# Patient Record
Sex: FEMALE | Race: OTHER | HISPANIC OR LATINO
[De-identification: names, ages, dates, MRNs, and addresses within clinical notes are randomized per-mention and may not be internally consistent; named-entity substitution may affect disease eponyms.]

---

## 2017-01-30 ENCOUNTER — APPOINTMENT (OUTPATIENT)
Dept: ENDOCRINOLOGY | Facility: CLINIC | Age: 47
End: 2017-01-30

## 2017-03-27 ENCOUNTER — APPOINTMENT (OUTPATIENT)
Dept: ENDOCRINOLOGY | Facility: CLINIC | Age: 47
End: 2017-03-27

## 2017-09-11 ENCOUNTER — INPATIENT (INPATIENT)
Facility: HOSPITAL | Age: 47
LOS: 9 days | Discharge: HOME HEALTH | End: 2017-09-21
Attending: SURGERY | Admitting: SURGERY
Payer: MEDICARE

## 2017-09-11 VITALS
SYSTOLIC BLOOD PRESSURE: 137 MMHG | RESPIRATION RATE: 21 BRPM | HEART RATE: 115 BPM | TEMPERATURE: 99 F | DIASTOLIC BLOOD PRESSURE: 59 MMHG | OXYGEN SATURATION: 100 %

## 2017-09-11 DIAGNOSIS — Z98.890 OTHER SPECIFIED POSTPROCEDURAL STATES: Chronic | ICD-10-CM

## 2017-09-11 DIAGNOSIS — A41.9 SEPSIS, UNSPECIFIED ORGANISM: ICD-10-CM

## 2017-09-11 LAB
ALBUMIN SERPL ELPH-MCNC: 3.3 G/DL — SIGNIFICANT CHANGE UP (ref 3.3–5)
ALBUMIN SERPL ELPH-MCNC: 4.2 G/DL — SIGNIFICANT CHANGE UP (ref 3.3–5)
ALP SERPL-CCNC: 101 U/L — SIGNIFICANT CHANGE UP (ref 40–120)
ALP SERPL-CCNC: 127 U/L — HIGH (ref 40–120)
ALT FLD-CCNC: 21 U/L — SIGNIFICANT CHANGE UP (ref 4–33)
ALT FLD-CCNC: 24 U/L — SIGNIFICANT CHANGE UP (ref 4–33)
APPEARANCE UR: CLEAR — SIGNIFICANT CHANGE UP
APTT BLD: 29.6 SEC — SIGNIFICANT CHANGE UP (ref 27.5–37.4)
AST SERPL-CCNC: 20 U/L — SIGNIFICANT CHANGE UP (ref 4–32)
AST SERPL-CCNC: 23 U/L — SIGNIFICANT CHANGE UP (ref 4–32)
B-OH-BUTYR SERPL-SCNC: 5.9 MMOL/L — HIGH (ref 0–0.4)
B-OH-BUTYR SERPL-SCNC: 6.2 MMOL/L — HIGH (ref 0–0.4)
BACTERIA # UR AUTO: SIGNIFICANT CHANGE UP
BASE EXCESS BLDA CALC-SCNC: -12.3 MMOL/L — SIGNIFICANT CHANGE UP
BASE EXCESS BLDA CALC-SCNC: -14.2 MMOL/L — SIGNIFICANT CHANGE UP
BASE EXCESS BLDV CALC-SCNC: -16 MMOL/L — SIGNIFICANT CHANGE UP
BASE EXCESS BLDV CALC-SCNC: -17.2 MMOL/L — SIGNIFICANT CHANGE UP
BASOPHILS # BLD AUTO: 0.07 K/UL — SIGNIFICANT CHANGE UP (ref 0–0.2)
BASOPHILS NFR BLD AUTO: 0.3 % — SIGNIFICANT CHANGE UP (ref 0–2)
BASOPHILS NFR SPEC: 0 % — SIGNIFICANT CHANGE UP (ref 0–2)
BILIRUB SERPL-MCNC: 1 MG/DL — SIGNIFICANT CHANGE UP (ref 0.2–1.2)
BILIRUB SERPL-MCNC: 1.1 MG/DL — SIGNIFICANT CHANGE UP (ref 0.2–1.2)
BILIRUB UR-MCNC: NEGATIVE — SIGNIFICANT CHANGE UP
BLD GP AB SCN SERPL QL: NEGATIVE — SIGNIFICANT CHANGE UP
BLOOD GAS VENOUS - CREATININE: 0.53 MG/DL — SIGNIFICANT CHANGE UP (ref 0.5–1.3)
BLOOD GAS VENOUS - CREATININE: 0.6 MG/DL — SIGNIFICANT CHANGE UP (ref 0.5–1.3)
BLOOD UR QL VISUAL: HIGH
BUN SERPL-MCNC: 10 MG/DL — SIGNIFICANT CHANGE UP (ref 7–23)
BUN SERPL-MCNC: 9 MG/DL — SIGNIFICANT CHANGE UP (ref 7–23)
BUN SERPL-MCNC: 9 MG/DL — SIGNIFICANT CHANGE UP (ref 7–23)
CA-I BLDA-SCNC: 1.14 MMOL/L — LOW (ref 1.15–1.29)
CA-I BLDA-SCNC: 1.15 MMOL/L — SIGNIFICANT CHANGE UP (ref 1.15–1.29)
CALCIUM SERPL-MCNC: 7.7 MG/DL — LOW (ref 8.4–10.5)
CALCIUM SERPL-MCNC: 7.8 MG/DL — LOW (ref 8.4–10.5)
CALCIUM SERPL-MCNC: 9 MG/DL — SIGNIFICANT CHANGE UP (ref 8.4–10.5)
CHLORIDE BLDV-SCNC: 101 MMOL/L — SIGNIFICANT CHANGE UP (ref 96–108)
CHLORIDE BLDV-SCNC: 99 MMOL/L — SIGNIFICANT CHANGE UP (ref 96–108)
CHLORIDE SERPL-SCNC: 102 MMOL/L — SIGNIFICANT CHANGE UP (ref 98–107)
CHLORIDE SERPL-SCNC: 102 MMOL/L — SIGNIFICANT CHANGE UP (ref 98–107)
CHLORIDE SERPL-SCNC: 89 MMOL/L — LOW (ref 98–107)
CO2 SERPL-SCNC: 10 MMOL/L — CRITICAL LOW (ref 22–31)
CO2 SERPL-SCNC: 14 MMOL/L — LOW (ref 22–31)
CO2 SERPL-SCNC: 19 MMOL/L — LOW (ref 22–31)
COLOR SPEC: SIGNIFICANT CHANGE UP
CREAT SERPL-MCNC: 0.56 MG/DL — SIGNIFICANT CHANGE UP (ref 0.5–1.3)
CREAT SERPL-MCNC: 0.63 MG/DL — SIGNIFICANT CHANGE UP (ref 0.5–1.3)
CREAT SERPL-MCNC: 0.72 MG/DL — SIGNIFICANT CHANGE UP (ref 0.5–1.3)
EOSINOPHIL # BLD AUTO: 0 K/UL — SIGNIFICANT CHANGE UP (ref 0–0.5)
EOSINOPHIL NFR BLD AUTO: 0 % — SIGNIFICANT CHANGE UP (ref 0–6)
EOSINOPHIL NFR FLD: 0 % — SIGNIFICANT CHANGE UP (ref 0–6)
GAS PNL BLDV: 127 MMOL/L — LOW (ref 136–146)
GAS PNL BLDV: 129 MMOL/L — LOW (ref 136–146)
GLUCOSE BLDA-MCNC: 202 MG/DL — HIGH (ref 70–99)
GLUCOSE BLDA-MCNC: 228 MG/DL — HIGH (ref 70–99)
GLUCOSE BLDV-MCNC: 376 — HIGH (ref 70–99)
GLUCOSE BLDV-MCNC: 376 — HIGH (ref 70–99)
GLUCOSE SERPL-MCNC: 199 MG/DL — HIGH (ref 70–99)
GLUCOSE SERPL-MCNC: 268 MG/DL — HIGH (ref 70–99)
GLUCOSE SERPL-MCNC: 357 MG/DL — HIGH (ref 70–99)
GLUCOSE UR-MCNC: >1000 — SIGNIFICANT CHANGE UP
GRAM STN SPEC: SIGNIFICANT CHANGE UP
GRAM STN SPEC: SIGNIFICANT CHANGE UP
GRAM STN WND: SIGNIFICANT CHANGE UP
GRAM STN WND: SIGNIFICANT CHANGE UP
GRAN CASTS # UR COMP ASSIST: SIGNIFICANT CHANGE UP
HCO3 BLDA-SCNC: 14 MMOL/L — LOW (ref 22–26)
HCO3 BLDA-SCNC: 15 MMOL/L — LOW (ref 22–26)
HCO3 BLDV-SCNC: 13 MMOL/L — LOW (ref 20–27)
HCO3 BLDV-SCNC: 13 MMOL/L — LOW (ref 20–27)
HCT VFR BLD CALC: 37.2 % — SIGNIFICANT CHANGE UP (ref 34.5–45)
HCT VFR BLD CALC: 42.1 % — SIGNIFICANT CHANGE UP (ref 34.5–45)
HCT VFR BLDA CALC: 37.7 % — SIGNIFICANT CHANGE UP (ref 34.5–46.5)
HCT VFR BLDA CALC: 39.7 % — SIGNIFICANT CHANGE UP (ref 34.5–46.5)
HCT VFR BLDV CALC: 44.2 % — SIGNIFICANT CHANGE UP (ref 34.5–45)
HCT VFR BLDV CALC: 48 % — HIGH (ref 34.5–45)
HGB BLD-MCNC: 12.7 G/DL — SIGNIFICANT CHANGE UP (ref 11.5–15.5)
HGB BLD-MCNC: 14.3 G/DL — SIGNIFICANT CHANGE UP (ref 11.5–15.5)
HGB BLDA-MCNC: 12.3 G/DL — SIGNIFICANT CHANGE UP (ref 11.5–15.5)
HGB BLDA-MCNC: 12.9 G/DL — SIGNIFICANT CHANGE UP (ref 11.5–15.5)
HGB BLDV-MCNC: 14.4 G/DL — SIGNIFICANT CHANGE UP (ref 11.5–15.5)
HGB BLDV-MCNC: 15.7 G/DL — HIGH (ref 11.5–15.5)
HYALINE CASTS # UR AUTO: SIGNIFICANT CHANGE UP (ref 0–?)
IMM GRANULOCYTES # BLD AUTO: 0.93 # — SIGNIFICANT CHANGE UP
IMM GRANULOCYTES NFR BLD AUTO: 3.5 % — HIGH (ref 0–1.5)
INR BLD: 1.22 — HIGH (ref 0.88–1.17)
KETONES UR-MCNC: SIGNIFICANT CHANGE UP
LACTATE BLDA-SCNC: 2.5 MMOL/L — HIGH (ref 0.5–2)
LACTATE BLDV-MCNC: 3.2 MMOL/L — HIGH (ref 0.5–2)
LACTATE BLDV-MCNC: 5.7 MMOL/L — CRITICAL HIGH (ref 0.5–2)
LEUKOCYTE ESTERASE UR-ACNC: NEGATIVE — SIGNIFICANT CHANGE UP
LYMPHOCYTES # BLD AUTO: 0.62 K/UL — LOW (ref 1–3.3)
LYMPHOCYTES # BLD AUTO: 2.3 % — LOW (ref 13–44)
LYMPHOCYTES NFR SPEC AUTO: 2 % — LOW (ref 13–44)
MAGNESIUM SERPL-MCNC: 1.7 MG/DL — SIGNIFICANT CHANGE UP (ref 1.6–2.6)
MCHC RBC-ENTMCNC: 31.9 PG — SIGNIFICANT CHANGE UP (ref 27–34)
MCHC RBC-ENTMCNC: 32.4 PG — SIGNIFICANT CHANGE UP (ref 27–34)
MCHC RBC-ENTMCNC: 34 % — SIGNIFICANT CHANGE UP (ref 32–36)
MCHC RBC-ENTMCNC: 34.1 % — SIGNIFICANT CHANGE UP (ref 32–36)
MCV RBC AUTO: 94 FL — SIGNIFICANT CHANGE UP (ref 80–100)
MCV RBC AUTO: 94.9 FL — SIGNIFICANT CHANGE UP (ref 80–100)
MONOCYTES # BLD AUTO: 2.18 K/UL — HIGH (ref 0–0.9)
MONOCYTES NFR BLD AUTO: 8.2 % — SIGNIFICANT CHANGE UP (ref 2–14)
MONOCYTES NFR BLD: 4 % — SIGNIFICANT CHANGE UP (ref 2–9)
MORPHOLOGY BLD-IMP: NORMAL — SIGNIFICANT CHANGE UP
MUCOUS THREADS # UR AUTO: SIGNIFICANT CHANGE UP
NEUTROPHIL AB SER-ACNC: 82 % — HIGH (ref 43–77)
NEUTROPHILS # BLD AUTO: 22.7 K/UL — HIGH (ref 1.8–7.4)
NEUTROPHILS NFR BLD AUTO: 85.7 % — HIGH (ref 43–77)
NEUTS BAND # BLD: 12 % — HIGH (ref 0–6)
NITRITE UR-MCNC: NEGATIVE — SIGNIFICANT CHANGE UP
NRBC # FLD: 0 — SIGNIFICANT CHANGE UP
NRBC # FLD: 0 — SIGNIFICANT CHANGE UP
PCO2 BLDA: 29 MMHG — LOW (ref 32–48)
PCO2 BLDA: 31 MMHG — LOW (ref 32–48)
PCO2 BLDV: 16 MMHG — LOW (ref 41–51)
PCO2 BLDV: 23 MMHG — LOW (ref 41–51)
PH BLDA: 7.22 PH — LOW (ref 7.35–7.45)
PH BLDA: 7.28 PH — LOW (ref 7.35–7.45)
PH BLDV: 7.25 PH — LOW (ref 7.32–7.43)
PH BLDV: 7.32 PH — SIGNIFICANT CHANGE UP (ref 7.32–7.43)
PH UR: 5.5 — SIGNIFICANT CHANGE UP (ref 4.6–8)
PHOSPHATE SERPL-MCNC: 0.8 MG/DL — CRITICAL LOW (ref 2.5–4.5)
PLATELET # BLD AUTO: 166 K/UL — SIGNIFICANT CHANGE UP (ref 150–400)
PLATELET # BLD AUTO: 214 K/UL — SIGNIFICANT CHANGE UP (ref 150–400)
PLATELET CLUMP BLD QL SMEAR: SLIGHT — SIGNIFICANT CHANGE UP
PLATELET COUNT - ESTIMATE: NORMAL — SIGNIFICANT CHANGE UP
PMV BLD: 10.2 FL — SIGNIFICANT CHANGE UP (ref 7–13)
PMV BLD: 9.8 FL — SIGNIFICANT CHANGE UP (ref 7–13)
PO2 BLDA: 132 MMHG — HIGH (ref 83–108)
PO2 BLDA: 154 MMHG — HIGH (ref 83–108)
PO2 BLDV: 178 MMHG — HIGH (ref 35–40)
PO2 BLDV: 47 MMHG — HIGH (ref 35–40)
POTASSIUM BLDA-SCNC: 3.8 MMOL/L — SIGNIFICANT CHANGE UP (ref 3.4–4.5)
POTASSIUM BLDA-SCNC: 3.8 MMOL/L — SIGNIFICANT CHANGE UP (ref 3.4–4.5)
POTASSIUM BLDV-SCNC: 3.9 MMOL/L — SIGNIFICANT CHANGE UP (ref 3.4–4.5)
POTASSIUM BLDV-SCNC: 4 MMOL/L — SIGNIFICANT CHANGE UP (ref 3.4–4.5)
POTASSIUM SERPL-MCNC: 3.8 MMOL/L — SIGNIFICANT CHANGE UP (ref 3.5–5.3)
POTASSIUM SERPL-MCNC: 3.9 MMOL/L — SIGNIFICANT CHANGE UP (ref 3.5–5.3)
POTASSIUM SERPL-MCNC: 4 MMOL/L — SIGNIFICANT CHANGE UP (ref 3.5–5.3)
POTASSIUM SERPL-SCNC: 3.8 MMOL/L — SIGNIFICANT CHANGE UP (ref 3.5–5.3)
POTASSIUM SERPL-SCNC: 3.9 MMOL/L — SIGNIFICANT CHANGE UP (ref 3.5–5.3)
POTASSIUM SERPL-SCNC: 4 MMOL/L — SIGNIFICANT CHANGE UP (ref 3.5–5.3)
PROT SERPL-MCNC: 7.1 G/DL — SIGNIFICANT CHANGE UP (ref 6–8.3)
PROT SERPL-MCNC: 8.3 G/DL — SIGNIFICANT CHANGE UP (ref 6–8.3)
PROT UR-MCNC: 100 — HIGH
PROTHROM AB SERPL-ACNC: 13.7 SEC — HIGH (ref 9.8–13.1)
RBC # BLD: 3.92 M/UL — SIGNIFICANT CHANGE UP (ref 3.8–5.2)
RBC # BLD: 4.48 M/UL — SIGNIFICANT CHANGE UP (ref 3.8–5.2)
RBC # FLD: 11.5 % — SIGNIFICANT CHANGE UP (ref 10.3–14.5)
RBC # FLD: 11.7 % — SIGNIFICANT CHANGE UP (ref 10.3–14.5)
RBC CASTS # UR COMP ASSIST: SIGNIFICANT CHANGE UP (ref 0–?)
REVIEW TO FOLLOW: YES — SIGNIFICANT CHANGE UP
RH IG SCN BLD-IMP: POSITIVE — SIGNIFICANT CHANGE UP
SAO2 % BLDA: 97.9 % — SIGNIFICANT CHANGE UP (ref 95–99)
SAO2 % BLDA: 98.9 % — SIGNIFICANT CHANGE UP (ref 95–99)
SAO2 % BLDV: 75.2 % — SIGNIFICANT CHANGE UP (ref 60–85)
SAO2 % BLDV: 99.3 % — HIGH (ref 60–85)
SODIUM BLDA-SCNC: 129 MMOL/L — LOW (ref 136–146)
SODIUM BLDA-SCNC: 131 MMOL/L — LOW (ref 136–146)
SODIUM SERPL-SCNC: 131 MMOL/L — LOW (ref 135–145)
SODIUM SERPL-SCNC: 135 MMOL/L — SIGNIFICANT CHANGE UP (ref 135–145)
SODIUM SERPL-SCNC: 135 MMOL/L — SIGNIFICANT CHANGE UP (ref 135–145)
SP GR SPEC: 1.03 — SIGNIFICANT CHANGE UP (ref 1–1.03)
SPECIMEN SOURCE: SIGNIFICANT CHANGE UP
SQUAMOUS # UR AUTO: SIGNIFICANT CHANGE UP
UROBILINOGEN FLD QL: NORMAL E.U. — SIGNIFICANT CHANGE UP (ref 0.1–0.2)
WBC # BLD: 21.51 K/UL — HIGH (ref 3.8–10.5)
WBC # BLD: 26.5 K/UL — HIGH (ref 3.8–10.5)
WBC # FLD AUTO: 21.51 K/UL — HIGH (ref 3.8–10.5)
WBC # FLD AUTO: 26.5 K/UL — HIGH (ref 3.8–10.5)
WBC UR QL: SIGNIFICANT CHANGE UP (ref 0–?)

## 2017-09-11 PROCEDURE — 99291 CRITICAL CARE FIRST HOUR: CPT | Mod: 24

## 2017-09-11 PROCEDURE — 73551 X-RAY EXAM OF FEMUR 1: CPT | Mod: 26,LT

## 2017-09-11 PROCEDURE — 10060 I&D ABSCESS SIMPLE/SINGLE: CPT

## 2017-09-11 PROCEDURE — 99223 1ST HOSP IP/OBS HIGH 75: CPT | Mod: 25

## 2017-09-11 PROCEDURE — 74177 CT ABD & PELVIS W/CONTRAST: CPT | Mod: 26

## 2017-09-11 PROCEDURE — 73501 X-RAY EXAM HIP UNI 1 VIEW: CPT | Mod: 26,LT

## 2017-09-11 PROCEDURE — 99292 CRITICAL CARE ADDL 30 MIN: CPT

## 2017-09-11 RX ORDER — SODIUM CHLORIDE 9 MG/ML
1000 INJECTION, SOLUTION INTRAVENOUS
Qty: 0 | Refills: 0 | Status: DISCONTINUED | OUTPATIENT
Start: 2017-09-11 | End: 2017-09-11

## 2017-09-11 RX ORDER — SERTRALINE 25 MG/1
100 TABLET, FILM COATED ORAL EVERY 12 HOURS
Qty: 0 | Refills: 0 | Status: DISCONTINUED | OUTPATIENT
Start: 2017-09-11 | End: 2017-09-11

## 2017-09-11 RX ORDER — DIPHENHYDRAMINE HCL 50 MG
50 CAPSULE ORAL ONCE
Qty: 0 | Refills: 0 | Status: DISCONTINUED | OUTPATIENT
Start: 2017-09-11 | End: 2017-09-11

## 2017-09-11 RX ORDER — SERTRALINE 25 MG/1
100 TABLET, FILM COATED ORAL DAILY
Qty: 0 | Refills: 0 | Status: DISCONTINUED | OUTPATIENT
Start: 2017-09-11 | End: 2017-09-18

## 2017-09-11 RX ORDER — HEPARIN SODIUM 5000 [USP'U]/ML
5000 INJECTION INTRAVENOUS; SUBCUTANEOUS EVERY 8 HOURS
Qty: 0 | Refills: 0 | Status: DISCONTINUED | OUTPATIENT
Start: 2017-09-11 | End: 2017-09-18

## 2017-09-11 RX ORDER — INSULIN GLARGINE 100 [IU]/ML
35 INJECTION, SOLUTION SUBCUTANEOUS AT BEDTIME
Qty: 0 | Refills: 0 | Status: DISCONTINUED | OUTPATIENT
Start: 2017-09-11 | End: 2017-09-12

## 2017-09-11 RX ORDER — OXYCODONE HYDROCHLORIDE 5 MG/1
5 TABLET ORAL
Qty: 0 | Refills: 0 | Status: DISCONTINUED | OUTPATIENT
Start: 2017-09-11 | End: 2017-09-18

## 2017-09-11 RX ORDER — TRAZODONE HCL 50 MG
150 TABLET ORAL DAILY
Qty: 0 | Refills: 0 | Status: DISCONTINUED | OUTPATIENT
Start: 2017-09-11 | End: 2017-09-11

## 2017-09-11 RX ORDER — PIPERACILLIN AND TAZOBACTAM 4; .5 G/20ML; G/20ML
3.38 INJECTION, POWDER, LYOPHILIZED, FOR SOLUTION INTRAVENOUS EVERY 8 HOURS
Qty: 0 | Refills: 0 | Status: DISCONTINUED | OUTPATIENT
Start: 2017-09-11 | End: 2017-09-14

## 2017-09-11 RX ORDER — TRAZODONE HCL 50 MG
150 TABLET ORAL AT BEDTIME
Qty: 0 | Refills: 0 | Status: DISCONTINUED | OUTPATIENT
Start: 2017-09-11 | End: 2017-09-18

## 2017-09-11 RX ORDER — INSULIN HUMAN 100 [IU]/ML
7 INJECTION, SOLUTION SUBCUTANEOUS ONCE
Qty: 0 | Refills: 0 | Status: COMPLETED | OUTPATIENT
Start: 2017-09-11 | End: 2017-09-11

## 2017-09-11 RX ORDER — TOPIRAMATE 25 MG
50 TABLET ORAL DAILY
Qty: 0 | Refills: 0 | Status: DISCONTINUED | OUTPATIENT
Start: 2017-09-11 | End: 2017-09-11

## 2017-09-11 RX ORDER — FAMOTIDINE 10 MG/ML
20 INJECTION INTRAVENOUS ONCE
Qty: 0 | Refills: 0 | Status: COMPLETED | OUTPATIENT
Start: 2017-09-11 | End: 2017-09-11

## 2017-09-11 RX ORDER — ONDANSETRON 8 MG/1
4 TABLET, FILM COATED ORAL ONCE
Qty: 0 | Refills: 0 | Status: COMPLETED | OUTPATIENT
Start: 2017-09-11 | End: 2017-09-11

## 2017-09-11 RX ORDER — CLONAZEPAM 1 MG
0.5 TABLET ORAL ONCE
Qty: 0 | Refills: 0 | Status: DISCONTINUED | OUTPATIENT
Start: 2017-09-11 | End: 2017-09-11

## 2017-09-11 RX ORDER — SODIUM CHLORIDE 9 MG/ML
1000 INJECTION, SOLUTION INTRAVENOUS
Qty: 0 | Refills: 0 | Status: DISCONTINUED | OUTPATIENT
Start: 2017-09-11 | End: 2017-09-13

## 2017-09-11 RX ORDER — PIPERACILLIN AND TAZOBACTAM 4; .5 G/20ML; G/20ML
4.5 INJECTION, POWDER, LYOPHILIZED, FOR SOLUTION INTRAVENOUS ONCE
Qty: 0 | Refills: 0 | Status: DISCONTINUED | OUTPATIENT
Start: 2017-09-11 | End: 2017-09-11

## 2017-09-11 RX ORDER — SODIUM CHLORIDE 9 MG/ML
2000 INJECTION INTRAMUSCULAR; INTRAVENOUS; SUBCUTANEOUS ONCE
Qty: 0 | Refills: 0 | Status: COMPLETED | OUTPATIENT
Start: 2017-09-11 | End: 2017-09-11

## 2017-09-11 RX ORDER — INSULIN HUMAN 100 [IU]/ML
7 INJECTION, SOLUTION SUBCUTANEOUS
Qty: 100 | Refills: 0 | Status: DISCONTINUED | OUTPATIENT
Start: 2017-09-11 | End: 2017-09-12

## 2017-09-11 RX ORDER — PIPERACILLIN AND TAZOBACTAM 4; .5 G/20ML; G/20ML
3.38 INJECTION, POWDER, LYOPHILIZED, FOR SOLUTION INTRAVENOUS ONCE
Qty: 0 | Refills: 0 | Status: COMPLETED | OUTPATIENT
Start: 2017-09-11 | End: 2017-09-11

## 2017-09-11 RX ORDER — SERTRALINE 25 MG/1
100 TABLET, FILM COATED ORAL
Qty: 0 | Refills: 0 | Status: DISCONTINUED | OUTPATIENT
Start: 2017-09-11 | End: 2017-09-11

## 2017-09-11 RX ORDER — VANCOMYCIN HCL 1 G
1000 VIAL (EA) INTRAVENOUS ONCE
Qty: 0 | Refills: 0 | Status: COMPLETED | OUTPATIENT
Start: 2017-09-11 | End: 2017-09-11

## 2017-09-11 RX ORDER — HYDROCORTISONE 1 %
1 OINTMENT (GRAM) TOPICAL THREE TIMES A DAY
Qty: 0 | Refills: 0 | Status: DISCONTINUED | OUTPATIENT
Start: 2017-09-11 | End: 2017-09-21

## 2017-09-11 RX ORDER — SERTRALINE 25 MG/1
100 TABLET, FILM COATED ORAL DAILY
Qty: 0 | Refills: 0 | Status: DISCONTINUED | OUTPATIENT
Start: 2017-09-11 | End: 2017-09-11

## 2017-09-11 RX ORDER — MORPHINE SULFATE 50 MG/1
4 CAPSULE, EXTENDED RELEASE ORAL ONCE
Qty: 0 | Refills: 0 | Status: DISCONTINUED | OUTPATIENT
Start: 2017-09-11 | End: 2017-09-11

## 2017-09-11 RX ORDER — OXYCODONE HYDROCHLORIDE 5 MG/1
10 TABLET ORAL EVERY 4 HOURS
Qty: 0 | Refills: 0 | Status: DISCONTINUED | OUTPATIENT
Start: 2017-09-11 | End: 2017-09-18

## 2017-09-11 RX ORDER — HYDROCORTISONE 1 %
1 OINTMENT (GRAM) TOPICAL THREE TIMES A DAY
Qty: 0 | Refills: 0 | Status: DISCONTINUED | OUTPATIENT
Start: 2017-09-11 | End: 2017-09-11

## 2017-09-11 RX ORDER — VANCOMYCIN HCL 1 G
1000 VIAL (EA) INTRAVENOUS EVERY 12 HOURS
Qty: 0 | Refills: 0 | Status: DISCONTINUED | OUTPATIENT
Start: 2017-09-11 | End: 2017-09-13

## 2017-09-11 RX ORDER — TOPIRAMATE 25 MG
50 TABLET ORAL EVERY 12 HOURS
Qty: 0 | Refills: 0 | Status: DISCONTINUED | OUTPATIENT
Start: 2017-09-11 | End: 2017-09-18

## 2017-09-11 RX ORDER — POTASSIUM PHOSPHATE, MONOBASIC POTASSIUM PHOSPHATE, DIBASIC 236; 224 MG/ML; MG/ML
15 INJECTION, SOLUTION INTRAVENOUS ONCE
Qty: 0 | Refills: 0 | Status: COMPLETED | OUTPATIENT
Start: 2017-09-11 | End: 2017-09-11

## 2017-09-11 RX ORDER — MORPHINE SULFATE 50 MG/1
5 CAPSULE, EXTENDED RELEASE ORAL ONCE
Qty: 0 | Refills: 0 | Status: DISCONTINUED | OUTPATIENT
Start: 2017-09-11 | End: 2017-09-11

## 2017-09-11 RX ORDER — INSULIN HUMAN 100 [IU]/ML
7 INJECTION, SOLUTION SUBCUTANEOUS
Qty: 100 | Refills: 0 | Status: DISCONTINUED | OUTPATIENT
Start: 2017-09-11 | End: 2017-09-11

## 2017-09-11 RX ADMIN — POTASSIUM PHOSPHATE, MONOBASIC POTASSIUM PHOSPHATE, DIBASIC 62.5 MILLIMOLE(S): 236; 224 INJECTION, SOLUTION INTRAVENOUS at 21:24

## 2017-09-11 RX ADMIN — HEPARIN SODIUM 5000 UNIT(S): 5000 INJECTION INTRAVENOUS; SUBCUTANEOUS at 22:41

## 2017-09-11 RX ADMIN — Medication 50 MILLIGRAM(S): at 21:14

## 2017-09-11 RX ADMIN — INSULIN HUMAN 7 UNIT(S): 100 INJECTION, SOLUTION SUBCUTANEOUS at 16:02

## 2017-09-11 RX ADMIN — MORPHINE SULFATE 5 MILLIGRAM(S): 50 CAPSULE, EXTENDED RELEASE ORAL at 20:20

## 2017-09-11 RX ADMIN — Medication 100 MILLIGRAM(S): at 16:02

## 2017-09-11 RX ADMIN — SODIUM CHLORIDE 150 MILLILITER(S): 9 INJECTION, SOLUTION INTRAVENOUS at 19:50

## 2017-09-11 RX ADMIN — FAMOTIDINE 20 MILLIGRAM(S): 10 INJECTION INTRAVENOUS at 14:52

## 2017-09-11 RX ADMIN — Medication 100 MILLIGRAM(S): at 22:40

## 2017-09-11 RX ADMIN — OXYCODONE HYDROCHLORIDE 5 MILLIGRAM(S): 5 TABLET ORAL at 22:20

## 2017-09-11 RX ADMIN — Medication 125 MILLIGRAM(S): at 14:53

## 2017-09-11 RX ADMIN — ONDANSETRON 4 MILLIGRAM(S): 8 TABLET, FILM COATED ORAL at 14:53

## 2017-09-11 RX ADMIN — MORPHINE SULFATE 4 MILLIGRAM(S): 50 CAPSULE, EXTENDED RELEASE ORAL at 14:53

## 2017-09-11 RX ADMIN — PIPERACILLIN AND TAZOBACTAM 200 GRAM(S): 4; .5 INJECTION, POWDER, LYOPHILIZED, FOR SOLUTION INTRAVENOUS at 14:53

## 2017-09-11 RX ADMIN — OXYCODONE HYDROCHLORIDE 5 MILLIGRAM(S): 5 TABLET ORAL at 21:40

## 2017-09-11 RX ADMIN — Medication 2 MILLIGRAM(S): at 14:52

## 2017-09-11 RX ADMIN — MORPHINE SULFATE 5 MILLIGRAM(S): 50 CAPSULE, EXTENDED RELEASE ORAL at 20:09

## 2017-09-11 RX ADMIN — Medication 1 APPLICATION(S): at 21:49

## 2017-09-11 RX ADMIN — Medication 150 MILLIGRAM(S): at 21:15

## 2017-09-11 RX ADMIN — SODIUM CHLORIDE 250 MILLILITER(S): 9 INJECTION, SOLUTION INTRAVENOUS at 16:04

## 2017-09-11 RX ADMIN — Medication 0.5 MILLIGRAM(S): at 21:14

## 2017-09-11 RX ADMIN — MORPHINE SULFATE 4 MILLIGRAM(S): 50 CAPSULE, EXTENDED RELEASE ORAL at 15:34

## 2017-09-11 RX ADMIN — INSULIN HUMAN 7 UNIT(S)/HR: 100 INJECTION, SOLUTION SUBCUTANEOUS at 16:04

## 2017-09-11 RX ADMIN — Medication 250 MILLIGRAM(S): at 19:50

## 2017-09-11 RX ADMIN — INSULIN GLARGINE 35 UNIT(S): 100 INJECTION, SOLUTION SUBCUTANEOUS at 23:21

## 2017-09-11 RX ADMIN — INSULIN HUMAN 3 UNIT(S)/HR: 100 INJECTION, SOLUTION SUBCUTANEOUS at 19:50

## 2017-09-11 RX ADMIN — SODIUM CHLORIDE 2000 MILLILITER(S): 9 INJECTION INTRAMUSCULAR; INTRAVENOUS; SUBCUTANEOUS at 14:53

## 2017-09-11 NOTE — ED ADULT NURSE REASSESSMENT NOTE - NS ED NURSE REASSESS COMMENT FT1
Pt is alert and oriented x 3 a triple lumen catheter was inserted under sterile technique by  without any difficulties 1/2 normal saline was initiated as ordered a insulin drip was initiated at 7 units/hr via an alaris pump the pt was then sent to the OR with [facility] transport.

## 2017-09-11 NOTE — ED PROVIDER NOTE - MEDICAL DECISION MAKING DETAILS
47F with large abscess with surrounding cellulitis concerning for necrotizing fasciaitits or forniers gangrene, allergic reaction, alcohol withdrawal and uncontrolled DM vs HONK. will treat for sepsis empirically and r/o free air w/in abscess. sx consult placed. check xray left hip and pelvis, ct a/p w/IV contrast, IVF bolus, IV zosyn, clindamycin, vanco. Zofran, Ativan, Pepcid, steroids. benadryl given by EMS. check cbc, cmp, vbg, beta-hydroxy buterate, blood cx. perez insertion for I/O monitoring. POCT ucg 47F with large abscess with surrounding cellulitis concerning for necrotizing fasciaitits or forniers gangrene, allergic reaction, alcohol withdrawal and uncontrolled DM vs HONK. will treat for sepsis empirically and r/o free air w/in abscess. urgent sx consult placed. check xray left hip and pelvis, ct a/p w/IV contrast, IVF bolus, IV zosyn, clindamycin, vanco. Zofran, Ativan, Pepcid, steroids. benadryl given by EMS. check cbc, cmp, vbg, beta-hydroxy buterate, blood cx. perez insertion for I/O monitoring. POCT ucg

## 2017-09-11 NOTE — ED PROVIDER NOTE - PROGRESS NOTE DETAILS
KANDACE Note: pw w/ Hx DM, ETOH abuse pw allergy.  Pt w/ abscess/cellulitis to LLE, took bactrim today, developed hives and came in.  Pt w/ diffuse cellulitis to proximal L thigh w/ large area of induration/fluctance.  chaperone for exam, MD Lior KANDACE Note: pw w/ Hx DM, ETOH abuse pw allergy.  Pt w/ abscess/cellulitis to LLE, took bactrim today, developed hives and came in.  Pt w/ diffuse cellulitis to proximal L thigh w/ large area of induration/fluctance.  chaperone for exam, MD Lior  concern nec fasc vs fourniers, surg consulted, abx started, CT pending -

## 2017-09-11 NOTE — H&P ADULT - NSHPLABSRESULTS_GEN_ALL_CORE
14.3   26.50 )-----------( 214      ( 11 Sep 2017 13:25 )             42.1     11 Sep 2017 13:25    131    |  89     |  10     ----------------------------<  357    3.8     |  10     |  0.72     Ca    9.0        11 Sep 2017 13:25    TPro  8.3    /  Alb  4.2    /  TBili  1.1    /  DBili  x      /  AST  23     /  ALT  24     /  AlkPhos  127    11 Sep 2017 13:25    LIVER FUNCTIONS - ( 11 Sep 2017 13:25 )  Alb: 4.2 g/dL / Pro: 8.3 g/dL / ALK PHOS: 127 u/L / ALT: 24 u/L / AST: 23 u/L / GGT: x             CAPILLARY BLOOD GLUCOSE   XRAY reads pending

## 2017-09-11 NOTE — ED PROCEDURE NOTE - CPROC ED INFUS LINE DETAIL1
The location was identified, and the area was draped and prepped./The catheter was placed using sterile technique./The guidewire was recovered./Ultrasound guidance was used during placement./All lumen(s) aspirated and flushed without difficulty.
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
## 2017-09-11 NOTE — H&P ADULT - ATTENDING COMMENTS
Patient seen and examined.  Chart and note reviewed.  Case discussed with B team and SICU team.    Sepsis secondary to necrotizing left groin abscess r/o necrotizing infection  a.  Patient underwent drainage and wound exploration with closure of Penrose drain  b.  Nil per os  c.  Continue vancomycin, zosyn, and clindamycin  d.  Follow up cultures    DM uncontrolled  a.  On insulin gtt, titrate to glucose <180 mg/dl  b.  Check FS Q 6 at this time  c.  Follow ABG, ketone bodies

## 2017-09-11 NOTE — H&P ADULT - NSHPREVIEWOFSYSTEMS_GEN_ALL_CORE
She is having nausea, vomiting, subjective fevers and chills. she has mild shortness of breath with no chest pain.

## 2017-09-11 NOTE — H&P ADULT - ASSESSMENT
47 year old woman with presented with severe allergic reaction to Bactrim, large left groin abscess, history of diabetes and alcohol abuse. Presentation possibly complicated DKA and alcohol withdrawal    Plan:    - emergent OR for incision and drainage of left groin abscess  - NPO      -d/w Dr. Layla MD, PGY-2 #[id number]

## 2017-09-11 NOTE — H&P ADULT - HISTORY OF PRESENT ILLNESS
47 year old woman presented after having an allergic reaction including hives and shortness of breath after taking Bactrim. She went to an urgent care center for an abscess on the left groin, fevers and chills for the last 5 days. She also states that she has had nausea and vomiting over the last couple of days and was unable to keep anything down including her medications. The abscess was spontaneously draining before, but currently it is not draining. She has had an abscess in the same location before but it was not as large. She is having severe pain at the site. She has a PMH of diabetes for 10 years and alcohol abuse for approximately 10 years. Her pattern of misuse is drinking heavily and binging for 5 days followed by abstinence for a few days. She has attempted to quit. She doesn't remember the last drink she had but thinks it may have been 2 days ago.

## 2017-09-11 NOTE — ED PROVIDER NOTE - ATTENDING CONTRIBUTION TO CARE
Dr. Aguilar: I have personally performed a face to face bedside history and physical examination of this patient. I have discussed the history, examination, review of systems, assessment and plan of management with the resident. I have reviewed the electronic medical record and amended it to reflect my history, review of systems, physical exam, assessment and plan.     Attending Exam - Dr. Aguilar: GEN: ill appearing, slightly tremulous  HEENT: +PERRL, EOMI  RESP: CTAB, no signs of resipiratory distress CV: tachycardic s1s2 ABD: soft/non tender/non distended  MSK: no deformities / swelling, normal range of motion, spine grossly normal NEURO: alert, non focal exam SKIN: L thigh large area of erythema/induration with opening with small amount purulent DC, erythema extending to groin and around to gluteal fold.

## 2017-09-11 NOTE — H&P ADULT - NSHPSOCIALHISTORY_GEN_ALL_CORE
Patient has a history of alcohol misuse as stated above. Does not smoke cigarettes or use illicit drugs. Currently unemployed and lives with mother.

## 2017-09-11 NOTE — ED ADULT NURSE NOTE - OBJECTIVE STATEMENT
break coverage: pt received to room #21 with c/o of allergic reaction. hives noted to b/l thighs, given 50mg benadryl by EMS. pt states she was started on bactrim for treatment of abscess. abscess noted to inner right thigh, warm to touch, red, with purulent drainage.  pt states she drinks 1 pt of vodka daily, has not drank since Friday. resting tremors noted. pt appears anxious. placed on 2L NC for comfort. pt aox4, CIWA noted. on H M, Sinus tach noted. VSS. NS from EMS infusing. will cont to monitor.

## 2017-09-11 NOTE — H&P ADULT - NSHPPHYSICALEXAM_GEN_ALL_CORE
General: alert and oriented, in acute respiratory distress with tremors  Resp: airway patent, respirations labored  CVS: regular rate and tachycardic to 110's  Abdomen: soft, nontender, nondistended  Groin: left groin with swelling, erythema and fluctuance, small ulceration with minimal drainage at the gluteus; very tender with induration  Extremities: no edema  Skin: warm, dry, appropriate color

## 2017-09-11 NOTE — CONSULT NOTE ADULT - ATTENDING COMMENTS
Patient seen and examined.  Chart and note reviewed.  Case discussed with B team and SICU team.    Sepsis secondary to necrotizing left groin abscess r/o necrotizing infection  a.  Patient underwent drainage and wound exploration with closure of Penrose drain  b.  Nil per os  c.  Continue vancomycin, zosyn, and clindamycin  d.  Follow up cultures    DM uncontrolled  a.  On insulin gtt, titrate to glucose <180 mg/dl  b.  Check FS Q 6 at this time  c.  Follow ABG, ketone bodies    Spent 90 minutes in critical care Patient seen and examined.  Chart and note reviewed.  Case discussed with B team and SICU team.    Sepsis secondary to necrotizing left groin abscess r/o necrotizing infection  a.  Patient underwent drainage and wound exploration with closure of Penrose drain  b.  Nil per os  c.  Continue vancomycin, zosyn, and clindamycin  d.  Follow up cultures    DM/DKA uncontrolled  a.  On insulin gtt, titrate to glucose <180 mg/dl  b.  Check FS Q 6 at this time  c.  Follow ABG, ketone bodies    Spent 90 minutes in critical care    The patient is a critical care patient with hemodynamic and metabolic instability in SICU.  I have personally interviewed and examined this patient, reviewed labs and x-rays, discussed with other consultants, House staff and PA's.  I spent   77  minutes  in total providing critical care for the diagnoses, assessment and plan above.  These diagnoses are unrelated to the surgical procedure noted above.  I met with family     min to get further history and make care decisions for this patient who is unable to participate due to altered mental status.  Time involved in performance of separately billable procedures was not counted toward my critical care time.  There is no overlap.

## 2017-09-11 NOTE — ED PROVIDER NOTE - FAMILY HISTORY
Mother  Still living? Unknown  Family history of diabetes mellitus in mother, Age at diagnosis: Age Unknown

## 2017-09-11 NOTE — BRIEF OPERATIVE NOTE - OPERATION/FINDINGS
Preop: Extensive soft tissue induration in left groin. Concern for necrotizing fasciitis.  Intraop: Two incisions made into suspicious areas. Some pus expressed (~10cc), sent for culture. Fascia explored and appeared viable and intact. No evidence of fascial infection. Two small pieces of fascia excised and sent for culture.    Penrose drain placed to connect the two incisions subcutaneously. Both incisions closed loosely with interrupted vertical mattress nylon sutures.

## 2017-09-12 LAB
BUN SERPL-MCNC: 10 MG/DL — SIGNIFICANT CHANGE UP (ref 7–23)
BUN SERPL-MCNC: 12 MG/DL — SIGNIFICANT CHANGE UP (ref 7–23)
BUN SERPL-MCNC: 13 MG/DL — SIGNIFICANT CHANGE UP (ref 7–23)
CA-I BLD-SCNC: 1.07 MMOL/L — SIGNIFICANT CHANGE UP (ref 1.03–1.23)
CALCIUM SERPL-MCNC: 7.8 MG/DL — LOW (ref 8.4–10.5)
CALCIUM SERPL-MCNC: 7.9 MG/DL — LOW (ref 8.4–10.5)
CALCIUM SERPL-MCNC: 7.9 MG/DL — LOW (ref 8.4–10.5)
CHLORIDE SERPL-SCNC: 101 MMOL/L — SIGNIFICANT CHANGE UP (ref 98–107)
CHLORIDE SERPL-SCNC: 103 MMOL/L — SIGNIFICANT CHANGE UP (ref 98–107)
CHLORIDE SERPL-SCNC: 104 MMOL/L — SIGNIFICANT CHANGE UP (ref 98–107)
CO2 SERPL-SCNC: 18 MMOL/L — LOW (ref 22–31)
CO2 SERPL-SCNC: 21 MMOL/L — LOW (ref 22–31)
CO2 SERPL-SCNC: 22 MMOL/L — SIGNIFICANT CHANGE UP (ref 22–31)
CREAT SERPL-MCNC: 0.53 MG/DL — SIGNIFICANT CHANGE UP (ref 0.5–1.3)
CREAT SERPL-MCNC: 0.61 MG/DL — SIGNIFICANT CHANGE UP (ref 0.5–1.3)
CREAT SERPL-MCNC: 0.62 MG/DL — SIGNIFICANT CHANGE UP (ref 0.5–1.3)
GLUCOSE SERPL-MCNC: 174 MG/DL — HIGH (ref 70–99)
GLUCOSE SERPL-MCNC: 281 MG/DL — HIGH (ref 70–99)
GLUCOSE SERPL-MCNC: 392 MG/DL — HIGH (ref 70–99)
HCT VFR BLD CALC: 33.3 % — LOW (ref 34.5–45)
HGB BLD-MCNC: 11.4 G/DL — LOW (ref 11.5–15.5)
LACTATE SERPL-SCNC: 1.5 MMOL/L — SIGNIFICANT CHANGE UP (ref 0.5–2)
MAGNESIUM SERPL-MCNC: 1.5 MG/DL — LOW (ref 1.6–2.6)
MAGNESIUM SERPL-MCNC: 2.5 MG/DL — SIGNIFICANT CHANGE UP (ref 1.6–2.6)
MAGNESIUM SERPL-MCNC: 3 MG/DL — HIGH (ref 1.6–2.6)
MCHC RBC-ENTMCNC: 32.6 PG — SIGNIFICANT CHANGE UP (ref 27–34)
MCHC RBC-ENTMCNC: 34.2 % — SIGNIFICANT CHANGE UP (ref 32–36)
MCV RBC AUTO: 95.1 FL — SIGNIFICANT CHANGE UP (ref 80–100)
NRBC # FLD: 0 — SIGNIFICANT CHANGE UP
PHOSPHATE SERPL-MCNC: 0.5 MG/DL — CRITICAL LOW (ref 2.5–4.5)
PHOSPHATE SERPL-MCNC: 1.1 MG/DL — LOW (ref 2.5–4.5)
PHOSPHATE SERPL-MCNC: 1.3 MG/DL — LOW (ref 2.5–4.5)
PLATELET # BLD AUTO: 160 K/UL — SIGNIFICANT CHANGE UP (ref 150–400)
PMV BLD: 10.2 FL — SIGNIFICANT CHANGE UP (ref 7–13)
POTASSIUM SERPL-MCNC: 3.7 MMOL/L — SIGNIFICANT CHANGE UP (ref 3.5–5.3)
POTASSIUM SERPL-MCNC: 3.9 MMOL/L — SIGNIFICANT CHANGE UP (ref 3.5–5.3)
POTASSIUM SERPL-MCNC: 4.1 MMOL/L — SIGNIFICANT CHANGE UP (ref 3.5–5.3)
POTASSIUM SERPL-SCNC: 3.7 MMOL/L — SIGNIFICANT CHANGE UP (ref 3.5–5.3)
POTASSIUM SERPL-SCNC: 3.9 MMOL/L — SIGNIFICANT CHANGE UP (ref 3.5–5.3)
POTASSIUM SERPL-SCNC: 4.1 MMOL/L — SIGNIFICANT CHANGE UP (ref 3.5–5.3)
RBC # BLD: 3.5 M/UL — LOW (ref 3.8–5.2)
RBC # FLD: 11.7 % — SIGNIFICANT CHANGE UP (ref 10.3–14.5)
SODIUM SERPL-SCNC: 135 MMOL/L — SIGNIFICANT CHANGE UP (ref 135–145)
SODIUM SERPL-SCNC: 136 MMOL/L — SIGNIFICANT CHANGE UP (ref 135–145)
SODIUM SERPL-SCNC: 137 MMOL/L — SIGNIFICANT CHANGE UP (ref 135–145)
SPECIMEN SOURCE: SIGNIFICANT CHANGE UP
WBC # BLD: 18.88 K/UL — HIGH (ref 3.8–10.5)
WBC # FLD AUTO: 18.88 K/UL — HIGH (ref 3.8–10.5)

## 2017-09-12 PROCEDURE — 99291 CRITICAL CARE FIRST HOUR: CPT

## 2017-09-12 PROCEDURE — 99292 CRITICAL CARE ADDL 30 MIN: CPT

## 2017-09-12 RX ORDER — DEXTROSE 50 % IN WATER 50 %
1 SYRINGE (ML) INTRAVENOUS ONCE
Qty: 0 | Refills: 0 | Status: DISCONTINUED | OUTPATIENT
Start: 2017-09-12 | End: 2017-09-21

## 2017-09-12 RX ORDER — SODIUM,POTASSIUM PHOSPHATES 278-250MG
1 POWDER IN PACKET (EA) ORAL ONCE
Qty: 0 | Refills: 0 | Status: COMPLETED | OUTPATIENT
Start: 2017-09-12 | End: 2017-09-12

## 2017-09-12 RX ORDER — CLONAZEPAM 1 MG
0.5 TABLET ORAL EVERY 8 HOURS
Qty: 0 | Refills: 0 | Status: DISCONTINUED | OUTPATIENT
Start: 2017-09-12 | End: 2017-09-18

## 2017-09-12 RX ORDER — CLONAZEPAM 1 MG
0.5 TABLET ORAL EVERY 8 HOURS
Qty: 0 | Refills: 0 | Status: DISCONTINUED | OUTPATIENT
Start: 2017-09-12 | End: 2017-09-12

## 2017-09-12 RX ORDER — SODIUM CHLORIDE 9 MG/ML
1000 INJECTION, SOLUTION INTRAVENOUS
Qty: 0 | Refills: 0 | Status: DISCONTINUED | OUTPATIENT
Start: 2017-09-12 | End: 2017-09-21

## 2017-09-12 RX ORDER — INSULIN LISPRO 100/ML
10 VIAL (ML) SUBCUTANEOUS
Qty: 0 | Refills: 0 | Status: DISCONTINUED | OUTPATIENT
Start: 2017-09-12 | End: 2017-09-12

## 2017-09-12 RX ORDER — DEXTROSE 50 % IN WATER 50 %
25 SYRINGE (ML) INTRAVENOUS ONCE
Qty: 0 | Refills: 0 | Status: DISCONTINUED | OUTPATIENT
Start: 2017-09-12 | End: 2017-09-21

## 2017-09-12 RX ORDER — MAGNESIUM SULFATE 500 MG/ML
2 VIAL (ML) INJECTION ONCE
Qty: 0 | Refills: 0 | Status: COMPLETED | OUTPATIENT
Start: 2017-09-12 | End: 2017-09-12

## 2017-09-12 RX ORDER — OXYCODONE HYDROCHLORIDE 5 MG/1
5 TABLET ORAL ONCE
Qty: 0 | Refills: 0 | Status: DISCONTINUED | OUTPATIENT
Start: 2017-09-12 | End: 2017-09-12

## 2017-09-12 RX ORDER — POTASSIUM PHOSPHATE, MONOBASIC POTASSIUM PHOSPHATE, DIBASIC 236; 224 MG/ML; MG/ML
15 INJECTION, SOLUTION INTRAVENOUS ONCE
Qty: 0 | Refills: 0 | Status: COMPLETED | OUTPATIENT
Start: 2017-09-12 | End: 2017-09-12

## 2017-09-12 RX ORDER — GLUCAGON INJECTION, SOLUTION 0.5 MG/.1ML
1 INJECTION, SOLUTION SUBCUTANEOUS ONCE
Qty: 0 | Refills: 0 | Status: DISCONTINUED | OUTPATIENT
Start: 2017-09-12 | End: 2017-09-21

## 2017-09-12 RX ORDER — INSULIN LISPRO 100/ML
15 VIAL (ML) SUBCUTANEOUS
Qty: 0 | Refills: 0 | Status: DISCONTINUED | OUTPATIENT
Start: 2017-09-12 | End: 2017-09-12

## 2017-09-12 RX ORDER — ONDANSETRON 8 MG/1
4 TABLET, FILM COATED ORAL EVERY 4 HOURS
Qty: 0 | Refills: 0 | Status: DISCONTINUED | OUTPATIENT
Start: 2017-09-12 | End: 2017-09-14

## 2017-09-12 RX ORDER — SODIUM CHLORIDE 9 MG/ML
1000 INJECTION, SOLUTION INTRAVENOUS
Qty: 0 | Refills: 0 | Status: DISCONTINUED | OUTPATIENT
Start: 2017-09-12 | End: 2017-09-18

## 2017-09-12 RX ORDER — DEXTROSE 50 % IN WATER 50 %
12.5 SYRINGE (ML) INTRAVENOUS ONCE
Qty: 0 | Refills: 0 | Status: DISCONTINUED | OUTPATIENT
Start: 2017-09-12 | End: 2017-09-21

## 2017-09-12 RX ORDER — CLONAZEPAM 1 MG
1 TABLET ORAL EVERY 8 HOURS
Qty: 0 | Refills: 0 | Status: DISCONTINUED | OUTPATIENT
Start: 2017-09-12 | End: 2017-09-12

## 2017-09-12 RX ORDER — INSULIN LISPRO 100/ML
VIAL (ML) SUBCUTANEOUS
Qty: 0 | Refills: 0 | Status: DISCONTINUED | OUTPATIENT
Start: 2017-09-12 | End: 2017-09-17

## 2017-09-12 RX ORDER — INSULIN DETEMIR 100/ML (3)
50 INSULIN PEN (ML) SUBCUTANEOUS AT BEDTIME
Qty: 0 | Refills: 0 | Status: DISCONTINUED | OUTPATIENT
Start: 2017-09-12 | End: 2017-09-19

## 2017-09-12 RX ORDER — INSULIN LISPRO 100/ML
VIAL (ML) SUBCUTANEOUS AT BEDTIME
Qty: 0 | Refills: 0 | Status: DISCONTINUED | OUTPATIENT
Start: 2017-09-12 | End: 2017-09-12

## 2017-09-12 RX ORDER — HYDROMORPHONE HYDROCHLORIDE 2 MG/ML
0.5 INJECTION INTRAMUSCULAR; INTRAVENOUS; SUBCUTANEOUS ONCE
Qty: 0 | Refills: 0 | Status: DISCONTINUED | OUTPATIENT
Start: 2017-09-12 | End: 2017-09-12

## 2017-09-12 RX ADMIN — Medication 50 GRAM(S): at 09:09

## 2017-09-12 RX ADMIN — Medication 63.75 MILLIMOLE(S): at 09:09

## 2017-09-12 RX ADMIN — OXYCODONE HYDROCHLORIDE 10 MILLIGRAM(S): 5 TABLET ORAL at 20:30

## 2017-09-12 RX ADMIN — HYDROMORPHONE HYDROCHLORIDE 0.5 MILLIGRAM(S): 2 INJECTION INTRAMUSCULAR; INTRAVENOUS; SUBCUTANEOUS at 22:25

## 2017-09-12 RX ADMIN — PIPERACILLIN AND TAZOBACTAM 25 GRAM(S): 4; .5 INJECTION, POWDER, LYOPHILIZED, FOR SOLUTION INTRAVENOUS at 09:09

## 2017-09-12 RX ADMIN — Medication 100 MILLIGRAM(S): at 22:16

## 2017-09-12 RX ADMIN — OXYCODONE HYDROCHLORIDE 5 MILLIGRAM(S): 5 TABLET ORAL at 12:00

## 2017-09-12 RX ADMIN — Medication 50 MILLIGRAM(S): at 22:19

## 2017-09-12 RX ADMIN — OXYCODONE HYDROCHLORIDE 5 MILLIGRAM(S): 5 TABLET ORAL at 02:40

## 2017-09-12 RX ADMIN — Medication 10 UNIT(S): at 08:03

## 2017-09-12 RX ADMIN — OXYCODONE HYDROCHLORIDE 5 MILLIGRAM(S): 5 TABLET ORAL at 00:39

## 2017-09-12 RX ADMIN — Medication 2: at 12:14

## 2017-09-12 RX ADMIN — HEPARIN SODIUM 5000 UNIT(S): 5000 INJECTION INTRAVENOUS; SUBCUTANEOUS at 05:42

## 2017-09-12 RX ADMIN — OXYCODONE HYDROCHLORIDE 10 MILLIGRAM(S): 5 TABLET ORAL at 20:00

## 2017-09-12 RX ADMIN — Medication 10 UNIT(S): at 12:14

## 2017-09-12 RX ADMIN — Medication 1 MILLIGRAM(S): at 12:00

## 2017-09-12 RX ADMIN — Medication 50 UNIT(S): at 22:24

## 2017-09-12 RX ADMIN — OXYCODONE HYDROCHLORIDE 5 MILLIGRAM(S): 5 TABLET ORAL at 01:18

## 2017-09-12 RX ADMIN — SERTRALINE 100 MILLIGRAM(S): 25 TABLET, FILM COATED ORAL at 10:37

## 2017-09-12 RX ADMIN — POTASSIUM PHOSPHATE, MONOBASIC POTASSIUM PHOSPHATE, DIBASIC 62.5 MILLIMOLE(S): 236; 224 INJECTION, SOLUTION INTRAVENOUS at 04:05

## 2017-09-12 RX ADMIN — PIPERACILLIN AND TAZOBACTAM 25 GRAM(S): 4; .5 INJECTION, POWDER, LYOPHILIZED, FOR SOLUTION INTRAVENOUS at 00:36

## 2017-09-12 RX ADMIN — SODIUM CHLORIDE 100 MILLILITER(S): 9 INJECTION, SOLUTION INTRAVENOUS at 08:03

## 2017-09-12 RX ADMIN — Medication 2: at 08:04

## 2017-09-12 RX ADMIN — OXYCODONE HYDROCHLORIDE 5 MILLIGRAM(S): 5 TABLET ORAL at 12:30

## 2017-09-12 RX ADMIN — Medication 4: at 17:43

## 2017-09-12 RX ADMIN — Medication 0.5 MILLIGRAM(S): at 20:13

## 2017-09-12 RX ADMIN — Medication 50 MILLIGRAM(S): at 10:37

## 2017-09-12 RX ADMIN — SODIUM CHLORIDE 50 MILLILITER(S): 9 INJECTION, SOLUTION INTRAVENOUS at 10:36

## 2017-09-12 RX ADMIN — PIPERACILLIN AND TAZOBACTAM 25 GRAM(S): 4; .5 INJECTION, POWDER, LYOPHILIZED, FOR SOLUTION INTRAVENOUS at 16:05

## 2017-09-12 RX ADMIN — HYDROMORPHONE HYDROCHLORIDE 0.5 MILLIGRAM(S): 2 INJECTION INTRAMUSCULAR; INTRAVENOUS; SUBCUTANEOUS at 22:10

## 2017-09-12 RX ADMIN — Medication 127.5 MILLIMOLE(S): at 20:01

## 2017-09-12 RX ADMIN — Medication 250 MILLIGRAM(S): at 20:01

## 2017-09-12 RX ADMIN — HEPARIN SODIUM 5000 UNIT(S): 5000 INJECTION INTRAVENOUS; SUBCUTANEOUS at 22:16

## 2017-09-12 RX ADMIN — Medication 0.5 MILLIGRAM(S): at 09:31

## 2017-09-12 RX ADMIN — Medication 1 TABLET(S): at 04:22

## 2017-09-12 RX ADMIN — HEPARIN SODIUM 5000 UNIT(S): 5000 INJECTION INTRAVENOUS; SUBCUTANEOUS at 14:00

## 2017-09-12 RX ADMIN — OXYCODONE HYDROCHLORIDE 10 MILLIGRAM(S): 5 TABLET ORAL at 06:15

## 2017-09-12 RX ADMIN — Medication 50 GRAM(S): at 04:05

## 2017-09-12 RX ADMIN — SODIUM CHLORIDE 50 MILLILITER(S): 9 INJECTION, SOLUTION INTRAVENOUS at 20:00

## 2017-09-12 RX ADMIN — Medication 250 MILLIGRAM(S): at 08:03

## 2017-09-12 RX ADMIN — Medication 100 MILLIGRAM(S): at 14:00

## 2017-09-12 RX ADMIN — Medication 100 MILLIGRAM(S): at 05:42

## 2017-09-12 RX ADMIN — Medication 150 MILLIGRAM(S): at 22:19

## 2017-09-12 RX ADMIN — POTASSIUM PHOSPHATE, MONOBASIC POTASSIUM PHOSPHATE, DIBASIC 62.5 MILLIMOLE(S): 236; 224 INJECTION, SOLUTION INTRAVENOUS at 12:05

## 2017-09-12 RX ADMIN — OXYCODONE HYDROCHLORIDE 10 MILLIGRAM(S): 5 TABLET ORAL at 06:53

## 2017-09-12 RX ADMIN — OXYCODONE HYDROCHLORIDE 5 MILLIGRAM(S): 5 TABLET ORAL at 02:00

## 2017-09-12 RX ADMIN — Medication 127.5 MILLIMOLE(S): at 22:16

## 2017-09-12 NOTE — PROGRESS NOTE ADULT - SUBJECTIVE AND OBJECTIVE BOX
SICU PM PROGRESS NOTE  ===============================  SICU Day 2    Interval Events: Banana bag started, regular diet started     HPI  47 year old woman presented after having an allergic reaction including hives and shortness of breath after taking Bactrim. She went to an urgent care center for an abscess on the left groin, fevers and chills for the last 5 days. She also states that she has had nausea and vomiting over the last couple of days and was unable to keep anything down including her medications. The abscess was spontaneously draining before, but currently it is not draining. She has had an abscess in the same location before but it was not as large. She is having severe pain at the site. She has a PMH of diabetes for 10 years and alcohol abuse for approximately 10 years. Her pattern of misuse is drinking heavily and binging for 5 days followed by abstinence for a few days. She has attempted to quit. She doesn't remember the last drink she had but thinks it may have been 2 days ago.     CT Ab/Pelvis: Skin thickening and subcutaneous soft tissue stranding involving the left medial buttock and left medial thigh, compatible with cellulitis. No   organized collection to suggest abscess formation.      Of note patient also in DKA, started on insulin gtt in ED. Patient was taken to the OR urgently given concern for necrotizing fascitis. Left groin exploration, penrose drain left in place.       Surgery Information   Case Duration: 	EBL: Minimal	IV Fluids: 2L	Blood Products: None	Urine Output: 700  Complications: None    Overnight events:  Anion gap closed, insulin gtt stopped.       VITAL SIGNS, INS/OUTS (last 24 hours):   --------------------------------------------------------------------------------------  ICU Vital Signs Last 24 Hrs  T(C): 35.6 (12 Sep 2017 12:00), Max: 36.8 (11 Sep 2017 20:00)  T(F): 96 (12 Sep 2017 12:00), Max: 98.3 (11 Sep 2017 20:00)  HR: 88 (12 Sep 2017 14:00) (68 - 105)  BP: 99/70 (12 Sep 2017 14:00) (88/52 - 148/88)  BP(mean): 77 (12 Sep 2017 14:00) (60 - 104)  ABP: 100/86 (11 Sep 2017 23:00) (100/86 - 141/74)  ABP(mean): 91 (11 Sep 2017 23:00) (91 - 114)  RR: 15 (12 Sep 2017 14:00) (14 - 24)  SpO2: 95% (12 Sep 2017 14:00) (90% - 100%)    --------------------------------------------------------------------------------------    EXAM  NEUROLOGY  Exam: Normal, NAD, alert, oriented x3, no focal deficits. PERRLA.       RESPIRATORY  Exam: Lungs clear to auscultation, Normal expansion/effort.      CARDIOVASCULAR  Exam: S1, S2.  Regular rate and rhythm     GI/NUTRITION  Exam: Abdomen soft, Non-tender, Non-distended    :  Exam: b/l labia majora nodules on  2 and 10 o'clock positions. Indurated, movable, 4-5 mm in diameter.  No fluctuance or erythema noted, normal skin color. No pain on palpation.  No other external abnormalities.   Drain in place on L inner thigh    VASCULAR  Exam: Extremities warm, pink, well-perfused.      MUSCULOSKELETAL  Exam: All extremities moving spontaneously without limitations.      SKIN  Exam: Good skin turgor, no skin breakdown.      METABOLIC/FLUIDS/ELECTROLYTES  dextrose 5% + lactated ringers. 1000 milliLiter(s) IV Continuous <Continuous>  dextrose 5%. 1000 milliLiter(s) IV Continuous <Continuous>  multivitamin/thiamine/folic acid in sodium chloride 0.9% 1000 milliLiter(s) IV Continuous <Continuous>      HEMATOLOGIC  [x] DVT Prophylaxis: heparin  Injectable 5000 Unit(s) SubCutaneous every 8 hours    Transfusions:	[] PRBC	[] Platelets		[] FFP	[] Cryoprecipitate    INFECTIOUS DISEASE  Antimicrobials/Immunologic Medications:  piperacillin/tazobactam IVPB. 3.375 Gram(s) IV Intermittent every 8 hours  clindamycin IVPB 600 milliGRAM(s) IV Intermittent every 8 hours  vancomycin  IVPB 1000 milliGRAM(s) IV Intermittent every 12 hours      LABS  --------------------------------------------------------------------------------------                                            11.4                  Neurophils% (auto):   x      (09-12 @ 02:25):    18.88)-----------(160          Lymphocytes% (auto):  x                                             33.3                   Eosinphils% (auto):   x        Manual%: Neutrophils x    ; Lymphocytes x    ; Eosinophils x    ; Bands%: x    ; Blasts x          09-12    135  |  101  |  13  ----------------------------<  392<H>  3.9   |  18<L>  |  0.61    Ca    7.9<L>      12 Sep 2017 09:15  Phos  1.1     09-12  Mg     2.5     09-12    TPro  7.1  /  Alb  3.3  /  TBili  1.0  /  DBili  x   /  AST  20  /  ALT  21  /  AlkPhos  101  09-11      ABG - ( 11 Sep 2017 18:11 )  pH: 7.28  /  pCO2: 29    /  pO2: 154   / HCO3: 15    / Base Excess: -12.3 /  SaO2: 98.9  / Lactate: x        VBG - ( 11 Sep 2017 14:37 )  pH: 7.32  /  pCO2: 16    /  pO2: 178   / HCO3: 13    / Base Excess: -17.2 /  SvO2: 99.3  / Lactate: 3.2    --------------------------------------------------------------------------------------    IMAGING STUDIES    ASSESSMENT  47 year old woman who initially presented with severe allergic reaction to Bactrim for treatment of large left groin abscess.  Presentation complicated DKA and alcohol withdrawal. Now s/p L groin exploration.    PLAN  Neurologic: pain control, ativan PRN. Restart home medications for neuropathy: Topiramate, Trazodone. c/w Zoloft: depression  Watch for DTs. c/w home Klonopin PRN.   Respiratory: Stable. Monitor O2 saturations. Saturating well on room air. Encourage incentive spirometry.  Cardiovascular:  Stable.   Gastrointestinal/Nutrition: start diet  Renal/Genitourinary: Echavarria in place; monitor I/O. Replete electrolytes as needed, banana bag 50cc/h  Hematologic: trend H/H ;  c/w VTE prophylaxis  Infectious Disease: Clindamycin (stop on 15th), Vancomycin, Zosyn; f/u vancomycin trough.  Tubes/Lines/Drains: PIV, A-line, Central Line (considering discontinuing today, placed in ED)  Endocrine: Anion gap closed, Lantus restarted along with ISS.   Fingerstick at noon after starting consistent carb diet was 300.  Added home medication: 50 units Levemir q HS and changed Humalog from 10 to 15 Units before meals.    Disposition: SICU  --------------------------------------------------------------------------------------    Critical Care Diagnoses:  DTs, ETOH withdrawal, Necrotizing fasciitis, DM, hypoglycemia, DKA

## 2017-09-12 NOTE — PROGRESS NOTE ADULT - SUBJECTIVE AND OBJECTIVE BOX
SICU Daily Progress Note  =====================================================  Interval/Overnight Events:     Anion gap closed, insulin gtt stopped.     SICU Day #    2    HPI: 47 year old woman presented after having an allergic reaction including hives and shortness of breath after taking Bactrim. She went to an urgent care center for an abscess on the left groin, fevers and chills for the last 5 days. She also states that she has had nausea and vomiting over the last couple of days and was unable to keep anything down including her medications. The abscess was spontaneously draining before, but currently it is not draining. She has had an abscess in the same location before but it was not as large. She is having severe pain at the site. She has a PMH of diabetes for 10 years and alcohol abuse for approximately 10 years. Her pattern of misuse is drinking heavily and binging for 5 days followed by abstinence for a few days. She has attempted to quit. She doesn't remember the last drink she had but thinks it may have been 2 days ago.     CT Ab/Pelvis: Skin thickening and subcutaneous soft tissue stranding involving the left medial buttock and left medial thigh, compatible with cellulitis. No   organized collection to suggest abscess formation.      Of note patient also in DKA, started on insulin gtt in ED. Patient was taken to the OR urgently given concern for necrotizing fascitis. Left groin exploration, penrose drain left in place.        Surgery Information   Case Duration: 	EBL: Minimal	IV Fluids: 2L	Blood Products: None	Urine Output: 700  Complications: None      Allergies: Bactrim (Anaphylaxis)      MEDICATIONS:   --------------------------------------------------------------------------------------  Neurologic Medications  topiramate 50 milliGRAM(s) Oral every 12 hours  traZODone 150 milliGRAM(s) Oral at bedtime  sertraline 100 milliGRAM(s) Oral daily  oxyCODONE    IR 5 milliGRAM(s) Oral every 3 hours PRN Moderate Pain (4 - 6)  oxyCODONE    IR 10 milliGRAM(s) Oral every 4 hours PRN Severe Pain (7 - 10)  clonazePAM Tablet 0.5 milliGRAM(s) Oral every 8 hours PRN agitation    Respiratory Medications    Cardiovascular Medications    Gastrointestinal Medications  dextrose 5% + lactated ringers. 1000 milliLiter(s) IV Continuous <Continuous>  dextrose 5%. 1000 milliLiter(s) IV Continuous <Continuous>    Genitourinary Medications    Hematologic/Oncologic Medications  heparin  Injectable 5000 Unit(s) SubCutaneous every 8 hours    Antimicrobial/Immunologic Medications  piperacillin/tazobactam IVPB. 3.375 Gram(s) IV Intermittent every 8 hours  clindamycin IVPB 600 milliGRAM(s) IV Intermittent every 8 hours  vancomycin  IVPB 1000 milliGRAM(s) IV Intermittent every 12 hours    Endocrine/Metabolic Medications  insulin glargine Injectable (LANTUS) 35 Unit(s) SubCutaneous at bedtime  insulin lispro Injectable (HumaLOG) 10 Unit(s) SubCutaneous three times a day before meals  insulin lispro (HumaLOG) corrective regimen sliding scale   SubCutaneous three times a day before meals  dextrose Gel 1 Dose(s) Oral once PRN Blood Glucose LESS THAN 70 milliGRAM(s)/deciliter  dextrose 50% Injectable 12.5 Gram(s) IV Push once  dextrose 50% Injectable 25 Gram(s) IV Push once  dextrose 50% Injectable 25 Gram(s) IV Push once  glucagon  Injectable 1 milliGRAM(s) IntraMuscular once PRN Glucose LESS THAN 70 milligrams/deciliter  insulin lispro (HumaLOG) corrective regimen sliding scale   SubCutaneous at bedtime    Topical/Other Medications  hydrocortisone 2.5% Ointment 1 Application(s) Topical three times a day PRN Itching LE    --------------------------------------------------------------------------------------    VITAL SIGNS, INS/OUTS (last 24 hours):  --------------------------------------------------------------------------------------  T(C): 36.2 (09-12-17 @ 04:00), Max: 36.8 (09-11-17 @ 20:00)  HR: 79 (09-12-17 @ 04:00) (79 - 118)  BP: 99/53 (09-12-17 @ 04:00) (95/57 - 148/88)  BP(mean): 60 (09-12-17 @ 04:00) (60 - 104)  ABP: 100/86 (09-11-17 @ 23:00) (100/86 - 141/74)  ABP(mean): 91 (09-11-17 @ 23:00) (91 - 114)  RR: 14 (09-12-17 @ 04:00) (14 - 24)  SpO2: 94% (09-12-17 @ 04:00) (91% - 100%)  Wt(kg): --  CVP(mm Hg): --  CI: --  CAPILLARY BLOOD GLUCOSE  164 (12 Sep 2017 04:00)  174 (12 Sep 2017 03:00)  169 (12 Sep 2017 02:00)  187 (12 Sep 2017 01:00)  219 (12 Sep 2017 00:00)  219 (11 Sep 2017 23:00)  230 (11 Sep 2017 22:00)  262 (11 Sep 2017 21:00)  232 (11 Sep 2017 20:00)  185 (11 Sep 2017 18:50)  299 (11 Sep 2017 16:10)       N/A      09-11 @ 07:01  -  09-12 @ 06:01  --------------------------------------------------------  IN:    dextrose 5% + lactated ringers.: 1025 mL    insulin Infusion: 39 mL    insulin Infusion: 9 mL    insulin Infusion: 4 mL    insulin Infusion: 11 mL    IV PiggyBack: 950 mL    Oral Fluid: 400 mL  Total IN: 2438 mL    OUT:    Indwelling Catheter - Urethral: 1525 mL  Total OUT: 1525 mL    Total NET: 913 mL        --------------------------------------------------------------------------------------    EXAM  NEUROLOGY  RASS:   	GCS:    Exam: Normal, NAD, alert, oriented x3, no focal deficits. ***    HEENT  Exam: Normocephalic, atraumatic, EOMI.  ***    RESPIRATORY  Exam: Lungs clear to auscultation, Normal expansion/effort. ***  Mechanical Ventilation:     CARDIOVASCULAR  Exam: S1, S2.  Regular rate and rhythm.   ***    GI/NUTRITION  Exam: Abdomen soft, Non-tender, Non-distended.  ***  Current Diet:  NPO***    VASCULAR  Exam: Extremities warm, pink, well-perfused. ***    MUSCULOSKELETAL  Exam: All extremities moving spontaneously without limitations. ***    SKIN  Exam: LLE groin incision dressed; C/D/I.     METABOLIC/FLUIDS/ELECTROLYTES  dextrose 5% + lactated ringers. 1000 milliLiter(s) IV Continuous <Continuous>  dextrose 5%. 1000 milliLiter(s) IV Continuous <Continuous>      HEMATOLOGIC  [x] VTE Prophylaxis: heparin  Injectable 5000 Unit(s) SubCutaneous every 8 hours    Transfusions:	[] PRBC	[] Platelets		[] FFP	[] Cryoprecipitate    INFECTIOUS DISEASE  Antimicrobials/Immunologic Medications:  piperacillin/tazobactam IVPB. 3.375 Gram(s) IV Intermittent every 8 hours  clindamycin IVPB 600 milliGRAM(s) IV Intermittent every 8 hours  vancomycin  IVPB 1000 milliGRAM(s) IV Intermittent every 12 hours      Tubes/Lines/Drains  ***  [x] Peripheral IV  [] Central Venous Line     	[] R	[] L	[] IJ	[] Fem	[] SC	Date Placed:   [] Arterial Line		[] R	[] L	[] Fem	[] Rad	[] Ax	Date Placed:   [] PICC		[] Midline		[] Mediport  [] Urinary Catheter		Date Placed:   [x] Necessity of urinary, arterial, and venous catheters discussed    LABS  --------------------------------------------------------------------------------------                                            11.4                  Neurophils% (auto):   x      (09-12 @ 02:25):    18.88)-----------(160          Lymphocytes% (auto):  x                                             33.3                   Eosinphils% (auto):   x        Manual%: Neutrophils x    ; Lymphocytes x    ; Eosinophils x    ; Bands%: x    ; Blasts x          09-12    137  |  104  |  10  ----------------------------<  174<H>  3.7   |  21<L>  |  0.53    Ca    7.9<L>      12 Sep 2017 02:25  Phos  0.5     09-12  Mg     1.5     09-12    TPro  7.1  /  Alb  3.3  /  TBili  1.0  /  DBili  x   /  AST  20  /  ALT  21  /  AlkPhos  101  09-11    ( 09-11 @ 14:47 )   PT: 13.7 SEC;   INR: 1.22   aPTT: 29.6 SEC    ABG - ( 11 Sep 2017 18:11 )  pH: 7.28  /  pCO2: 29    /  pO2: 154   / HCO3: 15    / Base Excess: -12.3 /  SaO2: 98.9  / Lactate: x        VBG - ( 11 Sep 2017 14:37 )  pH: 7.32  /  pCO2: 16    /  pO2: 178   / HCO3: 13    / Base Excess: -17.2 /  SvO2: 99.3  / Lactate: 3.2        ASSESSMENT: 47 year old woman who initially presented with severe allergic reaction to Bactrim for treatment of large left groin abscess.  Presentation complicated DKA and alcohol withdrawal. Now s/p L groin exploration.       PLAN:    Neurologic: pain control. Restart home medications for neuropathy: Topiramate, Trazodone. c/w Zoloft: depression  Watch for DTs. Back on home Klonopin PRN.   Respiratory: Stable. Monitor O2 saturations. Saturating well on room air. Encourage incentive spirometry.  Cardiovascular:  Stable. Monitor H/H  Gastrointestinal/Nutrition: NPO  Renal/Genitourinary: Echavarria in place; monitor I/O  Hematologic: trend H/H ;  c/w VTE prophylaxis  Infectious Disease: Clindamycin, Vancomycin, Zosyn; f/u vancomycin trough   Tubes/Lines/Drains: PIV, A-line, Central Line (considering discontinuing today, placed in ED)  Endocrine: Anion gap closed, Lantus restarted along with ISS.   Disposition: SICU SICU Daily Progress Note  =====================================================  Interval/Overnight Events:     Anion gap closed, insulin gtt stopped.     SICU Day #    2    HPI: 47 year old woman presented after having an allergic reaction including hives and shortness of breath after taking Bactrim. She went to an urgent care center for an abscess on the left groin, fevers and chills for the last 5 days. She also states that she has had nausea and vomiting over the last couple of days and was unable to keep anything down including her medications. The abscess was spontaneously draining before, but currently it is not draining. She has had an abscess in the same location before but it was not as large. She is having severe pain at the site. She has a PMH of diabetes for 10 years and alcohol abuse for approximately 10 years. Her pattern of misuse is drinking heavily and binging for 5 days followed by abstinence for a few days. She has attempted to quit. She doesn't remember the last drink she had but thinks it may have been 2 days ago.     CT Ab/Pelvis: Skin thickening and subcutaneous soft tissue stranding involving the left medial buttock and left medial thigh, compatible with cellulitis. No   organized collection to suggest abscess formation.      Of note patient also in DKA, started on insulin gtt in ED. Patient was taken to the OR urgently given concern for necrotizing fascitis. Left groin exploration, penrose drain left in place.        Surgery Information   Case Duration: 	EBL: Minimal	IV Fluids: 2L	Blood Products: None	Urine Output: 700  Complications: None      Allergies: Bactrim (Anaphylaxis)      MEDICATIONS:   --------------------------------------------------------------------------------------  Neurologic Medications  topiramate 50 milliGRAM(s) Oral every 12 hours  traZODone 150 milliGRAM(s) Oral at bedtime  sertraline 100 milliGRAM(s) Oral daily  oxyCODONE    IR 5 milliGRAM(s) Oral every 3 hours PRN Moderate Pain (4 - 6)  oxyCODONE    IR 10 milliGRAM(s) Oral every 4 hours PRN Severe Pain (7 - 10)  clonazePAM Tablet 0.5 milliGRAM(s) Oral every 8 hours PRN agitation    Respiratory Medications    Cardiovascular Medications    Gastrointestinal Medications  dextrose 5% + lactated ringers. 1000 milliLiter(s) IV Continuous <Continuous>  dextrose 5%. 1000 milliLiter(s) IV Continuous <Continuous>    Genitourinary Medications    Hematologic/Oncologic Medications  heparin  Injectable 5000 Unit(s) SubCutaneous every 8 hours    Antimicrobial/Immunologic Medications  piperacillin/tazobactam IVPB. 3.375 Gram(s) IV Intermittent every 8 hours  clindamycin IVPB 600 milliGRAM(s) IV Intermittent every 8 hours  vancomycin  IVPB 1000 milliGRAM(s) IV Intermittent every 12 hours    Endocrine/Metabolic Medications  insulin glargine Injectable (LANTUS) 35 Unit(s) SubCutaneous at bedtime  insulin lispro Injectable (HumaLOG) 10 Unit(s) SubCutaneous three times a day before meals  insulin lispro (HumaLOG) corrective regimen sliding scale   SubCutaneous three times a day before meals  dextrose Gel 1 Dose(s) Oral once PRN Blood Glucose LESS THAN 70 milliGRAM(s)/deciliter  dextrose 50% Injectable 12.5 Gram(s) IV Push once  dextrose 50% Injectable 25 Gram(s) IV Push once  dextrose 50% Injectable 25 Gram(s) IV Push once  glucagon  Injectable 1 milliGRAM(s) IntraMuscular once PRN Glucose LESS THAN 70 milligrams/deciliter  insulin lispro (HumaLOG) corrective regimen sliding scale   SubCutaneous at bedtime    Topical/Other Medications  hydrocortisone 2.5% Ointment 1 Application(s) Topical three times a day PRN Itching LE    --------------------------------------------------------------------------------------    VITAL SIGNS, INS/OUTS (last 24 hours):  --------------------------------------------------------------------------------------  T(C): 36.2 (09-12-17 @ 04:00), Max: 36.8 (09-11-17 @ 20:00)  HR: 79 (09-12-17 @ 04:00) (79 - 118)  BP: 99/53 (09-12-17 @ 04:00) (95/57 - 148/88)  BP(mean): 60 (09-12-17 @ 04:00) (60 - 104)  ABP: 100/86 (09-11-17 @ 23:00) (100/86 - 141/74)  ABP(mean): 91 (09-11-17 @ 23:00) (91 - 114)  RR: 14 (09-12-17 @ 04:00) (14 - 24)  SpO2: 94% (09-12-17 @ 04:00) (91% - 100%)  Wt(kg): --  CVP(mm Hg): --  CI: --  CAPILLARY BLOOD GLUCOSE  164 (12 Sep 2017 04:00)  174 (12 Sep 2017 03:00)  169 (12 Sep 2017 02:00)  187 (12 Sep 2017 01:00)  219 (12 Sep 2017 00:00)  219 (11 Sep 2017 23:00)  230 (11 Sep 2017 22:00)  262 (11 Sep 2017 21:00)  232 (11 Sep 2017 20:00)  185 (11 Sep 2017 18:50)  299 (11 Sep 2017 16:10)       N/A      09-11 @ 07:01  -  09-12 @ 06:01  --------------------------------------------------------  IN:    dextrose 5% + lactated ringers.: 1025 mL    insulin Infusion: 39 mL    insulin Infusion: 9 mL    insulin Infusion: 4 mL    insulin Infusion: 11 mL    IV PiggyBack: 950 mL    Oral Fluid: 400 mL  Total IN: 2438 mL    OUT:    Indwelling Catheter - Urethral: 1525 mL  Total OUT: 1525 mL    Total NET: 913 mL        --------------------------------------------------------------------------------------    EXAM  NEUROLOGY  RASS:   	GCS:    Exam: Normal, NAD, alert, oriented x3, no focal deficits. ***    HEENT  Exam: Normocephalic, atraumatic, EOMI.  ***    RESPIRATORY  Exam: Lungs clear to auscultation, Normal expansion/effort. ***  Mechanical Ventilation:     CARDIOVASCULAR  Exam: S1, S2.  Regular rate and rhythm.   ***    GI/NUTRITION  Exam: Abdomen soft, Non-tender, Non-distended.  ***  Current Diet:  NPO***    VASCULAR  Exam: Extremities warm, pink, well-perfused. ***    MUSCULOSKELETAL  Exam: All extremities moving spontaneously without limitations. ***    SKIN  Exam: LLE groin incision dressed; C/D/I.     METABOLIC/FLUIDS/ELECTROLYTES  dextrose 5% + lactated ringers. 1000 milliLiter(s) IV Continuous <Continuous>  dextrose 5%. 1000 milliLiter(s) IV Continuous <Continuous>      HEMATOLOGIC  [x] VTE Prophylaxis: heparin  Injectable 5000 Unit(s) SubCutaneous every 8 hours    Transfusions:	[] PRBC	[] Platelets		[] FFP	[] Cryoprecipitate    INFECTIOUS DISEASE  Antimicrobials/Immunologic Medications:  piperacillin/tazobactam IVPB. 3.375 Gram(s) IV Intermittent every 8 hours  clindamycin IVPB 600 milliGRAM(s) IV Intermittent every 8 hours  vancomycin  IVPB 1000 milliGRAM(s) IV Intermittent every 12 hours      Tubes/Lines/Drains  ***  [x] Peripheral IV  [] Central Venous Line     	[] R	[] L	[] IJ	[] Fem	[] SC	Date Placed:   [] Arterial Line		[] R	[] L	[] Fem	[] Rad	[] Ax	Date Placed:   [] PICC		[] Midline		[] Mediport  [] Urinary Catheter		Date Placed:   [x] Necessity of urinary, arterial, and venous catheters discussed    LABS  --------------------------------------------------------------------------------------                                            11.4                  Neurophils% (auto):   x      (09-12 @ 02:25):    18.88)-----------(160          Lymphocytes% (auto):  x                                             33.3                   Eosinphils% (auto):   x        Manual%: Neutrophils x    ; Lymphocytes x    ; Eosinophils x    ; Bands%: x    ; Blasts x          09-12    137  |  104  |  10  ----------------------------<  174<H>  3.7   |  21<L>  |  0.53    Ca    7.9<L>      12 Sep 2017 02:25  Phos  0.5     09-12  Mg     1.5     09-12    TPro  7.1  /  Alb  3.3  /  TBili  1.0  /  DBili  x   /  AST  20  /  ALT  21  /  AlkPhos  101  09-11    ( 09-11 @ 14:47 )   PT: 13.7 SEC;   INR: 1.22   aPTT: 29.6 SEC    ABG - ( 11 Sep 2017 18:11 )  pH: 7.28  /  pCO2: 29    /  pO2: 154   / HCO3: 15    / Base Excess: -12.3 /  SaO2: 98.9  / Lactate: x        VBG - ( 11 Sep 2017 14:37 )  pH: 7.32  /  pCO2: 16    /  pO2: 178   / HCO3: 13    / Base Excess: -17.2 /  SvO2: 99.3  / Lactate: 3.2        ASSESSMENT: 47 year old woman who initially presented with severe allergic reaction to Bactrim for treatment of large left groin abscess.  Presentation complicated DKA and alcohol withdrawal. Now s/p L groin exploration.       PLAN:    Neurologic: pain control, ativan PRN. Restart home medications for neuropathy: Topiramate, Trazodone. c/w Zoloft: depression  Watch for DTs. c/w home Klonopin PRN.   Respiratory: Stable. Monitor O2 saturations. Saturating well on room air. Encourage incentive spirometry.  Cardiovascular:  Stable.   Gastrointestinal/Nutrition: start diet  Renal/Genitourinary: Echavarria in place; monitor I/O. Replete electrolytes as needed, banana bag 50cc/h  Hematologic: trend H/H ;  c/w VTE prophylaxis  Infectious Disease: Clindamycin (stop on 15th), Vancomycin, Zosyn; f/u vancomycin trough.  Tubes/Lines/Drains: PIV, A-line, Central Line (considering discontinuing today, placed in ED)  Endocrine: Anion gap closed, Lantus restarted along with ISS.   Disposition: SICU    Critical Care Diagnosis: DTs, ETOH withdrawal, Necrotizing fasciitis, DM, hypoglycemia, DKA

## 2017-09-12 NOTE — PROGRESS NOTE ADULT - SUBJECTIVE AND OBJECTIVE BOX
INCOMPLETE    Interval events:  Patient went to the OR for I&D of groin abscess.  Patient off pressors and extubated with no acute events o/n    HPI:  47 year old woman presented after having an allergic reaction including hives and shortness of breath after taking Bactrim. She went to an urgent care center for an abscess on the left groin, fevers and chills for the last 5 days. She also states that she has had nausea and vomiting over the last couple of days and was unable to keep anything down including her medications. The abscess was spontaneously draining before, but currently it is not draining. She has had an abscess in the same location before but it was not as large. She is having severe pain at the site. She has a PMH of diabetes for 10 years and alcohol abuse for approximately 10 years. Her pattern of misuse is drinking heavily and binging for 5 days followed by abstinence for a few days. She has attempted to quit. She doesn't remember the last drink she had but thinks it may have been 2 days ago. (11 Sep 2017 14:48)      O: Vital Signs Last 24 Hrs  T(C): 36.2 (12 Sep 2017 04:00), Max: 36.8 (11 Sep 2017 20:00)  T(F): 97.1 (12 Sep 2017 04:00), Max: 98.3 (11 Sep 2017 20:00)  HR: 79 (12 Sep 2017 04:00) (79 - 118)  BP: 99/53 (12 Sep 2017 04:00) (95/57 - 148/88)  BP(mean): 60 (12 Sep 2017 04:00) (60 - 104)  RR: 14 (12 Sep 2017 04:00) (14 - 24)  SpO2: 94% (12 Sep 2017 04:00) (91% - 100%)    I&O's Detail    11 Sep 2017 07:01  -  12 Sep 2017 04:59  --------------------------------------------------------  IN:    dextrose 5% + lactated ringers.: 1025 mL    insulin Infusion: 39 mL    insulin Infusion: 9 mL    insulin Infusion: 4 mL    insulin Infusion: 11 mL    IV PiggyBack: 950 mL    Oral Fluid: 400 mL  Total IN: 2438 mL    OUT:    Indwelling Catheter - Urethral: 1525 mL  Total OUT: 1525 mL    Total NET: 913 mL        Gen: NAD, alert and interactive, oriented.  HEENT: normocephalic, atraumatic, no scleral icterus  CV: S1, S2, RRR  Pulm: CTA B/L  Abd: Soft, ND, NTP, no rebound, no guarding, no palpable organomegaly/masses  Ext: warm, no edema, palp dp/pt  Wound: L groin, dressing intact                          11.4   18.88 )-----------( 160      ( 12 Sep 2017 02:25 )             33.3   09-12    137  |  104  |  10  ----------------------------<  174<H>  3.7   |  21<L>  |  0.53    Ca    7.9<L>      12 Sep 2017 02:25  Phos  0.5     09-12  Mg     1.5     09-12    TPro  7.1  /  Alb  3.3  /  TBili  1.0  /  DBili  x   /  AST  20  /  ALT  21  /  AlkPhos  101  09-11

## 2017-09-12 NOTE — PROGRESS NOTE ADULT - ATTENDING COMMENTS
47 year old woman who initially presented with severe allergic reaction to Bactrim for treatment of large left groin abscess.  Presentation complicated DKA and alcohol withdrawal. Now s/p L groin exploration.    PLAN  Neurologic: pain control, ativan PRN. Restart home medications for neuropathy: Topiramate, Trazodone. c/w Zoloft: depression  Watch for DTs. c/w home Klonopin PRN.   Respiratory: Stable. Monitor O2 saturations. Saturating well on room air. Encourage incentive spirometry.  Cardiovascular:  Stable.   Gastrointestinal/Nutrition: start diet  Renal/Genitourinary: Echavarria in place; monitor I/O. Replete electrolytes as needed, banana bag 50cc/h  Hematologic: trend H/H ;  c/w VTE prophylaxis  Infectious Disease: Clindamycin (stop on 15th), Vancomycin, Zosyn; f/u vancomycin trough.  Tubes/Lines/Drains: PIV, A-line, Central Line (considering discontinuing today, placed in ED)  Endocrine: Anion gap closed, Lantus restarted along with ISS.   Fingerstick at noon after starting consistent carb diet was 300.  Added home medication: 50 units Levemir q HS and changed Humalog from 10 to 15 Units before meals.    Disposition: SICU  --------------------------------------------------------------------------------------    Critical Care Diagnoses:  DTs, ETOH withdrawal, Necrotizing fasciitis, DM, hypoglycemia, DKA  Critical care patient with hemodynamic compromise in SICU.  Additional SICU time 55 minutes.

## 2017-09-12 NOTE — PROGRESS NOTE ADULT - ATTENDING COMMENTS
47 year old woman who initially presented with severe allergic reaction to Bactrim for treatment of large left groin abscess.  Presentation complicated DKA and alcohol withdrawal. Now s/p L groin exploration.       PLAN:    Neurologic: pain control, ativan PRN. Restart home medications for neuropathy: Topiramate, Trazodone. c/w Zoloft: depression  Watch for DTs. c/w home Klonopin PRN.   Respiratory: Stable. Monitor O2 saturations. Saturating well on room air. Encourage incentive spirometry.  Cardiovascular:  Stable.   Gastrointestinal/Nutrition: start diet  Renal/Genitourinary: Echavarria in place; monitor I/O. Replete electrolytes as needed, banana bag 50cc/h  Hematologic: trend H/H ;  c/w VTE prophylaxis  Infectious Disease: Clindamycin (stop on 15th), Vancomycin, Zosyn; f/u vancomycin trough.  Tubes/Lines/Drains: PIV, A-line, Central Line (considering discontinuing today, placed in ED)  Endocrine: Anion gap closed, Lantus restarted along with ISS.   Disposition: SICU    Critical Care Diagnosis: DTs, ETOH withdrawal, Necrotizing fasciitis, DM, hypoglycemia, DKA    The patient is a critical care patient with hemodynamic and metabolic instability in SICU.  I have personally interviewed and examined this patient, reviewed labs and x-rays, discussed with other consultants, House staff and PA's.  I spent  66   minutes  in total providing critical care for the diagnoses, assessment and plan above.  These diagnoses are unrelated to the surgical procedure noted above.  I met with family     min to get further history and make care decisions for this patient who is unable to participate due to altered mental status.  Time involved in performance of separately billable procedures was not counted toward my critical care time.  There is no overlap.

## 2017-09-12 NOTE — PROGRESS NOTE ADULT - ASSESSMENT
Patient is a 46 yo female s/p OR I/D of L groin abscess, tissue appeared to be viable.  Patient is currently stable, off pressors and extubated.  -dressing changes BID  -f/u cultures  -continue to trend WBC    C Beaubian PGYII

## 2017-09-13 DIAGNOSIS — A41.9 SEPSIS, UNSPECIFIED ORGANISM: ICD-10-CM

## 2017-09-13 LAB
-  CEFAZOLIN: SIGNIFICANT CHANGE UP
-  CIPROFLOXACIN: SIGNIFICANT CHANGE UP
-  DAPTOMYCIN: SIGNIFICANT CHANGE UP
-  GENTAMICIN: SIGNIFICANT CHANGE UP
-  LINEZOLID: SIGNIFICANT CHANGE UP
-  OXACILLIN: SIGNIFICANT CHANGE UP
-  PENICILLIN: SIGNIFICANT CHANGE UP
-  RIFAMPIN.: SIGNIFICANT CHANGE UP
-  TETRACYCLINE: SIGNIFICANT CHANGE UP
-  TRIMETHOPRIM/SULFAMETHOXAZOLE: SIGNIFICANT CHANGE UP
-  VANCOMYCIN: SIGNIFICANT CHANGE UP
BACTERIA UR CULT: SIGNIFICANT CHANGE UP
BUN SERPL-MCNC: 11 MG/DL — SIGNIFICANT CHANGE UP (ref 7–23)
CA-I BLD-SCNC: 1.1 MMOL/L — SIGNIFICANT CHANGE UP (ref 1.03–1.23)
CALCIUM SERPL-MCNC: 7.9 MG/DL — LOW (ref 8.4–10.5)
CHLORIDE SERPL-SCNC: 107 MMOL/L — SIGNIFICANT CHANGE UP (ref 98–107)
CO2 SERPL-SCNC: 25 MMOL/L — SIGNIFICANT CHANGE UP (ref 22–31)
CREAT SERPL-MCNC: 0.62 MG/DL — SIGNIFICANT CHANGE UP (ref 0.5–1.3)
GLUCOSE SERPL-MCNC: 81 MG/DL — SIGNIFICANT CHANGE UP (ref 70–99)
HCT VFR BLD CALC: 33.8 % — LOW (ref 34.5–45)
HGB BLD-MCNC: 11.5 G/DL — SIGNIFICANT CHANGE UP (ref 11.5–15.5)
MAGNESIUM SERPL-MCNC: 2.8 MG/DL — HIGH (ref 1.6–2.6)
MCHC RBC-ENTMCNC: 32.7 PG — SIGNIFICANT CHANGE UP (ref 27–34)
MCHC RBC-ENTMCNC: 34 % — SIGNIFICANT CHANGE UP (ref 32–36)
MCV RBC AUTO: 96 FL — SIGNIFICANT CHANGE UP (ref 80–100)
METHOD TYPE: SIGNIFICANT CHANGE UP
NRBC # FLD: 0 — SIGNIFICANT CHANGE UP
ORGANISM # SPEC MICROSCOPIC CNT: SIGNIFICANT CHANGE UP
PHOSPHATE SERPL-MCNC: 2.5 MG/DL — SIGNIFICANT CHANGE UP (ref 2.5–4.5)
PLATELET # BLD AUTO: 153 K/UL — SIGNIFICANT CHANGE UP (ref 150–400)
PMV BLD: 10.3 FL — SIGNIFICANT CHANGE UP (ref 7–13)
POTASSIUM SERPL-MCNC: 3.5 MMOL/L — SIGNIFICANT CHANGE UP (ref 3.5–5.3)
POTASSIUM SERPL-SCNC: 3.5 MMOL/L — SIGNIFICANT CHANGE UP (ref 3.5–5.3)
RBC # BLD: 3.52 M/UL — LOW (ref 3.8–5.2)
RBC # FLD: 11.9 % — SIGNIFICANT CHANGE UP (ref 10.3–14.5)
SODIUM SERPL-SCNC: 143 MMOL/L — SIGNIFICANT CHANGE UP (ref 135–145)
VANCOMYCIN TROUGH SERPL-MCNC: 8 UG/ML — LOW (ref 10–20)
WBC # BLD: 15.82 K/UL — HIGH (ref 3.8–10.5)
WBC # FLD AUTO: 15.82 K/UL — HIGH (ref 3.8–10.5)

## 2017-09-13 PROCEDURE — 99233 SBSQ HOSP IP/OBS HIGH 50: CPT

## 2017-09-13 RX ORDER — SODIUM CHLORIDE 9 MG/ML
1000 INJECTION, SOLUTION INTRAVENOUS ONCE
Qty: 0 | Refills: 0 | Status: COMPLETED | OUTPATIENT
Start: 2017-09-13 | End: 2017-09-13

## 2017-09-13 RX ORDER — ACETAMINOPHEN 500 MG
1000 TABLET ORAL ONCE
Qty: 0 | Refills: 0 | Status: DISCONTINUED | OUTPATIENT
Start: 2017-09-13 | End: 2017-09-13

## 2017-09-13 RX ORDER — KETOROLAC TROMETHAMINE 30 MG/ML
15 SYRINGE (ML) INJECTION ONCE
Qty: 0 | Refills: 0 | Status: DISCONTINUED | OUTPATIENT
Start: 2017-09-13 | End: 2017-09-13

## 2017-09-13 RX ORDER — HYDROMORPHONE HYDROCHLORIDE 2 MG/ML
0.5 INJECTION INTRAMUSCULAR; INTRAVENOUS; SUBCUTANEOUS ONCE
Qty: 0 | Refills: 0 | Status: DISCONTINUED | OUTPATIENT
Start: 2017-09-13 | End: 2017-09-13

## 2017-09-13 RX ORDER — VANCOMYCIN HCL 1 G
1500 VIAL (EA) INTRAVENOUS EVERY 12 HOURS
Qty: 0 | Refills: 0 | Status: DISCONTINUED | OUTPATIENT
Start: 2017-09-13 | End: 2017-09-14

## 2017-09-13 RX ORDER — POTASSIUM PHOSPHATE, MONOBASIC POTASSIUM PHOSPHATE, DIBASIC 236; 224 MG/ML; MG/ML
15 INJECTION, SOLUTION INTRAVENOUS EVERY 6 HOURS
Qty: 0 | Refills: 0 | Status: COMPLETED | OUTPATIENT
Start: 2017-09-13 | End: 2017-09-13

## 2017-09-13 RX ORDER — HYDROMORPHONE HYDROCHLORIDE 2 MG/ML
0.5 INJECTION INTRAMUSCULAR; INTRAVENOUS; SUBCUTANEOUS
Qty: 0 | Refills: 0 | Status: DISCONTINUED | OUTPATIENT
Start: 2017-09-13 | End: 2017-09-18

## 2017-09-13 RX ADMIN — OXYCODONE HYDROCHLORIDE 10 MILLIGRAM(S): 5 TABLET ORAL at 00:20

## 2017-09-13 RX ADMIN — Medication 100 MILLIGRAM(S): at 06:06

## 2017-09-13 RX ADMIN — OXYCODONE HYDROCHLORIDE 10 MILLIGRAM(S): 5 TABLET ORAL at 20:30

## 2017-09-13 RX ADMIN — OXYCODONE HYDROCHLORIDE 10 MILLIGRAM(S): 5 TABLET ORAL at 15:00

## 2017-09-13 RX ADMIN — SODIUM CHLORIDE 50 MILLILITER(S): 9 INJECTION, SOLUTION INTRAVENOUS at 19:35

## 2017-09-13 RX ADMIN — OXYCODONE HYDROCHLORIDE 10 MILLIGRAM(S): 5 TABLET ORAL at 00:50

## 2017-09-13 RX ADMIN — SERTRALINE 100 MILLIGRAM(S): 25 TABLET, FILM COATED ORAL at 11:28

## 2017-09-13 RX ADMIN — SODIUM CHLORIDE 100 MILLILITER(S): 9 INJECTION, SOLUTION INTRAVENOUS at 06:07

## 2017-09-13 RX ADMIN — Medication 50 UNIT(S): at 23:09

## 2017-09-13 RX ADMIN — OXYCODONE HYDROCHLORIDE 10 MILLIGRAM(S): 5 TABLET ORAL at 20:15

## 2017-09-13 RX ADMIN — PIPERACILLIN AND TAZOBACTAM 25 GRAM(S): 4; .5 INJECTION, POWDER, LYOPHILIZED, FOR SOLUTION INTRAVENOUS at 16:41

## 2017-09-13 RX ADMIN — HYDROMORPHONE HYDROCHLORIDE 0.5 MILLIGRAM(S): 2 INJECTION INTRAMUSCULAR; INTRAVENOUS; SUBCUTANEOUS at 16:40

## 2017-09-13 RX ADMIN — Medication 50 MILLIGRAM(S): at 23:08

## 2017-09-13 RX ADMIN — SODIUM CHLORIDE 2000 MILLILITER(S): 9 INJECTION, SOLUTION INTRAVENOUS at 13:30

## 2017-09-13 RX ADMIN — Medication 15 MILLIGRAM(S): at 11:40

## 2017-09-13 RX ADMIN — Medication 1 MILLIGRAM(S): at 02:25

## 2017-09-13 RX ADMIN — OXYCODONE HYDROCHLORIDE 10 MILLIGRAM(S): 5 TABLET ORAL at 16:00

## 2017-09-13 RX ADMIN — OXYCODONE HYDROCHLORIDE 10 MILLIGRAM(S): 5 TABLET ORAL at 09:01

## 2017-09-13 RX ADMIN — Medication 15 MILLIGRAM(S): at 11:25

## 2017-09-13 RX ADMIN — HEPARIN SODIUM 5000 UNIT(S): 5000 INJECTION INTRAVENOUS; SUBCUTANEOUS at 23:09

## 2017-09-13 RX ADMIN — HYDROMORPHONE HYDROCHLORIDE 0.5 MILLIGRAM(S): 2 INJECTION INTRAMUSCULAR; INTRAVENOUS; SUBCUTANEOUS at 16:55

## 2017-09-13 RX ADMIN — POTASSIUM PHOSPHATE, MONOBASIC POTASSIUM PHOSPHATE, DIBASIC 62.5 MILLIMOLE(S): 236; 224 INJECTION, SOLUTION INTRAVENOUS at 12:00

## 2017-09-13 RX ADMIN — PIPERACILLIN AND TAZOBACTAM 25 GRAM(S): 4; .5 INJECTION, POWDER, LYOPHILIZED, FOR SOLUTION INTRAVENOUS at 00:24

## 2017-09-13 RX ADMIN — POTASSIUM PHOSPHATE, MONOBASIC POTASSIUM PHOSPHATE, DIBASIC 62.5 MILLIMOLE(S): 236; 224 INJECTION, SOLUTION INTRAVENOUS at 06:06

## 2017-09-13 RX ADMIN — OXYCODONE HYDROCHLORIDE 5 MILLIGRAM(S): 5 TABLET ORAL at 05:00

## 2017-09-13 RX ADMIN — Medication 300 MILLIGRAM(S): at 18:45

## 2017-09-13 RX ADMIN — Medication 50 MILLIGRAM(S): at 11:29

## 2017-09-13 RX ADMIN — HEPARIN SODIUM 5000 UNIT(S): 5000 INJECTION INTRAVENOUS; SUBCUTANEOUS at 06:07

## 2017-09-13 RX ADMIN — HEPARIN SODIUM 5000 UNIT(S): 5000 INJECTION INTRAVENOUS; SUBCUTANEOUS at 15:00

## 2017-09-13 RX ADMIN — Medication 100 MILLIGRAM(S): at 15:00

## 2017-09-13 RX ADMIN — PIPERACILLIN AND TAZOBACTAM 25 GRAM(S): 4; .5 INJECTION, POWDER, LYOPHILIZED, FOR SOLUTION INTRAVENOUS at 09:01

## 2017-09-13 RX ADMIN — OXYCODONE HYDROCHLORIDE 5 MILLIGRAM(S): 5 TABLET ORAL at 04:30

## 2017-09-13 RX ADMIN — Medication 250 MILLIGRAM(S): at 09:07

## 2017-09-13 RX ADMIN — Medication 127.5 MILLIMOLE(S): at 00:24

## 2017-09-13 RX ADMIN — Medication 1 MILLIGRAM(S): at 20:40

## 2017-09-13 RX ADMIN — OXYCODONE HYDROCHLORIDE 10 MILLIGRAM(S): 5 TABLET ORAL at 08:22

## 2017-09-13 RX ADMIN — SODIUM CHLORIDE 50 MILLILITER(S): 9 INJECTION, SOLUTION INTRAVENOUS at 09:01

## 2017-09-13 RX ADMIN — Medication 100 MILLIGRAM(S): at 22:35

## 2017-09-13 RX ADMIN — PIPERACILLIN AND TAZOBACTAM 25 GRAM(S): 4; .5 INJECTION, POWDER, LYOPHILIZED, FOR SOLUTION INTRAVENOUS at 23:00

## 2017-09-13 RX ADMIN — Medication 150 MILLIGRAM(S): at 23:08

## 2017-09-13 NOTE — PROGRESS NOTE ADULT - ATTENDING COMMENTS
I have personally interviewed and examined this patient, reviewed pertinent labs and imaging, and discussed the case with colleagues, residents, and physician assistants on B Team rounds.    The active care issues are:  1. abscess s/p surgical management-wound looks good, closed over penrose drain and no need for further operations at present  2. probably EtOH withdrawal    Continue supportive care.

## 2017-09-13 NOTE — PROGRESS NOTE ADULT - SUBJECTIVE AND OBJECTIVE BOX
GENERAL SURGERY DAILY PROGRESS NOTE  Subjective: Feeling well, pain better controlled    Objective: Tremulous, pain better controlled.     MEDICATIONS  (STANDING):  topiramate 50 milliGRAM(s) Oral every 12 hours  traZODone 150 milliGRAM(s) Oral at bedtime  sertraline 100 milliGRAM(s) Oral daily  heparin  Injectable 5000 Unit(s) SubCutaneous every 8 hours  piperacillin/tazobactam IVPB. 3.375 Gram(s) IV Intermittent every 8 hours  clindamycin IVPB 600 milliGRAM(s) IV Intermittent every 8 hours  insulin lispro (HumaLOG) corrective regimen sliding scale   SubCutaneous three times a day before meals  dextrose 5%. 1000 milliLiter(s) (50 mL/Hr) IV Continuous <Continuous>  dextrose 50% Injectable 12.5 Gram(s) IV Push once  dextrose 50% Injectable 25 Gram(s) IV Push once  dextrose 50% Injectable 25 Gram(s) IV Push once  multivitamin/thiamine/folic acid in sodium chloride 0.9% 1000 milliLiter(s) (50 mL/Hr) IV Continuous <Continuous>  insulin detemir injectable (LEVEMIR) 50 Unit(s) SubCutaneous at bedtime  vancomycin  IVPB 1500 milliGRAM(s) IV Intermittent every 12 hours    MEDICATIONS  (PRN):  hydrocortisone 2.5% Ointment 1 Application(s) Topical three times a day PRN Itching LE  oxyCODONE    IR 5 milliGRAM(s) Oral every 3 hours PRN Moderate Pain (4 - 6)  oxyCODONE    IR 10 milliGRAM(s) Oral every 4 hours PRN Severe Pain (7 - 10)  clonazePAM Tablet 0.5 milliGRAM(s) Oral every 8 hours PRN agitation  dextrose Gel 1 Dose(s) Oral once PRN Blood Glucose LESS THAN 70 milliGRAM(s)/deciliter  glucagon  Injectable 1 milliGRAM(s) IntraMuscular once PRN Glucose LESS THAN 70 milligrams/deciliter  ondansetron Injectable 4 milliGRAM(s) IV Push every 4 hours PRN Nausea and/or Vomiting  LORazepam   Injectable 1 milliGRAM(s) IV Push every 4 hours PRN Agitation  HYDROmorphone  Injectable 0.5 milliGRAM(s) IV Push every 3 hours PRN breakthrough      Vital Signs Last 24 Hrs  T(C): 36.3 (13 Sep 2017 08:00), Max: 36.3 (13 Sep 2017 08:00)  T(F): 97.4 (13 Sep 2017 08:00), Max: 97.4 (13 Sep 2017 08:00)  HR: 80 (13 Sep 2017 19:00) (61 - 93)  BP: 107/69 (13 Sep 2017 19:00) (82/53 - 114/57)  BP(mean): 75 (13 Sep 2017 19:00) (59 - 81)  RR: 20 (13 Sep 2017 19:00) (13 - 25)  SpO2: 93% (13 Sep 2017 19:00) (93% - 99%)    I&O's Detail    12 Sep 2017 07:01  -  13 Sep 2017 07:00  --------------------------------------------------------  IN:    dextrose 5% + lactated ringers.: 1100 mL    IV PiggyBack: 900 mL    multivitamin/thiamine/folic acid in sodium chloride 0.9%: 1050 mL    Oral Fluid: 1100 mL  Total IN: 4150 mL    OUT:    Indwelling Catheter - Urethral: 1925 mL    Voided: 400 mL  Total OUT: 2325 mL    Total NET: 1825 mL      13 Sep 2017 07:01  -  13 Sep 2017 19:55  --------------------------------------------------------  IN:    IV PiggyBack: 350 mL    Lactated Ringers IV Bolus: 1000 mL    multivitamin/thiamine/folic acid in sodium chloride 0.9%: 300 mL  Total IN: 1650 mL    OUT:    Voided: 400 mL  Total OUT: 400 mL    Total NET: 1250 mL      General: WN/WD NAD  Neurology: A&Ox3, nonfocal, GOFF x 4  Head:  Normocephalic, atraumatic  ENT:  Mucosa moist, no ulcerations  Neck:  Supple, no sinuses or palpable masses  Lymphatic:  No palpable cervical, supraclavicular, axillary or inguinal adenopathy  Respiratory: CTA B/L  CV: RRR, S1S2, no murmur  Abdominal: Soft, NT, ND no palpable mass  MSK: No edema, + peripheral pulses, FROM all 4 extremity  Incisions: intact, no erythema or drainage\  : penrose in place, minimal tenderness     Daily Weight in k.2 (13 Sep 2017 06:00)    LABS:                        11.5   15.82 )-----------( 153      ( 13 Sep 2017 04:20 )             33.8     09-13    143  |  107  |  11  ----------------------------<  81  3.5   |  25  |  0.62    Ca    7.9<L>      13 Sep 2017 04:20  Phos  2.5     09-13  Mg     2.8     09-13            RADIOLOGY & ADDITIONAL STUDIES:

## 2017-09-13 NOTE — PROGRESS NOTE ADULT - SUBJECTIVE AND OBJECTIVE BOX
SICU Daily Progress Note  =====================================================  SICU Day#: 3    HPI: 47 year old woman presented after having an allergic reaction including hives and shortness of breath after taking Bactrim. She went to an urgent care center for an abscess on the left groin, fevers and chills for the last 5 days. She also states that she has had nausea and vomiting over the last couple of days and was unable to keep anything down including her medications. The abscess was spontaneously draining before, but currently it is not draining. She has had an abscess in the same location before but it was not as large. She is having severe pain at the site. She has a PMH of diabetes for 10 years and alcohol abuse for approximately 10 years. Her pattern of misuse is drinking heavily and binging for 5 days followed by abstinence for a few days. She has attempted to quit. She doesn't remember the last drink she had but thinks it may have been 2 days ago. CT Ab/Pelvis: Skin thickening and subcutaneous soft tissue stranding involving the left medial buttock and left medial thigh, compatible with cellulitis. No   organized collection to suggest abscess formation. Pt treated for DKA, started on insulin gtt in ED. Patient was taken to the OR urgently given concern for necrotizing fascitis. Left groin exploration, penrose drain left in place.  Pt returned to SICU.     Allergies: Bactrim (Anaphylaxis)      MEDICATIONS:   --------------------------------------------------------------------------------------  Neurologic Medications  topiramate 50 milliGRAM(s) Oral every 12 hours  traZODone 150 milliGRAM(s) Oral at bedtime  sertraline 100 milliGRAM(s) Oral daily  oxyCODONE    IR 5 milliGRAM(s) Oral every 3 hours PRN Moderate Pain (4 - 6)  oxyCODONE    IR 10 milliGRAM(s) Oral every 4 hours PRN Severe Pain (7 - 10)  clonazePAM Tablet 0.5 milliGRAM(s) Oral every 8 hours PRN agitation  ondansetron Injectable 4 milliGRAM(s) IV Push every 4 hours PRN Nausea and/or Vomiting  LORazepam   Injectable 1 milliGRAM(s) IV Push every 4 hours PRN Agitation    Respiratory Medications    Cardiovascular Medications    Gastrointestinal Medications  dextrose 5% + lactated ringers. 1000 milliLiter(s) IV Continuous <Continuous>  dextrose 5%. 1000 milliLiter(s) IV Continuous <Continuous>  multivitamin/thiamine/folic acid in sodium chloride 0.9% 1000 milliLiter(s) IV Continuous <Continuous>    Genitourinary Medications    Hematologic/Oncologic Medications  heparin  Injectable 5000 Unit(s) SubCutaneous every 8 hours    Antimicrobial/Immunologic Medications  piperacillin/tazobactam IVPB. 3.375 Gram(s) IV Intermittent every 8 hours  clindamycin IVPB 600 milliGRAM(s) IV Intermittent every 8 hours  vancomycin  IVPB 1000 milliGRAM(s) IV Intermittent every 12 hours    Endocrine/Metabolic Medications  insulin lispro (HumaLOG) corrective regimen sliding scale   SubCutaneous three times a day before meals  dextrose Gel 1 Dose(s) Oral once PRN Blood Glucose LESS THAN 70 milliGRAM(s)/deciliter  dextrose 50% Injectable 12.5 Gram(s) IV Push once  dextrose 50% Injectable 25 Gram(s) IV Push once  dextrose 50% Injectable 25 Gram(s) IV Push once  glucagon  Injectable 1 milliGRAM(s) IntraMuscular once PRN Glucose LESS THAN 70 milligrams/deciliter  insulin detemir injectable (LEVEMIR) 50 Unit(s) SubCutaneous at bedtime    Topical/Other Medications  hydrocortisone 2.5% Ointment 1 Application(s) Topical three times a day PRN Itching LE    --------------------------------------------------------------------------------------    VITAL SIGNS, INS/OUTS (last 24 hours):  --------------------------------------------------------------------------------------  ((Insert SICU Vitals/Is+Os here))***  --------------------------------------------------------------------------------------    EXAM  NEUROLOGY  RASS:   	GCS:    Exam: Normal, NAD, alert, oriented x3, no focal deficits. ***    HEENT  Exam: Normocephalic, atraumatic, EOMI.  ***    RESPIRATORY  Exam: Lungs clear to auscultation, Normal expansion/effort. ***  Mechanical Ventilation:     CARDIOVASCULAR  Exam: S1, S2.  Regular rate and rhythm.   ***    GI/NUTRITION  Exam: Abdomen soft, Non-tender, Non-distended.  ***  Wound:  ***  Current Diet:  NPO***    VASCULAR  Exam: Extremities warm, pink, well-perfused. ***    MUSCULOSKELETAL  Exam: All extremities moving spontaneously without limitations. ***    SKIN  Exam: Good skin turgor, no skin breakdown. ***    METABOLIC/FLUIDS/ELECTROLYTES  dextrose 5% + lactated ringers. 1000 milliLiter(s) IV Continuous <Continuous>  dextrose 5%. 1000 milliLiter(s) IV Continuous <Continuous>  multivitamin/thiamine/folic acid in sodium chloride 0.9% 1000 milliLiter(s) IV Continuous <Continuous>      HEMATOLOGIC  [x] VTE Prophylaxis: heparin  Injectable 5000 Unit(s) SubCutaneous every 8 hours    Transfusions:	[] PRBC	[] Platelets		[] FFP	[] Cryoprecipitate    INFECTIOUS DISEASE  Antimicrobials/Immunologic Medications:  piperacillin/tazobactam IVPB. 3.375 Gram(s) IV Intermittent every 8 hours  clindamycin IVPB 600 milliGRAM(s) IV Intermittent every 8 hours  vancomycin  IVPB 1000 milliGRAM(s) IV Intermittent every 12 hours    Day #      of     ***    Tubes/Lines/Drains  ***  [x] Peripheral IV  [] Central Venous Line     	[] R	[] L	[] IJ	[] Fem	[] SC	Date Placed:   [] Arterial Line		[] R	[] L	[] Fem	[] Rad	[] Ax	Date Placed:   [] PICC		[] Midline		[] Mediport  [] Urinary Catheter		Date Placed:   [x] Necessity of urinary, arterial, and venous catheters discussed    LABS  --------------------------------------------------------------------------------------  ((Insert SICU Labs here))***  --------------------------------------------------------------------------------------    OTHER LABORATORY:     IMAGING STUDIES:   CXR:     47 year old woman who initially presented with severe allergic reaction to Bactrim for treatment of large left groin abscess.  Presentation complicated DKA and alcohol withdrawal. Now s/p L groin exploration.       PLAN:    Neurologic: pain control, ativan PRN. Restart home medications for neuropathy: Topiramate, Trazodone. c/w Zoloft: depression  Watch for DTs. c/w home Klonopin PRN.   Respiratory: Stable. Monitor O2 saturations. Saturating well on room air. Encourage incentive spirometry.  Cardiovascular:  Stable.   Gastrointestinal/Nutrition: start diet  Renal/Genitourinary: Echavarria in place; monitor I/O. Replete electrolytes as needed, banana bag 50cc/h  Hematologic: trend H/H ;  c/w VTE prophylaxis  Infectious Disease: Clindamycin (stop on 15th), Vancomycin, Zosyn; f/u vancomycin trough.  Tubes/Lines/Drains: PIV, A-line, Central Line (considering discontinuing today, placed in ED)  Endocrine: Anion gap closed, Lantus restarted along with ISS.   Disposition: SICU      --------------------------------------------------------------------------------------    Critical Care Diagnosis: DTs, ETOH withdrawal, Necrotizing fasciitis, DM, hypoglycemia, DKA SICU Daily Progress Note  =====================================================  Overnight: Pt's perez was removed.      SICU Day#: 3    HPI: 47 year old woman presented after having an allergic reaction including hives and shortness of breath after taking Bactrim. She went to an urgent care center for an abscess on the left groin, fevers and chills for the last 5 days. She also states that she has had nausea and vomiting over the last couple of days and was unable to keep anything down including her medications. The abscess was spontaneously draining before, but currently it is not draining. She has had an abscess in the same location before but it was not as large. She is having severe pain at the site. She has a PMH of diabetes for 10 years and alcohol abuse for approximately 10 years. Her pattern of misuse is drinking heavily and binging for 5 days followed by abstinence for a few days. She has attempted to quit. She doesn't remember the last drink she had but thinks it may have been 2 days ago. CT Ab/Pelvis: Skin thickening and subcutaneous soft tissue stranding involving the left medial buttock and left medial thigh, compatible with cellulitis. No   organized collection to suggest abscess formation. Pt treated for DKA, started on insulin gtt in ED. Patient was taken to the OR urgently given concern for necrotizing fascitis. Left groin exploration, penrose drain left in place.  Pt returned to SICU.     Allergies: Bactrim (Anaphylaxis)      MEDICATIONS:   --------------------------------------------------------------------------------------  Neurologic Medications  topiramate 50 milliGRAM(s) Oral every 12 hours  traZODone 150 milliGRAM(s) Oral at bedtime  sertraline 100 milliGRAM(s) Oral daily  oxyCODONE    IR 5 milliGRAM(s) Oral every 3 hours PRN Moderate Pain (4 - 6)  oxyCODONE    IR 10 milliGRAM(s) Oral every 4 hours PRN Severe Pain (7 - 10)  clonazePAM Tablet 0.5 milliGRAM(s) Oral every 8 hours PRN agitation  ondansetron Injectable 4 milliGRAM(s) IV Push every 4 hours PRN Nausea and/or Vomiting  LORazepam   Injectable 1 milliGRAM(s) IV Push every 4 hours PRN Agitation    Respiratory Medications    Cardiovascular Medications    Gastrointestinal Medications  dextrose 5% + lactated ringers. 1000 milliLiter(s) IV Continuous <Continuous>  dextrose 5%. 1000 milliLiter(s) IV Continuous <Continuous>  multivitamin/thiamine/folic acid in sodium chloride 0.9% 1000 milliLiter(s) IV Continuous <Continuous>    Genitourinary Medications    Hematologic/Oncologic Medications  heparin  Injectable 5000 Unit(s) SubCutaneous every 8 hours    Antimicrobial/Immunologic Medications  piperacillin/tazobactam IVPB. 3.375 Gram(s) IV Intermittent every 8 hours  clindamycin IVPB 600 milliGRAM(s) IV Intermittent every 8 hours  vancomycin  IVPB 1000 milliGRAM(s) IV Intermittent every 12 hours    Endocrine/Metabolic Medications  insulin lispro (HumaLOG) corrective regimen sliding scale   SubCutaneous three times a day before meals  dextrose Gel 1 Dose(s) Oral once PRN Blood Glucose LESS THAN 70 milliGRAM(s)/deciliter  dextrose 50% Injectable 12.5 Gram(s) IV Push once  dextrose 50% Injectable 25 Gram(s) IV Push once  dextrose 50% Injectable 25 Gram(s) IV Push once  glucagon  Injectable 1 milliGRAM(s) IntraMuscular once PRN Glucose LESS THAN 70 milligrams/deciliter  insulin detemir injectable (LEVEMIR) 50 Unit(s) SubCutaneous at bedtime    Topical/Other Medications  hydrocortisone 2.5% Ointment 1 Application(s) Topical three times a day PRN Itching LE    --------------------------------------------------------------------------------------    VITAL SIGNS, INS/OUTS (last 24 hours):  --------------------------------------------------------------------------------------  ((Insert SICU Vitals/Is+Os here))***  --------------------------------------------------------------------------------------    EXAM  NEUROLOGY  RASS:   	GCS:    Exam: Normal, NAD, alert, oriented x3, no focal deficits. ***    HEENT  Exam: Normocephalic, atraumatic, EOMI.  ***    RESPIRATORY  Exam: Lungs clear to auscultation, Normal expansion/effort. ***  Mechanical Ventilation:     CARDIOVASCULAR  Exam: S1, S2.  Regular rate and rhythm.   ***    GI/NUTRITION  Exam: Abdomen soft, Non-tender, Non-distended.  ***  Wound:  ***  Current Diet:  NPO***    VASCULAR  Exam: Extremities warm, pink, well-perfused. ***    MUSCULOSKELETAL  Exam: All extremities moving spontaneously without limitations. ***    SKIN  Exam: Good skin turgor, no skin breakdown. ***    METABOLIC/FLUIDS/ELECTROLYTES  dextrose 5% + lactated ringers. 1000 milliLiter(s) IV Continuous <Continuous>  dextrose 5%. 1000 milliLiter(s) IV Continuous <Continuous>  multivitamin/thiamine/folic acid in sodium chloride 0.9% 1000 milliLiter(s) IV Continuous <Continuous>      HEMATOLOGIC  [x] VTE Prophylaxis: heparin  Injectable 5000 Unit(s) SubCutaneous every 8 hours    Transfusions:	[] PRBC	[] Platelets		[] FFP	[] Cryoprecipitate    INFECTIOUS DISEASE  Antimicrobials/Immunologic Medications:  piperacillin/tazobactam IVPB. 3.375 Gram(s) IV Intermittent every 8 hours  clindamycin IVPB 600 milliGRAM(s) IV Intermittent every 8 hours  vancomycin  IVPB 1000 milliGRAM(s) IV Intermittent every 12 hours      [x] Peripheral IV  [x] Central Venous Line     	[] R	[] L	[] IJ	[] Fem	[] SC	Date Placed:   [] Arterial Line		[] R	[] L	[] Fem	[] Rad	[] Ax	Date Placed:   [] PICC		[] Midline		[] Mediport  [] Urinary Catheter		Date Placed:   [x] Necessity of urinary, arterial, and venous catheters discussed    LABS  --------------------------------------------------------------------------------------  ((Insert SICU Labs here))***  --------------------------------------------------------------------------------------    OTHER LABORATORY:     IMAGING STUDIES:   CXR:     47 year old woman who initially presented with severe allergic reaction to Bactrim for treatment of large left groin abscess.  Presentation complicated DKA and alcohol withdrawal. Now s/p L groin exploration.       PLAN:    Neurologic: pain control, ativan PRN. Topiramate, Trazodone. c/w Zoloft: depression  Watch for DTs. c/w home Klonopin PRN.   Respiratory: Stable. Monitor O2 saturations. Saturating well on room air. Encourage incentive spirometry.  Cardiovascular:  Stable.   Gastrointestinal/Nutrition: Regular diabetic diet  Renal/Genitourinary:Pt due to void by 11 AM.  Monitor I/O. Replete electrolytes as needed, banana bag 50cc/h.   Hematologic: trend H/H ;  c/w VTE prophylaxis  Infectious Disease: Clindamycin (stop on 15th), Vancomycin, Zosyn; f/u vancomycin trough. .  Tubes/Lines/Drains: PIV, A-line, Central Line (considering discontinuing today)  Endocrine: Continue Lantus and  ISS.   Disposition: SICU      --------------------------------------------------------------------------------------    Critical Care Diagnosis: DTs, ETOH withdrawal, Necrotizing fasciitis, DM, hypoglycemia, DKA SICU Daily Progress Note  =====================================================  Overnight: Pt's perez was removed.      SICU Day#: 3    HPI: 47 year old woman presented after having an allergic reaction including hives and shortness of breath after taking Bactrim. She went to an urgent care center for an abscess on the left groin, fevers and chills for the last 5 days. She also states that she has had nausea and vomiting over the last couple of days and was unable to keep anything down including her medications. The abscess was spontaneously draining before, but currently it is not draining. She has had an abscess in the same location before but it was not as large. She is having severe pain at the site. She has a PMH of diabetes for 10 years and alcohol abuse for approximately 10 years. Her pattern of misuse is drinking heavily and binging for 5 days followed by abstinence for a few days. She has attempted to quit. She doesn't remember the last drink she had but thinks it may have been 2 days ago. CT Ab/Pelvis: Skin thickening and subcutaneous soft tissue stranding involving the left medial buttock and left medial thigh, compatible with cellulitis. No organized collection to suggest abscess formation. Pt treated for DKA, started on insulin gtt in ED. Patient was taken to the OR urgently given concern for necrotizing fascitis. Left groin exploration, penrose drain left in place.  Pt returned to SICU.     Allergies: Bactrim (Anaphylaxis)      MEDICATIONS:   --------------------------------------------------------------------------------------  Neurologic Medications  topiramate 50 milliGRAM(s) Oral every 12 hours  traZODone 150 milliGRAM(s) Oral at bedtime  sertraline 100 milliGRAM(s) Oral daily  oxyCODONE    IR 5 milliGRAM(s) Oral every 3 hours PRN Moderate Pain (4 - 6)  oxyCODONE    IR 10 milliGRAM(s) Oral every 4 hours PRN Severe Pain (7 - 10)  clonazePAM Tablet 0.5 milliGRAM(s) Oral every 8 hours PRN agitation  ondansetron Injectable 4 milliGRAM(s) IV Push every 4 hours PRN Nausea and/or Vomiting  LORazepam   Injectable 1 milliGRAM(s) IV Push every 4 hours PRN Agitation    Respiratory Medications    Cardiovascular Medications    Gastrointestinal Medications  dextrose 5% + lactated ringers. 1000 milliLiter(s) IV Continuous <Continuous>  dextrose 5%. 1000 milliLiter(s) IV Continuous <Continuous>  multivitamin/thiamine/folic acid in sodium chloride 0.9% 1000 milliLiter(s) IV Continuous <Continuous>    Genitourinary Medications    Hematologic/Oncologic Medications  heparin  Injectable 5000 Unit(s) SubCutaneous every 8 hours    Antimicrobial/Immunologic Medications  piperacillin/tazobactam IVPB. 3.375 Gram(s) IV Intermittent every 8 hours  clindamycin IVPB 600 milliGRAM(s) IV Intermittent every 8 hours  vancomycin  IVPB 1000 milliGRAM(s) IV Intermittent every 12 hours    Endocrine/Metabolic Medications  insulin lispro (HumaLOG) corrective regimen sliding scale   SubCutaneous three times a day before meals  dextrose Gel 1 Dose(s) Oral once PRN Blood Glucose LESS THAN 70 milliGRAM(s)/deciliter  dextrose 50% Injectable 12.5 Gram(s) IV Push once  dextrose 50% Injectable 25 Gram(s) IV Push once  dextrose 50% Injectable 25 Gram(s) IV Push once  glucagon  Injectable 1 milliGRAM(s) IntraMuscular once PRN Glucose LESS THAN 70 milligrams/deciliter  insulin detemir injectable (LEVEMIR) 50 Unit(s) SubCutaneous at bedtime    Topical/Other Medications  hydrocortisone 2.5% Ointment 1 Application(s) Topical three times a day PRN Itching LE    --------------------------------------------------------------------------------------    VITAL SIGNS, INS/OUTS (last 24 hours):  --------------------------------------------------------------------------------------  ((Insert SICU Vitals/Is+Os here))***  --------------------------------------------------------------------------------------    EXAM  NEUROLOGY  RASS:   	GCS:    Exam: Normal, NAD, alert, oriented x3, no focal deficits. ***    HEENT  Exam: Normocephalic, atraumatic, EOMI.  ***    RESPIRATORY  Exam: Lungs clear to auscultation, Normal expansion/effort. ***  Mechanical Ventilation:     CARDIOVASCULAR  Exam: S1, S2.  Regular rate and rhythm.   ***    GI/NUTRITION  Exam: Abdomen soft, Non-tender, Non-distended.  ***  Wound:  ***  Current Diet:  NPO***    VASCULAR  Exam: Extremities warm, pink, well-perfused. ***    MUSCULOSKELETAL  Exam: All extremities moving spontaneously without limitations. ***    SKIN  Exam: Good skin turgor, no skin breakdown. ***    METABOLIC/FLUIDS/ELECTROLYTES  dextrose 5% + lactated ringers. 1000 milliLiter(s) IV Continuous <Continuous>  dextrose 5%. 1000 milliLiter(s) IV Continuous <Continuous>  multivitamin/thiamine/folic acid in sodium chloride 0.9% 1000 milliLiter(s) IV Continuous <Continuous>      HEMATOLOGIC  [x] VTE Prophylaxis: heparin  Injectable 5000 Unit(s) SubCutaneous every 8 hours    Transfusions:	[] PRBC	[] Platelets		[] FFP	[] Cryoprecipitate    INFECTIOUS DISEASE  Antimicrobials/Immunologic Medications:  piperacillin/tazobactam IVPB. 3.375 Gram(s) IV Intermittent every 8 hours  clindamycin IVPB 600 milliGRAM(s) IV Intermittent every 8 hours  vancomycin  IVPB 1000 milliGRAM(s) IV Intermittent every 12 hours      [x] Peripheral IV  [x] Central Venous Line     	[] R	[] L	[] IJ	[] Fem	[] SC	Date Placed:   [] Arterial Line		[] R	[] L	[] Fem	[] Rad	[] Ax	Date Placed:   [] PICC		[] Midline		[] Mediport  [] Urinary Catheter		Date Placed:   [x] Necessity of urinary, arterial, and venous catheters discussed    LABS  --------------------------------------------------------------------------------------  ((Insert SICU Labs here))***  --------------------------------------------------------------------------------------    OTHER LABORATORY:     IMAGING STUDIES:   CXR:     47 year old woman who initially presented with severe allergic reaction to Bactrim for treatment of large left groin abscess.  Presentation complicated DKA and alcohol withdrawal. Now s/p L groin exploration.       PLAN:    Neurologic: pain control, ativan PRN. Topiramate, Trazodone. c/w Zoloft: depression  Watch for DTs. c/w home Klonopin PRN.   Respiratory: Stable. Monitor O2 saturations. Saturating well on room air. Encourage incentive spirometry.  Cardiovascular:  Stable.   Gastrointestinal/Nutrition: Regular diabetic diet  Renal/Genitourinary:Pt due to void by 11 AM.  Monitor I/O. Replete electrolytes as needed, banana bag 50cc/h.   Hematologic: trend H/H ;  c/w VTE prophylaxis  Infectious Disease: Clindamycin (stop on 15th), Vancomycin, Zosyn; f/u vancomycin trough. .  Tubes/Lines/Drains: PIV, A-line, Central Line (considering discontinuing today)  Endocrine: Continue Lantus and  ISS.   Disposition: SICU      --------------------------------------------------------------------------------------    Critical Care Diagnosis: DTs, ETOH withdrawal, Necrotizing fasciitis, DM, hypoglycemia, DKA SICU Daily Progress Note  =====================================================  Overnight: Pt's perez was removed.      SICU Day#: 3    HPI: 47 year old woman presented after having an allergic reaction including hives and shortness of breath after taking Bactrim. She went to an urgent care center for an abscess on the left groin, fevers and chills for the last 5 days. She also states that she has had nausea and vomiting over the last couple of days and was unable to keep anything down including her medications. The abscess was spontaneously draining before, but currently it is not draining. She has had an abscess in the same location before but it was not as large. She is having severe pain at the site. She has a PMH of diabetes for 10 years and alcohol abuse for approximately 10 years. Her pattern of misuse is drinking heavily and binging for 5 days followed by abstinence for a few days. She has attempted to quit. She doesn't remember the last drink she had but thinks it may have been 2 days ago. CT Ab/Pelvis: Skin thickening and subcutaneous soft tissue stranding involving the left medial buttock and left medial thigh, compatible with cellulitis. No organized collection to suggest abscess formation. Pt treated for DKA, started on insulin gtt in ED. Patient was taken to the OR urgently given concern for necrotizing fascitis. Left groin exploration, penrose drain left in place.  Pt returned to SICU.     Allergies: Bactrim (Anaphylaxis)      MEDICATIONS:   --------------------------------------------------------------------------------------  Neurologic Medications  topiramate 50 milliGRAM(s) Oral every 12 hours  traZODone 150 milliGRAM(s) Oral at bedtime  sertraline 100 milliGRAM(s) Oral daily  oxyCODONE    IR 5 milliGRAM(s) Oral every 3 hours PRN Moderate Pain (4 - 6)  oxyCODONE    IR 10 milliGRAM(s) Oral every 4 hours PRN Severe Pain (7 - 10)  clonazePAM Tablet 0.5 milliGRAM(s) Oral every 8 hours PRN agitation  ondansetron Injectable 4 milliGRAM(s) IV Push every 4 hours PRN Nausea and/or Vomiting  LORazepam   Injectable 1 milliGRAM(s) IV Push every 4 hours PRN Agitation    Respiratory Medications    Cardiovascular Medications    Gastrointestinal Medications  dextrose 5% + lactated ringers. 1000 milliLiter(s) IV Continuous <Continuous>  dextrose 5%. 1000 milliLiter(s) IV Continuous <Continuous>  multivitamin/thiamine/folic acid in sodium chloride 0.9% 1000 milliLiter(s) IV Continuous <Continuous>    Genitourinary Medications    Hematologic/Oncologic Medications  heparin  Injectable 5000 Unit(s) SubCutaneous every 8 hours    Antimicrobial/Immunologic Medications  piperacillin/tazobactam IVPB. 3.375 Gram(s) IV Intermittent every 8 hours  clindamycin IVPB 600 milliGRAM(s) IV Intermittent every 8 hours  vancomycin  IVPB 1000 milliGRAM(s) IV Intermittent every 12 hours    Endocrine/Metabolic Medications  insulin lispro (HumaLOG) corrective regimen sliding scale   SubCutaneous three times a day before meals  dextrose Gel 1 Dose(s) Oral once PRN Blood Glucose LESS THAN 70 milliGRAM(s)/deciliter  dextrose 50% Injectable 12.5 Gram(s) IV Push once  dextrose 50% Injectable 25 Gram(s) IV Push once  dextrose 50% Injectable 25 Gram(s) IV Push once  glucagon  Injectable 1 milliGRAM(s) IntraMuscular once PRN Glucose LESS THAN 70 milligrams/deciliter  insulin detemir injectable (LEVEMIR) 50 Unit(s) SubCutaneous at bedtime    Topical/Other Medications  hydrocortisone 2.5% Ointment 1 Application(s) Topical three times a day PRN Itching LE    --------------------------------------------------------------------------------------    VITAL SIGNS, INS/OUTS (last 24 hours):  --------------------------------------------------------------------------------------  ((Insert SICU Vitals/Is+Os here))***  --------------------------------------------------------------------------------------    EXAM  NEUROLOGY  RASS:   	GCS:    Exam: Normal, NAD, alert, oriented x3, no focal deficits. ***    HEENT  Exam: Normocephalic, atraumatic, EOMI.  ***    RESPIRATORY  Exam: Lungs clear to auscultation, Normal expansion/effort. ***  Mechanical Ventilation:     CARDIOVASCULAR  Exam: S1, S2.  Regular rate and rhythm.   ***    GI/NUTRITION  Exam: Abdomen soft, Non-tender, Non-distended.  ***  Wound:  ***  Current Diet:  NPO***    VASCULAR  Exam: Extremities warm, pink, well-perfused. ***    MUSCULOSKELETAL  Exam: All extremities moving spontaneously without limitations. ***    SKIN  Exam: Good skin turgor, no skin breakdown. ***    METABOLIC/FLUIDS/ELECTROLYTES  dextrose 5% + lactated ringers. 1000 milliLiter(s) IV Continuous <Continuous>  dextrose 5%. 1000 milliLiter(s) IV Continuous <Continuous>  multivitamin/thiamine/folic acid in sodium chloride 0.9% 1000 milliLiter(s) IV Continuous <Continuous>      HEMATOLOGIC  [x] VTE Prophylaxis: heparin  Injectable 5000 Unit(s) SubCutaneous every 8 hours    Transfusions:	[] PRBC	[] Platelets		[] FFP	[] Cryoprecipitate    INFECTIOUS DISEASE  Antimicrobials/Immunologic Medications:  piperacillin/tazobactam IVPB. 3.375 Gram(s) IV Intermittent every 8 hours  clindamycin IVPB 600 milliGRAM(s) IV Intermittent every 8 hours  vancomycin  IVPB 1000 milliGRAM(s) IV Intermittent every 12 hours      [x] Peripheral IV  [x] Central Venous Line     	[] R	[] L	[] IJ	[] Fem	[] SC	Date Placed:   [] Arterial Line		[] R	[] L	[] Fem	[] Rad	[] Ax	Date Placed:   [] PICC		[] Midline		[] Mediport  [] Urinary Catheter		Date Placed:   [x] Necessity of urinary, arterial, and venous catheters discussed    LABS  --------------------------------------------------------------------------------------  ((Insert SICU Labs here))***  --------------------------------------------------------------------------------------    OTHER LABORATORY:     IMAGING STUDIES:   CXR:     47 year old woman who initially presented with severe allergic reaction to Bactrim for treatment of large left groin abscess.  Presentation complicated DKA and alcohol withdrawal. Now s/p L groin exploration.       PLAN:    Neurologic: pain control, ativan PRN. Topiramate, Trazodone. c/w Zoloft: depression  Watch for DTs. c/w home Klonopin PRN.   Respiratory: Stable. Monitor O2 saturations. Saturating well on room air. Encourage incentive spirometry.  Cardiovascular:  Stable.   Gastrointestinal/Nutrition: Regular diabetic diet  Renal/Genitourinary: banana bag 50cc/h. dc LR  Hematologic: trend H/H ;  c/w VTE prophylaxis  Infectious Disease: Clindamycin (stop on 15th), Vancomycin to 1.5 q12, Zosyn; f/u vancomycin trough. .  Tubes/Lines/Drains: PIV, A-line, Central Line (discontinue today)  Endocrine: Continue Lantus and  ISS.   Disposition: SICU      --------------------------------------------------------------------------------------    Critical Care Diagnosis: DTs, ETOH withdrawal, Necrotizing fasciitis, DM, hypoglycemia, DKA

## 2017-09-13 NOTE — PROGRESS NOTE ADULT - ATTENDING COMMENTS
47 year old woman who initially presented with severe allergic reaction to Bactrim for treatment of large left groin abscess.  Presentation complicated DKA and alcohol withdrawal. Now s/p L groin exploration.       PLAN:    Neurologic: pain control, ativan PRN. Topiramate, Trazodone. c/w Zoloft: depression  Watch for DTs. c/w home Klonopin PRN.   Respiratory: Stable. Monitor O2 saturations. Saturating well on room air. Encourage incentive spirometry.  Cardiovascular:  Stable.   Gastrointestinal/Nutrition: Regular diabetic diet  Renal/Genitourinary: banana bag 50cc/h. dc LR  Hematologic: trend H/H ;  c/w VTE prophylaxis  Infectious Disease: Clindamycin (stop on 15th), Vancomycin to 1.5 q12, Zosyn; f/u vancomycin trough. .  Tubes/Lines/Drains: PIV, A-line, Central Line (discontinue today)  Endocrine: Continue Lantus and  ISS.   Disposition: SICU      --------------------------------------------------------------------------------------    Critical Care Diagnosis: DTs, ETOH withdrawal, Necrotizing fasciitis, DM, hypoglycemia, DKA    The patient is a critical care patient with hemodynamic and metabolic instability in SICU.  I have personally interviewed and examined this patient, reviewed labs and x-rays, discussed with other consultants, House staff and PA's.  I spent 55    minutes  in total providing critical care for the diagnoses, assessment and plan above.  These diagnoses are unrelated to the surgical procedure noted above.  I met with family     min to get further history and make care decisions for this patient who is unable to participate due to altered mental status.  Time involved in performance of separately billable procedures was not counted toward my critical care time.  There is no overlap.

## 2017-09-14 LAB
-  CEFAZOLIN: SIGNIFICANT CHANGE UP
-  CIPROFLOXACIN: SIGNIFICANT CHANGE UP
-  CLINDAMYCIN: SIGNIFICANT CHANGE UP
-  DAPTOMYCIN: SIGNIFICANT CHANGE UP
-  ERYTHROMYCIN: SIGNIFICANT CHANGE UP
-  GENTAMICIN: SIGNIFICANT CHANGE UP
-  LINEZOLID: SIGNIFICANT CHANGE UP
-  MOXIFLOXACIN(AEROBIC): SIGNIFICANT CHANGE UP
-  OXACILLIN: SIGNIFICANT CHANGE UP
-  PENICILLIN: SIGNIFICANT CHANGE UP
-  RIFAMPIN.: SIGNIFICANT CHANGE UP
-  TETRACYCLINE: SIGNIFICANT CHANGE UP
-  TRIMETHOPRIM/SULFAMETHOXAZOLE: SIGNIFICANT CHANGE UP
-  VANCOMYCIN: SIGNIFICANT CHANGE UP
BACTERIA SKIN AEROBE CULT: SIGNIFICANT CHANGE UP
BUN SERPL-MCNC: 10 MG/DL — SIGNIFICANT CHANGE UP (ref 7–23)
CALCIUM SERPL-MCNC: 8.1 MG/DL — LOW (ref 8.4–10.5)
CHLORIDE SERPL-SCNC: 108 MMOL/L — HIGH (ref 98–107)
CO2 SERPL-SCNC: 23 MMOL/L — SIGNIFICANT CHANGE UP (ref 22–31)
CREAT SERPL-MCNC: 0.65 MG/DL — SIGNIFICANT CHANGE UP (ref 0.5–1.3)
CULTURE - SURGICAL SITE: SIGNIFICANT CHANGE UP
CULTURE RESULTS: SIGNIFICANT CHANGE UP
CULTURE RESULTS: SIGNIFICANT CHANGE UP
GLUCOSE SERPL-MCNC: 85 MG/DL — SIGNIFICANT CHANGE UP (ref 70–99)
GRAM STN WND: SIGNIFICANT CHANGE UP
HCT VFR BLD CALC: 38.6 % — SIGNIFICANT CHANGE UP (ref 34.5–45)
HGB BLD-MCNC: 12.6 G/DL — SIGNIFICANT CHANGE UP (ref 11.5–15.5)
MAGNESIUM SERPL-MCNC: 2.3 MG/DL — SIGNIFICANT CHANGE UP (ref 1.6–2.6)
MCHC RBC-ENTMCNC: 32 PG — SIGNIFICANT CHANGE UP (ref 27–34)
MCHC RBC-ENTMCNC: 32.6 % — SIGNIFICANT CHANGE UP (ref 32–36)
MCV RBC AUTO: 98 FL — SIGNIFICANT CHANGE UP (ref 80–100)
METHOD TYPE: SIGNIFICANT CHANGE UP
NRBC # FLD: 0.03 — SIGNIFICANT CHANGE UP
ORGANISM # SPEC MICROSCOPIC CNT: SIGNIFICANT CHANGE UP
ORGANISM # SPEC MICROSCOPIC CNT: SIGNIFICANT CHANGE UP
PHOSPHATE SERPL-MCNC: 4.1 MG/DL — SIGNIFICANT CHANGE UP (ref 2.5–4.5)
PLATELET # BLD AUTO: 179 K/UL — SIGNIFICANT CHANGE UP (ref 150–400)
PMV BLD: 10.1 FL — SIGNIFICANT CHANGE UP (ref 7–13)
POTASSIUM SERPL-MCNC: 3.7 MMOL/L — SIGNIFICANT CHANGE UP (ref 3.5–5.3)
POTASSIUM SERPL-SCNC: 3.7 MMOL/L — SIGNIFICANT CHANGE UP (ref 3.5–5.3)
RBC # BLD: 3.94 M/UL — SIGNIFICANT CHANGE UP (ref 3.8–5.2)
RBC # FLD: 11.9 % — SIGNIFICANT CHANGE UP (ref 10.3–14.5)
SODIUM SERPL-SCNC: 142 MMOL/L — SIGNIFICANT CHANGE UP (ref 135–145)
VANCOMYCIN FLD-MCNC: 18.4 UG/ML — SIGNIFICANT CHANGE UP
WBC # BLD: 12.43 K/UL — HIGH (ref 3.8–10.5)
WBC # FLD AUTO: 12.43 K/UL — HIGH (ref 3.8–10.5)

## 2017-09-14 PROCEDURE — 99233 SBSQ HOSP IP/OBS HIGH 50: CPT

## 2017-09-14 RX ADMIN — SERTRALINE 100 MILLIGRAM(S): 25 TABLET, FILM COATED ORAL at 11:21

## 2017-09-14 RX ADMIN — OXYCODONE HYDROCHLORIDE 10 MILLIGRAM(S): 5 TABLET ORAL at 16:00

## 2017-09-14 RX ADMIN — OXYCODONE HYDROCHLORIDE 5 MILLIGRAM(S): 5 TABLET ORAL at 00:50

## 2017-09-14 RX ADMIN — HYDROMORPHONE HYDROCHLORIDE 0.5 MILLIGRAM(S): 2 INJECTION INTRAMUSCULAR; INTRAVENOUS; SUBCUTANEOUS at 08:36

## 2017-09-14 RX ADMIN — Medication 50 UNIT(S): at 21:30

## 2017-09-14 RX ADMIN — HEPARIN SODIUM 5000 UNIT(S): 5000 INJECTION INTRAVENOUS; SUBCUTANEOUS at 21:30

## 2017-09-14 RX ADMIN — Medication 100 MILLIGRAM(S): at 05:37

## 2017-09-14 RX ADMIN — OXYCODONE HYDROCHLORIDE 10 MILLIGRAM(S): 5 TABLET ORAL at 20:25

## 2017-09-14 RX ADMIN — OXYCODONE HYDROCHLORIDE 10 MILLIGRAM(S): 5 TABLET ORAL at 11:21

## 2017-09-14 RX ADMIN — Medication 50 MILLIGRAM(S): at 11:20

## 2017-09-14 RX ADMIN — SODIUM CHLORIDE 50 MILLILITER(S): 9 INJECTION, SOLUTION INTRAVENOUS at 08:39

## 2017-09-14 RX ADMIN — HYDROMORPHONE HYDROCHLORIDE 0.5 MILLIGRAM(S): 2 INJECTION INTRAMUSCULAR; INTRAVENOUS; SUBCUTANEOUS at 23:33

## 2017-09-14 RX ADMIN — Medication 100 MILLIGRAM(S): at 14:13

## 2017-09-14 RX ADMIN — OXYCODONE HYDROCHLORIDE 10 MILLIGRAM(S): 5 TABLET ORAL at 15:29

## 2017-09-14 RX ADMIN — Medication 50 MILLIGRAM(S): at 21:28

## 2017-09-14 RX ADMIN — HYDROMORPHONE HYDROCHLORIDE 0.5 MILLIGRAM(S): 2 INJECTION INTRAMUSCULAR; INTRAVENOUS; SUBCUTANEOUS at 18:20

## 2017-09-14 RX ADMIN — OXYCODONE HYDROCHLORIDE 10 MILLIGRAM(S): 5 TABLET ORAL at 12:00

## 2017-09-14 RX ADMIN — HYDROMORPHONE HYDROCHLORIDE 0.5 MILLIGRAM(S): 2 INJECTION INTRAMUSCULAR; INTRAVENOUS; SUBCUTANEOUS at 08:46

## 2017-09-14 RX ADMIN — Medication 150 MILLIGRAM(S): at 21:28

## 2017-09-14 RX ADMIN — OXYCODONE HYDROCHLORIDE 5 MILLIGRAM(S): 5 TABLET ORAL at 06:25

## 2017-09-14 RX ADMIN — Medication 300 MILLIGRAM(S): at 19:59

## 2017-09-14 RX ADMIN — HEPARIN SODIUM 5000 UNIT(S): 5000 INJECTION INTRAVENOUS; SUBCUTANEOUS at 14:13

## 2017-09-14 RX ADMIN — Medication 1 MILLIGRAM(S): at 06:25

## 2017-09-14 RX ADMIN — Medication 100 MILLIGRAM(S): at 21:28

## 2017-09-14 RX ADMIN — OXYCODONE HYDROCHLORIDE 10 MILLIGRAM(S): 5 TABLET ORAL at 20:55

## 2017-09-14 RX ADMIN — OXYCODONE HYDROCHLORIDE 5 MILLIGRAM(S): 5 TABLET ORAL at 07:25

## 2017-09-14 RX ADMIN — PIPERACILLIN AND TAZOBACTAM 25 GRAM(S): 4; .5 INJECTION, POWDER, LYOPHILIZED, FOR SOLUTION INTRAVENOUS at 17:19

## 2017-09-14 RX ADMIN — Medication 300 MILLIGRAM(S): at 05:37

## 2017-09-14 RX ADMIN — HEPARIN SODIUM 5000 UNIT(S): 5000 INJECTION INTRAVENOUS; SUBCUTANEOUS at 05:38

## 2017-09-14 RX ADMIN — HYDROMORPHONE HYDROCHLORIDE 0.5 MILLIGRAM(S): 2 INJECTION INTRAMUSCULAR; INTRAVENOUS; SUBCUTANEOUS at 18:35

## 2017-09-14 RX ADMIN — OXYCODONE HYDROCHLORIDE 5 MILLIGRAM(S): 5 TABLET ORAL at 01:20

## 2017-09-14 RX ADMIN — SODIUM CHLORIDE 50 MILLILITER(S): 9 INJECTION, SOLUTION INTRAVENOUS at 21:28

## 2017-09-14 RX ADMIN — PIPERACILLIN AND TAZOBACTAM 25 GRAM(S): 4; .5 INJECTION, POWDER, LYOPHILIZED, FOR SOLUTION INTRAVENOUS at 08:39

## 2017-09-14 RX ADMIN — HYDROMORPHONE HYDROCHLORIDE 0.5 MILLIGRAM(S): 2 INJECTION INTRAMUSCULAR; INTRAVENOUS; SUBCUTANEOUS at 23:03

## 2017-09-14 NOTE — PROGRESS NOTE ADULT - ATTENDING COMMENTS
47 year old woman who initially presented with severe allergic reaction to Bactrim for treatment of large left groin abscess.  Presentation complicated DKA and alcohol withdrawal. Now s/p L groin incision and drainage.      PLAN:    Neurologic: pain control, ativan PRN. Topiramate, Trazodone. c/w Zoloft: depression  Monitor for DTs. c/w home Klonopin PRN.   Respiratory: Stable. Monitor O2 saturations. Saturating well on room air. Encourage incentive spirometry.  Cardiovascular:  Stable.   Gastrointestinal/Nutrition: Regular diabetic diet. dc zofran  Renal/Genitourinary: banana bag 50cc/h.  Hematologic: trend H/H ;  c/w VTE prophylaxis  Infectious Disease: Clindamycin (stop on 15th), Vancomycin 1.5 q12, Zosyn; repeat vancomycin trough  Tubes/Lines/Drains: PIV  Endocrine: Continue Lantus and  ISS.   Disposition: Transfer to floor    --------------------------------------------------------------------------------------    Critical Care Diagnoses:  EtOH Withdrawal, DKA, Hypoglycemia    The patient is a critical care patient with hemodynamic and metabolic instability in SICU.  I have personally interviewed and examined this patient, reviewed labs and x-rays, discussed with other consultants, House staff and PA's.  I spent   55  minutes  in total providing critical care for the diagnoses, assessment and plan above.  These diagnoses are unrelated to the surgical procedure noted above.  I met with family     min to get further history and make care decisions for this patient who is unable to participate due to altered mental status.  Time involved in performance of separately billable procedures was not counted toward my critical care time.  There is no overlap.

## 2017-09-14 NOTE — PROGRESS NOTE ADULT - ASSESSMENT
46yo F with L groin soft tissue infection s/p I&D in OR  - Continue antibiotics (culture grew MRSA). Check vanc troughs  - Reg diet  - Pain control  - Appreciate SICU care  - OK to transfer to floor    B Team Surgery  [id number]

## 2017-09-14 NOTE — DIETITIAN INITIAL EVALUATION ADULT. - OTHER INFO
Pt seen for critical care LOS.  At this time, pt reports a poor appetite 2/2 pain.  She states she is eating less than 50% of tray contents.  Confirmed by RN.  She denies food allergies, difficulties chewing/swallowing, recent wt change.  She states that she takes vit supplements  - B complex, C and Fish Oil.  She states that she performs FS 3-4x/d.  In regards to diet she feels confident in applying meal planning strategies to maintain consistent carbohydrate diet but is not always compliant.  She is taking Levemir and Novolog at home.  Would suggest obtaining HgbA1c level to check long term glucose control

## 2017-09-14 NOTE — PROGRESS NOTE ADULT - SUBJECTIVE AND OBJECTIVE BOX
B TEAM PROGRESS NOTE    Doing well. Feeling "less pressure and pain."    Vital Signs Last 24 Hrs  T(C): 36.5 (14 Sep 2017 04:00), Max: 36.5 (14 Sep 2017 04:00)  T(F): 97.7 (14 Sep 2017 04:00), Max: 97.7 (14 Sep 2017 04:00)  HR: 67 (14 Sep 2017 12:00) (67 - 93)  BP: 102/64 (14 Sep 2017 12:00) (89/53 - 136/80)  BP(mean): 73 (14 Sep 2017 12:00) (62 - 92)  RR: 15 (14 Sep 2017 12:00) (15 - 22)  SpO2: 96% (14 Sep 2017 12:00) (90% - 97%)                        12.6   12.43 )-----------( 179      ( 14 Sep 2017 06:05 )             38.6     09-14    142  |  108<H>  |  10  ----------------------------<  85  3.7   |  23  |  0.65    Ca    8.1<L>      14 Sep 2017 06:05  Phos  4.1     09-14  Mg     2.3     09-14    Vanc trough (9/13): 8.0    Gen: NAD  L groin: Soft tissue induration, decreased compared to prior exam. Penrose drain in place, draining serosanguinous fluid. Tender to palpation.

## 2017-09-14 NOTE — PROGRESS NOTE ADULT - SUBJECTIVE AND OBJECTIVE BOX
SICU Daily Progress Note  =====================================================  Interval/Overnight Events: Vancomycin dose increased yesterday. No acute events overnight.    POD # 3         	SICU Day # 4    HPI: 47 year old woman presented after having an allergic reaction including hives and shortness of breath after taking Bactrim. She went to an urgent care center for an abscess on the left groin, fevers and chills for the last 5 days. She also states that she has had nausea and vomiting over the last couple of days and was unable to keep anything down including her medications. The abscess was spontaneously draining before, but currently it is not draining. She has had an abscess in the same location before but it was not as large. She is having severe pain at the site. She has a PMH of diabetes for 10 years and alcohol abuse for approximately 10 years. Her pattern of misuse is drinking heavily and binging for 5 days followed by abstinence for a few days. She has attempted to quit. She doesn't remember the last drink she had but thinks it may have been 2 days ago. CT Ab/Pelvis: Skin thickening and subcutaneous soft tissue stranding involving the left medial buttock and left medial thigh, compatible with cellulitis. No organized collection to suggest abscess formation. Pt treated for DKA, started on insulin gtt in ED. Patient was taken to the OR urgently given concern for necrotizing fascitis. Left groin exploration, penrose drain left in place.  Pt returned to SICU.     Allergies: Bactrim (Anaphylaxis)      MEDICATIONS:   --------------------------------------------------------------------------------------  Neurologic Medications  topiramate 50 milliGRAM(s) Oral every 12 hours  traZODone 150 milliGRAM(s) Oral at bedtime  sertraline 100 milliGRAM(s) Oral daily  oxyCODONE    IR 5 milliGRAM(s) Oral every 3 hours PRN Moderate Pain (4 - 6)  oxyCODONE    IR 10 milliGRAM(s) Oral every 4 hours PRN Severe Pain (7 - 10)  clonazePAM Tablet 0.5 milliGRAM(s) Oral every 8 hours PRN agitation  ondansetron Injectable 4 milliGRAM(s) IV Push every 4 hours PRN Nausea and/or Vomiting  LORazepam   Injectable 1 milliGRAM(s) IV Push every 4 hours PRN Agitation  HYDROmorphone  Injectable 0.5 milliGRAM(s) IV Push every 3 hours PRN breakthrough    Respiratory Medications    Cardiovascular Medications    Gastrointestinal Medications  dextrose 5%. 1000 milliLiter(s) IV Continuous <Continuous>  multivitamin/thiamine/folic acid in sodium chloride 0.9% 1000 milliLiter(s) IV Continuous <Continuous>    Genitourinary Medications    Hematologic/Oncologic Medications  heparin  Injectable 5000 Unit(s) SubCutaneous every 8 hours    Antimicrobial/Immunologic Medications  piperacillin/tazobactam IVPB. 3.375 Gram(s) IV Intermittent every 8 hours  clindamycin IVPB 600 milliGRAM(s) IV Intermittent every 8 hours  vancomycin  IVPB 1500 milliGRAM(s) IV Intermittent every 12 hours    Endocrine/Metabolic Medications  insulin lispro (HumaLOG) corrective regimen sliding scale   SubCutaneous three times a day before meals  dextrose Gel 1 Dose(s) Oral once PRN Blood Glucose LESS THAN 70 milliGRAM(s)/deciliter  dextrose 50% Injectable 12.5 Gram(s) IV Push once  dextrose 50% Injectable 25 Gram(s) IV Push once  dextrose 50% Injectable 25 Gram(s) IV Push once  glucagon  Injectable 1 milliGRAM(s) IntraMuscular once PRN Glucose LESS THAN 70 milligrams/deciliter  insulin detemir injectable (LEVEMIR) 50 Unit(s) SubCutaneous at bedtime    Topical/Other Medications  hydrocortisone 2.5% Ointment 1 Application(s) Topical three times a day PRN Itching LE    --------------------------------------------------------------------------------------    VITAL SIGNS, INS/OUTS (last 24 hours):  --------------------------------------------------------------------------------------  T(C): 36.5 (17 @ 04:00), Max: 36.5 (17 @ 04:00)  HR: 93 (17 06:00) (61 - 93)  BP: 136/80 (17 @ 06:00) (82/53 - 136/80)  BP(mean): 91 (17 @ 06:00) (59 - 92)  ABP: --  ABP(mean): --  RR: 22 (17 @ 06:00) (13 - 22)  SpO2: 95% (17 @ 06:00) (93% - 97%)  Wt(kg): --  CVP(mm Hg): --  CI: --  CAPILLARY BLOOD GLUCOSE  193 (13 Sep 2017 23:00)  90 (13 Sep 2017 08:00)       N/A       @ 07:01  -   @ 07:00  --------------------------------------------------------  IN:    IV PiggyBack: 350 mL    Lactated Ringers IV Bolus: 1000 mL    multivitamin/thiamine/folic acid in sodium chloride 0.9%: 850 mL  Total IN: 2200 mL    OUT:    Voided: 1500 mL  Total OUT: 1500 mL    Total NET: 700 mL  --------------------------------------------------------------------------------------    EXAM  NEUROLOGY  RASS:   	GCS:    Exam: Normal, NAD, alert, oriented x3, no focal deficits.     HEENT  Exam: Normocephalic, atraumatic, EOMI.      RESPIRATORY  Exam: Lungs clear to auscultation, Normal expansion/effort.   Mechanical Ventilation:     CARDIOVASCULAR  Exam: S1, S2.  Regular rate and rhythm.       GI/NUTRITION  Exam: Abdomen soft, Non-tender, Non-distended.      VASCULAR  Exam: Extremities warm, pink, well-perfused.    MUSCULOSKELETAL  Exam: All extremities moving spontaneously without limitations.    SKIN  Exam: Good skin turgor, no skin breakdown.    METABOLIC/FLUIDS/ELECTROLYTES  dextrose 5%. 1000 milliLiter(s) IV Continuous <Continuous>  multivitamin/thiamine/folic acid in sodium chloride 0.9% 1000 milliLiter(s) IV Continuous <Continuous>      HEMATOLOGIC  [x] VTE Prophylaxis: heparin  Injectable 5000 Unit(s) SubCutaneous every 8 hours    Transfusions:	[] PRBC	[] Platelets		[] FFP	[] Cryoprecipitate    INFECTIOUS DISEASE  Antimicrobials/Immunologic Medications:  piperacillin/tazobactam IVPB. 3.375 Gram(s) IV Intermittent every 8 hours  clindamycin IVPB 600 milliGRAM(s) IV Intermittent every 8 hours  vancomycin  IVPB 1500 milliGRAM(s) IV Intermittent every 12 hours    Day #      of     ***    Tubes/Lines/Drains  ***  [x] Peripheral IV  [] Central Venous Line     	[] R	[] L	[] IJ	[] Fem	[] SC	Date Placed:   [] Arterial Line		[] R	[] L	[] Fem	[] Rad	[] Ax	Date Placed:   [] PICC		[] Midline		[] Mediport  [] Urinary Catheter		Date Placed:   [x] Necessity of urinary, arterial, and venous catheters discussed    LABS  --------------------------------------------------------------------------------------  CBC ( @ 06:05)                          12.6                     12.43<H>  )--------------(  179        --    % Neuts, --    % Lymphs, ANC: --                              38.6    CBC ( @ 04:20)                          11.5                     15.82<H>  )--------------(  153        --    % Neuts, --    % Lymphs, ANC: --                              33.8<L>    BMP ( @ 04:20)       143     |  107     |  11    			Ca++ 1.10    Ca 7.9<L>       ---------------------------------( 81    		Mg 2.8<H>       3.5     |  25      |  0.62  			Ph 2.5                 Urinalysis ( @ 14:47):     Color: PLYEL / Appearance: CLEAR / S.026 / pH: 5.5 / Gluc: >1000 / Ketones: LARGE / Bili: NEGATIVE / Urobili: NORMAL / Protein :100<H> / Nitrites: NEGATIVE / Leuk.Est: NEGATIVE / RBC: 0-2 / WBC: 0-2 / Sq Epi: OCC / Non Sq Epi:  / Bacteria FEW     --------------------------------------------------------------------------------------    OTHER LABORATORY:     IMAGING STUDIES:   CXR:     ASSESSMENT:  47 year old woman who initially presented with severe allergic reaction to Bactrim for treatment of large left groin abscess.  Presentation complicated DKA and alcohol withdrawal. Now s/p L groin incision and drainage.      PLAN:    Neurologic: pain control, ativan PRN. Topiramate, Trazodone. c/w Zoloft: depression  Monitor for DTs. c/w home Klonopin PRN.   Respiratory: Stable. Monitor O2 saturations. Saturating well on room air. Encourage incentive spirometry.  Cardiovascular:  Stable.   Gastrointestinal/Nutrition: Regular diabetic diet  Renal/Genitourinary: banana bag 50cc/h. dc LR  Hematologic: trend H/H ;  c/w VTE prophylaxis  Infectious Disease: Clindamycin (stop on ), Vancomycin 1.5 q12, Zosyn; f/u vancomycin trough. .  Tubes/Lines/Drains: PIV  Endocrine: Continue Lantus and  ISS.   Disposition: Transfer to floor    --------------------------------------------------------------------------------------    Critical Care Diagnoses:  EtOH Withdrawal, DKA, Hypoglycemia SICU Daily Progress Note  =====================================================  Interval/Overnight Events: Vancomycin dose increased yesterday. No acute events overnight.    POD # 3         	SICU Day # 4    HPI: 47 year old woman presented after having an allergic reaction including hives and shortness of breath after taking Bactrim. She went to an urgent care center for an abscess on the left groin, fevers and chills for the last 5 days. She also states that she has had nausea and vomiting over the last couple of days and was unable to keep anything down including her medications. The abscess was spontaneously draining before, but currently it is not draining. She has had an abscess in the same location before but it was not as large. She is having severe pain at the site. She has a PMH of diabetes for 10 years and alcohol abuse for approximately 10 years. Her pattern of misuse is drinking heavily and binging for 5 days followed by abstinence for a few days. She has attempted to quit. She doesn't remember the last drink she had but thinks it may have been 2 days ago. CT Ab/Pelvis: Skin thickening and subcutaneous soft tissue stranding involving the left medial buttock and left medial thigh, compatible with cellulitis. No organized collection to suggest abscess formation. Pt treated for DKA, started on insulin gtt in ED. Patient was taken to the OR urgently given concern for necrotizing fascitis. Left groin exploration, penrose drain left in place.  Pt returned to SICU.     Allergies: Bactrim (Anaphylaxis)      MEDICATIONS:   --------------------------------------------------------------------------------------  Neurologic Medications  topiramate 50 milliGRAM(s) Oral every 12 hours  traZODone 150 milliGRAM(s) Oral at bedtime  sertraline 100 milliGRAM(s) Oral daily  oxyCODONE    IR 5 milliGRAM(s) Oral every 3 hours PRN Moderate Pain (4 - 6)  oxyCODONE    IR 10 milliGRAM(s) Oral every 4 hours PRN Severe Pain (7 - 10)  clonazePAM Tablet 0.5 milliGRAM(s) Oral every 8 hours PRN agitation  ondansetron Injectable 4 milliGRAM(s) IV Push every 4 hours PRN Nausea and/or Vomiting  LORazepam   Injectable 1 milliGRAM(s) IV Push every 4 hours PRN Agitation  HYDROmorphone  Injectable 0.5 milliGRAM(s) IV Push every 3 hours PRN breakthrough    Respiratory Medications    Cardiovascular Medications    Gastrointestinal Medications  dextrose 5%. 1000 milliLiter(s) IV Continuous <Continuous>  multivitamin/thiamine/folic acid in sodium chloride 0.9% 1000 milliLiter(s) IV Continuous <Continuous>    Genitourinary Medications    Hematologic/Oncologic Medications  heparin  Injectable 5000 Unit(s) SubCutaneous every 8 hours    Antimicrobial/Immunologic Medications  piperacillin/tazobactam IVPB. 3.375 Gram(s) IV Intermittent every 8 hours  clindamycin IVPB 600 milliGRAM(s) IV Intermittent every 8 hours  vancomycin  IVPB 1500 milliGRAM(s) IV Intermittent every 12 hours    Endocrine/Metabolic Medications  insulin lispro (HumaLOG) corrective regimen sliding scale   SubCutaneous three times a day before meals  dextrose Gel 1 Dose(s) Oral once PRN Blood Glucose LESS THAN 70 milliGRAM(s)/deciliter  dextrose 50% Injectable 12.5 Gram(s) IV Push once  dextrose 50% Injectable 25 Gram(s) IV Push once  dextrose 50% Injectable 25 Gram(s) IV Push once  glucagon  Injectable 1 milliGRAM(s) IntraMuscular once PRN Glucose LESS THAN 70 milligrams/deciliter  insulin detemir injectable (LEVEMIR) 50 Unit(s) SubCutaneous at bedtime    Topical/Other Medications  hydrocortisone 2.5% Ointment 1 Application(s) Topical three times a day PRN Itching LE    --------------------------------------------------------------------------------------    VITAL SIGNS, INS/OUTS (last 24 hours):  --------------------------------------------------------------------------------------  T(C): 36.5 (17 @ 04:00), Max: 36.5 (17 @ 04:00)  HR: 93 (17 06:00) (61 - 93)  BP: 136/80 (17 @ 06:00) (82/53 - 136/80)  BP(mean): 91 (17 @ 06:00) (59 - 92)  ABP: --  ABP(mean): --  RR: 22 (17 @ 06:00) (13 - 22)  SpO2: 95% (17 @ 06:00) (93% - 97%)  Wt(kg): --  CVP(mm Hg): --  CI: --  CAPILLARY BLOOD GLUCOSE  193 (13 Sep 2017 23:00)  90 (13 Sep 2017 08:00)       N/A       @ 07:01  -   @ 07:00  --------------------------------------------------------  IN:    IV PiggyBack: 350 mL    Lactated Ringers IV Bolus: 1000 mL    multivitamin/thiamine/folic acid in sodium chloride 0.9%: 850 mL  Total IN: 2200 mL    OUT:    Voided: 1500 mL  Total OUT: 1500 mL    Total NET: 700 mL  --------------------------------------------------------------------------------------    EXAM  NEUROLOGY  RASS:   	GCS:    Exam: Normal, NAD, alert, oriented x3, no focal deficits.     HEENT  Exam: Normocephalic, atraumatic, EOMI.      RESPIRATORY  Exam: Lungs clear to auscultation, Normal expansion/effort.   Mechanical Ventilation:     CARDIOVASCULAR  Exam: S1, S2.  Regular rate and rhythm.       GI/NUTRITION  Exam: Abdomen soft, Non-tender, Non-distended.      VASCULAR  Exam: Extremities warm, pink, well-perfused.    MUSCULOSKELETAL  Exam: All extremities moving spontaneously without limitations.    SKIN  Exam: Good skin turgor, no skin breakdown.    METABOLIC/FLUIDS/ELECTROLYTES  dextrose 5%. 1000 milliLiter(s) IV Continuous <Continuous>  multivitamin/thiamine/folic acid in sodium chloride 0.9% 1000 milliLiter(s) IV Continuous <Continuous>      HEMATOLOGIC  [x] VTE Prophylaxis: heparin  Injectable 5000 Unit(s) SubCutaneous every 8 hours    Transfusions:	[] PRBC	[] Platelets		[] FFP	[] Cryoprecipitate    INFECTIOUS DISEASE  Antimicrobials/Immunologic Medications:  piperacillin/tazobactam IVPB. 3.375 Gram(s) IV Intermittent every 8 hours  clindamycin IVPB 600 milliGRAM(s) IV Intermittent every 8 hours  vancomycin  IVPB 1500 milliGRAM(s) IV Intermittent every 12 hours    Day #      of     ***    Tubes/Lines/Drains  ***  [x] Peripheral IV  [] Central Venous Line     	[] R	[] L	[] IJ	[] Fem	[] SC	Date Placed:   [] Arterial Line		[] R	[] L	[] Fem	[] Rad	[] Ax	Date Placed:   [] PICC		[] Midline		[] Mediport  [] Urinary Catheter		Date Placed:   [x] Necessity of urinary, arterial, and venous catheters discussed    LABS  --------------------------------------------------------------------------------------  CBC ( @ 06:05)                          12.6                     12.43<H>  )--------------(  179        --    % Neuts, --    % Lymphs, ANC: --                              38.6    CBC ( @ 04:20)                          11.5                     15.82<H>  )--------------(  153        --    % Neuts, --    % Lymphs, ANC: --                              33.8<L>    BMP ( @ 04:20)       143     |  107     |  11    			Ca++ 1.10    Ca 7.9<L>       ---------------------------------( 81    		Mg 2.8<H>       3.5     |  25      |  0.62  			Ph 2.5                 Urinalysis ( @ 14:47):     Color: PLYEL / Appearance: CLEAR / S.026 / pH: 5.5 / Gluc: >1000 / Ketones: LARGE / Bili: NEGATIVE / Urobili: NORMAL / Protein :100<H> / Nitrites: NEGATIVE / Leuk.Est: NEGATIVE / RBC: 0-2 / WBC: 0-2 / Sq Epi: OCC / Non Sq Epi:  / Bacteria FEW     --------------------------------------------------------------------------------------    OTHER LABORATORY:     IMAGING STUDIES:   CXR:     ASSESSMENT:  47 year old woman who initially presented with severe allergic reaction to Bactrim for treatment of large left groin abscess.  Presentation complicated DKA and alcohol withdrawal. Now s/p L groin incision and drainage.      PLAN:    Neurologic: pain control, ativan PRN. Topiramate, Trazodone. c/w Zoloft: depression  Monitor for DTs. c/w home Klonopin PRN.   Respiratory: Stable. Monitor O2 saturations. Saturating well on room air. Encourage incentive spirometry.  Cardiovascular:  Stable.   Gastrointestinal/Nutrition: Regular diabetic diet. dc zofran  Renal/Genitourinary: banana bag 50cc/h.  Hematologic: trend H/H ;  c/w VTE prophylaxis  Infectious Disease: Clindamycin (stop on ), Vancomycin 1.5 q12, Zosyn; repeat vancomycin trough  Tubes/Lines/Drains: PIV  Endocrine: Continue Lantus and  ISS.   Disposition: Transfer to floor    --------------------------------------------------------------------------------------    Critical Care Diagnoses:  EtOH Withdrawal, DKA, Hypoglycemia

## 2017-09-15 LAB
BUN SERPL-MCNC: 7 MG/DL — SIGNIFICANT CHANGE UP (ref 7–23)
CA-I BLD-SCNC: 1.22 MMOL/L — SIGNIFICANT CHANGE UP (ref 1.03–1.23)
CALCIUM SERPL-MCNC: 8.8 MG/DL — SIGNIFICANT CHANGE UP (ref 8.4–10.5)
CHLORIDE SERPL-SCNC: 106 MMOL/L — SIGNIFICANT CHANGE UP (ref 98–107)
CO2 SERPL-SCNC: 24 MMOL/L — SIGNIFICANT CHANGE UP (ref 22–31)
CREAT SERPL-MCNC: 0.71 MG/DL — SIGNIFICANT CHANGE UP (ref 0.5–1.3)
GLUCOSE SERPL-MCNC: 66 MG/DL — LOW (ref 70–99)
HCT VFR BLD CALC: 44.8 % — SIGNIFICANT CHANGE UP (ref 34.5–45)
HGB BLD-MCNC: 14.4 G/DL — SIGNIFICANT CHANGE UP (ref 11.5–15.5)
MAGNESIUM SERPL-MCNC: 2.3 MG/DL — SIGNIFICANT CHANGE UP (ref 1.6–2.6)
MCHC RBC-ENTMCNC: 32.1 % — SIGNIFICANT CHANGE UP (ref 32–36)
MCHC RBC-ENTMCNC: 32.1 PG — SIGNIFICANT CHANGE UP (ref 27–34)
MCV RBC AUTO: 100 FL — SIGNIFICANT CHANGE UP (ref 80–100)
NRBC # FLD: 0.12 — SIGNIFICANT CHANGE UP
PHOSPHATE SERPL-MCNC: 3.8 MG/DL — SIGNIFICANT CHANGE UP (ref 2.5–4.5)
PLATELET # BLD AUTO: 279 K/UL — SIGNIFICANT CHANGE UP (ref 150–400)
PMV BLD: 10.2 FL — SIGNIFICANT CHANGE UP (ref 7–13)
POTASSIUM SERPL-MCNC: 3.1 MMOL/L — LOW (ref 3.5–5.3)
POTASSIUM SERPL-SCNC: 3.1 MMOL/L — LOW (ref 3.5–5.3)
RBC # BLD: 4.48 M/UL — SIGNIFICANT CHANGE UP (ref 3.8–5.2)
RBC # FLD: 12.1 % — SIGNIFICANT CHANGE UP (ref 10.3–14.5)
SODIUM SERPL-SCNC: 146 MMOL/L — HIGH (ref 135–145)
WBC # BLD: 20.18 K/UL — HIGH (ref 3.8–10.5)
WBC # FLD AUTO: 20.18 K/UL — HIGH (ref 3.8–10.5)

## 2017-09-15 RX ORDER — SODIUM CHLORIDE 9 MG/ML
1000 INJECTION, SOLUTION INTRAVENOUS
Qty: 0 | Refills: 0 | Status: DISCONTINUED | OUTPATIENT
Start: 2017-09-15 | End: 2017-09-16

## 2017-09-15 RX ADMIN — HYDROMORPHONE HYDROCHLORIDE 0.5 MILLIGRAM(S): 2 INJECTION INTRAMUSCULAR; INTRAVENOUS; SUBCUTANEOUS at 14:44

## 2017-09-15 RX ADMIN — HYDROMORPHONE HYDROCHLORIDE 0.5 MILLIGRAM(S): 2 INJECTION INTRAMUSCULAR; INTRAVENOUS; SUBCUTANEOUS at 19:30

## 2017-09-15 RX ADMIN — OXYCODONE HYDROCHLORIDE 10 MILLIGRAM(S): 5 TABLET ORAL at 12:06

## 2017-09-15 RX ADMIN — Medication 100 MILLIGRAM(S): at 21:42

## 2017-09-15 RX ADMIN — Medication 100 MILLIGRAM(S): at 07:20

## 2017-09-15 RX ADMIN — OXYCODONE HYDROCHLORIDE 10 MILLIGRAM(S): 5 TABLET ORAL at 06:56

## 2017-09-15 RX ADMIN — Medication 100 MILLIGRAM(S): at 14:11

## 2017-09-15 RX ADMIN — HEPARIN SODIUM 5000 UNIT(S): 5000 INJECTION INTRAVENOUS; SUBCUTANEOUS at 06:26

## 2017-09-15 RX ADMIN — HYDROMORPHONE HYDROCHLORIDE 0.5 MILLIGRAM(S): 2 INJECTION INTRAMUSCULAR; INTRAVENOUS; SUBCUTANEOUS at 03:44

## 2017-09-15 RX ADMIN — SODIUM CHLORIDE 50 MILLILITER(S): 9 INJECTION, SOLUTION INTRAVENOUS at 08:48

## 2017-09-15 RX ADMIN — Medication 1 MILLIGRAM(S): at 14:11

## 2017-09-15 RX ADMIN — OXYCODONE HYDROCHLORIDE 10 MILLIGRAM(S): 5 TABLET ORAL at 11:06

## 2017-09-15 RX ADMIN — SERTRALINE 100 MILLIGRAM(S): 25 TABLET, FILM COATED ORAL at 11:06

## 2017-09-15 RX ADMIN — Medication 150 MILLIGRAM(S): at 23:28

## 2017-09-15 RX ADMIN — HYDROMORPHONE HYDROCHLORIDE 0.5 MILLIGRAM(S): 2 INJECTION INTRAMUSCULAR; INTRAVENOUS; SUBCUTANEOUS at 08:48

## 2017-09-15 RX ADMIN — Medication 1 MILLIGRAM(S): at 23:52

## 2017-09-15 RX ADMIN — OXYCODONE HYDROCHLORIDE 10 MILLIGRAM(S): 5 TABLET ORAL at 22:10

## 2017-09-15 RX ADMIN — HEPARIN SODIUM 5000 UNIT(S): 5000 INJECTION INTRAVENOUS; SUBCUTANEOUS at 14:11

## 2017-09-15 RX ADMIN — OXYCODONE HYDROCHLORIDE 10 MILLIGRAM(S): 5 TABLET ORAL at 16:00

## 2017-09-15 RX ADMIN — Medication 1 MILLIGRAM(S): at 02:06

## 2017-09-15 RX ADMIN — Medication 50 MILLIGRAM(S): at 21:41

## 2017-09-15 RX ADMIN — HYDROMORPHONE HYDROCHLORIDE 0.5 MILLIGRAM(S): 2 INJECTION INTRAMUSCULAR; INTRAVENOUS; SUBCUTANEOUS at 03:14

## 2017-09-15 RX ADMIN — OXYCODONE HYDROCHLORIDE 10 MILLIGRAM(S): 5 TABLET ORAL at 21:40

## 2017-09-15 RX ADMIN — Medication 1 MILLIGRAM(S): at 06:26

## 2017-09-15 RX ADMIN — HEPARIN SODIUM 5000 UNIT(S): 5000 INJECTION INTRAVENOUS; SUBCUTANEOUS at 21:41

## 2017-09-15 RX ADMIN — Medication 50 UNIT(S): at 23:28

## 2017-09-15 RX ADMIN — Medication 50 MILLIGRAM(S): at 11:06

## 2017-09-15 RX ADMIN — HYDROMORPHONE HYDROCHLORIDE 0.5 MILLIGRAM(S): 2 INJECTION INTRAMUSCULAR; INTRAVENOUS; SUBCUTANEOUS at 18:30

## 2017-09-15 RX ADMIN — OXYCODONE HYDROCHLORIDE 10 MILLIGRAM(S): 5 TABLET ORAL at 17:00

## 2017-09-15 RX ADMIN — Medication 1 MILLIGRAM(S): at 11:06

## 2017-09-15 RX ADMIN — HYDROMORPHONE HYDROCHLORIDE 0.5 MILLIGRAM(S): 2 INJECTION INTRAMUSCULAR; INTRAVENOUS; SUBCUTANEOUS at 14:11

## 2017-09-15 RX ADMIN — OXYCODONE HYDROCHLORIDE 10 MILLIGRAM(S): 5 TABLET ORAL at 06:26

## 2017-09-15 RX ADMIN — HYDROMORPHONE HYDROCHLORIDE 0.5 MILLIGRAM(S): 2 INJECTION INTRAMUSCULAR; INTRAVENOUS; SUBCUTANEOUS at 09:18

## 2017-09-15 RX ADMIN — OXYCODONE HYDROCHLORIDE 10 MILLIGRAM(S): 5 TABLET ORAL at 01:11

## 2017-09-15 RX ADMIN — HYDROMORPHONE HYDROCHLORIDE 0.5 MILLIGRAM(S): 2 INJECTION INTRAMUSCULAR; INTRAVENOUS; SUBCUTANEOUS at 23:52

## 2017-09-15 RX ADMIN — Medication 1 MILLIGRAM(S): at 18:30

## 2017-09-15 RX ADMIN — OXYCODONE HYDROCHLORIDE 10 MILLIGRAM(S): 5 TABLET ORAL at 00:41

## 2017-09-15 NOTE — CHART NOTE - NSCHARTNOTEFT_GEN_A_CORE
Continuum of Care note  HPI  48yo F with L groin soft tissue infection s/p I&D in OR. Surrounding tissue remains indurated, needs continued IV antibiotic therapy. Transferred from the SICU to the floor yesterday evening. Patient seen and examined at bedside. Denies SOB, CP, N/V, tremors, or any other complaints at this time. Patient states she is passing flatus    VITAL SIGNS, INS/OUTS (last 24 hours):  --------------------------------------------------------------------------------------  T(C): 36.5 (09-15-17 @ 10:30), Max: 36.9 (09-15-17 @ 02:52)  HR: 64 (09-15-17 @ 10:30) (59 - 96)  BP: 115/74 (09-15-17 @ 10:30) (98/55 - 123/77)  BP(mean): 66 (09-14-17 @ 21:00) (66 - 85)  RR: 16 (09-15-17 @ 10:30) (13 - 22)  SpO2: 98% (09-15-17 @ 10:30) (96% - 98%)  CAPILLARY BLOOD GLUCOSE  72 (15 Sep 2017 08:49)  242 (14 Sep 2017 21:30)    09-15 @ 07:01  -  09-15 @ 13:26  --------------------------------------------------------  IN:    multivitamin/thiamine/folic acid in sodium chloride 0.9%: 200 mL  Total IN: 200 mL    OUT:    Voided: 400 mL  Total OUT: 400 mL    Total NET: -200 mL    --------------------------------------------------------------------------------------    EXAM  Gen: NAD  Abd: soft, NT ND  left groin: packing in place, area continues to be indurated    METABOLIC/FLUIDS/ELECTROLYTES  dextrose 5%. 1000 milliLiter(s) IV Continuous <Continuous>  multivitamin/thiamine/folic acid in sodium chloride 0.9% 1000 milliLiter(s) IV Continuous <Continuous>      HEMATOLOGIC  [x] DVT Prophylaxis: heparin  Injectable 5000 Unit(s) SubCutaneous every 8 hours    INFECTIOUS DISEASE  Antimicrobials/Immunologic Medications:  clindamycin IVPB 600 milliGRAM(s) IV Intermittent every 8 hours      LABS  --------------------------------------------------------------------------------------  CBC (09-15 @ 07:40)                              14.4                           20.18<H>  )----------------(  279        --    % Neutrophils, --    % Lymphocytes, ANC: --                                  44.8    CBC (09-14 @ 06:05)                              12.6                           12.43<H>  )----------------(  179        --    % Neutrophils, --    % Lymphocytes, ANC: --                                  38.6      BMP (09-15 @ 07:40)             146<H>  |  106     |  7     		Ca++ 1.22    Ca 8.8                ---------------------------------( 66<L> 		Mg 2.3                3.1<L>  |  24      |  0.71  			Ph 3.8     BMP (09-14 @ 06:05)             142     |  108<H>  |  10    		Ca++ --      Ca 8.1<L>             ---------------------------------( 85    		Mg 2.3                3.7     |  23      |  0.65  			Ph 4.1     --------------------------------------------------------------------------------------    IMAGING STUDIES    ASSESSMENT  48yo F with L groin soft tissue infection s/p I&D in OR. Surrounding tissue remains indurated, needs continued IV antibiotic therapy. Transferred from the SICU to the floor yesterday evening.    PLAN  Neurologic: pain control with oxycodone, continue benzodiazepenes for withdrawal from Etoh, could switch MVI/Thiamin/Flic acid to PO from IV as patient is tolerating a diet  Respiratory: pulmonary toilet, encourage IS  Cardiovascular: hemodynamically stable at this time  Gastrointestinal/Nutrition: Continue ADA regular diet  Renal/Genitourinary: making adequate urine output  Hematologic: SQH for DVT ppx  Infectious Disease: Continue Clindamycin for MRSA in wound and urine  Endocrine: initially in DKA upon admission, would check Hg A1c and talk to patient if she has an endocrinologist, would continue levemir and ISS however may need to be adjusted as fingerstick was over 242 yesterday

## 2017-09-15 NOTE — PROGRESS NOTE ADULT - SUBJECTIVE AND OBJECTIVE BOX
B TEAM PROGRESS NOTE    S:    Vital Signs Last 24 Hrs  ICU Vital Signs Last 24 Hrs  T(C): 36.5 (15 Sep 2017 10:30), Max: 36.9 (15 Sep 2017 02:52)  T(F): 97.7 (15 Sep 2017 10:30), Max: 98.5 (15 Sep 2017 02:52)  HR: 64 (15 Sep 2017 10:30) (59 - 96)  BP: 115/74 (15 Sep 2017 10:30) (98/55 - 123/77)  BP(mean): 66 (14 Sep 2017 21:00) (66 - 85)  ABP: --  ABP(mean): --  RR: 16 (15 Sep 2017 10:30) (13 - 22)  SpO2: 98% (15 Sep 2017 10:30) (94% - 98%)    MEDICATIONS  (STANDING):  topiramate 50 milliGRAM(s) Oral every 12 hours  traZODone 150 milliGRAM(s) Oral at bedtime  sertraline 100 milliGRAM(s) Oral daily  heparin  Injectable 5000 Unit(s) SubCutaneous every 8 hours  clindamycin IVPB 600 milliGRAM(s) IV Intermittent every 8 hours  insulin lispro (HumaLOG) corrective regimen sliding scale   SubCutaneous three times a day before meals  dextrose 5%. 1000 milliLiter(s) (50 mL/Hr) IV Continuous <Continuous>  dextrose 50% Injectable 12.5 Gram(s) IV Push once  dextrose 50% Injectable 25 Gram(s) IV Push once  dextrose 50% Injectable 25 Gram(s) IV Push once  multivitamin/thiamine/folic acid in sodium chloride 0.9% 1000 milliLiter(s) (50 mL/Hr) IV Continuous <Continuous>  insulin detemir injectable (LEVEMIR) 50 Unit(s) SubCutaneous at bedtime  LORazepam     Tablet 1 milliGRAM(s) Oral every 4 hours    MEDICATIONS  (PRN):  hydrocortisone 2.5% Ointment 1 Application(s) Topical three times a day PRN Itching LE  oxyCODONE    IR 5 milliGRAM(s) Oral every 3 hours PRN Moderate Pain (4 - 6)  oxyCODONE    IR 10 milliGRAM(s) Oral every 4 hours PRN Severe Pain (7 - 10)  clonazePAM Tablet 0.5 milliGRAM(s) Oral every 8 hours PRN agitation  dextrose Gel 1 Dose(s) Oral once PRN Blood Glucose LESS THAN 70 milliGRAM(s)/deciliter  glucagon  Injectable 1 milliGRAM(s) IntraMuscular once PRN Glucose LESS THAN 70 milligrams/deciliter  HYDROmorphone  Injectable 0.5 milliGRAM(s) IV Push every 3 hours PRN breakthrough    Gen: NAD  L groin: tissue surrounding penrose and incision remains indurated. Penrose drain in place, draining serosanguinous fluid. Tender to palpation.  Buttock: non blanching erythema of the cleft

## 2017-09-15 NOTE — PROGRESS NOTE ADULT - ASSESSMENT
48yo F with L groin soft tissue infection s/p I&D in OR. Surrounding tissue remains indurated, needs continued IV antibiotic therapy.    - Continue antibiotics (culture grew MRSA). Check vanc troughs  - encouraged OOB, take pressure off of buttocks  - Reg diet  - Pain control    B Team Surgery  [id number]

## 2017-09-16 LAB
ANTIBODY ID 1_1: SIGNIFICANT CHANGE UP
APPEARANCE UR: CLEAR — SIGNIFICANT CHANGE UP
BACTERIA # UR AUTO: SIGNIFICANT CHANGE UP
BILIRUB UR-MCNC: NEGATIVE — SIGNIFICANT CHANGE UP
BLD GP AB SCN SERPL QL: POSITIVE — SIGNIFICANT CHANGE UP
BLOOD UR QL VISUAL: HIGH
BUN SERPL-MCNC: 7 MG/DL — SIGNIFICANT CHANGE UP (ref 7–23)
CALCIUM SERPL-MCNC: 8.8 MG/DL — SIGNIFICANT CHANGE UP (ref 8.4–10.5)
CHLORIDE SERPL-SCNC: 107 MMOL/L — SIGNIFICANT CHANGE UP (ref 98–107)
CO2 SERPL-SCNC: 25 MMOL/L — SIGNIFICANT CHANGE UP (ref 22–31)
COLOR SPEC: YELLOW — SIGNIFICANT CHANGE UP
CREAT SERPL-MCNC: 0.65 MG/DL — SIGNIFICANT CHANGE UP (ref 0.5–1.3)
DAT POLY-SP REAG RBC QL: NEGATIVE — SIGNIFICANT CHANGE UP
GLUCOSE SERPL-MCNC: 63 MG/DL — LOW (ref 70–99)
GLUCOSE UR-MCNC: NEGATIVE — SIGNIFICANT CHANGE UP
HCG UR-SCNC: NEGATIVE — SIGNIFICANT CHANGE UP
HCT VFR BLD CALC: 38.1 % — SIGNIFICANT CHANGE UP (ref 34.5–45)
HGB BLD-MCNC: 12.7 G/DL — SIGNIFICANT CHANGE UP (ref 11.5–15.5)
KETONES UR-MCNC: NEGATIVE — SIGNIFICANT CHANGE UP
LEUKOCYTE ESTERASE UR-ACNC: NEGATIVE — SIGNIFICANT CHANGE UP
MAGNESIUM SERPL-MCNC: 2.3 MG/DL — SIGNIFICANT CHANGE UP (ref 1.6–2.6)
MCHC RBC-ENTMCNC: 33.2 PG — SIGNIFICANT CHANGE UP (ref 27–34)
MCHC RBC-ENTMCNC: 33.3 % — SIGNIFICANT CHANGE UP (ref 32–36)
MCV RBC AUTO: 99.5 FL — SIGNIFICANT CHANGE UP (ref 80–100)
MUCOUS THREADS # UR AUTO: SIGNIFICANT CHANGE UP
NITRITE UR-MCNC: NEGATIVE — SIGNIFICANT CHANGE UP
NRBC # FLD: 0 — SIGNIFICANT CHANGE UP
PH UR: 7.5 — SIGNIFICANT CHANGE UP (ref 4.6–8)
PHOSPHATE SERPL-MCNC: 4.2 MG/DL — SIGNIFICANT CHANGE UP (ref 2.5–4.5)
PLATELET # BLD AUTO: 217 K/UL — SIGNIFICANT CHANGE UP (ref 150–400)
PMV BLD: 10.4 FL — SIGNIFICANT CHANGE UP (ref 7–13)
POTASSIUM SERPL-MCNC: 3.5 MMOL/L — SIGNIFICANT CHANGE UP (ref 3.5–5.3)
POTASSIUM SERPL-SCNC: 3.5 MMOL/L — SIGNIFICANT CHANGE UP (ref 3.5–5.3)
PROT UR-MCNC: NEGATIVE — SIGNIFICANT CHANGE UP
RBC # BLD: 3.83 M/UL — SIGNIFICANT CHANGE UP (ref 3.8–5.2)
RBC # FLD: 12.4 % — SIGNIFICANT CHANGE UP (ref 10.3–14.5)
RBC CASTS # UR COMP ASSIST: SIGNIFICANT CHANGE UP (ref 0–?)
RH IG SCN BLD-IMP: POSITIVE — SIGNIFICANT CHANGE UP
SODIUM SERPL-SCNC: 144 MMOL/L — SIGNIFICANT CHANGE UP (ref 135–145)
SP GR SPEC: 1.01 — SIGNIFICANT CHANGE UP (ref 1–1.03)
SP GR UR: 1.01 — SIGNIFICANT CHANGE UP (ref 1–1.03)
SQUAMOUS # UR AUTO: SIGNIFICANT CHANGE UP
UROBILINOGEN FLD QL: NORMAL E.U. — SIGNIFICANT CHANGE UP (ref 0.1–0.2)
WBC # BLD: 12.88 K/UL — HIGH (ref 3.8–10.5)
WBC # FLD AUTO: 12.88 K/UL — HIGH (ref 3.8–10.5)
WBC UR QL: SIGNIFICANT CHANGE UP (ref 0–?)

## 2017-09-16 PROCEDURE — 86077 PHYS BLOOD BANK SERV XMATCH: CPT

## 2017-09-16 PROCEDURE — 72193 CT PELVIS W/DYE: CPT | Mod: 26

## 2017-09-16 RX ORDER — DEXTROSE 50 % IN WATER 50 %
12.5 SYRINGE (ML) INTRAVENOUS ONCE
Qty: 0 | Refills: 0 | Status: COMPLETED | OUTPATIENT
Start: 2017-09-16 | End: 2017-09-16

## 2017-09-16 RX ORDER — FLUCONAZOLE 150 MG/1
200 TABLET ORAL ONCE
Qty: 0 | Refills: 0 | Status: COMPLETED | OUTPATIENT
Start: 2017-09-16 | End: 2017-09-16

## 2017-09-16 RX ORDER — DEXTROSE MONOHYDRATE, SODIUM CHLORIDE, AND POTASSIUM CHLORIDE 50; .745; 4.5 G/1000ML; G/1000ML; G/1000ML
1000 INJECTION, SOLUTION INTRAVENOUS
Qty: 0 | Refills: 0 | Status: DISCONTINUED | OUTPATIENT
Start: 2017-09-16 | End: 2017-09-18

## 2017-09-16 RX ORDER — FLUCONAZOLE 150 MG/1
TABLET ORAL
Qty: 0 | Refills: 0 | Status: DISCONTINUED | OUTPATIENT
Start: 2017-09-16 | End: 2017-09-18

## 2017-09-16 RX ORDER — FLUCONAZOLE 150 MG/1
200 TABLET ORAL EVERY 24 HOURS
Qty: 0 | Refills: 0 | Status: DISCONTINUED | OUTPATIENT
Start: 2017-09-17 | End: 2017-09-18

## 2017-09-16 RX ADMIN — HEPARIN SODIUM 5000 UNIT(S): 5000 INJECTION INTRAVENOUS; SUBCUTANEOUS at 22:46

## 2017-09-16 RX ADMIN — Medication 100 MILLIGRAM(S): at 22:46

## 2017-09-16 RX ADMIN — Medication 1 MILLIGRAM(S): at 11:00

## 2017-09-16 RX ADMIN — OXYCODONE HYDROCHLORIDE 10 MILLIGRAM(S): 5 TABLET ORAL at 08:05

## 2017-09-16 RX ADMIN — HEPARIN SODIUM 5000 UNIT(S): 5000 INJECTION INTRAVENOUS; SUBCUTANEOUS at 07:18

## 2017-09-16 RX ADMIN — Medication 100 MILLIGRAM(S): at 07:18

## 2017-09-16 RX ADMIN — Medication 50 MILLIGRAM(S): at 22:48

## 2017-09-16 RX ADMIN — Medication 12.5 GRAM(S): at 09:14

## 2017-09-16 RX ADMIN — HYDROMORPHONE HYDROCHLORIDE 0.5 MILLIGRAM(S): 2 INJECTION INTRAMUSCULAR; INTRAVENOUS; SUBCUTANEOUS at 20:45

## 2017-09-16 RX ADMIN — Medication 50 UNIT(S): at 22:52

## 2017-09-16 RX ADMIN — OXYCODONE HYDROCHLORIDE 10 MILLIGRAM(S): 5 TABLET ORAL at 14:35

## 2017-09-16 RX ADMIN — OXYCODONE HYDROCHLORIDE 10 MILLIGRAM(S): 5 TABLET ORAL at 18:32

## 2017-09-16 RX ADMIN — Medication 1 MILLIGRAM(S): at 17:47

## 2017-09-16 RX ADMIN — Medication 1 MILLIGRAM(S): at 07:18

## 2017-09-16 RX ADMIN — DEXTROSE MONOHYDRATE, SODIUM CHLORIDE, AND POTASSIUM CHLORIDE 100 MILLILITER(S): 50; .745; 4.5 INJECTION, SOLUTION INTRAVENOUS at 10:59

## 2017-09-16 RX ADMIN — Medication 50 MILLIGRAM(S): at 09:20

## 2017-09-16 RX ADMIN — SODIUM CHLORIDE 50 MILLILITER(S): 9 INJECTION, SOLUTION INTRAVENOUS at 11:00

## 2017-09-16 RX ADMIN — Medication 150 MILLIGRAM(S): at 22:47

## 2017-09-16 RX ADMIN — OXYCODONE HYDROCHLORIDE 10 MILLIGRAM(S): 5 TABLET ORAL at 19:32

## 2017-09-16 RX ADMIN — HYDROMORPHONE HYDROCHLORIDE 0.5 MILLIGRAM(S): 2 INJECTION INTRAMUSCULAR; INTRAVENOUS; SUBCUTANEOUS at 15:48

## 2017-09-16 RX ADMIN — OXYCODONE HYDROCHLORIDE 10 MILLIGRAM(S): 5 TABLET ORAL at 13:35

## 2017-09-16 RX ADMIN — HYDROMORPHONE HYDROCHLORIDE 0.5 MILLIGRAM(S): 2 INJECTION INTRAMUSCULAR; INTRAVENOUS; SUBCUTANEOUS at 21:00

## 2017-09-16 RX ADMIN — SODIUM CHLORIDE 100 MILLILITER(S): 9 INJECTION, SOLUTION INTRAVENOUS at 00:49

## 2017-09-16 RX ADMIN — SERTRALINE 100 MILLIGRAM(S): 25 TABLET, FILM COATED ORAL at 09:20

## 2017-09-16 RX ADMIN — HYDROMORPHONE HYDROCHLORIDE 0.5 MILLIGRAM(S): 2 INJECTION INTRAMUSCULAR; INTRAVENOUS; SUBCUTANEOUS at 16:18

## 2017-09-16 RX ADMIN — HEPARIN SODIUM 5000 UNIT(S): 5000 INJECTION INTRAVENOUS; SUBCUTANEOUS at 14:49

## 2017-09-16 RX ADMIN — Medication 1 MILLIGRAM(S): at 14:49

## 2017-09-16 RX ADMIN — OXYCODONE HYDROCHLORIDE 10 MILLIGRAM(S): 5 TABLET ORAL at 09:05

## 2017-09-16 RX ADMIN — HYDROMORPHONE HYDROCHLORIDE 0.5 MILLIGRAM(S): 2 INJECTION INTRAMUSCULAR; INTRAVENOUS; SUBCUTANEOUS at 00:22

## 2017-09-16 RX ADMIN — Medication 100 MILLIGRAM(S): at 14:49

## 2017-09-16 NOTE — PROGRESS NOTE ADULT - ATTENDING COMMENTS
Left groin abscess  -Decreased WBC today. No active purulence noted  -CT of pelvis  -DVT ppx  -Pain control  -ABX

## 2017-09-16 NOTE — PROGRESS NOTE ADULT - SUBJECTIVE AND OBJECTIVE BOX
Patient reports some improvement in her "shakes", but notes some worsening discomfort in left groin at the site of her prior abscess. Also noted to have leukocytosis of 20 from 12.      Tcurr:37.1° NaN° Tmax:37.1° @ 16 Sep 07:17NaN° @   HR: 75 (64 - 78)   BP: 113/78 (97/64 - 135/74)   RR: 16 (16 - 18) | SpO2: 100% (93 - 100)     labs:  wbc 20 (from 12) Cr 0.42    Exam  47 F, slightly tremulous a&o x 3  left groin wound indurated with surrounding cellulitis, very tender in periincisional area of medial left groin  - penrose flossed and 2 nylon sutures removed at bedside and wound gently probed bedside - minimal drainage noted  also with erythema in gluteal crease no induration or fluctuance noted, minimall tender  extremities warm

## 2017-09-16 NOTE — CHART NOTE - NSCHARTNOTEFT_GEN_A_CORE
During morning rounds, pt noted to have fungal dermatitis around groin wound.     Will start diflucan.     Awating CT pelvis to r/o undrained collection.     WIll feed today and make NPO aMN pending CT.     Alvarez Sylvester PGY 5

## 2017-09-16 NOTE — PROGRESS NOTE ADULT - ASSESSMENT
46yo F s/p incision and drainage of a left groin abscess in OR, postoperative course complicated by alcohol withdrawal, now with worsening leukocytosis    neuro: alcohol withdrawal and anxiety - continue lorazepam, sertraline, topirimate, prn clonazepam for anxiety, oxycodone, dilaudid for pain, trazodone at bedtime. Consider [facility] protocol to minimize benzodiazepine use  cards: no active issue, hemodynamically stable   pulm: no active issue, encourage ambulation and incentive spirometry  ID: groin wound concerning for possible undrained collection vs worsening cellulitis. Will obtain repeat CT scan to evaluate for new undrained abscess and keep patient NPO in case she needs additional exploration in the OR as bedside exam difficult secondary to pain. She is in agreement with this plan. Continue clindamycin. Check UA  heme: no active issue  gu: no active issue, voiding  gi: tolerating regular diet, NPO pending further workup of leukocytosis and groin erythema/tenderness  endocrine: sliding scale insulin  prophylaxis: lovenox

## 2017-09-17 LAB
BUN SERPL-MCNC: 4 MG/DL — LOW (ref 7–23)
CALCIUM SERPL-MCNC: 9 MG/DL — SIGNIFICANT CHANGE UP (ref 8.4–10.5)
CHLORIDE SERPL-SCNC: 106 MMOL/L — SIGNIFICANT CHANGE UP (ref 98–107)
CO2 SERPL-SCNC: 23 MMOL/L — SIGNIFICANT CHANGE UP (ref 22–31)
CREAT SERPL-MCNC: 0.75 MG/DL — SIGNIFICANT CHANGE UP (ref 0.5–1.3)
GLUCOSE SERPL-MCNC: 78 MG/DL — SIGNIFICANT CHANGE UP (ref 70–99)
HCT VFR BLD CALC: 41.2 % — SIGNIFICANT CHANGE UP (ref 34.5–45)
HGB BLD-MCNC: 13.3 G/DL — SIGNIFICANT CHANGE UP (ref 11.5–15.5)
MAGNESIUM SERPL-MCNC: 2.4 MG/DL — SIGNIFICANT CHANGE UP (ref 1.6–2.6)
MCHC RBC-ENTMCNC: 32.2 PG — SIGNIFICANT CHANGE UP (ref 27–34)
MCHC RBC-ENTMCNC: 32.3 % — SIGNIFICANT CHANGE UP (ref 32–36)
MCV RBC AUTO: 99.8 FL — SIGNIFICANT CHANGE UP (ref 80–100)
NRBC # FLD: 0 — SIGNIFICANT CHANGE UP
PHOSPHATE SERPL-MCNC: 3.5 MG/DL — SIGNIFICANT CHANGE UP (ref 2.5–4.5)
PLATELET # BLD AUTO: 222 K/UL — SIGNIFICANT CHANGE UP (ref 150–400)
PMV BLD: 10.8 FL — SIGNIFICANT CHANGE UP (ref 7–13)
POTASSIUM SERPL-MCNC: 3.7 MMOL/L — SIGNIFICANT CHANGE UP (ref 3.5–5.3)
POTASSIUM SERPL-SCNC: 3.7 MMOL/L — SIGNIFICANT CHANGE UP (ref 3.5–5.3)
RBC # BLD: 4.13 M/UL — SIGNIFICANT CHANGE UP (ref 3.8–5.2)
RBC # FLD: 12.9 % — SIGNIFICANT CHANGE UP (ref 10.3–14.5)
SODIUM SERPL-SCNC: 141 MMOL/L — SIGNIFICANT CHANGE UP (ref 135–145)
WBC # BLD: 11.94 K/UL — HIGH (ref 3.8–10.5)
WBC # FLD AUTO: 11.94 K/UL — HIGH (ref 3.8–10.5)

## 2017-09-17 RX ORDER — INSULIN LISPRO 100/ML
VIAL (ML) SUBCUTANEOUS
Qty: 0 | Refills: 0 | Status: DISCONTINUED | OUTPATIENT
Start: 2017-09-17 | End: 2017-09-21

## 2017-09-17 RX ADMIN — HEPARIN SODIUM 5000 UNIT(S): 5000 INJECTION INTRAVENOUS; SUBCUTANEOUS at 21:42

## 2017-09-17 RX ADMIN — Medication 50 MILLIGRAM(S): at 10:46

## 2017-09-17 RX ADMIN — Medication 1 MILLIGRAM(S): at 19:11

## 2017-09-17 RX ADMIN — Medication 100 MILLIGRAM(S): at 21:41

## 2017-09-17 RX ADMIN — Medication 50 UNIT(S): at 23:40

## 2017-09-17 RX ADMIN — Medication 1 MILLIGRAM(S): at 15:01

## 2017-09-17 RX ADMIN — OXYCODONE HYDROCHLORIDE 10 MILLIGRAM(S): 5 TABLET ORAL at 17:49

## 2017-09-17 RX ADMIN — Medication 100 MILLIGRAM(S): at 07:12

## 2017-09-17 RX ADMIN — OXYCODONE HYDROCHLORIDE 10 MILLIGRAM(S): 5 TABLET ORAL at 09:56

## 2017-09-17 RX ADMIN — DEXTROSE MONOHYDRATE, SODIUM CHLORIDE, AND POTASSIUM CHLORIDE 100 MILLILITER(S): 50; .745; 4.5 INJECTION, SOLUTION INTRAVENOUS at 10:46

## 2017-09-17 RX ADMIN — FLUCONAZOLE 100 MILLIGRAM(S): 150 TABLET ORAL at 00:14

## 2017-09-17 RX ADMIN — HYDROMORPHONE HYDROCHLORIDE 0.5 MILLIGRAM(S): 2 INJECTION INTRAMUSCULAR; INTRAVENOUS; SUBCUTANEOUS at 19:57

## 2017-09-17 RX ADMIN — OXYCODONE HYDROCHLORIDE 10 MILLIGRAM(S): 5 TABLET ORAL at 09:26

## 2017-09-17 RX ADMIN — SERTRALINE 100 MILLIGRAM(S): 25 TABLET, FILM COATED ORAL at 10:46

## 2017-09-17 RX ADMIN — HEPARIN SODIUM 5000 UNIT(S): 5000 INJECTION INTRAVENOUS; SUBCUTANEOUS at 15:01

## 2017-09-17 RX ADMIN — OXYCODONE HYDROCHLORIDE 10 MILLIGRAM(S): 5 TABLET ORAL at 00:35

## 2017-09-17 RX ADMIN — Medication 150 MILLIGRAM(S): at 21:47

## 2017-09-17 RX ADMIN — Medication 100 MILLIGRAM(S): at 15:01

## 2017-09-17 RX ADMIN — OXYCODONE HYDROCHLORIDE 10 MILLIGRAM(S): 5 TABLET ORAL at 17:19

## 2017-09-17 RX ADMIN — Medication 1 MILLIGRAM(S): at 21:41

## 2017-09-17 RX ADMIN — HEPARIN SODIUM 5000 UNIT(S): 5000 INJECTION INTRAVENOUS; SUBCUTANEOUS at 07:11

## 2017-09-17 RX ADMIN — Medication 2: at 12:46

## 2017-09-17 RX ADMIN — HYDROMORPHONE HYDROCHLORIDE 0.5 MILLIGRAM(S): 2 INJECTION INTRAMUSCULAR; INTRAVENOUS; SUBCUTANEOUS at 19:42

## 2017-09-17 RX ADMIN — OXYCODONE HYDROCHLORIDE 10 MILLIGRAM(S): 5 TABLET ORAL at 22:11

## 2017-09-17 RX ADMIN — Medication 1 MILLIGRAM(S): at 00:24

## 2017-09-17 RX ADMIN — OXYCODONE HYDROCHLORIDE 10 MILLIGRAM(S): 5 TABLET ORAL at 21:41

## 2017-09-17 RX ADMIN — Medication 2: at 18:31

## 2017-09-17 RX ADMIN — OXYCODONE HYDROCHLORIDE 10 MILLIGRAM(S): 5 TABLET ORAL at 00:05

## 2017-09-17 RX ADMIN — Medication 1 MILLIGRAM(S): at 10:46

## 2017-09-17 RX ADMIN — Medication 50 MILLIGRAM(S): at 21:42

## 2017-09-17 RX ADMIN — Medication 4: at 23:40

## 2017-09-17 RX ADMIN — SODIUM CHLORIDE 50 MILLILITER(S): 9 INJECTION, SOLUTION INTRAVENOUS at 10:46

## 2017-09-17 NOTE — PROGRESS NOTE ADULT - SUBJECTIVE AND OBJECTIVE BOX
S: pt    O: T(C): 36.8 (17 @ 07:07), Max: 36.9 (17 @ 10:10)  HR: 60 (17 @ 07:07) (60 - 75)  BP: 102/63 (17 @ 07:07) (102/63 - 137/78)  RR: 16 (17 @ 07:07) (16 - 18)  SpO2: 98% (17 @ 07:07) (97% - 99%)  Wt(kg): --   @ 07:01  -   @ 07:00  --------------------------------------------------------  IN:    dextrose 5% + sodium chloride 0.45% with potassium chloride 20 mEq/L: 1800 mL    IV PiggyBack: 250 mL    lactated ringers.: 400 mL    multivitamin/thiamine/folic acid in sodium chloride 0.9%: 1100 mL    Oral Fluid: 240 mL  Total IN: 3790 mL    OUT:    Voided: 1100 mL  Total OUT: 1100 mL    Total NET: 2690 mL  MEDICATIONS  (STANDING):  topiramate 50 milliGRAM(s) Oral every 12 hours  traZODone 150 milliGRAM(s) Oral at bedtime  sertraline 100 milliGRAM(s) Oral daily  heparin  Injectable 5000 Unit(s) SubCutaneous every 8 hours  clindamycin IVPB 600 milliGRAM(s) IV Intermittent every 8 hours  dextrose 5%. 1000 milliLiter(s) (50 mL/Hr) IV Continuous <Continuous>  dextrose 50% Injectable 12.5 Gram(s) IV Push once  dextrose 50% Injectable 25 Gram(s) IV Push once  dextrose 50% Injectable 25 Gram(s) IV Push once  multivitamin/thiamine/folic acid in sodium chloride 0.9% 1000 milliLiter(s) (50 mL/Hr) IV Continuous <Continuous>  insulin detemir injectable (LEVEMIR) 50 Unit(s) SubCutaneous at bedtime  LORazepam     Tablet 1 milliGRAM(s) Oral every 4 hours  dextrose 5% + sodium chloride 0.45% with potassium chloride 20 mEq/L 1000 milliLiter(s) (100 mL/Hr) IV Continuous <Continuous>  fluconAZOLE IVPB      fluconAZOLE IVPB 200 milliGRAM(s) IV Intermittent every 24 hours  insulin lispro (HumaLOG) corrective regimen sliding scale   SubCutaneous Before meals and at bedtime    MEDICATIONS  (PRN):  hydrocortisone 2.5% Ointment 1 Application(s) Topical three times a day PRN Itching LE  oxyCODONE    IR 5 milliGRAM(s) Oral every 3 hours PRN Moderate Pain (4 - 6)  oxyCODONE    IR 10 milliGRAM(s) Oral every 4 hours PRN Severe Pain (7 - 10)  clonazePAM Tablet 0.5 milliGRAM(s) Oral every 8 hours PRN agitation  dextrose Gel 1 Dose(s) Oral once PRN Blood Glucose LESS THAN 70 milliGRAM(s)/deciliter  glucagon  Injectable 1 milliGRAM(s) IntraMuscular once PRN Glucose LESS THAN 70 milligrams/deciliter  HYDROmorphone  Injectable 0.5 milliGRAM(s) IV Push every 3 hours PRN breakthrough    Exam  Gen: 47 F, slightly tremulous a&o x 3  Groin: left groin wound indurated with surrounding cellulitis, tender to palpation, minimal drainage noted  EXT: wwp    .  LABS:                         12.7   12.88 )-----------( 217      ( 16 Sep 2017 07:00 )             38.1     09-    144  |  107  |  7   ----------------------------<  63<L>  3.5   |  25  |  0.65    Ca    8.8      16 Sep 2017 07:00  Phos  4.2       Mg     2.3     -        Urinalysis Basic - ( 16 Sep 2017 07:27 )    Color: YELLOW / Appearance: CLEAR / S.009 / pH: 7.5  Gluc: NEGATIVE / Ketone: NEGATIVE  / Bili: NEGATIVE / Urobili: NORMAL E.U.   Blood: TRACE / Protein: NEGATIVE / Nitrite: NEGATIVE   Leuk Esterase: NEGATIVE / RBC: 0-2 / WBC 0-2   Sq Epi: OCC / Non Sq Epi: x / Bacteria: FEW    RADIOLOGY, EKG & ADDITIONAL TESTS: Reviewed.     CT: study is limited to pelvis , no undrained collections in visualized portion of wound

## 2017-09-17 NOTE — PROGRESS NOTE ADULT - ATTENDING COMMENTS
Seen and examined.  Chart and note reviewed.      S/P incision and drainage of left thigh abscess    Complains of 'soreness' in area    Operative site appears indurated with areas of erythema.  Drainage from penrose appears serous  Perineum is erythematous and raw with curd like discharge in vaginal region consistent with candidiasis    Continue clindamycin and diflucan  CT reviewed  Visulaized portion of left thigh without fluid to be drained  Continue dressing changes

## 2017-09-17 NOTE — PROGRESS NOTE ADULT - ASSESSMENT
48yo F s/p incision and drainage of a left groin abscess in OR, postoperative course complicated by alcohol withdrawal.     neuro: alcohol withdrawal and anxiety - continue lorazepam, sertraline, topirimate, prn clonazepam for anxiety, oxycodone, dilaudid for pain, trazodone at bedtime.   cards: no active issue, hemodynamically stable   pulm: no active issue, encourage ambulation and incentive spirometry  ID: groin wound: Continue clindamycin added diflucan yesterday.  heme: no active issue  gu: no active issue, voiding  gi: tolerating regular diet  endocrine: sliding scale insulin  prophylaxis: lovenox

## 2017-09-18 LAB
BACTERIA BLD CULT: SIGNIFICANT CHANGE UP
BACTERIA BLD CULT: SIGNIFICANT CHANGE UP
BUN SERPL-MCNC: 5 MG/DL — LOW (ref 7–23)
CALCIUM SERPL-MCNC: 9 MG/DL — SIGNIFICANT CHANGE UP (ref 8.4–10.5)
CHLORIDE SERPL-SCNC: 110 MMOL/L — HIGH (ref 98–107)
CO2 SERPL-SCNC: 22 MMOL/L — SIGNIFICANT CHANGE UP (ref 22–31)
CREAT SERPL-MCNC: 0.83 MG/DL — SIGNIFICANT CHANGE UP (ref 0.5–1.3)
GLUCOSE SERPL-MCNC: 59 MG/DL — LOW (ref 70–99)
HCT VFR BLD CALC: 35.4 % — SIGNIFICANT CHANGE UP (ref 34.5–45)
HGB BLD-MCNC: 11.3 G/DL — LOW (ref 11.5–15.5)
MAGNESIUM SERPL-MCNC: 2.5 MG/DL — SIGNIFICANT CHANGE UP (ref 1.6–2.6)
MCHC RBC-ENTMCNC: 31.9 % — LOW (ref 32–36)
MCHC RBC-ENTMCNC: 32.5 PG — SIGNIFICANT CHANGE UP (ref 27–34)
MCV RBC AUTO: 101.7 FL — HIGH (ref 80–100)
NRBC # FLD: 0 — SIGNIFICANT CHANGE UP
PHOSPHATE SERPL-MCNC: 4.3 MG/DL — SIGNIFICANT CHANGE UP (ref 2.5–4.5)
PLATELET # BLD AUTO: 219 K/UL — SIGNIFICANT CHANGE UP (ref 150–400)
PMV BLD: 10.9 FL — SIGNIFICANT CHANGE UP (ref 7–13)
POTASSIUM SERPL-MCNC: 4.1 MMOL/L — SIGNIFICANT CHANGE UP (ref 3.5–5.3)
POTASSIUM SERPL-SCNC: 4.1 MMOL/L — SIGNIFICANT CHANGE UP (ref 3.5–5.3)
RBC # BLD: 3.48 M/UL — LOW (ref 3.8–5.2)
RBC # FLD: 13 % — SIGNIFICANT CHANGE UP (ref 10.3–14.5)
SODIUM SERPL-SCNC: 143 MMOL/L — SIGNIFICANT CHANGE UP (ref 135–145)
WBC # BLD: 11.99 K/UL — HIGH (ref 3.8–10.5)
WBC # FLD AUTO: 11.99 K/UL — HIGH (ref 3.8–10.5)

## 2017-09-18 RX ORDER — FLUCONAZOLE 150 MG/1
200 TABLET ORAL
Qty: 0 | Refills: 0 | Status: DISCONTINUED | OUTPATIENT
Start: 2017-09-18 | End: 2017-09-19

## 2017-09-18 RX ORDER — CLONAZEPAM 1 MG
0.5 TABLET ORAL
Qty: 0 | Refills: 0 | Status: DISCONTINUED | OUTPATIENT
Start: 2017-09-18 | End: 2017-09-21

## 2017-09-18 RX ORDER — TRAZODONE HCL 50 MG
150 TABLET ORAL AT BEDTIME
Qty: 0 | Refills: 0 | Status: DISCONTINUED | OUTPATIENT
Start: 2017-09-18 | End: 2017-09-21

## 2017-09-18 RX ORDER — ENOXAPARIN SODIUM 100 MG/ML
40 INJECTION SUBCUTANEOUS
Qty: 0 | Refills: 0 | Status: DISCONTINUED | OUTPATIENT
Start: 2017-09-18 | End: 2017-09-21

## 2017-09-18 RX ORDER — OXYCODONE HYDROCHLORIDE 5 MG/1
5 TABLET ORAL EVERY 4 HOURS
Qty: 0 | Refills: 0 | Status: DISCONTINUED | OUTPATIENT
Start: 2017-09-18 | End: 2017-09-21

## 2017-09-18 RX ORDER — OXYCODONE HYDROCHLORIDE 5 MG/1
10 TABLET ORAL EVERY 4 HOURS
Qty: 0 | Refills: 0 | Status: DISCONTINUED | OUTPATIENT
Start: 2017-09-18 | End: 2017-09-21

## 2017-09-18 RX ORDER — TOPIRAMATE 25 MG
50 TABLET ORAL
Qty: 0 | Refills: 0 | Status: DISCONTINUED | OUTPATIENT
Start: 2017-09-18 | End: 2017-09-21

## 2017-09-18 RX ORDER — HYDROMORPHONE HYDROCHLORIDE 2 MG/ML
0.5 INJECTION INTRAMUSCULAR; INTRAVENOUS; SUBCUTANEOUS EVERY 12 HOURS
Qty: 0 | Refills: 0 | Status: DISCONTINUED | OUTPATIENT
Start: 2017-09-18 | End: 2017-09-19

## 2017-09-18 RX ORDER — SERTRALINE 25 MG/1
100 TABLET, FILM COATED ORAL
Qty: 0 | Refills: 0 | Status: DISCONTINUED | OUTPATIENT
Start: 2017-09-18 | End: 2017-09-21

## 2017-09-18 RX ADMIN — Medication 50 UNIT(S): at 23:21

## 2017-09-18 RX ADMIN — Medication 100 MILLIGRAM(S): at 05:20

## 2017-09-18 RX ADMIN — Medication 50 MILLIGRAM(S): at 23:21

## 2017-09-18 RX ADMIN — Medication 0.5 MILLIGRAM(S): at 23:19

## 2017-09-18 RX ADMIN — OXYCODONE HYDROCHLORIDE 10 MILLIGRAM(S): 5 TABLET ORAL at 18:52

## 2017-09-18 RX ADMIN — Medication 50 MILLIGRAM(S): at 10:20

## 2017-09-18 RX ADMIN — Medication 150 MILLIGRAM(S): at 23:20

## 2017-09-18 RX ADMIN — ENOXAPARIN SODIUM 40 MILLIGRAM(S): 100 INJECTION SUBCUTANEOUS at 19:53

## 2017-09-18 RX ADMIN — OXYCODONE HYDROCHLORIDE 10 MILLIGRAM(S): 5 TABLET ORAL at 23:51

## 2017-09-18 RX ADMIN — Medication 2: at 17:58

## 2017-09-18 RX ADMIN — Medication 100 MILLIGRAM(S): at 23:21

## 2017-09-18 RX ADMIN — HYDROMORPHONE HYDROCHLORIDE 0.5 MILLIGRAM(S): 2 INJECTION INTRAMUSCULAR; INTRAVENOUS; SUBCUTANEOUS at 01:17

## 2017-09-18 RX ADMIN — OXYCODONE HYDROCHLORIDE 10 MILLIGRAM(S): 5 TABLET ORAL at 05:18

## 2017-09-18 RX ADMIN — FLUCONAZOLE 100 MILLIGRAM(S): 150 TABLET ORAL at 19:54

## 2017-09-18 RX ADMIN — HYDROMORPHONE HYDROCHLORIDE 0.5 MILLIGRAM(S): 2 INJECTION INTRAMUSCULAR; INTRAVENOUS; SUBCUTANEOUS at 00:47

## 2017-09-18 RX ADMIN — OXYCODONE HYDROCHLORIDE 10 MILLIGRAM(S): 5 TABLET ORAL at 14:39

## 2017-09-18 RX ADMIN — OXYCODONE HYDROCHLORIDE 10 MILLIGRAM(S): 5 TABLET ORAL at 04:48

## 2017-09-18 RX ADMIN — FLUCONAZOLE 100 MILLIGRAM(S): 150 TABLET ORAL at 00:35

## 2017-09-18 RX ADMIN — Medication 100 MILLIGRAM(S): at 15:51

## 2017-09-18 RX ADMIN — OXYCODONE HYDROCHLORIDE 10 MILLIGRAM(S): 5 TABLET ORAL at 11:44

## 2017-09-18 RX ADMIN — Medication 2: at 23:21

## 2017-09-18 RX ADMIN — OXYCODONE HYDROCHLORIDE 10 MILLIGRAM(S): 5 TABLET ORAL at 19:55

## 2017-09-18 RX ADMIN — SERTRALINE 100 MILLIGRAM(S): 25 TABLET, FILM COATED ORAL at 10:21

## 2017-09-18 RX ADMIN — OXYCODONE HYDROCHLORIDE 10 MILLIGRAM(S): 5 TABLET ORAL at 15:30

## 2017-09-18 RX ADMIN — OXYCODONE HYDROCHLORIDE 10 MILLIGRAM(S): 5 TABLET ORAL at 23:21

## 2017-09-18 RX ADMIN — OXYCODONE HYDROCHLORIDE 10 MILLIGRAM(S): 5 TABLET ORAL at 09:53

## 2017-09-18 RX ADMIN — Medication 1 MILLIGRAM(S): at 10:20

## 2017-09-18 RX ADMIN — Medication 0.5 MILLIGRAM(S): at 14:43

## 2017-09-18 RX ADMIN — HEPARIN SODIUM 5000 UNIT(S): 5000 INJECTION INTRAVENOUS; SUBCUTANEOUS at 05:20

## 2017-09-18 NOTE — PROGRESS NOTE ADULT - SUBJECTIVE AND OBJECTIVE BOX
B Team Progress Note. Please page #[phone] with any questions or concerns.    S: 48yo Woman seen and examined during morning rounds. No acute events overnight; afebrile, VS stable. Pain well controlled with current regimen. Tolerating diet; denies N/V. Endorses passing flatus and having bowel movements.     O:  Vital Signs Last 24 Hrs  T(C): 36.8 (18 Sep 2017 15:30), Max: 36.9 (18 Sep 2017 02:19)  T(F): 98.2 (18 Sep 2017 15:30), Max: 98.4 (18 Sep 2017 02:19)  HR: 62 (18 Sep 2017 15:30) (61 - 72)  BP: 110/74 (18 Sep 2017 15:30) (101/60 - 117/80)  BP(mean): --  RR: 16 (18 Sep 2017 15:30) (16 - 18)  SpO2: 96% (18 Sep 2017 15:30) (96% - 100%)    I&O's Detail    17 Sep 2017 07:01  -  18 Sep 2017 07:00  --------------------------------------------------------  IN:    dextrose 5% + sodium chloride 0.45% with potassium chloride 20 mEq/L: 2000 mL    IV PiggyBack: 150 mL    multivitamin/thiamine/folic acid in sodium chloride 0.9%: 1000 mL  Total IN: 3150 mL    OUT:    Voided: 1700 mL  Total OUT: 1700 mL    Total NET: 1450 mL      18 Sep 2017 07:01  -  18 Sep 2017 17:14  --------------------------------------------------------  IN:    IV PiggyBack: 50 mL    Oral Fluid: 250 mL  Total IN: 300 mL    OUT:    Voided: 1100 mL  Total OUT: 1100 mL    Total NET: -800 mL    Physical Exam:  Gen: Resting in bed, NAD, alert and oriented.   Resp: airway patent, respirations unlabored, no increased WOB   Abd: soft, NT/ND  Groin: Left groin wound with pinrose drain in place, some induration noted    MEDICATIONS  (STANDING):  dextrose 5%. 1000 milliLiter(s) (50 mL/Hr) IV Continuous <Continuous>  dextrose 50% Injectable 12.5 Gram(s) IV Push once  dextrose 50% Injectable 25 Gram(s) IV Push once  dextrose 50% Injectable 25 Gram(s) IV Push once  insulin detemir injectable (LEVEMIR) 50 Unit(s) SubCutaneous at bedtime  insulin lispro (HumaLOG) corrective regimen sliding scale   SubCutaneous Before meals and at bedtime  enoxaparin Injectable 40 milliGRAM(s) SubCutaneous <User Schedule>  clindamycin IVPB 600 milliGRAM(s) IV Intermittent every 8 hours  fluconAZOLE IVPB 200 milliGRAM(s) IV Intermittent <User Schedule>  sertraline 100 milliGRAM(s) Oral <User Schedule>  topiramate 50 milliGRAM(s) Oral <User Schedule>  traZODone 150 milliGRAM(s) Oral at bedtime  clonazePAM Tablet 0.5 milliGRAM(s) Oral <User Schedule>    MEDICATIONS  (PRN):  hydrocortisone 2.5% Ointment 1 Application(s) Topical three times a day PRN Itching LE  oxyCODONE    IR 10 milliGRAM(s) Oral every 4 hours PRN Severe Pain (7 - 10)  dextrose Gel 1 Dose(s) Oral once PRN Blood Glucose LESS THAN 70 milliGRAM(s)/deciliter  glucagon  Injectable 1 milliGRAM(s) IntraMuscular once PRN Glucose LESS THAN 70 milligrams/deciliter  oxyCODONE    IR 5 milliGRAM(s) Oral every 4 hours PRN Moderate Pain (4 - 6)  HYDROmorphone  Injectable 0.5 milliGRAM(s) IV Push every 12 hours PRN Pain due to dressing change      LABS:                        11.3   11.99 )-----------( 219      ( 18 Sep 2017 05:38 )             35.4       09-18    143  |  110<H>  |  5<L>  ----------------------------<  59<L>  4.1   |  22  |  0.83    Ca    9.0      18 Sep 2017 05:38  Phos  4.3     09-18  Mg     2.5     09-18

## 2017-09-18 NOTE — PROGRESS NOTE ADULT - ATTENDING COMMENTS
September 18th, 2017  11:30 AM    Hospital Day #7  Post op Day #7    Patient seen and evaluated on daily B-Team rounds. Care discussed at B-Team morning report sign-out.    BP		=	101 – 137/60 - 80  P		=	60 - 75		O2 Sat	=	96% - 100%  R		=	16 - 18		I/O	=	3,150 in/1,700 out  Temp		=	36.4 – 36.9			+ 6.6 liters since admission    Glucose	=	60 – 213    Current wt	=	80.9 Kg  Max wt		=	80.9 Kg  Admit wt	=	75.5 Kg    BMI		=	28.9    Awake, alert, and fully oriented. Does not appear toxic. In no distress.  Anicteric. Pupils reactive.  No thrush.  No JVD.  Lungs clear with non-labored respirations. Symmetrical chest wall movements.  COR – RRR  Abdomen soft and neither distended nor tender. Left groin with an indurated wound with a Penrose drain in place. Sero-purulent drainage. No fluctuance.  Extremities well perfused.    WBC	=	12	Na		=	143  Neutro	=	-	K		=	4.1  Hgb	=	11	Cl		=	110  Hct	=	35%	HCO3		=	22  Plts	=	219	Glucose	=	59  			BUN		=	5  PT	=	-	Creat		=	0.8  PTT	=	-  INR	=	-    Contrast enhanced CT scan of abdomen and pelvis 9/16/17 – No abscess. Skin thickening.    Tissue 9/11	-	MRSA  Wound 9/11	-	MRSA  Other 9/11	-	MRSA  Other 9/11	-	MRSA  Urine 9/11	-	10,000 – 49,000 MRSA  		-	10,000 – 49,000 CNS  Blood 9/11	-	Negative  Blood 9/11	-	Negative    Remains on:    1)	Low carbohydrate diet + Glucerna shake tid  2)	D5½NS +20 KCl/liter @ 100 ml/hour  3)	Banana bag – 1,000 ml/day  4)	Heparin 5,000 units sq q8 hours  5)	Clindamycin 600 mg IV q8 hours	-	Day #7  6)	Diflucan 200 mg IV q24 hours		-	Day #2  7)	Ativan 1 mg po q4 hours  8)	Klonopin 0.5 mg po q8 hours prn  9)	Zoloft 100 mg po q24 hours  10)	Topamax 50 mg po q12 hours  11)	Trazodone 150 mg po qhs  12)	Dilaudid 0.5 mg IVP q3 hours prn  13)	Oxycodone 5 0 19 mg po q3 – 4 hours prn  14)	Insulin  a.	Levemir 50 units sq qhs  b.	Sliding scale    Assessment:	This patient is assessed as being an overweight, 47-year-old insulin requiring type 2 diabetic, anxious, depressed female who abuses alcohol who presented to the ED at [facility] at approximately 1:30 PM on 9/11/17 with complaints of having hives in both thighs and dyspnea that the patient thought was an allergic reaction to Bactrim. She had been treated with Bactrim for a left thigh abscess. She had tachycardia, a leukocytosis, hyperglycemia with lactic and keto acidosis, and was found to have a left thigh abscess for which she underwent I & D on 9/11/17 finding MRSA in the wound and in her urine. While hospitalized she required treatment for acute alcohol withdrawal. She has since improved. Her symptoms of alcohol withdrawal have abated and her WBC has normalized. She has been treated with parenteral clindamycin for one week and yesterday was started on Diflucan as there was a clinical suspicion that she had a fungal infection. She is tolerating an oral diet and has had hypoglycemia. She is being given 50 units of Levemir nightly and sliding scale insulin.    Plan to:    1)	Clarify medication orders.  2)	Change the unfractionated heparin to enoxaparin.  3)	Discontinue the Ativan and resume clonazepam.  4)	Stop the Dilaudid except for dressing changes. Will plan to discontinue Dilaudid  	altogether so that she can be discharged (i.e. if she requires parenteral  	medications for dressing changes cannot be safely discharged).  5)	Plan to change the clindamycin and diflucan to oral / enteral route in the next  	24 hours and if stable plan discharge to home on 7/20/17.  6)	Allow and assist her to be out of bed.  7)	Obtain a gynecology consultation at the patient’s request.  8)	Full support in place    Carlos Diallo  40 Minutes exclusive of procedures

## 2017-09-18 NOTE — PROGRESS NOTE ADULT - ASSESSMENT
48yo F s/p incision and drainage of a left groin abscess in OR, postoperative course complicated by alcohol withdrawal.     neuro: alcohol withdrawal and anxiety - continue lorazepam, sertraline, topirimate, prn clonazepam for anxiety, oxycodone, dilaudid for pain, trazodone at bedtime.   cards: no active issue, hemodynamically stable   pulm: no active issue, encourage ambulation and incentive spirometry  ID: groin wound: Continue clindamycin added diflucan yesterday.  heme: no active issue  gu: no active issue, voiding  gi: tolerating regular diet  endocrine: sliding scale insulin  prophylaxis: lovenox 46yo F s/p incision and drainage of a left groin abscess in OR, postoperative course complicated by alcohol withdrawal.     neuro: alcohol withdrawal and anxiety - continue lorazepam, sertraline, topirimate, prn clonazepam for anxiety, oxycodone, dilaudid for pain, trazodone at bedtime.   cards: no active issue, hemodynamically stable   pulm: no active issue, encourage ambulation and incentive spirometry  ID: groin wound: Continue clindamycin added diflucan on Saturday.  heme: no active issue  gu: no active issue, voiding  gi: tolerating regular diet  endocrine: sliding scale insulin  prophylaxis: lovenox

## 2017-09-19 DIAGNOSIS — N90.89 OTHER SPECIFIED NONINFLAMMATORY DISORDERS OF VULVA AND PERINEUM: ICD-10-CM

## 2017-09-19 LAB
BUN SERPL-MCNC: 7 MG/DL — SIGNIFICANT CHANGE UP (ref 7–23)
CALCIUM SERPL-MCNC: 8.9 MG/DL — SIGNIFICANT CHANGE UP (ref 8.4–10.5)
CHLORIDE SERPL-SCNC: 109 MMOL/L — HIGH (ref 98–107)
CO2 SERPL-SCNC: 21 MMOL/L — LOW (ref 22–31)
CREAT SERPL-MCNC: 0.92 MG/DL — SIGNIFICANT CHANGE UP (ref 0.5–1.3)
GLUCOSE SERPL-MCNC: 57 MG/DL — LOW (ref 70–99)
HCT VFR BLD CALC: 35 % — SIGNIFICANT CHANGE UP (ref 34.5–45)
HGB BLD-MCNC: 11.7 G/DL — SIGNIFICANT CHANGE UP (ref 11.5–15.5)
MAGNESIUM SERPL-MCNC: 2.3 MG/DL — SIGNIFICANT CHANGE UP (ref 1.6–2.6)
MCHC RBC-ENTMCNC: 33.3 PG — SIGNIFICANT CHANGE UP (ref 27–34)
MCHC RBC-ENTMCNC: 33.4 % — SIGNIFICANT CHANGE UP (ref 32–36)
MCV RBC AUTO: 99.7 FL — SIGNIFICANT CHANGE UP (ref 80–100)
NRBC # FLD: 0.02 — SIGNIFICANT CHANGE UP
PHOSPHATE SERPL-MCNC: 5.2 MG/DL — HIGH (ref 2.5–4.5)
PLATELET # BLD AUTO: 242 K/UL — SIGNIFICANT CHANGE UP (ref 150–400)
PMV BLD: 11 FL — SIGNIFICANT CHANGE UP (ref 7–13)
POTASSIUM SERPL-MCNC: 4.1 MMOL/L — SIGNIFICANT CHANGE UP (ref 3.5–5.3)
POTASSIUM SERPL-SCNC: 4.1 MMOL/L — SIGNIFICANT CHANGE UP (ref 3.5–5.3)
RBC # BLD: 3.51 M/UL — LOW (ref 3.8–5.2)
RBC # FLD: 13 % — SIGNIFICANT CHANGE UP (ref 10.3–14.5)
SODIUM SERPL-SCNC: 140 MMOL/L — SIGNIFICANT CHANGE UP (ref 135–145)
WBC # BLD: 12.43 K/UL — HIGH (ref 3.8–10.5)
WBC # FLD AUTO: 12.43 K/UL — HIGH (ref 3.8–10.5)

## 2017-09-19 RX ORDER — FLUCONAZOLE 150 MG/1
200 TABLET ORAL EVERY 24 HOURS
Qty: 0 | Refills: 0 | Status: DISCONTINUED | OUTPATIENT
Start: 2017-09-19 | End: 2017-09-21

## 2017-09-19 RX ORDER — ACETAMINOPHEN 500 MG
1000 TABLET ORAL ONCE
Qty: 0 | Refills: 0 | Status: COMPLETED | OUTPATIENT
Start: 2017-09-19 | End: 2017-09-19

## 2017-09-19 RX ORDER — FLUCONAZOLE 150 MG/1
200 TABLET ORAL EVERY 24 HOURS
Qty: 0 | Refills: 0 | Status: DISCONTINUED | OUTPATIENT
Start: 2017-09-19 | End: 2017-09-19

## 2017-09-19 RX ORDER — INSULIN DETEMIR 100/ML (3)
45 INSULIN PEN (ML) SUBCUTANEOUS AT BEDTIME
Qty: 0 | Refills: 0 | Status: DISCONTINUED | OUTPATIENT
Start: 2017-09-19 | End: 2017-09-21

## 2017-09-19 RX ADMIN — ENOXAPARIN SODIUM 40 MILLIGRAM(S): 100 INJECTION SUBCUTANEOUS at 19:31

## 2017-09-19 RX ADMIN — Medication 400 MILLIGRAM(S): at 15:07

## 2017-09-19 RX ADMIN — OXYCODONE HYDROCHLORIDE 10 MILLIGRAM(S): 5 TABLET ORAL at 22:10

## 2017-09-19 RX ADMIN — OXYCODONE HYDROCHLORIDE 10 MILLIGRAM(S): 5 TABLET ORAL at 18:29

## 2017-09-19 RX ADMIN — OXYCODONE HYDROCHLORIDE 10 MILLIGRAM(S): 5 TABLET ORAL at 13:17

## 2017-09-19 RX ADMIN — SERTRALINE 100 MILLIGRAM(S): 25 TABLET, FILM COATED ORAL at 09:13

## 2017-09-19 RX ADMIN — Medication 30 MILLILITER(S): at 16:09

## 2017-09-19 RX ADMIN — Medication 50 MILLIGRAM(S): at 09:16

## 2017-09-19 RX ADMIN — Medication 45 UNIT(S): at 22:11

## 2017-09-19 RX ADMIN — Medication 0.5 MILLIGRAM(S): at 07:44

## 2017-09-19 RX ADMIN — Medication 50 MILLIGRAM(S): at 22:11

## 2017-09-19 RX ADMIN — Medication 2: at 22:12

## 2017-09-19 RX ADMIN — OXYCODONE HYDROCHLORIDE 10 MILLIGRAM(S): 5 TABLET ORAL at 12:20

## 2017-09-19 RX ADMIN — OXYCODONE HYDROCHLORIDE 10 MILLIGRAM(S): 5 TABLET ORAL at 22:40

## 2017-09-19 RX ADMIN — OXYCODONE HYDROCHLORIDE 10 MILLIGRAM(S): 5 TABLET ORAL at 17:47

## 2017-09-19 RX ADMIN — OXYCODONE HYDROCHLORIDE 10 MILLIGRAM(S): 5 TABLET ORAL at 04:00

## 2017-09-19 RX ADMIN — Medication 0.5 MILLIGRAM(S): at 22:11

## 2017-09-19 RX ADMIN — OXYCODONE HYDROCHLORIDE 10 MILLIGRAM(S): 5 TABLET ORAL at 03:30

## 2017-09-19 RX ADMIN — Medication 600 MILLIGRAM(S): at 22:11

## 2017-09-19 RX ADMIN — OXYCODONE HYDROCHLORIDE 10 MILLIGRAM(S): 5 TABLET ORAL at 07:40

## 2017-09-19 RX ADMIN — Medication 150 MILLIGRAM(S): at 22:11

## 2017-09-19 RX ADMIN — Medication 1000 MILLIGRAM(S): at 15:38

## 2017-09-19 RX ADMIN — Medication 600 MILLIGRAM(S): at 14:25

## 2017-09-19 RX ADMIN — OXYCODONE HYDROCHLORIDE 10 MILLIGRAM(S): 5 TABLET ORAL at 08:10

## 2017-09-19 RX ADMIN — Medication 100 MILLIGRAM(S): at 06:43

## 2017-09-19 RX ADMIN — FLUCONAZOLE 200 MILLIGRAM(S): 150 TABLET ORAL at 17:47

## 2017-09-19 RX ADMIN — Medication 0.5 MILLIGRAM(S): at 14:24
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
## 2017-09-19 NOTE — PROGRESS NOTE ADULT - ATTENDING COMMENTS
September 19th, 2017  12:40 PM    Hospital Day #8  Post op Day #8    Patient seen and evaluated on daily B-Team rounds. Care discussed at B-Team morning report sign-out. Adjacent B-Team PA and care coordinator notes reviewed.    BP		=	91 – 124/60 - 83  P		=	61 - 72		O2 Sat	=	96% - 98%  R		=	16 - 18		I/O	=	1,100 in/1,700 out  Temp		=	36.4 – 37.1			+ 6 liters since admission    Glucose	=	75 – 181    Current wt	=	80.9 Kg  Max wt		=	80.9 Kg  Admit wt	=	75.5 Kg    BMI		=	28.9    Awake, alert, and fully oriented. Does not appear toxic. In no distress.  Anicteric. Pupils reactive.  No thrush.  No JVD.  Lungs clear with non-labored respirations. Symmetrical chest wall movements.  COR – RRR  Abdomen soft and neither distended nor tender. Left groin with an indurated wound with a Penrose drain in place. Sero-purulent drainage. No fluctuance.  Extremities well perfused.    WBC	=	12	Na		=	140  Neutro	=	-	K		=	4.1  Hgb	=	12	Cl		=	109  Hct	=	35%	HCO3		=	21  Plts	=	242	Glucose	=	59  			BUN		=	7  PT	=	-	Creat		=	0.9  PTT	=	-  INR	=	-    Contrast enhanced CT scan of abdomen and pelvis 9/16/17 – No abscess. Skin thickening.    Tissue 9/11	-	MRSA  Wound 9/11	-	MRSA  Other 9/11	-	MRSA  Other 9/11	-	MRSA  Urine 9/11	-	10,000 – 49,000 MRSA  		-	10,000 – 49,000 CNS  Blood 9/11	-	Negative  Blood 9/11	-	Negative    Remains on:    1)	Low carbohydrate diet + Glucerna shake tid  2)	Enoxaparin 40 mg sq q24 hours  3)	Clindamycin 600 mg IV q8 hours	-	Day #8  4)	Diflucan 200 mg IV q24 hours		-	Day #3  5)	Klonopin 0.5 mg po q8 hours prn  6)	Zoloft 100 mg po q24 hours  7)	Topamax 50 mg po q12 hours  8)	Trazodone 150 mg po qhs  9)	Oxycodone 5- 10 mg po q4 hours prn  10)	Insulin  	a.	Levemir 45 units sq qhs  	b.	Sliding scale    Assessment:	This patient is assessed as being an overweight, 47-year-old insulin requiring type 2 diabetic, anxious, depressed female who abuses alcohol who presented to the ED at [facility] at approximately 1:30 PM on 9/11/17 with complaints of having hives in both thighs and dyspnea that the patient thought was an allergic reaction to Bactrim. She had been treated with Bactrim for a left thigh abscess. She had tachycardia, a leukocytosis, hyperglycemia with lactic and keto acidosis, and was found to have a left thigh abscess for which she underwent I & D on 9/11/17 finding MRSA in the wound and in her urine. While hospitalized she required treatment for acute alcohol withdrawal. She has since improved. Her symptoms of alcohol withdrawal have abated and her WBC has normalized. She has been treated with parenteral clindamycin for 8 days and 2 days ago was started on Diflucan as there was a clinical suspicion that she had a fungal infection. She is tolerating an oral diet and has had hypoglycemia. She was being given 50 units of Levemir nightly and sliding scale insulin. The Levemir was decreased to 45 units every evening. The dilaudid has been discontinued.    Plan to:    1)	Change the clindamycin and diflucan to oral / enteral route. Will plan a total of  	14 days of treatment.  2)	Pack the wounds with plain gauze.  3)	Allow and assist her to be out of bed.  4)	Re-check WBC on 9/20/17.  5)	Determine if the patient knows how to check glucose levels and has a glucometer.  6)	Obtain a  consult and offer the patient the opportunity for  	alcohol detox.  7)	Await completion of the gynecology consultation (reportedly seen yesterday but  	not note in the medical record).  8)	Full support in place    Carlos Diallo  15 Minutes exclusive of procedures. September 19th, 2017  12:40 PM    Hospital Day #8  Post op Day #8    Patient seen and evaluated on daily B-Team rounds. Care discussed at B-Team morning report sign-out. Adjacent B-Team PA and care coordinator notes reviewed.    BP		=	91 – 124/60 - 83  P		=	61 - 72		O2 Sat	=	96% - 98%  R		=	16 - 18		I/O	=	1,100 in/1,700 out  Temp		=	36.4 – 37.1			+ 6 liters since admission    Glucose	=	75 – 181    Current wt	=	80.9 Kg  Max wt		=	80.9 Kg  Admit wt	=	75.5 Kg    BMI		=	28.9    Awake, alert, and fully oriented. Does not appear toxic. In no distress.  Anicteric. Pupils reactive.  No thrush.  No JVD.  Lungs clear with non-labored respirations. Symmetrical chest wall movements.  COR – RRR  Abdomen soft and neither distended nor tender. Left groin with an indurated wound with a Penrose drain in place. Sero-purulent drainage. No fluctuance.  Extremities well perfused.    WBC	=	12	Na		=	140  Neutro	=	-	K		=	4.1  Hgb	=	12	Cl		=	109  Hct	=	35%	HCO3		=	21  Plts	=	242	Glucose	=	59  			BUN		=	7  PT	=	-	Creat		=	0.9  PTT	=	-  INR	=	-    Contrast enhanced CT scan of abdomen and pelvis 9/16/17 – No abscess. Skin thickening.    Tissue 9/11	-	MRSA  Wound 9/11	-	MRSA  Other 9/11	-	MRSA  Other 9/11	-	MRSA  Urine 9/11	-	10,000 – 49,000 MRSA  		-	10,000 – 49,000 CNS  Blood 9/11	-	Negative  Blood 9/11	-	Negative    Remains on:    1)	Low carbohydrate diet + Glucerna shake tid  2)	Enoxaparin 40 mg sq q24 hours  3)	Clindamycin 600 mg IV q8 hours	-	Day #8  4)	Diflucan 200 mg IV q24 hours		-	Day #3  5)	Klonopin 0.5 mg po q8 hours prn  6)	Zoloft 100 mg po q24 hours  7)	Topamax 50 mg po q12 hours  8)	Trazodone 150 mg po qhs  9)	Oxycodone 5- 10 mg po q4 hours prn  10)	Insulin  	a.	Levemir 45 units sq qhs  	b.	Sliding scale    Assessment:	This patient is assessed as being an overweight, 47-year-old insulin requiring type 2 diabetic, anxious, depressed female who abuses alcohol who presented to the ED at [facility] at approximately 1:30 PM on 9/11/17 with complaints of having hives in both thighs and dyspnea that the patient thought was an allergic reaction to Bactrim. She had been treated with Bactrim for a left thigh abscess. She had tachycardia, a leukocytosis, hyperglycemia with lactic and keto acidosis, and was found to have a left thigh abscess for which she underwent I & D on 9/11/17 finding MRSA in the wound and in her urine. While hospitalized she required treatment for acute alcohol withdrawal. She has since improved. Her symptoms of alcohol withdrawal have abated and her WBC had normalized but increased again today. She has been treated with parenteral clindamycin for 8 days and 2 days ago was started on Diflucan as there was a clinical suspicion that she had a fungal infection. She is tolerating an oral diet and has had hypoglycemia. She was being given 50 units of Levemir nightly and sliding scale insulin. The Levemir was decreased to 45 units every evening. The dilaudid has been discontinued.    Plan to:    1)	Change the clindamycin and diflucan to oral / enteral route. Will plan a total of  	14 days of treatment.  2)	Pack the wounds with plain gauze.  3)	Allow and assist her to be out of bed.  4)	Re-check WBC on 9/20/17. If not higher will plan discharge to home.  5)	Determine if the patient knows how to check glucose levels and has a glucometer.  6)	Obtain a  consult and offer the patient the opportunity for  	alcohol detox.  7)	Await completion of the gynecology consultation (reportedly seen yesterday but  	not note in the medical record).  8)	Full support in place    Carlos Diallo  15 Minutes exclusive of procedures.

## 2017-09-19 NOTE — PROGRESS NOTE ADULT - ASSESSMENT
A&P:  47 year old female s/p incision and drainage of a left groin abscess in OR on 9/11, penrose drain in place postoperative course complicated by alcohol withdrawal.     -Continue sertraline, topirimate, prn clonazepam for anxiety, oxycodone prn pain, trazodone at bedtime.   -Continue consistent carbohydrate diet   -Transition from IV to PO Clindamycin for MRSA in abscess with cellulitis and Diflucan for empiric fungal infection.  -Will apply packing to surgical wound, patient will go home with penrose drain in place and PO Clindamycin and Diflucan. Plan to d/c home tomorrow.  -Adjust Levemir for hypoglycemia, will decrease dose from 50units qhs to 45units qhs, insulin sliding scale.   -DVT prophylaxis with lovenox A&P:  47 year old female s/p incision and drainage of a left groin abscess in OR on 9/11, penrose drain in place postoperative course complicated by alcohol withdrawal.     -Continue sertraline, topirimate, prn clonazepam for anxiety, oxycodone prn pain, trazodone at bedtime.   -Continue consistent carbohydrate diet   -Transition from IV to PO Clindamycin (9/11- ) for MRSA in abscess with cellulitis and Diflucan (9/18- )for empiric fungal infection.  -Will apply packing to surgical wound, patient will go home with penrose drain in place and PO Clindamycin and Diflucan. Plan to d/c home tomorrow.  -Adjust Levemir for hypoglycemia, will decrease dose from 50units qhs to 45units qhs, insulin sliding scale.   -DVT prophylaxis with lovenox A&P:  47 year old female s/p incision and drainage of a left groin abscess in OR on 9/11, penrose drain in place postoperative course complicated by alcohol withdrawal.     -Continue sertraline, topirimate, prn clonazepam for anxiety, oxycodone prn pain, trazodone at bedtime.   -Continue consistent carbohydrate diet   -Transition from IV to PO Clindamycin (9/11- ) for MRSA in abscess with cellulitis and Diflucan (9/18- )for empiric fungal infection.  -Will apply packing to surgical wound, patient will go home with penrose drain in place and PO Clindamycin and Diflucan. Plan to d/c home tomorrow.  - Creatinine and WBC slightly elevated, Urine output is adequate, and patient has been afebrile. Continue to trend.  -Adjust Levemir for hypoglycemia, will decrease dose from 50units qhs to 45units qhs, insulin sliding scale.   -DVT prophylaxis with lovenox

## 2017-09-19 NOTE — CONSULT NOTE ADULT - PROBLEM SELECTOR RECOMMENDATION 9
-perineal care  -follow up GC/Cl, BV Affirm cultures  -follow out outpt for routine obgyn care    Pt seen with Dr. Calvo PGY3  Virgei, pgy2 -perineal care  -follow up GC/Cl, BV Affirm cultures  -follow out outpt for routine obgyn care    Pt seen with Dr. Calvo PGY3, Dr. Juana Garvin, pgy2

## 2017-09-19 NOTE — PROGRESS NOTE ADULT - SUBJECTIVE AND OBJECTIVE BOX
Team B Surgery Progress Note    Patient seen and examined at the bedside this morning. Patient is without complaints. Patient's serum blood glucose was 57 on AM labs prior to breakfast, patient was given orange juice. Repeat fingerstick was 75.     Vital Signs Last 24 Hrs  T(C): 36.7 (19 Sep 2017 09:18), Max: 37.1 (19 Sep 2017 07:40)  T(F): 98 (19 Sep 2017 09:18), Max: 98.7 (19 Sep 2017 07:40)  HR: 64 (19 Sep 2017 09:18) (62 - 65)  BP: 91/60 (19 Sep 2017 09:18) (91/60 - 124/83)  RR: 16 (19 Sep 2017 09:18) (16 - 18)  SpO2: 97% (19 Sep 2017 09:18) (95% - 97%)    I&O's Detail    18 Sep 2017 07:01  -  19 Sep 2017 07:00  --------------------------------------------------------  IN:    IV PiggyBack: 150 mL    Oral Fluid: 950 mL  Total IN: 1100 mL    OUT:    Voided: 1700 mL  Total OUT: 1700 mL    Total NET: -600 mL    Labs:                        11.7   12.43 )-----------( 242      ( 19 Sep 2017 06:30 )             35.0   09-19    140  |  109<H>  |  7   ----------------------------<  57<L>  4.1   |  21<L>  |  0.92    Ca    8.9      19 Sep 2017 06:30  Phos  5.2     09-19  Mg     2.3     09-19    A&P:  47 year old female s/p incision and drainage of a left groin abscess in OR on 9/11, penrose drain in place postoperative course complicated by alcohol withdrawal.     -Continue sertraline, topirimate, prn clonazepam for anxiety, oxycodone prn pain, trazodone at bedtime.   -Continue consistent carbohydrate diet   -Transition from IV to PO Clindamycin for MRSA in abscess with cellulitis and Diflucan for empiric fungal infection.  -Will apply packing to surgical wound, patient will go home with penrose drain in place and PO Clindamycin and Diflucan. Plan to d/c home tomorrow.  -Adjust Levemir for hypoglycemia, will decrease dose from 50units qhs to 44units qhs, insulin sliding scale.   -DVT prophylaxis with lovenox Team B Surgery Progress Note    Patient seen and examined at the bedside this morning. Patient is without complaints. Patient's serum blood glucose was 57 on AM labs prior to breakfast, patient was given orange juice. Repeat fingerstick was 75.     Vital Signs Last 24 Hrs  T(C): 36.7 (19 Sep 2017 09:18), Max: 37.1 (19 Sep 2017 07:40)  T(F): 98 (19 Sep 2017 09:18), Max: 98.7 (19 Sep 2017 07:40)  HR: 64 (19 Sep 2017 09:18) (62 - 65)  BP: 91/60 (19 Sep 2017 09:18) (91/60 - 124/83)  RR: 16 (19 Sep 2017 09:18) (16 - 18)  SpO2: 97% (19 Sep 2017 09:18) (95% - 97%)    I&O's Detail    18 Sep 2017 07:01  -  19 Sep 2017 07:00  --------------------------------------------------------  IN:    IV PiggyBack: 150 mL    Oral Fluid: 950 mL  Total IN: 1100 mL    OUT:    Voided: 1700 mL  Total OUT: 1700 mL    Total NET: -600 mL    Labs:                        11.7   12.43 )-----------( 242      ( 19 Sep 2017 06:30 )             35.0   09-19    140  |  109<H>  |  7   ----------------------------<  57<L>  4.1   |  21<L>  |  0.92    Ca    8.9      19 Sep 2017 06:30  Phos  5.2     09-19  Mg     2.3     09-19

## 2017-09-19 NOTE — CONSULT NOTE ADULT - ASSESSMENT
48 y/o s/p I&D of groin abscess, s/p EtOH withdrawal, c/o vaginal discharge - now resolved, c/o vulvar "bumps" - now resolved.

## 2017-09-20 ENCOUNTER — TRANSCRIPTION ENCOUNTER (OUTPATIENT)
Age: 47
End: 2017-09-20

## 2017-09-20 LAB
ANISOCYTOSIS BLD QL: SLIGHT — SIGNIFICANT CHANGE UP
BASOPHILS # BLD AUTO: 0.09 K/UL — SIGNIFICANT CHANGE UP (ref 0–0.2)
BASOPHILS NFR BLD AUTO: 0.6 % — SIGNIFICANT CHANGE UP (ref 0–2)
BASOPHILS NFR SPEC: 0 % — SIGNIFICANT CHANGE UP (ref 0–2)
BUN SERPL-MCNC: 7 MG/DL — SIGNIFICANT CHANGE UP (ref 7–23)
C TRACH RRNA SPEC QL NAA+PROBE: SIGNIFICANT CHANGE UP
CALCIUM SERPL-MCNC: 8.9 MG/DL — SIGNIFICANT CHANGE UP (ref 8.4–10.5)
CANDIDA AB TITR SER: NOT DETECTED — SIGNIFICANT CHANGE UP
CHLORIDE SERPL-SCNC: 105 MMOL/L — SIGNIFICANT CHANGE UP (ref 98–107)
CO2 SERPL-SCNC: 22 MMOL/L — SIGNIFICANT CHANGE UP (ref 22–31)
CREAT SERPL-MCNC: 0.97 MG/DL — SIGNIFICANT CHANGE UP (ref 0.5–1.3)
EOSINOPHIL # BLD AUTO: 0.17 K/UL — SIGNIFICANT CHANGE UP (ref 0–0.5)
EOSINOPHIL NFR BLD AUTO: 1.2 % — SIGNIFICANT CHANGE UP (ref 0–6)
EOSINOPHIL NFR FLD: 0.9 % — SIGNIFICANT CHANGE UP (ref 0–6)
G VAGINALIS DNA SPEC QL NAA+PROBE: NOT DETECTED — SIGNIFICANT CHANGE UP
GIANT PLATELETS BLD QL SMEAR: PRESENT — SIGNIFICANT CHANGE UP
GLUCOSE SERPL-MCNC: 92 MG/DL — SIGNIFICANT CHANGE UP (ref 70–99)
HCT VFR BLD CALC: 36.3 % — SIGNIFICANT CHANGE UP (ref 34.5–45)
HGB BLD-MCNC: 11.8 G/DL — SIGNIFICANT CHANGE UP (ref 11.5–15.5)
IMM GRANULOCYTES # BLD AUTO: 0.47 # — SIGNIFICANT CHANGE UP
IMM GRANULOCYTES NFR BLD AUTO: 3.2 % — HIGH (ref 0–1.5)
LYMPHOCYTES # BLD AUTO: 14.9 % — SIGNIFICANT CHANGE UP (ref 13–44)
LYMPHOCYTES # BLD AUTO: 2.17 K/UL — SIGNIFICANT CHANGE UP (ref 1–3.3)
LYMPHOCYTES NFR SPEC AUTO: 12.2 % — LOW (ref 13–44)
MACROCYTES BLD QL: SLIGHT — SIGNIFICANT CHANGE UP
MCHC RBC-ENTMCNC: 32.4 PG — SIGNIFICANT CHANGE UP (ref 27–34)
MCHC RBC-ENTMCNC: 32.5 % — SIGNIFICANT CHANGE UP (ref 32–36)
MCV RBC AUTO: 99.7 FL — SIGNIFICANT CHANGE UP (ref 80–100)
MONOCYTES # BLD AUTO: 0.86 K/UL — SIGNIFICANT CHANGE UP (ref 0–0.9)
MONOCYTES NFR BLD AUTO: 5.9 % — SIGNIFICANT CHANGE UP (ref 2–14)
MONOCYTES NFR BLD: 7.8 % — SIGNIFICANT CHANGE UP (ref 2–9)
N GONORRHOEA RRNA SPEC QL NAA+PROBE: SIGNIFICANT CHANGE UP
NEUTROPHIL AB SER-ACNC: 74.8 % — SIGNIFICANT CHANGE UP (ref 43–77)
NEUTROPHILS # BLD AUTO: 10.83 K/UL — HIGH (ref 1.8–7.4)
NEUTROPHILS NFR BLD AUTO: 74.2 % — SIGNIFICANT CHANGE UP (ref 43–77)
NEUTS BAND # BLD: 4.3 % — SIGNIFICANT CHANGE UP (ref 0–6)
NRBC # FLD: 0 — SIGNIFICANT CHANGE UP
PLATELET # BLD AUTO: 267 K/UL — SIGNIFICANT CHANGE UP (ref 150–400)
PLATELET COUNT - ESTIMATE: NORMAL — SIGNIFICANT CHANGE UP
PMV BLD: 11 FL — SIGNIFICANT CHANGE UP (ref 7–13)
POLYCHROMASIA BLD QL SMEAR: SLIGHT — SIGNIFICANT CHANGE UP
POTASSIUM SERPL-MCNC: 4.3 MMOL/L — SIGNIFICANT CHANGE UP (ref 3.5–5.3)
POTASSIUM SERPL-SCNC: 4.3 MMOL/L — SIGNIFICANT CHANGE UP (ref 3.5–5.3)
RBC # BLD: 3.64 M/UL — LOW (ref 3.8–5.2)
RBC # FLD: 12.9 % — SIGNIFICANT CHANGE UP (ref 10.3–14.5)
SODIUM SERPL-SCNC: 140 MMOL/L — SIGNIFICANT CHANGE UP (ref 135–145)
SPECIMEN SOURCE: SIGNIFICANT CHANGE UP
T VAGINALIS SPEC QL WET PREP: NOT DETECTED — SIGNIFICANT CHANGE UP
WBC # BLD: 14.59 K/UL — HIGH (ref 3.8–10.5)
WBC # FLD AUTO: 14.59 K/UL — HIGH (ref 3.8–10.5)

## 2017-09-20 PROCEDURE — 99231 SBSQ HOSP IP/OBS SF/LOW 25: CPT

## 2017-09-20 RX ADMIN — OXYCODONE HYDROCHLORIDE 10 MILLIGRAM(S): 5 TABLET ORAL at 06:45

## 2017-09-20 RX ADMIN — OXYCODONE HYDROCHLORIDE 10 MILLIGRAM(S): 5 TABLET ORAL at 11:31

## 2017-09-20 RX ADMIN — FLUCONAZOLE 200 MILLIGRAM(S): 150 TABLET ORAL at 18:03

## 2017-09-20 RX ADMIN — SERTRALINE 100 MILLIGRAM(S): 25 TABLET, FILM COATED ORAL at 12:26

## 2017-09-20 RX ADMIN — OXYCODONE HYDROCHLORIDE 10 MILLIGRAM(S): 5 TABLET ORAL at 07:15

## 2017-09-20 RX ADMIN — ENOXAPARIN SODIUM 40 MILLIGRAM(S): 100 INJECTION SUBCUTANEOUS at 19:13

## 2017-09-20 RX ADMIN — OXYCODONE HYDROCHLORIDE 10 MILLIGRAM(S): 5 TABLET ORAL at 19:51

## 2017-09-20 RX ADMIN — OXYCODONE HYDROCHLORIDE 10 MILLIGRAM(S): 5 TABLET ORAL at 16:20

## 2017-09-20 RX ADMIN — Medication 0.5 MILLIGRAM(S): at 15:46

## 2017-09-20 RX ADMIN — OXYCODONE HYDROCHLORIDE 10 MILLIGRAM(S): 5 TABLET ORAL at 03:05

## 2017-09-20 RX ADMIN — Medication 0.5 MILLIGRAM(S): at 06:55

## 2017-09-20 RX ADMIN — Medication 45 UNIT(S): at 22:11

## 2017-09-20 RX ADMIN — Medication 600 MILLIGRAM(S): at 06:55

## 2017-09-20 RX ADMIN — Medication 30 MILLILITER(S): at 07:20

## 2017-09-20 RX ADMIN — OXYCODONE HYDROCHLORIDE 10 MILLIGRAM(S): 5 TABLET ORAL at 10:45

## 2017-09-20 RX ADMIN — Medication 0.5 MILLIGRAM(S): at 22:12

## 2017-09-20 RX ADMIN — Medication 30 MILLILITER(S): at 22:16

## 2017-09-20 RX ADMIN — OXYCODONE HYDROCHLORIDE 10 MILLIGRAM(S): 5 TABLET ORAL at 02:35

## 2017-09-20 RX ADMIN — OXYCODONE HYDROCHLORIDE 10 MILLIGRAM(S): 5 TABLET ORAL at 15:28

## 2017-09-20 RX ADMIN — Medication 50 MILLIGRAM(S): at 22:11

## 2017-09-20 RX ADMIN — Medication 600 MILLIGRAM(S): at 13:48

## 2017-09-20 RX ADMIN — OXYCODONE HYDROCHLORIDE 10 MILLIGRAM(S): 5 TABLET ORAL at 20:21

## 2017-09-20 RX ADMIN — Medication 150 MILLIGRAM(S): at 22:12

## 2017-09-20 RX ADMIN — Medication 50 MILLIGRAM(S): at 10:45

## 2017-09-20 RX ADMIN — Medication 600 MILLIGRAM(S): at 22:11

## 2017-09-20 NOTE — ADVANCED PRACTICE NURSE CONSULT - REASON FOR CONSULT
Patient being followed by Surgical team, performs daily dressing changes. Topical recommendations in chart.

## 2017-09-20 NOTE — DISCHARGE NOTE ADULT - HOSPITAL COURSE
47-year-old insulin requiring type 2 diabetic, anxious, depressed female who abuses alcohol who presented to the ED at [facility] at approximately 1:30 PM on 9/11/17 with complaints of having hives in both thighs and dyspnea that the patient thought was an allergic reaction to Bactrim. She had been treated with Bactrim for a left thigh abscess. She had tachycardia, a leukocytosis, hyperglycemia with lactic and keto acidosis, and was found to have a left thigh abscess for which she underwent I & D on 9/11/17 finding MRSA in the wound and in her urine. While hospitalized she required treatment for acute alcohol withdrawal. She has since improved. Her symptoms of alcohol withdrawal have abated and her WBC had normalized but increased again today. She has been treated with parenteral clindamycin for 8 days and 3 days ago was started on Diflucan as there was a clinical suspicion that she had a fungal infection. She is tolerating an oral diet and has had hypoglycemia. She is being given 45 units of Levemir nightly and sliding scale insulin and is no longer hypoglycemia. During hospital course patients diet was slowly advanced as tolerated.  At this time, pt is tolerating a regular diet, ambulating and voiding.  Pt has been deemed stable for discharge at this time.

## 2017-09-20 NOTE — PROGRESS NOTE ADULT - ATTENDING COMMENTS
September 20th, 2017  8:40 AM    Hospital Day #9  Post op Day #9    Patient seen and evaluated on daily B-Team rounds. Care discussed at B-Team morning report sign-out. GYN resident input appreciated.    BP		=	91 – 142/58 - 79  P		=	58 - 86		O2 Sat	=	95% - 100%  R		=	16 - 18		I/O	=	1,100 in/500 out  Temp		=	36.4 – 37.1			+ 6.6 liters since admission    Glucose	=	75 – 152    Current wt	=	80.9 Kg  Max wt		=	80.9 Kg  Admit wt	=	75.5 Kg    BMI		=	28.9    Awake, alert, and fully oriented. Does not appear toxic. In no distress.  Anicteric. Pupils reactive.  No thrush.  No JVD.  Lungs clear with non-labored respirations. Symmetrical chest wall movements.  COR – RRR  Abdomen soft and neither distended nor tender. Left groin with an indurated wound with a Penrose drain in place. Sero-purulent drainage. No fluctuance.  Extremities well perfused.    WBC	=	15	Na		=	140  Neutro	=	75%	K		=	4.3  Hgb	=	12	Cl		=	105  Hct	=	36%	HCO3		=	22  Plts	=	267	Glucose	=	92  			BUN		=	7  PT	=	-	Creat		=	1.0  PTT	=	-  INR	=	-    Contrast enhanced CT scan of abdomen and pelvis 9/16/17 – No abscess. Skin thickening.    Tissue 9/11	-	MRSA  Wound 9/11	-	MRSA  Other 9/11	-	MRSA  Other 9/11	-	MRSA  Urine 9/11	-	10,000 – 49,000 MRSA  		-	10,000 – 49,000 CNS  Blood 9/11	-	Negative  Blood 9/11	-	Negative    Remains on:    1)	Low carbohydrate diet + Glucerna shake tid  2)	Enoxaparin 40 mg sq q24 hours  3)	Clindamycin 600 mg IV q8 hours	-	Day #9  4)	Diflucan 200 mg IV q24 hours		-	Day #4  5)	Klonopin 0.5 mg po q8 hours prn  6)	Zoloft 100 mg po q24 hours  7)	Topamax 50 mg po q12 hours  8)	Trazodone 150 mg po qhs  9)	Oxycodone 5- 10 mg po q4 hours prn  10)	Insulin  	a.	Levemir 45 units sq qhs  	b.	Sliding scale  11)	Maalox 30 mg q4 hours    Assessment:	This patient is assessed as being an overweight, 47-year-old insulin requiring type 2 diabetic, anxious, depressed female who abuses alcohol who presented to the ED at [facility] at approximately 1:30 PM on 9/11/17 with complaints of having hives in both thighs and dyspnea that the patient thought was an allergic reaction to Bactrim. She had been treated with Bactrim for a left thigh abscess. She had tachycardia, a leukocytosis, hyperglycemia with lactic and keto acidosis, and was found to have a left thigh abscess for which she underwent I & D on 9/11/17 finding MRSA in the wound and in her urine. While hospitalized she required treatment for acute alcohol withdrawal. She has since improved. Her symptoms of alcohol withdrawal have abated and her WBC had normalized but increased again today. She has been treated with parenteral clindamycin for 8 days and 3 days ago was started on Diflucan as there was a clinical suspicion that she had a fungal infection. She is tolerating an oral diet and has had hypoglycemia. She is being given 45 units of Levemir nightly and sliding scale insulin and is no longer hypoglycemia.    Plan to:    1)	Pack the wounds with plain gauze.  2)	Allow and assist her to be out of bed.  3)	Re-check WBC on 9/21/17. As the WBC is higher will plan to continue her in  	the hospital. If the WBC continues to increase and / or if she has fever will  	plan to obtain a CT scan of her pelvis / soft tissues.  4)	Obtain a  consult and offer the patient the opportunity for  	alcohol detox.  5)	Full support in place    Carlos Diallo  15 Minutes exclusive of procedures.

## 2017-09-20 NOTE — PROGRESS NOTE ADULT - ASSESSMENT
A&P:  47 year old female s/p incision and drainage of a left groin abscess in OR on 9/11, penrose drain in place postoperative course complicated by alcohol withdrawal, now with increased WBC.     -Continue sertraline, topirimate, prn clonazepam for anxiety, oxycodone prn pain, trazodone at bedtime.   -Continue consistent carbohydrate diet   -Transition from IV to PO Clindamycin (9/11- ) for MRSA in abscess with cellulitis and Diflucan (9/18- )for empiric fungal infection.  -Continue surgical wound care with packing, abdominal pad and surgical underwear, ill avoid tape to prevent worsening of blisters. Patient will go home with penrose drain in place and PO Clindamycin and Diflucan.   -Creatinine and WBC continue to increase, will continue to trend.  -Continue Levemir 45units qhs, insulin sliding scale.  -Out of bed to chair, ambulate   -DVT prophylaxis with lovenox

## 2017-09-20 NOTE — PROGRESS NOTE ADULT - SUBJECTIVE AND OBJECTIVE BOX
Team B Surgery Progress Note    Patient seen and examined at the bedside this morning. Patient has been afebrile, glucose levels ranged .     Vital Signs Last 24 Hrs  T(C): 36.9 (20 Sep 2017 06:51), Max: 36.9 (20 Sep 2017 06:51)  T(F): 98.4 (20 Sep 2017 06:51), Max: 98.4 (20 Sep 2017 06:51)  HR: 86 (20 Sep 2017 06:51) (58 - 86)  BP: 107/75 (20 Sep 2017 06:51) (91/56 - 142/91)  RR: 17 (20 Sep 2017 06:51) (16 - 18)  SpO2: 97% (20 Sep 2017 06:51) (95% - 100%)    I&O's Detail    19 Sep 2017 07:01  -  20 Sep 2017 07:00  --------------------------------------------------------  IN:    Oral Fluid: 1100 mL  Total IN: 1100 mL    OUT:    Voided: 500 mL  Total OUT: 500 mL    Total NET: 600 mL    PHYSICAL EXAM:  A+Ox3, well-appearing  Left thigh/groin: skin is erythematous with blistering, two surgical wound opening with penrose drain and packing in place.     Labs:                      11.8   14.59 )-----------( 267      ( 20 Sep 2017 06:30 )             36.3   09-20    140  |  105  |  7   ----------------------------<  92  4.3   |  22  |  0.97    Ca    8.9      20 Sep 2017 06:30  Phos  5.2     09-19  Mg     2.3     09-19

## 2017-09-20 NOTE — DISCHARGE NOTE ADULT - PATIENT PORTAL LINK FT
“You can access the [employer] Patient Portal, offered by [facility], by registering with the following website: [email or web address]”

## 2017-09-20 NOTE — DISCHARGE NOTE ADULT - MEDICATION SUMMARY - MEDICATIONS TO STOP TAKING
I will STOP taking the medications listed below when I get home from the hospital:    HumaLOG KwikPen 100 units/mL subcutaneous solution  -- 15 unit(s) subcutaneous 3 times a day (before meals)

## 2017-09-20 NOTE — DISCHARGE NOTE ADULT - MEDICATION SUMMARY - MEDICATIONS TO CHANGE
I will SWITCH the dose or number of times a day I take the medications listed below when I get home from the hospital:    Levemir FlexPen 100 units/mL subcutaneous solution  -- 50 unit(s) subcutaneous once a day (at bedtime)  -- Check with your doctor before becoming pregnant.  Do not drink alcoholic beverages when taking this medication.  Keep in refrigerator.  Do not freeze.

## 2017-09-20 NOTE — DISCHARGE NOTE ADULT - PLAN OF CARE
WOUND CARE:  Please keep incisions clean and dry. Please do not Scrub or rub incisions. Do not use lotion or powder on incisions. Keep penrose in and pack area daily with dry guaze.   BATHING: You may shower and/or sponge bathe. You may use warm soapy water in the shower and rinse, pat dry.  ACTIVITY: No heavy lifting or straining. Otherwise, you may return to your usual level of physical activity. If you are taking narcotic pain medication DO NOT drive a car, operate machinery or make important decisions.  DIET: Return to your usual diet.  NOTIFY YOUR SURGEON IF: You have any bleeding that does not stop, any pus draining from your wound(s), any fever (over 100.4 F) persistent nausea/vomiting, or if your pain is not controlled on your discharge pain medications.  Please follow up with your primary care physician in one week regarding your hospitalization.  Please follow up with your surgeon, Dr. Lemons for removal of penrose maintain safety , wound care stability , reduce infection WOUND CARE:  Please keep incisions clean and dry. Please do not Scrub or rub incisions. Do not use lotion or powder on incisions. Keep pen albaro in and pack area daily with dry guaze.   BATHING: You may shower and/or sponge bathe. You may use warm soapy water in the shower and rinse, pat dry.  ACTIVITY: No heavy lifting or straining. Otherwise, you may return to your usual level of physical activity. If you are taking narcotic pain medication DO NOT drive a car, operate machinery or make important decisions.  DIET: Return to your usual diet.  NOTIFY YOUR SURGEON IF: You have any bleeding that does not stop, any pus draining from your wound(s), any fever (over 100.4 F) persistent nausea/vomiting, or if your pain is not controlled on your discharge pain medications.  Please follow up with your primary care physician in one week regarding your hospitalization.  Please follow up with your surgeon, Dr. Lemons for removal of penrose WOUND CARE:  Please keep incisions clean and dry. Please do not Scrub or rub incisions. Do not use lotion or powder on incisions. Keep pen albaro in and pack area daily with dry guaze.   BATHING: You may shower and/or sponge bathe. You may use warm soapy water in the shower and rinse, pat dry.  ACTIVITY: No heavy lifting or straining. Otherwise, you may return to your usual level of physical activity. If you are taking narcotic pain medication DO NOT drive a car, operate machinery or make important decisions.  DIET: Return to your usual diet.  NOTIFY YOUR SURGEON IF: You have any bleeding that does not stop, any pus draining from your wound(s), any fever (over 100.4 F) persistent nausea/vomiting, or if your pain is not controlled on your discharge pain medications.  Please follow up with your primary care physician in one week regarding your hospitalization, Dr. James Leach  Please follow up with your surgeon, Dr. Lemons for removal of penrose Diabetes Stop premeal insulin as you were not on it during this stay, continue lantus 45 units before bedtime and follow up fingersticks in the morning and evening, follow up with Dr. Leach next week

## 2017-09-20 NOTE — DISCHARGE NOTE ADULT - MEDICATION SUMMARY - MEDICATIONS TO TAKE
I will START or STAY ON the medications listed below when I get home from the hospital:    oxyCODONE 5 mg oral capsule  -- 1 cap(s) by mouth every 6 hours as needed for severe pain MDD:4  -- Caution federal law prohibits the transfer of this drug to any person other  than the person for whom it was prescribed.  It is very important that you take or use this exactly as directed.  Do not skip doses or discontinue unless directed by your doctor.  May cause drowsiness.  Alcohol may intensify this effect.  Use care when operating dangerous machinery.  This prescription cannot be refilled.  Using more of this medication than prescribed may cause serious breathing problems.    -- Indication: For pain    clonazePAM 0.5 mg oral tablet  -- 1 tab(s) by mouth 3 times a day  -- Indication: For Anxiety    topiramate 50 mg oral tablet  -- 1 tab(s) by mouth 2 times a day  -- Indication: For Seizure    Zoloft 100 mg oral tablet  -- 1 tab(s) by mouth 2 times a day  -- Indication: For Antidepression    traZODone 150 mg oral tablet  -- 1 tab(s) by mouth once a day (at bedtime)  -- Indication: For Antidepression    Lantus Solostar Pen 100 units/mL subcutaneous solution  -- 45 unit(s) subcutaneous once a day (at bedtime) MDD:45  -- Do not drink alcoholic beverages when taking this medication.  It is very important that you take or use this exactly as directed.  Do not skip doses or discontinue unless directed by your doctor.  Keep in refrigerator.  Do not freeze.    -- Indication: For DM    Diflucan 200 mg oral tablet  -- 1 tab(s) by mouth every 24 hours MDD:1  -- Indication: For Antibiotics    Cleocin HCl 300 mg oral capsule  -- 2 cap(s) by mouth every 8 hours MDD:2  -- Indication: For Antibiotics

## 2017-09-20 NOTE — DISCHARGE NOTE ADULT - CARE PROVIDER_API CALL
Tony Lemons), Surgery  [address] [place] [address]  Phone: [phone]  Fax: [phone]

## 2017-09-20 NOTE — DISCHARGE NOTE ADULT - CARE PLAN
Instructions for follow-up, activity and diet:	WOUND CARE:  Please keep incisions clean and dry. Please do not Scrub or rub incisions. Do not use lotion or powder on incisions. Keep penrose in and pack area daily with dry guaze.   BATHING: You may shower and/or sponge bathe. You may use warm soapy water in the shower and rinse, pat dry.  ACTIVITY: No heavy lifting or straining. Otherwise, you may return to your usual level of physical activity. If you are taking narcotic pain medication DO NOT drive a car, operate machinery or make important decisions.  DIET: Return to your usual diet.  NOTIFY YOUR SURGEON IF: You have any bleeding that does not stop, any pus draining from your wound(s), any fever (over 100.4 F) persistent nausea/vomiting, or if your pain is not controlled on your discharge pain medications.  Please follow up with your primary care physician in one week regarding your hospitalization.  Please follow up with your surgeon, Dr. Lemons for removal of penrose Goal:	maintain safety , wound care stability , reduce infection  Instructions for follow-up, activity and diet:	WOUND CARE:  Please keep incisions clean and dry. Please do not Scrub or rub incisions. Do not use lotion or powder on incisions. Keep pen albaro in and pack area daily with dry guaze.   BATHING: You may shower and/or sponge bathe. You may use warm soapy water in the shower and rinse, pat dry.  ACTIVITY: No heavy lifting or straining. Otherwise, you may return to your usual level of physical activity. If you are taking narcotic pain medication DO NOT drive a car, operate machinery or make important decisions.  DIET: Return to your usual diet.  NOTIFY YOUR SURGEON IF: You have any bleeding that does not stop, any pus draining from your wound(s), any fever (over 100.4 F) persistent nausea/vomiting, or if your pain is not controlled on your discharge pain medications.  Please follow up with your primary care physician in one week regarding your hospitalization.  Please follow up with your surgeon, Dr. Lemons for removal of penrose Goal:	maintain safety , wound care stability , reduce infection  Instructions for follow-up, activity and diet:	WOUND CARE:  Please keep incisions clean and dry. Please do not Scrub or rub incisions. Do not use lotion or powder on incisions. Keep pen albaro in and pack area daily with dry guaze.   BATHING: You may shower and/or sponge bathe. You may use warm soapy water in the shower and rinse, pat dry.  ACTIVITY: No heavy lifting or straining. Otherwise, you may return to your usual level of physical activity. If you are taking narcotic pain medication DO NOT drive a car, operate machinery or make important decisions.  DIET: Return to your usual diet.  NOTIFY YOUR SURGEON IF: You have any bleeding that does not stop, any pus draining from your wound(s), any fever (over 100.4 F) persistent nausea/vomiting, or if your pain is not controlled on your discharge pain medications.  Please follow up with your primary care physician in one week regarding your hospitalization, Dr. James Leach  Please follow up with your surgeon, Dr. Lemons for removal of penrose  Goal:	Diabetes  Instructions for follow-up, activity and diet:	Stop premeal insulin as you were not on it during this stay, continue lantus 45 units before bedtime and follow up fingersticks in the morning and evening, follow up with Dr. Leach next week

## 2017-09-21 VITALS
TEMPERATURE: 98 F | SYSTOLIC BLOOD PRESSURE: 94 MMHG | OXYGEN SATURATION: 95 % | DIASTOLIC BLOOD PRESSURE: 59 MMHG | RESPIRATION RATE: 18 BRPM | HEART RATE: 54 BPM

## 2017-09-21 LAB
BASOPHILS # BLD AUTO: 0.07 K/UL — SIGNIFICANT CHANGE UP (ref 0–0.2)
BASOPHILS NFR BLD AUTO: 0.6 % — SIGNIFICANT CHANGE UP (ref 0–2)
EOSINOPHIL # BLD AUTO: 0.14 K/UL — SIGNIFICANT CHANGE UP (ref 0–0.5)
EOSINOPHIL NFR BLD AUTO: 1.1 % — SIGNIFICANT CHANGE UP (ref 0–6)
HCT VFR BLD CALC: 39.2 % — SIGNIFICANT CHANGE UP (ref 34.5–45)
HGB BLD-MCNC: 12.8 G/DL — SIGNIFICANT CHANGE UP (ref 11.5–15.5)
IMM GRANULOCYTES # BLD AUTO: 0.22 # — SIGNIFICANT CHANGE UP
IMM GRANULOCYTES NFR BLD AUTO: 1.8 % — HIGH (ref 0–1.5)
LYMPHOCYTES # BLD AUTO: 2.76 K/UL — SIGNIFICANT CHANGE UP (ref 1–3.3)
LYMPHOCYTES # BLD AUTO: 22.7 % — SIGNIFICANT CHANGE UP (ref 13–44)
MCHC RBC-ENTMCNC: 32.5 PG — SIGNIFICANT CHANGE UP (ref 27–34)
MCHC RBC-ENTMCNC: 32.7 % — SIGNIFICANT CHANGE UP (ref 32–36)
MCV RBC AUTO: 99.5 FL — SIGNIFICANT CHANGE UP (ref 80–100)
MONOCYTES # BLD AUTO: 0.76 K/UL — SIGNIFICANT CHANGE UP (ref 0–0.9)
MONOCYTES NFR BLD AUTO: 6.2 % — SIGNIFICANT CHANGE UP (ref 2–14)
NEUTROPHILS # BLD AUTO: 8.23 K/UL — HIGH (ref 1.8–7.4)
NEUTROPHILS NFR BLD AUTO: 67.6 % — SIGNIFICANT CHANGE UP (ref 43–77)
NRBC # FLD: 0 — SIGNIFICANT CHANGE UP
PLATELET # BLD AUTO: 296 K/UL — SIGNIFICANT CHANGE UP (ref 150–400)
PMV BLD: 11.2 FL — SIGNIFICANT CHANGE UP (ref 7–13)
RBC # BLD: 3.94 M/UL — SIGNIFICANT CHANGE UP (ref 3.8–5.2)
RBC # FLD: 12.9 % — SIGNIFICANT CHANGE UP (ref 10.3–14.5)
WBC # BLD: 12.18 K/UL — HIGH (ref 3.8–10.5)
WBC # FLD AUTO: 12.18 K/UL — HIGH (ref 3.8–10.5)

## 2017-09-21 RX ORDER — ENOXAPARIN SODIUM 100 MG/ML
45 INJECTION SUBCUTANEOUS
Qty: 1 | Refills: 0 | OUTPATIENT
Start: 2017-09-21

## 2017-09-21 RX ORDER — FLUCONAZOLE 150 MG/1
1 TABLET ORAL
Qty: 4 | Refills: 0 | OUTPATIENT
Start: 2017-09-21 | End: 2017-09-25

## 2017-09-21 RX ORDER — OXYCODONE HYDROCHLORIDE 5 MG/1
1 TABLET ORAL
Qty: 12 | Refills: 0 | OUTPATIENT
Start: 2017-09-21 | End: 2017-09-24

## 2017-09-21 RX ADMIN — OXYCODONE HYDROCHLORIDE 10 MILLIGRAM(S): 5 TABLET ORAL at 16:44

## 2017-09-21 RX ADMIN — OXYCODONE HYDROCHLORIDE 10 MILLIGRAM(S): 5 TABLET ORAL at 04:15

## 2017-09-21 RX ADMIN — Medication 30 MILLILITER(S): at 07:19

## 2017-09-21 RX ADMIN — Medication 0.5 MILLIGRAM(S): at 07:19

## 2017-09-21 RX ADMIN — SERTRALINE 100 MILLIGRAM(S): 25 TABLET, FILM COATED ORAL at 12:14

## 2017-09-21 RX ADMIN — Medication 0.5 MILLIGRAM(S): at 16:43

## 2017-09-21 RX ADMIN — Medication 600 MILLIGRAM(S): at 16:43

## 2017-09-21 RX ADMIN — Medication 50 MILLIGRAM(S): at 12:14

## 2017-09-21 RX ADMIN — OXYCODONE HYDROCHLORIDE 10 MILLIGRAM(S): 5 TABLET ORAL at 09:48

## 2017-09-21 RX ADMIN — OXYCODONE HYDROCHLORIDE 10 MILLIGRAM(S): 5 TABLET ORAL at 12:14

## 2017-09-21 RX ADMIN — OXYCODONE HYDROCHLORIDE 10 MILLIGRAM(S): 5 TABLET ORAL at 08:20

## 2017-09-21 RX ADMIN — OXYCODONE HYDROCHLORIDE 10 MILLIGRAM(S): 5 TABLET ORAL at 04:45

## 2017-09-21 RX ADMIN — Medication 600 MILLIGRAM(S): at 07:18

## 2017-09-21 RX ADMIN — OXYCODONE HYDROCHLORIDE 10 MILLIGRAM(S): 5 TABLET ORAL at 00:30

## 2017-09-21 RX ADMIN — OXYCODONE HYDROCHLORIDE 10 MILLIGRAM(S): 5 TABLET ORAL at 00:00

## 2017-09-21 NOTE — PROGRESS NOTE ADULT - ASSESSMENT
A&P:  47 year old female s/p incision and drainage of a left groin abscess in OR on 9/11, penrose drain in place postoperative course complicated by alcohol withdrawal, now with decreasing WBC (14 -> 12) on oral antibiotics.    -Continue sertraline, topirimate, prn clonazepam for anxiety, oxycodone prn pain, trazodone at bedtime.   -Continue consistent carbohydrate diet   -Continue with PO clinda and diflucan  -Continue surgical wound care with packing, abdominal pad and surgical underwear  -Continue Levemir 45units qhs, insulin sliding scale.  -Out of bed to chair, ambulate   -DVT prophylaxis with lovenox  -DISPO: discharge home today with VNA for wound packing, penrose in place, surgical underwear

## 2017-09-21 NOTE — PROGRESS NOTE ADULT - SUBJECTIVE AND OBJECTIVE BOX
SUBJECTIVE:  Doing well. AVSS.    OBJECTIVE:     ** VITAL SIGNS / I&O's **    Vital Signs Last 24 Hrs  T(C): 36.6 (21 Sep 2017 09:46), Max: 36.9 (21 Sep 2017 02:00)  T(F): 97.8 (21 Sep 2017 09:46), Max: 98.4 (21 Sep 2017 02:00)  HR: 54 (21 Sep 2017 09:46) (54 - 82)  BP: 94/59 (21 Sep 2017 09:46) (94/59 - 110/68)  BP(mean): --  RR: 18 (21 Sep 2017 09:46) (17 - 18)  SpO2: 95% (21 Sep 2017 09:46) (91% - 99%)      20 Sep 2017 07:01  -  21 Sep 2017 07:00  --------------------------------------------------------  IN:    Oral Fluid: 950 mL  Total IN: 950 mL    OUT:    Voided: 400 mL  Total OUT: 400 mL    Total NET: 550 mL          ** PHYSICAL EXAM **    -- CONSTITUTIONAL: AOx3. NAD.   -- : Left inguinal skin incision with penrose in place, re-secured on rounds, tissue remains erythematous and indurated though improving.   -- EXTREMITIES: WWP      ** LABS **                          12.8   12.18 )-----------( 296      ( 21 Sep 2017 07:00 )             39.2     20 Sep 2017 06:30    140    |  105    |  7      ----------------------------<  92     4.3     |  22     |  0.97     Ca    8.9        20 Sep 2017 06:30        CAPILLARY BLOOD GLUCOSE  79 (21 Sep 2017 08:31)  150 (20 Sep 2017 21:58)  103 (20 Sep 2017 17:46)  101 (20 Sep 2017 12:28)

## 2017-09-21 NOTE — PROGRESS NOTE ADULT - ATTENDING COMMENTS
September 21st, 2017  11:20 AM    Hospital Day #10  Post op Day #10    Patient seen and evaluated on daily B-Team rounds. Care discussed at B-Team morning report sign-out. Adjacent B-Team resident note reviewed. Care Coordinator input appreciated.    BP		=	94 – 115/59 - 79  P		=	54 - 86		O2 Sat	=	95% - 100%  R		=	17 - 18		I/O	=	950 in/400 out  Temp		=	36.6 – 36.9			+ 7.1 liters since admission    Glucose	=	76 – 150    Current wt	=	80.9 Kg  Max wt		=	80.9 Kg  Admit wt	=	75.5 Kg    BMI		=	28.9    Awake, alert, and fully oriented. Does not appear toxic. In no distress.  Anicteric. Pupils reactive.  No thrush.  No JVD.  Lungs clear with non-labored respirations. Symmetrical chest wall movements.  COR – RRR  Abdomen soft and neither distended nor tender. Left groin with an indurated wound with a Penrose drain in place. Serous drainage. No fluctuance. Overall inproving  Extremities well perfused.    WBC	=	12	Na		=	-  Neutro	=	68%	K		=	-  Hgb	=	13	Cl		=	-  Hct	=	39%	HCO3		=	-  Plts	=	296	Glucose	=	-  			BUN		=	-  PT	=	-	Creat		=	-  PTT	=	-  INR	=	-    Contrast enhanced CT scan of abdomen and pelvis 9/16/17 – No abscess. Skin thickening.    Urine 9/20	-	Chlamydia – negative  Urine 9/20	-	GC – negative  Vaginal 9/20	-	Trichomonas, Gardnerella, Candida all negative  Tissue 9/11	-	MRSA  Wound 9/11	-	MRSA  Other 9/11	-	MRSA  Other 9/11	-	MRSA  Urine 9/11	-	10,000 – 49,000 MRSA  		-	10,000 – 49,000 CNS  Blood 9/11	-	Negative  Blood 9/11	-	Negative    Remains on:    1)	Low carbohydrate diet + Glucerna shake tid  2)	Enoxaparin 40 mg sq q24 hours  3)	Clindamycin 600 po q8 hours	-	Day #10  4)	Diflucan 200 mg po q24 hours	-	Day #5  5)	Klonopin 0.5 mg po q8 hours prn  6)	Zoloft 100 mg po q24 hours  7)	Topamax 50 mg po q12 hours  8)	Trazodone 150 mg po qhs  9)	Oxycodone 5- 10 mg po q4 hours prn  10)	Insulin  	a.	Levemir 45 units sq qhs  	b.	Sliding scale  11)	Maalox 30 mg q4 hours    Assessment:	This patient is assessed as being an overweight, 47-year-old insulin requiring type 2 diabetic, anxious, depressed female who abuses alcohol who presented to the ED at [facility] at approximately 1:30 PM on 9/11/17 with complaints of having hives in both thighs and dyspnea that the patient thought was an allergic reaction to Bactrim. She had been treated with Bactrim for a left thigh abscess. She had tachycardia, a leukocytosis, hyperglycemia with lactic and keto acidosis, and was found to have a left thigh abscess for which she underwent I & D on 9/11/17 finding MRSA in the wound and in her urine. While hospitalized she required treatment for acute alcohol withdrawal. She has since improved. Her symptoms of alcohol withdrawal have abated and her WBC had normalized but increased again yesterday. Today she has again had a decrease in her WBC and remains afebrile. She has been treated with parenteral clindamycin for 8 days and 4 days ago was started on Diflucan as there was a clinical suspicion that she had a fungal infection. She is tolerating an oral diet and has had hypoglycemia. She is being given 45 units of Levemir nightly and sliding scale insulin and is no longer hypoglycemia. She has been offered alcohol detox / follow-up and requested outpatient alcohol treatments. The  has provided the patient with phone numbers for outpatient alcohol related services. She has improved and appears stable for hospital discharge. The patient was examined by a gynecologist as she had requested and did not have any apparent GYN abnormalities.     Plan to:    1)	Pack the wounds with plain gauze.  2)	Allow and assist her to be out of bed.  3)	Plan hospital discharge today.    Carlos Diallo  15 Minutes exclusive of procedures.

## 2017-09-21 NOTE — PROGRESS NOTE ADULT - PROVIDER SPECIALTY LIST ADULT
SICU
Surgery

## 2017-09-25 RX ORDER — OXYCODONE HYDROCHLORIDE 5 MG/1
1 TABLET ORAL
Qty: 16 | Refills: 0 | OUTPATIENT
Start: 2017-09-25 | End: 2017-09-29

## 2017-10-03 ENCOUNTER — APPOINTMENT (OUTPATIENT)
Dept: TRAUMA SURGERY | Facility: CLINIC | Age: 47
End: 2017-10-03
Payer: MEDICARE

## 2017-10-03 VITALS
TEMPERATURE: 98.3 F | BODY MASS INDEX: 26.58 KG/M2 | HEART RATE: 71 BPM | SYSTOLIC BLOOD PRESSURE: 124 MMHG | WEIGHT: 150 LBS | HEIGHT: 63 IN | DIASTOLIC BLOOD PRESSURE: 87 MMHG

## 2017-10-03 PROCEDURE — 99024 POSTOP FOLLOW-UP VISIT: CPT

## 2017-10-12 ENCOUNTER — EMERGENCY (EMERGENCY)
Facility: HOSPITAL | Age: 47
LOS: 1 days | Discharge: HOME | End: 2017-10-12
Attending: EMERGENCY MEDICINE | Admitting: EMERGENCY MEDICINE
Payer: MEDICARE

## 2017-10-12 VITALS
DIASTOLIC BLOOD PRESSURE: 75 MMHG | TEMPERATURE: 98 F | OXYGEN SATURATION: 100 % | RESPIRATION RATE: 18 BRPM | HEART RATE: 76 BPM | SYSTOLIC BLOOD PRESSURE: 117 MMHG

## 2017-10-12 VITALS
RESPIRATION RATE: 16 BRPM | SYSTOLIC BLOOD PRESSURE: 122 MMHG | HEART RATE: 82 BPM | OXYGEN SATURATION: 100 % | DIASTOLIC BLOOD PRESSURE: 81 MMHG

## 2017-10-12 DIAGNOSIS — Z98.890 OTHER SPECIFIED POSTPROCEDURAL STATES: Chronic | ICD-10-CM

## 2017-10-12 PROCEDURE — 72125 CT NECK SPINE W/O DYE: CPT | Mod: 26

## 2017-10-12 PROCEDURE — 99285 EMERGENCY DEPT VISIT HI MDM: CPT | Mod: GC

## 2017-10-12 PROCEDURE — 70450 CT HEAD/BRAIN W/O DYE: CPT | Mod: 26

## 2017-10-12 NOTE — ED PROVIDER NOTE - PHYSICAL EXAMINATION
Attending/Marie: +Anxious, head-atraumatic,  PERRL/EOMI, non-icterus, no cspine PT, , no LAWSON, no JVD, RRR, CTAB; Abd-soft, NT/ND, no HSM; no LE edema, A&Ox3, nonfocal; Skin-warm/dry

## 2017-10-12 NOTE — ED PROVIDER NOTE - PROGRESS NOTE DETAILS
CT head and neck were unremarkable. Pt was reassessed, does not appear to be intoxicated, AAOx3, able to ambulate without any assistance, tolerating feeds. Pt is ready to be discharged.

## 2017-10-12 NOTE — ED PROVIDER NOTE - OBJECTIVE STATEMENT
48yo F w/ pmhx of DM, HTN, anxiety, EtOH abuse BIBEMS for s/p fall secondary to intoxication. Pt was walking her dog when she fell, may have passed and hit her head, she was found outside on the street by neighbors. Brother told ems pt had been drinking daily, "a lot". FS: 184. Pt reports no pain at the time. 48yo F w/ pmhx of DM, HTN, anxiety, EtOH abuse BIBEMS for s/p fall secondary to intoxication. Pt was walking her dog when she fell, may have passed and hit her head, she was found outside on the street by neighbors. Brother told ems pt had been drinking daily, "a lot". FS: 184. Pt reports no pain at the time.    Attending/Marie: 46 yo as described above including DM, depression/anxiety, reports h/o binge drinking alcohol (vodka) today, reports today drinking, while walking her dog. She stated to being "dizzy" and losing her balance and falling backwards. Pt does reports hitting her head with possible LOC. Deneis acute omplatins now inclduing suicidal/homicidal ideation.

## 2017-10-12 NOTE — ED ADULT NURSE NOTE - OBJECTIVE STATEMENT
BIBEMS to room 29 for alcohol intox and fall. pt states is having headache after fall. states fell backwards and hit back of head. denies any other injuries. patient alert and oriented x 4. states drinks daily. unsure how much she drank today or when last drink was. drank vodka. pt denies any history of alcohol withdrawal. respirations even and unlabored. belongings secured. pt calm and cooperative at this time. denies any SI/HI, or hallucinations. CIWA score 0. PCA at bedside for enhanced supervision.

## 2017-10-12 NOTE — ED PROVIDER NOTE - FAMILY HISTORY
Father  Still living? Unknown  Family history of diabetes mellitus in mother, Age at diagnosis: Age Unknown

## 2017-10-12 NOTE — ED ADULT TRIAGE NOTE - CHIEF COMPLAINT QUOTE
pt presents via ems for intoxication and fall, found outside on the street by neighbors. pt offers no complaints. swelling to forehead per ems, brother told ems pt drinks daily, "a lot". FS: 184 in field. PMHX: anxiety, depression, dm, htn

## 2017-10-16 ENCOUNTER — TRANSCRIPTION ENCOUNTER (OUTPATIENT)
Age: 47
End: 2017-10-16

## 2017-10-16 ENCOUNTER — EMERGENCY (EMERGENCY)
Facility: HOSPITAL | Age: 47
LOS: 1 days | Discharge: HOME | End: 2017-10-16
Attending: EMERGENCY MEDICINE | Admitting: HOSPITALIST
Payer: MEDICARE

## 2017-10-16 VITALS
TEMPERATURE: 98 F | HEART RATE: 95 BPM | DIASTOLIC BLOOD PRESSURE: 66 MMHG | SYSTOLIC BLOOD PRESSURE: 111 MMHG | OXYGEN SATURATION: 99 % | RESPIRATION RATE: 15 BRPM

## 2017-10-16 VITALS
HEART RATE: 68 BPM | RESPIRATION RATE: 16 BRPM | SYSTOLIC BLOOD PRESSURE: 121 MMHG | DIASTOLIC BLOOD PRESSURE: 77 MMHG | OXYGEN SATURATION: 99 %

## 2017-10-16 DIAGNOSIS — L03.317 CELLULITIS OF BUTTOCK: ICD-10-CM

## 2017-10-16 DIAGNOSIS — Z98.890 OTHER SPECIFIED POSTPROCEDURAL STATES: Chronic | ICD-10-CM

## 2017-10-16 DIAGNOSIS — F32.9 MAJOR DEPRESSIVE DISORDER, SINGLE EPISODE, UNSPECIFIED: ICD-10-CM

## 2017-10-16 DIAGNOSIS — E11.9 TYPE 2 DIABETES MELLITUS WITHOUT COMPLICATIONS: ICD-10-CM

## 2017-10-16 DIAGNOSIS — F10.10 ALCOHOL ABUSE, UNCOMPLICATED: ICD-10-CM

## 2017-10-16 DIAGNOSIS — Z29.9 ENCOUNTER FOR PROPHYLACTIC MEASURES, UNSPECIFIED: ICD-10-CM

## 2017-10-16 LAB
ALBUMIN SERPL ELPH-MCNC: 3.6 G/DL — SIGNIFICANT CHANGE UP (ref 3.3–5)
ALBUMIN SERPL ELPH-MCNC: 4.3 G/DL — SIGNIFICANT CHANGE UP (ref 3.3–5)
ALP SERPL-CCNC: 75 U/L — SIGNIFICANT CHANGE UP (ref 40–120)
ALP SERPL-CCNC: 89 U/L — SIGNIFICANT CHANGE UP (ref 40–120)
ALT FLD-CCNC: 17 U/L — SIGNIFICANT CHANGE UP (ref 4–33)
ALT FLD-CCNC: 18 U/L — SIGNIFICANT CHANGE UP (ref 4–33)
AST SERPL-CCNC: 19 U/L — SIGNIFICANT CHANGE UP (ref 4–32)
AST SERPL-CCNC: 20 U/L — SIGNIFICANT CHANGE UP (ref 4–32)
BASE EXCESS BLDV CALC-SCNC: -6.4 MMOL/L — SIGNIFICANT CHANGE UP
BASE EXCESS BLDV CALC-SCNC: -6.7 MMOL/L — SIGNIFICANT CHANGE UP
BASOPHILS # BLD AUTO: 0.03 K/UL — SIGNIFICANT CHANGE UP (ref 0–0.2)
BASOPHILS # BLD AUTO: 0.07 K/UL — SIGNIFICANT CHANGE UP (ref 0–0.2)
BASOPHILS NFR BLD AUTO: 0.5 % — SIGNIFICANT CHANGE UP (ref 0–2)
BASOPHILS NFR BLD AUTO: 0.9 % — SIGNIFICANT CHANGE UP (ref 0–2)
BILIRUB SERPL-MCNC: 0.2 MG/DL — SIGNIFICANT CHANGE UP (ref 0.2–1.2)
BILIRUB SERPL-MCNC: 0.2 MG/DL — SIGNIFICANT CHANGE UP (ref 0.2–1.2)
BLOOD GAS VENOUS - CREATININE: 0.63 MG/DL — SIGNIFICANT CHANGE UP (ref 0.5–1.3)
BLOOD GAS VENOUS - CREATININE: 0.67 MG/DL — SIGNIFICANT CHANGE UP (ref 0.5–1.3)
BUN SERPL-MCNC: 12 MG/DL — SIGNIFICANT CHANGE UP (ref 7–23)
BUN SERPL-MCNC: 9 MG/DL — SIGNIFICANT CHANGE UP (ref 7–23)
CALCIUM SERPL-MCNC: 7.6 MG/DL — LOW (ref 8.4–10.5)
CALCIUM SERPL-MCNC: 8.5 MG/DL — SIGNIFICANT CHANGE UP (ref 8.4–10.5)
CHLORIDE BLDV-SCNC: 113 MMOL/L — HIGH (ref 96–108)
CHLORIDE BLDV-SCNC: 116 MMOL/L — HIGH (ref 96–108)
CHLORIDE SERPL-SCNC: 106 MMOL/L — SIGNIFICANT CHANGE UP (ref 98–107)
CHLORIDE SERPL-SCNC: 115 MMOL/L — HIGH (ref 98–107)
CO2 SERPL-SCNC: 15 MMOL/L — LOW (ref 22–31)
CO2 SERPL-SCNC: 19 MMOL/L — LOW (ref 22–31)
CREAT SERPL-MCNC: 0.62 MG/DL — SIGNIFICANT CHANGE UP (ref 0.5–1.3)
CREAT SERPL-MCNC: 0.78 MG/DL — SIGNIFICANT CHANGE UP (ref 0.5–1.3)
EOSINOPHIL # BLD AUTO: 0.2 K/UL — SIGNIFICANT CHANGE UP (ref 0–0.5)
EOSINOPHIL # BLD AUTO: 0.21 K/UL — SIGNIFICANT CHANGE UP (ref 0–0.5)
EOSINOPHIL NFR BLD AUTO: 2.6 % — SIGNIFICANT CHANGE UP (ref 0–6)
EOSINOPHIL NFR BLD AUTO: 3.2 % — SIGNIFICANT CHANGE UP (ref 0–6)
GAS PNL BLDV: 140 MMOL/L — SIGNIFICANT CHANGE UP (ref 136–146)
GAS PNL BLDV: 140 MMOL/L — SIGNIFICANT CHANGE UP (ref 136–146)
GLUCOSE BLDV-MCNC: 254 — HIGH (ref 70–99)
GLUCOSE BLDV-MCNC: 323 — HIGH (ref 70–99)
GLUCOSE SERPL-MCNC: 218 MG/DL — HIGH (ref 70–99)
GLUCOSE SERPL-MCNC: 306 MG/DL — HIGH (ref 70–99)
HCG SERPL-ACNC: < 5 MIU/ML — SIGNIFICANT CHANGE UP
HCO3 BLDV-SCNC: 18 MMOL/L — LOW (ref 20–27)
HCO3 BLDV-SCNC: 19 MMOL/L — LOW (ref 20–27)
HCT VFR BLD CALC: 34.4 % — LOW (ref 34.5–45)
HCT VFR BLD CALC: 40 % — SIGNIFICANT CHANGE UP (ref 34.5–45)
HCT VFR BLDV CALC: 35.9 % — SIGNIFICANT CHANGE UP (ref 34.5–45)
HCT VFR BLDV CALC: 42.4 % — SIGNIFICANT CHANGE UP (ref 34.5–45)
HGB BLD-MCNC: 11.6 G/DL — SIGNIFICANT CHANGE UP (ref 11.5–15.5)
HGB BLD-MCNC: 13.5 G/DL — SIGNIFICANT CHANGE UP (ref 11.5–15.5)
HGB BLDV-MCNC: 11.7 G/DL — SIGNIFICANT CHANGE UP (ref 11.5–15.5)
HGB BLDV-MCNC: 13.8 G/DL — SIGNIFICANT CHANGE UP (ref 11.5–15.5)
IMM GRANULOCYTES # BLD AUTO: 0.04 # — SIGNIFICANT CHANGE UP
IMM GRANULOCYTES # BLD AUTO: 0.04 # — SIGNIFICANT CHANGE UP
IMM GRANULOCYTES NFR BLD AUTO: 0.5 % — SIGNIFICANT CHANGE UP (ref 0–1.5)
IMM GRANULOCYTES NFR BLD AUTO: 0.6 % — SIGNIFICANT CHANGE UP (ref 0–1.5)
LACTATE BLDV-MCNC: 2.1 MMOL/L — HIGH (ref 0.5–2)
LACTATE BLDV-MCNC: 2.5 MMOL/L — HIGH (ref 0.5–2)
LYMPHOCYTES # BLD AUTO: 2.36 K/UL — SIGNIFICANT CHANGE UP (ref 1–3.3)
LYMPHOCYTES # BLD AUTO: 2.91 K/UL — SIGNIFICANT CHANGE UP (ref 1–3.3)
LYMPHOCYTES # BLD AUTO: 35.7 % — SIGNIFICANT CHANGE UP (ref 13–44)
LYMPHOCYTES # BLD AUTO: 37.4 % — SIGNIFICANT CHANGE UP (ref 13–44)
MCHC RBC-ENTMCNC: 31.7 PG — SIGNIFICANT CHANGE UP (ref 27–34)
MCHC RBC-ENTMCNC: 31.7 PG — SIGNIFICANT CHANGE UP (ref 27–34)
MCHC RBC-ENTMCNC: 33.7 % — SIGNIFICANT CHANGE UP (ref 32–36)
MCHC RBC-ENTMCNC: 33.8 % — SIGNIFICANT CHANGE UP (ref 32–36)
MCV RBC AUTO: 93.9 FL — SIGNIFICANT CHANGE UP (ref 80–100)
MCV RBC AUTO: 94 FL — SIGNIFICANT CHANGE UP (ref 80–100)
MONOCYTES # BLD AUTO: 0.39 K/UL — SIGNIFICANT CHANGE UP (ref 0–0.9)
MONOCYTES # BLD AUTO: 0.46 K/UL — SIGNIFICANT CHANGE UP (ref 0–0.9)
MONOCYTES NFR BLD AUTO: 4.8 % — SIGNIFICANT CHANGE UP (ref 2–14)
MONOCYTES NFR BLD AUTO: 7.3 % — SIGNIFICANT CHANGE UP (ref 2–14)
NEUTROPHILS # BLD AUTO: 3.22 K/UL — SIGNIFICANT CHANGE UP (ref 1.8–7.4)
NEUTROPHILS # BLD AUTO: 4.53 K/UL — SIGNIFICANT CHANGE UP (ref 1.8–7.4)
NEUTROPHILS NFR BLD AUTO: 51 % — SIGNIFICANT CHANGE UP (ref 43–77)
NEUTROPHILS NFR BLD AUTO: 55.5 % — SIGNIFICANT CHANGE UP (ref 43–77)
NRBC # FLD: 0 — SIGNIFICANT CHANGE UP
NRBC # FLD: 0 — SIGNIFICANT CHANGE UP
PCO2 BLDV: 37 MMHG — LOW (ref 41–51)
PCO2 BLDV: 38 MMHG — LOW (ref 41–51)
PH BLDV: 7.31 PH — LOW (ref 7.32–7.43)
PH BLDV: 7.32 PH — SIGNIFICANT CHANGE UP (ref 7.32–7.43)
PLATELET # BLD AUTO: 176 K/UL — SIGNIFICANT CHANGE UP (ref 150–400)
PLATELET # BLD AUTO: 203 K/UL — SIGNIFICANT CHANGE UP (ref 150–400)
PMV BLD: 10.7 FL — SIGNIFICANT CHANGE UP (ref 7–13)
PMV BLD: 10.9 FL — SIGNIFICANT CHANGE UP (ref 7–13)
PO2 BLDV: 39 MMHG — SIGNIFICANT CHANGE UP (ref 35–40)
PO2 BLDV: 45 MMHG — HIGH (ref 35–40)
POTASSIUM BLDV-SCNC: 4 MMOL/L — SIGNIFICANT CHANGE UP (ref 3.4–4.5)
POTASSIUM BLDV-SCNC: 4 MMOL/L — SIGNIFICANT CHANGE UP (ref 3.4–4.5)
POTASSIUM SERPL-MCNC: 4.1 MMOL/L — SIGNIFICANT CHANGE UP (ref 3.5–5.3)
POTASSIUM SERPL-MCNC: 4.3 MMOL/L — SIGNIFICANT CHANGE UP (ref 3.5–5.3)
POTASSIUM SERPL-SCNC: 4.1 MMOL/L — SIGNIFICANT CHANGE UP (ref 3.5–5.3)
POTASSIUM SERPL-SCNC: 4.3 MMOL/L — SIGNIFICANT CHANGE UP (ref 3.5–5.3)
PROT SERPL-MCNC: 6.4 G/DL — SIGNIFICANT CHANGE UP (ref 6–8.3)
PROT SERPL-MCNC: 7.7 G/DL — SIGNIFICANT CHANGE UP (ref 6–8.3)
RBC # BLD: 3.66 M/UL — LOW (ref 3.8–5.2)
RBC # BLD: 4.26 M/UL — SIGNIFICANT CHANGE UP (ref 3.8–5.2)
RBC # FLD: 11 % — SIGNIFICANT CHANGE UP (ref 10.3–14.5)
RBC # FLD: 11.1 % — SIGNIFICANT CHANGE UP (ref 10.3–14.5)
SAO2 % BLDV: 65.4 % — SIGNIFICANT CHANGE UP (ref 60–85)
SAO2 % BLDV: 73.6 % — SIGNIFICANT CHANGE UP (ref 60–85)
SODIUM SERPL-SCNC: 141 MMOL/L — SIGNIFICANT CHANGE UP (ref 135–145)
SODIUM SERPL-SCNC: 145 MMOL/L — SIGNIFICANT CHANGE UP (ref 135–145)
WBC # BLD: 6.31 K/UL — SIGNIFICANT CHANGE UP (ref 3.8–10.5)
WBC # BLD: 8.15 K/UL — SIGNIFICANT CHANGE UP (ref 3.8–10.5)
WBC # FLD AUTO: 6.31 K/UL — SIGNIFICANT CHANGE UP (ref 3.8–10.5)
WBC # FLD AUTO: 8.15 K/UL — SIGNIFICANT CHANGE UP (ref 3.8–10.5)

## 2017-10-16 PROCEDURE — 99285 EMERGENCY DEPT VISIT HI MDM: CPT | Mod: 25,GC

## 2017-10-16 RX ORDER — SODIUM CHLORIDE 9 MG/ML
1000 INJECTION INTRAMUSCULAR; INTRAVENOUS; SUBCUTANEOUS ONCE
Qty: 0 | Refills: 0 | Status: COMPLETED | OUTPATIENT
Start: 2017-10-16 | End: 2017-10-16

## 2017-10-16 RX ORDER — ACETAMINOPHEN 500 MG
975 TABLET ORAL ONCE
Qty: 0 | Refills: 0 | Status: COMPLETED | OUTPATIENT
Start: 2017-10-16 | End: 2017-10-16

## 2017-10-16 RX ORDER — KETOROLAC TROMETHAMINE 30 MG/ML
15 SYRINGE (ML) INJECTION ONCE
Qty: 0 | Refills: 0 | Status: DISCONTINUED | OUTPATIENT
Start: 2017-10-16 | End: 2017-10-16

## 2017-10-16 RX ORDER — SODIUM CHLORIDE 9 MG/ML
1000 INJECTION INTRAMUSCULAR; INTRAVENOUS; SUBCUTANEOUS
Qty: 0 | Refills: 0 | Status: DISCONTINUED | OUTPATIENT
Start: 2017-10-16 | End: 2017-10-16

## 2017-10-16 RX ORDER — INSULIN GLARGINE 100 [IU]/ML
45 INJECTION, SOLUTION SUBCUTANEOUS
Qty: 0 | Refills: 0 | COMMUNITY
Start: 2017-10-16

## 2017-10-16 RX ORDER — INSULIN GLARGINE 100 [IU]/ML
45 INJECTION, SOLUTION SUBCUTANEOUS ONCE
Qty: 0 | Refills: 0 | Status: DISCONTINUED | OUTPATIENT
Start: 2017-10-16 | End: 2017-10-16

## 2017-10-16 RX ADMIN — Medication 975 MILLIGRAM(S): at 08:45

## 2017-10-16 RX ADMIN — Medication 15 MILLIGRAM(S): at 08:45

## 2017-10-16 RX ADMIN — SODIUM CHLORIDE 100 MILLILITER(S): 9 INJECTION INTRAMUSCULAR; INTRAVENOUS; SUBCUTANEOUS at 09:34

## 2017-10-16 RX ADMIN — Medication 100 MILLIGRAM(S): at 05:07

## 2017-10-16 RX ADMIN — SODIUM CHLORIDE 1000 MILLILITER(S): 9 INJECTION INTRAMUSCULAR; INTRAVENOUS; SUBCUTANEOUS at 04:03

## 2017-10-16 RX ADMIN — SODIUM CHLORIDE 1000 MILLILITER(S): 9 INJECTION INTRAMUSCULAR; INTRAVENOUS; SUBCUTANEOUS at 05:07

## 2017-10-16 NOTE — DISCHARGE NOTE ADULT - MEDICATION SUMMARY - MEDICATIONS TO TAKE
I will START or STAY ON the medications listed below when I get home from the hospital:    insulin glargine  -- 45 unit(s) subcutaneously once a day (at bedtime) starting on 10/17/17  -- Indication: For DM    doxycycline hyclate 100 mg oral capsule  -- 1 cap(s) by mouth 2 times a day   -- Avoid prolonged or excessive exposure to direct and/or artificial sunlight while taking this medication.  Do not take this drug if you are pregnant.  Finish all this medication unless otherwise directed by prescriber.  Medication should be taken with plenty of water.    -- Indication: For Cellulitis of buttock

## 2017-10-16 NOTE — H&P ADULT - PROBLEM SELECTOR PLAN 1
Patient with history of binge alcohol abuse. She does not drink regularly. She reports no history of withdrawal symptoms and does not exhibit any symptoms of withdrawal on interview/exam. I counseled her on abstaining from alcohol. She remains contemplative.     -abstain from alcohol  -f/u with PMD

## 2017-10-16 NOTE — H&P ADULT - ASSESSMENT
47 W PMH alcohol abuse (binge drinking), T2DM on insulin, recent admission for gluteal abscess and cellulitis, presents after episode of intoxication. VS normal and stable. On exam, patient is calm and cooperative, non-tremulous, alert, and oriented. Labs

## 2017-10-16 NOTE — ED PROVIDER NOTE - OBJECTIVE STATEMENT
47F h/o ETOH abuse, DM, recent admission for groin abscess p/w alcohol intox. Endorses drinking alcohol today, does not recall which. States her abscess is improving. Denies fever, falls, trauma, nausea, vomiting. Denies SI/HI.

## 2017-10-16 NOTE — ED PROVIDER NOTE - MEDICAL DECISION MAKING DETAILS
Concern for cellulitis vs possible deeper infection.   - CBC, CMP, VBG, CT pelvis.   - abx. reassess

## 2017-10-16 NOTE — DISCHARGE NOTE ADULT - ADDITIONAL INSTRUCTIONS
Follow up with  PMD within one week of discharge. Call for appointment. Return to ED for any concerning symptoms. Continue medications as prescribed. Low salt, low fat, low cholesterol diet. Will treat as cellulitis with 7-day course of doxycycline. Patient to f/u with PMD in 3 days.

## 2017-10-16 NOTE — DISCHARGE NOTE ADULT - HOSPITAL COURSE
47 W PMH alcohol abuse (binge drinking), T2DM on insulin, recent admission for gluteal abscess and cellulitis, presents after episode of intoxication. VS normal and stable. On exam, patient is calm and cooperative, non-tremulous, alert, and oriented.   No fevers, leukocytosis, or signs of sepsis. CT with persistent, though improved, fat stranding of left gluteal skin. Will treat as cellulitis with 7-day course of doxycycline (patient with MRSA gluteal abscess last month). Patient to f/u with PMD in 3 days.  As per attending, patient stable for discharge. 47 W PMH alcohol abuse (binge drinking), T2DM on insulin, recent admission for gluteal abscess and cellulitis, presents after episode of intoxication. VS normal and stable. On exam, patient is calm and cooperative, non-tremulous, alert, and oriented.     No fevers, leukocytosis, or signs of sepsis. CT with persistent, though improved, fat stranding of left gluteal skin. Will treat as cellulitis with 7-day course of doxycycline (patient with MRSA gluteal abscess last month). Patient to f/u with PMD in 3 days.    She is stable for discharge with outpatient f/u. Patient will receive her home dose of insulin prior to discharge (she missed last night's dose).

## 2017-10-16 NOTE — DISCHARGE NOTE ADULT - PLAN OF CARE
Followup with PMD and take all medications prescribed. Followup with PMD and take all medications prescribed. Low salt, low fat, low cholesterol, diabetic diet.  Will treat as cellulitis with 7-day course of doxycycline. Patient to f/u with PMD in 3 days. Followup with PMD and take all medications prescribed. Low salt, low fat, low cholesterol, diabetic diet.

## 2017-10-16 NOTE — H&P ADULT - PROBLEM SELECTOR PLAN 2
No fevers, leukocytosis, or signs of sepsis. CT with persistent, though improved, fat stranding of left gluteal skin. Will treat as cellulitis with 7-day course of doxycycline (patient with MRSA gluteal abscess last month). Patient to f/u with PMD in 3 days.

## 2017-10-16 NOTE — ED ADULT TRIAGE NOTE - CHIEF COMPLAINT QUOTE
Pt brought in by EMS for intox.  Family called 911 for argument between her and her brother.  Pt endorses drinking 2 beers, states last beer was approx 4PM.  Pt denies any other drug use.  Denies any SI/HI.  Calm and cooperative in triage.  States I have to go home because my mom is having surgery tomorrow.  PMHx:  depression, anxiety, DM

## 2017-10-16 NOTE — DISCHARGE NOTE ADULT - MEDICATION SUMMARY - MEDICATIONS TO STOP TAKING
I will STOP taking the medications listed below when I get home from the hospital:    Cleocin HCl 300 mg oral capsule  -- 2 cap(s) by mouth every 8 hours MDD:2    Diflucan 200 mg oral tablet  -- 1 tab(s) by mouth every 24 hours MDD:1

## 2017-10-16 NOTE — ED ADULT NURSE NOTE - OBJECTIVE STATEMENT
Break coverage: Patient received in room #25 c/o discomfort to right groin. Patient A&Ox3, ambulatory. Patient reports she had a surgical debridement done in september 11 2017. Patient reports she gets visited by a nurse at home. Patient denies any fevers, c/o chills "few days ago". Patient reports normal drainage that was explained to her post op. Patient denies any foul smelling drainage noted. 2 open wounds noted to left groin and inner groin close to buttocks. No drainage noted currently. Redness and swelling noted. 20G IV placed in left ac, labs drawn and sent. Will monitor.

## 2017-10-16 NOTE — ED PROVIDER NOTE - ATTENDING CONTRIBUTION TO CARE
Dr. Rangel: I have personally performed a face to face bedside history and physical examination of this patient. I have discussed the history, examination, review of systems, assessment and plan of management with the resident. I have reviewed the electronic medical record and amended it to reflect my history, review of systems, physical exam, assessment and plan.  47F h/o binge ETOH abuse, no h/o WD, h/o DM, recent admission to SICU for groin abscess drainage and DKA p/w ETOH intox. Pt is currently not on abx. Lives with family. States she was "drinking a lot", no falls, no SI, no HI. States abscess in her groin is improving but unsure.  On exam pt appears intoxicated but well, nad, rrr, ctab, abdo soft/nt/nd  : chaperone Dr. chavarria, extensive erythema and warmth over perineal area, no palpable abscess, no active drainage, no crepitus.  Plan - CT pelvis r/o abscess, IV clinda, labs, likely admit

## 2017-10-16 NOTE — ED PROVIDER NOTE - PROGRESS NOTE DETAILS
Joseph: PMD James Hany called for admission. Number nonworking. Hany is an [facility] physicians provider. Hospitalist therefore paged for admission. Awaiting callback. Joseph: Pt with pain improved after medication. Agreeable to plan for admission. CT demonstrates no evidence of drainable collection or worsening infxn. PMD James Hany called for admission. Number nonworking. Hany is an [facility] physicians provider. Hospitalist therefore paged for admission. Awaiting callback.

## 2017-10-16 NOTE — DISCHARGE NOTE ADULT - CARE PLAN
Principal Discharge DX:	Cellulitis of buttock  Goal:	Followup with PMD and take all medications prescribed.  Instructions for follow-up, activity and diet:	Followup with PMD and take all medications prescribed. Low salt, low fat, low cholesterol, diabetic diet.  Will treat as cellulitis with 7-day course of doxycycline. Patient to f/u with PMD in 3 days.  Secondary Diagnosis:	Type 2 diabetes mellitus without complication, with long-term current use of insulin  Goal:	Followup with PMD and take all medications prescribed.  Instructions for follow-up, activity and diet:	Followup with PMD and take all medications prescribed. Low salt, low fat, low cholesterol, diabetic diet.  Secondary Diagnosis:	Alcohol abuse  Goal:	Followup with PMD and take all medications prescribed.  Instructions for follow-up, activity and diet:	Followup with PMD and take all medications prescribed. Low salt, low fat, low cholesterol, diabetic diet.

## 2017-10-16 NOTE — H&P ADULT - PROBLEM SELECTOR PLAN 3
Missed insulin dose last night because of intoxication. Depressed CO2 noted on chemistry, but VBG pH 7.31-7.32 (her true pH is likely in normal acid-base range as this is a venous value). Likely secondary to missed insulin, alcoholic ketosis, starvation ketosis (has not eaten since yesterday).    -provide diet  -administer Lantus 45U now
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
## 2017-10-16 NOTE — DISCHARGE NOTE ADULT - PATIENT PORTAL LINK FT
“You can access the [facility] Patient Portal, offered by , by registering with the following website: [email or web address]”

## 2017-10-17 LAB
SPECIMEN SOURCE: SIGNIFICANT CHANGE UP
SPECIMEN SOURCE: SIGNIFICANT CHANGE UP

## 2017-10-20 ENCOUNTER — EMERGENCY (EMERGENCY)
Facility: HOSPITAL | Age: 47
LOS: 1 days | Discharge: HOME | End: 2017-10-20
Attending: EMERGENCY MEDICINE | Admitting: EMERGENCY MEDICINE
Payer: MEDICARE

## 2017-10-20 VITALS
HEART RATE: 79 BPM | TEMPERATURE: 97 F | RESPIRATION RATE: 18 BRPM | DIASTOLIC BLOOD PRESSURE: 80 MMHG | SYSTOLIC BLOOD PRESSURE: 127 MMHG | OXYGEN SATURATION: 94 %

## 2017-10-20 VITALS
SYSTOLIC BLOOD PRESSURE: 135 MMHG | RESPIRATION RATE: 18 BRPM | DIASTOLIC BLOOD PRESSURE: 62 MMHG | HEART RATE: 92 BPM | OXYGEN SATURATION: 99 %

## 2017-10-20 DIAGNOSIS — F10.99 ALCOHOL USE, UNSPECIFIED WITH UNSPECIFIED ALCOHOL-INDUCED DISORDER: ICD-10-CM

## 2017-10-20 DIAGNOSIS — Z98.890 OTHER SPECIFIED POSTPROCEDURAL STATES: Chronic | ICD-10-CM

## 2017-10-20 LAB
ALBUMIN SERPL ELPH-MCNC: 4.6 G/DL — SIGNIFICANT CHANGE UP (ref 3.3–5)
ALP SERPL-CCNC: 90 U/L — SIGNIFICANT CHANGE UP (ref 40–120)
ALT FLD-CCNC: 26 U/L — SIGNIFICANT CHANGE UP (ref 4–33)
AMPHET UR-MCNC: NEGATIVE — SIGNIFICANT CHANGE UP
APAP SERPL-MCNC: < 15 UG/ML — LOW (ref 15–25)
APPEARANCE UR: CLEAR — SIGNIFICANT CHANGE UP
AST SERPL-CCNC: 63 U/L — HIGH (ref 4–32)
BACTERIA # UR AUTO: SIGNIFICANT CHANGE UP
BARBITURATES MEASUREMENT: NEGATIVE — SIGNIFICANT CHANGE UP
BARBITURATES UR SCN-MCNC: NEGATIVE — SIGNIFICANT CHANGE UP
BASOPHILS # BLD AUTO: 0.06 K/UL — SIGNIFICANT CHANGE UP (ref 0–0.2)
BASOPHILS NFR BLD AUTO: 0.9 % — SIGNIFICANT CHANGE UP (ref 0–2)
BENZODIAZ SERPL-MCNC: NEGATIVE — SIGNIFICANT CHANGE UP
BENZODIAZ UR-MCNC: NEGATIVE — SIGNIFICANT CHANGE UP
BILIRUB SERPL-MCNC: 0.5 MG/DL — SIGNIFICANT CHANGE UP (ref 0.2–1.2)
BILIRUB UR-MCNC: NEGATIVE — SIGNIFICANT CHANGE UP
BLOOD UR QL VISUAL: NEGATIVE — SIGNIFICANT CHANGE UP
BUN SERPL-MCNC: 4 MG/DL — LOW (ref 7–23)
BUN SERPL-MCNC: 4 MG/DL — LOW (ref 7–23)
CALCIUM SERPL-MCNC: 8.3 MG/DL — LOW (ref 8.4–10.5)
CALCIUM SERPL-MCNC: 9 MG/DL — SIGNIFICANT CHANGE UP (ref 8.4–10.5)
CANNABINOIDS UR-MCNC: NEGATIVE — SIGNIFICANT CHANGE UP
CHLORIDE SERPL-SCNC: 107 MMOL/L — SIGNIFICANT CHANGE UP (ref 98–107)
CHLORIDE SERPL-SCNC: 111 MMOL/L — HIGH (ref 98–107)
CO2 SERPL-SCNC: 20 MMOL/L — LOW (ref 22–31)
CO2 SERPL-SCNC: 20 MMOL/L — LOW (ref 22–31)
COCAINE METAB.OTHER UR-MCNC: NEGATIVE — SIGNIFICANT CHANGE UP
COLOR SPEC: SIGNIFICANT CHANGE UP
CREAT SERPL-MCNC: 0.56 MG/DL — SIGNIFICANT CHANGE UP (ref 0.5–1.3)
CREAT SERPL-MCNC: 0.7 MG/DL — SIGNIFICANT CHANGE UP (ref 0.5–1.3)
EOSINOPHIL # BLD AUTO: 0.08 K/UL — SIGNIFICANT CHANGE UP (ref 0–0.5)
EOSINOPHIL NFR BLD AUTO: 1.1 % — SIGNIFICANT CHANGE UP (ref 0–6)
ETHANOL BLD-MCNC: 330 MG/DL — HIGH
GLUCOSE SERPL-MCNC: 101 MG/DL — HIGH (ref 70–99)
GLUCOSE SERPL-MCNC: 117 MG/DL — HIGH (ref 70–99)
GLUCOSE UR-MCNC: NEGATIVE — SIGNIFICANT CHANGE UP
HCG UR-SCNC: NEGATIVE — SIGNIFICANT CHANGE UP
HCT VFR BLD CALC: 42.2 % — SIGNIFICANT CHANGE UP (ref 34.5–45)
HGB BLD-MCNC: 14.8 G/DL — SIGNIFICANT CHANGE UP (ref 11.5–15.5)
IMM GRANULOCYTES # BLD AUTO: 0.02 # — SIGNIFICANT CHANGE UP
IMM GRANULOCYTES NFR BLD AUTO: 0.3 % — SIGNIFICANT CHANGE UP (ref 0–1.5)
KETONES UR-MCNC: NEGATIVE — SIGNIFICANT CHANGE UP
LEUKOCYTE ESTERASE UR-ACNC: NEGATIVE — SIGNIFICANT CHANGE UP
LYMPHOCYTES # BLD AUTO: 2.73 K/UL — SIGNIFICANT CHANGE UP (ref 1–3.3)
LYMPHOCYTES # BLD AUTO: 38.8 % — SIGNIFICANT CHANGE UP (ref 13–44)
MCHC RBC-ENTMCNC: 32 PG — SIGNIFICANT CHANGE UP (ref 27–34)
MCHC RBC-ENTMCNC: 35.1 % — SIGNIFICANT CHANGE UP (ref 32–36)
MCV RBC AUTO: 91.3 FL — SIGNIFICANT CHANGE UP (ref 80–100)
METHADONE UR-MCNC: NEGATIVE — SIGNIFICANT CHANGE UP
MONOCYTES # BLD AUTO: 0.32 K/UL — SIGNIFICANT CHANGE UP (ref 0–0.9)
MONOCYTES NFR BLD AUTO: 4.6 % — SIGNIFICANT CHANGE UP (ref 2–14)
NEUTROPHILS # BLD AUTO: 3.82 K/UL — SIGNIFICANT CHANGE UP (ref 1.8–7.4)
NEUTROPHILS NFR BLD AUTO: 54.3 % — SIGNIFICANT CHANGE UP (ref 43–77)
NITRITE UR-MCNC: NEGATIVE — SIGNIFICANT CHANGE UP
NRBC # FLD: 0 — SIGNIFICANT CHANGE UP
OPIATES UR-MCNC: NEGATIVE — SIGNIFICANT CHANGE UP
OXYCODONE UR-MCNC: NEGATIVE — SIGNIFICANT CHANGE UP
PCP UR-MCNC: NEGATIVE — SIGNIFICANT CHANGE UP
PH UR: 6.5 — SIGNIFICANT CHANGE UP (ref 4.6–8)
PLATELET # BLD AUTO: 215 K/UL — SIGNIFICANT CHANGE UP (ref 150–400)
PMV BLD: 10.8 FL — SIGNIFICANT CHANGE UP (ref 7–13)
POTASSIUM SERPL-MCNC: 3.2 MMOL/L — LOW (ref 3.5–5.3)
POTASSIUM SERPL-MCNC: SIGNIFICANT CHANGE UP MMOL/L (ref 3.5–5.3)
POTASSIUM SERPL-SCNC: 3.2 MMOL/L — LOW (ref 3.5–5.3)
POTASSIUM SERPL-SCNC: SIGNIFICANT CHANGE UP MMOL/L (ref 3.5–5.3)
PROT SERPL-MCNC: 8.5 G/DL — HIGH (ref 6–8.3)
PROT UR-MCNC: NEGATIVE — SIGNIFICANT CHANGE UP
RBC # BLD: 4.62 M/UL — SIGNIFICANT CHANGE UP (ref 3.8–5.2)
RBC # FLD: 11.3 % — SIGNIFICANT CHANGE UP (ref 10.3–14.5)
RBC CASTS # UR COMP ASSIST: SIGNIFICANT CHANGE UP (ref 0–?)
SALICYLATES SERPL-MCNC: < 5 MG/DL — LOW (ref 15–30)
SODIUM SERPL-SCNC: 145 MMOL/L — SIGNIFICANT CHANGE UP (ref 135–145)
SODIUM SERPL-SCNC: 149 MMOL/L — HIGH (ref 135–145)
SP GR SPEC: 1 — LOW (ref 1–1.03)
SQUAMOUS # UR AUTO: SIGNIFICANT CHANGE UP
TSH SERPL-MCNC: 0.86 UIU/ML — SIGNIFICANT CHANGE UP (ref 0.27–4.2)
UROBILINOGEN FLD QL: NORMAL E.U. — SIGNIFICANT CHANGE UP (ref 0.1–0.2)
WBC # BLD: 7.03 K/UL — SIGNIFICANT CHANGE UP (ref 3.8–10.5)
WBC # FLD AUTO: 7.03 K/UL — SIGNIFICANT CHANGE UP (ref 3.8–10.5)

## 2017-10-20 PROCEDURE — 90792 PSYCH DIAG EVAL W/MED SRVCS: CPT | Mod: GC

## 2017-10-20 PROCEDURE — 99284 EMERGENCY DEPT VISIT MOD MDM: CPT | Mod: GC

## 2017-10-20 RX ORDER — KETOROLAC TROMETHAMINE 30 MG/ML
15 SYRINGE (ML) INJECTION ONCE
Qty: 0 | Refills: 0 | Status: DISCONTINUED | OUTPATIENT
Start: 2017-10-20 | End: 2017-10-20

## 2017-10-20 RX ORDER — DIAZEPAM 5 MG
5 TABLET ORAL ONCE
Qty: 0 | Refills: 0 | Status: DISCONTINUED | OUTPATIENT
Start: 2017-10-20 | End: 2017-10-20

## 2017-10-20 RX ORDER — ACETAMINOPHEN 500 MG
650 TABLET ORAL ONCE
Qty: 0 | Refills: 0 | Status: COMPLETED | OUTPATIENT
Start: 2017-10-20 | End: 2017-10-20

## 2017-10-20 RX ORDER — SODIUM CHLORIDE 9 MG/ML
1000 INJECTION, SOLUTION INTRAVENOUS ONCE
Qty: 0 | Refills: 0 | Status: COMPLETED | OUTPATIENT
Start: 2017-10-20 | End: 2017-10-20

## 2017-10-20 RX ORDER — POTASSIUM CHLORIDE 20 MEQ
40 PACKET (EA) ORAL ONCE
Qty: 0 | Refills: 0 | Status: COMPLETED | OUTPATIENT
Start: 2017-10-20 | End: 2017-10-20

## 2017-10-20 RX ORDER — SODIUM CHLORIDE 9 MG/ML
1000 INJECTION INTRAMUSCULAR; INTRAVENOUS; SUBCUTANEOUS ONCE
Qty: 0 | Refills: 0 | Status: COMPLETED | OUTPATIENT
Start: 2017-10-20 | End: 2017-10-20

## 2017-10-20 RX ADMIN — Medication 40 MILLIEQUIVALENT(S): at 21:14

## 2017-10-20 RX ADMIN — Medication 15 MILLIGRAM(S): at 21:59

## 2017-10-20 RX ADMIN — Medication 5 MILLIGRAM(S): at 21:57

## 2017-10-20 RX ADMIN — Medication 15 MILLIGRAM(S): at 22:14

## 2017-10-20 RX ADMIN — Medication 650 MILLIGRAM(S): at 21:28

## 2017-10-20 RX ADMIN — Medication 650 MILLIGRAM(S): at 20:43

## 2017-10-20 RX ADMIN — SODIUM CHLORIDE 2000 MILLILITER(S): 9 INJECTION, SOLUTION INTRAVENOUS at 21:59

## 2017-10-20 RX ADMIN — SODIUM CHLORIDE 1000 MILLILITER(S): 9 INJECTION INTRAMUSCULAR; INTRAVENOUS; SUBCUTANEOUS at 17:45

## 2017-10-20 NOTE — ED BEHAVIORAL HEALTH ASSESSMENT NOTE - SUMMARY
Patient is a 46 y/o single  female with history of alcohol use disorder, MDD, anxiety, prior psych hospitalizations (last in 2011 at [facility]), 1 remote suicide attempt (age 15), medical history significant for DM type II, leg abscess (recent surgery/admission at [facility] 1 month ago, complicated by alcohol withdrawal), who lives with her mother and brother in [place], [place] and is currently supported by SSD. She is brought by EMS after family activated 911 in the context of patient being alcohol intoxicated and found wandering street, potentially in front of traffic with concern of suicidality.     On presentation patient does not appear to have overt depression but does exhibit signs/symptoms concerning for a significant alcohol use disorder, leading to dangerous behaviors, that is consistent with her chronic behaviors for the past 20 years. While there is report of patient possibly intentionally walking into traffic, she adamantly denies doing so, denies any suicidal ideations/intent/plan. However there is no evidence of acute depression/suicidality that would warrant inpatient involuntary admission, though she would benefit from inpatient detox/rehab. Spent extensive time counseling patient and parent about risks of continued substance use (including fatal withdrawal), and strongly recommended to patient to pursue substance treatment which she agrees to do so strictly on an outpatient basis. Also advised mother to consider utilizing family court to obtain OOP against patient using substance in house. Referral information about substance treatment centers provided to patient, and as such she is appropriate for discharge.

## 2017-10-20 NOTE — PROVIDER CONTACT NOTE (OTHER) - ASSESSMENT
obier received a phone call Ms. Jones from [facility] care- [phone]. Writer was informed pt was very erratic and throwing stuffs at home when her home care went to make home visit. Ms. Jones informed pts mom in petrified  and fearful with pts today's behavior. Pt has history of depression. Ms. Jones informed pt  ran out of the house  wanted jump in front of traffic. Ms. Jones wants pt to be seen by the psychiatrist . Medical team was made aware.

## 2017-10-20 NOTE — ED ADULT TRIAGE NOTE - CHIEF COMPLAINT QUOTE
Pt brought in by ems with SI, and drinking vodka today.  As per ems , her brother saw pt standing in front of cars .  Pt is talkative at triage Pt brought in by ems with SI, and drinking vodka today.  As per ems , her brother saw pt standing in front of cars .  Pt is talkative at triage.  Pt denies si , or hallucinations at triage
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
## 2017-10-20 NOTE — ED ADULT NURSE REASSESSMENT NOTE - NS ED NURSE REASSESS COMMENT FT1
Pt. alert, awake, agitated, restless, screaming that she "wants to go home RIGHT NOW!", pt. admits to drinking today but states, "I'm fine I just want to go home", verbally hostile, but somewhat cooperative. Remains on constant observation for risk of elopement,  risk for self harm and acute alcohol intoxication. VS as noted, respirations even, unlabored. Will continue to monitor.

## 2017-10-20 NOTE — ED BEHAVIORAL HEALTH ASSESSMENT NOTE - OTHER PAST PSYCHIATRIC HISTORY (INCLUDE DETAILS REGARDING ONSET, COURSE OF ILLNESS, INPATIENT/OUTPATIENT TREATMENT)
Hx of MDD, anxiety, alcohol use disorder. Prior hospitalizations, none since 2011 at [facility]. Remote hx of ECT in the past, 1 suicide attempt at age 15. Sees Dr. Cruz as outpatient psychiatrist regularly for the past 1 year.

## 2017-10-20 NOTE — ED PROVIDER NOTE - PROGRESS NOTE DETAILS
Pt calm and answering questions appropriately.  Psychiatry called back and will see patient.    - Dorothy Steel, DO Pt cleared by psychiatry.  Pt will be discharged home.  - Dorothy Steel DO

## 2017-10-20 NOTE — ED BEHAVIORAL HEALTH ASSESSMENT NOTE - SUICIDE RISK FACTORS
Chronic pain or acute medical issue/History of abuse/trauma/Highly impulsive behavior/Substance abuse/dependence

## 2017-10-20 NOTE — ED PROVIDER NOTE - CARE PLAN
Principal Discharge DX:	Alcohol intoxication Principal Discharge DX:	Alcohol intoxication  Instructions for follow-up, activity and diet:	- The psychiatrist provided materials  - please seek help.   - Return for any new or worsening symptoms
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
## 2017-10-20 NOTE — ED ADULT NURSE NOTE - CHIEF COMPLAINT QUOTE
Pt brought in by ems with SI, and drinking vodka today.  As per ems , her brother saw pt standing in front of cars .  Pt is talkative at triage.  Pt denies si , or hallucinations at triage

## 2017-10-20 NOTE — ED BEHAVIORAL HEALTH ASSESSMENT NOTE - TREATMENT
briefly attended AA in the past. Alcohol withdrawals consisting of tremors needing CIWA protocol when medically hospitalized denied

## 2017-10-20 NOTE — ED PROVIDER NOTE - OBJECTIVE STATEMENT
47yoF hx of ETOH abuse, depression, anxiety, dm, BIBEMS for intoxication and possibly SI by jumping in front of traffic. per patient, she has been binge drinking but did not try to commit suicide. patient changes story. cannot obtain further history.
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
## 2017-10-20 NOTE — ED PROVIDER NOTE - MEDICAL DECISION MAKING DETAILS
47yoF hx of etoh abuse pw etoh intox and si. will medically clear and consult psych. no signs of serious trauma, no indication for imaging at this time.

## 2017-10-20 NOTE — ED PROVIDER NOTE - PHYSICAL EXAMINATION
left abrasion, superficial to left lateral arm.   bruise over right elbow   small superficial scratch over abdomen.  ETOH on breath, slurred speach

## 2017-10-20 NOTE — ED BEHAVIORAL HEALTH ASSESSMENT NOTE - SUICIDE PROTECTIVE FACTORS
Responsibility to family and others/Fear of death or dying due to pain/suffering/Identifies reasons for living/Future oriented

## 2017-10-20 NOTE — ED BEHAVIORAL HEALTH ASSESSMENT NOTE - DESCRIPTION (FIRST USE, LAST USE, QUANTITY, FREQUENCY, DURATION)
goes on alcohol binge 2-3 times per month, drinking 1-2 pints of vodka remote use of cocaine > 15-20 years ago

## 2017-10-20 NOTE — ED BEHAVIORAL HEALTH ASSESSMENT NOTE - DESCRIPTION
sitting comfortably in bed, at the time of interview DM type 2, recent leg abscess needing surgery at [facility] 1 month ago. Requires in home nursing service for wound cleaning now On disability for mental health reasons for years. Lives with mother, brother in [place], [place].

## 2017-10-20 NOTE — ED PROVIDER NOTE - PLAN OF CARE
- The psychiatrist provided materials  - please seek help.   - Return for any new or worsening symptoms

## 2017-10-20 NOTE — ED ADULT NURSE REASSESSMENT NOTE - NS ED NURSE REASSESS COMMENT FT1
2040 received pt in bed alert and oriented x 3, no acute distress noted, no c/o pain at present. 1:1 observation in progress.

## 2017-10-20 NOTE — ED BEHAVIORAL HEALTH ASSESSMENT NOTE - CASE SUMMARY
patient is a 46 y/o F with reported history of depression, alcohol abuse, and DMII who presents after agitation while quite intoxicated (ETOH c. 300s). She reports that she follows for her depression with her psychiatrist (whom she is due to see next week) and complies with her zoloft and takes clonazepam as needed for anxiety. She has some insight into the ETOH- related risks she is incurring. She voices desire to cut back. She says that between episodes of binge drinking her mood is normal and she adamantly denies any SI/HI/urges to self harm. She is not interested in inpatient detoxification at this time. Discussed at length the various levels of treatment for ETOH abuse, noting that hers warrants inpatient detoxification. Also discussed risk of interaction of ETOH with BZD, pt voices understanding and reiterates her desire to cut down on drinking. Given that patient when sober is not having acute psychiatric symptoms, she is not a candidate for involuntary admission, and she is not interested in any inpatient level of care at this time. safety plan to return to ED for suicidal ideation, f/u with her own psychiatrist and establish substance abuse treatment on an outpatient basis. provided list of substance abuse tx resources including detox, rehab, intensive outpt (pt plans to seek this level), and outpt. patient has multiple RF (remote suicide attempt, hx of depression, substance abuse) and some PF (desire to live, motivation to cut back on ETOH, periods of clincial stability in between hospitalizations). THere is not clear immanent risk from her psychiatric disorder which would warrant depriving patient of her autonomy at this time, thus while the prognosis for her substance abuse is guarded at best, there is not adequate legal justification to involuntarily commit such a patient, nor is there clear evidence that an inpatient psychiatric hospitalization would help her address the underlying alcohol abuse issue.

## 2017-10-21 LAB
BACTERIA BLD CULT: SIGNIFICANT CHANGE UP
BACTERIA BLD CULT: SIGNIFICANT CHANGE UP

## 2017-10-25 ENCOUNTER — APPOINTMENT (OUTPATIENT)
Dept: TRAUMA SURGERY | Facility: CLINIC | Age: 47
End: 2017-10-25
Payer: MEDICARE

## 2017-10-25 VITALS
HEIGHT: 63 IN | DIASTOLIC BLOOD PRESSURE: 75 MMHG | WEIGHT: 155 LBS | TEMPERATURE: 98.1 F | HEART RATE: 56 BPM | BODY MASS INDEX: 27.46 KG/M2 | SYSTOLIC BLOOD PRESSURE: 125 MMHG

## 2017-10-25 PROCEDURE — ZZZZZ: CPT

## 2017-11-07 ENCOUNTER — APPOINTMENT (OUTPATIENT)
Dept: TRAUMA SURGERY | Facility: CLINIC | Age: 47
End: 2017-11-07

## 2017-11-15 ENCOUNTER — APPOINTMENT (OUTPATIENT)
Dept: TRAUMA SURGERY | Facility: CLINIC | Age: 47
End: 2017-11-15

## 2017-11-28 ENCOUNTER — APPOINTMENT (OUTPATIENT)
Dept: TRAUMA SURGERY | Facility: CLINIC | Age: 47
End: 2017-11-28

## 2017-11-28 VITALS
TEMPERATURE: 97.8 F | HEIGHT: 63 IN | HEART RATE: 62 BPM | SYSTOLIC BLOOD PRESSURE: 100 MMHG | WEIGHT: 155 LBS | BODY MASS INDEX: 27.46 KG/M2 | DIASTOLIC BLOOD PRESSURE: 65 MMHG

## 2017-12-11 ENCOUNTER — RESULT REVIEW (OUTPATIENT)
Age: 47
End: 2017-12-11

## 2017-12-25 ENCOUNTER — EMERGENCY (EMERGENCY)
Facility: HOSPITAL | Age: 47
LOS: 1 days | Discharge: HOME | End: 2017-12-25
Admitting: EMERGENCY MEDICINE
Payer: MEDICARE

## 2017-12-25 VITALS
HEART RATE: 78 BPM | DIASTOLIC BLOOD PRESSURE: 87 MMHG | TEMPERATURE: 99 F | OXYGEN SATURATION: 100 % | RESPIRATION RATE: 16 BRPM | SYSTOLIC BLOOD PRESSURE: 143 MMHG

## 2017-12-25 DIAGNOSIS — Z98.890 OTHER SPECIFIED POSTPROCEDURAL STATES: Chronic | ICD-10-CM

## 2017-12-25 LAB
ALBUMIN SERPL ELPH-MCNC: 4.8 G/DL — SIGNIFICANT CHANGE UP (ref 3.3–5)
ALP SERPL-CCNC: 106 U/L — SIGNIFICANT CHANGE UP (ref 40–120)
ALT FLD-CCNC: 36 U/L — HIGH (ref 4–33)
AMPHET UR-MCNC: NEGATIVE — SIGNIFICANT CHANGE UP
APAP SERPL-MCNC: < 15 UG/ML — LOW (ref 15–25)
APPEARANCE UR: CLEAR — SIGNIFICANT CHANGE UP
AST SERPL-CCNC: 72 U/L — HIGH (ref 4–32)
BARBITURATES MEASUREMENT: NEGATIVE — SIGNIFICANT CHANGE UP
BARBITURATES UR SCN-MCNC: NEGATIVE — SIGNIFICANT CHANGE UP
BASOPHILS # BLD AUTO: 0.06 K/UL — SIGNIFICANT CHANGE UP (ref 0–0.2)
BASOPHILS NFR BLD AUTO: 0.7 % — SIGNIFICANT CHANGE UP (ref 0–2)
BENZODIAZ SERPL-MCNC: NEGATIVE — SIGNIFICANT CHANGE UP
BENZODIAZ UR-MCNC: NEGATIVE — SIGNIFICANT CHANGE UP
BILIRUB SERPL-MCNC: 0.4 MG/DL — SIGNIFICANT CHANGE UP (ref 0.2–1.2)
BILIRUB UR-MCNC: NEGATIVE — SIGNIFICANT CHANGE UP
BLOOD UR QL VISUAL: NEGATIVE — SIGNIFICANT CHANGE UP
BUN SERPL-MCNC: 9 MG/DL — SIGNIFICANT CHANGE UP (ref 7–23)
CALCIUM SERPL-MCNC: 8.7 MG/DL — SIGNIFICANT CHANGE UP (ref 8.4–10.5)
CANNABINOIDS UR-MCNC: NEGATIVE — SIGNIFICANT CHANGE UP
CHLORIDE SERPL-SCNC: 103 MMOL/L — SIGNIFICANT CHANGE UP (ref 98–107)
CO2 SERPL-SCNC: 19 MMOL/L — LOW (ref 22–31)
COCAINE METAB.OTHER UR-MCNC: NEGATIVE — SIGNIFICANT CHANGE UP
COLOR SPEC: SIGNIFICANT CHANGE UP
CREAT SERPL-MCNC: 0.73 MG/DL — SIGNIFICANT CHANGE UP (ref 0.5–1.3)
EOSINOPHIL # BLD AUTO: 0.18 K/UL — SIGNIFICANT CHANGE UP (ref 0–0.5)
EOSINOPHIL NFR BLD AUTO: 2.2 % — SIGNIFICANT CHANGE UP (ref 0–6)
ETHANOL BLD-MCNC: 262 MG/DL — HIGH
GLUCOSE SERPL-MCNC: 225 MG/DL — HIGH (ref 70–99)
GLUCOSE UR-MCNC: NEGATIVE — SIGNIFICANT CHANGE UP
HCG SERPL-ACNC: < 5 MIU/ML — SIGNIFICANT CHANGE UP
HCT VFR BLD CALC: 46.9 % — HIGH (ref 34.5–45)
HGB BLD-MCNC: 15.9 G/DL — HIGH (ref 11.5–15.5)
IMM GRANULOCYTES # BLD AUTO: 0.03 # — SIGNIFICANT CHANGE UP
IMM GRANULOCYTES NFR BLD AUTO: 0.4 % — SIGNIFICANT CHANGE UP (ref 0–1.5)
KETONES UR-MCNC: NEGATIVE — SIGNIFICANT CHANGE UP
LEUKOCYTE ESTERASE UR-ACNC: SIGNIFICANT CHANGE UP
LYMPHOCYTES # BLD AUTO: 2.75 K/UL — SIGNIFICANT CHANGE UP (ref 1–3.3)
LYMPHOCYTES # BLD AUTO: 33.5 % — SIGNIFICANT CHANGE UP (ref 13–44)
MCHC RBC-ENTMCNC: 31.7 PG — SIGNIFICANT CHANGE UP (ref 27–34)
MCHC RBC-ENTMCNC: 33.9 % — SIGNIFICANT CHANGE UP (ref 32–36)
MCV RBC AUTO: 93.4 FL — SIGNIFICANT CHANGE UP (ref 80–100)
METHADONE UR-MCNC: NEGATIVE — SIGNIFICANT CHANGE UP
MONOCYTES # BLD AUTO: 0.5 K/UL — SIGNIFICANT CHANGE UP (ref 0–0.9)
MONOCYTES NFR BLD AUTO: 6.1 % — SIGNIFICANT CHANGE UP (ref 2–14)
NEUTROPHILS # BLD AUTO: 4.7 K/UL — SIGNIFICANT CHANGE UP (ref 1.8–7.4)
NEUTROPHILS NFR BLD AUTO: 57.1 % — SIGNIFICANT CHANGE UP (ref 43–77)
NITRITE UR-MCNC: NEGATIVE — SIGNIFICANT CHANGE UP
NRBC # FLD: 0 — SIGNIFICANT CHANGE UP
OPIATES UR-MCNC: NEGATIVE — SIGNIFICANT CHANGE UP
OXYCODONE UR-MCNC: NEGATIVE — SIGNIFICANT CHANGE UP
PCP UR-MCNC: NEGATIVE — SIGNIFICANT CHANGE UP
PH UR: 6 — SIGNIFICANT CHANGE UP (ref 4.6–8)
PLATELET # BLD AUTO: 199 K/UL — SIGNIFICANT CHANGE UP (ref 150–400)
PMV BLD: 10.9 FL — SIGNIFICANT CHANGE UP (ref 7–13)
POTASSIUM SERPL-MCNC: 4.5 MMOL/L — SIGNIFICANT CHANGE UP (ref 3.5–5.3)
POTASSIUM SERPL-SCNC: 4.5 MMOL/L — SIGNIFICANT CHANGE UP (ref 3.5–5.3)
PROT SERPL-MCNC: 8.2 G/DL — SIGNIFICANT CHANGE UP (ref 6–8.3)
PROT UR-MCNC: NEGATIVE MG/DL — SIGNIFICANT CHANGE UP
RBC # BLD: 5.02 M/UL — SIGNIFICANT CHANGE UP (ref 3.8–5.2)
RBC # FLD: 12.3 % — SIGNIFICANT CHANGE UP (ref 10.3–14.5)
RBC CASTS # UR COMP ASSIST: SIGNIFICANT CHANGE UP (ref 0–?)
SALICYLATES SERPL-MCNC: < 5 MG/DL — LOW (ref 15–30)
SODIUM SERPL-SCNC: 140 MMOL/L — SIGNIFICANT CHANGE UP (ref 135–145)
SP GR SPEC: 1 — LOW (ref 1–1.04)
SQUAMOUS # UR AUTO: SIGNIFICANT CHANGE UP
TSH SERPL-MCNC: 0.71 UIU/ML — SIGNIFICANT CHANGE UP (ref 0.27–4.2)
UROBILINOGEN FLD QL: NORMAL MG/DL — SIGNIFICANT CHANGE UP
WBC # BLD: 8.22 K/UL — SIGNIFICANT CHANGE UP (ref 3.8–10.5)
WBC # FLD AUTO: 8.22 K/UL — SIGNIFICANT CHANGE UP (ref 3.8–10.5)
WBC UR QL: SIGNIFICANT CHANGE UP (ref 0–?)

## 2017-12-25 PROCEDURE — 99284 EMERGENCY DEPT VISIT MOD MDM: CPT

## 2017-12-25 NOTE — ED ADULT NURSE NOTE - OBJECTIVE STATEMENT
Received pt in  pt bought in by EMS from home for intoxication & agitation. pt calm & cooperative in nad eval on going.

## 2017-12-25 NOTE — ED PROVIDER NOTE - MEDICAL DECISION MAKING DETAILS
46 y/o F hx Bipolar, Alcohol Abuse, HTN, DM, Depression  Labs, Urine Tox, UA, EKG, HCG. List of detox facilities provided.   Medical evaluation performed. There is no clinical evidence of acute medical problem requiring immediate intervention.

## 2017-12-25 NOTE — ED ADULT NURSE REASSESSMENT NOTE - NS ED NURSE REASSESS COMMENT FT1
16:55-Patient in improved and stable condition, discharged as per NP Pellew order, discharge instructions given, pt verbalized understanding and left ER a&ox3 with steady gait in a [employer] cab service.
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
## 2018-01-15 ENCOUNTER — INPATIENT (INPATIENT)
Facility: HOSPITAL | Age: 48
LOS: 4 days | Discharge: HOME | DRG: 417 | End: 2018-01-20
Attending: SURGERY | Admitting: INTERNAL MEDICINE
Payer: COMMERCIAL

## 2018-01-15 VITALS
DIASTOLIC BLOOD PRESSURE: 99 MMHG | RESPIRATION RATE: 18 BRPM | SYSTOLIC BLOOD PRESSURE: 145 MMHG | TEMPERATURE: 98 F | OXYGEN SATURATION: 98 % | HEART RATE: 109 BPM | WEIGHT: 160.06 LBS

## 2018-01-15 DIAGNOSIS — Z98.890 OTHER SPECIFIED POSTPROCEDURAL STATES: Chronic | ICD-10-CM

## 2018-01-15 DIAGNOSIS — E10.10 TYPE 1 DIABETES MELLITUS WITH KETOACIDOSIS WITHOUT COMA: ICD-10-CM

## 2018-01-15 DIAGNOSIS — L02.91 CUTANEOUS ABSCESS, UNSPECIFIED: Chronic | ICD-10-CM

## 2018-01-15 LAB
ACETONE SERPL-MCNC: SIGNIFICANT CHANGE UP
ACETONE SERPL-MCNC: SIGNIFICANT CHANGE UP
ALBUMIN SERPL ELPH-MCNC: 4.9 G/DL — SIGNIFICANT CHANGE UP (ref 3.3–5)
ALP SERPL-CCNC: 109 U/L — SIGNIFICANT CHANGE UP (ref 40–120)
ALT FLD-CCNC: 25 U/L RC — SIGNIFICANT CHANGE UP (ref 10–45)
AMPHET UR-MCNC: NEGATIVE — SIGNIFICANT CHANGE UP
AMYLASE P1 CFR SERPL: 185 U/L — HIGH (ref 25–125)
ANION GAP SERPL CALC-SCNC: 13 MMOL/L — SIGNIFICANT CHANGE UP (ref 5–17)
ANION GAP SERPL CALC-SCNC: 16 MMOL/L — SIGNIFICANT CHANGE UP (ref 5–17)
ANION GAP SERPL CALC-SCNC: 25 MMOL/L — HIGH (ref 5–17)
APPEARANCE UR: ABNORMAL
AST SERPL-CCNC: 19 U/L — SIGNIFICANT CHANGE UP (ref 10–40)
B-OH-BUTYR SERPL-SCNC: 3.4 MMOL/L — HIGH
BARBITURATES UR SCN-MCNC: NEGATIVE — SIGNIFICANT CHANGE UP
BASE EXCESS BLDV CALC-SCNC: 0.1 MMOL/L — SIGNIFICANT CHANGE UP (ref -2–2)
BASOPHILS # BLD AUTO: 0 K/UL — SIGNIFICANT CHANGE UP (ref 0–0.2)
BASOPHILS # BLD AUTO: 0 K/UL — SIGNIFICANT CHANGE UP (ref 0–0.2)
BASOPHILS NFR BLD AUTO: 0 % — SIGNIFICANT CHANGE UP (ref 0–2)
BASOPHILS NFR BLD AUTO: 0.1 % — SIGNIFICANT CHANGE UP (ref 0–2)
BENZODIAZ UR-MCNC: NEGATIVE — SIGNIFICANT CHANGE UP
BILIRUB SERPL-MCNC: 1.2 MG/DL — SIGNIFICANT CHANGE UP (ref 0.2–1.2)
BILIRUB UR-MCNC: ABNORMAL
BUN SERPL-MCNC: 15 MG/DL — SIGNIFICANT CHANGE UP (ref 7–23)
BUN SERPL-MCNC: 17 MG/DL — SIGNIFICANT CHANGE UP (ref 7–23)
BUN SERPL-MCNC: 23 MG/DL — SIGNIFICANT CHANGE UP (ref 7–23)
CA-I SERPL-SCNC: 1.27 MMOL/L — SIGNIFICANT CHANGE UP (ref 1.12–1.3)
CALCIUM SERPL-MCNC: 10.4 MG/DL — SIGNIFICANT CHANGE UP (ref 8.4–10.5)
CALCIUM SERPL-MCNC: 8.9 MG/DL — SIGNIFICANT CHANGE UP (ref 8.4–10.5)
CALCIUM SERPL-MCNC: 9.2 MG/DL — SIGNIFICANT CHANGE UP (ref 8.4–10.5)
CHLORIDE BLDV-SCNC: 107 MMOL/L — SIGNIFICANT CHANGE UP (ref 96–108)
CHLORIDE SERPL-SCNC: 105 MMOL/L — SIGNIFICANT CHANGE UP (ref 96–108)
CHLORIDE SERPL-SCNC: 105 MMOL/L — SIGNIFICANT CHANGE UP (ref 96–108)
CHLORIDE SERPL-SCNC: 92 MMOL/L — LOW (ref 96–108)
CK MB CFR SERPL CALC: 1 NG/ML — SIGNIFICANT CHANGE UP (ref 0–3.8)
CK SERPL-CCNC: 19 U/L — LOW (ref 25–170)
CO2 BLDV-SCNC: 26 MMOL/L — SIGNIFICANT CHANGE UP (ref 22–30)
CO2 SERPL-SCNC: 18 MMOL/L — LOW (ref 22–31)
CO2 SERPL-SCNC: 21 MMOL/L — LOW (ref 22–31)
CO2 SERPL-SCNC: 22 MMOL/L — SIGNIFICANT CHANGE UP (ref 22–31)
COCAINE METAB.OTHER UR-MCNC: NEGATIVE — SIGNIFICANT CHANGE UP
COLOR SPEC: YELLOW — SIGNIFICANT CHANGE UP
CREAT SERPL-MCNC: 0.55 MG/DL — SIGNIFICANT CHANGE UP (ref 0.5–1.3)
CREAT SERPL-MCNC: 0.62 MG/DL — SIGNIFICANT CHANGE UP (ref 0.5–1.3)
CREAT SERPL-MCNC: 0.81 MG/DL — SIGNIFICANT CHANGE UP (ref 0.5–1.3)
DIFF PNL FLD: NEGATIVE — SIGNIFICANT CHANGE UP
EOSINOPHIL # BLD AUTO: 0 K/UL — SIGNIFICANT CHANGE UP (ref 0–0.5)
EOSINOPHIL # BLD AUTO: 0.1 K/UL — SIGNIFICANT CHANGE UP (ref 0–0.5)
EOSINOPHIL NFR BLD AUTO: 0 % — SIGNIFICANT CHANGE UP (ref 0–6)
EOSINOPHIL NFR BLD AUTO: 0.4 % — SIGNIFICANT CHANGE UP (ref 0–6)
GAS PNL BLDV: 137 MMOL/L — SIGNIFICANT CHANGE UP (ref 136–145)
GAS PNL BLDV: SIGNIFICANT CHANGE UP
GLUCOSE BLDC GLUCOMTR-MCNC: 119 MG/DL — HIGH (ref 70–99)
GLUCOSE BLDC GLUCOMTR-MCNC: 119 MG/DL — HIGH (ref 70–99)
GLUCOSE BLDC GLUCOMTR-MCNC: 163 MG/DL — HIGH (ref 70–99)
GLUCOSE BLDC GLUCOMTR-MCNC: 172 MG/DL — HIGH (ref 70–99)
GLUCOSE BLDC GLUCOMTR-MCNC: 172 MG/DL — HIGH (ref 70–99)
GLUCOSE BLDC GLUCOMTR-MCNC: 222 MG/DL — HIGH (ref 70–99)
GLUCOSE BLDC GLUCOMTR-MCNC: 74 MG/DL — SIGNIFICANT CHANGE UP (ref 70–99)
GLUCOSE BLDV-MCNC: 196 MG/DL — HIGH (ref 70–99)
GLUCOSE SERPL-MCNC: 136 MG/DL — HIGH (ref 70–99)
GLUCOSE SERPL-MCNC: 198 MG/DL — HIGH (ref 70–99)
GLUCOSE SERPL-MCNC: 365 MG/DL — HIGH (ref 70–99)
GLUCOSE UR QL: >1000 MG/DL
HCG UR QL: NEGATIVE — SIGNIFICANT CHANGE UP
HCO3 BLDV-SCNC: 24 MMOL/L — SIGNIFICANT CHANGE UP (ref 21–29)
HCT VFR BLD CALC: 39.1 % — SIGNIFICANT CHANGE UP (ref 34.5–45)
HCT VFR BLD CALC: 49.7 % — HIGH (ref 34.5–45)
HCT VFR BLDA CALC: 43 % — SIGNIFICANT CHANGE UP (ref 39–50)
HGB BLD CALC-MCNC: 14 G/DL — SIGNIFICANT CHANGE UP (ref 11.5–15.5)
HGB BLD-MCNC: 14.1 G/DL — SIGNIFICANT CHANGE UP (ref 11.5–15.5)
HGB BLD-MCNC: 17.4 G/DL — HIGH (ref 11.5–15.5)
HIV 1 & 2 AB SERPL IA.RAPID: SIGNIFICANT CHANGE UP
HOROWITZ INDEX BLDV+IHG-RTO: 21 — SIGNIFICANT CHANGE UP
KETONES UR-MCNC: ABNORMAL
LACTATE BLDV-MCNC: 1.1 MMOL/L — SIGNIFICANT CHANGE UP (ref 0.7–2)
LEUKOCYTE ESTERASE UR-ACNC: NEGATIVE — SIGNIFICANT CHANGE UP
LIDOCAIN IGE QN: 176 U/L — HIGH (ref 7–60)
LIDOCAIN IGE QN: >530 U/L — HIGH (ref 7–60)
LYMPHOCYTES # BLD AUTO: 0.7 K/UL — LOW (ref 1–3.3)
LYMPHOCYTES # BLD AUTO: 1.1 K/UL — SIGNIFICANT CHANGE UP (ref 1–3.3)
LYMPHOCYTES # BLD AUTO: 3.8 % — LOW (ref 13–44)
LYMPHOCYTES # BLD AUTO: 6.8 % — LOW (ref 13–44)
MAGNESIUM SERPL-MCNC: 1.8 MG/DL — SIGNIFICANT CHANGE UP (ref 1.6–2.6)
MAGNESIUM SERPL-MCNC: 2.3 MG/DL — SIGNIFICANT CHANGE UP (ref 1.6–2.6)
MCHC RBC-ENTMCNC: 33.5 PG — SIGNIFICANT CHANGE UP (ref 27–34)
MCHC RBC-ENTMCNC: 34.7 PG — HIGH (ref 27–34)
MCHC RBC-ENTMCNC: 35.1 GM/DL — SIGNIFICANT CHANGE UP (ref 32–36)
MCHC RBC-ENTMCNC: 36.1 GM/DL — HIGH (ref 32–36)
MCV RBC AUTO: 95.5 FL — SIGNIFICANT CHANGE UP (ref 80–100)
MCV RBC AUTO: 96.1 FL — SIGNIFICANT CHANGE UP (ref 80–100)
METHADONE UR-MCNC: NEGATIVE — SIGNIFICANT CHANGE UP
MONOCYTES # BLD AUTO: 0.9 K/UL — SIGNIFICANT CHANGE UP (ref 0–0.9)
MONOCYTES # BLD AUTO: 1.1 K/UL — HIGH (ref 0–0.9)
MONOCYTES NFR BLD AUTO: 4.9 % — SIGNIFICANT CHANGE UP (ref 2–14)
MONOCYTES NFR BLD AUTO: 7 % — SIGNIFICANT CHANGE UP (ref 2–14)
NEUTROPHILS # BLD AUTO: 13.9 K/UL — HIGH (ref 1.8–7.4)
NEUTROPHILS # BLD AUTO: 16.4 K/UL — HIGH (ref 1.8–7.4)
NEUTROPHILS NFR BLD AUTO: 85.8 % — HIGH (ref 43–77)
NEUTROPHILS NFR BLD AUTO: 91.2 % — HIGH (ref 43–77)
NITRITE UR-MCNC: NEGATIVE — SIGNIFICANT CHANGE UP
OPIATES UR-MCNC: POSITIVE
OTHER CELLS CSF MANUAL: 11 ML/DL — LOW (ref 18–22)
OXYCODONE UR-MCNC: NEGATIVE — SIGNIFICANT CHANGE UP
PCO2 BLDV: 41 MMHG — SIGNIFICANT CHANGE UP (ref 35–50)
PCP SPEC-MCNC: SIGNIFICANT CHANGE UP
PCP UR-MCNC: NEGATIVE — SIGNIFICANT CHANGE UP
PH BLDV: 7.39 — SIGNIFICANT CHANGE UP (ref 7.35–7.45)
PH UR: 6 — SIGNIFICANT CHANGE UP (ref 5–8)
PHOSPHATE SERPL-MCNC: 1.9 MG/DL — LOW (ref 2.5–4.5)
PHOSPHATE SERPL-MCNC: 2 MG/DL — LOW (ref 2.5–4.5)
PLATELET # BLD AUTO: 183 K/UL — SIGNIFICANT CHANGE UP (ref 150–400)
PLATELET # BLD AUTO: 245 K/UL — SIGNIFICANT CHANGE UP (ref 150–400)
PO2 BLDV: 30 MMHG — SIGNIFICANT CHANGE UP (ref 25–45)
POTASSIUM BLDV-SCNC: 3.7 MMOL/L — SIGNIFICANT CHANGE UP (ref 3.5–5)
POTASSIUM SERPL-MCNC: 3.9 MMOL/L — SIGNIFICANT CHANGE UP (ref 3.5–5.3)
POTASSIUM SERPL-MCNC: 4.2 MMOL/L — SIGNIFICANT CHANGE UP (ref 3.5–5.3)
POTASSIUM SERPL-MCNC: 4.2 MMOL/L — SIGNIFICANT CHANGE UP (ref 3.5–5.3)
POTASSIUM SERPL-SCNC: 3.9 MMOL/L — SIGNIFICANT CHANGE UP (ref 3.5–5.3)
POTASSIUM SERPL-SCNC: 4.2 MMOL/L — SIGNIFICANT CHANGE UP (ref 3.5–5.3)
POTASSIUM SERPL-SCNC: 4.2 MMOL/L — SIGNIFICANT CHANGE UP (ref 3.5–5.3)
PROT SERPL-MCNC: 9.3 G/DL — HIGH (ref 6–8.3)
PROT UR-MCNC: 100 MG/DL
RBC # BLD: 4.06 M/UL — SIGNIFICANT CHANGE UP (ref 3.8–5.2)
RBC # BLD: 5.2 M/UL — SIGNIFICANT CHANGE UP (ref 3.8–5.2)
RBC # FLD: 11.2 % — SIGNIFICANT CHANGE UP (ref 10.3–14.5)
RBC # FLD: 11.2 % — SIGNIFICANT CHANGE UP (ref 10.3–14.5)
SAO2 % BLDV: 57 % — LOW (ref 67–88)
SODIUM SERPL-SCNC: 135 MMOL/L — SIGNIFICANT CHANGE UP (ref 135–145)
SODIUM SERPL-SCNC: 140 MMOL/L — SIGNIFICANT CHANGE UP (ref 135–145)
SODIUM SERPL-SCNC: 142 MMOL/L — SIGNIFICANT CHANGE UP (ref 135–145)
SP GR SPEC: >1.03 — HIGH (ref 1.01–1.02)
THC UR QL: NEGATIVE — SIGNIFICANT CHANGE UP
TROPONIN T SERPL-MCNC: <0.01 NG/ML — SIGNIFICANT CHANGE UP (ref 0–0.06)
UROBILINOGEN FLD QL: 1 MG/DL
WBC # BLD: 16.2 K/UL — HIGH (ref 3.8–10.5)
WBC # BLD: 18 K/UL — HIGH (ref 3.8–10.5)
WBC # FLD AUTO: 16.2 K/UL — HIGH (ref 3.8–10.5)
WBC # FLD AUTO: 18 K/UL — HIGH (ref 3.8–10.5)

## 2018-01-15 PROCEDURE — 76705 ECHO EXAM OF ABDOMEN: CPT | Mod: 26,RT

## 2018-01-15 PROCEDURE — 99285 EMERGENCY DEPT VISIT HI MDM: CPT

## 2018-01-15 PROCEDURE — 99291 CRITICAL CARE FIRST HOUR: CPT

## 2018-01-15 RX ORDER — MORPHINE SULFATE 50 MG/1
4 CAPSULE, EXTENDED RELEASE ORAL ONCE
Qty: 0 | Refills: 0 | Status: DISCONTINUED | OUTPATIENT
Start: 2018-01-15 | End: 2018-01-15

## 2018-01-15 RX ORDER — SODIUM CHLORIDE 9 MG/ML
1000 INJECTION INTRAMUSCULAR; INTRAVENOUS; SUBCUTANEOUS ONCE
Qty: 0 | Refills: 0 | Status: COMPLETED | OUTPATIENT
Start: 2018-01-15 | End: 2018-01-15

## 2018-01-15 RX ORDER — SODIUM CHLORIDE 9 MG/ML
1000 INJECTION, SOLUTION INTRAVENOUS ONCE
Qty: 0 | Refills: 0 | Status: COMPLETED | OUTPATIENT
Start: 2018-01-15 | End: 2018-01-15

## 2018-01-15 RX ORDER — ONDANSETRON 8 MG/1
4 TABLET, FILM COATED ORAL ONCE
Qty: 0 | Refills: 0 | Status: COMPLETED | OUTPATIENT
Start: 2018-01-15 | End: 2018-01-15

## 2018-01-15 RX ORDER — MAGNESIUM SULFATE 500 MG/ML
1 VIAL (ML) INJECTION ONCE
Qty: 0 | Refills: 0 | Status: COMPLETED | OUTPATIENT
Start: 2018-01-15 | End: 2018-01-15

## 2018-01-15 RX ORDER — SODIUM CHLORIDE 9 MG/ML
1000 INJECTION, SOLUTION INTRAVENOUS
Qty: 0 | Refills: 0 | Status: DISCONTINUED | OUTPATIENT
Start: 2018-01-15 | End: 2018-01-16

## 2018-01-15 RX ORDER — TRAZODONE HCL 50 MG
200 TABLET ORAL AT BEDTIME
Qty: 0 | Refills: 0 | Status: DISCONTINUED | OUTPATIENT
Start: 2018-01-15 | End: 2018-01-19

## 2018-01-15 RX ORDER — ENOXAPARIN SODIUM 100 MG/ML
40 INJECTION SUBCUTANEOUS EVERY 24 HOURS
Qty: 0 | Refills: 0 | Status: DISCONTINUED | OUTPATIENT
Start: 2018-01-15 | End: 2018-01-17

## 2018-01-15 RX ORDER — SERTRALINE 25 MG/1
100 TABLET, FILM COATED ORAL DAILY
Qty: 0 | Refills: 0 | Status: DISCONTINUED | OUTPATIENT
Start: 2018-01-15 | End: 2018-01-15

## 2018-01-15 RX ORDER — ACETAMINOPHEN 500 MG
1000 TABLET ORAL ONCE
Qty: 0 | Refills: 0 | Status: COMPLETED | OUTPATIENT
Start: 2018-01-15 | End: 2018-01-16

## 2018-01-15 RX ORDER — INSULIN HUMAN 100 [IU]/ML
1 INJECTION, SOLUTION SUBCUTANEOUS
Qty: 100 | Refills: 0 | Status: DISCONTINUED | OUTPATIENT
Start: 2018-01-15 | End: 2018-01-16

## 2018-01-15 RX ADMIN — SODIUM CHLORIDE 2000 MILLILITER(S): 9 INJECTION, SOLUTION INTRAVENOUS at 12:43

## 2018-01-15 RX ADMIN — SODIUM CHLORIDE 2000 MILLILITER(S): 9 INJECTION INTRAMUSCULAR; INTRAVENOUS; SUBCUTANEOUS at 14:47

## 2018-01-15 RX ADMIN — INSULIN HUMAN 7 UNIT(S)/HR: 100 INJECTION, SOLUTION SUBCUTANEOUS at 17:30

## 2018-01-15 RX ADMIN — MORPHINE SULFATE 4 MILLIGRAM(S): 50 CAPSULE, EXTENDED RELEASE ORAL at 14:46

## 2018-01-15 RX ADMIN — MORPHINE SULFATE 4 MILLIGRAM(S): 50 CAPSULE, EXTENDED RELEASE ORAL at 18:12

## 2018-01-15 RX ADMIN — INSULIN HUMAN 1 UNIT(S)/HR: 100 INJECTION, SOLUTION SUBCUTANEOUS at 18:55

## 2018-01-15 RX ADMIN — Medication 250 MILLIMOLE(S): at 21:45

## 2018-01-15 RX ADMIN — INSULIN HUMAN 3 UNIT(S)/HR: 100 INJECTION, SOLUTION SUBCUTANEOUS at 23:45

## 2018-01-15 RX ADMIN — Medication 100 GRAM(S): at 20:30

## 2018-01-15 RX ADMIN — SODIUM CHLORIDE 2000 MILLILITER(S): 9 INJECTION INTRAMUSCULAR; INTRAVENOUS; SUBCUTANEOUS at 11:30

## 2018-01-15 RX ADMIN — ONDANSETRON 4 MILLIGRAM(S): 8 TABLET, FILM COATED ORAL at 11:28

## 2018-01-15 RX ADMIN — SODIUM CHLORIDE 200 MILLILITER(S): 9 INJECTION, SOLUTION INTRAVENOUS at 17:44

## 2018-01-15 RX ADMIN — Medication 0.25 MILLIGRAM(S): at 22:15

## 2018-01-15 RX ADMIN — ONDANSETRON 4 MILLIGRAM(S): 8 TABLET, FILM COATED ORAL at 12:43

## 2018-01-15 RX ADMIN — MORPHINE SULFATE 4 MILLIGRAM(S): 50 CAPSULE, EXTENDED RELEASE ORAL at 16:40

## 2018-01-15 RX ADMIN — ENOXAPARIN SODIUM 40 MILLIGRAM(S): 100 INJECTION SUBCUTANEOUS at 22:00

## 2018-01-15 RX ADMIN — INSULIN HUMAN 7 UNIT(S)/HR: 100 INJECTION, SOLUTION SUBCUTANEOUS at 14:47

## 2018-01-15 NOTE — H&P ADULT - NSHPPHYSICALEXAM_GEN_ALL_CORE
Vital Signs Last 24 Hrs  T(C): 37 (15 Paul 2018 16:52), Max: 37 (15 Paul 2018 15:38)  T(F): 98.6 (15 Paul 2018 16:52), Max: 98.6 (15 Paul 2018 15:38)  HR: 92 (15 Paul 2018 16:52) (92 - 109)  BP: 131/82 (15 Paul 2018 16:52) (131/82 - 145/99)  BP(mean): --  RR: 18 (15 Paul 2018 16:52) (18 - 18)  SpO2: 97% (15 Paul 2018 16:52) (97% - 98%)    GENERAL: NAD, well-developed  HEAD:  Atraumatic, Normocephalic  EYES: EOMI, PERRLA, conjunctiva and sclera clear  Mouth: MMM, no lesions  NECK: Supple, no appreciable masses  Lung: normal work of breathing, cta b/l  Chest: S1&S2+, rrr, no m/r/g appreciated  ABDOMEN: bs+, soft, mild tenderness to deep palpation diffusely described as soreness, nd, no appreciable masses  : No perez catheter, no CVA tenderness  EXTREMITIES:  radial pulse present B/l, PT present b/l, no LE edema b/l  Neuro: A&Ox3, no focal deficits  SKIN: erythematous rash on face w/ possible malar distribution?, upper back w/ mild erythema where there was itchiness

## 2018-01-15 NOTE — H&P ADULT - NSHPREVIEWOFSYSTEMS_GEN_ALL_CORE
CONSTITUTIONAL: No fever, no chills  EYES: No eye pain, no visual disturbance  Mouth: no pain in mouth, no cavities  RESPIRATORY: No cough, No sob  CARDIOVASCULAR: No CP, no palpitations  GASTROINTESTINAL: no abdominal pain, no n/v/d  GENITOURINARY: No dysuria, no hematuria  NEUROLOGICAL: No headaches, no weakness  SKIN: No itching, no rashes  MUSCULOSKELETAL: No joint pain, no joint swelling CONSTITUTIONAL: No fever, chills+  EYES: No eye pain, no visual disturbance  Mouth: no pain in mouth, no cavities  RESPIRATORY: No cough, No sob  CARDIOVASCULAR: No CP, palpitations 2d pta following vomiting episodes  GASTROINTESTINAL: abdominal pain+, n/v+, no diarrhea  GENITOURINARY: No dysuria, no hematuria  NEUROLOGICAL: No headaches, no weakness  SKIN: itching on upper back since friday, and new rash on face  MUSCULOSKELETAL: No joint pain, no joint swelling

## 2018-01-15 NOTE — ED PROVIDER NOTE - ATTENDING CONTRIBUTION TO CARE
I performed a history and physical exam of the patient and discussed their management with the resident. I reviewed the resident's note and agree with the documented findings and plan of care.     Patient is a 48 yo F with hx of ETOH abuse, DM here for multiple episodes of vomiting x 4 days and associated epigastric pain. Denies fever, chest pain, sob. Patient admitted to recent alcohol binge to RN.   VS noted  Gen. vomiting otherwise non-toxic, fruity smell consistent with ketones  HEENT: EOMI, pharynx without exudate/ erythema   Lungs: CTAB/L no C/ W /R   CVS: RRR   Abd; Soft ttp in epigastric region, no rebound  Ext: no edema  Skin: no rash  Neuro AAOx3 non focal clear speech  a/p: epigastric pain; labs consistent with pancreatitis, DKA. US shows gallstones w/o signs of cholecystitis. MICU consulted for DKA, insulin drip started.   - Kathia HALL

## 2018-01-15 NOTE — ED PROVIDER NOTE - CARE PLAN
Principal Discharge DX:	Diabetic ketoacidosis without coma associated with type 1 diabetes mellitus  Secondary Diagnosis:	Other acute pancreatitis, unspecified complication status

## 2018-01-15 NOTE — ED ADULT NURSE REASSESSMENT NOTE - NS ED NURSE REASSESS COMMENT FT1
pt admitted to MICU for DKA and acute pancreatitis. no vomiting present post Zofran administrating 4 mg x2. pt medicated with 4 mg of morphine IVP for generalized upper epigastric pain. Insulin infusion running at 7 mL/hr with q. 1 hr FS done. pt remains on continuous cardiac monitor. VS stable. remains NPO. denies any other complaints of pain or discomfort. mom at bedside. report given to MICU, pt pending transport. safety and fall precautions maintained.

## 2018-01-15 NOTE — H&P ADULT - NSHPLABSRESULTS_GEN_ALL_CORE
Personally reviewed available labs, imaging and ekg     Labs  CBC Full  -  ( 15 Paul 2018 11:32 )  WBC Count : 18.0 K/uL  Hemoglobin : 17.4 g/dL  Hematocrit : 49.7 %  Platelet Count - Automated : 245 K/uL  Mean Cell Volume : 95.5 fl  Mean Cell Hemoglobin : 33.5 pg  Mean Cell Hemoglobin Concentration : 35.1 gm/dL  Auto Neutrophil # : 16.4 K/uL  Auto Lymphocyte # : 0.7 K/uL  Auto Monocyte # : 0.9 K/uL  Auto Eosinophil # : 0.0 K/uL  Auto Basophil # : 0.0 K/uL  Auto Neutrophil % : 91.2 %  Auto Lymphocyte % : 3.8 %  Auto Monocyte % : 4.9 %  Auto Eosinophil % : 0.0 %  Auto Basophil % : 0.1 %    01-15    138  |  100  |  18  ----------------------------<  255<H>  4.3   |  19<L>  |  0.62    Ca    9.4      15 Paul 2018 14:33    TPro  9.3<H>  /  Alb  4.9  /  TBili  1.2  /  DBili  x   /  AST  19  /  ALT  25  /  AlkPhos  109  01-15    Beta Hydroxy-Butyrate: 3.4 mmol/L (01.15.18 @ 14:33)  Lipase, Serum: 616 U/L (01.15.18 @ 11:32)      Urinalysis Basic - ( 15 Paul 2018 15:28 )    Color: Yellow / Appearance: SL Turbid / SG: >1.030 / pH: x  Gluc: x / Ketone: Large  / Bili: Small / Urobili: 1 mg/dL   Blood: x / Protein: 100 mg/dL / Nitrite: Negative   Leuk Esterase: Negative / RBC: 0-2 /HPF / WBC 0-2 /HPF   Sq Epi: x / Non Sq Epi: OCC /HPF / Bacteria: Few /HPF      CAPILLARY BLOOD GLUCOSE      POCT Blood Glucose.: 119 mg/dL (15 Paul 2018 16:39)  POCT Blood Glucose.: 181 mg/dL (15 Paul 2018 15:48)  POCT Blood Glucose.: 212 mg/dL (15 Paul 2018 14:43)  POCT Blood Glucose.: 337 mg/dL (15 Paul 2018 11:21)        11:32 - VBG - pH: 7.30  | pCO2: 37    | pO2: 30    | Lactate: 2.6      Imaging  < from: US Abdomen Upper Quadrant Right (01.15.18 @ 12:11) >  FINDINGS:  Liver: Diffuse hepatic steatosis.  Bile ducts: Normal caliber. Common bile duct measures 4 mm.   Gallbladder: Cholelithiasis. No gallbladder wall thickening,   pericholecystic fluid, or discrete fluid collection. Negative sonographic   Khanna sign.      Pancreas: Visualized portions are within normal limits.  Right kidney: 1.4 cm. No hydronephrosis.   Ascites: None.  IVC: Poorly visualized due to severe hepatic steatosis.  IMPRESSION:   Small gallstones.  Diffuse hepatic steatosis.  < end of copied text >        EKG- not present in chart

## 2018-01-15 NOTE — H&P ADULT - ASSESSMENT
47F w/ DM2 w/ hx of DKA on lantus, EtOH dependence, hx of MRSA gluteal abscess and cellulitis (Sep2017), & MDD presents to [facility] for nausea & vomiting and found to have DKA and pancreatitis and therefore admitted to MICU for further management w/ insulin gtt.    --Neuro/Psych  #MDD  - will hold zoloft and topiramate on admission given pancreatitis and unclear source. patient noted to require benzo daily for anxiety and therefore will order ativan prn. c/w trazadone. pancreatitis not resolving will need psych consult for medication management  - No SI/HI currently, per chart attempted suicide as teenager    --Cardiovascular  - no acute pathology or history of disease. will get EKG and CE given N/V.    --Pulm  - no acute pathology    --GI  #Acute Pancreatitis  - unclear source however hx of EtOH dependence- will get tox screen. RUQ US w/ cholelithiasis but no acute cholecystitis. Is taking sertraline/topiramate which can cause pancreatitis and are therefore held on admission and will need to be reassessed. Will send for serum TG. no report of trauma to abdomen.  - c/w IV hydration, NPO    --Endo  #DM2 w/ DKA  - initial AG 25, HCO3 18, Serum Glu 337, Beta Hydroxybuturate 3.4, VBG pH 7.30, lactate 2.6  - ED started insulin gtt, will continue per DKA protocol. will start D5 given glu<200  - 47F w/ DM2 w/ hx of DKA on lantus, EtOH dependence, hx of MRSA gluteal abscess and cellulitis (Sep2017), & MDD presents to [facility] for nausea & vomiting and found to have DKA and pancreatitis and therefore admitted to MICU for further management w/ insulin gtt.    --Neuro/Psych  #MDD  - will hold zoloft and topiramate on admission given pancreatitis and unclear source. patient noted to require benzo daily for anxiety and therefore will order ativan prn. c/w trazadone. pancreatitis not resolving will need psych consult for medication management  - No SI/HI currently, per chart attempted suicide as teenager    --Cardiovascular  - no acute pathology or history of disease. will get EKG and CE given N/V.    --Pulm  - no acute pathology    --GI  #Acute Pancreatitis  - unclear source however hx of EtOH dependence- will get tox screen. RUQ US w/ cholelithiasis but no acute cholecystitis. Is taking sertraline/topiramate which can cause pancreatitis and are therefore held on admission and will need to be reassessed. Will send for serum TG. no report of trauma to abdomen.  - c/w IV hydration, NPO    --Endo  #DM2 w/ DKA  - initial AG 25, HCO3 18, Serum Glu 337, Beta Hydroxybuturate 3.4, VBG pH 7.30, lactate 2.6  - ED started insulin gtt, will continue per DKA protocol. will start D5 given glu<200  - Endo consult tomorrow am    --Heme  -no acute pathology    --Derm  - noted rash on face. given history of 1st cousin w/ SLE may consider DEEP however no other signs of SLE.    --PPX  lovenox for dvt ppx 47F w/ DM2 w/ hx of DKA on lantus, EtOH dependence, hx of MRSA gluteal abscess and cellulitis (Sep2017), & MDD presents to [facility] for nausea & vomiting and found to have DKA and pancreatitis and therefore admitted to MICU for further management w/ insulin gtt.    --Neuro/Psych  #MDD  - will hold zoloft and topiramate on admission given pancreatitis and unclear source. patient noted to require benzo daily for anxiety and therefore will order ativan prn. c/w trazadone. pancreatitis not resolving will need psych consult for medication management  - No SI/HI currently, per chart attempted suicide as teenager    --Cardiovascular  - no acute pathology or history of disease. will get EKG and CE given N/V.    --Pulm  - no acute pathology    --GI  #Acute Pancreatitis  - unclear source however hx of EtOH dependence- will get tox screen. RUQ US w/ cholelithiasis but no acute cholecystitis. Is taking sertraline/topiramate which can cause pancreatitis and are therefore held on admission and will need to be reassessed. Will send for serum TG. no report of trauma to abdomen.  - c/w IV hydration, NPO    --Endo  #DM2 w/ DKA  - initial AG 25, HCO3 18, Serum Glu 337, Beta Hydroxybuturate 3.4, VBG pH 7.30, lactate 2.6  - ED started insulin gtt, will continue per DKA protocol. will start D5 given glu<200  - Endo consult tomorrow am    --Heme  -no acute pathology  --ID  -afebrile, no source for infection suspected. will monitor off abx at this time. Will get RVP given n/v    --Derm  - noted rash on face. given history of 1st cousin w/ SLE may consider DEEP however no other signs of SLE.    --PPX  lovenox for dvt ppx 47F w/ DM2 w/ hx of DKA on lantus, EtOH dependence, hx of MRSA gluteal abscess and cellulitis (Sep2017), & MDD presents to [facility] for nausea & vomiting and found to have DKA and pancreatitis and therefore admitted to MICU for further management w/ insulin gtt.    --Neuro/Psych  #MDD  - will hold zoloft and topiramate on admission given pancreatitis and unclear source. patient noted to require benzo daily for anxiety and therefore will order ativan prn. c/w trazadone. pancreatitis not resolving will need psych consult for medication management  - No SI/HI currently, per chart attempted suicide as teenager  - [id number]   #Family Hx of cerebral aneurysm- should be followed up by medicine team following treatment for acute illness    --Cardiovascular  - no acute pathology or history of disease. will get EKG and CE given N/V.    --Pulm  - no acute pathology    --GI  #Acute Pancreatitis  - unclear source however hx of EtOH dependence- will get tox screen. RUQ US w/ cholelithiasis but no acute cholecystitis. Is taking sertraline/topiramate which can cause pancreatitis and are therefore held on admission and will need to be reassessed. Will send for serum TG. no report of trauma to abdomen.  - c/w IV hydration, NPO    --Endo  #DM2 w/ DKA  - initial AG 25, HCO3 18, Serum Glu 337, Beta Hydroxybuturate 3.4, VBG pH 7.30, lactate 2.6  - ED started insulin gtt, will continue per DKA protocol. will start D5 given glu<200  - Endo consult tomorrow am  - Unclear source of DKA however likely pancreatitis. get CE, EKG to r/o cardiac, RVP. UA is neg/ get tox screen.     --Heme  -no acute pathology  --ID  -afebrile, no source for infection suspected. will monitor off abx at this time. Will get RVP given n/v  - Note hx of MRS Left gluteal abscess  - patient agreeable to screen HIV  --Derm  - noted rash on face. given history of 1st cousin w/ SLE may consider DEEP however no other signs of SLE.    --PPX  lovenox for dvt ppx

## 2018-01-15 NOTE — H&P ADULT - ATTENDING COMMENTS
46 yo woman with DM2, EtOH dependnece admitted with DKA, and pancreatitis, maybe EtOH induced. Insulin gtt. Fluid resuscitation. BMP Q4H. Toxicology screen. DVT ppx.    40 minutes critical time spent. 48 yo woman with DM2, EtOH dependnece admitted with DKA, and pancreatitis, maybe EtOH induced. Insulin gtt. Fluid resuscitation. BMP Q4H. Toxicology screen. Check triglycerides. DVT ppx.    40 minutes critical time spent. 46 yo woman with DM2, EtOH dependence admitted with DKA, and pancreatitis, maybe EtOH induced. Insulin gtt. Fluid resuscitation. BMP Q4H. Toxicology screen. Check triglycerides. DVT ppx.    40 minutes critical time spent.

## 2018-01-15 NOTE — H&P ADULT - NSHPSOCIALHISTORY_GEN_ALL_CORE
EtOh dependence w/ reported last drink New Years lilian, never tobacco, denies EtOh dependence w/ reported last drink New Years lilian, never tobacco, denies. currently on disability living at mothers home. single, celibate for 3 years

## 2018-01-15 NOTE — ED ADULT NURSE NOTE - OBJECTIVE STATEMENT
47 y.o female pmh insulin dependent DM, depression and anxiety with undisclosed hx of alcohol abuse presenting to ED from home accompanied by her family c/o nausea and vomiting x 3 days with generalized epigastric pain. pt states she has been unable to keep anything down. describes the epigastric pain as sharp and cramping. no fever, chills, weakness, diarrhea, cp, sob, or dizziness. states that she takes her insulin at night and did not check her FS this am. FS upon arrival [phone]. afebrile. VS stable. labs and line obtained. safety and fall precautions maintained.

## 2018-01-15 NOTE — PATIENT PROFILE ADULT. - VISION (WITH CORRECTIVE LENSES IF THE PATIENT USUALLY WEARS THEM):
Partially impaired: cannot see medication labels or newsprint, but can see obstacles in path, and the surrounding layout; can count fingers at arm's length/wears progressive lenses

## 2018-01-15 NOTE — H&P ADULT - FAMILY HISTORY
Family history of systemic lupus erythematosus     Mother  Still living? Unknown  Family history of diabetes mellitus, Age at diagnosis: Age Unknown     Father  Still living? Unknown  Family history of cerebral aneurysm, Age at diagnosis: Age Unknown     Grandparent  Still living? Unknown  Family history of cerebral aneurysm, Age at diagnosis: Age Unknown     Aunt  Still living? Unknown  Family history of cerebral aneurysm, Age at diagnosis: Age Unknown     Sibling  Still living? Unknown  Family history of abscess of skin or subcutaneous tissue, Age at diagnosis: Age Unknown

## 2018-01-15 NOTE — ED PROVIDER NOTE - PROGRESS NOTE DETAILS
work up concerning for dka, MICU consutled will see pt MICU requesting holding off on admission until result of BHB MICU aware of elevated BHB, will call back for dispo approved MICU

## 2018-01-15 NOTE — ED PROVIDER NOTE - OBJECTIVE STATEMENT
47 year old woman PMH depression/anxiety, IDDM p/w vomiting. 4 days nbnb emesis up to 20 episodes/day unable to keep much of anything down. A/w epigastric/RUQ pain. No diarrhea/constipation, fever, urinary symptoms. Has been taking her insulin. Last urinated in ED here. No hx abdominal surgeries.

## 2018-01-15 NOTE — H&P ADULT - HISTORY OF PRESENT ILLNESS
47F w/ DM2 w/ hx of DKA on lantus, EtOH dependence, hx of MRSA gluteal abscess and cellulitis (Sep2017), & MDD presents to [facility] for nausea & vomiting. Patient reports being awoken 3d prior to admission w/ nausea and NBNB vomiting which was persistent until admission. Prior to having nausea and vomiting she did not have any fevers, cough, sob, cp, palpitations, abdominal pain, diarrhea, painful urination/change in urination, rash, joint pain or sweling and does note having sick contact being her mother who had strep throat recently and did report some chill on review maybe 7d prior to admission. The day after nausea and vomiting started she noticed having diffuse abdominal soreness, intermittent palpitations, and had decreased PO intake and did not take her evening [id number] dose 2d prior to admission. Of note she has had recent ED visits and admissions over the last 3mo for EtOH abuse and 47F w/ DM2 w/ hx of DKA on lantus, EtOH dependence, hx of MRSA gluteal abscess and cellulitis (), & MDD presents to [facility] for nausea & vomiting. Patient reports being awoken 3d prior to admission w/ nausea and NBNB vomiting which was persistent until admission. Prior to having nausea and vomiting she did not have any fevers, cough, sob, cp, palpitations, abdominal pain, diarrhea, painful urination/change in urination, change in vision, joint pain or swelling and does note having sick contact being her mother who had strep throat recently and did report some chill on review maybe 7d prior to admission. The day after nausea and vomiting started she noticed having diffuse abdominal soreness, intermittent palpitations, and had decreased PO intake and did not take her evening [id number] dose 2d prior to admission. Of note she has had recent ED visits and admissions over the last 3mo for EtOH abuse and reports last drink on New Years lilian although initially reporting that she has been sober for >6months. 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    Gyn: perimenopausal over last 3mo intermittent menses (saw Gyn, neg pregnancy test in ) A4(3 spontaneous, 1 elective)  PT reports NKDA but charted allergy to bactrim- will need to further confirm

## 2018-01-16 DIAGNOSIS — R03.0 ELEVATED BLOOD-PRESSURE READING, WITHOUT DIAGNOSIS OF HYPERTENSION: ICD-10-CM

## 2018-01-16 DIAGNOSIS — K76.0 FATTY (CHANGE OF) LIVER, NOT ELSEWHERE CLASSIFIED: ICD-10-CM

## 2018-01-16 DIAGNOSIS — E13.10 OTHER SPECIFIED DIABETES MELLITUS WITH KETOACIDOSIS WITHOUT COMA: ICD-10-CM

## 2018-01-16 LAB
ACETONE SERPL-MCNC: NEGATIVE — SIGNIFICANT CHANGE UP
AMYLASE P1 CFR SERPL: 100 U/L — SIGNIFICANT CHANGE UP (ref 25–125)
AMYLASE P1 CFR SERPL: 83 U/L — SIGNIFICANT CHANGE UP (ref 25–125)
ANION GAP SERPL CALC-SCNC: 11 MMOL/L — SIGNIFICANT CHANGE UP (ref 5–17)
ANION GAP SERPL CALC-SCNC: 12 MMOL/L — SIGNIFICANT CHANGE UP (ref 5–17)
BASE EXCESS BLDV CALC-SCNC: 0.4 MMOL/L — SIGNIFICANT CHANGE UP (ref -2–2)
BASE EXCESS BLDV CALC-SCNC: 1.5 MMOL/L — SIGNIFICANT CHANGE UP (ref -2–2)
BASE EXCESS BLDV CALC-SCNC: 1.6 MMOL/L — SIGNIFICANT CHANGE UP (ref -2–2)
BASE EXCESS BLDV CALC-SCNC: 2.3 MMOL/L — HIGH (ref -2–2)
BUN SERPL-MCNC: 12 MG/DL — SIGNIFICANT CHANGE UP (ref 7–23)
BUN SERPL-MCNC: 12 MG/DL — SIGNIFICANT CHANGE UP (ref 7–23)
BUN SERPL-MCNC: 7 MG/DL — SIGNIFICANT CHANGE UP (ref 7–23)
BUN SERPL-MCNC: 9 MG/DL — SIGNIFICANT CHANGE UP (ref 7–23)
CA-I SERPL-SCNC: 1.21 MMOL/L — SIGNIFICANT CHANGE UP (ref 1.12–1.3)
CA-I SERPL-SCNC: 1.21 MMOL/L — SIGNIFICANT CHANGE UP (ref 1.12–1.3)
CA-I SERPL-SCNC: 1.25 MMOL/L — SIGNIFICANT CHANGE UP (ref 1.12–1.3)
CA-I SERPL-SCNC: 1.26 MMOL/L — SIGNIFICANT CHANGE UP (ref 1.12–1.3)
CALCIUM SERPL-MCNC: 8.6 MG/DL — SIGNIFICANT CHANGE UP (ref 8.4–10.5)
CALCIUM SERPL-MCNC: 8.6 MG/DL — SIGNIFICANT CHANGE UP (ref 8.4–10.5)
CALCIUM SERPL-MCNC: 8.7 MG/DL — SIGNIFICANT CHANGE UP (ref 8.4–10.5)
CALCIUM SERPL-MCNC: 8.9 MG/DL — SIGNIFICANT CHANGE UP (ref 8.4–10.5)
CHLORIDE BLDV-SCNC: 101 MMOL/L — SIGNIFICANT CHANGE UP (ref 96–108)
CHLORIDE BLDV-SCNC: 102 MMOL/L — SIGNIFICANT CHANGE UP (ref 96–108)
CHLORIDE BLDV-SCNC: 105 MMOL/L — SIGNIFICANT CHANGE UP (ref 96–108)
CHLORIDE BLDV-SCNC: 106 MMOL/L — SIGNIFICANT CHANGE UP (ref 96–108)
CHLORIDE SERPL-SCNC: 100 MMOL/L — SIGNIFICANT CHANGE UP (ref 96–108)
CHLORIDE SERPL-SCNC: 102 MMOL/L — SIGNIFICANT CHANGE UP (ref 96–108)
CHLORIDE SERPL-SCNC: 102 MMOL/L — SIGNIFICANT CHANGE UP (ref 96–108)
CHLORIDE SERPL-SCNC: 103 MMOL/L — SIGNIFICANT CHANGE UP (ref 96–108)
CK MB CFR SERPL CALC: 1 NG/ML — SIGNIFICANT CHANGE UP (ref 0–3.8)
CK MB CFR SERPL CALC: <1 NG/ML — SIGNIFICANT CHANGE UP (ref 0–3.8)
CK SERPL-CCNC: 19 U/L — LOW (ref 25–170)
CK SERPL-CCNC: 28 U/L — SIGNIFICANT CHANGE UP (ref 25–170)
CO2 BLDV-SCNC: 26 MMOL/L — SIGNIFICANT CHANGE UP (ref 22–30)
CO2 BLDV-SCNC: 26 MMOL/L — SIGNIFICANT CHANGE UP (ref 22–30)
CO2 BLDV-SCNC: 27 MMOL/L — SIGNIFICANT CHANGE UP (ref 22–30)
CO2 BLDV-SCNC: 29 MMOL/L — SIGNIFICANT CHANGE UP (ref 22–30)
CO2 SERPL-SCNC: 22 MMOL/L — SIGNIFICANT CHANGE UP (ref 22–31)
CO2 SERPL-SCNC: 25 MMOL/L — SIGNIFICANT CHANGE UP (ref 22–31)
CREAT SERPL-MCNC: 0.48 MG/DL — LOW (ref 0.5–1.3)
CREAT SERPL-MCNC: 0.5 MG/DL — SIGNIFICANT CHANGE UP (ref 0.5–1.3)
CREAT SERPL-MCNC: 0.51 MG/DL — SIGNIFICANT CHANGE UP (ref 0.5–1.3)
CREAT SERPL-MCNC: 0.52 MG/DL — SIGNIFICANT CHANGE UP (ref 0.5–1.3)
CULTURE RESULTS: SIGNIFICANT CHANGE UP
GAS PNL BLDV: 135 MMOL/L — LOW (ref 136–145)
GAS PNL BLDV: SIGNIFICANT CHANGE UP
GLUCOSE BLDC GLUCOMTR-MCNC: 120 MG/DL — HIGH (ref 70–99)
GLUCOSE BLDC GLUCOMTR-MCNC: 129 MG/DL — HIGH (ref 70–99)
GLUCOSE BLDC GLUCOMTR-MCNC: 141 MG/DL — HIGH (ref 70–99)
GLUCOSE BLDC GLUCOMTR-MCNC: 142 MG/DL — HIGH (ref 70–99)
GLUCOSE BLDC GLUCOMTR-MCNC: 145 MG/DL — HIGH (ref 70–99)
GLUCOSE BLDC GLUCOMTR-MCNC: 146 MG/DL — HIGH (ref 70–99)
GLUCOSE BLDC GLUCOMTR-MCNC: 157 MG/DL — HIGH (ref 70–99)
GLUCOSE BLDC GLUCOMTR-MCNC: 162 MG/DL — HIGH (ref 70–99)
GLUCOSE BLDC GLUCOMTR-MCNC: 164 MG/DL — HIGH (ref 70–99)
GLUCOSE BLDC GLUCOMTR-MCNC: 165 MG/DL — HIGH (ref 70–99)
GLUCOSE BLDC GLUCOMTR-MCNC: 165 MG/DL — HIGH (ref 70–99)
GLUCOSE BLDC GLUCOMTR-MCNC: 167 MG/DL — HIGH (ref 70–99)
GLUCOSE BLDC GLUCOMTR-MCNC: 170 MG/DL — HIGH (ref 70–99)
GLUCOSE BLDC GLUCOMTR-MCNC: 170 MG/DL — HIGH (ref 70–99)
GLUCOSE BLDC GLUCOMTR-MCNC: 176 MG/DL — HIGH (ref 70–99)
GLUCOSE BLDC GLUCOMTR-MCNC: 176 MG/DL — HIGH (ref 70–99)
GLUCOSE BLDC GLUCOMTR-MCNC: 177 MG/DL — HIGH (ref 70–99)
GLUCOSE BLDC GLUCOMTR-MCNC: 179 MG/DL — HIGH (ref 70–99)
GLUCOSE BLDC GLUCOMTR-MCNC: 191 MG/DL — HIGH (ref 70–99)
GLUCOSE BLDV-MCNC: 152 MG/DL — HIGH (ref 70–99)
GLUCOSE BLDV-MCNC: 158 MG/DL — HIGH (ref 70–99)
GLUCOSE BLDV-MCNC: 179 MG/DL — HIGH (ref 70–99)
GLUCOSE BLDV-MCNC: 181 MG/DL — HIGH (ref 70–99)
GLUCOSE SERPL-MCNC: 148 MG/DL — HIGH (ref 70–99)
GLUCOSE SERPL-MCNC: 161 MG/DL — HIGH (ref 70–99)
GLUCOSE SERPL-MCNC: 184 MG/DL — HIGH (ref 70–99)
GLUCOSE SERPL-MCNC: 184 MG/DL — HIGH (ref 70–99)
HBA1C BLD-MCNC: 7.7 % — HIGH (ref 4–5.6)
HCO3 BLDV-SCNC: 24 MMOL/L — SIGNIFICANT CHANGE UP (ref 21–29)
HCO3 BLDV-SCNC: 25 MMOL/L — SIGNIFICANT CHANGE UP (ref 21–29)
HCO3 BLDV-SCNC: 26 MMOL/L — SIGNIFICANT CHANGE UP (ref 21–29)
HCO3 BLDV-SCNC: 28 MMOL/L — SIGNIFICANT CHANGE UP (ref 21–29)
HCT VFR BLD CALC: 39.5 % — SIGNIFICANT CHANGE UP (ref 34.5–45)
HCT VFR BLDA CALC: 40 % — SIGNIFICANT CHANGE UP (ref 39–50)
HCT VFR BLDA CALC: 41 % — SIGNIFICANT CHANGE UP (ref 39–50)
HCT VFR BLDA CALC: 42 % — SIGNIFICANT CHANGE UP (ref 39–50)
HCT VFR BLDA CALC: 43 % — SIGNIFICANT CHANGE UP (ref 39–50)
HGB BLD CALC-MCNC: 13 G/DL — SIGNIFICANT CHANGE UP (ref 11.5–15.5)
HGB BLD CALC-MCNC: 13.5 G/DL — SIGNIFICANT CHANGE UP (ref 11.5–15.5)
HGB BLD CALC-MCNC: 13.7 G/DL — SIGNIFICANT CHANGE UP (ref 11.5–15.5)
HGB BLD CALC-MCNC: 13.9 G/DL — SIGNIFICANT CHANGE UP (ref 11.5–15.5)
HGB BLD-MCNC: 13.5 G/DL — SIGNIFICANT CHANGE UP (ref 11.5–15.5)
HOROWITZ INDEX BLDV+IHG-RTO: 21 — SIGNIFICANT CHANGE UP
HOROWITZ INDEX BLDV+IHG-RTO: 21 — SIGNIFICANT CHANGE UP
LACTATE BLDV-MCNC: 1.6 MMOL/L — SIGNIFICANT CHANGE UP (ref 0.7–2)
LACTATE BLDV-MCNC: 1.7 MMOL/L — SIGNIFICANT CHANGE UP (ref 0.7–2)
LACTATE BLDV-MCNC: 1.7 MMOL/L — SIGNIFICANT CHANGE UP (ref 0.7–2)
LACTATE BLDV-MCNC: 2 MMOL/L — SIGNIFICANT CHANGE UP (ref 0.7–2)
LIDOCAIN IGE QN: 70 U/L — HIGH (ref 7–60)
LIDOCAIN IGE QN: 87 U/L — HIGH (ref 7–60)
MAGNESIUM SERPL-MCNC: 1.9 MG/DL — SIGNIFICANT CHANGE UP (ref 1.6–2.6)
MAGNESIUM SERPL-MCNC: 2 MG/DL — SIGNIFICANT CHANGE UP (ref 1.6–2.6)
MCHC RBC-ENTMCNC: 33.6 PG — SIGNIFICANT CHANGE UP (ref 27–34)
MCHC RBC-ENTMCNC: 34.3 GM/DL — SIGNIFICANT CHANGE UP (ref 32–36)
MCV RBC AUTO: 98.1 FL — SIGNIFICANT CHANGE UP (ref 80–100)
OTHER CELLS CSF MANUAL: 12 ML/DL — LOW (ref 18–22)
OTHER CELLS CSF MANUAL: 13 ML/DL — LOW (ref 18–22)
OTHER CELLS CSF MANUAL: 14 ML/DL — LOW (ref 18–22)
OTHER CELLS CSF MANUAL: 7 ML/DL — LOW (ref 18–22)
PCO2 BLDV: 37 MMHG — SIGNIFICANT CHANGE UP (ref 35–50)
PCO2 BLDV: 40 MMHG — SIGNIFICANT CHANGE UP (ref 35–50)
PCO2 BLDV: 42 MMHG — SIGNIFICANT CHANGE UP (ref 35–50)
PCO2 BLDV: 47 MMHG — SIGNIFICANT CHANGE UP (ref 35–50)
PH BLDV: 7.38 — SIGNIFICANT CHANGE UP (ref 7.35–7.45)
PH BLDV: 7.41 — SIGNIFICANT CHANGE UP (ref 7.35–7.45)
PH BLDV: 7.41 — SIGNIFICANT CHANGE UP (ref 7.35–7.45)
PH BLDV: 7.44 — SIGNIFICANT CHANGE UP (ref 7.35–7.45)
PHOSPHATE SERPL-MCNC: 1.4 MG/DL — LOW (ref 2.5–4.5)
PHOSPHATE SERPL-MCNC: 1.5 MG/DL — LOW (ref 2.5–4.5)
PHOSPHATE SERPL-MCNC: 1.7 MG/DL — LOW (ref 2.5–4.5)
PHOSPHATE SERPL-MCNC: 2.5 MG/DL — SIGNIFICANT CHANGE UP (ref 2.5–4.5)
PLATELET # BLD AUTO: 158 K/UL — SIGNIFICANT CHANGE UP (ref 150–400)
PO2 BLDV: 23 MMHG — LOW (ref 25–45)
PO2 BLDV: 34 MMHG — SIGNIFICANT CHANGE UP (ref 25–45)
PO2 BLDV: 36 MMHG — SIGNIFICANT CHANGE UP (ref 25–45)
PO2 BLDV: 39 MMHG — SIGNIFICANT CHANGE UP (ref 25–45)
POTASSIUM BLDV-SCNC: 3.3 MMOL/L — LOW (ref 3.5–5)
POTASSIUM BLDV-SCNC: 3.4 MMOL/L — LOW (ref 3.5–5)
POTASSIUM BLDV-SCNC: 3.5 MMOL/L — SIGNIFICANT CHANGE UP (ref 3.5–5)
POTASSIUM BLDV-SCNC: 3.6 MMOL/L — SIGNIFICANT CHANGE UP (ref 3.5–5)
POTASSIUM SERPL-MCNC: 3.5 MMOL/L — SIGNIFICANT CHANGE UP (ref 3.5–5.3)
POTASSIUM SERPL-MCNC: 3.5 MMOL/L — SIGNIFICANT CHANGE UP (ref 3.5–5.3)
POTASSIUM SERPL-MCNC: 3.6 MMOL/L — SIGNIFICANT CHANGE UP (ref 3.5–5.3)
POTASSIUM SERPL-MCNC: 3.8 MMOL/L — SIGNIFICANT CHANGE UP (ref 3.5–5.3)
POTASSIUM SERPL-SCNC: 3.5 MMOL/L — SIGNIFICANT CHANGE UP (ref 3.5–5.3)
POTASSIUM SERPL-SCNC: 3.5 MMOL/L — SIGNIFICANT CHANGE UP (ref 3.5–5.3)
POTASSIUM SERPL-SCNC: 3.6 MMOL/L — SIGNIFICANT CHANGE UP (ref 3.5–5.3)
POTASSIUM SERPL-SCNC: 3.8 MMOL/L — SIGNIFICANT CHANGE UP (ref 3.5–5.3)
RBC # BLD: 4.02 M/UL — SIGNIFICANT CHANGE UP (ref 3.8–5.2)
RBC # FLD: 11 % — SIGNIFICANT CHANGE UP (ref 10.3–14.5)
SAO2 % BLDV: 34 % — LOW (ref 67–88)
SAO2 % BLDV: 65 % — LOW (ref 67–88)
SAO2 % BLDV: 72 % — SIGNIFICANT CHANGE UP (ref 67–88)
SAO2 % BLDV: 76 % — SIGNIFICANT CHANGE UP (ref 67–88)
SODIUM SERPL-SCNC: 136 MMOL/L — SIGNIFICANT CHANGE UP (ref 135–145)
SODIUM SERPL-SCNC: 137 MMOL/L — SIGNIFICANT CHANGE UP (ref 135–145)
SODIUM SERPL-SCNC: 138 MMOL/L — SIGNIFICANT CHANGE UP (ref 135–145)
SODIUM SERPL-SCNC: 138 MMOL/L — SIGNIFICANT CHANGE UP (ref 135–145)
SPECIMEN SOURCE: SIGNIFICANT CHANGE UP
TROPONIN T SERPL-MCNC: <0.01 NG/ML — SIGNIFICANT CHANGE UP (ref 0–0.06)
TROPONIN T SERPL-MCNC: <0.01 NG/ML — SIGNIFICANT CHANGE UP (ref 0–0.06)
WBC # BLD: 17 K/UL — HIGH (ref 3.8–10.5)
WBC # FLD AUTO: 17 K/UL — HIGH (ref 3.8–10.5)

## 2018-01-16 PROCEDURE — 99291 CRITICAL CARE FIRST HOUR: CPT

## 2018-01-16 PROCEDURE — 99223 1ST HOSP IP/OBS HIGH 75: CPT | Mod: GC

## 2018-01-16 PROCEDURE — 74177 CT ABD & PELVIS W/CONTRAST: CPT | Mod: 26

## 2018-01-16 PROCEDURE — 93010 ELECTROCARDIOGRAM REPORT: CPT

## 2018-01-16 RX ORDER — INSULIN LISPRO 100/ML
3 VIAL (ML) SUBCUTANEOUS
Qty: 0 | Refills: 0 | Status: DISCONTINUED | OUTPATIENT
Start: 2018-01-16 | End: 2018-01-16

## 2018-01-16 RX ORDER — MORPHINE SULFATE 50 MG/1
2 CAPSULE, EXTENDED RELEASE ORAL ONCE
Qty: 0 | Refills: 0 | Status: DISCONTINUED | OUTPATIENT
Start: 2018-01-16 | End: 2018-01-16

## 2018-01-16 RX ORDER — INSULIN GLARGINE 100 [IU]/ML
9 INJECTION, SOLUTION SUBCUTANEOUS AT BEDTIME
Qty: 0 | Refills: 0 | Status: DISCONTINUED | OUTPATIENT
Start: 2018-01-16 | End: 2018-01-16

## 2018-01-16 RX ORDER — POTASSIUM CHLORIDE 20 MEQ
10 PACKET (EA) ORAL
Qty: 0 | Refills: 0 | Status: DISCONTINUED | OUTPATIENT
Start: 2018-01-16 | End: 2018-01-16

## 2018-01-16 RX ORDER — SODIUM CHLORIDE 9 MG/ML
1000 INJECTION, SOLUTION INTRAVENOUS
Qty: 0 | Refills: 0 | Status: DISCONTINUED | OUTPATIENT
Start: 2018-01-16 | End: 2018-01-19

## 2018-01-16 RX ORDER — POTASSIUM CHLORIDE 20 MEQ
20 PACKET (EA) ORAL
Qty: 0 | Refills: 0 | Status: COMPLETED | OUTPATIENT
Start: 2018-01-16 | End: 2018-01-16

## 2018-01-16 RX ORDER — INSULIN GLARGINE 100 [IU]/ML
12 INJECTION, SOLUTION SUBCUTANEOUS AT BEDTIME
Qty: 0 | Refills: 0 | Status: DISCONTINUED | OUTPATIENT
Start: 2018-01-16 | End: 2018-01-17

## 2018-01-16 RX ORDER — INSULIN LISPRO 100/ML
VIAL (ML) SUBCUTANEOUS AT BEDTIME
Qty: 0 | Refills: 0 | Status: DISCONTINUED | OUTPATIENT
Start: 2018-01-16 | End: 2018-01-19

## 2018-01-16 RX ORDER — ACETAMINOPHEN 500 MG
1000 TABLET ORAL ONCE
Qty: 0 | Refills: 0 | Status: COMPLETED | OUTPATIENT
Start: 2018-01-16 | End: 2018-01-16

## 2018-01-16 RX ORDER — DEXTROSE 50 % IN WATER 50 %
1 SYRINGE (ML) INTRAVENOUS ONCE
Qty: 0 | Refills: 0 | Status: DISCONTINUED | OUTPATIENT
Start: 2018-01-16 | End: 2018-01-19

## 2018-01-16 RX ORDER — INSULIN LISPRO 100/ML
VIAL (ML) SUBCUTANEOUS
Qty: 0 | Refills: 0 | Status: DISCONTINUED | OUTPATIENT
Start: 2018-01-16 | End: 2018-01-19

## 2018-01-16 RX ORDER — MORPHINE SULFATE 50 MG/1
2 CAPSULE, EXTENDED RELEASE ORAL EVERY 4 HOURS
Qty: 0 | Refills: 0 | Status: DISCONTINUED | OUTPATIENT
Start: 2018-01-16 | End: 2018-01-16

## 2018-01-16 RX ORDER — POTASSIUM PHOSPHATE, MONOBASIC POTASSIUM PHOSPHATE, DIBASIC 236; 224 MG/ML; MG/ML
15 INJECTION, SOLUTION INTRAVENOUS ONCE
Qty: 0 | Refills: 0 | Status: COMPLETED | OUTPATIENT
Start: 2018-01-16 | End: 2018-01-16

## 2018-01-16 RX ORDER — DEXTROSE 50 % IN WATER 50 %
25 SYRINGE (ML) INTRAVENOUS ONCE
Qty: 0 | Refills: 0 | Status: DISCONTINUED | OUTPATIENT
Start: 2018-01-16 | End: 2018-01-19

## 2018-01-16 RX ORDER — ONDANSETRON 8 MG/1
4 TABLET, FILM COATED ORAL EVERY 6 HOURS
Qty: 0 | Refills: 0 | Status: DISCONTINUED | OUTPATIENT
Start: 2018-01-16 | End: 2018-01-19

## 2018-01-16 RX ORDER — MORPHINE SULFATE 50 MG/1
2 CAPSULE, EXTENDED RELEASE ORAL
Qty: 0 | Refills: 0 | Status: DISCONTINUED | OUTPATIENT
Start: 2018-01-16 | End: 2018-01-17

## 2018-01-16 RX ORDER — DOCUSATE SODIUM 100 MG
100 CAPSULE ORAL
Qty: 0 | Refills: 0 | Status: DISCONTINUED | OUTPATIENT
Start: 2018-01-16 | End: 2018-01-19

## 2018-01-16 RX ORDER — INSULIN LISPRO 100/ML
2 VIAL (ML) SUBCUTANEOUS
Qty: 0 | Refills: 0 | Status: DISCONTINUED | OUTPATIENT
Start: 2018-01-16 | End: 2018-01-19

## 2018-01-16 RX ORDER — GLUCAGON INJECTION, SOLUTION 0.5 MG/.1ML
1 INJECTION, SOLUTION SUBCUTANEOUS ONCE
Qty: 0 | Refills: 0 | Status: DISCONTINUED | OUTPATIENT
Start: 2018-01-16 | End: 2018-01-19

## 2018-01-16 RX ORDER — DEXTROSE 50 % IN WATER 50 %
12.5 SYRINGE (ML) INTRAVENOUS ONCE
Qty: 0 | Refills: 0 | Status: DISCONTINUED | OUTPATIENT
Start: 2018-01-16 | End: 2018-01-19

## 2018-01-16 RX ORDER — SENNA PLUS 8.6 MG/1
2 TABLET ORAL AT BEDTIME
Qty: 0 | Refills: 0 | Status: DISCONTINUED | OUTPATIENT
Start: 2018-01-16 | End: 2018-01-19

## 2018-01-16 RX ADMIN — MORPHINE SULFATE 2 MILLIGRAM(S): 50 CAPSULE, EXTENDED RELEASE ORAL at 18:18

## 2018-01-16 RX ADMIN — MORPHINE SULFATE 2 MILLIGRAM(S): 50 CAPSULE, EXTENDED RELEASE ORAL at 18:30

## 2018-01-16 RX ADMIN — SODIUM CHLORIDE 200 MILLILITER(S): 9 INJECTION, SOLUTION INTRAVENOUS at 00:00

## 2018-01-16 RX ADMIN — MORPHINE SULFATE 2 MILLIGRAM(S): 50 CAPSULE, EXTENDED RELEASE ORAL at 21:40

## 2018-01-16 RX ADMIN — MORPHINE SULFATE 2 MILLIGRAM(S): 50 CAPSULE, EXTENDED RELEASE ORAL at 15:04

## 2018-01-16 RX ADMIN — Medication 200 MILLIGRAM(S): at 21:22

## 2018-01-16 RX ADMIN — MORPHINE SULFATE 2 MILLIGRAM(S): 50 CAPSULE, EXTENDED RELEASE ORAL at 15:15

## 2018-01-16 RX ADMIN — MORPHINE SULFATE 2 MILLIGRAM(S): 50 CAPSULE, EXTENDED RELEASE ORAL at 11:22

## 2018-01-16 RX ADMIN — Medication 400 MILLIGRAM(S): at 00:00

## 2018-01-16 RX ADMIN — MORPHINE SULFATE 2 MILLIGRAM(S): 50 CAPSULE, EXTENDED RELEASE ORAL at 11:10

## 2018-01-16 RX ADMIN — MORPHINE SULFATE 2 MILLIGRAM(S): 50 CAPSULE, EXTENDED RELEASE ORAL at 08:16

## 2018-01-16 RX ADMIN — MORPHINE SULFATE 2 MILLIGRAM(S): 50 CAPSULE, EXTENDED RELEASE ORAL at 08:27

## 2018-01-16 RX ADMIN — INSULIN HUMAN 1 UNIT(S)/HR: 100 INJECTION, SOLUTION SUBCUTANEOUS at 08:01

## 2018-01-16 RX ADMIN — ENOXAPARIN SODIUM 40 MILLIGRAM(S): 100 INJECTION SUBCUTANEOUS at 21:22

## 2018-01-16 RX ADMIN — SODIUM CHLORIDE 100 MILLILITER(S): 9 INJECTION, SOLUTION INTRAVENOUS at 09:55

## 2018-01-16 RX ADMIN — MORPHINE SULFATE 2 MILLIGRAM(S): 50 CAPSULE, EXTENDED RELEASE ORAL at 21:22

## 2018-01-16 RX ADMIN — Medication 400 MILLIGRAM(S): at 05:00

## 2018-01-16 RX ADMIN — Medication 20 MILLIEQUIVALENT(S): at 19:50

## 2018-01-16 RX ADMIN — Medication 1000 MILLIGRAM(S): at 05:30

## 2018-01-16 RX ADMIN — SENNA PLUS 2 TABLET(S): 8.6 TABLET ORAL at 21:22

## 2018-01-16 RX ADMIN — SODIUM CHLORIDE 200 MILLILITER(S): 9 INJECTION, SOLUTION INTRAVENOUS at 05:00

## 2018-01-16 RX ADMIN — Medication 100 MILLIGRAM(S): at 17:49

## 2018-01-16 RX ADMIN — Medication 0.5 MILLIGRAM(S): at 12:58

## 2018-01-16 RX ADMIN — Medication 20 MILLIEQUIVALENT(S): at 17:49

## 2018-01-16 RX ADMIN — POTASSIUM PHOSPHATE, MONOBASIC POTASSIUM PHOSPHATE, DIBASIC 62.5 MILLIMOLE(S): 236; 224 INJECTION, SOLUTION INTRAVENOUS at 18:06

## 2018-01-16 RX ADMIN — INSULIN GLARGINE 12 UNIT(S): 100 INJECTION, SOLUTION SUBCUTANEOUS at 18:10

## 2018-01-16 RX ADMIN — SODIUM CHLORIDE 200 MILLILITER(S): 9 INJECTION, SOLUTION INTRAVENOUS at 07:54

## 2018-01-16 RX ADMIN — Medication 1000 MILLIGRAM(S): at 00:30

## 2018-01-16 NOTE — PROGRESS NOTE ADULT - SUBJECTIVE AND OBJECTIVE BOX
CONTACT INFO:  Maynor Chaney M.D.  PGY-1 | Preliminary Resident  Department of Internal Medicine  [phone] (NS) | [phone] (CHRISTINA)    Chief Complaint:     Interval Events: Patient was seen and examined at the bedside. Patient complaint of ___. Patient denied ____. The remaining ROS were negative.    MEDICATIONS  (STANDING):  dextrose 5% + lactated ringers. 1000 milliLiter(s) (100 mL/Hr) IV Continuous <Continuous>  enoxaparin Injectable 40 milliGRAM(s) SubCutaneous every 24 hours  insulin Infusion 1 Unit(s)/Hr (1 mL/Hr) IV Continuous <Continuous>  traZODone 200 milliGRAM(s) Oral at bedtime    MEDICATIONS  (PRN):  LORazepam     Tablet 0.5 milliGRAM(s) Oral two times a day PRN Anxiety  morphine  - Injectable 2 milliGRAM(s) IV Push every 4 hours PRN moderate to severe pain      Allergies    Bactrim (Anaphylaxis)    Intolerances        PHYSICAL EXAM:  VITALS: T(C): 37.2 (01-16-18 @ 12:00)  T(F): 98.9 (01-16-18 @ 12:00), Max: 98.9 (01-16-18 @ 08:00)  HR: 68 (01-16-18 @ 12:00) (62 - 96)  BP: 159/78 (01-16-18 @ 12:00) (131/82 - 160/72)  RR:  (18 - 28)  SpO2:  (95% - 100%)  Wt(kg): --    GENERAL: In NAD, well-nourished  HEENT: NC/AT, EOMI  NECK: Supple, no JVD  CHEST/LUNG: CTABL, no wheezes. Breathing was non-labored and with equal chest rise.  CARDIOVASCULAR: RRR, S1S2nl, no R/M/G appreciated. No LE edema  EXTREMITIES: FROM x all 4 extremities  INTEGUMENTARY: No rash, non-diaphoretic  NEURO: A/O x 4, non-focal  PSYCH: Reactive affect, euthymic mood      POCT Blood Glucose.: 177 mg/dL (01-16-18 @ 11:57)  POCT Blood Glucose.: 165 mg/dL (01-16-18 @ 11:03)  POCT Blood Glucose.: 176 mg/dL (01-16-18 @ 10:01)  POCT Blood Glucose.: 165 mg/dL (01-16-18 @ 09:03)  POCT Blood Glucose.: 120 mg/dL (01-16-18 @ 07:49)  POCT Blood Glucose.: 129 mg/dL (01-16-18 @ 06:30)  POCT Blood Glucose.: 146 mg/dL (01-16-18 @ 04:31)  POCT Blood Glucose.: 141 mg/dL (01-16-18 @ 03:00)  POCT Blood Glucose.: 164 mg/dL (01-16-18 @ 01:50)  POCT Blood Glucose.: 191 mg/dL (01-16-18 @ 01:02)  POCT Blood Glucose.: 222 mg/dL (01-15-18 @ 23:45)  POCT Blood Glucose.: 172 mg/dL (01-15-18 @ 21:46)  POCT Blood Glucose.: 172 mg/dL (01-15-18 @ 20:48)  POCT Blood Glucose.: 163 mg/dL (01-15-18 @ 19:42)  POCT Blood Glucose.: 119 mg/dL (01-15-18 @ 18:35)  POCT Blood Glucose.: 74 mg/dL (01-15-18 @ 17:40)  POCT Blood Glucose.: 119 mg/dL (01-15-18 @ 16:39)  POCT Blood Glucose.: 181 mg/dL (01-15-18 @ 15:48)  POCT Blood Glucose.: 212 mg/dL (01-15-18 @ 14:43)  POCT Blood Glucose.: 337 mg/dL (01-15-18 @ 11:21)      01-16    138  |  102  |  12  ----------------------------<  184<H>  3.5   |  25  |  0.48<L>    EGFR if : 137  EGFR if non : 118    Ca    8.6      01-16  Mg     2.0     01-16  Phos  1.7     01-16    TPro  9.3<H>  /  Alb  4.9  /  TBili  1.2  /  DBili  x   /  AST  19  /  ALT  25  /  AlkPhos  109  01-15          Thyroid Function Tests:  12-25 @ 15:36 TSH 0.71 FreeT4 -- T3 -- Anti TPO -- Anti Thyroglobulin Ab -- TSI --      Hemoglobin A1C, Whole Blood: 7.7 % <H> [4.0 - 5.6] (01-15-18 @ 22:22)

## 2018-01-16 NOTE — PROGRESS NOTE ADULT - SUBJECTIVE AND OBJECTIVE BOX
CHIEF COMPLAINT:  47F w/ DM2 w/ hx of DKA on lantus, EtOH dependence, hx of MRSA gluteal abscess and cellulitis (Sep2017), & MDD presents to [facility] for nausea & vomiting. Patient reports being awoken 3d prior to admission w/ nausea and NBNB vomiting which was persistent until admission. Prior to having nausea and vomiting she did not have any fevers, cough, sob, cp, palpitations, abdominal pain, diarrhea, painful urination/change in urination, change in vision, joint pain or swelling and does note having sick contact being her mother who had strep throat recently and did report some chill on review maybe 7d prior to admission. The day after nausea and vomiting started she noticed having diffuse abdominal soreness, intermittent palpitations, and had decreased PO intake and did not take her evening [id number] dose 2d prior to admission. Of note she has had recent ED visits and admissions over the last 3mo for EtOH abuse and reports last drink on New Years lilian although initially reporting that she has been sober for >6months. Additionally she reports ordering take out from a [employer].    Interval Events:      REVIEW OF SYSTEMS:  Constitutional: [X ] negative [ ] fevers [ ] chills [ ] weight loss [ ] weight gain  HEENT: [X ] negative [ ] dry eyes [ ] eye irritation [ ] postnasal drip [ ] nasal congestion  CV: [ X] negative  [ ] chest pain [ ] orthopnea [ ] palpitations [ ] murmur  Resp: [ X] negative [ ] cough [ ] shortness of breath [ ] dyspnea [ ] wheezing [ ] sputum [ ] hemoptysis  GI: [ ] negative [ ] nausea [ ] vomiting [ ] diarrhea [ ] constipation [ X] abd pain [ ] dysphagia   : [ X] negative [ ] dysuria [ ] nocturia [ ] hematuria [ ] increased urinary frequency  Musculoskeletal: [ X] negative [ ] back pain [ ] myalgias [ ] arthralgias [ ] fracture  Skin: [X ] negative [ ] rash [ ] itch  Neurological: [X ] negative [ ] headache [ ] dizziness [ ] syncope [ ] weakness [ ] numbness  Psychiatric: [X ] negative [ ] anxiety [ ] depression  Endocrine: [ ] negative [X ] diabetes [ ] thyroid problem  Hematologic/Lymphatic: [X ] negative [ ] anemia [ ] bleeding problem  Allergic/Immunologic: [X ] negative [ ] itchy eyes [ ] nasal discharge [ ] hives [ ] angioedema  [ ] All other systems negative  [ ] Unable to assess ROS because ________    OBJECTIVE:  ICU Vital Signs Last 24 Hrs  T(C): 36.9 (16 Jan 2018 03:00), Max: 37 (15 Paul 2018 15:38)  T(F): 98.4 (16 Jan 2018 03:00), Max: 98.6 (15 Paul 2018 15:38)  HR: 68 (16 Jan 2018 06:00) (68 - 109)  BP: 156/88 (16 Jan 2018 06:00) (131/82 - 156/88)  BP(mean): 116 (16 Jan 2018 06:00) (101 - 117)  ABP: --  ABP(mean): --  RR: 20 (16 Jan 2018 06:00) (18 - 28)  SpO2: 97% (16 Jan 2018 06:00) (95% - 99%)        01-15 @ 07:01  -  01-16 @ 07:00  --------------------------------------------------------  IN: 3175 mL / OUT: 900 mL / NET: 2275 mL      CAPILLARY BLOOD GLUCOSE  129 (16 Jan 2018 06:00)      POCT Blood Glucose.: 129 mg/dL (16 Jan 2018 06:30)      PHYSICAL EXAM:  GENERAL: NAD, well-developed  HEAD: Atraumatic, Normocephalic  EYES: EOMI, PERRLA, conjunctiva and sclera clear  NECK: Supple, No JVD  CHEST/LUNG: Clear to auscultation bilaterally; No wheezes/rales/rhonchi  HEART: Regular rate and rhythm; No murmurs, rubs, or gallops  ABDOMEN: Soft, Nontender, Nondistended; Bowel sounds present  EXTREMITIES:  2+ dP pulses b/l, No clubbing, cyanosis, or edema  PSYCH: reactive affect  NEUROLOGY: AAOx3, non-focal  SKIN: No rashes or lesions    LINES:    HOSPITAL MEDICATIONS:  Standing Meds:  dextrose 5% + lactated ringers. 1000 milliLiter(s) IV Continuous <Continuous>  enoxaparin Injectable 40 milliGRAM(s) SubCutaneous every 24 hours  insulin Infusion 1 Unit(s)/Hr IV Continuous <Continuous>  traZODone 200 milliGRAM(s) Oral at bedtime      PRN Meds:  LORazepam     Tablet 0.5 milliGRAM(s) Oral two times a day PRN      LABS:                        13.5   17.0  )-----------( 158      ( 16 Jan 2018 05:33 )             39.5     Hgb Trend: 13.5<--, 14.1<--, 17.4<--  01-16    136  |  103  |  12  ----------------------------<  148<H>  3.8   |  22  |  0.50    Ca    8.6      16 Jan 2018 05:33  Phos  1.4     01-16  Mg     2.0     01-16    TPro  9.3<H>  /  Alb  4.9  /  TBili  1.2  /  DBili  x   /  AST  19  /  ALT  25  /  AlkPhos  109  01-15    Creatinine Trend: 0.50<--, 0.55<--, 0.62<--, 0.62<--, 0.81<--, 0.73<--    Urinalysis Basic - ( 15 Paul 2018 15:28 )    Color: Yellow / Appearance: SL Turbid / SG: >1.030 / pH: x  Gluc: x / Ketone: Large  / Bili: Small / Urobili: 1 mg/dL   Blood: x / Protein: 100 mg/dL / Nitrite: Negative   Leuk Esterase: Negative / RBC: 0-2 /HPF / WBC 0-2 /HPF   Sq Epi: x / Non Sq Epi: OCC /HPF / Bacteria: Few /HPF        Venous Blood Gas:  01-16 @ 05:31  7.44/37/39/25/76  VBG Lactate: 1.7  Venous Blood Gas:  01-15 @ 22:31  7.39/41/30/24/57  VBG Lactate: 1.1  Venous Blood Gas:  01-15 @ 18:40  7.35/43/26/23/43  VBG Lactate: 1.2  Venous Blood Gas:  01-15 @ 11:32  7.30/37/30/18/50  VBG Lactate: 2.6      MICROBIOLOGY:     RADIOLOGY:  [ ] Reviewed and interpreted by me    EKG:      --Endo  #DM2 w/ DKA  - on insulin gtt on at 1 U/hr  -  D5LR  - Endo consult today  - NPO  - Unclear source of DKA however likely pancreatitis.     --GI  - stable  - NPO  - follow amylase/triglycerides  - c/w IVF for pancreatitis  - will need to monitor for ETOH withdrawal given hx of 1 L vodka daily use    --Heme  -no acute pathology    --Psych  - On trazodone and xanax PRN    --ID  -afebrile, no source for infection suspected. will monitor off abx at this time. Will get RVP given n/v    --Derm  - noted rash on face. given history of 1st cousin w/ SLE may consider DEEP however no other signs of SLE.    --PPX  lovenox for dvt ppx CHIEF COMPLAINT:  47F w/ DM2 w/ hx of DKA on lantus, EtOH dependence, hx of MRSA gluteal abscess and cellulitis (Sep2017), & MDD presents to [facility] for nausea & vomiting. Patient reports being awoken 3d prior to admission w/ nausea and NBNB vomiting which was persistent until admission. Prior to having nausea and vomiting she did not have any fevers, cough, sob, cp, palpitations, abdominal pain, diarrhea, painful urination/change in urination, change in vision, joint pain or swelling and does note having sick contact being her mother who had strep throat recently and did report some chill on review maybe 7d prior to admission. The day after nausea and vomiting started she noticed having diffuse abdominal soreness, intermittent palpitations, and had decreased PO intake and did not take her evening [id number] dose 2d prior to admission. Of note she has had recent ED visits and admissions over the last 3mo for EtOH abuse and reports last drink on New Years lilian although initially reporting that she has been sober for >6months. Additionally she reports ordering take out from a [employer].    Interval Events:  Pt continues to have abdominal pain, received 1g tylenol IV at 6 am with minimal pain relief.      REVIEW OF SYSTEMS:  Constitutional: [X ] negative [ ] fevers [ ] chills [ ] weight loss [ ] weight gain  HEENT: [X ] negative [ ] dry eyes [ ] eye irritation [ ] postnasal drip [ ] nasal congestion  CV: [ X] negative  [ ] chest pain [ ] orthopnea [ ] palpitations [ ] murmur  Resp: [ X] negative [ ] cough [ ] shortness of breath [ ] dyspnea [ ] wheezing [ ] sputum [ ] hemoptysis  GI: [ ] negative [ x] nausea [ ] vomiting [ ] diarrhea [ ] constipation [ X] abd pain [ ] dysphagia   : [ X] negative [ ] dysuria [ ] nocturia [ ] hematuria [ ] increased urinary frequency  Musculoskeletal: [ X] negative [ ] back pain [ ] myalgias [ ] arthralgias [ ] fracture  Skin: [X ] negative [ ] rash [ ] itch  Neurological: [X ] negative [ ] headache [ ] dizziness [ ] syncope [ ] weakness [ ] numbness  Psychiatric: [X ] negative [ ] anxiety [ ] depression  Endocrine: [ ] negative [X ] diabetes [ ] thyroid problem  Hematologic/Lymphatic: [X ] negative [ ] anemia [ ] bleeding problem  Allergic/Immunologic: [X ] negative [ ] itchy eyes [ ] nasal discharge [ ] hives [ ] angioedema  [ x] All other systems negative      OBJECTIVE:  ICU Vital Signs Last 24 Hrs  T(C): 36.9 (16 Jan 2018 03:00), Max: 37 (15 Paul 2018 15:38)  T(F): 98.4 (16 Jan 2018 03:00), Max: 98.6 (15 Paul 2018 15:38)  HR: 68 (16 Jan 2018 06:00) (68 - 109)  BP: 156/88 (16 Jan 2018 06:00) (131/82 - 156/88)  BP(mean): 116 (16 Jan 2018 06:00) (101 - 117)  ABP: --  ABP(mean): --  RR: 20 (16 Jan 2018 06:00) (18 - 28)  SpO2: 97% (16 Jan 2018 06:00) (95% - 99%)        01-15 @ 07:01  -  01-16 @ 07:00  --------------------------------------------------------  IN: 3175 mL / OUT: 900 mL / NET: 2275 mL      CAPILLARY BLOOD GLUCOSE  129 (16 Jan 2018 06:00)      POCT Blood Glucose.: 129 mg/dL (16 Jan 2018 06:30)      PHYSICAL EXAM:  GENERAL: NAD, well-developed  HEAD: Atraumatic, Normocephalic  EYES: EOMI, PERRLA, conjunctiva and sclera clear  NECK: Supple, No JVD  CHEST/LUNG: Clear to auscultation bilaterally; No wheezes/rales/rhonchi  HEART: Regular rate and rhythm; No murmurs, rubs, or gallops  ABDOMEN: Soft, Nontender, Nondistended; Bowel sounds present  EXTREMITIES:  2+ dP pulses b/l, No clubbing, cyanosis, or edema  PSYCH: reactive affect  NEUROLOGY: AAOx3, non-focal  SKIN: No rashes or lesions    LINES:    HOSPITAL MEDICATIONS:  Standing Meds:  dextrose 5% + lactated ringers. 1000 milliLiter(s) IV Continuous <Continuous>  enoxaparin Injectable 40 milliGRAM(s) SubCutaneous every 24 hours  insulin Infusion 1 Unit(s)/Hr IV Continuous <Continuous>  traZODone 200 milliGRAM(s) Oral at bedtime      PRN Meds:  LORazepam     Tablet 0.5 milliGRAM(s) Oral two times a day PRN      LABS:                        13.5   17.0  )-----------( 158      ( 16 Jan 2018 05:33 )             39.5     Hgb Trend: 13.5<--, 14.1<--, 17.4<--  01-16    136  |  103  |  12  ----------------------------<  148<H>  3.8   |  22  |  0.50    Ca    8.6      16 Jan 2018 05:33  Phos  1.4     01-16  Mg     2.0     01-16    TPro  9.3<H>  /  Alb  4.9  /  TBili  1.2  /  DBili  x   /  AST  19  /  ALT  25  /  AlkPhos  109  01-15    Creatinine Trend: 0.50<--, 0.55<--, 0.62<--, 0.62<--, 0.81<--, 0.73<--    Urinalysis Basic - ( 15 Paul 2018 15:28 )    Color: Yellow / Appearance: SL Turbid / SG: >1.030 / pH: x  Gluc: x / Ketone: Large  / Bili: Small / Urobili: 1 mg/dL   Blood: x / Protein: 100 mg/dL / Nitrite: Negative   Leuk Esterase: Negative / RBC: 0-2 /HPF / WBC 0-2 /HPF   Sq Epi: x / Non Sq Epi: OCC /HPF / Bacteria: Few /HPF        Venous Blood Gas:  01-16 @ 05:31  7.44/37/39/25/76  VBG Lactate: 1.7  Venous Blood Gas:  01-15 @ 22:31  7.39/41/30/24/57  VBG Lactate: 1.1  Venous Blood Gas:  01-15 @ 18:40  7.35/43/26/23/43  VBG Lactate: 1.2  Venous Blood Gas:  01-15 @ 11:32  7.30/37/30/18/50  VBG Lactate: 2.6      MICROBIOLOGY:     RADIOLOGY:  [ ] Reviewed and interpreted by me    EKG:      --Endo  #DM2 w/ DKA  - on insulin gtt on at 1 U/hr  - last   -  D5LR  - Endo consult today  - NPO  - Unclear source of DKA however likely pancreatitis.     --GI  - stable  - NPO  - follow amylase/triglycerides  - c/w IVF for pancreatitis  - will need to monitor for ETOH withdrawal given hx of 1 L vodka daily use    --Heme  -no acute pathology    --Psych  - On trazodone and xanax PRN    --ID  -afebrile, no source for infection suspected. will monitor off abx at this time. Will get RVP given n/v    --Derm  - noted rash on face. given history of 1st cousin w/ SLE may consider DEEP however no other signs of SLE.    --PPX  lovenox for dvt ppx

## 2018-01-16 NOTE — CONSULT NOTE ADULT - ASSESSMENT
46 y/o F PMHx T2DM, EtOH abuse, MDD, anxiety p/w nausea, vomiting, abdominal pain, reduced PO intake, and palpitations, found to have DKA in the setting of pancreatitis, most likely 2/2 EtOH abuse. Currently on insulin gtt 1 unit/hr, pending transition to SQ insulin. Patient's AG most recently is 11.
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
## 2018-01-16 NOTE — CONSULT NOTE ADULT - PROBLEM SELECTOR RECOMMENDATION 9
***Plan to be d/w attending****    - DKA precipitated by pancreatitis 2/2 EtOH use most likely (Lipase 616)  - Holding topiramate and Zoloft in setting of pancreatitis  - UA, Jabier negative. RUQ US reveals gallstones and hepatic steatosis but no acute pathology.  - F/u CT abdomen  - AG most recently 11, on insulin gtt 1 unit/hr  - Patient's insulin requirements can be extrapolated to 24 units IV/daily --> 19 Units SQ --> 9 units Lantus + Humalog 3/3/3 TIDac + low dose SSI - DKA precipitated by pancreatitis ?EtOH/elevated TG, unfortunately her lipid panel and ETOH levels were not checked.  - UA, Jabier negative. RUQ US reveals gallstones and hepatic steatosis but no acute pathology.  - F/u CT abdomen  - AG most recently 11, on insulin gtt 1 unit/hr  - Patient's insulin requirements can be extrapolated to 24 units IV/daily --> 12 units Lantus sq qhs + Humalog 2 TIDac while on clears but increase to 4units tid-ac when on full diet.  -low dose SSI

## 2018-01-17 DIAGNOSIS — Z91.11 PATIENT'S NONCOMPLIANCE WITH DIETARY REGIMEN: ICD-10-CM

## 2018-01-17 LAB
ALBUMIN SERPL ELPH-MCNC: 3 G/DL — LOW (ref 3.3–5)
ALP SERPL-CCNC: 68 U/L — SIGNIFICANT CHANGE UP (ref 40–120)
ALT FLD-CCNC: 11 U/L RC — SIGNIFICANT CHANGE UP (ref 10–45)
AMYLASE P1 CFR SERPL: 42 U/L — SIGNIFICANT CHANGE UP (ref 25–125)
ANA TITR SER: NEGATIVE — SIGNIFICANT CHANGE UP
ANION GAP SERPL CALC-SCNC: 10 MMOL/L — SIGNIFICANT CHANGE UP (ref 5–17)
APTT BLD: 32.3 SEC — SIGNIFICANT CHANGE UP (ref 27.5–37.4)
AST SERPL-CCNC: 12 U/L — SIGNIFICANT CHANGE UP (ref 10–40)
BASOPHILS # BLD AUTO: 0.1 K/UL — SIGNIFICANT CHANGE UP (ref 0–0.2)
BASOPHILS NFR BLD AUTO: 0.5 % — SIGNIFICANT CHANGE UP (ref 0–2)
BILIRUB SERPL-MCNC: 0.8 MG/DL — SIGNIFICANT CHANGE UP (ref 0.2–1.2)
BUN SERPL-MCNC: 6 MG/DL — LOW (ref 7–23)
CALCIUM SERPL-MCNC: 8.8 MG/DL — SIGNIFICANT CHANGE UP (ref 8.4–10.5)
CHLORIDE SERPL-SCNC: 102 MMOL/L — SIGNIFICANT CHANGE UP (ref 96–108)
CO2 SERPL-SCNC: 25 MMOL/L — SIGNIFICANT CHANGE UP (ref 22–31)
CREAT SERPL-MCNC: 0.53 MG/DL — SIGNIFICANT CHANGE UP (ref 0.5–1.3)
EOSINOPHIL # BLD AUTO: 0.3 K/UL — SIGNIFICANT CHANGE UP (ref 0–0.5)
EOSINOPHIL NFR BLD AUTO: 2 % — SIGNIFICANT CHANGE UP (ref 0–6)
GLUCOSE BLDC GLUCOMTR-MCNC: 134 MG/DL — HIGH (ref 70–99)
GLUCOSE BLDC GLUCOMTR-MCNC: 146 MG/DL — HIGH (ref 70–99)
GLUCOSE BLDC GLUCOMTR-MCNC: 162 MG/DL — HIGH (ref 70–99)
GLUCOSE BLDC GLUCOMTR-MCNC: 163 MG/DL — HIGH (ref 70–99)
GLUCOSE BLDC GLUCOMTR-MCNC: 208 MG/DL — HIGH (ref 70–99)
GLUCOSE SERPL-MCNC: 163 MG/DL — HIGH (ref 70–99)
HCT VFR BLD CALC: 37.5 % — SIGNIFICANT CHANGE UP (ref 34.5–45)
HGB BLD-MCNC: 13 G/DL — SIGNIFICANT CHANGE UP (ref 11.5–15.5)
INR BLD: 1.14 RATIO — SIGNIFICANT CHANGE UP (ref 0.88–1.16)
LIDOCAIN IGE QN: 34 U/L — SIGNIFICANT CHANGE UP (ref 7–60)
LYMPHOCYTES # BLD AUTO: 1.5 K/UL — SIGNIFICANT CHANGE UP (ref 1–3.3)
LYMPHOCYTES # BLD AUTO: 11.4 % — LOW (ref 13–44)
MAGNESIUM SERPL-MCNC: 1.9 MG/DL — SIGNIFICANT CHANGE UP (ref 1.6–2.6)
MCHC RBC-ENTMCNC: 33.8 PG — SIGNIFICANT CHANGE UP (ref 27–34)
MCHC RBC-ENTMCNC: 34.7 GM/DL — SIGNIFICANT CHANGE UP (ref 32–36)
MCV RBC AUTO: 97.5 FL — SIGNIFICANT CHANGE UP (ref 80–100)
MONOCYTES # BLD AUTO: 0.8 K/UL — SIGNIFICANT CHANGE UP (ref 0–0.9)
MONOCYTES NFR BLD AUTO: 6.4 % — SIGNIFICANT CHANGE UP (ref 2–14)
NEUTROPHILS # BLD AUTO: 10.3 K/UL — HIGH (ref 1.8–7.4)
NEUTROPHILS NFR BLD AUTO: 79.7 % — HIGH (ref 43–77)
PHOSPHATE SERPL-MCNC: 2.7 MG/DL — SIGNIFICANT CHANGE UP (ref 2.5–4.5)
PLATELET # BLD AUTO: 153 K/UL — SIGNIFICANT CHANGE UP (ref 150–400)
POTASSIUM SERPL-MCNC: 3.9 MMOL/L — SIGNIFICANT CHANGE UP (ref 3.5–5.3)
POTASSIUM SERPL-SCNC: 3.9 MMOL/L — SIGNIFICANT CHANGE UP (ref 3.5–5.3)
PROT SERPL-MCNC: 6.1 G/DL — SIGNIFICANT CHANGE UP (ref 6–8.3)
PROTHROM AB SERPL-ACNC: 12.5 SEC — SIGNIFICANT CHANGE UP (ref 9.8–12.7)
RBC # BLD: 3.85 M/UL — SIGNIFICANT CHANGE UP (ref 3.8–5.2)
RBC # FLD: 10.9 % — SIGNIFICANT CHANGE UP (ref 10.3–14.5)
SODIUM SERPL-SCNC: 137 MMOL/L — SIGNIFICANT CHANGE UP (ref 135–145)
WBC # BLD: 12.9 K/UL — HIGH (ref 3.8–10.5)
WBC # FLD AUTO: 12.9 K/UL — HIGH (ref 3.8–10.5)

## 2018-01-17 PROCEDURE — 99233 SBSQ HOSP IP/OBS HIGH 50: CPT | Mod: GC

## 2018-01-17 PROCEDURE — 99232 SBSQ HOSP IP/OBS MODERATE 35: CPT | Mod: GC

## 2018-01-17 RX ORDER — INSULIN GLARGINE 100 [IU]/ML
15 INJECTION, SOLUTION SUBCUTANEOUS AT BEDTIME
Qty: 0 | Refills: 0 | Status: DISCONTINUED | OUTPATIENT
Start: 2018-01-17 | End: 2018-01-19

## 2018-01-17 RX ORDER — CLONAZEPAM 1 MG
0.5 TABLET ORAL THREE TIMES A DAY
Qty: 0 | Refills: 0 | Status: DISCONTINUED | OUTPATIENT
Start: 2018-01-17 | End: 2018-01-17

## 2018-01-17 RX ORDER — SIMETHICONE 80 MG/1
80 TABLET, CHEWABLE ORAL
Qty: 0 | Refills: 0 | Status: DISCONTINUED | OUTPATIENT
Start: 2018-01-17 | End: 2018-01-19

## 2018-01-17 RX ORDER — MORPHINE SULFATE 50 MG/1
3 CAPSULE, EXTENDED RELEASE ORAL
Qty: 0 | Refills: 0 | Status: DISCONTINUED | OUTPATIENT
Start: 2018-01-17 | End: 2018-01-19

## 2018-01-17 RX ORDER — CLONAZEPAM 1 MG
0.5 TABLET ORAL THREE TIMES A DAY
Qty: 0 | Refills: 0 | Status: DISCONTINUED | OUTPATIENT
Start: 2018-01-17 | End: 2018-01-19

## 2018-01-17 RX ORDER — TOPIRAMATE 25 MG
50 TABLET ORAL ONCE
Qty: 0 | Refills: 0 | Status: DISCONTINUED | OUTPATIENT
Start: 2018-01-17 | End: 2018-01-17

## 2018-01-17 RX ORDER — ACETAMINOPHEN 500 MG
650 TABLET ORAL EVERY 6 HOURS
Qty: 0 | Refills: 0 | Status: DISCONTINUED | OUTPATIENT
Start: 2018-01-17 | End: 2018-01-19

## 2018-01-17 RX ORDER — SERTRALINE 25 MG/1
100 TABLET, FILM COATED ORAL DAILY
Qty: 0 | Refills: 0 | Status: DISCONTINUED | OUTPATIENT
Start: 2018-01-17 | End: 2018-01-19

## 2018-01-17 RX ORDER — TOPIRAMATE 25 MG
50 TABLET ORAL DAILY
Qty: 0 | Refills: 0 | Status: DISCONTINUED | OUTPATIENT
Start: 2018-01-17 | End: 2018-01-19

## 2018-01-17 RX ORDER — ENOXAPARIN SODIUM 100 MG/ML
40 INJECTION SUBCUTANEOUS EVERY 24 HOURS
Qty: 0 | Refills: 0 | Status: DISCONTINUED | OUTPATIENT
Start: 2018-01-18 | End: 2018-01-19

## 2018-01-17 RX ADMIN — MORPHINE SULFATE 3 MILLIGRAM(S): 50 CAPSULE, EXTENDED RELEASE ORAL at 02:03

## 2018-01-17 RX ADMIN — Medication 0.5 MILLIGRAM(S): at 15:03

## 2018-01-17 RX ADMIN — INSULIN GLARGINE 15 UNIT(S): 100 INJECTION, SOLUTION SUBCUTANEOUS at 22:01

## 2018-01-17 RX ADMIN — Medication 2 UNIT(S): at 09:26

## 2018-01-17 RX ADMIN — Medication 200 MILLIGRAM(S): at 23:12

## 2018-01-17 RX ADMIN — Medication 650 MILLIGRAM(S): at 08:40

## 2018-01-17 RX ADMIN — Medication 2 UNIT(S): at 13:33

## 2018-01-17 RX ADMIN — MORPHINE SULFATE 2 MILLIGRAM(S): 50 CAPSULE, EXTENDED RELEASE ORAL at 11:46

## 2018-01-17 RX ADMIN — MORPHINE SULFATE 3 MILLIGRAM(S): 50 CAPSULE, EXTENDED RELEASE ORAL at 20:03

## 2018-01-17 RX ADMIN — ONDANSETRON 4 MILLIGRAM(S): 8 TABLET, FILM COATED ORAL at 17:08

## 2018-01-17 RX ADMIN — MORPHINE SULFATE 2 MILLIGRAM(S): 50 CAPSULE, EXTENDED RELEASE ORAL at 16:16

## 2018-01-17 RX ADMIN — Medication 650 MILLIGRAM(S): at 08:10

## 2018-01-17 RX ADMIN — Medication 2 UNIT(S): at 20:11

## 2018-01-17 RX ADMIN — ONDANSETRON 4 MILLIGRAM(S): 8 TABLET, FILM COATED ORAL at 09:46

## 2018-01-17 RX ADMIN — Medication 50 MILLIGRAM(S): at 13:35

## 2018-01-17 RX ADMIN — SERTRALINE 100 MILLIGRAM(S): 25 TABLET, FILM COATED ORAL at 13:35

## 2018-01-17 RX ADMIN — MORPHINE SULFATE 2 MILLIGRAM(S): 50 CAPSULE, EXTENDED RELEASE ORAL at 16:01

## 2018-01-17 RX ADMIN — SENNA PLUS 2 TABLET(S): 8.6 TABLET ORAL at 22:01

## 2018-01-17 RX ADMIN — Medication 2: at 09:25

## 2018-01-17 RX ADMIN — MORPHINE SULFATE 2 MILLIGRAM(S): 50 CAPSULE, EXTENDED RELEASE ORAL at 12:01

## 2018-01-17 RX ADMIN — Medication 100 MILLIGRAM(S): at 17:08

## 2018-01-17 NOTE — CHART NOTE - NSCHARTNOTEFT_GEN_A_CORE
MICU Transfer Note    Transfer from: MICU    Transfer to: ( X ) Medicine    (  ) Telemetry     (   ) RCU        (    ) Palliative         (   ) Stroke Unit          (   ) __________________    Accepting Physician:  Signout given to:     MICU COURSE:  47F w/ DM2 w/ hx of DKA on lantus, EtOH dependence, hx of MRSA gluteal abscess and cellulitis (Sep2017), & MDD presents to [facility] for nausea & vomiting and found to have DKA and pancreatitis and therefore admitted to MICU for further management w/ insulin gtt. Endocrinology was consulted and the patient was successfully titrated to subcutaneous insulin. Patient was found to have abdominal pain and elevated lipase to approx 600 and therefore was treated w/ IV fluid and pain control for acute pancreatitis. RUQ ultrasound documented cholelithiasis w/o signs of acute cholecystitis which is presumed source of pancreatitis. Lipid profile is pending. Of note, patient was found to have new erythematous facial rash concerning for malar distribution w/ family hx of SLE and therefore DEEP was sent and is pending however no other signs of SLE. Sertraline and topiramate initially held due to possible role in pancreatitis but on med review patient taking appropriately and therefore restarted. Patient evaluated and deemed stable for transfer to medicine floor as she is tolerating PO intake and abdominal pain is resolving.    ASSESSMENT & PLAN:   47F w/ DM2 w/ hx of DKA on lantus, EtOH dependence, hx of MRSA gluteal abscess and cellulitis (Sep2017), & MDD presents to [facility] for nausea & vomiting and found to have DKA and pancreatitis and therefore admitted to MICU for further management w/ insulin gtt and now has had transition to subcutaneous insulin w/ resolving pancreatitis tolerating PO intake    --Neuro/Psych  #MDD  - c/w trazadone and ativan. pt will restart sertraline and topiramate and pancreatitis has resolved and it is likely 2/2 cholelithiasis vs EtOH abuse. Will need outpatient surgical f/u to evaluate for elective cholecystectomy.  - No SI/HI currently, per chart attempted suicide as teenager  - [id number]   #Family Hx of cerebral aneurysm- should be followed up by medicine team following treatment for acute illness    --Cardiovascular  - no acute pathology or history of disease. EKG wnl and CE wnl    --Pulm  - no acute pathology    --GI  #Acute Pancreatitis  - likely 2/2 cholelithiasis ve EtOH abuse. cholelithiasis on RUQ US. CT scan appreciated. patient tolerating PO and is encouraged to take po intake. Lipid profile from admission still pending result    --Endo  #DM2 w/ DKA  - initial AG 25, HCO3 18, Serum Glu 337, Beta Hydroxybuturate 3.4, VBG pH 7.30, lactate 2.6  - Endo consult appreciated. c/w basal/bolus regimen  - DKA likely 2/2 pancreatitis. EKG & CE wnl, UA tox neg. afebrile since admission and unlikely infectious etiology.    --Heme  -no acute pathology  --ID  - non-toxic appearing, no suspicion for infectious etiology at this time  - Note hx of MRS Left gluteal abscess  - HIV negative  --Derm  - noted rash on face on admission w/ malar distribution. DEEP pending result collected initially given family hx of SLE    --PPX  lovenox for dvt ppx      Patient at this time is stable for transfer to floor          FOR FOLLOW UP:  Immanuel Onofre MD PGY-2  MICU Resident   [phone] MICU Transfer Note    Transfer from: MICU    Transfer to: ( X ) Medicine    (  ) Telemetry     (   ) RCU        (    ) Palliative         (   ) Stroke Unit          (   ) __________________    Accepting Physician: Dr. Samanta Argueta (Hospitalist)  Signout given to:     MICU COURSE:  47F w/ DM2 w/ hx of DKA on lantus, EtOH dependence, hx of MRSA gluteal abscess and cellulitis (Sep2017), & MDD presents to [facility] for nausea & vomiting and found to have DKA and pancreatitis and therefore admitted to MICU for further management w/ insulin gtt. Endocrinology was consulted and the patient was successfully titrated to subcutaneous insulin. Patient was found to have abdominal pain and elevated lipase to approx 600 and therefore was treated w/ IV fluid and pain control for acute pancreatitis. RUQ ultrasound documented cholelithiasis w/o signs of acute cholecystitis which is presumed source of pancreatitis. Lipid profile is pending. Of note, patient was found to have new erythematous facial rash concerning for malar distribution w/ family hx of SLE and therefore DEEP was sent and is pending however no other signs of SLE. Sertraline and topiramate initially held due to possible role in pancreatitis but on med review patient taking appropriately and therefore restarted. Patient evaluated and deemed stable for transfer to medicine floor as she is tolerating PO intake and abdominal pain is resolving.    ASSESSMENT & PLAN:   47F w/ DM2 w/ hx of DKA on lantus, EtOH dependence, hx of MRSA gluteal abscess and cellulitis (Sep2017), & MDD presents to [facility] for nausea & vomiting and found to have DKA and pancreatitis and therefore admitted to MICU for further management w/ insulin gtt and now has had transition to subcutaneous insulin w/ resolving pancreatitis tolerating PO intake    --Neuro/Psych  #MDD  - c/w trazadone and ativan. pt will restart sertraline and topiramate and pancreatitis has resolved and it is likely 2/2 cholelithiasis vs EtOH abuse. Will need outpatient surgical f/u to evaluate for elective cholecystectomy.  - No SI/HI currently, per chart attempted suicide as teenager  - [id number]   #Family Hx of cerebral aneurysm- should be followed up by medicine team following treatment for acute illness    --Cardiovascular  - no acute pathology or history of disease. EKG wnl and CE wnl    --Pulm  - no acute pathology    --GI  #Acute Pancreatitis  - likely 2/2 cholelithiasis ve EtOH abuse. cholelithiasis on RUQ US. CT scan appreciated. patient tolerating PO and is encouraged to take po intake. Lipid profile from admission still pending result    --Endo  #DM2 w/ DKA  - initial AG 25, HCO3 18, Serum Glu 337, Beta Hydroxybuturate 3.4, VBG pH 7.30, lactate 2.6  - Endo consult appreciated. c/w basal/bolus regimen  - DKA likely 2/2 pancreatitis. EKG & CE wnl, UA tox neg. afebrile since admission and unlikely infectious etiology.    --Heme  -no acute pathology  --ID  - non-toxic appearing, no suspicion for infectious etiology at this time  - Note hx of MRS Left gluteal abscess  - HIV negative  --Derm  - noted rash on face on admission w/ malar distribution. DEEP pending result collected initially given family hx of SLE    --PPX  lovenox for dvt ppx      Patient at this time is stable for transfer to floor          FOR FOLLOW UP:  Immanuel Onofre MD PGY-2  MICU Resident   [phone]

## 2018-01-17 NOTE — PROGRESS NOTE ADULT - PROBLEM SELECTOR PLAN 1
- DKA precipitated by pancreatitis ?EtOH/elevated TG, unfortunately her lipid panel and ETOH levels were not checked.  - UA, Jabier negative. RUQ US reveals gallstones and hepatic steatosis but no acute pathology.  - CT abdomen 1/16 significant for interstitial edematous pancreas consistent with acute pancreatitis, as well as hepatic steatosis and cholelithiasis.   - AG most recently 11, on insulin gtt 1 unit/hr  -C/w 12 units Lantus sq qhs + Humalog 2 TIDac while on clears but increase to 4 units tid-ac once advanced to full diet.  -C/w low dose SSI. ***TO BE D/W ATTENDING****    - DKA precipitated by pancreatitis ?EtOH/elevated TG, unfortunately her lipid panel and ETOH levels were not checked.  - UA, Jabier negative. RUQ US reveals gallstones and hepatic steatosis but no acute pathology; CT abdomen 1/16 significant for interstitial edematous pancreas consistent with acute pancreatitis, as well as hepatic steatosis and cholelithiasis.   - Inc Lantus to 14 sq qhs + C/w Humalog 2 TIDac while on clears but increase to 4 units tid-ac once advanced to full diet.  -C/w low dose SSI. ***TO BE D/W ATTENDING****    - DKA precipitated by pancreatitis ?EtOH/elevated TG, unfortunately her lipid panel and ETOH levels were not checked.  - UA, Jabier negative. RUQ US reveals gallstones and hepatic steatosis but no acute pathology; CT abdomen 1/16 significant for interstitial edematous pancreas consistent with acute pancreatitis, as well as hepatic steatosis and cholelithiasis.   - Inc Lantus to 14 sq qhs + C/w Humalog 2 TIDac while PO intake is still decreased, but can increase to 4 units tid-ac once able to tolerate larger meals.   -C/w low dose SSI. - DKA precipitated by pancreatitis ?EtOH/elevated TG, unfortunately her lipid panel and ETOH levels were not checked.  - UA, Jabier negative. RUQ US reveals gallstones and hepatic steatosis but no acute pathology; CT abdomen 1/16 significant for interstitial edematous pancreas consistent with acute pancreatitis, as well as hepatic steatosis and cholelithiasis.   - Inc Lantus to 15 sq qhs + C/w Humalog 2 TIDac while PO intake is still decreased, but can increase to 4 units tid-ac once able to tolerate larger meals.   -C/w low dose SSI.

## 2018-01-17 NOTE — PROGRESS NOTE ADULT - SUBJECTIVE AND OBJECTIVE BOX
CONTACT INFO:  Maynor Chaney M.D.  PGY-1 | Preliminary Resident  Department of Internal Medicine  [phone] (NS) | [id number] ([place])    Chief Complaint: DKA    Interval Events: Patient was seen and examined at the bedside. Patient complaint of ___. Patient denied ____. The remaining ROS were negative.    MEDICATIONS  (STANDING):  dextrose 5%. 1000 milliLiter(s) (50 mL/Hr) IV Continuous <Continuous>  dextrose 50% Injectable 12.5 Gram(s) IV Push once  dextrose 50% Injectable 25 Gram(s) IV Push once  dextrose 50% Injectable 25 Gram(s) IV Push once  docusate sodium 100 milliGRAM(s) Oral two times a day  enoxaparin Injectable 40 milliGRAM(s) SubCutaneous every 24 hours  insulin glargine Injectable (LANTUS) 12 Unit(s) SubCutaneous at bedtime  insulin lispro (HumaLOG) corrective regimen sliding scale   SubCutaneous three times a day before meals  insulin lispro (HumaLOG) corrective regimen sliding scale   SubCutaneous at bedtime  insulin lispro Injectable (HumaLOG) 2 Unit(s) SubCutaneous three times a day before meals  senna 2 Tablet(s) Oral at bedtime  traZODone 200 milliGRAM(s) Oral at bedtime    MEDICATIONS  (PRN):  acetaminophen   Tablet. 650 milliGRAM(s) Oral every 6 hours PRN mild to moderate pain  dextrose Gel 1 Dose(s) Oral once PRN Blood Glucose LESS THAN 70 milliGRAM(s)/deciliter  glucagon  Injectable 1 milliGRAM(s) IntraMuscular once PRN Glucose LESS THAN 70 milligrams/deciliter  LORazepam     Tablet 0.5 milliGRAM(s) Oral two times a day PRN Anxiety  morphine  - Injectable 2 milliGRAM(s) IV Push every 3 hours PRN moderate to severe pain  ondansetron Injectable 4 milliGRAM(s) IV Push every 6 hours PRN Nausea and/or Vomiting      Allergies    Bactrim (Anaphylaxis)    Intolerances        PHYSICAL EXAM:  VITALS: T(C): 37.8 (01-17-18 @ 08:00)  T(F): 100 (01-17-18 @ 08:00), Max: 100 (01-17-18 @ 08:00)  HR: 67 (01-17-18 @ 09:00) (60 - 78)  BP: 125/76 (01-17-18 @ 09:00) (125/76 - 162/91)  RR:  (15 - 32)  SpO2:  (95% - 99%)  Wt(kg): --    GENERAL: NAD, well-groomed, well-developed  EYES: No proptosis, no lid lag, anicteric  HEENT:  Atraumatic, Normocephalic, moist mucous membranes, thyroid without nodules 15g  RESPIRATORY: Clear to auscultation bilaterally; No rales, rhonchi, wheezing  CARDIOVASCULAR: Regular rate and rhythm; No murmurs; no peripheral edema  GI: Marked ttp epigastrium  SKIN: Dry, intact, No rashes or lesions on feet b/l  MUSCULOSKELETAL: Full range of motion, normal strength  NEURO: sensation intact, extraocular movements intact, no tremor  PSYCH: Alert and oriented x 3, +reactive affect, +euthymic mood    POCT Blood Glucose.: 208 mg/dL (01-17-18 @ 09:22)  POCT Blood Glucose.: 179 mg/dL (01-16-18 @ 21:27)  POCT Blood Glucose.: 142 mg/dL (01-16-18 @ 20:04)  POCT Blood Glucose.: 162 mg/dL (01-16-18 @ 18:51)  POCT Blood Glucose.: 145 mg/dL (01-16-18 @ 17:50)  POCT Blood Glucose.: 157 mg/dL (01-16-18 @ 16:57)  POCT Blood Glucose.: 170 mg/dL (01-16-18 @ 16:03)  POCT Blood Glucose.: 167 mg/dL (01-16-18 @ 15:22)  POCT Blood Glucose.: 176 mg/dL (01-16-18 @ 14:02)  POCT Blood Glucose.: 170 mg/dL (01-16-18 @ 13:00)  POCT Blood Glucose.: 177 mg/dL (01-16-18 @ 11:57)  POCT Blood Glucose.: 165 mg/dL (01-16-18 @ 11:03)  POCT Blood Glucose.: 176 mg/dL (01-16-18 @ 10:01)  POCT Blood Glucose.: 165 mg/dL (01-16-18 @ 09:03)  POCT Blood Glucose.: 120 mg/dL (01-16-18 @ 07:49)  POCT Blood Glucose.: 129 mg/dL (01-16-18 @ 06:30)  POCT Blood Glucose.: 146 mg/dL (01-16-18 @ 04:31)  POCT Blood Glucose.: 141 mg/dL (01-16-18 @ 03:00)  POCT Blood Glucose.: 164 mg/dL (01-16-18 @ 01:50)  POCT Blood Glucose.: 191 mg/dL (01-16-18 @ 01:02)  POCT Blood Glucose.: 222 mg/dL (01-15-18 @ 23:45)  POCT Blood Glucose.: 172 mg/dL (01-15-18 @ 21:46)  POCT Blood Glucose.: 172 mg/dL (01-15-18 @ 20:48)  POCT Blood Glucose.: 163 mg/dL (01-15-18 @ 19:42)  POCT Blood Glucose.: 119 mg/dL (01-15-18 @ 18:35)  POCT Blood Glucose.: 74 mg/dL (01-15-18 @ 17:40)  POCT Blood Glucose.: 119 mg/dL (01-15-18 @ 16:39)  POCT Blood Glucose.: 181 mg/dL (01-15-18 @ 15:48)  POCT Blood Glucose.: 212 mg/dL (01-15-18 @ 14:43)  POCT Blood Glucose.: 337 mg/dL (01-15-18 @ 11:21)      01-17    137  |  102  |  6<L>  ----------------------------<  163<H>  3.9   |  25  |  0.53    EGFR if : 133  EGFR if non : 115    Ca    8.8      01-17  Mg     1.9     01-17  Phos  2.7     01-17    TPro  6.1  /  Alb  3.0<L>  /  TBili  0.8  /  DBili  x   /  AST  12  /  ALT  11  /  AlkPhos  68  01-17          Thyroid Function Tests:  12-25 @ 15:36 TSH 0.71 FreeT4 -- T3 -- Anti TPO -- Anti Thyroglobulin Ab -- TSI --      Hemoglobin A1C, Whole Blood: 7.7 % <H> [4.0 - 5.6] (01-15-18 @ 22:22) CONTACT INFO:  Maynor Chaney M.D.  PGY-1 | Preliminary Resident  Department of Internal Medicine  [phone] (NS) | [id number] ([place])    Chief Complaint: DKA    Interval Events: Patient transitioned from insulin gtt to SQ. Patient tolerating PO intake. Patient required 2 units of correctional insulin this AM for FS of 208. Patient was seen and examined at the bedside. Patient complaint of ___. Patient denied ____. The remaining ROS were negative.    MEDICATIONS  (STANDING):  dextrose 5%. 1000 milliLiter(s) (50 mL/Hr) IV Continuous <Continuous>  dextrose 50% Injectable 12.5 Gram(s) IV Push once  dextrose 50% Injectable 25 Gram(s) IV Push once  dextrose 50% Injectable 25 Gram(s) IV Push once  docusate sodium 100 milliGRAM(s) Oral two times a day  enoxaparin Injectable 40 milliGRAM(s) SubCutaneous every 24 hours  insulin glargine Injectable (LANTUS) 12 Unit(s) SubCutaneous at bedtime  insulin lispro (HumaLOG) corrective regimen sliding scale   SubCutaneous three times a day before meals  insulin lispro (HumaLOG) corrective regimen sliding scale   SubCutaneous at bedtime  insulin lispro Injectable (HumaLOG) 2 Unit(s) SubCutaneous three times a day before meals  senna 2 Tablet(s) Oral at bedtime  traZODone 200 milliGRAM(s) Oral at bedtime    MEDICATIONS  (PRN):  acetaminophen   Tablet. 650 milliGRAM(s) Oral every 6 hours PRN mild to moderate pain  dextrose Gel 1 Dose(s) Oral once PRN Blood Glucose LESS THAN 70 milliGRAM(s)/deciliter  glucagon  Injectable 1 milliGRAM(s) IntraMuscular once PRN Glucose LESS THAN 70 milligrams/deciliter  LORazepam     Tablet 0.5 milliGRAM(s) Oral two times a day PRN Anxiety  morphine  - Injectable 2 milliGRAM(s) IV Push every 3 hours PRN moderate to severe pain  ondansetron Injectable 4 milliGRAM(s) IV Push every 6 hours PRN Nausea and/or Vomiting      Allergies    Bactrim (Anaphylaxis)    Intolerances        PHYSICAL EXAM:  VITALS: T(C): 37.8 (01-17-18 @ 08:00)  T(F): 100 (01-17-18 @ 08:00), Max: 100 (01-17-18 @ 08:00)  HR: 67 (01-17-18 @ 09:00) (60 - 78)  BP: 125/76 (01-17-18 @ 09:00) (125/76 - 162/91)  RR:  (15 - 32)  SpO2:  (95% - 99%)  Wt(kg): --    GENERAL: NAD, well-groomed, well-developed  EYES: No proptosis, no lid lag, anicteric  HEENT:  Atraumatic, Normocephalic, moist mucous membranes, thyroid without nodules 15g  RESPIRATORY: Clear to auscultation bilaterally; No rales, rhonchi, wheezing  CARDIOVASCULAR: Regular rate and rhythm; No murmurs; no peripheral edema  GI: Marked ttp epigastrium  SKIN: Dry, intact, No rashes or lesions on feet b/l  MUSCULOSKELETAL: Full range of motion, normal strength  NEURO: sensation intact, extraocular movements intact, no tremor  PSYCH: Alert and oriented x 3, +reactive affect, +euthymic mood    POCT Blood Glucose.: 208 mg/dL (01-17-18 @ 09:22)  POCT Blood Glucose.: 179 mg/dL (01-16-18 @ 21:27)  POCT Blood Glucose.: 142 mg/dL (01-16-18 @ 20:04)  POCT Blood Glucose.: 162 mg/dL (01-16-18 @ 18:51)  POCT Blood Glucose.: 145 mg/dL (01-16-18 @ 17:50)  POCT Blood Glucose.: 157 mg/dL (01-16-18 @ 16:57)  POCT Blood Glucose.: 170 mg/dL (01-16-18 @ 16:03)  POCT Blood Glucose.: 167 mg/dL (01-16-18 @ 15:22)  POCT Blood Glucose.: 176 mg/dL (01-16-18 @ 14:02)  POCT Blood Glucose.: 170 mg/dL (01-16-18 @ 13:00)  POCT Blood Glucose.: 177 mg/dL (01-16-18 @ 11:57)  POCT Blood Glucose.: 165 mg/dL (01-16-18 @ 11:03)  POCT Blood Glucose.: 176 mg/dL (01-16-18 @ 10:01)  POCT Blood Glucose.: 165 mg/dL (01-16-18 @ 09:03)  POCT Blood Glucose.: 120 mg/dL (01-16-18 @ 07:49)  POCT Blood Glucose.: 129 mg/dL (01-16-18 @ 06:30)  POCT Blood Glucose.: 146 mg/dL (01-16-18 @ 04:31)  POCT Blood Glucose.: 141 mg/dL (01-16-18 @ 03:00)  POCT Blood Glucose.: 164 mg/dL (01-16-18 @ 01:50)  POCT Blood Glucose.: 191 mg/dL (01-16-18 @ 01:02)  POCT Blood Glucose.: 222 mg/dL (01-15-18 @ 23:45)  POCT Blood Glucose.: 172 mg/dL (01-15-18 @ 21:46)  POCT Blood Glucose.: 172 mg/dL (01-15-18 @ 20:48)  POCT Blood Glucose.: 163 mg/dL (01-15-18 @ 19:42)  POCT Blood Glucose.: 119 mg/dL (01-15-18 @ 18:35)  POCT Blood Glucose.: 74 mg/dL (01-15-18 @ 17:40)  POCT Blood Glucose.: 119 mg/dL (01-15-18 @ 16:39)  POCT Blood Glucose.: 181 mg/dL (01-15-18 @ 15:48)  POCT Blood Glucose.: 212 mg/dL (01-15-18 @ 14:43)  POCT Blood Glucose.: 337 mg/dL (01-15-18 @ 11:21)      01-17    137  |  102  |  6<L>  ----------------------------<  163<H>  3.9   |  25  |  0.53    EGFR if : 133  EGFR if non : 115    Ca    8.8      01-17  Mg     1.9     01-17  Phos  2.7     01-17    TPro  6.1  /  Alb  3.0<L>  /  TBili  0.8  /  DBili  x   /  AST  12  /  ALT  11  /  AlkPhos  68  01-17          Thyroid Function Tests:  12-25 @ 15:36 TSH 0.71 FreeT4 -- T3 -- Anti TPO -- Anti Thyroglobulin Ab -- TSI --      Hemoglobin A1C, Whole Blood: 7.7 % <H> [4.0 - 5.6] (01-15-18 @ 22:22) CONTACT INFO:  Maynor Chaney M.D.  PGY-1 | Preliminary Resident  Department of Internal Medicine  [phone] (NS) | [id number] ([place])    Chief Complaint: DKA    Interval Events: Patient transitioned from insulin gtt to SQ. Patient advanced to full diet and was able to eat Cheerios for breakfast but still does not have a big appetite. Patient required 2 units of correctional insulin this AM for FS of 208. Patient was seen and examined at the bedside. She appears much more comfortable, stating her abdominal pain is tolerable.  The remaining ROS was negative. Patient pending transfer to a general medical floor.     MEDICATIONS  (STANDING):  dextrose 5%. 1000 milliLiter(s) (50 mL/Hr) IV Continuous <Continuous>  dextrose 50% Injectable 12.5 Gram(s) IV Push once  dextrose 50% Injectable 25 Gram(s) IV Push once  dextrose 50% Injectable 25 Gram(s) IV Push once  docusate sodium 100 milliGRAM(s) Oral two times a day  enoxaparin Injectable 40 milliGRAM(s) SubCutaneous every 24 hours  insulin glargine Injectable (LANTUS) 12 Unit(s) SubCutaneous at bedtime  insulin lispro (HumaLOG) corrective regimen sliding scale   SubCutaneous three times a day before meals  insulin lispro (HumaLOG) corrective regimen sliding scale   SubCutaneous at bedtime  insulin lispro Injectable (HumaLOG) 2 Unit(s) SubCutaneous three times a day before meals  senna 2 Tablet(s) Oral at bedtime  traZODone 200 milliGRAM(s) Oral at bedtime    MEDICATIONS  (PRN):  acetaminophen   Tablet. 650 milliGRAM(s) Oral every 6 hours PRN mild to moderate pain  dextrose Gel 1 Dose(s) Oral once PRN Blood Glucose LESS THAN 70 milliGRAM(s)/deciliter  glucagon  Injectable 1 milliGRAM(s) IntraMuscular once PRN Glucose LESS THAN 70 milligrams/deciliter  LORazepam     Tablet 0.5 milliGRAM(s) Oral two times a day PRN Anxiety  morphine  - Injectable 2 milliGRAM(s) IV Push every 3 hours PRN moderate to severe pain  ondansetron Injectable 4 milliGRAM(s) IV Push every 6 hours PRN Nausea and/or Vomiting      Allergies    Bactrim (Anaphylaxis)    Intolerances        PHYSICAL EXAM:  VITALS: T(C): 37.8 (01-17-18 @ 08:00)  T(F): 100 (01-17-18 @ 08:00), Max: 100 (01-17-18 @ 08:00)  HR: 67 (01-17-18 @ 09:00) (60 - 78)  BP: 125/76 (01-17-18 @ 09:00) (125/76 - 162/91)  RR:  (15 - 32)  SpO2:  (95% - 99%)  Wt(kg): --    GENERAL: NAD, well-groomed, well-developed  EYES: No proptosis, no lid lag, anicteric  HEENT:  Atraumatic, Normocephalic, moist mucous membranes, thyroid without nodules  RESPIRATORY: Clear to auscultation bilaterally; No rales, rhonchi, wheezing  CARDIOVASCULAR: Regular rate and rhythm; No murmurs; no peripheral edema  GI: ttp epigastrium  SKIN: Dry, intact, No rashes or lesions on feet b/l  MUSCULOSKELETAL: Full range of motion, normal strength  NEURO: sensation intact, extraocular movements intact, no tremor  PSYCH: Alert and oriented x 3, +reactive affect, +euthymic mood    POCT Blood Glucose.: 208 mg/dL (01-17-18 @ 09:22)  POCT Blood Glucose.: 179 mg/dL (01-16-18 @ 21:27)  POCT Blood Glucose.: 142 mg/dL (01-16-18 @ 20:04)  POCT Blood Glucose.: 162 mg/dL (01-16-18 @ 18:51)  POCT Blood Glucose.: 145 mg/dL (01-16-18 @ 17:50)  POCT Blood Glucose.: 157 mg/dL (01-16-18 @ 16:57)  POCT Blood Glucose.: 170 mg/dL (01-16-18 @ 16:03)  POCT Blood Glucose.: 167 mg/dL (01-16-18 @ 15:22)  POCT Blood Glucose.: 176 mg/dL (01-16-18 @ 14:02)  POCT Blood Glucose.: 170 mg/dL (01-16-18 @ 13:00)  POCT Blood Glucose.: 177 mg/dL (01-16-18 @ 11:57)  POCT Blood Glucose.: 165 mg/dL (01-16-18 @ 11:03)  POCT Blood Glucose.: 176 mg/dL (01-16-18 @ 10:01)  POCT Blood Glucose.: 165 mg/dL (01-16-18 @ 09:03)  POCT Blood Glucose.: 120 mg/dL (01-16-18 @ 07:49)  POCT Blood Glucose.: 129 mg/dL (01-16-18 @ 06:30)  POCT Blood Glucose.: 146 mg/dL (01-16-18 @ 04:31)  POCT Blood Glucose.: 141 mg/dL (01-16-18 @ 03:00)  POCT Blood Glucose.: 164 mg/dL (01-16-18 @ 01:50)  POCT Blood Glucose.: 191 mg/dL (01-16-18 @ 01:02)  POCT Blood Glucose.: 222 mg/dL (01-15-18 @ 23:45)  POCT Blood Glucose.: 172 mg/dL (01-15-18 @ 21:46)  POCT Blood Glucose.: 172 mg/dL (01-15-18 @ 20:48)  POCT Blood Glucose.: 163 mg/dL (01-15-18 @ 19:42)  POCT Blood Glucose.: 119 mg/dL (01-15-18 @ 18:35)  POCT Blood Glucose.: 74 mg/dL (01-15-18 @ 17:40)  POCT Blood Glucose.: 119 mg/dL (01-15-18 @ 16:39)  POCT Blood Glucose.: 181 mg/dL (01-15-18 @ 15:48)  POCT Blood Glucose.: 212 mg/dL (01-15-18 @ 14:43)  POCT Blood Glucose.: 337 mg/dL (01-15-18 @ 11:21)      01-17    137  |  102  |  6<L>  ----------------------------<  163<H>  3.9   |  25  |  0.53    EGFR if : 133  EGFR if non : 115    Ca    8.8      01-17  Mg     1.9     01-17  Phos  2.7     01-17    TPro  6.1  /  Alb  3.0<L>  /  TBili  0.8  /  DBili  x   /  AST  12  /  ALT  11  /  AlkPhos  68  01-17          Thyroid Function Tests:  12-25 @ 15:36 TSH 0.71 FreeT4 -- T3 -- Anti TPO -- Anti Thyroglobulin Ab -- TSI --      Hemoglobin A1C, Whole Blood: 7.7 % <H> [4.0 - 5.6] (01-15-18 @ 22:22)

## 2018-01-17 NOTE — CHART NOTE - NSCHARTNOTEFT_GEN_A_CORE
47F w/ DM2 w/ hx of DKA on lantus, EtOH dependence, hx of MRSA gluteal abscess and cellulitis (Sep2017), & MDD presents to [facility] for nausea & vomiting and found to have DKA and pancreatitis and therefore admitted to MICU for further management w/ insulin gtt. Endocrinology was consulted and the patient was successfully titrated to subcutaneous insulin. Patient was found to have abdominal pain and elevated lipase to approx 600 and therefore was treated w/ IV fluid and pain control for acute pancreatitis. RUQ ultrasound documented cholelithiasis w/o signs of acute cholecystitis which is presumed source of pancreatitis. Lipid profile is pending. Of note, patient was found to have new erythematous facial rash concerning for malar distribution w/ family hx of SLE and therefore DEEP was sent and is pending however no other signs of SLE. Sertraline and topiramate initially held due to possible role in pancreatitis but on med review patient taking appropriately and therefore restarted. Patient evaluated and deemed stable for transfer to medicine floor as she is tolerating PO intake and abdominal pain is resolving.    DKA (Resolved) - likely 2/2 pancreatitis  - initial AG 25, HCO3 18, Serum Glu 337, Beta Hydroxybuturate 3.4, VBG pH 7.30, lactate 2.6  - S/p insulin drip now on lantus, humalog 2u qmeals, ISS  -   - Endo consult appreciated. c/w basal/bolus regimen    Gallstone Pancreatitis  - likely 2/2 cholelithiasis ve EtOH abuse. cholelithiasis on RUQ US. CT scan appreciated. patient tolerating PO and is encouraged to take po intake. Lipid profile from admission still pending result    #MDD  - c/w trazadone and ativan. pt will restart sertraline and topiramate and pancreatitis has resolved and it is likely 2/2 cholelithiasis vs EtOH abuse. Will need outpatient surgical f/u to evaluate for elective cholecystectomy.  - No SI/HI currently, per chart attempted suicide as teenager  - iSTOP#[id number] 47F w/ DM2 w/ hx of DKA on lantus, EtOH dependence, hx of MRSA gluteal abscess and cellulitis (Sep2017), & MDD presents to [facility] for nausea & vomiting and found to have DKA and pancreatitis and therefore admitted to MICU for further management w/ insulin gtt. Endocrinology was consulted and the patient was successfully titrated to subcutaneous insulin. Patient was found to have abdominal pain and elevated lipase to approx 600 and therefore was treated w/ IV fluid and pain control for acute pancreatitis. RUQ ultrasound documented cholelithiasis w/o signs of acute cholecystitis which is presumed source of pancreatitis. Lipid profile is pending. Of note, patient was found to have new erythematous facial rash concerning for malar distribution w/ family hx of SLE and therefore DEEP was sent and is pending however no other signs of SLE. Sertraline and topiramate initially held due to possible role in pancreatitis but on med review patient taking appropriately and therefore restarted. Patient evaluated and deemed stable for transfer to medicine floor as she is tolerating PO intake and abdominal pain is resolving.    DKA (Resolved) - likely 2/2 pancreatitis  - initial AG 25, HCO3 18, Serum Glu 337, Beta Hydroxybuturate 3.4, VBG pH 7.30, lactate 2.6  - S/p insulin drip now on lantus, humalog 2u qmeals, ISS  - Endo consulted    Gallstone Pancreatitis  - likely 2/2 cholelithiasis vs EtOH abuse. cholelithiasis on RUQ US.   - Tolerating PO  - 3mg IV morphine PRN  - F/u lipid panel (pending)  - Will need outpatient surgical f/u to evaluate for elective cholecystectomy    MDD  - c/w trazadone and clonazepam  - c/w sertraline and topiramate   - No SI/HI currently, per chart attempted suicide as teenager    Bob Ash - PGY3  [phone] / [id number]

## 2018-01-17 NOTE — PROGRESS NOTE ADULT - ASSESSMENT
47F w/ DM2 w/ hx of DKA on lantus, EtOH dependence, hx of MRSA gluteal abscess and cellulitis (Sep2017), & MDD presents to [facility] for nausea & vomiting and found to have DKA and pancreatitis and therefore admitted to MICU for further management w/ insulin gtt.    --Neuro/Psych  #MDD  - c/w trazadone and ativan. patient will restart sertraline and topiramate and pancreatitis has resolved and it is likely 2/2 cholelithiasis vs EtOH abuse  - No SI/HI currently, per chart attempted suicide as teenager  - iSTOP#[id number]   #Family Hx of cerebral aneurysm- should be followed up by medicine team following treatment for acute illness    --Cardiovascular  - no acute pathology or history of disease. EKG wnl and CE wnl    --Pulm  - no acute pathology    --GI  #Acute Pancreatitis  - likely 2/2 cholelithiasis ve EtOH abuse. cholelithiasis on RUQ US. CT scan appreciated. patient tolerating PO and is encouraaged to take po intake. Lipid profile from admission still pending result    --Endo  #DM2 w/ DKA  - initial AG 25, HCO3 18, Serum Glu 337, Beta Hydroxybuturate 3.4, VBG pH 7.30, lactate 2.6  - ED started insulin gtt, will continue per DKA protocol. will start D5 given glu<200  - Endo following  - Unclear source of DKA however likely pancreatitis. get CE, EKG to r/o cardiac, RVP. UA is neg/ get tox screen.     --Heme  -no acute pathology  --ID  -afebrile, no source for infection suspected. will monitor off abx at this time. Will get RVP given n/v  - Note hx of MRS Left gluteal abscess  - patient agreeable to screen HIV  --Derm  - noted rash on face. given history of 1st cousin w/ SLE may consider DEEP however no other signs of SLE.    --PPX  lovenox for dvt ppx 47F w/ DM2 w/ hx of DKA on lantus, EtOH dependence, hx of MRSA gluteal abscess and cellulitis (Sep2017), & MDD presents to [facility] for nausea & vomiting and found to have DKA and pancreatitis and therefore admitted to MICU for further management w/ insulin gtt and now has had transition to subcutaneous insulin w/ resolving pancreatitis tolerating PO intake    --Neuro/Psych  #MDD  - c/w trazadone and ativan. pt will restart sertraline and topiramate and pancreatitis has resolved and it is likely 2/2 cholelithiasis vs EtOH abuse. Will need outpatient surgical f/u to evaluate for elective cholecystectomy.  - No SI/HI currently, per chart attempted suicide as teenager  - iSTOP#[id number]   #Family Hx of cerebral aneurysm- should be followed up by medicine team following treatment for acute illness    --Cardiovascular  - no acute pathology or history of disease. EKG wnl and CE wnl    --Pulm  - no acute pathology    --GI  #Acute Pancreatitis  - likely 2/2 cholelithiasis ve EtOH abuse. cholelithiasis on RUQ US. CT scan appreciated. patient tolerating PO and is encouraged to take po intake. Lipid profile from admission still pending result    --Endo  #DM2 w/ DKA  - initial AG 25, HCO3 18, Serum Glu 337, Beta Hydroxybuturate 3.4, VBG pH 7.30, lactate 2.6  - Endo consult appreciated. c/w basal/bolus regimen  - Unclear source of DKA however likely pancreatitis. EKG & CE wnl, UA tox neg. afebrile since admission and unlikely infectious etiology.    --Heme  -no acute pathology  --ID  - non-toxic appearing, no suspicion for infectious etiology at this time  - Note hx of MRS Left gluteal abscess  - HIV negative  --Derm  - noted rash on face on admission w/ malar distribution. DEEP pending result collected initially given family hx of SLE    --PPX  lovenox for dvt ppx      Patient at this time is stable for transfer to floor

## 2018-01-17 NOTE — PROGRESS NOTE ADULT - ASSESSMENT
46 y/o F PMHx T2DM, EtOH abuse, MDD, anxiety p/w nausea, vomiting, abdominal pain, reduced PO intake, and palpitations, found to have DKA in the setting of pancreatitis, most likely 2/2 EtOH abuse. Currently on insulin gtt 1 unit/hr, pending transition to SQ insulin. Patient's AG most recently is 11. 46 y/o F PMHx T2DM, EtOH abuse, MDD, anxiety p/w nausea, vomiting, abdominal pain, reduced PO intake, and palpitations, found to have DKA in the setting of pancreatitis, most likely 2/2 EtOH abuse. S/p transition to SQ insulin, pending transfer to [facility].

## 2018-01-17 NOTE — PROGRESS NOTE ADULT - SUBJECTIVE AND OBJECTIVE BOX
CHIEF COMPLAINT: Patient is a 47y old  Female who presents with a chief complaint of Nausea and vomiting (15 Paul 2018 16:30)    Interval Events: No acute events overnight    REVIEW OF SYSTEMS:      OBJECTIVE:  ICU Vital Signs Last 24 Hrs  T(C): 36.8 (17 Jan 2018 04:00), Max: 37.2 (16 Jan 2018 08:00)  T(F): 98.3 (17 Jan 2018 04:00), Max: 98.9 (16 Jan 2018 08:00)  HR: 75 (17 Jan 2018 06:00) (60 - 75)  BP: 143/78 (17 Jan 2018 06:00) (126/79 - 162/91)  BP(mean): 102 (17 Jan 2018 06:00) (90 - 119)  ABP: --  ABP(mean): --  RR: 17 (17 Jan 2018 06:00) (15 - 32)  SpO2: 98% (17 Jan 2018 06:00) (95% - 100%)        01-16 @ 07:01  -  01-17 @ 07:00  --------------------------------------------------------  IN: 2162 mL / OUT: 2250 mL / NET: -88 mL      CAPILLARY BLOOD GLUCOSE  129 (16 Jan 2018 06:00)      POCT Blood Glucose.: 179 mg/dL (16 Jan 2018 21:27)      PHYSICAL EXAM:      HOSPITAL MEDICATIONS:  MEDICATIONS  (STANDING):  dextrose 5%. 1000 milliLiter(s) (50 mL/Hr) IV Continuous <Continuous>  dextrose 50% Injectable 12.5 Gram(s) IV Push once  dextrose 50% Injectable 25 Gram(s) IV Push once  dextrose 50% Injectable 25 Gram(s) IV Push once  docusate sodium 100 milliGRAM(s) Oral two times a day  enoxaparin Injectable 40 milliGRAM(s) SubCutaneous every 24 hours  insulin glargine Injectable (LANTUS) 12 Unit(s) SubCutaneous at bedtime  insulin lispro (HumaLOG) corrective regimen sliding scale   SubCutaneous three times a day before meals  insulin lispro (HumaLOG) corrective regimen sliding scale   SubCutaneous at bedtime  insulin lispro Injectable (HumaLOG) 2 Unit(s) SubCutaneous three times a day before meals  senna 2 Tablet(s) Oral at bedtime  traZODone 200 milliGRAM(s) Oral at bedtime    MEDICATIONS  (PRN):  dextrose Gel 1 Dose(s) Oral once PRN Blood Glucose LESS THAN 70 milliGRAM(s)/deciliter  glucagon  Injectable 1 milliGRAM(s) IntraMuscular once PRN Glucose LESS THAN 70 milligrams/deciliter  LORazepam     Tablet 0.5 milliGRAM(s) Oral two times a day PRN Anxiety  morphine  - Injectable 2 milliGRAM(s) IV Push every 3 hours PRN moderate to severe pain  ondansetron Injectable 4 milliGRAM(s) IV Push every 6 hours PRN Nausea and/or Vomiting      LABS:  (01-17 @ 03:15)                        13.0  12.9 )-----------( 153                 37.5    Neutrophils = 10.3 (79.7%)  Lymphocytes = 1.5 (11.4%)  Eosinophils = 0.3 (2.0%)  Basophils = 0.1 (0.5%)  Monocytes = 0.8 (6.4%)  Bands = --%    WBC Trend: 12.9<--, 17.0<--, 16.2<--  Hb Trend: 13.0<--, 13.5<--, 14.1<--, 17.4<--  Plt Trend: 153<--, 158<--, 183<--, 245<--  01-17    137  |  102  |  6<L>  ----------------------------<  163<H>  3.9   |  25  |  0.53    Ca    8.8      17 Jan 2018 03:15  Phos  2.7     01-17  Mg     1.9     01-17    TPro  6.1  /  Alb  3.0<L>  /  TBili  0.8  /  DBili  x   /  AST  12  /  ALT  11  /  AlkPhos  68  01-17    Creatinine Trend: 0.53<--, 0.52<--, 0.51<--, 0.48<--, 0.50<--, 0.55<--  PT/INR - ( 17 Jan 2018 03:15 )   PT: 12.5 sec;   INR: 1.14 ratio         PTT - ( 17 Jan 2018 03:15 )  PTT:32.3 sec  Urinalysis Basic - ( 15 Paul 2018 15:28 )    Color: Yellow / Appearance: SL Turbid / SG: >1.030 / pH: x  Gluc: x / Ketone: Large  / Bili: Small / Urobili: 1 mg/dL   Blood: x / Protein: 100 mg/dL / Nitrite: Negative   Leuk Esterase: Negative / RBC: 0-2 /HPF / WBC 0-2 /HPF   Sq Epi: x / Non Sq Epi: OCC /HPF / Bacteria: Few /HPF    Venous Blood Gas:  01-16 @ 20:34  7.38/47/23/28/34  VBG Lactate: 2.0  Venous Blood Gas:  01-16 @ 13:30  7.41/42/34/26/65  VBG Lactate: 1.7  Venous Blood Gas:  01-16 @ 09:17  7.41/40/36/24/72  VBG Lactate: 1.6  Venous Blood Gas:  01-16 @ 05:31  7.44/37/39/25/76  VBG Lactate: 1.7  Venous Blood Gas:  01-15 @ 22:31  7.39/41/30/24/57  VBG Lactate: 1.1  Venous Blood Gas:  01-15 @ 18:40  7.35/43/26/23/43  VBG Lactate: 1.2  Venous Blood Gas:  01-15 @ 11:32  7.30/37/30/18/50  VBG Lactate: 2.6    CARDIAC MARKERS ( 16 Jan 2018 13:35 )  Trop <0.01 ng/mL / CK 28 U/L / CKMB x       CARDIAC MARKERS ( 16 Jan 2018 09:23 )  Trop <0.01 ng/mL / CK 19 U/L<L> / CKMB x       CARDIAC MARKERS ( 15 Paul 2018 18:45 )  Trop <0.01 ng/mL / CK 19 U/L<L> / CKMB x         Lipid profile pending  DEEP pending      MICROBIOLOGY:   Blood Cx:  Urine Cx:  Sputum Cx:  Legionella:  RVP:    RADIOLOGY:  X Ray:  CT:  < from: CT Abdomen and Pelvis w/ IV Cont (01.16.18 @ 13:45) >  FINDINGS:  LOWER CHEST: Bilateral lower lobe subsegmental platelike atelectasis.  LIVER: Hepatic steatosis  BILE DUCTS: Normal caliber.  GALLBLADDER: Distended with stones.  SPLEEN: Within normal limits.  PANCREAS: Mild peripancreatic fat stranding and peripancreatic fluid   consistent with acute pancreatitis. No discrete collection or evidence of   necrosis.  ADRENALS: Within normal limits.  KIDNEYS/URETERS: Within normal limits.  BLADDER: Within normal limits.  REPRODUCTIVE ORGANS: The uterus and adnexa are within normal limits.  BOWEL: No bowel obstruction. Appendix is normal.  PERITONEUM: Small ascites.  VESSELS:  Splenic vein, SMV and portal veins are patent.  RETROPERITONEUM: No lymphadenopathy.  Trace fluid as above.  ABDOMINAL WALL: Within normal limits.  BONES: Within normal limits.    IMPRESSION:   -Interstitial edematous pancreatitis.  -Hepatic steatosis.  -Cholelithiasis.  < end of copied text >    MRI:  Ultrasound:  [ ] Reviewed and interpreted by me    EKG: CHIEF COMPLAINT: Patient is a 47y old  Female who presents with a chief complaint of Nausea and vomiting (15 Paul 2018 16:30)    Interval Events: No acute events overnight    REVIEW OF SYSTEMS:      OBJECTIVE:  ICU Vital Signs Last 24 Hrs  T(C): 36.8 (17 Jan 2018 04:00), Max: 37.2 (16 Jan 2018 08:00)  T(F): 98.3 (17 Jan 2018 04:00), Max: 98.9 (16 Jan 2018 08:00)  HR: 75 (17 Jan 2018 06:00) (60 - 75)  BP: 143/78 (17 Jan 2018 06:00) (126/79 - 162/91)  BP(mean): 102 (17 Jan 2018 06:00) (90 - 119)  ABP: --  ABP(mean): --  RR: 17 (17 Jan 2018 06:00) (15 - 32)  SpO2: 98% (17 Jan 2018 06:00) (95% - 100%)        01-16 @ 07:01  -  01-17 @ 07:00  --------------------------------------------------------  IN: 2162 mL / OUT: 2250 mL / NET: -88 mL      CAPILLARY BLOOD GLUCOSE  129 (16 Jan 2018 06:00)      POCT Blood Glucose.: 179 mg/dL (16 Jan 2018 21:27)      PHYSICAL EXAM:      HOSPITAL MEDICATIONS:  MEDICATIONS  (STANDING):  dextrose 5%. 1000 milliLiter(s) (50 mL/Hr) IV Continuous <Continuous>  dextrose 50% Injectable 12.5 Gram(s) IV Push once  dextrose 50% Injectable 25 Gram(s) IV Push once  dextrose 50% Injectable 25 Gram(s) IV Push once  docusate sodium 100 milliGRAM(s) Oral two times a day  enoxaparin Injectable 40 milliGRAM(s) SubCutaneous every 24 hours  insulin glargine Injectable (LANTUS) 12 Unit(s) SubCutaneous at bedtime  insulin lispro (HumaLOG) corrective regimen sliding scale   SubCutaneous three times a day before meals  insulin lispro (HumaLOG) corrective regimen sliding scale   SubCutaneous at bedtime  insulin lispro Injectable (HumaLOG) 2 Unit(s) SubCutaneous three times a day before meals  senna 2 Tablet(s) Oral at bedtime  traZODone 200 milliGRAM(s) Oral at bedtime    MEDICATIONS  (PRN):  dextrose Gel 1 Dose(s) Oral once PRN Blood Glucose LESS THAN 70 milliGRAM(s)/deciliter  glucagon  Injectable 1 milliGRAM(s) IntraMuscular once PRN Glucose LESS THAN 70 milligrams/deciliter  LORazepam     Tablet 0.5 milliGRAM(s) Oral two times a day PRN Anxiety  morphine  - Injectable 2 milliGRAM(s) IV Push every 3 hours PRN moderate to severe pain  ondansetron Injectable 4 milliGRAM(s) IV Push every 6 hours PRN Nausea and/or Vomiting      LABS:  (01-17 @ 03:15)                        13.0  12.9 )-----------( 153                 37.5    Neutrophils = 10.3 (79.7%)  Lymphocytes = 1.5 (11.4%)  Eosinophils = 0.3 (2.0%)  Basophils = 0.1 (0.5%)  Monocytes = 0.8 (6.4%)  Bands = --%    WBC Trend: 12.9<--, 17.0<--, 16.2<--  Hb Trend: 13.0<--, 13.5<--, 14.1<--, 17.4<--  Plt Trend: 153<--, 158<--, 183<--, 245<--  01-17    137  |  102  |  6<L>  ----------------------------<  163<H>  3.9   |  25  |  0.53    Ca    8.8      17 Jan 2018 03:15  Phos  2.7     01-17  Mg     1.9     01-17    TPro  6.1  /  Alb  3.0<L>  /  TBili  0.8  /  DBili  x   /  AST  12  /  ALT  11  /  AlkPhos  68  01-17    Creatinine Trend: 0.53<--, 0.52<--, 0.51<--, 0.48<--, 0.50<--, 0.55<--  PT/INR - ( 17 Jan 2018 03:15 )   PT: 12.5 sec;   INR: 1.14 ratio         PTT - ( 17 Jan 2018 03:15 )  PTT:32.3 sec  Urinalysis Basic - ( 15 Paul 2018 15:28 )    Color: Yellow / Appearance: SL Turbid / SG: >1.030 / pH: x  Gluc: x / Ketone: Large  / Bili: Small / Urobili: 1 mg/dL   Blood: x / Protein: 100 mg/dL / Nitrite: Negative   Leuk Esterase: Negative / RBC: 0-2 /HPF / WBC 0-2 /HPF   Sq Epi: x / Non Sq Epi: OCC /HPF / Bacteria: Few /HPF    Venous Blood Gas:  01-16 @ 20:34  7.38/47/23/28/34  VBG Lactate: 2.0  Venous Blood Gas:  01-16 @ 13:30  7.41/42/34/26/65  VBG Lactate: 1.7  Venous Blood Gas:  01-16 @ 09:17  7.41/40/36/24/72  VBG Lactate: 1.6  Venous Blood Gas:  01-16 @ 05:31  7.44/37/39/25/76  VBG Lactate: 1.7  Venous Blood Gas:  01-15 @ 22:31  7.39/41/30/24/57  VBG Lactate: 1.1  Venous Blood Gas:  01-15 @ 18:40  7.35/43/26/23/43  VBG Lactate: 1.2  Venous Blood Gas:  01-15 @ 11:32  7.30/37/30/18/50  VBG Lactate: 2.6    CARDIAC MARKERS ( 16 Jan 2018 13:35 )  Trop <0.01 ng/mL / CK 28 U/L / CKMB x       CARDIAC MARKERS ( 16 Jan 2018 09:23 )  Trop <0.01 ng/mL / CK 19 U/L<L> / CKMB x       CARDIAC MARKERS ( 15 Paul 2018 18:45 )  Trop <0.01 ng/mL / CK 19 U/L<L> / CKMB x         Lipid profile(1/15) pending  DEEP pending(1/15) pending  Serum Tox screen (1/15) pending      MICROBIOLOGY:   Blood Cx:  Urine Cx:  Sputum Cx:  Legionella:  RVP:    RADIOLOGY:  X Ray:  CT:  < from: CT Abdomen and Pelvis w/ IV Cont (01.16.18 @ 13:45) >  FINDINGS:  LOWER CHEST: Bilateral lower lobe subsegmental platelike atelectasis.  LIVER: Hepatic steatosis  BILE DUCTS: Normal caliber.  GALLBLADDER: Distended with stones.  SPLEEN: Within normal limits.  PANCREAS: Mild peripancreatic fat stranding and peripancreatic fluid   consistent with acute pancreatitis. No discrete collection or evidence of   necrosis.  ADRENALS: Within normal limits.  KIDNEYS/URETERS: Within normal limits.  BLADDER: Within normal limits.  REPRODUCTIVE ORGANS: The uterus and adnexa are within normal limits.  BOWEL: No bowel obstruction. Appendix is normal.  PERITONEUM: Small ascites.  VESSELS:  Splenic vein, SMV and portal veins are patent.  RETROPERITONEUM: No lymphadenopathy.  Trace fluid as above.  ABDOMINAL WALL: Within normal limits.  BONES: Within normal limits.    IMPRESSION:   -Interstitial edematous pancreatitis.  -Hepatic steatosis.  -Cholelithiasis.  < end of copied text >    MRI:  Ultrasound:  [ ] Reviewed and interpreted by me    EKG: CHIEF COMPLAINT: Patient is a 47y old  Female who presents with a chief complaint of Nausea and vomiting (15 Paul 2018 16:30)    Interval Events: No acute events overnight. patient reports improvement in abdominal pain now 6/10 from 10/10 on admission, muscle pain, w/ 8/10 tolerable pain.     REVIEW OF SYSTEMS:  CONSTITUTIONAL: No fever, no chills  EYES: No eye pain, no visual disturbance  Mouth: no pain in mouth, no cavities  RESPIRATORY: No cough, No sob  CARDIOVASCULAR: No CP, no palpitations  GASTROINTESTINAL: abdominal pain+, no n/v/d  GENITOURINARY: No dysuria, no hematuria  NEUROLOGICAL: No headaches, no weakness  SKIN: No itching, rash+  MUSCULOSKELETAL: No joint pain, no joint swelling    OBJECTIVE:  ICU Vital Signs Last 24 Hrs  T(C): 36.8 (17 Jan 2018 04:00), Max: 37.2 (16 Jan 2018 08:00)  T(F): 98.3 (17 Jan 2018 04:00), Max: 98.9 (16 Jan 2018 08:00)  HR: 75 (17 Jan 2018 06:00) (60 - 75)  BP: 143/78 (17 Jan 2018 06:00) (126/79 - 162/91)  BP(mean): 102 (17 Jan 2018 06:00) (90 - 119)  ABP: --  ABP(mean): --  RR: 17 (17 Jan 2018 06:00) (15 - 32)  SpO2: 98% (17 Jan 2018 06:00) (95% - 100%)        01-16 @ 07:01  -  01-17 @ 07:00  --------------------------------------------------------  IN: 2162 mL / OUT: 2250 mL / NET: -88 mL      CAPILLARY BLOOD GLUCOSE  129 (16 Jan 2018 06:00)      POCT Blood Glucose.: 179 mg/dL (16 Jan 2018 21:27)      PHYSICAL EXAM:  GENERAL: NAD, well-developed  HEAD:  Atraumatic, Normocephalic  EYES: EOMI, PERRLA, conjunctiva and sclera clear  Mouth: MMM, no lesions  NECK: Supple, no appreciable masses  Lung: normal work of breathing, cta b/l  Chest: S1&S2+, rrr, no m/r/g appreciated  ABDOMEN: bs+, soft, mild tenderness to deep palpation diffusely described as soreness improved from admission, nd, no appreciable masses  : No perez catheter, no CVA tenderness  EXTREMITIES:  radial pulse present B/l, PT present b/l, no LE edema b/l. no joint swelling or warmth  Neuro: A&Ox3, no focal deficits  SKIN: erythematous rash on face w/ possible malar distribution? and improving from admission        HOSPITAL MEDICATIONS:  MEDICATIONS  (STANDING):  dextrose 5%. 1000 milliLiter(s) (50 mL/Hr) IV Continuous <Continuous>  dextrose 50% Injectable 12.5 Gram(s) IV Push once  dextrose 50% Injectable 25 Gram(s) IV Push once  dextrose 50% Injectable 25 Gram(s) IV Push once  docusate sodium 100 milliGRAM(s) Oral two times a day  enoxaparin Injectable 40 milliGRAM(s) SubCutaneous every 24 hours  insulin glargine Injectable (LANTUS) 12 Unit(s) SubCutaneous at bedtime  insulin lispro (HumaLOG) corrective regimen sliding scale   SubCutaneous three times a day before meals  insulin lispro (HumaLOG) corrective regimen sliding scale   SubCutaneous at bedtime  insulin lispro Injectable (HumaLOG) 2 Unit(s) SubCutaneous three times a day before meals  senna 2 Tablet(s) Oral at bedtime  traZODone 200 milliGRAM(s) Oral at bedtime    MEDICATIONS  (PRN):  dextrose Gel 1 Dose(s) Oral once PRN Blood Glucose LESS THAN 70 milliGRAM(s)/deciliter  glucagon  Injectable 1 milliGRAM(s) IntraMuscular once PRN Glucose LESS THAN 70 milligrams/deciliter  LORazepam     Tablet 0.5 milliGRAM(s) Oral two times a day PRN Anxiety  morphine  - Injectable 2 milliGRAM(s) IV Push every 3 hours PRN moderate to severe pain  ondansetron Injectable 4 milliGRAM(s) IV Push every 6 hours PRN Nausea and/or Vomiting      LABS:  (01-17 @ 03:15)                        13.0  12.9 )-----------( 153                 37.5    Neutrophils = 10.3 (79.7%)  Lymphocytes = 1.5 (11.4%)  Eosinophils = 0.3 (2.0%)  Basophils = 0.1 (0.5%)  Monocytes = 0.8 (6.4%)  Bands = --%    WBC Trend: 12.9<--, 17.0<--, 16.2<--  Hb Trend: 13.0<--, 13.5<--, 14.1<--, 17.4<--  Plt Trend: 153<--, 158<--, 183<--, 245<--  01-17    137  |  102  |  6<L>  ----------------------------<  163<H>  3.9   |  25  |  0.53    Ca    8.8      17 Jan 2018 03:15  Phos  2.7     01-17  Mg     1.9     01-17    TPro  6.1  /  Alb  3.0<L>  /  TBili  0.8  /  DBili  x   /  AST  12  /  ALT  11  /  AlkPhos  68  01-17    Creatinine Trend: 0.53<--, 0.52<--, 0.51<--, 0.48<--, 0.50<--, 0.55<--  PT/INR - ( 17 Jan 2018 03:15 )   PT: 12.5 sec;   INR: 1.14 ratio         PTT - ( 17 Jan 2018 03:15 )  PTT:32.3 sec  Urinalysis Basic - ( 15 Paul 2018 15:28 )    Color: Yellow / Appearance: SL Turbid / SG: >1.030 / pH: x  Gluc: x / Ketone: Large  / Bili: Small / Urobili: 1 mg/dL   Blood: x / Protein: 100 mg/dL / Nitrite: Negative   Leuk Esterase: Negative / RBC: 0-2 /HPF / WBC 0-2 /HPF   Sq Epi: x / Non Sq Epi: OCC /HPF / Bacteria: Few /HPF    Venous Blood Gas:  01-16 @ 20:34  7.38/47/23/28/34  VBG Lactate: 2.0  Venous Blood Gas:  01-16 @ 13:30  7.41/42/34/26/65  VBG Lactate: 1.7  Venous Blood Gas:  01-16 @ 09:17  7.41/40/36/24/72  VBG Lactate: 1.6  Venous Blood Gas:  01-16 @ 05:31  7.44/37/39/25/76  VBG Lactate: 1.7  Venous Blood Gas:  01-15 @ 22:31  7.39/41/30/24/57  VBG Lactate: 1.1  Venous Blood Gas:  01-15 @ 18:40  7.35/43/26/23/43  VBG Lactate: 1.2  Venous Blood Gas:  01-15 @ 11:32  7.30/37/30/18/50  VBG Lactate: 2.6    CARDIAC MARKERS ( 16 Jan 2018 13:35 )  Trop <0.01 ng/mL / CK 28 U/L / CKMB x       CARDIAC MARKERS ( 16 Jan 2018 09:23 )  Trop <0.01 ng/mL / CK 19 U/L<L> / CKMB x       CARDIAC MARKERS ( 15 Paul 2018 18:45 )  Trop <0.01 ng/mL / CK 19 U/L<L> / CKMB x         Lipid profile(1/15) pending  DEEP pending(1/15) pending  Serum Tox screen (1/15) pending      MICROBIOLOGY:   Blood Cx:  Urine Cx:  Sputum Cx:  Legionella:  RVP:    RADIOLOGY:  X Ray:  CT:  < from: CT Abdomen and Pelvis w/ IV Cont (01.16.18 @ 13:45) >  FINDINGS:  LOWER CHEST: Bilateral lower lobe subsegmental platelike atelectasis.  LIVER: Hepatic steatosis  BILE DUCTS: Normal caliber.  GALLBLADDER: Distended with stones.  SPLEEN: Within normal limits.  PANCREAS: Mild peripancreatic fat stranding and peripancreatic fluid   consistent with acute pancreatitis. No discrete collection or evidence of   necrosis.  ADRENALS: Within normal limits.  KIDNEYS/URETERS: Within normal limits.  BLADDER: Within normal limits.  REPRODUCTIVE ORGANS: The uterus and adnexa are within normal limits.  BOWEL: No bowel obstruction. Appendix is normal.  PERITONEUM: Small ascites.  VESSELS:  Splenic vein, SMV and portal veins are patent.  RETROPERITONEUM: No lymphadenopathy.  Trace fluid as above.  ABDOMINAL WALL: Within normal limits.  BONES: Within normal limits.    IMPRESSION:   -Interstitial edematous pancreatitis.  -Hepatic steatosis.  -Cholelithiasis.  < end of copied text >    MRI:  Ultrasound:  [ ] Reviewed and interpreted by me    EKG:

## 2018-01-17 NOTE — PROGRESS NOTE ADULT - PROBLEM SELECTOR PLAN 4
- Will schedule an appointment at [address] [place] [address] for patient to receive diabetes education and dietary counseling. - Patient has an appointment scheduled at [address] [place] [address]. on next Tuesday, January 23rd at 11:20am with a physicial. Patient must first meet with a provider before seeing a dietician or DM educator. Patient aware of appointment.

## 2018-01-18 DIAGNOSIS — K80.20 CALCULUS OF GALLBLADDER WITHOUT CHOLECYSTITIS WITHOUT OBSTRUCTION: ICD-10-CM

## 2018-01-18 DIAGNOSIS — F32.9 MAJOR DEPRESSIVE DISORDER, SINGLE EPISODE, UNSPECIFIED: ICD-10-CM

## 2018-01-18 DIAGNOSIS — E10.10 TYPE 1 DIABETES MELLITUS WITH KETOACIDOSIS WITHOUT COMA: ICD-10-CM

## 2018-01-18 DIAGNOSIS — K85.90 ACUTE PANCREATITIS WITHOUT NECROSIS OR INFECTION, UNSPECIFIED: ICD-10-CM

## 2018-01-18 DIAGNOSIS — Z29.9 ENCOUNTER FOR PROPHYLACTIC MEASURES, UNSPECIFIED: ICD-10-CM

## 2018-01-18 DIAGNOSIS — F10.99 ALCOHOL USE, UNSPECIFIED WITH UNSPECIFIED ALCOHOL-INDUCED DISORDER: ICD-10-CM

## 2018-01-18 LAB
ALBUMIN SERPL ELPH-MCNC: 3.6 G/DL — SIGNIFICANT CHANGE UP (ref 3.3–5)
ALP SERPL-CCNC: 85 U/L — SIGNIFICANT CHANGE UP (ref 40–120)
ALT FLD-CCNC: 13 U/L RC — SIGNIFICANT CHANGE UP (ref 10–45)
AMPHETAMINES BLD QL SCN: NEGATIVE — SIGNIFICANT CHANGE UP
ANION GAP SERPL CALC-SCNC: 13 MMOL/L — SIGNIFICANT CHANGE UP (ref 5–17)
AST SERPL-CCNC: 17 U/L — SIGNIFICANT CHANGE UP (ref 10–40)
BARBITURATES SERPLBLD QL: NEGATIVE — SIGNIFICANT CHANGE UP
BASOPHILS # BLD AUTO: 0.1 K/UL — SIGNIFICANT CHANGE UP (ref 0–0.2)
BASOPHILS NFR BLD AUTO: 0.5 % — SIGNIFICANT CHANGE UP (ref 0–2)
BENZODIAZAPINES, SERUM: NEGATIVE — SIGNIFICANT CHANGE UP
BILIRUB SERPL-MCNC: 0.8 MG/DL — SIGNIFICANT CHANGE UP (ref 0.2–1.2)
BUN SERPL-MCNC: 10 MG/DL — SIGNIFICANT CHANGE UP (ref 7–23)
CALCIUM SERPL-MCNC: 9.4 MG/DL — SIGNIFICANT CHANGE UP (ref 8.4–10.5)
CANNABINOIDS SERPLBLD QL SCN: NEGATIVE — SIGNIFICANT CHANGE UP
CHLORIDE SERPL-SCNC: 100 MMOL/L — SIGNIFICANT CHANGE UP (ref 96–108)
CO2 SERPL-SCNC: 24 MMOL/L — SIGNIFICANT CHANGE UP (ref 22–31)
COCAINE+BZE SERPLBLD QL SCN: NEGATIVE — SIGNIFICANT CHANGE UP
CREAT SERPL-MCNC: 0.69 MG/DL — SIGNIFICANT CHANGE UP (ref 0.5–1.3)
EOSINOPHIL # BLD AUTO: 0.2 K/UL — SIGNIFICANT CHANGE UP (ref 0–0.5)
EOSINOPHIL NFR BLD AUTO: 2.2 % — SIGNIFICANT CHANGE UP (ref 0–6)
GLUCOSE BLDC GLUCOMTR-MCNC: 130 MG/DL — HIGH (ref 70–99)
GLUCOSE BLDC GLUCOMTR-MCNC: 145 MG/DL — HIGH (ref 70–99)
GLUCOSE BLDC GLUCOMTR-MCNC: 149 MG/DL — HIGH (ref 70–99)
GLUCOSE BLDC GLUCOMTR-MCNC: 172 MG/DL — HIGH (ref 70–99)
GLUCOSE SERPL-MCNC: 202 MG/DL — HIGH (ref 70–99)
HCG UR QL: NEGATIVE — SIGNIFICANT CHANGE UP
HCT VFR BLD CALC: 41.3 % — SIGNIFICANT CHANGE UP (ref 34.5–45)
HGB BLD-MCNC: 14.8 G/DL — SIGNIFICANT CHANGE UP (ref 11.5–15.5)
LYMPHOCYTES # BLD AUTO: 1.5 K/UL — SIGNIFICANT CHANGE UP (ref 1–3.3)
LYMPHOCYTES # BLD AUTO: 14 % — SIGNIFICANT CHANGE UP (ref 13–44)
MAGNESIUM SERPL-MCNC: 2.1 MG/DL — SIGNIFICANT CHANGE UP (ref 1.6–2.6)
MCHC RBC-ENTMCNC: 34.6 PG — HIGH (ref 27–34)
MCHC RBC-ENTMCNC: 35.8 GM/DL — SIGNIFICANT CHANGE UP (ref 32–36)
MCV RBC AUTO: 96.5 FL — SIGNIFICANT CHANGE UP (ref 80–100)
METHADONE SERPL-MCNC: NEGATIVE — SIGNIFICANT CHANGE UP
MONOCYTES # BLD AUTO: 0.7 K/UL — SIGNIFICANT CHANGE UP (ref 0–0.9)
MONOCYTES NFR BLD AUTO: 6.9 % — SIGNIFICANT CHANGE UP (ref 2–14)
NEUTROPHILS # BLD AUTO: 8.1 K/UL — HIGH (ref 1.8–7.4)
NEUTROPHILS NFR BLD AUTO: 76.4 % — SIGNIFICANT CHANGE UP (ref 43–77)
OPIATES SERPL QL: NEGATIVE — SIGNIFICANT CHANGE UP
PCP BLD QL SCN: NEGATIVE — SIGNIFICANT CHANGE UP
PHOSPHATE SERPL-MCNC: 3.7 MG/DL — SIGNIFICANT CHANGE UP (ref 2.5–4.5)
PLATELET # BLD AUTO: 215 K/UL — SIGNIFICANT CHANGE UP (ref 150–400)
POTASSIUM SERPL-MCNC: 3.9 MMOL/L — SIGNIFICANT CHANGE UP (ref 3.5–5.3)
POTASSIUM SERPL-SCNC: 3.9 MMOL/L — SIGNIFICANT CHANGE UP (ref 3.5–5.3)
PROT SERPL-MCNC: 7.5 G/DL — SIGNIFICANT CHANGE UP (ref 6–8.3)
RBC # BLD: 4.29 M/UL — SIGNIFICANT CHANGE UP (ref 3.8–5.2)
RBC # FLD: 10.8 % — SIGNIFICANT CHANGE UP (ref 10.3–14.5)
SODIUM SERPL-SCNC: 137 MMOL/L — SIGNIFICANT CHANGE UP (ref 135–145)
WBC # BLD: 10.6 K/UL — HIGH (ref 3.8–10.5)
WBC # FLD AUTO: 10.6 K/UL — HIGH (ref 3.8–10.5)

## 2018-01-18 PROCEDURE — 99232 SBSQ HOSP IP/OBS MODERATE 35: CPT

## 2018-01-18 PROCEDURE — 99233 SBSQ HOSP IP/OBS HIGH 50: CPT | Mod: GC

## 2018-01-18 PROCEDURE — 99232 SBSQ HOSP IP/OBS MODERATE 35: CPT | Mod: GC

## 2018-01-18 RX ORDER — MORPHINE SULFATE 50 MG/1
3 CAPSULE, EXTENDED RELEASE ORAL ONCE
Qty: 0 | Refills: 0 | Status: DISCONTINUED | OUTPATIENT
Start: 2018-01-18 | End: 2018-01-18

## 2018-01-18 RX ADMIN — MORPHINE SULFATE 3 MILLIGRAM(S): 50 CAPSULE, EXTENDED RELEASE ORAL at 23:36

## 2018-01-18 RX ADMIN — MORPHINE SULFATE 3 MILLIGRAM(S): 50 CAPSULE, EXTENDED RELEASE ORAL at 10:50

## 2018-01-18 RX ADMIN — SENNA PLUS 2 TABLET(S): 8.6 TABLET ORAL at 22:13

## 2018-01-18 RX ADMIN — Medication 1: at 18:06

## 2018-01-18 RX ADMIN — Medication 200 MILLIGRAM(S): at 22:13

## 2018-01-18 RX ADMIN — ENOXAPARIN SODIUM 40 MILLIGRAM(S): 100 INJECTION SUBCUTANEOUS at 05:41

## 2018-01-18 RX ADMIN — Medication 100 MILLIGRAM(S): at 18:07

## 2018-01-18 RX ADMIN — SERTRALINE 100 MILLIGRAM(S): 25 TABLET, FILM COATED ORAL at 13:08

## 2018-01-18 RX ADMIN — MORPHINE SULFATE 3 MILLIGRAM(S): 50 CAPSULE, EXTENDED RELEASE ORAL at 18:07

## 2018-01-18 RX ADMIN — MORPHINE SULFATE 3 MILLIGRAM(S): 50 CAPSULE, EXTENDED RELEASE ORAL at 06:31

## 2018-01-18 RX ADMIN — MORPHINE SULFATE 3 MILLIGRAM(S): 50 CAPSULE, EXTENDED RELEASE ORAL at 20:36

## 2018-01-18 RX ADMIN — MORPHINE SULFATE 3 MILLIGRAM(S): 50 CAPSULE, EXTENDED RELEASE ORAL at 18:30

## 2018-01-18 RX ADMIN — INSULIN GLARGINE 15 UNIT(S): 100 INJECTION, SOLUTION SUBCUTANEOUS at 22:12

## 2018-01-18 RX ADMIN — Medication 2 UNIT(S): at 13:07

## 2018-01-18 RX ADMIN — MORPHINE SULFATE 3 MILLIGRAM(S): 50 CAPSULE, EXTENDED RELEASE ORAL at 21:06

## 2018-01-18 RX ADMIN — Medication 50 MILLIGRAM(S): at 13:08

## 2018-01-18 RX ADMIN — Medication 2 UNIT(S): at 09:14

## 2018-01-18 RX ADMIN — MORPHINE SULFATE 3 MILLIGRAM(S): 50 CAPSULE, EXTENDED RELEASE ORAL at 15:15

## 2018-01-18 RX ADMIN — Medication 100 MILLIGRAM(S): at 05:41

## 2018-01-18 RX ADMIN — Medication 2 UNIT(S): at 18:07

## 2018-01-18 RX ADMIN — MORPHINE SULFATE 3 MILLIGRAM(S): 50 CAPSULE, EXTENDED RELEASE ORAL at 14:56

## 2018-01-18 RX ADMIN — MORPHINE SULFATE 3 MILLIGRAM(S): 50 CAPSULE, EXTENDED RELEASE ORAL at 11:05

## 2018-01-18 NOTE — PROGRESS NOTE ADULT - PROBLEM SELECTOR PLAN 4
- remote h/o suicide attempt; no active SI/HI at this time  - sertraline 100 qd and topiramate 50 qd restarted 1/17 now that pancreatitis resolving  - clonazepam 0.5 TID PRN for anxiety
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
## 2018-01-18 NOTE — PROGRESS NOTE ADULT - SUBJECTIVE AND OBJECTIVE BOX
CONTACT INFO:  Maynor Chaney M.D.  PGY-1 | Preliminary Resident  Department of Internal Medicine  [phone] (NS) | [id number] ([place])    Chief Complaint:     Interval Events: Finger sticks generally remained at goal (<180) over the past 24 hours with the exception of yesterday morning at 09:35 before breakfast, when sugars were 208 and 2 units correction were given. Lantus was increased to 15 in light of elevated fasting sugars in the AM.  Patient was seen and examined at the bedside. Patient complaint of ___. Patient denied ____. The remaining ROS was negative.    MEDICATIONS  (STANDING):  dextrose 5%. 1000 milliLiter(s) (50 mL/Hr) IV Continuous <Continuous>  dextrose 50% Injectable 12.5 Gram(s) IV Push once  dextrose 50% Injectable 25 Gram(s) IV Push once  dextrose 50% Injectable 25 Gram(s) IV Push once  docusate sodium 100 milliGRAM(s) Oral two times a day  enoxaparin Injectable 40 milliGRAM(s) SubCutaneous every 24 hours  insulin glargine Injectable (LANTUS) 15 Unit(s) SubCutaneous at bedtime  insulin lispro (HumaLOG) corrective regimen sliding scale   SubCutaneous three times a day before meals  insulin lispro (HumaLOG) corrective regimen sliding scale   SubCutaneous at bedtime  insulin lispro Injectable (HumaLOG) 2 Unit(s) SubCutaneous three times a day before meals  senna 2 Tablet(s) Oral at bedtime  sertraline 100 milliGRAM(s) Oral daily  topiramate 50 milliGRAM(s) Oral daily  traZODone 200 milliGRAM(s) Oral at bedtime    MEDICATIONS  (PRN):  acetaminophen   Tablet. 650 milliGRAM(s) Oral every 6 hours PRN mild to moderate pain  clonazePAM Tablet 0.5 milliGRAM(s) Oral three times a day PRN Anxiety  dextrose Gel 1 Dose(s) Oral once PRN Blood Glucose LESS THAN 70 milliGRAM(s)/deciliter  glucagon  Injectable 1 milliGRAM(s) IntraMuscular once PRN Glucose LESS THAN 70 milligrams/deciliter  morphine  - Injectable 3 milliGRAM(s) IV Push every 3 hours PRN moderate to severe pain  ondansetron Injectable 4 milliGRAM(s) IV Push every 6 hours PRN Nausea and/or Vomiting  simethicone 80 milliGRAM(s) Chew two times a day PRN Dyspepsia      Allergies    Bactrim (Anaphylaxis)    Intolerances        PHYSICAL EXAM:  VITALS: T(C): 37.2 (01-18-18 @ 04:45)  T(F): 98.9 (01-18-18 @ 04:45), Max: 100 (01-17-18 @ 08:00)  HR: 76 (01-18-18 @ 04:45) (60 - 91)  BP: 141/81 (01-18-18 @ 04:45) (125/76 - 157/85)  RR:  (17 - 28)  SpO2:  (96% - 99%)  Wt(kg): --    GENERAL: NAD, well-groomed, well-developed  EYES: No proptosis, no lid lag, anicteric  HEENT:  Atraumatic, Normocephalic, moist mucous membranes, thyroid without nodules  RESPIRATORY: Clear to auscultation bilaterally; No rales, rhonchi, wheezing  CARDIOVASCULAR: Regular rate and rhythm; No murmurs; no peripheral edema  GI: ttp epigastrium  SKIN: Dry, intact, No rashes or lesions on feet b/l  MUSCULOSKELETAL: Full range of motion, normal strength  NEURO: sensation intact, extraocular movements intact, no tremor  PSYCH: Alert and oriented x 3, +reactive affect, +euthymic mood        POCT Blood Glucose.: 163 mg/dL (01-17-18 @ 21:35)  POCT Blood Glucose.: 162 mg/dL (01-17-18 @ 20:10)  POCT Blood Glucose.: 146 mg/dL (01-17-18 @ 17:35)  POCT Blood Glucose.: 134 mg/dL (01-17-18 @ 13:32)  POCT Blood Glucose.: 208 mg/dL (01-17-18 @ 09:22)  POCT Blood Glucose.: 179 mg/dL (01-16-18 @ 21:27)  POCT Blood Glucose.: 142 mg/dL (01-16-18 @ 20:04)  POCT Blood Glucose.: 162 mg/dL (01-16-18 @ 18:51)  POCT Blood Glucose.: 145 mg/dL (01-16-18 @ 17:50)  POCT Blood Glucose.: 157 mg/dL (01-16-18 @ 16:57)  POCT Blood Glucose.: 170 mg/dL (01-16-18 @ 16:03)  POCT Blood Glucose.: 167 mg/dL (01-16-18 @ 15:22)  POCT Blood Glucose.: 176 mg/dL (01-16-18 @ 14:02)  POCT Blood Glucose.: 170 mg/dL (01-16-18 @ 13:00)  POCT Blood Glucose.: 177 mg/dL (01-16-18 @ 11:57)  POCT Blood Glucose.: 165 mg/dL (01-16-18 @ 11:03)  POCT Blood Glucose.: 176 mg/dL (01-16-18 @ 10:01)  POCT Blood Glucose.: 165 mg/dL (01-16-18 @ 09:03)  POCT Blood Glucose.: 120 mg/dL (01-16-18 @ 07:49)  POCT Blood Glucose.: 129 mg/dL (01-16-18 @ 06:30)  POCT Blood Glucose.: 146 mg/dL (01-16-18 @ 04:31)  POCT Blood Glucose.: 141 mg/dL (01-16-18 @ 03:00)  POCT Blood Glucose.: 164 mg/dL (01-16-18 @ 01:50)  POCT Blood Glucose.: 191 mg/dL (01-16-18 @ 01:02)  POCT Blood Glucose.: 222 mg/dL (01-15-18 @ 23:45)  POCT Blood Glucose.: 172 mg/dL (01-15-18 @ 21:46)  POCT Blood Glucose.: 172 mg/dL (01-15-18 @ 20:48)  POCT Blood Glucose.: 163 mg/dL (01-15-18 @ 19:42)  POCT Blood Glucose.: 119 mg/dL (01-15-18 @ 18:35)  POCT Blood Glucose.: 74 mg/dL (01-15-18 @ 17:40)  POCT Blood Glucose.: 119 mg/dL (01-15-18 @ 16:39)  POCT Blood Glucose.: 181 mg/dL (01-15-18 @ 15:48)  POCT Blood Glucose.: 212 mg/dL (01-15-18 @ 14:43)  POCT Blood Glucose.: 337 mg/dL (01-15-18 @ 11:21)      01-17    137  |  102  |  6<L>  ----------------------------<  163<H>  3.9   |  25  |  0.53    EGFR if : 133  EGFR if non : 115    Ca    8.8      01-17  Mg     1.9     01-17  Phos  2.7     01-17    TPro  6.1  /  Alb  3.0<L>  /  TBili  0.8  /  DBili  x   /  AST  12  /  ALT  11  /  AlkPhos  68  01-17          Thyroid Function Tests:  12-25 @ 15:36 TSH 0.71 FreeT4 -- T3 -- Anti TPO -- Anti Thyroglobulin Ab -- TSI --      Hemoglobin A1C, Whole Blood: 7.7 % <H> [4.0 - 5.6] (01-15-18 @ 22:22) CONTACT INFO:  Maynor Chaney M.D.  PGY-1 | Preliminary Resident  Department of Internal Medicine  [phone] (NS) | [id number] ([place])    Chief Complaint: DKA    Interval Events: Finger sticks generally remained at goal (<180) over the past 24 hours with the exception of yesterday morning at 09:35 before breakfast, when sugars were 208 and 2 units correction were given. Lantus was increased to 15 in light of elevated fasting sugars in the AM.  Patient was seen and examined at the bedside. Patient complaint of ___. Patient denied ____. The remaining ROS was negative.    MEDICATIONS  (STANDING):  dextrose 5%. 1000 milliLiter(s) (50 mL/Hr) IV Continuous <Continuous>  dextrose 50% Injectable 12.5 Gram(s) IV Push once  dextrose 50% Injectable 25 Gram(s) IV Push once  dextrose 50% Injectable 25 Gram(s) IV Push once  docusate sodium 100 milliGRAM(s) Oral two times a day  enoxaparin Injectable 40 milliGRAM(s) SubCutaneous every 24 hours  insulin glargine Injectable (LANTUS) 15 Unit(s) SubCutaneous at bedtime  insulin lispro (HumaLOG) corrective regimen sliding scale   SubCutaneous three times a day before meals  insulin lispro (HumaLOG) corrective regimen sliding scale   SubCutaneous at bedtime  insulin lispro Injectable (HumaLOG) 2 Unit(s) SubCutaneous three times a day before meals  senna 2 Tablet(s) Oral at bedtime  sertraline 100 milliGRAM(s) Oral daily  topiramate 50 milliGRAM(s) Oral daily  traZODone 200 milliGRAM(s) Oral at bedtime    MEDICATIONS  (PRN):  acetaminophen   Tablet. 650 milliGRAM(s) Oral every 6 hours PRN mild to moderate pain  clonazePAM Tablet 0.5 milliGRAM(s) Oral three times a day PRN Anxiety  dextrose Gel 1 Dose(s) Oral once PRN Blood Glucose LESS THAN 70 milliGRAM(s)/deciliter  glucagon  Injectable 1 milliGRAM(s) IntraMuscular once PRN Glucose LESS THAN 70 milligrams/deciliter  morphine  - Injectable 3 milliGRAM(s) IV Push every 3 hours PRN moderate to severe pain  ondansetron Injectable 4 milliGRAM(s) IV Push every 6 hours PRN Nausea and/or Vomiting  simethicone 80 milliGRAM(s) Chew two times a day PRN Dyspepsia      Allergies    Bactrim (Anaphylaxis)    Intolerances        PHYSICAL EXAM:  VITALS: T(C): 37.2 (01-18-18 @ 04:45)  T(F): 98.9 (01-18-18 @ 04:45), Max: 100 (01-17-18 @ 08:00)  HR: 76 (01-18-18 @ 04:45) (60 - 91)  BP: 141/81 (01-18-18 @ 04:45) (125/76 - 157/85)  RR:  (17 - 28)  SpO2:  (96% - 99%)  Wt(kg): --    GENERAL: NAD, well-groomed, well-developed  EYES: No proptosis, no lid lag, anicteric  HEENT:  Atraumatic, Normocephalic, moist mucous membranes, thyroid without nodules  RESPIRATORY: Clear to auscultation bilaterally; No rales, rhonchi, wheezing  CARDIOVASCULAR: Regular rate and rhythm; No murmurs; no peripheral edema  GI: ttp epigastrium  SKIN: Dry, intact, No rashes or lesions on feet b/l  MUSCULOSKELETAL: Full range of motion, normal strength  NEURO: sensation intact, extraocular movements intact, no tremor  PSYCH: Alert and oriented x 3, +reactive affect, +euthymic mood        POCT Blood Glucose.: 163 mg/dL (01-17-18 @ 21:35)  POCT Blood Glucose.: 162 mg/dL (01-17-18 @ 20:10)  POCT Blood Glucose.: 146 mg/dL (01-17-18 @ 17:35)  POCT Blood Glucose.: 134 mg/dL (01-17-18 @ 13:32)  POCT Blood Glucose.: 208 mg/dL (01-17-18 @ 09:22)  POCT Blood Glucose.: 179 mg/dL (01-16-18 @ 21:27)  POCT Blood Glucose.: 142 mg/dL (01-16-18 @ 20:04)  POCT Blood Glucose.: 162 mg/dL (01-16-18 @ 18:51)  POCT Blood Glucose.: 145 mg/dL (01-16-18 @ 17:50)  POCT Blood Glucose.: 157 mg/dL (01-16-18 @ 16:57)  POCT Blood Glucose.: 170 mg/dL (01-16-18 @ 16:03)  POCT Blood Glucose.: 167 mg/dL (01-16-18 @ 15:22)  POCT Blood Glucose.: 176 mg/dL (01-16-18 @ 14:02)  POCT Blood Glucose.: 170 mg/dL (01-16-18 @ 13:00)  POCT Blood Glucose.: 177 mg/dL (01-16-18 @ 11:57)  POCT Blood Glucose.: 165 mg/dL (01-16-18 @ 11:03)  POCT Blood Glucose.: 176 mg/dL (01-16-18 @ 10:01)  POCT Blood Glucose.: 165 mg/dL (01-16-18 @ 09:03)  POCT Blood Glucose.: 120 mg/dL (01-16-18 @ 07:49)  POCT Blood Glucose.: 129 mg/dL (01-16-18 @ 06:30)  POCT Blood Glucose.: 146 mg/dL (01-16-18 @ 04:31)  POCT Blood Glucose.: 141 mg/dL (01-16-18 @ 03:00)  POCT Blood Glucose.: 164 mg/dL (01-16-18 @ 01:50)  POCT Blood Glucose.: 191 mg/dL (01-16-18 @ 01:02)  POCT Blood Glucose.: 222 mg/dL (01-15-18 @ 23:45)  POCT Blood Glucose.: 172 mg/dL (01-15-18 @ 21:46)  POCT Blood Glucose.: 172 mg/dL (01-15-18 @ 20:48)  POCT Blood Glucose.: 163 mg/dL (01-15-18 @ 19:42)  POCT Blood Glucose.: 119 mg/dL (01-15-18 @ 18:35)  POCT Blood Glucose.: 74 mg/dL (01-15-18 @ 17:40)  POCT Blood Glucose.: 119 mg/dL (01-15-18 @ 16:39)  POCT Blood Glucose.: 181 mg/dL (01-15-18 @ 15:48)  POCT Blood Glucose.: 212 mg/dL (01-15-18 @ 14:43)  POCT Blood Glucose.: 337 mg/dL (01-15-18 @ 11:21)      01-17    137  |  102  |  6<L>  ----------------------------<  163<H>  3.9   |  25  |  0.53    EGFR if : 133  EGFR if non : 115    Ca    8.8      01-17  Mg     1.9     01-17  Phos  2.7     01-17    TPro  6.1  /  Alb  3.0<L>  /  TBili  0.8  /  DBili  x   /  AST  12  /  ALT  11  /  AlkPhos  68  01-17          Thyroid Function Tests:  12-25 @ 15:36 TSH 0.71 FreeT4 -- T3 -- Anti TPO -- Anti Thyroglobulin Ab -- TSI --      Hemoglobin A1C, Whole Blood: 7.7 % <H> [4.0 - 5.6] (01-15-18 @ 22:22) CONTACT INFO:  Maynor Chaney M.D.  PGY-1 | Preliminary Resident  Department of Internal Medicine  [phone] ([place]) | [id number] ([place])    Chief Complaint: DKA    Interval Events: Finger sticks generally remained at goal (<180) over the past 24 hours with the exception of yesterday morning at 09:35 before breakfast, when sugars were 208 and 2 units correction were given. Lantus was increased to 15 in light of elevated fasting sugars in the AM.  Patient was seen and examined at the bedside. Patient sitting up in chair and eating breakfast. She says her appetite is better but is still "pecking" at her food. Her abdominal pain is significantly decreased.    MEDICATIONS  (STANDING):  dextrose 5%. 1000 milliLiter(s) (50 mL/Hr) IV Continuous <Continuous>  dextrose 50% Injectable 12.5 Gram(s) IV Push once  dextrose 50% Injectable 25 Gram(s) IV Push once  dextrose 50% Injectable 25 Gram(s) IV Push once  docusate sodium 100 milliGRAM(s) Oral two times a day  enoxaparin Injectable 40 milliGRAM(s) SubCutaneous every 24 hours  insulin glargine Injectable (LANTUS) 15 Unit(s) SubCutaneous at bedtime  insulin lispro (HumaLOG) corrective regimen sliding scale   SubCutaneous three times a day before meals  insulin lispro (HumaLOG) corrective regimen sliding scale   SubCutaneous at bedtime  insulin lispro Injectable (HumaLOG) 2 Unit(s) SubCutaneous three times a day before meals  senna 2 Tablet(s) Oral at bedtime  sertraline 100 milliGRAM(s) Oral daily  topiramate 50 milliGRAM(s) Oral daily  traZODone 200 milliGRAM(s) Oral at bedtime    MEDICATIONS  (PRN):  acetaminophen   Tablet. 650 milliGRAM(s) Oral every 6 hours PRN mild to moderate pain  clonazePAM Tablet 0.5 milliGRAM(s) Oral three times a day PRN Anxiety  dextrose Gel 1 Dose(s) Oral once PRN Blood Glucose LESS THAN 70 milliGRAM(s)/deciliter  glucagon  Injectable 1 milliGRAM(s) IntraMuscular once PRN Glucose LESS THAN 70 milligrams/deciliter  morphine  - Injectable 3 milliGRAM(s) IV Push every 3 hours PRN moderate to severe pain  ondansetron Injectable 4 milliGRAM(s) IV Push every 6 hours PRN Nausea and/or Vomiting  simethicone 80 milliGRAM(s) Chew two times a day PRN Dyspepsia      Allergies    Bactrim (Anaphylaxis)    Intolerances        PHYSICAL EXAM:  VITALS: T(C): 37.2 (01-18-18 @ 04:45)  T(F): 98.9 (01-18-18 @ 04:45), Max: 100 (01-17-18 @ 08:00)  HR: 76 (01-18-18 @ 04:45) (60 - 91)  BP: 141/81 (01-18-18 @ 04:45) (125/76 - 157/85)  RR:  (17 - 28)  SpO2:  (96% - 99%)  Wt(kg): --    GENERAL: NAD, well-groomed, well-developed  EYES: No proptosis, no lid lag, anicteric  HEENT:  Atraumatic, Normocephalic, moist mucous membranes, thyroid without nodules  RESPIRATORY: Clear to auscultation bilaterally; No rales, rhonchi, wheezing  CARDIOVASCULAR: Regular rate and rhythm; No murmurs; no peripheral edema  GI: Mild ttp epigastrium  SKIN: Dry, intact, No rashes or lesions on feet b/l  MUSCULOSKELETAL: Full range of motion, normal strength  NEURO: sensation intact, extraocular movements intact, no tremor  PSYCH: Alert and oriented x 3, +reactive affect, +euthymic mood        POCT Blood Glucose.: 163 mg/dL (01-17-18 @ 21:35)  POCT Blood Glucose.: 162 mg/dL (01-17-18 @ 20:10)  POCT Blood Glucose.: 146 mg/dL (01-17-18 @ 17:35)  POCT Blood Glucose.: 134 mg/dL (01-17-18 @ 13:32)  POCT Blood Glucose.: 208 mg/dL (01-17-18 @ 09:22)  POCT Blood Glucose.: 179 mg/dL (01-16-18 @ 21:27)  POCT Blood Glucose.: 142 mg/dL (01-16-18 @ 20:04)  POCT Blood Glucose.: 162 mg/dL (01-16-18 @ 18:51)  POCT Blood Glucose.: 145 mg/dL (01-16-18 @ 17:50)  POCT Blood Glucose.: 157 mg/dL (01-16-18 @ 16:57)  POCT Blood Glucose.: 170 mg/dL (01-16-18 @ 16:03)  POCT Blood Glucose.: 167 mg/dL (01-16-18 @ 15:22)  POCT Blood Glucose.: 176 mg/dL (01-16-18 @ 14:02)  POCT Blood Glucose.: 170 mg/dL (01-16-18 @ 13:00)  POCT Blood Glucose.: 177 mg/dL (01-16-18 @ 11:57)  POCT Blood Glucose.: 165 mg/dL (01-16-18 @ 11:03)  POCT Blood Glucose.: 176 mg/dL (01-16-18 @ 10:01)  POCT Blood Glucose.: 165 mg/dL (01-16-18 @ 09:03)  POCT Blood Glucose.: 120 mg/dL (01-16-18 @ 07:49)  POCT Blood Glucose.: 129 mg/dL (01-16-18 @ 06:30)  POCT Blood Glucose.: 146 mg/dL (01-16-18 @ 04:31)  POCT Blood Glucose.: 141 mg/dL (01-16-18 @ 03:00)  POCT Blood Glucose.: 164 mg/dL (01-16-18 @ 01:50)  POCT Blood Glucose.: 191 mg/dL (01-16-18 @ 01:02)  POCT Blood Glucose.: 222 mg/dL (01-15-18 @ 23:45)  POCT Blood Glucose.: 172 mg/dL (01-15-18 @ 21:46)  POCT Blood Glucose.: 172 mg/dL (01-15-18 @ 20:48)  POCT Blood Glucose.: 163 mg/dL (01-15-18 @ 19:42)  POCT Blood Glucose.: 119 mg/dL (01-15-18 @ 18:35)  POCT Blood Glucose.: 74 mg/dL (01-15-18 @ 17:40)  POCT Blood Glucose.: 119 mg/dL (01-15-18 @ 16:39)  POCT Blood Glucose.: 181 mg/dL (01-15-18 @ 15:48)  POCT Blood Glucose.: 212 mg/dL (01-15-18 @ 14:43)  POCT Blood Glucose.: 337 mg/dL (01-15-18 @ 11:21)      01-17    137  |  102  |  6<L>  ----------------------------<  163<H>  3.9   |  25  |  0.53    EGFR if : 133  EGFR if non : 115    Ca    8.8      01-17  Mg     1.9     01-17  Phos  2.7     01-17    TPro  6.1  /  Alb  3.0<L>  /  TBili  0.8  /  DBili  x   /  AST  12  /  ALT  11  /  AlkPhos  68  01-17          Thyroid Function Tests:  12-25 @ 15:36 TSH 0.71 FreeT4 -- T3 -- Anti TPO -- Anti Thyroglobulin Ab -- TSI --      Hemoglobin A1C, Whole Blood: 7.7 % <H> [4.0 - 5.6] (01-15-18 @ 22:22)

## 2018-01-18 NOTE — PROGRESS NOTE ADULT - PROBLEM SELECTOR PLAN 4
- Patient has an appointment scheduled at [address] [place] [address]. on next Tuesday, January 23rd at 11:20am with a physical. Patient must first meet with a provider before seeing a dietician or DM educator. Patient aware of appointment.

## 2018-01-18 NOTE — CONSULT NOTE ADULT - ASSESSMENT
46yo F with gallstone pancreatitis complicated by DKA    - diet as tolerated  - OR for cholecystectomy on this admission - will add on for tomorrow, please make NPO, IVF at midnight  - discussed with medicine team     discussed with Dr. Matson  ATP surgery [id number]

## 2018-01-18 NOTE — PROGRESS NOTE ADULT - ASSESSMENT
47F with insulin-dependent DM1 c/b prior episode of DKA and alcohol dependence admitted for DKA secondary to gallstone vs. alcoholic pancreatitis s/p insulin drip with improved glycemic control, pancreatitis resolving with improved tolerance to PO diet.

## 2018-01-18 NOTE — PROGRESS NOTE ADULT - PROBLEM SELECTOR PLAN 1
- initial AG 25 with HCO3 18, pH 7.3 now resolved s/p insulin drip, transitioned to basal bolus insulin in MICU  - -200s over last 24 hours; will cont Lantus 15 qhs (home dose Lantus 45 U)  and f/u Endocrinology recommendations  - cont CC/diet, diabetes education

## 2018-01-18 NOTE — PROGRESS NOTE ADULT - ASSESSMENT
48 y/o F PMHx T2DM, EtOH abuse, MDD, anxiety p/w nausea, vomiting, abdominal pain, reduced PO intake, and palpitations, found to have DKA in the setting of pancreatitis, most likely 2/2 EtOH abuse. S/p transition to SQ insulin, pending transfer to [facility]. 48 y/o F PMHx T2DM, EtOH abuse, MDD, anxiety p/w nausea, vomiting, abdominal pain, reduced PO intake, and palpitations, found to have DKA in the setting of pancreatitis, most likely 2/2 EtOH abuse. Patient now transitioned to basal/bolus SQ insulin with pain markedly improved.

## 2018-01-18 NOTE — CONSULT NOTE ADULT - ATTENDING COMMENTS
Patient seen and examined and agree with above. 47 year old female admitted with gallstone pancreatitis and DKA. Admitted to the MICU initially and  now stable and tolerating her diet. She complains of mild epigastric and RUQ abdominal pain but appears to be tolerating diet. She denies chest pain or dyspnea.   Physical exam is tender in the epigastric and right upper quadrant with no rebound or guarding.   on 1/15 ultrasound demonstrated cholelithiasis.  on 1/16- CT scan demonstrated acute pancreatitis  WBC 10.6  I have described the laparoscopic cholecystectomy with the risks and benefits explained. She has agreed to the procedure. NPO past midnight with IVF.
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
## 2018-01-18 NOTE — PROGRESS NOTE ADULT - PROBLEM SELECTOR PLAN 1
- DKA precipitated by pancreatitis ?EtOH/elevated TG, unfortunately her lipid panel and ETOH levels were not checked.  - UA, Jabier negative. RUQ US reveals gallstones and hepatic steatosis but no acute pathology; CT abdomen 1/16 significant for interstitial edematous pancreas consistent with acute pancreatitis, as well as hepatic steatosis and cholelithiasis.   - C/w Lantus 15 sq qhs  - C/w Humalog 2 TIDac while PO intake is still decreased, but can increase to 4 units tid-ac once able to tolerate larger meals.   -C/w low dose SSI. - DKA precipitated by pancreatitis ?EtOH/elevated TG, unfortunately her lipid panel and ETOH levels were not checked.  - UA, Jabier negative. RUQ US reveals gallstones and hepatic steatosis but no acute pathology; CT abdomen 1/16 significant for interstitial edematous pancreas consistent with acute pancreatitis, as well as hepatic steatosis and cholelithiasis.   - C/w Lantus 15 sq qhs  - Patient's FS well controlled on current dose of Humalog 2 TIDac while patient's PO intake still not at baseline. Once patient able to eat full meals can increase to 4 units tid-ac.  -C/w low dose SSI.

## 2018-01-19 ENCOUNTER — RESULT REVIEW (OUTPATIENT)
Age: 48
End: 2018-01-19

## 2018-01-19 ENCOUNTER — TRANSCRIPTION ENCOUNTER (OUTPATIENT)
Age: 48
End: 2018-01-19

## 2018-01-19 LAB
ANION GAP SERPL CALC-SCNC: 11 MMOL/L — SIGNIFICANT CHANGE UP (ref 5–17)
ANION GAP SERPL CALC-SCNC: 13 MMOL/L — SIGNIFICANT CHANGE UP (ref 5–17)
BASOPHILS # BLD AUTO: 0.1 K/UL — SIGNIFICANT CHANGE UP (ref 0–0.2)
BASOPHILS NFR BLD AUTO: 0.7 % — SIGNIFICANT CHANGE UP (ref 0–2)
BLD GP AB SCN SERPL QL: NEGATIVE — SIGNIFICANT CHANGE UP
BUN SERPL-MCNC: 10 MG/DL — SIGNIFICANT CHANGE UP (ref 7–23)
BUN SERPL-MCNC: 12 MG/DL — SIGNIFICANT CHANGE UP (ref 7–23)
CALCIUM SERPL-MCNC: 8.4 MG/DL — SIGNIFICANT CHANGE UP (ref 8.4–10.5)
CALCIUM SERPL-MCNC: 9.8 MG/DL — SIGNIFICANT CHANGE UP (ref 8.4–10.5)
CHLORIDE SERPL-SCNC: 101 MMOL/L — SIGNIFICANT CHANGE UP (ref 96–108)
CHLORIDE SERPL-SCNC: 104 MMOL/L — SIGNIFICANT CHANGE UP (ref 96–108)
CO2 SERPL-SCNC: 20 MMOL/L — LOW (ref 22–31)
CO2 SERPL-SCNC: 27 MMOL/L — SIGNIFICANT CHANGE UP (ref 22–31)
CREAT SERPL-MCNC: 0.73 MG/DL — SIGNIFICANT CHANGE UP (ref 0.5–1.3)
CREAT SERPL-MCNC: 0.74 MG/DL — SIGNIFICANT CHANGE UP (ref 0.5–1.3)
EOSINOPHIL # BLD AUTO: 0.3 K/UL — SIGNIFICANT CHANGE UP (ref 0–0.5)
EOSINOPHIL NFR BLD AUTO: 3.2 % — SIGNIFICANT CHANGE UP (ref 0–6)
GLUCOSE BLDC GLUCOMTR-MCNC: 146 MG/DL — HIGH (ref 70–99)
GLUCOSE BLDC GLUCOMTR-MCNC: 174 MG/DL — HIGH (ref 70–99)
GLUCOSE SERPL-MCNC: 155 MG/DL — HIGH (ref 70–99)
GLUCOSE SERPL-MCNC: 165 MG/DL — HIGH (ref 70–99)
HCT VFR BLD CALC: 41.6 % — SIGNIFICANT CHANGE UP (ref 34.5–45)
HGB BLD-MCNC: 14.9 G/DL — SIGNIFICANT CHANGE UP (ref 11.5–15.5)
LYMPHOCYTES # BLD AUTO: 1.9 K/UL — SIGNIFICANT CHANGE UP (ref 1–3.3)
LYMPHOCYTES # BLD AUTO: 21.2 % — SIGNIFICANT CHANGE UP (ref 13–44)
MAGNESIUM SERPL-MCNC: 2.2 MG/DL — SIGNIFICANT CHANGE UP (ref 1.6–2.6)
MCHC RBC-ENTMCNC: 34.7 PG — HIGH (ref 27–34)
MCHC RBC-ENTMCNC: 35.9 GM/DL — SIGNIFICANT CHANGE UP (ref 32–36)
MCV RBC AUTO: 96.8 FL — SIGNIFICANT CHANGE UP (ref 80–100)
MONOCYTES # BLD AUTO: 0.6 K/UL — SIGNIFICANT CHANGE UP (ref 0–0.9)
MONOCYTES NFR BLD AUTO: 7.2 % — SIGNIFICANT CHANGE UP (ref 2–14)
NEUTROPHILS # BLD AUTO: 6 K/UL — SIGNIFICANT CHANGE UP (ref 1.8–7.4)
NEUTROPHILS NFR BLD AUTO: 67.7 % — SIGNIFICANT CHANGE UP (ref 43–77)
PHOSPHATE SERPL-MCNC: 4 MG/DL — SIGNIFICANT CHANGE UP (ref 2.5–4.5)
PLATELET # BLD AUTO: 244 K/UL — SIGNIFICANT CHANGE UP (ref 150–400)
POTASSIUM SERPL-MCNC: 3.4 MMOL/L — LOW (ref 3.5–5.3)
POTASSIUM SERPL-MCNC: 4.7 MMOL/L — SIGNIFICANT CHANGE UP (ref 3.5–5.3)
POTASSIUM SERPL-SCNC: 3.4 MMOL/L — LOW (ref 3.5–5.3)
POTASSIUM SERPL-SCNC: 4.7 MMOL/L — SIGNIFICANT CHANGE UP (ref 3.5–5.3)
RBC # BLD: 4.3 M/UL — SIGNIFICANT CHANGE UP (ref 3.8–5.2)
RBC # FLD: 11 % — SIGNIFICANT CHANGE UP (ref 10.3–14.5)
RH IG SCN BLD-IMP: POSITIVE — SIGNIFICANT CHANGE UP
SODIUM SERPL-SCNC: 137 MMOL/L — SIGNIFICANT CHANGE UP (ref 135–145)
SODIUM SERPL-SCNC: 139 MMOL/L — SIGNIFICANT CHANGE UP (ref 135–145)
WBC # BLD: 8.9 K/UL — SIGNIFICANT CHANGE UP (ref 3.8–10.5)
WBC # FLD AUTO: 8.9 K/UL — SIGNIFICANT CHANGE UP (ref 3.8–10.5)

## 2018-01-19 PROCEDURE — 88304 TISSUE EXAM BY PATHOLOGIST: CPT | Mod: 26

## 2018-01-19 PROCEDURE — 47562 LAPAROSCOPIC CHOLECYSTECTOMY: CPT

## 2018-01-19 PROCEDURE — 99233 SBSQ HOSP IP/OBS HIGH 50: CPT | Mod: GC

## 2018-01-19 RX ORDER — CLONAZEPAM 1 MG
0.5 TABLET ORAL THREE TIMES A DAY
Qty: 0 | Refills: 0 | Status: DISCONTINUED | OUTPATIENT
Start: 2018-01-19 | End: 2018-01-20

## 2018-01-19 RX ORDER — SERTRALINE 25 MG/1
100 TABLET, FILM COATED ORAL DAILY
Qty: 0 | Refills: 0 | Status: DISCONTINUED | OUTPATIENT
Start: 2018-01-19 | End: 2018-01-20

## 2018-01-19 RX ORDER — INSULIN LISPRO 100/ML
VIAL (ML) SUBCUTANEOUS
Qty: 0 | Refills: 0 | Status: DISCONTINUED | OUTPATIENT
Start: 2018-01-19 | End: 2018-01-20

## 2018-01-19 RX ORDER — OXYCODONE HYDROCHLORIDE 5 MG/1
10 TABLET ORAL EVERY 4 HOURS
Qty: 0 | Refills: 0 | Status: DISCONTINUED | OUTPATIENT
Start: 2018-01-19 | End: 2018-01-20

## 2018-01-19 RX ORDER — ENOXAPARIN SODIUM 100 MG/ML
40 INJECTION SUBCUTANEOUS EVERY 24 HOURS
Qty: 0 | Refills: 0 | Status: DISCONTINUED | OUTPATIENT
Start: 2018-01-19 | End: 2018-01-20

## 2018-01-19 RX ORDER — POTASSIUM CHLORIDE 20 MEQ
10 PACKET (EA) ORAL
Qty: 0 | Refills: 0 | Status: DISCONTINUED | OUTPATIENT
Start: 2018-01-19 | End: 2018-01-20

## 2018-01-19 RX ORDER — ACETAMINOPHEN 500 MG
975 TABLET ORAL EVERY 6 HOURS
Qty: 0 | Refills: 0 | Status: DISCONTINUED | OUTPATIENT
Start: 2018-01-19 | End: 2018-01-20

## 2018-01-19 RX ORDER — SODIUM CHLORIDE 9 MG/ML
1000 INJECTION, SOLUTION INTRAVENOUS
Qty: 0 | Refills: 0 | Status: DISCONTINUED | OUTPATIENT
Start: 2018-01-19 | End: 2018-01-19

## 2018-01-19 RX ORDER — DEXTROSE 50 % IN WATER 50 %
1 SYRINGE (ML) INTRAVENOUS ONCE
Qty: 0 | Refills: 0 | Status: DISCONTINUED | OUTPATIENT
Start: 2018-01-19 | End: 2018-01-20

## 2018-01-19 RX ORDER — DEXTROSE 50 % IN WATER 50 %
25 SYRINGE (ML) INTRAVENOUS ONCE
Qty: 0 | Refills: 0 | Status: DISCONTINUED | OUTPATIENT
Start: 2018-01-19 | End: 2018-01-20

## 2018-01-19 RX ORDER — DEXTROSE 50 % IN WATER 50 %
12.5 SYRINGE (ML) INTRAVENOUS ONCE
Qty: 0 | Refills: 0 | Status: DISCONTINUED | OUTPATIENT
Start: 2018-01-19 | End: 2018-01-20

## 2018-01-19 RX ORDER — INSULIN LISPRO 100/ML
VIAL (ML) SUBCUTANEOUS AT BEDTIME
Qty: 0 | Refills: 0 | Status: DISCONTINUED | OUTPATIENT
Start: 2018-01-19 | End: 2018-01-20

## 2018-01-19 RX ORDER — SODIUM CHLORIDE 9 MG/ML
1000 INJECTION, SOLUTION INTRAVENOUS
Qty: 0 | Refills: 0 | Status: DISCONTINUED | OUTPATIENT
Start: 2018-01-19 | End: 2018-01-20

## 2018-01-19 RX ORDER — TOPIRAMATE 25 MG
50 TABLET ORAL DAILY
Qty: 0 | Refills: 0 | Status: DISCONTINUED | OUTPATIENT
Start: 2018-01-19 | End: 2018-01-20

## 2018-01-19 RX ORDER — OXYCODONE HYDROCHLORIDE 5 MG/1
10 TABLET ORAL EVERY 6 HOURS
Qty: 0 | Refills: 0 | Status: DISCONTINUED | OUTPATIENT
Start: 2018-01-19 | End: 2018-01-19

## 2018-01-19 RX ORDER — OXYCODONE HYDROCHLORIDE 5 MG/1
5 TABLET ORAL EVERY 4 HOURS
Qty: 0 | Refills: 0 | Status: DISCONTINUED | OUTPATIENT
Start: 2018-01-19 | End: 2018-01-20

## 2018-01-19 RX ORDER — ONDANSETRON 8 MG/1
4 TABLET, FILM COATED ORAL ONCE
Qty: 0 | Refills: 0 | Status: COMPLETED | OUTPATIENT
Start: 2018-01-19 | End: 2018-01-19

## 2018-01-19 RX ORDER — POTASSIUM CHLORIDE 20 MEQ
40 PACKET (EA) ORAL ONCE
Qty: 0 | Refills: 0 | Status: DISCONTINUED | OUTPATIENT
Start: 2018-01-19 | End: 2018-01-19

## 2018-01-19 RX ORDER — INSULIN LISPRO 100/ML
2 VIAL (ML) SUBCUTANEOUS
Qty: 0 | Refills: 0 | Status: DISCONTINUED | OUTPATIENT
Start: 2018-01-19 | End: 2018-01-20

## 2018-01-19 RX ORDER — TRAZODONE HCL 50 MG
200 TABLET ORAL AT BEDTIME
Qty: 0 | Refills: 0 | Status: DISCONTINUED | OUTPATIENT
Start: 2018-01-19 | End: 2018-01-20

## 2018-01-19 RX ORDER — DOCUSATE SODIUM 100 MG
100 CAPSULE ORAL
Qty: 0 | Refills: 0 | Status: DISCONTINUED | OUTPATIENT
Start: 2018-01-19 | End: 2018-01-20

## 2018-01-19 RX ORDER — SENNA PLUS 8.6 MG/1
2 TABLET ORAL AT BEDTIME
Qty: 0 | Refills: 0 | Status: DISCONTINUED | OUTPATIENT
Start: 2018-01-19 | End: 2018-01-20

## 2018-01-19 RX ORDER — INSULIN GLARGINE 100 [IU]/ML
15 INJECTION, SOLUTION SUBCUTANEOUS AT BEDTIME
Qty: 0 | Refills: 0 | Status: DISCONTINUED | OUTPATIENT
Start: 2018-01-19 | End: 2018-01-20

## 2018-01-19 RX ORDER — HYDROMORPHONE HYDROCHLORIDE 2 MG/ML
0.5 INJECTION INTRAMUSCULAR; INTRAVENOUS; SUBCUTANEOUS
Qty: 0 | Refills: 0 | Status: DISCONTINUED | OUTPATIENT
Start: 2018-01-19 | End: 2018-01-19

## 2018-01-19 RX ORDER — HYDROMORPHONE HYDROCHLORIDE 2 MG/ML
1 INJECTION INTRAMUSCULAR; INTRAVENOUS; SUBCUTANEOUS EVERY 4 HOURS
Qty: 0 | Refills: 0 | Status: DISCONTINUED | OUTPATIENT
Start: 2018-01-19 | End: 2018-01-20

## 2018-01-19 RX ORDER — SIMETHICONE 80 MG/1
80 TABLET, CHEWABLE ORAL
Qty: 0 | Refills: 0 | Status: DISCONTINUED | OUTPATIENT
Start: 2018-01-19 | End: 2018-01-20

## 2018-01-19 RX ORDER — GLUCAGON INJECTION, SOLUTION 0.5 MG/.1ML
1 INJECTION, SOLUTION SUBCUTANEOUS ONCE
Qty: 0 | Refills: 0 | Status: DISCONTINUED | OUTPATIENT
Start: 2018-01-19 | End: 2018-01-20

## 2018-01-19 RX ORDER — ACETAMINOPHEN 500 MG
1000 TABLET ORAL ONCE
Qty: 0 | Refills: 0 | Status: COMPLETED | OUTPATIENT
Start: 2018-01-19 | End: 2018-01-19

## 2018-01-19 RX ADMIN — HYDROMORPHONE HYDROCHLORIDE 0.5 MILLIGRAM(S): 2 INJECTION INTRAMUSCULAR; INTRAVENOUS; SUBCUTANEOUS at 16:10

## 2018-01-19 RX ADMIN — Medication 400 MILLIGRAM(S): at 17:53

## 2018-01-19 RX ADMIN — HYDROMORPHONE HYDROCHLORIDE 0.5 MILLIGRAM(S): 2 INJECTION INTRAMUSCULAR; INTRAVENOUS; SUBCUTANEOUS at 13:53

## 2018-01-19 RX ADMIN — SODIUM CHLORIDE 75 MILLILITER(S): 9 INJECTION, SOLUTION INTRAVENOUS at 02:25

## 2018-01-19 RX ADMIN — OXYCODONE HYDROCHLORIDE 10 MILLIGRAM(S): 5 TABLET ORAL at 20:00

## 2018-01-19 RX ADMIN — SERTRALINE 100 MILLIGRAM(S): 25 TABLET, FILM COATED ORAL at 21:01

## 2018-01-19 RX ADMIN — OXYCODONE HYDROCHLORIDE 10 MILLIGRAM(S): 5 TABLET ORAL at 19:28

## 2018-01-19 RX ADMIN — ENOXAPARIN SODIUM 40 MILLIGRAM(S): 100 INJECTION SUBCUTANEOUS at 21:01

## 2018-01-19 RX ADMIN — Medication 1000 MILLIGRAM(S): at 18:20

## 2018-01-19 RX ADMIN — MORPHINE SULFATE 3 MILLIGRAM(S): 50 CAPSULE, EXTENDED RELEASE ORAL at 05:41

## 2018-01-19 RX ADMIN — MORPHINE SULFATE 3 MILLIGRAM(S): 50 CAPSULE, EXTENDED RELEASE ORAL at 02:23

## 2018-01-19 RX ADMIN — HYDROMORPHONE HYDROCHLORIDE 0.5 MILLIGRAM(S): 2 INJECTION INTRAMUSCULAR; INTRAVENOUS; SUBCUTANEOUS at 17:52

## 2018-01-19 RX ADMIN — HYDROMORPHONE HYDROCHLORIDE 0.5 MILLIGRAM(S): 2 INJECTION INTRAMUSCULAR; INTRAVENOUS; SUBCUTANEOUS at 13:54

## 2018-01-19 RX ADMIN — Medication 100 MILLIGRAM(S): at 21:02

## 2018-01-19 RX ADMIN — ONDANSETRON 4 MILLIGRAM(S): 8 TABLET, FILM COATED ORAL at 20:02

## 2018-01-19 RX ADMIN — Medication 50 MILLIGRAM(S): at 21:01

## 2018-01-19 RX ADMIN — HYDROMORPHONE HYDROCHLORIDE 1 MILLIGRAM(S): 2 INJECTION INTRAMUSCULAR; INTRAVENOUS; SUBCUTANEOUS at 22:04

## 2018-01-19 RX ADMIN — Medication 0.5 MILLIGRAM(S): at 21:01

## 2018-01-19 RX ADMIN — INSULIN GLARGINE 15 UNIT(S): 100 INJECTION, SOLUTION SUBCUTANEOUS at 22:14

## 2018-01-19 RX ADMIN — MORPHINE SULFATE 3 MILLIGRAM(S): 50 CAPSULE, EXTENDED RELEASE ORAL at 00:06

## 2018-01-19 RX ADMIN — Medication 1: at 16:11

## 2018-01-19 RX ADMIN — HYDROMORPHONE HYDROCHLORIDE 0.5 MILLIGRAM(S): 2 INJECTION INTRAMUSCULAR; INTRAVENOUS; SUBCUTANEOUS at 18:10

## 2018-01-19 RX ADMIN — Medication 100 MILLIEQUIVALENT(S): at 08:34

## 2018-01-19 RX ADMIN — SENNA PLUS 2 TABLET(S): 8.6 TABLET ORAL at 21:01

## 2018-01-19 RX ADMIN — MORPHINE SULFATE 3 MILLIGRAM(S): 50 CAPSULE, EXTENDED RELEASE ORAL at 02:53

## 2018-01-19 RX ADMIN — HYDROMORPHONE HYDROCHLORIDE 0.5 MILLIGRAM(S): 2 INJECTION INTRAMUSCULAR; INTRAVENOUS; SUBCUTANEOUS at 16:14

## 2018-01-19 NOTE — DISCHARGE NOTE ADULT - PATIENT PORTAL LINK FT
“You can access the [employer] Patient Portal, offered by [facility], by registering with the following website: [email or web address]”

## 2018-01-19 NOTE — DISCHARGE NOTE ADULT - MEDICATION SUMMARY - MEDICATIONS TO CHANGE
I will SWITCH the dose or number of times a day I take the medications listed below when I get home from the hospital:  None I will SWITCH the dose or number of times a day I take the medications listed below when I get home from the hospital:    insulin glargine  -- 45 unit(s) subcutaneously once a day (at bedtime) starting on 10/17/17

## 2018-01-19 NOTE — PROGRESS NOTE ADULT - PROBLEM SELECTOR PLAN 5
- reports last drink on 12/31, out of window for withdrawal symptoms  - self-motivated to stop binge-drinking to prevent recurrence of pancreatitis
- reports last drink on 12/31, out of window for withdrawal symptoms, low concern for active withdrawal at this time  - no indication for CIWA

## 2018-01-19 NOTE — DISCHARGE NOTE ADULT - CARE PLAN
Principal Discharge DX:	Diabetic ketoacidosis without coma associated with type 1 diabetes mellitus  Goal:	Continue to take your insulin daily and adhere to diabetic diet. Follow up with your Endocrinologist.  Secondary Diagnosis:	Acute pancreatitis  Goal:	Abstinence from alcohol  Secondary Diagnosis:	Cholelithiasis  Goal:	Follow up with General Surgery  Secondary Diagnosis:	Alcohol use disorder  Goal:	Abstinence from alcohol Principal Discharge DX:	Cholelithiasis  Goal:	post operative pain control, tolerate diet, local surgical incision wound care  Assessment and plan of treatment:	Dr. Perez, Acute Care Surgery in 7 to 10 days.  Call [phone] for appointment.  Notify your surgeon and return to ER for temperatures greater than 101, chills sweats, pain not controlled with pain medications, persistent nausea and vomiting, or acutely concerning matters to you, that may require urgent medical attention.  Please keep incision sites clean and dry, shower only.  Do not bathe or immerse incision sites in water for a prolonged amount of time.  Steri-strips may fall of on their own in the shower.  Secondary Diagnosis:	Acute pancreatitis  Goal:	Abstinence from alcohol  Assessment and plan of treatment:	Please follow up with your Primary Care Physician regarding your hospitalization, and schedule an appointment within two weeks after your discharge to review your hospital course.  Please  review all your current medications, and dose adjust as prescribed by your Primary Care Physician.  Call their office for appointment.  Secondary Diagnosis:	Alcohol use disorder  Goal:	Abstinence from alcohol  Assessment and plan of treatment:	Please follow up with your Primary Care Physician regarding your hospitalization, and schedule an appointment within two weeks after your discharge to review your hospital course.  Please  review all your current medications, and dose adjust as prescribed by your Primary Care Physician.  Call their office for appointment.  Secondary Diagnosis:	Diabetic ketoacidosis without coma associated with type 1 diabetes mellitus  Goal:	Continue to take your insulin daily and adhere to diabetic diet. Follow up with your Endocrinologist.  Assessment and plan of treatment:	Your Endocrinologist within one to two weeks to update your medical records regarding this hospitalization and to review all your current medications and dosages.  Call their office for appointment. Principal Discharge DX:	Cholelithiasis  Goal:	post operative pain control, tolerate diet, local surgical incision wound care  Assessment and plan of treatment:	Dr. Perez, Acute Care Surgery in 7 to 10 days.  Call [phone] for appointment.  Notify your surgeon and return to ER for temperatures greater than 101, chills sweats, pain not controlled with pain medications, persistent nausea and vomiting, or acutely concerning matters to you, that may require urgent medical attention.  Please keep incision sites clean and dry, shower only.  Do not bathe or immerse incision sites in water for a prolonged amount of time.  Steri-strips may fall of on their own in the shower.  Secondary Diagnosis:	Acute pancreatitis  Goal:	Abstinence from alcohol  Assessment and plan of treatment:	You have an appointment scheduled at [address] [place] [address], next Tuesday, January 23rd at 11:20am with a physician.  Call their office [phone] to confirm appointment.  Secondary Diagnosis:	Alcohol use disorder  Goal:	Abstinence from alcohol  Assessment and plan of treatment:	Please follow up with your Primary Care Physician regarding your hospitalization, and schedule an appointment within two weeks after your discharge to review your hospital course.  Please  review all your current medications, and dose adjust as prescribed by your Primary Care Physician.  Call their office for appointment.  Secondary Diagnosis:	Diabetic ketoacidosis without coma associated with type 1 diabetes mellitus  Goal:	Continue to take your insulin daily and adhere to diabetic diet. Follow up with your Endocrinologist.  Assessment and plan of treatment:	Your own  Endocrinologist, or Dr. Washington (Endocrinology at [facility]), within one to two weeks to update your medical records regarding this hospitalization and to review all your insulin requirements and dosages, for any necessary dose adjustments.  Call Dr. Washington's office [phone] for appointment. Principal Discharge DX:	Cholelithiasis  Goal:	post operative pain control, tolerate diet, local surgical incision wound care  Assessment and plan of treatment:	Dr. Perez, Acute Care Surgery in 7 to 10 days.  Call [phone] for appointment.  Notify your surgeon and return to ER for temperatures greater than 101, chills sweats, pain not controlled with pain medications, persistent nausea and vomiting, or acutely concerning matters to you, that may require urgent medical attention.  Please keep incision sites clean and dry, shower only.  Do not bathe or immerse incision sites in water for a prolonged amount of time.  Steri-strips may fall of on their own in the shower.  Secondary Diagnosis:	Acute pancreatitis  Goal:	Abstinence from alcohol  Assessment and plan of treatment:	You have an appointment scheduled at [address] [place] [address], next Tuesday, January 23rd at 11:20am with a physician.  Call their office [phone] to confirm appointment.  You can also follow up with your Primary Care Physician Dr. Cyril Dlilard.  Call Dr. Dillard's office, [phone], for appointment.  Secondary Diagnosis:	Alcohol use disorder  Goal:	Abstinence from alcohol  Assessment and plan of treatment:	Please follow up with your Primary Care Physician regarding your hospitalization, and schedule an appointment within two weeks after your discharge to review your hospital course.  Please  review all your current medications, and dose adjust as prescribed by your Primary Care Physician.  Call their office for appointment.  Secondary Diagnosis:	Diabetic ketoacidosis without coma associated with type 1 diabetes mellitus  Goal:	Continue to take your insulin daily and adhere to diabetic diet. Follow up with your Endocrinologist.  Assessment and plan of treatment:	Your private Endocrinologist, within one week to update your medical records regarding this hospitalization and to review all your insulin requirements and dosages, for any necessary dose adjustments.  Call their office for appointment.    Or you can follow up with Dr. Washington (Endocrinology at [facility]), within one week. Call Dr. Washington's office [phone] for appointment. Principal Discharge DX:	Cholelithiasis  Goal:	post operative pain control, tolerate diet, local surgical incision wound care  Assessment and plan of treatment:	Dr. Perez, Acute Care Surgery in 7 to 10 days.  Call [phone] for appointment.  Notify your surgeon and return to ER for temperatures greater than 101, chills sweats, pain not controlled with pain medications, persistent nausea and vomiting, or acutely concerning matters to you, that may require urgent medical attention.  Please keep incision sites clean and dry, shower only.  Do not bathe or immerse incision sites in water for a prolonged amount of time.  Steri-strips may fall of on their own in the shower.  Secondary Diagnosis:	Acute pancreatitis  Goal:	Abstinence from alcohol  Assessment and plan of treatment:	You have an appointment scheduled at with Dr. Kelley at [address] [place] [address]., next Tuesday, January 23rd at 11:20am.  Call their office [phone] to confirm appointment.  Secondary Diagnosis:	Diabetic ketoacidosis without coma associated with type 1 diabetes mellitus  Goal:	Continue to take your insulin daily and adhere to diabetic diet. Follow up with your Endocrinologist.  Assessment and plan of treatment:	Follow up within one week with a Diabetes Educator, to review all your insulin requirements and dosages, for any necessary dose adjustments.  Please check if still any does adjustments required for your Lantus or Humalog doses.  Call their office [phone] for appointment.      Follow up with Dr. Washington  (Endocrinology at [facility]) or first available Endocrinologist.  Call their office [phone] for appointment.

## 2018-01-19 NOTE — PRE-OP CHECKLIST - 1.
emotional support provided to patient, pre op instruction and orientation to procedure provided to patient

## 2018-01-19 NOTE — DISCHARGE NOTE ADULT - SECONDARY DIAGNOSIS.
Acute pancreatitis Cholelithiasis Alcohol use disorder Diabetic ketoacidosis without coma associated with type 1 diabetes mellitus

## 2018-01-19 NOTE — PROGRESS NOTE ADULT - SUBJECTIVE AND OBJECTIVE BOX
Patient is a 47y old  Female who presents with a chief complaint of Nausea and vomiting (15 Paul 2018 16:30)      SUBJECTIVE / OVERNIGHT EVENTS:    MEDICATIONS  (STANDING):  dextrose 5%. 1000 milliLiter(s) (50 mL/Hr) IV Continuous <Continuous>  dextrose 50% Injectable 12.5 Gram(s) IV Push once  dextrose 50% Injectable 25 Gram(s) IV Push once  dextrose 50% Injectable 25 Gram(s) IV Push once  docusate sodium 100 milliGRAM(s) Oral two times a day  enoxaparin Injectable 40 milliGRAM(s) SubCutaneous every 24 hours  insulin glargine Injectable (LANTUS) 15 Unit(s) SubCutaneous at bedtime  insulin lispro (HumaLOG) corrective regimen sliding scale   SubCutaneous three times a day before meals  insulin lispro (HumaLOG) corrective regimen sliding scale   SubCutaneous at bedtime  insulin lispro Injectable (HumaLOG) 2 Unit(s) SubCutaneous three times a day before meals  lactated ringers. 1000 milliLiter(s) (75 mL/Hr) IV Continuous <Continuous>  potassium chloride  10 mEq/100 mL IVPB 10 milliEquivalent(s) IV Intermittent every 1 hour  senna 2 Tablet(s) Oral at bedtime  sertraline 100 milliGRAM(s) Oral daily  topiramate 50 milliGRAM(s) Oral daily  traZODone 200 milliGRAM(s) Oral at bedtime    MEDICATIONS  (PRN):  acetaminophen   Tablet. 650 milliGRAM(s) Oral every 6 hours PRN mild to moderate pain  clonazePAM Tablet 0.5 milliGRAM(s) Oral three times a day PRN Anxiety  dextrose Gel 1 Dose(s) Oral once PRN Blood Glucose LESS THAN 70 milliGRAM(s)/deciliter  glucagon  Injectable 1 milliGRAM(s) IntraMuscular once PRN Glucose LESS THAN 70 milligrams/deciliter  morphine  - Injectable 3 milliGRAM(s) IV Push every 3 hours PRN moderate to severe pain  ondansetron Injectable 4 milliGRAM(s) IV Push every 6 hours PRN Nausea and/or Vomiting  simethicone 80 milliGRAM(s) Chew two times a day PRN Dyspepsia    CAPILLARY BLOOD GLUCOSE  POCT Blood Glucose.: 130 mg/dL (18 Jan 2018 21:47)  POCT Blood Glucose.: 172 mg/dL (18 Jan 2018 17:21)  POCT Blood Glucose.: 149 mg/dL (18 Jan 2018 12:31)  POCT Blood Glucose.: 145 mg/dL (18 Jan 2018 08:31)    I&O's Summary  18 Jan 2018 07:01  -  19 Jan 2018 07:00  --------------------------------------------------------  IN: 955 mL / OUT: 0 mL / NET: 955 mL        PHYSICAL EXAM:    Vital Signs Last 24 Hrs  T(C): 36.7 (19 Jan 2018 04:44), Max: 36.7 (19 Jan 2018 04:44)  T(F): 98.1 (19 Jan 2018 04:44), Max: 98.1 (19 Jan 2018 04:44)  HR: 69 (19 Jan 2018 04:44) (67 - 86)  BP: 100/68 (19 Jan 2018 04:44) (100/68 - 124/81)  BP(mean): --  RR: 18 (19 Jan 2018 04:44) (16 - 18)  SpO2: 99% (19 Jan 2018 04:44) (97% - 99%)        LABS:                        14.9   8.9   )-----------( 244      ( 19 Jan 2018 06:44 )             41.6     01-19    139  |  101  |  10  ----------------------------<  155<H>  3.4<L>   |  27  |  0.73    Ca    9.8      19 Jan 2018 06:42  Phos  4.0     01-19  Mg     2.2     01-19    TPro  7.5  /  Alb  3.6  /  TBili  0.8  /  DBili  x   /  AST  17  /  ALT  13  /  AlkPhos  85  01-18              RADIOLOGY & ADDITIONAL TESTS:    Imaging Personally Reviewed:    Consultant(s) Notes Reviewed:      Care Discussed with Consultants/Other Providers: Patient is a 47y old  Female who presents with a chief complaint of Nausea and vomiting (15 Paul 2018 16:30)      SUBJECTIVE / OVERNIGHT EVENTS:    MEDICATIONS  (STANDING):  dextrose 5%. 1000 milliLiter(s) (50 mL/Hr) IV Continuous <Continuous>  dextrose 50% Injectable 12.5 Gram(s) IV Push once  dextrose 50% Injectable 25 Gram(s) IV Push once  dextrose 50% Injectable 25 Gram(s) IV Push once  docusate sodium 100 milliGRAM(s) Oral two times a day  enoxaparin Injectable 40 milliGRAM(s) SubCutaneous every 24 hours  insulin glargine Injectable (LANTUS) 15 Unit(s) SubCutaneous at bedtime  insulin lispro (HumaLOG) corrective regimen sliding scale   SubCutaneous three times a day before meals  insulin lispro (HumaLOG) corrective regimen sliding scale   SubCutaneous at bedtime  insulin lispro Injectable (HumaLOG) 2 Unit(s) SubCutaneous three times a day before meals  lactated ringers. 1000 milliLiter(s) (75 mL/Hr) IV Continuous <Continuous>  potassium chloride  10 mEq/100 mL IVPB 10 milliEquivalent(s) IV Intermittent every 1 hour  senna 2 Tablet(s) Oral at bedtime  sertraline 100 milliGRAM(s) Oral daily  topiramate 50 milliGRAM(s) Oral daily  traZODone 200 milliGRAM(s) Oral at bedtime    MEDICATIONS  (PRN):  acetaminophen   Tablet. 650 milliGRAM(s) Oral every 6 hours PRN mild to moderate pain  clonazePAM Tablet 0.5 milliGRAM(s) Oral three times a day PRN Anxiety  dextrose Gel 1 Dose(s) Oral once PRN Blood Glucose LESS THAN 70 milliGRAM(s)/deciliter  glucagon  Injectable 1 milliGRAM(s) IntraMuscular once PRN Glucose LESS THAN 70 milligrams/deciliter  morphine  - Injectable 3 milliGRAM(s) IV Push every 3 hours PRN moderate to severe pain  ondansetron Injectable 4 milliGRAM(s) IV Push every 6 hours PRN Nausea and/or Vomiting  simethicone 80 milliGRAM(s) Chew two times a day PRN Dyspepsia    CAPILLARY BLOOD GLUCOSE  POCT Blood Glucose.: 130 mg/dL (18 Jan 2018 21:47)  POCT Blood Glucose.: 172 mg/dL (18 Jan 2018 17:21)  POCT Blood Glucose.: 149 mg/dL (18 Jan 2018 12:31)  POCT Blood Glucose.: 145 mg/dL (18 Jan 2018 08:31)    I&O's Summary  18 Jan 2018 07:01  -  19 Jan 2018 07:00  --------------------------------------------------------  IN: 955 mL / OUT: 0 mL / NET: 955 mL        PHYSICAL EXAM:    Vital Signs Last 24 Hrs  T(C): 36.7 (19 Jan 2018 04:44), Max: 36.7 (19 Jan 2018 04:44)  T(F): 98.1 (19 Jan 2018 04:44), Max: 98.1 (19 Jan 2018 04:44)  HR: 69 (19 Jan 2018 04:44) (67 - 86)  BP: 100/68 (19 Jan 2018 04:44) (100/68 - 124/81)  BP(mean): --  RR: 18 (19 Jan 2018 04:44) (16 - 18)  SpO2: 99% (19 Jan 2018 04:44) (97% - 99%)    GENERAL: NAD, well-developed, resting comfortably   HEENT: no scleral icterus, oral membranes moist   NECK: Supple, no JVD  CHEST/LUNG: Clear to auscultation bilaterally  HEART: Regular rate and rhythm; No murmurs, rubs, or gallops  ABDOMEN: Soft, non-distended, normal bowel sounds, no guarding, neg. Khanna's sign +mild and diffuse tenderness, worse on rUQ  EXTREMITIES:  2+ pedal pulses, no LE edema   PSYCH: AAOx3  NEUROLOGY: non-focal, no tremors   SKIN: No rashes or lesions      LABS:                        14.9   8.9   )-----------( 244      ( 19 Jan 2018 06:44 )             41.6     01-19    139  |  101  |  10  ----------------------------<  155<H>  3.4<L>   |  27  |  0.73    Ca    9.8      19 Jan 2018 06:42  Phos  4.0     01-19  Mg     2.2     01-19    TPro  7.5  /  Alb  3.6  /  TBili  0.8  /  DBili  x   /  AST  17  /  ALT  13  /  AlkPhos  85  01-18              RADIOLOGY & ADDITIONAL TESTS:    Imaging Personally Reviewed:    Consultant(s) Notes Reviewed:      Care Discussed with Consultants/Other Providers: Patient is a 47y old  Female who presents with a chief complaint of Nausea and vomiting (15 Paul 2018 16:30)      SUBJECTIVE / OVERNIGHT EVENTS:    Left-sided abdominal pain is mild with eating. No nausea or emesis, tolerated dinner very well. No subjective fever or chills. NPO since midnight for surgery today.     MEDICATIONS  (STANDING):  dextrose 5%. 1000 milliLiter(s) (50 mL/Hr) IV Continuous <Continuous>  dextrose 50% Injectable 12.5 Gram(s) IV Push once  dextrose 50% Injectable 25 Gram(s) IV Push once  dextrose 50% Injectable 25 Gram(s) IV Push once  docusate sodium 100 milliGRAM(s) Oral two times a day  enoxaparin Injectable 40 milliGRAM(s) SubCutaneous every 24 hours  insulin glargine Injectable (LANTUS) 15 Unit(s) SubCutaneous at bedtime  insulin lispro (HumaLOG) corrective regimen sliding scale   SubCutaneous three times a day before meals  insulin lispro (HumaLOG) corrective regimen sliding scale   SubCutaneous at bedtime  insulin lispro Injectable (HumaLOG) 2 Unit(s) SubCutaneous three times a day before meals  lactated ringers. 1000 milliLiter(s) (75 mL/Hr) IV Continuous <Continuous>  potassium chloride  10 mEq/100 mL IVPB 10 milliEquivalent(s) IV Intermittent every 1 hour  senna 2 Tablet(s) Oral at bedtime  sertraline 100 milliGRAM(s) Oral daily  topiramate 50 milliGRAM(s) Oral daily  traZODone 200 milliGRAM(s) Oral at bedtime    MEDICATIONS  (PRN):  acetaminophen   Tablet. 650 milliGRAM(s) Oral every 6 hours PRN mild to moderate pain  clonazePAM Tablet 0.5 milliGRAM(s) Oral three times a day PRN Anxiety  dextrose Gel 1 Dose(s) Oral once PRN Blood Glucose LESS THAN 70 milliGRAM(s)/deciliter  glucagon  Injectable 1 milliGRAM(s) IntraMuscular once PRN Glucose LESS THAN 70 milligrams/deciliter  morphine  - Injectable 3 milliGRAM(s) IV Push every 3 hours PRN moderate to severe pain  ondansetron Injectable 4 milliGRAM(s) IV Push every 6 hours PRN Nausea and/or Vomiting  simethicone 80 milliGRAM(s) Chew two times a day PRN Dyspepsia    CAPILLARY BLOOD GLUCOSE  POCT Blood Glucose.: 130 mg/dL (18 Jan 2018 21:47)  POCT Blood Glucose.: 172 mg/dL (18 Jan 2018 17:21)  POCT Blood Glucose.: 149 mg/dL (18 Jan 2018 12:31)  POCT Blood Glucose.: 145 mg/dL (18 Jan 2018 08:31)    I&O's Summary  18 Jan 2018 07:01  -  19 Jan 2018 07:00  --------------------------------------------------------  IN: 955 mL / OUT: 0 mL / NET: 955 mL        PHYSICAL EXAM:    Vital Signs Last 24 Hrs  T(C): 36.7 (19 Jan 2018 04:44), Max: 36.7 (19 Jan 2018 04:44)  T(F): 98.1 (19 Jan 2018 04:44), Max: 98.1 (19 Jan 2018 04:44)  HR: 69 (19 Jan 2018 04:44) (67 - 86)  BP: 100/68 (19 Jan 2018 04:44) (100/68 - 124/81)  BP(mean): --  RR: 18 (19 Jan 2018 04:44) (16 - 18)  SpO2: 99% (19 Jan 2018 04:44) (97% - 99%)    GENERAL: NAD, well-developed, resting comfortably   HEENT: no scleral icterus, oral membranes moist   NECK: Supple, no JVD  CHEST/LUNG: Clear to auscultation bilaterally  HEART: Regular rate and rhythm; No murmurs, rubs, or gallops  ABDOMEN: Soft, non-distended, normal bowel sounds, no guarding, neg. Khanna's sign +diffuse mild tenderness LUQ>RUQ  EXTREMITIES:  2+ pedal pulses, no LE edema   PSYCH: AAOx3  NEUROLOGY: non-focal, no tremors   SKIN: No rashes or lesions      LABS:                        14.9   8.9   )-----------( 244      ( 19 Jan 2018 06:44 )             41.6     01-19    139  |  101  |  10  ----------------------------<  155<H>  3.4<L>   |  27  |  0.73    Ca    9.8      19 Jan 2018 06:42  Phos  4.0     01-19  Mg     2.2     01-19    TPro  7.5  /  Alb  3.6  /  TBili  0.8  /  DBili  x   /  AST  17  /  ALT  13  /  AlkPhos  85  01-18      RADIOLOGY & ADDITIONAL TESTS:    Imaging Personally Reviewed:    Consultant(s) Notes Reviewed:  General Surgery    Care Discussed with Consultants/Other Providers:

## 2018-01-19 NOTE — BRIEF OPERATIVE NOTE - BRIEF OP NOTE DRAINS
Javid Pharmacist calling with [facility] requesting refill of Hydrocodone, refill NOT completed per protocol.    Last refilled 11/13/17. Take 1-2 tabs every 6 hrs as needed for pain  Last office visit 11/06/17.    Advised to contact PCP when office reopens to obtain refill however no guarantee that refill will be authorized. States he called this morning at 1100. Informed him refill request is still pending MD approval.   He verbalized understanding and states \"I will just tell the patient to wait another day in pain.\"    Reason for Disposition  • Caller requesting a nonurgent new prescription or refill and triager unable to refill per unit policy    Protocols used: MEDICATION QUESTION CALL-A-CARLIE Hua    none

## 2018-01-19 NOTE — BRIEF OPERATIVE NOTE - PROCEDURE
<<-----Click on this checkbox to enter Procedure Laparoscopic cholecystectomy  01/19/2018    Active  [id number]

## 2018-01-19 NOTE — DISCHARGE NOTE ADULT - VISION (WITH CORRECTIVE LENSES IF THE PATIENT USUALLY WEARS THEM):
wears progressive lenses/Partially impaired: cannot see medication labels or newsprint, but can see obstacles in path, and the surrounding layout; can count fingers at arm's length

## 2018-01-19 NOTE — DISCHARGE NOTE ADULT - CARE PROVIDERS DIRECT ADDRESSES
,[email or web address]

## 2018-01-19 NOTE — PROGRESS NOTE ADULT - PROBLEM SELECTOR PLAN 1
- Given H/o 2 episodes of DKA and thin body habitus, check MAURO and Islet Cell Ab  - Check C-peptide. LINDSEY is part of the differential diagnosis  - C/w Lantus 15 sq qhs and Humalog 2 units TID AC and low SSI AC and HS    Outpatient Plan  - To be determined based on Ab and C-peptide and insulin requirements here    Brittany Tucker DO  Endocrine Fellow  [phone]

## 2018-01-19 NOTE — PROGRESS NOTE ADULT - PROBLEM SELECTOR PROBLEM 1
Diabetic ketoacidosis without coma associated with type 2 diabetes mellitus
Diabetic ketoacidosis without coma associated with type 1 diabetes mellitus
Acute pancreatitis

## 2018-01-19 NOTE — PROGRESS NOTE ADULT - SUBJECTIVE AND OBJECTIVE BOX
SURGERY PROGRESS NOTE:    ===============================================  Acute Care Surgery and Trauma Surgery ([facility]) Pager [phone]  ===============================================    Subjective:  Pt continues to have upper abdominal pain radiating to back. Denies N/V.      Objective:    PE:  Gen: NAD  Resp: airway patent, respirations unlabored, no increased WoB  CVS: RRR  Abd: soft, ND, TTP epigastrium, no rebound or guarding  Ext: no edema, WWP  Neuro: AAOx3, no focal deficits    Vital Signs Last 24 Hrs  T(C): 36.7 (19 Jan 2018 04:44), Max: 36.7 (19 Jan 2018 04:44)  T(F): 98.1 (19 Jan 2018 04:44), Max: 98.1 (19 Jan 2018 04:44)  HR: 69 (19 Jan 2018 04:44) (67 - 86)  BP: 100/68 (19 Jan 2018 04:44) (100/68 - 124/81)  BP(mean): --  RR: 18 (19 Jan 2018 04:44) (16 - 18)  SpO2: 99% (19 Jan 2018 04:44) (97% - 99%)    I&O's Detail    17 Jan 2018 07:01  -  18 Jan 2018 07:00  --------------------------------------------------------  IN:    Oral Fluid: 340 mL  Total IN: 340 mL    OUT:  Total OUT: 0 mL    Total NET: 340 mL      18 Jan 2018 07:01  -  19 Jan 2018 06:51  --------------------------------------------------------  IN:    lactated ringers.: 75 mL    Oral Fluid: 880 mL  Total IN: 955 mL    OUT:  Total OUT: 0 mL    Total NET: 955 mL          Daily     Daily     MEDICATIONS  (STANDING):  dextrose 5%. 1000 milliLiter(s) (50 mL/Hr) IV Continuous <Continuous>  dextrose 50% Injectable 12.5 Gram(s) IV Push once  dextrose 50% Injectable 25 Gram(s) IV Push once  dextrose 50% Injectable 25 Gram(s) IV Push once  docusate sodium 100 milliGRAM(s) Oral two times a day  enoxaparin Injectable 40 milliGRAM(s) SubCutaneous every 24 hours  insulin glargine Injectable (LANTUS) 15 Unit(s) SubCutaneous at bedtime  insulin lispro (HumaLOG) corrective regimen sliding scale   SubCutaneous three times a day before meals  insulin lispro (HumaLOG) corrective regimen sliding scale   SubCutaneous at bedtime  insulin lispro Injectable (HumaLOG) 2 Unit(s) SubCutaneous three times a day before meals  lactated ringers. 1000 milliLiter(s) (75 mL/Hr) IV Continuous <Continuous>  senna 2 Tablet(s) Oral at bedtime  sertraline 100 milliGRAM(s) Oral daily  topiramate 50 milliGRAM(s) Oral daily  traZODone 200 milliGRAM(s) Oral at bedtime    MEDICATIONS  (PRN):  acetaminophen   Tablet. 650 milliGRAM(s) Oral every 6 hours PRN mild to moderate pain  clonazePAM Tablet 0.5 milliGRAM(s) Oral three times a day PRN Anxiety  dextrose Gel 1 Dose(s) Oral once PRN Blood Glucose LESS THAN 70 milliGRAM(s)/deciliter  glucagon  Injectable 1 milliGRAM(s) IntraMuscular once PRN Glucose LESS THAN 70 milligrams/deciliter  morphine  - Injectable 3 milliGRAM(s) IV Push every 3 hours PRN moderate to severe pain  ondansetron Injectable 4 milliGRAM(s) IV Push every 6 hours PRN Nausea and/or Vomiting  simethicone 80 milliGRAM(s) Chew two times a day PRN Dyspepsia      LABS:                        14.8   10.6  )-----------( 215      ( 18 Jan 2018 09:53 )             41.3     01-18    137  |  100  |  10  ----------------------------<  202<H>  3.9   |  24  |  0.69    Ca    9.4      18 Jan 2018 09:53  Phos  3.7     01-18  Mg     2.1     01-18    TPro  7.5  /  Alb  3.6  /  TBili  0.8  /  DBili  x   /  AST  17  /  ALT  13  /  AlkPhos  85  01-18          RADIOLOGY & ADDITIONAL STUDIES:

## 2018-01-19 NOTE — PROGRESS NOTE ADULT - PROBLEM SELECTOR PLAN 1
- peripancreatic fat stranding w/ fluid collection on CT A/P; no complicating features (necrosis, pseudocyst)  - initial serum lipase near 600s, downtrending with IV fluids and bowel rest; no need to trend  - likely etiology is alcohol use disorder vs. gallstone pancreatitis however no significant CBD dilatation or stones noted on RUQ sonogram  - lipid profile pending however lower suspicion for hypertriglyceridemia, or infectious etiologies    - cont pain control, morphine q4 PRN  - tolerating regular diet

## 2018-01-19 NOTE — PROGRESS NOTE ADULT - PROBLEM SELECTOR PLAN 6
[place] [address] Lizabeth De Leon MD-PGY2  Dept. Internal Medicine  Pager #[phone]
[place] [address] Lizabeth De Leon MD-PGY2  Dept. Internal Medicine  Pager #[phone]

## 2018-01-19 NOTE — PROGRESS NOTE ADULT - ASSESSMENT
47yF w/ Gallstone pancreatitis    - OR t/d for lap paulino  - NPO/IVF  - IV analgesia and antiemetics  - Please page [phone] w/ any questions    JHOAN Noland PGY-2

## 2018-01-19 NOTE — DISCHARGE NOTE ADULT - INSTRUCTIONS
diabetic diet    Activity as tolerated. Avoid heavy lifting, no heavy exercise  or straining over 15 lbs for the next two weeks;  Driving- Please do not drive until your pain is well controlled and you do not need to take pain medications.  You may shower-Do not submerge or scrub incision sites.  Please pat dry incisions/dressings.  Leave the white steri strips in place, they will fall off on their own in approximately 5-7 days.

## 2018-01-19 NOTE — PROGRESS NOTE ADULT - PROBLEM SELECTOR PLAN 2
-Goal is less than 130/80. Patient close to goal over past 24 hours, but consider adding a BP agent if BP readings worsen.
- Goal is less than 130/80. Patient close to goal over past 24 hours, but consider adding a BP agent if BP readings worsen.
- biliary stones without signs of acute cholecystitis on RUQ sonogram, no indication for antibiotics as patient afebrile; leukocytosis likely reactive in setting of pancreatitis  - will need outpatient Surgical evaluation for possible elective cholecystectomy
- peripancreatic fat stranding w/ fluid collection on CT A/P; no complicating features (necrosis, pseudocyst)  - initial serum lipase near 600s, downtrending with IV fluids and bowel rest; no need to trend  - likely etiology is alcohol use disorder vs. gallstone pancreatitis however no significant CBD dilatation or stones noted on RUQ sonogram  - lipid profile pending however lower suspicion for hypertriglyceridemia, or infectious etiologies    - cont pain control, morphine q4 PRN  - tolerating regular diet

## 2018-01-19 NOTE — DISCHARGE NOTE ADULT - CARE PROVIDER_API CALL
Sj Perez), Surgery  Critical Care  [address] [facility]  [address] [address]  Phone: [phone]  Fax: [phone] Sj Perez (MD), Surgery  Critical Care  [address] [facility]  [address]  [place] [address]  Phone: [phone]  Fax: [phone]    Winston Washington (), Internal Medicine  [address] [place] [address]  Phone: [phone]  Fax: [phone] Sj Perez), Surgery  Critical Care  [address] [facility]  [address] [address]  Phone: [phone]  Fax: [phone]    Winston Washington (), Internal Medicine  [address] [place] [address]  Phone: [phone]  Fax: [phone]    Chantal Kelley), Internal Medicine  [address] [place] [address]  Phone: [phone]  Fax: [phone]

## 2018-01-19 NOTE — DISCHARGE NOTE ADULT - PROVIDER TOKENS
JR:'[id number]' JR:'[id number]' TOKEN:'[id number]:'[id number]'

## 2018-01-19 NOTE — CHART NOTE - NSCHARTNOTEFT_GEN_A_CORE
Procedure: Laparoscopic cholecystectomy     Diagnosis/Indication: cholecystitis    Surgeon: Dr. Perez    S: Patient is s/p areli jonah seen on the floor. Patient tolerates the procedure well    O:  T(C): 36.5 (01-19-18 @ 17:56), Max: 36.6 (01-19-18 @ 16:00)  T(F): 97.7 (01-19-18 @ 17:56), Max: 97.9 (01-19-18 @ 16:00)  HR: 69 (01-19-18 @ 17:56) (63 - 69)  BP: 121/76 (01-19-18 @ 17:56) (102/65 - 121/76)  RR: 18 (01-19-18 @ 17:56) (16 - 18)  SpO2: 96% (01-19-18 @ 16:00) (95% - 100%)  Wt(kg): --                        14.9   8.9   )-----------( 244      ( 19 Jan 2018 06:44 )             41.6     01-19    137  |  104  |  12  ----------------------------<  165<H>  4.7   |  20<L>  |  0.74    Ca    8.4      19 Jan 2018 15:18  Phos  4.0     01-19  Mg     2.2     01-19    TPro  7.5  /  Alb  3.6  /  TBili  0.8  /  DBili  x   /  AST  17  /  ALT  13  /  AlkPhos  85  01-18      Gen:   C/V:   Pulm:   Abd:   Extremities:      A/P: 47yFemale s/p above procedure  Diet:  IVF:  Pain/nausea control  DVT ppx:  Dispo plan: Procedure: Laparoscopic cholecystectomy     Diagnosis/Indication: cholecystitis    Surgeon: Dr. Perez    S: Patient is s/p lap choley seen on the floor. Patient tolerates the procedure well with pain well controlled on current pain medication. Patient denies chest pain, shortness of breath, nausea and/or vomiting. Patient is waiting to try some CLD diet, but so far able to tolerate some juice. Patient hasn't ambulated since surgery but planning to make some laps around the cruz. Patient hasn't voided since surgery.     O:  T(C): 36.5 (01-19-18 @ 17:56), Max: 36.6 (01-19-18 @ 16:00)  T(F): 97.7 (01-19-18 @ 17:56), Max: 97.9 (01-19-18 @ 16:00)  HR: 69 (01-19-18 @ 17:56) (63 - 69)  BP: 121/76 (01-19-18 @ 17:56) (102/65 - 121/76)  RR: 18 (01-19-18 @ 17:56) (16 - 18)  SpO2: 96% (01-19-18 @ 16:00) (95% - 100%)  Wt(kg): --                        14.9   8.9   )-----------( 244      ( 19 Jan 2018 06:44 )             41.6     01-19    137  |  104  |  12  ----------------------------<  165<H>  4.7   |  20<L>  |  0.74    Ca    8.4      19 Jan 2018 15:18  Phos  4.0     01-19  Mg     2.2     01-19    TPro  7.5  /  Alb  3.6  /  TBili  0.8  /  DBili  x   /  AST  17  /  ALT  13  /  AlkPhos  85  01-18      Gen: resting comfortably in bed, in no acute distress  C/V: RRR  Pulm: unlabored breathing on room air   Abd: soft, slightly distended, sore but no tenderness around incisions, dressing over port sites intact with minimal strike through   Extremities: no swelling, no tenderness       A/P: 47y female s/p above procedure, tolerating procedure well with pain well controlled on meds, waiting for CLD, haven't ambulated nor voided since surgery yet.     Diet: CLD, monitor GI function   IVF: LR at 40cc/h  Pain/nausea control  DVT ppx: lovenox   Dispo plan: on the floor

## 2018-01-19 NOTE — DISCHARGE NOTE ADULT - MEDICATION SUMMARY - MEDICATIONS TO TAKE
I will START or STAY ON the medications listed below when I get home from the hospital:    oxyCODONE 5 mg oral tablet  -- 1-2 tab(s) by mouth every 6-8 hours, As Needed -Moderate to Severe Pain MDD:6  -- Indication: For Pain    acetaminophen 325 mg oral tablet  -- 2 tab(s) by mouth every 6 hours, As Needed -   -- Indication: For Pain    Topamax 50 mg oral tablet  -- 50 milligram(s) by mouth once a day  -- Indication: For Anticonvulsant    clonazePAM 0.5 mg oral tablet  -- 1 tab(s) by mouth 3 times a day, As Needed - for anxiety  -- Indication: For Anticonvulsant    Zoloft 100 mg oral tablet  -- 1 tab(s) by mouth once a day  -- Indication: For Mood    traZODone 100 mg oral tablet  -- 200 milligram(s) by mouth once a day (at bedtime)  -- Indication: For Mood    insulin glargine  -- 45 unit(s) subcutaneously once a day (at bedtime) starting on 10/17/17  -- Indication: For Diabetes    docusate sodium 100 mg oral capsule  -- 1 cap(s) by mouth 2 times a day  -- Indication: For laxative    senna oral tablet  -- 2 tab(s) by mouth once a day (at bedtime)  -- Indication: For laxative    simethicone 80 mg oral tablet, chewable  -- 1 tab(s) by mouth 2 times a day, As needed, Dyspepsia  -- Indication: For gas I will START or STAY ON the medications listed below when I get home from the hospital:    oxyCODONE 5 mg oral tablet  -- 1-2 tab(s) by mouth every 6-8 hours, As Needed -Moderate to Severe Pain MDD:6  -- Indication: For Pain    acetaminophen 325 mg oral tablet  -- 2 tab(s) by mouth every 6 hours, As Needed -   -- Indication: For Pain    Topamax 50 mg oral tablet  -- 50 milligram(s) by mouth once a day  -- Indication: For Anticonvulsant    clonazePAM 0.5 mg oral tablet  -- 1 tab(s) by mouth 3 times a day, As Needed - for anxiety  -- Indication: For Anticonvulsant    Zoloft 100 mg oral tablet  -- 1 tab(s) by mouth once a day  -- Indication: For Mood    traZODone 100 mg oral tablet  -- 200 milligram(s) by mouth once a day (at bedtime)  -- Indication: For Mood    insulin glargine  -- 15 unit(s) subcutaneous once a day (at bedtime)  -- Indication: For Diabetes    insulin lispro 100 units/mL subcutaneous solution  -- 2 unit(s) subcutaneous 3 times a day (before meals)  -- Indication: For Daibetes    docusate sodium 100 mg oral capsule  -- 1 cap(s) by mouth 2 times a day  -- Indication: For laxative    senna oral tablet  -- 2 tab(s) by mouth once a day (at bedtime)  -- Indication: For laxative    simethicone 80 mg oral tablet, chewable  -- 1 tab(s) by mouth 2 times a day, As needed, Dyspepsia  -- Indication: For gas I will START or STAY ON the medications listed below when I get home from the hospital:    acetaminophen 325 mg oral tablet  -- 2 tab(s) by mouth every 6 hours, As Needed -   -- Indication: For Pain    HYDROmorphone 2 mg oral tablet  -- 1-2 tab(s) by mouth every 6-8 hours, As Needed -Moderate to Severe Pain MDD:6  -- Indication: For Pain    Topamax 50 mg oral tablet  -- 50 milligram(s) by mouth once a day  -- Indication: For Anticonvulsant    clonazePAM 0.5 mg oral tablet  -- 1 tab(s) by mouth 3 times a day, As Needed - for anxiety  -- Indication: For Anticonvulsant    Zoloft 100 mg oral tablet  -- 1 tab(s) by mouth once a day  -- Indication: For Mood    traZODone 100 mg oral tablet  -- 200 milligram(s) by mouth once a day (at bedtime)  -- Indication: For Mood    insulin glargine  -- 15 unit(s) subcutaneous once a day (at bedtime)  -- Indication: For Diabetes    insulin lispro 100 units/mL subcutaneous solution  -- 2 unit(s) subcutaneous 3 times a day (before meals)  -- Indication: For Daibetes    docusate sodium 100 mg oral capsule  -- 1 cap(s) by mouth 2 times a day  -- Indication: For laxative    senna oral tablet  -- 2 tab(s) by mouth once a day (at bedtime)  -- Indication: For laxative    simethicone 80 mg oral tablet, chewable  -- 1 tab(s) by mouth 2 times a day, As needed, Dyspepsia  -- Indication: For gas

## 2018-01-19 NOTE — DISCHARGE NOTE ADULT - PLAN OF CARE
Continue to take your insulin daily and adhere to diabetic diet. Follow up with your Endocrinologist. Abstinence from alcohol Follow up with General Surgery post operative pain control, tolerate diet, local surgical incision wound care Dr. Perez, Acute Care Surgery in 7 to 10 days.  Call [phone] for appointment.  Notify your surgeon and return to ER for temperatures greater than 101, chills sweats, pain not controlled with pain medications, persistent nausea and vomiting, or acutely concerning matters to you, that may require urgent medical attention.  Please keep incision sites clean and dry, shower only.  Do not bathe or immerse incision sites in water for a prolonged amount of time.  Steri-strips may fall of on their own in the shower. Please follow up with your Primary Care Physician regarding your hospitalization, and schedule an appointment within two weeks after your discharge to review your hospital course.  Please  review all your current medications, and dose adjust as prescribed by your Primary Care Physician.  Call their office for appointment. Your Endocrinologist within one to two weeks to update your medical records regarding this hospitalization and to review all your current medications and dosages.  Call their office for appointment. You have an appointment scheduled at [address] [place] [address], next Tuesday, January 23rd at 11:20am with a physician.  Call their office [phone] to confirm appointment. Your own  Endocrinologist, or Dr. Washington (Endocrinology at [facility]), within one to two weeks to update your medical records regarding this hospitalization and to review all your insulin requirements and dosages, for any necessary dose adjustments.  Call Dr. Washington's office [phone] for appointment. You have an appointment scheduled at [address] [place] [address], next Tuesday, January 23rd at 11:20am with a physician.  Call their office [phone] to confirm appointment.  You can also follow up with your Primary Care Physician Dr. Cyril Dillard.  Call Dr. Dillard's office, [phone], for appointment. Your private Endocrinologist, within one week to update your medical records regarding this hospitalization and to review all your insulin requirements and dosages, for any necessary dose adjustments.  Call their office for appointment.    Or you can follow up with Dr. Washington (Endocrinology at [facility]), within one week. Call Dr. Washington's office [phone] for appointment. You have an appointment scheduled at with Dr. Kelley at [address] [place] [address]., next Tuesday, January 23rd at 11:20am.  Call their office [phone] to confirm appointment. Follow up within one week with a Diabetes Educator, to review all your insulin requirements and dosages, for any necessary dose adjustments.  Please check if still any does adjustments required for your Lantus or Humalog doses.  Call their office [phone] for appointment.      Follow up with Dr. Washington  (Endocrinology at [facility]) or first available Endocrinologist.  Call their office [phone] for appointment.

## 2018-01-19 NOTE — DISCHARGE NOTE ADULT - HOSPITAL COURSE
47F with DM2 and h/o DKA on Lantus, alcohol dependence, h/o MRSA gluteal abscess and cellulitis (09/2017) & major depressive DO (remote h/o suicide attempt) presents for nausea & vomiting and found to have DKA with AG 25 with HCO3 18, pH 7.3. Lipase markedly elevated to 600s and CT A/P revealed changes consistent with acute pancreatitis. Patient admitted directly to MICU for further management with insulin drip. Endocrinology was consulted and the patient was successfully titrated to subcutaneous basal-bolus insulin. Acute pancreatitis was managed with IV fluids and pain control. RUQ ultrasound demonstrated cholelithiasis without signs of acute cholecystitis. Presumed etiology of pancreatitis is gallstones, alcoholic pancreatitis less likely given patient last drink approx. 2 week prior. Lipid profile remains pending. Of note, patient was found to have new erythematous facial rash concerning for malar distribution with family history of SLE however DEEP remained normal. Sertraline and topiramate initially held due to possible role in pancreatitis and has since been restarted. On hospital day 3, patient transferred to the floor for further management when tolerating adequate PO and blood sugars controlled. General Surgery was consulted for evaluation of cholelithiasis and possible elective cholecystectomy. Surgery performed on hospital day 5. 47F with DM2 and h/o DKA on Lantus, alcohol dependence, h/o MRSA gluteal abscess and cellulitis (09/2017) & major depressive DO (remote h/o suicide attempt) presents for nausea & vomiting and found to have DKA with AG 25 with HCO3 18, pH 7.3. Lipase markedly elevated to 600s and CT A/P revealed changes consistent with acute pancreatitis. Patient admitted directly to MICU for further management with insulin drip. Endocrinology was consulted and the patient was successfully titrated to subcutaneous basal-bolus insulin. Acute pancreatitis was managed with IV fluids and pain control. RUQ ultrasound demonstrated cholelithiasis without signs of acute cholecystitis. Presumed etiology of pancreatitis is gallstones, alcoholic pancreatitis less likely given patient last drink approx. 2 week prior. Lipid profile remains pending. Of note, patient was found to have new erythematous facial rash concerning for malar distribution with family history of SLE however DEEP remained normal. Sertraline and topiramate initially held due to possible role in pancreatitis and has since been restarted. On hospital day 3, patient transferred to the floor for further management when tolerating adequate PO and blood sugars controlled. General Surgery was consulted for evaluation of cholelithiasis and possible elective cholecystectomy. Surgery performed on hospital day 5.    Pt underwent lap cholecystectomy, on 1/19.  Pt. tolerated procedure well and was transferred to the recovery room and then the floor without incidence.  Pt. was mainly managed for postoperative pain, wound care, as well as close monitoring of fluid resuscitation and return of GI function.  Pt has been tolerating a diet, voiding, ambulating, and the pain is now well controlled.   Pt. is ready for discharge home in stable condition, and will follow up within one to two weeks as an outpatient with Dr. Perez, and with her Endocrinologist.

## 2018-01-19 NOTE — BRIEF OPERATIVE NOTE - OPERATION/FINDINGS
Lap cholecystectomy. Pt had omental adhesions to gallbladder as well as a large left hepatic lobe. Adhesions taken down with cautery carefully. Critical view obtained; pictures were taken and placed into patient chart. Duct and artery clipped with Hem-o-loks x 2 distal and 1 proximally.

## 2018-01-19 NOTE — BRIEF OPERATIVE NOTE - POST-OP DX
Cholelithiasis  01/19/2018    Active  Mattie, Ping  Gallstone pancreatitis  01/19/2018    Active  Mattie, Ping

## 2018-01-19 NOTE — PROGRESS NOTE ADULT - PROBLEM SELECTOR PLAN 3
-low CHO diet is helpful  -check fasting lipids in the am  -dietary consult.
- Low CHO diet is helpful  - Check fasting lipids in the am  - Dietician appointment to be made as an outpatient (see below)
- -170s well controlled - DKA RESOLVED s/p insulin drip (1/15-1/16)  - cont Lantus 15 qhs (home dose Lantus 45 U), Humalog 2 U TID with meals   - will increase pre-meal insulin after cholecystectomy/patient resumes normal eating habits, per Endo recommendations  - NPO for cholecystectomy; resume CC diet after OR
- biliary stones without signs of acute cholecystitis on RUQ sonogram, no indication for antibiotics as patient afebrile; leukocytosis likely reactive in setting of pancreatitis  - will need outpatient Surgical evaluation for possible elective cholecystectomy

## 2018-01-19 NOTE — PROGRESS NOTE ADULT - ATTENDING COMMENTS
Agree with above. Patient seen and examined. Frequent bedside visits.  -Diabetic Ketoacidosis - now with gap closed. Continues to require insulin drip in setting of inability to tolerate PO likely from pancreatitis. Once with improved pain and ability to tolerate PO will transition to long acting insulin to titrate off Insulin drip  -Severe abdominal pain - central with radiation to back and LLQ - will check CT abd/pelvis with PO contrast. Vomiting resolved but still with nausea and dry heaves. I suspect pancreatitis vs diverticulitis. Will hold off antibiotics in absence of fevers until imaging reviewed    Critical Care Time 35 minutes
Agree with above. Patient seen and examined.  -Diabetic Ketoacidosis - now with gap closed and transitioned off insulin drip. Endocrine followup. Lipid panel pending from 1/15  -Severe abdominal pain - central with radiation to back and LLQ now improving. CT abd/pelvis noted with pancreatitis without necrosis and cholelithiasis. Patient now tolerating PO intake  -Medically stable for transfer to floors
Agree with assessment and plan as above by Dr. Chaney. Reviewed all pertinent labs, glucose values, and imaging studies. Modifications made as indicated above.     Winston Washington D.O  [phone]
Agree with assessment and plan as above by Dr. Chaney. Reviewed all pertinent labs, glucose values, and imaging studies. Modifications made as indicated above.     Winston Washington D.O  [phone]
Agree with above.  For Cholecystectomy otday.  Possibel tx to surgical service as no acute medical issues, endocrine following for DKA.
Agree with above.  DKA resolved.  aptient tolerating diet, however still abdominal pain. Time course of ETOH off as last drink on New Years lilian, approxiamtely 10-14 days prior to abdominal pain.  With gallstones, would c/s surgery for possibel CCX.    -patietn on long standing topamax and sertraline.  On list of drugs causing pancreatiits, sertaline is class IV agent (1 case report, no re-challenge), topamax not on list, in post-marketing analysis <1% patients with pancreatitis 2/2 topamax.  As patient has alternative more likely causes of gallstones and ETOH, wll restart both.

## 2018-01-19 NOTE — PROGRESS NOTE ADULT - ASSESSMENT
47F with insulin-dependent DM1 c/b prior episode of DKA and alcohol dependence admitted for DKA secondary to gallstone vs. alcoholic pancreatitis s/p insulin drip with improved glycemic control, pending elective cholecystectomy by Surgery today.

## 2018-01-19 NOTE — PROGRESS NOTE ADULT - SUBJECTIVE AND OBJECTIVE BOX
Chief Complaint/Follow-up on: T2DM    Subjective: Patient s/p lap cholecystectomy done a few hours ago. States that this admission was her second episode of DKA in her lifetime. She was only on Lantus at home and no other agents and may have been missing a few doses of Lantus sometimes. Has a strong family history of T2DM. Cousin has Lupus. Tolerating liquids at this time.    MEDICATIONS  (STANDING):  dextrose 50% Injectable 12.5 Gram(s) IV Push once  dextrose 50% Injectable 25 Gram(s) IV Push once  dextrose 50% Injectable 25 Gram(s) IV Push once  docusate sodium 100 milliGRAM(s) Oral two times a day  enoxaparin Injectable 40 milliGRAM(s) SubCutaneous every 24 hours  insulin glargine Injectable (LANTUS) 15 Unit(s) SubCutaneous at bedtime  insulin lispro (HumaLOG) corrective regimen sliding scale   SubCutaneous three times a day before meals  insulin lispro (HumaLOG) corrective regimen sliding scale   SubCutaneous at bedtime  insulin lispro Injectable (HumaLOG) 2 Unit(s) SubCutaneous three times a day before meals  lactated ringers. 1000 milliLiter(s) (40 mL/Hr) IV Continuous <Continuous>  ondansetron Injectable 4 milliGRAM(s) IV Push once  potassium chloride  10 mEq/100 mL IVPB 10 milliEquivalent(s) IV Intermittent every 1 hour  senna 2 Tablet(s) Oral at bedtime  sertraline 100 milliGRAM(s) Oral daily  topiramate 50 milliGRAM(s) Oral daily  traZODone 200 milliGRAM(s) Oral at bedtime    MEDICATIONS  (PRN):  acetaminophen   Tablet. 975 milliGRAM(s) Oral every 6 hours PRN Mild Pain (1 - 3)  clonazePAM Tablet 0.5 milliGRAM(s) Oral three times a day PRN Anxiety  dextrose Gel 1 Dose(s) Oral once PRN Blood Glucose LESS THAN 70 milliGRAM(s)/deciliter  glucagon  Injectable 1 milliGRAM(s) IntraMuscular once PRN Glucose LESS THAN 70 milligrams/deciliter  oxyCODONE    IR 5 milliGRAM(s) Oral every 4 hours PRN Moderate Pain (4 - 6)  oxyCODONE    IR 10 milliGRAM(s) Oral every 6 hours PRN Severe Pain (7 - 10)  simethicone 80 milliGRAM(s) Chew two times a day PRN Dyspepsia      PHYSICAL EXAM:  VITALS: T(C): 36.5 (01-19-18 @ 17:56)  T(F): 97.7 (01-19-18 @ 17:56), Max: 98.1 (01-19-18 @ 04:44)  HR: 69 (01-19-18 @ 17:56) (63 - 86)  BP: 121/76 (01-19-18 @ 17:56) (99/58 - 121/76)  RR:  (14 - 18)  SpO2:  (95% - 100%)    GENERAL: NAD, well-groomed, well-developed, thin female  EYES: No proptosis, no injection  HEENT:  Atraumatic, Normocephalic, moist mucous membranes  THYROID: Normal size, no palpable nodules  RESPIRATORY: Clear to auscultation bilaterally; No rales, rhonchi, wheezing, or rubs  CARDIOVASCULAR: Regular rate and rhythm; No murmurs; no peripheral edema  GI: Soft, nontender, non distended, normal bowel sounds  CUSHING'S SIGNS: no striae    POCT Blood Glucose.: 146 mg/dL (01-19-18 @ 08:39)  POCT Blood Glucose.: 130 mg/dL (01-18-18 @ 21:47)  POCT Blood Glucose.: 172 mg/dL (01-18-18 @ 17:21)  POCT Blood Glucose.: 149 mg/dL (01-18-18 @ 12:31)  POCT Blood Glucose.: 145 mg/dL (01-18-18 @ 08:31)  POCT Blood Glucose.: 163 mg/dL (01-17-18 @ 21:35)  POCT Blood Glucose.: 162 mg/dL (01-17-18 @ 20:10)  POCT Blood Glucose.: 146 mg/dL (01-17-18 @ 17:35)  POCT Blood Glucose.: 134 mg/dL (01-17-18 @ 13:32)  POCT Blood Glucose.: 208 mg/dL (01-17-18 @ 09:22)  POCT Blood Glucose.: 179 mg/dL (01-16-18 @ 21:27)  POCT Blood Glucose.: 142 mg/dL (01-16-18 @ 20:04)    01-19    137  |  104  |  12  ----------------------------<  165<H>  4.7   |  20<L>  |  0.74    EGFR if : 113  EGFR if non : 98    Ca    8.4      01-19  Mg     2.2     01-19  Phos  4.0     01-19    TPro  7.5  /  Alb  3.6  /  TBili  0.8  /  DBili  x   /  AST  17  /  ALT  13  /  AlkPhos  85  01-18          Thyroid Function Tests:  12-25 @ 15:36 TSH 0.71 FreeT4 -- T3 -- Anti TPO -- Anti Thyroglobulin Ab -- TSI --      Hemoglobin A1C, Whole Blood: 7.7 % <H> [4.0 - 5.6] (01-15-18 @ 22:22)

## 2018-01-20 VITALS
DIASTOLIC BLOOD PRESSURE: 65 MMHG | OXYGEN SATURATION: 98 % | HEART RATE: 60 BPM | RESPIRATION RATE: 18 BRPM | TEMPERATURE: 98 F | SYSTOLIC BLOOD PRESSURE: 98 MMHG

## 2018-01-20 LAB
ALBUMIN SERPL ELPH-MCNC: 3.2 G/DL — LOW (ref 3.3–5)
ALP SERPL-CCNC: 107 U/L — SIGNIFICANT CHANGE UP (ref 40–120)
ALT FLD-CCNC: 55 U/L — HIGH (ref 10–45)
ANION GAP SERPL CALC-SCNC: 13 MMOL/L — SIGNIFICANT CHANGE UP (ref 5–17)
AST SERPL-CCNC: 109 U/L — HIGH (ref 10–40)
BASOPHILS # BLD AUTO: 0.04 K/UL — SIGNIFICANT CHANGE UP (ref 0–0.2)
BASOPHILS NFR BLD AUTO: 0.6 % — SIGNIFICANT CHANGE UP (ref 0–2)
BILIRUB SERPL-MCNC: 0.6 MG/DL — SIGNIFICANT CHANGE UP (ref 0.2–1.2)
BUN SERPL-MCNC: 9 MG/DL — SIGNIFICANT CHANGE UP (ref 7–23)
C PEPTIDE SERPL-MCNC: 0.3 NG/ML — LOW (ref 0.9–7.1)
CALCIUM SERPL-MCNC: 9.1 MG/DL — SIGNIFICANT CHANGE UP (ref 8.4–10.5)
CHLORIDE SERPL-SCNC: 104 MMOL/L — SIGNIFICANT CHANGE UP (ref 96–108)
CO2 SERPL-SCNC: 21 MMOL/L — LOW (ref 22–31)
CREAT SERPL-MCNC: 0.7 MG/DL — SIGNIFICANT CHANGE UP (ref 0.5–1.3)
EOSINOPHIL # BLD AUTO: 0.18 K/UL — SIGNIFICANT CHANGE UP (ref 0–0.5)
EOSINOPHIL NFR BLD AUTO: 2.7 % — SIGNIFICANT CHANGE UP (ref 0–6)
GLUCOSE BLDC GLUCOMTR-MCNC: 108 MG/DL — HIGH (ref 70–99)
GLUCOSE BLDC GLUCOMTR-MCNC: 123 MG/DL — HIGH (ref 70–99)
GLUCOSE BLDC GLUCOMTR-MCNC: 143 MG/DL — HIGH (ref 70–99)
GLUCOSE BLDC GLUCOMTR-MCNC: 82 MG/DL — SIGNIFICANT CHANGE UP (ref 70–99)
GLUCOSE SERPL-MCNC: 80 MG/DL — SIGNIFICANT CHANGE UP (ref 70–99)
HCT VFR BLD CALC: 37.4 % — SIGNIFICANT CHANGE UP (ref 34.5–45)
HGB BLD-MCNC: 12.6 G/DL — SIGNIFICANT CHANGE UP (ref 11.5–15.5)
IMM GRANULOCYTES NFR BLD AUTO: 0.3 % — SIGNIFICANT CHANGE UP (ref 0–1.5)
LYMPHOCYTES # BLD AUTO: 1.18 K/UL — SIGNIFICANT CHANGE UP (ref 1–3.3)
LYMPHOCYTES # BLD AUTO: 18 % — SIGNIFICANT CHANGE UP (ref 13–44)
MAGNESIUM SERPL-MCNC: 2.1 MG/DL — SIGNIFICANT CHANGE UP (ref 1.6–2.6)
MCHC RBC-ENTMCNC: 32.6 PG — SIGNIFICANT CHANGE UP (ref 27–34)
MCHC RBC-ENTMCNC: 33.7 GM/DL — SIGNIFICANT CHANGE UP (ref 32–36)
MCV RBC AUTO: 96.9 FL — SIGNIFICANT CHANGE UP (ref 80–100)
MONOCYTES # BLD AUTO: 0.69 K/UL — SIGNIFICANT CHANGE UP (ref 0–0.9)
MONOCYTES NFR BLD AUTO: 10.5 % — SIGNIFICANT CHANGE UP (ref 2–14)
NEUTROPHILS # BLD AUTO: 4.44 K/UL — SIGNIFICANT CHANGE UP (ref 1.8–7.4)
NEUTROPHILS NFR BLD AUTO: 67.9 % — SIGNIFICANT CHANGE UP (ref 43–77)
PHOSPHATE SERPL-MCNC: 2.7 MG/DL — SIGNIFICANT CHANGE UP (ref 2.5–4.5)
PLATELET # BLD AUTO: 230 K/UL — SIGNIFICANT CHANGE UP (ref 150–400)
POTASSIUM SERPL-MCNC: 3.9 MMOL/L — SIGNIFICANT CHANGE UP (ref 3.5–5.3)
POTASSIUM SERPL-SCNC: 3.9 MMOL/L — SIGNIFICANT CHANGE UP (ref 3.5–5.3)
PROT SERPL-MCNC: 6 G/DL — SIGNIFICANT CHANGE UP (ref 6–8.3)
RBC # BLD: 3.86 M/UL — SIGNIFICANT CHANGE UP (ref 3.8–5.2)
RBC # FLD: 12.3 % — SIGNIFICANT CHANGE UP (ref 10.3–14.5)
SODIUM SERPL-SCNC: 138 MMOL/L — SIGNIFICANT CHANGE UP (ref 135–145)
WBC # BLD: 6.55 K/UL — SIGNIFICANT CHANGE UP (ref 3.8–10.5)
WBC # FLD AUTO: 6.55 K/UL — SIGNIFICANT CHANGE UP (ref 3.8–10.5)

## 2018-01-20 PROCEDURE — 82150 ASSAY OF AMYLASE: CPT

## 2018-01-20 PROCEDURE — 85730 THROMBOPLASTIN TIME PARTIAL: CPT

## 2018-01-20 PROCEDURE — 86850 RBC ANTIBODY SCREEN: CPT

## 2018-01-20 PROCEDURE — 86703 HIV-1/HIV-2 1 RESULT ANTBDY: CPT

## 2018-01-20 PROCEDURE — 96375 TX/PRO/DX INJ NEW DRUG ADDON: CPT

## 2018-01-20 PROCEDURE — 82565 ASSAY OF CREATININE: CPT

## 2018-01-20 PROCEDURE — 84100 ASSAY OF PHOSPHORUS: CPT

## 2018-01-20 PROCEDURE — 88304 TISSUE EXAM BY PATHOLOGIST: CPT

## 2018-01-20 PROCEDURE — 74177 CT ABD & PELVIS W/CONTRAST: CPT

## 2018-01-20 PROCEDURE — 84484 ASSAY OF TROPONIN QUANT: CPT

## 2018-01-20 PROCEDURE — 84295 ASSAY OF SERUM SODIUM: CPT

## 2018-01-20 PROCEDURE — 87086 URINE CULTURE/COLONY COUNT: CPT

## 2018-01-20 PROCEDURE — 86038 ANTINUCLEAR ANTIBODIES: CPT

## 2018-01-20 PROCEDURE — 85610 PROTHROMBIN TIME: CPT

## 2018-01-20 PROCEDURE — 84132 ASSAY OF SERUM POTASSIUM: CPT

## 2018-01-20 PROCEDURE — 82553 CREATINE MB FRACTION: CPT

## 2018-01-20 PROCEDURE — 82435 ASSAY OF BLOOD CHLORIDE: CPT

## 2018-01-20 PROCEDURE — 82010 KETONE BODYS QUAN: CPT

## 2018-01-20 PROCEDURE — 82803 BLOOD GASES ANY COMBINATION: CPT

## 2018-01-20 PROCEDURE — 82550 ASSAY OF CK (CPK): CPT

## 2018-01-20 PROCEDURE — 86341 ISLET CELL ANTIBODY: CPT

## 2018-01-20 PROCEDURE — 83036 HEMOGLOBIN GLYCOSYLATED A1C: CPT

## 2018-01-20 PROCEDURE — 81001 URINALYSIS AUTO W/SCOPE: CPT

## 2018-01-20 PROCEDURE — 76705 ECHO EXAM OF ABDOMEN: CPT

## 2018-01-20 PROCEDURE — 86900 BLOOD TYPING SEROLOGIC ABO: CPT

## 2018-01-20 PROCEDURE — 80048 BASIC METABOLIC PNL TOTAL CA: CPT

## 2018-01-20 PROCEDURE — 99285 EMERGENCY DEPT VISIT HI MDM: CPT | Mod: 25

## 2018-01-20 PROCEDURE — 84703 CHORIONIC GONADOTROPIN ASSAY: CPT

## 2018-01-20 PROCEDURE — 93005 ELECTROCARDIOGRAM TRACING: CPT

## 2018-01-20 PROCEDURE — 82947 ASSAY GLUCOSE BLOOD QUANT: CPT

## 2018-01-20 PROCEDURE — 85027 COMPLETE CBC AUTOMATED: CPT

## 2018-01-20 PROCEDURE — 86901 BLOOD TYPING SEROLOGIC RH(D): CPT

## 2018-01-20 PROCEDURE — 85014 HEMATOCRIT: CPT

## 2018-01-20 PROCEDURE — 81025 URINE PREGNANCY TEST: CPT

## 2018-01-20 PROCEDURE — 86922 COMPATIBILITY TEST ANTIGLOB: CPT

## 2018-01-20 PROCEDURE — 83735 ASSAY OF MAGNESIUM: CPT

## 2018-01-20 PROCEDURE — 82962 GLUCOSE BLOOD TEST: CPT

## 2018-01-20 PROCEDURE — 84681 ASSAY OF C-PEPTIDE: CPT

## 2018-01-20 PROCEDURE — 80307 DRUG TEST PRSMV CHEM ANLYZR: CPT

## 2018-01-20 PROCEDURE — 82330 ASSAY OF CALCIUM: CPT

## 2018-01-20 PROCEDURE — 82009 KETONE BODYS QUAL: CPT

## 2018-01-20 PROCEDURE — 83690 ASSAY OF LIPASE: CPT

## 2018-01-20 PROCEDURE — 96376 TX/PRO/DX INJ SAME DRUG ADON: CPT

## 2018-01-20 PROCEDURE — 83605 ASSAY OF LACTIC ACID: CPT

## 2018-01-20 PROCEDURE — 80053 COMPREHEN METABOLIC PANEL: CPT

## 2018-01-20 PROCEDURE — 96374 THER/PROPH/DIAG INJ IV PUSH: CPT

## 2018-01-20 RX ORDER — INSULIN LISPRO 100/ML
5 VIAL (ML) SUBCUTANEOUS
Qty: 0 | Refills: 0 | DISCHARGE
Start: 2018-01-20

## 2018-01-20 RX ORDER — SENNA PLUS 8.6 MG/1
2 TABLET ORAL
Qty: 0 | Refills: 0 | DISCHARGE
Start: 2018-01-20

## 2018-01-20 RX ORDER — OXYCODONE HYDROCHLORIDE 5 MG/1
1 TABLET ORAL
Qty: 20 | Refills: 0 | OUTPATIENT
Start: 2018-01-20 | End: 2018-01-24

## 2018-01-20 RX ORDER — HYDROMORPHONE HYDROCHLORIDE 2 MG/ML
4 INJECTION INTRAMUSCULAR; INTRAVENOUS; SUBCUTANEOUS EVERY 6 HOURS
Qty: 0 | Refills: 0 | Status: DISCONTINUED | OUTPATIENT
Start: 2018-01-20 | End: 2018-01-20

## 2018-01-20 RX ORDER — INSULIN LISPRO 100/ML
4 VIAL (ML) SUBCUTANEOUS
Qty: 0 | Refills: 0 | DISCHARGE
Start: 2018-01-20

## 2018-01-20 RX ORDER — DOCUSATE SODIUM 100 MG
1 CAPSULE ORAL
Qty: 0 | Refills: 0 | DISCHARGE
Start: 2018-01-20

## 2018-01-20 RX ORDER — SIMETHICONE 80 MG/1
1 TABLET, CHEWABLE ORAL
Qty: 0 | Refills: 0 | COMMUNITY
Start: 2018-01-20

## 2018-01-20 RX ORDER — ACETAMINOPHEN 500 MG
2 TABLET ORAL
Qty: 0 | Refills: 0 | COMMUNITY
Start: 2018-01-20

## 2018-01-20 RX ORDER — INSULIN GLARGINE 100 [IU]/ML
15 INJECTION, SOLUTION SUBCUTANEOUS
Qty: 0 | Refills: 0 | DISCHARGE
Start: 2018-01-20

## 2018-01-20 RX ORDER — KETOROLAC TROMETHAMINE 30 MG/ML
15 SYRINGE (ML) INJECTION ONCE
Qty: 0 | Refills: 0 | Status: DISCONTINUED | OUTPATIENT
Start: 2018-01-20 | End: 2018-01-20

## 2018-01-20 RX ORDER — HYDROMORPHONE HYDROCHLORIDE 2 MG/ML
1 INJECTION INTRAMUSCULAR; INTRAVENOUS; SUBCUTANEOUS
Qty: 20 | Refills: 0 | OUTPATIENT
Start: 2018-01-20 | End: 2018-01-24

## 2018-01-20 RX ORDER — HYDROMORPHONE HYDROCHLORIDE 2 MG/ML
2 INJECTION INTRAMUSCULAR; INTRAVENOUS; SUBCUTANEOUS EVERY 4 HOURS
Qty: 0 | Refills: 0 | Status: DISCONTINUED | OUTPATIENT
Start: 2018-01-20 | End: 2018-01-20

## 2018-01-20 RX ORDER — ACETAMINOPHEN 500 MG
3 TABLET ORAL
Qty: 0 | Refills: 0 | COMMUNITY
Start: 2018-01-20

## 2018-01-20 RX ORDER — OXYCODONE HYDROCHLORIDE 5 MG/1
5 TABLET ORAL ONCE
Qty: 0 | Refills: 0 | Status: DISCONTINUED | OUTPATIENT
Start: 2018-01-20 | End: 2018-01-20

## 2018-01-20 RX ORDER — INSULIN LISPRO 100/ML
2 VIAL (ML) SUBCUTANEOUS
Qty: 0 | Refills: 0 | COMMUNITY
Start: 2018-01-20

## 2018-01-20 RX ADMIN — SODIUM CHLORIDE 40 MILLILITER(S): 9 INJECTION, SOLUTION INTRAVENOUS at 00:36

## 2018-01-20 RX ADMIN — Medication 100 MILLIGRAM(S): at 17:05

## 2018-01-20 RX ADMIN — OXYCODONE HYDROCHLORIDE 10 MILLIGRAM(S): 5 TABLET ORAL at 01:05

## 2018-01-20 RX ADMIN — SERTRALINE 100 MILLIGRAM(S): 25 TABLET, FILM COATED ORAL at 12:57

## 2018-01-20 RX ADMIN — OXYCODONE HYDROCHLORIDE 10 MILLIGRAM(S): 5 TABLET ORAL at 08:29

## 2018-01-20 RX ADMIN — Medication 50 MILLIGRAM(S): at 12:57

## 2018-01-20 RX ADMIN — OXYCODONE HYDROCHLORIDE 10 MILLIGRAM(S): 5 TABLET ORAL at 00:35

## 2018-01-20 RX ADMIN — HYDROMORPHONE HYDROCHLORIDE 4 MILLIGRAM(S): 2 INJECTION INTRAMUSCULAR; INTRAVENOUS; SUBCUTANEOUS at 12:58

## 2018-01-20 RX ADMIN — HYDROMORPHONE HYDROCHLORIDE 4 MILLIGRAM(S): 2 INJECTION INTRAMUSCULAR; INTRAVENOUS; SUBCUTANEOUS at 13:28

## 2018-01-20 RX ADMIN — Medication 15 MILLIGRAM(S): at 03:22

## 2018-01-20 RX ADMIN — Medication 100 MILLIGRAM(S): at 04:58

## 2018-01-20 RX ADMIN — OXYCODONE HYDROCHLORIDE 5 MILLIGRAM(S): 5 TABLET ORAL at 17:03

## 2018-01-20 RX ADMIN — Medication 2 UNIT(S): at 13:35

## 2018-01-20 RX ADMIN — OXYCODONE HYDROCHLORIDE 5 MILLIGRAM(S): 5 TABLET ORAL at 17:33

## 2018-01-20 RX ADMIN — OXYCODONE HYDROCHLORIDE 10 MILLIGRAM(S): 5 TABLET ORAL at 07:59

## 2018-01-20 RX ADMIN — Medication 2 UNIT(S): at 17:44

## 2018-01-20 RX ADMIN — Medication 15 MILLIGRAM(S): at 02:52

## 2018-01-20 NOTE — PROGRESS NOTE ADULT - SUBJECTIVE AND OBJECTIVE BOX
Surgery Progress Note    S: Patient resting in bed. Overnight patient had significant pain.  Serial abdominal exams were benign.  Patient was given multiple medications but continued to have pain.  Currently doing well, pain control improving.     T(C): 36.6 (01-20-18 @ 05:00), Max: 36.7 (01-19-18 @ 22:10)  HR: 57 (01-20-18 @ 05:00) (57 - 79)  BP: 100/65 (01-20-18 @ 05:00) (99/58 - 121/76)  RR: 18 (01-20-18 @ 05:00) (14 - 18)  SpO2: 95% (01-20-18 @ 05:00) (93% - 100%)  Wt(kg): --    01-18 @ 07:01  -  01-19 @ 07:00  --------------------------------------------------------  IN:    lactated ringers.: 75 mL    Oral Fluid: 880 mL  Total IN: 955 mL    OUT:  Total OUT: 0 mL    Total NET: 955 mL      01-19 @ 07:01  -  01-20 @ 06:22  --------------------------------------------------------  IN:    lactated ringers.: 160 mL  Total IN: 160 mL    OUT:    Voided: 1000 mL  Total OUT: 1000 mL    Total NET: -840 mL                              14.9   8.9   )-----------( 244      ( 19 Jan 2018 06:44 )             41.6     CAPILLARY BLOOD GLUCOSE      POCT Blood Glucose.: 174 mg/dL (19 Jan 2018 22:06)  POCT Blood Glucose.: 146 mg/dL (19 Jan 2018 08:39)      PE:   Gen: AAOX 3, NAD  Resp: non labored breathing  Abd: Soft, ND, tender to palpation in RUQ, no rebound, no guarding, incisions c,d,i      A/P: 47yFemale s/p lap paulino with post-operative pain  - appreciate acute pain recs  - multimodal pain control  - reg diet  - Monitor GI function  - encourage OOB/IS  - dispo: home once pain control improved

## 2018-01-20 NOTE — PROGRESS NOTE ADULT - PROVIDER SPECIALTY LIST ADULT
Endocrinology
Internal Medicine
Internal Medicine
MICU
Surgery
Surgery
MICU
Endocrinology

## 2018-01-23 LAB
GAD65 AB SER-MCNC: 0 NMOL/L — SIGNIFICANT CHANGE UP
ISLET CELL512 AB SER-ACNC: <5 JDF UNITS — SIGNIFICANT CHANGE UP

## 2018-01-23 RX ORDER — HYDROMORPHONE HYDROCHLORIDE 2 MG/ML
1 INJECTION INTRAMUSCULAR; INTRAVENOUS; SUBCUTANEOUS
Qty: 9 | Refills: 0 | OUTPATIENT
Start: 2018-01-23 | End: 2018-01-25

## 2018-01-25 ENCOUNTER — APPOINTMENT (OUTPATIENT)
Dept: INTERNAL MEDICINE | Facility: CLINIC | Age: 48
End: 2018-01-25

## 2018-01-29 LAB — SURGICAL PATHOLOGY STUDY: SIGNIFICANT CHANGE UP

## 2018-01-30 ENCOUNTER — APPOINTMENT (OUTPATIENT)
Dept: TRAUMA SURGERY | Facility: CLINIC | Age: 48
End: 2018-01-30
Payer: MEDICARE

## 2018-01-30 ENCOUNTER — APPOINTMENT (OUTPATIENT)
Dept: INTERNAL MEDICINE | Facility: CLINIC | Age: 48
End: 2018-01-30

## 2018-01-30 ENCOUNTER — APPOINTMENT (OUTPATIENT)
Dept: SURGERY | Facility: CLINIC | Age: 48
End: 2018-01-30

## 2018-01-30 VITALS
DIASTOLIC BLOOD PRESSURE: 61 MMHG | BODY MASS INDEX: 27.46 KG/M2 | HEIGHT: 63 IN | HEART RATE: 60 BPM | WEIGHT: 155 LBS | TEMPERATURE: 98 F | SYSTOLIC BLOOD PRESSURE: 91 MMHG

## 2018-01-30 DIAGNOSIS — K81.1 CHRONIC CHOLECYSTITIS: ICD-10-CM

## 2018-01-30 DIAGNOSIS — K59.00 CONSTIPATION, UNSPECIFIED: ICD-10-CM

## 2018-01-30 PROCEDURE — 99024 POSTOP FOLLOW-UP VISIT: CPT

## 2018-01-31 ENCOUNTER — TRANSCRIPTION ENCOUNTER (OUTPATIENT)
Age: 48
End: 2018-01-31

## 2018-02-08 ENCOUNTER — APPOINTMENT (OUTPATIENT)
Dept: INTERNAL MEDICINE | Facility: CLINIC | Age: 48
End: 2018-02-08
Payer: MEDICARE

## 2018-02-08 VITALS
DIASTOLIC BLOOD PRESSURE: 52 MMHG | WEIGHT: 152 LBS | HEART RATE: 63 BPM | HEIGHT: 61.5 IN | BODY MASS INDEX: 28.33 KG/M2 | SYSTOLIC BLOOD PRESSURE: 82 MMHG | OXYGEN SATURATION: 99 %

## 2018-02-08 DIAGNOSIS — E10.42 TYPE 1 DIABETES MELLITUS WITH DIABETIC POLYNEUROPATHY: ICD-10-CM

## 2018-02-08 DIAGNOSIS — R79.89 OTHER SPECIFIED ABNORMAL FINDINGS OF BLOOD CHEMISTRY: ICD-10-CM

## 2018-02-08 DIAGNOSIS — F10.20 ALCOHOL DEPENDENCE, UNCOMPLICATED: ICD-10-CM

## 2018-02-08 PROCEDURE — 99203 OFFICE O/P NEW LOW 30 MIN: CPT

## 2018-02-08 RX ORDER — IBUPROFEN 400 MG/1
400 TABLET, FILM COATED ORAL
Qty: 30 | Refills: 0 | Status: DISCONTINUED | COMMUNITY
Start: 2017-11-27

## 2018-02-08 RX ORDER — AMOXICILLIN 500 MG/1
500 CAPSULE ORAL
Qty: 30 | Refills: 0 | Status: DISCONTINUED | COMMUNITY
Start: 2017-11-27

## 2018-02-11 LAB
ALBUMIN SERPL ELPH-MCNC: 4.2 G/DL
ALP BLD-CCNC: 83 U/L
ALT SERPL-CCNC: 14 U/L
AST SERPL-CCNC: 19 U/L
BILIRUB DIRECT SERPL-MCNC: 0.2 MG/DL
BILIRUB INDIRECT SERPL-MCNC: 0.4 MG/DL
BILIRUB SERPL-MCNC: 0.6 MG/DL
HBV SURFACE AB SER QL: NONREACTIVE
HBV SURFACE AG SER QL: NONREACTIVE
HCV AB SER QL: NONREACTIVE
HCV S/CO RATIO: 0.14 S/CO
PROT SERPL-MCNC: 7.3 G/DL

## 2018-03-02 ENCOUNTER — APPOINTMENT (OUTPATIENT)
Dept: ENDOCRINOLOGY | Facility: CLINIC | Age: 48
End: 2018-03-02
Payer: MEDICARE

## 2018-03-02 VITALS
OXYGEN SATURATION: 98 % | SYSTOLIC BLOOD PRESSURE: 120 MMHG | BODY MASS INDEX: 28.7 KG/M2 | HEART RATE: 69 BPM | DIASTOLIC BLOOD PRESSURE: 70 MMHG | WEIGHT: 152 LBS | HEIGHT: 61 IN

## 2018-03-02 DIAGNOSIS — Z82.49 FAMILY HISTORY OF ISCHEMIC HEART DISEASE AND OTHER DISEASES OF THE CIRCULATORY SYSTEM: ICD-10-CM

## 2018-03-02 LAB
GLUCOSE BLDC GLUCOMTR-MCNC: 231
HBA1C MFR BLD HPLC: 8.4

## 2018-03-02 PROCEDURE — 82962 GLUCOSE BLOOD TEST: CPT

## 2018-03-02 PROCEDURE — 83036 HEMOGLOBIN GLYCOSYLATED A1C: CPT | Mod: QW

## 2018-03-02 PROCEDURE — 99205 OFFICE O/P NEW HI 60 MIN: CPT | Mod: 25

## 2018-03-02 RX ORDER — TOPIRAMATE 50 MG/1
TABLET, COATED ORAL
Refills: 0 | Status: DISCONTINUED | COMMUNITY
End: 2018-03-02

## 2018-03-02 RX ORDER — HYDROMORPHONE HYDROCHLORIDE 2 MG/1
2 TABLET ORAL
Qty: 14 | Refills: 0 | Status: DISCONTINUED | COMMUNITY
Start: 2018-01-30 | End: 2018-03-02

## 2018-03-07 LAB
C PEPTIDE SERPL-MCNC: 1.4 NG/ML
CHOLEST SERPL-MCNC: 228 MG/DL
CHOLEST/HDLC SERPL: 3.5 RATIO
CREAT SPEC-SCNC: 125 MG/DL
HDLC SERPL-MCNC: 66 MG/DL
ISLET CELL512 AB SER-SCNC: 0 NMOL/L
LDLC SERPL CALC-MCNC: 125 MG/DL
MICROALBUMIN 24H UR DL<=1MG/L-MCNC: <0.3 MG/DL
MICROALBUMIN/CREAT 24H UR-RTO: NORMAL
TRIGL SERPL-MCNC: 186 MG/DL

## 2018-03-09 LAB — ZINC TRANSPORTER 8 AB: <10 U/ML

## 2018-03-15 ENCOUNTER — EMERGENCY (EMERGENCY)
Facility: HOSPITAL | Age: 48
LOS: 1 days | Discharge: HOME | End: 2018-03-15
Attending: EMERGENCY MEDICINE | Admitting: EMERGENCY MEDICINE
Payer: COMMERCIAL

## 2018-03-15 VITALS
DIASTOLIC BLOOD PRESSURE: 84 MMHG | RESPIRATION RATE: 20 BRPM | OXYGEN SATURATION: 95 % | TEMPERATURE: 98 F | HEART RATE: 92 BPM | SYSTOLIC BLOOD PRESSURE: 126 MMHG

## 2018-03-15 VITALS
HEART RATE: 86 BPM | SYSTOLIC BLOOD PRESSURE: 146 MMHG | DIASTOLIC BLOOD PRESSURE: 91 MMHG | OXYGEN SATURATION: 95 % | RESPIRATION RATE: 18 BRPM

## 2018-03-15 DIAGNOSIS — Z98.890 OTHER SPECIFIED POSTPROCEDURAL STATES: Chronic | ICD-10-CM

## 2018-03-15 DIAGNOSIS — R69 ILLNESS, UNSPECIFIED: ICD-10-CM

## 2018-03-15 DIAGNOSIS — L02.91 CUTANEOUS ABSCESS, UNSPECIFIED: Chronic | ICD-10-CM

## 2018-03-15 DIAGNOSIS — F19.94 OTHER PSYCHOACTIVE SUBSTANCE USE, UNSPECIFIED WITH PSYCHOACTIVE SUBSTANCE-INDUCED MOOD DISORDER: ICD-10-CM

## 2018-03-15 LAB
ALBUMIN SERPL ELPH-MCNC: 4.6 G/DL — SIGNIFICANT CHANGE UP (ref 3.3–5)
ALP SERPL-CCNC: 118 U/L — SIGNIFICANT CHANGE UP (ref 40–120)
ALT FLD-CCNC: 15 U/L RC — SIGNIFICANT CHANGE UP (ref 10–45)
ANION GAP SERPL CALC-SCNC: 21 MMOL/L — HIGH (ref 5–17)
APAP SERPL-MCNC: <15 UG/ML — SIGNIFICANT CHANGE UP (ref 10–30)
AST SERPL-CCNC: 21 U/L — SIGNIFICANT CHANGE UP (ref 10–40)
BASOPHILS # BLD AUTO: 0.1 K/UL — SIGNIFICANT CHANGE UP (ref 0–0.2)
BASOPHILS NFR BLD AUTO: 1.4 % — SIGNIFICANT CHANGE UP (ref 0–2)
BILIRUB SERPL-MCNC: 0.6 MG/DL — SIGNIFICANT CHANGE UP (ref 0.2–1.2)
BUN SERPL-MCNC: 8 MG/DL — SIGNIFICANT CHANGE UP (ref 7–23)
CALCIUM SERPL-MCNC: 9.1 MG/DL — SIGNIFICANT CHANGE UP (ref 8.4–10.5)
CHLORIDE SERPL-SCNC: 98 MMOL/L — SIGNIFICANT CHANGE UP (ref 96–108)
CO2 SERPL-SCNC: 22 MMOL/L — SIGNIFICANT CHANGE UP (ref 22–31)
CREAT SERPL-MCNC: 0.57 MG/DL — SIGNIFICANT CHANGE UP (ref 0.5–1.3)
EOSINOPHIL # BLD AUTO: 0.1 K/UL — SIGNIFICANT CHANGE UP (ref 0–0.5)
EOSINOPHIL NFR BLD AUTO: 1.7 % — SIGNIFICANT CHANGE UP (ref 0–6)
ETHANOL SERPL-MCNC: 145 MG/DL — HIGH (ref 0–10)
GLUCOSE SERPL-MCNC: 238 MG/DL — HIGH (ref 70–99)
HCT VFR BLD CALC: 44.3 % — SIGNIFICANT CHANGE UP (ref 34.5–45)
HGB BLD-MCNC: 15.5 G/DL — SIGNIFICANT CHANGE UP (ref 11.5–15.5)
LYMPHOCYTES # BLD AUTO: 2.6 K/UL — SIGNIFICANT CHANGE UP (ref 1–3.3)
LYMPHOCYTES # BLD AUTO: 31.8 % — SIGNIFICANT CHANGE UP (ref 13–44)
MCHC RBC-ENTMCNC: 32.7 PG — SIGNIFICANT CHANGE UP (ref 27–34)
MCHC RBC-ENTMCNC: 34.9 GM/DL — SIGNIFICANT CHANGE UP (ref 32–36)
MCV RBC AUTO: 93.8 FL — SIGNIFICANT CHANGE UP (ref 80–100)
MONOCYTES # BLD AUTO: 0.5 K/UL — SIGNIFICANT CHANGE UP (ref 0–0.9)
MONOCYTES NFR BLD AUTO: 6 % — SIGNIFICANT CHANGE UP (ref 2–14)
NEUTROPHILS # BLD AUTO: 4.9 K/UL — SIGNIFICANT CHANGE UP (ref 1.8–7.4)
NEUTROPHILS NFR BLD AUTO: 59.1 % — SIGNIFICANT CHANGE UP (ref 43–77)
PLATELET # BLD AUTO: 182 K/UL — SIGNIFICANT CHANGE UP (ref 150–400)
POTASSIUM SERPL-MCNC: 3.9 MMOL/L — SIGNIFICANT CHANGE UP (ref 3.5–5.3)
POTASSIUM SERPL-SCNC: 3.9 MMOL/L — SIGNIFICANT CHANGE UP (ref 3.5–5.3)
PROT SERPL-MCNC: 8.5 G/DL — HIGH (ref 6–8.3)
RBC # BLD: 4.72 M/UL — SIGNIFICANT CHANGE UP (ref 3.8–5.2)
RBC # FLD: 11.8 % — SIGNIFICANT CHANGE UP (ref 10.3–14.5)
SALICYLATES SERPL-MCNC: <2 MG/DL — LOW (ref 15–30)
SODIUM SERPL-SCNC: 141 MMOL/L — SIGNIFICANT CHANGE UP (ref 135–145)
TSH SERPL-MCNC: 0.98 UIU/ML — SIGNIFICANT CHANGE UP (ref 0.27–4.2)
WBC # BLD: 8.3 K/UL — SIGNIFICANT CHANGE UP (ref 3.8–10.5)
WBC # FLD AUTO: 8.3 K/UL — SIGNIFICANT CHANGE UP (ref 3.8–10.5)

## 2018-03-15 PROCEDURE — 83735 ASSAY OF MAGNESIUM: CPT

## 2018-03-15 PROCEDURE — 84443 ASSAY THYROID STIM HORMONE: CPT

## 2018-03-15 PROCEDURE — 96376 TX/PRO/DX INJ SAME DRUG ADON: CPT

## 2018-03-15 PROCEDURE — 96374 THER/PROPH/DIAG INJ IV PUSH: CPT

## 2018-03-15 PROCEDURE — 80053 COMPREHEN METABOLIC PANEL: CPT

## 2018-03-15 PROCEDURE — 93010 ELECTROCARDIOGRAM REPORT: CPT

## 2018-03-15 PROCEDURE — 96375 TX/PRO/DX INJ NEW DRUG ADDON: CPT

## 2018-03-15 PROCEDURE — 93005 ELECTROCARDIOGRAM TRACING: CPT

## 2018-03-15 PROCEDURE — 96372 THER/PROPH/DIAG INJ SC/IM: CPT | Mod: XU

## 2018-03-15 PROCEDURE — 80307 DRUG TEST PRSMV CHEM ANLYZR: CPT

## 2018-03-15 PROCEDURE — 99284 EMERGENCY DEPT VISIT MOD MDM: CPT | Mod: 25

## 2018-03-15 PROCEDURE — 84100 ASSAY OF PHOSPHORUS: CPT

## 2018-03-15 PROCEDURE — 99285 EMERGENCY DEPT VISIT HI MDM: CPT | Mod: 25

## 2018-03-15 PROCEDURE — 90792 PSYCH DIAG EVAL W/MED SRVCS: CPT | Mod: GC

## 2018-03-15 PROCEDURE — 85027 COMPLETE CBC AUTOMATED: CPT

## 2018-03-15 RX ORDER — ONDANSETRON 8 MG/1
4 TABLET, FILM COATED ORAL ONCE
Qty: 0 | Refills: 0 | Status: COMPLETED | OUTPATIENT
Start: 2018-03-15 | End: 2018-03-15

## 2018-03-15 RX ORDER — SODIUM CHLORIDE 9 MG/ML
1000 INJECTION INTRAMUSCULAR; INTRAVENOUS; SUBCUTANEOUS ONCE
Qty: 0 | Refills: 0 | Status: COMPLETED | OUTPATIENT
Start: 2018-03-15 | End: 2018-03-15

## 2018-03-15 RX ORDER — DIPHENHYDRAMINE HCL 50 MG
50 CAPSULE ORAL EVERY 4 HOURS
Qty: 0 | Refills: 0 | Status: DISCONTINUED | OUTPATIENT
Start: 2018-03-15 | End: 2018-03-19

## 2018-03-15 RX ADMIN — ONDANSETRON 4 MILLIGRAM(S): 8 TABLET, FILM COATED ORAL at 06:48

## 2018-03-15 RX ADMIN — SODIUM CHLORIDE 1000 MILLILITER(S): 9 INJECTION INTRAMUSCULAR; INTRAVENOUS; SUBCUTANEOUS at 06:48

## 2018-03-15 RX ADMIN — ONDANSETRON 4 MILLIGRAM(S): 8 TABLET, FILM COATED ORAL at 04:01

## 2018-03-15 RX ADMIN — Medication 1 MILLIGRAM(S): at 10:40

## 2018-03-15 RX ADMIN — Medication 1 MILLIGRAM(S): at 15:03

## 2018-03-15 RX ADMIN — Medication 25 MILLIGRAM(S): at 10:40

## 2018-03-15 RX ADMIN — Medication 50 MILLIGRAM(S): at 06:48

## 2018-03-15 RX ADMIN — SODIUM CHLORIDE 1000 MILLILITER(S): 9 INJECTION INTRAMUSCULAR; INTRAVENOUS; SUBCUTANEOUS at 05:00

## 2018-03-15 RX ADMIN — Medication 1 MILLIGRAM(S): at 07:07

## 2018-03-15 NOTE — ED ADULT NURSE REASSESSMENT NOTE - NS ED NURSE REASSESS COMMENT FT1
0700 Report received from night nurse Gurjit BRAVO. constant observation intact. A&Ox3. Color pink. skin W&D. lungs clear. abd soft.IV intact LACF without sx of infilt. c/o generalized bodyaches and feeling thirsty at present. States +EtOH last night. States she wants to move out of house she lives in with mother, brother and cousin. states she doesn't feel safe. Denies SI/HI. Safety precautions maintained. Siderails up. Security called to come to ER to wand patient 0700 Report received from night nurse Gurjit BRAVO. constant observation intact. A&Ox3. Color pink. skin W&D. lungs clear. abd soft.IV intact LACF without sx of infilt. c/o generalized bodyaches and feeling thirsty at present. States +EtOH last night. States she wants to move out of house she lives in with mother, brother and cousin. states she doesn't feel safe. Dr Justin Cortes notified. Denies SI/HI. Safety precautions maintained. Siderails up. Security called to come to ER to wand patient

## 2018-03-15 NOTE — ED ADULT NURSE REASSESSMENT NOTE - NS ED NURSE REASSESS COMMENT FT1
pt becoming increasingly agitated and aggressive towards ED staff. pt stating "I want to go home now. You cant hold me against my will." primary RN explained to pt that she has to wait to be reevaluated by ED MD staff. pt continues to verbally abusive/aggressive towards primary RN and MORENA Crawford. pt states, "move the fuck out of my way and let me use the phone to call my , Its my right." pt proceeded to throw her stuffed animal at EDT Carlos. primary RN and ER MD Justin Cortes at bedside attempting to calm pt and explained wait time and plan of care. pt now back in bed with 1:1 at bedside more calm and relaxed at present.

## 2018-03-15 NOTE — ED ADULT NURSE NOTE - NS ED PATIENT SAFETY CONERN FT
5656 Pt states she doesn't feel safe in her home and wants to move out. Requests to speak with social work
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
## 2018-03-15 NOTE — ED PROVIDER NOTE - OBJECTIVE STATEMENT
47 yo F hx of etoh use (last drink today, bottle of vodka), w/drawal, no seizures, anxiety ptsd, depression presents BIBEMS for "not feeling well." Pt now states that she has court day coming up and she called anxious. States she has not taken her meds for 1 week. has been drinking all week.

## 2018-03-15 NOTE — ED PROVIDER NOTE - ATTENDING CONTRIBUTION TO CARE
Attending MD Flores:  I personally have seen and examined this patient.  Resident note reviewed and agree on plan of care and except where noted.  See MDM for details.

## 2018-03-15 NOTE — ED BEHAVIORAL HEALTH ASSESSMENT NOTE - DESCRIPTION
As per ED note, patient initially refused to be examined. She received Ativan 1 mg IV and Benadryl 50 mg IM for anxiety and agitation. Alcohol abuse, diabetes, MRSA cellulitis, s/p cholecystectomy Unemployed, on disability, domiciled with family

## 2018-03-15 NOTE — ED PROVIDER NOTE - CARE PLAN
Principal Discharge DX:	Alcohol withdrawal  Assessment and plan of treatment:	1) Please follow-up with your psychiatrist (clinic: [phone]) and/or primary care doctor within the next 3 days.  Please call today or tomorrow for an appointment.  If you cannot follow-up with your doctor(s), please return to the ED for any urgent issues.  2) If you have any worsening of symptoms or any other concerns please return to the ED immediately.  3) Please continue taking your home medications as directed. Take the Librium as directed as needed, stop if you are too drowsy, do not take while driving or making important decisions.
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
## 2018-03-15 NOTE — ED BEHAVIORAL HEALTH ASSESSMENT NOTE - SAFETY PLAN DETAILS
Patient will call 911 or return to ED should she have thoughts of hurting herself or others. Patient will work towards sobriety.
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
## 2018-03-15 NOTE — ED PROVIDER NOTE - PLAN OF CARE
1) Please follow-up with your psychiatrist (clinic: [phone]) and/or primary care doctor within the next 3 days.  Please call today or tomorrow for an appointment.  If you cannot follow-up with your doctor(s), please return to the ED for any urgent issues.  2) If you have any worsening of symptoms or any other concerns please return to the ED immediately.  3) Please continue taking your home medications as directed. Take the Librium as directed as needed, stop if you are too drowsy, do not take while driving or making important decisions.

## 2018-03-15 NOTE — ED PROVIDER NOTE - MEDICAL DECISION MAKING DETAILS
Sandra HALL: 49 y/o female with PMH of MAURO, MDD, Alcohol abuse here from home after calling EMS for anxiety. Patient in the ER refuses to be examined initially. Then reports she lives at home with her mom and brother and she has been drinking Vodka for the past week. Last drink was earlier this AM. Reports she has an upcoming court date for her niece and is feeling anxious. Denies fever, SI, HI, illicit drug use, HA, CP, SOB, N/V?D. or trauma. Exam shows a female with volatile affect and behavior and walking and moving everything. Consider Bereavement, Depression, Anxiety. Plan Tox labs and Psych consult and anxiolytics as needed.

## 2018-03-15 NOTE — ED BEHAVIORAL HEALTH ASSESSMENT NOTE - MEDICATIONS (PRESCRIPTIONS, DIRECTIONS)
patient will remain on current psych meds Zoloft 100 mg po QD, Trazodone 100 mg po QHS, Klonopin 0.5 mg po TID prn

## 2018-03-15 NOTE — ED BEHAVIORAL HEALTH ASSESSMENT NOTE - OTHER PAST PSYCHIATRIC HISTORY (INCLUDE DETAILS REGARDING ONSET, COURSE OF ILLNESS, INPATIENT/OUTPATIENT TREATMENT)
Multiple psych ED visits  multiple prior psych admissions for depression and anxiety, last hospitalized in 2013 at [facility] for depression  In [facility] ED 3 weeks ago for anxiety  Multiple inpatient admissions to [facility] [phone]

## 2018-03-15 NOTE — ED ADULT NURSE REASSESSMENT NOTE - NS ED NURSE REASSESS COMMENT FT1
1245 requests additional anxiety meds. Dr notified. none ordered at this time. awaiting eval by Attending psych  constant observation intact

## 2018-03-15 NOTE — ED BEHAVIORAL HEALTH ASSESSMENT NOTE - RISK ASSESSMENT
Risk factors: Single, chronic mental illness, depression, anxiety, impulsivity, hx of self- injurious behavior, prior suicidality, previous suicide attempts, prior hospitalizations, hx of substance abuse, multiple stressors, academic/occupational decline, absence of outpatient follow-up, poor reactivity to stressors, low frustration tolerance, medication non- compliance.     Protective factors: Denies any active suicidal ideation/intent/plan, identifies reasons for living, supportive social network or family, no access to firearms, no legal issues, no hx of abuse.     Patient has multiple chronic risk factors, but she is not acutely suicidal or homicidal. Her current anxiety and aggressive behaviors reported at home appear to be largely in the context of heavy drinking and alcohol intoxication. Patient is not at imminent risk of hurting herself, and is able to care for her basic needs. Patient refuses voluntary psych admission. She does not require acute involuntary inpatient psychiatric hospitalization.

## 2018-03-15 NOTE — ED ADULT NURSE NOTE - OBJECTIVE STATEMENT
pt BIBA from home and as per EMS, pt has PMH of depression, anxiety, and diabetes but is noncompliant with her medications. pt "has been drinking all day today and has been saying she doesn't feel well. she usually drinks regularly but today has been saying everything hurts. She had x1 episode of nonbloody vomit today PTA." pt speaking with slurred speech with alcohol on breath and states, "I have not been feeling good for the past few days. I drank a bottle of vodka today." pt denies any SI/HI at present. pt denies any elicit drug use. pt feels nauseous at present. pt making multiple attempts to leave ED stating "I need to go" placed on 1:1 with 1:1 at bedside. pt resting comfortably in bed at present. pt is no apparent risk to self or others at present.

## 2018-04-03 ENCOUNTER — EMERGENCY (EMERGENCY)
Facility: HOSPITAL | Age: 48
LOS: 1 days | Discharge: HOME | End: 2018-04-03
Admitting: EMERGENCY MEDICINE
Payer: MEDICARE

## 2018-04-03 VITALS
TEMPERATURE: 98 F | SYSTOLIC BLOOD PRESSURE: 122 MMHG | OXYGEN SATURATION: 97 % | HEART RATE: 75 BPM | RESPIRATION RATE: 16 BRPM | DIASTOLIC BLOOD PRESSURE: 70 MMHG

## 2018-04-03 DIAGNOSIS — Z98.890 OTHER SPECIFIED POSTPROCEDURAL STATES: Chronic | ICD-10-CM

## 2018-04-03 DIAGNOSIS — L02.91 CUTANEOUS ABSCESS, UNSPECIFIED: Chronic | ICD-10-CM

## 2018-04-03 LAB
ALBUMIN SERPL ELPH-MCNC: 4.2 G/DL — SIGNIFICANT CHANGE UP (ref 3.3–5)
ALP SERPL-CCNC: 99 U/L — SIGNIFICANT CHANGE UP (ref 40–120)
ALT FLD-CCNC: 27 U/L — SIGNIFICANT CHANGE UP (ref 4–33)
APAP SERPL-MCNC: < 15 UG/ML — LOW (ref 15–25)
AST SERPL-CCNC: 35 U/L — HIGH (ref 4–32)
BASOPHILS # BLD AUTO: 0.1 K/UL — SIGNIFICANT CHANGE UP (ref 0–0.2)
BASOPHILS NFR BLD AUTO: 1.6 % — SIGNIFICANT CHANGE UP (ref 0–2)
BILIRUB SERPL-MCNC: 0.2 MG/DL — SIGNIFICANT CHANGE UP (ref 0.2–1.2)
BUN SERPL-MCNC: 15 MG/DL — SIGNIFICANT CHANGE UP (ref 7–23)
CALCIUM SERPL-MCNC: 8.4 MG/DL — SIGNIFICANT CHANGE UP (ref 8.4–10.5)
CHLORIDE SERPL-SCNC: 103 MMOL/L — SIGNIFICANT CHANGE UP (ref 98–107)
CO2 SERPL-SCNC: 22 MMOL/L — SIGNIFICANT CHANGE UP (ref 22–31)
CREAT SERPL-MCNC: 0.67 MG/DL — SIGNIFICANT CHANGE UP (ref 0.5–1.3)
EOSINOPHIL # BLD AUTO: 0.21 K/UL — SIGNIFICANT CHANGE UP (ref 0–0.5)
EOSINOPHIL NFR BLD AUTO: 3.3 % — SIGNIFICANT CHANGE UP (ref 0–6)
ETHANOL BLD-MCNC: 243 MG/DL — HIGH
GLUCOSE SERPL-MCNC: 213 MG/DL — HIGH (ref 70–99)
HCG SERPL-ACNC: < 5 MIU/ML — SIGNIFICANT CHANGE UP
HCT VFR BLD CALC: 43.1 % — SIGNIFICANT CHANGE UP (ref 34.5–45)
HGB BLD-MCNC: 14.4 G/DL — SIGNIFICANT CHANGE UP (ref 11.5–15.5)
IMM GRANULOCYTES # BLD AUTO: 0.05 # — SIGNIFICANT CHANGE UP
IMM GRANULOCYTES NFR BLD AUTO: 0.8 % — SIGNIFICANT CHANGE UP (ref 0–1.5)
LYMPHOCYTES # BLD AUTO: 2.72 K/UL — SIGNIFICANT CHANGE UP (ref 1–3.3)
LYMPHOCYTES # BLD AUTO: 42.4 % — SIGNIFICANT CHANGE UP (ref 13–44)
MCHC RBC-ENTMCNC: 30.5 PG — SIGNIFICANT CHANGE UP (ref 27–34)
MCHC RBC-ENTMCNC: 33.4 % — SIGNIFICANT CHANGE UP (ref 32–36)
MCV RBC AUTO: 91.3 FL — SIGNIFICANT CHANGE UP (ref 80–100)
MONOCYTES # BLD AUTO: 0.45 K/UL — SIGNIFICANT CHANGE UP (ref 0–0.9)
MONOCYTES NFR BLD AUTO: 7 % — SIGNIFICANT CHANGE UP (ref 2–14)
NEUTROPHILS # BLD AUTO: 2.88 K/UL — SIGNIFICANT CHANGE UP (ref 1.8–7.4)
NEUTROPHILS NFR BLD AUTO: 44.9 % — SIGNIFICANT CHANGE UP (ref 43–77)
NRBC # FLD: 0 — SIGNIFICANT CHANGE UP
PLATELET # BLD AUTO: 189 K/UL — SIGNIFICANT CHANGE UP (ref 150–400)
PMV BLD: 10.3 FL — SIGNIFICANT CHANGE UP (ref 7–13)
POTASSIUM SERPL-MCNC: 4 MMOL/L — SIGNIFICANT CHANGE UP (ref 3.5–5.3)
POTASSIUM SERPL-SCNC: 4 MMOL/L — SIGNIFICANT CHANGE UP (ref 3.5–5.3)
PROT SERPL-MCNC: 7.4 G/DL — SIGNIFICANT CHANGE UP (ref 6–8.3)
RBC # BLD: 4.72 M/UL — SIGNIFICANT CHANGE UP (ref 3.8–5.2)
RBC # FLD: 12.5 % — SIGNIFICANT CHANGE UP (ref 10.3–14.5)
SALICYLATES SERPL-MCNC: < 5 MG/DL — LOW (ref 15–30)
SODIUM SERPL-SCNC: 141 MMOL/L — SIGNIFICANT CHANGE UP (ref 135–145)
TSH SERPL-MCNC: 1.2 UIU/ML — SIGNIFICANT CHANGE UP (ref 0.27–4.2)
WBC # BLD: 6.41 K/UL — SIGNIFICANT CHANGE UP (ref 3.8–10.5)
WBC # FLD AUTO: 6.41 K/UL — SIGNIFICANT CHANGE UP (ref 3.8–10.5)

## 2018-04-03 PROCEDURE — 99285 EMERGENCY DEPT VISIT HI MDM: CPT

## 2018-04-03 RX ORDER — HALOPERIDOL DECANOATE 100 MG/ML
5 INJECTION INTRAMUSCULAR ONCE
Qty: 0 | Refills: 0 | Status: COMPLETED | OUTPATIENT
Start: 2018-04-03 | End: 2018-04-03

## 2018-04-03 RX ORDER — DIPHENHYDRAMINE HCL 50 MG
50 CAPSULE ORAL ONCE
Qty: 0 | Refills: 0 | Status: COMPLETED | OUTPATIENT
Start: 2018-04-03 | End: 2018-04-03

## 2018-04-03 RX ADMIN — HALOPERIDOL DECANOATE 5 MILLIGRAM(S): 100 INJECTION INTRAMUSCULAR at 22:28

## 2018-04-03 RX ADMIN — Medication 2 MILLIGRAM(S): at 22:28

## 2018-04-03 RX ADMIN — Medication 50 MILLIGRAM(S): at 22:28
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
## 2018-04-03 NOTE — ED PROVIDER NOTE - PROGRESS NOTE DETAILS
pt signed out pending reassessment - at this time is clinically sober.  Denies any SI HI AH VH.  Recalls calling 911 yesterday in an attempt to de-escalate a situation at home when she drank too much.  Has previous visits for the same here in the ED.  Attempted to contact collateral without success.  As is clinically sober at this time will dc home.
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
## 2018-04-04 VITALS
RESPIRATION RATE: 16 BRPM | TEMPERATURE: 98 F | SYSTOLIC BLOOD PRESSURE: 125 MMHG | OXYGEN SATURATION: 97 % | HEART RATE: 77 BPM | DIASTOLIC BLOOD PRESSURE: 80 MMHG

## 2018-04-04 NOTE — ED ADULT NURSE REASSESSMENT NOTE - NS ED NURSE REASSESS COMMENT FT1
Patient sleeping in bed, appears comfortable and in no apparent distress.  Will continue to monitor patient.
Patient presents agitated, aob (+), medication given as ordered, patient put into 4 point restraints at 21:20 for agitation, one to one observation maintained, pt taken out of 4 point restraints at 22:20 once noted to be calm, blood work done, pt currently awaiting further MD evaluation, will continue to monitor pt.

## 2018-04-09 ENCOUNTER — APPOINTMENT (OUTPATIENT)
Dept: ENDOCRINOLOGY | Facility: CLINIC | Age: 48
End: 2018-04-09

## 2018-04-23 ENCOUNTER — APPOINTMENT (OUTPATIENT)
Dept: ENDOCRINOLOGY | Facility: CLINIC | Age: 48
End: 2018-04-23

## 2018-04-27 ENCOUNTER — APPOINTMENT (OUTPATIENT)
Dept: ENDOCRINOLOGY | Facility: CLINIC | Age: 48
End: 2018-04-27
Payer: MEDICARE

## 2018-04-27 VITALS
HEART RATE: 69 BPM | SYSTOLIC BLOOD PRESSURE: 128 MMHG | DIASTOLIC BLOOD PRESSURE: 80 MMHG | RESPIRATION RATE: 16 BRPM | OXYGEN SATURATION: 99 % | HEIGHT: 61 IN | BODY MASS INDEX: 28.7 KG/M2 | WEIGHT: 152 LBS

## 2018-04-27 DIAGNOSIS — E11.65 TYPE 2 DIABETES MELLITUS WITH HYPERGLYCEMIA: ICD-10-CM

## 2018-04-27 PROCEDURE — 99215 OFFICE O/P EST HI 40 MIN: CPT

## 2018-05-10 ENCOUNTER — OTHER (OUTPATIENT)
Age: 48
End: 2018-05-10

## 2018-06-16 PROBLEM — F10.20 ALCOHOL DEPENDENCE: Status: ACTIVE | Noted: 2018-06-16

## 2018-06-20 ENCOUNTER — EMERGENCY (EMERGENCY)
Facility: HOSPITAL | Age: 48
LOS: 1 days | Discharge: HOME | End: 2018-06-20
Attending: EMERGENCY MEDICINE | Admitting: EMERGENCY MEDICINE
Payer: MEDICARE

## 2018-06-20 DIAGNOSIS — Z98.890 OTHER SPECIFIED POSTPROCEDURAL STATES: Chronic | ICD-10-CM

## 2018-06-20 DIAGNOSIS — L02.91 CUTANEOUS ABSCESS, UNSPECIFIED: Chronic | ICD-10-CM

## 2018-06-20 LAB
BASOPHILS # BLD AUTO: 0.12 K/UL — SIGNIFICANT CHANGE UP (ref 0–0.2)
BASOPHILS NFR BLD AUTO: 1.8 % — SIGNIFICANT CHANGE UP (ref 0–2)
EOSINOPHIL # BLD AUTO: 0.28 K/UL — SIGNIFICANT CHANGE UP (ref 0–0.5)
EOSINOPHIL NFR BLD AUTO: 4.2 % — SIGNIFICANT CHANGE UP (ref 0–6)
HCT VFR BLD CALC: 39.9 % — SIGNIFICANT CHANGE UP (ref 34.5–45)
HGB BLD-MCNC: 13.4 G/DL — SIGNIFICANT CHANGE UP (ref 11.5–15.5)
IMM GRANULOCYTES # BLD AUTO: 0.04 # — SIGNIFICANT CHANGE UP
IMM GRANULOCYTES NFR BLD AUTO: 0.6 % — SIGNIFICANT CHANGE UP (ref 0–1.5)
LYMPHOCYTES # BLD AUTO: 2.93 K/UL — SIGNIFICANT CHANGE UP (ref 1–3.3)
LYMPHOCYTES # BLD AUTO: 43.6 % — SIGNIFICANT CHANGE UP (ref 13–44)
MCHC RBC-ENTMCNC: 31.8 PG — SIGNIFICANT CHANGE UP (ref 27–34)
MCHC RBC-ENTMCNC: 33.6 % — SIGNIFICANT CHANGE UP (ref 32–36)
MCV RBC AUTO: 94.5 FL — SIGNIFICANT CHANGE UP (ref 80–100)
MONOCYTES # BLD AUTO: 0.39 K/UL — SIGNIFICANT CHANGE UP (ref 0–0.9)
MONOCYTES NFR BLD AUTO: 5.8 % — SIGNIFICANT CHANGE UP (ref 2–14)
NEUTROPHILS # BLD AUTO: 2.96 K/UL — SIGNIFICANT CHANGE UP (ref 1.8–7.4)
NEUTROPHILS NFR BLD AUTO: 44 % — SIGNIFICANT CHANGE UP (ref 43–77)
NRBC # FLD: 0 — SIGNIFICANT CHANGE UP
PLATELET # BLD AUTO: 340 K/UL — SIGNIFICANT CHANGE UP (ref 150–400)
PMV BLD: 10.3 FL — SIGNIFICANT CHANGE UP (ref 7–13)
RBC # BLD: 4.22 M/UL — SIGNIFICANT CHANGE UP (ref 3.8–5.2)
RBC # FLD: 11.9 % — SIGNIFICANT CHANGE UP (ref 10.3–14.5)
WBC # BLD: 6.72 K/UL — SIGNIFICANT CHANGE UP (ref 3.8–10.5)
WBC # FLD AUTO: 6.72 K/UL — SIGNIFICANT CHANGE UP (ref 3.8–10.5)

## 2018-06-20 PROCEDURE — 93010 ELECTROCARDIOGRAM REPORT: CPT

## 2018-06-20 PROCEDURE — 99285 EMERGENCY DEPT VISIT HI MDM: CPT | Mod: 25

## 2018-06-20 RX ORDER — DIPHENHYDRAMINE HCL 50 MG
50 CAPSULE ORAL ONCE
Qty: 0 | Refills: 0 | Status: COMPLETED | OUTPATIENT
Start: 2018-06-20 | End: 2018-06-20

## 2018-06-20 RX ORDER — HALOPERIDOL DECANOATE 100 MG/ML
5 INJECTION INTRAMUSCULAR ONCE
Qty: 0 | Refills: 0 | Status: COMPLETED | OUTPATIENT
Start: 2018-06-20 | End: 2018-06-20

## 2018-06-20 RX ADMIN — HALOPERIDOL DECANOATE 5 MILLIGRAM(S): 100 INJECTION INTRAMUSCULAR at 23:25

## 2018-06-20 RX ADMIN — Medication 50 MILLIGRAM(S): at 23:25

## 2018-06-20 RX ADMIN — Medication 2 MILLIGRAM(S): at 23:25
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
## 2018-06-21 VITALS
HEART RATE: 64 BPM | DIASTOLIC BLOOD PRESSURE: 71 MMHG | RESPIRATION RATE: 16 BRPM | SYSTOLIC BLOOD PRESSURE: 109 MMHG | TEMPERATURE: 98 F | OXYGEN SATURATION: 96 %

## 2018-06-21 VITALS
SYSTOLIC BLOOD PRESSURE: 107 MMHG | HEART RATE: 66 BPM | OXYGEN SATURATION: 100 % | RESPIRATION RATE: 17 BRPM | DIASTOLIC BLOOD PRESSURE: 64 MMHG | TEMPERATURE: 98 F

## 2018-06-21 LAB
ALBUMIN SERPL ELPH-MCNC: 4.3 G/DL — SIGNIFICANT CHANGE UP (ref 3.3–5)
ALP SERPL-CCNC: 98 U/L — SIGNIFICANT CHANGE UP (ref 40–120)
ALT FLD-CCNC: 60 U/L — HIGH (ref 4–33)
APAP SERPL-MCNC: < 15 UG/ML — LOW (ref 15–25)
AST SERPL-CCNC: 88 U/L — HIGH (ref 4–32)
BILIRUB SERPL-MCNC: 0.4 MG/DL — SIGNIFICANT CHANGE UP (ref 0.2–1.2)
BUN SERPL-MCNC: 7 MG/DL — SIGNIFICANT CHANGE UP (ref 7–23)
CALCIUM SERPL-MCNC: 9 MG/DL — SIGNIFICANT CHANGE UP (ref 8.4–10.5)
CHLORIDE SERPL-SCNC: 105 MMOL/L — SIGNIFICANT CHANGE UP (ref 98–107)
CO2 SERPL-SCNC: 21 MMOL/L — LOW (ref 22–31)
CREAT SERPL-MCNC: 0.68 MG/DL — SIGNIFICANT CHANGE UP (ref 0.5–1.3)
ETHANOL BLD-MCNC: 272 MG/DL — HIGH
GLUCOSE SERPL-MCNC: 176 MG/DL — HIGH (ref 70–99)
HCG SERPL-ACNC: < 5 MIU/ML — SIGNIFICANT CHANGE UP
POTASSIUM SERPL-MCNC: 4.4 MMOL/L — SIGNIFICANT CHANGE UP (ref 3.5–5.3)
POTASSIUM SERPL-SCNC: 4.4 MMOL/L — SIGNIFICANT CHANGE UP (ref 3.5–5.3)
PROT SERPL-MCNC: 7.4 G/DL — SIGNIFICANT CHANGE UP (ref 6–8.3)
SALICYLATES SERPL-MCNC: < 5 MG/DL — LOW (ref 15–30)
SODIUM SERPL-SCNC: 144 MMOL/L — SIGNIFICANT CHANGE UP (ref 135–145)
TSH SERPL-MCNC: 1.16 UIU/ML — SIGNIFICANT CHANGE UP (ref 0.27–4.2)

## 2018-06-21 NOTE — ED PROVIDER NOTE - CHIEF COMPLAINT
The patient is a 48y Female complaining of agiitation. The patient is a 48y Female complaining of agitation.

## 2018-06-21 NOTE — ED PROVIDER NOTE - PROGRESS NOTE DETAILS
Pt sedated prior to my arrival. Unknown hx beyond triage note and MR. RIVAS No acute overnight events. Patient awake, alert, calm, cooperative. Feels safe going home. Will call for a ride. ADITYA.

## 2018-06-21 NOTE — ED PROVIDER NOTE - MEDICAL DECISION MAKING DETAILS
Agitation due to alcohol use. Reassessed s/p sedation: calm, cooperative, no SI/HI/AVH. Labs and vs non-actionable. ADITYA.
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
## 2018-06-21 NOTE — ED ADULT NURSE REASSESSMENT NOTE - NS ED NURSE REASSESS COMMENT FT1
pt received STAT Ativan 2/Haldol 5/Benadryl 50mg IM upon arrival to  area due to agitation at 2325 with + effect observed. Pt initally ordered to be placed in 4pt restraints however was able to be verbally deescalated after receiving STAT IM meds and released from [facility] handcuffs. Belgica MARADIAGA made aware, 4pt restraint order cancelled. VSS. Pending medical reassessment.

## 2018-06-21 NOTE — ED PROVIDER NOTE - PSYCHIATRIC, MLM
Alert and oriented to person, place, time/situation. normal mood and affect. no apparent risk to self or others. Calm. Cooperative.

## 2018-06-26 ENCOUNTER — EMERGENCY (EMERGENCY)
Facility: HOSPITAL | Age: 48
LOS: 1 days | Discharge: HOME | End: 2018-06-26
Attending: EMERGENCY MEDICINE
Payer: COMMERCIAL

## 2018-06-26 VITALS — SYSTOLIC BLOOD PRESSURE: 170 MMHG | DIASTOLIC BLOOD PRESSURE: 89 MMHG

## 2018-06-26 DIAGNOSIS — Z98.890 OTHER SPECIFIED POSTPROCEDURAL STATES: Chronic | ICD-10-CM

## 2018-06-26 DIAGNOSIS — L02.91 CUTANEOUS ABSCESS, UNSPECIFIED: Chronic | ICD-10-CM

## 2018-06-26 LAB
ALBUMIN SERPL ELPH-MCNC: 4.4 G/DL — SIGNIFICANT CHANGE UP (ref 3.3–5)
ALP SERPL-CCNC: 121 U/L — HIGH (ref 40–120)
ALT FLD-CCNC: 67 U/L — HIGH (ref 10–45)
AMPHET UR-MCNC: NEGATIVE — SIGNIFICANT CHANGE UP
ANION GAP SERPL CALC-SCNC: 20 MMOL/L — HIGH (ref 5–17)
APAP SERPL-MCNC: <15 UG/ML — SIGNIFICANT CHANGE UP (ref 10–30)
APPEARANCE UR: ABNORMAL
AST SERPL-CCNC: 77 U/L — HIGH (ref 10–40)
BARBITURATES UR SCN-MCNC: NEGATIVE — SIGNIFICANT CHANGE UP
BASOPHILS # BLD AUTO: 0.1 K/UL — SIGNIFICANT CHANGE UP (ref 0–0.2)
BASOPHILS NFR BLD AUTO: 1.3 % — SIGNIFICANT CHANGE UP (ref 0–2)
BENZODIAZ UR-MCNC: NEGATIVE — SIGNIFICANT CHANGE UP
BILIRUB SERPL-MCNC: 0.5 MG/DL — SIGNIFICANT CHANGE UP (ref 0.2–1.2)
BILIRUB UR-MCNC: NEGATIVE — SIGNIFICANT CHANGE UP
BUN SERPL-MCNC: 5 MG/DL — LOW (ref 7–23)
CALCIUM SERPL-MCNC: 9.7 MG/DL — SIGNIFICANT CHANGE UP (ref 8.4–10.5)
CHLORIDE SERPL-SCNC: 104 MMOL/L — SIGNIFICANT CHANGE UP (ref 96–108)
CO2 SERPL-SCNC: 20 MMOL/L — LOW (ref 22–31)
COCAINE METAB.OTHER UR-MCNC: NEGATIVE — SIGNIFICANT CHANGE UP
COLOR SPEC: YELLOW — SIGNIFICANT CHANGE UP
CREAT SERPL-MCNC: 0.68 MG/DL — SIGNIFICANT CHANGE UP (ref 0.5–1.3)
DIFF PNL FLD: NEGATIVE — SIGNIFICANT CHANGE UP
EOSINOPHIL # BLD AUTO: 0.2 K/UL — SIGNIFICANT CHANGE UP (ref 0–0.5)
EOSINOPHIL NFR BLD AUTO: 2.9 % — SIGNIFICANT CHANGE UP (ref 0–6)
EPI CELLS # UR: ABNORMAL /HPF
ETHANOL SERPL-MCNC: 241 MG/DL — HIGH (ref 0–10)
GLUCOSE SERPL-MCNC: 186 MG/DL — HIGH (ref 70–99)
GLUCOSE UR QL: NEGATIVE — SIGNIFICANT CHANGE UP
HCT VFR BLD CALC: 42.8 % — SIGNIFICANT CHANGE UP (ref 34.5–45)
HGB BLD-MCNC: 14.6 G/DL — SIGNIFICANT CHANGE UP (ref 11.5–15.5)
HYALINE CASTS # UR AUTO: ABNORMAL
KETONES UR-MCNC: ABNORMAL
LEUKOCYTE ESTERASE UR-ACNC: ABNORMAL
LYMPHOCYTES # BLD AUTO: 2.8 K/UL — SIGNIFICANT CHANGE UP (ref 1–3.3)
LYMPHOCYTES # BLD AUTO: 39.5 % — SIGNIFICANT CHANGE UP (ref 13–44)
MCHC RBC-ENTMCNC: 32.7 PG — SIGNIFICANT CHANGE UP (ref 27–34)
MCHC RBC-ENTMCNC: 34.1 GM/DL — SIGNIFICANT CHANGE UP (ref 32–36)
MCV RBC AUTO: 95.9 FL — SIGNIFICANT CHANGE UP (ref 80–100)
METHADONE UR-MCNC: NEGATIVE — SIGNIFICANT CHANGE UP
MONOCYTES # BLD AUTO: 0.3 K/UL — SIGNIFICANT CHANGE UP (ref 0–0.9)
MONOCYTES NFR BLD AUTO: 4.7 % — SIGNIFICANT CHANGE UP (ref 2–14)
NEUTROPHILS # BLD AUTO: 3.7 K/UL — SIGNIFICANT CHANGE UP (ref 1.8–7.4)
NEUTROPHILS NFR BLD AUTO: 51.5 % — SIGNIFICANT CHANGE UP (ref 43–77)
NITRITE UR-MCNC: NEGATIVE — SIGNIFICANT CHANGE UP
OPIATES UR-MCNC: NEGATIVE — SIGNIFICANT CHANGE UP
OXYCODONE UR-MCNC: NEGATIVE — SIGNIFICANT CHANGE UP
PCP SPEC-MCNC: SIGNIFICANT CHANGE UP
PCP UR-MCNC: NEGATIVE — SIGNIFICANT CHANGE UP
PH UR: 5.5 — SIGNIFICANT CHANGE UP (ref 5–8)
PLATELET # BLD AUTO: 230 K/UL — SIGNIFICANT CHANGE UP (ref 150–400)
POTASSIUM SERPL-MCNC: 3.7 MMOL/L — SIGNIFICANT CHANGE UP (ref 3.5–5.3)
POTASSIUM SERPL-SCNC: 3.7 MMOL/L — SIGNIFICANT CHANGE UP (ref 3.5–5.3)
PROT SERPL-MCNC: 7.9 G/DL — SIGNIFICANT CHANGE UP (ref 6–8.3)
PROT UR-MCNC: SIGNIFICANT CHANGE UP
RBC # BLD: 4.47 M/UL — SIGNIFICANT CHANGE UP (ref 3.8–5.2)
RBC # FLD: 11.7 % — SIGNIFICANT CHANGE UP (ref 10.3–14.5)
RBC CASTS # UR COMP ASSIST: ABNORMAL /HPF (ref 0–2)
SALICYLATES SERPL-MCNC: <2 MG/DL — LOW (ref 15–30)
SODIUM SERPL-SCNC: 144 MMOL/L — SIGNIFICANT CHANGE UP (ref 135–145)
SP GR SPEC: 1.01 — SIGNIFICANT CHANGE UP (ref 1.01–1.02)
THC UR QL: NEGATIVE — SIGNIFICANT CHANGE UP
TSH SERPL-MCNC: 0.55 UIU/ML — SIGNIFICANT CHANGE UP (ref 0.27–4.2)
UROBILINOGEN FLD QL: NEGATIVE — SIGNIFICANT CHANGE UP
WBC # BLD: 7.2 K/UL — SIGNIFICANT CHANGE UP (ref 3.8–10.5)
WBC # FLD AUTO: 7.2 K/UL — SIGNIFICANT CHANGE UP (ref 3.8–10.5)
WBC UR QL: SIGNIFICANT CHANGE UP /HPF (ref 0–5)

## 2018-06-26 PROCEDURE — 93010 ELECTROCARDIOGRAM REPORT: CPT

## 2018-06-26 PROCEDURE — 99284 EMERGENCY DEPT VISIT MOD MDM: CPT | Mod: 25

## 2018-06-26 RX ORDER — CLONAZEPAM 1 MG
0.5 TABLET ORAL ONCE
Qty: 0 | Refills: 0 | Status: DISCONTINUED | OUTPATIENT
Start: 2018-06-26 | End: 2018-06-26

## 2018-06-26 RX ORDER — HALOPERIDOL DECANOATE 100 MG/ML
5 INJECTION INTRAMUSCULAR ONCE
Qty: 0 | Refills: 0 | Status: COMPLETED | OUTPATIENT
Start: 2018-06-26 | End: 2018-06-26

## 2018-06-26 RX ADMIN — Medication 2 MILLIGRAM(S): at 19:56

## 2018-06-26 RX ADMIN — Medication 0.5 MILLIGRAM(S): at 19:13

## 2018-06-26 RX ADMIN — HALOPERIDOL DECANOATE 5 MILLIGRAM(S): 100 INJECTION INTRAMUSCULAR at 19:56

## 2018-06-26 NOTE — ED ADULT NURSE REASSESSMENT NOTE - NS ED NURSE REASSESS COMMENT FT1
After blood was drawn, pt. became agitated, hostile and screaming obscenities @ the provider, was becoming racial, then demanding to see the psychiatrist even though she was told that the psych was notified. pt. was medicated w/ haldol 5mg/ativan 2mg IM stat @ 1956 for psychotic agitation. maintained on 1:1 CO for aggression.

## 2018-06-26 NOTE — ED PROVIDER NOTE - CARE PLAN
Principal Discharge DX:	Aggressive behavior  Assessment and plan of treatment:	Please follow up with a psychiatrist in regards to your symptoms.  The follow up information will be provided to you.    Drink at least 2 Liters or 64 Ounces of water each day.  Return for any persistent, worsening symptoms, or ANY concerns at all.

## 2018-06-26 NOTE — ED PROVIDER NOTE - MEDICAL DECISION MAKING DETAILS
48 year old female past medical history DM, depression, anxiety, presents to the ED for agitation. Patient throwing things at family. Patient reports alcohol use today. Patient in ED agitated but not combative, yelling in ED but easily re-directable. Will obtain labwork, ekg, patient takes klonopin at home so will give dose now and reeval.

## 2018-06-26 NOTE — ED PROVIDER NOTE - PROGRESS NOTE DETAILS
Patient now becoming agitated and aggressive towards staff will require ativan/haldol for behavioural control spoke to telepsych who will see patient.  patient is clinically sober, requesting to talk to a psychiatrist -gordon patient feels much better after talking to psychiatry, was provided follow up information by psychiatry.  does not display signs of withdrawal.  will dc -gordon

## 2018-06-26 NOTE — ED PROVIDER NOTE - OBJECTIVE STATEMENT
48 year old female past medical history DM, depression, anxiety, presents to the ED for agitation. Patient reports that today she was served papers for an order of protection against family. Patient was throwing things at family and had aggressive behavior. Patient denies SI, HI. Denies feeling depressed at this time but does feel anxious. Reports she had 2 shots of vodka today. No other drug use.
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
## 2018-06-26 NOTE — ED PROVIDER NOTE - PLAN OF CARE
Please follow up with a psychiatrist in regards to your symptoms.  The follow up information will be provided to you.    Drink at least 2 Liters or 64 Ounces of water each day.  Return for any persistent, worsening symptoms, or ANY concerns at all.

## 2018-06-27 VITALS
HEART RATE: 86 BPM | TEMPERATURE: 98 F | OXYGEN SATURATION: 97 % | SYSTOLIC BLOOD PRESSURE: 136 MMHG | DIASTOLIC BLOOD PRESSURE: 91 MMHG | RESPIRATION RATE: 18 BRPM

## 2018-06-27 DIAGNOSIS — F14.11 COCAINE ABUSE, IN REMISSION: ICD-10-CM

## 2018-06-27 DIAGNOSIS — F10.94 ALCOHOL USE, UNSPECIFIED WITH ALCOHOL-INDUCED MOOD DISORDER: ICD-10-CM

## 2018-06-27 DIAGNOSIS — F10.20 ALCOHOL DEPENDENCE, UNCOMPLICATED: ICD-10-CM

## 2018-06-27 PROCEDURE — 81001 URINALYSIS AUTO W/SCOPE: CPT

## 2018-06-27 PROCEDURE — 93005 ELECTROCARDIOGRAM TRACING: CPT

## 2018-06-27 PROCEDURE — 84443 ASSAY THYROID STIM HORMONE: CPT

## 2018-06-27 PROCEDURE — 96372 THER/PROPH/DIAG INJ SC/IM: CPT

## 2018-06-27 PROCEDURE — 80053 COMPREHEN METABOLIC PANEL: CPT

## 2018-06-27 PROCEDURE — 99285 EMERGENCY DEPT VISIT HI MDM: CPT | Mod: 25

## 2018-06-27 PROCEDURE — 85027 COMPLETE CBC AUTOMATED: CPT

## 2018-06-27 PROCEDURE — 80307 DRUG TEST PRSMV CHEM ANLYZR: CPT

## 2018-06-27 PROCEDURE — 90792 PSYCH DIAG EVAL W/MED SRVCS: CPT | Mod: GT

## 2018-06-27 RX ORDER — ONDANSETRON 8 MG/1
4 TABLET, FILM COATED ORAL ONCE
Qty: 0 | Refills: 0 | Status: COMPLETED | OUTPATIENT
Start: 2018-06-27 | End: 2018-06-27

## 2018-06-27 RX ORDER — ONDANSETRON 8 MG/1
4 TABLET, FILM COATED ORAL ONCE
Qty: 0 | Refills: 0 | Status: DISCONTINUED | OUTPATIENT
Start: 2018-06-27 | End: 2018-06-27

## 2018-06-27 RX ADMIN — ONDANSETRON 4 MILLIGRAM(S): 8 TABLET, FILM COATED ORAL at 05:05

## 2018-06-27 NOTE — ED BEHAVIORAL HEALTH ASSESSMENT NOTE - SUICIDE PROTECTIVE FACTORS
Positive therapeutic relationships/Future oriented/Responsibility to family and others/Identifies reasons for living

## 2018-06-27 NOTE — ED BEHAVIORAL HEALTH ASSESSMENT NOTE - DESCRIPTION (FIRST USE, LAST USE, QUANTITY, FREQUENCY, DURATION)
Binge pattern with heavy drinking for 2-3 days at a time with periods of sobriety in between. past history, denies current use Binge pattern with heavy drinking for 2-3 days at a time with periods of sobriety in between. Denies daily drinking, hx of withdrawals, DTs, or seizures. As per CVM - first use at 37 years old, used X1 year, abstinent for 5 years, resumed at age 42. Denies any recent use, utox negative

## 2018-06-27 NOTE — ED BEHAVIORAL HEALTH ASSESSMENT NOTE - CURRENT MEDICATION
Zoloft 100 mg po QD, Trazodone 100 mg po qhs, Klonopin 0.5 mg po TID prn Zoloft 100 mg po QD, Trazodone 100 mg po qhs, Klonopin 0.5 mg po TID prn  DM medications - Lantus 15U HS, Humalog AC depending on FS, Trulicity once per week.

## 2018-06-27 NOTE — ED BEHAVIORAL HEALTH ASSESSMENT NOTE - OTHER
15 Gilles Reese Discord with family Family? Discord with family; Intoxication Avoid ETOH, suggest pt obtain substance use treatment as well. n/a

## 2018-06-27 NOTE — ED BEHAVIORAL HEALTH ASSESSMENT NOTE - DESCRIPTION
Pt arrived to ED agitated, in handcuffs. BAL at 1957 was 241 mg/dL. She received Klonopin 0.5mg PO at 1913 and Haldol 5mg/Ativan 2mg IM at 1956 for agitation. At time of evaluation, pt was clinically sober, calm and cooperative.     Vital Signs Last 24 Hrs  T(C): 36.6 (27 Jun 2018 03:00), Max: 36.6 (26 Jun 2018 23:00)  T(F): 97.8 (27 Jun 2018 03:00), Max: 97.9 (26 Jun 2018 23:00)  HR: 86 (27 Jun 2018 03:00) (86 - 100)  BP: 136/91 (27 Jun 2018 03:00) (115/83 - 170/89)  BP(mean): --  RR: 18 (27 Jun 2018 03:00) (18 - 18)  SpO2: 97% (27 Jun 2018 03:00) (97% - 98%) IDDM, s/p cholecystectomy earlier this year, hx of MRSA Cellulitis Unemployed, on disability, domiciled with family Single, childless, on SSD for mental health, lives with multiple family members

## 2018-06-27 NOTE — ED BEHAVIORAL HEALTH ASSESSMENT NOTE - SUICIDE RISK FACTORS
Agitation/severe anxiety/Substance abuse/dependence/Highly impulsive behavior Substance abuse/dependence/Agitation/severe anxiety/Family history of suicide/Highly impulsive behavior

## 2018-06-27 NOTE — ED BEHAVIORAL HEALTH ASSESSMENT NOTE - PAST PSYCHOTROPIC MEDICATION
Topamax, Paxil, Celexa, Prozac, Lexapro, Wellbutrin and likely many more as per patient but unable to recall at this time. Prozac, Zoloft, Paxil, Celexa, Lexapro, Effexor, Remeron Wellbutrin, Remeron, Cytomel, Seroquel, Abilify, Invega, and Topamax. and likely many more as per patient but unable to recall at this time.

## 2018-06-27 NOTE — ED BEHAVIORAL HEALTH NOTE - BEHAVIORAL HEALTH NOTE
ISTOP Reference #: [id number]     06/05/2018 06/05/2018 clonazepam 0.5 mg tablet  90 30 Gwen Jose Antonio THORPE     04/23/2018 04/23/2018 lorazepam 0.5 mg tablet  30 8 Gwen Jose Antonio THORPE     03/26/2018 03/26/2018 lorazepam 0.5 mg tablet  30 7 Gwen Jose Antonio THORPE     01/30/2018 01/30/2018 hydromorphone 2 mg tablet  14 7 Sj Perez (MD)     01/23/2018 01/23/2018 hydromorphone 2 mg tablet  9 3 jS Perez (MD)     10/02/2017 10/02/2017 clonazepam 0.5 mg tablet  90 30 Liza Cruz MD     09/21/2017 09/21/2017 oxycodone hcl 5 mg tablet  12 3 Jessica Fu     07/17/2017 07/17/2017 clonazepam 0.5 mg tablet  90 30 Liza Cruz MD
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
## 2018-06-27 NOTE — ED BEHAVIORAL HEALTH ASSESSMENT NOTE - DIFFERENTIAL
acute alcohol intoxication, substance induced mood disorder, MDD, MAURO MDD vs Substance induced mood d/o  Anxiety - Panic d/o vs MAURO  PTSD by history  r/o Personality d/o - prior reports of chronic suicidal ideation, substance abuse, difficulty with anger, issues with interpersonal relationships

## 2018-06-27 NOTE — ED BEHAVIORAL HEALTH ASSESSMENT NOTE - LEGAL HISTORY
no known legal history Denies prior hx - was served papers today instructing pt to appear in family court on July 9th, unclear reason why.

## 2018-06-27 NOTE — ED BEHAVIORAL HEALTH ASSESSMENT NOTE - RISK ASSESSMENT
Risk factors: Single, chronic mental illness, depression, anxiety, impulsivity, hx of self- injurious behavior, prior suicidality, previous suicide attempts, prior hospitalizations, hx of substance abuse, multiple stressors, academic/occupational decline, absence of outpatient follow-up, poor reactivity to stressors, low frustration tolerance, medication non- compliance.     Protective factors: Denies any active suicidal ideation/intent/plan, identifies reasons for living, supportive social network or family, no access to firearms, no legal issues, no hx of abuse.     Patient has multiple chronic risk factors, but she is not acutely suicidal or homicidal. Her current anxiety and aggressive behaviors reported at home appear to be largely in the context of heavy drinking and alcohol intoxication. Patient is not at imminent risk of hurting herself, and is able to care for her basic needs. Patient refuses voluntary psych admission. She does not require acute involuntary inpatient psychiatric hospitalization. Risk factors: Single, chronic mental illness, depression, anxiety, impulsivity, previous suicide attempt, prior hospitalizations, substance abuse, multiple stressors, occupational decline, absence of outpatient follow-up, poor reactivity to stressors, low frustration tolerance, hx of medication non- compliance.      Protective factors: Denies any active suicidal ideation/intent/plan, no recent self-harm/suicidal behaviors (last over 30 years ago), has no acute affective or psychotic symptoms, has responsibility to her dog, identifies reasons for living, future oriented, current medication/follow up compliance, no access to firearms, no legal issues, and requesting referrals for outpatient follow up with motivation to participate in care.     Patient has multiple chronic risk factors, but she is not acutely suicidal or homicidal. Her current anxiety and aggressive behaviors reported at home appear to be largely in the context of heavy drinking and alcohol intoxication. Patient is not at imminent risk of hurting herself, and is able to care for her basic needs.
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
## 2018-06-27 NOTE — ED BEHAVIORAL HEALTH ASSESSMENT NOTE - OTHER PAST PSYCHIATRIC HISTORY (INCLUDE DETAILS REGARDING ONSET, COURSE OF ILLNESS, INPATIENT/OUTPATIENT TREATMENT)
Multiple psych ED visits, multiple prior psych admissions for depression and anxiety, last hospitalized in 2013 at [facility] for depression, Multiple inpatient admissions to [facility] [id number]. Multiple inpatient admissions, one at age 15 following suicide attempt via ingestion of unknown quantity of unknown medication, then [facility] 2005, [facility] 2008, [facility] late 2010-early 2011 (3 weeks), and most recent [facility] 7/2013.  First two adult hospitalizations were for worsening depression with passive thoughts of suicide; two others resulted from collateral reports of worsening depression and suicidal ideation which patient denied. Had 3 ECT treatments during [facility] hospitalization in [id number] but discontinued due to intolerable side effects (headache/neck pain).

## 2018-06-27 NOTE — ED BEHAVIORAL HEALTH ASSESSMENT NOTE - SAFETY PLAN DETAILS
Patient will call 911 or return to ED should she have thoughts of hurting herself or others. Patient will work towards sobriety. Family and patient advised to return to ED or call 911 for any worsening symptoms or safety concerns.

## 2018-06-27 NOTE — ED BEHAVIORAL HEALTH ASSESSMENT NOTE - CASE SUMMARY
This is a 48-y.o. HF pt. with PPH of MDD, panic disorder, PTSD, multiple inpatient psychiatric admissions (most recent July 2013 at [facility] for depression), substance abuse, history of violence with intoxication,  BIBEMS requested by self for anxiety, evaluated for 'stress and anxiety; .Patient reports recent heavy drinking in the context of increased anxiety due to stressors at home. She denies SI/HI and there are no associated attempts.    I have seen and evaluated this patient myself. Chart, labs, meds reviewed. I agree with resident's assessment and plan. Patient is a 49 yo  female, single, childless, unemployed on SSD for mental illness, living with family, PPH of MDD, panic disorder, PTSD, multiple inpatient psychiatric admissions (as per March note, most recently July 2013 at [facility] for depression), one suicide attempt at 14 yo via medication overdose, history of violence with intoxication, no legal history, hx of cocaine and alcohol abuse, denies history of withdrawal/DTs/seizures, PMH of IDDM, brought in by EMS activated by unknown family (likely mother) for agitation/aggression at home while intoxicated. Pt arrived to ED agitated, in handcuffs. BAL at 1957 was 241 mg/dL. She received Klonopin 0.5mg PO at 1913 and Haldol 5mg/Ativan 2mg IM at 1956 for agitation. At time of evaluation, pt was clinically sober.     Pt reports a hx of Depression and Anxiety, multiple prior hospitalizations but none recently (as per note from March 2018, last admission was in 2013), recently in outpatient treatment with Dr. Cruz for therapy and medication management. Pt states over many years she has been tried on "everything" to help manage her depression/anxiety, however, reports the current regimen of Zoloft 100mg daily,   Patient does not meet criteria for inpt. psych.  Patient currently is not an imminent threat to self or others.  Discharge home. Patient to be given referrals.

## 2018-06-27 NOTE — ED BEHAVIORAL HEALTH ASSESSMENT NOTE - MEDICATIONS (PRESCRIPTIONS, DIRECTIONS)
patient will remain on current psych meds Zoloft 100 mg po QD, Trazodone 100 mg po QHS, Klonopin 0.5 mg po TID prn Continue as prescribed

## 2018-06-27 NOTE — ED BEHAVIORAL HEALTH ASSESSMENT NOTE - REFERRAL / APPOINTMENT DETAILS
patient will follow up with her outpatient psychiatrist Provided referrals for [facility] walk-in clinic and [facility] walk-in
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
## 2018-07-04 ENCOUNTER — EMERGENCY (EMERGENCY)
Facility: HOSPITAL | Age: 48
LOS: 1 days | Discharge: HOME | End: 2018-07-04
Admitting: EMERGENCY MEDICINE
Payer: MEDICARE

## 2018-07-04 VITALS
OXYGEN SATURATION: 100 % | TEMPERATURE: 98 F | DIASTOLIC BLOOD PRESSURE: 92 MMHG | HEART RATE: 97 BPM | SYSTOLIC BLOOD PRESSURE: 132 MMHG | RESPIRATION RATE: 16 BRPM

## 2018-07-04 VITALS
RESPIRATION RATE: 16 BRPM | OXYGEN SATURATION: 99 % | TEMPERATURE: 98 F | SYSTOLIC BLOOD PRESSURE: 120 MMHG | HEART RATE: 84 BPM | DIASTOLIC BLOOD PRESSURE: 84 MMHG

## 2018-07-04 DIAGNOSIS — Z98.890 OTHER SPECIFIED POSTPROCEDURAL STATES: Chronic | ICD-10-CM

## 2018-07-04 DIAGNOSIS — L02.91 CUTANEOUS ABSCESS, UNSPECIFIED: Chronic | ICD-10-CM

## 2018-07-04 PROCEDURE — 99283 EMERGENCY DEPT VISIT LOW MDM: CPT

## 2018-07-04 RX ORDER — INSULIN LISPRO 100/ML
6 VIAL (ML) SUBCUTANEOUS ONCE
Qty: 0 | Refills: 0 | Status: COMPLETED | OUTPATIENT
Start: 2018-07-04 | End: 2018-07-04

## 2018-07-04 RX ADMIN — Medication 6 UNIT(S): at 13:54

## 2018-07-04 RX ADMIN — Medication 1 MILLIGRAM(S): at 15:55
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
## 2018-07-04 NOTE — ED BEHAVIORAL HEALTH NOTE - BEHAVIORAL HEALTH NOTE
Writer left message for pt's mother, Jm Keith, at [phone]. [employer] link number [phone] was provided  with a return call requested.

## 2018-07-04 NOTE — ED PROVIDER NOTE - PROGRESS NOTE DETAILS
FLASH Lindo Attempted three phones call to mother at [phone] all direct to voice mail no return call left voice mail stating patient is being discharged and returning home Mother did not call back

## 2018-07-04 NOTE — ED BEHAVIORAL HEALTH NOTE - BEHAVIORAL HEALTH NOTE
Pt evaluated and cleared for discharge. Writer assisted pt in making transportation arrangements home. Transportation arranged with [employer] #[phone]. Pt spoke with provider and confirmed ability to pay upon arriving home. Pt also confirmed having keys and access to home. Pt's mother remains unable to be contacted. Phone is off. Pt in agreement with plan. No additional questions or concerns were reported.
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
## 2018-07-23 ENCOUNTER — APPOINTMENT (OUTPATIENT)
Dept: ENDOCRINOLOGY | Facility: CLINIC | Age: 48
End: 2018-07-23

## 2018-07-26 PROBLEM — L03.90 CELLULITIS, UNSPECIFIED: Chronic | Status: ACTIVE | Noted: 2018-01-15

## 2018-07-26 PROBLEM — F10.10 ALCOHOL ABUSE, UNCOMPLICATED: Chronic | Status: ACTIVE | Noted: 2017-12-25

## 2018-08-07 ENCOUNTER — OTHER (OUTPATIENT)
Age: 48
End: 2018-08-07

## 2018-08-09 ENCOUNTER — APPOINTMENT (OUTPATIENT)
Dept: ENDOCRINOLOGY | Facility: CLINIC | Age: 48
End: 2018-08-09

## 2018-08-21 ENCOUNTER — APPOINTMENT (OUTPATIENT)
Dept: INTERNAL MEDICINE | Facility: CLINIC | Age: 48
End: 2018-08-21

## 2018-08-27 RX ADMIN — MORPHINE SULFATE 4 MILLIGRAM(S): 50 CAPSULE, EXTENDED RELEASE ORAL at 21:00

## 2018-08-28 ENCOUNTER — INPATIENT (INPATIENT)
Facility: HOSPITAL | Age: 48
LOS: 3 days | Discharge: HOME | DRG: 438 | End: 2018-09-01
Attending: INTERNAL MEDICINE | Admitting: HOSPITALIST
Payer: COMMERCIAL

## 2018-08-28 VITALS
HEART RATE: 97 BPM | WEIGHT: 149.91 LBS | HEIGHT: 63 IN | DIASTOLIC BLOOD PRESSURE: 98 MMHG | TEMPERATURE: 98 F | OXYGEN SATURATION: 100 % | SYSTOLIC BLOOD PRESSURE: 149 MMHG | RESPIRATION RATE: 24 BRPM

## 2018-08-28 DIAGNOSIS — F41.9 ANXIETY DISORDER, UNSPECIFIED: ICD-10-CM

## 2018-08-28 DIAGNOSIS — E87.8 OTHER DISORDERS OF ELECTROLYTE AND FLUID BALANCE, NOT ELSEWHERE CLASSIFIED: ICD-10-CM

## 2018-08-28 DIAGNOSIS — Z98.890 OTHER SPECIFIED POSTPROCEDURAL STATES: Chronic | ICD-10-CM

## 2018-08-28 DIAGNOSIS — Z29.9 ENCOUNTER FOR PROPHYLACTIC MEASURES, UNSPECIFIED: ICD-10-CM

## 2018-08-28 DIAGNOSIS — K85.20 ALCOHOL INDUCED ACUTE PANCREATITIS WITHOUT NECROSIS OR INFECTION: ICD-10-CM

## 2018-08-28 DIAGNOSIS — E13.10 OTHER SPECIFIED DIABETES MELLITUS WITH KETOACIDOSIS WITHOUT COMA: ICD-10-CM

## 2018-08-28 DIAGNOSIS — E87.2 ACIDOSIS: ICD-10-CM

## 2018-08-28 DIAGNOSIS — K85.90 ACUTE PANCREATITIS WITHOUT NECROSIS OR INFECTION, UNSPECIFIED: ICD-10-CM

## 2018-08-28 DIAGNOSIS — L02.91 CUTANEOUS ABSCESS, UNSPECIFIED: Chronic | ICD-10-CM

## 2018-08-28 DIAGNOSIS — R94.5 ABNORMAL RESULTS OF LIVER FUNCTION STUDIES: ICD-10-CM

## 2018-08-28 DIAGNOSIS — F10.239 ALCOHOL DEPENDENCE WITH WITHDRAWAL, UNSPECIFIED: ICD-10-CM

## 2018-08-28 LAB
ALBUMIN SERPL ELPH-MCNC: 4.7 G/DL — SIGNIFICANT CHANGE UP (ref 3.3–5)
ALP SERPL-CCNC: 110 U/L — SIGNIFICANT CHANGE UP (ref 40–120)
ALT FLD-CCNC: 52 U/L — HIGH (ref 10–45)
ANION GAP SERPL CALC-SCNC: 17 MMOL/L — SIGNIFICANT CHANGE UP (ref 5–17)
ANION GAP SERPL CALC-SCNC: 21 MMOL/L — HIGH (ref 5–17)
ANION GAP SERPL CALC-SCNC: 28 MMOL/L — HIGH (ref 5–17)
APAP SERPL-MCNC: <15 UG/ML — SIGNIFICANT CHANGE UP (ref 10–30)
APPEARANCE UR: CLEAR — SIGNIFICANT CHANGE UP
AST SERPL-CCNC: 41 U/L — HIGH (ref 10–40)
BASOPHILS # BLD AUTO: 0.1 K/UL — SIGNIFICANT CHANGE UP (ref 0–0.2)
BASOPHILS NFR BLD AUTO: 0.5 % — SIGNIFICANT CHANGE UP (ref 0–2)
BILIRUB SERPL-MCNC: 2.3 MG/DL — HIGH (ref 0.2–1.2)
BILIRUB UR-MCNC: NEGATIVE — SIGNIFICANT CHANGE UP
BUN SERPL-MCNC: 10 MG/DL — SIGNIFICANT CHANGE UP (ref 7–23)
BUN SERPL-MCNC: 8 MG/DL — SIGNIFICANT CHANGE UP (ref 7–23)
BUN SERPL-MCNC: 8 MG/DL — SIGNIFICANT CHANGE UP (ref 7–23)
CALCIUM SERPL-MCNC: 10 MG/DL — SIGNIFICANT CHANGE UP (ref 8.4–10.5)
CALCIUM SERPL-MCNC: 8.9 MG/DL — SIGNIFICANT CHANGE UP (ref 8.4–10.5)
CALCIUM SERPL-MCNC: 9.1 MG/DL — SIGNIFICANT CHANGE UP (ref 8.4–10.5)
CHLORIDE SERPL-SCNC: 86 MMOL/L — LOW (ref 96–108)
CHLORIDE SERPL-SCNC: 94 MMOL/L — LOW (ref 96–108)
CHLORIDE SERPL-SCNC: 95 MMOL/L — LOW (ref 96–108)
CO2 SERPL-SCNC: 17 MMOL/L — LOW (ref 22–31)
CO2 SERPL-SCNC: 20 MMOL/L — LOW (ref 22–31)
CO2 SERPL-SCNC: 20 MMOL/L — LOW (ref 22–31)
COLOR SPEC: SIGNIFICANT CHANGE UP
CREAT SERPL-MCNC: 0.63 MG/DL — SIGNIFICANT CHANGE UP (ref 0.5–1.3)
CREAT SERPL-MCNC: 0.65 MG/DL — SIGNIFICANT CHANGE UP (ref 0.5–1.3)
CREAT SERPL-MCNC: 0.78 MG/DL — SIGNIFICANT CHANGE UP (ref 0.5–1.3)
DIFF PNL FLD: NEGATIVE — SIGNIFICANT CHANGE UP
EOSINOPHIL # BLD AUTO: 0 K/UL — SIGNIFICANT CHANGE UP (ref 0–0.5)
EOSINOPHIL NFR BLD AUTO: 0.1 % — SIGNIFICANT CHANGE UP (ref 0–6)
EPI CELLS # UR: SIGNIFICANT CHANGE UP /HPF
ETHANOL SERPL-MCNC: SIGNIFICANT CHANGE UP MG/DL (ref 0–10)
GAS PNL BLDV: SIGNIFICANT CHANGE UP
GLUCOSE SERPL-MCNC: 299 MG/DL — HIGH (ref 70–99)
GLUCOSE SERPL-MCNC: 322 MG/DL — HIGH (ref 70–99)
GLUCOSE SERPL-MCNC: 528 MG/DL — CRITICAL HIGH (ref 70–99)
GLUCOSE UR QL: >1000 MG/DL
HCG SERPL-ACNC: <2 MIU/ML — SIGNIFICANT CHANGE UP
HCT VFR BLD CALC: 49.6 % — HIGH (ref 34.5–45)
HGB BLD-MCNC: 17.3 G/DL — HIGH (ref 11.5–15.5)
KETONES UR-MCNC: ABNORMAL
LEUKOCYTE ESTERASE UR-ACNC: NEGATIVE — SIGNIFICANT CHANGE UP
LIDOCAIN IGE QN: 223 U/L — HIGH (ref 7–60)
LYMPHOCYTES # BLD AUTO: 0.9 K/UL — LOW (ref 1–3.3)
LYMPHOCYTES # BLD AUTO: 6.8 % — LOW (ref 13–44)
MAGNESIUM SERPL-MCNC: 1.5 MG/DL — LOW (ref 1.6–2.6)
MCHC RBC-ENTMCNC: 32.8 PG — SIGNIFICANT CHANGE UP (ref 27–34)
MCHC RBC-ENTMCNC: 34.8 GM/DL — SIGNIFICANT CHANGE UP (ref 32–36)
MCV RBC AUTO: 94.1 FL — SIGNIFICANT CHANGE UP (ref 80–100)
MONOCYTES # BLD AUTO: 0.8 K/UL — SIGNIFICANT CHANGE UP (ref 0–0.9)
MONOCYTES NFR BLD AUTO: 6 % — SIGNIFICANT CHANGE UP (ref 2–14)
NEUTROPHILS # BLD AUTO: 11.1 K/UL — HIGH (ref 1.8–7.4)
NEUTROPHILS NFR BLD AUTO: 86.6 % — HIGH (ref 43–77)
NITRITE UR-MCNC: NEGATIVE — SIGNIFICANT CHANGE UP
PH UR: 6 — SIGNIFICANT CHANGE UP (ref 5–8)
PHOSPHATE SERPL-MCNC: 2.8 MG/DL — SIGNIFICANT CHANGE UP (ref 2.5–4.5)
PLATELET # BLD AUTO: 228 K/UL — SIGNIFICANT CHANGE UP (ref 150–400)
POTASSIUM SERPL-MCNC: 3.6 MMOL/L — SIGNIFICANT CHANGE UP (ref 3.5–5.3)
POTASSIUM SERPL-MCNC: 3.9 MMOL/L — SIGNIFICANT CHANGE UP (ref 3.5–5.3)
POTASSIUM SERPL-MCNC: 4.1 MMOL/L — SIGNIFICANT CHANGE UP (ref 3.5–5.3)
POTASSIUM SERPL-SCNC: 3.6 MMOL/L — SIGNIFICANT CHANGE UP (ref 3.5–5.3)
POTASSIUM SERPL-SCNC: 3.9 MMOL/L — SIGNIFICANT CHANGE UP (ref 3.5–5.3)
POTASSIUM SERPL-SCNC: 4.1 MMOL/L — SIGNIFICANT CHANGE UP (ref 3.5–5.3)
PROT SERPL-MCNC: 9 G/DL — HIGH (ref 6–8.3)
PROT UR-MCNC: SIGNIFICANT CHANGE UP
RBC # BLD: 5.27 M/UL — HIGH (ref 3.8–5.2)
RBC # FLD: 10.8 % — SIGNIFICANT CHANGE UP (ref 10.3–14.5)
SALICYLATES SERPL-MCNC: <2 MG/DL — LOW (ref 15–30)
SODIUM SERPL-SCNC: 131 MMOL/L — LOW (ref 135–145)
SODIUM SERPL-SCNC: 132 MMOL/L — LOW (ref 135–145)
SODIUM SERPL-SCNC: 135 MMOL/L — SIGNIFICANT CHANGE UP (ref 135–145)
SP GR SPEC: >1.03 — HIGH (ref 1.01–1.02)
UROBILINOGEN FLD QL: NEGATIVE — SIGNIFICANT CHANGE UP
WBC # BLD: 12.8 K/UL — HIGH (ref 3.8–10.5)
WBC # FLD AUTO: 12.8 K/UL — HIGH (ref 3.8–10.5)
WBC UR QL: SIGNIFICANT CHANGE UP /HPF (ref 0–5)

## 2018-08-28 PROCEDURE — 93010 ELECTROCARDIOGRAM REPORT: CPT

## 2018-08-28 PROCEDURE — 99285 EMERGENCY DEPT VISIT HI MDM: CPT | Mod: 25

## 2018-08-28 PROCEDURE — 99223 1ST HOSP IP/OBS HIGH 75: CPT

## 2018-08-28 PROCEDURE — 71045 X-RAY EXAM CHEST 1 VIEW: CPT | Mod: 26

## 2018-08-28 RX ORDER — SODIUM CHLORIDE 9 MG/ML
1000 INJECTION, SOLUTION INTRAVENOUS
Qty: 0 | Refills: 0 | Status: DISCONTINUED | OUTPATIENT
Start: 2018-08-28 | End: 2018-09-01

## 2018-08-28 RX ORDER — KETOROLAC TROMETHAMINE 30 MG/ML
15 SYRINGE (ML) INJECTION ONCE
Qty: 0 | Refills: 0 | Status: DISCONTINUED | OUTPATIENT
Start: 2018-08-28 | End: 2018-08-28

## 2018-08-28 RX ORDER — DEXTROSE 50 % IN WATER 50 %
25 SYRINGE (ML) INTRAVENOUS ONCE
Qty: 0 | Refills: 0 | Status: DISCONTINUED | OUTPATIENT
Start: 2018-08-28 | End: 2018-09-01

## 2018-08-28 RX ORDER — TRAZODONE HCL 50 MG
200 TABLET ORAL
Qty: 0 | Refills: 0 | COMMUNITY

## 2018-08-28 RX ORDER — ACETAMINOPHEN 500 MG
975 TABLET ORAL ONCE
Qty: 0 | Refills: 0 | Status: DISCONTINUED | OUTPATIENT
Start: 2018-08-28 | End: 2018-08-28

## 2018-08-28 RX ORDER — GLUCAGON INJECTION, SOLUTION 0.5 MG/.1ML
1 INJECTION, SOLUTION SUBCUTANEOUS ONCE
Qty: 0 | Refills: 0 | Status: DISCONTINUED | OUTPATIENT
Start: 2018-08-28 | End: 2018-09-01

## 2018-08-28 RX ORDER — FAMOTIDINE 10 MG/ML
20 INJECTION INTRAVENOUS ONCE
Qty: 0 | Refills: 0 | Status: COMPLETED | OUTPATIENT
Start: 2018-08-28 | End: 2018-08-28

## 2018-08-28 RX ORDER — INSULIN HUMAN 100 [IU]/ML
7 INJECTION, SOLUTION SUBCUTANEOUS ONCE
Qty: 0 | Refills: 0 | Status: COMPLETED | OUTPATIENT
Start: 2018-08-28 | End: 2018-08-28

## 2018-08-28 RX ORDER — SODIUM CHLORIDE 9 MG/ML
2000 INJECTION, SOLUTION INTRAVENOUS ONCE
Qty: 0 | Refills: 0 | Status: COMPLETED | OUTPATIENT
Start: 2018-08-28 | End: 2018-08-28

## 2018-08-28 RX ORDER — MAGNESIUM SULFATE 500 MG/ML
2 VIAL (ML) INJECTION ONCE
Qty: 0 | Refills: 0 | Status: COMPLETED | OUTPATIENT
Start: 2018-08-28 | End: 2018-08-28

## 2018-08-28 RX ORDER — GABAPENTIN 400 MG/1
100 CAPSULE ORAL
Qty: 0 | Refills: 0 | Status: DISCONTINUED | OUTPATIENT
Start: 2018-08-28 | End: 2018-09-01

## 2018-08-28 RX ORDER — ONDANSETRON 8 MG/1
4 TABLET, FILM COATED ORAL ONCE
Qty: 0 | Refills: 0 | Status: COMPLETED | OUTPATIENT
Start: 2018-08-28 | End: 2018-08-28

## 2018-08-28 RX ORDER — SODIUM CHLORIDE 9 MG/ML
1000 INJECTION, SOLUTION INTRAVENOUS ONCE
Qty: 0 | Refills: 0 | Status: COMPLETED | OUTPATIENT
Start: 2018-08-28 | End: 2018-08-28

## 2018-08-28 RX ORDER — INSULIN GLARGINE 100 [IU]/ML
10 INJECTION, SOLUTION SUBCUTANEOUS ONCE
Qty: 0 | Refills: 0 | Status: COMPLETED | OUTPATIENT
Start: 2018-08-28 | End: 2018-08-28

## 2018-08-28 RX ORDER — SODIUM CHLORIDE 9 MG/ML
1000 INJECTION INTRAMUSCULAR; INTRAVENOUS; SUBCUTANEOUS ONCE
Qty: 0 | Refills: 0 | Status: COMPLETED | OUTPATIENT
Start: 2018-08-28 | End: 2018-08-28

## 2018-08-28 RX ORDER — MORPHINE SULFATE 50 MG/1
2 CAPSULE, EXTENDED RELEASE ORAL EVERY 6 HOURS
Qty: 0 | Refills: 0 | Status: DISCONTINUED | OUTPATIENT
Start: 2018-08-28 | End: 2018-09-01

## 2018-08-28 RX ORDER — TOPIRAMATE 25 MG
50 TABLET ORAL
Qty: 0 | Refills: 0 | COMMUNITY

## 2018-08-28 RX ORDER — METOCLOPRAMIDE HCL 10 MG
10 TABLET ORAL ONCE
Qty: 0 | Refills: 0 | Status: COMPLETED | OUTPATIENT
Start: 2018-08-28 | End: 2018-08-28

## 2018-08-28 RX ORDER — INSULIN LISPRO 100/ML
VIAL (ML) SUBCUTANEOUS EVERY 4 HOURS
Qty: 0 | Refills: 0 | Status: DISCONTINUED | OUTPATIENT
Start: 2018-08-28 | End: 2018-08-29

## 2018-08-28 RX ORDER — ENOXAPARIN SODIUM 100 MG/ML
40 INJECTION SUBCUTANEOUS EVERY 24 HOURS
Qty: 0 | Refills: 0 | Status: DISCONTINUED | OUTPATIENT
Start: 2018-08-28 | End: 2018-09-01

## 2018-08-28 RX ORDER — INSULIN LISPRO 100/ML
6 VIAL (ML) SUBCUTANEOUS ONCE
Qty: 0 | Refills: 0 | Status: COMPLETED | OUTPATIENT
Start: 2018-08-28 | End: 2018-08-28

## 2018-08-28 RX ORDER — MORPHINE SULFATE 50 MG/1
4 CAPSULE, EXTENDED RELEASE ORAL ONCE
Qty: 0 | Refills: 0 | Status: DISCONTINUED | OUTPATIENT
Start: 2018-08-28 | End: 2018-08-28

## 2018-08-28 RX ORDER — SODIUM CHLORIDE 9 MG/ML
1000 INJECTION, SOLUTION INTRAVENOUS
Qty: 0 | Refills: 0 | Status: DISCONTINUED | OUTPATIENT
Start: 2018-08-28 | End: 2018-08-29

## 2018-08-28 RX ORDER — DEXTROSE 50 % IN WATER 50 %
15 SYRINGE (ML) INTRAVENOUS ONCE
Qty: 0 | Refills: 0 | Status: DISCONTINUED | OUTPATIENT
Start: 2018-08-28 | End: 2018-09-01

## 2018-08-28 RX ORDER — THIAMINE MONONITRATE (VIT B1) 100 MG
100 TABLET ORAL DAILY
Qty: 0 | Refills: 0 | Status: DISCONTINUED | OUTPATIENT
Start: 2018-08-28 | End: 2018-09-01

## 2018-08-28 RX ORDER — DEXTROSE 50 % IN WATER 50 %
12.5 SYRINGE (ML) INTRAVENOUS ONCE
Qty: 0 | Refills: 0 | Status: DISCONTINUED | OUTPATIENT
Start: 2018-08-28 | End: 2018-09-01

## 2018-08-28 RX ORDER — ONDANSETRON 8 MG/1
4 TABLET, FILM COATED ORAL EVERY 8 HOURS
Qty: 0 | Refills: 0 | Status: DISCONTINUED | OUTPATIENT
Start: 2018-08-28 | End: 2018-08-29

## 2018-08-28 RX ORDER — FOLIC ACID 0.8 MG
1 TABLET ORAL DAILY
Qty: 0 | Refills: 0 | Status: DISCONTINUED | OUTPATIENT
Start: 2018-08-28 | End: 2018-09-01

## 2018-08-28 RX ADMIN — Medication 10 MILLIGRAM(S): at 19:36

## 2018-08-28 RX ADMIN — MORPHINE SULFATE 2 MILLIGRAM(S): 50 CAPSULE, EXTENDED RELEASE ORAL at 23:44

## 2018-08-28 RX ADMIN — SODIUM CHLORIDE 1000 MILLILITER(S): 9 INJECTION, SOLUTION INTRAVENOUS at 19:36

## 2018-08-28 RX ADMIN — Medication 2 GRAM(S): at 20:36

## 2018-08-28 RX ADMIN — SODIUM CHLORIDE 2000 MILLILITER(S): 9 INJECTION, SOLUTION INTRAVENOUS at 19:36

## 2018-08-28 RX ADMIN — INSULIN GLARGINE 10 UNIT(S): 100 INJECTION, SOLUTION SUBCUTANEOUS at 22:30

## 2018-08-28 RX ADMIN — Medication 30 MILLILITER(S): at 19:36

## 2018-08-28 RX ADMIN — Medication 2 MILLIGRAM(S): at 23:44

## 2018-08-28 RX ADMIN — MORPHINE SULFATE 4 MILLIGRAM(S): 50 CAPSULE, EXTENDED RELEASE ORAL at 20:37

## 2018-08-28 RX ADMIN — Medication 50 GRAM(S): at 19:36

## 2018-08-28 RX ADMIN — Medication 1 MILLIGRAM(S): at 16:50

## 2018-08-28 RX ADMIN — INSULIN HUMAN 7 UNIT(S): 100 INJECTION, SOLUTION SUBCUTANEOUS at 17:39

## 2018-08-28 RX ADMIN — SODIUM CHLORIDE 1000 MILLILITER(S): 9 INJECTION INTRAMUSCULAR; INTRAVENOUS; SUBCUTANEOUS at 21:35

## 2018-08-28 RX ADMIN — SODIUM CHLORIDE 4000 MILLILITER(S): 9 INJECTION, SOLUTION INTRAVENOUS at 17:30

## 2018-08-28 RX ADMIN — SODIUM CHLORIDE 2000 MILLILITER(S): 9 INJECTION, SOLUTION INTRAVENOUS at 16:49

## 2018-08-28 RX ADMIN — Medication 6 UNIT(S): at 22:31

## 2018-08-28 RX ADMIN — ONDANSETRON 4 MILLIGRAM(S): 8 TABLET, FILM COATED ORAL at 16:49

## 2018-08-28 RX ADMIN — FAMOTIDINE 20 MILLIGRAM(S): 10 INJECTION INTRAVENOUS at 16:49

## 2018-08-28 RX ADMIN — SODIUM CHLORIDE 125 MILLILITER(S): 9 INJECTION, SOLUTION INTRAVENOUS at 20:56

## 2018-08-28 RX ADMIN — ONDANSETRON 4 MILLIGRAM(S): 8 TABLET, FILM COATED ORAL at 17:40

## 2018-08-28 NOTE — ED PROVIDER NOTE - PHYSICAL EXAMINATION
Attending MD Morin: A & O x 3, appears uncomfortable, tearful, mildly tremulous, EOMI b/l, PERRL b/l; lungs CTAB, heart with reg rhythm without murmur; abdomen soft, nondistended, ttp epigastrium and LUQ, no r/g; extremities with no edema; affect appropriate. neuro exam non focal with no motor or sensory deficits.

## 2018-08-28 NOTE — ED PROVIDER NOTE - CARE PLAN
Principal Discharge DX:	Alcohol-induced acute pancreatitis, unspecified complication status Principal Discharge DX:	Alcohol-induced acute pancreatitis, unspecified complication status  Secondary Diagnosis:	Alcohol withdrawal

## 2018-08-28 NOTE — ED ADULT NURSE NOTE - NSIMPLEMENTINTERV_GEN_ALL_ED
Implemented All Universal Safety Interventions:  [place] to call system. Call bell, personal items and telephone within reach. Instruct patient to call for assistance. Room bathroom lighting operational. Non-slip footwear when patient is off stretcher. Physically safe environment: no spills, clutter or unnecessary equipment. Stretcher in lowest position, wheels locked, appropriate side rails in place.

## 2018-08-28 NOTE — H&P ADULT - NSHPREVIEWOFSYSTEMS_GEN_ALL_CORE
CONSTITUTIONAL: No weakness, fevers or chills  EYES/ENT: No visual changes;  No dysphagia  NECK: No pain or stiffness  RESPIRATORY: No cough, wheezing, hemoptysis; No shortness of breath  CARDIOVASCULAR: No chest pain or palpitations; No lower extremity edema  EXTREMITIES: no le edema, cyanosis, clubbing  MUSCULOSKELETAL: no joint pain, swelling  GASTROINTESTINAL: +abdominal pain, nausea, vomiting  BACK: No back pain  GENITOURINARY: No dysuria, frequency or hematuria  NEUROLOGICAL: No numbness or weakness  SKIN: No itching, burning, rashes, or lesions   PSYCH: no agitation  All other review of systems is negative unless indicated above.

## 2018-08-28 NOTE — H&P ADULT - NSHPLABSRESULTS_GEN_ALL_CORE
Labs personally reviewed:                          17.3   12.8  )-----------( 228      ( 28 Aug 2018 16:42 )             49.6     08-28    132<L>  |  95<L>  |  8   ----------------------------<  322<H>  4.1   |  20<L>  |  0.65    Ca    8.9      28 Aug 2018 21:40  Phos  2.8     08-28  Mg     1.5     08-28    TPro  9.0<H>  /  Alb  4.7  /  TBili  2.3<H>  /  DBili  0.8<H>  /  AST  41<H>  /  ALT  52<H>  /  AlkPhos  110  08-28    CARDIAC MARKERS ( 28 Aug 2018 16:40 )  x     / x     / 50 U/L / x     / 1.0 ng/mL      LIVER FUNCTIONS - ( 28 Aug 2018 16:40 )  Alb: 4.7 g/dL / Pro: 9.0 g/dL / ALK PHOS: 110 U/L / ALT: 52 U/L / AST: 41 U/L / GGT: x             Urinalysis Basic - ( 28 Aug 2018 16:42 )    Color: PL Yellow / Appearance: Clear / SG: >1.030 / pH: x  Gluc: x / Ketone: Large  / Bili: Negative / Urobili: Negative   Blood: x / Protein: Trace / Nitrite: Negative   Leuk Esterase: Negative / RBC: x / WBC 0-2 /HPF   Sq Epi: x / Non Sq Epi: OCC /HPF / Bacteria: x      CAPILLARY BLOOD GLUCOSE      POCT Blood Glucose.: 323 mg/dL (28 Aug 2018 21:38)  POCT Blood Glucose.: 477 mg/dL (28 Aug 2018 15:26)  POCT Blood Glucose.: 486 mg/dL (28 Aug 2018 15:26)      Imaging:  CXR personally reviewed: no focal opacity    EKG personally reviewed: nsr, TWI v1-v3, QTc 503

## 2018-08-28 NOTE — H&P ADULT - PROBLEM SELECTOR PLAN 6
pseudohyponatremia likely due to elevated blood sugar  magnesium repleted  monitor electrolytes closely

## 2018-08-28 NOTE — ED PROVIDER NOTE - PROGRESS NOTE DETAILS
Attending MD Morin: labs notable for hyperglycemia, elevated lactate to 4 and elevated AG, normal pH. Suspect elevated AG from lactate elevation and starvation/alcoholic ketoacidosis, favor this over DKA however cannot rule out entirely yet. Will repeat labs after IV fluid resuscitation Jonathan Weil, PGY2 - repeat gas shows stable pH. Glucose markedly decreased on repeat BMP. Patient requesting additional analgesia and anti-emetics though appears relatively comfortable. Will admit. attending Dev: beta hydroxy butyrate resulted after admission to hospitalist. Discussed again with hospitalist Betito. Plan for repeat finger stick, bmp and vbg. Pt with improved anion gap after IVF and insulin boluses. Never acidotic. Will follow-up repeat labs- may require insulin gtt and MICU attending Dev: improved lactate and anion gap. Not acidotic. Will page hospitalist to update on labs

## 2018-08-28 NOTE — H&P ADULT - PROBLEM SELECTOR PLAN 1
Patient with pancreatitis, likely alcohol induced vs. gallstone vs. medication induced (SSRI, trulicity)  s/p 3L LR and 1L NS  currently getting NS+Multivitamin/thiamin/folate  c/w IV zofran (monitor QT )  c/w IV morphine for pain  keep NPO

## 2018-08-28 NOTE — ED ADULT NURSE NOTE - GENITOURINARY ASSESSMENT
Tremayne Galarza's goals for this visit include:   Chief Complaint   Patient presents with     RECHECK     CAD       He requests these members of his care team be copied on today's visit information: PCP    PCP: [facility] [facility]    Referring Provider:  No referring provider defined for this encounter.    Chief Complaint   Patient presents with     RECHECK     CAD       Initial /83 mmHg  Pulse 60  Wt 99.292 kg (218 lb 14.4 oz)  SpO2 97% Estimated body mass index is 29.68 kg/(m^2) as calculated from the following:    Height as of 12/22/15: 1.829 m (6').    Weight as of this encounter: 99.292 kg (218 lb 14.4 oz).  BP completed using cuff size: large      Medication Refills: Cristino Cochran, [facility]       WDL

## 2018-08-28 NOTE — H&P ADULT - PROBLEM SELECTOR PLAN 3
multifactorial CIWA monitoring  ativan taper  c/w banana bag  c/w thiamine and folate and multifactorial  consider psych consult in AM  social work consult

## 2018-08-28 NOTE — H&P ADULT - HISTORY OF PRESENT ILLNESS
49 yo female with PMH of T2DM, depression/anxiety, MRSA gluteal abscess and cellulitis, hx of DKA and alcoholic pancreatitis requiring admission to ICU.  Patient states that she drinks quite frequently and when she drinks she starts binging.  On Saturday, she was binge drinking.  Since Sunday, she started having multiple episodes of nausea and vomiting, could not tolerated food, drink or medication.  This morning she started having sharp epigastric pain radiating to the back.  She denies any fever, dizziness or blurry vision.  She had two episodes of soft stool.  Denies any dysuria.  Patient otherwise denies fever, cough, SOB.  In ED, patient was found to have elevated lipase level, elevated AG to 28, lactate, blood sugar of 528.  Her vitals showed /110, HR 83, saturation 97% on RA, RR 18.  She received 3L of LR and 1L of NS, 7u regular insulin, IV reglan and IV zofran.  CIWA initially was 10.  Received IV ativan.  Currently patient still complains of abdominal pain, has some nausea and tremors.  Denies any hellucinations.  Finger stick at the time is 323.

## 2018-08-28 NOTE — H&P ADULT - NSHPSOCIALHISTORY_GEN_ALL_CORE
Denies tobacco use, drinks alcohol frequently, sometimes skips a week, but binges  denies other drugs use  single, lives with mother

## 2018-08-28 NOTE — H&P ADULT - ASSESSMENT
49 yo female with PMH of T2DM, depression/anxiety, MRSA gluteal abscess and cellulitis, hx of DKA and alcoholic pancreatitis requiring admission to ICU.

## 2018-08-28 NOTE — H&P ADULT - PROBLEM SELECTOR PLAN 4
on clonazepam prn, not been taking last 2 days, hold while on ativan taper  holding SSRI for now, given pancreatitis  reevaluate in AM

## 2018-08-28 NOTE — ED PROVIDER NOTE - ATTENDING CONTRIBUTION TO CARE
Attending MD Morin:  I personally have seen and examined this patient.  Resident note reviewed and agree on plan of care and except where noted.  See HPI, PE, and MDM for details.

## 2018-08-28 NOTE — H&P ADULT - NSHPPHYSICALEXAM_GEN_ALL_CORE
PHYSICAL EXAM:  Vital Signs Last 24 Hrs  T(C): 36.7 (08-28-18 @ 19:35)  T(F): 98 (08-28-18 @ 19:35), Max: 98 (08-28-18 @ 19:35)  HR: 88 (08-28-18 @ 19:35) (83 - 97)  BP: 166/102 (08-28-18 @ 19:35)  BP(mean): --  RR: 19 (08-28-18 @ 19:35) (18 - 24)  SpO2: 99% (08-28-18 @ 19:35) (97% - 100%)  Wt(kg): --    Constitutional: NAD, awake and alert  EYES: EOMI  ENMT:  Normal Hearing, no tonsillar exudates ; dry mucous membrane  Neck: Soft and supple, No JVD  Lungs: Breath sounds are clear bilaterally, No wheezing, rales or rhonchi  Heart: S1 and S2, regular rate and rhythm, no Murmurs, gallops or rubs  Abdomen: Bowel Sounds present, soft, nontender, nondistended, no guarding, no rebound  Extremities: No cyanosis or clubbing; warm to touch  Vascular: 2+ peripheral pulses lower ex  Neurological: A/O x 3, no focal deficits; +mild tremors  Musculoskeletal: 5/5 strength b/l upper and lower extremities  Skin: No rashes  Psych: no depression or anhedonia  HEME: no bruises, no nose bleeds

## 2018-08-28 NOTE — ED ADULT NURSE NOTE - OBJECTIVE STATEMENT
47 yo female with PMH diabetes, pancreatitis, alcohol abuse presents to ED with 2 days of n/v/d and abdominal pain. She reports that she has been drinking heavily x3 days, stopped 2 days ago, developed n/v/d and pain. She reports 15-20 episodes of vomiting and 3 episodes of diarrhea. Pain located diffuse abdomen, worst in epigastrium.

## 2018-08-28 NOTE — H&P ADULT - PROBLEM SELECTOR PLAN 2
AG 17 now  s/p 7u regular insulin   will give stat lantus 10u now (home regimen lantus 15u)  sliding scale q4h for now  c/w IVF  consider endocrine consult in AM Elevated AG likely multifactorial DKA and/or lactic acidosis and/or starvation   AG 17 now  s/p 7u regular insulin   will give stat lantus 10u now (home regimen lantus 15u)  sliding scale q4h for now  c/w IVF  consider endocrine consult in AM Elevated AG likely multifactorial DKA and/or lactic acidosis and/or starvation   AG 17 now  s/p 7u regular insulin   will give stat lantus 10u now (home regimen lantus 15u)  sliding scale q4h for now  c/w IVF  consider endocrine consult in AM  BMP q4

## 2018-08-28 NOTE — ED PROVIDER NOTE - OBJECTIVE STATEMENT
48F presenting with n/v/d and abdominal pain for 2 days. She describes abdominal pain diffusely lower, left sided, and epigastric which radiates into her chest. Vomiting 15-20x since onset and 3x diarrhea. NBNB emesis, no melena or hematochezia. She endorses alcohol use shortly before onset. Unclear if this is similar to past pancreatitis episodes. +chills, no fever.     PMH: DM, pancreatitis, cholecystectomy  Surgical: None  All: Bactrim

## 2018-08-28 NOTE — H&P ADULT - FAMILY HISTORY
Family history of systemic lupus erythematosus     Family history of diabetes mellitus     Grandparent  Still living? Unknown  Family history of cerebral aneurysm, Age at diagnosis: Age Unknown     Aunt  Still living? Unknown  Family history of cerebral aneurysm, Age at diagnosis: Age Unknown     Sibling  Still living? Unknown  Family history of abscess of skin or subcutaneous tissue, Age at diagnosis: Age Unknown

## 2018-08-29 DIAGNOSIS — E11.65 TYPE 2 DIABETES MELLITUS WITH HYPERGLYCEMIA: ICD-10-CM

## 2018-08-29 DIAGNOSIS — E78.5 HYPERLIPIDEMIA, UNSPECIFIED: ICD-10-CM

## 2018-08-29 DIAGNOSIS — I10 ESSENTIAL (PRIMARY) HYPERTENSION: ICD-10-CM

## 2018-08-29 LAB
ALBUMIN SERPL ELPH-MCNC: 3.7 G/DL — SIGNIFICANT CHANGE UP (ref 3.3–5)
ALP SERPL-CCNC: 76 U/L — SIGNIFICANT CHANGE UP (ref 40–120)
ALT FLD-CCNC: 33 U/L — SIGNIFICANT CHANGE UP (ref 10–45)
ANION GAP SERPL CALC-SCNC: 14 MMOL/L — SIGNIFICANT CHANGE UP (ref 5–17)
ANION GAP SERPL CALC-SCNC: 14 MMOL/L — SIGNIFICANT CHANGE UP (ref 5–17)
ANION GAP SERPL CALC-SCNC: 17 MMOL/L — SIGNIFICANT CHANGE UP (ref 5–17)
AST SERPL-CCNC: 27 U/L — SIGNIFICANT CHANGE UP (ref 10–40)
BASOPHILS # BLD AUTO: 0.02 K/UL — SIGNIFICANT CHANGE UP (ref 0–0.2)
BASOPHILS NFR BLD AUTO: 0.2 % — SIGNIFICANT CHANGE UP (ref 0–2)
BILIRUB SERPL-MCNC: 1.3 MG/DL — HIGH (ref 0.2–1.2)
BUN SERPL-MCNC: 7 MG/DL — SIGNIFICANT CHANGE UP (ref 7–23)
BUN SERPL-MCNC: 7 MG/DL — SIGNIFICANT CHANGE UP (ref 7–23)
BUN SERPL-MCNC: 8 MG/DL — SIGNIFICANT CHANGE UP (ref 7–23)
CALCIUM SERPL-MCNC: 8.1 MG/DL — LOW (ref 8.4–10.5)
CALCIUM SERPL-MCNC: 8.1 MG/DL — LOW (ref 8.4–10.5)
CALCIUM SERPL-MCNC: 8.2 MG/DL — LOW (ref 8.4–10.5)
CHLORIDE SERPL-SCNC: 100 MMOL/L — SIGNIFICANT CHANGE UP (ref 96–108)
CHLORIDE SERPL-SCNC: 100 MMOL/L — SIGNIFICANT CHANGE UP (ref 96–108)
CHLORIDE SERPL-SCNC: 99 MMOL/L — SIGNIFICANT CHANGE UP (ref 96–108)
CO2 SERPL-SCNC: 20 MMOL/L — LOW (ref 22–31)
CO2 SERPL-SCNC: 20 MMOL/L — LOW (ref 22–31)
CO2 SERPL-SCNC: 21 MMOL/L — LOW (ref 22–31)
CREAT SERPL-MCNC: 0.52 MG/DL — SIGNIFICANT CHANGE UP (ref 0.5–1.3)
CREAT SERPL-MCNC: 0.56 MG/DL — SIGNIFICANT CHANGE UP (ref 0.5–1.3)
CREAT SERPL-MCNC: 0.57 MG/DL — SIGNIFICANT CHANGE UP (ref 0.5–1.3)
EOSINOPHIL # BLD AUTO: 0.05 K/UL — SIGNIFICANT CHANGE UP (ref 0–0.5)
EOSINOPHIL NFR BLD AUTO: 0.4 % — SIGNIFICANT CHANGE UP (ref 0–6)
GLUCOSE BLDC GLUCOMTR-MCNC: 149 MG/DL — HIGH (ref 70–99)
GLUCOSE BLDC GLUCOMTR-MCNC: 191 MG/DL — HIGH (ref 70–99)
GLUCOSE BLDC GLUCOMTR-MCNC: 232 MG/DL — HIGH (ref 70–99)
GLUCOSE SERPL-MCNC: 192 MG/DL — HIGH (ref 70–99)
GLUCOSE SERPL-MCNC: 200 MG/DL — HIGH (ref 70–99)
GLUCOSE SERPL-MCNC: 201 MG/DL — HIGH (ref 70–99)
HBA1C BLD-MCNC: 8.7 % — HIGH (ref 4–5.6)
HCT VFR BLD CALC: 41.9 % — SIGNIFICANT CHANGE UP (ref 34.5–45)
HGB BLD-MCNC: 14.4 G/DL — SIGNIFICANT CHANGE UP (ref 11.5–15.5)
IMM GRANULOCYTES NFR BLD AUTO: 0.3 % — SIGNIFICANT CHANGE UP (ref 0–1.5)
LYMPHOCYTES # BLD AUTO: 1.59 K/UL — SIGNIFICANT CHANGE UP (ref 1–3.3)
LYMPHOCYTES # BLD AUTO: 12.1 % — LOW (ref 13–44)
MAGNESIUM SERPL-MCNC: 1.9 MG/DL — SIGNIFICANT CHANGE UP (ref 1.6–2.6)
MAGNESIUM SERPL-MCNC: 2.1 MG/DL — SIGNIFICANT CHANGE UP (ref 1.6–2.6)
MCHC RBC-ENTMCNC: 33.3 PG — SIGNIFICANT CHANGE UP (ref 27–34)
MCHC RBC-ENTMCNC: 34.4 GM/DL — SIGNIFICANT CHANGE UP (ref 32–36)
MCV RBC AUTO: 96.8 FL — SIGNIFICANT CHANGE UP (ref 80–100)
MONOCYTES # BLD AUTO: 0.87 K/UL — SIGNIFICANT CHANGE UP (ref 0–0.9)
MONOCYTES NFR BLD AUTO: 6.6 % — SIGNIFICANT CHANGE UP (ref 2–14)
NEUTROPHILS # BLD AUTO: 10.61 K/UL — HIGH (ref 1.8–7.4)
NEUTROPHILS NFR BLD AUTO: 80.4 % — HIGH (ref 43–77)
PHOSPHATE SERPL-MCNC: 1.5 MG/DL — LOW (ref 2.5–4.5)
PHOSPHATE SERPL-MCNC: 2.8 MG/DL — SIGNIFICANT CHANGE UP (ref 2.5–4.5)
PLATELET # BLD AUTO: 166 K/UL — SIGNIFICANT CHANGE UP (ref 150–400)
POTASSIUM SERPL-MCNC: 3.4 MMOL/L — LOW (ref 3.5–5.3)
POTASSIUM SERPL-MCNC: 3.5 MMOL/L — SIGNIFICANT CHANGE UP (ref 3.5–5.3)
POTASSIUM SERPL-MCNC: 3.8 MMOL/L — SIGNIFICANT CHANGE UP (ref 3.5–5.3)
POTASSIUM SERPL-SCNC: 3.4 MMOL/L — LOW (ref 3.5–5.3)
POTASSIUM SERPL-SCNC: 3.5 MMOL/L — SIGNIFICANT CHANGE UP (ref 3.5–5.3)
POTASSIUM SERPL-SCNC: 3.8 MMOL/L — SIGNIFICANT CHANGE UP (ref 3.5–5.3)
PROT SERPL-MCNC: 6.6 G/DL — SIGNIFICANT CHANGE UP (ref 6–8.3)
RBC # BLD: 4.33 M/UL — SIGNIFICANT CHANGE UP (ref 3.8–5.2)
RBC # FLD: 11.5 % — SIGNIFICANT CHANGE UP (ref 10.3–14.5)
SODIUM SERPL-SCNC: 134 MMOL/L — LOW (ref 135–145)
SODIUM SERPL-SCNC: 134 MMOL/L — LOW (ref 135–145)
SODIUM SERPL-SCNC: 137 MMOL/L — SIGNIFICANT CHANGE UP (ref 135–145)
WBC # BLD: 13.18 K/UL — HIGH (ref 3.8–10.5)
WBC # FLD AUTO: 13.18 K/UL — HIGH (ref 3.8–10.5)

## 2018-08-29 PROCEDURE — 99233 SBSQ HOSP IP/OBS HIGH 50: CPT

## 2018-08-29 PROCEDURE — 93010 ELECTROCARDIOGRAM REPORT: CPT

## 2018-08-29 PROCEDURE — 99223 1ST HOSP IP/OBS HIGH 75: CPT

## 2018-08-29 RX ORDER — INSULIN GLARGINE 100 [IU]/ML
15 INJECTION, SOLUTION SUBCUTANEOUS AT BEDTIME
Qty: 0 | Refills: 0 | Status: DISCONTINUED | OUTPATIENT
Start: 2018-08-29 | End: 2018-09-01

## 2018-08-29 RX ORDER — MAGNESIUM SULFATE 500 MG/ML
1 VIAL (ML) INJECTION ONCE
Qty: 0 | Refills: 0 | Status: COMPLETED | OUTPATIENT
Start: 2018-08-29 | End: 2018-08-29

## 2018-08-29 RX ORDER — METOCLOPRAMIDE HCL 10 MG
5 TABLET ORAL EVERY 8 HOURS
Qty: 0 | Refills: 0 | Status: DISCONTINUED | OUTPATIENT
Start: 2018-08-29 | End: 2018-09-01

## 2018-08-29 RX ORDER — SODIUM CHLORIDE 9 MG/ML
1000 INJECTION, SOLUTION INTRAVENOUS
Qty: 0 | Refills: 0 | Status: DISCONTINUED | OUTPATIENT
Start: 2018-08-29 | End: 2018-09-01

## 2018-08-29 RX ORDER — SERTRALINE 25 MG/1
100 TABLET, FILM COATED ORAL DAILY
Qty: 0 | Refills: 0 | Status: DISCONTINUED | OUTPATIENT
Start: 2018-08-29 | End: 2018-09-01

## 2018-08-29 RX ORDER — INSULIN LISPRO 100/ML
VIAL (ML) SUBCUTANEOUS AT BEDTIME
Qty: 0 | Refills: 0 | Status: DISCONTINUED | OUTPATIENT
Start: 2018-08-29 | End: 2018-09-01

## 2018-08-29 RX ORDER — POTASSIUM PHOSPHATE, MONOBASIC POTASSIUM PHOSPHATE, DIBASIC 236; 224 MG/ML; MG/ML
30 INJECTION, SOLUTION INTRAVENOUS ONCE
Qty: 0 | Refills: 0 | Status: COMPLETED | OUTPATIENT
Start: 2018-08-29 | End: 2018-08-29

## 2018-08-29 RX ORDER — ONDANSETRON 8 MG/1
4 TABLET, FILM COATED ORAL ONCE
Qty: 0 | Refills: 0 | Status: COMPLETED | OUTPATIENT
Start: 2018-08-29 | End: 2018-08-29

## 2018-08-29 RX ORDER — INSULIN LISPRO 100/ML
VIAL (ML) SUBCUTANEOUS
Qty: 0 | Refills: 0 | Status: DISCONTINUED | OUTPATIENT
Start: 2018-08-29 | End: 2018-09-01

## 2018-08-29 RX ADMIN — Medication 4: at 01:50

## 2018-08-29 RX ADMIN — MORPHINE SULFATE 2 MILLIGRAM(S): 50 CAPSULE, EXTENDED RELEASE ORAL at 22:49

## 2018-08-29 RX ADMIN — Medication 1.5 MILLIGRAM(S): at 22:15

## 2018-08-29 RX ADMIN — Medication 2: at 09:59

## 2018-08-29 RX ADMIN — Medication 2 MILLIGRAM(S): at 10:17

## 2018-08-29 RX ADMIN — MORPHINE SULFATE 2 MILLIGRAM(S): 50 CAPSULE, EXTENDED RELEASE ORAL at 00:15

## 2018-08-29 RX ADMIN — MORPHINE SULFATE 2 MILLIGRAM(S): 50 CAPSULE, EXTENDED RELEASE ORAL at 22:19

## 2018-08-29 RX ADMIN — SERTRALINE 100 MILLIGRAM(S): 25 TABLET, FILM COATED ORAL at 12:33

## 2018-08-29 RX ADMIN — GABAPENTIN 100 MILLIGRAM(S): 400 CAPSULE ORAL at 17:27

## 2018-08-29 RX ADMIN — Medication 2 MILLIGRAM(S): at 05:12

## 2018-08-29 RX ADMIN — Medication 2 MILLIGRAM(S): at 13:57

## 2018-08-29 RX ADMIN — Medication 1 MILLIGRAM(S): at 12:33

## 2018-08-29 RX ADMIN — INSULIN GLARGINE 15 UNIT(S): 100 INJECTION, SOLUTION SUBCUTANEOUS at 21:54

## 2018-08-29 RX ADMIN — MORPHINE SULFATE 2 MILLIGRAM(S): 50 CAPSULE, EXTENDED RELEASE ORAL at 16:00

## 2018-08-29 RX ADMIN — ENOXAPARIN SODIUM 40 MILLIGRAM(S): 100 INJECTION SUBCUTANEOUS at 05:12

## 2018-08-29 RX ADMIN — Medication 1 TABLET(S): at 12:33

## 2018-08-29 RX ADMIN — Medication 100 GRAM(S): at 12:33

## 2018-08-29 RX ADMIN — Medication 2: at 05:24

## 2018-08-29 RX ADMIN — Medication 2 MILLIGRAM(S): at 01:52

## 2018-08-29 RX ADMIN — Medication 5 MILLIGRAM(S): at 10:00

## 2018-08-29 RX ADMIN — GABAPENTIN 100 MILLIGRAM(S): 400 CAPSULE ORAL at 05:12

## 2018-08-29 RX ADMIN — SODIUM CHLORIDE 125 MILLILITER(S): 9 INJECTION, SOLUTION INTRAVENOUS at 05:09

## 2018-08-29 RX ADMIN — Medication 0: at 21:52

## 2018-08-29 RX ADMIN — MORPHINE SULFATE 2 MILLIGRAM(S): 50 CAPSULE, EXTENDED RELEASE ORAL at 07:47

## 2018-08-29 RX ADMIN — POTASSIUM PHOSPHATE, MONOBASIC POTASSIUM PHOSPHATE, DIBASIC 83.33 MILLIMOLE(S): 236; 224 INJECTION, SOLUTION INTRAVENOUS at 13:49

## 2018-08-29 RX ADMIN — MORPHINE SULFATE 2 MILLIGRAM(S): 50 CAPSULE, EXTENDED RELEASE ORAL at 16:15

## 2018-08-29 RX ADMIN — Medication 100 MILLIGRAM(S): at 12:33

## 2018-08-29 RX ADMIN — Medication 2: at 17:26

## 2018-08-29 RX ADMIN — Medication 5 MILLIGRAM(S): at 17:28

## 2018-08-29 RX ADMIN — SODIUM CHLORIDE 100 MILLILITER(S): 9 INJECTION, SOLUTION INTRAVENOUS at 05:47

## 2018-08-29 RX ADMIN — Medication 2 MILLIGRAM(S): at 17:26

## 2018-08-29 NOTE — CONSULT NOTE ADULT - ASSESSMENT
48 y.o woman with  T2DM, depression/anxiety, MRSA gluteal abscess and cellulitis, hx of DKA and alcoholic pancreatitis a/w alcohol induced pancreatitis.

## 2018-08-29 NOTE — ED ADULT NURSE REASSESSMENT NOTE - NS ED NURSE REASSESS COMMENT FT1
Pt reports that her last alcoholic beverage was 3 days ago. Pt reports that she drinks a pint of vodka every day. Pt's CIWA level was 10, pt treated with 1mg of Ativan. Pt receiving IV fluids. 2nd IV being placed by another RN. Pt tolerating medication well. Mother at bedside, will continue to monitor pt.
Report given to holding RN Latonia. Patient aware. Patient currently comfortable. VSS.
repeat blood tests sent to lab
Report received from LAWRENCE Gorman

## 2018-08-29 NOTE — PROGRESS NOTE ADULT - SUBJECTIVE AND OBJECTIVE BOX
Gómez Bird MD  Pager [phone]  Spectra [phone]  Cell Phone [phone]    Patient is a 48y old  Female who presents with a chief complaint of abdominal pain and vomiting (28 Aug 2018 22:26)        SUBJECTIVE / OVERNIGHT EVENTS: Patient feeling better. No nausea or vomiting. Abdominal pain improved.      MEDICATIONS  (STANDING):  dextrose 5%. 1000 milliLiter(s) (50 mL/Hr) IV Continuous <Continuous>  dextrose 50% Injectable 12.5 Gram(s) IV Push once  dextrose 50% Injectable 25 Gram(s) IV Push once  dextrose 50% Injectable 25 Gram(s) IV Push once  enoxaparin Injectable 40 milliGRAM(s) SubCutaneous every 24 hours  folic acid 1 milliGRAM(s) Oral daily  gabapentin 100 milliGRAM(s) Oral two times a day  insulin glargine Injectable (LANTUS) 15 Unit(s) SubCutaneous at bedtime  insulin lispro (HumaLOG) corrective regimen sliding scale   SubCutaneous three times a day before meals  insulin lispro (HumaLOG) corrective regimen sliding scale   SubCutaneous at bedtime  lactated ringers. 1000 milliLiter(s) (100 mL/Hr) IV Continuous <Continuous>  LORazepam   Injectable   IV Push   LORazepam   Injectable 2 milliGRAM(s) IV Push every 4 hours  LORazepam   Injectable 1.5 milliGRAM(s) IV Push every 4 hours  magnesium sulfate  IVPB 1 Gram(s) IV Intermittent once  multivitamin 1 Tablet(s) Oral daily  multivitamin/thiamine/folic acid in sodium chloride 0.9% 1000 milliLiter(s) (125 mL/Hr) IV Continuous <Continuous>  potassium phosphate IVPB 30 milliMole(s) IV Intermittent once  thiamine 100 milliGRAM(s) Oral daily    MEDICATIONS  (PRN):  dextrose 40% Gel 15 Gram(s) Oral once PRN Blood Glucose LESS THAN 70 milliGRAM(s)/deciliter  glucagon  Injectable 1 milliGRAM(s) IntraMuscular once PRN Glucose LESS THAN 70 milligrams/deciliter  metoclopramide Injectable 5 milliGRAM(s) IV Push every 8 hours PRN naseau/vomiting  morphine  - Injectable 2 milliGRAM(s) IV Push every 6 hours PRN Severe Pain (7 - 10)      Vital Signs Last 24 Hrs  T(C): 36.9 (29 Aug 2018 10:46), Max: 37.1 (29 Aug 2018 03:21)  T(F): 98.5 (29 Aug 2018 10:46), Max: 98.8 (29 Aug 2018 03:21)  HR: 91 (29 Aug 2018 10:46) (83 - 97)  BP: 131/88 (29 Aug 2018 10:46) (131/88 - 173/110)  BP(mean): --  RR: 16 (29 Aug 2018 10:46) (16 - 24)  SpO2: 98% (29 Aug 2018 10:46) (97% - 100%)  CAPILLARY BLOOD GLUCOSE      POCT Blood Glucose.: 187 mg/dL (29 Aug 2018 09:58)  POCT Blood Glucose.: 181 mg/dL (29 Aug 2018 05:20)  POCT Blood Glucose.: 235 mg/dL (29 Aug 2018 01:20)  POCT Blood Glucose.: 323 mg/dL (28 Aug 2018 21:38)  POCT Blood Glucose.: 477 mg/dL (28 Aug 2018 15:26)  POCT Blood Glucose.: 486 mg/dL (28 Aug 2018 15:26)    I&O's Summary        PHYSICAL EXAM  GENERAL: NAD, well-developed  HEAD:  Atraumatic, Normocephalic  EYES: EOMI, PERRLA, conjunctiva and sclera clear  CHEST/LUNG: Clear to auscultation bilaterally; No wheeze  HEART: Regular rate and rhythm; No murmurs, rubs, or gallops  ABDOMEN: Soft, Nontender, Nondistended; Bowel sounds present  EXTREMITIES:  2+ Peripheral Pulses, No clubbing, cyanosis, or edema  PSYCH: AAOx3      LABS:                        14.4   13.18 )-----------( 166      ( 29 Aug 2018 07:51 )             41.9     08-29    134<L>  |  100  |  7   ----------------------------<  200<H>  3.4<L>   |  20<L>  |  0.56    Ca    8.2<L>      29 Aug 2018 06:40  Phos  1.5     08-29  Mg     1.9     08-29    TPro  6.6  /  Alb  3.7  /  TBili  1.3<H>  /  DBili  x   /  AST  27  /  ALT  33  /  AlkPhos  76  08-29      CARDIAC MARKERS ( 28 Aug 2018 16:40 )  x     / x     / 50 U/L / x     / 1.0 ng/mL      Urinalysis Basic - ( 28 Aug 2018 16:42 )    Color: PL Yellow / Appearance: Clear / SG: >1.030 / pH: x  Gluc: x / Ketone: Large  / Bili: Negative / Urobili: Negative   Blood: x / Protein: Trace / Nitrite: Negative   Leuk Esterase: Negative / RBC: x / WBC 0-2 /HPF   Sq Epi: x / Non Sq Epi: OCC /HPF / Bacteria: x          RADIOLOGY & ADDITIONAL TESTS:    Imaging Personally Reviewed:  Consultant(s) Notes Reviewed:    Care Discussed with Consultants/Other Providers:

## 2018-08-29 NOTE — PROGRESS NOTE ADULT - PROBLEM SELECTOR PLAN 3
- CIWA monitoring, ativan taper initiated  - social work evaluating   - counselled on abstinence from etoh. Per patient she is a binge drinker and can go weeks without consuming etoh.
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
## 2018-08-29 NOTE — CONSULT NOTE ADULT - PROBLEM SELECTOR RECOMMENDATION 9
Uncontrolled, w/ Hba1c 8.7. Had mil DKA on admission, now resolved.  Currently NPO, will have diet slowly advanced as tolerated.  Ordered for Lantus 15u qhs tonight. Will continue.   Cont. SS TIDAC/qhs. will add back pre-meal insulin once diet is advanced.  Cont. to monitor FS's TIDAC/qhs.    Discharge regimen: basal/bolus, w/ doses TBD. Do not restart GLP1 agonist given h/o recurrent pancreatitis and alcohol abuse.  Outpt. f/u w/ Dr Cha, [phone].

## 2018-08-30 ENCOUNTER — TRANSCRIPTION ENCOUNTER (OUTPATIENT)
Age: 48
End: 2018-08-30

## 2018-08-30 LAB
AMYLASE P1 CFR SERPL: 57 U/L — SIGNIFICANT CHANGE UP (ref 25–125)
ANION GAP SERPL CALC-SCNC: 10 MMOL/L — SIGNIFICANT CHANGE UP (ref 5–17)
BUN SERPL-MCNC: 4 MG/DL — LOW (ref 7–23)
CALCIUM SERPL-MCNC: 8.3 MG/DL — LOW (ref 8.4–10.5)
CHLORIDE SERPL-SCNC: 102 MMOL/L — SIGNIFICANT CHANGE UP (ref 96–108)
CO2 SERPL-SCNC: 24 MMOL/L — SIGNIFICANT CHANGE UP (ref 22–31)
CREAT SERPL-MCNC: 0.53 MG/DL — SIGNIFICANT CHANGE UP (ref 0.5–1.3)
GLUCOSE BLDC GLUCOMTR-MCNC: 126 MG/DL — HIGH (ref 70–99)
GLUCOSE BLDC GLUCOMTR-MCNC: 139 MG/DL — HIGH (ref 70–99)
GLUCOSE BLDC GLUCOMTR-MCNC: 151 MG/DL — HIGH (ref 70–99)
GLUCOSE BLDC GLUCOMTR-MCNC: 182 MG/DL — HIGH (ref 70–99)
GLUCOSE SERPL-MCNC: 140 MG/DL — HIGH (ref 70–99)
HCT VFR BLD CALC: 39.8 % — SIGNIFICANT CHANGE UP (ref 34.5–45)
HGB BLD-MCNC: 13.8 G/DL — SIGNIFICANT CHANGE UP (ref 11.5–15.5)
LIDOCAIN IGE QN: 62 U/L — HIGH (ref 7–60)
MAGNESIUM SERPL-MCNC: 2 MG/DL — SIGNIFICANT CHANGE UP (ref 1.6–2.6)
MCHC RBC-ENTMCNC: 33.3 PG — SIGNIFICANT CHANGE UP (ref 27–34)
MCHC RBC-ENTMCNC: 34.7 GM/DL — SIGNIFICANT CHANGE UP (ref 32–36)
MCV RBC AUTO: 95.9 FL — SIGNIFICANT CHANGE UP (ref 80–100)
PHOSPHATE SERPL-MCNC: 2.2 MG/DL — LOW (ref 2.5–4.5)
PLATELET # BLD AUTO: 146 K/UL — LOW (ref 150–400)
POTASSIUM SERPL-MCNC: 3.5 MMOL/L — SIGNIFICANT CHANGE UP (ref 3.5–5.3)
POTASSIUM SERPL-SCNC: 3.5 MMOL/L — SIGNIFICANT CHANGE UP (ref 3.5–5.3)
RBC # BLD: 4.15 M/UL — SIGNIFICANT CHANGE UP (ref 3.8–5.2)
RBC # FLD: 11.7 % — SIGNIFICANT CHANGE UP (ref 10.3–14.5)
SODIUM SERPL-SCNC: 136 MMOL/L — SIGNIFICANT CHANGE UP (ref 135–145)
WBC # BLD: 8.48 K/UL — SIGNIFICANT CHANGE UP (ref 3.8–10.5)
WBC # FLD AUTO: 8.48 K/UL — SIGNIFICANT CHANGE UP (ref 3.8–10.5)

## 2018-08-30 PROCEDURE — 76705 ECHO EXAM OF ABDOMEN: CPT | Mod: 26,RT

## 2018-08-30 PROCEDURE — 99233 SBSQ HOSP IP/OBS HIGH 50: CPT

## 2018-08-30 RX ORDER — HYDROMORPHONE HYDROCHLORIDE 2 MG/ML
1 INJECTION INTRAMUSCULAR; INTRAVENOUS; SUBCUTANEOUS ONCE
Qty: 0 | Refills: 0 | Status: DISCONTINUED | OUTPATIENT
Start: 2018-08-30 | End: 2018-08-30

## 2018-08-30 RX ADMIN — Medication 2: at 12:11

## 2018-08-30 RX ADMIN — Medication 1 MILLIGRAM(S): at 11:57

## 2018-08-30 RX ADMIN — MORPHINE SULFATE 2 MILLIGRAM(S): 50 CAPSULE, EXTENDED RELEASE ORAL at 04:14

## 2018-08-30 RX ADMIN — HYDROMORPHONE HYDROCHLORIDE 1 MILLIGRAM(S): 2 INJECTION INTRAMUSCULAR; INTRAVENOUS; SUBCUTANEOUS at 21:33

## 2018-08-30 RX ADMIN — Medication 1 MILLIGRAM(S): at 22:49

## 2018-08-30 RX ADMIN — Medication 1.5 MILLIGRAM(S): at 06:10

## 2018-08-30 RX ADMIN — MORPHINE SULFATE 2 MILLIGRAM(S): 50 CAPSULE, EXTENDED RELEASE ORAL at 20:19

## 2018-08-30 RX ADMIN — INSULIN GLARGINE 15 UNIT(S): 100 INJECTION, SOLUTION SUBCUTANEOUS at 22:30

## 2018-08-30 RX ADMIN — MORPHINE SULFATE 2 MILLIGRAM(S): 50 CAPSULE, EXTENDED RELEASE ORAL at 04:44

## 2018-08-30 RX ADMIN — Medication 1.5 MILLIGRAM(S): at 02:26

## 2018-08-30 RX ADMIN — Medication 1.5 MILLIGRAM(S): at 16:28

## 2018-08-30 RX ADMIN — ENOXAPARIN SODIUM 40 MILLIGRAM(S): 100 INJECTION SUBCUTANEOUS at 06:09

## 2018-08-30 RX ADMIN — Medication 100 MILLIGRAM(S): at 11:57

## 2018-08-30 RX ADMIN — MORPHINE SULFATE 2 MILLIGRAM(S): 50 CAPSULE, EXTENDED RELEASE ORAL at 10:39

## 2018-08-30 RX ADMIN — MORPHINE SULFATE 2 MILLIGRAM(S): 50 CAPSULE, EXTENDED RELEASE ORAL at 20:49

## 2018-08-30 RX ADMIN — MORPHINE SULFATE 2 MILLIGRAM(S): 50 CAPSULE, EXTENDED RELEASE ORAL at 11:45

## 2018-08-30 RX ADMIN — Medication 1.5 MILLIGRAM(S): at 18:57

## 2018-08-30 RX ADMIN — GABAPENTIN 100 MILLIGRAM(S): 400 CAPSULE ORAL at 17:14

## 2018-08-30 RX ADMIN — SERTRALINE 100 MILLIGRAM(S): 25 TABLET, FILM COATED ORAL at 11:57

## 2018-08-30 RX ADMIN — HYDROMORPHONE HYDROCHLORIDE 1 MILLIGRAM(S): 2 INJECTION INTRAMUSCULAR; INTRAVENOUS; SUBCUTANEOUS at 22:03

## 2018-08-30 RX ADMIN — GABAPENTIN 100 MILLIGRAM(S): 400 CAPSULE ORAL at 06:10

## 2018-08-30 RX ADMIN — Medication 1.5 MILLIGRAM(S): at 11:56

## 2018-08-30 RX ADMIN — Medication 1 TABLET(S): at 11:57

## 2018-08-30 NOTE — DISCHARGE NOTE ADULT - MEDICATION SUMMARY - MEDICATIONS TO STOP TAKING
I will STOP taking the medications listed below when I get home from the hospital:    Zoloft 100 mg oral tablet  -- 1 tab(s) by mouth once a day    Topamax 50 mg oral tablet  -- 50 milligram(s) by mouth once a day    clonazePAM 0.5 mg oral tablet  -- 1 tab(s) by mouth 3 times a day, As Needed - for anxiety    HYDROmorphone 2 mg oral tablet  -- 1-2 tab(s) by mouth every 6-8 hours, As Needed -Moderate to Severe Pain MDD:6    Dilaudid 2 mg oral tablet  -- 1 tab(s) by mouth every 8 hours MDD:3 tablets  -- Caution federal law prohibits the transfer of this drug to any person other  than the person for whom it was prescribed.  May cause drowsiness.  Alcohol may intensify this effect.  Use care when operating dangerous machinery.  This prescription cannot be refilled.  Using more of this medication than prescribed may cause serious breathing problems.    chlordiazePOXIDE 25 mg oral capsule  -- 2 cap(s) by mouth for withdrawal tremors as needed  Day1:Every 4-6 hours  Day2:Every 6-8 hours  Day3:Every 12 hours  Day4:At night MDD:300mg  -- Caution federal law prohibits the transfer of this drug to any person other  than the person for whom it was prescribed.  May cause drowsiness.  Alcohol may intensify this effect.  Use care when operating dangerous machinery.    Teresa Dennis

## 2018-08-30 NOTE — DISCHARGE NOTE ADULT - COMPLETE THE FOLLOWING IF THE PATIENT REFUSES THE INFLUENZA VACCINE:
Vaccine Information Sheet (VIS) provided-VIS date: 8/07/15/Risks/benefits discussed with patient/surrogate

## 2018-08-30 NOTE — DISCHARGE NOTE ADULT - MEDICATION SUMMARY - MEDICATIONS TO TAKE
I will START or STAY ON the medications listed below when I get home from the hospital:    acetaminophen 10 mg/mL intravenous solution  -- 100 milliliter(s) intravenous once  -- Indication: For Pain    gabapentin 100 mg oral capsule  -- 1 cap(s) by mouth 2 times a day  -- Indication: For Pain    sertraline 100 mg oral tablet  -- 1 tab(s) by mouth once a day  -- Indication: For Depression    insulin glargine  -- 15 unit(s) subcutaneous once a day (at bedtime)  -- Indication: For Diabetic ketoacidosis    insulin lispro 100 units/mL subcutaneous solution  -- 5 unit(s) subcutaneous 3 times a day (before meals)  -- Indication: For Diabetic ketoacidosis    docusate sodium 100 mg oral capsule  -- 1 cap(s) by mouth 2 times a day  -- Indication: For constipation    senna oral tablet  -- 2 tab(s) by mouth once a day (at bedtime)  -- Indication: For constipation    Multiple Vitamins oral tablet  -- 1 tab(s) by mouth once a day  -- Indication: For supplement    folic acid 1 mg oral tablet  -- 1 tab(s) by mouth once a day  -- Indication: For supplement    thiamine 100 mg oral tablet  -- 1 tab(s) by mouth once a day  -- Indication: For supplement I will START or STAY ON the medications listed below when I get home from the hospital:    gabapentin 100 mg oral capsule  -- 1 cap(s) by mouth 2 times a day  -- Indication: For Pain    LORazepam 0.5 mg oral tablet  -- 1 tab(s) by mouth at 10 pm tonight  then ativan 1 tab every 12 hours x4 doses starting on 9/2/18 and complete on 9/3. MDD:0.5 mgs  -- Indication: For Alcohol withdrawal/ do not take clonazepam until completed ativan tapered    sertraline 100 mg oral tablet  -- 1 tab(s) by mouth once a day  -- Indication: For Depression    insulin glargine  -- 15 unit(s) subcutaneous once a day (at bedtime)  -- Indication: For Diabetic ketoacidosis    insulin lispro 100 units/mL subcutaneous solution  -- 5 unit(s) subcutaneous 3 times a day (before meals)  -- Indication: For Diabetic ketoacidosis    docusate sodium 100 mg oral capsule  -- 1 cap(s) by mouth 2 times a day  -- Indication: For constipation    senna oral tablet  -- 2 tab(s) by mouth once a day (at bedtime)  -- Indication: For constipation    Multiple Vitamins oral tablet  -- 1 tab(s) by mouth once a day  -- Indication: For supplement    folic acid 1 mg oral tablet  -- 1 tab(s) by mouth once a day  -- Indication: For supplement    thiamine 100 mg oral tablet  -- 1 tab(s) by mouth once a day  -- Indication: For supplement

## 2018-08-30 NOTE — DISCHARGE NOTE ADULT - PLAN OF CARE
NS+ Multivitamin/thiamin/folate , normalized with fluids and Insulin.   Endo consulted, insulin dosing adjusted c/w thiamine and folate.  educated on alcohol cessation and cessation programs available  social work consulted. improved Improved with intravenous fluids and discontinuation of Trulicity pen , normalized with fluids and Insulin.   Endo consulted, insulin dosing adjusted, please see Dr Cha as previously scheduled continue thiamine and folate.  educated on alcohol cessation and cessation programs available  social work consulted.

## 2018-08-30 NOTE — DISCHARGE NOTE ADULT - PATIENT PORTAL LINK FT
You can access the [facility] Patient Portal, offered by Health system, by registering with the following website: [email or web address]

## 2018-08-30 NOTE — PROGRESS NOTE ADULT - SUBJECTIVE AND OBJECTIVE BOX
Patient is a 48y old  Female who presents with a chief complaint of abdominal pain and vomiting (30 Aug 2018 17:23)      SUBJECTIVE / OVERNIGHT EVENTS: Patient feels much improved. No events overnight.   ROS otherwise negative.     T(C): 37 (08-30-18 @ 16:22), Max: 37.2 (08-30-18 @ 11:50)  HR: 63 (08-30-18 @ 16:22) (63 - 80)  BP: 145/89 (08-30-18 @ 16:22) (132/86 - 145/89)  RR: 18 (08-30-18 @ 16:22) (18 - 18)  SpO2: 95% (08-30-18 @ 16:22) (94% - 97%)    MEDICATIONS  (STANDING):  dextrose 5%. 1000 milliLiter(s) (50 mL/Hr) IV Continuous <Continuous>  dextrose 50% Injectable 12.5 Gram(s) IV Push once  dextrose 50% Injectable 25 Gram(s) IV Push once  dextrose 50% Injectable 25 Gram(s) IV Push once  enoxaparin Injectable 40 milliGRAM(s) SubCutaneous every 24 hours  folic acid 1 milliGRAM(s) Oral daily  gabapentin 100 milliGRAM(s) Oral two times a day  insulin glargine Injectable (LANTUS) 15 Unit(s) SubCutaneous at bedtime  insulin lispro (HumaLOG) corrective regimen sliding scale   SubCutaneous three times a day before meals  insulin lispro (HumaLOG) corrective regimen sliding scale   SubCutaneous at bedtime  lactated ringers. 1000 milliLiter(s) (100 mL/Hr) IV Continuous <Continuous>  LORazepam   Injectable   IV Push   LORazepam   Injectable 1.5 milliGRAM(s) IV Push every 4 hours  LORazepam   Injectable 1 milliGRAM(s) IV Push every 4 hours  multivitamin 1 Tablet(s) Oral daily  sertraline 100 milliGRAM(s) Oral daily  thiamine 100 milliGRAM(s) Oral daily    MEDICATIONS  (PRN):  dextrose 40% Gel 15 Gram(s) Oral once PRN Blood Glucose LESS THAN 70 milliGRAM(s)/deciliter  glucagon  Injectable 1 milliGRAM(s) IntraMuscular once PRN Glucose LESS THAN 70 milligrams/deciliter  metoclopramide Injectable 5 milliGRAM(s) IV Push every 8 hours PRN naseau/vomiting  morphine  - Injectable 2 milliGRAM(s) IV Push every 6 hours PRN Severe Pain (7 - 10)      PHYSICAL EXAM:  GENERAL: NAD, well-developed  HEAD:  Atraumatic, Normocephalic  EYES: EOMI, conjunctiva and sclera clear  NECK: Supple, No JVD  CHEST/LUNG: Clear to auscultation bilaterally; No wheeze  HEART: Regular rate and rhythm; No murmurs, rubs, or gallops  ABDOMEN: Soft, Nontender, Nondistended; Bowel sounds present  EXTREMITIES:  2+ Peripheral Pulses, No clubbing, cyanosis, or edema  PSYCH: AAOx3  NEUROLOGY: non-focal  SKIN: No rashes or lesions                                   13.8   8.48  )-----------( 146      ( 30 Aug 2018 09:27 )             39.8           LIVER FUNCTIONS - ( 29 Aug 2018 06:39 )  Alb: 3.7 g/dL / Pro: 6.6 g/dL / ALK PHOS: 76 U/L / ALT: 33 U/L / AST: 27 U/L / GGT: x             136|102|4<140  3.5|24|0.53  8.3,2.0,2.2  08-30 @ 07:14          RADIOLOGY & ADDITIONAL TESTS:    Imaging Personally Reviewed: US show Hepatic steatosis     Consultant(s) Notes Reviewed:  Endo     Care Discussed with Consultants/Other Providers:

## 2018-08-30 NOTE — DISCHARGE NOTE ADULT - CARE PLAN
Principal Discharge DX:	Pancreatitis  Assessment and plan of treatment:	NS+ Multivitamin/thiamin/folate  Secondary Diagnosis:	Diabetic ketoacidosis  Assessment and plan of treatment:	, normalized with fluids and Insulin.   Endo consulted, insulin dosing adjusted  Secondary Diagnosis:	Alcohol withdrawal  Assessment and plan of treatment:	c/w thiamine and folate.  educated on alcohol cessation and cessation programs available  social work consulted. Principal Discharge DX:	Pancreatitis  Goal:	improved  Assessment and plan of treatment:	Improved with intravenous fluids and discontinuation of Trulicity pen  Secondary Diagnosis:	Diabetic ketoacidosis  Assessment and plan of treatment:	, normalized with fluids and Insulin.   Endo consulted, insulin dosing adjusted, please see Dr Cha as previously scheduled  Secondary Diagnosis:	Alcohol withdrawal  Assessment and plan of treatment:	continue thiamine and folate.  educated on alcohol cessation and cessation programs available  social work consulted.

## 2018-08-30 NOTE — PROGRESS NOTE ADULT - PROBLEM SELECTOR PLAN 3
CIWA monitoring  ativan taper  c/w banana bag  c/w thiamine and folate and multifactorial  consider psych consult in AM  social work consult

## 2018-08-30 NOTE — DISCHARGE NOTE ADULT - HOSPITAL COURSE
47 yo female with PMH of T2DM, depression/anxiety, MRSA gluteal abscess and cellulitis, hx of DKA and alcoholic pancreatitis now a/w recurrent pancreatitis likely secondary to EtOH and EtOH withdrawal. Pt was admitted with pancreatitis possibly alcohol induced verse medication . Pt clinically improved, trulicty was discontinued and resume Zoloft on discharge. Pt developed Diabetic ketoacidosis, received intravenous fluids and adjustment of insulin under the guidance of endocrinology, pt glucose has signficantly improved and patient will resume previous insulin dose and follow up with Dr. Cha (endocrinologist outpatient) patient already has an appointment scheduled. Pt was informed to not take trulicity pen . During her hospital stay she was treated for alcohol withdrawal. Pt requesting to leave now although advised to stay in morning after completion of tapered dose. Pt insists on leaving , she will receive one dose of ativan prior discharge and last dose of tapered in morning.  Pt advised to not take clonazepam while on ativan tapered and continue zoloft.     {[id number]} 47 yo female with PMH of T2DM, depression/anxiety, MRSA gluteal abscess and cellulitis, hx of DKA and alcoholic pancreatitis now a/w recurrent pancreatitis likely secondary to EtOH and EtOH withdrawal. Pt was admitted with pancreatitis possibly alcohol induced verse medication . Pt clinically improved, trulicty was discontinued and resume Zoloft on discharge. Pt developed Diabetic ketoacidosis, received intravenous fluids and adjustment of insulin under the guidance of endocrinology, pt glucose has signficantly improved and patient will resume previous insulin dose and follow up with Dr. Cha (endocrinologist outpatient) patient already has an appointment scheduled. Pt was informed to not take trulicity pen . During her hospital stay she was treated for alcohol withdrawal. Pt requesting to leave now although advised to stay in morning after completion of tapered dose. Pt insists on leaving , she will receive one dose of ativan prior discharge and to complete tapering at home as indicated on discharge instructions.  Pt advised to not take clonazepam while on ativan tapered and continue zoloft.     {[id number]}

## 2018-08-30 NOTE — PROGRESS NOTE ADULT - SUBJECTIVE AND OBJECTIVE BOX
Chief Complaint: Uncontrolled DM2    s: Pt. c/o some abdominal discomfort this AM. No nausea or vomiting. She ate some chicken broth and jello last night and tolerated it well.    MEDICATIONS  (STANDING):  dextrose 5%. 1000 milliLiter(s) (50 mL/Hr) IV Continuous <Continuous>  dextrose 50% Injectable 12.5 Gram(s) IV Push once  dextrose 50% Injectable 25 Gram(s) IV Push once  dextrose 50% Injectable 25 Gram(s) IV Push once  enoxaparin Injectable 40 milliGRAM(s) SubCutaneous every 24 hours  folic acid 1 milliGRAM(s) Oral daily  gabapentin 100 milliGRAM(s) Oral two times a day  insulin glargine Injectable (LANTUS) 15 Unit(s) SubCutaneous at bedtime  insulin lispro (HumaLOG) corrective regimen sliding scale   SubCutaneous three times a day before meals  insulin lispro (HumaLOG) corrective regimen sliding scale   SubCutaneous at bedtime  lactated ringers. 1000 milliLiter(s) (100 mL/Hr) IV Continuous <Continuous>  LORazepam   Injectable   IV Push   LORazepam   Injectable 1.5 milliGRAM(s) IV Push every 4 hours  LORazepam   Injectable 1 milliGRAM(s) IV Push every 4 hours  multivitamin 1 Tablet(s) Oral daily  sertraline 100 milliGRAM(s) Oral daily  thiamine 100 milliGRAM(s) Oral daily    MEDICATIONS  (PRN):  dextrose 40% Gel 15 Gram(s) Oral once PRN Blood Glucose LESS THAN 70 milliGRAM(s)/deciliter  glucagon  Injectable 1 milliGRAM(s) IntraMuscular once PRN Glucose LESS THAN 70 milligrams/deciliter  metoclopramide Injectable 5 milliGRAM(s) IV Push every 8 hours PRN naseau/vomiting  morphine  - Injectable 2 milliGRAM(s) IV Push every 6 hours PRN Severe Pain (7 - 10)      Allergies    Bactrim (Anaphylaxis)  Eggplant mouth itches (Other)    Intolerances      Review of Systems:  Constitutional: No fever  Eyes: No blurry vision  Neuro: No tremors  HEENT: No pain  Cardiovascular: No chest pain, palpitations  Respiratory: No SOB, no cough  GI: No nausea, vomiting,+  abdominal pain  : No dysuria  Skin: no rash  Psych: no depression  Endocrine: no polyuria, polydipsia  Hem/lymph: no swelling  Osteoporosis: no fractures    PHYSICAL EXAM:  VITALS: T(C): 37.1 (08-30-18 @ 07:53)  T(F): 98.8 (08-30-18 @ 07:53), Max: 99 (08-30-18 @ 04:07)  HR: 80 (08-30-18 @ 07:53) (69 - 91)  BP: 132/86 (08-30-18 @ 07:53) (131/88 - 156/94)  RR:  (16 - 18)  SpO2:  (94% - 99%)  Wt(kg): --  GENERAL: NAD, well-groomed, well-developed  EYES: No proptosis, no lid lag, anicteric  HEENT:  Atraumatic, Normocephalic, moist mucous membranes  RESPIRATORY: Clear to auscultation bilaterally; No rales, rhonchi, wheezing, or rubs  CARDIOVASCULAR: Regular rate and rhythm; No murmurs; no peripheral edema  GI: Soft, nontender, non distended, normal bowel sounds  SKIN: Dry, intact, No rashes or lesions  MUSCULOSKELETAL: Full range of motion  NEURO: no tremor  PSYCH: Alert and oriented x 3, normal affect, normal mood    POCT Blood Glucose.: 139 mg/dL (08-30-18 @ 08:10)  POCT Blood Glucose.: 232 mg/dL (08-29-18 @ 21:23)  POCT Blood Glucose.: 191 mg/dL (08-29-18 @ 16:39)  POCT Blood Glucose.: 149 mg/dL (08-29-18 @ 12:29)  POCT Blood Glucose.: 187 mg/dL (08-29-18 @ 09:58)  POCT Blood Glucose.: 181 mg/dL (08-29-18 @ 05:20)  POCT Blood Glucose.: 235 mg/dL (08-29-18 @ 01:20)  POCT Blood Glucose.: 323 mg/dL (08-28-18 @ 21:38)  POCT Blood Glucose.: 477 mg/dL (08-28-18 @ 15:26)  POCT Blood Glucose.: 486 mg/dL (08-28-18 @ 15:26)      08-30    136  |  102  |  4<L>  ----------------------------<  140<H>  3.5   |  24  |  0.53    EGFR if : 132  EGFR if non : 114    Ca    8.3<L>      08-30  Mg     2.0     08-30  Phos  2.2     08-30    TPro  6.6  /  Alb  3.7  /  TBili  1.3<H>  /  DBili  x   /  AST  27  /  ALT  33  /  AlkPhos  76  08-29          Thyroid Function Tests:      Hemoglobin A1C, Whole Blood: 8.7 % <H> [4.0 - 5.6] (08-29-18 @ 07:51)

## 2018-08-30 NOTE — DISCHARGE NOTE ADULT - CARE PROVIDER_API CALL
Domenica Cha (DO), Internal Medicine  [address] [address]  Phone: [phone]  Fax: [phone]

## 2018-08-31 LAB
ANION GAP SERPL CALC-SCNC: 10 MMOL/L — SIGNIFICANT CHANGE UP (ref 5–17)
BUN SERPL-MCNC: 5 MG/DL — LOW (ref 7–23)
CALCIUM SERPL-MCNC: 8.9 MG/DL — SIGNIFICANT CHANGE UP (ref 8.4–10.5)
CHLORIDE SERPL-SCNC: 102 MMOL/L — SIGNIFICANT CHANGE UP (ref 96–108)
CO2 SERPL-SCNC: 24 MMOL/L — SIGNIFICANT CHANGE UP (ref 22–31)
CREAT SERPL-MCNC: 0.56 MG/DL — SIGNIFICANT CHANGE UP (ref 0.5–1.3)
GLUCOSE BLDC GLUCOMTR-MCNC: 115 MG/DL — HIGH (ref 70–99)
GLUCOSE BLDC GLUCOMTR-MCNC: 120 MG/DL — HIGH (ref 70–99)
GLUCOSE BLDC GLUCOMTR-MCNC: 198 MG/DL — HIGH (ref 70–99)
GLUCOSE BLDC GLUCOMTR-MCNC: 274 MG/DL — HIGH (ref 70–99)
GLUCOSE BLDC GLUCOMTR-MCNC: 95 MG/DL — SIGNIFICANT CHANGE UP (ref 70–99)
GLUCOSE SERPL-MCNC: 138 MG/DL — HIGH (ref 70–99)
HCT VFR BLD CALC: 39.1 % — SIGNIFICANT CHANGE UP (ref 34.5–45)
HGB BLD-MCNC: 13.6 G/DL — SIGNIFICANT CHANGE UP (ref 11.5–15.5)
LIDOCAIN IGE QN: 26 U/L — SIGNIFICANT CHANGE UP (ref 7–60)
MCHC RBC-ENTMCNC: 33.3 PG — SIGNIFICANT CHANGE UP (ref 27–34)
MCHC RBC-ENTMCNC: 34.8 GM/DL — SIGNIFICANT CHANGE UP (ref 32–36)
MCV RBC AUTO: 95.8 FL — SIGNIFICANT CHANGE UP (ref 80–100)
PLATELET # BLD AUTO: 141 K/UL — LOW (ref 150–400)
POTASSIUM SERPL-MCNC: 3.8 MMOL/L — SIGNIFICANT CHANGE UP (ref 3.5–5.3)
POTASSIUM SERPL-SCNC: 3.8 MMOL/L — SIGNIFICANT CHANGE UP (ref 3.5–5.3)
RBC # BLD: 4.08 M/UL — SIGNIFICANT CHANGE UP (ref 3.8–5.2)
RBC # FLD: 11.6 % — SIGNIFICANT CHANGE UP (ref 10.3–14.5)
SODIUM SERPL-SCNC: 136 MMOL/L — SIGNIFICANT CHANGE UP (ref 135–145)
WBC # BLD: 7.42 K/UL — SIGNIFICANT CHANGE UP (ref 3.8–10.5)
WBC # FLD AUTO: 7.42 K/UL — SIGNIFICANT CHANGE UP (ref 3.8–10.5)

## 2018-08-31 PROCEDURE — 99233 SBSQ HOSP IP/OBS HIGH 50: CPT

## 2018-08-31 RX ORDER — HYDROMORPHONE HYDROCHLORIDE 2 MG/ML
1 INJECTION INTRAMUSCULAR; INTRAVENOUS; SUBCUTANEOUS ONCE
Qty: 0 | Refills: 0 | Status: DISCONTINUED | OUTPATIENT
Start: 2018-08-31 | End: 2018-08-31

## 2018-08-31 RX ORDER — ACETAMINOPHEN 500 MG
1000 TABLET ORAL ONCE
Qty: 0 | Refills: 0 | Status: DISCONTINUED | OUTPATIENT
Start: 2018-08-31 | End: 2018-09-01

## 2018-08-31 RX ORDER — MORPHINE SULFATE 50 MG/1
1 CAPSULE, EXTENDED RELEASE ORAL ONCE
Qty: 0 | Refills: 0 | Status: DISCONTINUED | OUTPATIENT
Start: 2018-08-31 | End: 2018-08-31

## 2018-08-31 RX ADMIN — MORPHINE SULFATE 1 MILLIGRAM(S): 50 CAPSULE, EXTENDED RELEASE ORAL at 22:40

## 2018-08-31 RX ADMIN — HYDROMORPHONE HYDROCHLORIDE 1 MILLIGRAM(S): 2 INJECTION INTRAMUSCULAR; INTRAVENOUS; SUBCUTANEOUS at 04:00

## 2018-08-31 RX ADMIN — ENOXAPARIN SODIUM 40 MILLIGRAM(S): 100 INJECTION SUBCUTANEOUS at 07:05

## 2018-08-31 RX ADMIN — SERTRALINE 100 MILLIGRAM(S): 25 TABLET, FILM COATED ORAL at 12:32

## 2018-08-31 RX ADMIN — Medication 100 MILLIGRAM(S): at 12:32

## 2018-08-31 RX ADMIN — MORPHINE SULFATE 1 MILLIGRAM(S): 50 CAPSULE, EXTENDED RELEASE ORAL at 11:30

## 2018-08-31 RX ADMIN — Medication 1 MILLIGRAM(S): at 02:19

## 2018-08-31 RX ADMIN — MORPHINE SULFATE 2 MILLIGRAM(S): 50 CAPSULE, EXTENDED RELEASE ORAL at 09:09

## 2018-08-31 RX ADMIN — Medication 2: at 22:04

## 2018-08-31 RX ADMIN — MORPHINE SULFATE 1 MILLIGRAM(S): 50 CAPSULE, EXTENDED RELEASE ORAL at 22:04

## 2018-08-31 RX ADMIN — MORPHINE SULFATE 2 MILLIGRAM(S): 50 CAPSULE, EXTENDED RELEASE ORAL at 20:45

## 2018-08-31 RX ADMIN — MORPHINE SULFATE 2 MILLIGRAM(S): 50 CAPSULE, EXTENDED RELEASE ORAL at 02:38

## 2018-08-31 RX ADMIN — MORPHINE SULFATE 2 MILLIGRAM(S): 50 CAPSULE, EXTENDED RELEASE ORAL at 20:04

## 2018-08-31 RX ADMIN — Medication 1 MILLIGRAM(S): at 12:31

## 2018-08-31 RX ADMIN — Medication 1 TABLET(S): at 12:32

## 2018-08-31 RX ADMIN — GABAPENTIN 100 MILLIGRAM(S): 400 CAPSULE ORAL at 07:06

## 2018-08-31 RX ADMIN — GABAPENTIN 100 MILLIGRAM(S): 400 CAPSULE ORAL at 17:51

## 2018-08-31 RX ADMIN — HYDROMORPHONE HYDROCHLORIDE 1 MILLIGRAM(S): 2 INJECTION INTRAMUSCULAR; INTRAVENOUS; SUBCUTANEOUS at 04:30

## 2018-08-31 RX ADMIN — Medication 1 MILLIGRAM(S): at 17:51

## 2018-08-31 RX ADMIN — Medication 1 MILLIGRAM(S): at 12:30

## 2018-08-31 RX ADMIN — Medication 1 MILLIGRAM(S): at 07:06

## 2018-08-31 RX ADMIN — INSULIN GLARGINE 15 UNIT(S): 100 INJECTION, SOLUTION SUBCUTANEOUS at 22:04

## 2018-08-31 RX ADMIN — MORPHINE SULFATE 2 MILLIGRAM(S): 50 CAPSULE, EXTENDED RELEASE ORAL at 09:40

## 2018-08-31 RX ADMIN — MORPHINE SULFATE 2 MILLIGRAM(S): 50 CAPSULE, EXTENDED RELEASE ORAL at 02:08

## 2018-08-31 RX ADMIN — SODIUM CHLORIDE 100 MILLILITER(S): 9 INJECTION, SOLUTION INTRAVENOUS at 09:17

## 2018-08-31 RX ADMIN — Medication 1 MILLIGRAM(S): at 22:45

## 2018-08-31 RX ADMIN — MORPHINE SULFATE 1 MILLIGRAM(S): 50 CAPSULE, EXTENDED RELEASE ORAL at 11:45

## 2018-08-31 NOTE — PROGRESS NOTE ADULT - SUBJECTIVE AND OBJECTIVE BOX
Chief Complaint: Uncontrolled DM2    S: Pt. w/ episode of abdominal pain last night, now resolved. She is still on a clear liquid diet and tolerating it well.    MEDICATIONS  (STANDING):  acetaminophen  IVPB. 1000 milliGRAM(s) IV Intermittent once  dextrose 5%. 1000 milliLiter(s) (50 mL/Hr) IV Continuous <Continuous>  dextrose 50% Injectable 12.5 Gram(s) IV Push once  dextrose 50% Injectable 25 Gram(s) IV Push once  dextrose 50% Injectable 25 Gram(s) IV Push once  enoxaparin Injectable 40 milliGRAM(s) SubCutaneous every 24 hours  folic acid 1 milliGRAM(s) Oral daily  gabapentin 100 milliGRAM(s) Oral two times a day  insulin glargine Injectable (LANTUS) 15 Unit(s) SubCutaneous at bedtime  insulin lispro (HumaLOG) corrective regimen sliding scale   SubCutaneous three times a day before meals  insulin lispro (HumaLOG) corrective regimen sliding scale   SubCutaneous at bedtime  lactated ringers. 1000 milliLiter(s) (100 mL/Hr) IV Continuous <Continuous>  LORazepam   Injectable   IV Push   LORazepam   Injectable 1 milliGRAM(s) IV Push every 4 hours  LORazepam   Injectable 0.5 milliGRAM(s) IV Push every 4 hours  multivitamin 1 Tablet(s) Oral daily  sertraline 100 milliGRAM(s) Oral daily  thiamine 100 milliGRAM(s) Oral daily    MEDICATIONS  (PRN):  dextrose 40% Gel 15 Gram(s) Oral once PRN Blood Glucose LESS THAN 70 milliGRAM(s)/deciliter  glucagon  Injectable 1 milliGRAM(s) IntraMuscular once PRN Glucose LESS THAN 70 milligrams/deciliter  metoclopramide Injectable 5 milliGRAM(s) IV Push every 8 hours PRN naseau/vomiting  morphine  - Injectable 2 milliGRAM(s) IV Push every 6 hours PRN Severe Pain (7 - 10)      Allergies    Bactrim (Anaphylaxis)  Eggplant mouth itches (Other)      Review of Systems:  Constitutional: No fever  Eyes: No blurry vision  Neuro: No tremors  HEENT: No pain  Cardiovascular: No chest pain, palpitations  Respiratory: No SOB, no cough  GI: No nausea, vomiting, abdominal pain  : No dysuria  Skin: no rash  Psych: no depression  Endocrine: no polyuria, polydipsia  Hem/lymph: no swelling  Osteoporosis: no fractures    PHYSICAL EXAM:  VITALS: T(C): 36.8 (08-31-18 @ 06:36)  T(F): 98.2 (08-31-18 @ 06:36), Max: 98.9 (08-30-18 @ 11:50)  HR: 64 (08-31-18 @ 06:36) (61 - 76)  BP: 141/92 (08-31-18 @ 06:36) (133/84 - 145/89)  RR:  (18 - 18)  SpO2:  (95% - 97%)  Wt(kg): --  GENERAL: NAD, well-groomed, well-developed  EYES: No proptosis, no lid lag, anicteric  HEENT:  Atraumatic, Normocephalic, moist mucous membranes  RESPIRATORY: Clear to auscultation bilaterally; No rales, rhonchi, wheezing, or rubs  CARDIOVASCULAR: Regular rate and rhythm; No murmurs; no peripheral edema  GI: Soft, nontender, non distended, normal bowel sounds  SKIN: Dry, intact, No rashes or lesions  MUSCULOSKELETAL: Full range of motion  NEURO: no tremor  PSYCH: Alert and oriented x 3, normal affect, normal mood    POCT Blood Glucose.: 95 mg/dL (08-31-18 @ 08:18)  POCT Blood Glucose.: 182 mg/dL (08-30-18 @ 21:51)  POCT Blood Glucose.: 126 mg/dL (08-30-18 @ 16:34)  POCT Blood Glucose.: 151 mg/dL (08-30-18 @ 12:04)  POCT Blood Glucose.: 139 mg/dL (08-30-18 @ 08:10)  POCT Blood Glucose.: 232 mg/dL (08-29-18 @ 21:23)  POCT Blood Glucose.: 191 mg/dL (08-29-18 @ 16:39)  POCT Blood Glucose.: 149 mg/dL (08-29-18 @ 12:29)  POCT Blood Glucose.: 187 mg/dL (08-29-18 @ 09:58)  POCT Blood Glucose.: 181 mg/dL (08-29-18 @ 05:20)  POCT Blood Glucose.: 235 mg/dL (08-29-18 @ 01:20)  POCT Blood Glucose.: 323 mg/dL (08-28-18 @ 21:38)  POCT Blood Glucose.: 477 mg/dL (08-28-18 @ 15:26)  POCT Blood Glucose.: 486 mg/dL (08-28-18 @ 15:26)      08-31    136  |  102  |  5<L>  ----------------------------<  138<H>  3.8   |  24  |  0.56    EGFR if : 130  EGFR if non : 112    Ca    8.9      08-31  Mg     2.0     08-30  Phos  2.2     08-30    TPro  6.6  /  Alb  3.7  /  TBili  1.3<H>  /  DBili  x   /  AST  27  /  ALT  33  /  AlkPhos  76  08-29          Thyroid Function Tests:      Hemoglobin A1C, Whole Blood: 8.7 % <H> [4.0 - 5.6] (08-29-18 @ 07:51)

## 2018-08-31 NOTE — PROGRESS NOTE ADULT - PROBLEM SELECTOR PLAN 3
CIWA monitoring  ativan taper- last dose tomorrow.   c/w banana bag  c/w thiamine and folate and multifactorial  social work following.

## 2018-08-31 NOTE — PROGRESS NOTE ADULT - SUBJECTIVE AND OBJECTIVE BOX
Patient is a 48y old  Female who presents with a chief complaint of abdominal pain and vomiting (30 Aug 2018 17:23)      SUBJECTIVE / OVERNIGHT EVENTS: Patient feels much improved. No events overnight.   ROS otherwise negative.     T(C): 36.7 (08-31-18 @ 14:30), Max: 36.8 (08-31-18 @ 10:55)  HR: 63 (08-31-18 @ 14:30) (63 - 66)  BP: 141/79 (08-31-18 @ 14:30) (138/88 - 141/79)  RR: 18 (08-31-18 @ 14:30) (18 - 18)  SpO2: 96% (08-31-18 @ 14:30) (96% - 96%)    MEDICATIONS  (STANDING):  acetaminophen  IVPB. 1000 milliGRAM(s) IV Intermittent once  dextrose 5%. 1000 milliLiter(s) (50 mL/Hr) IV Continuous <Continuous>  dextrose 50% Injectable 12.5 Gram(s) IV Push once  dextrose 50% Injectable 25 Gram(s) IV Push once  dextrose 50% Injectable 25 Gram(s) IV Push once  enoxaparin Injectable 40 milliGRAM(s) SubCutaneous every 24 hours  folic acid 1 milliGRAM(s) Oral daily  gabapentin 100 milliGRAM(s) Oral two times a day  insulin glargine Injectable (LANTUS) 15 Unit(s) SubCutaneous at bedtime  insulin lispro (HumaLOG) corrective regimen sliding scale   SubCutaneous three times a day before meals  insulin lispro (HumaLOG) corrective regimen sliding scale   SubCutaneous at bedtime  lactated ringers. 1000 milliLiter(s) (100 mL/Hr) IV Continuous <Continuous>  LORazepam   Injectable   IV Push   LORazepam   Injectable 1 milliGRAM(s) IV Push every 4 hours  LORazepam   Injectable 0.5 milliGRAM(s) IV Push every 4 hours  multivitamin 1 Tablet(s) Oral daily  sertraline 100 milliGRAM(s) Oral daily  thiamine 100 milliGRAM(s) Oral daily    MEDICATIONS  (PRN):  dextrose 40% Gel 15 Gram(s) Oral once PRN Blood Glucose LESS THAN 70 milliGRAM(s)/deciliter  glucagon  Injectable 1 milliGRAM(s) IntraMuscular once PRN Glucose LESS THAN 70 milligrams/deciliter  metoclopramide Injectable 5 milliGRAM(s) IV Push every 8 hours PRN naseau/vomiting  morphine  - Injectable 2 milliGRAM(s) IV Push every 6 hours PRN Severe Pain (7 - 10)      PHYSICAL EXAM:  GENERAL: NAD, well-developed  HEAD:  Atraumatic, Normocephalic  EYES: EOMI, conjunctiva and sclera clear  NECK: Supple, No JVD  CHEST/LUNG: Clear to auscultation bilaterally; No wheeze  HEART: Regular rate and rhythm; No murmurs, rubs, or gallops  ABDOMEN: Soft, Nontender, Nondistended; Bowel sounds present  EXTREMITIES:  2+ Peripheral Pulses, No clubbing, cyanosis, or edema  PSYCH: AAOx3  NEUROLOGY: non-focal  SKIN: No rashes or lesions                                                         13.6   7.42  )-----------( 141      ( 31 Aug 2018 07:49 )             39.1               136|102|5<138  3.8|24|0.56  8.9,--,--  08-31 @ 07:16      Lipase 24    CAPILLARY BLOOD GLUCOSE      POCT Blood Glucose.: 120 mg/dL (31 Aug 2018 16:34)  POCT Blood Glucose.: 115 mg/dL (31 Aug 2018 12:18)  POCT Blood Glucose.: 95 mg/dL (31 Aug 2018 08:18)  POCT Blood Glucose.: 182 mg/dL (30 Aug 2018 21:51)      RADIOLOGY & ADDITIONAL TESTS:    Imaging Personally Reviewed: US show Hepatic steatosis     Consultant(s) Notes Reviewed:  Endo     Care Discussed with Consultants/Other Providers:

## 2018-09-01 VITALS
SYSTOLIC BLOOD PRESSURE: 128 MMHG | TEMPERATURE: 98 F | DIASTOLIC BLOOD PRESSURE: 87 MMHG | OXYGEN SATURATION: 97 % | HEART RATE: 69 BPM | RESPIRATION RATE: 18 BRPM

## 2018-09-01 LAB
ALBUMIN SERPL ELPH-MCNC: 3.1 G/DL — LOW (ref 3.3–5)
ALP SERPL-CCNC: 102 U/L — SIGNIFICANT CHANGE UP (ref 40–120)
ALT FLD-CCNC: 37 U/L — SIGNIFICANT CHANGE UP (ref 10–45)
ANION GAP SERPL CALC-SCNC: 10 MMOL/L — SIGNIFICANT CHANGE UP (ref 5–17)
AST SERPL-CCNC: 43 U/L — HIGH (ref 10–40)
BASOPHILS # BLD AUTO: 0.03 K/UL — SIGNIFICANT CHANGE UP (ref 0–0.2)
BASOPHILS NFR BLD AUTO: 0.4 % — SIGNIFICANT CHANGE UP (ref 0–2)
BILIRUB SERPL-MCNC: 0.5 MG/DL — SIGNIFICANT CHANGE UP (ref 0.2–1.2)
BUN SERPL-MCNC: 5 MG/DL — LOW (ref 7–23)
CALCIUM SERPL-MCNC: 8.9 MG/DL — SIGNIFICANT CHANGE UP (ref 8.4–10.5)
CHLORIDE SERPL-SCNC: 99 MMOL/L — SIGNIFICANT CHANGE UP (ref 96–108)
CO2 SERPL-SCNC: 25 MMOL/L — SIGNIFICANT CHANGE UP (ref 22–31)
CREAT SERPL-MCNC: 0.66 MG/DL — SIGNIFICANT CHANGE UP (ref 0.5–1.3)
EOSINOPHIL # BLD AUTO: 0.27 K/UL — SIGNIFICANT CHANGE UP (ref 0–0.5)
EOSINOPHIL NFR BLD AUTO: 3.7 % — SIGNIFICANT CHANGE UP (ref 0–6)
GLUCOSE BLDC GLUCOMTR-MCNC: 136 MG/DL — HIGH (ref 70–99)
GLUCOSE BLDC GLUCOMTR-MCNC: 142 MG/DL — HIGH (ref 70–99)
GLUCOSE BLDC GLUCOMTR-MCNC: 212 MG/DL — HIGH (ref 70–99)
GLUCOSE SERPL-MCNC: 197 MG/DL — HIGH (ref 70–99)
HCT VFR BLD CALC: 37.7 % — SIGNIFICANT CHANGE UP (ref 34.5–45)
HGB BLD-MCNC: 12.6 G/DL — SIGNIFICANT CHANGE UP (ref 11.5–15.5)
IMM GRANULOCYTES NFR BLD AUTO: 0.6 % — SIGNIFICANT CHANGE UP (ref 0–1.5)
LYMPHOCYTES # BLD AUTO: 1.68 K/UL — SIGNIFICANT CHANGE UP (ref 1–3.3)
LYMPHOCYTES # BLD AUTO: 23.3 % — SIGNIFICANT CHANGE UP (ref 13–44)
MCHC RBC-ENTMCNC: 31.7 PG — SIGNIFICANT CHANGE UP (ref 27–34)
MCHC RBC-ENTMCNC: 33.4 GM/DL — SIGNIFICANT CHANGE UP (ref 32–36)
MCV RBC AUTO: 95 FL — SIGNIFICANT CHANGE UP (ref 80–100)
MONOCYTES # BLD AUTO: 0.63 K/UL — SIGNIFICANT CHANGE UP (ref 0–0.9)
MONOCYTES NFR BLD AUTO: 8.7 % — SIGNIFICANT CHANGE UP (ref 2–14)
NEUTROPHILS # BLD AUTO: 4.56 K/UL — SIGNIFICANT CHANGE UP (ref 1.8–7.4)
NEUTROPHILS NFR BLD AUTO: 63.3 % — SIGNIFICANT CHANGE UP (ref 43–77)
PLATELET # BLD AUTO: 133 K/UL — LOW (ref 150–400)
POTASSIUM SERPL-MCNC: 4 MMOL/L — SIGNIFICANT CHANGE UP (ref 3.5–5.3)
POTASSIUM SERPL-SCNC: 4 MMOL/L — SIGNIFICANT CHANGE UP (ref 3.5–5.3)
PROT SERPL-MCNC: 6 G/DL — SIGNIFICANT CHANGE UP (ref 6–8.3)
RBC # BLD: 3.97 M/UL — SIGNIFICANT CHANGE UP (ref 3.8–5.2)
RBC # FLD: 11.9 % — SIGNIFICANT CHANGE UP (ref 10.3–14.5)
SODIUM SERPL-SCNC: 134 MMOL/L — LOW (ref 135–145)
WBC # BLD: 7.21 K/UL — SIGNIFICANT CHANGE UP (ref 3.8–10.5)
WBC # FLD AUTO: 7.21 K/UL — SIGNIFICANT CHANGE UP (ref 3.8–10.5)

## 2018-09-01 PROCEDURE — 99239 HOSP IP/OBS DSCHRG MGMT >30: CPT

## 2018-09-01 RX ORDER — DULAGLUTIDE 4.5 MG/.5ML
0 INJECTION, SOLUTION SUBCUTANEOUS
Qty: 0 | Refills: 0 | COMMUNITY

## 2018-09-01 RX ORDER — SERTRALINE 25 MG/1
1 TABLET, FILM COATED ORAL
Qty: 0 | Refills: 0 | DISCHARGE
Start: 2018-09-01

## 2018-09-01 RX ORDER — SERTRALINE 25 MG/1
1 TABLET, FILM COATED ORAL
Qty: 0 | Refills: 0 | COMMUNITY

## 2018-09-01 RX ORDER — GABAPENTIN 400 MG/1
1 CAPSULE ORAL
Qty: 0 | Refills: 0 | COMMUNITY

## 2018-09-01 RX ORDER — TRAZODONE HCL 50 MG
100 TABLET ORAL
Qty: 0 | Refills: 0 | COMMUNITY

## 2018-09-01 RX ORDER — ACETAMINOPHEN 500 MG
100 TABLET ORAL
Qty: 0 | Refills: 0 | COMMUNITY
Start: 2018-09-01

## 2018-09-01 RX ORDER — CLONAZEPAM 1 MG
1 TABLET ORAL
Qty: 0 | Refills: 0 | COMMUNITY

## 2018-09-01 RX ORDER — TRAMADOL HYDROCHLORIDE 50 MG/1
25 TABLET ORAL EVERY 8 HOURS
Qty: 0 | Refills: 0 | Status: DISCONTINUED | OUTPATIENT
Start: 2018-09-01 | End: 2018-09-01

## 2018-09-01 RX ADMIN — MORPHINE SULFATE 2 MILLIGRAM(S): 50 CAPSULE, EXTENDED RELEASE ORAL at 02:05

## 2018-09-01 RX ADMIN — Medication 0.5 MILLIGRAM(S): at 02:00

## 2018-09-01 RX ADMIN — Medication 0.5 MILLIGRAM(S): at 10:03

## 2018-09-01 RX ADMIN — SODIUM CHLORIDE 100 MILLILITER(S): 9 INJECTION, SOLUTION INTRAVENOUS at 06:08

## 2018-09-01 RX ADMIN — MORPHINE SULFATE 2 MILLIGRAM(S): 50 CAPSULE, EXTENDED RELEASE ORAL at 02:40

## 2018-09-01 RX ADMIN — Medication 100 MILLIGRAM(S): at 11:32

## 2018-09-01 RX ADMIN — Medication 0.5 MILLIGRAM(S): at 17:53

## 2018-09-01 RX ADMIN — Medication 0.5 MILLIGRAM(S): at 06:08

## 2018-09-01 RX ADMIN — ENOXAPARIN SODIUM 40 MILLIGRAM(S): 100 INJECTION SUBCUTANEOUS at 06:08

## 2018-09-01 RX ADMIN — Medication 1 TABLET(S): at 11:32

## 2018-09-01 RX ADMIN — Medication 1 MILLIGRAM(S): at 11:32

## 2018-09-01 RX ADMIN — Medication 4: at 16:57

## 2018-09-01 RX ADMIN — Medication 0.5 MILLIGRAM(S): at 14:50

## 2018-09-01 RX ADMIN — GABAPENTIN 100 MILLIGRAM(S): 400 CAPSULE ORAL at 06:08

## 2018-09-01 RX ADMIN — GABAPENTIN 100 MILLIGRAM(S): 400 CAPSULE ORAL at 17:01

## 2018-09-01 RX ADMIN — SERTRALINE 100 MILLIGRAM(S): 25 TABLET, FILM COATED ORAL at 11:32

## 2018-09-01 NOTE — PROGRESS NOTE ADULT - PROBLEM SELECTOR PLAN 5
? due to alcohol use  Stable
- monitor LFTs   - US abdomen ordered
? due to alcohol use  hx of cholecystectomy  monitor LFTs for now  check US abdomen
Hepatic steatosis on U/S.  c/w monitoring.

## 2018-09-01 NOTE — PROGRESS NOTE ADULT - PROBLEM SELECTOR PLAN 1
Patient with pancreatitis, likely alcohol induced vs. gallstone vs. medication induced (SSRI, trulicity)  s/p 3L LR and 1L NS  currently getting NS+Multivitamin/thiamin/folate  D/C IV morphine and transition to her home tramadol dosing  Tolerating diet.  D/C IVF.
Patient with pancreatitis, likely alcohol induced vs. gallstone vs. medication induced (SSRI, trulicity)  s/p 3L LR and 1L NS  currently getting NS+Multivitamin/thiamin/folate  c/w IV zofran (monitor QT )  c/w IV morphine for pain  Advance diet as tolerated.
Uncontrolled, w/ Hba1c 8.7. Had mild DKA on admission, now resolved.  Currently on clear liquid diet, will have diet advanced as tolerated.  -c/w Lantus 15u qhs tonight.   -c/w SS TIDAC/qhs. will add back pre-meal insulin once diet is advanced more.  -cont. to monitor FS's TIDAC/qhs.    Discharge regimen: basal/bolus, w/ doses TBD.  Would not restart GLP1 agonist given h/o recurrent pancreatitis and alcohol abuse.  Outpt. f/u w/ Dr Cha, [phone].
Uncontrolled, w/ Hba1c 8.7. Had mild DKA on admission, now resolved.  Currently on clear liquid diet, will have diet slowly advanced as tolerated.  -c/w Lantus 15u qhs tonight.   -c/w SS TIDAC/qhs. will add back pre-meal insulin once diet is advanced more.  -cont. to monitor FS's TIDAC/qhs.    Discharge regimen: basal/bolus, w/ doses TBD.  Would not restart GLP1 agonist given h/o recurrent pancreatitis and alcohol abuse.  Outpt. f/u w/ Dr Cha, [phone].
Patient with pancreatitis, likely alcohol induced vs. gallstone vs. medication induced (SSRI, trulicity)  s/p 3L LR and 1L NS  currently getting NS+Multivitamin/thiamin/folate  c/w IV zofran (monitor QT )  c/w IV morphine for pain transition to Tramadol prn.   Advance diet as tolerated.
- advance diet to clear liquids consistent carb  - cont IVF with LR  - antiemetics/pain control as needed

## 2018-09-01 NOTE — PROGRESS NOTE ADULT - PROBLEM SELECTOR PROBLEM 3
Alcohol withdrawal
Alcohol withdrawal
Hyperlipidemia, unspecified hyperlipidemia type
Hyperlipidemia, unspecified hyperlipidemia type
Alcohol withdrawal
Alcohol withdrawal

## 2018-09-01 NOTE — PROGRESS NOTE ADULT - ATTENDING COMMENTS
Plan d/w patient and NP (Sravanthi Márquez).    Jorge Brooks M.D.  Hospitalist  Pager: [phone] Plan d/w patient and NP (Sravanthi Márquez). Patient is stable for D/C home later this evening after final dose of ativan taper.    Discharge planning time 35 minutes.     Jorge Brooks M.D.  Hospitalist  Pager: [phone]

## 2018-09-01 NOTE — PROGRESS NOTE ADULT - PROBLEM SELECTOR PLAN 6
pseudohyponatremia likely due to elevated blood sugar  magnesium repleted  monitor electrolytes closely pseudohyponatremia likely due to elevated blood sugar  magnesium repleted  BMP now WNL

## 2018-09-01 NOTE — PROGRESS NOTE ADULT - PROBLEM SELECTOR PROBLEM 1
Pancreatitis
Uncontrolled type 2 diabetes mellitus with hyperglycemia, with long-term current use of insulin
Uncontrolled type 2 diabetes mellitus with hyperglycemia, with long-term current use of insulin
Pancreatitis

## 2018-09-01 NOTE — PROGRESS NOTE ADULT - PROBLEM SELECTOR PLAN 4
- resume SSRI  - declining inpatient psychiatry evaluation
on clonazepam prn, not been taking last 2 days, hold while on ativan taper  holding SSRI for now, given pancreatitis
on clonazepam prn, not been taking last 2 days, hold while on ativan taper  holding SSRI for now, given pancreatitis  Resume on discharge.
on clonazepam prn, not been taking last 2 days, hold while on ativan taper  holding SSRI for now, given pancreatitis  Resume on discharge.

## 2018-09-01 NOTE — PROGRESS NOTE ADULT - ASSESSMENT
49 yo female with PMH of T2DM, depression/anxiety, MRSA gluteal abscess and cellulitis, hx of DKA and alcoholic pancreatitis requiring admission to ICU.
47 yo female with PMH of T2DM, depression/anxiety, MRSA gluteal abscess and cellulitis, hx of DKA and alcoholic pancreatitis requiring admission to ICU.
48 y.o woman with  T2DM, depression/anxiety, MRSA gluteal abscess and cellulitis, hx of DKA and alcoholic pancreatitis a/w alcohol induced pancreatitis.
48 y.o woman with  T2DM, depression/anxiety, MRSA gluteal abscess and cellulitis, hx of DKA and alcoholic pancreatitis a/w alcohol induced pancreatitis.
49 yo female with PMH of T2DM, depression/anxiety, MRSA gluteal abscess and cellulitis, hx of DKA and alcoholic pancreatitis now a/w recurrent pancreatitis likely secondary to EtOH and EtOH withdrawal.
49 yo female with PMH of T2DM, depression/anxiety, MRSA gluteal abscess and cellulitis, hx of DKA and alcoholic pancreatitis requiring ICU admission in the past p/w recurrent pancreatitis.

## 2018-09-01 NOTE — PROGRESS NOTE ADULT - SUBJECTIVE AND OBJECTIVE BOX
Patient is a 48y old  Female who presents with a chief complaint of abdominal pain and vomiting (30 Aug 2018 17:23)      SUBJECTIVE / OVERNIGHT EVENTS: Continued abdominal pain last night. Required morphine IV x 3 overnight. Reports improvement this afternoon. Remains on taper and IVF.    REVIEW OF SYSTEMS      General:	No fevers, chills  	  Ophthalmologic: No change in vision    Respiratory and Thorax: No SOB or cough  	  Cardiovascular: No chest pain, palpitations, or LE edema    Gastrointestinal: +abdominal pain, but no nausea, vomiting, or diarrhea    Genitourinary: No dysuria or polyuria    MEDICATIONS  (STANDING):  acetaminophen  IVPB. 1000 milliGRAM(s) IV Intermittent once  dextrose 5%. 1000 milliLiter(s) (50 mL/Hr) IV Continuous <Continuous>  dextrose 50% Injectable 12.5 Gram(s) IV Push once  dextrose 50% Injectable 25 Gram(s) IV Push once  dextrose 50% Injectable 25 Gram(s) IV Push once  enoxaparin Injectable 40 milliGRAM(s) SubCutaneous every 24 hours  folic acid 1 milliGRAM(s) Oral daily  gabapentin 100 milliGRAM(s) Oral two times a day  insulin glargine Injectable (LANTUS) 15 Unit(s) SubCutaneous at bedtime  insulin lispro (HumaLOG) corrective regimen sliding scale   SubCutaneous three times a day before meals  insulin lispro (HumaLOG) corrective regimen sliding scale   SubCutaneous at bedtime  LORazepam   Injectable   IV Push   LORazepam   Injectable 0.5 milliGRAM(s) IV Push every 4 hours  multivitamin 1 Tablet(s) Oral daily  sertraline 100 milliGRAM(s) Oral daily  thiamine 100 milliGRAM(s) Oral daily    MEDICATIONS  (PRN):  dextrose 40% Gel 15 Gram(s) Oral once PRN Blood Glucose LESS THAN 70 milliGRAM(s)/deciliter  glucagon  Injectable 1 milliGRAM(s) IntraMuscular once PRN Glucose LESS THAN 70 milligrams/deciliter  metoclopramide Injectable 5 milliGRAM(s) IV Push every 8 hours PRN naseau/vomiting  traMADol 25 milliGRAM(s) Oral every 8 hours PRN Severe Pain (7 - 10)      I&O's Summary    31 Aug 2018 07:01  -  01 Sep 2018 07:00  --------------------------------------------------------  IN: 3800 mL / OUT: 0 mL / NET: 3800 mL        CAPILLARY BLOOD GLUCOSE      POCT Blood Glucose.: 136 mg/dL (01 Sep 2018 11:57)  POCT Blood Glucose.: 142 mg/dL (01 Sep 2018 07:53)  POCT Blood Glucose.: 274 mg/dL (31 Aug 2018 21:34)  POCT Blood Glucose.: 198 mg/dL (31 Aug 2018 20:11)  POCT Blood Glucose.: 120 mg/dL (31 Aug 2018 16:34)      Vital Signs Last 24 Hrs  T(C): 36.6 (01 Sep 2018 14:08), Max: 37.3 (31 Aug 2018 21:21)  T(F): 97.9 (01 Sep 2018 14:08), Max: 99.2 (31 Aug 2018 21:21)  HR: 69 (01 Sep 2018 14:08) (60 - 70)  BP: 128/87 (01 Sep 2018 14:08) (128/87 - 155/92)  BP(mean): --  RR: 18 (01 Sep 2018 14:08) (18 - 18)  SpO2: 97% (01 Sep 2018 14:08) (95% - 97%)      PHYSICAL EXAM:  GENERAL: NAD, well-developed  HEAD:  Atraumatic, Normocephalic  EYES: EOMI, conjunctiva and sclera clear  NECK: Supple, No JVD  CHEST/LUNG: Clear to auscultation bilaterally; No wheeze  HEART: Regular rate and rhythm; No murmurs, rubs, or gallops  ABDOMEN: Soft, mild tenderness epigastric, Nondistended; Bowel sounds present  EXTREMITIES:  2+ Peripheral Pulses, No clubbing, cyanosis, or edema  PSYCH: AAOx3  NEUROLOGY: non-focal  SKIN: No rashes or lesions                 (09-01 @ 08:50)                      12.6  7.21 )-----------( 133                 37.7    Neutrophils = 4.56 (63.3%)  Lymphocytes = 1.68 (23.3%)  Eosinophils = 0.27 (3.7%)  Basophils = 0.03 (0.4%)  Monocytes = 0.63 (8.7%)  Bands = --%    09-01    134<L>  |  99  |  5<L>  ----------------------------<  197<H>  4.0   |  25  |  0.66    Ca    8.9      01 Sep 2018 07:24    TPro  6.0  /  Alb  3.1<L>  /  TBili  0.5  /  DBili  x   /  AST  43<H>  /  ALT  37  /  AlkPhos  102  09-01        Labs personally reviewed.    RADIOLOGY & ADDITIONAL TESTS:    Imaging Personally Reviewed: US show Hepatic steatosis     Consultant(s) Notes Reviewed:  Endo     Care Discussed with Consultants/Other Providers: Patient is a 48y old  Female who presents with a chief complaint of abdominal pain and vomiting (30 Aug 2018 17:23)      SUBJECTIVE / OVERNIGHT EVENTS: Continued abdominal pain last night. Required morphine IV overnight. Reports improvement this afternoon. Remained on taper and IVF.    REVIEW OF SYSTEMS      General:	No fevers, chills  	  Ophthalmologic: No change in vision    Respiratory and Thorax: No SOB or cough  	  Cardiovascular: No chest pain, palpitations, or LE edema    Gastrointestinal: +abdominal pain, but no nausea, vomiting, or diarrhea    Genitourinary: No dysuria or polyuria    MEDICATIONS  (STANDING):  acetaminophen  IVPB. 1000 milliGRAM(s) IV Intermittent once  dextrose 5%. 1000 milliLiter(s) (50 mL/Hr) IV Continuous <Continuous>  dextrose 50% Injectable 12.5 Gram(s) IV Push once  dextrose 50% Injectable 25 Gram(s) IV Push once  dextrose 50% Injectable 25 Gram(s) IV Push once  enoxaparin Injectable 40 milliGRAM(s) SubCutaneous every 24 hours  folic acid 1 milliGRAM(s) Oral daily  gabapentin 100 milliGRAM(s) Oral two times a day  insulin glargine Injectable (LANTUS) 15 Unit(s) SubCutaneous at bedtime  insulin lispro (HumaLOG) corrective regimen sliding scale   SubCutaneous three times a day before meals  insulin lispro (HumaLOG) corrective regimen sliding scale   SubCutaneous at bedtime  LORazepam   Injectable   IV Push   LORazepam   Injectable 0.5 milliGRAM(s) IV Push every 4 hours  multivitamin 1 Tablet(s) Oral daily  sertraline 100 milliGRAM(s) Oral daily  thiamine 100 milliGRAM(s) Oral daily    MEDICATIONS  (PRN):  dextrose 40% Gel 15 Gram(s) Oral once PRN Blood Glucose LESS THAN 70 milliGRAM(s)/deciliter  glucagon  Injectable 1 milliGRAM(s) IntraMuscular once PRN Glucose LESS THAN 70 milligrams/deciliter  metoclopramide Injectable 5 milliGRAM(s) IV Push every 8 hours PRN naseau/vomiting  traMADol 25 milliGRAM(s) Oral every 8 hours PRN Severe Pain (7 - 10)      I&O's Summary    31 Aug 2018 07:01  -  01 Sep 2018 07:00  --------------------------------------------------------  IN: 3800 mL / OUT: 0 mL / NET: 3800 mL        CAPILLARY BLOOD GLUCOSE      POCT Blood Glucose.: 136 mg/dL (01 Sep 2018 11:57)  POCT Blood Glucose.: 142 mg/dL (01 Sep 2018 07:53)  POCT Blood Glucose.: 274 mg/dL (31 Aug 2018 21:34)  POCT Blood Glucose.: 198 mg/dL (31 Aug 2018 20:11)  POCT Blood Glucose.: 120 mg/dL (31 Aug 2018 16:34)      Vital Signs Last 24 Hrs  T(C): 36.6 (01 Sep 2018 14:08), Max: 37.3 (31 Aug 2018 21:21)  T(F): 97.9 (01 Sep 2018 14:08), Max: 99.2 (31 Aug 2018 21:21)  HR: 69 (01 Sep 2018 14:08) (60 - 70)  BP: 128/87 (01 Sep 2018 14:08) (128/87 - 155/92)  BP(mean): --  RR: 18 (01 Sep 2018 14:08) (18 - 18)  SpO2: 97% (01 Sep 2018 14:08) (95% - 97%)      PHYSICAL EXAM:  GENERAL: NAD, well-developed  HEAD:  Atraumatic, Normocephalic  EYES: EOMI, conjunctiva and sclera clear  NECK: Supple, No JVD  CHEST/LUNG: Clear to auscultation bilaterally; No wheeze  HEART: Regular rate and rhythm; No murmurs, rubs, or gallops  ABDOMEN: Soft, mild tenderness epigastric, Nondistended; Bowel sounds present  EXTREMITIES:  2+ Peripheral Pulses, No clubbing, cyanosis, or edema  PSYCH: AAOx3  NEUROLOGY: non-focal  SKIN: No rashes or lesions                 (09-01 @ 08:50)                      12.6  7.21 )-----------( 133                 37.7    Neutrophils = 4.56 (63.3%)  Lymphocytes = 1.68 (23.3%)  Eosinophils = 0.27 (3.7%)  Basophils = 0.03 (0.4%)  Monocytes = 0.63 (8.7%)  Bands = --%    09-01    134<L>  |  99  |  5<L>  ----------------------------<  197<H>  4.0   |  25  |  0.66    Ca    8.9      01 Sep 2018 07:24    TPro  6.0  /  Alb  3.1<L>  /  TBili  0.5  /  DBili  x   /  AST  43<H>  /  ALT  37  /  AlkPhos  102  09-01        Labs personally reviewed.    RADIOLOGY & ADDITIONAL TESTS:    Imaging Personally Reviewed: US show Hepatic steatosis     Consultant(s) Notes Reviewed:  Endo     Care Discussed with Consultants/Other Providers:

## 2018-09-01 NOTE — PROGRESS NOTE ADULT - PROBLEM SELECTOR PLAN 3
CIWA monitoring  ativan taper- last dose 9/2!  c/w thiamine and folate and multifactorial  social work following. CIWA monitoring  ativan taper- last dose scheduled for tomorrow AM. Can complete taper tonight, as CIWA scores have remained < 2.  c/w thiamine and folate and multifactorial  social work following.

## 2018-09-01 NOTE — PROGRESS NOTE ADULT - PROBLEM SELECTOR PROBLEM 2
Benign essential HTN
Benign essential HTN
Diabetic ketoacidosis

## 2018-09-12 ENCOUNTER — EMERGENCY (EMERGENCY)
Facility: HOSPITAL | Age: 48
LOS: 1 days | Discharge: HOME | End: 2018-09-12
Attending: EMERGENCY MEDICINE | Admitting: EMERGENCY MEDICINE
Payer: MEDICARE

## 2018-09-12 VITALS
HEART RATE: 92 BPM | TEMPERATURE: 98 F | OXYGEN SATURATION: 100 % | RESPIRATION RATE: 16 BRPM | SYSTOLIC BLOOD PRESSURE: 127 MMHG | DIASTOLIC BLOOD PRESSURE: 91 MMHG

## 2018-09-12 DIAGNOSIS — Z98.890 OTHER SPECIFIED POSTPROCEDURAL STATES: Chronic | ICD-10-CM

## 2018-09-12 DIAGNOSIS — L02.91 CUTANEOUS ABSCESS, UNSPECIFIED: Chronic | ICD-10-CM

## 2018-09-12 PROBLEM — K85.90 ACUTE PANCREATITIS WITHOUT NECROSIS OR INFECTION, UNSPECIFIED: Chronic | Status: ACTIVE | Noted: 2018-08-28

## 2018-09-12 PROCEDURE — 99282 EMERGENCY DEPT VISIT SF MDM: CPT
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
## 2018-10-11 ENCOUNTER — EMERGENCY (EMERGENCY)
Facility: HOSPITAL | Age: 48
LOS: 1 days | Discharge: HOME | End: 2018-10-11
Attending: EMERGENCY MEDICINE | Admitting: EMERGENCY MEDICINE
Payer: MEDICARE

## 2018-10-11 VITALS
HEART RATE: 101 BPM | DIASTOLIC BLOOD PRESSURE: 77 MMHG | TEMPERATURE: 98 F | OXYGEN SATURATION: 95 % | SYSTOLIC BLOOD PRESSURE: 119 MMHG | RESPIRATION RATE: 20 BRPM

## 2018-10-11 VITALS
RESPIRATION RATE: 18 BRPM | HEART RATE: 76 BPM | OXYGEN SATURATION: 97 % | TEMPERATURE: 97 F | SYSTOLIC BLOOD PRESSURE: 108 MMHG | DIASTOLIC BLOOD PRESSURE: 68 MMHG

## 2018-10-11 DIAGNOSIS — Z98.890 OTHER SPECIFIED POSTPROCEDURAL STATES: Chronic | ICD-10-CM

## 2018-10-11 DIAGNOSIS — L02.91 CUTANEOUS ABSCESS, UNSPECIFIED: Chronic | ICD-10-CM

## 2018-10-11 LAB
ALBUMIN SERPL ELPH-MCNC: 4.5 G/DL — SIGNIFICANT CHANGE UP (ref 3.3–5)
ALP SERPL-CCNC: 119 U/L — SIGNIFICANT CHANGE UP (ref 40–120)
ALT FLD-CCNC: 48 U/L — HIGH (ref 4–33)
AMPHET UR-MCNC: NEGATIVE — SIGNIFICANT CHANGE UP
APAP SERPL-MCNC: < 15 UG/ML — LOW (ref 15–25)
APPEARANCE UR: CLEAR — SIGNIFICANT CHANGE UP
AST SERPL-CCNC: 40 U/L — HIGH (ref 4–32)
BARBITURATES UR SCN-MCNC: NEGATIVE — SIGNIFICANT CHANGE UP
BASE EXCESS BLDV CALC-SCNC: -0.6 MMOL/L — SIGNIFICANT CHANGE UP
BASOPHILS # BLD AUTO: 0.09 K/UL — SIGNIFICANT CHANGE UP (ref 0–0.2)
BASOPHILS NFR BLD AUTO: 1.2 % — SIGNIFICANT CHANGE UP (ref 0–2)
BENZODIAZ UR-MCNC: NEGATIVE — SIGNIFICANT CHANGE UP
BILIRUB SERPL-MCNC: 0.3 MG/DL — SIGNIFICANT CHANGE UP (ref 0.2–1.2)
BILIRUB UR-MCNC: NEGATIVE — SIGNIFICANT CHANGE UP
BLOOD GAS VENOUS - CREATININE: 0.62 MG/DL — SIGNIFICANT CHANGE UP (ref 0.5–1.3)
BLOOD UR QL VISUAL: NEGATIVE — SIGNIFICANT CHANGE UP
BUN SERPL-MCNC: 6 MG/DL — LOW (ref 7–23)
CALCIUM SERPL-MCNC: 8.9 MG/DL — SIGNIFICANT CHANGE UP (ref 8.4–10.5)
CANNABINOIDS UR-MCNC: NEGATIVE — SIGNIFICANT CHANGE UP
CHLORIDE BLDV-SCNC: 111 MMOL/L — HIGH (ref 96–108)
CHLORIDE SERPL-SCNC: 101 MMOL/L — SIGNIFICANT CHANGE UP (ref 98–107)
CO2 SERPL-SCNC: 21 MMOL/L — LOW (ref 22–31)
COCAINE METAB.OTHER UR-MCNC: NEGATIVE — SIGNIFICANT CHANGE UP
COLOR SPEC: COLORLESS — SIGNIFICANT CHANGE UP
CREAT SERPL-MCNC: 0.56 MG/DL — SIGNIFICANT CHANGE UP (ref 0.5–1.3)
EOSINOPHIL # BLD AUTO: 0.28 K/UL — SIGNIFICANT CHANGE UP (ref 0–0.5)
EOSINOPHIL NFR BLD AUTO: 3.7 % — SIGNIFICANT CHANGE UP (ref 0–6)
ETHANOL BLD-MCNC: 464 MG/DL — HIGH
GAS PNL BLDV: 143 MMOL/L — SIGNIFICANT CHANGE UP (ref 136–146)
GLUCOSE BLDV-MCNC: 306 — HIGH (ref 70–99)
GLUCOSE SERPL-MCNC: 312 MG/DL — HIGH (ref 70–99)
GLUCOSE UR-MCNC: >1000 — HIGH
HCO3 BLDV-SCNC: 23 MMOL/L — SIGNIFICANT CHANGE UP (ref 20–27)
HCT VFR BLD CALC: 42.4 % — SIGNIFICANT CHANGE UP (ref 34.5–45)
HCT VFR BLDV CALC: 45.6 % — HIGH (ref 34.5–45)
HGB BLD-MCNC: 14.5 G/DL — SIGNIFICANT CHANGE UP (ref 11.5–15.5)
HGB BLDV-MCNC: 14.9 G/DL — SIGNIFICANT CHANGE UP (ref 11.5–15.5)
IMM GRANULOCYTES # BLD AUTO: 0.02 # — SIGNIFICANT CHANGE UP
IMM GRANULOCYTES NFR BLD AUTO: 0.3 % — SIGNIFICANT CHANGE UP (ref 0–1.5)
KETONES UR-MCNC: NEGATIVE — SIGNIFICANT CHANGE UP
LACTATE BLDV-MCNC: 3.4 MMOL/L — HIGH (ref 0.5–2)
LEUKOCYTE ESTERASE UR-ACNC: NEGATIVE — SIGNIFICANT CHANGE UP
LYMPHOCYTES # BLD AUTO: 3.44 K/UL — HIGH (ref 1–3.3)
LYMPHOCYTES # BLD AUTO: 45.7 % — HIGH (ref 13–44)
MCHC RBC-ENTMCNC: 31.9 PG — SIGNIFICANT CHANGE UP (ref 27–34)
MCHC RBC-ENTMCNC: 34.2 % — SIGNIFICANT CHANGE UP (ref 32–36)
MCV RBC AUTO: 93.2 FL — SIGNIFICANT CHANGE UP (ref 80–100)
METHADONE UR-MCNC: NEGATIVE — SIGNIFICANT CHANGE UP
MONOCYTES # BLD AUTO: 0.51 K/UL — SIGNIFICANT CHANGE UP (ref 0–0.9)
MONOCYTES NFR BLD AUTO: 6.8 % — SIGNIFICANT CHANGE UP (ref 2–14)
NEUTROPHILS # BLD AUTO: 3.18 K/UL — SIGNIFICANT CHANGE UP (ref 1.8–7.4)
NEUTROPHILS NFR BLD AUTO: 42.3 % — LOW (ref 43–77)
NITRITE UR-MCNC: NEGATIVE — SIGNIFICANT CHANGE UP
NRBC # FLD: 0 — SIGNIFICANT CHANGE UP
OPIATES UR-MCNC: NEGATIVE — SIGNIFICANT CHANGE UP
OXYCODONE UR-MCNC: NEGATIVE — SIGNIFICANT CHANGE UP
PCO2 BLDV: 49 MMHG — SIGNIFICANT CHANGE UP (ref 41–51)
PCP UR-MCNC: NEGATIVE — SIGNIFICANT CHANGE UP
PH BLDV: 7.32 PH — SIGNIFICANT CHANGE UP (ref 7.32–7.43)
PH UR: 6 — SIGNIFICANT CHANGE UP (ref 5–8)
PLATELET # BLD AUTO: 243 K/UL — SIGNIFICANT CHANGE UP (ref 150–400)
PMV BLD: 10.5 FL — SIGNIFICANT CHANGE UP (ref 7–13)
PO2 BLDV: 69 MMHG — HIGH (ref 35–40)
POTASSIUM BLDV-SCNC: 3.8 MMOL/L — SIGNIFICANT CHANGE UP (ref 3.4–4.5)
POTASSIUM SERPL-MCNC: 3.7 MMOL/L — SIGNIFICANT CHANGE UP (ref 3.5–5.3)
POTASSIUM SERPL-SCNC: 3.7 MMOL/L — SIGNIFICANT CHANGE UP (ref 3.5–5.3)
PROT SERPL-MCNC: 7.8 G/DL — SIGNIFICANT CHANGE UP (ref 6–8.3)
PROT UR-MCNC: NEGATIVE — SIGNIFICANT CHANGE UP
RBC # BLD: 4.55 M/UL — SIGNIFICANT CHANGE UP (ref 3.8–5.2)
RBC # FLD: 11.6 % — SIGNIFICANT CHANGE UP (ref 10.3–14.5)
SALICYLATES SERPL-MCNC: < 5 MG/DL — LOW (ref 15–30)
SAO2 % BLDV: 88.9 % — HIGH (ref 60–85)
SODIUM SERPL-SCNC: 142 MMOL/L — SIGNIFICANT CHANGE UP (ref 135–145)
SP GR SPEC: 1 — SIGNIFICANT CHANGE UP (ref 1–1.04)
UROBILINOGEN FLD QL: NORMAL — SIGNIFICANT CHANGE UP
WBC # BLD: 7.52 K/UL — SIGNIFICANT CHANGE UP (ref 3.8–10.5)
WBC # FLD AUTO: 7.52 K/UL — SIGNIFICANT CHANGE UP (ref 3.8–10.5)

## 2018-10-11 PROCEDURE — 99285 EMERGENCY DEPT VISIT HI MDM: CPT | Mod: GC

## 2018-10-11 RX ORDER — HALOPERIDOL DECANOATE 100 MG/ML
5 INJECTION INTRAMUSCULAR ONCE
Qty: 0 | Refills: 0 | Status: COMPLETED | OUTPATIENT
Start: 2018-10-11 | End: 2018-10-11

## 2018-10-11 RX ORDER — DIPHENHYDRAMINE HCL 50 MG
50 CAPSULE ORAL ONCE
Qty: 0 | Refills: 0 | Status: COMPLETED | OUTPATIENT
Start: 2018-10-11 | End: 2018-10-11

## 2018-10-11 RX ORDER — SODIUM CHLORIDE 9 MG/ML
2000 INJECTION INTRAMUSCULAR; INTRAVENOUS; SUBCUTANEOUS ONCE
Qty: 0 | Refills: 0 | Status: COMPLETED | OUTPATIENT
Start: 2018-10-11 | End: 2018-10-11

## 2018-10-11 RX ORDER — THIAMINE MONONITRATE (VIT B1) 100 MG
500 TABLET ORAL ONCE
Qty: 0 | Refills: 0 | Status: COMPLETED | OUTPATIENT
Start: 2018-10-11 | End: 2018-10-11

## 2018-10-11 RX ADMIN — Medication 105 MILLIGRAM(S): at 21:12

## 2018-10-11 RX ADMIN — SODIUM CHLORIDE 1000 MILLILITER(S): 9 INJECTION INTRAMUSCULAR; INTRAVENOUS; SUBCUTANEOUS at 20:36

## 2018-10-11 RX ADMIN — Medication 50 MILLIGRAM(S): at 18:08

## 2018-10-11 RX ADMIN — HALOPERIDOL DECANOATE 5 MILLIGRAM(S): 100 INJECTION INTRAMUSCULAR at 18:09

## 2018-10-11 RX ADMIN — Medication 2 MILLIGRAM(S): at 18:09

## 2018-10-11 NOTE — ED ADULT NURSE REASSESSMENT NOTE - NS ED NURSE REASSESS COMMENT FT1
Pt. received in room #25, report from LAWRENCE Post. Pt. lethargic but arousable to verbal stimuli. A&O x1, belongs by 21 as per LAWRENCE Austin. vitals stable. 20g tp L ac noted intact. no distress noted at this time. will continue to monitor. ST

## 2018-10-11 NOTE — ED PROVIDER NOTE - MEDICAL DECISION MAKING DETAILS
48F presenting etOH intoxication w obtain basic labs including etOH level. Supportive therapy. Reassess.

## 2018-10-11 NOTE — ED ADULT NURSE NOTE - OBJECTIVE STATEMENT
Patient received BIBEMS responsive to verbal stimuli to person into room 25 after being medicated for being verbally aggressive in triage. Per triage note, patient was brought for ETOH. Patient presents with lethargy; however, responds to name. VSS on 2L NC. 20g PIV in place to left AC, labs drawn - will continue to monitor.

## 2018-10-11 NOTE — ED PROVIDER NOTE - OBJECTIVE STATEMENT
48F PMH depression and anxiety presenting w EtOH intoxication. Pt belligerents in triage. Currently, sedated. ROS cannot be obtain. Will tx for etoh intoxication.

## 2018-10-11 NOTE — ED PROVIDER NOTE - ATTENDING CONTRIBUTION TO CARE
alvarez: hx etoh abuse. Was to start detox tomorrow; drank heavily today, went to PCP office and was sent to ED by PCP.   exam: pt has AOB. awake but poorly responsive. No obvious trauma on exam.  labs: elevated ethanol level. Pt slowly awakening, Mother at bedside.  Pt being hydrated. Pt to be reassessed as she beings to awake more.

## 2018-10-11 NOTE — ED ADULT TRIAGE NOTE - CHIEF COMPLAINT QUOTE
states" I went to my doctor to get cleared for detox " admits to drinking. patient is loud and uncooperative in triage.

## 2018-10-11 NOTE — ED PROVIDER NOTE - PROGRESS NOTE DETAILS
Resident, MARIO: Pt mother at bedside. Endorses multiple admission for ETOH intoxication over last two weeks. Pt drinks 1-2 pints a day. Never intubated or w withdrawal seizures. Pt scheduled for 30 day rehab admission tmrw for etoh intoxication. Resident, MARIO: Pt is ambulatory, alert, tolerating PO. Mother to take responsibility for her and drive her home. Plan is for substance abuse program.

## 2018-10-25 PROCEDURE — 84132 ASSAY OF SERUM POTASSIUM: CPT

## 2018-10-25 PROCEDURE — 82550 ASSAY OF CK (CPK): CPT

## 2018-10-25 PROCEDURE — 83605 ASSAY OF LACTIC ACID: CPT

## 2018-10-25 PROCEDURE — 96375 TX/PRO/DX INJ NEW DRUG ADDON: CPT

## 2018-10-25 PROCEDURE — 81001 URINALYSIS AUTO W/SCOPE: CPT

## 2018-10-25 PROCEDURE — 85014 HEMATOCRIT: CPT

## 2018-10-25 PROCEDURE — 76705 ECHO EXAM OF ABDOMEN: CPT

## 2018-10-25 PROCEDURE — 83036 HEMOGLOBIN GLYCOSYLATED A1C: CPT

## 2018-10-25 PROCEDURE — 99285 EMERGENCY DEPT VISIT HI MDM: CPT | Mod: 25

## 2018-10-25 PROCEDURE — 80307 DRUG TEST PRSMV CHEM ANLYZR: CPT

## 2018-10-25 PROCEDURE — 83735 ASSAY OF MAGNESIUM: CPT

## 2018-10-25 PROCEDURE — 82947 ASSAY GLUCOSE BLOOD QUANT: CPT

## 2018-10-25 PROCEDURE — 82150 ASSAY OF AMYLASE: CPT

## 2018-10-25 PROCEDURE — 84100 ASSAY OF PHOSPHORUS: CPT

## 2018-10-25 PROCEDURE — 80053 COMPREHEN METABOLIC PANEL: CPT

## 2018-10-25 PROCEDURE — 71045 X-RAY EXAM CHEST 1 VIEW: CPT

## 2018-10-25 PROCEDURE — 96361 HYDRATE IV INFUSION ADD-ON: CPT

## 2018-10-25 PROCEDURE — 82803 BLOOD GASES ANY COMBINATION: CPT

## 2018-10-25 PROCEDURE — 93005 ELECTROCARDIOGRAM TRACING: CPT

## 2018-10-25 PROCEDURE — 96365 THER/PROPH/DIAG IV INF INIT: CPT

## 2018-10-25 PROCEDURE — 84484 ASSAY OF TROPONIN QUANT: CPT

## 2018-10-25 PROCEDURE — 82010 KETONE BODYS QUAN: CPT

## 2018-10-25 PROCEDURE — 80048 BASIC METABOLIC PNL TOTAL CA: CPT

## 2018-10-25 PROCEDURE — 82330 ASSAY OF CALCIUM: CPT

## 2018-10-25 PROCEDURE — 84295 ASSAY OF SERUM SODIUM: CPT

## 2018-10-25 PROCEDURE — 82435 ASSAY OF BLOOD CHLORIDE: CPT

## 2018-10-25 PROCEDURE — 83690 ASSAY OF LIPASE: CPT

## 2018-10-25 PROCEDURE — 96376 TX/PRO/DX INJ SAME DRUG ADON: CPT

## 2018-10-25 PROCEDURE — 82553 CREATINE MB FRACTION: CPT

## 2018-10-25 PROCEDURE — 84702 CHORIONIC GONADOTROPIN TEST: CPT

## 2018-10-25 PROCEDURE — 82248 BILIRUBIN DIRECT: CPT

## 2018-10-25 PROCEDURE — 85027 COMPLETE CBC AUTOMATED: CPT

## 2018-10-25 PROCEDURE — 82962 GLUCOSE BLOOD TEST: CPT

## 2018-11-18 ENCOUNTER — EMERGENCY (EMERGENCY)
Facility: HOSPITAL | Age: 48
LOS: 1 days | Discharge: HOME | End: 2018-11-18
Admitting: EMERGENCY MEDICINE
Payer: MEDICARE

## 2018-11-18 VITALS
RESPIRATION RATE: 16 BRPM | DIASTOLIC BLOOD PRESSURE: 90 MMHG | OXYGEN SATURATION: 100 % | HEART RATE: 99 BPM | SYSTOLIC BLOOD PRESSURE: 131 MMHG | TEMPERATURE: 98 F

## 2018-11-18 DIAGNOSIS — Z98.890 OTHER SPECIFIED POSTPROCEDURAL STATES: Chronic | ICD-10-CM

## 2018-11-18 DIAGNOSIS — L02.91 CUTANEOUS ABSCESS, UNSPECIFIED: Chronic | ICD-10-CM

## 2018-11-18 PROCEDURE — 99283 EMERGENCY DEPT VISIT LOW MDM: CPT

## 2018-11-18 NOTE — ED PROVIDER NOTE - CONDITION AT DISCHARGE:
Date of Service:  5/2/2018    Chief Complaint:  Urinary incontinence  Recurrent urinary tract infections    History of Present Illness:  The patient presents today 1 month since I last saw her, her medical records were reviewed and in summary Leigh Ann Bird is a 70-year old female who has a history of urinary incontinence and recurrent urinary tract infections. She is an uncontrolled type 2 diabetic with a history of heart disease. She also recently underwent triple bypass surgery in December 2017. A cystoscopy done on 1/29/18 revealed chronic inflamed cystitis. A catheterized urine ws obtained and revealed 100,000 Aerococcus urinae, and was treated with Vantin. She was then started on Macrobid for suppressive therapy.    She presents today, after having a CT scan on 3/19/18 that revealed a solid mass in the upper pole of the left kidney. A ultrasound on 4/13/18 revealed: Ill-defined 4.1 cm left upper pole mass. Patient body habitus limits ultrasound assessment. There may be an additional lower pole mass upon correlation with the comparison CT as detailed above. Urologic consultation is recommended. She denies any urinary complaints. She denies any fevers, chills or gross hematuria. A urinalysis was performed that revealed moderate blood, positive nitrates, and large leucocytes. We will proceed with a cystoscopy today.     Past Medical History:   Past Medical History:   Diagnosis Date   • Bradycardia 2016    with sinus pauses   • Bronchitis    • Chronic pain     Back   • Cirrhosis, non-alcoholic (CMS/HCC)     G1F4 per liver biopsy(2016)   • COPD (chronic obstructive pulmonary disease) (CMS/HCC)    • Coronary artery disease 11/2017    severe   • Depression    • Fracture     left foot at 16 years old   • Gallstones     removed   • GERD (gastroesophageal reflux disease)    • Hepatic steatosis 12/2016   • Hyperlipidemia    • Hypertension    • Hypothyroidism 2005   • Insomnia    • Junctional tachycardia (CMS/HCC)    •  Mobility impaired     uses cane, walker or W/C @ times   • Morbid obesity (CMS/[facility])     BMI-45.36   • Neuropathy    • CASSIE on CPAP    • Osteoarthritis    • Pacemaker    • RA (rheumatoid arthritis) (CMS/[facility])    • RAD (reactive airway disease)    • S/P CABG x 3 2017   • Sinus node dysfunction (CMS/[facility])    • Type 2 diabetes mellitus (CMS/[facility])    • Urinary tract infection    • Wears dentures     Full upper   • Wears glasses    • Wears hearing aid        Surgical History:  Past Surgical History:   Procedure Laterality Date   • Cardiac catherization  10/27/2017   •  section, classic  1968   • Colonoscopy w/ polypectomy  2011   • Coronary artery bypass graft  11/2017    x3   • Echocardiogram     • Holter monitor     • Laparoscopy, cholecystectomy  2016    nicki Louisthiyanique   with Liver Biopsy   • Nm danyel per rst/strs pharmacolo     • Pacemaker implant  2016   • Tonsillectomy and adenoidectomy     :    Family History:  Family history of G.U. (Genitourinary) Malignancy - no  Family History   Problem Relation Age of Onset   • Other Mother      blood clots in lung   • Rheumatoid Arthritis Mother    • Osteoporosis Mother    • Alcohol Abuse Father    • Diabetes Father    • Myocardial Infarction Brother    • Heart disease Brother    • Alcohol Abuse Brother      prescription drugs   • Diabetes Daughter    • Cancer Daughter      cervial-hysterectomy   • Alcohol Abuse Brother    • Heart disease Brother      stents placed   • Myocardial Infarction Brother    • Diabetes Paternal Uncle        Social History:  Social History     Social History   • Marital status:      Spouse name: N/A   • Number of children: 1   • Years of education: N/A     Occupational History   • retired      Social History Main Topics   • Smoking status: Former Smoker     Packs/day: 3.00     Years: 40.00     Types: Cigarettes     Quit date: 1999   • Smokeless tobacco: Never Used   • Alcohol use No   • Drug use:  No   • Sexual activity: Not Currently     Partners: Male     Birth control/ protection: Post-menopausal     Other Topics Concern   • Blood Transfusions No   • Caffeine Concern No   • Occupational Exposure No   • Sleep Concern Yes   • Stress Concern Yes   • Weight Concern Yes   • Special Diet No   • Exercise No   • Seat Belt Yes   • Self-Exams No     Social History Narrative   • No narrative on file       Allergies:  ALLERGIES:   Allergen Reactions   • Furosemide RASH   • Lisinopril Other (See Comments)     Decreased kidney function   • Tetanus Toxoid SWELLING       Medications:  Current Outpatient Prescriptions   Medication Sig Dispense Refill   • fluticasone (FLONASE) 50 MCG/ACT nasal spray Spray 2 sprays in each nostril daily. FOR 1 WEEK, THEN DECREASE TO 1 SPRAY DAILY FOR 1 MONTH. DISCONTINUE AFTER 1 MONTH. 16 g 0   • gabapentin (NEURONTIN) 300 MG capsule TAKE 3 CAPSULES BY MOUTH EVERY EVENING AS DIRECTED 90 capsule 3   • blood glucose test strip Test blood sugar 4 times daily as directed. Diagnosis: E11.9. Meter: One touch Verio 400 strip 12   • nitrofurantoin, macrocrystal-monohydrate, (MACROBID) 100 MG capsule Take 1 capsule by mouth daily. 30 capsule 3   • traZODone (DESYREL) 50 MG tablet Take 1 tablet by mouth nightly. 90 tablet 0   • atorvastatin (LIPITOR) 80 MG tablet Take 1 tablet by mouth nightly. 90 tablet 0   • levothyroxine (SYNTHROID, LEVOTHROID) 88 MCG tablet Take 1 tablet by mouth daily. 90 tablet 0   • DULoxetine (CYMBALTA) 30 MG capsule Take 2 capsules by mouth daily. 60 capsule 5   • insulin glargine (BASAGLAR KWIKPEN) 100 UNIT/ML pen-injector Inject 16 Units into the skin every morning. 15 mL 3   • nystatin-triamcinolone (MYCOLOG II) 569990-1.1 UNIT/GM-% ointment Apply topically 2 times daily. 30 g 0   • Insulin Pen Needle (B-D U/F PEN NEEDLE 5/16\") 31G X 8 MM Misc Use to inject insulin daily. 100 each 1   • hydroCORTisone (CORTIZONE) 1 % ointment Apply topically 2 times daily. 30 g 0   •  metoPROLOL tartrate (LOPRESSOR) 50 MG tablet Take 1 tablet by mouth every 12 hours. 180 tablet 1   • bumetanide (BUMEX) 1 MG tablet Take 1 tablet by mouth daily. 90 tablet 1   • ibuprofen 200 MG capsule Take 200-400 mg by mouth every 6 hours as needed for Fever or Pain.     • magnesium oxide 250 MG tablet Take 1 tablet by mouth daily. OVER THE COUNTER. 90 tablet 0   • glimepiride (AMARYL) 2 MG tablet Take 1 tablet by mouth daily. 90 tablet 0   • dakins 0.0625 % topical solution dakins to moisten gauze for dressings 50 mL 1   • umeclidinium-vilanterol (ANORO ELLIPTA) 62.5-25 MCG/INH inhaler Inhale 1 puff into the lungs daily. 60 each 3   • Magnesium Hydroxide (MILK OF MAGNESIA PO)      • calcium carbonate (TUMS) 500 MG chewable tablet Chew 1 tablet by mouth daily as needed for Heartburn. CALLED TO [facility].     • metformin (GLUCOPHAGE) 1000 MG tablet TAKE 1 TABLET BY MOUTH TWICE DAILY WITH MEALS 180 tablet 0   • cholecalciferol (VITAMIN D3) 1000 UNITS tablet Take 1,000 Units by mouth daily.     • aspirin 81 MG tablet Take 81 mg by mouth daily. Taking at [place]     • Lancets 30G Misc 4 times daily. Dx. E11.9 100 each 2   • Blood Glucose Monitoring Suppl w/Device Kit Use to check blood sugars 4 times daily. One touch Verio. Dx. Ell.9 1 kit 0   • DISPENSE 1 ELECTRIC SCOOTER 1 each 0   • albuterol 108 (90 Base) MCG/ACT inhaler Inhale 2 puffs into the lungs every 4 hours as needed for Shortness of Breath or Wheezing. 1 Inhaler 2   • omeprazole (PRILOSEC) 20 MG capsule TAKE ONE CAPSULE BY MOUTH ONE TIME DAILY 90 capsule 2   • potassium chloride (K-DUR,KLOR-CON) 20 MEQ CR tablet Take 1 tablet by mouth daily. 90 tablet 3   • Elastic Bandages & Supports (MEDICAL COMPRESSION SOCKS) Misc 1 each daily. 1 each 1   • Misc. Devices (FINGERTIP PULSE OXIMETER) MISC Use as directed. 1 each 0   • albuterol-ipratropium 2.5 mg/0.5 mg (DUONEB) 0.5-2.5 (3) MG/3ML nebulizer solution Take 3 mLs by nebulization every 6 hours  as needed for Wheezing or Shortness of Breath. 120 vial 12   • ciprofloxacin (CIPRO) 500 MG tablet Take 1 tablet by mouth 2 times daily for 21 days. 42 tablet 0     No current facility-administered medications for this visit.        Allergies, Medications, Past Medical History, Past Surgical History, Family History, and Social History were reviewed today.    Review of Systems:    Denies fever and chills.  Complains of urinary incontinence, recurrent urinary tract infections.  All other systems have been reviewed and are negative.    Physical Exam:    Constitutional:  Visit Vitals  BP (!) 125/104   Pulse 70   Resp 16   Ht 5' 2\" (1.575 m)   Wt 114.1 kg   BMI 46.01 kg/m²     Well developed, well nourished, and afebrile.    Psychiatric:  Alert and oriented x3.  Normal mood and affect.    Skin:  Warm and dry with no lesions or induration.    Genitourinary:  External genitalia normal  Urethral meatus normal  Urethra with good support  Perineum normal     Procedure:  Cystoscopy    The patient was placed in the modified lithotomy position.  Prepped and draped in the usual sterile technique.    Flexible cystoscopy performed which showed:  Normal meatus.  Urethra with no strictures.  Bladder not distended.  Bladder showed no tumors, masses, or neoplastic processes.  Ureteral orifices were in the expected anatomic position.  Catheterized urine obtained   Grossly infected urine present, chronic cystitis present.    Assessment and Plan:  Therefore we discussed where to go from here,     For her chronic cystitis- A catheterized urine sent for culture. She will be started on Cipro 500 mg BID for 3 weeks, followed by Macrobid  100 mg daily for suppressive therapy. Pending the culture her antibiotic may be changed. I encouraged her to drink more water, and start time void and double void.     For her renal mass- I will be referring her to Dr. Choi for further evaluation of her renal mass. She agrees with the plan and is pleased.        This note was scribed by Daysi Hilliard on behalf of Dr. Cosby. All records and service were performed by Dr. Cosby  The documentation recorded by the scribe accurately and completely reflects the service(s) I personally performed and the decisions made by me.        Satisfactory

## 2018-11-18 NOTE — ED PROVIDER NOTE - MEDICAL DECISION MAKING DETAILS
47 y/o F hx DM, HTN, HLD, Anxiety, Depression 47 y/o F hx DM, HTN, HLD, Anxiety, Depression  Discuss plan with mother ([phone]) who states that patient was anxious after consuming 2 shots of vodka. Denies any concerns for patient safety, however stated that she would prefer if patient goes to a shelter.  Patient clinically sober.  Medical evaluation performed. There is no clinical evidence of any acute medical problem requiring immediate intervention. D/C to shelter  via cab.
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
## 2018-11-18 NOTE — ED ADULT NURSE NOTE - NSIMPLEMENTINTERV_GEN_ALL_ED
Implemented All Universal Safety Interventions:  [place] to call system. Call bell, personal items and telephone within reach. Instruct patient to call for assistance. Room bathroom lighting operational. Non-slip footwear when patient is off stretcher. Physically safe environment: no spills, clutter or unnecessary equipment. Stretcher in lowest position, wheels locked, appropriate side rails in place.
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
## 2018-11-23 ENCOUNTER — EMERGENCY (EMERGENCY)
Facility: HOSPITAL | Age: 48
LOS: 1 days | Discharge: HOME | End: 2018-11-23
Attending: EMERGENCY MEDICINE | Admitting: EMERGENCY MEDICINE
Payer: MEDICARE

## 2018-11-23 VITALS
OXYGEN SATURATION: 98 % | SYSTOLIC BLOOD PRESSURE: 100 MMHG | DIASTOLIC BLOOD PRESSURE: 57 MMHG | TEMPERATURE: 98 F | RESPIRATION RATE: 18 BRPM | HEART RATE: 69 BPM

## 2018-11-23 DIAGNOSIS — Z98.890 OTHER SPECIFIED POSTPROCEDURAL STATES: Chronic | ICD-10-CM

## 2018-11-23 DIAGNOSIS — L02.91 CUTANEOUS ABSCESS, UNSPECIFIED: Chronic | ICD-10-CM

## 2018-11-23 PROCEDURE — 99285 EMERGENCY DEPT VISIT HI MDM: CPT | Mod: 25
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
## 2018-11-23 NOTE — ED PROVIDER NOTE - NSFOLLOWUPINSTRUCTIONS_ED_ALL_ED_FT
Follow up with your primary doctor within 24-48 hours to further evaluation of poor glucose control.  Return to emergency department if there is any worsening of your condition.

## 2018-11-23 NOTE — ED PROVIDER NOTE - PROGRESS NOTE DETAILS
Ranjan HALL:  Pt signed out to me.  Upon reassessment, she is pacing in  asking to leave, asking to use her phone to "call [employer]".  Pt is intrusive, banging on the windows, received IM ativan for agitation.  Labs reviewed +ETOH, also bicarb 16 with glucose >400s (AG 20).  Will have pt brought to [facility] for IVFs, further management of hyperglycemia. Ranjan HALL: Pt more calm and cooperative after ativan.  Unable to obtain IV despite multiple attempts by mult RNs.  Pt able to drink a pitcher of water, IV placed via US, blood gas sent and will cont to hydrate, repeat labs. DO Jill: Patient improved. Well appearing. AAOx3. Stable gait. No tremor and stable VS. Labs with anion gap of 11 and improving lactate. To be discharged. Counseled on uncontrolled glucose and advised to follow up with primary doctor. No SI/HI.
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
## 2018-11-23 NOTE — ED PROVIDER NOTE - PHYSICAL EXAMINATION
VS:  unremarkable    GEN - NAD; well appearing; A+O x3 appears intox  HEAD - NC/AT     ENT - PEERL, EOMI, mucous membranes  moist , no discharge      NECK: Neck supple, non-tender without lymphadenopathy, no masses, no JVD  PULM - CTA b/l,  symmetric breath sounds  COR -  normal heart sounds    ABD - , ND, NT, soft,  BACK - no CVA tenderness, nontender spine     EXTREMS - no edema, no deformity, warm and well perfused    SKIN - no rash or bruising      NEUROLOGIC - alert, CN 2-12 intact, sensation nl, motor no focal deficit.
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
## 2018-11-23 NOTE — ED PROVIDER NOTE - CARE PLAN
Principal Discharge DX:	Abscess  Secondary Diagnosis:	Hyperglycemia Principal Discharge DX:	Alcohol abuse  Secondary Diagnosis:	Hyperglycemia
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
## 2018-11-24 VITALS
SYSTOLIC BLOOD PRESSURE: 105 MMHG | HEART RATE: 70 BPM | OXYGEN SATURATION: 100 % | RESPIRATION RATE: 16 BRPM | DIASTOLIC BLOOD PRESSURE: 62 MMHG

## 2018-11-24 LAB
ALBUMIN SERPL ELPH-MCNC: 4.2 G/DL — SIGNIFICANT CHANGE UP (ref 3.3–5)
ALP SERPL-CCNC: 113 U/L — SIGNIFICANT CHANGE UP (ref 40–120)
ALT FLD-CCNC: 12 U/L — SIGNIFICANT CHANGE UP (ref 4–33)
APAP SERPL-MCNC: < 15 UG/ML — LOW (ref 15–25)
AST SERPL-CCNC: 16 U/L — SIGNIFICANT CHANGE UP (ref 4–32)
BASE EXCESS BLDV CALC-SCNC: -1 MMOL/L — SIGNIFICANT CHANGE UP
BASE EXCESS BLDV CALC-SCNC: -1.7 MMOL/L — SIGNIFICANT CHANGE UP
BASOPHILS # BLD AUTO: 0.07 K/UL — SIGNIFICANT CHANGE UP (ref 0–0.2)
BASOPHILS NFR BLD AUTO: 0.8 % — SIGNIFICANT CHANGE UP (ref 0–2)
BILIRUB SERPL-MCNC: 0.2 MG/DL — SIGNIFICANT CHANGE UP (ref 0.2–1.2)
BLOOD GAS VENOUS - CREATININE: 0.54 MG/DL — SIGNIFICANT CHANGE UP (ref 0.5–1.3)
BLOOD GAS VENOUS - CREATININE: 0.55 MG/DL — SIGNIFICANT CHANGE UP (ref 0.5–1.3)
BUN SERPL-MCNC: 11 MG/DL — SIGNIFICANT CHANGE UP (ref 7–23)
BUN SERPL-MCNC: 9 MG/DL — SIGNIFICANT CHANGE UP (ref 7–23)
CALCIUM SERPL-MCNC: 8.1 MG/DL — LOW (ref 8.4–10.5)
CALCIUM SERPL-MCNC: 9.1 MG/DL — SIGNIFICANT CHANGE UP (ref 8.4–10.5)
CHLORIDE BLDV-SCNC: 106 MMOL/L — SIGNIFICANT CHANGE UP (ref 96–108)
CHLORIDE BLDV-SCNC: 99 MMOL/L — SIGNIFICANT CHANGE UP (ref 96–108)
CHLORIDE SERPL-SCNC: 100 MMOL/L — SIGNIFICANT CHANGE UP (ref 98–107)
CHLORIDE SERPL-SCNC: 105 MMOL/L — SIGNIFICANT CHANGE UP (ref 98–107)
CO2 SERPL-SCNC: 16 MMOL/L — LOW (ref 22–31)
CO2 SERPL-SCNC: 21 MMOL/L — LOW (ref 22–31)
CREAT SERPL-MCNC: 0.59 MG/DL — SIGNIFICANT CHANGE UP (ref 0.5–1.3)
CREAT SERPL-MCNC: 0.69 MG/DL — SIGNIFICANT CHANGE UP (ref 0.5–1.3)
EOSINOPHIL # BLD AUTO: 0.2 K/UL — SIGNIFICANT CHANGE UP (ref 0–0.5)
EOSINOPHIL NFR BLD AUTO: 2.4 % — SIGNIFICANT CHANGE UP (ref 0–6)
ETHANOL BLD-MCNC: 242 MG/DL — HIGH
GAS PNL BLDV: 134 MMOL/L — LOW (ref 136–146)
GAS PNL BLDV: 135 MMOL/L — LOW (ref 136–146)
GLUCOSE BLDV-MCNC: 252 — HIGH (ref 70–99)
GLUCOSE BLDV-MCNC: 325 — HIGH (ref 70–99)
GLUCOSE SERPL-MCNC: 233 MG/DL — HIGH (ref 70–99)
GLUCOSE SERPL-MCNC: 414 MG/DL — HIGH (ref 70–99)
HCO3 BLDV-SCNC: 22 MMOL/L — SIGNIFICANT CHANGE UP (ref 20–27)
HCO3 BLDV-SCNC: 22 MMOL/L — SIGNIFICANT CHANGE UP (ref 20–27)
HCT VFR BLD CALC: 42.6 % — SIGNIFICANT CHANGE UP (ref 34.5–45)
HCT VFR BLDV CALC: 38.7 % — SIGNIFICANT CHANGE UP (ref 34.5–45)
HCT VFR BLDV CALC: 40.7 % — SIGNIFICANT CHANGE UP (ref 34.5–45)
HGB BLD-MCNC: 14.9 G/DL — SIGNIFICANT CHANGE UP (ref 11.5–15.5)
HGB BLDV-MCNC: 12.6 G/DL — SIGNIFICANT CHANGE UP (ref 11.5–15.5)
HGB BLDV-MCNC: 13.2 G/DL — SIGNIFICANT CHANGE UP (ref 11.5–15.5)
IMM GRANULOCYTES # BLD AUTO: 0.02 # — SIGNIFICANT CHANGE UP
IMM GRANULOCYTES NFR BLD AUTO: 0.2 % — SIGNIFICANT CHANGE UP (ref 0–1.5)
LACTATE BLDV-MCNC: 2.6 MMOL/L — HIGH (ref 0.5–2)
LACTATE BLDV-MCNC: 3.8 MMOL/L — HIGH (ref 0.5–2)
LYMPHOCYTES # BLD AUTO: 3.18 K/UL — SIGNIFICANT CHANGE UP (ref 1–3.3)
LYMPHOCYTES # BLD AUTO: 38.4 % — SIGNIFICANT CHANGE UP (ref 13–44)
MCHC RBC-ENTMCNC: 30.3 PG — SIGNIFICANT CHANGE UP (ref 27–34)
MCHC RBC-ENTMCNC: 35 % — SIGNIFICANT CHANGE UP (ref 32–36)
MCV RBC AUTO: 86.6 FL — SIGNIFICANT CHANGE UP (ref 80–100)
MONOCYTES # BLD AUTO: 0.24 K/UL — SIGNIFICANT CHANGE UP (ref 0–0.9)
MONOCYTES NFR BLD AUTO: 2.9 % — SIGNIFICANT CHANGE UP (ref 2–14)
NEUTROPHILS # BLD AUTO: 4.57 K/UL — SIGNIFICANT CHANGE UP (ref 1.8–7.4)
NEUTROPHILS NFR BLD AUTO: 55.3 % — SIGNIFICANT CHANGE UP (ref 43–77)
NRBC # FLD: 0 — SIGNIFICANT CHANGE UP
PCO2 BLDV: 42 MMHG — SIGNIFICANT CHANGE UP (ref 41–51)
PCO2 BLDV: 42 MMHG — SIGNIFICANT CHANGE UP (ref 41–51)
PH BLDV: 7.36 PH — SIGNIFICANT CHANGE UP (ref 7.32–7.43)
PH BLDV: 7.37 PH — SIGNIFICANT CHANGE UP (ref 7.32–7.43)
PLATELET # BLD AUTO: 202 K/UL — SIGNIFICANT CHANGE UP (ref 150–400)
PMV BLD: 11.7 FL — SIGNIFICANT CHANGE UP (ref 7–13)
PO2 BLDV: 27 MMHG — LOW (ref 35–40)
PO2 BLDV: 35 MMHG — SIGNIFICANT CHANGE UP (ref 35–40)
POTASSIUM BLDV-SCNC: 3.9 MMOL/L — SIGNIFICANT CHANGE UP (ref 3.4–4.5)
POTASSIUM BLDV-SCNC: 4.6 MMOL/L — HIGH (ref 3.4–4.5)
POTASSIUM SERPL-MCNC: 4.1 MMOL/L — SIGNIFICANT CHANGE UP (ref 3.5–5.3)
POTASSIUM SERPL-MCNC: 4.1 MMOL/L — SIGNIFICANT CHANGE UP (ref 3.5–5.3)
POTASSIUM SERPL-SCNC: 4.1 MMOL/L — SIGNIFICANT CHANGE UP (ref 3.5–5.3)
POTASSIUM SERPL-SCNC: 4.1 MMOL/L — SIGNIFICANT CHANGE UP (ref 3.5–5.3)
PROT SERPL-MCNC: 7.7 G/DL — SIGNIFICANT CHANGE UP (ref 6–8.3)
RBC # BLD: 4.92 M/UL — SIGNIFICANT CHANGE UP (ref 3.8–5.2)
RBC # FLD: 11.2 % — SIGNIFICANT CHANGE UP (ref 10.3–14.5)
SALICYLATES SERPL-MCNC: < 5 MG/DL — LOW (ref 15–30)
SAO2 % BLDV: 43.1 % — LOW (ref 60–85)
SAO2 % BLDV: 60 % — SIGNIFICANT CHANGE UP (ref 60–85)
SODIUM SERPL-SCNC: 136 MMOL/L — SIGNIFICANT CHANGE UP (ref 135–145)
SODIUM SERPL-SCNC: 137 MMOL/L — SIGNIFICANT CHANGE UP (ref 135–145)
WBC # BLD: 8.28 K/UL — SIGNIFICANT CHANGE UP (ref 3.8–10.5)
WBC # FLD AUTO: 8.28 K/UL — SIGNIFICANT CHANGE UP (ref 3.8–10.5)

## 2018-11-24 RX ORDER — IBUPROFEN 200 MG
600 TABLET ORAL ONCE
Qty: 0 | Refills: 0 | Status: COMPLETED | OUTPATIENT
Start: 2018-11-24 | End: 2018-11-24

## 2018-11-24 RX ORDER — SODIUM CHLORIDE 9 MG/ML
2000 INJECTION INTRAMUSCULAR; INTRAVENOUS; SUBCUTANEOUS ONCE
Qty: 0 | Refills: 0 | Status: COMPLETED | OUTPATIENT
Start: 2018-11-24 | End: 2018-11-24

## 2018-11-24 RX ORDER — LIDOCAINE 4 G/100G
1 CREAM TOPICAL ONCE
Qty: 0 | Refills: 0 | Status: COMPLETED | OUTPATIENT
Start: 2018-11-24 | End: 2018-11-24

## 2018-11-24 RX ADMIN — SODIUM CHLORIDE 2000 MILLILITER(S): 9 INJECTION INTRAMUSCULAR; INTRAVENOUS; SUBCUTANEOUS at 05:12

## 2018-11-24 RX ADMIN — Medication 2 MILLIGRAM(S): at 01:55

## 2018-11-24 RX ADMIN — LIDOCAINE 1 PATCH: 4 CREAM TOPICAL at 05:15

## 2018-11-24 RX ADMIN — Medication 600 MILLIGRAM(S): at 05:15

## 2018-11-24 NOTE — ED ADULT NURSE REASSESSMENT NOTE - NS ED NURSE REASSESS COMMENT FT1
Patient received on handoff report, A&Ox4, respirations even and unlabored, ambulatory with steady gait, skin warm and dry good for color, NAD. Patient states has to leave and wants to sign out prior to completion of care. MD Melchor made aware. Witnessed provider discuss with patient risks of leaving prior to completions of care, patient verbalized understanding. IV removed, no signs of swelling or bruising observed, belongings returned to patient.

## 2018-11-24 NOTE — ED ADULT NURSE REASSESSMENT NOTE - NS ED NURSE REASSESS COMMENT FT1
Pt agitated; several attempts at redirection unsuccessful. Pt assessed by MD and pt became violent and threatening. Pt medicated as ordered.
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
## 2018-11-24 NOTE — ED ADULT NURSE REASSESSMENT NOTE - NS ED NURSE REASSESS COMMENT FT1
Pt now calm and cooperative; apologetic for earlier behavior stating "that's how I get sometimes".  Per medical attending, pt to be xfer to main ER for IV hydration and further blood work. Report given to LAWRENCE Gomez; pt xfer to main ER spot 5a.
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
## 2018-11-26 ENCOUNTER — APPOINTMENT (OUTPATIENT)
Dept: ENDOCRINOLOGY | Facility: CLINIC | Age: 48
End: 2018-11-26
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
## 2019-02-14 NOTE — BRIEF OPERATIVE NOTE - PRIMARY SURGEON
Chris
What Is The Reason For Today's Visit?: Full Body Skin Examination
What Is The Reason For Today's Visit? (Being Monitored For X): the development of new lesions

## 2019-03-03 ENCOUNTER — EMERGENCY (EMERGENCY)
Facility: HOSPITAL | Age: 49
LOS: 1 days | Discharge: HOME | End: 2019-03-03
Attending: EMERGENCY MEDICINE | Admitting: EMERGENCY MEDICINE
Payer: MEDICARE

## 2019-03-03 VITALS
SYSTOLIC BLOOD PRESSURE: 152 MMHG | RESPIRATION RATE: 16 BRPM | DIASTOLIC BLOOD PRESSURE: 99 MMHG | HEART RATE: 91 BPM | TEMPERATURE: 98 F

## 2019-03-03 DIAGNOSIS — Z98.890 OTHER SPECIFIED POSTPROCEDURAL STATES: Chronic | ICD-10-CM

## 2019-03-03 DIAGNOSIS — L02.91 CUTANEOUS ABSCESS, UNSPECIFIED: Chronic | ICD-10-CM

## 2019-03-03 PROCEDURE — 71046 X-RAY EXAM CHEST 2 VIEWS: CPT | Mod: 26

## 2019-03-03 PROCEDURE — 99285 EMERGENCY DEPT VISIT HI MDM: CPT | Mod: GC,25

## 2019-03-03 RX ORDER — ACETAMINOPHEN 500 MG
650 TABLET ORAL ONCE
Qty: 0 | Refills: 0 | Status: COMPLETED | OUTPATIENT
Start: 2019-03-03 | End: 2019-03-03

## 2019-03-03 RX ORDER — HALOPERIDOL DECANOATE 100 MG/ML
5 INJECTION INTRAMUSCULAR ONCE
Qty: 0 | Refills: 0 | Status: COMPLETED | OUTPATIENT
Start: 2019-03-03 | End: 2019-03-03

## 2019-03-03 RX ADMIN — Medication 2 MILLIGRAM(S): at 22:20

## 2019-03-03 RX ADMIN — HALOPERIDOL DECANOATE 5 MILLIGRAM(S): 100 INJECTION INTRAMUSCULAR at 22:20
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
## 2019-03-04 VITALS
OXYGEN SATURATION: 98 % | DIASTOLIC BLOOD PRESSURE: 74 MMHG | SYSTOLIC BLOOD PRESSURE: 135 MMHG | RESPIRATION RATE: 16 BRPM | TEMPERATURE: 99 F | HEART RATE: 94 BPM

## 2019-03-04 LAB — HCG SERPL-ACNC: < 5 MIU/ML — SIGNIFICANT CHANGE UP

## 2019-03-04 PROCEDURE — 70486 CT MAXILLOFACIAL W/O DYE: CPT | Mod: 26

## 2019-03-04 PROCEDURE — 70450 CT HEAD/BRAIN W/O DYE: CPT | Mod: 26

## 2019-03-04 PROCEDURE — 73140 X-RAY EXAM OF FINGER(S): CPT | Mod: 26,LT

## 2019-03-04 RX ORDER — IBUPROFEN 200 MG
600 TABLET ORAL ONCE
Qty: 0 | Refills: 0 | Status: DISCONTINUED | OUTPATIENT
Start: 2019-03-04 | End: 2019-03-04

## 2019-03-04 NOTE — ED ADULT NURSE NOTE - NSIMPLEMENTINTERV_GEN_ALL_ED
Implemented All Fall Risk Interventions:  [place] to call system. Call bell, personal items and telephone within reach. Instruct patient to call for assistance. Room bathroom lighting operational. Non-slip footwear when patient is off stretcher. Physically safe environment: no spills, clutter or unnecessary equipment. Stretcher in lowest position, wheels locked, appropriate side rails in place. Provide visual cue, wrist band, yellow gown, etc. Monitor gait and stability. Monitor for mental status changes and reorient to person, place, and time. Review medications for side effects contributing to fall risk. Reinforce activity limits and safety measures with patient and family.
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
## 2019-03-04 NOTE — ED PROVIDER NOTE - NSFOLLOWUPINSTRUCTIONS_ED_ALL_ED_FT
Activities as tolerated. Please encourage good oral and fluid intake. For pain, please take Motrin 400mg every 4 hours as needed, or Tylenol 650mg every 6 hours as needed.    Please see your primary care doctor within 24-48 hours for further management of your symptoms.  Please follow up with ENT within one week for further evaluation and care of symptoms.    Please seek emergent medical management if you have any worsening signs or symptoms, such as chest pain, difficulty breathing, loss of consciousness, or persistent vomiting.
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
## 2019-03-04 NOTE — ED ADULT NURSE REASSESSMENT NOTE - NS ED NURSE REASSESS COMMENT FT1
No acute distress at present. Respirations are even and unlabored on room air. Pt. is ambulatory with steady gait. Belongings returned. Pt. is discharged.

## 2019-03-08 ENCOUNTER — INPATIENT (INPATIENT)
Facility: HOSPITAL | Age: 49
LOS: 2 days | Discharge: HOME | End: 2019-03-11
Attending: INTERNAL MEDICINE | Admitting: INTERNAL MEDICINE
Payer: MEDICARE

## 2019-03-08 VITALS
OXYGEN SATURATION: 99 % | SYSTOLIC BLOOD PRESSURE: 147 MMHG | RESPIRATION RATE: 20 BRPM | HEART RATE: 120 BPM | DIASTOLIC BLOOD PRESSURE: 101 MMHG

## 2019-03-08 DIAGNOSIS — R74.0 NONSPECIFIC ELEVATION OF LEVELS OF TRANSAMINASE AND LACTIC ACID DEHYDROGENASE [LDH]: ICD-10-CM

## 2019-03-08 DIAGNOSIS — F10.10 ALCOHOL ABUSE, UNCOMPLICATED: ICD-10-CM

## 2019-03-08 DIAGNOSIS — F10.239 ALCOHOL DEPENDENCE WITH WITHDRAWAL, UNSPECIFIED: ICD-10-CM

## 2019-03-08 DIAGNOSIS — L02.91 CUTANEOUS ABSCESS, UNSPECIFIED: Chronic | ICD-10-CM

## 2019-03-08 DIAGNOSIS — E11.9 TYPE 2 DIABETES MELLITUS WITHOUT COMPLICATIONS: ICD-10-CM

## 2019-03-08 DIAGNOSIS — Z98.890 OTHER SPECIFIED POSTPROCEDURAL STATES: Chronic | ICD-10-CM

## 2019-03-08 DIAGNOSIS — Z29.9 ENCOUNTER FOR PROPHYLACTIC MEASURES, UNSPECIFIED: ICD-10-CM

## 2019-03-08 DIAGNOSIS — Z90.89 ACQUIRED ABSENCE OF OTHER ORGANS: Chronic | ICD-10-CM

## 2019-03-08 DIAGNOSIS — F32.9 MAJOR DEPRESSIVE DISORDER, SINGLE EPISODE, UNSPECIFIED: ICD-10-CM

## 2019-03-08 LAB
ALBUMIN SERPL ELPH-MCNC: 5.4 G/DL — HIGH (ref 3.3–5)
ALP SERPL-CCNC: 137 U/L — HIGH (ref 40–120)
ALT FLD-CCNC: 39 U/L — HIGH (ref 4–33)
AMPHET UR-MCNC: NEGATIVE — SIGNIFICANT CHANGE UP
ANION GAP SERPL CALC-SCNC: 17 MMO/L — HIGH (ref 7–14)
ANION GAP SERPL CALC-SCNC: 30 MMO/L — HIGH (ref 7–14)
APAP SERPL-MCNC: < 15 UG/ML — LOW (ref 15–25)
APPEARANCE UR: CLEAR — SIGNIFICANT CHANGE UP
AST SERPL-CCNC: 52 U/L — HIGH (ref 4–32)
B-OH-BUTYR SERPL-SCNC: 3.5 MMOL/L — HIGH (ref 0–0.4)
B-OH-BUTYR SERPL-SCNC: 6 MMOL/L — HIGH (ref 0–0.4)
BARBITURATES UR SCN-MCNC: NEGATIVE — SIGNIFICANT CHANGE UP
BASE EXCESS BLDV CALC-SCNC: -4.1 MMOL/L — SIGNIFICANT CHANGE UP
BASE EXCESS BLDV CALC-SCNC: -5.2 MMOL/L — SIGNIFICANT CHANGE UP
BASOPHILS # BLD AUTO: 0.06 K/UL — SIGNIFICANT CHANGE UP (ref 0–0.2)
BASOPHILS NFR BLD AUTO: 0.5 % — SIGNIFICANT CHANGE UP (ref 0–2)
BENZODIAZ UR-MCNC: NEGATIVE — SIGNIFICANT CHANGE UP
BILIRUB SERPL-MCNC: 2.1 MG/DL — HIGH (ref 0.2–1.2)
BILIRUB UR-MCNC: NEGATIVE — SIGNIFICANT CHANGE UP
BLOOD GAS VENOUS - CREATININE: 0.46 MG/DL — LOW (ref 0.5–1.3)
BLOOD GAS VENOUS - CREATININE: 0.52 MG/DL — SIGNIFICANT CHANGE UP (ref 0.5–1.3)
BLOOD UR QL VISUAL: NEGATIVE — SIGNIFICANT CHANGE UP
BUN SERPL-MCNC: 11 MG/DL — SIGNIFICANT CHANGE UP (ref 7–23)
BUN SERPL-MCNC: 9 MG/DL — SIGNIFICANT CHANGE UP (ref 7–23)
CALCIUM SERPL-MCNC: 10.5 MG/DL — SIGNIFICANT CHANGE UP (ref 8.4–10.5)
CALCIUM SERPL-MCNC: 8.8 MG/DL — SIGNIFICANT CHANGE UP (ref 8.4–10.5)
CANNABINOIDS UR-MCNC: NEGATIVE — SIGNIFICANT CHANGE UP
CHLORIDE BLDV-SCNC: 104 MMOL/L — SIGNIFICANT CHANGE UP (ref 96–108)
CHLORIDE BLDV-SCNC: 109 MMOL/L — HIGH (ref 96–108)
CHLORIDE SERPL-SCNC: 103 MMOL/L — SIGNIFICANT CHANGE UP (ref 98–107)
CHLORIDE SERPL-SCNC: 94 MMOL/L — LOW (ref 98–107)
CO2 SERPL-SCNC: 18 MMOL/L — LOW (ref 22–31)
CO2 SERPL-SCNC: 19 MMOL/L — LOW (ref 22–31)
COCAINE METAB.OTHER UR-MCNC: NEGATIVE — SIGNIFICANT CHANGE UP
COLOR SPEC: SIGNIFICANT CHANGE UP
CREAT SERPL-MCNC: 0.6 MG/DL — SIGNIFICANT CHANGE UP (ref 0.5–1.3)
CREAT SERPL-MCNC: 0.69 MG/DL — SIGNIFICANT CHANGE UP (ref 0.5–1.3)
EOSINOPHIL # BLD AUTO: 0 K/UL — SIGNIFICANT CHANGE UP (ref 0–0.5)
EOSINOPHIL NFR BLD AUTO: 0 % — SIGNIFICANT CHANGE UP (ref 0–6)
ETHANOL BLD-MCNC: < 10 MG/DL — SIGNIFICANT CHANGE UP
GAS PNL BLDV: 137 MMOL/L — SIGNIFICANT CHANGE UP (ref 136–146)
GAS PNL BLDV: 139 MMOL/L — SIGNIFICANT CHANGE UP (ref 136–146)
GLUCOSE BLDV-MCNC: 383 — HIGH (ref 70–99)
GLUCOSE BLDV-MCNC: 453 — CRITICAL HIGH (ref 70–99)
GLUCOSE SERPL-MCNC: 395 MG/DL — HIGH (ref 70–99)
GLUCOSE SERPL-MCNC: 450 MG/DL — CRITICAL HIGH (ref 70–99)
GLUCOSE UR-MCNC: >1000 — HIGH
HCO3 BLDV-SCNC: 20 MMOL/L — SIGNIFICANT CHANGE UP (ref 20–27)
HCO3 BLDV-SCNC: 21 MMOL/L — SIGNIFICANT CHANGE UP (ref 20–27)
HCT VFR BLD CALC: 43.2 % — SIGNIFICANT CHANGE UP (ref 34.5–45)
HCT VFR BLDV CALC: 37.2 % — SIGNIFICANT CHANGE UP (ref 34.5–45)
HCT VFR BLDV CALC: 44 % — SIGNIFICANT CHANGE UP (ref 34.5–45)
HGB BLD-MCNC: 14.4 G/DL — SIGNIFICANT CHANGE UP (ref 11.5–15.5)
HGB BLDV-MCNC: 12.1 G/DL — SIGNIFICANT CHANGE UP (ref 11.5–15.5)
HGB BLDV-MCNC: 14.4 G/DL — SIGNIFICANT CHANGE UP (ref 11.5–15.5)
IMM GRANULOCYTES NFR BLD AUTO: 0.5 % — SIGNIFICANT CHANGE UP (ref 0–1.5)
KETONES UR-MCNC: >150 — HIGH
LACTATE BLDV-MCNC: 2.6 MMOL/L — HIGH (ref 0.5–2)
LACTATE BLDV-MCNC: 7.5 MMOL/L — CRITICAL HIGH (ref 0.5–2)
LEUKOCYTE ESTERASE UR-ACNC: NEGATIVE — SIGNIFICANT CHANGE UP
LIDOCAIN IGE QN: 12.9 U/L — SIGNIFICANT CHANGE UP (ref 7–60)
LYMPHOCYTES # BLD AUTO: 0.72 K/UL — LOW (ref 1–3.3)
LYMPHOCYTES # BLD AUTO: 6.5 % — LOW (ref 13–44)
MCHC RBC-ENTMCNC: 30.1 PG — SIGNIFICANT CHANGE UP (ref 27–34)
MCHC RBC-ENTMCNC: 33.3 % — SIGNIFICANT CHANGE UP (ref 32–36)
MCV RBC AUTO: 90.2 FL — SIGNIFICANT CHANGE UP (ref 80–100)
METHADONE UR-MCNC: NEGATIVE — SIGNIFICANT CHANGE UP
MONOCYTES # BLD AUTO: 0.61 K/UL — SIGNIFICANT CHANGE UP (ref 0–0.9)
MONOCYTES NFR BLD AUTO: 5.5 % — SIGNIFICANT CHANGE UP (ref 2–14)
NEUTROPHILS # BLD AUTO: 9.64 K/UL — HIGH (ref 1.8–7.4)
NEUTROPHILS NFR BLD AUTO: 87 % — HIGH (ref 43–77)
NITRITE UR-MCNC: NEGATIVE — SIGNIFICANT CHANGE UP
NRBC # FLD: 0 K/UL — LOW (ref 25–125)
OPIATES UR-MCNC: NEGATIVE — SIGNIFICANT CHANGE UP
OXYCODONE UR-MCNC: NEGATIVE — SIGNIFICANT CHANGE UP
PCO2 BLDV: 31 MMHG — LOW (ref 41–51)
PCO2 BLDV: 36 MMHG — LOW (ref 41–51)
PCP UR-MCNC: NEGATIVE — SIGNIFICANT CHANGE UP
PH BLDV: 7.37 PH — SIGNIFICANT CHANGE UP (ref 7.32–7.43)
PH BLDV: 7.4 PH — SIGNIFICANT CHANGE UP (ref 7.32–7.43)
PH UR: 5.5 — SIGNIFICANT CHANGE UP (ref 5–8)
PLATELET # BLD AUTO: 187 K/UL — SIGNIFICANT CHANGE UP (ref 150–400)
PMV BLD: 10.1 FL — SIGNIFICANT CHANGE UP (ref 7–13)
PO2 BLDV: 31 MMHG — LOW (ref 35–40)
PO2 BLDV: 41 MMHG — HIGH (ref 35–40)
POTASSIUM BLDV-SCNC: 4 MMOL/L — SIGNIFICANT CHANGE UP (ref 3.4–4.5)
POTASSIUM BLDV-SCNC: 4.4 MMOL/L — SIGNIFICANT CHANGE UP (ref 3.4–4.5)
POTASSIUM SERPL-MCNC: 4.7 MMOL/L — SIGNIFICANT CHANGE UP (ref 3.5–5.3)
POTASSIUM SERPL-MCNC: 4.8 MMOL/L — SIGNIFICANT CHANGE UP (ref 3.5–5.3)
POTASSIUM SERPL-SCNC: 4.7 MMOL/L — SIGNIFICANT CHANGE UP (ref 3.5–5.3)
POTASSIUM SERPL-SCNC: 4.8 MMOL/L — SIGNIFICANT CHANGE UP (ref 3.5–5.3)
PROT SERPL-MCNC: 8.7 G/DL — HIGH (ref 6–8.3)
PROT UR-MCNC: 10 — SIGNIFICANT CHANGE UP
RBC # BLD: 4.79 M/UL — SIGNIFICANT CHANGE UP (ref 3.8–5.2)
RBC # FLD: 12.4 % — SIGNIFICANT CHANGE UP (ref 10.3–14.5)
SALICYLATES SERPL-MCNC: < 5 MG/DL — LOW (ref 15–30)
SAO2 % BLDV: 53.5 % — LOW (ref 60–85)
SAO2 % BLDV: 71.2 % — SIGNIFICANT CHANGE UP (ref 60–85)
SODIUM SERPL-SCNC: 139 MMOL/L — SIGNIFICANT CHANGE UP (ref 135–145)
SODIUM SERPL-SCNC: 142 MMOL/L — SIGNIFICANT CHANGE UP (ref 135–145)
SP GR SPEC: 1.03 — SIGNIFICANT CHANGE UP (ref 1–1.04)
UROBILINOGEN FLD QL: NORMAL — SIGNIFICANT CHANGE UP
WBC # BLD: 11.09 K/UL — HIGH (ref 3.8–10.5)
WBC # FLD AUTO: 11.09 K/UL — HIGH (ref 3.8–10.5)

## 2019-03-08 RX ORDER — GLUCAGON INJECTION, SOLUTION 0.5 MG/.1ML
1 INJECTION, SOLUTION SUBCUTANEOUS ONCE
Qty: 0 | Refills: 0 | Status: DISCONTINUED | OUTPATIENT
Start: 2019-03-08 | End: 2019-03-11

## 2019-03-08 RX ORDER — INSULIN LISPRO 100/ML
VIAL (ML) SUBCUTANEOUS
Qty: 0 | Refills: 0 | Status: DISCONTINUED | OUTPATIENT
Start: 2019-03-08 | End: 2019-03-11

## 2019-03-08 RX ORDER — SODIUM CHLORIDE 9 MG/ML
1000 INJECTION, SOLUTION INTRAVENOUS
Qty: 0 | Refills: 0 | Status: DISCONTINUED | OUTPATIENT
Start: 2019-03-08 | End: 2019-03-08

## 2019-03-08 RX ORDER — DEXTROSE 50 % IN WATER 50 %
12.5 SYRINGE (ML) INTRAVENOUS ONCE
Qty: 0 | Refills: 0 | Status: DISCONTINUED | OUTPATIENT
Start: 2019-03-08 | End: 2019-03-11

## 2019-03-08 RX ORDER — DEXTROSE 50 % IN WATER 50 %
25 SYRINGE (ML) INTRAVENOUS ONCE
Qty: 0 | Refills: 0 | Status: DISCONTINUED | OUTPATIENT
Start: 2019-03-08 | End: 2019-03-11

## 2019-03-08 RX ORDER — SODIUM CHLORIDE 9 MG/ML
1000 INJECTION, SOLUTION INTRAVENOUS
Qty: 0 | Refills: 0 | Status: DISCONTINUED | OUTPATIENT
Start: 2019-03-08 | End: 2019-03-11

## 2019-03-08 RX ORDER — SODIUM CHLORIDE 9 MG/ML
1000 INJECTION INTRAMUSCULAR; INTRAVENOUS; SUBCUTANEOUS ONCE
Qty: 0 | Refills: 0 | Status: COMPLETED | OUTPATIENT
Start: 2019-03-08 | End: 2019-03-08

## 2019-03-08 RX ORDER — DEXTROSE 50 % IN WATER 50 %
15 SYRINGE (ML) INTRAVENOUS ONCE
Qty: 0 | Refills: 0 | Status: DISCONTINUED | OUTPATIENT
Start: 2019-03-08 | End: 2019-03-11

## 2019-03-08 RX ORDER — INSULIN LISPRO 100/ML
6 VIAL (ML) SUBCUTANEOUS ONCE
Qty: 0 | Refills: 0 | Status: COMPLETED | OUTPATIENT
Start: 2019-03-08 | End: 2019-03-08

## 2019-03-08 RX ORDER — ONDANSETRON 8 MG/1
4 TABLET, FILM COATED ORAL ONCE
Qty: 0 | Refills: 0 | Status: COMPLETED | OUTPATIENT
Start: 2019-03-08 | End: 2019-03-08

## 2019-03-08 RX ORDER — ACETAMINOPHEN 500 MG
650 TABLET ORAL EVERY 6 HOURS
Qty: 0 | Refills: 0 | Status: DISCONTINUED | OUTPATIENT
Start: 2019-03-08 | End: 2019-03-11

## 2019-03-08 RX ORDER — ONDANSETRON 8 MG/1
4 TABLET, FILM COATED ORAL EVERY 6 HOURS
Qty: 0 | Refills: 0 | Status: DISCONTINUED | OUTPATIENT
Start: 2019-03-08 | End: 2019-03-09

## 2019-03-08 RX ORDER — SODIUM CHLORIDE 9 MG/ML
1000 INJECTION, SOLUTION INTRAVENOUS
Qty: 0 | Refills: 0 | Status: DISCONTINUED | OUTPATIENT
Start: 2019-03-08 | End: 2019-03-09

## 2019-03-08 RX ORDER — METOCLOPRAMIDE HCL 10 MG
10 TABLET ORAL ONCE
Qty: 0 | Refills: 0 | Status: COMPLETED | OUTPATIENT
Start: 2019-03-08 | End: 2019-03-08

## 2019-03-08 RX ORDER — DOCUSATE SODIUM 100 MG
100 CAPSULE ORAL THREE TIMES A DAY
Qty: 0 | Refills: 0 | Status: DISCONTINUED | OUTPATIENT
Start: 2019-03-08 | End: 2019-03-11

## 2019-03-08 RX ORDER — INSULIN LISPRO 100/ML
VIAL (ML) SUBCUTANEOUS AT BEDTIME
Qty: 0 | Refills: 0 | Status: DISCONTINUED | OUTPATIENT
Start: 2019-03-08 | End: 2019-03-11

## 2019-03-08 RX ADMIN — Medication 10 MILLIGRAM(S): at 17:31

## 2019-03-08 RX ADMIN — Medication 2 MILLIGRAM(S): at 19:31

## 2019-03-08 RX ADMIN — SODIUM CHLORIDE 1000 MILLILITER(S): 9 INJECTION INTRAMUSCULAR; INTRAVENOUS; SUBCUTANEOUS at 17:23

## 2019-03-08 RX ADMIN — Medication 2 MILLIGRAM(S): at 17:32

## 2019-03-08 RX ADMIN — Medication 50 MILLIGRAM(S): at 22:32

## 2019-03-08 RX ADMIN — SODIUM CHLORIDE 1000 MILLILITER(S): 9 INJECTION INTRAMUSCULAR; INTRAVENOUS; SUBCUTANEOUS at 18:40

## 2019-03-08 RX ADMIN — Medication 6 UNIT(S): at 19:31

## 2019-03-08 RX ADMIN — Medication 30 MILLILITER(S): at 22:31

## 2019-03-08 RX ADMIN — Medication 3: at 22:53

## 2019-03-08 RX ADMIN — SODIUM CHLORIDE 1000 MILLILITER(S): 9 INJECTION INTRAMUSCULAR; INTRAVENOUS; SUBCUTANEOUS at 18:32

## 2019-03-08 RX ADMIN — ONDANSETRON 4 MILLIGRAM(S): 8 TABLET, FILM COATED ORAL at 17:23

## 2019-03-08 RX ADMIN — Medication 100 MILLIGRAM(S): at 22:32

## 2019-03-08 NOTE — H&P ADULT - PROBLEM SELECTOR PLAN 4
-c/w home sertraline 100; no current SI/HI  -will have to f/u w/outpt psychiatrist for psychosocial issues
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
## 2019-03-08 NOTE — H&P ADULT - ASSESSMENT
49 yo F w/PMHX T2DM hx of DKA, depression w/anxiety, alcoholic pancreatitis requiring ICU admission presenting w/N/V x 2 days and headache after ETOH use.    leukocytosis, hyperglycemic with glycosuria, transaminitis w/elevated total bilirubin, lactate 7.5 --> 2.6, utox negative, lipase WNL, HCG neg 49 yo F w/PMHX T2DM hx of DKA, depression w/anxiety, alcoholic pancreatitis requiring ICU admission presenting w/N/V x 2 days and headache after ETOH use.

## 2019-03-08 NOTE — H&P ADULT - PROBLEM SELECTOR PLAN 5
-hyperglycemic with glycosuria and high lactate - VBG shows no acidemia  -f/u ketones to r/o DKA although no acidemia  -A1C, FSBG, HISS -hyperglycemic with glycosuria and high lactate - VBG shows no acidemia  -f/u ketones to r/o DKA although no acidemia  -A1C, FSBG, HISS  -c/w home lantus 15 and humalog 5/5/5  -c/w gabapentin 100 BID for diabetic neuropathy

## 2019-03-08 NOTE — ED PROVIDER NOTE - NS ED ROS FT
General: No fevers / chills  HENT: No head trauma, ear pain, runny nose, or sore throat  Eyes: No visual changes  CP: No chest pain, palpitations, or lightheadedness  Resp: No shortness of breath, no cough  GI: No abdominal pain, diarrhea, constipation, +nausea/vomiting  : No urinary fz, dysuria, or hematuria  Neuro: No numbness, tingling, or weakness  Psych: +anxiety, +depression
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
## 2019-03-08 NOTE — H&P ADULT - NSHPPHYSICALEXAM_GEN_ALL_CORE
Vital Signs Last 24 Hrs  T(C): 37.1 (08 Mar 2019 22:15), Max: 37.1 (08 Mar 2019 22:15)  T(F): 98.8 (08 Mar 2019 22:15), Max: 98.8 (08 Mar 2019 22:15)  HR: 84 (08 Mar 2019 22:15) (84 - 120)  BP: 143/88 (08 Mar 2019 22:15) (138/88 - 148/73)  BP(mean): --  RR: 18 (08 Mar 2019 22:15) (18 - 20)  SpO2: 100% (08 Mar 2019 22:15) (99% - 100%)    Gen: NAD  HEENT: normocephalic, EOMI, PERRLA, neck supple, no thyromegaly  Chest/CV: RRR, +2 peripheral pulses  Pulm: CTAB/L  Abd/GI: soft, NT, ND +BS  MSK: no peripheral edema/cyanosis  Heme/lymph: no cervical lymphadenopathy  Skin: no rashes  Neuro: CN 2-12 grossly intact  Psych: alert and oriented, normal mood, affect Vital Signs Last 24 Hrs  T(C): 37.1 (08 Mar 2019 22:15), Max: 37.1 (08 Mar 2019 22:15)  T(F): 98.8 (08 Mar 2019 22:15), Max: 98.8 (08 Mar 2019 22:15)  HR: 84 (08 Mar 2019 22:15) (84 - 120)  BP: 143/88 (08 Mar 2019 22:15) (138/88 - 148/73)  BP(mean): --  RR: 18 (08 Mar 2019 22:15) (18 - 20)  SpO2: 100% (08 Mar 2019 22:15) (99% - 100%)    Gen: pale  withdrawn female pt, NAD  HEENT: normocephalic, EOMI, PERRLA, neck supple, no thyromegaly, left eye with red injection  Chest/CV: RRR, +2 peripheral pulses  Pulm: CTAB/L  Abd/GI: soft, NT, ND +BS, no hepatosplenomegaly, no varices/angiomata  MSK: no peripheral edema/cyanosis  Heme/lymph: no cervical lymphadenopathy  Skin: no rashes  Neuro: +resting tremor UE B/L, no asterixis  Psych: alert and oriented, normal mood, affect Vital Signs Last 24 Hrs  T(C): 37.1 (08 Mar 2019 22:15), Max: 37.1 (08 Mar 2019 22:15)  T(F): 98.8 (08 Mar 2019 22:15), Max: 98.8 (08 Mar 2019 22:15)  HR: 84 (08 Mar 2019 22:15) (84 - 120)  BP: 143/88 (08 Mar 2019 22:15) (138/88 - 148/73)  BP(mean): --  RR: 18 (08 Mar 2019 22:15) (18 - 20)  SpO2: 100% (08 Mar 2019 22:15) (99% - 100%)    Gen: pale  withdrawn female pt, NAD  HEENT: normocephalic, EOMI, PERRLA, neck supple, no thyromegaly, left eye with red injection  Chest/CV: RRR, +2 peripheral pulses  Pulm: CTAB/L  Abd/GI: soft, NT, ND +BS, no hepatosplenomegaly, no varices/angiomata  MSK: no peripheral edema/cyanosis, ecchymosis back and rt elbow  Heme/lymph: no cervical lymphadenopathy  Skin: no rashes  Neuro: +resting tremor UE B/L, no asterixis  Psych: alert and oriented, normal mood, affect Vital Signs Last 24 Hrs  T(C): 37.1 (08 Mar 2019 22:15), Max: 37.1 (08 Mar 2019 22:15)  T(F): 98.8 (08 Mar 2019 22:15), Max: 98.8 (08 Mar 2019 22:15)  HR: 84 (08 Mar 2019 22:15) (84 - 120)  BP: 143/88 (08 Mar 2019 22:15) (138/88 - 148/73)  BP(mean): --  RR: 18 (08 Mar 2019 22:15) (18 - 20)  SpO2: 100% (08 Mar 2019 22:15) (99% - 100%)    Gen: pale  withdrawn female pt, NAD  HEENT: normocephalic, EOMI, PERRLA, neck supple, no thyromegaly, left eye with red injection  Chest/CV: RRR, +2 peripheral pulses  Pulm: Normal resp effort, Clear to auscultation bilaterally  Abd/GI: soft, NT, ND +BS, no hepatosplenomegaly, no varices/angiomata  MSK: no peripheral edema/cyanosis, ecchymosis back and rt elbow  Heme/lymph: no cervical lymphadenopathy  Skin: no rashes, skin intact  Neuro: +resting tremor UE B/L, no asterixis  Psych: alert and oriented, normal mood and affect

## 2019-03-08 NOTE — H&P ADULT - PROBLEM SELECTOR PLAN 3
-likely 2/2 acute alcohol ingestion  -trend LFTs  -if no improvement, hepatitis panel and abdominal US -likely 2/2 acute alcohol  -trend LFTs  -if no improvement, hepatitis panel and abdominal US

## 2019-03-08 NOTE — ED ADULT NURSE NOTE - NSALCOHOLQUITREADY_GEN_A_CORE_SD
[facility]    [address]    [facility] [address]    Phone:  [phone]                                       After Visit Summary   9/19/2017    Dorita Gilmore    MRN: [id number]           After Visit Summary Signature Page     I have received my discharge instructions, and my questions have been answered. I have discussed any challenges I see with this plan with the nurse or doctor.    ..........................................................................................................................................  Patient/Patient Representative Signature      ..........................................................................................................................................  Patient Representative Print Name and Relationship to Patient    ..................................................               ................................................  Date                                            Time    ..........................................................................................................................................  Reviewed by Signature/Title    ...................................................              ..............................................  Date                                                            Time           thinking about quitting

## 2019-03-08 NOTE — H&P ADULT - NSHPLABSRESULTS_GEN_ALL_CORE
LABS personally reviewed: leukocytosis, hyperglycemic with glycosuria, transaminitis w/elevated total bilirubin, lactate 7.5 --> 2.6, utox negative, lipase WNL, HCG neg                        14.4   11.09 )-----------( 187      ( 08 Mar 2019 17:00 )             43.2     03-08    139  |  103  |  9   ----------------------------<  395<H>  4.8   |  19<L>  |  0.60  03-08    142  |  94<L>  |  11  ----------------------------<  450<HH>  4.7   |  18<L>  |  0.69    Ca    8.8      08 Mar 2019 20:45  Ca    10.5      08 Mar 2019 17:00    TPro  8.7<H>  /  Alb  5.4<H>  /  TBili  2.1<H>  /  DBili  x   /  AST  52<H>  /  ALT  39<H>  /  AlkPhos  137<H>  03-08    Lipase, Serum (03.08.19 @ 17:00)    Lipase, Serum: 12.9 U/L    POCT Blood Glucose.: 334 mg/dL (08 Mar 2019 19:24)  POCT Blood Glucose.: 433 mg/dL (08 Mar 2019 15:40)    beta hydroxy butyrate: pending    Toxicology Screen, Drugs of Abuse, Urine (03.08.19 @ 18:45)    Phencyclidine Level, Urine: NEGATIVE    Amphetamine, Urine: NEGATIVE    Barbiturates Screen, Urine: NEGATIVE    Benzodiazepine, Urine: NEGATIVE    Cannabinoids, Urine: NEGATIVE    Cocaine Metabolite, Urine: NEGATIVE    Methadone, Urine: NEGATIVE    Opiate, Urine: NEGATIVE    Oxycodone, Urine: NEGATIVE:   TEST                       CUT OFF VALUE  ----                       -------------  AMPHETAMINE CLASS           1000 ng/mL  BARBITURATES                 200 ng/mL  BENZODIAZEPINES              300 ng/mL  CANNABINOIDS                  50 ng/mL  COCAINE/METABOLITE           300 ng/mL  METHADONE                    300 ng/mL  OPIATES                      300 ng/mL  PHENCYCLIDINE                 25 ng/mL  OXYCODONE                    100 ng/mL    Urinalysis (03.08.19 @ 18:45)    Color: LIGHT YELLOW    Urine Appearance: CLEAR    Glucose: >1000    Bilirubin: NEGATIVE    Ketone - Urine: >150    Specific Gravity: 1.031    Blood: NEGATIVE    pH - Urine: 5.5    Protein, Urine: 10    Urobilinogen: NORMAL    Nitrite: NEGATIVE    Leukocyte Esterase Concentration: NEGATIVE      ECG personally reviewed:    RADIOLOGY: LABS personally reviewed: leukocytosis, hyperglycemic with glycosuria, transaminitis w/elevated total bilirubin, lactate 7.5 --> 2.6, utox negative, lipase WNL, HCG neg                        14.4   11.09 )-----------( 187      ( 08 Mar 2019 17:00 )             43.2     03-08    139  |  103  |  9   ----------------------------<  395<H>  4.8   |  19<L>  |  0.60  03-08    142  |  94<L>  |  11  ----------------------------<  450<HH>  4.7   |  18<L>  |  0.69    Ca    8.8      08 Mar 2019 20:45  Ca    10.5      08 Mar 2019 17:00    TPro  8.7<H>  /  Alb  5.4<H>  /  TBili  2.1<H>  /  DBili  x   /  AST  52<H>  /  ALT  39<H>  /  AlkPhos  137<H>  03-08    Lipase, Serum (03.08.19 @ 17:00)    Lipase, Serum: 12.9 U/L    POCT Blood Glucose.: 334 mg/dL (08 Mar 2019 19:24)  POCT Blood Glucose.: 433 mg/dL (08 Mar 2019 15:40)    beta hydroxy butyrate: pending    Toxicology Screen, Drugs of Abuse, Urine (03.08.19 @ 18:45)    Phencyclidine Level, Urine: NEGATIVE    Amphetamine, Urine: NEGATIVE    Barbiturates Screen, Urine: NEGATIVE    Benzodiazepine, Urine: NEGATIVE    Cannabinoids, Urine: NEGATIVE    Cocaine Metabolite, Urine: NEGATIVE    Methadone, Urine: NEGATIVE    Opiate, Urine: NEGATIVE    Oxycodone, Urine: NEGATIVE:   TEST                       CUT OFF VALUE  ----                       -------------  AMPHETAMINE CLASS           1000 ng/mL  BARBITURATES                 200 ng/mL  BENZODIAZEPINES              300 ng/mL  CANNABINOIDS                  50 ng/mL  COCAINE/METABOLITE           300 ng/mL  METHADONE                    300 ng/mL  OPIATES                      300 ng/mL  PHENCYCLIDINE                 25 ng/mL  OXYCODONE                    100 ng/mL    Urinalysis (03.08.19 @ 18:45)    Color: LIGHT YELLOW    Urine Appearance: CLEAR    Glucose: >1000    Bilirubin: NEGATIVE    Ketone - Urine: >150    Specific Gravity: 1.031    Blood: NEGATIVE    pH - Urine: 5.5    Protein, Urine: 10    Urobilinogen: NORMAL    Nitrite: NEGATIVE    Leukocyte Esterase Concentration: NEGATIVE      ECG personally reviewed: pending

## 2019-03-08 NOTE — ED ADULT NURSE REASSESSMENT NOTE - NS ED NURSE REASSESS COMMENT FT1
report received from marcio RN pt is in bed A and O x 3 in NAD resting comfortably in bed, fluids ongoing as per order. CIWA reassessment completed.

## 2019-03-08 NOTE — ED ADULT TRIAGE NOTE - CHIEF COMPLAINT QUOTE
states" I am vomiting since 2 days and I stopped drinking since last night and my blood sugar is high and I am having severe withdrawals." patient is shaking and vomiting green. patient is been drinking morethan 2 pints of liquor fr few weeks . . h/o DM. Fs at home as per patient is in 700. patient appears ill.
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
## 2019-03-08 NOTE — ED PROVIDER NOTE - CLINICAL SUMMARY MEDICAL DECISION MAKING FREE TEXT BOX
Pt p/w binge drinking for the last several weeks, 2 days of vomiting, potentially etoh pancreatitis, r/o dka, has not been compliant with medications 2/2 drinking, abd soft NT, tachycardic likely due to volume depletion. last drink yesterday, no fasciculations or tremors, labs pending, IVF/sx control  Ximena Romero, PGY-2 EM

## 2019-03-08 NOTE — H&P ADULT - NSHPREVIEWOFSYSTEMS_GEN_ALL_CORE
Constitutional: denies fevers, chills, sweats, weight loss  HEENT: denies changes in vision, rhinorrhea, tussis, tinnitus, discharge from ears  CV: denies palpitations, CP  Pulm: denies SOB  GI: denies abdominal pain, N/V, diarrhea/constipation, hematochezia/melena  : denies dysuria/hematuria  Skin: denies new lesions, peripheral swelling  Back/MSK: denies new muscle/joint aches  Neuro/psych: denies neurologic deficits, seizure-like activity, dizziness, paresthesias, loss of sensation, incontinence  Heme/lymph: denies new bruises/bleeds, lymphadenopathy Constitutional: denies fevers, chills, +sweats  HEENT: denies changes in vision, rhinorrhea, tussis, tinnitus, discharge from ears  CV: denies palpitations, CP  Pulm: denies SOB  GI: +nausea, +abdominal soreness epigastric area  : denies dysuria/hematuria  Skin: denies new lesions, peripheral swelling  Back/MSK: +lower back aching pain  Neuro/psych: denies neurologic deficits, seizure-like activity, paresthesias  Heme/lymph: denies new bruises/bleeds, lymphadenopathy Constitutional: denies fevers, chills, +sweats  HEENT: denies changes in vision, rhinorrhea, tussis, tinnitus, discharge from ears  CV: denies palpitations, CP  Pulm: denies shortness of breath or cough  GI: +nausea, +abdominal soreness epigastric area  : denies dysuria/hematuria  Skin: denies new lesions, peripheral swelling  Back/MSK: +lower back aching pain  Neuro/psych: denies neurologic deficits, seizure-like activity, paresthesias  Heme/lymph: denies new bruises/bleeds, lymphadenopathy  Psych: + anxiety, no depression  Endocrine: No heat or cold intolerance

## 2019-03-08 NOTE — H&P ADULT - PROBLEM SELECTOR PLAN 1
-nausea and vomiting after alcohol ingestion, with abdominal soreness. lipase neg; less likely pancreatitis, utox negative, lactate elevated.  -CIWA w/ativan symptom triggered  -fluid resuscitation  -advance diet as tolerated  - referral -nausea and vomiting after alcohol ingestion, with abdominal soreness. lipase neg; less likely pancreatitis, utox negative, lactate elevated. leukocytosis likely reactive  -CIWA w/ativan symptom triggered and librium taper  -fluid resuscitation  -advance diet as tolerated  - referral

## 2019-03-08 NOTE — ED ADULT NURSE NOTE - OBJECTIVE STATEMENT
macrio rn. pt in rm 18. alert,oriented x3. reports last alcohol last pm,nausea,vomiting,ch pains since thursday. presently n/v,meds as ordered. reports " feels anxious" denies suicidal  actions,,thoughts.mom at bedside. pt meds /anxiety with relief at present

## 2019-03-08 NOTE — H&P ADULT - NSHPSOCIALHISTORY_GEN_ALL_CORE
Occupation:  Tobacco use:  Alcohol use:  Recreational drug use:  High-risk sexual activity: Tobacco use: denies hx  Alcohol use: drinks 1-2 pints of vodka daily, CAGE + x 4  Recreational drug use: denies hx  High-risk sexual activity: denies

## 2019-03-08 NOTE — ED ADULT NURSE REASSESSMENT NOTE - NS ED NURSE REASSESS COMMENT FT1
Received report from LAWRENCE Gambino. pt [id number], ciwa score as noted in flowsheet. Pt states improvement in symptoms since arrival. Meds given as ordered. Will monitor.

## 2019-03-08 NOTE — ED PROVIDER NOTE - CARE PLAN
Principal Discharge DX:	Alcohol withdrawal Principal Discharge DX:	Alcohol withdrawal  Secondary Diagnosis:	Vomiting

## 2019-03-09 LAB
ALBUMIN SERPL ELPH-MCNC: 4.1 G/DL — SIGNIFICANT CHANGE UP (ref 3.3–5)
ALP SERPL-CCNC: 94 U/L — SIGNIFICANT CHANGE UP (ref 40–120)
ALT FLD-CCNC: 26 U/L — SIGNIFICANT CHANGE UP (ref 4–33)
ANION GAP SERPL CALC-SCNC: 15 MMO/L — HIGH (ref 7–14)
AST SERPL-CCNC: 29 U/L — SIGNIFICANT CHANGE UP (ref 4–32)
BASE EXCESS BLDV CALC-SCNC: 0.5 MMOL/L — SIGNIFICANT CHANGE UP
BASOPHILS # BLD AUTO: 0.04 K/UL — SIGNIFICANT CHANGE UP (ref 0–0.2)
BASOPHILS NFR BLD AUTO: 0.5 % — SIGNIFICANT CHANGE UP (ref 0–2)
BILIRUB SERPL-MCNC: 1.4 MG/DL — HIGH (ref 0.2–1.2)
BUN SERPL-MCNC: 8 MG/DL — SIGNIFICANT CHANGE UP (ref 7–23)
CALCIUM SERPL-MCNC: 8.8 MG/DL — SIGNIFICANT CHANGE UP (ref 8.4–10.5)
CHLORIDE SERPL-SCNC: 98 MMOL/L — SIGNIFICANT CHANGE UP (ref 98–107)
CO2 SERPL-SCNC: 22 MMOL/L — SIGNIFICANT CHANGE UP (ref 22–31)
CREAT SERPL-MCNC: 0.62 MG/DL — SIGNIFICANT CHANGE UP (ref 0.5–1.3)
EOSINOPHIL # BLD AUTO: 0.04 K/UL — SIGNIFICANT CHANGE UP (ref 0–0.5)
EOSINOPHIL NFR BLD AUTO: 0.5 % — SIGNIFICANT CHANGE UP (ref 0–6)
GAS PNL BLDV: 133 MMOL/L — LOW (ref 136–146)
GLUCOSE BLDV-MCNC: 228 — HIGH (ref 70–99)
GLUCOSE SERPL-MCNC: 223 MG/DL — HIGH (ref 70–99)
HBA1C BLD-MCNC: 9.2 % — HIGH (ref 4–5.6)
HCO3 BLDV-SCNC: 24 MMOL/L — SIGNIFICANT CHANGE UP (ref 20–27)
HCT VFR BLD CALC: 37.5 % — SIGNIFICANT CHANGE UP (ref 34.5–45)
HCT VFR BLDV CALC: 38.2 % — SIGNIFICANT CHANGE UP (ref 34.5–45)
HGB BLD-MCNC: 11.9 G/DL — SIGNIFICANT CHANGE UP (ref 11.5–15.5)
HGB BLDV-MCNC: 12.4 G/DL — SIGNIFICANT CHANGE UP (ref 11.5–15.5)
IMM GRANULOCYTES NFR BLD AUTO: 0.4 % — SIGNIFICANT CHANGE UP (ref 0–1.5)
LACTATE BLDV-MCNC: 1.4 MMOL/L — SIGNIFICANT CHANGE UP (ref 0.5–2)
LYMPHOCYTES # BLD AUTO: 1.76 K/UL — SIGNIFICANT CHANGE UP (ref 1–3.3)
LYMPHOCYTES # BLD AUTO: 21.8 % — SIGNIFICANT CHANGE UP (ref 13–44)
MAGNESIUM SERPL-MCNC: 2 MG/DL — SIGNIFICANT CHANGE UP (ref 1.6–2.6)
MCHC RBC-ENTMCNC: 30 PG — SIGNIFICANT CHANGE UP (ref 27–34)
MCHC RBC-ENTMCNC: 31.7 % — LOW (ref 32–36)
MCV RBC AUTO: 94.5 FL — SIGNIFICANT CHANGE UP (ref 80–100)
MONOCYTES # BLD AUTO: 0.92 K/UL — HIGH (ref 0–0.9)
MONOCYTES NFR BLD AUTO: 11.4 % — SIGNIFICANT CHANGE UP (ref 2–14)
NEUTROPHILS # BLD AUTO: 5.3 K/UL — SIGNIFICANT CHANGE UP (ref 1.8–7.4)
NEUTROPHILS NFR BLD AUTO: 65.4 % — SIGNIFICANT CHANGE UP (ref 43–77)
NRBC # FLD: 0 K/UL — LOW (ref 25–125)
PCO2 BLDV: 39 MMHG — LOW (ref 41–51)
PH BLDV: 7.42 PH — SIGNIFICANT CHANGE UP (ref 7.32–7.43)
PHOSPHATE SERPL-MCNC: 2.2 MG/DL — LOW (ref 2.5–4.5)
PLATELET # BLD AUTO: 131 K/UL — LOW (ref 150–400)
PMV BLD: 10 FL — SIGNIFICANT CHANGE UP (ref 7–13)
PO2 BLDV: 34 MMHG — LOW (ref 35–40)
POTASSIUM BLDV-SCNC: 3.7 MMOL/L — SIGNIFICANT CHANGE UP (ref 3.4–4.5)
POTASSIUM SERPL-MCNC: 3.9 MMOL/L — SIGNIFICANT CHANGE UP (ref 3.5–5.3)
POTASSIUM SERPL-SCNC: 3.9 MMOL/L — SIGNIFICANT CHANGE UP (ref 3.5–5.3)
PROT SERPL-MCNC: 6.8 G/DL — SIGNIFICANT CHANGE UP (ref 6–8.3)
RBC # BLD: 3.97 M/UL — SIGNIFICANT CHANGE UP (ref 3.8–5.2)
RBC # FLD: 12.3 % — SIGNIFICANT CHANGE UP (ref 10.3–14.5)
SAO2 % BLDV: 64.6 % — SIGNIFICANT CHANGE UP (ref 60–85)
SODIUM SERPL-SCNC: 135 MMOL/L — SIGNIFICANT CHANGE UP (ref 135–145)
WBC # BLD: 8.09 K/UL — SIGNIFICANT CHANGE UP (ref 3.8–10.5)
WBC # FLD AUTO: 8.09 K/UL — SIGNIFICANT CHANGE UP (ref 3.8–10.5)

## 2019-03-09 PROCEDURE — 99223 1ST HOSP IP/OBS HIGH 75: CPT | Mod: GC

## 2019-03-09 PROCEDURE — 12345: CPT | Mod: NC,GC

## 2019-03-09 RX ORDER — GABAPENTIN 400 MG/1
100 CAPSULE ORAL
Qty: 0 | Refills: 0 | Status: DISCONTINUED | OUTPATIENT
Start: 2019-03-09 | End: 2019-03-11

## 2019-03-09 RX ORDER — SENNA PLUS 8.6 MG/1
2 TABLET ORAL AT BEDTIME
Qty: 0 | Refills: 0 | Status: DISCONTINUED | OUTPATIENT
Start: 2019-03-09 | End: 2019-03-11

## 2019-03-09 RX ORDER — INSULIN GLARGINE 100 [IU]/ML
15 INJECTION, SOLUTION SUBCUTANEOUS AT BEDTIME
Qty: 0 | Refills: 0 | Status: DISCONTINUED | OUTPATIENT
Start: 2019-03-09 | End: 2019-03-11

## 2019-03-09 RX ORDER — SODIUM,POTASSIUM PHOSPHATES 278-250MG
1 POWDER IN PACKET (EA) ORAL ONCE
Qty: 0 | Refills: 0 | Status: COMPLETED | OUTPATIENT
Start: 2019-03-09 | End: 2019-03-09

## 2019-03-09 RX ORDER — SERTRALINE 25 MG/1
100 TABLET, FILM COATED ORAL DAILY
Qty: 0 | Refills: 0 | Status: DISCONTINUED | OUTPATIENT
Start: 2019-03-09 | End: 2019-03-11

## 2019-03-09 RX ORDER — LANOLIN ALCOHOL/MO/W.PET/CERES
3 CREAM (GRAM) TOPICAL AT BEDTIME
Qty: 0 | Refills: 0 | Status: DISCONTINUED | OUTPATIENT
Start: 2019-03-09 | End: 2019-03-11

## 2019-03-09 RX ORDER — INSULIN LISPRO 100/ML
5 VIAL (ML) SUBCUTANEOUS
Qty: 0 | Refills: 0 | Status: DISCONTINUED | OUTPATIENT
Start: 2019-03-09 | End: 2019-03-11

## 2019-03-09 RX ADMIN — SODIUM CHLORIDE 100 MILLILITER(S): 9 INJECTION, SOLUTION INTRAVENOUS at 02:20

## 2019-03-09 RX ADMIN — Medication 5 UNIT(S): at 09:11

## 2019-03-09 RX ADMIN — Medication 5 UNIT(S): at 13:10

## 2019-03-09 RX ADMIN — GABAPENTIN 100 MILLIGRAM(S): 400 CAPSULE ORAL at 17:18

## 2019-03-09 RX ADMIN — Medication 5 UNIT(S): at 18:18

## 2019-03-09 RX ADMIN — INSULIN GLARGINE 15 UNIT(S): 100 INJECTION, SOLUTION SUBCUTANEOUS at 22:12

## 2019-03-09 RX ADMIN — Medication 2 MILLIGRAM(S): at 18:39

## 2019-03-09 RX ADMIN — Medication 3: at 18:18

## 2019-03-09 RX ADMIN — Medication 50 MILLIGRAM(S): at 09:18

## 2019-03-09 RX ADMIN — Medication 50 MILLIGRAM(S): at 22:11

## 2019-03-09 RX ADMIN — GABAPENTIN 100 MILLIGRAM(S): 400 CAPSULE ORAL at 05:39

## 2019-03-09 RX ADMIN — Medication 50 MILLIGRAM(S): at 17:18

## 2019-03-09 RX ADMIN — SODIUM CHLORIDE 100 MILLILITER(S): 9 INJECTION, SOLUTION INTRAVENOUS at 09:19

## 2019-03-09 RX ADMIN — SERTRALINE 100 MILLIGRAM(S): 25 TABLET, FILM COATED ORAL at 13:09

## 2019-03-09 RX ADMIN — SENNA PLUS 2 TABLET(S): 8.6 TABLET ORAL at 22:11

## 2019-03-09 RX ADMIN — Medication 100 MILLIGRAM(S): at 22:12

## 2019-03-09 RX ADMIN — Medication 1 PACKET(S): at 09:12

## 2019-03-09 RX ADMIN — Medication 2 MILLIGRAM(S): at 13:24

## 2019-03-09 RX ADMIN — Medication 3 MILLIGRAM(S): at 22:11

## 2019-03-09 RX ADMIN — Medication 2: at 09:12

## 2019-03-09 RX ADMIN — Medication 100 MILLIGRAM(S): at 13:09

## 2019-03-09 RX ADMIN — Medication 1: at 13:10

## 2019-03-09 RX ADMIN — Medication 30 MILLILITER(S): at 22:11

## 2019-03-09 RX ADMIN — Medication 50 MILLIGRAM(S): at 04:07

## 2019-03-09 RX ADMIN — Medication 100 MILLIGRAM(S): at 05:39

## 2019-03-09 NOTE — PROGRESS NOTE ADULT - PROBLEM SELECTOR PLAN 5
-hyperglycemic with glycosuria and elevated ketones, but with normal pH on VBG   - GAP and FSG's improved this morning   - A1C 9.2  - FSBG, HISS  - c/w home lantus 15 and humalog 5/5/5  - c/w gabapentin 100 BID for diabetic neuropathy

## 2019-03-09 NOTE — PROGRESS NOTE ADULT - ASSESSMENT
49 yo F w/PMHX T2DM (hx of DKA), depression w/anxiety, alcoholic pancreatitis (requiring ICU admission) presenting w/N/V x 2 days and headache after ETOH use, admitted for alcohol withdrawal.

## 2019-03-09 NOTE — PROGRESS NOTE ADULT - PROBLEM SELECTOR PLAN 4
- c/w home sertraline 100; no current SI/HI  - will have to f/u w/outpt psychiatrist for psychosocial issues

## 2019-03-09 NOTE — PROGRESS NOTE ADULT - PROBLEM SELECTOR PLAN 3
Patient with elevated LFT's likely 2/2 acute alcohol, improved this morning   - continue to trend LFTs

## 2019-03-09 NOTE — PROGRESS NOTE ADULT - PROBLEM SELECTOR PLAN 1
Patient with nausea and vomiting after alcohol ingestion, with abdominal soreness. lipase neg; less likely pancreatitis, utox negative, lactate elevated. leukocytosis likely reactive  - CIWA w/ativan symptom triggered and librium taper  - c/w fluid resuscitation  - advance diet as tolerated  - will need SW referral

## 2019-03-09 NOTE — PROGRESS NOTE ADULT - SUBJECTIVE AND OBJECTIVE BOX
Tena Meléndez M.D.   PGY-2 | Internal Medicine   [phone] | [id number]        Patient is a 48y old  Female who presents with a chief complaint of ETOH withdrawal (08 Mar 2019 22:16)        SUBJECTIVE / OVERNIGHT EVENTS: Patient had no acute events overnight. Patient seen and examined at bedside this morning. Patient endorsing that she feels better this morning, still with mild tremors, nausea and headache. Patient would like to try to eat breakfast this morning.     ROS: [ - ] Fever [ - ] Chills [ + ] Nausea/Vomiting [ - ] Chest Pain [ - ] Shortness of breath     MEDICATIONS  (STANDING):  chlordiazePOXIDE 50 milliGRAM(s) Oral every 6 hours  chlordiazePOXIDE   Oral   dextrose 5%. 1000 milliLiter(s) (50 mL/Hr) IV Continuous <Continuous>  dextrose 50% Injectable 12.5 Gram(s) IV Push once  dextrose 50% Injectable 25 Gram(s) IV Push once  dextrose 50% Injectable 25 Gram(s) IV Push once  docusate sodium 100 milliGRAM(s) Oral three times a day  gabapentin 100 milliGRAM(s) Oral two times a day  insulin glargine Injectable (LANTUS) 15 Unit(s) SubCutaneous at bedtime  insulin lispro (HumaLOG) corrective regimen sliding scale   SubCutaneous three times a day before meals  insulin lispro (HumaLOG) corrective regimen sliding scale   SubCutaneous at bedtime  insulin lispro Injectable (HumaLOG) 5 Unit(s) SubCutaneous three times a day before meals  melatonin 3 milliGRAM(s) Oral at bedtime  multivitamin/thiamine/folic acid in sodium chloride 0.9% 1000 milliLiter(s) (100 mL/Hr) IV Continuous <Continuous>  senna 2 Tablet(s) Oral at bedtime  sertraline 100 milliGRAM(s) Oral daily    MEDICATIONS  (PRN):  acetaminophen   Tablet .. 650 milliGRAM(s) Oral every 6 hours PRN Temp greater or equal to 38C (100.4F), Mild Pain (1 - 3), Moderate Pain (4 - 6)  aluminum hydroxide/magnesium hydroxide/simethicone Suspension 30 milliLiter(s) Oral every 4 hours PRN Dyspepsia  dextrose 40% Gel 15 Gram(s) Oral once PRN Blood Glucose LESS THAN 70 milliGRAM(s)/deciliter  glucagon  Injectable 1 milliGRAM(s) IntraMuscular once PRN Glucose LESS THAN 70 milligrams/deciliter  LORazepam     Tablet 2 milliGRAM(s) Oral every 2 hours PRN Symptom-triggered 2 point increase in CIWA-Ar  LORazepam     Tablet 2 milliGRAM(s) Oral every 1 hour PRN Symptom-triggered: each CIWA -Ar score 8 or GREATER  ondansetron Injectable 4 milliGRAM(s) IV Push every 6 hours PRN Nausea      Vital Signs Last 24 Hrs  T(C): 36.7 (09 Mar 2019 07:31), Max: 37.6 (09 Mar 2019 02:18)  T(F): 98.1 (09 Mar 2019 07:31), Max: 99.6 (09 Mar 2019 02:18)  HR: 81 (09 Mar 2019 07:31) (77 - 120)  BP: 123/75 (09 Mar 2019 07:31) (123/75 - 148/73)  BP(mean): --  RR: 16 (09 Mar 2019 07:31) (16 - 20)  SpO2: 100% (09 Mar 2019 07:31) (99% - 100%)  CAPILLARY BLOOD GLUCOSE      POCT Blood Glucose.: 232 mg/dL (09 Mar 2019 08:55)  POCT Blood Glucose.: 352 mg/dL (08 Mar 2019 22:24)  POCT Blood Glucose.: 334 mg/dL (08 Mar 2019 19:24)  POCT Blood Glucose.: 433 mg/dL (08 Mar 2019 15:40)    I&O's Summary    08 Mar 2019 07:01  -  09 Mar 2019 07:00  --------------------------------------------------------  IN: 0 mL / OUT: 300 mL / NET: -300 mL        PHYSICAL EXAM  GENERAL: NAD, lying comfortably in bed   HEAD:  Atraumatic, Normocephalic  EYES: EOMI, PERRLA, conjunctiva and sclera clear  NECK: Supple, No JVD  CHEST/LUNG: Clear to auscultation bilaterally; No wheeze  HEART: Regular rate and rhythm; No murmurs, rubs, or gallops  ABDOMEN: Soft, Nontender, Nondistended; Bowel sounds present  EXTREMITIES:  2+ Peripheral Pulses, No clubbing, cyanosis, or edema  NEURO: AAOx3, non-focal, + tremors UE b/l, no tongue fasciculations   SKIN: No rashes or lesions      LABS:                        11.9   8.09  )-----------( 131      ( 09 Mar 2019 04:25 )             37.5         135  |  98  |  8   ----------------------------<  223<H>  3.9   |  22  |  0.62    Ca    8.8      09 Mar 2019 04:25  Phos  2.2       Mg     2.0         TPro  6.8  /  Alb  4.1  /  TBili  1.4<H>  /  DBili  x   /  AST  29  /  ALT  26  /  AlkPhos  94            Urinalysis Basic - ( 08 Mar 2019 18:45 )    Color: LIGHT YELLOW / Appearance: CLEAR / S.031 / pH: 5.5  Gluc: >1000 / Ketone: >150  / Bili: NEGATIVE / Urobili: NORMAL   Blood: NEGATIVE / Protein: 10 / Nitrite: NEGATIVE   Leuk Esterase: NEGATIVE / RBC: x / WBC x   Sq Epi: x / Non Sq Epi: x / Bacteria: x          RADIOLOGY & ADDITIONAL TESTS:    Imaging Personally Reviewed:  Consultant(s) Notes Reviewed:

## 2019-03-10 LAB
ALBUMIN SERPL ELPH-MCNC: 4 G/DL — SIGNIFICANT CHANGE UP (ref 3.3–5)
ALP SERPL-CCNC: 90 U/L — SIGNIFICANT CHANGE UP (ref 40–120)
ALT FLD-CCNC: 21 U/L — SIGNIFICANT CHANGE UP (ref 4–33)
ANION GAP SERPL CALC-SCNC: 13 MMO/L — SIGNIFICANT CHANGE UP (ref 7–14)
AST SERPL-CCNC: 27 U/L — SIGNIFICANT CHANGE UP (ref 4–32)
BILIRUB SERPL-MCNC: 1 MG/DL — SIGNIFICANT CHANGE UP (ref 0.2–1.2)
BUN SERPL-MCNC: 13 MG/DL — SIGNIFICANT CHANGE UP (ref 7–23)
CALCIUM SERPL-MCNC: 9.4 MG/DL — SIGNIFICANT CHANGE UP (ref 8.4–10.5)
CHLORIDE SERPL-SCNC: 101 MMOL/L — SIGNIFICANT CHANGE UP (ref 98–107)
CO2 SERPL-SCNC: 24 MMOL/L — SIGNIFICANT CHANGE UP (ref 22–31)
CREAT SERPL-MCNC: 0.71 MG/DL — SIGNIFICANT CHANGE UP (ref 0.5–1.3)
GLUCOSE SERPL-MCNC: 221 MG/DL — HIGH (ref 70–99)
HCT VFR BLD CALC: 39.9 % — SIGNIFICANT CHANGE UP (ref 34.5–45)
HGB BLD-MCNC: 13.5 G/DL — SIGNIFICANT CHANGE UP (ref 11.5–15.5)
MAGNESIUM SERPL-MCNC: 2.1 MG/DL — SIGNIFICANT CHANGE UP (ref 1.6–2.6)
MCHC RBC-ENTMCNC: 30.8 PG — SIGNIFICANT CHANGE UP (ref 27–34)
MCHC RBC-ENTMCNC: 33.8 % — SIGNIFICANT CHANGE UP (ref 32–36)
MCV RBC AUTO: 90.9 FL — SIGNIFICANT CHANGE UP (ref 80–100)
NRBC # FLD: 0 K/UL — LOW (ref 25–125)
PHOSPHATE SERPL-MCNC: 2.7 MG/DL — SIGNIFICANT CHANGE UP (ref 2.5–4.5)
PLATELET # BLD AUTO: 137 K/UL — LOW (ref 150–400)
PMV BLD: 9.8 FL — SIGNIFICANT CHANGE UP (ref 7–13)
POTASSIUM SERPL-MCNC: 3.8 MMOL/L — SIGNIFICANT CHANGE UP (ref 3.5–5.3)
POTASSIUM SERPL-SCNC: 3.8 MMOL/L — SIGNIFICANT CHANGE UP (ref 3.5–5.3)
PROT SERPL-MCNC: 6.7 G/DL — SIGNIFICANT CHANGE UP (ref 6–8.3)
RBC # BLD: 4.39 M/UL — SIGNIFICANT CHANGE UP (ref 3.8–5.2)
RBC # FLD: 12.2 % — SIGNIFICANT CHANGE UP (ref 10.3–14.5)
SODIUM SERPL-SCNC: 138 MMOL/L — SIGNIFICANT CHANGE UP (ref 135–145)
WBC # BLD: 5.42 K/UL — SIGNIFICANT CHANGE UP (ref 3.8–10.5)
WBC # FLD AUTO: 5.42 K/UL — SIGNIFICANT CHANGE UP (ref 3.8–10.5)

## 2019-03-10 PROCEDURE — 99233 SBSQ HOSP IP/OBS HIGH 50: CPT | Mod: GC

## 2019-03-10 RX ADMIN — Medication 50 MILLIGRAM(S): at 13:20

## 2019-03-10 RX ADMIN — Medication 1: at 22:14

## 2019-03-10 RX ADMIN — SENNA PLUS 2 TABLET(S): 8.6 TABLET ORAL at 21:53

## 2019-03-10 RX ADMIN — INSULIN GLARGINE 15 UNIT(S): 100 INJECTION, SOLUTION SUBCUTANEOUS at 22:14

## 2019-03-10 RX ADMIN — Medication 100 MILLIGRAM(S): at 06:08

## 2019-03-10 RX ADMIN — Medication 5 UNIT(S): at 13:22

## 2019-03-10 RX ADMIN — Medication 3: at 13:22

## 2019-03-10 RX ADMIN — Medication 100 MILLIGRAM(S): at 21:53

## 2019-03-10 RX ADMIN — GABAPENTIN 100 MILLIGRAM(S): 400 CAPSULE ORAL at 18:22

## 2019-03-10 RX ADMIN — Medication 50 MILLIGRAM(S): at 21:53

## 2019-03-10 RX ADMIN — Medication 50 MILLIGRAM(S): at 06:08

## 2019-03-10 RX ADMIN — Medication 100 MILLIGRAM(S): at 13:21

## 2019-03-10 RX ADMIN — SERTRALINE 100 MILLIGRAM(S): 25 TABLET, FILM COATED ORAL at 13:20

## 2019-03-10 RX ADMIN — GABAPENTIN 100 MILLIGRAM(S): 400 CAPSULE ORAL at 06:11

## 2019-03-10 RX ADMIN — Medication 2: at 18:17

## 2019-03-10 RX ADMIN — Medication 1: at 09:19

## 2019-03-10 RX ADMIN — Medication 3 MILLIGRAM(S): at 21:53

## 2019-03-10 RX ADMIN — Medication 5 UNIT(S): at 18:17

## 2019-03-10 RX ADMIN — Medication 5 UNIT(S): at 09:18

## 2019-03-10 RX ADMIN — Medication 2 MILLIGRAM(S): at 19:02

## 2019-03-10 NOTE — PROGRESS NOTE ADULT - PROBLEM SELECTOR PLAN 5
-hyperglycemic with glycosuria and elevated ketones, but with normal pH on VBG   - GAP and FSG's improving  - A1C 9.2  - FSBG, HISS  - c/w home lantus 15 and humalog 5/5/5  - c/w gabapentin 100 BID for diabetic neuropathy

## 2019-03-10 NOTE — PROGRESS NOTE ADULT - SUBJECTIVE AND OBJECTIVE BOX
Jerome Frank, PGY-1  Internal Medicine  Pager:  [phone]    Patient is a 48y old  Female who presents with a chief complaint of ETOH withdrawal (09 Mar 2019 09:36)      SUBJECTIVE / OVERNIGHT EVENTS:   Patient seen and examined at bedside. No acute events overnight.     MEDICATIONS  (STANDING):  chlordiazePOXIDE 50 milliGRAM(s) Oral every 8 hours  chlordiazePOXIDE   Oral   dextrose 5%. 1000 milliLiter(s) (50 mL/Hr) IV Continuous <Continuous>  dextrose 50% Injectable 12.5 Gram(s) IV Push once  dextrose 50% Injectable 25 Gram(s) IV Push once  dextrose 50% Injectable 25 Gram(s) IV Push once  docusate sodium 100 milliGRAM(s) Oral three times a day  gabapentin 100 milliGRAM(s) Oral two times a day  insulin glargine Injectable (LANTUS) 15 Unit(s) SubCutaneous at bedtime  insulin lispro (HumaLOG) corrective regimen sliding scale   SubCutaneous three times a day before meals  insulin lispro (HumaLOG) corrective regimen sliding scale   SubCutaneous at bedtime  insulin lispro Injectable (HumaLOG) 5 Unit(s) SubCutaneous three times a day before meals  melatonin 3 milliGRAM(s) Oral at bedtime  senna 2 Tablet(s) Oral at bedtime  sertraline 100 milliGRAM(s) Oral daily    MEDICATIONS  (PRN):  acetaminophen   Tablet .. 650 milliGRAM(s) Oral every 6 hours PRN Temp greater or equal to 38C (100.4F), Mild Pain (1 - 3), Moderate Pain (4 - 6)  aluminum hydroxide/magnesium hydroxide/simethicone Suspension 30 milliLiter(s) Oral every 4 hours PRN Dyspepsia  dextrose 40% Gel 15 Gram(s) Oral once PRN Blood Glucose LESS THAN 70 milliGRAM(s)/deciliter  glucagon  Injectable 1 milliGRAM(s) IntraMuscular once PRN Glucose LESS THAN 70 milligrams/deciliter  LORazepam     Tablet 2 milliGRAM(s) Oral every 2 hours PRN Symptom-triggered 2 point increase in CIWA-Ar  LORazepam     Tablet 2 milliGRAM(s) Oral every 1 hour PRN Symptom-triggered: each CIWA -Ar score 8 or GREATER      CAPILLARY BLOOD GLUCOSE      POCT Blood Glucose.: 147 mg/dL (09 Mar 2019 22:03)  POCT Blood Glucose.: 284 mg/dL (09 Mar 2019 18:04)  POCT Blood Glucose.: 198 mg/dL (09 Mar 2019 12:51)  POCT Blood Glucose.: 232 mg/dL (09 Mar 2019 08:55)      I&O's Summary      T(C): 36.8 (03-10-19 @ 05:19), Max: 37 (03-10-19 @ 01:20)  HR: 69 (03-10-19 @ 05:19) (69 - 85)  BP: 121/75 (03-10-19 @ 05:19) (114/85 - 135/76)  RR: 16 (03-10-19 @ 05:19) (16 - 18)  SpO2: 100% (03-10-19 @ 05:19) (98% - 100%)    PHYSICAL EXAM:  GENERAL: NAD, well-developed  HEAD:  Atraumatic, Normocephalic  EYES: PERRL. EOMI,  conjunctiva and sclera clear.  NECK: Supple  CHEST/LUNG: Clear to auscultation bilaterally.  HEART: Regular rate and rhythm. Normal sounding S1, S2. No murmurs, rubs, or gallops  ABDOMEN: Soft, nontender, nondistended; Bowel sounds present.  EXTREMITIES:  No clubbing, cyanosis, or edema. Peripheral pulses 2+ and symmetric.  PSYCH: AAOx3  NEUROLOGY: non-focal  SKIN: No rashes or lesions    LABS:                        11.9   8.09  )-----------( 131      ( 09 Mar 2019 04:25 )             37.5     WBC Trend: 8.09<--, 11.09<--  03-09    135  |  98  |  8   ----------------------------<  223<H>  3.9   |  22  |  0.62    Ca    8.8      09 Mar 2019 04:25  Phos  2.2     03-  Mg     2.0         TPro  6.8  /  Alb  4.1  /  TBili  1.4<H>  /  DBili  x   /  AST  29  /  ALT  26  /  AlkPhos  94      Creatinine Trend: 0.62<--, 0.60<--, 0.69<--        Urinalysis Basic - ( 08 Mar 2019 18:45 )    Color: LIGHT YELLOW / Appearance: CLEAR / S.031 / pH: 5.5  Gluc: >1000 / Ketone: >150  / Bili: NEGATIVE / Urobili: NORMAL   Blood: NEGATIVE / Protein: 10 / Nitrite: NEGATIVE   Leuk Esterase: NEGATIVE / RBC: x / WBC x   Sq Epi: x / Non Sq Epi: x / Bacteria: x        RADIOLOGY & ADDITIONAL TESTS:    Imaging Personally Reviewed:    Consultant(s) Notes Reviewed:      Care Discussed with Consultants/Other Providers: Jerome Frank, PGY-1  Internal Medicine  Pager:  [phone]    Patient is a 48y old  Female who presents with a chief complaint of ETOH withdrawal (09 Mar 2019 09:36)      SUBJECTIVE / OVERNIGHT EVENTS:   Patient seen and examined at bedside. No acute events overnight. Patient feeling better than yesterday, but still a little tremulous.     MEDICATIONS  (STANDING):  chlordiazePOXIDE 50 milliGRAM(s) Oral every 8 hours  chlordiazePOXIDE   Oral   dextrose 5%. 1000 milliLiter(s) (50 mL/Hr) IV Continuous <Continuous>  dextrose 50% Injectable 12.5 Gram(s) IV Push once  dextrose 50% Injectable 25 Gram(s) IV Push once  dextrose 50% Injectable 25 Gram(s) IV Push once  docusate sodium 100 milliGRAM(s) Oral three times a day  gabapentin 100 milliGRAM(s) Oral two times a day  insulin glargine Injectable (LANTUS) 15 Unit(s) SubCutaneous at bedtime  insulin lispro (HumaLOG) corrective regimen sliding scale   SubCutaneous three times a day before meals  insulin lispro (HumaLOG) corrective regimen sliding scale   SubCutaneous at bedtime  insulin lispro Injectable (HumaLOG) 5 Unit(s) SubCutaneous three times a day before meals  melatonin 3 milliGRAM(s) Oral at bedtime  senna 2 Tablet(s) Oral at bedtime  sertraline 100 milliGRAM(s) Oral daily    MEDICATIONS  (PRN):  acetaminophen   Tablet .. 650 milliGRAM(s) Oral every 6 hours PRN Temp greater or equal to 38C (100.4F), Mild Pain (1 - 3), Moderate Pain (4 - 6)  aluminum hydroxide/magnesium hydroxide/simethicone Suspension 30 milliLiter(s) Oral every 4 hours PRN Dyspepsia  dextrose 40% Gel 15 Gram(s) Oral once PRN Blood Glucose LESS THAN 70 milliGRAM(s)/deciliter  glucagon  Injectable 1 milliGRAM(s) IntraMuscular once PRN Glucose LESS THAN 70 milligrams/deciliter  LORazepam     Tablet 2 milliGRAM(s) Oral every 2 hours PRN Symptom-triggered 2 point increase in CIWA-Ar  LORazepam     Tablet 2 milliGRAM(s) Oral every 1 hour PRN Symptom-triggered: each CIWA -Ar score 8 or GREATER      CAPILLARY BLOOD GLUCOSE      POCT Blood Glucose.: 147 mg/dL (09 Mar 2019 22:03)  POCT Blood Glucose.: 284 mg/dL (09 Mar 2019 18:04)  POCT Blood Glucose.: 198 mg/dL (09 Mar 2019 12:51)  POCT Blood Glucose.: 232 mg/dL (09 Mar 2019 08:55)      I&O's Summary      T(C): 36.8 (03-10-19 @ 05:19), Max: 37 (03-10-19 @ 01:20)  HR: 69 (03-10-19 @ 05:19) (69 - 85)  BP: 121/75 (03-10-19 @ 05:19) (114/85 - 135/76)  RR: 16 (03-10-19 @ 05:19) (16 - 18)  SpO2: 100% (03-10-19 @ 05:19) (98% - 100%)    PHYSICAL EXAM:  GENERAL: NAD, well-developed  HEAD:  Atraumatic, Normocephalic  EYES: PERRL. EOMI,  conjunctiva and sclera clear.  NECK: Supple  CHEST/LUNG: Clear to auscultation bilaterally.  HEART: Regular rate and rhythm. Normal sounding S1, S2. No murmurs, rubs, or gallops  ABDOMEN: Soft, nontender, nondistended; Bowel sounds present.  EXTREMITIES:  No clubbing, cyanosis, or edema. Peripheral pulses 2+ and symmetric.  PSYCH: AAOx3  NEUROLOGY: non-focal  SKIN: No rashes or lesions    LABS:                        11.9   8.09  )-----------( 131      ( 09 Mar 2019 04:25 )             37.5     WBC Trend: 8.09<--, 11.09<--      135  |  98  |  8   ----------------------------<  223<H>  3.9   |  22  |  0.62    Ca    8.8      09 Mar 2019 04:25  Phos  2.2       Mg     2.0         TPro  6.8  /  Alb  4.1  /  TBili  1.4<H>  /  DBili  x   /  AST  29  /  ALT  26  /  AlkPhos  94      Creatinine Trend: 0.62<--, 0.60<--, 0.69<--        Urinalysis Basic - ( 08 Mar 2019 18:45 )    Color: LIGHT YELLOW / Appearance: CLEAR / S.031 / pH: 5.5  Gluc: >1000 / Ketone: >150  / Bili: NEGATIVE / Urobili: NORMAL   Blood: NEGATIVE / Protein: 10 / Nitrite: NEGATIVE   Leuk Esterase: NEGATIVE / RBC: x / WBC x   Sq Epi: x / Non Sq Epi: x / Bacteria: x        RADIOLOGY & ADDITIONAL TESTS:    Imaging Personally Reviewed:    Consultant(s) Notes Reviewed:      Care Discussed with Consultants/Other Providers:

## 2019-03-10 NOTE — PROGRESS NOTE ADULT - ASSESSMENT
47 yo F w/PMHX T2DM (hx of DKA), depression w/anxiety, alcoholic pancreatitis (requiring ICU admission) presenting w/N/V x 2 days and headache after ETOH use, admitted for alcohol withdrawal.

## 2019-03-11 ENCOUNTER — TRANSCRIPTION ENCOUNTER (OUTPATIENT)
Age: 49
End: 2019-03-11

## 2019-03-11 VITALS
TEMPERATURE: 98 F | HEART RATE: 63 BPM | RESPIRATION RATE: 18 BRPM | SYSTOLIC BLOOD PRESSURE: 119 MMHG | DIASTOLIC BLOOD PRESSURE: 76 MMHG | OXYGEN SATURATION: 100 %

## 2019-03-11 LAB
ALBUMIN SERPL ELPH-MCNC: 3.6 G/DL — SIGNIFICANT CHANGE UP (ref 3.3–5)
ALP SERPL-CCNC: 95 U/L — SIGNIFICANT CHANGE UP (ref 40–120)
ALT FLD-CCNC: 19 U/L — SIGNIFICANT CHANGE UP (ref 4–33)
ANION GAP SERPL CALC-SCNC: 14 MMO/L — SIGNIFICANT CHANGE UP (ref 7–14)
AST SERPL-CCNC: 23 U/L — SIGNIFICANT CHANGE UP (ref 4–32)
BILIRUB SERPL-MCNC: 0.6 MG/DL — SIGNIFICANT CHANGE UP (ref 0.2–1.2)
BUN SERPL-MCNC: 16 MG/DL — SIGNIFICANT CHANGE UP (ref 7–23)
CALCIUM SERPL-MCNC: 9 MG/DL — SIGNIFICANT CHANGE UP (ref 8.4–10.5)
CHLORIDE SERPL-SCNC: 102 MMOL/L — SIGNIFICANT CHANGE UP (ref 98–107)
CO2 SERPL-SCNC: 21 MMOL/L — LOW (ref 22–31)
CREAT SERPL-MCNC: 0.66 MG/DL — SIGNIFICANT CHANGE UP (ref 0.5–1.3)
GLUCOSE SERPL-MCNC: 297 MG/DL — HIGH (ref 70–99)
HCT VFR BLD CALC: 37.2 % — SIGNIFICANT CHANGE UP (ref 34.5–45)
HGB BLD-MCNC: 12.5 G/DL — SIGNIFICANT CHANGE UP (ref 11.5–15.5)
MCHC RBC-ENTMCNC: 30.6 PG — SIGNIFICANT CHANGE UP (ref 27–34)
MCHC RBC-ENTMCNC: 33.6 % — SIGNIFICANT CHANGE UP (ref 32–36)
MCV RBC AUTO: 91.2 FL — SIGNIFICANT CHANGE UP (ref 80–100)
NRBC # FLD: 0 K/UL — LOW (ref 25–125)
PLATELET # BLD AUTO: 133 K/UL — LOW (ref 150–400)
PMV BLD: 10.1 FL — SIGNIFICANT CHANGE UP (ref 7–13)
POTASSIUM SERPL-MCNC: 4.2 MMOL/L — SIGNIFICANT CHANGE UP (ref 3.5–5.3)
POTASSIUM SERPL-SCNC: 4.2 MMOL/L — SIGNIFICANT CHANGE UP (ref 3.5–5.3)
PROT SERPL-MCNC: 5.9 G/DL — LOW (ref 6–8.3)
RBC # BLD: 4.08 M/UL — SIGNIFICANT CHANGE UP (ref 3.8–5.2)
RBC # FLD: 12.1 % — SIGNIFICANT CHANGE UP (ref 10.3–14.5)
SODIUM SERPL-SCNC: 137 MMOL/L — SIGNIFICANT CHANGE UP (ref 135–145)
WBC # BLD: 4.89 K/UL — SIGNIFICANT CHANGE UP (ref 3.8–10.5)
WBC # FLD AUTO: 4.89 K/UL — SIGNIFICANT CHANGE UP (ref 3.8–10.5)

## 2019-03-11 PROCEDURE — 99239 HOSP IP/OBS DSCHRG MGMT >30: CPT

## 2019-03-11 RX ADMIN — Medication 2: at 08:55

## 2019-03-11 RX ADMIN — GABAPENTIN 100 MILLIGRAM(S): 400 CAPSULE ORAL at 06:10

## 2019-03-11 RX ADMIN — Medication 5 UNIT(S): at 13:05

## 2019-03-11 RX ADMIN — SERTRALINE 100 MILLIGRAM(S): 25 TABLET, FILM COATED ORAL at 13:09

## 2019-03-11 RX ADMIN — Medication 5 UNIT(S): at 08:56

## 2019-03-11 RX ADMIN — Medication 1: at 13:05

## 2019-03-11 RX ADMIN — Medication 100 MILLIGRAM(S): at 06:06

## 2019-03-11 RX ADMIN — Medication 2 MILLIGRAM(S): at 06:05

## 2019-03-11 NOTE — DISCHARGE NOTE NURSING/CASE MANAGEMENT/SOCIAL WORK - NSDCPNINST_GEN_ALL_CORE
Please continue to monitor blood sugar. Please stay compliant with utilizing insulin pen. Be mindful of alcohol intake.

## 2019-03-11 NOTE — DISCHARGE NOTE PROVIDER - CARE PROVIDER_API CALL
Jose Antonio Hidalgo)  Internal Medicine  [address] [place] [address]  Phone: [phone]  Fax: [phone]  Follow Up Time:

## 2019-03-11 NOTE — PROGRESS NOTE ADULT - SUBJECTIVE AND OBJECTIVE BOX
Jerome Frank, PGY-1  Internal Medicine  Pager:  [phone]    Patient is a 48y old  Female who presents with a chief complaint of ETOH withdrawal (10 Mar 2019 07:31)      SUBJECTIVE / OVERNIGHT EVENTS:   Patient seen and examined at bedside. No acute events overnight. Patient feeling better. Tremulousness improving.     MEDICATIONS  (STANDING):  chlordiazePOXIDE 50 milliGRAM(s) Oral every 12 hours  chlordiazePOXIDE   Oral   dextrose 5%. 1000 milliLiter(s) (50 mL/Hr) IV Continuous <Continuous>  dextrose 50% Injectable 12.5 Gram(s) IV Push once  dextrose 50% Injectable 25 Gram(s) IV Push once  dextrose 50% Injectable 25 Gram(s) IV Push once  docusate sodium 100 milliGRAM(s) Oral three times a day  gabapentin 100 milliGRAM(s) Oral two times a day  insulin glargine Injectable (LANTUS) 15 Unit(s) SubCutaneous at bedtime  insulin lispro (HumaLOG) corrective regimen sliding scale   SubCutaneous three times a day before meals  insulin lispro (HumaLOG) corrective regimen sliding scale   SubCutaneous at bedtime  insulin lispro Injectable (HumaLOG) 5 Unit(s) SubCutaneous three times a day before meals  melatonin 3 milliGRAM(s) Oral at bedtime  senna 2 Tablet(s) Oral at bedtime  sertraline 100 milliGRAM(s) Oral daily    MEDICATIONS  (PRN):  acetaminophen   Tablet .. 650 milliGRAM(s) Oral every 6 hours PRN Temp greater or equal to 38C (100.4F), Mild Pain (1 - 3), Moderate Pain (4 - 6)  aluminum hydroxide/magnesium hydroxide/simethicone Suspension 30 milliLiter(s) Oral every 4 hours PRN Dyspepsia  dextrose 40% Gel 15 Gram(s) Oral once PRN Blood Glucose LESS THAN 70 milliGRAM(s)/deciliter  glucagon  Injectable 1 milliGRAM(s) IntraMuscular once PRN Glucose LESS THAN 70 milligrams/deciliter  LORazepam     Tablet 2 milliGRAM(s) Oral every 2 hours PRN Symptom-triggered 2 point increase in CIWA-Ar  LORazepam     Tablet 2 milliGRAM(s) Oral every 1 hour PRN Symptom-triggered: each CIWA -Ar score 8 or GREATER      CAPILLARY BLOOD GLUCOSE      POCT Blood Glucose.: 256 mg/dL (10 Mar 2019 22:06)  POCT Blood Glucose.: 244 mg/dL (10 Mar 2019 18:16)  POCT Blood Glucose.: 275 mg/dL (10 Mar 2019 12:50)  POCT Blood Glucose.: 190 mg/dL (10 Mar 2019 08:53)      I&O's Summary      T(C): 36.7 (03-11-19 @ 05:59), Max: 37.1 (03-10-19 @ 22:00)  HR: 57 (03-11-19 @ 05:59) (57 - 69)  BP: 122/81 (03-11-19 @ 05:59) (117/72 - 138/67)  RR: 18 (03-11-19 @ 05:59) (16 - 18)  SpO2: 100% (03-11-19 @ 05:59) (97% - 100%)    PHYSICAL EXAM:  GENERAL: NAD, well-developed  HEAD:  Atraumatic, Normocephalic  EYES: PERRL. EOMI,  conjunctiva and sclera clear.  NECK: Supple  CHEST/LUNG: Clear to auscultation bilaterally.  HEART: Regular rate and rhythm. Normal sounding S1, S2. No murmurs, rubs, or gallops  ABDOMEN: Soft, nontender, nondistended; Bowel sounds present.  EXTREMITIES:  No clubbing, cyanosis, or edema. Peripheral pulses 2+ and symmetric.  PSYCH: AAOx3  NEUROLOGY: non-focal  SKIN: No rashes or lesions    LABS:                        12.5   4.89  )-----------( 133      ( 11 Mar 2019 05:20 )             37.2     WBC Trend: 4.89<--, 5.42<--, 8.09<--  03-11    137  |  102  |  16  ----------------------------<  297<H>  4.2   |  21<L>  |  0.66    Ca    9.0      11 Mar 2019 05:20  Phos  2.7     03-10  Mg     2.1     03-10    TPro  5.9<L>  /  Alb  3.6  /  TBili  0.6  /  DBili  x   /  AST  23  /  ALT  19  /  AlkPhos  95  03-11    Creatinine Trend: 0.66<--, 0.71<--, 0.62<--, 0.60<--, 0.69<--            RADIOLOGY & ADDITIONAL TESTS:    Imaging Personally Reviewed:    Consultant(s) Notes Reviewed:      Care Discussed with Consultants/Other Providers:
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
## 2019-03-11 NOTE — PROGRESS NOTE ADULT - ATTENDING COMMENTS
49 yo with hx DM, depression/anxiety, hx of etoh use disorder c/b alcoholic pancreatitis presents with etoh withdrawal.      Pt is stable for dc home, denies significant anxiety, denies palpitations, nausea/vomiting, does not appear tremulous, diaphoeretic    Etoh witdrawal: secondary to etoh use d/o; complete taper as o/p, pt requires two more doses of librium which she can take as an outpatient; pt assures me that she will not drink while on the librium and verbally expresses that she understands the risks/consequences of drinking on librium including respiratory despression and death  Depression/anxiety: f/u as o/p with her psychiatrist, continue sertraline  DM: continue home regimen, lantus/humalog  32 minutes spent on discharge plan of care
Continue librium taper with CIWA monitoring.
Continue CIWA for EtOH withdrawal. Pt will complete librium taper on 3/12.

## 2019-03-11 NOTE — DISCHARGE NOTE NURSING/CASE MANAGEMENT/SOCIAL WORK - NSDCDPATPORTLINK_GEN_ALL_CORE
You can access the [facility] Patient Portal, offered by [facility], by registering with the following website: [email or web address]

## 2019-03-11 NOTE — DISCHARGE NOTE PROVIDER - NSDCCPCAREPLAN_GEN_ALL_CORE_FT
PRINCIPAL DISCHARGE DIAGNOSIS  Problem: Alcohol withdrawal  Assessment and Plan of Treatment: You were admitted due to alcohol withdrawal. You were placed on a librium taper with resolution of your symptoms. Please take 50mg librium at 6PM on 3/11 and 50mg librium at 6AM on 3/12. Please refrain from drinking

## 2019-03-19 ENCOUNTER — APPOINTMENT (OUTPATIENT)
Dept: ENDOCRINOLOGY | Facility: CLINIC | Age: 49
End: 2019-03-19

## 2019-03-28 ENCOUNTER — APPOINTMENT (OUTPATIENT)
Dept: OTOLARYNGOLOGY | Facility: CLINIC | Age: 49
End: 2019-03-28

## 2019-04-03 NOTE — ED ADULT TRIAGE NOTE - NS ED NURSE BANDS TYPE
Physical Therapy Daily Treatment    Visit Count: 2  Plan of Care: Health Remote  80/20 after $3000 deductible per plan year  2/1/2019-1/31/2020  BOMN    Precautions:     SUBJECTIVE   Doing well, having less pain in the ankle and her HEP is going well.  Having the most problem balancing.  Wearing the brace full time still.   Current Pain (0-10 scale): 2   Functional Change: walking with less pain    OBJECTIVE   Full ankle PROM,  Poor proprioception with tandem balance      Treatment   Therapeutic Exercise:   Review HEP  Standing gastroc stretch  Standing heel raises  Tandem balance on air ex  B lunges  Upright bike 5' warm up  Manual Therapy:   Ankle PROM with overpressure 4 way    Skilled input: education/instruction on progression of HEP    Home Program:   AROM ankle 4-way with yellow theraband resistance  Standing gastroc stretch  Heel raises  Tandem balance on soft surface        Writer verbally educated the patient and received verbal consent from the patient on hand placement, positioning of patient, and techniques to be performed today including hand placement and palpation for techniques as described above and how they are pertinent to the patient's plan of care.      Suggestions for next session as indicated: progress per plan of care, Progress HEp as able, strength, proprioception    ASSESSMENT   Full PROM but still primarily limited with ankle strength, endurance, and proprioception.  Able to progress HEP.   Pain after treatment (patient reported, 0-10 scale): 2  Result of above outlined education: Verbalizes understanding and Demonstrates understanding    THERAPY DAILY BILLING   Insurance: Fanchimp 2. N/A    Evaluation Procedures:  No evaluation codes were used on this date of service    Timed Procedures:  Manual Therapy, 10 minutes  Therapeutic Exercise, 30 minutes    Untimed Procedures:  No untimed codes were used on this date of service    Total Treatment Time: 40 minutes   Name band;

## 2019-04-23 ENCOUNTER — APPOINTMENT (OUTPATIENT)
Dept: OTOLARYNGOLOGY | Facility: CLINIC | Age: 49
End: 2019-04-23

## 2019-06-05 ENCOUNTER — INPATIENT (INPATIENT)
Facility: HOSPITAL | Age: 49
LOS: 0 days | Discharge: LEFT BEFORE DISCHARGE | End: 2019-06-06
Attending: INTERNAL MEDICINE | Admitting: INTERNAL MEDICINE
Payer: MEDICARE

## 2019-06-05 VITALS
HEART RATE: 134 BPM | RESPIRATION RATE: 18 BRPM | OXYGEN SATURATION: 100 % | DIASTOLIC BLOOD PRESSURE: 86 MMHG | SYSTOLIC BLOOD PRESSURE: 146 MMHG | TEMPERATURE: 98 F

## 2019-06-05 DIAGNOSIS — Z29.9 ENCOUNTER FOR PROPHYLACTIC MEASURES, UNSPECIFIED: ICD-10-CM

## 2019-06-05 DIAGNOSIS — F41.9 ANXIETY DISORDER, UNSPECIFIED: ICD-10-CM

## 2019-06-05 DIAGNOSIS — F10.10 ALCOHOL ABUSE, UNCOMPLICATED: ICD-10-CM

## 2019-06-05 DIAGNOSIS — F32.9 MAJOR DEPRESSIVE DISORDER, SINGLE EPISODE, UNSPECIFIED: ICD-10-CM

## 2019-06-05 DIAGNOSIS — E13.10 OTHER SPECIFIED DIABETES MELLITUS WITH KETOACIDOSIS WITHOUT COMA: ICD-10-CM

## 2019-06-05 DIAGNOSIS — L02.91 CUTANEOUS ABSCESS, UNSPECIFIED: Chronic | ICD-10-CM

## 2019-06-05 DIAGNOSIS — Z90.89 ACQUIRED ABSENCE OF OTHER ORGANS: Chronic | ICD-10-CM

## 2019-06-05 DIAGNOSIS — E87.2 ACIDOSIS: ICD-10-CM

## 2019-06-05 DIAGNOSIS — Z98.890 OTHER SPECIFIED POSTPROCEDURAL STATES: Chronic | ICD-10-CM

## 2019-06-05 DIAGNOSIS — F10.230 ALCOHOL DEPENDENCE WITH WITHDRAWAL, UNCOMPLICATED: ICD-10-CM

## 2019-06-05 DIAGNOSIS — I10 ESSENTIAL (PRIMARY) HYPERTENSION: ICD-10-CM

## 2019-06-05 DIAGNOSIS — E78.5 HYPERLIPIDEMIA, UNSPECIFIED: ICD-10-CM

## 2019-06-05 LAB
ALBUMIN SERPL ELPH-MCNC: 4.7 G/DL — SIGNIFICANT CHANGE UP (ref 3.3–5)
ALBUMIN SERPL ELPH-MCNC: 4.8 G/DL — SIGNIFICANT CHANGE UP (ref 3.3–5)
ALBUMIN SERPL ELPH-MCNC: 5 G/DL — SIGNIFICANT CHANGE UP (ref 3.3–5)
ALP SERPL-CCNC: 100 U/L — SIGNIFICANT CHANGE UP (ref 40–120)
ALP SERPL-CCNC: 105 U/L — SIGNIFICANT CHANGE UP (ref 40–120)
ALP SERPL-CCNC: 97 U/L — SIGNIFICANT CHANGE UP (ref 40–120)
ALT FLD-CCNC: 42 U/L — HIGH (ref 4–33)
ALT FLD-CCNC: 48 U/L — HIGH (ref 4–33)
ALT FLD-CCNC: 49 U/L — HIGH (ref 4–33)
ANION GAP SERPL CALC-SCNC: 15 MMO/L — HIGH (ref 7–14)
ANION GAP SERPL CALC-SCNC: 16 MMO/L — HIGH (ref 7–14)
ANION GAP SERPL CALC-SCNC: 20 MMO/L — HIGH (ref 7–14)
ANION GAP SERPL CALC-SCNC: 23 MMO/L — HIGH (ref 7–14)
ANION GAP SERPL CALC-SCNC: 31 MMO/L — HIGH (ref 7–14)
APAP SERPL-MCNC: < 15 UG/ML — LOW (ref 15–25)
APPEARANCE UR: CLEAR — SIGNIFICANT CHANGE UP
AST SERPL-CCNC: 69 U/L — HIGH (ref 4–32)
AST SERPL-CCNC: 82 U/L — HIGH (ref 4–32)
AST SERPL-CCNC: 96 U/L — HIGH (ref 4–32)
B-OH-BUTYR SERPL-SCNC: 5.1 MMOL/L — HIGH (ref 0–0.4)
B-OH-BUTYR SERPL-SCNC: 5.2 MMOL/L — HIGH (ref 0–0.4)
BASE EXCESS BLDV CALC-SCNC: -0.5 MMOL/L — SIGNIFICANT CHANGE UP
BASE EXCESS BLDV CALC-SCNC: -6 MMOL/L — SIGNIFICANT CHANGE UP
BASE EXCESS BLDV CALC-SCNC: -7.7 MMOL/L — SIGNIFICANT CHANGE UP
BASE EXCESS BLDV CALC-SCNC: -9.4 MMOL/L — SIGNIFICANT CHANGE UP
BASOPHILS # BLD AUTO: 0.05 K/UL — SIGNIFICANT CHANGE UP (ref 0–0.2)
BASOPHILS NFR BLD AUTO: 0.5 % — SIGNIFICANT CHANGE UP (ref 0–2)
BILIRUB SERPL-MCNC: 1.3 MG/DL — HIGH (ref 0.2–1.2)
BILIRUB SERPL-MCNC: 1.4 MG/DL — HIGH (ref 0.2–1.2)
BILIRUB SERPL-MCNC: 1.5 MG/DL — HIGH (ref 0.2–1.2)
BILIRUB UR-MCNC: NEGATIVE — SIGNIFICANT CHANGE UP
BLOOD GAS VENOUS - CREATININE: 0.53 MG/DL — SIGNIFICANT CHANGE UP (ref 0.5–1.3)
BLOOD GAS VENOUS - CREATININE: 0.57 MG/DL — SIGNIFICANT CHANGE UP (ref 0.5–1.3)
BLOOD GAS VENOUS - CREATININE: SIGNIFICANT CHANGE UP MG/DL (ref 0.5–1.3)
BLOOD GAS VENOUS - FIO2: 21 — SIGNIFICANT CHANGE UP
BLOOD GAS VENOUS - FIO2: 21 — SIGNIFICANT CHANGE UP
BLOOD UR QL VISUAL: NEGATIVE — SIGNIFICANT CHANGE UP
BUN SERPL-MCNC: 5 MG/DL — LOW (ref 7–23)
BUN SERPL-MCNC: 6 MG/DL — LOW (ref 7–23)
BUN SERPL-MCNC: 7 MG/DL — SIGNIFICANT CHANGE UP (ref 7–23)
BUN SERPL-MCNC: 8 MG/DL — SIGNIFICANT CHANGE UP (ref 7–23)
BUN SERPL-MCNC: 8 MG/DL — SIGNIFICANT CHANGE UP (ref 7–23)
CALCIUM SERPL-MCNC: 8.1 MG/DL — LOW (ref 8.4–10.5)
CALCIUM SERPL-MCNC: 8.2 MG/DL — LOW (ref 8.4–10.5)
CALCIUM SERPL-MCNC: 8.7 MG/DL — SIGNIFICANT CHANGE UP (ref 8.4–10.5)
CALCIUM SERPL-MCNC: 8.8 MG/DL — SIGNIFICANT CHANGE UP (ref 8.4–10.5)
CALCIUM SERPL-MCNC: 8.8 MG/DL — SIGNIFICANT CHANGE UP (ref 8.4–10.5)
CHLORIDE BLDV-SCNC: 105 MMOL/L — SIGNIFICANT CHANGE UP (ref 96–108)
CHLORIDE BLDV-SCNC: 107 MMOL/L — SIGNIFICANT CHANGE UP (ref 96–108)
CHLORIDE BLDV-SCNC: 108 MMOL/L — SIGNIFICANT CHANGE UP (ref 96–108)
CHLORIDE SERPL-SCNC: 103 MMOL/L — SIGNIFICANT CHANGE UP (ref 98–107)
CHLORIDE SERPL-SCNC: 103 MMOL/L — SIGNIFICANT CHANGE UP (ref 98–107)
CHLORIDE SERPL-SCNC: 97 MMOL/L — LOW (ref 98–107)
CHLORIDE SERPL-SCNC: 98 MMOL/L — SIGNIFICANT CHANGE UP (ref 98–107)
CHLORIDE SERPL-SCNC: 99 MMOL/L — SIGNIFICANT CHANGE UP (ref 98–107)
CO2 SERPL-SCNC: 11 MMOL/L — LOW (ref 22–31)
CO2 SERPL-SCNC: 13 MMOL/L — LOW (ref 22–31)
CO2 SERPL-SCNC: 17 MMOL/L — LOW (ref 22–31)
CO2 SERPL-SCNC: 20 MMOL/L — LOW (ref 22–31)
CO2 SERPL-SCNC: 20 MMOL/L — LOW (ref 22–31)
COLOR SPEC: SIGNIFICANT CHANGE UP
CREAT SERPL-MCNC: 0.46 MG/DL — LOW (ref 0.5–1.3)
CREAT SERPL-MCNC: 0.49 MG/DL — LOW (ref 0.5–1.3)
CREAT SERPL-MCNC: 0.5 MG/DL — SIGNIFICANT CHANGE UP (ref 0.5–1.3)
CREAT SERPL-MCNC: 0.5 MG/DL — SIGNIFICANT CHANGE UP (ref 0.5–1.3)
CREAT SERPL-MCNC: 0.52 MG/DL — SIGNIFICANT CHANGE UP (ref 0.5–1.3)
EOSINOPHIL # BLD AUTO: 0 K/UL — SIGNIFICANT CHANGE UP (ref 0–0.5)
EOSINOPHIL NFR BLD AUTO: 0 % — SIGNIFICANT CHANGE UP (ref 0–6)
ETHANOL BLD-MCNC: < 10 MG/DL — SIGNIFICANT CHANGE UP
GAS PNL BLDV: 131 MMOL/L — LOW (ref 136–146)
GAS PNL BLDV: 132 MMOL/L — LOW (ref 136–146)
GAS PNL BLDV: 138 MMOL/L — SIGNIFICANT CHANGE UP (ref 136–146)
GAS PNL BLDV: 140 MMOL/L — SIGNIFICANT CHANGE UP (ref 136–146)
GLUCOSE BLDV-MCNC: 102 MG/DL — HIGH (ref 70–99)
GLUCOSE BLDV-MCNC: 293 MG/DL — HIGH (ref 70–99)
GLUCOSE BLDV-MCNC: 308 MG/DL — HIGH (ref 70–99)
GLUCOSE BLDV-MCNC: 375 MG/DL — HIGH (ref 70–99)
GLUCOSE SERPL-MCNC: 101 MG/DL — HIGH (ref 70–99)
GLUCOSE SERPL-MCNC: 178 MG/DL — HIGH (ref 70–99)
GLUCOSE SERPL-MCNC: 302 MG/DL — HIGH (ref 70–99)
GLUCOSE SERPL-MCNC: 308 MG/DL — HIGH (ref 70–99)
GLUCOSE SERPL-MCNC: 378 MG/DL — HIGH (ref 70–99)
GLUCOSE UR-MCNC: 1000 — HIGH
HCG SERPL-ACNC: < 5 MIU/ML — SIGNIFICANT CHANGE UP
HCO3 BLDV-SCNC: 16 MMOL/L — LOW (ref 20–27)
HCO3 BLDV-SCNC: 18 MMOL/L — LOW (ref 20–27)
HCO3 BLDV-SCNC: 19 MMOL/L — LOW (ref 20–27)
HCO3 BLDV-SCNC: 24 MMOL/L — SIGNIFICANT CHANGE UP (ref 20–27)
HCT VFR BLD CALC: 45.2 % — HIGH (ref 34.5–45)
HCT VFR BLDV CALC: 42.7 % — SIGNIFICANT CHANGE UP (ref 34.5–45)
HCT VFR BLDV CALC: 42.9 % — SIGNIFICANT CHANGE UP (ref 34.5–45)
HCT VFR BLDV CALC: 46.7 % — HIGH (ref 34.5–45)
HCT VFR BLDV CALC: 47.4 % — HIGH (ref 34.5–45)
HGB BLD-MCNC: 15.5 G/DL — SIGNIFICANT CHANGE UP (ref 11.5–15.5)
HGB BLDV-MCNC: 13.9 G/DL — SIGNIFICANT CHANGE UP (ref 11.5–15.5)
HGB BLDV-MCNC: 14 G/DL — SIGNIFICANT CHANGE UP (ref 11.5–15.5)
HGB BLDV-MCNC: 15.2 G/DL — SIGNIFICANT CHANGE UP (ref 11.5–15.5)
HGB BLDV-MCNC: 15.5 G/DL — SIGNIFICANT CHANGE UP (ref 11.5–15.5)
IMM GRANULOCYTES NFR BLD AUTO: 0.7 % — SIGNIFICANT CHANGE UP (ref 0–1.5)
KETONES UR-MCNC: 150 — SIGNIFICANT CHANGE UP
LACTATE BLDV-MCNC: 2.3 MMOL/L — HIGH (ref 0.5–2)
LACTATE BLDV-MCNC: 3.4 MMOL/L — HIGH (ref 0.5–2)
LACTATE BLDV-MCNC: 6.4 MMOL/L — CRITICAL HIGH (ref 0.5–2)
LACTATE BLDV-MCNC: 6.4 MMOL/L — CRITICAL HIGH (ref 0.5–2)
LEUKOCYTE ESTERASE UR-ACNC: NEGATIVE — SIGNIFICANT CHANGE UP
LIDOCAIN IGE QN: 17 U/L — SIGNIFICANT CHANGE UP (ref 7–60)
LYMPHOCYTES # BLD AUTO: 0.64 K/UL — LOW (ref 1–3.3)
LYMPHOCYTES # BLD AUTO: 6.1 % — LOW (ref 13–44)
MAGNESIUM SERPL-MCNC: 1.6 MG/DL — SIGNIFICANT CHANGE UP (ref 1.6–2.6)
MAGNESIUM SERPL-MCNC: 1.6 MG/DL — SIGNIFICANT CHANGE UP (ref 1.6–2.6)
MCHC RBC-ENTMCNC: 31.9 PG — SIGNIFICANT CHANGE UP (ref 27–34)
MCHC RBC-ENTMCNC: 34.3 % — SIGNIFICANT CHANGE UP (ref 32–36)
MCV RBC AUTO: 93 FL — SIGNIFICANT CHANGE UP (ref 80–100)
MONOCYTES # BLD AUTO: 0.44 K/UL — SIGNIFICANT CHANGE UP (ref 0–0.9)
MONOCYTES NFR BLD AUTO: 4.2 % — SIGNIFICANT CHANGE UP (ref 2–14)
NEUTROPHILS # BLD AUTO: 9.27 K/UL — HIGH (ref 1.8–7.4)
NEUTROPHILS NFR BLD AUTO: 88.5 % — HIGH (ref 43–77)
NITRITE UR-MCNC: NEGATIVE — SIGNIFICANT CHANGE UP
NRBC # FLD: 0 K/UL — SIGNIFICANT CHANGE UP (ref 0–0)
PCO2 BLDV: 27 MMHG — LOW (ref 41–51)
PCO2 BLDV: 35 MMHG — LOW (ref 41–51)
PCO2 BLDV: 38 MMHG — LOW (ref 41–51)
PCO2 BLDV: 38 MMHG — LOW (ref 41–51)
PH BLDV: 7.26 PH — LOW (ref 7.32–7.43)
PH BLDV: 7.32 PH — SIGNIFICANT CHANGE UP (ref 7.32–7.43)
PH BLDV: 7.4 PH — SIGNIFICANT CHANGE UP (ref 7.32–7.43)
PH BLDV: 7.44 PH — HIGH (ref 7.32–7.43)
PH UR: 5.5 — SIGNIFICANT CHANGE UP (ref 5–8)
PHOSPHATE SERPL-MCNC: 1 MG/DL — CRITICAL LOW (ref 2.5–4.5)
PHOSPHATE SERPL-MCNC: 2.3 MG/DL — LOW (ref 2.5–4.5)
PLATELET # BLD AUTO: 178 K/UL — SIGNIFICANT CHANGE UP (ref 150–400)
PMV BLD: 9.9 FL — SIGNIFICANT CHANGE UP (ref 7–13)
PO2 BLDV: 31 MMHG — LOW (ref 35–40)
PO2 BLDV: 34 MMHG — LOW (ref 35–40)
PO2 BLDV: 44 MMHG — HIGH (ref 35–40)
PO2 BLDV: 46 MMHG — HIGH (ref 35–40)
POTASSIUM BLDV-SCNC: 3.1 MMOL/L — LOW (ref 3.4–4.5)
POTASSIUM BLDV-SCNC: 4.4 MMOL/L — SIGNIFICANT CHANGE UP (ref 3.4–4.5)
POTASSIUM BLDV-SCNC: 5.3 MMOL/L — HIGH (ref 3.4–4.5)
POTASSIUM BLDV-SCNC: 5.6 MMOL/L — HIGH (ref 3.4–4.5)
POTASSIUM SERPL-MCNC: 3.5 MMOL/L — SIGNIFICANT CHANGE UP (ref 3.5–5.3)
POTASSIUM SERPL-MCNC: 4.4 MMOL/L — SIGNIFICANT CHANGE UP (ref 3.5–5.3)
POTASSIUM SERPL-MCNC: 4.5 MMOL/L — SIGNIFICANT CHANGE UP (ref 3.5–5.3)
POTASSIUM SERPL-MCNC: 5.2 MMOL/L — SIGNIFICANT CHANGE UP (ref 3.5–5.3)
POTASSIUM SERPL-MCNC: 6.1 MMOL/L — HIGH (ref 3.5–5.3)
POTASSIUM SERPL-SCNC: 3.5 MMOL/L — SIGNIFICANT CHANGE UP (ref 3.5–5.3)
POTASSIUM SERPL-SCNC: 4.4 MMOL/L — SIGNIFICANT CHANGE UP (ref 3.5–5.3)
POTASSIUM SERPL-SCNC: 4.5 MMOL/L — SIGNIFICANT CHANGE UP (ref 3.5–5.3)
POTASSIUM SERPL-SCNC: 5.2 MMOL/L — SIGNIFICANT CHANGE UP (ref 3.5–5.3)
POTASSIUM SERPL-SCNC: 6.1 MMOL/L — HIGH (ref 3.5–5.3)
PROT SERPL-MCNC: 7.7 G/DL — SIGNIFICANT CHANGE UP (ref 6–8.3)
PROT SERPL-MCNC: 8.4 G/DL — HIGH (ref 6–8.3)
PROT SERPL-MCNC: 8.5 G/DL — HIGH (ref 6–8.3)
PROT UR-MCNC: NEGATIVE — SIGNIFICANT CHANGE UP
RBC # BLD: 4.86 M/UL — SIGNIFICANT CHANGE UP (ref 3.8–5.2)
RBC # FLD: 11.9 % — SIGNIFICANT CHANGE UP (ref 10.3–14.5)
RBC CASTS # UR COMP ASSIST: SIGNIFICANT CHANGE UP (ref 0–?)
SALICYLATES SERPL-MCNC: < 5 MG/DL — LOW (ref 15–30)
SAO2 % BLDV: 51.1 % — LOW (ref 60–85)
SAO2 % BLDV: 61.5 % — SIGNIFICANT CHANGE UP (ref 60–85)
SAO2 % BLDV: 75.4 % — SIGNIFICANT CHANGE UP (ref 60–85)
SAO2 % BLDV: 81.1 % — SIGNIFICANT CHANGE UP (ref 60–85)
SODIUM SERPL-SCNC: 134 MMOL/L — LOW (ref 135–145)
SODIUM SERPL-SCNC: 134 MMOL/L — LOW (ref 135–145)
SODIUM SERPL-SCNC: 137 MMOL/L — SIGNIFICANT CHANGE UP (ref 135–145)
SODIUM SERPL-SCNC: 140 MMOL/L — SIGNIFICANT CHANGE UP (ref 135–145)
SODIUM SERPL-SCNC: 141 MMOL/L — SIGNIFICANT CHANGE UP (ref 135–145)
SP GR SPEC: 1.02 — SIGNIFICANT CHANGE UP (ref 1–1.04)
SQUAMOUS # UR AUTO: SIGNIFICANT CHANGE UP
UROBILINOGEN FLD QL: NORMAL — SIGNIFICANT CHANGE UP
WBC # BLD: 10.47 K/UL — SIGNIFICANT CHANGE UP (ref 3.8–10.5)
WBC # FLD AUTO: 10.47 K/UL — SIGNIFICANT CHANGE UP (ref 3.8–10.5)
WBC UR QL: SIGNIFICANT CHANGE UP (ref 0–?)

## 2019-06-05 PROCEDURE — 93010 ELECTROCARDIOGRAM REPORT: CPT

## 2019-06-05 PROCEDURE — 99223 1ST HOSP IP/OBS HIGH 75: CPT | Mod: GC

## 2019-06-05 PROCEDURE — 74177 CT ABD & PELVIS W/CONTRAST: CPT | Mod: 26

## 2019-06-05 PROCEDURE — 99223 1ST HOSP IP/OBS HIGH 75: CPT

## 2019-06-05 PROCEDURE — 99291 CRITICAL CARE FIRST HOUR: CPT

## 2019-06-05 RX ORDER — POTASSIUM CHLORIDE 20 MEQ
20 PACKET (EA) ORAL
Refills: 0 | Status: COMPLETED | OUTPATIENT
Start: 2019-06-05 | End: 2019-06-05

## 2019-06-05 RX ORDER — INSULIN GLARGINE 100 [IU]/ML
16 INJECTION, SOLUTION SUBCUTANEOUS AT BEDTIME
Refills: 0 | Status: DISCONTINUED | OUTPATIENT
Start: 2019-06-05 | End: 2019-06-06

## 2019-06-05 RX ORDER — DEXTROSE 50 % IN WATER 50 %
15 SYRINGE (ML) INTRAVENOUS ONCE
Refills: 0 | Status: DISCONTINUED | OUTPATIENT
Start: 2019-06-05 | End: 2019-06-06

## 2019-06-05 RX ORDER — POTASSIUM PHOSPHATE, MONOBASIC POTASSIUM PHOSPHATE, DIBASIC 236; 224 MG/ML; MG/ML
15 INJECTION, SOLUTION INTRAVENOUS ONCE
Refills: 0 | Status: COMPLETED | OUTPATIENT
Start: 2019-06-05 | End: 2019-06-05

## 2019-06-05 RX ORDER — THIAMINE MONONITRATE (VIT B1) 100 MG
100 TABLET ORAL DAILY
Refills: 0 | Status: DISCONTINUED | OUTPATIENT
Start: 2019-06-05 | End: 2019-06-06

## 2019-06-05 RX ORDER — INSULIN LISPRO 100/ML
3 VIAL (ML) SUBCUTANEOUS
Refills: 0 | Status: DISCONTINUED | OUTPATIENT
Start: 2019-06-05 | End: 2019-06-05

## 2019-06-05 RX ORDER — FOLIC ACID 0.8 MG
1 TABLET ORAL DAILY
Refills: 0 | Status: DISCONTINUED | OUTPATIENT
Start: 2019-06-05 | End: 2019-06-06

## 2019-06-05 RX ORDER — DEXTROSE 50 % IN WATER 50 %
25 SYRINGE (ML) INTRAVENOUS ONCE
Refills: 0 | Status: DISCONTINUED | OUTPATIENT
Start: 2019-06-05 | End: 2019-06-06

## 2019-06-05 RX ORDER — ONDANSETRON 8 MG/1
4 TABLET, FILM COATED ORAL ONCE
Refills: 0 | Status: COMPLETED | OUTPATIENT
Start: 2019-06-05 | End: 2019-06-05

## 2019-06-05 RX ORDER — INSULIN GLARGINE 100 [IU]/ML
12 INJECTION, SOLUTION SUBCUTANEOUS ONCE
Refills: 0 | Status: DISCONTINUED | OUTPATIENT
Start: 2019-06-05 | End: 2019-06-05

## 2019-06-05 RX ORDER — DEXTROSE 50 % IN WATER 50 %
12.5 SYRINGE (ML) INTRAVENOUS ONCE
Refills: 0 | Status: DISCONTINUED | OUTPATIENT
Start: 2019-06-05 | End: 2019-06-05

## 2019-06-05 RX ORDER — SODIUM CHLORIDE 9 MG/ML
1000 INJECTION, SOLUTION INTRAVENOUS
Refills: 0 | Status: DISCONTINUED | OUTPATIENT
Start: 2019-06-05 | End: 2019-06-05

## 2019-06-05 RX ORDER — DEXTROSE 50 % IN WATER 50 %
12.5 SYRINGE (ML) INTRAVENOUS ONCE
Refills: 0 | Status: DISCONTINUED | OUTPATIENT
Start: 2019-06-05 | End: 2019-06-06

## 2019-06-05 RX ORDER — GLUCAGON INJECTION, SOLUTION 0.5 MG/.1ML
1 INJECTION, SOLUTION SUBCUTANEOUS ONCE
Refills: 0 | Status: DISCONTINUED | OUTPATIENT
Start: 2019-06-05 | End: 2019-06-06

## 2019-06-05 RX ORDER — INSULIN LISPRO 100/ML
4 VIAL (ML) SUBCUTANEOUS ONCE
Refills: 0 | Status: COMPLETED | OUTPATIENT
Start: 2019-06-05 | End: 2019-06-05

## 2019-06-05 RX ORDER — HUMAN INSULIN 100 [IU]/ML
5 INJECTION, SUSPENSION SUBCUTANEOUS ONCE
Refills: 0 | Status: COMPLETED | OUTPATIENT
Start: 2019-06-05 | End: 2019-06-05

## 2019-06-05 RX ORDER — SODIUM CHLORIDE 9 MG/ML
1000 INJECTION INTRAMUSCULAR; INTRAVENOUS; SUBCUTANEOUS ONCE
Refills: 0 | Status: COMPLETED | OUTPATIENT
Start: 2019-06-05 | End: 2019-06-05

## 2019-06-05 RX ORDER — INSULIN LISPRO 100/ML
VIAL (ML) SUBCUTANEOUS
Refills: 0 | Status: DISCONTINUED | OUTPATIENT
Start: 2019-06-05 | End: 2019-06-05

## 2019-06-05 RX ORDER — ENOXAPARIN SODIUM 100 MG/ML
40 INJECTION SUBCUTANEOUS EVERY 24 HOURS
Refills: 0 | Status: DISCONTINUED | OUTPATIENT
Start: 2019-06-05 | End: 2019-06-05

## 2019-06-05 RX ORDER — DOCUSATE SODIUM 100 MG
100 CAPSULE ORAL
Refills: 0 | Status: DISCONTINUED | OUTPATIENT
Start: 2019-06-05 | End: 2019-06-06

## 2019-06-05 RX ORDER — DEXTROSE 50 % IN WATER 50 %
15 SYRINGE (ML) INTRAVENOUS ONCE
Refills: 0 | Status: DISCONTINUED | OUTPATIENT
Start: 2019-06-05 | End: 2019-06-05

## 2019-06-05 RX ORDER — HYDROMORPHONE HYDROCHLORIDE 2 MG/ML
0.5 INJECTION INTRAMUSCULAR; INTRAVENOUS; SUBCUTANEOUS ONCE
Refills: 0 | Status: DISCONTINUED | OUTPATIENT
Start: 2019-06-05 | End: 2019-06-05

## 2019-06-05 RX ORDER — GLUCAGON INJECTION, SOLUTION 0.5 MG/.1ML
1 INJECTION, SOLUTION SUBCUTANEOUS ONCE
Refills: 0 | Status: DISCONTINUED | OUTPATIENT
Start: 2019-06-05 | End: 2019-06-05

## 2019-06-05 RX ORDER — SENNA PLUS 8.6 MG/1
2 TABLET ORAL AT BEDTIME
Refills: 0 | Status: DISCONTINUED | OUTPATIENT
Start: 2019-06-05 | End: 2019-06-06

## 2019-06-05 RX ORDER — INSULIN LISPRO 100/ML
VIAL (ML) SUBCUTANEOUS
Refills: 0 | Status: DISCONTINUED | OUTPATIENT
Start: 2019-06-05 | End: 2019-06-06

## 2019-06-05 RX ORDER — ACETAMINOPHEN 500 MG
650 TABLET ORAL EVERY 6 HOURS
Refills: 0 | Status: DISCONTINUED | OUTPATIENT
Start: 2019-06-05 | End: 2019-06-06

## 2019-06-05 RX ORDER — INSULIN LISPRO 100/ML
VIAL (ML) SUBCUTANEOUS AT BEDTIME
Refills: 0 | Status: DISCONTINUED | OUTPATIENT
Start: 2019-06-05 | End: 2019-06-05

## 2019-06-05 RX ORDER — DEXTROSE 50 % IN WATER 50 %
25 SYRINGE (ML) INTRAVENOUS ONCE
Refills: 0 | Status: DISCONTINUED | OUTPATIENT
Start: 2019-06-05 | End: 2019-06-05

## 2019-06-05 RX ORDER — SODIUM CHLORIDE 9 MG/ML
1000 INJECTION, SOLUTION INTRAVENOUS
Refills: 0 | Status: DISCONTINUED | OUTPATIENT
Start: 2019-06-05 | End: 2019-06-06

## 2019-06-05 RX ORDER — INSULIN GLARGINE 100 [IU]/ML
12 INJECTION, SOLUTION SUBCUTANEOUS AT BEDTIME
Refills: 0 | Status: DISCONTINUED | OUTPATIENT
Start: 2019-06-05 | End: 2019-06-05

## 2019-06-05 RX ORDER — INSULIN LISPRO 100/ML
VIAL (ML) SUBCUTANEOUS AT BEDTIME
Refills: 0 | Status: DISCONTINUED | OUTPATIENT
Start: 2019-06-05 | End: 2019-06-06

## 2019-06-05 RX ORDER — GABAPENTIN 400 MG/1
100 CAPSULE ORAL
Refills: 0 | Status: DISCONTINUED | OUTPATIENT
Start: 2019-06-05 | End: 2019-06-06

## 2019-06-05 RX ORDER — SERTRALINE 25 MG/1
100 TABLET, FILM COATED ORAL AT BEDTIME
Refills: 0 | Status: DISCONTINUED | OUTPATIENT
Start: 2019-06-05 | End: 2019-06-06

## 2019-06-05 RX ORDER — INSULIN LISPRO 100/ML
5 VIAL (ML) SUBCUTANEOUS
Refills: 0 | Status: DISCONTINUED | OUTPATIENT
Start: 2019-06-05 | End: 2019-06-06

## 2019-06-05 RX ADMIN — ONDANSETRON 4 MILLIGRAM(S): 8 TABLET, FILM COATED ORAL at 02:42

## 2019-06-05 RX ADMIN — Medication 20 MILLIEQUIVALENT(S): at 18:34

## 2019-06-05 RX ADMIN — GABAPENTIN 100 MILLIGRAM(S): 400 CAPSULE ORAL at 17:31

## 2019-06-05 RX ADMIN — Medication 2 MILLIGRAM(S): at 02:04

## 2019-06-05 RX ADMIN — SODIUM CHLORIDE 150 MILLILITER(S): 9 INJECTION, SOLUTION INTRAVENOUS at 14:36

## 2019-06-05 RX ADMIN — SODIUM CHLORIDE 1000 MILLILITER(S): 9 INJECTION INTRAMUSCULAR; INTRAVENOUS; SUBCUTANEOUS at 02:04

## 2019-06-05 RX ADMIN — SENNA PLUS 2 TABLET(S): 8.6 TABLET ORAL at 22:19

## 2019-06-05 RX ADMIN — Medication 100 MILLIGRAM(S): at 17:31

## 2019-06-05 RX ADMIN — Medication 1 TABLET(S): at 11:50

## 2019-06-05 RX ADMIN — Medication 100 MILLIGRAM(S): at 16:27

## 2019-06-05 RX ADMIN — Medication 2 MILLIGRAM(S): at 10:22

## 2019-06-05 RX ADMIN — Medication 2 MILLIGRAM(S): at 01:16

## 2019-06-05 RX ADMIN — POTASSIUM PHOSPHATE, MONOBASIC POTASSIUM PHOSPHATE, DIBASIC 62.5 MILLIMOLE(S): 236; 224 INJECTION, SOLUTION INTRAVENOUS at 17:30

## 2019-06-05 RX ADMIN — Medication 4 UNIT(S): at 04:58

## 2019-06-05 RX ADMIN — Medication 20 MILLIEQUIVALENT(S): at 20:17

## 2019-06-05 RX ADMIN — ONDANSETRON 4 MILLIGRAM(S): 8 TABLET, FILM COATED ORAL at 04:44

## 2019-06-05 RX ADMIN — Medication 1 MILLIGRAM(S): at 11:50

## 2019-06-05 RX ADMIN — SODIUM CHLORIDE 125 MILLILITER(S): 9 INJECTION, SOLUTION INTRAVENOUS at 06:08

## 2019-06-05 RX ADMIN — Medication 50 MILLIGRAM(S): at 17:42

## 2019-06-05 RX ADMIN — Medication 20 MILLIEQUIVALENT(S): at 16:27

## 2019-06-05 RX ADMIN — Medication 2: at 13:28

## 2019-06-05 RX ADMIN — Medication 650 MILLIGRAM(S): at 11:35

## 2019-06-05 RX ADMIN — Medication 50 MILLIGRAM(S): at 14:08

## 2019-06-05 RX ADMIN — Medication 5 UNIT(S): at 18:29

## 2019-06-05 RX ADMIN — HYDROMORPHONE HYDROCHLORIDE 0.5 MILLIGRAM(S): 2 INJECTION INTRAMUSCULAR; INTRAVENOUS; SUBCUTANEOUS at 05:00

## 2019-06-05 RX ADMIN — INSULIN GLARGINE 16 UNIT(S): 100 INJECTION, SOLUTION SUBCUTANEOUS at 22:19

## 2019-06-05 RX ADMIN — SODIUM CHLORIDE 1000 MILLILITER(S): 9 INJECTION INTRAMUSCULAR; INTRAVENOUS; SUBCUTANEOUS at 01:17

## 2019-06-05 RX ADMIN — Medication 650 MILLIGRAM(S): at 12:30

## 2019-06-05 RX ADMIN — HUMAN INSULIN 5 UNIT(S): 100 INJECTION, SUSPENSION SUBCUTANEOUS at 10:31

## 2019-06-05 RX ADMIN — Medication 2 MILLIGRAM(S): at 06:56

## 2019-06-05 RX ADMIN — HYDROMORPHONE HYDROCHLORIDE 0.5 MILLIGRAM(S): 2 INJECTION INTRAMUSCULAR; INTRAVENOUS; SUBCUTANEOUS at 01:29

## 2019-06-05 RX ADMIN — Medication 5 UNIT(S): at 13:25

## 2019-06-05 RX ADMIN — SODIUM CHLORIDE 150 MILLILITER(S): 9 INJECTION, SOLUTION INTRAVENOUS at 22:20

## 2019-06-05 RX ADMIN — Medication 2 MILLIGRAM(S): at 07:53

## 2019-06-05 RX ADMIN — HYDROMORPHONE HYDROCHLORIDE 0.5 MILLIGRAM(S): 2 INJECTION INTRAMUSCULAR; INTRAVENOUS; SUBCUTANEOUS at 05:15

## 2019-06-05 NOTE — H&P ADULT - ATTENDING COMMENTS
Agree with above and modified as necessary  #DKA  -improved gap and acidosis  -appreciate endo recs, monitor BMP q4 hours today to ensure no opening of anion gap, then can be q12-q24 hours.  -monitor K, Phos, Mg  -continue with fluids.  -no signs of infection at this time.    #High anion-gap metabolic acidosis with concominant metabolic alkalosis  -patient with high anion gap acidosis 2/2 lactic acidosis (liekly from ETOH) and ketoacidosis, Bicarb 11, but pH inappropriately high, likely ins etting of alkalosis from emesis  -improved now, continue to monitor AG.    #ETOH abuse  -day 2 since last drink  -high risk CIWA, no cirrhosis, will start librium taper  -MV given.  -no evidence of wernicke's.    #anxiety  -neeeds follow up with her psychiatrist.  -on anxiolytics for CIWA, will cotninue trazodone and seroquel for anxiety and depression.    rest as above

## 2019-06-05 NOTE — CONSULT NOTE ADULT - PROBLEM SELECTOR RECOMMENDATION 9
-provide IV hydration and pain control for withdrawal symptoms per primary team  -she was tolerating juice and drank apple and cranberry juice  -for now provide NPH 5 units stat  -Lantus 18 units at bedtime, humalog 6 units TID with meals  -repeat a BMP this afternoon to ensure anion gap remains closed  -consistent carbohydrate diet as tolerated  -low correction and bedtime sliding scale  -with her history of pancreatitis, she is not a candidate for DPP-IV inhibotors or GLP1 agonists  -social work consultation as well -provide IV hydration and pain control for withdrawal symptoms per primary team  -she was tolerating juice and drank apple and cranberry juice  -for now provide NPH 5 units stat  -Lantus 16 units at bedtime, humalog 5 units TID with meals  -repeat a BMP this afternoon to ensure anion gap remains closed  -consistent carbohydrate diet as tolerated  -low correction and bedtime sliding scale  -with her history of pancreatitis, she is not a candidate for DPP-IV inhibotors or GLP1 agonists  -social work consultation as well
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
## 2019-06-05 NOTE — CONSULT NOTE ADULT - ASSESSMENT
Patient is a 49 year old woman with history of T2DM with prior episodes of DKA (last A1C 9.2 in 03/2019), depression with anxiety, alcoholic pancreatitis requiring ICU admission presenting nausea and vomiting for the past 2 days. MICU consulted for possible DKA.     # Metabolic acidosis with elevated anion gap   - Likely multifactorial in the setting of elevated lactate, starvation ketoacidosis from alcohol use as well as DKA (beta hydroxybutyrate 5.2 and )  - Lactate responded to IVF resuscitation   - PH now normalized to 7.32/38/31/19 and HCO3 improving  - Continue with IVF for resuscitation   - s/p Humalog 4 units in the ED, continue to monitor FS and continue with long acting Insulin at decreased dose    Patient does not meet MICU admission at this time. Patient seen and examined with the MICU attending.     Libby Hannon PGY-2  Internal medicine MICU  [id number]
Patient is a 48 yo woman with uncontrolled type 2 diabetes (A1c of 9.2%), complicated by neuropathy with ETOH dependence admitted for withdrawal and DKA (high medical decision making)

## 2019-06-05 NOTE — H&P ADULT - NSHPSOCIALHISTORY_GEN_ALL_CORE
Unemployed on disability, lives with extended family (reports drug abuse at home), binge drinking 2pints of vodka (3 days at a time, every 3 days), no tobacco use, no IVDU

## 2019-06-05 NOTE — H&P ADULT - NSHPREVIEWOFSYSTEMS_GEN_ALL_CORE
CONSTITUTIONAL: No fever, weight loss, or fatigue  EYES: No eye pain, visual disturbances, or discharge  ENMT:  No difficulty hearing, tinnitus, vertigo; No sinus or throat pain  RESPIRATORY: No cough, wheezing, chills or hemoptysis; No shortness of breath  CARDIOVASCULAR: No chest pain, palpitations, dizziness, or leg swelling  GASTROINTESTINAL: No abdominal or epigastric pain. No nausea, vomiting, or hematemesis; No diarrhea or constipation. No melena or hematochezia.  GENITOURINARY: No dysuria, frequency, hematuria, or incontinence  NEUROLOGICAL: No headaches, loss of strength, numbness, or tremors  SKIN: No itching, burning, rashes, or lesions   LYMPH NODES: No enlarged glands  ENDOCRINE: No heat or cold intolerance; No polydipsia or polyuria  MUSCULOSKELETAL: No joint pain or swelling;   PSYCHIATRIC: Denies depression, anxiety  HEME/LYMPH: No easy bruising, or bleeding gums  ALLERGY AND IMMUNOLOGIC: No hives or eczema CONSTITUTIONAL: No fever, weight loss, or fatigue  EYES: No eye pain, visual disturbances, or discharge  ENMT:  No difficulty hearing, tinnitus, vertigo; No sinus or throat pain  RESPIRATORY: No cough, wheezing, chills or hemoptysis; No shortness of breath  CARDIOVASCULAR: No chest pain, palpitations, dizziness, or leg swelling  GASTROINTESTINAL: + abdominal pain, nausea, vomiting. No diarrhea or constipation. No melena or hematochezia.  GENITOURINARY: No dysuria, frequency, hematuria, or incontinence  NEUROLOGICAL: No headaches, loss of strength, numbness, or tremors  SKIN: +itching. no burning, rashes, or lesions   LYMPH NODES: No enlarged glands  ENDOCRINE: No heat or cold intolerance; No polydipsia or polyuria  MUSCULOSKELETAL: No joint pain or swelling;   PSYCHIATRIC: Denies depression, anxiety  HEME/LYMPH: No easy bruising, or bleeding gums  ALLERGY AND IMMUNOLOGIC: No hives or eczema

## 2019-06-05 NOTE — ED ADULT TRIAGE NOTE - CHIEF COMPLAINT QUOTE
Pt BIBA for 2 days n/v.  states stopped drinking 2 days ago.  Patient is shaking and vomiting green.  Rpts LUQ abd pain.  States she drinks 2 pints. daily.  Initial CIWA 36.  1L NS & 8mg zofran by EMS.  20G L forearm. Pt BIBA for 2 days n/v.  states stopped drinking 2 days ago.  Patient is shaking and vomiting green.  Rpts LUQ abd pain.  States she drinks 2 pints. daily.  Initial CIWA=36.  1L NS & 8mg zofran by EMS.  20G L forearm.  Hx MICU with intubations. Pt BIBA for 2 days n/v.  states stopped drinking 2 days ago.  Patient is shaking and vomiting green.  Rpts LUQ abd pain.  States she drinks 2 pints. daily.  Initial CIWA=32.  1L NS & 8mg zofran by EMS.  20G L forearm.  Hx MICU with intubations.

## 2019-06-05 NOTE — ED PROVIDER NOTE - CARE PLAN
Principal Discharge DX:	Alcohol withdrawal Principal Discharge DX:	Alcoholic ketoacidosis  Secondary Diagnosis:	Alcohol abuse Principal Discharge DX:	Alcoholic ketoacidosis  Secondary Diagnosis:	Alcohol abuse  Secondary Diagnosis:	Alcohol withdrawal syndrome without complication
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
## 2019-06-05 NOTE — H&P ADULT - NSHPPHYSICALEXAM_GEN_ALL_CORE
Vital Signs Last 24 Hrs  T(C): 36.8 (05 Jun 2019 08:30), Max: 36.8 (05 Jun 2019 00:28)  T(F): 98.2 (05 Jun 2019 08:30), Max: 98.2 (05 Jun 2019 00:28)  HR: 76 (05 Jun 2019 08:30) (76 - 134)  BP: 150/103 (05 Jun 2019 08:30) (146/86 - 167/98)  BP(mean): --  RR: 18 (05 Jun 2019 08:30) (16 - 18)  SpO2: 98% (05 Jun 2019 08:30) (97% - 100%)  I&O's Summary      PHYSICAL EXAM:  GENERAL: NAD  HEAD:  Atraumatic, Normocephalic  EYES: EOMI, PERRLA, conjunctiva and sclera clear  ENMT: No tonsillar erythema, exudates, or enlargement; Moist mucous membranes, Good dentition  NECK: Supple, No JVD  NERVOUS SYSTEM: AOX3, motor and sensation grossly intact in b/l UE and b/l LE  PSYCHIATRIC: Appropriate affect and mood  CHEST/LUNG: Clear to auscultation bilaterally; No rales, rhonchi, wheezing, or rubs  HEART: Regular rate and rhythm; No murmurs, rubs, or gallops. No LE edema  ABDOMEN: Soft, Nontender, Nondistended; Bowel sounds present  EXTREMITIES:  2+ Peripheral Pulses, No clubbing, cyanosis  SKIN: No rashes or lesions Vital Signs Last 24 Hrs  T(C): 36.8 (05 Jun 2019 08:30), Max: 36.8 (05 Jun 2019 00:28)  T(F): 98.2 (05 Jun 2019 08:30), Max: 98.2 (05 Jun 2019 00:28)  HR: 76 (05 Jun 2019 08:30) (76 - 134)  BP: 150/103 (05 Jun 2019 08:30) (146/86 - 167/98)  BP(mean): --  RR: 18 (05 Jun 2019 08:30) (16 - 18)  SpO2: 98% (05 Jun 2019 08:30) (97% - 100%)    PHYSICAL EXAM:  GENERAL: NAD  HEAD:  Atraumatic, Normocephalic  EYES: EOMI, conjunctiva and sclera clear  ENMT: No tonsillar erythema, exudates, or enlargement; dry mucous membranes, good dentition  NECK: Supple, No JVD  NERVOUS SYSTEM: AOX3, motor and sensation grossly intact in b/l UE and b/l LE  PSYCHIATRIC: Appropriate affect and mood  CHEST/LUNG: Clear to auscultation bilaterally; No rales, rhonchi, wheezing, or rubs  HEART: Regular rate and rhythm; No murmurs, rubs, or gallops. No LE edema  ABDOMEN: Soft, +RLQ tenderness, no rebound or guarding, Nondistended; Bowel sounds present  EXTREMITIES:  2+ Peripheral Pulses, No clubbing, cyanosis  SKIN: No rashes or lesions Vital Signs Last 24 Hrs  T(C): 36.8 (05 Jun 2019 08:30), Max: 36.8 (05 Jun 2019 00:28)  T(F): 98.2 (05 Jun 2019 08:30), Max: 98.2 (05 Jun 2019 00:28)  HR: 76 (05 Jun 2019 08:30) (76 - 134)  BP: 150/103 (05 Jun 2019 08:30) (146/86 - 167/98)  BP(mean): --  RR: 18 (05 Jun 2019 08:30) (16 - 18)  SpO2: 98% (05 Jun 2019 08:30) (97% - 100%)    PHYSICAL EXAM:  GENERAL: NAD, tremulous  HEAD:  Atraumatic, Normocephalic  EYES: EOMI, conjunctiva and sclera clear  ENMT: No tonsillar erythema, exudates, or enlargement; dry mucous membranes, good dentition  NECK: Supple, No JVD  NERVOUS SYSTEM: AOX3, motor and sensation grossly intact in b/l UE and b/l LE  PSYCHIATRIC: Patient anxious, tremulous.   CHEST/LUNG: Clear to auscultation bilaterally; No rales, rhonchi, wheezing, or rubs  HEART: Regular rate and rhythm; No murmurs, rubs, or gallops. No LE edema  ABDOMEN: Soft, +RLQ tenderness, no rebound or guarding, Nondistended; Bowel sounds present  EXTREMITIES:  2+ Peripheral Pulses, No clubbing, cyanosis  SKIN: No rashes or lesions

## 2019-06-05 NOTE — CONSULT NOTE ADULT - ATTENDING COMMENTS
pt is  a 50 yo female with hx of etoh, dm who presents  with nausea, vomiting and elevated glucose, noted   elevated beta hydroxy, ketones consistent with dka,  needs iv f , insuling , monitoring o f  electrolytes ,  monitor for etoh withdrawal, monitor electrolytes

## 2019-06-05 NOTE — CONSULT NOTE ADULT - PROBLEM SELECTOR RECOMMENDATION 3
-statin if no other contraindications    Patient requires follow up with Dr. Domenica Reyes upon discharge.  [phone]

## 2019-06-05 NOTE — ED PROVIDER NOTE - CLINICAL SUMMARY MEDICAL DECISION MAKING FREE TEXT BOX
Etoh abuse/withdrawal, abd pain, hx of pancreatitis, concern for pancreatitis vs dks - labs, cxr, IV hydration, Ativan, will place on CIWA and admit.

## 2019-06-05 NOTE — H&P ADULT - PROBLEM SELECTOR PLAN 2
Presenting with tremors and severe anxiety; concern for acute withdrawal  CIWA >8 on admission  - will start symptom triggered CIWA  - Librium taper

## 2019-06-05 NOTE — H&P ADULT - ASSESSMENT
50 yo F PMHx of DM2, EtOH abuse/withdrawal, depression, anxiety, alcoholic pancreatitis who presents with abdominal pain, nausea, vomiting, found to be in DKA, also being treated for alcohol withdrawal.

## 2019-06-05 NOTE — ED ADULT NURSE NOTE - OBJECTIVE STATEMENT
Pt. received in room 4, A&Ox4, ambulatory, with 20 gauge IV in left hand placed by float LAWRENCE WILKINS Pt. with hx of ETOH c/o LUQ abd pain, n/v that began this morning. Pt. reports she typically drinks about 2 pints of vodka daily, last drink 2 days ago. Denies blood in vomit. Tremors noted. PA aware of pt. CIWA score. Denies fever, chills, SOB, chest pain, dizziness, suicide ideation, homicide ideation, auditory/visual hallucinations. LMP 2 years ago pt. states she is postmenopausal. Respirations are even and unlabored on room air. MD evaluation in progress. Will continue to monitor.

## 2019-06-05 NOTE — ED ADULT NURSE REASSESSMENT NOTE - NS ED NURSE REASSESS COMMENT FT1
Report received from break coverage RN Mc COOK Pt. received A&Ox4, ambulatory. VS and CIWA as noted. PA Micha aware of pt. vital signs and CIWA score. Medication given as per PA order.  MICU at bedside for evaluation. Respirations are even and unlabored on room air. Will continue to monitor.
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
## 2019-06-05 NOTE — H&P ADULT - NSICDXFAMILYHX_GEN_ALL_CORE_FT
FAMILY HISTORY:  Family history of diabetes mellitus  Family history of systemic lupus erythematosus    Sibling  Still living? Unknown  Family history of abscess of skin or subcutaneous tissue, Age at diagnosis: Age Unknown    Grandparent  Still living? Unknown  Family history of cerebral aneurysm, Age at diagnosis: Age Unknown    Aunt  Still living? Unknown  Family history of cerebral aneurysm, Age at diagnosis: Age Unknown

## 2019-06-05 NOTE — ED ADULT NURSE REASSESSMENT NOTE - NS ED NURSE REASSESS COMMENT FT1
Spoke with DALE Kelley, as per MAR, give PO ativan as per order and pt. can go to admitted bed after. Pt. is calm and cooperative at present. Respirations are even and unlabored. Pt. is being transported at present.

## 2019-06-05 NOTE — ED ADULT NURSE NOTE - CHIEF COMPLAINT QUOTE
Pt BIBA for 2 days n/v.  states stopped drinking 2 days ago.  Patient is shaking and vomiting green.  Rpts LUQ abd pain.  States she drinks 2 pints. daily.  Initial CIWA=32.  1L NS & 8mg zofran by EMS.  20G L forearm.  Hx MICU with intubations.

## 2019-06-05 NOTE — H&P ADULT - PROBLEM SELECTOR PLAN 4
c/w home dose Trazadone 100mg qhs  c/w home dose Zoloft 100mg Qhs  c/w home dose seroquel 25mg daily

## 2019-06-05 NOTE — H&P ADULT - PROBLEM SELECTOR PLAN 3
Endorses severe anxiety that she uses alcohol to self medicate  Previously on Klonopin but stopped years ago by outpatient provider in the setting of alcohol abuse   - Will concomitantly treat anxiety while on CIWA  - outpatient followup with patient's Psychiatrist

## 2019-06-05 NOTE — H&P ADULT - NSICDXPASTMEDICALHX_GEN_ALL_CORE_FT
PAST MEDICAL HISTORY:  Alcohol abuse     Anxiety     Depression     Diabetes     MRSA cellulitis     Pancreatitis

## 2019-06-05 NOTE — ED PROVIDER NOTE - ATTENDING CONTRIBUTION TO CARE
pt w hx of ETOH abuse, withdrawals, pancreatitis and DM w DKA presenting with onset of severe constant epigastric abd pain that does not radiate.  Associated with multiple episodes of NBNB emesis.  Drinks 2 pints vodka daily however stopped two days ago    Gen: Well appearing in moderate distress from pain  Head: NC/AT  Neck: trachea midline  Resp:  No distress  CV: Tachy RR  Abd; Soft TTP in the epigastrium  Ext: no deformities  Neuro:  A&O appears non focal  Skin:  Warm and dry as visualized  Psych:  Normal affect and mood     Pt with abd pain and NV.  In setting of ETOH abuse and withdrawals need to consider pancreatitis high on differential.  Will be aggressive with CIWA and benzos.  Initial labs showing gap and elevated lactate with mild acidosis.  Most consistent with AKA more so than a DKA picture.  Will hydrate aggressively and treat with banana bag.  No indication for insulin at this time.

## 2019-06-05 NOTE — H&P ADULT - NSHPLABSRESULTS_GEN_ALL_CORE
LABS:                        15.5   10.47 )-----------( 178      ( 05 Jun 2019 01:00 )             45.2     05 Jun 2019 04:48    140    |  103    |  7      ----------------------------<  308    5.2     |  17     |  0.49     Ca    8.2        05 Jun 2019 04:48    TPro  7.7    /  Alb  4.7    /  TBili  1.3    /  DBili  x      /  AST  69     /  ALT  42     /  AlkPhos  97       05 Jun 2019 04:48  Blood Gas Venous Comprehensive (06.05.19 @ 04:48)    Blood Gas Venous - Lactate: 2.3: Please note updated reference range. mmol/L    Blood Gas Venous - Chloride: 107 mmol/L    Blood Gas Venous - Creatinine: 0.53 mg/dL    pH, Venous: 7.32 pH    pCO2, Venous: 38 mmHg    pO2, Venous: 31 mmHg    HCO3, Venous: 19 mmol/L    Blood Gas Venous - FIO2: 21    Base Excess, Venous: -6.0: REFERENCE RANGE = -3 + 2 mmol/L mmol/L    Oxygen Saturation, Venous: 51.1 %    Blood Gas Venous - Sodium: 138 mmol/L    Blood Gas Venous - Potassium: 5.3 mmol/L    Blood Gas Venous - Glucose: 308 mg/dL    Blood Gas Venous - Hemoglobin: 14.0 g/dL    Blood Gas Venous - Hematocrit: 42.9 %  lactate elevated at 6.4, decreased to 2.3 after IVF    CAPILLARY BLOOD GLUCOSE  POCT Blood Glucose.: 238 mg/dL (05 Jun 2019 06:37)  POCT Blood Glucose.: 326 mg/dL (05 Jun 2019 04:32)  POCT Blood Glucose.: 340 mg/dL (05 Jun 2019 00:50)    RADIOLOGY & ADDITIONAL TESTS:  < from: CT Abdomen and Pelvis w/ IV Cont (06.05.19 @ 05:49) >      FINDINGS:    LOWER CHEST: Within normal limits.    LIVER: Hepatic steatosis.  BILE DUCTS: Normal caliber.  GALLBLADDER: Cholecystectomy.  SPLEEN: Nonspecific indeterminant low-attenuation lesions too small to   characterize.  PANCREAS: Fatty atrophy without peripancreatic inflammation. No ductal   dilatation.  ADRENALS: Within normal limits.  KIDNEYS/URETERS: Within normal limits.    BLADDER: Within normal limits.  REPRODUCTIVE ORGANS: Uterus and adnexa within normal limits.    BOWEL: No bowel obstruction. Appendix is normal.  PERITONEUM: No ascites.  VESSELS:  Minimal atherosclerotic changes.  RETROPERITONEUM: No lymphadenopathy.    ABDOMINAL WALL: Within normal limits.  BONES: Within normal limits.    IMPRESSION:     No CT evidence of acute pancreatitis.    Hepatic steatosis.  < end of copied text >        Imaging Personally Reviewed:  [ ] YES LABS:                        15.5   10.47 )-----------( 178      ( 05 Jun 2019 01:00 )             45.2     05 Jun 2019 04:48    140    |  103    |  7      ----------------------------<  308    5.2     |  17     |  0.49     Ca    8.2        05 Jun 2019 04:48    TPro  7.7    /  Alb  4.7    /  TBili  1.3    /  DBili  x      /  AST  69     /  ALT  42     /  AlkPhos  97       05 Jun 2019 04:48  Blood Gas Venous Comprehensive (06.05.19 @ 04:48)    Blood Gas Venous - Lactate: 2.3: Please note updated reference range. mmol/L    Blood Gas Venous - Chloride: 107 mmol/L    Blood Gas Venous - Creatinine: 0.53 mg/dL    pH, Venous: 7.26 pH    pCO2, Venous: 38 mmHg    pO2, Venous: 31 mmHg    HCO3, Venous: 19 mmol/L    Blood Gas Venous - FIO2: 21    Base Excess, Venous: -6.0: REFERENCE RANGE = -3 + 2 mmol/L mmol/L    Oxygen Saturation, Venous: 51.1 %    Blood Gas Venous - Sodium: 138 mmol/L    Blood Gas Venous - Potassium: 5.3 mmol/L    Blood Gas Venous - Glucose: 308 mg/dL    Blood Gas Venous - Hemoglobin: 14.0 g/dL    Blood Gas Venous - Hematocrit: 42.9 %  lactate elevated at 6.4, decreased to 2.3 after IVF    CAPILLARY BLOOD GLUCOSE  POCT Blood Glucose.: 238 mg/dL (05 Jun 2019 06:37)  POCT Blood Glucose.: 326 mg/dL (05 Jun 2019 04:32)  POCT Blood Glucose.: 340 mg/dL (05 Jun 2019 00:50)    RADIOLOGY & ADDITIONAL TESTS:  < from: CT Abdomen and Pelvis w/ IV Cont (06.05.19 @ 05:49) >  FINDINGS:    LOWER CHEST: Within normal limits.    LIVER: Hepatic steatosis.  BILE DUCTS: Normal caliber.  GALLBLADDER: Cholecystectomy.  SPLEEN: Nonspecific indeterminant low-attenuation lesions too small to   characterize.  PANCREAS: Fatty atrophy without peripancreatic inflammation. No ductal   dilatation.  ADRENALS: Within normal limits.  KIDNEYS/URETERS: Within normal limits.    BLADDER: Within normal limits.  REPRODUCTIVE ORGANS: Uterus and adnexa within normal limits.    BOWEL: No bowel obstruction. Appendix is normal.  PERITONEUM: No ascites.  VESSELS:  Minimal atherosclerotic changes.  RETROPERITONEUM: No lymphadenopathy.    ABDOMINAL WALL: Within normal limits.  BONES: Within normal limits.    IMPRESSION:     No CT evidence of acute pancreatitis.    Hepatic steatosis.  < end of copied text >    Imaging Personally Reviewed:  [x] YES

## 2019-06-06 ENCOUNTER — TRANSCRIPTION ENCOUNTER (OUTPATIENT)
Age: 49
End: 2019-06-06

## 2019-06-06 VITALS
TEMPERATURE: 98 F | DIASTOLIC BLOOD PRESSURE: 85 MMHG | RESPIRATION RATE: 16 BRPM | SYSTOLIC BLOOD PRESSURE: 152 MMHG | HEART RATE: 62 BPM | OXYGEN SATURATION: 100 %

## 2019-06-06 LAB
APTT BLD: 28.6 SEC — SIGNIFICANT CHANGE UP (ref 27.5–36.3)
HBA1C BLD-MCNC: 10 % — HIGH (ref 4–5.6)
INR BLD: 1.08 — SIGNIFICANT CHANGE UP (ref 0.88–1.17)
PROTHROM AB SERPL-ACNC: 12 SEC — SIGNIFICANT CHANGE UP (ref 9.8–13.1)

## 2019-06-06 PROCEDURE — 99239 HOSP IP/OBS DSCHRG MGMT >30: CPT | Mod: GC

## 2019-06-06 RX ORDER — INSULIN DETEMIR 100/ML (3)
15 INSULIN PEN (ML) SUBCUTANEOUS
Qty: 0 | Refills: 0 | DISCHARGE

## 2019-06-06 RX ORDER — QUETIAPINE FUMARATE 200 MG/1
25 TABLET, FILM COATED ORAL DAILY
Refills: 0 | Status: DISCONTINUED | OUTPATIENT
Start: 2019-06-06 | End: 2019-06-06

## 2019-06-06 RX ADMIN — Medication 50 MILLIGRAM(S): at 00:19

## 2019-06-06 RX ADMIN — Medication 100 MILLIGRAM(S): at 05:43

## 2019-06-06 RX ADMIN — Medication 50 MILLIGRAM(S): at 05:44

## 2019-06-06 RX ADMIN — Medication 50 MILLIGRAM(S): at 10:55

## 2019-06-06 RX ADMIN — GABAPENTIN 100 MILLIGRAM(S): 400 CAPSULE ORAL at 05:43

## 2019-06-06 NOTE — DISCHARGE NOTE PROVIDER - CARE PROVIDER_API CALL
Jose Antonio Hidalgo)  Internal Medicine  [address] [place] [address]  Phone: [phone]  Fax: [phone]  Follow Up Time:

## 2019-06-06 NOTE — PROGRESS NOTE ADULT - SUBJECTIVE AND OBJECTIVE BOX
Fan Jean-Baptiste MD MARLENI  Internal Medicine PGY-1  Pager [facility]: [phone]; [facility] [id number]    Patient is a 49y old  Female who presents with a chief complaint of DKA (2019 08:48)    SUBJECTIVE/OVERNIGHT EVENTS   No acute events overnight. Patient requesting to leave AMA     OBJECTIVE  Vital Signs Last 24 Hrs  T(C): 36.7 (2019 06:30), Max: 36.9 (2019 21:14)  T(F): 98 (2019 06:30), Max: 98.4 (2019 21:14)  HR: 62 (2019 06:30) (62 - 74)  BP: 152/85 (2019 06:30) (121/75 - 152/85)  RR: 16 (2019 06:30) (16 - 18)  SpO2: 100% (2019 06:30) (97% - 100%)    I&O's Summary    2019 07:01  -  2019 07:00  --------------------------------------------------------  IN: 2665 mL / OUT: 0 mL / NET: 2665 mL    PHYSICAL EXAM:  GENERAL: NAD, well-developed  HEAD:  Atraumatic, Normocephalic  EYES: EOMI, conjunctiva and sclera clear  NECK: Supple, No JVD  CHEST/LUNG: Clear to auscultation bilaterally; No wheeze  HEART: Regular rate and rhythm; No murmurs, rubs, or gallops  ABDOMEN: Soft, Nontender, Nondistended; Bowel sounds present  EXTREMITIES:  2+ Peripheral Pulses, No clubbing, cyanosis, or edema  PSYCH: AAOx3  NEUROLOGY: +Tremors  SKIN: No rashes or lesions    LABS                        15.5   10.47 )-----------( 178      ( 2019 01:00 )             45.2     06-05    134<L>  |  99  |  5<L>  ----------------------------<  178<H>  4.4   |  20<L>  |  0.52  06-05    134<L>  |  98  |  6<L>  ----------------------------<  101<H>  3.5   |  20<L>  |  0.50    Ca    8.7      2019 22:10  Ca    8.8      2019 14:50  Phos  2.3     06-05  Mg     1.6     06-05    TPro  7.7  /  Alb  4.7  /  TBili  1.3<H>  /  DBili  x   /  AST  69<H>  /  ALT  42<H>  /  AlkPhos  97  06-05  TPro  8.5<H>  /  Alb  4.8  /  TBili  1.4<H>  /  DBili  x   /  AST  96<H>  /  ALT  48<H>  /  AlkPhos  100  06-05    CAPILLARY BLOOD GLUCOSE  POCT Blood Glucose.: 170 mg/dL (2019 22:10)  POCT Blood Glucose.: 132 mg/dL (2019 18:07)  POCT Blood Glucose.: 246 mg/dL (2019 12:54)    PT/INR - ( 2019 06:14 )   PT: 12.0 SEC;   INR: 1.08     PTT - ( 2019 06:14 )  PTT:28.6 SEC    Urinalysis Basic - ( 2019 04:48 )  Color: LT. YELLOW / Appearance: CLEAR / S.021 / pH: 5.5  Gluc: 1000 / Ketone: 150  / Bili: NEGATIVE / Urobili: NORMAL   Blood: NEGATIVE / Protein: NEGATIVE / Nitrite: NEGATIVE   Leuk Esterase: NEGATIVE / RBC: 0-2 / WBC 0-2   Sq Epi: OCC / Non Sq Epi: x / Bacteria: x    MEDICATIONS  (STANDING):  chlordiazePOXIDE   Oral   chlordiazePOXIDE 50 milliGRAM(s) Oral every 8 hours  chlordiazePOXIDE 50 milliGRAM(s) Oral once  dextrose 5%. 1000 milliLiter(s) (50 mL/Hr) IV Continuous <Continuous>  dextrose 50% Injectable 12.5 Gram(s) IV Push once  dextrose 50% Injectable 25 Gram(s) IV Push once  dextrose 50% Injectable 25 Gram(s) IV Push once  docusate sodium 100 milliGRAM(s) Oral two times a day  folic acid 1 milliGRAM(s) Oral daily  gabapentin 100 milliGRAM(s) Oral two times a day  insulin glargine Injectable (LANTUS) 16 Unit(s) SubCutaneous at bedtime  insulin lispro (HumaLOG) corrective regimen sliding scale   SubCutaneous three times a day before meals  insulin lispro (HumaLOG) corrective regimen sliding scale   SubCutaneous at bedtime  insulin lispro Injectable (HumaLOG) 5 Unit(s) SubCutaneous three times a day before meals  lactated ringers. 1000 milliLiter(s) (150 mL/Hr) IV Continuous <Continuous>  multivitamin 1 Tablet(s) Oral daily  QUEtiapine 25 milliGRAM(s) Oral daily  senna 2 Tablet(s) Oral at bedtime  sertraline 100 milliGRAM(s) Oral at bedtime  thiamine 100 milliGRAM(s) Oral daily    MEDICATIONS  (PRN):  acetaminophen   Tablet .. 650 milliGRAM(s) Oral every 6 hours PRN Temp greater or equal to 38C (100.4F), Mild Pain (1 - 3)  dextrose 40% Gel 15 Gram(s) Oral once PRN Blood Glucose LESS THAN 70 milliGRAM(s)/deciliter  glucagon  Injectable 1 milliGRAM(s) IntraMuscular once PRN Glucose LESS THAN 70 milligrams/deciliter  LORazepam   Injectable 2 milliGRAM(s) IV Push every 1 hour PRN Symptom-triggered: each CIWA -Ar score 8 or GREATER

## 2019-06-06 NOTE — PROGRESS NOTE ADULT - PROBLEM SELECTOR PLAN 3
Endorses severe anxiety that she uses alcohol to self medicate  Previously on Klonopin but stopped years ago by outpatient provider in the setting of alcohol abuse   - Instructed to pursue outpatient followup with patient's Psychiatrist

## 2019-06-06 NOTE — PROGRESS NOTE ADULT - ATTENDING COMMENTS
Patient needs to leave for "emergency" with her dog.  I personally discussed risk of leaving with high CIWA scores of 6 including seizures and death. Patient was able to tell me the risks and still wants to elave. DIscussed importance of ETOH abstinence and I personally handed her outpatient rehab list. Discussed absolute need to always take her basal insulin or will come back to ED with similar symptoms.  Has insulin, pens, needles at home.  Patient understands. Will leave against medical advice today.   D/C planning 32 minutes.

## 2019-06-06 NOTE — DISCHARGE NOTE PROVIDER - HOSPITAL COURSE
48 yo F PMHx of DM2, EtOH abuse/withdrawal, depression, anxiety, alcoholic pancreatitis who presents with abdominal pain, nausea, vomiting, found to be in mild DKA, also being treated for alcohol withdrawal.         Patient was found to be acidotic with large anion gap thought to be 2/2 alcoholic ketosis and DKA. She was given insulin and fluids in ED and gap began to close. MICU evaluated patient, but did not think patient required ICU. Patient admitted to medicine floors for treatment of withdrawal and DKA. Pt was started on Librium taper and CIWA scores imrpvoed from 20's to 6. She also continued to receive insulin and her anion gap closed. Endocrine was consulted for patient as had DKA in past and recommended     increasing long acting insulin to 16 units at night. Today patient states that she has emergency at home and can't stay in the hospital. She is AAOx3 and denies hallucinations. Patient was told that if she leaves now she has risk of seizure and death from etoh withdrawal. Patient showed understanding and repeated that she has risk of possible seizure and death if leaving hospital early. Patient states has insulin and refills at home. She states she will make follow up appointment with PCP Dr. Hidalgo.

## 2019-06-06 NOTE — DISCHARGE NOTE PROVIDER - NSDCCPCAREPLAN_GEN_ALL_CORE_FT
PRINCIPAL DISCHARGE DIAGNOSIS  Diagnosis: Alcohol withdrawal  Assessment and Plan of Treatment: You were admitted with alcohol withdrawal. You were started on Librium taper with improvement of CIWA scores. You left before completing Librium taper. Please follow up with PCP in 1-2 days. Please refrain from drinking alcohol and seek alcohol abuse programs.      SECONDARY DISCHARGE DIAGNOSES  Diagnosis: Diabetic ketoacidosis  Assessment and Plan of Treatment: You were found to be in DKA. This resolved with insulin use. Your hemoglobin A1C was found to be 10. Endocrine was consulted and recommended increasing long acting insulin to 16 U at night. Please follow up with PCP for further management of diabetes.

## 2019-06-06 NOTE — PROGRESS NOTE ADULT - PROBLEM SELECTOR PLAN 2
Presenting with tremors and severe anxiety; concern for acute withdrawal  CIWA >8 on admission  - CIWA 6 at the time of AMA discharge   - incomplete librium taper, will give librium 50mg X1

## 2019-06-06 NOTE — DISCHARGE NOTE NURSING/CASE MANAGEMENT/SOCIAL WORK - NSDCDPATPORTLINK_GEN_ALL_CORE
You can access the [facility] Patient Portal, offered by [facility], by registering with the following website: [email or web address]

## 2019-06-06 NOTE — PROGRESS NOTE ADULT - PROBLEM SELECTOR PLAN 1
DKA in the setting of medication non-compliance  Gap closing   - c/w humalog 5units premeal, 16 units lantus qhs  - Patient left AMA

## 2019-06-06 NOTE — PROGRESS NOTE ADULT - ASSESSMENT
50 yo F PMHx of DM2, EtOH abuse, alcoholic pancreatitis, admitted for treatment of DKA and EtOH withdrawal. Patient verbalized understanding of the risk of leaving AMA in the setting of an incomplete taper including, worsening withdrawal symptoms, seizures, and death.

## 2019-08-12 NOTE — ED ADULT NURSE NOTE - CHPI ED SYMPTOMS POS
Patient Seen in: [facility]    History   Patient presents with:  Chest Pain    Stated Complaint: CHEST PAIN/ANXIETY    HPI  49 yo F here with complaints of chest pain and anxiety  Recently treated for a bronchitis with Prednison place and time  GAIT: Normal        ED Course     Labs Reviewed   POCT CBC - Abnormal; Notable for the following components:       Result Value    # Neutrophil 8.3 (*)     # Lymphocyte 0.9 (*)     All other components within normal limits   POCT ISTAT CHEM BONES:  Normal for age. CONCLUSION:  No active disease    Dictated by: Lonnie Madison MD on 8/12/2019 at 9:36     Approved by: Lonnie Madison MD            All labs were normal.     Patient verbalized understanding and agreed with the plan.        MD ACTING OUT BEHAVIORS/AGITATION

## 2019-09-08 ENCOUNTER — EMERGENCY (EMERGENCY)
Facility: HOSPITAL | Age: 49
LOS: 1 days | Discharge: LEFT BEFORE DISCHARGE | End: 2019-09-08
Admitting: EMERGENCY MEDICINE

## 2019-09-08 VITALS
TEMPERATURE: 98 F | OXYGEN SATURATION: 100 % | DIASTOLIC BLOOD PRESSURE: 73 MMHG | RESPIRATION RATE: 18 BRPM | HEART RATE: 88 BPM | SYSTOLIC BLOOD PRESSURE: 126 MMHG

## 2019-09-08 DIAGNOSIS — L02.91 CUTANEOUS ABSCESS, UNSPECIFIED: Chronic | ICD-10-CM

## 2019-09-08 DIAGNOSIS — Z90.89 ACQUIRED ABSENCE OF OTHER ORGANS: Chronic | ICD-10-CM

## 2019-09-08 DIAGNOSIS — Z98.890 OTHER SPECIFIED POSTPROCEDURAL STATES: Chronic | ICD-10-CM

## 2019-09-08 NOTE — ED ADULT TRIAGE NOTE - CHIEF COMPLAINT QUOTE
Pt c/o nausea/vomiting, tremors, and not feeling well.  Pt endorses drinking half a pint of vodka.  Pt dry heaving.  States "I think I'm in withdrawals."  Pt denies any SI/HI/AH/VH.  Crying, sporadic outbursts, states "I want to leave." PMHx:  DM, pancreatitis, ETOH abuse, depression, anxiety

## 2019-09-26 ENCOUNTER — INPATIENT (INPATIENT)
Facility: HOSPITAL | Age: 49
LOS: 1 days | Discharge: LEFT BEFORE DISCHARGE | DRG: 894 | End: 2019-09-28
Attending: INTERNAL MEDICINE | Admitting: INTERNAL MEDICINE
Payer: MEDICARE

## 2019-09-26 VITALS
DIASTOLIC BLOOD PRESSURE: 75 MMHG | HEART RATE: 76 BPM | RESPIRATION RATE: 16 BRPM | WEIGHT: 160.06 LBS | SYSTOLIC BLOOD PRESSURE: 116 MMHG | TEMPERATURE: 98 F | OXYGEN SATURATION: 95 % | HEIGHT: 62 IN

## 2019-09-26 DIAGNOSIS — Z98.890 OTHER SPECIFIED POSTPROCEDURAL STATES: Chronic | ICD-10-CM

## 2019-09-26 DIAGNOSIS — L02.91 CUTANEOUS ABSCESS, UNSPECIFIED: Chronic | ICD-10-CM

## 2019-09-26 DIAGNOSIS — F10.239 ALCOHOL DEPENDENCE WITH WITHDRAWAL, UNSPECIFIED: ICD-10-CM

## 2019-09-26 DIAGNOSIS — Z90.89 ACQUIRED ABSENCE OF OTHER ORGANS: Chronic | ICD-10-CM

## 2019-09-26 LAB
ALBUMIN SERPL ELPH-MCNC: 3.6 G/DL — SIGNIFICANT CHANGE UP (ref 3.3–5)
ALBUMIN SERPL ELPH-MCNC: 4.1 G/DL — SIGNIFICANT CHANGE UP (ref 3.3–5)
ALP SERPL-CCNC: 102 U/L — SIGNIFICANT CHANGE UP (ref 40–120)
ALP SERPL-CCNC: 78 U/L — SIGNIFICANT CHANGE UP (ref 40–120)
ALT FLD-CCNC: 29 U/L — SIGNIFICANT CHANGE UP (ref 10–45)
ALT FLD-CCNC: 33 U/L — SIGNIFICANT CHANGE UP (ref 10–45)
ANION GAP SERPL CALC-SCNC: 18 MMOL/L — HIGH (ref 5–17)
ANION GAP SERPL CALC-SCNC: 9 MMOL/L — SIGNIFICANT CHANGE UP (ref 5–17)
APPEARANCE UR: CLEAR — SIGNIFICANT CHANGE UP
AST SERPL-CCNC: 47 U/L — HIGH (ref 10–40)
AST SERPL-CCNC: 61 U/L — HIGH (ref 10–40)
B-OH-BUTYR SERPL-SCNC: 0.2 MMOL/L — SIGNIFICANT CHANGE UP
BACTERIA # UR AUTO: NEGATIVE — SIGNIFICANT CHANGE UP
BASE EXCESS BLDV CALC-SCNC: -0.8 MMOL/L — SIGNIFICANT CHANGE UP (ref -2–2)
BASOPHILS # BLD AUTO: 0.1 K/UL — SIGNIFICANT CHANGE UP (ref 0–0.2)
BASOPHILS NFR BLD AUTO: 1.1 % — SIGNIFICANT CHANGE UP (ref 0–2)
BILIRUB DIRECT SERPL-MCNC: 0.3 MG/DL — HIGH (ref 0–0.2)
BILIRUB INDIRECT FLD-MCNC: 0.5 MG/DL — SIGNIFICANT CHANGE UP (ref 0.2–1)
BILIRUB SERPL-MCNC: 0.3 MG/DL — SIGNIFICANT CHANGE UP (ref 0.2–1.2)
BILIRUB SERPL-MCNC: 0.8 MG/DL — SIGNIFICANT CHANGE UP (ref 0.2–1.2)
BILIRUB UR-MCNC: NEGATIVE — SIGNIFICANT CHANGE UP
BUN SERPL-MCNC: 10 MG/DL — SIGNIFICANT CHANGE UP (ref 7–23)
BUN SERPL-MCNC: 9 MG/DL — SIGNIFICANT CHANGE UP (ref 7–23)
CA-I SERPL-SCNC: 1.15 MMOL/L — SIGNIFICANT CHANGE UP (ref 1.12–1.3)
CALCIUM SERPL-MCNC: 8.3 MG/DL — LOW (ref 8.4–10.5)
CALCIUM SERPL-MCNC: 8.8 MG/DL — SIGNIFICANT CHANGE UP (ref 8.4–10.5)
CHLORIDE BLDV-SCNC: 104 MMOL/L — SIGNIFICANT CHANGE UP (ref 96–108)
CHLORIDE SERPL-SCNC: 104 MMOL/L — SIGNIFICANT CHANGE UP (ref 96–108)
CHLORIDE SERPL-SCNC: 99 MMOL/L — SIGNIFICANT CHANGE UP (ref 96–108)
CO2 BLDV-SCNC: 25 MMOL/L — SIGNIFICANT CHANGE UP (ref 22–30)
CO2 SERPL-SCNC: 20 MMOL/L — LOW (ref 22–31)
CO2 SERPL-SCNC: 24 MMOL/L — SIGNIFICANT CHANGE UP (ref 22–31)
COLOR SPEC: SIGNIFICANT CHANGE UP
CREAT SERPL-MCNC: 0.66 MG/DL — SIGNIFICANT CHANGE UP (ref 0.5–1.3)
CREAT SERPL-MCNC: 0.69 MG/DL — SIGNIFICANT CHANGE UP (ref 0.5–1.3)
DIFF PNL FLD: NEGATIVE — SIGNIFICANT CHANGE UP
EOSINOPHIL # BLD AUTO: 0.4 K/UL — SIGNIFICANT CHANGE UP (ref 0–0.5)
EOSINOPHIL NFR BLD AUTO: 4.6 % — SIGNIFICANT CHANGE UP (ref 0–6)
EPI CELLS # UR: 0 /HPF — SIGNIFICANT CHANGE UP
ETHANOL SERPL-MCNC: 178 MG/DL — HIGH (ref 0–10)
GAS PNL BLDV: 138 MMOL/L — SIGNIFICANT CHANGE UP (ref 135–145)
GAS PNL BLDV: SIGNIFICANT CHANGE UP
GLUCOSE BLDC GLUCOMTR-MCNC: 175 MG/DL — HIGH (ref 70–99)
GLUCOSE BLDC GLUCOMTR-MCNC: 189 MG/DL — HIGH (ref 70–99)
GLUCOSE BLDV-MCNC: 367 MG/DL — HIGH (ref 70–99)
GLUCOSE SERPL-MCNC: 184 MG/DL — HIGH (ref 70–99)
GLUCOSE SERPL-MCNC: 340 MG/DL — HIGH (ref 70–99)
GLUCOSE UR QL: ABNORMAL
HCG SERPL-ACNC: <2 MIU/ML — SIGNIFICANT CHANGE UP
HCO3 BLDV-SCNC: 24 MMOL/L — SIGNIFICANT CHANGE UP (ref 21–29)
HCT VFR BLD CALC: 43.5 % — SIGNIFICANT CHANGE UP (ref 34.5–45)
HCT VFR BLDA CALC: 47 % — SIGNIFICANT CHANGE UP (ref 39–50)
HGB BLD CALC-MCNC: 15.2 G/DL — SIGNIFICANT CHANGE UP (ref 11.5–15.5)
HGB BLD-MCNC: 14.5 G/DL — SIGNIFICANT CHANGE UP (ref 11.5–15.5)
HOROWITZ INDEX BLDV+IHG-RTO: SIGNIFICANT CHANGE UP
HYALINE CASTS # UR AUTO: 0 /LPF — SIGNIFICANT CHANGE UP (ref 0–2)
KETONES UR-MCNC: NEGATIVE — SIGNIFICANT CHANGE UP
LACTATE BLDV-MCNC: 2.8 MMOL/L — HIGH (ref 0.7–2)
LEUKOCYTE ESTERASE UR-ACNC: NEGATIVE — SIGNIFICANT CHANGE UP
LIDOCAIN IGE QN: 12 U/L — SIGNIFICANT CHANGE UP (ref 7–60)
LYMPHOCYTES # BLD AUTO: 3.1 K/UL — SIGNIFICANT CHANGE UP (ref 1–3.3)
LYMPHOCYTES # BLD AUTO: 39.5 % — SIGNIFICANT CHANGE UP (ref 13–44)
MAGNESIUM SERPL-MCNC: 1.7 MG/DL — SIGNIFICANT CHANGE UP (ref 1.6–2.6)
MCHC RBC-ENTMCNC: 31.8 PG — SIGNIFICANT CHANGE UP (ref 27–34)
MCHC RBC-ENTMCNC: 33.3 GM/DL — SIGNIFICANT CHANGE UP (ref 32–36)
MCV RBC AUTO: 95.4 FL — SIGNIFICANT CHANGE UP (ref 80–100)
MONOCYTES # BLD AUTO: 0.3 K/UL — SIGNIFICANT CHANGE UP (ref 0–0.9)
MONOCYTES NFR BLD AUTO: 4.3 % — SIGNIFICANT CHANGE UP (ref 2–14)
NEUTROPHILS # BLD AUTO: 3.9 K/UL — SIGNIFICANT CHANGE UP (ref 1.8–7.4)
NEUTROPHILS NFR BLD AUTO: 50.5 % — SIGNIFICANT CHANGE UP (ref 43–77)
NITRITE UR-MCNC: NEGATIVE — SIGNIFICANT CHANGE UP
PCO2 BLDV: 40 MMHG — SIGNIFICANT CHANGE UP (ref 35–50)
PH BLDV: 7.38 — SIGNIFICANT CHANGE UP (ref 7.35–7.45)
PH UR: 6.5 — SIGNIFICANT CHANGE UP (ref 5–8)
PHOSPHATE SERPL-MCNC: 2.4 MG/DL — LOW (ref 2.5–4.5)
PLATELET # BLD AUTO: 191 K/UL — SIGNIFICANT CHANGE UP (ref 150–400)
PO2 BLDV: 37 MMHG — SIGNIFICANT CHANGE UP (ref 25–45)
POTASSIUM BLDV-SCNC: 3.9 MMOL/L — SIGNIFICANT CHANGE UP (ref 3.5–5.3)
POTASSIUM SERPL-MCNC: 3.9 MMOL/L — SIGNIFICANT CHANGE UP (ref 3.5–5.3)
POTASSIUM SERPL-MCNC: 4.2 MMOL/L — SIGNIFICANT CHANGE UP (ref 3.5–5.3)
POTASSIUM SERPL-SCNC: 3.9 MMOL/L — SIGNIFICANT CHANGE UP (ref 3.5–5.3)
POTASSIUM SERPL-SCNC: 4.2 MMOL/L — SIGNIFICANT CHANGE UP (ref 3.5–5.3)
PROT SERPL-MCNC: 6.2 G/DL — SIGNIFICANT CHANGE UP (ref 6–8.3)
PROT SERPL-MCNC: 7.1 G/DL — SIGNIFICANT CHANGE UP (ref 6–8.3)
PROT UR-MCNC: ABNORMAL
RBC # BLD: 4.56 M/UL — SIGNIFICANT CHANGE UP (ref 3.8–5.2)
RBC # FLD: 11.4 % — SIGNIFICANT CHANGE UP (ref 10.3–14.5)
RBC CASTS # UR COMP ASSIST: 2 /HPF — SIGNIFICANT CHANGE UP (ref 0–4)
SAO2 % BLDV: 66 % — LOW (ref 67–88)
SODIUM SERPL-SCNC: 137 MMOL/L — SIGNIFICANT CHANGE UP (ref 135–145)
SODIUM SERPL-SCNC: 137 MMOL/L — SIGNIFICANT CHANGE UP (ref 135–145)
SP GR SPEC: >1.05 (ref 1.01–1.02)
UROBILINOGEN FLD QL: NEGATIVE — SIGNIFICANT CHANGE UP
WBC # BLD: 7.8 K/UL — SIGNIFICANT CHANGE UP (ref 3.8–10.5)
WBC # FLD AUTO: 7.8 K/UL — SIGNIFICANT CHANGE UP (ref 3.8–10.5)
WBC UR QL: 1 /HPF — SIGNIFICANT CHANGE UP (ref 0–5)

## 2019-09-26 PROCEDURE — 74177 CT ABD & PELVIS W/CONTRAST: CPT | Mod: 26

## 2019-09-26 PROCEDURE — 99285 EMERGENCY DEPT VISIT HI MDM: CPT | Mod: 25

## 2019-09-26 PROCEDURE — 93010 ELECTROCARDIOGRAM REPORT: CPT

## 2019-09-26 PROCEDURE — 71045 X-RAY EXAM CHEST 1 VIEW: CPT | Mod: 26

## 2019-09-26 RX ORDER — GABAPENTIN 400 MG/1
1 CAPSULE ORAL
Qty: 0 | Refills: 0 | DISCHARGE

## 2019-09-26 RX ORDER — ACETAMINOPHEN 500 MG
650 TABLET ORAL ONCE
Refills: 0 | Status: COMPLETED | OUTPATIENT
Start: 2019-09-26 | End: 2019-09-26

## 2019-09-26 RX ORDER — MORPHINE SULFATE 50 MG/1
4 CAPSULE, EXTENDED RELEASE ORAL ONCE
Refills: 0 | Status: DISCONTINUED | OUTPATIENT
Start: 2019-09-26 | End: 2019-09-26

## 2019-09-26 RX ORDER — INSULIN LISPRO 100/ML
VIAL (ML) SUBCUTANEOUS
Refills: 0 | Status: DISCONTINUED | OUTPATIENT
Start: 2019-09-26 | End: 2019-09-28

## 2019-09-26 RX ORDER — LIDOCAINE 4 G/100G
10 CREAM TOPICAL ONCE
Refills: 0 | Status: COMPLETED | OUTPATIENT
Start: 2019-09-26 | End: 2019-09-26

## 2019-09-26 RX ORDER — SODIUM CHLORIDE 9 MG/ML
1000 INJECTION, SOLUTION INTRAVENOUS
Refills: 0 | Status: DISCONTINUED | OUTPATIENT
Start: 2019-09-26 | End: 2019-09-28

## 2019-09-26 RX ORDER — THIAMINE MONONITRATE (VIT B1) 100 MG
100 TABLET ORAL DAILY
Refills: 0 | Status: DISCONTINUED | OUTPATIENT
Start: 2019-09-26 | End: 2019-09-28

## 2019-09-26 RX ORDER — INSULIN LISPRO 100/ML
4 VIAL (ML) SUBCUTANEOUS
Qty: 0 | Refills: 0 | DISCHARGE

## 2019-09-26 RX ORDER — INSULIN DETEMIR 100/ML (3)
16 INSULIN PEN (ML) SUBCUTANEOUS
Qty: 0 | Refills: 0 | DISCHARGE

## 2019-09-26 RX ORDER — INSULIN DETEMIR 100/ML (3)
10 INSULIN PEN (ML) SUBCUTANEOUS AT BEDTIME
Refills: 0 | Status: DISCONTINUED | OUTPATIENT
Start: 2019-09-26 | End: 2019-09-26

## 2019-09-26 RX ORDER — TRAZODONE HCL 50 MG
100 TABLET ORAL AT BEDTIME
Refills: 0 | Status: DISCONTINUED | OUTPATIENT
Start: 2019-09-26 | End: 2019-09-28

## 2019-09-26 RX ORDER — DEXTROSE 50 % IN WATER 50 %
15 SYRINGE (ML) INTRAVENOUS ONCE
Refills: 0 | Status: DISCONTINUED | OUTPATIENT
Start: 2019-09-26 | End: 2019-09-28

## 2019-09-26 RX ORDER — SODIUM CHLORIDE 9 MG/ML
2000 INJECTION INTRAMUSCULAR; INTRAVENOUS; SUBCUTANEOUS ONCE
Refills: 0 | Status: COMPLETED | OUTPATIENT
Start: 2019-09-26 | End: 2019-09-26

## 2019-09-26 RX ORDER — DEXTROSE 50 % IN WATER 50 %
12.5 SYRINGE (ML) INTRAVENOUS ONCE
Refills: 0 | Status: DISCONTINUED | OUTPATIENT
Start: 2019-09-26 | End: 2019-09-28

## 2019-09-26 RX ORDER — DEXTROSE 50 % IN WATER 50 %
25 SYRINGE (ML) INTRAVENOUS ONCE
Refills: 0 | Status: DISCONTINUED | OUTPATIENT
Start: 2019-09-26 | End: 2019-09-28

## 2019-09-26 RX ORDER — ONDANSETRON 8 MG/1
4 TABLET, FILM COATED ORAL ONCE
Refills: 0 | Status: COMPLETED | OUTPATIENT
Start: 2019-09-26 | End: 2019-09-26

## 2019-09-26 RX ORDER — MORPHINE SULFATE 50 MG/1
2 CAPSULE, EXTENDED RELEASE ORAL ONCE
Refills: 0 | Status: DISCONTINUED | OUTPATIENT
Start: 2019-09-26 | End: 2019-09-26

## 2019-09-26 RX ORDER — SUCRALFATE 1 G
1 TABLET ORAL ONCE
Refills: 0 | Status: COMPLETED | OUTPATIENT
Start: 2019-09-26 | End: 2019-09-26

## 2019-09-26 RX ORDER — GLUCAGON INJECTION, SOLUTION 0.5 MG/.1ML
1 INJECTION, SOLUTION SUBCUTANEOUS ONCE
Refills: 0 | Status: DISCONTINUED | OUTPATIENT
Start: 2019-09-26 | End: 2019-09-28

## 2019-09-26 RX ORDER — FAMOTIDINE 10 MG/ML
20 INJECTION INTRAVENOUS ONCE
Refills: 0 | Status: COMPLETED | OUTPATIENT
Start: 2019-09-26 | End: 2019-09-26

## 2019-09-26 RX ORDER — FOLIC ACID 0.8 MG
1 TABLET ORAL DAILY
Refills: 0 | Status: DISCONTINUED | OUTPATIENT
Start: 2019-09-26 | End: 2019-09-28

## 2019-09-26 RX ORDER — INSULIN LISPRO 100/ML
VIAL (ML) SUBCUTANEOUS AT BEDTIME
Refills: 0 | Status: DISCONTINUED | OUTPATIENT
Start: 2019-09-26 | End: 2019-09-28

## 2019-09-26 RX ORDER — METOCLOPRAMIDE HCL 10 MG
10 TABLET ORAL ONCE
Refills: 0 | Status: COMPLETED | OUTPATIENT
Start: 2019-09-26 | End: 2019-09-26

## 2019-09-26 RX ORDER — INFLUENZA VIRUS VACCINE 15; 15; 15; 15 UG/.5ML; UG/.5ML; UG/.5ML; UG/.5ML
0.5 SUSPENSION INTRAMUSCULAR ONCE
Refills: 0 | Status: COMPLETED | OUTPATIENT
Start: 2019-09-26 | End: 2019-09-26

## 2019-09-26 RX ORDER — INSULIN GLARGINE 100 [IU]/ML
10 INJECTION, SOLUTION SUBCUTANEOUS AT BEDTIME
Refills: 0 | Status: DISCONTINUED | OUTPATIENT
Start: 2019-09-26 | End: 2019-09-28

## 2019-09-26 RX ADMIN — MORPHINE SULFATE 2 MILLIGRAM(S): 50 CAPSULE, EXTENDED RELEASE ORAL at 22:05

## 2019-09-26 RX ADMIN — MORPHINE SULFATE 4 MILLIGRAM(S): 50 CAPSULE, EXTENDED RELEASE ORAL at 01:19

## 2019-09-26 RX ADMIN — MORPHINE SULFATE 4 MILLIGRAM(S): 50 CAPSULE, EXTENDED RELEASE ORAL at 15:03

## 2019-09-26 RX ADMIN — Medication 650 MILLIGRAM(S): at 08:13

## 2019-09-26 RX ADMIN — Medication 1: at 17:00

## 2019-09-26 RX ADMIN — Medication 650 MILLIGRAM(S): at 07:43

## 2019-09-26 RX ADMIN — FAMOTIDINE 20 MILLIGRAM(S): 10 INJECTION INTRAVENOUS at 05:27

## 2019-09-26 RX ADMIN — Medication 1 MILLIGRAM(S): at 21:50

## 2019-09-26 RX ADMIN — Medication 1 GRAM(S): at 05:29

## 2019-09-26 RX ADMIN — SODIUM CHLORIDE 75 MILLILITER(S): 9 INJECTION, SOLUTION INTRAVENOUS at 19:42

## 2019-09-26 RX ADMIN — Medication 2 MILLIGRAM(S): at 07:51

## 2019-09-26 RX ADMIN — Medication 2 MILLIGRAM(S): at 20:03

## 2019-09-26 RX ADMIN — MORPHINE SULFATE 4 MILLIGRAM(S): 50 CAPSULE, EXTENDED RELEASE ORAL at 06:17

## 2019-09-26 RX ADMIN — ONDANSETRON 4 MILLIGRAM(S): 8 TABLET, FILM COATED ORAL at 04:34

## 2019-09-26 RX ADMIN — Medication 30 MILLILITER(S): at 05:27

## 2019-09-26 RX ADMIN — LIDOCAINE 10 MILLILITER(S): 4 CREAM TOPICAL at 05:27

## 2019-09-26 RX ADMIN — ONDANSETRON 4 MILLIGRAM(S): 8 TABLET, FILM COATED ORAL at 21:02

## 2019-09-26 RX ADMIN — Medication 2 MILLIGRAM(S): at 18:35

## 2019-09-26 RX ADMIN — Medication 10 MILLIGRAM(S): at 07:43

## 2019-09-26 RX ADMIN — MORPHINE SULFATE 2 MILLIGRAM(S): 50 CAPSULE, EXTENDED RELEASE ORAL at 21:50

## 2019-09-26 RX ADMIN — ONDANSETRON 4 MILLIGRAM(S): 8 TABLET, FILM COATED ORAL at 01:19

## 2019-09-26 RX ADMIN — SODIUM CHLORIDE 2000 MILLILITER(S): 9 INJECTION INTRAMUSCULAR; INTRAVENOUS; SUBCUTANEOUS at 01:23

## 2019-09-26 RX ADMIN — Medication 2 MILLIGRAM(S): at 14:36

## 2019-09-26 RX ADMIN — MORPHINE SULFATE 4 MILLIGRAM(S): 50 CAPSULE, EXTENDED RELEASE ORAL at 14:33

## 2019-09-26 RX ADMIN — INSULIN GLARGINE 10 UNIT(S): 100 INJECTION, SOLUTION SUBCUTANEOUS at 22:19

## 2019-09-26 RX ADMIN — Medication 2 MILLIGRAM(S): at 09:44

## 2019-09-26 NOTE — PROVIDER CONTACT NOTE (OTHER) - ASSESSMENT
Pt A&Ox4. VSS. tmp 98.5, hr 60's, bp 153/98. respirations 18 and oxygen saturation  96% on room air. Pt is having moderate tremors, mild anxiety, severe headaches and mild nausea. Pt is having no c/o chest pain, SOB, palpitations, and discomfort.

## 2019-09-26 NOTE — ED PROVIDER NOTE - OBJECTIVE STATEMENT
50y/o F with h/o IDDM, pancreatitis, alcohol abuse, presenting with worsening abdominal pain and inability to keep food/liquids down for the past 2 days.  States that she last had alcohol this evening (drank 1/2 pint of vodka) to try to help the pain.  Numerous vomiting today, nonbloody/nonbilious, and c/o chest pain and shortness of breath today.  Arrives by EMS.    PMH: IDDM, pancreatitis, alcohol abuse  PSH: cholecystectomy (2018) Attending note (Daniel): 50y/o F with h/o IDDM, pancreatitis, alcohol abuse, presenting with worsening abdominal pain and inability to keep food/liquids down for the past 2 days.  States that she last had alcohol this evening (drank 1/2 pint of vodka) to try to help the pain.  Numerous vomiting today, nonbloody/nonbilious, and c/o chest pain and shortness of breath today.  Arrives by EMS.    PMH: IDDM, pancreatitis, alcohol abuse  PSH: cholecystectomy (2018)

## 2019-09-26 NOTE — ED PROVIDER NOTE - ATTENDING CONTRIBUTION TO CARE
50y/o F with h/o IDDM, pancreatitis, alcohol abuse, presenting with worsening abdominal pain and inability to keep food/liquids down for the past 2 days.  States that she last had alcohol this evening (drank 1/2 pint of vodka) to try to help the pain.  Numerous vomiting today, nonbloody/nonbilious, and c/o chest pain and shortness of breath today.  Arrives by EMS.    · CONSTITUTIONAL: Well appearing, well nourished, awake, alert, oriented to person, place, time/situation; appears uncomfortable due to pain, crying, but speaking in full sentences and cooperative with exam.  · ENMT: Airway patent, Nasal mucosa clear. Mouth with normal mucosa. Throat has no vesicles, no oropharyngeal exudates and uvula is midline.  · EYES: Clear bilaterally, pupils equal, round and reactive to light.  · CARDIAC: Normal rate, regular rhythm.  Heart sounds S1, S2.  No murmurs, rubs or gallops.  · RESPIRATORY: Breath sounds clear and equal bilaterally.  · GASTROINTESTINAL: - - -  · Abdominal Exam: soft  · Abdominal Tenderness Location: diffuse abd tenderness without rebound or guarding, worse in epigastrium  · MUSCULOSKELETAL: Spine appears normal, range of motion is not limited, no muscle or joint tenderness  · SKIN: Skin normal color for race, warm, dry and intact. No evidence of rash.      AP: Concern for pancreatitis, possible DKA, also consider esophagitis/gastritis; less likely acute intraabdominal surgical pathology; will obtain labs: cbc, cmp, vbg/lactate, lipase, ucg, ua, and consider imaging with CT AP; alcohol level; antiemetic, pain control and iv fluids as needed.

## 2019-09-26 NOTE — ED PROVIDER NOTE - ABDOMINAL TENDER
diffuse abd pain, worse in epigastrium diffuse abd tenderness without rebound or guarding, worse in epigastrium

## 2019-09-26 NOTE — ED ADULT NURSE REASSESSMENT NOTE - NS ED NURSE REASSESS COMMENT FT1
Received female AO x3 ambulating to the bathroom . Reports headache with pain level of 6/10 on pain scale and throbbing in quality. Pt actively nauseous and vomiting , given meds as ordered. Ativan 2mg IVP wa also given for CIWA score of 19.

## 2019-09-26 NOTE — ED ADULT NURSE REASSESSMENT NOTE - NS ED NURSE REASSESS COMMENT FT1
pt complaining of pain to the left upper abd with pain level of 8/10 on pain scale cramping in quality. CIW 10, given Morphine 4mg IVP and Ativan 2mg orally .

## 2019-09-26 NOTE — ED ADULT NURSE REASSESSMENT NOTE - NS ED NURSE REASSESS COMMENT FT1
pt report that nausea is no mild and she is no longer vomiting , however report there is no improvement with the headache.

## 2019-09-26 NOTE — ED ADULT NURSE NOTE - OBJECTIVE STATEMENT
50 y/o female history of pancreatitis, alcohol abuse, diabetes, depression, anxiety, presents to the ED via EMS from home c/o abdominal pain. Patient states that two days ago she stopped drinking after drinking every day. Patient states that yesterday she had abdominal pain on the left side and nausea and vomiting. Patient decided to drink half a pint of vodka today to try to make the pain stop. Patient states she feels very anxious and currently has the shakes.  Denies fever, chills, weakness, abd pain, diarrhea/constipation, numbness/tingling, urinary s/s. Patient A&Ox3, in no respiratory distress, and denies chest pain. Abdominal pain present to the left upper quadrant. Patient has shaking of extremities when extending arms. Equal strength and sensation in all 4 extremities.

## 2019-09-26 NOTE — ED ADULT NURSE REASSESSMENT NOTE - NS ED NURSE REASSESS COMMENT FT1
MD Keating aware of patient's CIWA score being 9. Instructed to monitor patient. No further interventions at this time.

## 2019-09-26 NOTE — CHART NOTE - NSCHARTNOTEFT_GEN_A_CORE
Spoke with Dr Ribeiro, pt  is a 48y/o F with h/o IDDM, pancreatitis, alcohol abuse, presenting with worsening abdominal pain and inability to keep food/liquids down for the past 2 days., Asked by Dr Ribeiro to start symptom triggered CIWA protocol, order placed, Dr Ribeiro will do H+P and admission orders latter today

## 2019-09-26 NOTE — ED ADULT NURSE NOTE - CHPI ED NUR SYMPTOMS NEG
no diarrhea/no blood in stool/no burning urination/no chills/no abdominal distension/no dysuria/no hematuria/no fever

## 2019-09-26 NOTE — H&P ADULT - HISTORY OF PRESENT ILLNESS
48y/o F with h/o IDDM, pancreatitis, alcohol abuse, presenting with worsening abdominal pain and inability to keep food/liquids down for the past 2 days.  States that she last had alcohol this evening (drank 1/2 pint of vodka) to try to help the pain.  Numerous vomiting today, nonbloody/nonbilious, and c/o chest pain and shortness of breath today.  pt denies chest pain, shortness of breath , nausea, vomiting bd pain at present

## 2019-09-26 NOTE — ED ADULT NURSE REASSESSMENT NOTE - NS ED NURSE REASSESS COMMENT FT1
Patient A&Ox3, complaining of abdominal pain at this time. Patient requesting pain medication. MD Keating made aware. VSS. Will continue to monitor. Patient A&Ox3, complaining of abdominal pain at this time. Patient requesting pain medication. MD Keating made aware. Patient awaiting CT.  VSS. Will continue to monitor.

## 2019-09-26 NOTE — PROVIDER CONTACT NOTE (OTHER) - BACKGROUND
Pt came in with alcohol dependence with withdrawal. Pt has a hx of pancreatitis, anxiety, DM, and MRSA cellulitis.

## 2019-09-26 NOTE — SBIRT NOTE ADULT - NSSBIRTALCPASSREFTXDET_GEN_A_CORE
Outpatient vs inpatient treatment discussed. Inpatient LMSW to assist with outpatient referral once medically stable.

## 2019-09-26 NOTE — ED PROVIDER NOTE - PROGRESS NOTE DETAILS
Berny Woodruff PGY3: left message for pcp dr. downey, received signout on patient - severe abdominal pain, n/v unresponsive to meds at bedside, concern for chronic pancreatitis and noncompliant diabetic - will require admission for management of above issues Berny Woodruff PGY3: patient vomited in CT - persistently nauseous, will give tylenol/reglan, pending CTr Berny Woodruff PGY3: pt w/ headache, persistently uncomfortable, giving ativan for concern of ETOH WD Berny Woodruff PGY3: pt w/ headache, persistently uncomfortable, giving ativan for concern of ETOH WD; attempted to contact shayan again, unable to

## 2019-09-26 NOTE — H&P ADULT - ASSESSMENT
pt with above history pw/abd pain ,n , vomiting\  Abd pain - benign abd exam , monitor closely ,iv fluids    Dm2- iss, levemir     Alcohol withdrawal - thiamine, mvi, folate , CIWA

## 2019-09-26 NOTE — ED ADULT NURSE NOTE - NSIMPLEMENTINTERV_GEN_ALL_ED
Implemented All Fall Risk Interventions:  [place] to call system. Call bell, personal items and telephone within reach. Instruct patient to call for assistance. Room bathroom lighting operational. Non-slip footwear when patient is off stretcher. Physically safe environment: no spills, clutter or unnecessary equipment. Stretcher in lowest position, wheels locked, appropriate side rails in place. Provide visual cue, wrist band, yellow gown, etc. Monitor gait and stability. Monitor for mental status changes and reorient to person, place, and time. Review medications for side effects contributing to fall risk. Reinforce activity limits and safety measures with patient and family.

## 2019-09-26 NOTE — ED PROVIDER NOTE - CLINICAL SUMMARY MEDICAL DECISION MAKING FREE TEXT BOX
Berny Woodruff PGY3: received signout on patient - severe abdominal pain, n/v unresponsive to meds at bedside, concern for chronic pancreatitis and noncompliant diabetic - will require admission for management of above issues Berny Woodruff PGY3: received signout on patient - severe abdominal pain, n/v unresponsive to meds at bedside, concern for chronic pancreatitis and noncompliant diabetic - will require admission for management of above issues    Attending note (Daniel): the above statement reflects the resident's MDM.  Please see my attending statement for detailed information regarding my medical decision making.

## 2019-09-26 NOTE — ED PROVIDER NOTE - CONSTITUTIONAL, MLM
normal... Well appearing, well nourished, awake, alert, oriented to person, place, time/situation; appears uncomfortable due to pain, crying, but speaking in full sentences and cooperative with exam.

## 2019-09-27 LAB
ALBUMIN SERPL ELPH-MCNC: 3.5 G/DL — SIGNIFICANT CHANGE UP (ref 3.3–5)
ALP SERPL-CCNC: 72 U/L — SIGNIFICANT CHANGE UP (ref 40–120)
ALT FLD-CCNC: 28 U/L — SIGNIFICANT CHANGE UP (ref 10–45)
ANION GAP SERPL CALC-SCNC: 11 MMOL/L — SIGNIFICANT CHANGE UP (ref 5–17)
ANION GAP SERPL CALC-SCNC: 9 MMOL/L — SIGNIFICANT CHANGE UP (ref 5–17)
AST SERPL-CCNC: 36 U/L — SIGNIFICANT CHANGE UP (ref 10–40)
BASOPHILS # BLD AUTO: 0.03 K/UL — SIGNIFICANT CHANGE UP (ref 0–0.2)
BASOPHILS NFR BLD AUTO: 0.5 % — SIGNIFICANT CHANGE UP (ref 0–2)
BILIRUB DIRECT SERPL-MCNC: 0.2 MG/DL — SIGNIFICANT CHANGE UP (ref 0–0.2)
BILIRUB INDIRECT FLD-MCNC: 0.5 MG/DL — SIGNIFICANT CHANGE UP (ref 0.2–1)
BILIRUB SERPL-MCNC: 0.7 MG/DL — SIGNIFICANT CHANGE UP (ref 0.2–1.2)
BUN SERPL-MCNC: 9 MG/DL — SIGNIFICANT CHANGE UP (ref 7–23)
BUN SERPL-MCNC: 9 MG/DL — SIGNIFICANT CHANGE UP (ref 7–23)
CALCIUM SERPL-MCNC: 8.2 MG/DL — LOW (ref 8.4–10.5)
CALCIUM SERPL-MCNC: 8.3 MG/DL — LOW (ref 8.4–10.5)
CHLORIDE SERPL-SCNC: 101 MMOL/L — SIGNIFICANT CHANGE UP (ref 96–108)
CHLORIDE SERPL-SCNC: 102 MMOL/L — SIGNIFICANT CHANGE UP (ref 96–108)
CHOLEST SERPL-MCNC: 176 MG/DL — SIGNIFICANT CHANGE UP (ref 10–199)
CO2 SERPL-SCNC: 22 MMOL/L — SIGNIFICANT CHANGE UP (ref 22–31)
CO2 SERPL-SCNC: 23 MMOL/L — SIGNIFICANT CHANGE UP (ref 22–31)
CREAT SERPL-MCNC: 0.63 MG/DL — SIGNIFICANT CHANGE UP (ref 0.5–1.3)
CREAT SERPL-MCNC: 0.64 MG/DL — SIGNIFICANT CHANGE UP (ref 0.5–1.3)
CULTURE RESULTS: SIGNIFICANT CHANGE UP
EOSINOPHIL # BLD AUTO: 0.27 K/UL — SIGNIFICANT CHANGE UP (ref 0–0.5)
EOSINOPHIL NFR BLD AUTO: 4.3 % — SIGNIFICANT CHANGE UP (ref 0–6)
GLUCOSE BLDC GLUCOMTR-MCNC: 213 MG/DL — HIGH (ref 70–99)
GLUCOSE BLDC GLUCOMTR-MCNC: 218 MG/DL — HIGH (ref 70–99)
GLUCOSE BLDC GLUCOMTR-MCNC: 218 MG/DL — HIGH (ref 70–99)
GLUCOSE BLDC GLUCOMTR-MCNC: 236 MG/DL — HIGH (ref 70–99)
GLUCOSE BLDC GLUCOMTR-MCNC: 238 MG/DL — HIGH (ref 70–99)
GLUCOSE BLDC GLUCOMTR-MCNC: 241 MG/DL — HIGH (ref 70–99)
GLUCOSE SERPL-MCNC: 197 MG/DL — HIGH (ref 70–99)
GLUCOSE SERPL-MCNC: 198 MG/DL — HIGH (ref 70–99)
HBA1C BLD-MCNC: 8.8 % — HIGH (ref 4–5.6)
HCT VFR BLD CALC: 42.1 % — SIGNIFICANT CHANGE UP (ref 34.5–45)
HDLC SERPL-MCNC: 63 MG/DL — SIGNIFICANT CHANGE UP
HGB BLD-MCNC: 14.4 G/DL — SIGNIFICANT CHANGE UP (ref 11.5–15.5)
IMM GRANULOCYTES NFR BLD AUTO: 0.5 % — SIGNIFICANT CHANGE UP (ref 0–1.5)
LIDOCAIN IGE QN: 51 U/L — SIGNIFICANT CHANGE UP (ref 7–60)
LIPID PNL WITH DIRECT LDL SERPL: 80 MG/DL — SIGNIFICANT CHANGE UP
LYMPHOCYTES # BLD AUTO: 1.92 K/UL — SIGNIFICANT CHANGE UP (ref 1–3.3)
LYMPHOCYTES # BLD AUTO: 30.5 % — SIGNIFICANT CHANGE UP (ref 13–44)
MAGNESIUM SERPL-MCNC: 2.5 MG/DL — SIGNIFICANT CHANGE UP (ref 1.6–2.6)
MCHC RBC-ENTMCNC: 32.6 PG — SIGNIFICANT CHANGE UP (ref 27–34)
MCHC RBC-ENTMCNC: 34.2 GM/DL — SIGNIFICANT CHANGE UP (ref 32–36)
MCV RBC AUTO: 95.2 FL — SIGNIFICANT CHANGE UP (ref 80–100)
MONOCYTES # BLD AUTO: 0.3 K/UL — SIGNIFICANT CHANGE UP (ref 0–0.9)
MONOCYTES NFR BLD AUTO: 4.8 % — SIGNIFICANT CHANGE UP (ref 2–14)
NEUTROPHILS # BLD AUTO: 3.74 K/UL — SIGNIFICANT CHANGE UP (ref 1.8–7.4)
NEUTROPHILS NFR BLD AUTO: 59.4 % — SIGNIFICANT CHANGE UP (ref 43–77)
PHOSPHATE SERPL-MCNC: 2.1 MG/DL — LOW (ref 2.5–4.5)
PLATELET # BLD AUTO: 134 K/UL — LOW (ref 150–400)
POTASSIUM SERPL-MCNC: 3.6 MMOL/L — SIGNIFICANT CHANGE UP (ref 3.5–5.3)
POTASSIUM SERPL-MCNC: 3.7 MMOL/L — SIGNIFICANT CHANGE UP (ref 3.5–5.3)
POTASSIUM SERPL-SCNC: 3.6 MMOL/L — SIGNIFICANT CHANGE UP (ref 3.5–5.3)
POTASSIUM SERPL-SCNC: 3.7 MMOL/L — SIGNIFICANT CHANGE UP (ref 3.5–5.3)
PROT SERPL-MCNC: 6.1 G/DL — SIGNIFICANT CHANGE UP (ref 6–8.3)
RBC # BLD: 4.42 M/UL — SIGNIFICANT CHANGE UP (ref 3.8–5.2)
RBC # FLD: 11.5 % — SIGNIFICANT CHANGE UP (ref 10.3–14.5)
SODIUM SERPL-SCNC: 134 MMOL/L — LOW (ref 135–145)
SODIUM SERPL-SCNC: 134 MMOL/L — LOW (ref 135–145)
SPECIMEN SOURCE: SIGNIFICANT CHANGE UP
TOTAL CHOLESTEROL/HDL RATIO MEASUREMENT: 2.8 RATIO — LOW (ref 3.3–7.1)
TRIGL SERPL-MCNC: 163 MG/DL — HIGH (ref 10–149)
WBC # BLD: 6.29 K/UL — SIGNIFICANT CHANGE UP (ref 3.8–10.5)
WBC # FLD AUTO: 6.29 K/UL — SIGNIFICANT CHANGE UP (ref 3.8–10.5)

## 2019-09-27 PROCEDURE — 93010 ELECTROCARDIOGRAM REPORT: CPT

## 2019-09-27 RX ORDER — MAGNESIUM SULFATE 500 MG/ML
2 VIAL (ML) INJECTION ONCE
Refills: 0 | Status: COMPLETED | OUTPATIENT
Start: 2019-09-27 | End: 2019-09-27

## 2019-09-27 RX ORDER — SODIUM,POTASSIUM PHOSPHATES 278-250MG
1 POWDER IN PACKET (EA) ORAL
Refills: 0 | Status: COMPLETED | OUTPATIENT
Start: 2019-09-27 | End: 2019-09-27

## 2019-09-27 RX ADMIN — Medication 2 MILLIGRAM(S): at 08:52

## 2019-09-27 RX ADMIN — Medication 2: at 12:28

## 2019-09-27 RX ADMIN — Medication 1 MILLIGRAM(S): at 11:21

## 2019-09-27 RX ADMIN — Medication 2 MILLIGRAM(S): at 22:06

## 2019-09-27 RX ADMIN — INSULIN GLARGINE 10 UNIT(S): 100 INJECTION, SOLUTION SUBCUTANEOUS at 22:05

## 2019-09-27 RX ADMIN — Medication 2 MILLIGRAM(S): at 14:21

## 2019-09-27 RX ADMIN — Medication 2: at 09:30

## 2019-09-27 RX ADMIN — Medication 100 GRAM(S): at 02:20

## 2019-09-27 RX ADMIN — Medication 1 PACKET(S): at 06:44

## 2019-09-27 RX ADMIN — Medication 2 MILLIGRAM(S): at 11:21

## 2019-09-27 RX ADMIN — Medication 2 MILLIGRAM(S): at 06:52

## 2019-09-27 RX ADMIN — Medication 1 TABLET(S): at 11:21

## 2019-09-27 RX ADMIN — SODIUM CHLORIDE 75 MILLILITER(S): 9 INJECTION, SOLUTION INTRAVENOUS at 07:00

## 2019-09-27 RX ADMIN — Medication 2: at 18:49

## 2019-09-27 RX ADMIN — Medication 100 MILLIGRAM(S): at 00:25

## 2019-09-27 RX ADMIN — Medication 2 MILLIGRAM(S): at 18:37

## 2019-09-27 RX ADMIN — Medication 100 MILLIGRAM(S): at 23:15

## 2019-09-27 RX ADMIN — Medication 100 MILLIGRAM(S): at 11:26

## 2019-09-27 RX ADMIN — Medication 1 PACKET(S): at 18:37

## 2019-09-27 NOTE — CHART NOTE - NSCHARTNOTEFT_GEN_A_CORE
Interval events    cc: CIWA score 11-12  Evaluated the pt at bedside  Patient alert, but anxious. tremors present  C/O HA    Vital Signs Last 24 Hrs  T(C): 36.6 (27 Sep 2019 06:05), Max: 36.9 (26 Sep 2019 19:33)  T(F): 97.8 (27 Sep 2019 06:05), Max: 98.5 (26 Sep 2019 19:33)  HR: 51 (27 Sep 2019 06:05) (47 - 63)  BP: 113/76 (27 Sep 2019 06:05) (113/73 - 163/96)  BP(mean): --  RR: 18 (27 Sep 2019 06:05) (16 - 18)  SpO2: 94% (27 Sep 2019 06:05) (92% - 97%)      50y/o F with h/o IDDM, pancreatitis, alcohol abuse, presenting with worsening abdominal pain and inability to keep food/liquids down for the past 2 days.  States that she last had alcohol this evening (drank 1/2 pint of vodka) to try to help the pain.  Numerous vomiting today, nonbloody/nonbilious, and c/o chest pain and shortness of breath today.  Arrives by EMS.  Admitted with ETOH, CIWA changed from symptom triggered to low CIWA score with ativan PRN in setting of increasing CIWA score  C/WCIWA  Monitor CIWA score  Seizure precautions  Will follow    Maria E Mason [facility] [place]  [id number]

## 2019-09-28 ENCOUNTER — TRANSCRIPTION ENCOUNTER (OUTPATIENT)
Age: 49
End: 2019-09-28

## 2019-09-28 VITALS
OXYGEN SATURATION: 98 % | SYSTOLIC BLOOD PRESSURE: 123 MMHG | RESPIRATION RATE: 18 BRPM | HEART RATE: 67 BPM | DIASTOLIC BLOOD PRESSURE: 86 MMHG | TEMPERATURE: 98 F

## 2019-09-28 LAB
ANION GAP SERPL CALC-SCNC: 10 MMOL/L — SIGNIFICANT CHANGE UP (ref 5–17)
BUN SERPL-MCNC: 10 MG/DL — SIGNIFICANT CHANGE UP (ref 7–23)
CALCIUM SERPL-MCNC: 9.1 MG/DL — SIGNIFICANT CHANGE UP (ref 8.4–10.5)
CHLORIDE SERPL-SCNC: 104 MMOL/L — SIGNIFICANT CHANGE UP (ref 96–108)
CO2 SERPL-SCNC: 21 MMOL/L — LOW (ref 22–31)
CREAT SERPL-MCNC: 0.66 MG/DL — SIGNIFICANT CHANGE UP (ref 0.5–1.3)
GLUCOSE BLDC GLUCOMTR-MCNC: 197 MG/DL — HIGH (ref 70–99)
GLUCOSE BLDC GLUCOMTR-MCNC: 219 MG/DL — HIGH (ref 70–99)
GLUCOSE SERPL-MCNC: 243 MG/DL — HIGH (ref 70–99)
MAGNESIUM SERPL-MCNC: 1.9 MG/DL — SIGNIFICANT CHANGE UP (ref 1.6–2.6)
PHOSPHATE SERPL-MCNC: 3.9 MG/DL — SIGNIFICANT CHANGE UP (ref 2.5–4.5)
POTASSIUM SERPL-MCNC: 4.6 MMOL/L — SIGNIFICANT CHANGE UP (ref 3.5–5.3)
POTASSIUM SERPL-SCNC: 4.6 MMOL/L — SIGNIFICANT CHANGE UP (ref 3.5–5.3)
SODIUM SERPL-SCNC: 135 MMOL/L — SIGNIFICANT CHANGE UP (ref 135–145)

## 2019-09-28 RX ORDER — HEPARIN SODIUM 5000 [USP'U]/ML
5000 INJECTION INTRAVENOUS; SUBCUTANEOUS EVERY 12 HOURS
Refills: 0 | Status: DISCONTINUED | OUTPATIENT
Start: 2019-09-28 | End: 2019-09-28

## 2019-09-28 RX ORDER — CLONAZEPAM 1 MG
1 TABLET ORAL
Qty: 0 | Refills: 0 | DISCHARGE

## 2019-09-28 RX ORDER — GABAPENTIN 400 MG/1
1 CAPSULE ORAL
Qty: 0 | Refills: 0 | DISCHARGE

## 2019-09-28 RX ADMIN — Medication 1 MILLIGRAM(S): at 08:59

## 2019-09-28 RX ADMIN — Medication 1: at 08:16

## 2019-09-28 RX ADMIN — Medication 1 TABLET(S): at 11:55

## 2019-09-28 RX ADMIN — SODIUM CHLORIDE 75 MILLILITER(S): 9 INJECTION, SOLUTION INTRAVENOUS at 02:16

## 2019-09-28 RX ADMIN — Medication 100 MILLIGRAM(S): at 11:55

## 2019-09-28 RX ADMIN — Medication 1 MILLIGRAM(S): at 00:54

## 2019-09-28 RX ADMIN — Medication 2: at 11:51

## 2019-09-28 RX ADMIN — Medication 1 MILLIGRAM(S): at 11:54

## 2019-09-28 RX ADMIN — Medication 2 MILLIGRAM(S): at 04:54

## 2019-09-28 RX ADMIN — Medication 2 MILLIGRAM(S): at 13:22

## 2019-09-28 RX ADMIN — SODIUM CHLORIDE 75 MILLILITER(S): 9 INJECTION, SOLUTION INTRAVENOUS at 08:15

## 2019-09-28 NOTE — DISCHARGE NOTE NURSING/CASE MANAGEMENT/SOCIAL WORK - PATIENT PORTAL LINK FT
You can access the FollowMyHealth Patient Portal offered by [facility] by registering at the following website: [email or web address]. By joining [facility]’s FollowMyHealth portal, you will also be able to view your health information using other applications (apps) compatible with our system.

## 2019-09-28 NOTE — DISCHARGE NOTE PROVIDER - HOSPITAL COURSE
50y/o F with h/o IDDM, pancreatitis, alcohol abuse, presenting with worsening abdominal pain and inability to keep food/liquids down for the past 2 days.  States that she last had alcohol this evening (drank 1/2 pint of vodka) to try to help the pain.  Numerous vomiting on day of admission, nonbloody/nonbilious, and c/o chest pain and shortness of breath.  pt denies chest pain, shortness of breath , nausea, vomiting.  Admitted for alcohol induced pancreatitis.  Placed on CIWA.  Pt cleared by GI for D/C however CIWA scores remain high.  Pt signing out AMA.

## 2019-09-28 NOTE — DISCHARGE NOTE PROVIDER - NSDCCPCAREPLAN_GEN_ALL_CORE_FT
PRINCIPAL DISCHARGE DIAGNOSIS  Diagnosis: Alcohol withdrawal  Assessment and Plan of Treatment: You are signing out against medical advice      SECONDARY DISCHARGE DIAGNOSES  Diagnosis: Alcohol-induced pancreatitis  Assessment and Plan of Treatment: You are signing out against medical advice

## 2019-09-28 NOTE — PROGRESS NOTE ADULT - ASSESSMENT
Better this am, no further vomiting  no signs of pancreatitis or significant alk hep, LFTs and concetta/lipase nl, CT neg as well  n/v maybe from alcohol induced gastritis  PPI BID, diet as tolerates, d/c planning from Gi perspective
doing well, no GI complaints, tolerating PO  ok from GI perspective for d/c  on withdrawal protocol
pt with above history pw/abd pain ,n , vomiting    Abd pain - benign abd exam , monitor closely ,iv fluids    Dm2- iss, levemir     Alcohol withdrawal - thiamine, mvi, folate , CIWA

## 2019-09-28 NOTE — DISCHARGE NOTE PROVIDER - CARE PROVIDER_API CALL
David Abdi)  Gastroenterology  [address], [address]  [place] [address]  Phone: [phone]  Fax: [phone]  Follow Up Time:

## 2019-09-28 NOTE — PROGRESS NOTE ADULT - SUBJECTIVE AND OBJECTIVE BOX
INTERVAL HPI/OVERNIGHT EVENTS:    MEDICATIONS  (STANDING):  dextrose 5%. 1000 milliLiter(s) (50 mL/Hr) IV Continuous <Continuous>  dextrose 50% Injectable 12.5 Gram(s) IV Push once  dextrose 50% Injectable 25 Gram(s) IV Push once  dextrose 50% Injectable 25 Gram(s) IV Push once  folic acid 1 milliGRAM(s) Oral daily  influenza   Vaccine 0.5 milliLiter(s) IntraMuscular once  insulin glargine Injectable (LANTUS) 10 Unit(s) SubCutaneous at bedtime  insulin lispro (HumaLOG) corrective regimen sliding scale   SubCutaneous three times a day before meals  insulin lispro (HumaLOG) corrective regimen sliding scale   SubCutaneous at bedtime  multivitamin 1 Tablet(s) Oral daily  potassium phosphate / sodium phosphate powder 1 Packet(s) Oral two times a day  sodium chloride 0.45%. 1000 milliLiter(s) (75 mL/Hr) IV Continuous <Continuous>  thiamine 100 milliGRAM(s) Oral daily  traZODone 100 milliGRAM(s) Oral at bedtime    MEDICATIONS  (PRN):  dextrose 40% Gel 15 Gram(s) Oral once PRN Blood Glucose LESS THAN 70 milliGRAM(s)/deciliter  glucagon  Injectable 1 milliGRAM(s) IntraMuscular once PRN Glucose LESS THAN 70 milligrams/deciliter  LORazepam   Injectable 2 milliGRAM(s) IV Push every 2 hours PRN CIWA-Ar score increase by 2 points and a total score of 7 or less  LORazepam   Injectable 1 milliGRAM(s) IV Push every 1 hour PRN CIWA-Ar score 8 or greater      Allergies    Bactrim (Anaphylaxis)  Eggplant mouth itches (Other)    Intolerances            PHYSICAL EXAM:   Vital Signs:  Vital Signs Last 24 Hrs  T(C): 36.8 (27 Sep 2019 11:54), Max: 36.9 (26 Sep 2019 19:33)  T(F): 98.2 (27 Sep 2019 11:54), Max: 98.5 (26 Sep 2019 19:33)  HR: 56 (27 Sep 2019 11:54) (47 - 63)  BP: 133/85 (27 Sep 2019 11:54) (113/73 - 163/96)  BP(mean): --  RR: 18 (27 Sep 2019 11:54) (18 - 18)  SpO2: 96% (27 Sep 2019 11:54) (92% - 97%)  Daily     Daily     GENERAL:  no distress  HEENT:  NC/AT,  anicteric  CHEST:   no increased effort, breath sounds clear  HEART:  Regular rhythm  ABDOMEN:  Soft, non-tender, non-distended, normoactive bowel sounds,  no masses ,no hepato-splenomegaly, no signs of chronic liver disease  EXTEREMITIES:  no cyanosis      LABS:                        14.4   6.29  )-----------( 134      ( 27 Sep 2019 07:51 )             42.1     09-27    134<L>  |  101  |  9   ----------------------------<  197<H>  3.6   |  22  |  0.64    Ca    8.2<L>      27 Sep 2019 06:33  Phos  2.1     09-27  Mg     2.5     09-27    TPro  6.1  /  Alb  3.5  /  TBili  0.7  /  DBili  0.2  /  AST  36  /  ALT  28  /  AlkPhos  72  09-27      Urinalysis Basic - ( 26 Sep 2019 09:47 )    Color: Light Yellow / Appearance: Clear / SG: >1.050 / pH: x  Gluc: x / Ketone: Negative  / Bili: Negative / Urobili: Negative   Blood: x / Protein: 30 mg/dL / Nitrite: Negative   Leuk Esterase: Negative / RBC: 2 /hpf / WBC 1 /HPF   Sq Epi: x / Non Sq Epi: 0 /hpf / Bacteria: Negative        RADIOLOGY & ADDITIONAL TESTS:
INTERVAL HPI/OVERNIGHT EVENTS:    MEDICATIONS  (STANDING):  dextrose 5%. 1000 milliLiter(s) (50 mL/Hr) IV Continuous <Continuous>  dextrose 50% Injectable 12.5 Gram(s) IV Push once  dextrose 50% Injectable 25 Gram(s) IV Push once  dextrose 50% Injectable 25 Gram(s) IV Push once  folic acid 1 milliGRAM(s) Oral daily  heparin  Injectable 5000 Unit(s) SubCutaneous every 12 hours  influenza   Vaccine 0.5 milliLiter(s) IntraMuscular once  insulin glargine Injectable (LANTUS) 10 Unit(s) SubCutaneous at bedtime  insulin lispro (HumaLOG) corrective regimen sliding scale   SubCutaneous three times a day before meals  insulin lispro (HumaLOG) corrective regimen sliding scale   SubCutaneous at bedtime  multivitamin 1 Tablet(s) Oral daily  sodium chloride 0.45%. 1000 milliLiter(s) (75 mL/Hr) IV Continuous <Continuous>  thiamine 100 milliGRAM(s) Oral daily  traZODone 100 milliGRAM(s) Oral at bedtime    MEDICATIONS  (PRN):  dextrose 40% Gel 15 Gram(s) Oral once PRN Blood Glucose LESS THAN 70 milliGRAM(s)/deciliter  glucagon  Injectable 1 milliGRAM(s) IntraMuscular once PRN Glucose LESS THAN 70 milligrams/deciliter  LORazepam   Injectable 2 milliGRAM(s) IV Push every 2 hours PRN CIWA-Ar score increase by 2 points and a total score of 7 or less  LORazepam   Injectable 1 milliGRAM(s) IV Push every 1 hour PRN CIWA-Ar score 8 or greater      Allergies    Bactrim (Anaphylaxis)  Eggplant mouth itches (Other)    Intolerances            PHYSICAL EXAM:   Vital Signs:  Vital Signs Last 24 Hrs  T(C): 36.6 (28 Sep 2019 10:45), Max: 36.8 (27 Sep 2019 11:54)  T(F): 97.9 (28 Sep 2019 10:45), Max: 98.3 (28 Sep 2019 00:03)  HR: 53 (28 Sep 2019 10:45) (47 - 57)  BP: 143/85 (28 Sep 2019 10:45) (122/66 - 149/88)  BP(mean): --  RR: 18 (28 Sep 2019 10:45) (18 - 18)  SpO2: 95% (28 Sep 2019 10:45) (95% - 98%)  Daily     Daily     GENERAL:  no distress  HEENT:  NC/AT,  anicteric  CHEST:   no increased effort, breath sounds clear  HEART:  Regular rhythm  ABDOMEN:  Soft, non-tender, non-distended, normoactive bowel sounds,  no masses ,no hepato-splenomegaly, no signs of chronic liver disease  EXTEREMITIES:  no cyanosis      LABS:                        14.4   6.29  )-----------( 134      ( 27 Sep 2019 07:51 )             42.1     09-27    134<L>  |  101  |  9   ----------------------------<  197<H>  3.6   |  22  |  0.64    Ca    8.2<L>      27 Sep 2019 06:33  Phos  2.1     09-27  Mg     2.5     09-27    TPro  6.1  /  Alb  3.5  /  TBili  0.7  /  DBili  0.2  /  AST  36  /  ALT  28  /  AlkPhos  72  09-27          RADIOLOGY & ADDITIONAL TESTS:
SUBJECTIVE / OVERNIGHT EVENTS: pt denies chest pain, shortness of breath       MEDICATIONS  (STANDING):  dextrose 5%. 1000 milliLiter(s) (50 mL/Hr) IV Continuous <Continuous>  dextrose 50% Injectable 12.5 Gram(s) IV Push once  dextrose 50% Injectable 25 Gram(s) IV Push once  dextrose 50% Injectable 25 Gram(s) IV Push once  folic acid 1 milliGRAM(s) Oral daily  influenza   Vaccine 0.5 milliLiter(s) IntraMuscular once  insulin glargine Injectable (LANTUS) 10 Unit(s) SubCutaneous at bedtime  insulin lispro (HumaLOG) corrective regimen sliding scale   SubCutaneous three times a day before meals  insulin lispro (HumaLOG) corrective regimen sliding scale   SubCutaneous at bedtime  multivitamin 1 Tablet(s) Oral daily  sodium chloride 0.45%. 1000 milliLiter(s) (75 mL/Hr) IV Continuous <Continuous>  thiamine 100 milliGRAM(s) Oral daily  traZODone 100 milliGRAM(s) Oral at bedtime    MEDICATIONS  (PRN):  dextrose 40% Gel 15 Gram(s) Oral once PRN Blood Glucose LESS THAN 70 milliGRAM(s)/deciliter  glucagon  Injectable 1 milliGRAM(s) IntraMuscular once PRN Glucose LESS THAN 70 milligrams/deciliter  LORazepam   Injectable 2 milliGRAM(s) IV Push every 2 hours PRN CIWA-Ar score increase by 2 points and a total score of 7 or less  LORazepam   Injectable 1 milliGRAM(s) IV Push every 1 hour PRN CIWA-Ar score 8 or greater    Vital Signs Last 24 Hrs  T(C): 36.8 (27 Sep 2019 20:49), Max: 36.8 (27 Sep 2019 03:33)  T(F): 98.2 (27 Sep 2019 20:49), Max: 98.3 (27 Sep 2019 03:33)  HR: 57 (27 Sep 2019 20:49) (47 - 60)  BP: 144/87 (27 Sep 2019 20:49) (113/73 - 144/87)  BP(mean): --  RR: 18 (27 Sep 2019 20:49) (18 - 18)  SpO2: 98% (27 Sep 2019 20:49) (92% - 98%)    CAPILLARY BLOOD GLUCOSE      POCT Blood Glucose.: 218 mg/dL (27 Sep 2019 21:26)  POCT Blood Glucose.: 218 mg/dL (27 Sep 2019 18:42)  POCT Blood Glucose.: 241 mg/dL (27 Sep 2019 16:40)  POCT Blood Glucose.: 236 mg/dL (27 Sep 2019 11:54)  POCT Blood Glucose.: 238 mg/dL (27 Sep 2019 09:12)  POCT Blood Glucose.: 213 mg/dL (27 Sep 2019 08:05)    I&O's Summary    26 Sep 2019 07:01  -  27 Sep 2019 07:00  --------------------------------------------------------  IN: 110 mL / OUT: 0 mL / NET: 110 mL    27 Sep 2019 07:01  -  27 Sep 2019 23:17  --------------------------------------------------------  IN: 1260 mL / OUT: 0 mL / NET: 1260 mL        Constitutional: No fever, fatigue  Skin: No rash.  Eyes: No recent vision problems or eye pain.  ENT: No congestion, ear pain, or sore throat.  Cardiovascular: No chest pain or palpation.  Respiratory: No cough, shortness of breath, congestion, or wheezing.  Gastrointestinal: No abdominal pain, nausea, vomiting, or diarrhea.  Genitourinary: No dysuria.  Musculoskeletal: No joint swelling.  Neurologic: No headache.    PHYSICAL EXAM:  GENERAL: NAD  EYES: EOMI, PERRLA  NECK: Supple, No JVD  CHEST/LUNG: cta robert   HEART:  S1 , S2 +  ABDOMEN: soft , bs+  EXTREMITIES:no edema    NEUROLOGY:alert awake oriented       LABS:                        14.4   6.29  )-----------( 134      ( 27 Sep 2019 07:51 )             42.1     09-27    134<L>  |  101  |  9   ----------------------------<  197<H>  3.6   |  22  |  0.64    Ca    8.2<L>      27 Sep 2019 06:33  Phos  2.1     09-27  Mg     2.5     09-27    TPro  6.1  /  Alb  3.5  /  TBili  0.7  /  DBili  0.2  /  AST  36  /  ALT  28  /  AlkPhos  72  09-27          Urinalysis Basic - ( 26 Sep 2019 09:47 )    Color: Light Yellow / Appearance: Clear / SG: >1.050 / pH: x  Gluc: x / Ketone: Negative  / Bili: Negative / Urobili: Negative   Blood: x / Protein: 30 mg/dL / Nitrite: Negative   Leuk Esterase: Negative / RBC: 2 /hpf / WBC 1 /HPF   Sq Epi: x / Non Sq Epi: 0 /hpf / Bacteria: Negative        RADIOLOGY & ADDITIONAL TESTS:    Imaging Personally Reviewed:    Consultant(s) Notes Reviewed:      Care Discussed with Consultants/Other Providers:

## 2019-10-03 ENCOUNTER — INPATIENT (INPATIENT)
Facility: HOSPITAL | Age: 49
LOS: 1 days | Discharge: LEFT BEFORE DISCHARGE | End: 2019-10-05
Attending: INTERNAL MEDICINE | Admitting: INTERNAL MEDICINE
Payer: MEDICARE

## 2019-10-03 VITALS
TEMPERATURE: 98 F | RESPIRATION RATE: 20 BRPM | OXYGEN SATURATION: 98 % | DIASTOLIC BLOOD PRESSURE: 88 MMHG | SYSTOLIC BLOOD PRESSURE: 128 MMHG | HEART RATE: 80 BPM

## 2019-10-03 DIAGNOSIS — Z98.890 OTHER SPECIFIED POSTPROCEDURAL STATES: Chronic | ICD-10-CM

## 2019-10-03 DIAGNOSIS — R11.2 NAUSEA WITH VOMITING, UNSPECIFIED: ICD-10-CM

## 2019-10-03 DIAGNOSIS — F10.10 ALCOHOL ABUSE, UNCOMPLICATED: ICD-10-CM

## 2019-10-03 DIAGNOSIS — F10.230 ALCOHOL DEPENDENCE WITH WITHDRAWAL, UNCOMPLICATED: ICD-10-CM

## 2019-10-03 DIAGNOSIS — Z29.9 ENCOUNTER FOR PROPHYLACTIC MEASURES, UNSPECIFIED: ICD-10-CM

## 2019-10-03 DIAGNOSIS — Z90.89 ACQUIRED ABSENCE OF OTHER ORGANS: Chronic | ICD-10-CM

## 2019-10-03 DIAGNOSIS — F41.9 ANXIETY DISORDER, UNSPECIFIED: ICD-10-CM

## 2019-10-03 DIAGNOSIS — E11.9 TYPE 2 DIABETES MELLITUS WITHOUT COMPLICATIONS: ICD-10-CM

## 2019-10-03 DIAGNOSIS — L02.91 CUTANEOUS ABSCESS, UNSPECIFIED: Chronic | ICD-10-CM

## 2019-10-03 LAB
ALBUMIN SERPL ELPH-MCNC: 4.3 G/DL — SIGNIFICANT CHANGE UP (ref 3.3–5)
ALP SERPL-CCNC: 89 U/L — SIGNIFICANT CHANGE UP (ref 40–120)
ALT FLD-CCNC: 37 U/L — HIGH (ref 4–33)
ANION GAP SERPL CALC-SCNC: 20 MMO/L — HIGH (ref 7–14)
APAP SERPL-MCNC: < 15 UG/ML — LOW (ref 15–25)
AST SERPL-CCNC: 91 U/L — HIGH (ref 4–32)
BASE EXCESS BLDV CALC-SCNC: -0.4 MMOL/L — SIGNIFICANT CHANGE UP
BASE EXCESS BLDV CALC-SCNC: -1.1 MMOL/L — SIGNIFICANT CHANGE UP
BASOPHILS # BLD AUTO: 0.06 K/UL — SIGNIFICANT CHANGE UP (ref 0–0.2)
BASOPHILS NFR BLD AUTO: 1.1 % — SIGNIFICANT CHANGE UP (ref 0–2)
BILIRUB SERPL-MCNC: 0.5 MG/DL — SIGNIFICANT CHANGE UP (ref 0.2–1.2)
BLOOD GAS VENOUS - CREATININE: 0.55 MG/DL — SIGNIFICANT CHANGE UP (ref 0.5–1.3)
BLOOD GAS VENOUS - CREATININE: 0.57 MG/DL — SIGNIFICANT CHANGE UP (ref 0.5–1.3)
BUN SERPL-MCNC: 7 MG/DL — SIGNIFICANT CHANGE UP (ref 7–23)
CALCIUM SERPL-MCNC: 8.8 MG/DL — SIGNIFICANT CHANGE UP (ref 8.4–10.5)
CHLORIDE BLDV-SCNC: 103 MMOL/L — SIGNIFICANT CHANGE UP (ref 96–108)
CHLORIDE BLDV-SCNC: 107 MMOL/L — SIGNIFICANT CHANGE UP (ref 96–108)
CHLORIDE SERPL-SCNC: 100 MMOL/L — SIGNIFICANT CHANGE UP (ref 98–107)
CO2 SERPL-SCNC: 21 MMOL/L — LOW (ref 22–31)
CREAT SERPL-MCNC: 0.57 MG/DL — SIGNIFICANT CHANGE UP (ref 0.5–1.3)
EOSINOPHIL # BLD AUTO: 0.2 K/UL — SIGNIFICANT CHANGE UP (ref 0–0.5)
EOSINOPHIL NFR BLD AUTO: 3.8 % — SIGNIFICANT CHANGE UP (ref 0–6)
ETHANOL BLD-MCNC: 283 MG/DL — HIGH
GAS PNL BLDV: 142 MMOL/L — SIGNIFICANT CHANGE UP (ref 136–146)
GAS PNL BLDV: 144 MMOL/L — SIGNIFICANT CHANGE UP (ref 136–146)
GLUCOSE BLDV-MCNC: 275 MG/DL — HIGH (ref 70–99)
GLUCOSE BLDV-MCNC: 309 MG/DL — HIGH (ref 70–99)
GLUCOSE SERPL-MCNC: 320 MG/DL — HIGH (ref 70–99)
HCG SERPL-ACNC: < 5 MIU/ML — SIGNIFICANT CHANGE UP
HCO3 BLDV-SCNC: 22 MMOL/L — SIGNIFICANT CHANGE UP (ref 20–27)
HCO3 BLDV-SCNC: 23 MMOL/L — SIGNIFICANT CHANGE UP (ref 20–27)
HCT VFR BLD CALC: 43.1 % — SIGNIFICANT CHANGE UP (ref 34.5–45)
HCT VFR BLDV CALC: 43.4 % — SIGNIFICANT CHANGE UP (ref 34.5–45)
HCT VFR BLDV CALC: 48.9 % — HIGH (ref 34.5–45)
HGB BLD-MCNC: 14.7 G/DL — SIGNIFICANT CHANGE UP (ref 11.5–15.5)
HGB BLDV-MCNC: 14.1 G/DL — SIGNIFICANT CHANGE UP (ref 11.5–15.5)
HGB BLDV-MCNC: 16 G/DL — HIGH (ref 11.5–15.5)
IMM GRANULOCYTES NFR BLD AUTO: 0.4 % — SIGNIFICANT CHANGE UP (ref 0–1.5)
LACTATE BLDV-MCNC: 2.9 MMOL/L — HIGH (ref 0.5–2)
LACTATE BLDV-MCNC: 4 MMOL/L — CRITICAL HIGH (ref 0.5–2)
LIDOCAIN IGE QN: 13.1 U/L — SIGNIFICANT CHANGE UP (ref 7–60)
LYMPHOCYTES # BLD AUTO: 2.19 K/UL — SIGNIFICANT CHANGE UP (ref 1–3.3)
LYMPHOCYTES # BLD AUTO: 41.6 % — SIGNIFICANT CHANGE UP (ref 13–44)
MCHC RBC-ENTMCNC: 31.3 PG — SIGNIFICANT CHANGE UP (ref 27–34)
MCHC RBC-ENTMCNC: 34.1 % — SIGNIFICANT CHANGE UP (ref 32–36)
MCV RBC AUTO: 91.7 FL — SIGNIFICANT CHANGE UP (ref 80–100)
MONOCYTES # BLD AUTO: 0.32 K/UL — SIGNIFICANT CHANGE UP (ref 0–0.9)
MONOCYTES NFR BLD AUTO: 6.1 % — SIGNIFICANT CHANGE UP (ref 2–14)
NEUTROPHILS # BLD AUTO: 2.48 K/UL — SIGNIFICANT CHANGE UP (ref 1.8–7.4)
NEUTROPHILS NFR BLD AUTO: 47 % — SIGNIFICANT CHANGE UP (ref 43–77)
NRBC # FLD: 0 K/UL — SIGNIFICANT CHANGE UP (ref 0–0)
PCO2 BLDV: 45 MMHG — SIGNIFICANT CHANGE UP (ref 41–51)
PCO2 BLDV: 48 MMHG — SIGNIFICANT CHANGE UP (ref 41–51)
PH BLDV: 7.32 PH — SIGNIFICANT CHANGE UP (ref 7.32–7.43)
PH BLDV: 7.35 PH — SIGNIFICANT CHANGE UP (ref 7.32–7.43)
PLATELET # BLD AUTO: 194 K/UL — SIGNIFICANT CHANGE UP (ref 150–400)
PMV BLD: 10.1 FL — SIGNIFICANT CHANGE UP (ref 7–13)
PO2 BLDV: 42 MMHG — HIGH (ref 35–40)
PO2 BLDV: 47 MMHG — HIGH (ref 35–40)
POTASSIUM BLDV-SCNC: 3.4 MMOL/L — SIGNIFICANT CHANGE UP (ref 3.4–4.5)
POTASSIUM BLDV-SCNC: 4 MMOL/L — SIGNIFICANT CHANGE UP (ref 3.4–4.5)
POTASSIUM SERPL-MCNC: 3.5 MMOL/L — SIGNIFICANT CHANGE UP (ref 3.5–5.3)
POTASSIUM SERPL-SCNC: 3.5 MMOL/L — SIGNIFICANT CHANGE UP (ref 3.5–5.3)
PROT SERPL-MCNC: 7.4 G/DL — SIGNIFICANT CHANGE UP (ref 6–8.3)
RBC # BLD: 4.7 M/UL — SIGNIFICANT CHANGE UP (ref 3.8–5.2)
RBC # FLD: 11.5 % — SIGNIFICANT CHANGE UP (ref 10.3–14.5)
SALICYLATES SERPL-MCNC: < 5 MG/DL — LOW (ref 15–30)
SAO2 % BLDV: 66.9 % — SIGNIFICANT CHANGE UP (ref 60–85)
SAO2 % BLDV: 73.8 % — SIGNIFICANT CHANGE UP (ref 60–85)
SODIUM SERPL-SCNC: 141 MMOL/L — SIGNIFICANT CHANGE UP (ref 135–145)
WBC # BLD: 5.27 K/UL — SIGNIFICANT CHANGE UP (ref 3.8–10.5)
WBC # FLD AUTO: 5.27 K/UL — SIGNIFICANT CHANGE UP (ref 3.8–10.5)

## 2019-10-03 PROCEDURE — 99223 1ST HOSP IP/OBS HIGH 75: CPT

## 2019-10-03 RX ORDER — TRAZODONE HCL 50 MG
50 TABLET ORAL AT BEDTIME
Refills: 0 | Status: DISCONTINUED | OUTPATIENT
Start: 2019-10-03 | End: 2019-10-05

## 2019-10-03 RX ORDER — INSULIN GLARGINE 100 [IU]/ML
15 INJECTION, SOLUTION SUBCUTANEOUS AT BEDTIME
Refills: 0 | Status: DISCONTINUED | OUTPATIENT
Start: 2019-10-03 | End: 2019-10-05

## 2019-10-03 RX ORDER — CLONAZEPAM 1 MG
0.5 TABLET ORAL THREE TIMES A DAY
Refills: 0 | Status: DISCONTINUED | OUTPATIENT
Start: 2019-10-03 | End: 2019-10-05

## 2019-10-03 RX ORDER — ACETAMINOPHEN 500 MG
650 TABLET ORAL ONCE
Refills: 0 | Status: COMPLETED | OUTPATIENT
Start: 2019-10-03 | End: 2019-10-03

## 2019-10-03 RX ORDER — SODIUM CHLORIDE 9 MG/ML
1000 INJECTION, SOLUTION INTRAVENOUS ONCE
Refills: 0 | Status: COMPLETED | OUTPATIENT
Start: 2019-10-03 | End: 2019-10-03

## 2019-10-03 RX ORDER — PANTOPRAZOLE SODIUM 20 MG/1
40 TABLET, DELAYED RELEASE ORAL
Refills: 0 | Status: DISCONTINUED | OUTPATIENT
Start: 2019-10-03 | End: 2019-10-05

## 2019-10-03 RX ORDER — DEXTROSE 50 % IN WATER 50 %
25 SYRINGE (ML) INTRAVENOUS ONCE
Refills: 0 | Status: DISCONTINUED | OUTPATIENT
Start: 2019-10-03 | End: 2019-10-05

## 2019-10-03 RX ORDER — DEXTROSE 50 % IN WATER 50 %
12.5 SYRINGE (ML) INTRAVENOUS ONCE
Refills: 0 | Status: DISCONTINUED | OUTPATIENT
Start: 2019-10-03 | End: 2019-10-05

## 2019-10-03 RX ORDER — FOLIC ACID 0.8 MG
1 TABLET ORAL DAILY
Refills: 0 | Status: DISCONTINUED | OUTPATIENT
Start: 2019-10-03 | End: 2019-10-05

## 2019-10-03 RX ORDER — SODIUM CHLORIDE 9 MG/ML
1000 INJECTION, SOLUTION INTRAVENOUS
Refills: 0 | Status: DISCONTINUED | OUTPATIENT
Start: 2019-10-03 | End: 2019-10-05

## 2019-10-03 RX ORDER — FAMOTIDINE 10 MG/ML
20 INJECTION INTRAVENOUS ONCE
Refills: 0 | Status: COMPLETED | OUTPATIENT
Start: 2019-10-03 | End: 2019-10-03

## 2019-10-03 RX ORDER — HALOPERIDOL DECANOATE 100 MG/ML
5 INJECTION INTRAMUSCULAR ONCE
Refills: 0 | Status: COMPLETED | OUTPATIENT
Start: 2019-10-03 | End: 2019-10-03

## 2019-10-03 RX ORDER — METFORMIN HYDROCHLORIDE 850 MG/1
1 TABLET ORAL
Qty: 0 | Refills: 0 | DISCHARGE

## 2019-10-03 RX ORDER — DIPHENHYDRAMINE HCL 50 MG
25 CAPSULE ORAL ONCE
Refills: 0 | Status: DISCONTINUED | OUTPATIENT
Start: 2019-10-03 | End: 2019-10-03

## 2019-10-03 RX ORDER — ONDANSETRON 8 MG/1
2 TABLET, FILM COATED ORAL EVERY 4 HOURS
Refills: 0 | Status: DISCONTINUED | OUTPATIENT
Start: 2019-10-03 | End: 2019-10-03

## 2019-10-03 RX ORDER — ONDANSETRON 8 MG/1
4 TABLET, FILM COATED ORAL ONCE
Refills: 0 | Status: COMPLETED | OUTPATIENT
Start: 2019-10-03 | End: 2019-10-03

## 2019-10-03 RX ORDER — METOCLOPRAMIDE HCL 10 MG
10 TABLET ORAL ONCE
Refills: 0 | Status: COMPLETED | OUTPATIENT
Start: 2019-10-03 | End: 2019-10-03

## 2019-10-03 RX ORDER — SODIUM CHLORIDE 9 MG/ML
1000 INJECTION INTRAMUSCULAR; INTRAVENOUS; SUBCUTANEOUS ONCE
Refills: 0 | Status: COMPLETED | OUTPATIENT
Start: 2019-10-03 | End: 2019-10-03

## 2019-10-03 RX ORDER — SENNA PLUS 8.6 MG/1
2 TABLET ORAL
Qty: 0 | Refills: 0 | DISCHARGE

## 2019-10-03 RX ORDER — GLUCAGON INJECTION, SOLUTION 0.5 MG/.1ML
1 INJECTION, SOLUTION SUBCUTANEOUS ONCE
Refills: 0 | Status: DISCONTINUED | OUTPATIENT
Start: 2019-10-03 | End: 2019-10-05

## 2019-10-03 RX ORDER — INSULIN GLARGINE 100 [IU]/ML
20 INJECTION, SOLUTION SUBCUTANEOUS AT BEDTIME
Refills: 0 | Status: DISCONTINUED | OUTPATIENT
Start: 2019-10-03 | End: 2019-10-03

## 2019-10-03 RX ORDER — DEXTROSE 50 % IN WATER 50 %
15 SYRINGE (ML) INTRAVENOUS ONCE
Refills: 0 | Status: DISCONTINUED | OUTPATIENT
Start: 2019-10-03 | End: 2019-10-05

## 2019-10-03 RX ORDER — INSULIN LISPRO 100/ML
4 VIAL (ML) SUBCUTANEOUS
Refills: 0 | Status: DISCONTINUED | OUTPATIENT
Start: 2019-10-03 | End: 2019-10-05

## 2019-10-03 RX ORDER — THIAMINE MONONITRATE (VIT B1) 100 MG
100 TABLET ORAL DAILY
Refills: 0 | Status: COMPLETED | OUTPATIENT
Start: 2019-10-03 | End: 2019-10-05

## 2019-10-03 RX ORDER — GABAPENTIN 400 MG/1
300 CAPSULE ORAL THREE TIMES A DAY
Refills: 0 | Status: DISCONTINUED | OUTPATIENT
Start: 2019-10-03 | End: 2019-10-05

## 2019-10-03 RX ORDER — ONDANSETRON 8 MG/1
4 TABLET, FILM COATED ORAL EVERY 6 HOURS
Refills: 0 | Status: DISCONTINUED | OUTPATIENT
Start: 2019-10-03 | End: 2019-10-05

## 2019-10-03 RX ORDER — INSULIN LISPRO 100/ML
VIAL (ML) SUBCUTANEOUS
Refills: 0 | Status: DISCONTINUED | OUTPATIENT
Start: 2019-10-03 | End: 2019-10-05

## 2019-10-03 RX ORDER — DIPHENHYDRAMINE HCL 50 MG
25 CAPSULE ORAL EVERY 4 HOURS
Refills: 0 | Status: DISCONTINUED | OUTPATIENT
Start: 2019-10-03 | End: 2019-10-03

## 2019-10-03 RX ADMIN — FAMOTIDINE 20 MILLIGRAM(S): 10 INJECTION INTRAVENOUS at 09:19

## 2019-10-03 RX ADMIN — Medication 650 MILLIGRAM(S): at 10:48

## 2019-10-03 RX ADMIN — Medication 2 MILLIGRAM(S): at 17:56

## 2019-10-03 RX ADMIN — Medication 650 MILLIGRAM(S): at 19:11

## 2019-10-03 RX ADMIN — HALOPERIDOL DECANOATE 5 MILLIGRAM(S): 100 INJECTION INTRAMUSCULAR at 02:50

## 2019-10-03 RX ADMIN — SODIUM CHLORIDE 1000 MILLILITER(S): 9 INJECTION, SOLUTION INTRAVENOUS at 14:39

## 2019-10-03 RX ADMIN — Medication 2 MILLIGRAM(S): at 10:18

## 2019-10-03 RX ADMIN — PANTOPRAZOLE SODIUM 40 MILLIGRAM(S): 20 TABLET, DELAYED RELEASE ORAL at 19:55

## 2019-10-03 RX ADMIN — ONDANSETRON 4 MILLIGRAM(S): 8 TABLET, FILM COATED ORAL at 04:24

## 2019-10-03 RX ADMIN — Medication 100 MILLIGRAM(S): at 13:26

## 2019-10-03 RX ADMIN — ONDANSETRON 4 MILLIGRAM(S): 8 TABLET, FILM COATED ORAL at 13:27

## 2019-10-03 RX ADMIN — Medication 50 MILLIGRAM(S): at 11:04

## 2019-10-03 RX ADMIN — Medication 10 MILLIGRAM(S): at 09:19

## 2019-10-03 RX ADMIN — Medication 2 MILLIGRAM(S): at 14:39

## 2019-10-03 RX ADMIN — Medication 650 MILLIGRAM(S): at 09:19

## 2019-10-03 RX ADMIN — Medication 2 MILLIGRAM(S): at 23:59

## 2019-10-03 RX ADMIN — Medication 4 UNIT(S): at 19:12

## 2019-10-03 RX ADMIN — Medication 1 TABLET(S): at 13:26

## 2019-10-03 RX ADMIN — Medication 30 MILLILITER(S): at 09:19

## 2019-10-03 RX ADMIN — SODIUM CHLORIDE 1000 MILLILITER(S): 9 INJECTION INTRAMUSCULAR; INTRAVENOUS; SUBCUTANEOUS at 04:25

## 2019-10-03 RX ADMIN — Medication 1 MILLIGRAM(S): at 12:22

## 2019-10-03 RX ADMIN — Medication 2 MILLIGRAM(S): at 20:36

## 2019-10-03 RX ADMIN — Medication 4: at 19:11

## 2019-10-03 RX ADMIN — Medication 2 MILLIGRAM(S): at 02:51

## 2019-10-03 RX ADMIN — SODIUM CHLORIDE 1000 MILLILITER(S): 9 INJECTION INTRAMUSCULAR; INTRAVENOUS; SUBCUTANEOUS at 06:02

## 2019-10-03 RX ADMIN — Medication 650 MILLIGRAM(S): at 14:38

## 2019-10-03 NOTE — H&P ADULT - PROBLEM SELECTOR PLAN 3
iss, levemir Normally at [facility] - 20 units at home ; will reduce to 15, given decreased PO intake   - Lispro 4 units AC

## 2019-10-03 NOTE — ED PROVIDER NOTE - NS ED ROS FT
ROS:  GENERAL: No fever, no chills  EYES: no change in vision  HEENT: no trouble swallowing, no trouble speaking  CARDIAC: no chest pain  PULMONARY: no cough, no shortness of breath  GI: + abdominal pain, + nausea, + vomiting, no diarrhea, no constipation  : No dysuria, no frequency, no change in appearance, or odor of urine  SKIN: no rashes  NEURO: no headache, no weakness  MSK: No joint pain  ~Kenan Britt D.O. -Resident

## 2019-10-03 NOTE — ED PROVIDER NOTE - PHYSICAL EXAMINATION
Physical Exam:  Gen: NAD, AOx3, intox appearing, able to ambulate without assistance  Head: NCAT  HEENT: EOMI, PEERLA, normal conjunctiva, tongue midline, oral mucosa moist  Lung: CTAB, no respiratory distress, no wheezes/rhonchi/rales B/L, speaking in full sentences  CV: RRR, no murmurs, rubs or gallops  Abd: soft, NT, ND, no guarding, no rigidity, no rebound tenderness, no CVA tenderness   MSK: no visible deformities, ROM normal in UE/LE, no back pain  Neuro: No focal sensory or motor deficits  Skin: Warm, well perfused, no rash, no leg swelling  Psych: normal affect, calm  ~Kenan Britt D.O. -Resident
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
## 2019-10-03 NOTE — H&P ADULT - NSHPPHYSICALEXAM_GEN_ALL_CORE
GENERAL:Middle age overweight woman in  NAD  HEENT: EOMI, MMM, no oropharyngeal lesions or erythema appreciated  Pulm: normal work of breathing, CTABL  CV: RRR, S1&S2+, no m/r/g appreciated  ABDOMEN: soft, obese nt, nd, no hepatosplenomegaly  MSK: nl ROM  EXTREMITIES:  no appreciable edema in b/l LE  Neuro: A&Ox3, no focal deficits  SKIN: warm and dry, no visible rash GENERAL:Middle age overweight woman in  NAD  HEENT: EOMI, MMM, no oropharyngeal lesions or erythema appreciated  Pulm: normal work of breathing, CTABL  CV: RRR, S1&S2+, no m/r/g appreciated  ABDOMEN: soft, obese nt, nd, no hepatosplenomegaly  MSK: nl ROM  EXTREMITIES:  no appreciable edema in b/l LE  Neuro: A&Ox3, no focal deficits; no hand tremors or tongue fasciculations   SKIN: warm and dry, no visible rash

## 2019-10-03 NOTE — H&P ADULT - NSHPLABSRESULTS_GEN_ALL_CORE
LABS:  CAPILLARY BLOOD GLUCOSE                                14.7   5.27  )-----------( 194      ( 03 Oct 2019 04:40 )             43.1     10-03    141  |  100  |  7   ----------------------------<  320<H>  3.5   |  21<L>  |  0.57    Ca    8.8      03 Oct 2019 04:40    TPro  7.4  /  Alb  4.3  /  TBili  0.5  /  DBili  x   /  AST  91<H>  /  ALT  37<H>  /  AlkPhos  89  10-03                RADIOLOGY & ADDITIONAL TESTS:    Telemetry Personally Reviewed:    ECG Personally Reviewed:    Imaging Personally Reviewed:    Imaging Reviewed:     Consultant(s) Notes Reviewed:      Care Discussed with Consultants/Other Providers:

## 2019-10-03 NOTE — H&P ADULT - ASSESSMENT
pt with above history pw/abd pain ,n , vomiting\  Abd pain - benign abd exam , monitor closely ,iv fluids    Dm2- iss, levemir     Alcohol withdrawal - thiamine, mvi, folate , CIWA pt with above history pw/abd pain ,n , vomiting\  Abd pain - benign abd exam , monitor closely ,iv fluids    Dm2-     Alcohol withdrawal - 48y/o F with h/o IDDM, pancreatitis, alcohol abuse, presenting with worsening abdominal pain      pt with above history pw/abd pain ,n , vomiting\  Abd pain - benign abd exam , monitor closely ,iv fluids    Dm2-     Alcohol withdrawal - 48y/o F with h/o IDDM, pancreatitis, alcohol abuse with no history of complicated withdrawal, presenting with generalized abdominal pain with associated nausea and vomitting for one day, with benign abdominal exam with signs of ETOH withdrawal, c/w ETOH withdrawal +/- ETOH gastritis 48y/o F with h/o IDDM, pancreatitis, alcohol abuse with no history of complicated withdrawal, presenting with generalized abdominal pain with associated nausea and vomitting for one day, with benign abdominal exam with signs of ETOH withdrawal, c/w ETOH withdrawal +/- ETOH gastritis.

## 2019-10-03 NOTE — H&P ADULT - PROBLEM SELECTOR PLAN 4
- Continue with thiamine, mvi, folate ,   - Symptom triggered ativa per CIWA  - Gabapentin 300 TID - Continue home clonopin .5mg TID PRN   - Home trazodone   - states that she is off seroquel - Continue home clonopin .5mg TID PRN (IStop confirmed)   - Home trazodone   - states that she is off seroquel
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
## 2019-10-03 NOTE — ED PROVIDER NOTE - CARE PLAN
Principal Discharge DX:	Nausea and vomiting, intractability of vomiting not specified, unspecified vomiting type  Secondary Diagnosis:	Alcohol abuse Principal Discharge DX:	Nausea and vomiting, intractability of vomiting not specified, unspecified vomiting type  Secondary Diagnosis:	Alcohol abuse  Secondary Diagnosis:	Alcohol withdrawal syndrome without complication

## 2019-10-03 NOTE — H&P ADULT - NSHPREVIEWOFSYSTEMS_GEN_ALL_CORE
REVIEW OF SYSTEMS:    CONSTITUTIONAL: No weakness, fevers or chills  EYES/ENT: No visual changes;  no throat pain   NECK: No pain or stiffness  RESPIRATORY: No cough, wheezing, hemoptysis; No shortness of breath  CARDIOVASCULAR: No chest pain or palpitations  GASTROINTESTINAL: ++  abdominal or epigastric pain. ++ nausea, vomiting,  No hematemesis; No diarrhea or constipation. No melena or hematochezia.  GENITOURINARY: No dysuria, change in frequency or hematuria  NEUROLOGICAL: No numbness or weakness  SKIN: No itching, burning, rashes, or lesions   Psych: No depression   All other review of systems is negative unless indicated above.

## 2019-10-03 NOTE — ED PROVIDER NOTE - PROGRESS NOTE DETAILS
patient walking around ER daron woo, refusing to go back to room, verbal de-escalation techniques not working, given 5 haldol, 2ativan 25benadryl for sedation, now resting comfortably in room patient calm us iv placed labs sent Darrian Sr, PGY 2: patient reports headache and tremor. Will give ativan and librium and reassess. Darrian Sr, PGY 2: patient is still feeling anxious and continue to have mild hand tremors. will admit for alcohol withdrawal

## 2019-10-03 NOTE — H&P ADULT - HISTORY OF PRESENT ILLNESS
50y/o F with h/o IDDM, pancreatitis, alcohol abuse, presenting with worsening abdominal pain and inability to keep food/liquids down for the past 2 days.  States that she last had alcohol this evening (drank 1/2 pint of vodka) to try  to help the pain. Numerous vomiting today, nonbloody/nonbilious, and c/o chest  pain and shortness of breath today. pt denies chest pain, shortness of breath  , nausea, vomiting bd pain at present 48y/o F with h/o IDDM, pancreatitis, alcohol abuse with no history of complicated withdrawal, presenting with generalized abdominal pain with associated nausea and vomitting for one day. Patient has a history of heavy ETOH abuse, drinks up to 1/2 pints of vodka a day. History of hospitalizations for ETOH withdrawal but no hx of seizures or worsening abdominal pain and inability to keep food/liquids down for the past 2 days.  States that she last had alcohol this evening (drank 1/2 pint of vodka) to try  to help the pain. Numerous vomiting today, nonbloody/nonbilious, and c/o chest  pain and shortness of breath today. pt denies chest pain, shortness of breath  , nausea, vomiting bd pain at present 50y/o F with h/o IDDM, pancreatitis, alcohol abuse with no history of complicated withdrawal, presenting with generalized abdominal pain with associated nausea and vomitting for one day. Patient has a history of heavy ETOH abuse, drinks up to 1/2 pints of vodka a day. History of hospitalizations for ETOH withdrawal but no hx of seizures or intubations. She recently had a binge, her last drink was yesterday before presentation. She reports several episodes of  nonbloody/nonbilious emesis and generalized abdominal pain. Additionally, with poor PO intake. No fevers, chills, diarrhea, cough, no recent abx. No recent travel. Denies chest pain and shortness of breath.

## 2019-10-03 NOTE — ED ADULT NURSE NOTE - NSIMPLEMENTINTERV_GEN_ALL_ED
Implemented All Fall Risk Interventions:  [place] to call system. Call bell, personal items and telephone within reach. Instruct patient to call for assistance. Room bathroom lighting operational. Non-slip footwear when patient is off stretcher. Physically safe environment: no spills, clutter or unnecessary equipment. Stretcher in lowest position, wheels locked, appropriate side rails in place. Provide visual cue, wrist band, yellow gown, etc. Monitor gait and stability. Monitor for mental status changes and reorient to person, place, and time. Review medications for side effects contributing to fall risk. Reinforce activity limits and safety measures with patient and family.

## 2019-10-03 NOTE — ED ADULT NURSE REASSESSMENT NOTE - NS ED NURSE REASSESS COMMENT FT1
Report received from dayshift RN. Pt aaox4. Vital signs reassessed as noted. Pt states feeling mildly anxious and hot; pt medicated as per MD order. Pt repositioned for comfort. Bed in lowest position. Will continue to monitor.

## 2019-10-03 NOTE — H&P ADULT - NSHPSOCIALHISTORY_GEN_ALL_CORE
Lives in [place]. History of heavy ETOH use - drinks up to 1/2 pint of vodka daily. Hx of hospitalizations for ETOH withdrawal. Denies any tobacco use or recreational drug use.
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
## 2019-10-03 NOTE — H&P ADULT - PROBLEM SELECTOR PLAN 2
- ETOH gastritis  vs. ETOH withdrawal   - Treat with symptom triggered therapy   - PPI BID - ETOH gastritis  vs. ETOH withdrawal   - Treat with symptom triggered therapy   - PPI BID  - Zofran PRN ; EKG Qtc 474 - 9/27
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
## 2019-10-03 NOTE — H&P ADULT - PROBLEM SELECTOR PLAN 1
- Continue with thiamine, mvi, folate ,   - Symptom triggered ativa per CIWA  - Gabapentin 300 TID  - Social work consult: Re: rehab sources   - Patient with AG of 20 on initial labs, with lactate of 4.0  likely secondary to starvation ketoacidosis and ETOH. Glucose of 320 - low concern for DKA.
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
## 2019-10-04 LAB
ALBUMIN SERPL ELPH-MCNC: 4 G/DL — SIGNIFICANT CHANGE UP (ref 3.3–5)
ALP SERPL-CCNC: 81 U/L — SIGNIFICANT CHANGE UP (ref 40–120)
ALT FLD-CCNC: 27 U/L — SIGNIFICANT CHANGE UP (ref 4–33)
ANION GAP SERPL CALC-SCNC: 14 MMO/L — SIGNIFICANT CHANGE UP (ref 7–14)
ANION GAP SERPL CALC-SCNC: 14 MMO/L — SIGNIFICANT CHANGE UP (ref 7–14)
AST SERPL-CCNC: 47 U/L — HIGH (ref 4–32)
BILIRUB SERPL-MCNC: 1.7 MG/DL — HIGH (ref 0.2–1.2)
BUN SERPL-MCNC: 10 MG/DL — SIGNIFICANT CHANGE UP (ref 7–23)
BUN SERPL-MCNC: 9 MG/DL — SIGNIFICANT CHANGE UP (ref 7–23)
CALCIUM SERPL-MCNC: 8.7 MG/DL — SIGNIFICANT CHANGE UP (ref 8.4–10.5)
CALCIUM SERPL-MCNC: 8.9 MG/DL — SIGNIFICANT CHANGE UP (ref 8.4–10.5)
CHLORIDE SERPL-SCNC: 101 MMOL/L — SIGNIFICANT CHANGE UP (ref 98–107)
CHLORIDE SERPL-SCNC: 101 MMOL/L — SIGNIFICANT CHANGE UP (ref 98–107)
CO2 SERPL-SCNC: 21 MMOL/L — LOW (ref 22–31)
CO2 SERPL-SCNC: 21 MMOL/L — LOW (ref 22–31)
CREAT SERPL-MCNC: 0.6 MG/DL — SIGNIFICANT CHANGE UP (ref 0.5–1.3)
CREAT SERPL-MCNC: 0.63 MG/DL — SIGNIFICANT CHANGE UP (ref 0.5–1.3)
GLUCOSE BLDC GLUCOMTR-MCNC: 212 MG/DL — HIGH (ref 70–99)
GLUCOSE BLDC GLUCOMTR-MCNC: 216 MG/DL — HIGH (ref 70–99)
GLUCOSE BLDC GLUCOMTR-MCNC: 237 MG/DL — HIGH (ref 70–99)
GLUCOSE BLDC GLUCOMTR-MCNC: 278 MG/DL — HIGH (ref 70–99)
GLUCOSE SERPL-MCNC: 152 MG/DL — HIGH (ref 70–99)
GLUCOSE SERPL-MCNC: 235 MG/DL — HIGH (ref 70–99)
LACTATE BLDV-MCNC: 3.2 MMOL/L — HIGH (ref 0.5–2)
LACTATE BLDV-MCNC: 4.1 MMOL/L — CRITICAL HIGH (ref 0.5–2)
MAGNESIUM SERPL-MCNC: 1.7 MG/DL — SIGNIFICANT CHANGE UP (ref 1.6–2.6)
MAGNESIUM SERPL-MCNC: 1.8 MG/DL — SIGNIFICANT CHANGE UP (ref 1.6–2.6)
PHOSPHATE SERPL-MCNC: 2 MG/DL — LOW (ref 2.5–4.5)
PHOSPHATE SERPL-MCNC: 2 MG/DL — LOW (ref 2.5–4.5)
POTASSIUM SERPL-MCNC: 3.8 MMOL/L — SIGNIFICANT CHANGE UP (ref 3.5–5.3)
POTASSIUM SERPL-MCNC: 4 MMOL/L — SIGNIFICANT CHANGE UP (ref 3.5–5.3)
POTASSIUM SERPL-SCNC: 3.8 MMOL/L — SIGNIFICANT CHANGE UP (ref 3.5–5.3)
POTASSIUM SERPL-SCNC: 4 MMOL/L — SIGNIFICANT CHANGE UP (ref 3.5–5.3)
PROT SERPL-MCNC: 7 G/DL — SIGNIFICANT CHANGE UP (ref 6–8.3)
SODIUM SERPL-SCNC: 136 MMOL/L — SIGNIFICANT CHANGE UP (ref 135–145)
SODIUM SERPL-SCNC: 136 MMOL/L — SIGNIFICANT CHANGE UP (ref 135–145)

## 2019-10-04 RX ORDER — POTASSIUM PHOSPHATE, MONOBASIC POTASSIUM PHOSPHATE, DIBASIC 236; 224 MG/ML; MG/ML
15 INJECTION, SOLUTION INTRAVENOUS ONCE
Refills: 0 | Status: COMPLETED | OUTPATIENT
Start: 2019-10-04 | End: 2019-10-04

## 2019-10-04 RX ORDER — SODIUM CHLORIDE 9 MG/ML
1000 INJECTION, SOLUTION INTRAVENOUS
Refills: 0 | Status: DISCONTINUED | OUTPATIENT
Start: 2019-10-04 | End: 2019-10-05

## 2019-10-04 RX ORDER — SODIUM CHLORIDE 9 MG/ML
3 INJECTION INTRAMUSCULAR; INTRAVENOUS; SUBCUTANEOUS EVERY 8 HOURS
Refills: 0 | Status: DISCONTINUED | OUTPATIENT
Start: 2019-10-04 | End: 2019-10-05

## 2019-10-04 RX ADMIN — Medication 2 MILLIGRAM(S): at 22:02

## 2019-10-04 RX ADMIN — GABAPENTIN 300 MILLIGRAM(S): 400 CAPSULE ORAL at 05:17

## 2019-10-04 RX ADMIN — Medication 6: at 17:57

## 2019-10-04 RX ADMIN — Medication 2 MILLIGRAM(S): at 09:23

## 2019-10-04 RX ADMIN — SODIUM CHLORIDE 100 MILLILITER(S): 9 INJECTION, SOLUTION INTRAVENOUS at 04:04

## 2019-10-04 RX ADMIN — Medication 2 MILLIGRAM(S): at 13:38

## 2019-10-04 RX ADMIN — POTASSIUM PHOSPHATE, MONOBASIC POTASSIUM PHOSPHATE, DIBASIC 62.5 MILLIMOLE(S): 236; 224 INJECTION, SOLUTION INTRAVENOUS at 13:38

## 2019-10-04 RX ADMIN — PANTOPRAZOLE SODIUM 40 MILLIGRAM(S): 20 TABLET, DELAYED RELEASE ORAL at 17:57

## 2019-10-04 RX ADMIN — Medication 4 UNIT(S): at 09:32

## 2019-10-04 RX ADMIN — SODIUM CHLORIDE 3 MILLILITER(S): 9 INJECTION INTRAMUSCULAR; INTRAVENOUS; SUBCUTANEOUS at 22:32

## 2019-10-04 RX ADMIN — Medication 1 MILLIGRAM(S): at 11:42

## 2019-10-04 RX ADMIN — INSULIN GLARGINE 15 UNIT(S): 100 INJECTION, SOLUTION SUBCUTANEOUS at 22:30

## 2019-10-04 RX ADMIN — Medication 4 UNIT(S): at 17:57

## 2019-10-04 RX ADMIN — SODIUM CHLORIDE 3 MILLILITER(S): 9 INJECTION INTRAMUSCULAR; INTRAVENOUS; SUBCUTANEOUS at 13:28

## 2019-10-04 RX ADMIN — Medication 4 UNIT(S): at 12:55

## 2019-10-04 RX ADMIN — Medication 0.5 MILLIGRAM(S): at 02:16

## 2019-10-04 RX ADMIN — Medication 2 MILLIGRAM(S): at 17:57

## 2019-10-04 RX ADMIN — Medication 2 MILLIGRAM(S): at 05:17

## 2019-10-04 RX ADMIN — Medication 4: at 09:32

## 2019-10-04 RX ADMIN — Medication 1 TABLET(S): at 11:42

## 2019-10-04 RX ADMIN — Medication 4: at 12:55

## 2019-10-04 RX ADMIN — SODIUM CHLORIDE 100 MILLILITER(S): 9 INJECTION, SOLUTION INTRAVENOUS at 22:04

## 2019-10-04 RX ADMIN — GABAPENTIN 300 MILLIGRAM(S): 400 CAPSULE ORAL at 13:38

## 2019-10-04 RX ADMIN — PANTOPRAZOLE SODIUM 40 MILLIGRAM(S): 20 TABLET, DELAYED RELEASE ORAL at 05:17

## 2019-10-04 RX ADMIN — SODIUM CHLORIDE 3 MILLILITER(S): 9 INJECTION INTRAMUSCULAR; INTRAVENOUS; SUBCUTANEOUS at 06:00

## 2019-10-04 RX ADMIN — Medication 100 MILLIGRAM(S): at 11:42

## 2019-10-04 RX ADMIN — GABAPENTIN 300 MILLIGRAM(S): 400 CAPSULE ORAL at 22:03

## 2019-10-04 NOTE — PROGRESS NOTE ADULT - ASSESSMENT
50y/o F with h/o IDDM, pancreatitis, alcohol abuse with no history of complicated withdrawal, presenting with generalized abdominal pain with associated nausea and vomitting for one day, with benign abdominal exam with signs of ETOH withdrawal, c/w ETOH withdrawal +/- ETOH gastritis.

## 2019-10-04 NOTE — PROGRESS NOTE ADULT - PROBLEM SELECTOR PLAN 4
- Continue home clonopin .5mg TID PRN (IStop confirmed)   - Home trazodone   - states that she is off seroquel

## 2019-10-04 NOTE — PROGRESS NOTE ADULT - SUBJECTIVE AND OBJECTIVE BOX
SUBJECTIVE / OVERNIGHT EVENTS: pt denies chest pain, shortness of breath, abd pain , nausea,  v      MEDICATIONS  (STANDING):  dextrose 5%. 1000 milliLiter(s) (50 mL/Hr) IV Continuous <Continuous>  dextrose 50% Injectable 12.5 Gram(s) IV Push once  dextrose 50% Injectable 25 Gram(s) IV Push once  dextrose 50% Injectable 25 Gram(s) IV Push once  folic acid 1 milliGRAM(s) Oral daily  gabapentin 300 milliGRAM(s) Oral three times a day  insulin glargine Injectable (LANTUS) 15 Unit(s) SubCutaneous at bedtime  insulin lispro (HumaLOG) corrective regimen sliding scale   SubCutaneous three times a day before meals  insulin lispro Injectable (HumaLOG) 4 Unit(s) SubCutaneous three times a day before meals  lactated ringers. 1000 milliLiter(s) (100 mL/Hr) IV Continuous <Continuous>  LORazepam     Tablet   Oral   LORazepam     Tablet 2 milliGRAM(s) Oral every 4 hours  multivitamin 1 Tablet(s) Oral daily  pantoprazole   Suspension 40 milliGRAM(s) Oral two times a day  sodium chloride 0.9% lock flush 3 milliLiter(s) IV Push every 8 hours  thiamine 100 milliGRAM(s) Oral daily    MEDICATIONS  (PRN):  clonazePAM  Tablet 0.5 milliGRAM(s) Oral three times a day PRN anxiety  dextrose 40% Gel 15 Gram(s) Oral once PRN Blood Glucose LESS THAN 70 milliGRAM(s)/deciliter  glucagon  Injectable 1 milliGRAM(s) IntraMuscular once PRN Glucose LESS THAN 70 milligrams/deciliter  LORazepam   Injectable 2 milliGRAM(s) IntraMuscular every 2 hours PRN CIWA score increase by 2 points and current CIWA score GREATER THAN 9  ondansetron Injectable 4 milliGRAM(s) IV Push every 6 hours PRN Nausea and/or Vomiting  traZODone 50 milliGRAM(s) Oral at bedtime PRN Insomnia/anxiety    Vital Signs Last 24 Hrs  T(C): 36.8 (04 Oct 2019 14:03), Max: 37.2 (04 Oct 2019 00:51)  T(F): 98.2 (04 Oct 2019 14:03), Max: 99 (04 Oct 2019 00:51)  HR: 69 (04 Oct 2019 14:03) (64 - 69)  BP: 124/74 (04 Oct 2019 14:03) (121/75 - 155/88)  BP(mean): --  RR: 17 (04 Oct 2019 14:03) (16 - 18)  SpO2: 96% (04 Oct 2019 14:03) (96% - 100%)    CAPILLARY BLOOD GLUCOSE      POCT Blood Glucose.: 278 mg/dL (04 Oct 2019 17:48)  POCT Blood Glucose.: 212 mg/dL (04 Oct 2019 12:49)  POCT Blood Glucose.: 216 mg/dL (04 Oct 2019 09:30)    I&O's Summary    04 Oct 2019 07:01  -  04 Oct 2019 18:07  --------------------------------------------------------  IN: 0 mL / OUT: 550 mL / NET: -550 mL        Constitutional: No fever, fatigue  Skin: No rash.  Eyes: No recent vision problems or eye pain.  ENT: No congestion, ear pain, or sore throat.  Cardiovascular: No chest pain or palpation.  Respiratory: No cough, shortness of breath, congestion, or wheezing.  Gastrointestinal: No abdominal pain, nausea, vomiting, or diarrhea.  Genitourinary: No dysuria.  Musculoskeletal: No joint swelling.  Neurologic: No headache.    PHYSICAL EXAM:  GENERAL: NAD  EYES: EOMI, PERRLA  NECK: Supple, No JVD  CHEST/LUNG: dec breath sounds rt base   HEART:  S1 , S2 +  ABDOMEN: soft , bs+, no distension, no tenderness  EXTREMITIES:  no edema  NEUROLOGY: alert awake oriented , fine tremors+      LABS:                        14.7   5.27  )-----------( 194      ( 03 Oct 2019 04:40 )             43.1     10-04    136  |  101  |  10  ----------------------------<  235<H>  4.0   |  21<L>  |  0.63    Ca    8.9      04 Oct 2019 09:54  Phos  2.0     10-04  Mg     1.8     10-04    TPro  7.0  /  Alb  4.0  /  TBili  1.7<H>  /  DBili  x   /  AST  47<H>  /  ALT  27  /  AlkPhos  81  10-04              RADIOLOGY & ADDITIONAL TESTS:    Imaging Personally Reviewed:    Consultant(s) Notes Reviewed:      Care Discussed with Consultants/Other Providers:

## 2019-10-05 ENCOUNTER — TRANSCRIPTION ENCOUNTER (OUTPATIENT)
Age: 49
End: 2019-10-05

## 2019-10-05 VITALS
DIASTOLIC BLOOD PRESSURE: 86 MMHG | OXYGEN SATURATION: 98 % | TEMPERATURE: 98 F | HEART RATE: 69 BPM | RESPIRATION RATE: 16 BRPM | SYSTOLIC BLOOD PRESSURE: 134 MMHG

## 2019-10-05 LAB
ALBUMIN SERPL ELPH-MCNC: 3.9 G/DL — SIGNIFICANT CHANGE UP (ref 3.3–5)
ALP SERPL-CCNC: 87 U/L — SIGNIFICANT CHANGE UP (ref 40–120)
ALT FLD-CCNC: 23 U/L — SIGNIFICANT CHANGE UP (ref 4–33)
ANION GAP SERPL CALC-SCNC: 11 MMO/L — SIGNIFICANT CHANGE UP (ref 7–14)
AST SERPL-CCNC: 39 U/L — HIGH (ref 4–32)
BILIRUB SERPL-MCNC: 0.9 MG/DL — SIGNIFICANT CHANGE UP (ref 0.2–1.2)
BUN SERPL-MCNC: 10 MG/DL — SIGNIFICANT CHANGE UP (ref 7–23)
CALCIUM SERPL-MCNC: 9.3 MG/DL — SIGNIFICANT CHANGE UP (ref 8.4–10.5)
CHLORIDE SERPL-SCNC: 102 MMOL/L — SIGNIFICANT CHANGE UP (ref 98–107)
CO2 SERPL-SCNC: 22 MMOL/L — SIGNIFICANT CHANGE UP (ref 22–31)
CREAT SERPL-MCNC: 0.65 MG/DL — SIGNIFICANT CHANGE UP (ref 0.5–1.3)
GLUCOSE BLDC GLUCOMTR-MCNC: 182 MG/DL — HIGH (ref 70–99)
GLUCOSE BLDC GLUCOMTR-MCNC: 209 MG/DL — HIGH (ref 70–99)
GLUCOSE SERPL-MCNC: 282 MG/DL — HIGH (ref 70–99)
MAGNESIUM SERPL-MCNC: 1.8 MG/DL — SIGNIFICANT CHANGE UP (ref 1.6–2.6)
PHOSPHATE SERPL-MCNC: 3 MG/DL — SIGNIFICANT CHANGE UP (ref 2.5–4.5)
POTASSIUM SERPL-MCNC: 4.4 MMOL/L — SIGNIFICANT CHANGE UP (ref 3.5–5.3)
POTASSIUM SERPL-SCNC: 4.4 MMOL/L — SIGNIFICANT CHANGE UP (ref 3.5–5.3)
PROT SERPL-MCNC: 6.8 G/DL — SIGNIFICANT CHANGE UP (ref 6–8.3)
SODIUM SERPL-SCNC: 135 MMOL/L — SIGNIFICANT CHANGE UP (ref 135–145)

## 2019-10-05 RX ORDER — ACETAMINOPHEN 500 MG
650 TABLET ORAL ONCE
Refills: 0 | Status: COMPLETED | OUTPATIENT
Start: 2019-10-05 | End: 2019-10-05

## 2019-10-05 RX ORDER — PANTOPRAZOLE SODIUM 20 MG/1
1 TABLET, DELAYED RELEASE ORAL
Qty: 30 | Refills: 0
Start: 2019-10-05

## 2019-10-05 RX ORDER — GABAPENTIN 400 MG/1
1 CAPSULE ORAL
Qty: 21 | Refills: 0
Start: 2019-10-05 | End: 2019-10-11

## 2019-10-05 RX ADMIN — GABAPENTIN 300 MILLIGRAM(S): 400 CAPSULE ORAL at 14:06

## 2019-10-05 RX ADMIN — Medication 2: at 13:05

## 2019-10-05 RX ADMIN — Medication 4: at 09:21

## 2019-10-05 RX ADMIN — Medication 100 MILLIGRAM(S): at 12:19

## 2019-10-05 RX ADMIN — Medication 4 UNIT(S): at 09:21

## 2019-10-05 RX ADMIN — Medication 1.5 MILLIGRAM(S): at 09:56

## 2019-10-05 RX ADMIN — Medication 1 TABLET(S): at 12:19

## 2019-10-05 RX ADMIN — SODIUM CHLORIDE 3 MILLILITER(S): 9 INJECTION INTRAMUSCULAR; INTRAVENOUS; SUBCUTANEOUS at 14:08

## 2019-10-05 RX ADMIN — Medication 1.5 MILLIGRAM(S): at 14:06

## 2019-10-05 RX ADMIN — Medication 650 MILLIGRAM(S): at 13:00

## 2019-10-05 RX ADMIN — PANTOPRAZOLE SODIUM 40 MILLIGRAM(S): 20 TABLET, DELAYED RELEASE ORAL at 06:14

## 2019-10-05 RX ADMIN — Medication 0.5 MILLIGRAM(S): at 12:27

## 2019-10-05 RX ADMIN — SODIUM CHLORIDE 100 MILLILITER(S): 9 INJECTION, SOLUTION INTRAVENOUS at 14:07

## 2019-10-05 RX ADMIN — Medication 2 MILLIGRAM(S): at 02:19

## 2019-10-05 RX ADMIN — Medication 1 MILLIGRAM(S): at 12:19

## 2019-10-05 RX ADMIN — Medication 1.5 MILLIGRAM(S): at 06:15

## 2019-10-05 RX ADMIN — Medication 4 UNIT(S): at 13:05

## 2019-10-05 RX ADMIN — GABAPENTIN 300 MILLIGRAM(S): 400 CAPSULE ORAL at 06:14

## 2019-10-05 RX ADMIN — Medication 650 MILLIGRAM(S): at 12:19

## 2019-10-05 RX ADMIN — SODIUM CHLORIDE 3 MILLILITER(S): 9 INJECTION INTRAMUSCULAR; INTRAVENOUS; SUBCUTANEOUS at 06:16

## 2019-10-05 NOTE — PROGRESS NOTE ADULT - PROBLEM SELECTOR PLAN 1
ciwa  psych eval for poss anxiety/ depression which pt agreed yesterday and today pt states that she doesn't want to stay in hospital , want to be evaluated by psychiatrist, advised pt to stay in hospital upuntill pt completes CIWA if pt insists on leaving it has to be AMA

## 2019-10-05 NOTE — DISCHARGE NOTE NURSING/CASE MANAGEMENT/SOCIAL WORK - PATIENT PORTAL LINK FT
You can access the FollowMyHealth Patient Portal offered by [facility] by registering at the following website: [email or web address]. By joining [facility]’s FollowMyHealth portal, you will also be able to view your health information using other applications (apps) compatible with our system.

## 2019-10-05 NOTE — PROGRESS NOTE ADULT - ATTENDING COMMENTS
received call from pts mother asking not to discharge pt and asking pts information , informed pts mother about pt request to hospital staff not to discuss pts clinical status with pts mother   pts mother got upset saying that pt couldn't be discharged , [facility] will [facility] get [facility] , writer informed pts mother that pt has capacity to make decisions on her own at present , talk to her daughter  informed nurse manager on the floor about the pts mother phone discussion

## 2019-10-05 NOTE — PROGRESS NOTE ADULT - SUBJECTIVE AND OBJECTIVE BOX
SUBJECTIVE / OVERNIGHT EVENTS: pt denies chest pain, shortness of breath, abd pain , nausea,  vomiting, denies suicidal thoughts  doesn't want to see [psychiatrist , want to go home tomorrow,  pt doesn't want pts medical condition to be discussed with pts mother     MEDICATIONS  (STANDING):  dextrose 5%. 1000 milliLiter(s) (50 mL/Hr) IV Continuous <Continuous>  dextrose 50% Injectable 12.5 Gram(s) IV Push once  dextrose 50% Injectable 25 Gram(s) IV Push once  dextrose 50% Injectable 25 Gram(s) IV Push once  folic acid 1 milliGRAM(s) Oral daily  gabapentin 300 milliGRAM(s) Oral three times a day  insulin glargine Injectable (LANTUS) 15 Unit(s) SubCutaneous at bedtime  insulin lispro (HumaLOG) corrective regimen sliding scale   SubCutaneous three times a day before meals  insulin lispro Injectable (HumaLOG) 4 Unit(s) SubCutaneous three times a day before meals  lactated ringers. 1000 milliLiter(s) (100 mL/Hr) IV Continuous <Continuous>  LORazepam     Tablet   Oral   LORazepam     Tablet 1.5 milliGRAM(s) Oral every 4 hours  multivitamin 1 Tablet(s) Oral daily  pantoprazole   Suspension 40 milliGRAM(s) Oral two times a day  sodium chloride 0.9% lock flush 3 milliLiter(s) IV Push every 8 hours    MEDICATIONS  (PRN):  clonazePAM  Tablet 0.5 milliGRAM(s) Oral three times a day PRN anxiety  dextrose 40% Gel 15 Gram(s) Oral once PRN Blood Glucose LESS THAN 70 milliGRAM(s)/deciliter  glucagon  Injectable 1 milliGRAM(s) IntraMuscular once PRN Glucose LESS THAN 70 milligrams/deciliter  LORazepam   Injectable 2 milliGRAM(s) IntraMuscular every 2 hours PRN CIWA score increase by 2 points and current CIWA score GREATER THAN 9  ondansetron Injectable 4 milliGRAM(s) IV Push every 6 hours PRN Nausea and/or Vomiting  traZODone 50 milliGRAM(s) Oral at bedtime PRN Insomnia/anxiety    Vital Signs Last 24 Hrs  T(C): 36.7 (05 Oct 2019 14:02), Max: 36.9 (04 Oct 2019 21:54)  T(F): 98 (05 Oct 2019 14:02), Max: 98.4 (04 Oct 2019 21:54)  HR: 69 (05 Oct 2019 14:02) (55 - 69)  BP: 134/86 (05 Oct 2019 14:02) (132/74 - 149/62)  BP(mean): --  RR: 16 (05 Oct 2019 14:02) (16 - 18)  SpO2: 98% (05 Oct 2019 14:02) (96% - 98%)    Constitutional: No fever, fatigue  Skin: No rash.  Eyes: No recent vision problems or eye pain.  ENT: No congestion, ear pain, or sore throat.  Cardiovascular: No chest pain or palpation.  Respiratory: No cough, shortness of breath, congestion, or wheezing.  Gastrointestinal: No abdominal pain, nausea, vomiting, or diarrhea.  Genitourinary: No dysuria.  Musculoskeletal: No joint swelling.  Neurologic: No headache.    PHYSICAL EXAM:  GENERAL: NAD  EYES: EOMI, PERRLA  NECK: Supple, No JVD  CHEST/LUNG: dec breath sounds rt base   HEART:  S1 , S2 +  ABDOMEN: soft , bs+, no distension, no tenderness  EXTREMITIES:  no edema  NEUROLOGY: alert awake oriented x3,no tremors    LABS:  10-05    135  |  102  |  10  ----------------------------<  282<H>  4.4   |  22  |  0.65    Ca    9.3      05 Oct 2019 09:49  Phos  3.0     10-05  Mg     1.8     10-05    TPro  6.8  /  Alb  3.9  /  TBili  0.9  /  DBili      /  AST  39<H>  /  ALT  23  /  AlkPhos  87  10-05    Creatinine Trend: 0.65 <--, 0.63 <--, 0.60 <--, 0.57 <--    Urine Studies:            LIVER FUNCTIONS - ( 05 Oct 2019 09:49 )  Alb: 3.9 g/dL / Pro: 6.8 g/dL / ALK PHOS: 87 u/L / ALT: 23 u/L / AST: 39 u/L / GGT: x             RADIOLOGY & ADDITIONAL TESTS:    Imaging Personally Reviewed:    Consultant(s) Notes Reviewed:      Care Discussed with Consultants/Other Providers:

## 2019-10-05 NOTE — DISCHARGE NOTE PROVIDER - HOSPITAL COURSE
49 F alcohol abuse (no Hx of withdrawal), DM, Hx pancreatitis p/w generalized abdominal pain with associated N/V x 1day. Admitted for EtOH withdrawal +/- gastritis.         ETOH withdrawal     -CIWA symptom triggered, Ativan taper     -MVI, folic acid, thiamine     -PPI     -Lactate elevated with AG 20 likely 2/2 starvation ketoacidosis. Downtrending.         Nausea/vomiting    -DDX include EtOH gastritis vs EtOH withdrawal     -PPI    -Zofran PRN         IDDM. A1c 8.8    -Lantus, Humalog, ISS        Anxiety    -Trazodone, Klonopin PRN         SIGNED OUT AMA. Attending aware.

## 2019-10-05 NOTE — CHART NOTE - NSCHARTNOTEFT_GEN_A_CORE
Notified by RN that pt wanted to sign out AMA. Spoke with pt at bedside. Pt explained risks and benefits including but not limited to seizures, death. Pt verbalizes udnerstanding and will like to sign out AMA. AMA paperwork filled out. Attending notified.     ADS  [id number] Notified by RN that pt wanted to sign out AMA. Spoke with pt at bedside. Pt explained risks and benefits including but not limited to seizures, death. Pt verbalizes understanding and will like to sign out AMA. AMA paperwork filled out. Attending notified.     ADS  [id number] Notified by RN that pt wanted to sign out AMA. Spoke with pt at bedside. Pt A&O x3. Pt explained risks and benefits including but not limited to seizures, death. Pt verbalizes understanding and will like to sign out AMA. AMA paperwork filled out. Attending notified.     ADS  [id number]

## 2019-10-05 NOTE — PROGRESS NOTE ADULT - ASSESSMENT
48y/o F with h/o IDDM, pancreatitis, alcohol abuse with no history of complicated withdrawal, presenting with generalized abdominal pain with associated nausea and vomitting for one day, with benign abdominal exam with signs of ETOH withdrawal, c/w ETOH withdrawal +/- ETOH gastritis.

## 2019-10-05 NOTE — DISCHARGE NOTE PROVIDER - NSDCCPCAREPLAN_GEN_ALL_CORE_FT
PRINCIPAL DISCHARGE DIAGNOSIS  Diagnosis: Alcohol withdrawal syndrome without complication  Assessment and Plan of Treatment: You were monitored on alcohol withdrawal and placed on a Ativan taper. You have SIGNED OUT AGAINST MEDICAL ADVICE understanding risks and benefits. Please follow up at [facility] [phone] if you are in need of assistance with substance abuse and abstinence. Call to make an appointment.      SECONDARY DISCHARGE DIAGNOSES  Diagnosis: Diabetes  Assessment and Plan of Treatment: Continue consistent carbohydrate diet.  Monitor blood glucose levels throughout the day before meals and at bedtime.  Record blood sugars and bring to outpatient providers appointment in order to be reviewed by your doctor for management modifications.  Be aware of diabetes management symptoms including feeling cool and clammy may be related to low glucose levels.  Feeling hot and dry may indicate high glucose levels. If you feel these symptoms, check your blood sugar.  Make regular podiatry appointments in order to have feet checked for wounds and toe nails cut by a doctor to prevent infections, as well as, appointments with an ophthalmologist to monitor your vision.      Diagnosis: Nausea and vomiting, intractability of vomiting not specified, unspecified vomiting type  Assessment and Plan of Treatment: Continue Protonix as recommended. Follow up outpatient PCP in 1-2 weeks for further management.    Diagnosis: Anxiety  Assessment and Plan of Treatment: Continue Klonopin as needed. Follow up with your PCP and psychiatrist for further evaluation and medical management.

## 2019-10-10 NOTE — PROVIDER CONTACT NOTE (CRITICAL VALUE NOTIFICATION) - RECOMMENDATIONS
Chief Complaint   Patient presents with     Post-op Visit     DOS 9/30/19     Blood pressure 118/63, pulse 82, weight 84.8 kg (187 lb).    Devorah Arellano EMT     Discussed with md ,Given a cup of orange juice and breakfast for patient

## 2019-10-13 PROCEDURE — 84484 ASSAY OF TROPONIN QUANT: CPT

## 2019-10-13 PROCEDURE — 81001 URINALYSIS AUTO W/SCOPE: CPT

## 2019-10-13 PROCEDURE — 80307 DRUG TEST PRSMV CHEM ANLYZR: CPT

## 2019-10-13 PROCEDURE — 82010 KETONE BODYS QUAN: CPT

## 2019-10-13 PROCEDURE — 82435 ASSAY OF BLOOD CHLORIDE: CPT

## 2019-10-13 PROCEDURE — 84295 ASSAY OF SERUM SODIUM: CPT

## 2019-10-13 PROCEDURE — 84100 ASSAY OF PHOSPHORUS: CPT

## 2019-10-13 PROCEDURE — 96374 THER/PROPH/DIAG INJ IV PUSH: CPT | Mod: XU

## 2019-10-13 PROCEDURE — 93005 ELECTROCARDIOGRAM TRACING: CPT

## 2019-10-13 PROCEDURE — 84132 ASSAY OF SERUM POTASSIUM: CPT

## 2019-10-13 PROCEDURE — 96375 TX/PRO/DX INJ NEW DRUG ADDON: CPT

## 2019-10-13 PROCEDURE — 83605 ASSAY OF LACTIC ACID: CPT

## 2019-10-13 PROCEDURE — 87086 URINE CULTURE/COLONY COUNT: CPT

## 2019-10-13 PROCEDURE — 85027 COMPLETE CBC AUTOMATED: CPT

## 2019-10-13 PROCEDURE — 82803 BLOOD GASES ANY COMBINATION: CPT

## 2019-10-13 PROCEDURE — 71045 X-RAY EXAM CHEST 1 VIEW: CPT

## 2019-10-13 PROCEDURE — 80048 BASIC METABOLIC PNL TOTAL CA: CPT

## 2019-10-13 PROCEDURE — 85014 HEMATOCRIT: CPT

## 2019-10-13 PROCEDURE — 82962 GLUCOSE BLOOD TEST: CPT

## 2019-10-13 PROCEDURE — 83735 ASSAY OF MAGNESIUM: CPT

## 2019-10-13 PROCEDURE — 83036 HEMOGLOBIN GLYCOSYLATED A1C: CPT

## 2019-10-13 PROCEDURE — 82947 ASSAY GLUCOSE BLOOD QUANT: CPT

## 2019-10-13 PROCEDURE — 80061 LIPID PANEL: CPT

## 2019-10-13 PROCEDURE — 74177 CT ABD & PELVIS W/CONTRAST: CPT

## 2019-10-13 PROCEDURE — 84702 CHORIONIC GONADOTROPIN TEST: CPT

## 2019-10-13 PROCEDURE — 80053 COMPREHEN METABOLIC PANEL: CPT

## 2019-10-13 PROCEDURE — 82248 BILIRUBIN DIRECT: CPT

## 2019-10-13 PROCEDURE — 80076 HEPATIC FUNCTION PANEL: CPT

## 2019-10-13 PROCEDURE — 96376 TX/PRO/DX INJ SAME DRUG ADON: CPT

## 2019-10-13 PROCEDURE — 83690 ASSAY OF LIPASE: CPT

## 2019-10-13 PROCEDURE — 99285 EMERGENCY DEPT VISIT HI MDM: CPT | Mod: 25

## 2019-10-13 PROCEDURE — 82330 ASSAY OF CALCIUM: CPT

## 2019-12-06 ENCOUNTER — INPATIENT (INPATIENT)
Facility: HOSPITAL | Age: 49
LOS: 2 days | Discharge: LEFT BEFORE DISCHARGE | End: 2019-12-09
Attending: INTERNAL MEDICINE | Admitting: INTERNAL MEDICINE
Payer: MEDICARE

## 2019-12-06 VITALS
SYSTOLIC BLOOD PRESSURE: 134 MMHG | DIASTOLIC BLOOD PRESSURE: 85 MMHG | TEMPERATURE: 98 F | OXYGEN SATURATION: 99 % | HEART RATE: 83 BPM | RESPIRATION RATE: 16 BRPM

## 2019-12-06 DIAGNOSIS — Z90.89 ACQUIRED ABSENCE OF OTHER ORGANS: Chronic | ICD-10-CM

## 2019-12-06 DIAGNOSIS — F10.10 ALCOHOL ABUSE, UNCOMPLICATED: ICD-10-CM

## 2019-12-06 DIAGNOSIS — F41.9 ANXIETY DISORDER, UNSPECIFIED: ICD-10-CM

## 2019-12-06 DIAGNOSIS — F32.9 MAJOR DEPRESSIVE DISORDER, SINGLE EPISODE, UNSPECIFIED: ICD-10-CM

## 2019-12-06 DIAGNOSIS — R10.9 UNSPECIFIED ABDOMINAL PAIN: ICD-10-CM

## 2019-12-06 DIAGNOSIS — M54.2 CERVICALGIA: ICD-10-CM

## 2019-12-06 DIAGNOSIS — L02.91 CUTANEOUS ABSCESS, UNSPECIFIED: Chronic | ICD-10-CM

## 2019-12-06 DIAGNOSIS — F10.239 ALCOHOL DEPENDENCE WITH WITHDRAWAL, UNSPECIFIED: ICD-10-CM

## 2019-12-06 DIAGNOSIS — E11.42 TYPE 2 DIABETES MELLITUS WITH DIABETIC POLYNEUROPATHY: ICD-10-CM

## 2019-12-06 DIAGNOSIS — R74.0 NONSPECIFIC ELEVATION OF LEVELS OF TRANSAMINASE AND LACTIC ACID DEHYDROGENASE [LDH]: ICD-10-CM

## 2019-12-06 DIAGNOSIS — Z98.890 OTHER SPECIFIED POSTPROCEDURAL STATES: Chronic | ICD-10-CM

## 2019-12-06 DIAGNOSIS — J69.0 PNEUMONITIS DUE TO INHALATION OF FOOD AND VOMIT: ICD-10-CM

## 2019-12-06 DIAGNOSIS — Z29.9 ENCOUNTER FOR PROPHYLACTIC MEASURES, UNSPECIFIED: ICD-10-CM

## 2019-12-06 LAB
ALBUMIN SERPL ELPH-MCNC: 4.5 G/DL — SIGNIFICANT CHANGE UP (ref 3.3–5)
ALP SERPL-CCNC: 97 U/L — SIGNIFICANT CHANGE UP (ref 40–120)
ALT FLD-CCNC: 100 U/L — HIGH (ref 4–33)
ANION GAP SERPL CALC-SCNC: 21 MMO/L — HIGH (ref 7–14)
APAP SERPL-MCNC: < 15 UG/ML — LOW (ref 15–25)
APPEARANCE UR: CLEAR — SIGNIFICANT CHANGE UP
AST SERPL-CCNC: 196 U/L — HIGH (ref 4–32)
BACTERIA # UR AUTO: NEGATIVE — SIGNIFICANT CHANGE UP
BASE EXCESS BLDV CALC-SCNC: 0.2 MMOL/L — SIGNIFICANT CHANGE UP
BASOPHILS # BLD AUTO: 0.06 K/UL — SIGNIFICANT CHANGE UP (ref 0–0.2)
BASOPHILS NFR BLD AUTO: 1 % — SIGNIFICANT CHANGE UP (ref 0–2)
BILIRUB SERPL-MCNC: 1.2 MG/DL — SIGNIFICANT CHANGE UP (ref 0.2–1.2)
BILIRUB UR-MCNC: NEGATIVE — SIGNIFICANT CHANGE UP
BLOOD GAS VENOUS - CREATININE: 0.61 MG/DL — SIGNIFICANT CHANGE UP (ref 0.5–1.3)
BLOOD UR QL VISUAL: NEGATIVE — SIGNIFICANT CHANGE UP
BUN SERPL-MCNC: 10 MG/DL — SIGNIFICANT CHANGE UP (ref 7–23)
CALCIUM SERPL-MCNC: 9.4 MG/DL — SIGNIFICANT CHANGE UP (ref 8.4–10.5)
CHLORIDE BLDV-SCNC: 105 MMOL/L — SIGNIFICANT CHANGE UP (ref 96–108)
CHLORIDE SERPL-SCNC: 100 MMOL/L — SIGNIFICANT CHANGE UP (ref 98–107)
CK MB BLD-MCNC: 1.31 NG/ML — SIGNIFICANT CHANGE UP (ref 1–4.7)
CK MB BLD-MCNC: SIGNIFICANT CHANGE UP (ref 0–2.5)
CK SERPL-CCNC: 61 U/L — SIGNIFICANT CHANGE UP (ref 25–170)
CO2 SERPL-SCNC: 19 MMOL/L — LOW (ref 22–31)
COLOR SPEC: YELLOW — SIGNIFICANT CHANGE UP
CREAT SERPL-MCNC: 0.49 MG/DL — LOW (ref 0.5–1.3)
EOSINOPHIL # BLD AUTO: 0.17 K/UL — SIGNIFICANT CHANGE UP (ref 0–0.5)
EOSINOPHIL NFR BLD AUTO: 2.7 % — SIGNIFICANT CHANGE UP (ref 0–6)
ETHANOL BLD-MCNC: 262 MG/DL — HIGH
GAS PNL BLDV: 140 MMOL/L — SIGNIFICANT CHANGE UP (ref 136–146)
GLUCOSE BLDC GLUCOMTR-MCNC: 178 MG/DL — HIGH (ref 70–99)
GLUCOSE BLDV-MCNC: 140 MG/DL — HIGH (ref 70–99)
GLUCOSE SERPL-MCNC: 141 MG/DL — HIGH (ref 70–99)
GLUCOSE UR-MCNC: 200 — HIGH
HCG SERPL-ACNC: < 5 MIU/ML — SIGNIFICANT CHANGE UP
HCO3 BLDV-SCNC: 25 MMOL/L — SIGNIFICANT CHANGE UP (ref 20–27)
HCT VFR BLD CALC: 46.9 % — HIGH (ref 34.5–45)
HCT VFR BLDV CALC: 49.3 % — HIGH (ref 34.5–45)
HGB BLD-MCNC: 16 G/DL — HIGH (ref 11.5–15.5)
HGB BLDV-MCNC: 16.1 G/DL — HIGH (ref 11.5–15.5)
HYALINE CASTS # UR AUTO: SIGNIFICANT CHANGE UP
IMM GRANULOCYTES NFR BLD AUTO: 0.2 % — SIGNIFICANT CHANGE UP (ref 0–1.5)
KETONES UR-MCNC: SIGNIFICANT CHANGE UP
LACTATE BLDV-MCNC: 3.3 MMOL/L — HIGH (ref 0.5–2)
LEUKOCYTE ESTERASE UR-ACNC: NEGATIVE — SIGNIFICANT CHANGE UP
LIDOCAIN IGE QN: 42.4 U/L — SIGNIFICANT CHANGE UP (ref 7–60)
LYMPHOCYTES # BLD AUTO: 2.6 K/UL — SIGNIFICANT CHANGE UP (ref 1–3.3)
LYMPHOCYTES # BLD AUTO: 41.7 % — SIGNIFICANT CHANGE UP (ref 13–44)
MCHC RBC-ENTMCNC: 32.1 PG — SIGNIFICANT CHANGE UP (ref 27–34)
MCHC RBC-ENTMCNC: 34.1 % — SIGNIFICANT CHANGE UP (ref 32–36)
MCV RBC AUTO: 94.2 FL — SIGNIFICANT CHANGE UP (ref 80–100)
MONOCYTES # BLD AUTO: 0.41 K/UL — SIGNIFICANT CHANGE UP (ref 0–0.9)
MONOCYTES NFR BLD AUTO: 6.6 % — SIGNIFICANT CHANGE UP (ref 2–14)
NEUTROPHILS # BLD AUTO: 2.99 K/UL — SIGNIFICANT CHANGE UP (ref 1.8–7.4)
NEUTROPHILS NFR BLD AUTO: 47.8 % — SIGNIFICANT CHANGE UP (ref 43–77)
NITRITE UR-MCNC: NEGATIVE — SIGNIFICANT CHANGE UP
NRBC # FLD: 0 K/UL — SIGNIFICANT CHANGE UP (ref 0–0)
PCO2 BLDV: 32 MMHG — LOW (ref 41–51)
PH BLDV: 7.48 PH — HIGH (ref 7.32–7.43)
PH UR: 6 — SIGNIFICANT CHANGE UP (ref 5–8)
PLATELET # BLD AUTO: 158 K/UL — SIGNIFICANT CHANGE UP (ref 150–400)
PMV BLD: 10.3 FL — SIGNIFICANT CHANGE UP (ref 7–13)
PO2 BLDV: 82 MMHG — HIGH (ref 35–40)
POTASSIUM BLDV-SCNC: 3.5 MMOL/L — SIGNIFICANT CHANGE UP (ref 3.4–4.5)
POTASSIUM SERPL-MCNC: 3.8 MMOL/L — SIGNIFICANT CHANGE UP (ref 3.5–5.3)
POTASSIUM SERPL-SCNC: 3.8 MMOL/L — SIGNIFICANT CHANGE UP (ref 3.5–5.3)
PROT SERPL-MCNC: 8.3 G/DL — SIGNIFICANT CHANGE UP (ref 6–8.3)
PROT UR-MCNC: 30 — SIGNIFICANT CHANGE UP
RBC # BLD: 4.98 M/UL — SIGNIFICANT CHANGE UP (ref 3.8–5.2)
RBC # FLD: 11.7 % — SIGNIFICANT CHANGE UP (ref 10.3–14.5)
RBC CASTS # UR COMP ASSIST: SIGNIFICANT CHANGE UP (ref 0–?)
SALICYLATES SERPL-MCNC: < 5 MG/DL — LOW (ref 15–30)
SAO2 % BLDV: 96.3 % — HIGH (ref 60–85)
SODIUM SERPL-SCNC: 140 MMOL/L — SIGNIFICANT CHANGE UP (ref 135–145)
SP GR SPEC: > 1.04 — HIGH (ref 1–1.04)
SQUAMOUS # UR AUTO: SIGNIFICANT CHANGE UP
UROBILINOGEN FLD QL: SIGNIFICANT CHANGE UP
WBC # BLD: 6.24 K/UL — SIGNIFICANT CHANGE UP (ref 3.8–10.5)
WBC # FLD AUTO: 6.24 K/UL — SIGNIFICANT CHANGE UP (ref 3.8–10.5)
WBC UR QL: SIGNIFICANT CHANGE UP (ref 0–?)

## 2019-12-06 PROCEDURE — 99223 1ST HOSP IP/OBS HIGH 75: CPT

## 2019-12-06 PROCEDURE — 74177 CT ABD & PELVIS W/CONTRAST: CPT | Mod: 26

## 2019-12-06 PROCEDURE — 71046 X-RAY EXAM CHEST 2 VIEWS: CPT | Mod: 26

## 2019-12-06 PROCEDURE — 76830 TRANSVAGINAL US NON-OB: CPT | Mod: 26

## 2019-12-06 RX ORDER — THIAMINE MONONITRATE (VIT B1) 100 MG
100 TABLET ORAL ONCE
Refills: 0 | Status: COMPLETED | OUTPATIENT
Start: 2019-12-06 | End: 2019-12-06

## 2019-12-06 RX ORDER — PIPERACILLIN AND TAZOBACTAM 4; .5 G/20ML; G/20ML
3.38 INJECTION, POWDER, LYOPHILIZED, FOR SOLUTION INTRAVENOUS EVERY 8 HOURS
Refills: 0 | Status: DISCONTINUED | OUTPATIENT
Start: 2019-12-06 | End: 2019-12-09

## 2019-12-06 RX ORDER — TRAZODONE HCL 50 MG
100 TABLET ORAL AT BEDTIME
Refills: 0 | Status: DISCONTINUED | OUTPATIENT
Start: 2019-12-06 | End: 2019-12-09

## 2019-12-06 RX ORDER — ENOXAPARIN SODIUM 100 MG/ML
40 INJECTION SUBCUTANEOUS DAILY
Refills: 0 | Status: DISCONTINUED | OUTPATIENT
Start: 2019-12-06 | End: 2019-12-09

## 2019-12-06 RX ORDER — MORPHINE SULFATE 50 MG/1
8 CAPSULE, EXTENDED RELEASE ORAL ONCE
Refills: 0 | Status: DISCONTINUED | OUTPATIENT
Start: 2019-12-06 | End: 2019-12-06

## 2019-12-06 RX ORDER — SODIUM CHLORIDE 9 MG/ML
1000 INJECTION, SOLUTION INTRAVENOUS
Refills: 0 | Status: DISCONTINUED | OUTPATIENT
Start: 2019-12-06 | End: 2019-12-09

## 2019-12-06 RX ORDER — FAMOTIDINE 10 MG/ML
20 INJECTION INTRAVENOUS ONCE
Refills: 0 | Status: COMPLETED | OUTPATIENT
Start: 2019-12-06 | End: 2019-12-06

## 2019-12-06 RX ORDER — PREGABALIN 225 MG/1
1000 CAPSULE ORAL DAILY
Refills: 0 | Status: DISCONTINUED | OUTPATIENT
Start: 2019-12-06 | End: 2019-12-09

## 2019-12-06 RX ORDER — PROCHLORPERAZINE MALEATE 5 MG
10 TABLET ORAL ONCE
Refills: 0 | Status: COMPLETED | OUTPATIENT
Start: 2019-12-06 | End: 2019-12-06

## 2019-12-06 RX ORDER — INSULIN LISPRO 100/ML
VIAL (ML) SUBCUTANEOUS AT BEDTIME
Refills: 0 | Status: DISCONTINUED | OUTPATIENT
Start: 2019-12-06 | End: 2019-12-09

## 2019-12-06 RX ORDER — FOLIC ACID 0.8 MG
1 TABLET ORAL DAILY
Refills: 0 | Status: DISCONTINUED | OUTPATIENT
Start: 2019-12-07 | End: 2019-12-09

## 2019-12-06 RX ORDER — PANTOPRAZOLE SODIUM 20 MG/1
40 TABLET, DELAYED RELEASE ORAL
Refills: 0 | Status: DISCONTINUED | OUTPATIENT
Start: 2019-12-06 | End: 2019-12-09

## 2019-12-06 RX ORDER — METOCLOPRAMIDE HCL 10 MG
10 TABLET ORAL ONCE
Refills: 0 | Status: COMPLETED | OUTPATIENT
Start: 2019-12-06 | End: 2019-12-06

## 2019-12-06 RX ORDER — DEXTROSE 50 % IN WATER 50 %
25 SYRINGE (ML) INTRAVENOUS ONCE
Refills: 0 | Status: DISCONTINUED | OUTPATIENT
Start: 2019-12-06 | End: 2019-12-09

## 2019-12-06 RX ORDER — THIAMINE MONONITRATE (VIT B1) 100 MG
100 TABLET ORAL DAILY
Refills: 0 | Status: DISCONTINUED | OUTPATIENT
Start: 2019-12-07 | End: 2019-12-09

## 2019-12-06 RX ORDER — MIDAZOLAM HYDROCHLORIDE 1 MG/ML
1 INJECTION, SOLUTION INTRAMUSCULAR; INTRAVENOUS ONCE
Refills: 0 | Status: DISCONTINUED | OUTPATIENT
Start: 2019-12-06 | End: 2019-12-06

## 2019-12-06 RX ORDER — SODIUM CHLORIDE 9 MG/ML
1000 INJECTION INTRAMUSCULAR; INTRAVENOUS; SUBCUTANEOUS ONCE
Refills: 0 | Status: COMPLETED | OUTPATIENT
Start: 2019-12-06 | End: 2019-12-06

## 2019-12-06 RX ORDER — GLUCAGON INJECTION, SOLUTION 0.5 MG/.1ML
1 INJECTION, SOLUTION SUBCUTANEOUS ONCE
Refills: 0 | Status: DISCONTINUED | OUTPATIENT
Start: 2019-12-06 | End: 2019-12-09

## 2019-12-06 RX ORDER — ACETAMINOPHEN 500 MG
650 TABLET ORAL EVERY 6 HOURS
Refills: 0 | Status: DISCONTINUED | OUTPATIENT
Start: 2019-12-06 | End: 2019-12-09

## 2019-12-06 RX ORDER — ONDANSETRON 8 MG/1
4 TABLET, FILM COATED ORAL ONCE
Refills: 0 | Status: COMPLETED | OUTPATIENT
Start: 2019-12-06 | End: 2019-12-06

## 2019-12-06 RX ORDER — MORPHINE SULFATE 50 MG/1
4 CAPSULE, EXTENDED RELEASE ORAL EVERY 6 HOURS
Refills: 0 | Status: DISCONTINUED | OUTPATIENT
Start: 2019-12-06 | End: 2019-12-09

## 2019-12-06 RX ORDER — INSULIN DETEMIR 100/ML (3)
10 INSULIN PEN (ML) SUBCUTANEOUS AT BEDTIME
Refills: 0 | Status: DISCONTINUED | OUTPATIENT
Start: 2019-12-06 | End: 2019-12-09

## 2019-12-06 RX ORDER — PIPERACILLIN AND TAZOBACTAM 4; .5 G/20ML; G/20ML
3.38 INJECTION, POWDER, LYOPHILIZED, FOR SOLUTION INTRAVENOUS ONCE
Refills: 0 | Status: COMPLETED | OUTPATIENT
Start: 2019-12-06 | End: 2019-12-06

## 2019-12-06 RX ORDER — SODIUM CHLORIDE 9 MG/ML
1000 INJECTION, SOLUTION INTRAVENOUS ONCE
Refills: 0 | Status: COMPLETED | OUTPATIENT
Start: 2019-12-06 | End: 2019-12-06

## 2019-12-06 RX ORDER — GABAPENTIN 400 MG/1
300 CAPSULE ORAL THREE TIMES A DAY
Refills: 0 | Status: DISCONTINUED | OUTPATIENT
Start: 2019-12-06 | End: 2019-12-09

## 2019-12-06 RX ORDER — ONDANSETRON 8 MG/1
4 TABLET, FILM COATED ORAL EVERY 6 HOURS
Refills: 0 | Status: DISCONTINUED | OUTPATIENT
Start: 2019-12-06 | End: 2019-12-09

## 2019-12-06 RX ORDER — DEXTROSE 50 % IN WATER 50 %
15 SYRINGE (ML) INTRAVENOUS ONCE
Refills: 0 | Status: DISCONTINUED | OUTPATIENT
Start: 2019-12-06 | End: 2019-12-09

## 2019-12-06 RX ORDER — DOCUSATE SODIUM 100 MG
1 CAPSULE ORAL
Qty: 0 | Refills: 0 | DISCHARGE

## 2019-12-06 RX ORDER — DEXTROSE 50 % IN WATER 50 %
12.5 SYRINGE (ML) INTRAVENOUS ONCE
Refills: 0 | Status: DISCONTINUED | OUTPATIENT
Start: 2019-12-06 | End: 2019-12-09

## 2019-12-06 RX ORDER — INSULIN LISPRO 100/ML
VIAL (ML) SUBCUTANEOUS
Refills: 0 | Status: DISCONTINUED | OUTPATIENT
Start: 2019-12-06 | End: 2019-12-09

## 2019-12-06 RX ADMIN — Medication 10 MILLIGRAM(S): at 13:04

## 2019-12-06 RX ADMIN — MORPHINE SULFATE 8 MILLIGRAM(S): 50 CAPSULE, EXTENDED RELEASE ORAL at 11:30

## 2019-12-06 RX ADMIN — SODIUM CHLORIDE 100 MILLILITER(S): 9 INJECTION, SOLUTION INTRAVENOUS at 23:06

## 2019-12-06 RX ADMIN — SODIUM CHLORIDE 1000 MILLILITER(S): 9 INJECTION INTRAMUSCULAR; INTRAVENOUS; SUBCUTANEOUS at 11:51

## 2019-12-06 RX ADMIN — ONDANSETRON 4 MILLIGRAM(S): 8 TABLET, FILM COATED ORAL at 10:56

## 2019-12-06 RX ADMIN — Medication 100 MILLIGRAM(S): at 10:56

## 2019-12-06 RX ADMIN — MORPHINE SULFATE 4 MILLIGRAM(S): 50 CAPSULE, EXTENDED RELEASE ORAL at 23:46

## 2019-12-06 RX ADMIN — GABAPENTIN 300 MILLIGRAM(S): 400 CAPSULE ORAL at 23:05

## 2019-12-06 RX ADMIN — Medication 2 MILLIGRAM(S): at 22:12

## 2019-12-06 RX ADMIN — Medication 1 MILLIGRAM(S): at 15:03

## 2019-12-06 RX ADMIN — PIPERACILLIN AND TAZOBACTAM 200 GRAM(S): 4; .5 INJECTION, POWDER, LYOPHILIZED, FOR SOLUTION INTRAVENOUS at 23:06

## 2019-12-06 RX ADMIN — Medication 1 TABLET(S): at 11:06

## 2019-12-06 RX ADMIN — Medication 10 MILLIGRAM(S): at 11:47

## 2019-12-06 RX ADMIN — FAMOTIDINE 20 MILLIGRAM(S): 10 INJECTION INTRAVENOUS at 10:56

## 2019-12-06 RX ADMIN — ONDANSETRON 4 MILLIGRAM(S): 8 TABLET, FILM COATED ORAL at 23:05

## 2019-12-06 RX ADMIN — SODIUM CHLORIDE 1000 MILLILITER(S): 9 INJECTION INTRAMUSCULAR; INTRAVENOUS; SUBCUTANEOUS at 11:02

## 2019-12-06 RX ADMIN — MORPHINE SULFATE 8 MILLIGRAM(S): 50 CAPSULE, EXTENDED RELEASE ORAL at 11:02

## 2019-12-06 RX ADMIN — MIDAZOLAM HYDROCHLORIDE 1 MILLIGRAM(S): 1 INJECTION, SOLUTION INTRAMUSCULAR; INTRAVENOUS at 13:05

## 2019-12-06 RX ADMIN — Medication 10 UNIT(S): at 23:05

## 2019-12-06 RX ADMIN — SODIUM CHLORIDE 1000 MILLILITER(S): 9 INJECTION, SOLUTION INTRAVENOUS at 11:49

## 2019-12-06 RX ADMIN — Medication 10 MILLIGRAM(S): at 15:03

## 2019-12-06 RX ADMIN — SODIUM CHLORIDE 1000 MILLILITER(S): 9 INJECTION, SOLUTION INTRAVENOUS at 12:50

## 2019-12-06 NOTE — H&P ADULT - PROBLEM SELECTOR PLAN 4
-pt reports being on long acting insulin 20-25 units and humalog 4-6 units before meals. Given poor PO status, will continue 10 units of levemir. Hold premeals. Low ISS  -f/u a1c  -c/w gabapentin for diabetic neuropathy

## 2019-12-06 NOTE — ED ADULT TRIAGE NOTE - CHIEF COMPLAINT QUOTE
Pt brought in by EMS from home complaining of abdominal pain and n/v x3 days. Pt drinks daily, last drink was last night or early this AM. Pt denies chest pain, sob, fever or chills.

## 2019-12-06 NOTE — CHART NOTE - NSCHARTNOTEFT_GEN_A_CORE
11/09/2019 11/09/2019 clonazepam 0.5 mg tablet  90 30 Jose Antonio Hidalgo     09/13/2019 09/13/2019 clonazepam 0.5 mg tablet  90 30 Jose Antonio Hidalgo     07/15/2019 07/15/2019 lorazepam 0.5 mg tablet  90 30 Jose Antonio Hidalgo     03/13/2019 03/13/2019 chlordiazepoxide 25 mg capsule  30 15 Jose Antonio Hidalgo     03/11/2019 03/11/2019 chlordiazepoxide 25 mg capsule  4 2 Tena Stuart     01/12/2019 01/12/2019 zolpidem tartrate 5 mg tablet  30 30 Jose Antonio Hidalgo

## 2019-12-06 NOTE — ED PROVIDER NOTE - PROGRESS NOTE DETAILS
pt was reassessed by incoming team, Dr. Romero and Dr. Chavez- was here for intractable vomiting, would like to stop drinking ETOH, still having persistent vomiting, given ETOH in 200s, concern for WD, ativan was already given for vomiting, compazine/zofran, pt is HD stable, transaminitis, spoke to Dr. Ribeiro about pna, will see pt clinically first, HD stable for admission   Ximena Romero, PGY-3 EM

## 2019-12-06 NOTE — H&P ADULT - PROBLEM SELECTOR PLAN 1
-Pt reports epigastric/LUQ pain. CT A/P reviewed. No obvious pathology noted, Asymmetric prominence of the left adnexa was benign on further evaluation with transvaginal US. Lipase wnl.  -Suspect abdominal pain related to alcoholic gastritis vs alcoholic hepatitis   -continue with protonix. Maalox prn  - zofran for nausea  -IVF  -Clear liquid diet, advance as tolerated

## 2019-12-06 NOTE — H&P ADULT - HISTORY OF PRESENT ILLNESS
49F h/o ETOH abuse w/ h/o withdrawal, IDDM, depression/anxiety, pancreatitis, s/p cholecystectomy presents with abdominal pain.  Last had a pint of vodka this morning in attempt to alleviate abdominal pain.  Endorses pain x 1 week, initially intermittent now constant, LUQ radiating to left flank as well as sharp burning epigastric pain. Reports associated subjective fevers/chills, nausea, vomiting and soft bowel movements. Also endorses a productive cough and mild shortness of breath on exertion. Reports diffuse body aches as well as back pain and neck pain. Had a fall about 1 month ago.  Denies, CP, headache, dysuria, numbness or weakness. States she drinks about 2 pints of vodka 3-4 days out of the week. Last drink was this morning. Has withdrawal symptoms when she attempts to quit.

## 2019-12-06 NOTE — ED PROVIDER NOTE - CLINICAL SUMMARY MEDICAL DECISION MAKING FREE TEXT BOX
- abdominal pain, consider pancreatitis, SBO, uti/pyelonephritis  - cbc, cmp, lipase, ua, urine culture, CT abd/pelvis, pain control

## 2019-12-06 NOTE — H&P ADULT - NSHPPHYSICALEXAM_GEN_ALL_CORE
GENERAL APPEARANCE: Well developed, well nourished, alert and cooperative in mild distress 2/2 to pain  HEENT:  PERRL, EOMI. External auditory canals normal, hearing grossly intact.  NECK: Neck supple, non-tender without lymphadenopathy, masses or thyromegaly.  CARDIAC: Normal S1 and S2. No S3, S4 or murmurs. Rhythm is regular.  LUNGS: Poor inspiratory effort, scattered rhonchi auscultated over the bases. No accessory muscle use  ABDOMEN: Positive bowel sounds. Soft, diffuse tenderness worse in the epigastric/left upper quadrant area. No guarding or rebound.   MUSCULOSKELETAL: ROM intact spine and extremities. No joint erythema or tenderness.   BACK: normal posture, no spinal deformity, symmetry of spinal muscles. Tenderness over lumbar and cervical spine   EXTREMITIES: No significant deformity or joint abnormality. No edema. Peripheral pulses intact. No varicosities.  NEUROLOGICAL: CN II-XII intact. Strength and sensation symmetric and intact throughout. Mild tremor of outstretched hands  SKIN: Skin normal color, texture and turgor with no lesions or eruptions.  PSYCHIATRIC: AOx3.Normal affect and behavior.

## 2019-12-06 NOTE — SBIRT NOTE ADULT - NSSBIRTBRIEFINTDET_GEN_A_CORE
Provided SBIRT services: Full screen positive. Brief Intervention Performed. Screening results were reviewed with the patient and patient was provided information about healthy guidelines and potential negative consequences associated with level of risk. Motivation and readiness to reduce or stop use was discussed and goals and activities to make changes were suggested/offered.  Pt not feeling well- not able to discuss treatment options

## 2019-12-06 NOTE — H&P ADULT - PROBLEM SELECTOR PLAN 6
-likely 2/2 to alcoholic hepatitis. Liver wnl on CT A/P  -check coags in am to calculate discriminant function  -continue to trend

## 2019-12-06 NOTE — H&P ADULT - PROBLEM SELECTOR PLAN 2
-Pt reports productive cough and exertional shortness of breath. CT A/P significant for consolidation in the right lung base which is the predominant lung lobe involved in aspiration pna. Pt at higher risk given alcoholism and frequent vomiting  -f/u sputum cx  -will treat with zosyn for now

## 2019-12-06 NOTE — ED ADULT NURSE REASSESSMENT NOTE - NS ED NURSE REASSESS COMMENT FT1
covering primary RN for break pt is in bed A and Ox3  in NAD no N/V noted, pt  endorses anxiety in Ct and  subsequent vomiting, medicated as per order, CT made aware, fall precautions in place.

## 2019-12-06 NOTE — ED PROVIDER NOTE - OBJECTIVE STATEMENT
49F h/o ETOH abuse, IDDM, depression/anxiety, pancreatitis, s/p cholecystectomy presents with abdominal pain.  Last had a pint of vodka this morning in attempt to alleviate abdominal pain.  Pain x 1 week, initially intermittent now constant, LUQ radiating to left flank.  +Associated subjective fevers/chills, N/V/D.  Also endorses back pain.  Also mild dysuria.  +Flatus. 49F h/o ETOH abuse, IDDM, depression/anxiety, pancreatitis, s/p cholecystectomy presents with abdominal pain.  Last had a pint of vodka this morning in attempt to alleviate abdominal pain.  Pain x 1 week, initially intermittent now constant, LUQ radiating to left flank. +Associated subjective fevers/chills, N/V/D.  Also endorses back pain.  Also mild dysuria.  +Flatus. Denies, CP, sob, headache, dysuria

## 2019-12-06 NOTE — H&P ADULT - NSHPREVIEWOFSYSTEMS_GEN_ALL_CORE
CONSTITUTIONAL:  +fevers +chills No weight loss, weakness or fatigue.  HEENT:  Eyes:  No visual loss, blurred vision, double vision or yellow sclerae. Ears, Nose, Throat:  No hearing loss, sneezing, congestion, runny nose or sore throat.  SKIN:  No rash or itching.  CARDIOVASCULAR:  No chest pain, chest pressure or chest discomfort. No palpitations or edema.  RESPIRATORY:  +productive cough +shortness of breath  GASTROINTESTINAL:  +anorexia +n/v +abdominal pain No diarrhea. No blood.  GENITOURINARY:  Denies hematuria, dysuria.   NEUROLOGICAL:  No headache, dizziness, syncope, paralysis, ataxia, numbness or tingling in the extremities. No change in bowel or bladder control.  MUSCULOSKELETAL: +muscle pain +neck and back pain  No joint pain or stiffness.  HEMATOLOGIC:  No anemia, bleeding or bruising.  LYMPHATICS:  No enlarged nodes. No history of splenectomy.  PSYCHIATRIC:  +anxiety   ENDOCRINOLOGIC:  No reports of sweating, cold or heat intolerance. No polyuria or polydipsia.  ALLERGIES:  No history of asthma, hives, eczema or rhinitis.

## 2019-12-06 NOTE — ED ADULT NURSE NOTE - OBJECTIVE STATEMENT
Pt received to [facility] room 3. Pt c/o LUQ and RUQ abdominal pain and nausea. Pt states that she is a "binge drinker" and her last drink was this morning. Pt denies dyspnea, chest pain, palpitations, headache, and dizziness. Past medical hx of DM and pancreatitis. Pt states that she is "going through menopause." AxOx4 and ambulates independently. 22G IV placed in right hand by LAWRENCE Lepe. Labs sent. Medication given. Pt's mother is at bedside.

## 2019-12-06 NOTE — H&P ADULT - PROBLEM SELECTOR PLAN 5
-Pt endorses fall 1 month ago. CXR and CT A/P reviewed, no osseous abnormalities. No neuro deficits on exam  -Check xray of the cervical spine  -Pain control

## 2019-12-06 NOTE — H&P ADULT - ASSESSMENT
49F h/o ETOH abuse w/ h/o withdrawal, IDDM, depression/anxiety, pancreatitis, s/p cholecystectomy presents with abdominal pain as well as new cough and SOB with evidence of consolidation in the right lower lobe concerning for aspiration pna further c/b by alcohol w/d

## 2019-12-06 NOTE — H&P ADULT - NSHPLABSRESULTS_GEN_ALL_CORE
(12-06 @ 10:45)                      16.0  6.24 )-----------( 158                 46.9    Neutrophils = 2.99 (47.8%)  Lymphocytes = 2.60 (41.7%)  Eosinophils = 0.17 (2.7%)  Basophils = 0.06 (1.0%)  Monocytes = 0.41 (6.6%)  Bands = --%    12-06    140  |  100  |  10  ----------------------------<  141<H>  3.8   |  19<L>  |  0.49<L>    Ca    9.4      06 Dec 2019 10:29    TPro  8.3  /  Alb  4.5  /  TBili  1.2  /  DBili  x   /  AST  196<H>  /  ALT  100<H>  /  AlkPhos  97  12-06      CARDIAC MARKERS ( 06 Dec 2019 10:29 )  Trop x     / CK 61 u/L / CKMB 1.31 ng/mL         Venous Blood Gas:  12-06 @ 10:45  7.48/32/82/25/96.3  VBG Lactate: 3.3        Tox:   (12-06 @ 14:15)  Acetaminophen Level, Serum: < 15.0 [15 - 25]  Barbiturates Measurement: --  Benzodiazepines: --  Ethanol, Whole Blood: 262  Salicylate Level, Serum: < 5.0 [15 - 30]        Urinalysis Basic - ( 06 Dec 2019 14:15 )    Color: YELLOW / Appearance: CLEAR / SG: > 1.040 / pH: 6.0  Gluc: 200 / Ketone: SMALL  / Bili: NEGATIVE / Urobili: TRACE   Blood: NEGATIVE / Protein: 30 / Nitrite: NEGATIVE   Leuk Esterase: NEGATIVE / RBC: 3-5 / WBC 0-2   Sq Epi: OCC / Non Sq Epi: x / Bacteria: NEGATIVE    Labs reviewed.  Imaging reviewed    < from: Xray Chest 2 Views PA/Lat (12.06.19 @ 14:42) >    EXAM:  Frontal and lateral chest from 12/6/2019 at 1442. Compared to prior study   from 9/26/2019.    IMPRESSION:  Grossly clear lungs. No pleural effusions or pneumothorax.    Unremarkable osseous structures.    < end of copied text >    < from: CT Abdomen and Pelvis w/ IV Cont (12.06.19 @ 13:26) >    IMPRESSION:     No acute intra-abdominal pathology.    New patchy opacity in the lower lobe of the right lung, possibly   pneumonia.    Asymmetric prominence of the left adnexa. Consider further evaluation   with pelvic ultrasound.        < end of copied text >    < from: US Transvaginal (12.06.19 @ 16:05) >      IMPRESSION:    Left ovarian cyst measuring 2.0 cm.    No evidence of ovarian torsion.        < end of copied text >

## 2019-12-07 LAB
ALBUMIN SERPL ELPH-MCNC: 4.2 G/DL — SIGNIFICANT CHANGE UP (ref 3.3–5)
ALP SERPL-CCNC: 88 U/L — SIGNIFICANT CHANGE UP (ref 40–120)
ALT FLD-CCNC: 95 U/L — HIGH (ref 4–33)
ANION GAP SERPL CALC-SCNC: 18 MMO/L — HIGH (ref 7–14)
APTT BLD: 28 SEC — SIGNIFICANT CHANGE UP (ref 27.5–36.3)
AST SERPL-CCNC: 133 U/L — HIGH (ref 4–32)
BASE EXCESS BLDV CALC-SCNC: 0.5 MMOL/L — SIGNIFICANT CHANGE UP
BILIRUB SERPL-MCNC: 2 MG/DL — HIGH (ref 0.2–1.2)
BLOOD GAS VENOUS - CREATININE: SIGNIFICANT CHANGE UP MG/DL (ref 0.5–1.3)
BUN SERPL-MCNC: 10 MG/DL — SIGNIFICANT CHANGE UP (ref 7–23)
CALCIUM SERPL-MCNC: 8.5 MG/DL — SIGNIFICANT CHANGE UP (ref 8.4–10.5)
CHLORIDE BLDV-SCNC: 106 MMOL/L — SIGNIFICANT CHANGE UP (ref 96–108)
CHLORIDE SERPL-SCNC: 97 MMOL/L — LOW (ref 98–107)
CO2 SERPL-SCNC: 22 MMOL/L — SIGNIFICANT CHANGE UP (ref 22–31)
CREAT SERPL-MCNC: 0.51 MG/DL — SIGNIFICANT CHANGE UP (ref 0.5–1.3)
GAS PNL BLDV: 135 MMOL/L — LOW (ref 136–146)
GLUCOSE BLDC GLUCOMTR-MCNC: 145 MG/DL — HIGH (ref 70–99)
GLUCOSE BLDC GLUCOMTR-MCNC: 161 MG/DL — HIGH (ref 70–99)
GLUCOSE BLDC GLUCOMTR-MCNC: 236 MG/DL — HIGH (ref 70–99)
GLUCOSE BLDC GLUCOMTR-MCNC: 259 MG/DL — HIGH (ref 70–99)
GLUCOSE BLDV-MCNC: 154 MG/DL — HIGH (ref 70–99)
GLUCOSE SERPL-MCNC: 143 MG/DL — HIGH (ref 70–99)
GRAM STN SPT: SIGNIFICANT CHANGE UP
HBA1C BLD-MCNC: 8.7 % — HIGH (ref 4–5.6)
HCO3 BLDV-SCNC: 25 MMOL/L — SIGNIFICANT CHANGE UP (ref 20–27)
HCT VFR BLDV CALC: 45.2 % — HIGH (ref 34.5–45)
HGB BLDV-MCNC: 14.8 G/DL — SIGNIFICANT CHANGE UP (ref 11.5–15.5)
INR BLD: 1.03 — SIGNIFICANT CHANGE UP (ref 0.88–1.17)
LACTATE BLDV-MCNC: 1.4 MMOL/L — SIGNIFICANT CHANGE UP (ref 0.5–2)
MAGNESIUM SERPL-MCNC: 1.5 MG/DL — LOW (ref 1.6–2.6)
PCO2 BLDV: 38 MMHG — LOW (ref 41–51)
PH BLDV: 7.43 PH — SIGNIFICANT CHANGE UP (ref 7.32–7.43)
PHOSPHATE SERPL-MCNC: 2.3 MG/DL — LOW (ref 2.5–4.5)
PO2 BLDV: 93 MMHG — HIGH (ref 35–40)
POTASSIUM BLDV-SCNC: 3.8 MMOL/L — SIGNIFICANT CHANGE UP (ref 3.4–4.5)
POTASSIUM SERPL-MCNC: 4.1 MMOL/L — SIGNIFICANT CHANGE UP (ref 3.5–5.3)
POTASSIUM SERPL-SCNC: 4.1 MMOL/L — SIGNIFICANT CHANGE UP (ref 3.5–5.3)
PROT SERPL-MCNC: 7.2 G/DL — SIGNIFICANT CHANGE UP (ref 6–8.3)
PROTHROM AB SERPL-ACNC: 11.5 SEC — SIGNIFICANT CHANGE UP (ref 9.8–13.1)
SAO2 % BLDV: 97.7 % — HIGH (ref 60–85)
SODIUM SERPL-SCNC: 137 MMOL/L — SIGNIFICANT CHANGE UP (ref 135–145)
SPECIMEN SOURCE: SIGNIFICANT CHANGE UP

## 2019-12-07 PROCEDURE — 72040 X-RAY EXAM NECK SPINE 2-3 VW: CPT | Mod: 26

## 2019-12-07 PROCEDURE — 93010 ELECTROCARDIOGRAM REPORT: CPT

## 2019-12-07 RX ORDER — MAGNESIUM SULFATE 500 MG/ML
1 VIAL (ML) INJECTION ONCE
Refills: 0 | Status: COMPLETED | OUTPATIENT
Start: 2019-12-07 | End: 2019-12-07

## 2019-12-07 RX ORDER — SODIUM,POTASSIUM PHOSPHATES 278-250MG
1 POWDER IN PACKET (EA) ORAL
Refills: 0 | Status: COMPLETED | OUTPATIENT
Start: 2019-12-07 | End: 2019-12-08

## 2019-12-07 RX ADMIN — Medication 650 MILLIGRAM(S): at 15:15

## 2019-12-07 RX ADMIN — PANTOPRAZOLE SODIUM 40 MILLIGRAM(S): 20 TABLET, DELAYED RELEASE ORAL at 06:19

## 2019-12-07 RX ADMIN — Medication 100 MILLIGRAM(S): at 22:17

## 2019-12-07 RX ADMIN — Medication 1 TABLET(S): at 12:45

## 2019-12-07 RX ADMIN — Medication 1 MILLIGRAM(S): at 11:31

## 2019-12-07 RX ADMIN — Medication 1: at 23:07

## 2019-12-07 RX ADMIN — Medication 650 MILLIGRAM(S): at 14:19

## 2019-12-07 RX ADMIN — Medication 1.5 MILLIGRAM(S): at 22:17

## 2019-12-07 RX ADMIN — MORPHINE SULFATE 4 MILLIGRAM(S): 50 CAPSULE, EXTENDED RELEASE ORAL at 00:01

## 2019-12-07 RX ADMIN — Medication 10 UNIT(S): at 23:07

## 2019-12-07 RX ADMIN — Medication 2 MILLIGRAM(S): at 10:04

## 2019-12-07 RX ADMIN — GABAPENTIN 300 MILLIGRAM(S): 400 CAPSULE ORAL at 06:19

## 2019-12-07 RX ADMIN — Medication 1: at 12:48

## 2019-12-07 RX ADMIN — Medication 100 GRAM(S): at 12:45

## 2019-12-07 RX ADMIN — PIPERACILLIN AND TAZOBACTAM 25 GRAM(S): 4; .5 INJECTION, POWDER, LYOPHILIZED, FOR SOLUTION INTRAVENOUS at 09:24

## 2019-12-07 RX ADMIN — Medication 2 MILLIGRAM(S): at 14:19

## 2019-12-07 RX ADMIN — PREGABALIN 1000 MICROGRAM(S): 225 CAPSULE ORAL at 11:31

## 2019-12-07 RX ADMIN — PANTOPRAZOLE SODIUM 40 MILLIGRAM(S): 20 TABLET, DELAYED RELEASE ORAL at 00:19

## 2019-12-07 RX ADMIN — Medication 100 MILLIGRAM(S): at 11:31

## 2019-12-07 RX ADMIN — GABAPENTIN 300 MILLIGRAM(S): 400 CAPSULE ORAL at 14:19

## 2019-12-07 RX ADMIN — Medication 1 TABLET(S): at 18:19

## 2019-12-07 RX ADMIN — Medication 2 MILLIGRAM(S): at 02:10

## 2019-12-07 RX ADMIN — ENOXAPARIN SODIUM 40 MILLIGRAM(S): 100 INJECTION SUBCUTANEOUS at 11:31

## 2019-12-07 RX ADMIN — Medication 1 TABLET(S): at 11:31

## 2019-12-07 RX ADMIN — Medication 30 MILLILITER(S): at 14:18

## 2019-12-07 RX ADMIN — GABAPENTIN 300 MILLIGRAM(S): 400 CAPSULE ORAL at 22:17

## 2019-12-07 RX ADMIN — Medication 2 MILLIGRAM(S): at 06:19

## 2019-12-07 RX ADMIN — Medication 2 MILLIGRAM(S): at 18:11

## 2019-12-07 RX ADMIN — Medication 2: at 18:10

## 2019-12-07 RX ADMIN — PIPERACILLIN AND TAZOBACTAM 25 GRAM(S): 4; .5 INJECTION, POWDER, LYOPHILIZED, FOR SOLUTION INTRAVENOUS at 18:11

## 2019-12-07 NOTE — PROGRESS NOTE ADULT - PROBLEM SELECTOR PLAN 4
-pt reports being on long acting insulin 20-25 units and humalog 4-6 units before meals. Given poor PO status, will continue 10 units of levemir. Hold premeals. Low ISS  -A1C-8.7,consider Endocrine consult  -f/u a1c  -c/w gabapentin for diabetic neuropathy
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
## 2019-12-07 NOTE — PROGRESS NOTE ADULT - PROBLEM SELECTOR PLAN 3
- CIWA for alcohol withdrawal  -continue with IVF w/ additives  -c/w folic acid, thiamine and MV  -Social work c/s in AM

## 2019-12-07 NOTE — PROGRESS NOTE ADULT - PROBLEM SELECTOR PLAN 2
-Pt reports productive cough and exertional shortness of breath. CT A/P significant for consolidation in the right lung base which is the predominant lung lobe involved in aspiration pna. Pt at higher risk given alcoholism and frequent vomiting  -f/u sputum cx.cont.zosyn  -Pulmonary consult called.

## 2019-12-08 LAB
ALBUMIN SERPL ELPH-MCNC: 3.7 G/DL — SIGNIFICANT CHANGE UP (ref 3.3–5)
ALP SERPL-CCNC: 78 U/L — SIGNIFICANT CHANGE UP (ref 40–120)
ALT FLD-CCNC: 54 U/L — HIGH (ref 4–33)
ANION GAP SERPL CALC-SCNC: 10 MMO/L — SIGNIFICANT CHANGE UP (ref 7–14)
AST SERPL-CCNC: 45 U/L — HIGH (ref 4–32)
BACTERIA UR CULT: SIGNIFICANT CHANGE UP
BASOPHILS # BLD AUTO: 0.04 K/UL — SIGNIFICANT CHANGE UP (ref 0–0.2)
BASOPHILS NFR BLD AUTO: 0.9 % — SIGNIFICANT CHANGE UP (ref 0–2)
BILIRUB SERPL-MCNC: 1.3 MG/DL — HIGH (ref 0.2–1.2)
BUN SERPL-MCNC: 9 MG/DL — SIGNIFICANT CHANGE UP (ref 7–23)
CALCIUM SERPL-MCNC: 8.9 MG/DL — SIGNIFICANT CHANGE UP (ref 8.4–10.5)
CHLORIDE SERPL-SCNC: 98 MMOL/L — SIGNIFICANT CHANGE UP (ref 98–107)
CO2 SERPL-SCNC: 25 MMOL/L — SIGNIFICANT CHANGE UP (ref 22–31)
CREAT SERPL-MCNC: 0.67 MG/DL — SIGNIFICANT CHANGE UP (ref 0.5–1.3)
EOSINOPHIL # BLD AUTO: 0.18 K/UL — SIGNIFICANT CHANGE UP (ref 0–0.5)
EOSINOPHIL NFR BLD AUTO: 4.1 % — SIGNIFICANT CHANGE UP (ref 0–6)
GLUCOSE BLDC GLUCOMTR-MCNC: 245 MG/DL — HIGH (ref 70–99)
GLUCOSE BLDC GLUCOMTR-MCNC: 265 MG/DL — HIGH (ref 70–99)
GLUCOSE BLDC GLUCOMTR-MCNC: 278 MG/DL — HIGH (ref 70–99)
GLUCOSE BLDC GLUCOMTR-MCNC: 302 MG/DL — HIGH (ref 70–99)
GLUCOSE SERPL-MCNC: 265 MG/DL — HIGH (ref 70–99)
HCT VFR BLD CALC: 41.4 % — SIGNIFICANT CHANGE UP (ref 34.5–45)
HGB BLD-MCNC: 14.5 G/DL — SIGNIFICANT CHANGE UP (ref 11.5–15.5)
IMM GRANULOCYTES NFR BLD AUTO: 0.5 % — SIGNIFICANT CHANGE UP (ref 0–1.5)
LYMPHOCYTES # BLD AUTO: 1.35 K/UL — SIGNIFICANT CHANGE UP (ref 1–3.3)
LYMPHOCYTES # BLD AUTO: 30.9 % — SIGNIFICANT CHANGE UP (ref 13–44)
MAGNESIUM SERPL-MCNC: 2 MG/DL — SIGNIFICANT CHANGE UP (ref 1.6–2.6)
MCHC RBC-ENTMCNC: 33.2 PG — SIGNIFICANT CHANGE UP (ref 27–34)
MCHC RBC-ENTMCNC: 35 % — SIGNIFICANT CHANGE UP (ref 32–36)
MCV RBC AUTO: 94.7 FL — SIGNIFICANT CHANGE UP (ref 80–100)
MONOCYTES # BLD AUTO: 0.34 K/UL — SIGNIFICANT CHANGE UP (ref 0–0.9)
MONOCYTES NFR BLD AUTO: 7.8 % — SIGNIFICANT CHANGE UP (ref 2–14)
NEUTROPHILS # BLD AUTO: 2.44 K/UL — SIGNIFICANT CHANGE UP (ref 1.8–7.4)
NEUTROPHILS NFR BLD AUTO: 55.8 % — SIGNIFICANT CHANGE UP (ref 43–77)
NRBC # FLD: 0 K/UL — SIGNIFICANT CHANGE UP (ref 0–0)
PHOSPHATE SERPL-MCNC: 2 MG/DL — LOW (ref 2.5–4.5)
PLATELET # BLD AUTO: 106 K/UL — LOW (ref 150–400)
PMV BLD: 10.8 FL — SIGNIFICANT CHANGE UP (ref 7–13)
POTASSIUM SERPL-MCNC: 3.7 MMOL/L — SIGNIFICANT CHANGE UP (ref 3.5–5.3)
POTASSIUM SERPL-SCNC: 3.7 MMOL/L — SIGNIFICANT CHANGE UP (ref 3.5–5.3)
PROT SERPL-MCNC: 6.8 G/DL — SIGNIFICANT CHANGE UP (ref 6–8.3)
RBC # BLD: 4.37 M/UL — SIGNIFICANT CHANGE UP (ref 3.8–5.2)
RBC # FLD: 11.4 % — SIGNIFICANT CHANGE UP (ref 10.3–14.5)
SODIUM SERPL-SCNC: 133 MMOL/L — LOW (ref 135–145)
SPECIMEN SOURCE: SIGNIFICANT CHANGE UP
WBC # BLD: 4.37 K/UL — SIGNIFICANT CHANGE UP (ref 3.8–10.5)
WBC # FLD AUTO: 4.37 K/UL — SIGNIFICANT CHANGE UP (ref 3.8–10.5)

## 2019-12-08 PROCEDURE — 99222 1ST HOSP IP/OBS MODERATE 55: CPT

## 2019-12-08 RX ADMIN — Medication 1.5 MILLIGRAM(S): at 06:12

## 2019-12-08 RX ADMIN — Medication 1.5 MILLIGRAM(S): at 10:12

## 2019-12-08 RX ADMIN — PREGABALIN 1000 MICROGRAM(S): 225 CAPSULE ORAL at 12:56

## 2019-12-08 RX ADMIN — ENOXAPARIN SODIUM 40 MILLIGRAM(S): 100 INJECTION SUBCUTANEOUS at 12:56

## 2019-12-08 RX ADMIN — Medication 1 MILLIGRAM(S): at 12:56

## 2019-12-08 RX ADMIN — Medication 3: at 12:56

## 2019-12-08 RX ADMIN — GABAPENTIN 300 MILLIGRAM(S): 400 CAPSULE ORAL at 06:12

## 2019-12-08 RX ADMIN — Medication 1.5 MILLIGRAM(S): at 14:15

## 2019-12-08 RX ADMIN — Medication 100 MILLIGRAM(S): at 12:56

## 2019-12-08 RX ADMIN — PIPERACILLIN AND TAZOBACTAM 25 GRAM(S): 4; .5 INJECTION, POWDER, LYOPHILIZED, FOR SOLUTION INTRAVENOUS at 08:22

## 2019-12-08 RX ADMIN — PIPERACILLIN AND TAZOBACTAM 25 GRAM(S): 4; .5 INJECTION, POWDER, LYOPHILIZED, FOR SOLUTION INTRAVENOUS at 18:02

## 2019-12-08 RX ADMIN — Medication 10 UNIT(S): at 22:34

## 2019-12-08 RX ADMIN — Medication 30 MILLILITER(S): at 20:03

## 2019-12-08 RX ADMIN — PANTOPRAZOLE SODIUM 40 MILLIGRAM(S): 20 TABLET, DELAYED RELEASE ORAL at 06:12

## 2019-12-08 RX ADMIN — PIPERACILLIN AND TAZOBACTAM 25 GRAM(S): 4; .5 INJECTION, POWDER, LYOPHILIZED, FOR SOLUTION INTRAVENOUS at 02:24

## 2019-12-08 RX ADMIN — GABAPENTIN 300 MILLIGRAM(S): 400 CAPSULE ORAL at 22:33

## 2019-12-08 RX ADMIN — Medication 1.5 MILLIGRAM(S): at 18:03

## 2019-12-08 RX ADMIN — Medication 1 TABLET(S): at 08:22

## 2019-12-08 RX ADMIN — Medication 1: at 22:33

## 2019-12-08 RX ADMIN — Medication 1 MILLIGRAM(S): at 22:33

## 2019-12-08 RX ADMIN — Medication 1 TABLET(S): at 12:56

## 2019-12-08 RX ADMIN — Medication 1.5 MILLIGRAM(S): at 02:21

## 2019-12-08 RX ADMIN — GABAPENTIN 300 MILLIGRAM(S): 400 CAPSULE ORAL at 14:23

## 2019-12-08 RX ADMIN — Medication 100 MILLIGRAM(S): at 22:33

## 2019-12-08 RX ADMIN — Medication 2: at 08:55

## 2019-12-08 RX ADMIN — Medication 4: at 17:48
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
## 2019-12-08 NOTE — CONSULT NOTE ADULT - SUBJECTIVE AND OBJECTIVE BOX
Pulmonary Consult Note    LUIS POTTS  MRN-[id number]    Chief Complaint: Patient is a 49y old  Female who presents with a chief complaint of alcohol w/d, abdominal pain (07 Dec 2019 22:04)      HPI:  49yFemale   -Patient with hx etoh abuse admitted with lower chest/upper abdominal discomfort.  Today she states she still has some discomfort but overall improved.  Denies dyspnea or cough or sputum production.  Being tx for possible aspiration pna    ROS:  All other systems reviewed and negative    PAST MEDICAL HISTORY: HEALTH ISSUES - PROBLEM Dx:  Anxiety: Anxiety  Depression: Depression  Transaminitis: Transaminitis  Neck pain: Neck pain  Type 2 diabetes mellitus with diabetic polyneuropathy, with long-term current use of insulin: Type 2 diabetes mellitus with diabetic polyneuropathy, with long-term current use of insulin  Alcohol abuse: Alcohol abuse  Aspiration pneumonia: Aspiration pneumonia  Abdominal pain: Abdominal pain      SOCIAL HISTORY: etoh abuse, never smoker    ACTIVE MEDICATION LIST:  MEDICATIONS  (STANDING):  cyanocobalamin 1000 MICROGram(s) Oral daily  dextrose 5%. 1000 milliLiter(s) (50 mL/Hr) IV Continuous <Continuous>  dextrose 50% Injectable 12.5 Gram(s) IV Push once  dextrose 50% Injectable 25 Gram(s) IV Push once  dextrose 50% Injectable 25 Gram(s) IV Push once  enoxaparin Injectable 40 milliGRAM(s) SubCutaneous daily  folic acid 1 milliGRAM(s) Oral daily  gabapentin 300 milliGRAM(s) Oral three times a day  insulin detemir injectable (LEVEMIR) 10 Unit(s) SubCutaneous at bedtime  insulin lispro (HumaLOG) corrective regimen sliding scale   SubCutaneous three times a day before meals  insulin lispro (HumaLOG) corrective regimen sliding scale   SubCutaneous at bedtime  LORazepam   Injectable   IV Push   LORazepam   Injectable 1.5 milliGRAM(s) IV Push every 4 hours  LORazepam   Injectable 1 milliGRAM(s) IV Push every 4 hours  multivitamin 1 Tablet(s) Oral daily  pantoprazole    Tablet 40 milliGRAM(s) Oral before breakfast  piperacillin/tazobactam IVPB.. 3.375 Gram(s) IV Intermittent every 8 hours  sodium chloride 0.9% 1000 milliLiter(s) (100 mL/Hr) IV Continuous <Continuous>  thiamine 100 milliGRAM(s) Oral daily  traZODone 100 milliGRAM(s) Oral at bedtime    MEDICATIONS  (PRN):  acetaminophen   Tablet .. 650 milliGRAM(s) Oral every 6 hours PRN Mild Pain (1 - 3), Moderate Pain (4 - 6)  aluminum hydroxide/magnesium hydroxide/simethicone Suspension 30 milliLiter(s) Oral every 4 hours PRN Dyspepsia  dextrose 40% Gel 15 Gram(s) Oral once PRN Blood Glucose LESS THAN 70 milliGRAM(s)/deciliter  glucagon  Injectable 1 milliGRAM(s) IntraMuscular once PRN Glucose LESS THAN 70 milligrams/deciliter  LORazepam     Tablet 2 milliGRAM(s) Oral every 2 hours PRN CIWA-Ar score increase by 2 points and a total score of 7 or less  LORazepam   Injectable 2 milliGRAM(s) IV Push every 1 hour PRN CIWA-Ar score 8 or greater  morphine  - Injectable 4 milliGRAM(s) IV Push every 6 hours PRN Severe Pain (7 - 10)  ondansetron Injectable 4 milliGRAM(s) IV Push every 6 hours PRN Nausea and/or Vomiting      EXAM:  Vital Signs Last 24 Hrs  T(C): 36.4 (08 Dec 2019 10:00), Max: 36.8 (08 Dec 2019 02:00)  T(F): 97.6 (08 Dec 2019 10:00), Max: 98.3 (08 Dec 2019 02:00)  HR: 76 (08 Dec 2019 10:00) (76 - 96)  BP: 120/76 (08 Dec 2019 10:00) (100/63 - 141/87)  BP(mean): --  RR: 17 (08 Dec 2019 10:00) (16 - 17)  SpO2: 97% (08 Dec 2019 10:00) (95% - 97%)  GENERAL: No acute distress  NEURO: Alert and oriented x 3  LUNGS: Clear to auscultation bilaterally, no rales or wheezes  CV: S1/S2  ABDOMEN: +BS, nontender  EXTREMITIES: no clubbing, cyanosis, edema    LABS/IMAGING: reviewed                        14.5   4.37  )-----------( 106      ( 08 Dec 2019 07:11 )             41.4   12-08    133<L>  |  98  |  9   ----------------------------<  265<H>  3.7   |  25  |  0.67    Ca    8.9      08 Dec 2019 07:11  Phos  2.0     12-08  Mg     2.0     12-08    TPro  6.8  /  Alb  3.7  /  TBili  1.3<H>  /  DBili  x   /  AST  45<H>  /  ALT  54<H>  /  AlkPhos  78  12-08  < from: Xray Chest 2 Views PA/Lat (12.06.19 @ 14:42) >  EXAM:  Frontal and lateral chest from 12/6/2019 at 1442. Compared to prior study   from 9/26/2019.    IMPRESSION:  Grossly clear lungs. No pleural effusions or pneumothorax.    Cardiac and mediastinal silhouettes within normal limits.    Trachea midline.    Unremarkable osseous structures.    Residual contrast material in the bilateral renal collecting systems.   Also correlate with findings on earlier performed abdomen/pelvis CT.    < end of copied text >    < from: CT Abdomen and Pelvis w/ IV Cont (12.06.19 @ 13:26) >  IMPRESSION:     No acute intra-abdominal pathology.    New patchy opacity in the lower lobe of the right lung, possibly   pneumonia.    Asymmetric prominence of the left adnexa. Consider further evaluation   with pelvic ultrasound.    < end of copied text >      PROBLEM LIST:  49yFemale with HEALTH ISSUES - PROBLEM Dx:  aspiration pneumonia?  Anxiety: Anxiety  Depression: Depression  Transaminitis: Transaminitis  Neck pain: Neck pain  Type 2 diabetes mellitus with diabetic polyneuropathy, with long-term current use of insulin: Type 2 diabetes mellitus with diabetic polyneuropathy, with long-term current use of insulin  Alcohol abuse: Alcohol abuse  Abdominal pain: Abdominal pain    RECS:  -Patient does not clinically appear to have pneumonia.  Changes on ct abdomen minimal, aspiration pneumonitis? pt feeling better with abx, would favor short course, 5 days, could switch to augmentin if going to be discharged.  -monitor for etoh withdrawal  -incentive marlon    Thank you for this consultation, please feel free to call with any questions [phone]  Leola Bartlett MD

## 2019-12-09 ENCOUNTER — TRANSCRIPTION ENCOUNTER (OUTPATIENT)
Age: 49
End: 2019-12-09

## 2019-12-09 VITALS
OXYGEN SATURATION: 98 % | HEART RATE: 70 BPM | RESPIRATION RATE: 16 BRPM | DIASTOLIC BLOOD PRESSURE: 82 MMHG | TEMPERATURE: 98 F | SYSTOLIC BLOOD PRESSURE: 122 MMHG

## 2019-12-09 LAB
ALBUMIN SERPL ELPH-MCNC: 3.5 G/DL — SIGNIFICANT CHANGE UP (ref 3.3–5)
ALP SERPL-CCNC: 80 U/L — SIGNIFICANT CHANGE UP (ref 40–120)
ALT FLD-CCNC: 46 U/L — HIGH (ref 4–33)
ANION GAP SERPL CALC-SCNC: 11 MMO/L — SIGNIFICANT CHANGE UP (ref 7–14)
AST SERPL-CCNC: 56 U/L — HIGH (ref 4–32)
BASOPHILS # BLD AUTO: 0.04 K/UL — SIGNIFICANT CHANGE UP (ref 0–0.2)
BASOPHILS NFR BLD AUTO: 0.8 % — SIGNIFICANT CHANGE UP (ref 0–2)
BILIRUB SERPL-MCNC: 0.9 MG/DL — SIGNIFICANT CHANGE UP (ref 0.2–1.2)
BUN SERPL-MCNC: 12 MG/DL — SIGNIFICANT CHANGE UP (ref 7–23)
CALCIUM SERPL-MCNC: 9.6 MG/DL — SIGNIFICANT CHANGE UP (ref 8.4–10.5)
CHLORIDE SERPL-SCNC: 101 MMOL/L — SIGNIFICANT CHANGE UP (ref 98–107)
CO2 SERPL-SCNC: 22 MMOL/L — SIGNIFICANT CHANGE UP (ref 22–31)
CREAT SERPL-MCNC: 0.7 MG/DL — SIGNIFICANT CHANGE UP (ref 0.5–1.3)
EOSINOPHIL # BLD AUTO: 0.2 K/UL — SIGNIFICANT CHANGE UP (ref 0–0.5)
EOSINOPHIL NFR BLD AUTO: 4.1 % — SIGNIFICANT CHANGE UP (ref 0–6)
GLUCOSE BLDC GLUCOMTR-MCNC: 287 MG/DL — HIGH (ref 70–99)
GLUCOSE BLDC GLUCOMTR-MCNC: 352 MG/DL — HIGH (ref 70–99)
GLUCOSE SERPL-MCNC: 328 MG/DL — HIGH (ref 70–99)
HCT VFR BLD CALC: 41.4 % — SIGNIFICANT CHANGE UP (ref 34.5–45)
HGB BLD-MCNC: 14.1 G/DL — SIGNIFICANT CHANGE UP (ref 11.5–15.5)
IMM GRANULOCYTES NFR BLD AUTO: 0.6 % — SIGNIFICANT CHANGE UP (ref 0–1.5)
LYMPHOCYTES # BLD AUTO: 1.37 K/UL — SIGNIFICANT CHANGE UP (ref 1–3.3)
LYMPHOCYTES # BLD AUTO: 27.9 % — SIGNIFICANT CHANGE UP (ref 13–44)
MAGNESIUM SERPL-MCNC: 2.1 MG/DL — SIGNIFICANT CHANGE UP (ref 1.6–2.6)
MCHC RBC-ENTMCNC: 32 PG — SIGNIFICANT CHANGE UP (ref 27–34)
MCHC RBC-ENTMCNC: 34.1 % — SIGNIFICANT CHANGE UP (ref 32–36)
MCV RBC AUTO: 93.9 FL — SIGNIFICANT CHANGE UP (ref 80–100)
MONOCYTES # BLD AUTO: 0.38 K/UL — SIGNIFICANT CHANGE UP (ref 0–0.9)
MONOCYTES NFR BLD AUTO: 7.7 % — SIGNIFICANT CHANGE UP (ref 2–14)
NEUTROPHILS # BLD AUTO: 2.89 K/UL — SIGNIFICANT CHANGE UP (ref 1.8–7.4)
NEUTROPHILS NFR BLD AUTO: 58.9 % — SIGNIFICANT CHANGE UP (ref 43–77)
NRBC # FLD: 0 K/UL — SIGNIFICANT CHANGE UP (ref 0–0)
PHOSPHATE SERPL-MCNC: 3.3 MG/DL — SIGNIFICANT CHANGE UP (ref 2.5–4.5)
PLATELET # BLD AUTO: 107 K/UL — LOW (ref 150–400)
PMV BLD: 11 FL — SIGNIFICANT CHANGE UP (ref 7–13)
POTASSIUM SERPL-MCNC: 4 MMOL/L — SIGNIFICANT CHANGE UP (ref 3.5–5.3)
POTASSIUM SERPL-SCNC: 4 MMOL/L — SIGNIFICANT CHANGE UP (ref 3.5–5.3)
PROT SERPL-MCNC: 6.5 G/DL — SIGNIFICANT CHANGE UP (ref 6–8.3)
RBC # BLD: 4.41 M/UL — SIGNIFICANT CHANGE UP (ref 3.8–5.2)
RBC # FLD: 11.5 % — SIGNIFICANT CHANGE UP (ref 10.3–14.5)
SODIUM SERPL-SCNC: 134 MMOL/L — LOW (ref 135–145)
WBC # BLD: 4.91 K/UL — SIGNIFICANT CHANGE UP (ref 3.8–10.5)
WBC # FLD AUTO: 4.91 K/UL — SIGNIFICANT CHANGE UP (ref 3.8–10.5)

## 2019-12-09 PROCEDURE — 99222 1ST HOSP IP/OBS MODERATE 55: CPT

## 2019-12-09 PROCEDURE — 99232 SBSQ HOSP IP/OBS MODERATE 35: CPT

## 2019-12-09 RX ORDER — PANTOPRAZOLE SODIUM 20 MG/1
1 TABLET, DELAYED RELEASE ORAL
Qty: 30 | Refills: 0
Start: 2019-12-09 | End: 2020-01-07

## 2019-12-09 RX ORDER — CIPROFLOXACIN LACTATE 400MG/40ML
1 VIAL (ML) INTRAVENOUS
Qty: 3 | Refills: 0
Start: 2019-12-09 | End: 2019-12-11

## 2019-12-09 RX ADMIN — PREGABALIN 1000 MICROGRAM(S): 225 CAPSULE ORAL at 12:14

## 2019-12-09 RX ADMIN — Medication 3: at 13:01

## 2019-12-09 RX ADMIN — Medication 1 MILLIGRAM(S): at 13:40

## 2019-12-09 RX ADMIN — Medication 1 MILLIGRAM(S): at 06:06

## 2019-12-09 RX ADMIN — GABAPENTIN 300 MILLIGRAM(S): 400 CAPSULE ORAL at 06:06

## 2019-12-09 RX ADMIN — GABAPENTIN 300 MILLIGRAM(S): 400 CAPSULE ORAL at 12:14

## 2019-12-09 RX ADMIN — ENOXAPARIN SODIUM 40 MILLIGRAM(S): 100 INJECTION SUBCUTANEOUS at 12:14

## 2019-12-09 RX ADMIN — PANTOPRAZOLE SODIUM 40 MILLIGRAM(S): 20 TABLET, DELAYED RELEASE ORAL at 06:06

## 2019-12-09 RX ADMIN — Medication 1 MILLIGRAM(S): at 09:57

## 2019-12-09 RX ADMIN — Medication 1 MILLIGRAM(S): at 02:15

## 2019-12-09 RX ADMIN — PIPERACILLIN AND TAZOBACTAM 25 GRAM(S): 4; .5 INJECTION, POWDER, LYOPHILIZED, FOR SOLUTION INTRAVENOUS at 02:14

## 2019-12-09 RX ADMIN — Medication 1 TABLET(S): at 12:14

## 2019-12-09 RX ADMIN — Medication 5: at 09:01

## 2019-12-09 RX ADMIN — PIPERACILLIN AND TAZOBACTAM 25 GRAM(S): 4; .5 INJECTION, POWDER, LYOPHILIZED, FOR SOLUTION INTRAVENOUS at 09:57

## 2019-12-09 RX ADMIN — Medication 100 MILLIGRAM(S): at 12:14

## 2019-12-09 RX ADMIN — Medication 1 MILLIGRAM(S): at 12:14

## 2019-12-09 NOTE — PROVIDER CONTACT NOTE (OTHER) - BACKGROUND
49F h/o ETOH abuse w/ h/o withdrawal, IDDM, depression/anxiety, pancreatitis, s/p cholecystectomy presents with abdominal pain as well as new cough and SOB

## 2019-12-09 NOTE — DISCHARGE NOTE PROVIDER - NSDCCPCAREPLAN_GEN_ALL_CORE_FT
PRINCIPAL DISCHARGE DIAGNOSIS  Diagnosis: Alcohol abuse  Assessment and Plan of Treatment: While at [facility] you were diagnosed with alcohol withdrawal and you were only displaying minimal withdrawal symptoms while on a medication taper to prevent severe withdrawal. You decided to leave Against medical Advice prior to finishing the medications.    You are to follow-up with your primary care doctor in the next 1 week for further support and you are to abstain from using alcohol.   Please return for any vomiting blood, severe withdrawal symptoms such as hallucinations, persistent vomiting and inabilty to tolerate liquids, or any other concerns.      SECONDARY DISCHARGE DIAGNOSES  Diagnosis: Aspiration pneumonia  Assessment and Plan of Treatment: While in the hospital you were diagnosed with pneumonia. You had 4 days of IV antibiotics and you improved. You were also seen by pulmonology (Lung specialist as well) who recommended switching to oral anitbiotics.   You are to continue the antibuiotics to completion.   You are to call the Pulmnologist Number attached and follwo-up in the next 1-2 weeks for further evaluation and management.   You should return to the hospital if you begin to develop worsening shortness of breath/difficulty breathing at rest despite continued treatment with your prescribed medications, development/persistent fevers for greater than 2 days, worsening cough and other symptoms despite your prescribed medications, or for any other concerns with your breathing or any other problems.    Diagnosis: Type 2 diabetes mellitus with diabetic polyneuropathy, with long-term current use of insulin  Assessment and Plan of Treatment: While hospitalized at [facility] you had high blodo sugars (200s to 300s) and your insulin was being adjusted prior to you leaving [place].  You are to continue to take the medications as instructed and prescribed in the discharge paperwork.   You are to follow-up with your Endocrinologist or Primary Care doctor within the next 1-2 weeks for further evaluation and management of your diabetes. Please call for an appointment when you leave  hospital.   Please return to the hospital for persistent abdominal pain, nausea and vomiting with inability to tolerate liquids and maintain your hydration, fevers fo more than 100.4F for more than 2 days, confusion, or any other concerns.
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
## 2019-12-09 NOTE — DISCHARGE NOTE PROVIDER - NSDCMRMEDTOKEN_GEN_ALL_CORE_FT
folic acid 1 mg oral tablet: 1 tab(s) orally once a day  gabapentin 300 mg oral capsule: 1 cap(s) orally 3 times a day  HumaLOG KwikPen 100 units/mL injectable solution: 4-6 unit(s) subcutaneous 3 times a day (before meals)  KlonoPIN 0.5 mg oral tablet: 1 tab(s) orally 3 times a day, As Needed  Levemir FlexPen 100 units/mL subcutaneous solution: 20-25 unit(s) subcutaneous once a day (at bedtime)  Multiple Vitamins oral tablet: 1 tab(s) orally once a day  thiamine 100 mg oral tablet: 1 tab(s) orally once a day  traZODone 100 mg oral tablet: 1 tab(s) orally once a day (at bedtime)  Vitamin B12: folic acid 1 mg oral tablet: 1 tab(s) orally once a day  gabapentin 300 mg oral capsule: 1 cap(s) orally 3 times a day  HumaLOG KwikPen 100 units/mL injectable solution: 4-6 unit(s) subcutaneous 3 times a day (before meals)  KlonoPIN 0.5 mg oral tablet: 1 tab(s) orally 3 times a day, As Needed  Levaquin 500 mg oral tablet: 1 tab(s) orally once a day   Levemir FlexPen 100 units/mL subcutaneous solution: 20-25 unit(s) subcutaneous once a day (at bedtime)  Multiple Vitamins oral tablet: 1 tab(s) orally once a day  pantoprazole 40 mg oral delayed release tablet: 1 tab(s) orally once a day (before a meal)  thiamine 100 mg oral tablet: 1 tab(s) orally once a day  traZODone 100 mg oral tablet: 1 tab(s) orally once a day (at bedtime)  Vitamin B12:

## 2019-12-09 NOTE — DISCHARGE NOTE NURSING/CASE MANAGEMENT/SOCIAL WORK - PATIENT PORTAL LINK FT
You can access the FollowMyHealth Patient Portal offered by [facility] by registering at the following website: [email or web address]. By joining [facility]’s FollowMyHealth portal, you will also be able to view your health information using other applications (apps) compatible with our system.

## 2019-12-09 NOTE — DISCHARGE NOTE PROVIDER - INSTRUCTIONS
You should limit yourself to 180 grams of carbohydrates daily to help lower your hemoglobin A1C to a goal of <7.0%. You should limit excessive intake of foods that are high in carbohydrates such as sodas, candies,  large amounts of sweet fruits, pastas, rices, and potatoes. In order to further aide in dietary changes to manage your diabetes you should maintain a food diary for one month and record the amount of carbohydrates in each meal or snack. This should be brought to your primary care doctors office and then further discussed. For more information about dietary recommendations you can visit the website for the National Diabetes Initiative at: http://www.ndei.org/ADA-nutrition-guidelines-2013.aspx.

## 2019-12-09 NOTE — CONSULT NOTE ADULT - ASSESSMENT
49 year old female with alcohol abuse with h/o withdrawal, DM, Depression, Anxiety, Pancreatitis, s/p cholecystectomy presenting with abd pain from most likely alcohol gastritis/ hepatitis.    Lipase WNL  CT Abd wnl. RLL PNA  Patient without cough, no fevers, WBC WNL  Reviewed CT with chest radiologist and no signs of PNA/ consolidation.  Afebrile  WBC WNL    Recommend:  -Would monitor off antibiotics  -No clinical signs of PNA    Addendum: Patient signed out AMA.    Gómez Diaz MD  Pager [phone]  After 5pm/weekends call [phone]

## 2019-12-09 NOTE — PROGRESS NOTE ADULT - SUBJECTIVE AND OBJECTIVE BOX
PULMONARY PROGRESS NOTE    LUIS POTTS  MRN-[id number]    Patient is a 49y old  Female who presents with a chief complaint of alcohol w/d, abdominal pain (08 Dec 2019 20:27)      HPI: being followed by pulm for abnl chest finding on abd CT  No resp complaints  feels well  -    ROS: neg  -    ACTIVE MEDICATION LIST:  MEDICATIONS  (STANDING):  cyanocobalamin 1000 MICROGram(s) Oral daily  dextrose 5%. 1000 milliLiter(s) (50 mL/Hr) IV Continuous <Continuous>  dextrose 50% Injectable 12.5 Gram(s) IV Push once  dextrose 50% Injectable 25 Gram(s) IV Push once  dextrose 50% Injectable 25 Gram(s) IV Push once  enoxaparin Injectable 40 milliGRAM(s) SubCutaneous daily  folic acid 1 milliGRAM(s) Oral daily  gabapentin 300 milliGRAM(s) Oral three times a day  insulin detemir injectable (LEVEMIR) 10 Unit(s) SubCutaneous at bedtime  insulin lispro (HumaLOG) corrective regimen sliding scale   SubCutaneous three times a day before meals  insulin lispro (HumaLOG) corrective regimen sliding scale   SubCutaneous at bedtime  LORazepam   Injectable   IV Push   LORazepam   Injectable 0.5 milliGRAM(s) IV Push every 4 hours  LORazepam   Injectable 1 milliGRAM(s) IV Push every 4 hours  multivitamin 1 Tablet(s) Oral daily  pantoprazole    Tablet 40 milliGRAM(s) Oral before breakfast  piperacillin/tazobactam IVPB.. 3.375 Gram(s) IV Intermittent every 8 hours  sodium chloride 0.9% 1000 milliLiter(s) (100 mL/Hr) IV Continuous <Continuous>  thiamine 100 milliGRAM(s) Oral daily  traZODone 100 milliGRAM(s) Oral at bedtime    MEDICATIONS  (PRN):  acetaminophen   Tablet .. 650 milliGRAM(s) Oral every 6 hours PRN Mild Pain (1 - 3), Moderate Pain (4 - 6)  aluminum hydroxide/magnesium hydroxide/simethicone Suspension 30 milliLiter(s) Oral every 4 hours PRN Dyspepsia  dextrose 40% Gel 15 Gram(s) Oral once PRN Blood Glucose LESS THAN 70 milliGRAM(s)/deciliter  glucagon  Injectable 1 milliGRAM(s) IntraMuscular once PRN Glucose LESS THAN 70 milligrams/deciliter  LORazepam     Tablet 2 milliGRAM(s) Oral every 2 hours PRN CIWA-Ar score increase by 2 points and a total score of 7 or less  LORazepam   Injectable 2 milliGRAM(s) IV Push every 1 hour PRN CIWA-Ar score 8 or greater  morphine  - Injectable 4 milliGRAM(s) IV Push every 6 hours PRN Severe Pain (7 - 10)  ondansetron Injectable 4 milliGRAM(s) IV Push every 6 hours PRN Nausea and/or Vomiting      EXAM:  Vital Signs Last 24 Hrs  T(C): 36.7 (09 Dec 2019 06:00), Max: 37.2 (09 Dec 2019 02:00)  T(F): 98 (09 Dec 2019 06:00), Max: 98.9 (09 Dec 2019 02:00)  HR: 80 (09 Dec 2019 06:00) (72 - 82)  BP: 116/77 (09 Dec 2019 06:00) (115/70 - 132/88)  BP(mean): --  RR: 16 (09 Dec 2019 06:00) (15 - 17)  SpO2: 98% (09 Dec 2019 06:00) (96% - 98%)    GENERAL: The patient is awake and alert in no apparent distress.     LUNGS: Clear to auscultation without wheezing, rales or rhonchi; respirations unlabored    HEART: Regular rate and rhythm without murmur.                            14.1   4.91  )-----------( 107      ( 09 Dec 2019 06:05 )             41.4       12-09    134<L>  |  101  |  12  ----------------------------<  328<H>  4.0   |  22  |  0.70    Ca    9.6      09 Dec 2019 06:05  Phos  3.3     12-09  Mg     2.1     12-09    TPro  6.5  /  Alb  3.5  /  TBili  0.9  /  DBili  x   /  AST  56<H>  /  ALT  46<H>  /  AlkPhos  80  12-09        PROBLEM LIST:  49y Female with HEALTH ISSUES - PROBLEM Dx:  Need for prophylactic measure: Need for prophylactic measure  Anxiety: Anxiety  Depression: Depression  Transaminitis: Transaminitis  Neck pain: Neck pain  Type 2 diabetes mellitus with diabetic polyneuropathy, with long-term current use of insulin: Type 2 diabetes mellitus with diabetic polyneuropathy, with long-term current use of insulin  Alcohol abuse: Alcohol abuse  Aspiration pneumonia: Aspiration pneumonia  Abdominal pain: Abdominal pain        RECS: Minimal/nonspecific finding on abd CT in reference to chest  unclear if antibiotics required at all  May change to po  f/u PRN        Melchor Morales MD  [phone]

## 2019-12-09 NOTE — DISCHARGE NOTE PROVIDER - CARE PROVIDER_API CALL
Melchor Morales)  Critical Care Medicine; Internal Medicine; Pulmonary Disease; Sleep Medicine  [address] [facility] [place]  [place] [address]  Phone: [phone]  Fax: [phone]  Follow Up Time:

## 2019-12-09 NOTE — DISCHARGE NOTE PROVIDER - HOSPITAL COURSE
9F h/o ETOH abuse w/ h/o withdrawal, IDDM, depression/anxiety, pancreatitis, s/p cholecystectomy presents with abdominal pain.  Last had a pint of vodka this morning in attempt to alleviate abdominal pain.  Endorses pain x 1 week, initially intermittent now constant, LUQ radiating to left flank as well as sharp burning epigastric pain. Reports associated subjective fevers/chills, nausea, vomiting and soft bowel movements. Also endorses a productive cough and mild shortness of breath on exertion. Reports diffuse body aches as well as back pain and neck pain. Had a fall about 1 month ago.  Denies, CP, headache, dysuria, numbness or weakness. States she drinks about 2 pints of vodka 3-4 days out of the week. Last drink was this morning. Has withdrawal symptoms when she attempts to quit.          The patient was admitted to medicine for Pneumonia, ETOH withdrawal and hyperglycemia without DKA.         During hospital admission the patient was started on a CIWA taper and displayed minimal symptoms of withdrawal. patient was also started on IV Antibiotics and received 4 days of IV antibiotics at which time Pulmonology saw patient and recommended switching to oral antibiotics. The patient was also with uncontrolled hyperglycemia during hospital admission and was being monitored for adjustment in her diabetic regimen. On the last day of admission the patient expressed the desire to leave the hospital. The patient was explained that it was medically recommended to stay to finish the CIWA taper and get her diabetes under better control but she desired to leave AMA.         Dr. Ribeiro (The attending) Informed this patient of the need for further medical evaluation and care given the patient's current medical condition.   This patient declined further medical evaluation and treatment.  I informed this patient of the benefits of further medical evaluation and care at this facility.  I informed this patient of the risks of leaving against our medical advice without properly completing our evaluation today that include illness, injury, permanent disability and even death.  The patient was also informed about alternatives.  I informed the patient of the possible necessity for hospital admission depending on future findings.   At the time of discussion this patient maintained full faculties of judgement and medical decision making capacity.    In accordance with this patient's wishes the patient was discharged from the emergency department against our medical advice in stable condition with normal vital signs in no acute distress.         The patient left AMA with oral antibiotics and return instructions and with follwo-up with endocrinology, PMD, and Pulmonology instructions.

## 2019-12-10 LAB — BACTERIA SPT RESP CULT: SIGNIFICANT CHANGE UP

## 2019-12-17 LAB
HAV IGM SER-ACNC: SIGNIFICANT CHANGE UP
HBV CORE IGM SER-ACNC: SIGNIFICANT CHANGE UP
HBV SURFACE AG SER-ACNC: SIGNIFICANT CHANGE UP
HCV AB S/CO SERPL IA: SIGNIFICANT CHANGE UP
HCV AB SERPL-IMP: SIGNIFICANT CHANGE UP
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
## 2020-02-09 ENCOUNTER — EMERGENCY (EMERGENCY)
Facility: HOSPITAL | Age: 50
LOS: 1 days | Discharge: HOME | End: 2020-02-09
Attending: EMERGENCY MEDICINE | Admitting: EMERGENCY MEDICINE
Payer: MEDICARE

## 2020-02-09 VITALS
TEMPERATURE: 98 F | HEART RATE: 73 BPM | DIASTOLIC BLOOD PRESSURE: 91 MMHG | RESPIRATION RATE: 16 BRPM | SYSTOLIC BLOOD PRESSURE: 144 MMHG | OXYGEN SATURATION: 97 %

## 2020-02-09 DIAGNOSIS — L02.91 CUTANEOUS ABSCESS, UNSPECIFIED: Chronic | ICD-10-CM

## 2020-02-09 DIAGNOSIS — Z90.89 ACQUIRED ABSENCE OF OTHER ORGANS: Chronic | ICD-10-CM

## 2020-02-09 DIAGNOSIS — Z98.890 OTHER SPECIFIED POSTPROCEDURAL STATES: Chronic | ICD-10-CM

## 2020-02-09 PROCEDURE — 99285 EMERGENCY DEPT VISIT HI MDM: CPT

## 2020-02-09 RX ORDER — HALOPERIDOL DECANOATE 100 MG/ML
5 INJECTION INTRAMUSCULAR ONCE
Refills: 0 | Status: DISCONTINUED | OUTPATIENT
Start: 2020-02-09 | End: 2020-02-09

## 2020-02-09 NOTE — ED ADULT TRIAGE NOTE - CHIEF COMPLAINT QUOTE
Pt mom called EMS, stating pt hasn't been taking meds and has been drinking "all day". Pt acting erratic at home and hitting the wall. Endorses depression. Pt hyperactive and emotional in triage. H/o ETOH, DM, HTN, anxiety, depression.

## 2020-02-09 NOTE — ED PROVIDER NOTE - PATIENT PORTAL LINK FT
You can access the FollowMyHealth Patient Portal offered by [facility] by registering at the following website: [email or web address]. By joining [employer]’s FollowMyHealth portal, you will also be able to view your health information using other applications (apps) compatible with our system.

## 2020-02-09 NOTE — ED PROVIDER NOTE - PHYSICAL EXAMINATION
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 no obvious sign of facial trauma  unsteady gait  no resp distress  nontender abdomen  will medicate and re assess Dr Portillo  agitated in the room at times singing or yelling. no obvious sign of facial trauma  unsteady gait  no resp distress. no retractions.   nontender abdomen no bruising.   no lac or abrasion of bl hands. nl  bl hands.   Alert to self./place, follows commands,   no focal deficits.

## 2020-02-09 NOTE — ED PROVIDER NOTE - PROGRESS NOTE DETAILS
pt less agitated, mother at bedside. Informed of plan to have labs/re assess once sober.  Endorse to Dr Welch Pt complaining of abdominal pain, resolved with pain medications. CT shows no acute changes, results explained. Will DC with SW information about detox. Patient would like to quit drinking. Pt fully ambulatory , AOx4

## 2020-02-10 ENCOUNTER — INPATIENT (INPATIENT)
Facility: HOSPITAL | Age: 50
LOS: 3 days | Discharge: HOME | End: 2020-02-14
Attending: INTERNAL MEDICINE | Admitting: INTERNAL MEDICINE
Payer: MEDICARE

## 2020-02-10 VITALS
TEMPERATURE: 98 F | DIASTOLIC BLOOD PRESSURE: 87 MMHG | OXYGEN SATURATION: 100 % | SYSTOLIC BLOOD PRESSURE: 134 MMHG | HEART RATE: 90 BPM | RESPIRATION RATE: 28 BRPM

## 2020-02-10 VITALS
OXYGEN SATURATION: 98 % | TEMPERATURE: 98 F | DIASTOLIC BLOOD PRESSURE: 97 MMHG | HEART RATE: 78 BPM | SYSTOLIC BLOOD PRESSURE: 163 MMHG | RESPIRATION RATE: 16 BRPM

## 2020-02-10 DIAGNOSIS — Z98.890 OTHER SPECIFIED POSTPROCEDURAL STATES: Chronic | ICD-10-CM

## 2020-02-10 DIAGNOSIS — F10.239 ALCOHOL DEPENDENCE WITH WITHDRAWAL, UNSPECIFIED: ICD-10-CM

## 2020-02-10 DIAGNOSIS — Z90.89 ACQUIRED ABSENCE OF OTHER ORGANS: Chronic | ICD-10-CM

## 2020-02-10 DIAGNOSIS — E11.9 TYPE 2 DIABETES MELLITUS WITHOUT COMPLICATIONS: ICD-10-CM

## 2020-02-10 DIAGNOSIS — L02.91 CUTANEOUS ABSCESS, UNSPECIFIED: Chronic | ICD-10-CM

## 2020-02-10 DIAGNOSIS — F10.230 ALCOHOL DEPENDENCE WITH WITHDRAWAL, UNCOMPLICATED: ICD-10-CM

## 2020-02-10 LAB
ALBUMIN SERPL ELPH-MCNC: 4.7 G/DL — SIGNIFICANT CHANGE UP (ref 3.3–5)
ALBUMIN SERPL ELPH-MCNC: 4.7 G/DL — SIGNIFICANT CHANGE UP (ref 3.3–5)
ALP SERPL-CCNC: 91 U/L — SIGNIFICANT CHANGE UP (ref 40–120)
ALP SERPL-CCNC: 98 U/L — SIGNIFICANT CHANGE UP (ref 40–120)
ALT FLD-CCNC: 54 U/L — HIGH (ref 4–33)
ALT FLD-CCNC: 68 U/L — HIGH (ref 4–33)
AMPHET UR-MCNC: NEGATIVE — SIGNIFICANT CHANGE UP
AMPHET UR-MCNC: NEGATIVE — SIGNIFICANT CHANGE UP
ANION GAP SERPL CALC-SCNC: 19 MMO/L — HIGH (ref 7–14)
ANION GAP SERPL CALC-SCNC: 19 MMO/L — HIGH (ref 7–14)
ANION GAP SERPL CALC-SCNC: 27 MMO/L — HIGH (ref 7–14)
APAP SERPL-MCNC: < 15 UG/ML — LOW (ref 15–25)
APAP SERPL-MCNC: < 15 UG/ML — LOW (ref 15–25)
APPEARANCE UR: CLEAR — SIGNIFICANT CHANGE UP
AST SERPL-CCNC: 64 U/L — HIGH (ref 4–32)
AST SERPL-CCNC: 96 U/L — HIGH (ref 4–32)
BACTERIA # UR AUTO: SIGNIFICANT CHANGE UP
BARBITURATES UR SCN-MCNC: NEGATIVE — SIGNIFICANT CHANGE UP
BARBITURATES UR SCN-MCNC: NEGATIVE — SIGNIFICANT CHANGE UP
BASE EXCESS BLDV CALC-SCNC: -6.2 MMOL/L — SIGNIFICANT CHANGE UP
BASE EXCESS BLDV CALC-SCNC: -6.9 MMOL/L — SIGNIFICANT CHANGE UP
BASOPHILS # BLD AUTO: 0.05 K/UL — SIGNIFICANT CHANGE UP (ref 0–0.2)
BASOPHILS # BLD AUTO: 0.06 K/UL — SIGNIFICANT CHANGE UP (ref 0–0.2)
BASOPHILS NFR BLD AUTO: 0.8 % — SIGNIFICANT CHANGE UP (ref 0–2)
BASOPHILS NFR BLD AUTO: 1 % — SIGNIFICANT CHANGE UP (ref 0–2)
BENZODIAZ UR-MCNC: NEGATIVE — SIGNIFICANT CHANGE UP
BENZODIAZ UR-MCNC: NEGATIVE — SIGNIFICANT CHANGE UP
BILIRUB SERPL-MCNC: 0.6 MG/DL — SIGNIFICANT CHANGE UP (ref 0.2–1.2)
BILIRUB SERPL-MCNC: 2.1 MG/DL — HIGH (ref 0.2–1.2)
BILIRUB UR-MCNC: NEGATIVE — SIGNIFICANT CHANGE UP
BLOOD GAS VENOUS - CREATININE: 0.5 MG/DL — SIGNIFICANT CHANGE UP (ref 0.5–1.3)
BLOOD GAS VENOUS - CREATININE: 0.55 MG/DL — SIGNIFICANT CHANGE UP (ref 0.5–1.3)
BLOOD GAS VENOUS - FIO2: 21 — SIGNIFICANT CHANGE UP
BLOOD UR QL VISUAL: NEGATIVE — SIGNIFICANT CHANGE UP
BUN SERPL-MCNC: 6 MG/DL — LOW (ref 7–23)
BUN SERPL-MCNC: 8 MG/DL — SIGNIFICANT CHANGE UP (ref 7–23)
BUN SERPL-MCNC: 8 MG/DL — SIGNIFICANT CHANGE UP (ref 7–23)
CALCIUM SERPL-MCNC: 7.8 MG/DL — LOW (ref 8.4–10.5)
CALCIUM SERPL-MCNC: 9.2 MG/DL — SIGNIFICANT CHANGE UP (ref 8.4–10.5)
CALCIUM SERPL-MCNC: 9.2 MG/DL — SIGNIFICANT CHANGE UP (ref 8.4–10.5)
CANNABINOIDS UR-MCNC: NEGATIVE — SIGNIFICANT CHANGE UP
CANNABINOIDS UR-MCNC: POSITIVE — SIGNIFICANT CHANGE UP
CHLORIDE BLDV-SCNC: 103 MMOL/L — SIGNIFICANT CHANGE UP (ref 96–108)
CHLORIDE BLDV-SCNC: 111 MMOL/L — HIGH (ref 96–108)
CHLORIDE SERPL-SCNC: 100 MMOL/L — SIGNIFICANT CHANGE UP (ref 98–107)
CHLORIDE SERPL-SCNC: 105 MMOL/L — SIGNIFICANT CHANGE UP (ref 98–107)
CHLORIDE SERPL-SCNC: 95 MMOL/L — LOW (ref 98–107)
CO2 SERPL-SCNC: 13 MMOL/L — LOW (ref 22–31)
CO2 SERPL-SCNC: 15 MMOL/L — LOW (ref 22–31)
CO2 SERPL-SCNC: 20 MMOL/L — LOW (ref 22–31)
COCAINE METAB.OTHER UR-MCNC: NEGATIVE — SIGNIFICANT CHANGE UP
COCAINE METAB.OTHER UR-MCNC: NEGATIVE — SIGNIFICANT CHANGE UP
COLOR SPEC: YELLOW — SIGNIFICANT CHANGE UP
CREAT SERPL-MCNC: 0.48 MG/DL — LOW (ref 0.5–1.3)
CREAT SERPL-MCNC: 0.51 MG/DL — SIGNIFICANT CHANGE UP (ref 0.5–1.3)
CREAT SERPL-MCNC: 0.54 MG/DL — SIGNIFICANT CHANGE UP (ref 0.5–1.3)
EOSINOPHIL # BLD AUTO: 0 K/UL — SIGNIFICANT CHANGE UP (ref 0–0.5)
EOSINOPHIL # BLD AUTO: 0.12 K/UL — SIGNIFICANT CHANGE UP (ref 0–0.5)
EOSINOPHIL NFR BLD AUTO: 0 % — SIGNIFICANT CHANGE UP (ref 0–6)
EOSINOPHIL NFR BLD AUTO: 2.1 % — SIGNIFICANT CHANGE UP (ref 0–6)
EPI CELLS # UR: SIGNIFICANT CHANGE UP
ETHANOL BLD-MCNC: 363 MG/DL — HIGH
ETHANOL BLD-MCNC: < 10 MG/DL — SIGNIFICANT CHANGE UP
GAS PNL BLDV: 137 MMOL/L — SIGNIFICANT CHANGE UP (ref 136–146)
GAS PNL BLDV: 139 MMOL/L — SIGNIFICANT CHANGE UP (ref 136–146)
GLUCOSE BLDV-MCNC: 213 MG/DL — HIGH (ref 70–99)
GLUCOSE BLDV-MCNC: 250 MG/DL — HIGH (ref 70–99)
GLUCOSE SERPL-MCNC: 214 MG/DL — HIGH (ref 70–99)
GLUCOSE SERPL-MCNC: 243 MG/DL — HIGH (ref 70–99)
GLUCOSE SERPL-MCNC: 247 MG/DL — HIGH (ref 70–99)
GLUCOSE UR-MCNC: >1000 — SIGNIFICANT CHANGE UP
HCG SERPL-ACNC: < 5 MIU/ML — SIGNIFICANT CHANGE UP
HCO3 BLDV-SCNC: 19 MMOL/L — LOW (ref 20–27)
HCO3 BLDV-SCNC: 21 MMOL/L — SIGNIFICANT CHANGE UP (ref 20–27)
HCT VFR BLD CALC: 43.4 % — SIGNIFICANT CHANGE UP (ref 34.5–45)
HCT VFR BLD CALC: 46 % — HIGH (ref 34.5–45)
HCT VFR BLDV CALC: 43.3 % — SIGNIFICANT CHANGE UP (ref 34.5–45)
HCT VFR BLDV CALC: 49 % — HIGH (ref 34.5–45)
HGB BLD-MCNC: 14.9 G/DL — SIGNIFICANT CHANGE UP (ref 11.5–15.5)
HGB BLD-MCNC: 15.3 G/DL — SIGNIFICANT CHANGE UP (ref 11.5–15.5)
HGB BLDV-MCNC: 14.1 G/DL — SIGNIFICANT CHANGE UP (ref 11.5–15.5)
HGB BLDV-MCNC: 16 G/DL — HIGH (ref 11.5–15.5)
IMM GRANULOCYTES NFR BLD AUTO: 0.3 % — SIGNIFICANT CHANGE UP (ref 0–1.5)
IMM GRANULOCYTES NFR BLD AUTO: 0.5 % — SIGNIFICANT CHANGE UP (ref 0–1.5)
KETONES UR-MCNC: >150 — HIGH
LACTATE BLDV-MCNC: 1.9 MMOL/L — SIGNIFICANT CHANGE UP (ref 0.5–2)
LACTATE BLDV-MCNC: 3.3 MMOL/L — HIGH (ref 0.5–2)
LEUKOCYTE ESTERASE UR-ACNC: SIGNIFICANT CHANGE UP
LIDOCAIN IGE QN: 13.3 U/L — SIGNIFICANT CHANGE UP (ref 7–60)
LYMPHOCYTES # BLD AUTO: 0.73 K/UL — LOW (ref 1–3.3)
LYMPHOCYTES # BLD AUTO: 11.2 % — LOW (ref 13–44)
LYMPHOCYTES # BLD AUTO: 3.4 K/UL — HIGH (ref 1–3.3)
LYMPHOCYTES # BLD AUTO: 58.8 % — HIGH (ref 13–44)
MCHC RBC-ENTMCNC: 31.7 PG — SIGNIFICANT CHANGE UP (ref 27–34)
MCHC RBC-ENTMCNC: 32.1 PG — SIGNIFICANT CHANGE UP (ref 27–34)
MCHC RBC-ENTMCNC: 33.3 % — SIGNIFICANT CHANGE UP (ref 32–36)
MCHC RBC-ENTMCNC: 34.3 % — SIGNIFICANT CHANGE UP (ref 32–36)
MCV RBC AUTO: 93.5 FL — SIGNIFICANT CHANGE UP (ref 80–100)
MCV RBC AUTO: 95.2 FL — SIGNIFICANT CHANGE UP (ref 80–100)
METHADONE UR-MCNC: NEGATIVE — SIGNIFICANT CHANGE UP
METHADONE UR-MCNC: NEGATIVE — SIGNIFICANT CHANGE UP
MONOCYTES # BLD AUTO: 0.49 K/UL — SIGNIFICANT CHANGE UP (ref 0–0.9)
MONOCYTES # BLD AUTO: 0.56 K/UL — SIGNIFICANT CHANGE UP (ref 0–0.9)
MONOCYTES NFR BLD AUTO: 8.5 % — SIGNIFICANT CHANGE UP (ref 2–14)
MONOCYTES NFR BLD AUTO: 8.6 % — SIGNIFICANT CHANGE UP (ref 2–14)
NEUTROPHILS # BLD AUTO: 1.69 K/UL — LOW (ref 1.8–7.4)
NEUTROPHILS # BLD AUTO: 5.12 K/UL — SIGNIFICANT CHANGE UP (ref 1.8–7.4)
NEUTROPHILS NFR BLD AUTO: 29.3 % — LOW (ref 43–77)
NEUTROPHILS NFR BLD AUTO: 78.9 % — HIGH (ref 43–77)
NITRITE UR-MCNC: NEGATIVE — SIGNIFICANT CHANGE UP
NRBC # FLD: 0 K/UL — SIGNIFICANT CHANGE UP (ref 0–0)
NRBC # FLD: 0 K/UL — SIGNIFICANT CHANGE UP (ref 0–0)
OPIATES UR-MCNC: NEGATIVE — SIGNIFICANT CHANGE UP
OPIATES UR-MCNC: NEGATIVE — SIGNIFICANT CHANGE UP
OXYCODONE UR-MCNC: NEGATIVE — SIGNIFICANT CHANGE UP
OXYCODONE UR-MCNC: NEGATIVE — SIGNIFICANT CHANGE UP
PCO2 BLDV: 20 MMHG — LOW (ref 41–51)
PCO2 BLDV: 31 MMHG — LOW (ref 41–51)
PCP UR-MCNC: NEGATIVE — SIGNIFICANT CHANGE UP
PCP UR-MCNC: NEGATIVE — SIGNIFICANT CHANGE UP
PH BLDV: 7.37 PH — SIGNIFICANT CHANGE UP (ref 7.32–7.43)
PH BLDV: 7.52 PH — HIGH (ref 7.32–7.43)
PH UR: 6.5 — SIGNIFICANT CHANGE UP (ref 5–8)
PLATELET # BLD AUTO: 158 K/UL — SIGNIFICANT CHANGE UP (ref 150–400)
PLATELET # BLD AUTO: 170 K/UL — SIGNIFICANT CHANGE UP (ref 150–400)
PMV BLD: 10.3 FL — SIGNIFICANT CHANGE UP (ref 7–13)
PMV BLD: 10.6 FL — SIGNIFICANT CHANGE UP (ref 7–13)
PO2 BLDV: 33 MMHG — LOW (ref 35–40)
PO2 BLDV: 37 MMHG — SIGNIFICANT CHANGE UP (ref 35–40)
POTASSIUM BLDV-SCNC: 3.7 MMOL/L — SIGNIFICANT CHANGE UP (ref 3.4–4.5)
POTASSIUM BLDV-SCNC: 4 MMOL/L — SIGNIFICANT CHANGE UP (ref 3.4–4.5)
POTASSIUM SERPL-MCNC: 3.5 MMOL/L — SIGNIFICANT CHANGE UP (ref 3.5–5.3)
POTASSIUM SERPL-MCNC: 4.1 MMOL/L — SIGNIFICANT CHANGE UP (ref 3.5–5.3)
POTASSIUM SERPL-MCNC: 5.4 MMOL/L — HIGH (ref 3.5–5.3)
POTASSIUM SERPL-SCNC: 3.5 MMOL/L — SIGNIFICANT CHANGE UP (ref 3.5–5.3)
POTASSIUM SERPL-SCNC: 4.1 MMOL/L — SIGNIFICANT CHANGE UP (ref 3.5–5.3)
POTASSIUM SERPL-SCNC: 5.4 MMOL/L — HIGH (ref 3.5–5.3)
PROT SERPL-MCNC: 8.1 G/DL — SIGNIFICANT CHANGE UP (ref 6–8.3)
PROT SERPL-MCNC: 8.3 G/DL — SIGNIFICANT CHANGE UP (ref 6–8.3)
PROT UR-MCNC: 200 — HIGH
RBC # BLD: 4.64 M/UL — SIGNIFICANT CHANGE UP (ref 3.8–5.2)
RBC # BLD: 4.83 M/UL — SIGNIFICANT CHANGE UP (ref 3.8–5.2)
RBC # FLD: 12.7 % — SIGNIFICANT CHANGE UP (ref 10.3–14.5)
RBC # FLD: 12.7 % — SIGNIFICANT CHANGE UP (ref 10.3–14.5)
RBC CASTS # UR COMP ASSIST: SIGNIFICANT CHANGE UP (ref 0–?)
SALICYLATES SERPL-MCNC: < 5 MG/DL — LOW (ref 15–30)
SALICYLATES SERPL-MCNC: < 5 MG/DL — LOW (ref 15–30)
SAO2 % BLDV: 65.7 % — SIGNIFICANT CHANGE UP (ref 60–85)
SAO2 % BLDV: 69.6 % — SIGNIFICANT CHANGE UP (ref 60–85)
SODIUM SERPL-SCNC: 135 MMOL/L — SIGNIFICANT CHANGE UP (ref 135–145)
SODIUM SERPL-SCNC: 139 MMOL/L — SIGNIFICANT CHANGE UP (ref 135–145)
SODIUM SERPL-SCNC: 139 MMOL/L — SIGNIFICANT CHANGE UP (ref 135–145)
SP GR SPEC: 1.02 — SIGNIFICANT CHANGE UP (ref 1–1.04)
UROBILINOGEN FLD QL: NORMAL — SIGNIFICANT CHANGE UP
WBC # BLD: 5.78 K/UL — SIGNIFICANT CHANGE UP (ref 3.8–10.5)
WBC # BLD: 6.49 K/UL — SIGNIFICANT CHANGE UP (ref 3.8–10.5)
WBC # FLD AUTO: 5.78 K/UL — SIGNIFICANT CHANGE UP (ref 3.8–10.5)
WBC # FLD AUTO: 6.49 K/UL — SIGNIFICANT CHANGE UP (ref 3.8–10.5)
WBC UR QL: SIGNIFICANT CHANGE UP (ref 0–?)

## 2020-02-10 PROCEDURE — 99223 1ST HOSP IP/OBS HIGH 75: CPT

## 2020-02-10 PROCEDURE — 73130 X-RAY EXAM OF HAND: CPT | Mod: 26,RT

## 2020-02-10 PROCEDURE — 74176 CT ABD & PELVIS W/O CONTRAST: CPT | Mod: 26

## 2020-02-10 RX ORDER — INSULIN LISPRO 100/ML
4 VIAL (ML) SUBCUTANEOUS
Qty: 0 | Refills: 0 | DISCHARGE

## 2020-02-10 RX ORDER — SODIUM CHLORIDE 9 MG/ML
2000 INJECTION INTRAMUSCULAR; INTRAVENOUS; SUBCUTANEOUS ONCE
Refills: 0 | Status: COMPLETED | OUTPATIENT
Start: 2020-02-10 | End: 2020-02-10

## 2020-02-10 RX ORDER — ONDANSETRON 8 MG/1
4 TABLET, FILM COATED ORAL ONCE
Refills: 0 | Status: COMPLETED | OUTPATIENT
Start: 2020-02-10 | End: 2020-02-10

## 2020-02-10 RX ORDER — PREGABALIN 225 MG/1
0 CAPSULE ORAL
Qty: 0 | Refills: 0 | DISCHARGE

## 2020-02-10 RX ORDER — INSULIN LISPRO 100/ML
VIAL (ML) SUBCUTANEOUS
Refills: 0 | Status: DISCONTINUED | OUTPATIENT
Start: 2020-02-10 | End: 2020-02-14

## 2020-02-10 RX ORDER — SODIUM CHLORIDE 9 MG/ML
1000 INJECTION, SOLUTION INTRAVENOUS ONCE
Refills: 0 | Status: COMPLETED | OUTPATIENT
Start: 2020-02-10 | End: 2020-02-10

## 2020-02-10 RX ORDER — THIAMINE MONONITRATE (VIT B1) 100 MG
1 TABLET ORAL
Qty: 2 | Refills: 0

## 2020-02-10 RX ORDER — SODIUM CHLORIDE 9 MG/ML
1000 INJECTION, SOLUTION INTRAVENOUS
Refills: 0 | Status: DISCONTINUED | OUTPATIENT
Start: 2020-02-10 | End: 2020-02-10

## 2020-02-10 RX ORDER — ACETAMINOPHEN 500 MG
975 TABLET ORAL ONCE
Refills: 0 | Status: COMPLETED | OUTPATIENT
Start: 2020-02-10 | End: 2020-02-10

## 2020-02-10 RX ORDER — MORPHINE SULFATE 50 MG/1
2 CAPSULE, EXTENDED RELEASE ORAL ONCE
Refills: 0 | Status: DISCONTINUED | OUTPATIENT
Start: 2020-02-10 | End: 2020-02-10

## 2020-02-10 RX ORDER — SODIUM CHLORIDE 9 MG/ML
1000 INJECTION, SOLUTION INTRAVENOUS
Refills: 0 | Status: DISCONTINUED | OUTPATIENT
Start: 2020-02-10 | End: 2020-02-11

## 2020-02-10 RX ORDER — KETOROLAC TROMETHAMINE 30 MG/ML
15 SYRINGE (ML) INJECTION ONCE
Refills: 0 | Status: DISCONTINUED | OUTPATIENT
Start: 2020-02-10 | End: 2020-02-10

## 2020-02-10 RX ORDER — ONDANSETRON 8 MG/1
4 TABLET, FILM COATED ORAL EVERY 6 HOURS
Refills: 0 | Status: DISCONTINUED | OUTPATIENT
Start: 2020-02-10 | End: 2020-02-11

## 2020-02-10 RX ORDER — PANTOPRAZOLE SODIUM 20 MG/1
40 TABLET, DELAYED RELEASE ORAL
Refills: 0 | Status: DISCONTINUED | OUTPATIENT
Start: 2020-02-10 | End: 2020-02-14

## 2020-02-10 RX ORDER — ONDANSETRON 8 MG/1
1 TABLET, FILM COATED ORAL
Qty: 15 | Refills: 0
Start: 2020-02-10 | End: 2020-02-14

## 2020-02-10 RX ORDER — SODIUM CHLORIDE 9 MG/ML
1000 INJECTION, SOLUTION INTRAVENOUS
Refills: 0 | Status: COMPLETED | OUTPATIENT
Start: 2020-02-10 | End: 2020-02-10

## 2020-02-10 RX ORDER — INSULIN DETEMIR 100/ML (3)
10 INSULIN PEN (ML) SUBCUTANEOUS AT BEDTIME
Refills: 0 | Status: DISCONTINUED | OUTPATIENT
Start: 2020-02-10 | End: 2020-02-14

## 2020-02-10 RX ORDER — FOLIC ACID 0.8 MG
1 TABLET ORAL DAILY
Refills: 0 | Status: DISCONTINUED | OUTPATIENT
Start: 2020-02-10 | End: 2020-02-14

## 2020-02-10 RX ORDER — THIAMINE MONONITRATE (VIT B1) 100 MG
100 TABLET ORAL DAILY
Refills: 0 | Status: DISCONTINUED | OUTPATIENT
Start: 2020-02-10 | End: 2020-02-10

## 2020-02-10 RX ORDER — SODIUM CHLORIDE 9 MG/ML
1000 INJECTION INTRAMUSCULAR; INTRAVENOUS; SUBCUTANEOUS ONCE
Refills: 0 | Status: COMPLETED | OUTPATIENT
Start: 2020-02-10 | End: 2020-02-10

## 2020-02-10 RX ORDER — FOLIC ACID 0.8 MG
1 TABLET ORAL
Qty: 0 | Refills: 0 | DISCHARGE

## 2020-02-10 RX ORDER — INSULIN LISPRO 100/ML
VIAL (ML) SUBCUTANEOUS AT BEDTIME
Refills: 0 | Status: DISCONTINUED | OUTPATIENT
Start: 2020-02-10 | End: 2020-02-14

## 2020-02-10 RX ADMIN — SODIUM CHLORIDE 1000 MILLILITER(S): 9 INJECTION INTRAMUSCULAR; INTRAVENOUS; SUBCUTANEOUS at 20:13

## 2020-02-10 RX ADMIN — Medication 15 MILLIGRAM(S): at 03:33

## 2020-02-10 RX ADMIN — SODIUM CHLORIDE 2000 MILLILITER(S): 9 INJECTION INTRAMUSCULAR; INTRAVENOUS; SUBCUTANEOUS at 03:31

## 2020-02-10 RX ADMIN — Medication 975 MILLIGRAM(S): at 19:53

## 2020-02-10 RX ADMIN — Medication 1 MILLIGRAM(S): at 21:27

## 2020-02-10 RX ADMIN — ONDANSETRON 4 MILLIGRAM(S): 8 TABLET, FILM COATED ORAL at 03:41

## 2020-02-10 RX ADMIN — Medication 1 MILLIGRAM(S): at 19:30

## 2020-02-10 RX ADMIN — ONDANSETRON 4 MILLIGRAM(S): 8 TABLET, FILM COATED ORAL at 02:24

## 2020-02-10 RX ADMIN — Medication 2 MILLIGRAM(S): at 00:06

## 2020-02-10 RX ADMIN — MORPHINE SULFATE 2 MILLIGRAM(S): 50 CAPSULE, EXTENDED RELEASE ORAL at 03:33

## 2020-02-10 RX ADMIN — MORPHINE SULFATE 2 MILLIGRAM(S): 50 CAPSULE, EXTENDED RELEASE ORAL at 02:54

## 2020-02-10 RX ADMIN — SODIUM CHLORIDE 250 MILLILITER(S): 9 INJECTION, SOLUTION INTRAVENOUS at 20:13

## 2020-02-10 RX ADMIN — ONDANSETRON 4 MILLIGRAM(S): 8 TABLET, FILM COATED ORAL at 19:20

## 2020-02-10 RX ADMIN — SODIUM CHLORIDE 2000 MILLILITER(S): 9 INJECTION INTRAMUSCULAR; INTRAVENOUS; SUBCUTANEOUS at 01:33

## 2020-02-10 RX ADMIN — Medication 1 MILLIGRAM(S): at 22:48

## 2020-02-10 RX ADMIN — SODIUM CHLORIDE 1000 MILLILITER(S): 9 INJECTION INTRAMUSCULAR; INTRAVENOUS; SUBCUTANEOUS at 19:16

## 2020-02-10 RX ADMIN — Medication 1 MILLIGRAM(S): at 19:17

## 2020-02-10 RX ADMIN — ONDANSETRON 4 MILLIGRAM(S): 8 TABLET, FILM COATED ORAL at 21:27

## 2020-02-10 RX ADMIN — SODIUM CHLORIDE 1000 MILLILITER(S): 9 INJECTION, SOLUTION INTRAVENOUS at 22:33

## 2020-02-10 RX ADMIN — Medication 15 MILLIGRAM(S): at 02:24

## 2020-02-10 NOTE — ED ADULT NURSE NOTE - OBJECTIVE STATEMENT
Pt presents to room 4, alert and ambulatory x4 at baseline, PMHX of DM, depression and anxiety coming to ED from home for ETOH withdrawal. Pt recently seen from ED yesterday for same issue, reports going home and states " I felt worse". Pt endorses N/V and generalized body pain x1day. Last alchohol drink yesterday evening, admits to binge-drinking, "2 pints of vodka each day for 3 days" Pt appearing to be anxious, uncomfortable and moaning, slight tremors observed to arms when extended, reports itchiness around leg area. Pt is non-diaphoretic, respirations even and non-labored, sat at 100% RA, vitals stable as noted, NSR on cardiac monitor. 22g IV right arm, labs drawn and sent, medications given as ordered by MD. Awaiting further plan of care.

## 2020-02-10 NOTE — PROVIDER CONTACT NOTE (OTHER) - BACKGROUND
Pt reports she was at [facility] in the past; states she does not want to return to that program.

## 2020-02-10 NOTE — ED ADULT NURSE NOTE - CHIEF COMPLAINT
The patient is a 49y Female complaining of The patient is a 49y Female complaining of drinking and abdominal pain

## 2020-02-10 NOTE — ED PROVIDER NOTE - PHYSICAL EXAMINATION
General: uncomfortable appearing female, moderate distress   HEENT: normocephalic, atraumatic   Respiratory: normal work of breathing, lungs clear to auscultation bilaterally   Cardiac: regular rate and rhythm   Abdomen: soft, epigastric tenderness to palpation   MSK: no swelling or tenderness of lower extremities, moving all extremities spontaneously   Skin: warm, dry   Neuro: A&Ox3  Psych: appropriate affect

## 2020-02-10 NOTE — ED PROVIDER NOTE - ATTENDING CONTRIBUTION TO CARE
agree with resident note    "49F, pmh of etoh abuse, pancreatitis, DM, presenting with alcohol withdrawal. patient left hospital AMA yesterday, returned to the ED for vomiting, abdominal pain and shaking. symptoms improved, and was discharged with referral for detox. patient reports drinking 2 pints of vodka last night. now with continued nausea, vomiting, abdominal pain and shaking. "    CIWA initially 20  PE: uncomfortable but VSS wnl; not diaphoretic; CTAB/L; s1 s2 no m/r/g abd soft/NT/ND ext: no edema    Imp: alcohol WD; given prior seizures and symptoms likely will need admission

## 2020-02-10 NOTE — PROVIDER CONTACT NOTE (OTHER) - ASSESSMENT
Options for treatment discussed with pt.  Pt verbalized understanding of inpatient vs. outpatient.  Pt reports being amenable to referrals to programs in the community.  Explained that she would need to contact the programs to discuss services and her motivation for treatment.

## 2020-02-10 NOTE — PROVIDER CONTACT NOTE (OTHER) - REASON
substance abuse resources
Airway patent, Nasal mucosa clear. Mouth with normal mucosa. Throat has no vesicles, no oropharyngeal exudates and uvula is midline.

## 2020-02-10 NOTE — ED ADULT NURSE NOTE - GASTROINTESTINAL WDL
Abdomen soft, pain to left abdomin radiating to back nondistended, bowel sounds present in all 4 quadrants.

## 2020-02-10 NOTE — ED PROVIDER NOTE - OBJECTIVE STATEMENT
49F, pmh of etoh abuse, pancreatitis, 49F, pmh of etoh abuse, pancreatitis, DM, presenting with alcohol withdrawal. patient left hospital AMA yesterday, returned to the ED for vomiting, abdominal pain and shaking. symptoms improved, and was discharged with referral for detox. patient reports drinking 2 pints of vodka last night. now with continued nausea, vomiting, abdominal pain and shaking. has history of withdrawal seizures. denies fever, chest pain, difficulty breathing, pain or burning with urination.

## 2020-02-10 NOTE — H&P ADULT - PROBLEM SELECTOR PLAN 1
start standing ativan taper at 2 mg IV (similar to prev admission) w/ PRNs in place, c/w IVF w/ PRN for nausea

## 2020-02-10 NOTE — H&P ADULT - NSHPPHYSICALEXAM_GEN_ALL_CORE
T(C): 36.7 (02-10-20 @ 20:32), Max: 36.9 (02-10-20 @ 03:55)  HR: 83 (02-10-20 @ 20:32) (76 - 90)  BP: 135/88 (02-10-20 @ 20:32) (134/87 - 163/97)  RR: 18 (02-10-20 @ 20:32) (16 - 29)  SpO2: 97% (02-10-20 @ 20:32) (97% - 100%)    Constitutional: NAD, well-developed, well-nourished  Ears, Nose, Mouth, and Throat: normal external ears and nose, normal hearing, moist oral mucosa  Eyes: normal conjunctiva, EOMI, PERRL  Neck: supple, no JVD  Respiratory: Clear to auscultation bilaterally. No wheezes, rales or rhonchi. Normal respiratory effort  Cardiovascular: RRR, no M/R/G, no edema, 2+ Peripheral Pulses  Gastrointestinal: soft, nontender, nondistended, +BS, no hernia  Skin: warm, dry, no rash  Neurologic: +tremors, sensation grossly intact, CN grossly intact, non-focal exam  Musculoskeletal: no clubbing, no cyanosis, no joint swelling  Psychiatric: AOX3, +anxious

## 2020-02-10 NOTE — H&P ADULT - NSHPLABSRESULTS_GEN_ALL_CORE
02-10    139  |  105  |  8   ----------------------------<  214<H>  4.1   |  15<L>  |  0.51    Ca    7.8<L>      10 Feb 2020 21:10    TPro  8.3  /  Alb  4.7  /  TBili  2.1<H>  /  DBili  x   /  AST  96<H>  /  ALT  68<H>  /  AlkPhos  98  02-10                        14.9   6.49  )-----------( 158      ( 10 Feb 2020 19:23 )             43.4     LIVER FUNCTIONS - ( 10 Feb 2020 19:11 )  Alb: 4.7 g/dL / Pro: 8.3 g/dL / ALK PHOS: 98 u/L / ALT: 68 u/L / AST: 96 u/L / GGT: x           Urinalysis Basic - ( 10 Feb 2020 19:40 )    Color: YELLOW / Appearance: CLEAR / S.024 / pH: 6.5  Gluc: >1000 / Ketone: >150  / Bili: NEGATIVE / Urobili: NORMAL   Blood: NEGATIVE / Protein: 200 / Nitrite: NEGATIVE   Leuk Esterase: TRACE / RBC: 0-2 / WBC 1-3   Sq Epi: x / Non Sq Epi: SMALL / Bacteria: SMALL

## 2020-02-10 NOTE — H&P ADULT - NSHPREVIEWOFSYSTEMS_GEN_ALL_CORE
Constitutional Symptoms: No weakness, fevers, chills, weight loss  Eyes: No visual changes, eye pain, double vision  Ears, Nose, Mouth, Throat: No runny nose, sinus pain, ear pain, tinnitus, sore throat, dysphagia, odynophagia  Cardiovascular: No chest pain, palpitations, edema  Respiratory: No cough, wheezing, hemoptysis, shortness of breath  Gastrointestinal: +abdominal pain, nausea/vomiting, no diarrhea/constipation, hematemesis, BRBPR, melena  Genitourinary: No dysuria, frequency, hematuria  Musculoskeletal: No joint pain, joint swelling, decreased ROM  Skin: No pruritus, rashes, lesions, wounds  Neurologic:  +tremors, headache, no seizures, paraesthesia, numbness, limb weakness    Positives and pertinent negatives noted and all other systems negative.

## 2020-02-10 NOTE — ED ADULT NURSE NOTE - NSIMPLEMENTINTERV_GEN_ALL_ED
Implemented All Fall Risk Interventions:  [place] to call system. Call bell, personal items and telephone within reach. Instruct patient to call for assistance. Room bathroom lighting operational. Non-slip footwear when patient is off stretcher. Physically safe environment: no spills, clutter or unnecessary equipment. Stretcher in lowest position, wheels locked, appropriate side rails in place. Provide visual cue, wrist band, yellow gown, etc. Monitor gait and stability. Monitor for mental status changes and reorient to person, place, and time. Review medications for side effects contributing to fall risk. Reinforce activity limits and safety measures with patient and family.

## 2020-02-10 NOTE — PROVIDER CONTACT NOTE (OTHER) - ACTION/TREATMENT ORDERED:
Pt provided with referral information to [facility], as well as additional programs within 20 miles of her home.  Pt reports she will follow up as needed.

## 2020-02-10 NOTE — ED ADULT TRIAGE NOTE - CHIEF COMPLAINT QUOTE
Pt called 911 for alcohol withdrawal symptoms.  Seen here yesterday and discharged for the same.  Last drink last night.  Hx alcohol withdrawal seizure.  Arrives with O2 2L nC in use.  Appears shaky and vomiting in triage.  C/O abd pain and HA

## 2020-02-10 NOTE — ED ADULT NURSE REASSESSMENT NOTE - NS ED NURSE REASSESS COMMENT FT1
Break coverage- Pt returns from CT scan with c/o abd pain and nausea. Pt medicated as ordered. Will monitor.

## 2020-02-10 NOTE — H&P ADULT - HISTORY OF PRESENT ILLNESS
49 F PMH EtOH Abuse, pancreatitis, DM2 p/w alcohol withdrawal. Patient initially presented to the ED on 2020 w/ agitation, intoxication. Patient complained of abdominal pain, resolved w/ meds, CT w/ no acute changes, patient discharged. Now returning with EtOH withdrawal, complaining of tremors, N/V, generalized body pain, abdominal pain.     Patient left hospital AMA yesterday, returned to the ED for vomiting, abdominal pain and shaking. symptoms improved, and was discharged with referral for detox. patient reports drinking 2 pints of vodka last night. now with continued nausea, vomiting, abdominal pain and shaking. has history of withdrawal seizures. denies fever, chest pain, difficulty breathing, pain or burning with urination.    month ago.  Denies, CP, headache, dysuria, numbness or weakness. States she drinks about 2 pints of vodka 3-4 days out of the week. Last drink was this morning. Has withdrawal symptoms when she attempts to quit.      Ativan 1 m:00  19:30  21:15  22:30 Patient is a 50 y/o F PMH EtOH Abuse, pancreatitis, DM2 p/w alcohol withdrawal. Patient initially presented to the ED on 2/9/2020 w/ agitation, intoxication. Patient complained of abdominal pain, resolved w/ meds, CT w/ no acute changes, patient discharged. Now returning with EtOH withdrawal, complaining of tremors, nausea w/ nonbloody vomiting, generalized body pain, abdominal pain. Reports that symptoms are improved s/p Ativan 1 mg IV X4 in the ED. Reports drinking 2 pints of vodka last night, usually drinks 2 pints of vodka over a few days. Last drink was this morning.

## 2020-02-11 DIAGNOSIS — F19.94 OTHER PSYCHOACTIVE SUBSTANCE USE, UNSPECIFIED WITH PSYCHOACTIVE SUBSTANCE-INDUCED MOOD DISORDER: ICD-10-CM

## 2020-02-11 DIAGNOSIS — F10.20 ALCOHOL DEPENDENCE, UNCOMPLICATED: ICD-10-CM

## 2020-02-11 DIAGNOSIS — F41.8 OTHER SPECIFIED ANXIETY DISORDERS: ICD-10-CM

## 2020-02-11 LAB
ANION GAP SERPL CALC-SCNC: 14 MMO/L — SIGNIFICANT CHANGE UP (ref 7–14)
BUN SERPL-MCNC: 12 MG/DL — SIGNIFICANT CHANGE UP (ref 7–23)
CALCIUM SERPL-MCNC: 8.7 MG/DL — SIGNIFICANT CHANGE UP (ref 8.4–10.5)
CHLORIDE SERPL-SCNC: 102 MMOL/L — SIGNIFICANT CHANGE UP (ref 98–107)
CO2 SERPL-SCNC: 18 MMOL/L — LOW (ref 22–31)
CREAT SERPL-MCNC: 0.52 MG/DL — SIGNIFICANT CHANGE UP (ref 0.5–1.3)
GLUCOSE BLDC GLUCOMTR-MCNC: 143 MG/DL — HIGH (ref 70–99)
GLUCOSE BLDC GLUCOMTR-MCNC: 162 MG/DL — HIGH (ref 70–99)
GLUCOSE BLDC GLUCOMTR-MCNC: 185 MG/DL — HIGH (ref 70–99)
GLUCOSE BLDC GLUCOMTR-MCNC: 245 MG/DL — HIGH (ref 70–99)
GLUCOSE SERPL-MCNC: 169 MG/DL — HIGH (ref 70–99)
HBA1C BLD-MCNC: 8.7 % — HIGH (ref 4–5.6)
MAGNESIUM SERPL-MCNC: 1.6 MG/DL — SIGNIFICANT CHANGE UP (ref 1.6–2.6)
PHOSPHATE SERPL-MCNC: 1.5 MG/DL — LOW (ref 2.5–4.5)
POTASSIUM SERPL-MCNC: 3.9 MMOL/L — SIGNIFICANT CHANGE UP (ref 3.5–5.3)
POTASSIUM SERPL-SCNC: 3.9 MMOL/L — SIGNIFICANT CHANGE UP (ref 3.5–5.3)
SODIUM SERPL-SCNC: 134 MMOL/L — LOW (ref 135–145)
SPECIMEN SOURCE: SIGNIFICANT CHANGE UP

## 2020-02-11 PROCEDURE — 90792 PSYCH DIAG EVAL W/MED SRVCS: CPT

## 2020-02-11 RX ORDER — METOCLOPRAMIDE HCL 10 MG
10 TABLET ORAL ONCE
Refills: 0 | Status: COMPLETED | OUTPATIENT
Start: 2020-02-11 | End: 2020-02-11

## 2020-02-11 RX ORDER — THIAMINE MONONITRATE (VIT B1) 100 MG
100 TABLET ORAL AT BEDTIME
Refills: 0 | Status: DISCONTINUED | OUTPATIENT
Start: 2020-02-11 | End: 2020-02-14

## 2020-02-11 RX ORDER — SODIUM CHLORIDE 9 MG/ML
1000 INJECTION INTRAMUSCULAR; INTRAVENOUS; SUBCUTANEOUS
Refills: 0 | Status: DISCONTINUED | OUTPATIENT
Start: 2020-02-11 | End: 2020-02-13

## 2020-02-11 RX ADMIN — Medication 1.5 MILLIGRAM(S): at 21:50

## 2020-02-11 RX ADMIN — Medication 2 MILLIGRAM(S): at 14:10

## 2020-02-11 RX ADMIN — Medication 10 MILLIGRAM(S): at 01:30

## 2020-02-11 RX ADMIN — Medication 1 TABLET(S): at 12:04

## 2020-02-11 RX ADMIN — SODIUM CHLORIDE 50 MILLILITER(S): 9 INJECTION INTRAMUSCULAR; INTRAVENOUS; SUBCUTANEOUS at 12:04

## 2020-02-11 RX ADMIN — Medication 2 MILLIGRAM(S): at 05:53

## 2020-02-11 RX ADMIN — Medication 2 MILLIGRAM(S): at 01:09

## 2020-02-11 RX ADMIN — Medication 1 MILLIGRAM(S): at 12:03

## 2020-02-11 RX ADMIN — Medication 2: at 17:35

## 2020-02-11 RX ADMIN — Medication 10 UNIT(S): at 22:03

## 2020-02-11 RX ADMIN — Medication 10 UNIT(S): at 01:30

## 2020-02-11 RX ADMIN — Medication 100 MILLIGRAM(S): at 21:50

## 2020-02-11 RX ADMIN — Medication 2: at 12:04

## 2020-02-11 RX ADMIN — Medication 2 MILLIGRAM(S): at 17:44

## 2020-02-11 RX ADMIN — Medication 2 MILLIGRAM(S): at 10:44
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
## 2020-02-11 NOTE — PROGRESS NOTE ADULT - SUBJECTIVE AND OBJECTIVE BOX
SUBJECTIVE / OVERNIGHT EVENTS: pt denieas chest pain, shortness of breath , nausea,v        MEDICATIONS  (STANDING):  folic acid 1 milliGRAM(s) Oral daily  insulin detemir injectable (LEVEMIR) 10 Unit(s) SubCutaneous at bedtime  insulin lispro (HumaLOG) corrective regimen sliding scale   SubCutaneous three times a day before meals  insulin lispro (HumaLOG) corrective regimen sliding scale   SubCutaneous at bedtime  LORazepam   Injectable 2 milliGRAM(s) IV Push every 4 hours  LORazepam   Injectable 1.5 milliGRAM(s) IV Push every 4 hours  LORazepam   Injectable   IV Push   multivitamin 1 Tablet(s) Oral daily  pantoprazole    Tablet 40 milliGRAM(s) Oral before breakfast  sodium chloride 0.9%. 1000 milliLiter(s) (50 mL/Hr) IV Continuous <Continuous>  thiamine 100 milliGRAM(s) Oral at bedtime    MEDICATIONS  (PRN):  LORazepam   Injectable 2 milliGRAM(s) IV Push every 1 hour PRN Symptom-triggered: each CIWA -Ar score 8 or GREATER    Vital Signs Last 24 Hrs  T(C): 37 (2020 12:46), Max: 37.2 (2020 00:13)  T(F): 98.6 (2020 12:46), Max: 99 (2020 00:13)  HR: 78 (2020 12:46) (68 - 90)  BP: 128/77 (2020 12:46) (121/74 - 140/90)  BP(mean): --  RR: 17 (2020 12:46) (16 - 29)  SpO2: 100% (2020 12:46) (97% - 100%)    CAPILLARY BLOOD GLUCOSE      POCT Blood Glucose.: 189 mg/dL (2020 12:00)  POCT Blood Glucose.: 143 mg/dL (2020 08:24)  POCT Blood Glucose.: 185 mg/dL (2020 00:52)  POCT Blood Glucose.: 246 mg/dL (10 Feb 2020 19:13)    I&O's Summary      Constitutional: No fever, fatigue  Skin: No rash.  Eyes: No recent vision problems or eye pain.  ENT: No congestion, ear pain, or sore throat.  Cardiovascular: No chest pain or palpation.  Respiratory: No cough, shortness of breath, congestion, or wheezing.  Gastrointestinal: No abdominal pain, nausea, vomiting, or diarrhea.  Genitourinary: No dysuria.  Musculoskeletal: No joint swelling.  Neurologic: No headache.    PHYSICAL EXAM:  GENERAL: NAD  EYES: EOMI, PERRLA  NECK: Supple, No JVD  CHEST/LUNG: dec breath sounds  HEART:  S1 , S2 +  ABDOMEN: soft , bs+  EXTREMITIES:  no edema  NEUROLOGY:alert awake oriented , fine tremors+      LABS:                        14.9   6.49  )-----------( 158      ( 10 Feb 2020 19:23 )             43.4     02-10    139  |  105  |  8   ----------------------------<  214<H>  4.1   |  15<L>  |  0.51    Ca    7.8<L>      10 Feb 2020 21:10    TPro  8.3  /  Alb  4.7  /  TBili  2.1<H>  /  DBili  x   /  AST  96<H>  /  ALT  68<H>  /  AlkPhos  98  02-10          Urinalysis Basic - ( 10 Feb 2020 19:40 )    Color: YELLOW / Appearance: CLEAR / S.024 / pH: 6.5  Gluc: >1000 / Ketone: >150  / Bili: NEGATIVE / Urobili: NORMAL   Blood: NEGATIVE / Protein: 200 / Nitrite: NEGATIVE   Leuk Esterase: TRACE / RBC: 0-2 / WBC 1-3   Sq Epi: x / Non Sq Epi: SMALL / Bacteria: SMALL        RADIOLOGY & ADDITIONAL TESTS:    Imaging Personally Reviewed:    Consultant(s) Notes Reviewed:      Care Discussed with Consultants/Other Providers:

## 2020-02-11 NOTE — BEHAVIORAL HEALTH ASSESSMENT NOTE - NSBHCONSULTFOLLOWAFTERCARE_PSY_A_CORE FT
can f/u with [facility] includes the [facility] [phone] can f/u with [facility] [phone]. Information given to patient

## 2020-02-11 NOTE — BEHAVIORAL HEALTH ASSESSMENT NOTE - SUMMARY
This is a 50 y/o woman, domiciled at home with brother and mother, unemployed on disability, with a PPHx of alcohol use disorder, MDD, PTSD, anxiety, not currently in outpatient psychiatric treatment, with a hx of past inpatient hospitalizations and one suicide attempt at age 15 as per chart review, and PMHx of pancreatitis and DM2, who has been medically admitted for alcohol withdrawal, currently on Ativan taper. Psychiatry consulted at patient's request for depression.     Upon evaluation, patient endorses symptoms of depression as well as alcohol use disorder. She is no longer in outpatient treatment but would benefit from treatment that would address both her depression and anxiety as well as her substance use. She is currently on an Ativan taper for alcohol withdrawal - once the taper decreases, it will be worthwhile to consider restarting her on Zoloft 50 mg. She will also benefit from Atarax PRN for breakthrough anxiety during the taper. Have encouraged patient to be proactive in seeking intake appointments where her insurance is accepted, including at [facility]. Patient also to consider if she is interested in trying alcohol cessation medications.     PLAN:   - C/w Ativan taper and WA protocol.   - Add Atarax 50 mg q6H PRN for anxiety.   - Will consider starting Zoloft and alcohol cessation medication as taper decreases.

## 2020-02-11 NOTE — BEHAVIORAL HEALTH ASSESSMENT NOTE - OTHER PAST PSYCHIATRIC HISTORY (INCLUDE DETAILS REGARDING ONSET, COURSE OF ILLNESS, INPATIENT/OUTPATIENT TREATMENT)
past with hx of MDD, anxiety, PTSD, panic disorder   has had multiple past outpatient providers   has had past inpatient hospitalizations, as per chart review, most recently in 2013 at [facility]  multiple past ED behavioral health visits    As per chart review:   Multiple inpatient admissions, one at age 15 following suicide attempt via ingestion of unknown quantity of unknown medication, then [facility] 2005, [place] 2008, [place] late 2010-early 2011 (3 weeks), and most recent [facility] 7/2013.  First two adult hospitalizations were for worsening depression with passive thoughts of suicide; two others resulted from collateral reports of worsening depression and suicidal ideation which patient denied. Had 3 ECT treatments during [place] hospitalization in [id number] but discontinued due to intolerable side effects (headache/neck pain).

## 2020-02-11 NOTE — BEHAVIORAL HEALTH ASSESSMENT NOTE - DESCRIPTION (FIRST USE, LAST USE, QUANTITY, FREQUENCY, DURATION)
pt states she began using heavily 3 years ago, will binge drink and drink 2 pints of vodka daily over a few days As per CVM - first use at 37 years old, used X1 year, abstinent for 5 years, resumed at age 42. Denies any recent use, utox negative

## 2020-02-11 NOTE — SBIRT NOTE ADULT - NSSBIRTBRIEFINTDET_GEN_A_CORE
SW reviewed AUDIT Score of 29 Harmful Use.  Pt was provided information about healthy guidelines and potential negative consequences and risks associated with current risk level. Motivation and readiness to reduce or stop use was discussed and goals and activities to make changes were suggested/offered.

## 2020-02-11 NOTE — BEHAVIORAL HEALTH ASSESSMENT NOTE - TREATMENT
has been in inpatient rehab, was briefly in treatment at [facility]

## 2020-02-11 NOTE — BEHAVIORAL HEALTH ASSESSMENT NOTE - PAST PSYCHOTROPIC MEDICATION
As per chart review:   Prozac, Zoloft, Paxil, Celexa, Lexapro, Effexor, Remeron Wellbutrin, Remeron, Cytomel, Seroquel, Abilify, Invega, Ativan, Librium, and Topamax

## 2020-02-11 NOTE — SBIRT NOTE ADULT - NSSBIRTALCPASSREFTXDET_GEN_A_CORE
SW provided pt with literature and contact information for outpatient treatment programs in her area.  Pt not interested in making telephone call at this time.

## 2020-02-11 NOTE — BEHAVIORAL HEALTH ASSESSMENT NOTE - NSBHCHARTREVIEWLAB_PSY_A_CORE FT
14.9   6.49  )-----------( 158      ( 10 Feb 2020 19:23 )             43.4     02-11    134<L>  |  102  |  12  ----------------------------<  169<H>  3.9   |  18<L>  |  0.52    Ca    8.7      2020 14:07  Phos  1.5     02-11  Mg     1.6     11    TPro  8.3  /  Alb  4.7  /  TBili  2.1<H>  /  DBili  x   /  AST  96<H>  /  ALT  68<H>  /  AlkPhos  98  02-10    Urinalysis Basic - ( 10 Feb 2020 19:40 )    Color: YELLOW / Appearance: CLEAR / S.024 / pH: 6.5  Gluc: >1000 / Ketone: >150  / Bili: NEGATIVE / Urobili: NORMAL   Blood: NEGATIVE / Protein: 200 / Nitrite: NEGATIVE   Leuk Esterase: TRACE / RBC: 0-2 / WBC 1-3   Sq Epi: x / Non Sq Epi: SMALL / Bacteria: SMALL

## 2020-02-11 NOTE — BEHAVIORAL HEALTH ASSESSMENT NOTE - NSBHCHARTREVIEWVS_PSY_A_CORE FT
Vital Signs (24 Hrs):  T(C): 36.9 (02-11-20 @ 14:42), Max: 37.2 (02-11-20 @ 00:13)  HR: 77 (02-11-20 @ 14:42) (68 - 90)  BP: 134/76 (02-11-20 @ 14:42) (121/74 - 140/90)  RR: 16 (02-11-20 @ 14:42) (16 - 29)  SpO2: 99% (02-11-20 @ 14:42) (97% - 100%)  Wt(kg): --  Daily Height in cm: 157.48 (11 Feb 2020 00:44)    Daily     I&O's Summary

## 2020-02-11 NOTE — BEHAVIORAL HEALTH ASSESSMENT NOTE - HPI (INCLUDE ILLNESS QUALITY, SEVERITY, DURATION, TIMING, CONTEXT, MODIFYING FACTORS, ASSOCIATED SIGNS AND SYMPTOMS)
This is a 50 y/o woman, domiciled at home with brother and mother, unemployed on disability, with a PPHx of alcohol use disorder, MDD, PTSD, anxiety, not currently in outpatient psychiatric treatment, with a hx of past inpatient hospitalizations and one suicide attempt at age 15 as per chart review, and PMHx of pancreatitis and DM2, who has been medically admitted for alcohol withdrawal, currently on Ativan taper. Psychiatry consulted at patient's request for depression.     On evaluation, the patient describes feeling stressed, down, frustrated, and anxious at the state of her life. She used to work as an  and travel, but since beginning to use alcohol in a binge fashion 3 years ago after a bad break-up and a miscarriage her life has not been the same. She would like to eventually return to working and stop using alcohol. She states that she spends most of her time isolated at home, alone with her support dog. There is conflict at home with her mother and brother, who also abuses alcohol. She still enjoys certain activities, such as watching funny movies, making people laugh, and playing with her dog. She endorses difficulty falling asleep and staying asleep with multiple intermittent nighttime awakenings. She endorses low energy, feeling tired throughout the day. She says she has trouble with concentration, that she used to be able to read, but now has to re-read the same thing four times. Her appetite ranges from her not eating much at all, to her then binging on food. She also endorses some psychomotor retardation.     She denies any suicidal ideation, past or current although chart review indicates hx of past suicide attempt. She denies any homicidal ideation. She denies any periods of sobriety with decreased need for sleep, high energy levels, and elevated mood. Patient denies auditory, visual, and tactile hallucinations. Denies any paranoia. She endorses nightmares and intrusive flashbacks about her trauma occurring almost daily.     The patient states she binge drinks for usually a few days at a time. Prior to this admission, she drank 2 pints of vodka daily for 3 days. After no longer drinking, she became tremulous and nauseous and was vomiting so she brought herself into the hospital. She endorses doing and saying things she regrets while drinking. She sometimes needs eye-openers. She endorses blacking out. She states that she has experienced withdrawal seizures, but no evidence of history of DTs. She denies other substance use, although Utox is positive for THC and chart review indicates a hx of cocaine use. Patient indicates that her motivation to stop drinking is at a 10/10. She has been in inpatient rehab before and would not like to pursue that again. In the past 3 years the longest she has remained sober is 2 months.     The patient has previously been in outpatient psychiatric treatment with various providers, but currently is not due to insurance. States she's been tried on multiple medications, but found Zoloft 50 mg daily to be most helpful. Her primary care doctor is currently prescribing her Klonopin 0.5 mg TID PRN for anxiety and Trazodone 100 mg qHS for insomnia. States that she usually takes 1-2 of the Klonpin PRNs daily.     ISTOP completed, reference #[id number]

## 2020-02-11 NOTE — BEHAVIORAL HEALTH ASSESSMENT NOTE - ACTIVATING EVENTS/STRESSORS
Substance intoxication or withdrawal/Change in provider or treatment (i.e., medications, psychotherapy, milieu)

## 2020-02-11 NOTE — BEHAVIORAL HEALTH ASSESSMENT NOTE - RISK ASSESSMENT
Low Acute Suicide Risk Risk factors: substance use, hx of suicide attempts and suicidal thinking, hx of past inpatient hospitalizations, current medical hospitalization, current substance withdrawal     Protective factors: no current SI, domiciled, no psychosis, pet     Overall pt does have some chronic risk factors for harm to self, but given no elevated acute risk, she does not require inpatient psychiatric hospitalization. She will benefit from outpatient psychiatric treatment.

## 2020-02-11 NOTE — BEHAVIORAL HEALTH ASSESSMENT NOTE - CASE SUMMARY
met with the patient, along with dr casillas, impression and plan discussed and agreed upon. Patient is motivated to seek sobriety, and is amenable to get connected with outpatient substance abuse treatment. Ongoing depressive and anxiety symptoms, but denies any si or hi, denies any a/vh or paranoia.   Agree with plan documented above.

## 2020-02-12 LAB
ANION GAP SERPL CALC-SCNC: 11 MMO/L — SIGNIFICANT CHANGE UP (ref 7–14)
BACTERIA UR CULT: SIGNIFICANT CHANGE UP
BUN SERPL-MCNC: 11 MG/DL — SIGNIFICANT CHANGE UP (ref 7–23)
CALCIUM SERPL-MCNC: 9 MG/DL — SIGNIFICANT CHANGE UP (ref 8.4–10.5)
CHLORIDE SERPL-SCNC: 103 MMOL/L — SIGNIFICANT CHANGE UP (ref 98–107)
CO2 SERPL-SCNC: 21 MMOL/L — LOW (ref 22–31)
CREAT SERPL-MCNC: 0.55 MG/DL — SIGNIFICANT CHANGE UP (ref 0.5–1.3)
GLUCOSE BLDC GLUCOMTR-MCNC: 157 MG/DL — HIGH (ref 70–99)
GLUCOSE BLDC GLUCOMTR-MCNC: 195 MG/DL — HIGH (ref 70–99)
GLUCOSE BLDC GLUCOMTR-MCNC: 211 MG/DL — HIGH (ref 70–99)
GLUCOSE BLDC GLUCOMTR-MCNC: 221 MG/DL — HIGH (ref 70–99)
GLUCOSE SERPL-MCNC: 217 MG/DL — HIGH (ref 70–99)
HCT VFR BLD CALC: 41.3 % — SIGNIFICANT CHANGE UP (ref 34.5–45)
HGB BLD-MCNC: 14.3 G/DL — SIGNIFICANT CHANGE UP (ref 11.5–15.5)
MAGNESIUM SERPL-MCNC: 1.9 MG/DL — SIGNIFICANT CHANGE UP (ref 1.6–2.6)
MCHC RBC-ENTMCNC: 32.2 PG — SIGNIFICANT CHANGE UP (ref 27–34)
MCHC RBC-ENTMCNC: 34.6 % — SIGNIFICANT CHANGE UP (ref 32–36)
MCV RBC AUTO: 93 FL — SIGNIFICANT CHANGE UP (ref 80–100)
NRBC # FLD: 0 K/UL — SIGNIFICANT CHANGE UP (ref 0–0)
PHOSPHATE SERPL-MCNC: 1.8 MG/DL — LOW (ref 2.5–4.5)
PLATELET # BLD AUTO: 132 K/UL — LOW (ref 150–400)
PMV BLD: 11.1 FL — SIGNIFICANT CHANGE UP (ref 7–13)
POTASSIUM SERPL-MCNC: 3.5 MMOL/L — SIGNIFICANT CHANGE UP (ref 3.5–5.3)
POTASSIUM SERPL-SCNC: 3.5 MMOL/L — SIGNIFICANT CHANGE UP (ref 3.5–5.3)
RBC # BLD: 4.44 M/UL — SIGNIFICANT CHANGE UP (ref 3.8–5.2)
RBC # FLD: 12.5 % — SIGNIFICANT CHANGE UP (ref 10.3–14.5)
SODIUM SERPL-SCNC: 135 MMOL/L — SIGNIFICANT CHANGE UP (ref 135–145)
WBC # BLD: 5.05 K/UL — SIGNIFICANT CHANGE UP (ref 3.8–10.5)
WBC # FLD AUTO: 5.05 K/UL — SIGNIFICANT CHANGE UP (ref 3.8–10.5)

## 2020-02-12 RX ORDER — MAGNESIUM SULFATE 500 MG/ML
2 VIAL (ML) INJECTION ONCE
Refills: 0 | Status: DISCONTINUED | OUTPATIENT
Start: 2020-02-12 | End: 2020-02-12

## 2020-02-12 RX ORDER — ACETAMINOPHEN 500 MG
650 TABLET ORAL ONCE
Refills: 0 | Status: COMPLETED | OUTPATIENT
Start: 2020-02-12 | End: 2020-02-12

## 2020-02-12 RX ORDER — HYDROXYZINE HCL 10 MG
50 TABLET ORAL EVERY 6 HOURS
Refills: 0 | Status: DISCONTINUED | OUTPATIENT
Start: 2020-02-12 | End: 2020-02-14

## 2020-02-12 RX ORDER — POTASSIUM PHOSPHATE, MONOBASIC POTASSIUM PHOSPHATE, DIBASIC 236; 224 MG/ML; MG/ML
30 INJECTION, SOLUTION INTRAVENOUS ONCE
Refills: 0 | Status: DISCONTINUED | OUTPATIENT
Start: 2020-02-12 | End: 2020-02-12

## 2020-02-12 RX ORDER — SODIUM,POTASSIUM PHOSPHATES 278-250MG
1 POWDER IN PACKET (EA) ORAL
Refills: 0 | Status: COMPLETED | OUTPATIENT
Start: 2020-02-12 | End: 2020-02-14

## 2020-02-12 RX ADMIN — Medication 650 MILLIGRAM(S): at 00:58

## 2020-02-12 RX ADMIN — Medication 1.5 MILLIGRAM(S): at 02:08

## 2020-02-12 RX ADMIN — Medication 100 MILLIGRAM(S): at 21:50

## 2020-02-12 RX ADMIN — Medication 4: at 08:42

## 2020-02-12 RX ADMIN — Medication 2: at 12:42

## 2020-02-12 RX ADMIN — Medication 10 UNIT(S): at 22:52

## 2020-02-12 RX ADMIN — Medication 1 TABLET(S): at 17:32

## 2020-02-12 RX ADMIN — Medication 1.5 MILLIGRAM(S): at 17:33

## 2020-02-12 RX ADMIN — Medication 1 TABLET(S): at 12:11

## 2020-02-12 RX ADMIN — PANTOPRAZOLE SODIUM 40 MILLIGRAM(S): 20 TABLET, DELAYED RELEASE ORAL at 05:56

## 2020-02-12 RX ADMIN — Medication 1.5 MILLIGRAM(S): at 13:33

## 2020-02-12 RX ADMIN — Medication 650 MILLIGRAM(S): at 00:28

## 2020-02-12 RX ADMIN — SODIUM CHLORIDE 50 MILLILITER(S): 9 INJECTION INTRAMUSCULAR; INTRAVENOUS; SUBCUTANEOUS at 08:42

## 2020-02-12 RX ADMIN — Medication 1 MILLIGRAM(S): at 21:49

## 2020-02-12 RX ADMIN — Medication 1 MILLIGRAM(S): at 12:11

## 2020-02-12 RX ADMIN — Medication 1.5 MILLIGRAM(S): at 05:56

## 2020-02-12 RX ADMIN — SODIUM CHLORIDE 50 MILLILITER(S): 9 INJECTION INTRAMUSCULAR; INTRAVENOUS; SUBCUTANEOUS at 05:57

## 2020-02-12 RX ADMIN — Medication 1.5 MILLIGRAM(S): at 09:25

## 2020-02-12 RX ADMIN — Medication 1 TABLET(S): at 21:50

## 2020-02-12 RX ADMIN — Medication 4: at 17:13

## 2020-02-12 NOTE — PROGRESS NOTE ADULT - SUBJECTIVE AND OBJECTIVE BOX
SUBJECTIVE / OVERNIGHT EVENTS: pt denieas chest pain, shortness of breath , nausea,v      MEDICATIONS  (STANDING):  folic acid 1 milliGRAM(s) Oral daily  insulin detemir injectable (LEVEMIR) 10 Unit(s) SubCutaneous at bedtime  insulin lispro (HumaLOG) corrective regimen sliding scale   SubCutaneous three times a day before meals  insulin lispro (HumaLOG) corrective regimen sliding scale   SubCutaneous at bedtime  LORazepam   Injectable 1 milliGRAM(s) IV Push every 4 hours  LORazepam   Injectable   IV Push   multivitamin 1 Tablet(s) Oral daily  pantoprazole    Tablet 40 milliGRAM(s) Oral before breakfast  potassium acid phosphate/sodium acid phosphate tablet (K-PHOS No. 2) 1 Tablet(s) Oral four times a day with meals  sodium chloride 0.9%. 1000 milliLiter(s) (50 mL/Hr) IV Continuous <Continuous>  thiamine 100 milliGRAM(s) Oral at bedtime    MEDICATIONS  (PRN):  hydrOXYzine hydrochloride 50 milliGRAM(s) Oral every 6 hours PRN Anxiety  LORazepam   Injectable 2 milliGRAM(s) IV Push every 1 hour PRN Symptom-triggered: each CIWA -Ar score 8 or GREATER    Vital Signs Last 24 Hrs  T(C): 37 (2020 21:30), Max: 37 (2020 21:30)  T(F): 98.6 (2020 21:30), Max: 98.6 (2020 21:30)  HR: 68 (2020 21:30) (68 - 88)  BP: 153/98 (2020 21:30) (131/79 - 153/98)  BP(mean): --  RR: 18 (2020 21:30) (14 - 18)  SpO2: 98% (2020 21:30) (98% - 100%)    Constitutional: No fever, fatigue  Skin: No rash.  Eyes: No recent vision problems or eye pain.  ENT: No congestion, ear pain, or sore throat.  Cardiovascular: No chest pain or palpation.  Respiratory: No cough, shortness of breath, congestion, or wheezing.  Gastrointestinal: No abdominal pain, nausea, vomiting, or diarrhea.  Genitourinary: No dysuria.  Musculoskeletal: No joint swelling.  Neurologic: No headache.    PHYSICAL EXAM:  GENERAL: NAD  EYES: EOMI, PERRLA  NECK: Supple, No JVD  CHEST/LUNG: dec breath sounds  HEART:  S1 , S2 +  ABDOMEN: soft , bs+  EXTREMITIES:  no edema  NEUROLOGY:alert awake oriented , fine tremors+    LABS:      135  |  103  |  11  ----------------------------<  217<H>  3.5   |  21<L>  |  0.55    Ca    9.0      2020 06:31  Phos  1.8       Mg     1.9           Creatinine Trend: 0.55 <--, 0.52 <--, 0.51 <--, 0.54 <--, 0.48 <--                        14.3   5.05  )-----------( 132      ( 2020 06:31 )             41.3     Urine Studies:  Urinalysis Basic - ( 10 Feb 2020 19:40 )    Color: YELLOW / Appearance: CLEAR / S.024 / pH: 6.5  Gluc: >1000 / Ketone: >150  / Bili: NEGATIVE / Urobili: NORMAL   Blood: NEGATIVE / Protein: 200 / Nitrite: NEGATIVE   Leuk Esterase: TRACE / RBC: 0-2 / WBC 1-3   Sq Epi:  / Non Sq Epi: SMALL / Bacteria: SMALL

## 2020-02-13 ENCOUNTER — TRANSCRIPTION ENCOUNTER (OUTPATIENT)
Age: 50
End: 2020-02-13

## 2020-02-13 LAB
GLUCOSE BLDC GLUCOMTR-MCNC: 118 MG/DL — HIGH (ref 70–99)
GLUCOSE BLDC GLUCOMTR-MCNC: 155 MG/DL — HIGH (ref 70–99)
GLUCOSE BLDC GLUCOMTR-MCNC: 164 MG/DL — HIGH (ref 70–99)
GLUCOSE BLDC GLUCOMTR-MCNC: 199 MG/DL — HIGH (ref 70–99)

## 2020-02-13 RX ORDER — LANOLIN ALCOHOL/MO/W.PET/CERES
3 CREAM (GRAM) TOPICAL ONCE
Refills: 0 | Status: COMPLETED | OUTPATIENT
Start: 2020-02-13 | End: 2020-02-13

## 2020-02-13 RX ORDER — LANOLIN ALCOHOL/MO/W.PET/CERES
3 CREAM (GRAM) TOPICAL AT BEDTIME
Refills: 0 | Status: DISCONTINUED | OUTPATIENT
Start: 2020-02-13 | End: 2020-02-14

## 2020-02-13 RX ADMIN — PANTOPRAZOLE SODIUM 40 MILLIGRAM(S): 20 TABLET, DELAYED RELEASE ORAL at 05:40

## 2020-02-13 RX ADMIN — Medication 2: at 08:46

## 2020-02-13 RX ADMIN — Medication 1 TABLET(S): at 17:56

## 2020-02-13 RX ADMIN — Medication 1 TABLET(S): at 12:45

## 2020-02-13 RX ADMIN — SODIUM CHLORIDE 50 MILLILITER(S): 9 INJECTION INTRAMUSCULAR; INTRAVENOUS; SUBCUTANEOUS at 08:47

## 2020-02-13 RX ADMIN — Medication 1 MILLIGRAM(S): at 17:56

## 2020-02-13 RX ADMIN — SODIUM CHLORIDE 50 MILLILITER(S): 9 INJECTION INTRAMUSCULAR; INTRAVENOUS; SUBCUTANEOUS at 05:39

## 2020-02-13 RX ADMIN — Medication 1 MILLIGRAM(S): at 12:45

## 2020-02-13 RX ADMIN — Medication 1 MILLIGRAM(S): at 10:10

## 2020-02-13 RX ADMIN — Medication 0.5 MILLIGRAM(S): at 21:56

## 2020-02-13 RX ADMIN — Medication 1 TABLET(S): at 08:47

## 2020-02-13 RX ADMIN — Medication 1 MILLIGRAM(S): at 01:40

## 2020-02-13 RX ADMIN — Medication 1 TABLET(S): at 21:57

## 2020-02-13 RX ADMIN — Medication 100 MILLIGRAM(S): at 21:57

## 2020-02-13 RX ADMIN — Medication 3 MILLIGRAM(S): at 21:57

## 2020-02-13 RX ADMIN — Medication 1 MILLIGRAM(S): at 05:39

## 2020-02-13 RX ADMIN — Medication 3 MILLIGRAM(S): at 01:11

## 2020-02-13 RX ADMIN — Medication 1 MILLIGRAM(S): at 14:01

## 2020-02-13 RX ADMIN — Medication 2: at 17:55

## 2020-02-13 RX ADMIN — Medication 10 UNIT(S): at 22:52

## 2020-02-13 NOTE — DISCHARGE NOTE PROVIDER - CARE PROVIDER_API CALL
[facility] [phone].,   Phone: (   )    -  Fax: (   )    -  Follow Up Time:

## 2020-02-13 NOTE — PROGRESS NOTE ADULT - SUBJECTIVE AND OBJECTIVE BOX
SUBJECTIVE / OVERNIGHT EVENTS: pt denieas chest pain, shortness of breath , nausea,v      MEDICATIONS  (STANDING):  folic acid 1 milliGRAM(s) Oral daily  insulin detemir injectable (LEVEMIR) 10 Unit(s) SubCutaneous at bedtime  insulin lispro (HumaLOG) corrective regimen sliding scale   SubCutaneous three times a day before meals  insulin lispro (HumaLOG) corrective regimen sliding scale   SubCutaneous at bedtime  LORazepam   Injectable 0.5 milliGRAM(s) IV Push every 4 hours  LORazepam   Injectable   IV Push   multivitamin 1 Tablet(s) Oral daily  pantoprazole    Tablet 40 milliGRAM(s) Oral before breakfast  potassium acid phosphate/sodium acid phosphate tablet (K-PHOS No. 2) 1 Tablet(s) Oral four times a day with meals  thiamine 100 milliGRAM(s) Oral at bedtime    MEDICATIONS  (PRN):  hydrOXYzine hydrochloride 50 milliGRAM(s) Oral every 6 hours PRN Anxiety  LORazepam   Injectable 2 milliGRAM(s) IV Push every 1 hour PRN Symptom-triggered: each CIWA -Ar score 8 or GREATER  melatonin 3 milliGRAM(s) Oral at bedtime PRN Insomnia    Vital Signs Last 24 Hrs  T(C): 37.2 (2020 17:30), Max: 37.2 (2020 17:30)  T(F): 99 (2020 17:30), Max: 99 (2020 17:30)  HR: 74 (2020 17:30) (58 - 74)  BP: 119/81 (2020 17:30) (119/81 - 153/98)  BP(mean): --  RR: 16 (2020 17:30) (16 - 18)  SpO2: 99% (2020 17:30) (97% - 99%)    Constitutional: No fever, fatigue  Skin: No rash.  Eyes: No recent vision problems or eye pain.  ENT: No congestion, ear pain, or sore throat.  Cardiovascular: No chest pain or palpation.  Respiratory: No cough, shortness of breath, congestion, or wheezing.  Gastrointestinal: No abdominal pain, nausea, vomiting, or diarrhea.  Genitourinary: No dysuria.  Musculoskeletal: No joint swelling.  Neurologic: No headache.    PHYSICAL EXAM:  GENERAL: NAD  EYES: EOMI, PERRLA  NECK: Supple, No JVD  CHEST/LUNG: dec breath sounds  HEART:  S1 , S2 +  ABDOMEN: soft , bs+  EXTREMITIES:  no edema  NEUROLOGY:alert awake oriented , fine tremors+    LABS:      135  |  103  |  11  ----------------------------<  217<H>  3.5   |  21<L>  |  0.55    Ca    9.0      2020 06:31  Phos  1.8       Mg     1.9           Creatinine Trend: 0.55 <--, 0.52 <--, 0.51 <--, 0.54 <--, 0.48 <--                        14.3   5.05  )-----------( 132      ( 2020 06:31 )             41.3     Urine Studies:  Urinalysis Basic - ( 10 Feb 2020 19:40 )    Color: YELLOW / Appearance: CLEAR / S.024 / pH: 6.5  Gluc: >1000 / Ketone: >150  / Bili: NEGATIVE / Urobili: NORMAL   Blood: NEGATIVE / Protein: 200 / Nitrite: NEGATIVE   Leuk Esterase: TRACE / RBC: 0-2 / WBC 1-3   Sq Epi:  / Non Sq Epi: SMALL / Bacteria: SMALL                    Leuk Esterase: TRACE / RBC: 0-2 / WBC 1-3   Sq Epi:  / Non Sq Epi: SMALL / Bacteria: SMALL

## 2020-02-13 NOTE — DISCHARGE NOTE PROVIDER - PROVIDER TOKENS
FREE:[LAST:[[facility] [phone].],PHONE:[(   )    -],FAX:[(   )    -]]

## 2020-02-13 NOTE — DISCHARGE NOTE PROVIDER - NSDCMRMEDTOKEN_GEN_ALL_CORE_FT
KlonoPIN 0.5 mg oral tablet: 1 tab(s) orally 3 times a day, As Needed  Levemir FlexPen 100 units/mL subcutaneous solution: 20-25 unit(s) subcutaneous once a day (at bedtime)  pantoprazole 40 mg oral delayed release tablet: 1 tab(s) orally once a day (before a meal)  traZODone 100 mg oral tablet: 1 tab(s) orally once a day (at bedtime) folic acid 1 mg oral tablet: 1 tab(s) orally once a day  Levemir FlexPen 100 units/mL subcutaneous solution: 20-25 unit(s) subcutaneous once a day (at bedtime)  Multiple Vitamins oral tablet: 1 tab(s) orally once a day  pantoprazole 40 mg oral delayed release tablet: 1 tab(s) orally once a day  sertraline 50 mg oral tablet: 1 tab(s) orally once a day  thiamine 100 mg oral tablet: 1 tab(s) orally once a day (at bedtime) folic acid 1 mg oral tablet: 1 tab(s) orally once a day  insulin lispro (concentrated) 200 units/mL subcutaneous solution: 2 Unit(s) if Glucose 151 - 200  4 Unit(s) if Glucose 201 - 250  6 Unit(s) if Glucose 251 - 300  8 Unit(s) if Glucose 301 - 350  10 Unit(s) if Glucose 351 - 400  12 Unit(s) if Glucose Greater Than 400  Levemir FlexPen 100 units/mL subcutaneous solution: 12 unit(s) subcutaneous once a day (at bedtime)   Multiple Vitamins oral tablet: 1 tab(s) orally once a day  pantoprazole 40 mg oral delayed release tablet: 1 tab(s) orally once a day  sertraline 50 mg oral tablet: 1 tab(s) orally once a day  thiamine 100 mg oral tablet: 1 tab(s) orally once a day (at bedtime)

## 2020-02-13 NOTE — DISCHARGE NOTE PROVIDER - NSDCACTIVITY_GEN_ALL_CORE
Bathing allowed Walking - Outdoors allowed/Showering allowed/Bathing allowed/Walking - Indoors allowed

## 2020-02-13 NOTE — DISCHARGE NOTE PROVIDER - NSDCCPCAREPLAN_GEN_ALL_CORE_FT
PRINCIPAL DISCHARGE DIAGNOSIS  Diagnosis: Alcohol withdrawal  Assessment and Plan of Treatment: You have completed Ativan taper. Abstain form alcohol      SECONDARY DISCHARGE DIAGNOSES  Diagnosis: Diabetes mellitus type 2, insulin dependent  Assessment and Plan of Treatment: Hg AHg A1c 8.8 % goal below 7%. Please constinue consistent carb diet, Levemir at bed time      Diagnosis: Substance induced mood disorder  Assessment and Plan of Treatment: Continue Zoloft 50 mg daily and follow up at [facility] in 1-2 weeks PRINCIPAL DISCHARGE DIAGNOSIS  Diagnosis: Alcohol withdrawal  Assessment and Plan of Treatment: You have completed Ativan taper. Abstain form alcohol      SECONDARY DISCHARGE DIAGNOSES  Diagnosis: Diabetes mellitus type 2, insulin dependent  Assessment and Plan of Treatment: Hg AHg A1c 8.8 % goal below 7%. Please constinue consistent carb diet, Levemir 12 U at bed time  Ans Humalog/Novolog As per sliding scale   2 Unit(s) if Glucose 151 - 200  4 Unit(s) if Glucose 201 - 250  6 Unit(s) if Glucose 251 - 300  8 Unit(s) if Glucose 301 - 350  10 Unit(s) if Glucose 351 - 400  12 Unit(s) if Glucose Greater Than 400  Levemir FlexPen 100 units/mL subcutaneous solution: 20-25 unit(s) subcutaneous once a day (at bedtime)      Diagnosis: Substance induced mood disorder  Assessment and Plan of Treatment: Continue Zoloft 50 mg daily and follow up at [facility] in 1-2 weeks

## 2020-02-13 NOTE — DISCHARGE NOTE PROVIDER - HOSPITAL COURSE
50 y/o F PMH EtOH Abuse, pancreatitis, DM2 p/w alcohol withdrawal.        Hospital Course    Alcohol withdrawal    -patient monitored on CIWA during admission    -Plan to continue ativan taper through 2/16 as follows-----    -Zoloft held during admission per Psych----        Diabetes mellitus type 2, Hg A1c 8,8    -continued levemir w/ FS AC/QHS w/ sliding scale during admission        Dispo- On ___ this case was reviewed with  ____, the patient is medically stable and optimized for discharge. All medications were reviewed and prescriptions were sent to mutually agreed upon pharmacy. 50 y/o F PMH EtOH Abuse, pancreatitis, DM2 p/w alcohol withdrawal. Pt started on  CIWA during admission, pt  completed ativan taper, on 2/14 w/ significant improvement. Pt has been evaluated by psych, started on Zoloft  and will f/u as outpt at [facility] '        Diabetes mellitus type 2, Hg A1c 8,8, pt is on basal/bolus will f/u as outpt             Dispo- On 2/14 this case was reviewed with Dr. Ribeiro and Psych, the patient is medically stable and optimized for discharge. All medications were reviewed and prescriptions were sent to mutually agreed upon pharmacy.

## 2020-02-14 ENCOUNTER — TRANSCRIPTION ENCOUNTER (OUTPATIENT)
Age: 50
End: 2020-02-14

## 2020-02-14 VITALS
DIASTOLIC BLOOD PRESSURE: 81 MMHG | TEMPERATURE: 99 F | RESPIRATION RATE: 17 BRPM | SYSTOLIC BLOOD PRESSURE: 127 MMHG | OXYGEN SATURATION: 100 % | HEART RATE: 82 BPM

## 2020-02-14 LAB
ANION GAP SERPL CALC-SCNC: 13 MMO/L — SIGNIFICANT CHANGE UP (ref 7–14)
BUN SERPL-MCNC: 12 MG/DL — SIGNIFICANT CHANGE UP (ref 7–23)
CALCIUM SERPL-MCNC: 9.1 MG/DL — SIGNIFICANT CHANGE UP (ref 8.4–10.5)
CHLORIDE SERPL-SCNC: 106 MMOL/L — SIGNIFICANT CHANGE UP (ref 98–107)
CO2 SERPL-SCNC: 21 MMOL/L — LOW (ref 22–31)
CREAT SERPL-MCNC: 0.58 MG/DL — SIGNIFICANT CHANGE UP (ref 0.5–1.3)
GLUCOSE BLDC GLUCOMTR-MCNC: 145 MG/DL — HIGH (ref 70–99)
GLUCOSE BLDC GLUCOMTR-MCNC: 167 MG/DL — HIGH (ref 70–99)
GLUCOSE SERPL-MCNC: 193 MG/DL — HIGH (ref 70–99)
MAGNESIUM SERPL-MCNC: 1.8 MG/DL — SIGNIFICANT CHANGE UP (ref 1.6–2.6)
PHOSPHATE SERPL-MCNC: 4.2 MG/DL — SIGNIFICANT CHANGE UP (ref 2.5–4.5)
POTASSIUM SERPL-MCNC: 3.5 MMOL/L — SIGNIFICANT CHANGE UP (ref 3.5–5.3)
POTASSIUM SERPL-SCNC: 3.5 MMOL/L — SIGNIFICANT CHANGE UP (ref 3.5–5.3)
SODIUM SERPL-SCNC: 140 MMOL/L — SIGNIFICANT CHANGE UP (ref 135–145)

## 2020-02-14 PROCEDURE — 99233 SBSQ HOSP IP/OBS HIGH 50: CPT

## 2020-02-14 PROCEDURE — 99238 HOSP IP/OBS DSCHRG MGMT 30/<: CPT

## 2020-02-14 RX ORDER — THIAMINE MONONITRATE (VIT B1) 100 MG
1 TABLET ORAL
Qty: 30 | Refills: 0
Start: 2020-02-14 | End: 2020-03-14

## 2020-02-14 RX ORDER — INSULIN LISPRO 100/ML
5 VIAL (ML) SUBCUTANEOUS
Qty: 0 | Refills: 0 | DISCHARGE
Start: 2020-02-14

## 2020-02-14 RX ORDER — FOLIC ACID 0.8 MG
1 TABLET ORAL
Qty: 30 | Refills: 0
Start: 2020-02-14 | End: 2020-03-14

## 2020-02-14 RX ORDER — SERTRALINE 25 MG/1
50 TABLET, FILM COATED ORAL DAILY
Refills: 0 | Status: DISCONTINUED | OUTPATIENT
Start: 2020-02-14 | End: 2020-02-14

## 2020-02-14 RX ORDER — TRAZODONE HCL 50 MG
1 TABLET ORAL
Qty: 0 | Refills: 0 | DISCHARGE

## 2020-02-14 RX ORDER — CLONAZEPAM 1 MG
1 TABLET ORAL
Qty: 0 | Refills: 0 | DISCHARGE

## 2020-02-14 RX ORDER — PANTOPRAZOLE SODIUM 20 MG/1
1 TABLET, DELAYED RELEASE ORAL
Qty: 30 | Refills: 0
Start: 2020-02-14 | End: 2020-03-14

## 2020-02-14 RX ORDER — INSULIN LISPRO 100/ML
1 VIAL (ML) SUBCUTANEOUS
Qty: 0 | Refills: 0 | DISCHARGE
Start: 2020-02-14

## 2020-02-14 RX ORDER — INSULIN DETEMIR 100/ML (3)
20 INSULIN PEN (ML) SUBCUTANEOUS
Qty: 0 | Refills: 0 | DISCHARGE

## 2020-02-14 RX ORDER — INSULIN LISPRO 100/ML
0 VIAL (ML) SUBCUTANEOUS
Qty: 0 | Refills: 0 | DISCHARGE
Start: 2020-02-14

## 2020-02-14 RX ORDER — SERTRALINE 25 MG/1
1 TABLET, FILM COATED ORAL
Qty: 14 | Refills: 0
Start: 2020-02-14 | End: 2020-02-27

## 2020-02-14 RX ADMIN — Medication 1 TABLET(S): at 12:30

## 2020-02-14 RX ADMIN — Medication 2: at 08:35

## 2020-02-14 RX ADMIN — SERTRALINE 50 MILLIGRAM(S): 25 TABLET, FILM COATED ORAL at 14:18

## 2020-02-14 RX ADMIN — Medication 1 MILLIGRAM(S): at 12:29

## 2020-02-14 RX ADMIN — Medication 0.5 MILLIGRAM(S): at 01:49

## 2020-02-14 RX ADMIN — Medication 0.5 MILLIGRAM(S): at 10:19

## 2020-02-14 RX ADMIN — PANTOPRAZOLE SODIUM 40 MILLIGRAM(S): 20 TABLET, DELAYED RELEASE ORAL at 05:40

## 2020-02-14 RX ADMIN — Medication 0.5 MILLIGRAM(S): at 05:40

## 2020-02-14 RX ADMIN — Medication 1 TABLET(S): at 08:36

## 2020-02-14 NOTE — PROGRESS NOTE BEHAVIORAL HEALTH - NSBHCHARTREVIEWVS_PSY_A_CORE FT
Vital Signs (24 Hrs):  T(C): 36.8 (02-14-20 @ 09:30), Max: 37.2 (02-13-20 @ 17:30)  HR: 70 (02-14-20 @ 09:30) (56 - 74)  BP: 126/88 (02-14-20 @ 09:30) (119/81 - 131/84)  RR: 16 (02-14-20 @ 09:30) (16 - 16)  SpO2: 99% (02-14-20 @ 09:30) (97% - 99%)  Wt(kg): --  Daily     Daily     I&O's Summary    13 Feb 2020 07:01  -  14 Feb 2020 07:00  --------------------------------------------------------  IN: 200 mL / OUT: 0 mL / NET: 200 mL

## 2020-02-14 NOTE — PROGRESS NOTE BEHAVIORAL HEALTH - RISK ASSESSMENT
Risk factors: substance use, hx of suicide attempts and suicidal thinking, hx of past inpatient hospitalizations, current medical hospitalization, current substance withdrawal     Protective factors: no current SI, domiciled, no psychosis, pet     Overall pt does have some chronic risk factors for harm to self, but given no elevated acute risk, she does not require inpatient psychiatric hospitalization. She will benefit from outpatient psychiatric treatment.

## 2020-02-14 NOTE — PROGRESS NOTE BEHAVIORAL HEALTH - NSBHFUPINTERVALHXFT_PSY_A_CORE
Pt seen for f/u of depression and alcohol withdrawal. Patient on Ativan taper. Chart reviewed. Pt received PRN of melatonin 3 mg last night at bedtime. No Ativan PRNs needed beyond taper. CIWAs ranging from 0 to 1.     On evaluation, patient reports feeling better with improvements in her mood. States her anxiety has decreased and she is sleeping well. Denies any nausea, vomiting, headache, tremors. Denies any SI/HI. Denies AH/VH, tactile hallucinations. She is eager for discharge. She has been in contact with her mother and niece, which was emotional, but overall positive.     Patient called [facility]. Said she spoke with someone who told her she will receive a call back to schedule an intake appt. She is amenable to starting Zoloft 50 mg daily for depression as she has found it helpful in the past.

## 2020-02-14 NOTE — PROGRESS NOTE BEHAVIORAL HEALTH - SUMMARY
This is a 50 y/o woman, domiciled at home with brother and mother, unemployed on disability, with a PPHx of alcohol use disorder, MDD, PTSD, anxiety, not currently in outpatient psychiatric treatment, with a hx of past inpatient hospitalizations and one suicide attempt at age 15 as per chart review, and PMHx of pancreatitis and DM2, who has been medically admitted for alcohol withdrawal, currently on Ativan taper. Psychiatry consulted at patient's request for depression.     Upon evaluation, patient endorses symptoms of depression as well as alcohol use disorder. She is no longer in outpatient treatment but would benefit from treatment that would address both her depression and anxiety as well as her substance use. She is currently on an Ativan taper for alcohol withdrawal - once the taper decreases, it will be worthwhile to consider restarting her on Zoloft 50 mg. She will also benefit from Atarax PRN for breakthrough anxiety during the taper. Have encouraged patient to be proactive in seeking intake appointments where her insurance is accepted, including at [facility]. Patient also to consider if she is interested in trying alcohol cessation medications.     2/14: Patient has been doing well managing withdrawal symptoms on Ativan taper with improvements in anxiety and sleep.  Taper may be expedited today so that she receives Ativan 0.5 mg IV BID. Pt amenable to starting Zoloft 50 mg daily, give one dose today and then d/c with 2 week supply of Zoloft 50 mg daily. Patient and CL team coordinating with [facility] for outpatient intake appt. She is psychiatrically cleared for d/c.    PLAN:   - Ativan taper expedited to Ativan 0.5 mg IV BID today  - Begin Zoloft 50 mg daily today and d/c with 2 week supply  - Psychiatrically cleared for d/c to f/u at [facility]

## 2020-02-14 NOTE — PROGRESS NOTE BEHAVIORAL HEALTH - CASE SUMMARY
met with the patient, discussed case with dr casillas, impression and plan discussed and agreed upon. Patient's mood has improved, denies any symptoms of depression, denies any si or hi, denies any a/vh or paranoia when asked. Motivated to f/u at [facility]. SW to assist

## 2020-02-14 NOTE — DISCHARGE NOTE NURSING/CASE MANAGEMENT/SOCIAL WORK - PATIENT PORTAL LINK FT
You can access the FollowMyHealth Patient Portal offered by [facility] by registering at the following website: [email or web address]. By joining [facility]’s FollowMyHealth portal, you will also be able to view your health information using other applications (apps) compatible with our system.

## 2020-02-14 NOTE — PROGRESS NOTE BEHAVIORAL HEALTH - NSBHCHARTREVIEWLAB_PSY_A_CORE FT
02-14    140  |  106  |  12  ----------------------------<  193<H>  3.5   |  21<L>  |  0.58    Ca    9.1      14 Feb 2020 06:35  Phos  4.2     02-14  Mg     1.8     02-14

## 2020-02-14 NOTE — PROGRESS NOTE BEHAVIORAL HEALTH - NSBHCONSULTFOLLOWAFTERCARE_PSY_A_CORE FT
Can f/u with [facility] [phone]. Patient is in the process of receiving an intake appt with our assitance. Can f/u with [facility] [phone]. Patient is in the process of receiving an intake appt with our assistance.

## 2020-02-24 NOTE — ED ADULT NURSE NOTE - PYSCHOSOCIAL ASSESSMENT
Anticoagulation Progress Note    Indication: Afib  Goal INR: 2.0-3.0  INR Result: 2.1    Leobardo RN, from [facility], called to report INR is 2.1 today for this pt, who was discharged from [facility] two days ago, after admit for Influenza A and Pneumonia at [facility].  No s/s bleeding, NVD, fever, medication changes currently reported. Pt back to her usual diet and activity, per RN. Dosing over the past week, per RN, was 2mg/1.5mg/2mg/2mg/1mg/2mg/2mg, for a cumulative dose today of 11.5mg/wk. Prior to recent admit, her weekly dose was 12mg/wk. Her INR was 2.1 on 2/21/2020. Plan   -Pt's INR=2.1 today, which is within the desired therapeutic range of 2.0-3.0.  -Pt's INR has decreased from 2.9 at last visit, which was on 1/30/2020, prior to recent illness.    -Will have pt resume her home dose and have INR rechecked via [address] in one week (3/2/2020). - - -.

## 2020-04-07 ENCOUNTER — APPOINTMENT (OUTPATIENT)
Dept: OPHTHALMOLOGY | Facility: CLINIC | Age: 50
End: 2020-04-07

## 2020-04-29 ENCOUNTER — EMERGENCY (EMERGENCY)
Facility: HOSPITAL | Age: 50
LOS: 1 days | Discharge: HOME | End: 2020-04-29
Attending: EMERGENCY MEDICINE | Admitting: STUDENT IN AN ORGANIZED HEALTH CARE EDUCATION/TRAINING PROGRAM
Payer: MEDICARE

## 2020-04-29 VITALS
OXYGEN SATURATION: 99 % | DIASTOLIC BLOOD PRESSURE: 89 MMHG | HEART RATE: 84 BPM | RESPIRATION RATE: 16 BRPM | SYSTOLIC BLOOD PRESSURE: 129 MMHG | TEMPERATURE: 99 F

## 2020-04-29 VITALS
SYSTOLIC BLOOD PRESSURE: 157 MMHG | OXYGEN SATURATION: 100 % | HEART RATE: 73 BPM | RESPIRATION RATE: 20 BRPM | DIASTOLIC BLOOD PRESSURE: 88 MMHG

## 2020-04-29 DIAGNOSIS — Z90.89 ACQUIRED ABSENCE OF OTHER ORGANS: Chronic | ICD-10-CM

## 2020-04-29 DIAGNOSIS — Z98.890 OTHER SPECIFIED POSTPROCEDURAL STATES: Chronic | ICD-10-CM

## 2020-04-29 DIAGNOSIS — L02.91 CUTANEOUS ABSCESS, UNSPECIFIED: Chronic | ICD-10-CM

## 2020-04-29 LAB
ALBUMIN SERPL ELPH-MCNC: 4.4 G/DL — SIGNIFICANT CHANGE UP (ref 3.3–5)
ALP SERPL-CCNC: 96 U/L — SIGNIFICANT CHANGE UP (ref 40–120)
ALT FLD-CCNC: 23 U/L — SIGNIFICANT CHANGE UP (ref 4–33)
AMPHET UR-MCNC: NEGATIVE — SIGNIFICANT CHANGE UP
ANION GAP SERPL CALC-SCNC: 19 MMO/L — HIGH (ref 7–14)
APAP SERPL-MCNC: < 15 UG/ML — LOW (ref 15–25)
AST SERPL-CCNC: 47 U/L — HIGH (ref 4–32)
BARBITURATES UR SCN-MCNC: NEGATIVE — SIGNIFICANT CHANGE UP
BASE EXCESS BLDV CALC-SCNC: -2 MMOL/L — SIGNIFICANT CHANGE UP
BASE EXCESS BLDV CALC-SCNC: -4.9 MMOL/L — SIGNIFICANT CHANGE UP
BASOPHILS # BLD AUTO: 0.06 K/UL — SIGNIFICANT CHANGE UP (ref 0–0.2)
BASOPHILS NFR BLD AUTO: 0.5 % — SIGNIFICANT CHANGE UP (ref 0–2)
BENZODIAZ UR-MCNC: NEGATIVE — SIGNIFICANT CHANGE UP
BILIRUB SERPL-MCNC: 0.3 MG/DL — SIGNIFICANT CHANGE UP (ref 0.2–1.2)
BLOOD GAS VENOUS - CREATININE: 0.5 MG/DL — SIGNIFICANT CHANGE UP (ref 0.5–1.3)
BLOOD GAS VENOUS - CREATININE: 0.63 MG/DL — SIGNIFICANT CHANGE UP (ref 0.5–1.3)
BLOOD GAS VENOUS - FIO2: 21 — SIGNIFICANT CHANGE UP
BLOOD GAS VENOUS - FIO2: 21 — SIGNIFICANT CHANGE UP
BUN SERPL-MCNC: 8 MG/DL — SIGNIFICANT CHANGE UP (ref 7–23)
CALCIUM SERPL-MCNC: 9.7 MG/DL — SIGNIFICANT CHANGE UP (ref 8.4–10.5)
CANNABINOIDS UR-MCNC: NEGATIVE — SIGNIFICANT CHANGE UP
CHLORIDE BLDV-SCNC: 108 MMOL/L — SIGNIFICANT CHANGE UP (ref 96–108)
CHLORIDE BLDV-SCNC: 113 MMOL/L — HIGH (ref 96–108)
CHLORIDE SERPL-SCNC: 102 MMOL/L — SIGNIFICANT CHANGE UP (ref 98–107)
CO2 SERPL-SCNC: 18 MMOL/L — LOW (ref 22–31)
COCAINE METAB.OTHER UR-MCNC: NEGATIVE — SIGNIFICANT CHANGE UP
CREAT SERPL-MCNC: 0.58 MG/DL — SIGNIFICANT CHANGE UP (ref 0.5–1.3)
EOSINOPHIL # BLD AUTO: 0.1 K/UL — SIGNIFICANT CHANGE UP (ref 0–0.5)
EOSINOPHIL NFR BLD AUTO: 0.8 % — SIGNIFICANT CHANGE UP (ref 0–6)
ETHANOL BLD-MCNC: 151 MG/DL — HIGH
GAS PNL BLDV: 138 MMOL/L — SIGNIFICANT CHANGE UP (ref 136–146)
GAS PNL BLDV: 140 MMOL/L — SIGNIFICANT CHANGE UP (ref 136–146)
GLUCOSE BLDV-MCNC: 207 MG/DL — HIGH (ref 70–99)
GLUCOSE BLDV-MCNC: 265 MG/DL — HIGH (ref 70–99)
GLUCOSE SERPL-MCNC: 276 MG/DL — HIGH (ref 70–99)
HCG SERPL-ACNC: < 5 MIU/ML — SIGNIFICANT CHANGE UP
HCO3 BLDV-SCNC: 20 MMOL/L — SIGNIFICANT CHANGE UP (ref 20–27)
HCO3 BLDV-SCNC: 21 MMOL/L — SIGNIFICANT CHANGE UP (ref 20–27)
HCT VFR BLD CALC: 44 % — SIGNIFICANT CHANGE UP (ref 34.5–45)
HCT VFR BLDV CALC: 36.2 % — SIGNIFICANT CHANGE UP (ref 34.5–45)
HCT VFR BLDV CALC: 39.2 % — SIGNIFICANT CHANGE UP (ref 34.5–45)
HGB BLD-MCNC: 14.9 G/DL — SIGNIFICANT CHANGE UP (ref 11.5–15.5)
HGB BLDV-MCNC: 11.7 G/DL — SIGNIFICANT CHANGE UP (ref 11.5–15.5)
HGB BLDV-MCNC: 12.8 G/DL — SIGNIFICANT CHANGE UP (ref 11.5–15.5)
IMM GRANULOCYTES NFR BLD AUTO: 0.7 % — SIGNIFICANT CHANGE UP (ref 0–1.5)
LACTATE BLDV-MCNC: 3.2 MMOL/L — HIGH (ref 0.5–2)
LACTATE BLDV-MCNC: 4 MMOL/L — CRITICAL HIGH (ref 0.5–2)
LIDOCAIN IGE QN: 14.2 U/L — SIGNIFICANT CHANGE UP (ref 7–60)
LYMPHOCYTES # BLD AUTO: 2.41 K/UL — SIGNIFICANT CHANGE UP (ref 1–3.3)
LYMPHOCYTES # BLD AUTO: 20 % — SIGNIFICANT CHANGE UP (ref 13–44)
MCHC RBC-ENTMCNC: 30.5 PG — SIGNIFICANT CHANGE UP (ref 27–34)
MCHC RBC-ENTMCNC: 33.9 % — SIGNIFICANT CHANGE UP (ref 32–36)
MCV RBC AUTO: 90.2 FL — SIGNIFICANT CHANGE UP (ref 80–100)
METHADONE UR-MCNC: NEGATIVE — SIGNIFICANT CHANGE UP
MONOCYTES # BLD AUTO: 0.46 K/UL — SIGNIFICANT CHANGE UP (ref 0–0.9)
MONOCYTES NFR BLD AUTO: 3.8 % — SIGNIFICANT CHANGE UP (ref 2–14)
NEUTROPHILS # BLD AUTO: 8.95 K/UL — HIGH (ref 1.8–7.4)
NEUTROPHILS NFR BLD AUTO: 74.2 % — SIGNIFICANT CHANGE UP (ref 43–77)
NRBC # FLD: 0 K/UL — SIGNIFICANT CHANGE UP (ref 0–0)
OPIATES UR-MCNC: NEGATIVE — SIGNIFICANT CHANGE UP
OXYCODONE UR-MCNC: NEGATIVE — SIGNIFICANT CHANGE UP
PCO2 BLDV: 39 MMHG — LOW (ref 41–51)
PCO2 BLDV: 45 MMHG — SIGNIFICANT CHANGE UP (ref 41–51)
PCP UR-MCNC: NEGATIVE — SIGNIFICANT CHANGE UP
PH BLDV: 7.33 PH — SIGNIFICANT CHANGE UP (ref 7.32–7.43)
PH BLDV: 7.33 PH — SIGNIFICANT CHANGE UP (ref 7.32–7.43)
PLATELET # BLD AUTO: 143 K/UL — LOW (ref 150–400)
PMV BLD: 11.1 FL — SIGNIFICANT CHANGE UP (ref 7–13)
PO2 BLDV: 32 MMHG — LOW (ref 35–40)
PO2 BLDV: 63 MMHG — HIGH (ref 35–40)
POTASSIUM BLDV-SCNC: 3.8 MMOL/L — SIGNIFICANT CHANGE UP (ref 3.4–4.5)
POTASSIUM BLDV-SCNC: 4.6 MMOL/L — HIGH (ref 3.4–4.5)
POTASSIUM SERPL-MCNC: 5.2 MMOL/L — SIGNIFICANT CHANGE UP (ref 3.5–5.3)
POTASSIUM SERPL-SCNC: 5.2 MMOL/L — SIGNIFICANT CHANGE UP (ref 3.5–5.3)
PROT SERPL-MCNC: 8.4 G/DL — HIGH (ref 6–8.3)
RBC # BLD: 4.88 M/UL — SIGNIFICANT CHANGE UP (ref 3.8–5.2)
RBC # FLD: 13 % — SIGNIFICANT CHANGE UP (ref 10.3–14.5)
SALICYLATES SERPL-MCNC: < 5 MG/DL — LOW (ref 15–30)
SAO2 % BLDV: 50.2 % — LOW (ref 60–85)
SAO2 % BLDV: 89.2 % — HIGH (ref 60–85)
SODIUM SERPL-SCNC: 139 MMOL/L — SIGNIFICANT CHANGE UP (ref 135–145)
TSH SERPL-MCNC: 0.56 UIU/ML — SIGNIFICANT CHANGE UP (ref 0.27–4.2)
WBC # BLD: 12.07 K/UL — HIGH (ref 3.8–10.5)
WBC # FLD AUTO: 12.07 K/UL — HIGH (ref 3.8–10.5)

## 2020-04-29 PROCEDURE — 73030 X-RAY EXAM OF SHOULDER: CPT | Mod: 26,RT

## 2020-04-29 PROCEDURE — 90792 PSYCH DIAG EVAL W/MED SRVCS: CPT

## 2020-04-29 PROCEDURE — 99285 EMERGENCY DEPT VISIT HI MDM: CPT

## 2020-04-29 PROCEDURE — 73110 X-RAY EXAM OF WRIST: CPT | Mod: 26,RT

## 2020-04-29 PROCEDURE — 73090 X-RAY EXAM OF FOREARM: CPT | Mod: 26,RT

## 2020-04-29 PROCEDURE — 71045 X-RAY EXAM CHEST 1 VIEW: CPT | Mod: 26

## 2020-04-29 RX ORDER — FAMOTIDINE 10 MG/ML
20 INJECTION INTRAVENOUS ONCE
Refills: 0 | Status: COMPLETED | OUTPATIENT
Start: 2020-04-29 | End: 2020-04-29

## 2020-04-29 RX ORDER — ONDANSETRON 8 MG/1
4 TABLET, FILM COATED ORAL ONCE
Refills: 0 | Status: COMPLETED | OUTPATIENT
Start: 2020-04-29 | End: 2020-04-29

## 2020-04-29 RX ORDER — TETANUS TOXOID, REDUCED DIPHTHERIA TOXOID AND ACELLULAR PERTUSSIS VACCINE, ADSORBED 5; 2.5; 8; 8; 2.5 [IU]/.5ML; [IU]/.5ML; UG/.5ML; UG/.5ML; UG/.5ML
0.5 SUSPENSION INTRAMUSCULAR ONCE
Refills: 0 | Status: COMPLETED | OUTPATIENT
Start: 2020-04-29 | End: 2020-04-29

## 2020-04-29 RX ORDER — SODIUM CHLORIDE 9 MG/ML
1000 INJECTION INTRAMUSCULAR; INTRAVENOUS; SUBCUTANEOUS ONCE
Refills: 0 | Status: COMPLETED | OUTPATIENT
Start: 2020-04-29 | End: 2020-04-29

## 2020-04-29 RX ADMIN — TETANUS TOXOID, REDUCED DIPHTHERIA TOXOID AND ACELLULAR PERTUSSIS VACCINE, ADSORBED 0.5 MILLILITER(S): 5; 2.5; 8; 8; 2.5 SUSPENSION INTRAMUSCULAR at 02:47

## 2020-04-29 RX ADMIN — ONDANSETRON 4 MILLIGRAM(S): 8 TABLET, FILM COATED ORAL at 02:51

## 2020-04-29 RX ADMIN — Medication 1 MILLIGRAM(S): at 04:00

## 2020-04-29 RX ADMIN — ONDANSETRON 4 MILLIGRAM(S): 8 TABLET, FILM COATED ORAL at 07:39

## 2020-04-29 RX ADMIN — FAMOTIDINE 20 MILLIGRAM(S): 10 INJECTION INTRAVENOUS at 02:50

## 2020-04-29 RX ADMIN — SODIUM CHLORIDE 1000 MILLILITER(S): 9 INJECTION INTRAMUSCULAR; INTRAVENOUS; SUBCUTANEOUS at 04:00

## 2020-04-29 RX ADMIN — SODIUM CHLORIDE 1000 MILLILITER(S): 9 INJECTION INTRAMUSCULAR; INTRAVENOUS; SUBCUTANEOUS at 02:51
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
## 2020-04-29 NOTE — ED BEHAVIORAL HEALTH ASSESSMENT NOTE - TREATMENT
has been in inpatient rehab, detoxed on [facility] medicine in 2/2020, was briefly in treatment at [facility]
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
## 2020-04-29 NOTE — ED BEHAVIORAL HEALTH ASSESSMENT NOTE - REFERRAL / APPOINTMENT DETAILS
declines inpatient but provided extensive referrals for both mental health and substance abuse treatment, provided [facility] brochure and advised follow up within 7 days

## 2020-04-29 NOTE — ED BEHAVIORAL HEALTH ASSESSMENT NOTE - ADDITIONAL DETAILS ALL
denies acute SI/intent/plan/gestures, as per chart review hx of suicide attempt via medication overdose at age 15
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
## 2020-04-29 NOTE — ED BEHAVIORAL HEALTH ASSESSMENT NOTE - DESCRIPTION
Vital Signs Last 24 Hrs  T(C): 37.2 (29 Apr 2020 07:53), Max: 37.2 (29 Apr 2020 00:35)  T(F): 99 (29 Apr 2020 07:53), Max: 99 (29 Apr 2020 00:35)  HR: 95 (29 Apr 2020 07:53) (84 - 95)  BP: 151/90 (29 Apr 2020 07:53) (129/89 - 151/90)  BP(mean): --  RR: 18 (29 Apr 2020 07:53) (16 - 18)  SpO2: 99% (29 Apr 2020 07:53) (99% - 99%) calm, cooperative, in behavioral control, no agitation    Vital Signs Last 24 Hrs  T(C): 37.2 (29 Apr 2020 07:53), Max: 37.2 (29 Apr 2020 00:35)  T(F): 99 (29 Apr 2020 07:53), Max: 99 (29 Apr 2020 00:35)  HR: 95 (29 Apr 2020 07:53) (84 - 95)  BP: 151/90 (29 Apr 2020 07:53) (129/89 - 151/90)  BP(mean): --  RR: 18 (29 Apr 2020 07:53) (16 - 18)  SpO2: 99% (29 Apr 2020 07:53) (99% - 99%) pancreatitis, DM2 Lives with mother and younger brother in house in [place], [place], unemployed, has a service dog

## 2020-04-29 NOTE — ED BEHAVIORAL HEALTH NOTE - BEHAVIORAL HEALTH NOTE
Writer called pt’s family to obtain collateral  and  left a voicemail requesting callback to social work phone.    met with pt to obtain an alternate number but she states it is just  for her mother Sheron.

## 2020-04-29 NOTE — ED PROVIDER NOTE - PHYSICAL EXAMINATION
General: Patient intoxicated and agitated but re-directable. AAO x 3  Skin: Dry and intact. scattered excoriations on chest and upper arms b/l  HEENT: Head with scattered excoriations, otherwise atraumatic. Oral mucosa moist. No pharyngeal exudates or tonsillar enlargement  Eyes: Conjunctiva normal, PERRL, EOMI  Cardiac: Regular rhythm and rate. No pretibial edema b/l. 2+ distal pulses in all extremities  Respiratory: Lungs clear b/l and symmetric. No respiratory distress. Able to speak in complete sentences.  Gastrointestinal: Abdomen soft, nondistended, + epigastric tenderness with mild guarding  Musculoskeletal: Moves all extremities spontaneously. no midline C/T/L spine ttp midline. NROM neck. +mild swelling and ttp to R distal ulnar. no snuff box ttp. NROM b/l elbows. limited ROM R shoulder but no ttp (?chronic per patient)  Neurological: alert and oriented to person, place, and time  Psychiatric: agitated but calm when re-directed
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
## 2020-04-29 NOTE — ED PROVIDER NOTE - PROGRESS NOTE DETAILS
Mother-  Jm Keith  [phone]  States that patient was endorsing SI and feelings of depression today although patient denies this in ED today Wenceslao- patient hysterical crying for her dog who functions as her emotional support animal. Not tremulous or tachycardic on the monitor HR in 80s.  Offered ativan for her symptoms and agreeable. 1mg IV ativan ordered.  psych team spoken with and will evaluate when clinically sober Ciano- labs showing ethanol level of 151 and lactate of 4 on initial labs. at this time awaiting results for repeat vbg with lactate after ivf.  XRay imaging with no acute injuries.  Pending psychiatry evaluation at this time as well as patient clinically sober now LASHAWN Mckeon- received sign out from Dr. Billy. I paged psych attng, awaiting psych to see pt. Rosalinda Foley M.D: pt signed out to me pending psych eval. seen by psych Dr Murray. clear from their standpoint for dc home. given resources for outpt follow up.

## 2020-04-29 NOTE — ED BEHAVIORAL HEALTH ASSESSMENT NOTE - OTHER PAST PSYCHIATRIC HISTORY (INCLUDE DETAILS REGARDING ONSET, COURSE OF ILLNESS, INPATIENT/OUTPATIENT TREATMENT)
past with hx of MDD, anxiety, PTSD, panic disorder   has had multiple past outpatient providers   has had past inpatient hospitalizations, as per chart review, most recently in 2013 at [facility]  multiple past ED behavioral health visits    As per chart review:   Multiple inpatient admissions, one at age 15 following suicide attempt via ingestion of unknown quantity of unknown medication, then [facility] 2005, [facility] 2008, [facility] late 2010-early 2011 (3 weeks), and most recent [facility] 7/2013.  First two adult hospitalizations were for worsening depression with passive thoughts of suicide; two others resulted from collateral reports of worsening depression and suicidal ideation which patient denied. Had 3 ECT treatments during [facility] hospitalization in [id number] but discontinued due to intolerable side effects (headache/neck pain).
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
## 2020-04-29 NOTE — ED BEHAVIORAL HEALTH ASSESSMENT NOTE - RISK ASSESSMENT
Low Acute Suicide Risk Risk factors: substance use, hx of suicide attempts and suicidal thinking, hx of past inpatient hospitalizations, past medical hospitalization for substance withdrawal     Protective factors: no current SI, domiciled, no psychosis, pet (support dog)    Overall pt does have some chronic risk factors for harm to self, but given no elevated acute risk, she does not require inpatient psychiatric hospitalization. She will benefit from outpatient psychiatric treatment.

## 2020-04-29 NOTE — PROVIDER CONTACT NOTE (OTHER) - ASSESSMENT
cabrera met with patient at bedside.  pt given information regarding alcohol and mental health services.  pt not interested in going for inpatient.  taxi set up for pt by using [employer] services.  bill back to account 50 for $18.00,

## 2020-04-29 NOTE — ED PROVIDER NOTE - PATIENT PORTAL LINK FT
You can access the FollowMyHealth Patient Portal offered by [facility] by registering at the following website: [email or web address]. By joining [facility]’s FollowMyHealth portal, you will also be able to view your health information using other applications (apps) compatible with our system.

## 2020-04-29 NOTE — ED BEHAVIORAL HEALTH ASSESSMENT NOTE - SAFETY PLAN ADDT'L DETAILS
Safety plan discussed with.../Education provided regarding environmental safety / lethal means restriction/Provision of National Suicide Prevention Lifeline 0-276-713-PLSX (0194)
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
## 2020-04-29 NOTE — ED BEHAVIORAL HEALTH ASSESSMENT NOTE - DESCRIPTION (FIRST USE, LAST USE, QUANTITY, FREQUENCY, DURATION)
reports began drinking heavily 3 years ago, hx of binge drinking of 2 pints of vodka daily last 2 days. as per CVM, first use at 37 years old, used x 1 year, abstinent for 5 years, resumed at 42.  Denies any recent use, utox negative.

## 2020-04-29 NOTE — ED BEHAVIORAL HEALTH ASSESSMENT NOTE - OTHER
I-Stop database I-Stop database Reference #: [id number], [facility] presented to ED with acute alcohol intoxication not assessed - in hospital bed
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
## 2020-04-29 NOTE — ED BEHAVIORAL HEALTH ASSESSMENT NOTE - NS ED [facility] MED ROS PSYCHIATRIC
PhysicalTherapy   Evaluation    Zeyad Woodard   MRN: [id number]     PT Received On: 11/16/17  PT Start Time: 1125     PT Stop Time: 1203    PT Total Time (min): 38 min       Billable Minutes:  Evaluation 15, Gait Training8, Therapeutic Activity 7 and Therapeutic Exercise 8=eval+[id number]    Diagnosis: MRSA bacteremia  Past Medical History:   Diagnosis Date    Anemia     Atrial fibrillation     BPH (benign prostatic hypertrophy)     Cancer     Cardiac pacemaker in situ 7/2/2015    CHF (congestive heart failure)     Coronary artery disease     Encounter for blood transfusion     GI bleed     cecum angiectasia s/p cautery    Hypertension     Hypothyroidism     Polyneuropathy     Posture imbalance     due to neuropathy    Right posterior capsular opacification 1/18/2017    SSS (sick sinus syndrome) 2015    s/p pacemaker      Past Surgical History:   Procedure Laterality Date    CARDIAC SURGERY      CATARACT EXTRACTION W/  INTRAOCULAR LENS IMPLANT Right 05/17/2010        CATARACT EXTRACTION W/  INTRAOCULAR LENS IMPLANT Left 02/16/2004        cataract surgery      COLONOSCOPY  6/30/04    COLONOSCOPY N/A 2/15/2016    Procedure: COLONOSCOPY;  Surgeon: Stanton Kirk MD;  Location: [address];  Service: Endoscopy;  Laterality: N/A;    EYE SURGERY      HERNIA REPAIR      inguinal hernia repair      Open heart surgery for pericardiectomy      for calcific constrictive pericarditis    UMBILICAL HERNIA REPAIR      Yag      Left Eye         General Precautions: Standard, contact, fall (MRSA)  Orthopedic Precautions:     Braces:           Patient History:  Lives With: alone  Living Arrangements: house  Home Accessibility: stairs to enter home  Home Layout: Able to live on 1st floor  Number of Stairs to Enter Home: 1  Transportation Available: family or friend will provide  Living Environment Comment: Lives alone in [facility] w/ TH ALDO. Dtr lives nearby and assists daily. PTA patient mod(I)  "w/ RW. Reports recent falls but reports he used to walk 2 miles daily. Uses a transport w/c for limited community mobility w/ dtr assisting. (he appears to be describing a transport w/c, but might be a rollator). Pt denies having AFOs but reports he has 'boots w/ something built in he wears to [employer]."  Equipment Currently Used at Home: grab bar, raised toilet, shower chair, bath bench, walker, rolling, cane, straight (either a transport w/c or a rollator)  DME owned (not currently used):none    Previous Level of Function:  Ambulation Skills: needs device  Transfer Skills: needs device  ADL Skills: needs device    Subjective:  Communicated with patient prior to session.  Patient agreeable to therapy  Chief Complaint: falls  Patient goals: take care of myself    Pain/Comfort  Pain Rating 1: 3/10  Location - Side 1: Right  Location 1: sacral spine (buttock)  Pain Addressed 1: Pre-medicate for activity, Distraction  Pain Rating Post-Intervention 1: 3/10    Objective:  Patient found supine in bed w/ nurse assisting   with      Cognitive Exam:  Oriented to: Person, Place, Time and Situation  Follows Commands/attention: Follows two-step commands  Communication: clear/fluent; hard of hearing  Safety awareness/insight to disability: intact    Physical Exam:  Postural examination/scapula alignment: Rounded shoulder and Posterior pelvic tilt    Skin integrity: bandage right sacral/flank area  Edema: None noted     Sensation:   Light touch and proprioception grossly intact LEs. Patient reports neuropathy BLEs and RUE.    Upper Extremity Range of Motion:  Right Upper Extremity: WFL  Left Upper Extremity: WFL    Upper Extremity Strength:  Right Upper Extremity: WFL  Left Upper Extremity: WFL    Lower Extremity Range of Motion:  Right Lower Extremity: WFL  Left Lower Extremity: WFL    Lower Extremity Strength:  Right Lower Extremity: HF 4/5; knee 4/5, ankle DF 0/5 and PF 1/5  Left Lower Extremity: HF 4/5, knee 4-/5, ankle DF 0/5, " PF 1+/5       Gross motor coordination: WFL grossly.     Functional Status:  MDS G  Bed Mobility Functional Status: CGA-Min (A)  Transfer Functional Status: CGA-Min (A)  Walk in Room Functional Status: CGA-Min (A)  Moving from seated to standing position: Not steady, only able to stabilize with staff assistance  Walking (with assistive device if used): Not steady, only able to stabilize with staff assistance  Turning around and facing the opposite direction while walking: Not steady, only able to stabilize with staff assistance  Surface-to-surface transfer (transfer between bed and chair or wheelchair): Not steady, only able to stabilize with staff assistance         Bed Mobility:  Sit>Supine:Not performed. Patient remained up in w/c.  Supine>Sit: CGA to sit and min/mod assist to scoot to EOB    Transfers:  Sit<>Stand: w/ RW from EOB w/ min assist; to/from w/c w/ RW and CGA. Cues for technique  Stand Pivot Transfer: bed>w/c w/ RW and CG/min assist  Cues for technique. Initially w/ mild posterior instability, improved as session progressed.    Gait:  Amb 20 feet w/ RW and min assist. After seated rest break, amb in room w/  feet w/ CGA to occ min assist.  Patient w/ mild posterior instability initially on first gait trial.  Improved second trial.   amb w/ steppage gait BLE, small steps, absent toe off and heel strike. First contact w/ forefoot.       Wheelchair Mobility:  Not tested    Therex:  Glute sets,   LAQ   x10 reps w/ assist as needed.    Balance:  Patient sits EOB ~ 5 minutes,  Posterior lean. Corrects w/ assist and maintains ~ one minute, then begins to lean posteriorly again. Patient w/ posterior pelvic tilt and thoracic kyphosis. Poor sit balance  Stands w/ RW and CGA to occ min assist.    Additional Treatment:  Patient assisted w/ donning pants, PT pulls up w/ patient standing w/ RW w/ min assist for standing, TA for pants.  Don diaper in standing w/ TA for diaper and pants management  White board  updated.    Patient left up in chair with call button in reach and lunch..    Assessment:  Zeyad Woodard is a 89 y.o. male with a medical diagnosis of MRSA bacteremia. He is on contact isolation for MRSA. Additionally, patient has B ankle weakness PF and DF (from neuropathy and for several years, per patient) and walks w/ small steps and steppage gait w/ RW and CG/min assist. He has a posterior lean primarily in sitting. PTA patient was mod(I) w/ RW but describes frequent falls. He reports he wears house slippers but has some boots (which might be AFOs or braces?) His daughter assists daily but he does not have full time assistance available. According to the [facility] patient requires moderate assistance. His condition is stable. Patient pipo is of LOW complexity.    Rehab identified problem list/impairments: weakness, impaired endurance, impaired self care skills, impaired functional mobilty, gait instability, impaired balance, decreased lower extremity function, pain, impaired skin, other (comment) (contact prec/MRSA)    Rehab potential is good.    Activity tolerance: Good    Discharge recommendations: home with home health (and likely family/caregiver assist)     Barriers to discharge: Decreased caregiver support, Inaccessible home environment    Equipment recommendations: wheelchair     GOALS:    Physical Therapy Goals        Problem: Physical Therapy Goal    Goal Priority Disciplines Outcome Goal Variances Interventions   Physical Therapy Goal     PT/OT, PT      Description:  Goals to be met by: 14 DAYS     Patient will increase functional independence with mobility by performin. Supine to sit with Supervision   2. Sit to supine with Supervision  3. Sit to stand transfer with Stand-by Assistance with RW  4. Bed to chair transfer with Stand-by Assistance using Rolling Walker  5. Gait  x 150 feet with Stand-by Assistance using Rolling Walker.   6. Wheelchair propulsion x100 feet with Stand-by Assistance  using bilateral upper extremities  7. Ascend/Descend 4 inch curb step with Contact Guard Assistance using Rolling Walker.  8. Sitting at edge of bed x8 minutes with Stand-by Assistance and without UE support, and performing exercises or activity  9. Stand for 5 minutes with Contact Guard Assistance using Rolling Walker and perform an activity  10. Lower extremity exercise program x20 reps per handout, with assistance as needed and gym therex                      PLAN:    Patient to be seen 5 x/week  to address the above listed problems via gait training, therapeutic activities, therapeutic exercises, wheelchair management/training  Plan of Care Expires: 12/16/17    More Sanchez, PT 11/16/2017   See HPI

## 2020-04-30 DIAGNOSIS — F41.8 OTHER SPECIFIED ANXIETY DISORDERS: ICD-10-CM

## 2020-04-30 DIAGNOSIS — F48.9 NONPSYCHOTIC MENTAL DISORDER, UNSPECIFIED: ICD-10-CM

## 2020-04-30 DIAGNOSIS — F10.20 ALCOHOL DEPENDENCE, UNCOMPLICATED: ICD-10-CM

## 2020-06-22 NOTE — H&P ADULT - PROBLEM SELECTOR PLAN 3
No -Will treat with CIWA for alcohol withdrawal  -continue with IVF w/ additives  -c/w folic acid, thiamine and MV  -Social work c/s in AM

## 2020-09-04 NOTE — ED ADULT NURSE NOTE - CCCP TRG CHIEF CMPLNT
aggressive behavior [Follow-Up: _____] : a [unfilled] follow-up visit [Other: _____] : [unfilled] [FreeTextEntry1] : 41 year old female with left sided cervical radiculopathy and a suspicious lesion at T 1 T2.  She has diminished pain and  had  followup scan with contrast.  The updated scan shows  small Tarlov cysts.

## 2020-10-16 ENCOUNTER — EMERGENCY (EMERGENCY)
Facility: HOSPITAL | Age: 50
LOS: 1 days | Discharge: HOME | End: 2020-10-16
Attending: EMERGENCY MEDICINE
Payer: MEDICARE

## 2020-10-16 VITALS
RESPIRATION RATE: 20 BRPM | HEART RATE: 71 BPM | DIASTOLIC BLOOD PRESSURE: 58 MMHG | SYSTOLIC BLOOD PRESSURE: 112 MMHG | OXYGEN SATURATION: 98 %

## 2020-10-16 VITALS
OXYGEN SATURATION: 95 % | TEMPERATURE: 98 F | HEIGHT: 62 IN | SYSTOLIC BLOOD PRESSURE: 118 MMHG | HEART RATE: 79 BPM | DIASTOLIC BLOOD PRESSURE: 79 MMHG | WEIGHT: 169.98 LBS | RESPIRATION RATE: 18 BRPM

## 2020-10-16 DIAGNOSIS — Z98.890 OTHER SPECIFIED POSTPROCEDURAL STATES: Chronic | ICD-10-CM

## 2020-10-16 DIAGNOSIS — Z90.89 ACQUIRED ABSENCE OF OTHER ORGANS: Chronic | ICD-10-CM

## 2020-10-16 DIAGNOSIS — L02.91 CUTANEOUS ABSCESS, UNSPECIFIED: Chronic | ICD-10-CM

## 2020-10-16 LAB
ALBUMIN SERPL ELPH-MCNC: 4.1 G/DL — SIGNIFICANT CHANGE UP (ref 3.3–5)
ALP SERPL-CCNC: 103 U/L — SIGNIFICANT CHANGE UP (ref 40–120)
ALT FLD-CCNC: 90 U/L — HIGH (ref 10–45)
ANION GAP SERPL CALC-SCNC: 15 MMOL/L — SIGNIFICANT CHANGE UP (ref 5–17)
ANION GAP SERPL CALC-SCNC: 16 MMOL/L — SIGNIFICANT CHANGE UP (ref 5–17)
APPEARANCE UR: CLEAR — SIGNIFICANT CHANGE UP
APTT BLD: 30.3 SEC — SIGNIFICANT CHANGE UP (ref 27.5–35.5)
AST SERPL-CCNC: 146 U/L — HIGH (ref 10–40)
B-OH-BUTYR SERPL-SCNC: 0.3 MMOL/L — SIGNIFICANT CHANGE UP
BASE EXCESS BLDV CALC-SCNC: -2.8 MMOL/L — LOW (ref -2–2)
BASE EXCESS BLDV CALC-SCNC: -3 MMOL/L — LOW (ref -2–2)
BASOPHILS # BLD AUTO: 0.07 K/UL — SIGNIFICANT CHANGE UP (ref 0–0.2)
BASOPHILS NFR BLD AUTO: 1 % — SIGNIFICANT CHANGE UP (ref 0–2)
BILIRUB SERPL-MCNC: 0.3 MG/DL — SIGNIFICANT CHANGE UP (ref 0.2–1.2)
BILIRUB UR-MCNC: NEGATIVE — SIGNIFICANT CHANGE UP
BUN SERPL-MCNC: 9 MG/DL — SIGNIFICANT CHANGE UP (ref 7–23)
BUN SERPL-MCNC: 9 MG/DL — SIGNIFICANT CHANGE UP (ref 7–23)
CA-I SERPL-SCNC: 1.09 MMOL/L — LOW (ref 1.12–1.3)
CA-I SERPL-SCNC: 1.17 MMOL/L — SIGNIFICANT CHANGE UP (ref 1.12–1.3)
CALCIUM SERPL-MCNC: 8.1 MG/DL — LOW (ref 8.4–10.5)
CALCIUM SERPL-MCNC: 8.7 MG/DL — SIGNIFICANT CHANGE UP (ref 8.4–10.5)
CHLORIDE BLDV-SCNC: 102 MMOL/L — SIGNIFICANT CHANGE UP (ref 96–108)
CHLORIDE BLDV-SCNC: 109 MMOL/L — HIGH (ref 96–108)
CHLORIDE SERPL-SCNC: 104 MMOL/L — SIGNIFICANT CHANGE UP (ref 96–108)
CHLORIDE SERPL-SCNC: 98 MMOL/L — SIGNIFICANT CHANGE UP (ref 96–108)
CO2 BLDV-SCNC: 22 MMOL/L — SIGNIFICANT CHANGE UP (ref 22–30)
CO2 BLDV-SCNC: 24 MMOL/L — SIGNIFICANT CHANGE UP (ref 22–30)
CO2 SERPL-SCNC: 18 MMOL/L — LOW (ref 22–31)
CO2 SERPL-SCNC: 20 MMOL/L — LOW (ref 22–31)
COLOR SPEC: SIGNIFICANT CHANGE UP
CREAT SERPL-MCNC: 0.53 MG/DL — SIGNIFICANT CHANGE UP (ref 0.5–1.3)
CREAT SERPL-MCNC: 0.56 MG/DL — SIGNIFICANT CHANGE UP (ref 0.5–1.3)
DIFF PNL FLD: NEGATIVE — SIGNIFICANT CHANGE UP
EOSINOPHIL # BLD AUTO: 0.22 K/UL — SIGNIFICANT CHANGE UP (ref 0–0.5)
EOSINOPHIL NFR BLD AUTO: 3 % — SIGNIFICANT CHANGE UP (ref 0–6)
GAS PNL BLDV: 136 MMOL/L — SIGNIFICANT CHANGE UP (ref 135–145)
GAS PNL BLDV: 137 MMOL/L — SIGNIFICANT CHANGE UP (ref 135–145)
GAS PNL BLDV: SIGNIFICANT CHANGE UP
GLUCOSE BLDV-MCNC: 251 MG/DL — HIGH (ref 70–99)
GLUCOSE BLDV-MCNC: 396 MG/DL — HIGH (ref 70–99)
GLUCOSE SERPL-MCNC: 319 MG/DL — HIGH (ref 70–99)
GLUCOSE SERPL-MCNC: 389 MG/DL — HIGH (ref 70–99)
GLUCOSE UR QL: ABNORMAL
HCG SERPL-ACNC: <2 MIU/ML — SIGNIFICANT CHANGE UP
HCO3 BLDV-SCNC: 21 MMOL/L — SIGNIFICANT CHANGE UP (ref 21–29)
HCO3 BLDV-SCNC: 22 MMOL/L — SIGNIFICANT CHANGE UP (ref 21–29)
HCT VFR BLD CALC: 43.2 % — SIGNIFICANT CHANGE UP (ref 34.5–45)
HCT VFR BLDA CALC: 42 % — SIGNIFICANT CHANGE UP (ref 39–50)
HCT VFR BLDA CALC: 45 % — SIGNIFICANT CHANGE UP (ref 39–50)
HGB BLD CALC-MCNC: 13.5 G/DL — SIGNIFICANT CHANGE UP (ref 11.5–15.5)
HGB BLD CALC-MCNC: 14.7 G/DL — SIGNIFICANT CHANGE UP (ref 11.5–15.5)
HGB BLD-MCNC: 14.5 G/DL — SIGNIFICANT CHANGE UP (ref 11.5–15.5)
IMM GRANULOCYTES NFR BLD AUTO: 0.5 % — SIGNIFICANT CHANGE UP (ref 0–1.5)
INR BLD: 0.94 RATIO — SIGNIFICANT CHANGE UP (ref 0.88–1.16)
KETONES UR-MCNC: ABNORMAL
LACTATE BLDV-MCNC: 2.8 MMOL/L — HIGH (ref 0.7–2)
LACTATE BLDV-MCNC: 4.8 MMOL/L — CRITICAL HIGH (ref 0.7–2)
LEUKOCYTE ESTERASE UR-ACNC: NEGATIVE — SIGNIFICANT CHANGE UP
LIDOCAIN IGE QN: 17 U/L — SIGNIFICANT CHANGE UP (ref 7–60)
LYMPHOCYTES # BLD AUTO: 2.82 K/UL — SIGNIFICANT CHANGE UP (ref 1–3.3)
LYMPHOCYTES # BLD AUTO: 38.7 % — SIGNIFICANT CHANGE UP (ref 13–44)
MCHC RBC-ENTMCNC: 30.9 PG — SIGNIFICANT CHANGE UP (ref 27–34)
MCHC RBC-ENTMCNC: 33.6 GM/DL — SIGNIFICANT CHANGE UP (ref 32–36)
MCV RBC AUTO: 92.1 FL — SIGNIFICANT CHANGE UP (ref 80–100)
MONOCYTES # BLD AUTO: 0.66 K/UL — SIGNIFICANT CHANGE UP (ref 0–0.9)
MONOCYTES NFR BLD AUTO: 9.1 % — SIGNIFICANT CHANGE UP (ref 2–14)
NEUTROPHILS # BLD AUTO: 3.47 K/UL — SIGNIFICANT CHANGE UP (ref 1.8–7.4)
NEUTROPHILS NFR BLD AUTO: 47.7 % — SIGNIFICANT CHANGE UP (ref 43–77)
NITRITE UR-MCNC: NEGATIVE — SIGNIFICANT CHANGE UP
NRBC # BLD: 0 /100 WBCS — SIGNIFICANT CHANGE UP (ref 0–0)
PCO2 BLDV: 37 MMHG — SIGNIFICANT CHANGE UP (ref 35–50)
PCO2 BLDV: 42 MMHG — SIGNIFICANT CHANGE UP (ref 35–50)
PH BLDV: 7.34 — LOW (ref 7.35–7.45)
PH BLDV: 7.38 — SIGNIFICANT CHANGE UP (ref 7.35–7.45)
PH UR: 6.5 — SIGNIFICANT CHANGE UP (ref 5–8)
PLATELET # BLD AUTO: 176 K/UL — SIGNIFICANT CHANGE UP (ref 150–400)
PO2 BLDV: 63 MMHG — HIGH (ref 25–45)
PO2 BLDV: 69 MMHG — HIGH (ref 25–45)
POTASSIUM BLDV-SCNC: 3.7 MMOL/L — SIGNIFICANT CHANGE UP (ref 3.5–5.3)
POTASSIUM BLDV-SCNC: 3.9 MMOL/L — SIGNIFICANT CHANGE UP (ref 3.5–5.3)
POTASSIUM SERPL-MCNC: 3.8 MMOL/L — SIGNIFICANT CHANGE UP (ref 3.5–5.3)
POTASSIUM SERPL-MCNC: 7.2 MMOL/L — CRITICAL HIGH (ref 3.5–5.3)
POTASSIUM SERPL-SCNC: 3.8 MMOL/L — SIGNIFICANT CHANGE UP (ref 3.5–5.3)
POTASSIUM SERPL-SCNC: 7.2 MMOL/L — CRITICAL HIGH (ref 3.5–5.3)
PROT SERPL-MCNC: 7.4 G/DL — SIGNIFICANT CHANGE UP (ref 6–8.3)
PROT UR-MCNC: NEGATIVE — SIGNIFICANT CHANGE UP
PROTHROM AB SERPL-ACNC: 11.3 SEC — SIGNIFICANT CHANGE UP (ref 10.6–13.6)
RBC # BLD: 4.69 M/UL — SIGNIFICANT CHANGE UP (ref 3.8–5.2)
RBC # FLD: 13.8 % — SIGNIFICANT CHANGE UP (ref 10.3–14.5)
SAO2 % BLDV: 89 % — HIGH (ref 67–88)
SAO2 % BLDV: 93 % — HIGH (ref 67–88)
SARS-COV-2 RNA SPEC QL NAA+PROBE: SIGNIFICANT CHANGE UP
SODIUM SERPL-SCNC: 132 MMOL/L — LOW (ref 135–145)
SODIUM SERPL-SCNC: 139 MMOL/L — SIGNIFICANT CHANGE UP (ref 135–145)
SP GR SPEC: 1.04 — HIGH (ref 1.01–1.02)
TROPONIN T, HIGH SENSITIVITY RESULT: <6 NG/L — SIGNIFICANT CHANGE UP (ref 0–51)
UROBILINOGEN FLD QL: NEGATIVE — SIGNIFICANT CHANGE UP
WBC # BLD: 7.28 K/UL — SIGNIFICANT CHANGE UP (ref 3.8–10.5)
WBC # FLD AUTO: 7.28 K/UL — SIGNIFICANT CHANGE UP (ref 3.8–10.5)

## 2020-10-16 PROCEDURE — 76705 ECHO EXAM OF ABDOMEN: CPT | Mod: 26

## 2020-10-16 PROCEDURE — 85018 HEMOGLOBIN: CPT

## 2020-10-16 PROCEDURE — 85014 HEMATOCRIT: CPT

## 2020-10-16 PROCEDURE — 80048 BASIC METABOLIC PNL TOTAL CA: CPT

## 2020-10-16 PROCEDURE — 96375 TX/PRO/DX INJ NEW DRUG ADDON: CPT

## 2020-10-16 PROCEDURE — 84100 ASSAY OF PHOSPHORUS: CPT

## 2020-10-16 PROCEDURE — 80053 COMPREHEN METABOLIC PANEL: CPT

## 2020-10-16 PROCEDURE — 82010 KETONE BODYS QUAN: CPT

## 2020-10-16 PROCEDURE — 74177 CT ABD & PELVIS W/CONTRAST: CPT

## 2020-10-16 PROCEDURE — 84702 CHORIONIC GONADOTROPIN TEST: CPT

## 2020-10-16 PROCEDURE — U0003: CPT

## 2020-10-16 PROCEDURE — 74177 CT ABD & PELVIS W/CONTRAST: CPT | Mod: 26

## 2020-10-16 PROCEDURE — 85730 THROMBOPLASTIN TIME PARTIAL: CPT

## 2020-10-16 PROCEDURE — 83605 ASSAY OF LACTIC ACID: CPT

## 2020-10-16 PROCEDURE — 84132 ASSAY OF SERUM POTASSIUM: CPT

## 2020-10-16 PROCEDURE — 93005 ELECTROCARDIOGRAM TRACING: CPT

## 2020-10-16 PROCEDURE — 82330 ASSAY OF CALCIUM: CPT

## 2020-10-16 PROCEDURE — 84484 ASSAY OF TROPONIN QUANT: CPT

## 2020-10-16 PROCEDURE — 81003 URINALYSIS AUTO W/O SCOPE: CPT

## 2020-10-16 PROCEDURE — 99285 EMERGENCY DEPT VISIT HI MDM: CPT

## 2020-10-16 PROCEDURE — 82947 ASSAY GLUCOSE BLOOD QUANT: CPT

## 2020-10-16 PROCEDURE — 96374 THER/PROPH/DIAG INJ IV PUSH: CPT | Mod: XU

## 2020-10-16 PROCEDURE — 76705 ECHO EXAM OF ABDOMEN: CPT

## 2020-10-16 PROCEDURE — 82962 GLUCOSE BLOOD TEST: CPT

## 2020-10-16 PROCEDURE — 93010 ELECTROCARDIOGRAM REPORT: CPT

## 2020-10-16 PROCEDURE — 99284 EMERGENCY DEPT VISIT MOD MDM: CPT | Mod: 25

## 2020-10-16 PROCEDURE — 83735 ASSAY OF MAGNESIUM: CPT

## 2020-10-16 PROCEDURE — 87086 URINE CULTURE/COLONY COUNT: CPT

## 2020-10-16 PROCEDURE — 96361 HYDRATE IV INFUSION ADD-ON: CPT

## 2020-10-16 PROCEDURE — 84295 ASSAY OF SERUM SODIUM: CPT

## 2020-10-16 PROCEDURE — 83690 ASSAY OF LIPASE: CPT

## 2020-10-16 PROCEDURE — 82803 BLOOD GASES ANY COMBINATION: CPT

## 2020-10-16 PROCEDURE — 82435 ASSAY OF BLOOD CHLORIDE: CPT

## 2020-10-16 PROCEDURE — 85025 COMPLETE CBC W/AUTO DIFF WBC: CPT

## 2020-10-16 PROCEDURE — 96376 TX/PRO/DX INJ SAME DRUG ADON: CPT

## 2020-10-16 PROCEDURE — 85610 PROTHROMBIN TIME: CPT

## 2020-10-16 RX ORDER — METOCLOPRAMIDE HCL 10 MG
10 TABLET ORAL ONCE
Refills: 0 | Status: COMPLETED | OUTPATIENT
Start: 2020-10-16 | End: 2020-10-16

## 2020-10-16 RX ORDER — ONDANSETRON 8 MG/1
4 TABLET, FILM COATED ORAL ONCE
Refills: 0 | Status: COMPLETED | OUTPATIENT
Start: 2020-10-16 | End: 2020-10-16

## 2020-10-16 RX ORDER — INSULIN HUMAN 100 [IU]/ML
6 INJECTION, SOLUTION SUBCUTANEOUS ONCE
Refills: 0 | Status: DISCONTINUED | OUTPATIENT
Start: 2020-10-16 | End: 2020-10-16

## 2020-10-16 RX ORDER — MORPHINE SULFATE 50 MG/1
4 CAPSULE, EXTENDED RELEASE ORAL ONCE
Refills: 0 | Status: DISCONTINUED | OUTPATIENT
Start: 2020-10-16 | End: 2020-10-16

## 2020-10-16 RX ORDER — INSULIN HUMAN 100 [IU]/ML
6 INJECTION, SOLUTION SUBCUTANEOUS ONCE
Refills: 0 | Status: COMPLETED | OUTPATIENT
Start: 2020-10-16 | End: 2020-10-16

## 2020-10-16 RX ORDER — INSULIN GLARGINE 100 [IU]/ML
20 INJECTION, SOLUTION SUBCUTANEOUS ONCE
Refills: 0 | Status: COMPLETED | OUTPATIENT
Start: 2020-10-16 | End: 2020-10-16

## 2020-10-16 RX ORDER — SODIUM CHLORIDE 9 MG/ML
1000 INJECTION INTRAMUSCULAR; INTRAVENOUS; SUBCUTANEOUS ONCE
Refills: 0 | Status: COMPLETED | OUTPATIENT
Start: 2020-10-16 | End: 2020-10-16

## 2020-10-16 RX ORDER — INSULIN DETEMIR 100/ML (3)
20 INSULIN PEN (ML) SUBCUTANEOUS ONCE
Refills: 0 | Status: DISCONTINUED | OUTPATIENT
Start: 2020-10-16 | End: 2020-10-16

## 2020-10-16 RX ADMIN — MORPHINE SULFATE 4 MILLIGRAM(S): 50 CAPSULE, EXTENDED RELEASE ORAL at 01:36

## 2020-10-16 RX ADMIN — SODIUM CHLORIDE 1000 MILLILITER(S): 9 INJECTION INTRAMUSCULAR; INTRAVENOUS; SUBCUTANEOUS at 02:20

## 2020-10-16 RX ADMIN — SODIUM CHLORIDE 1000 MILLILITER(S): 9 INJECTION INTRAMUSCULAR; INTRAVENOUS; SUBCUTANEOUS at 01:35

## 2020-10-16 RX ADMIN — Medication 2 MILLIGRAM(S): at 01:53

## 2020-10-16 RX ADMIN — MORPHINE SULFATE 4 MILLIGRAM(S): 50 CAPSULE, EXTENDED RELEASE ORAL at 02:20

## 2020-10-16 RX ADMIN — Medication 10 MILLIGRAM(S): at 04:26

## 2020-10-16 RX ADMIN — Medication 1 MILLIGRAM(S): at 02:42

## 2020-10-16 RX ADMIN — INSULIN GLARGINE 20 UNIT(S): 100 INJECTION, SOLUTION SUBCUTANEOUS at 05:06

## 2020-10-16 RX ADMIN — INSULIN HUMAN 6 UNIT(S): 100 INJECTION, SOLUTION SUBCUTANEOUS at 04:30

## 2020-10-16 RX ADMIN — ONDANSETRON 4 MILLIGRAM(S): 8 TABLET, FILM COATED ORAL at 01:36

## 2020-10-16 NOTE — ED PROVIDER NOTE - ATTENDING CONTRIBUTION TO CARE
Afebrile. Awake and Alert. Lungs CTA. Heart RRR. Abdomen soft , epigastric TTP, ND. CN II-XII grossly intact. Moves all extremities without lateralization.    r/o pancreatitis  r/o gastritis  r/o DKA  Monitor for signs of withdrawal Afebrile. Awake and Alert. Lungs CTA. Heart RRR. Abdomen soft , epigastric TTP, ND. CN II-XII grossly intact. Moves all extremities without lateralization.    r/o pancreatitis  r/o gastritis  r/o DKA  r/o liver cirrhosis with ascites  Monitor for signs of withdrawal

## 2020-10-16 NOTE — ED ADULT NURSE NOTE - OBJECTIVE STATEMENT
49 y/o female PMH of DM, DKA, pancreatitis, alcoholism, cholecystectomy presents to ED from home via EMS c/o severe abd pain, nausea, 1 episode of syncope. Pt reports she has been sober since July but recently 6 days ago started drinking again, about a pint of vodka a day. Since then, has had LUQ abd pain, n/v, chills, diarrhea. Pt reports tonight when she stood up to call EMS, she "heard a ringing in her ear, then fainted". Pt has small area of swelling noted to R side of forehead. EMS placed 20G to L hand and gave 4 of zofran IV and started 1 L of NS en route to ED. Pt reports last drink was 6 hrs ago, has gone into alcohol withdrawal in years past, including seizures and intubation. Denies chest pain, visual changes, numbness or tingling, weakness, blood in urine or stool, dizziness, heart palpitations, urinary symptoms. A&Ox4, breath sounds clear bilaterally, no edema, abd distended and very tender to LUQ, ambulatory independently, able to move all extremities. Neuro intact no deficit in strength or sensation, PEERL.

## 2020-10-16 NOTE — ED PROVIDER NOTE - PHYSICAL EXAMINATION
GENERAL: Patient awake alert NAD.  HEENT: NC/AT.  LUNGS: CTAB, no wheezes or crackles.  CARDIAC: RRR, no m/r/g.  ABDOMEN: Soft, diffuse upper quadrant abd tenderness, ND, No rebound, guarding.  EXT: No edema. No calf tenderness. CV 2+DP/PT bilaterally.  NEURO: A&Ox3. Moving all extremities.  SKIN: Warm and dry. No rash.  PSYCH: Normal affect.

## 2020-10-16 NOTE — ED ADULT NURSE REASSESSMENT NOTE - GENERAL PATIENT STATE
comfortable appearance/cooperative/resting/sleeping/no change observed/smiling/interactive/improvement verbalized

## 2020-10-16 NOTE — ED PROVIDER NOTE - PATIENT PORTAL LINK FT
You can access the FollowMyHealth Patient Portal offered by [facility] by registering at the following website: [email or web address]. By joining [facility]’s FollowMyHealth portal, you will also be able to view your health information using other applications (apps) compatible with our system.

## 2020-10-16 NOTE — ED PROVIDER NOTE - OBJECTIVE STATEMENT
50 y.o. F w/ PMHx of pancreatitis, DKA, etoh abuse, DM, cholecystectomy p/w diffuse abdominal pain, n/v/d since earlier today.  Pt. used to drink two pints vodka daily, stopped in July, and started again this past Friday.  Last drink 6 hours ago.  Did not eat anything out of the ordinary.  Pt. states she's had pancreatitis and DKA in the past.  States she took her short-acting insulin but has been missing her long-acting insulin on certain days.  States she's been intubated in the past for etoh withdrawal.  Denies any CP, SOB, f, +chills.

## 2020-10-16 NOTE — ED PROVIDER NOTE - PROGRESS NOTE DETAILS
Toño PGY2 - No ascites on bedside FAST exam.  Pt. still has pain.  Will order CT A/P w/ IV contrast. Abdominal pain resolved. Offered patient admission for alcohol detox due to high risk histroy, pt understands, has capcity and declines. She is currently in an oupt revocery program to which she wishes to return. Patient aware of risk of withdrawal and need for detox in an inpatient setting if she chooses to cease alcohol consumption, pt verbalized understanding. Offered to send patient's info to SBIRT at [facility], patient declined as she feels supported by her current program. Pt would like trial of PO and if passes, dc otherwise amenable to admission. Pt calm, cooperative, pleasant. ADITYA. Lucks-PGY2: pt received at sign-out, seen and evaluated at bedside.  Pt sitting comfortably in NAD. Abdominal exam benign. PO challenge in progress, will continue to monitor. Nancy-PGY2: pt tolerating PO intake without pain or emesis. Denies any withdrawal symptoms at this time. Pt requesting outpatient treatment for alcohol abuse, declines admission at this time. Discussed importance of outpatient follow up with return precautions. Pt understood and agreed. Abdominal pain resolved. Offered patient admission for alcohol detox due to high risk history, pt understands, has capacity and declines. She is currently in an oupt recovery program to which she wishes to return. Patient aware of risk of withdrawal and need for detox in an inpatient setting if she chooses to cease alcohol consumption, pt verbalized understanding. Offered to send patient's info to SBIRT at [facility] patient declined as she feels supported by her current program. Pt would like trial of PO and if passes, dc otherwise amenable to admission. Pt calm, cooperative, pleasant. ADITYA.

## 2020-10-16 NOTE — ED PROVIDER NOTE - NS ED ROS FT
General: +chills; denies fever, weight loss/weight gain.  HENT: denies nasal congestion, sore throat, rhinorrhea, ear pain.  Eyes: denies visual changes, blurred vision, eye discharge, eye redness.  Neck: denies neck pain, neck swelling.  CV: denies chest pain, palpitations.  Resp: denies difficulty breathing, cough.  Abdominal: +nausea, vomiting, diarrhea, abdominal pain; denies blood in stool, dark stool.  MSK: denies muscle aches, bony pain, leg pain, leg swelling.  Neuro: denies headaches, numbness, tingling, dizziness, lightheadedness.  Skin: denies rashes, cuts, bruises.  Hematologic: denies unexplained bruises.

## 2020-10-16 NOTE — ED PROVIDER NOTE - CLINICAL SUMMARY MEDICAL DECISION MAKING FREE TEXT BOX
50 y.o. F w/ hx of DM, prior DKA/pancreatitis, intubated for etoh withdrawal p/w diffuse abd pain since earlier today.  DKA vs pancreatitis. Hx of cholecystectomy.  Will get basics, trops, lipase, vbg w/ lactate, ketones, u/a w/ culture, administer fluids, manage sxs.  Will reassess and dispo pending results.

## 2020-10-16 NOTE — ED ADULT NURSE REASSESSMENT NOTE - NS ED NURSE REASSESS COMMENT FT1
pt appears much calmer in bed, is less fidgety and able to sit or lay still, watching tv resting comfortably. denies any new complaints and reports pain is tolerable. updated on plan of care.

## 2020-10-16 NOTE — ED PROVIDER NOTE - NSFOLLOWUPINSTRUCTIONS_ED_ALL_ED_FT
Abdominal Pain    Many things can cause abdominal pain. Many times, abdominal pain is not caused by a disease and will improve without treatment. Your health care provider will do a physical exam to determine if there is a dangerous cause of your pain; blood tests and imaging may help determine the cause of your pain. However, in many cases, no cause may be found and you may need further testing as an outpatient. Monitor your abdominal pain for any changes.     Follow up with your primary care physician in 1-3 days.    Follow up with your outpatient alcohol recovery program as discussed.    SEEK IMMEDIATE MEDICAL CARE IF YOU HAVE ANY OF THE FOLLOWING SYMPTOMS: worsening abdominal pain, uncontrollable vomiting, profuse diarrhea, inability to have bowel movements or pass gas, black or bloody stools, fever accompanying chest pain or back pain, tremors, symptoms of alcohol withdrawal, or fainting. These symptoms may represent a serious problem that is an emergency. Do not wait to see if the symptoms will go away. Get medical help right away. Call 911 and do not drive yourself to the hospital.

## 2020-10-17 LAB
CULTURE RESULTS: SIGNIFICANT CHANGE UP
SPECIMEN SOURCE: SIGNIFICANT CHANGE UP

## 2020-10-18 NOTE — ED PROCEDURE NOTE - ATTENDING CONTRIBUTION TO CARE
I was physically present for the E/M service provided. I was physically present for the key portions of the service provided. ADITYA.

## 2020-10-26 ENCOUNTER — INPATIENT (INPATIENT)
Facility: HOSPITAL | Age: 50
LOS: 1 days | Discharge: HOME | DRG: 439 | End: 2020-10-28
Attending: INTERNAL MEDICINE | Admitting: INTERNAL MEDICINE
Payer: MEDICARE

## 2020-10-26 VITALS
RESPIRATION RATE: 16 BRPM | HEIGHT: 62 IN | SYSTOLIC BLOOD PRESSURE: 113 MMHG | OXYGEN SATURATION: 95 % | DIASTOLIC BLOOD PRESSURE: 80 MMHG | TEMPERATURE: 98 F | HEART RATE: 92 BPM | WEIGHT: 169.98 LBS

## 2020-10-26 DIAGNOSIS — F43.10 POST-TRAUMATIC STRESS DISORDER, UNSPECIFIED: ICD-10-CM

## 2020-10-26 DIAGNOSIS — K85.20 ALCOHOL INDUCED ACUTE PANCREATITIS WITHOUT NECROSIS OR INFECTION: ICD-10-CM

## 2020-10-26 DIAGNOSIS — L02.91 CUTANEOUS ABSCESS, UNSPECIFIED: Chronic | ICD-10-CM

## 2020-10-26 DIAGNOSIS — E11.59 TYPE 2 DIABETES MELLITUS WITH OTHER CIRCULATORY COMPLICATIONS: ICD-10-CM

## 2020-10-26 DIAGNOSIS — F41.1 GENERALIZED ANXIETY DISORDER: ICD-10-CM

## 2020-10-26 DIAGNOSIS — Z29.9 ENCOUNTER FOR PROPHYLACTIC MEASURES, UNSPECIFIED: ICD-10-CM

## 2020-10-26 DIAGNOSIS — E87.2 ACIDOSIS: ICD-10-CM

## 2020-10-26 DIAGNOSIS — F10.20 ALCOHOL DEPENDENCE, UNCOMPLICATED: ICD-10-CM

## 2020-10-26 DIAGNOSIS — K76.0 FATTY (CHANGE OF) LIVER, NOT ELSEWHERE CLASSIFIED: ICD-10-CM

## 2020-10-26 DIAGNOSIS — K86.1 OTHER CHRONIC PANCREATITIS: ICD-10-CM

## 2020-10-26 DIAGNOSIS — Z90.89 ACQUIRED ABSENCE OF OTHER ORGANS: Chronic | ICD-10-CM

## 2020-10-26 DIAGNOSIS — Z98.890 OTHER SPECIFIED POSTPROCEDURAL STATES: Chronic | ICD-10-CM

## 2020-10-26 DIAGNOSIS — F33.9 MAJOR DEPRESSIVE DISORDER, RECURRENT, UNSPECIFIED: ICD-10-CM

## 2020-10-26 DIAGNOSIS — F10.230 ALCOHOL DEPENDENCE WITH WITHDRAWAL, UNCOMPLICATED: ICD-10-CM

## 2020-10-26 LAB
ALBUMIN SERPL ELPH-MCNC: 4.4 G/DL — SIGNIFICANT CHANGE UP (ref 3.3–5)
ALP SERPL-CCNC: 154 U/L — HIGH (ref 40–120)
ALT FLD-CCNC: 60 U/L — HIGH (ref 10–45)
ANION GAP SERPL CALC-SCNC: 16 MMOL/L — SIGNIFICANT CHANGE UP (ref 5–17)
ANION GAP SERPL CALC-SCNC: 19 MMOL/L — HIGH (ref 5–17)
APPEARANCE UR: CLEAR — SIGNIFICANT CHANGE UP
AST SERPL-CCNC: 50 U/L — HIGH (ref 10–40)
B-OH-BUTYR SERPL-SCNC: 0.3 MMOL/L — SIGNIFICANT CHANGE UP
BASE EXCESS BLDV CALC-SCNC: -1.7 MMOL/L — SIGNIFICANT CHANGE UP (ref -2–2)
BASE EXCESS BLDV CALC-SCNC: -1.8 MMOL/L — SIGNIFICANT CHANGE UP (ref -2–2)
BASE EXCESS BLDV CALC-SCNC: -3.5 MMOL/L — LOW (ref -2–2)
BASE EXCESS BLDV CALC-SCNC: -3.6 MMOL/L — LOW (ref -2–2)
BASOPHILS # BLD AUTO: 0.06 K/UL — SIGNIFICANT CHANGE UP (ref 0–0.2)
BASOPHILS NFR BLD AUTO: 0.9 % — SIGNIFICANT CHANGE UP (ref 0–2)
BILIRUB SERPL-MCNC: 0.4 MG/DL — SIGNIFICANT CHANGE UP (ref 0.2–1.2)
BILIRUB UR-MCNC: NEGATIVE — SIGNIFICANT CHANGE UP
BUN SERPL-MCNC: 6 MG/DL — LOW (ref 7–23)
BUN SERPL-MCNC: 7 MG/DL — SIGNIFICANT CHANGE UP (ref 7–23)
CA-I SERPL-SCNC: 1.07 MMOL/L — LOW (ref 1.12–1.3)
CA-I SERPL-SCNC: 1.1 MMOL/L — LOW (ref 1.12–1.3)
CALCIUM SERPL-MCNC: 8.6 MG/DL — SIGNIFICANT CHANGE UP (ref 8.4–10.5)
CALCIUM SERPL-MCNC: 9 MG/DL — SIGNIFICANT CHANGE UP (ref 8.4–10.5)
CHLORIDE BLDV-SCNC: 106 MMOL/L — SIGNIFICANT CHANGE UP (ref 96–108)
CHLORIDE BLDV-SCNC: 106 MMOL/L — SIGNIFICANT CHANGE UP (ref 96–108)
CHLORIDE BLDV-SCNC: 107 MMOL/L — SIGNIFICANT CHANGE UP (ref 96–108)
CHLORIDE BLDV-SCNC: 107 MMOL/L — SIGNIFICANT CHANGE UP (ref 96–108)
CHLORIDE SERPL-SCNC: 100 MMOL/L — SIGNIFICANT CHANGE UP (ref 96–108)
CHLORIDE SERPL-SCNC: 97 MMOL/L — SIGNIFICANT CHANGE UP (ref 96–108)
CO2 BLDV-SCNC: 22 MMOL/L — SIGNIFICANT CHANGE UP (ref 22–30)
CO2 BLDV-SCNC: 23 MMOL/L — SIGNIFICANT CHANGE UP (ref 22–30)
CO2 BLDV-SCNC: 25 MMOL/L — SIGNIFICANT CHANGE UP (ref 22–30)
CO2 BLDV-SCNC: 26 MMOL/L — SIGNIFICANT CHANGE UP (ref 22–30)
CO2 SERPL-SCNC: 20 MMOL/L — LOW (ref 22–31)
CO2 SERPL-SCNC: 20 MMOL/L — LOW (ref 22–31)
COLOR SPEC: SIGNIFICANT CHANGE UP
CREAT SERPL-MCNC: 0.5 MG/DL — SIGNIFICANT CHANGE UP (ref 0.5–1.3)
CREAT SERPL-MCNC: 0.56 MG/DL — SIGNIFICANT CHANGE UP (ref 0.5–1.3)
DIFF PNL FLD: NEGATIVE — SIGNIFICANT CHANGE UP
EOSINOPHIL # BLD AUTO: 0.19 K/UL — SIGNIFICANT CHANGE UP (ref 0–0.5)
EOSINOPHIL NFR BLD AUTO: 2.8 % — SIGNIFICANT CHANGE UP (ref 0–6)
ETHANOL SERPL-MCNC: 308 MG/DL — HIGH (ref 0–10)
GAS PNL BLDV: 136 MMOL/L — SIGNIFICANT CHANGE UP (ref 135–145)
GAS PNL BLDV: 136 MMOL/L — SIGNIFICANT CHANGE UP (ref 135–145)
GAS PNL BLDV: 137 MMOL/L — SIGNIFICANT CHANGE UP (ref 135–145)
GAS PNL BLDV: 137 MMOL/L — SIGNIFICANT CHANGE UP (ref 135–145)
GAS PNL BLDV: SIGNIFICANT CHANGE UP
GLUCOSE BLDC GLUCOMTR-MCNC: 217 MG/DL — HIGH (ref 70–99)
GLUCOSE BLDC GLUCOMTR-MCNC: 237 MG/DL — HIGH (ref 70–99)
GLUCOSE BLDC GLUCOMTR-MCNC: 297 MG/DL — HIGH (ref 70–99)
GLUCOSE BLDV-MCNC: 250 MG/DL — HIGH (ref 70–99)
GLUCOSE BLDV-MCNC: 264 MG/DL — HIGH (ref 70–99)
GLUCOSE BLDV-MCNC: 344 MG/DL — HIGH (ref 70–99)
GLUCOSE BLDV-MCNC: 445 MG/DL — HIGH (ref 70–99)
GLUCOSE SERPL-MCNC: 255 MG/DL — HIGH (ref 70–99)
GLUCOSE SERPL-MCNC: 421 MG/DL — HIGH (ref 70–99)
GLUCOSE UR QL: ABNORMAL
HCG SERPL-ACNC: <2 MIU/ML — SIGNIFICANT CHANGE UP
HCG UR QL: NEGATIVE — SIGNIFICANT CHANGE UP
HCO3 BLDV-SCNC: 21 MMOL/L — SIGNIFICANT CHANGE UP (ref 21–29)
HCO3 BLDV-SCNC: 22 MMOL/L — SIGNIFICANT CHANGE UP (ref 21–29)
HCO3 BLDV-SCNC: 24 MMOL/L — SIGNIFICANT CHANGE UP (ref 21–29)
HCO3 BLDV-SCNC: 24 MMOL/L — SIGNIFICANT CHANGE UP (ref 21–29)
HCT VFR BLD CALC: 38.8 % — SIGNIFICANT CHANGE UP (ref 34.5–45)
HCT VFR BLD CALC: 44.1 % — SIGNIFICANT CHANGE UP (ref 34.5–45)
HCT VFR BLDA CALC: 41 % — SIGNIFICANT CHANGE UP (ref 39–50)
HCT VFR BLDA CALC: 42 % — SIGNIFICANT CHANGE UP (ref 39–50)
HCT VFR BLDA CALC: 44 % — SIGNIFICANT CHANGE UP (ref 39–50)
HCT VFR BLDA CALC: 50 % — SIGNIFICANT CHANGE UP (ref 39–50)
HGB BLD CALC-MCNC: 13.4 G/DL — SIGNIFICANT CHANGE UP (ref 11.5–15.5)
HGB BLD CALC-MCNC: 13.8 G/DL — SIGNIFICANT CHANGE UP (ref 11.5–15.5)
HGB BLD CALC-MCNC: 14.3 G/DL — SIGNIFICANT CHANGE UP (ref 11.5–15.5)
HGB BLD CALC-MCNC: 16.4 G/DL — HIGH (ref 11.5–15.5)
HGB BLD-MCNC: 13.1 G/DL — SIGNIFICANT CHANGE UP (ref 11.5–15.5)
HGB BLD-MCNC: 15.1 G/DL — SIGNIFICANT CHANGE UP (ref 11.5–15.5)
IMM GRANULOCYTES NFR BLD AUTO: 0.4 % — SIGNIFICANT CHANGE UP (ref 0–1.5)
INR BLD: 0.97 RATIO — SIGNIFICANT CHANGE UP (ref 0.88–1.16)
KETONES UR-MCNC: ABNORMAL
LACTATE BLDV-MCNC: 3.8 MMOL/L — HIGH (ref 0.7–2)
LACTATE BLDV-MCNC: 4.1 MMOL/L — CRITICAL HIGH (ref 0.7–2)
LACTATE BLDV-MCNC: 5.2 MMOL/L — CRITICAL HIGH (ref 0.7–2)
LACTATE BLDV-MCNC: 5.3 MMOL/L — CRITICAL HIGH (ref 0.7–2)
LACTATE SERPL-SCNC: 1.3 MMOL/L — SIGNIFICANT CHANGE UP (ref 0.7–2)
LACTATE SERPL-SCNC: 3.7 MMOL/L — HIGH (ref 0.7–2)
LEUKOCYTE ESTERASE UR-ACNC: NEGATIVE — SIGNIFICANT CHANGE UP
LIDOCAIN IGE QN: 14 U/L — SIGNIFICANT CHANGE UP (ref 7–60)
LYMPHOCYTES # BLD AUTO: 3.01 K/UL — SIGNIFICANT CHANGE UP (ref 1–3.3)
LYMPHOCYTES # BLD AUTO: 43.9 % — SIGNIFICANT CHANGE UP (ref 13–44)
MAGNESIUM SERPL-MCNC: 2 MG/DL — SIGNIFICANT CHANGE UP (ref 1.6–2.6)
MCHC RBC-ENTMCNC: 31.9 PG — SIGNIFICANT CHANGE UP (ref 27–34)
MCHC RBC-ENTMCNC: 32 PG — SIGNIFICANT CHANGE UP (ref 27–34)
MCHC RBC-ENTMCNC: 33.8 GM/DL — SIGNIFICANT CHANGE UP (ref 32–36)
MCHC RBC-ENTMCNC: 34.2 GM/DL — SIGNIFICANT CHANGE UP (ref 32–36)
MCV RBC AUTO: 93.2 FL — SIGNIFICANT CHANGE UP (ref 80–100)
MCV RBC AUTO: 94.6 FL — SIGNIFICANT CHANGE UP (ref 80–100)
MONOCYTES # BLD AUTO: 0.47 K/UL — SIGNIFICANT CHANGE UP (ref 0–0.9)
MONOCYTES NFR BLD AUTO: 6.9 % — SIGNIFICANT CHANGE UP (ref 2–14)
NEUTROPHILS # BLD AUTO: 3.1 K/UL — SIGNIFICANT CHANGE UP (ref 1.8–7.4)
NEUTROPHILS NFR BLD AUTO: 45.1 % — SIGNIFICANT CHANGE UP (ref 43–77)
NITRITE UR-MCNC: NEGATIVE — SIGNIFICANT CHANGE UP
NRBC # BLD: 0 /100 WBCS — SIGNIFICANT CHANGE UP (ref 0–0)
NRBC # BLD: 0 /100 WBCS — SIGNIFICANT CHANGE UP (ref 0–0)
OTHER CELLS CSF MANUAL: 18 ML/DL — SIGNIFICANT CHANGE UP (ref 18–22)
PCO2 BLDV: 37 MMHG — SIGNIFICANT CHANGE UP (ref 35–50)
PCO2 BLDV: 44 MMHG — SIGNIFICANT CHANGE UP (ref 35–50)
PCO2 BLDV: 44 MMHG — SIGNIFICANT CHANGE UP (ref 35–50)
PCO2 BLDV: 47 MMHG — SIGNIFICANT CHANGE UP (ref 35–50)
PH BLDV: 7.32 — LOW (ref 7.35–7.45)
PH BLDV: 7.33 — LOW (ref 7.35–7.45)
PH BLDV: 7.34 — LOW (ref 7.35–7.45)
PH BLDV: 7.37 — SIGNIFICANT CHANGE UP (ref 7.35–7.45)
PH UR: 6 — SIGNIFICANT CHANGE UP (ref 5–8)
PHOSPHATE SERPL-MCNC: 2.8 MG/DL — SIGNIFICANT CHANGE UP (ref 2.5–4.5)
PLATELET # BLD AUTO: 128 K/UL — LOW (ref 150–400)
PLATELET # BLD AUTO: 151 K/UL — SIGNIFICANT CHANGE UP (ref 150–400)
PO2 BLDV: 34 MMHG — SIGNIFICANT CHANGE UP (ref 25–45)
PO2 BLDV: 41 MMHG — SIGNIFICANT CHANGE UP (ref 25–45)
PO2 BLDV: 50 MMHG — HIGH (ref 25–45)
PO2 BLDV: 86 MMHG — HIGH (ref 25–45)
POTASSIUM BLDV-SCNC: 3.8 MMOL/L — SIGNIFICANT CHANGE UP (ref 3.5–5.3)
POTASSIUM BLDV-SCNC: 3.9 MMOL/L — SIGNIFICANT CHANGE UP (ref 3.5–5.3)
POTASSIUM BLDV-SCNC: 3.9 MMOL/L — SIGNIFICANT CHANGE UP (ref 3.5–5.3)
POTASSIUM BLDV-SCNC: 4 MMOL/L — SIGNIFICANT CHANGE UP (ref 3.5–5.3)
POTASSIUM SERPL-MCNC: 3.9 MMOL/L — SIGNIFICANT CHANGE UP (ref 3.5–5.3)
POTASSIUM SERPL-MCNC: 4.2 MMOL/L — SIGNIFICANT CHANGE UP (ref 3.5–5.3)
POTASSIUM SERPL-SCNC: 3.9 MMOL/L — SIGNIFICANT CHANGE UP (ref 3.5–5.3)
POTASSIUM SERPL-SCNC: 4.2 MMOL/L — SIGNIFICANT CHANGE UP (ref 3.5–5.3)
PROT SERPL-MCNC: 7.5 G/DL — SIGNIFICANT CHANGE UP (ref 6–8.3)
PROT UR-MCNC: NEGATIVE — SIGNIFICANT CHANGE UP
PROTHROM AB SERPL-ACNC: 11.6 SEC — SIGNIFICANT CHANGE UP (ref 10.6–13.6)
RBC # BLD: 4.1 M/UL — SIGNIFICANT CHANGE UP (ref 3.8–5.2)
RBC # BLD: 4.73 M/UL — SIGNIFICANT CHANGE UP (ref 3.8–5.2)
RBC # FLD: 12.8 % — SIGNIFICANT CHANGE UP (ref 10.3–14.5)
RBC # FLD: 13 % — SIGNIFICANT CHANGE UP (ref 10.3–14.5)
SAO2 % BLDV: 57 % — LOW (ref 67–88)
SAO2 % BLDV: 66 % — LOW (ref 67–88)
SAO2 % BLDV: 79 % — SIGNIFICANT CHANGE UP (ref 67–88)
SAO2 % BLDV: 96 % — HIGH (ref 67–88)
SARS-COV-2 IGG SERPL QL IA: NEGATIVE — SIGNIFICANT CHANGE UP
SARS-COV-2 IGM SERPL IA-ACNC: 0.5 INDEX — SIGNIFICANT CHANGE UP
SARS-COV-2 RNA SPEC QL NAA+PROBE: SIGNIFICANT CHANGE UP
SODIUM SERPL-SCNC: 136 MMOL/L — SIGNIFICANT CHANGE UP (ref 135–145)
SODIUM SERPL-SCNC: 136 MMOL/L — SIGNIFICANT CHANGE UP (ref 135–145)
SP GR SPEC: 1.04 — HIGH (ref 1.01–1.02)
UROBILINOGEN FLD QL: NEGATIVE — SIGNIFICANT CHANGE UP
WBC # BLD: 5.8 K/UL — SIGNIFICANT CHANGE UP (ref 3.8–10.5)
WBC # BLD: 6.86 K/UL — SIGNIFICANT CHANGE UP (ref 3.8–10.5)
WBC # FLD AUTO: 5.8 K/UL — SIGNIFICANT CHANGE UP (ref 3.8–10.5)
WBC # FLD AUTO: 6.86 K/UL — SIGNIFICANT CHANGE UP (ref 3.8–10.5)

## 2020-10-26 PROCEDURE — 90792 PSYCH DIAG EVAL W/MED SRVCS: CPT

## 2020-10-26 PROCEDURE — 99285 EMERGENCY DEPT VISIT HI MDM: CPT | Mod: 25

## 2020-10-26 PROCEDURE — 99223 1ST HOSP IP/OBS HIGH 75: CPT

## 2020-10-26 PROCEDURE — 74174 CTA ABD&PLVS W/CONTRAST: CPT | Mod: 26

## 2020-10-26 PROCEDURE — 71045 X-RAY EXAM CHEST 1 VIEW: CPT | Mod: 26

## 2020-10-26 RX ORDER — SODIUM CHLORIDE 9 MG/ML
1000 INJECTION, SOLUTION INTRAVENOUS
Refills: 0 | Status: DISCONTINUED | OUTPATIENT
Start: 2020-10-26 | End: 2020-10-26

## 2020-10-26 RX ORDER — INSULIN LISPRO 100/ML
VIAL (ML) SUBCUTANEOUS AT BEDTIME
Refills: 0 | Status: DISCONTINUED | OUTPATIENT
Start: 2020-10-26 | End: 2020-10-27

## 2020-10-26 RX ORDER — METOCLOPRAMIDE HCL 10 MG
10 TABLET ORAL ONCE
Refills: 0 | Status: COMPLETED | OUTPATIENT
Start: 2020-10-26 | End: 2020-10-26

## 2020-10-26 RX ORDER — ONDANSETRON 8 MG/1
4 TABLET, FILM COATED ORAL ONCE
Refills: 0 | Status: COMPLETED | OUTPATIENT
Start: 2020-10-26 | End: 2020-10-26

## 2020-10-26 RX ORDER — DEXTROSE 50 % IN WATER 50 %
15 SYRINGE (ML) INTRAVENOUS ONCE
Refills: 0 | Status: DISCONTINUED | OUTPATIENT
Start: 2020-10-26 | End: 2020-10-28

## 2020-10-26 RX ORDER — SODIUM CHLORIDE 9 MG/ML
1000 INJECTION, SOLUTION INTRAVENOUS ONCE
Refills: 0 | Status: COMPLETED | OUTPATIENT
Start: 2020-10-26 | End: 2020-10-26

## 2020-10-26 RX ORDER — TRAZODONE HCL 50 MG
100 TABLET ORAL AT BEDTIME
Refills: 0 | Status: DISCONTINUED | OUTPATIENT
Start: 2020-10-26 | End: 2020-10-26

## 2020-10-26 RX ORDER — GABAPENTIN 400 MG/1
1 CAPSULE ORAL
Qty: 0 | Refills: 0 | DISCHARGE

## 2020-10-26 RX ORDER — ACETAMINOPHEN 500 MG
650 TABLET ORAL ONCE
Refills: 0 | Status: COMPLETED | OUTPATIENT
Start: 2020-10-26 | End: 2020-10-26

## 2020-10-26 RX ORDER — TRAZODONE HCL 50 MG
50 TABLET ORAL AT BEDTIME
Refills: 0 | Status: DISCONTINUED | OUTPATIENT
Start: 2020-10-26 | End: 2020-10-28

## 2020-10-26 RX ORDER — INSULIN GLARGINE 100 [IU]/ML
15 INJECTION, SOLUTION SUBCUTANEOUS AT BEDTIME
Refills: 0 | Status: DISCONTINUED | OUTPATIENT
Start: 2020-10-26 | End: 2020-10-27

## 2020-10-26 RX ORDER — DIPHENHYDRAMINE HCL 50 MG
1 CAPSULE ORAL
Qty: 0 | Refills: 0 | DISCHARGE

## 2020-10-26 RX ORDER — INSULIN LISPRO 100/ML
VIAL (ML) SUBCUTANEOUS
Refills: 0 | Status: DISCONTINUED | OUTPATIENT
Start: 2020-10-26 | End: 2020-10-27

## 2020-10-26 RX ORDER — DEXTROSE 50 % IN WATER 50 %
25 SYRINGE (ML) INTRAVENOUS ONCE
Refills: 0 | Status: DISCONTINUED | OUTPATIENT
Start: 2020-10-26 | End: 2020-10-28

## 2020-10-26 RX ORDER — SODIUM CHLORIDE 9 MG/ML
1000 INJECTION INTRAMUSCULAR; INTRAVENOUS; SUBCUTANEOUS
Refills: 0 | Status: DISCONTINUED | OUTPATIENT
Start: 2020-10-26 | End: 2020-10-28

## 2020-10-26 RX ORDER — FOLIC ACID 0.8 MG
1 TABLET ORAL DAILY
Refills: 0 | Status: DISCONTINUED | OUTPATIENT
Start: 2020-10-26 | End: 2020-10-28

## 2020-10-26 RX ORDER — MORPHINE SULFATE 50 MG/1
4 CAPSULE, EXTENDED RELEASE ORAL EVERY 4 HOURS
Refills: 0 | Status: DISCONTINUED | OUTPATIENT
Start: 2020-10-26 | End: 2020-10-28

## 2020-10-26 RX ORDER — THIAMINE MONONITRATE (VIT B1) 100 MG
100 TABLET ORAL ONCE
Refills: 0 | Status: COMPLETED | OUTPATIENT
Start: 2020-10-26 | End: 2020-10-26

## 2020-10-26 RX ORDER — ONDANSETRON 8 MG/1
4 TABLET, FILM COATED ORAL EVERY 4 HOURS
Refills: 0 | Status: DISCONTINUED | OUTPATIENT
Start: 2020-10-26 | End: 2020-10-28

## 2020-10-26 RX ORDER — SERTRALINE 25 MG/1
50 TABLET, FILM COATED ORAL DAILY
Refills: 0 | Status: DISCONTINUED | OUTPATIENT
Start: 2020-10-26 | End: 2020-10-28

## 2020-10-26 RX ORDER — GABAPENTIN 400 MG/1
300 CAPSULE ORAL
Refills: 0 | Status: DISCONTINUED | OUTPATIENT
Start: 2020-10-26 | End: 2020-10-28

## 2020-10-26 RX ORDER — SODIUM CHLORIDE 9 MG/ML
1000 INJECTION, SOLUTION INTRAVENOUS
Refills: 0 | Status: DISCONTINUED | OUTPATIENT
Start: 2020-10-26 | End: 2020-10-28

## 2020-10-26 RX ORDER — PREGABALIN 225 MG/1
1000 CAPSULE ORAL DAILY
Refills: 0 | Status: DISCONTINUED | OUTPATIENT
Start: 2020-10-26 | End: 2020-10-28

## 2020-10-26 RX ORDER — DEXTROSE 50 % IN WATER 50 %
12.5 SYRINGE (ML) INTRAVENOUS ONCE
Refills: 0 | Status: DISCONTINUED | OUTPATIENT
Start: 2020-10-26 | End: 2020-10-28

## 2020-10-26 RX ORDER — MORPHINE SULFATE 50 MG/1
4 CAPSULE, EXTENDED RELEASE ORAL ONCE
Refills: 0 | Status: DISCONTINUED | OUTPATIENT
Start: 2020-10-26 | End: 2020-10-26

## 2020-10-26 RX ORDER — INSULIN DETEMIR 100/ML (3)
12 INSULIN PEN (ML) SUBCUTANEOUS
Qty: 0 | Refills: 0 | DISCHARGE

## 2020-10-26 RX ORDER — INSULIN GLARGINE 100 [IU]/ML
30 INJECTION, SOLUTION SUBCUTANEOUS AT BEDTIME
Refills: 0 | Status: DISCONTINUED | OUTPATIENT
Start: 2020-10-26 | End: 2020-10-26

## 2020-10-26 RX ORDER — ENOXAPARIN SODIUM 100 MG/ML
40 INJECTION SUBCUTANEOUS DAILY
Refills: 0 | Status: DISCONTINUED | OUTPATIENT
Start: 2020-10-26 | End: 2020-10-28

## 2020-10-26 RX ORDER — THIAMINE MONONITRATE (VIT B1) 100 MG
100 TABLET ORAL AT BEDTIME
Refills: 0 | Status: DISCONTINUED | OUTPATIENT
Start: 2020-10-26 | End: 2020-10-28

## 2020-10-26 RX ORDER — CHOLECALCIFEROL (VITAMIN D3) 125 MCG
1000 CAPSULE ORAL DAILY
Refills: 0 | Status: DISCONTINUED | OUTPATIENT
Start: 2020-10-26 | End: 2020-10-28

## 2020-10-26 RX ORDER — GLUCAGON INJECTION, SOLUTION 0.5 MG/.1ML
1 INJECTION, SOLUTION SUBCUTANEOUS ONCE
Refills: 0 | Status: DISCONTINUED | OUTPATIENT
Start: 2020-10-26 | End: 2020-10-28

## 2020-10-26 RX ADMIN — SERTRALINE 50 MILLIGRAM(S): 25 TABLET, FILM COATED ORAL at 21:03

## 2020-10-26 RX ADMIN — SODIUM CHLORIDE 1000 MILLILITER(S): 9 INJECTION, SOLUTION INTRAVENOUS at 04:42

## 2020-10-26 RX ADMIN — SODIUM CHLORIDE 100 MILLILITER(S): 9 INJECTION INTRAMUSCULAR; INTRAVENOUS; SUBCUTANEOUS at 10:35

## 2020-10-26 RX ADMIN — Medication 2 MILLIGRAM(S): at 10:33

## 2020-10-26 RX ADMIN — ENOXAPARIN SODIUM 40 MILLIGRAM(S): 100 INJECTION SUBCUTANEOUS at 17:37

## 2020-10-26 RX ADMIN — GABAPENTIN 300 MILLIGRAM(S): 400 CAPSULE ORAL at 12:46

## 2020-10-26 RX ADMIN — Medication 1 MILLIGRAM(S): at 10:33

## 2020-10-26 RX ADMIN — PREGABALIN 1000 MICROGRAM(S): 225 CAPSULE ORAL at 12:46

## 2020-10-26 RX ADMIN — Medication 650 MILLIGRAM(S): at 08:44

## 2020-10-26 RX ADMIN — MORPHINE SULFATE 4 MILLIGRAM(S): 50 CAPSULE, EXTENDED RELEASE ORAL at 04:41

## 2020-10-26 RX ADMIN — MORPHINE SULFATE 4 MILLIGRAM(S): 50 CAPSULE, EXTENDED RELEASE ORAL at 08:45

## 2020-10-26 RX ADMIN — SODIUM CHLORIDE 1000 MILLILITER(S): 9 INJECTION, SOLUTION INTRAVENOUS at 02:59

## 2020-10-26 RX ADMIN — ONDANSETRON 4 MILLIGRAM(S): 8 TABLET, FILM COATED ORAL at 16:43

## 2020-10-26 RX ADMIN — Medication 50 MILLIGRAM(S): at 16:02

## 2020-10-26 RX ADMIN — Medication 2: at 18:43

## 2020-10-26 RX ADMIN — Medication 10 MILLIGRAM(S): at 08:44

## 2020-10-26 RX ADMIN — Medication 50 MILLIGRAM(S): at 20:59

## 2020-10-26 RX ADMIN — INSULIN GLARGINE 15 UNIT(S): 100 INJECTION, SOLUTION SUBCUTANEOUS at 22:28

## 2020-10-26 RX ADMIN — Medication 2 MILLIGRAM(S): at 17:17

## 2020-10-26 RX ADMIN — SODIUM CHLORIDE 125 MILLILITER(S): 9 INJECTION INTRAMUSCULAR; INTRAVENOUS; SUBCUTANEOUS at 12:46

## 2020-10-26 RX ADMIN — ONDANSETRON 4 MILLIGRAM(S): 8 TABLET, FILM COATED ORAL at 06:23

## 2020-10-26 RX ADMIN — Medication 100 MILLIGRAM(S): at 21:02

## 2020-10-26 RX ADMIN — Medication 100 MILLIGRAM(S): at 10:33

## 2020-10-26 RX ADMIN — SODIUM CHLORIDE 1000 MILLILITER(S): 9 INJECTION, SOLUTION INTRAVENOUS at 04:34

## 2020-10-26 RX ADMIN — Medication 1 TABLET(S): at 10:33

## 2020-10-26 RX ADMIN — MORPHINE SULFATE 4 MILLIGRAM(S): 50 CAPSULE, EXTENDED RELEASE ORAL at 14:36

## 2020-10-26 RX ADMIN — Medication 50 MILLIGRAM(S): at 12:46

## 2020-10-26 RX ADMIN — MORPHINE SULFATE 4 MILLIGRAM(S): 50 CAPSULE, EXTENDED RELEASE ORAL at 14:00

## 2020-10-26 RX ADMIN — Medication 2: at 22:27

## 2020-10-26 RX ADMIN — Medication 2 MILLIGRAM(S): at 19:41

## 2020-10-26 RX ADMIN — Medication 2: at 13:38

## 2020-10-26 RX ADMIN — SODIUM CHLORIDE 125 MILLILITER(S): 9 INJECTION INTRAMUSCULAR; INTRAVENOUS; SUBCUTANEOUS at 19:41

## 2020-10-26 RX ADMIN — MORPHINE SULFATE 4 MILLIGRAM(S): 50 CAPSULE, EXTENDED RELEASE ORAL at 02:58

## 2020-10-26 RX ADMIN — Medication 1000 UNIT(S): at 12:46

## 2020-10-26 NOTE — H&P ADULT - PROBLEM SELECTOR PLAN 3
- f/u psychiatry consult  - cont zoloft, trazodone  - check EKG    Anxiety  - hold Klonopin since she is getting ativan for alcohol withdrawal  - see separate chart note for I-STOP reference  - gets 90 tabs every 30 days, she takes 2-3 times a day as needed

## 2020-10-26 NOTE — BEHAVIORAL HEALTH ASSESSMENT NOTE - NSBHCONSULTMEDS_PSY_A_CORE FT
Continue home Zoloft 50mg PO daily  Trazodone 50mg QHS PRN insomnia, patient takes 100mg QHS at home however reducing iso of standing benzos

## 2020-10-26 NOTE — H&P ADULT - HISTORY OF PRESENT ILLNESS
51 yo female w/pmh alcohol use disorder, pancreatitis, diabetes mellitis, depression, anxiety, presents to [facility] for cc abdominal pain, nausea, vomiting. She says this has occurred for about a week. She presented to the ED on 10/16, discharged home the same day, however her abdominal exam did not resolve. It is in the upper abdomen, radiates to her back, and comes and goes. She has been drinking vodka daily. She drinks ~1 pint of vodka a day, but says amount of vodka she drinks ranges from day to day. Last drink Sunday night. She has not been able to keep food or drink down for the past day. Also having non-bloody diarrhea on and off for the past week. Has been feeling warm but no measured fevers at home. She is concerned about the "damage" she has done to her body by drinking is feels very anxious. Denies feelings of wanting to hurt herself. Endorses compliance with her medications. 49 yo female w/pmh alcohol use disorder, pancreatitis, diabetes mellitis, depression, anxiety, presents to [facility] for cc abdominal pain, nausea, vomiting. She says this has occurred for about a week. She presented to the ED on 10/16, discharged home the same day, however her abdominal exam did not resolve. It is in the upper abdomen, radiates to her back, and comes and goes. She has been drinking vodka daily. She drinks ~1 pint of vodka a day, but says amount of vodka she drinks ranges from day to day. Last drink Sunday night. She has not been able to keep food or drink down for the past day. Also having non-bloody diarrhea on and off for the past week. Has been feeling warm but no measured fevers at home. She is concerned about the "damage" she has done to her body by drinking is feels very anxious. Denies feelings of wanting to hurt herself. Endorses compliance with her medications.    In the ER, patient was hemodynamically stable, afebrile, with normal CBC, but elevated AG and lactate of 5.3 after 3L IVF bolus. Psych consulted.

## 2020-10-26 NOTE — BEHAVIORAL HEALTH ASSESSMENT NOTE - SUICIDE PROTECTIVE FACTORS
Positive therapeutic relationships/Responsibility to family and others/Supportive social network of family or friends/Beloved pets/Identifies reasons for living/Has future plans/Cultural, spiritual and/or moral attitudes against suicide

## 2020-10-26 NOTE — H&P ADULT - PROBLEM SELECTOR PLAN 7
lovenox  clear liquid diet  consults: psychiatry lovenox  clear liquid diet  consults: psychiatry    "indeterminate splenic hypodensities" seen on CT abd - outpatient f/u

## 2020-10-26 NOTE — ED ADULT NURSE REASSESSMENT NOTE - NS ED NURSE REASSESS COMMENT FT1
as per MD constant observation discontinued, Pt calm and cooperative. will continue to monitor and reevaluate

## 2020-10-26 NOTE — H&P ADULT - NSICDXFAMILYHX_GEN_ALL_CORE_FT
FAMILY HISTORY:  Family history of diabetes mellitus  Family history of systemic lupus erythematosus  FH: breast cancer, grandmother    Sibling  Still living? Unknown  Family history of abscess of skin or subcutaneous tissue, Age at diagnosis: Age Unknown    Grandparent  Still living? Unknown  Family history of cerebral aneurysm, Age at diagnosis: Age Unknown    Aunt  Still living? Unknown  Family history of cerebral aneurysm, Age at diagnosis: Age Unknown

## 2020-10-26 NOTE — ED PROVIDER NOTE - PHYSICAL EXAMINATION
Gen - NAD; breathing comfortably and speaking full sentences ; A+Ox3   HEENT - NCAT, EOMI, PERRL, moist mucous membranes  Neck - supple, no LAD or swelling  Resp - CTAB, symmetric breath sounds  CV -  RRR  Abd - soft, tender diffusely in abdomen, ND; no guarding or rebound  Back - L CVA tenderness  MSK - 5/5 strength and FROM b/l UE and LE  Extrem - 3+ distal pulses b/L UE and LE  Skin - no rash or bruising, warm and well perfused  Neuro - no focal motor or sensation deficits  Psych - agitated but redirectable, labile affect, denies SI/HI, organized/linear thoughts

## 2020-10-26 NOTE — ED PROVIDER NOTE - CLINICAL SUMMARY MEDICAL DECISION MAKING FREE TEXT BOX
50 year old female with history of EtOH abuse, depression/anxiety, DM, pancreatitis presenting with abdominal/flank pain and N/V in setting of +etoh intox. Patient is nontoxic appearing here with normal vitals, agitated intermittently but cooperative. FSG 400s. Exam remarkable for diffuse tenderness in abdomen/flank. Suspicion high for pancreatitis vs DKA vs alcoholic KA. Plan - IV fluids, labs, betahydroxy, ekg, CT a/p

## 2020-10-26 NOTE — BEHAVIORAL HEALTH ASSESSMENT NOTE - SUMMARY
Patient is a 50 year old woman, disabled, single, domiciled with family, with PMH DM and pancreatitis, PPH MAURO, MDD, PTSD, and alcohol use disorder, 3 prior psychiatric hospitalizations at [facility] for depression <10 years ago, inpatient rehab 2 years ago for alcohol use, no history of SI/SIB/SA, history of withdrawal seizure >10 years ago, who presented to the ED with abdominal pain iso alcohol intoxication and is being admitted for inpatient treatment. Psych consulted for alcohol use and anxiety/depression.     Patient's daily alcohol use is the most pertinent issue, as it is of significant quantity and patient reports history of withdrawal seizure as well as tremors. She is motivated for change however faces numerous obstacles including accessing adequate care, disability, medical illness, psychosocial difficulty at home, and boredom. Also contributing is significant generalized anxiety and PTSD with related symptoms. When patient is medically stabilized, optimization of her psychotropic regimen should be considered, as well as increasing her treatment and social support in the community. She may ultimately benefit from more intensive outpatient follow up.

## 2020-10-26 NOTE — PROVIDER CONTACT NOTE (OTHER) - ASSESSMENT
Pt A&Ox4, C/O headache and nausea given PO Tylenol and Reglan at ED. Persistent symptoms w/o relief. Pt grimacing-PA Cisse aware and at the bedside. PA made aware no CIWA orders were ordered and to assess patient for score of 6.

## 2020-10-26 NOTE — PATIENT PROFILE ADULT - MEDICATIONS/VISITS
Patient needs to come in and see  To disscuss sx as there is no orders in. Please schedule for office visit    no

## 2020-10-26 NOTE — BEHAVIORAL HEALTH ASSESSMENT NOTE - CASE SUMMARY
Patient is a 50 year old woman, disabled, single, domiciled with family, with PMH DM and pancreatitis, PPH MAURO, MDD, PTSD, and alcohol use disorder, 3 prior psychiatric hospitalizations at [facility] for depression <10 years ago, inpatient rehab 2 years ago for alcohol use, no history of SI/SIB/SA, history of withdrawal seizure >10 years ago, who presented to the ED with abdominal pain is alcohol intoxication and is being admitted for inpatient treatment. Psych consulted for alcohol use and anxiety/depression.   I agree that the patient's daily alcohol use (2 pints of Votka daily for the past month)  is the most pertinent issue, and patient reports history of withdrawal seizure as well as tremors. I agree with starting Librium 100mg po q4hrs today and PRNs of Ativan 2mg q 1hr.

## 2020-10-26 NOTE — H&P ADULT - PROBLEM SELECTOR PLAN 5
transaminitis due to chronic alcohol use, likely also obesity  trend  counseled on importance of alcohol cessation to prevent further liver damage transaminitis due to chronic alcohol use, likely also obesity  trend cmp  counseled on importance of alcohol cessation to prevent further liver damage  check INR
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
## 2020-10-26 NOTE — BEHAVIORAL HEALTH ASSESSMENT NOTE - DESCRIPTION (FIRST USE, LAST USE, QUANTITY, FREQUENCY, DURATION)
2 pints of vodka daily for the past month, similar pattern for months at a time over prior 2 years, dependence since early 30s remote use >10 years ago

## 2020-10-26 NOTE — H&P ADULT - NSHPSOCIALHISTORY_GEN_ALL_CORE
drinks ~1 pint of vodka a day, but says amount of vodka she drinks ranges from day to day. Last drink Sunday night. Never use cigarettes. Has tried cocaine and marijuana in the past but not currently using.

## 2020-10-26 NOTE — H&P ADULT - NSHPPHYSICALEXAM_GEN_ALL_CORE
Vital Signs Last 24 Hrs  T(C): 36.9 (26 Oct 2020 10:31), Max: 36.9 (26 Oct 2020 10:31)  T(F): 98.4 (26 Oct 2020 10:31), Max: 98.4 (26 Oct 2020 10:31)  HR: 104 (26 Oct 2020 10:31) (78 - 104)  BP: 159/94 (26 Oct 2020 10:31) (112/68 - 159/94)  BP(mean): --  RR: 20 (26 Oct 2020 10:31) (15 - 20)  SpO2: 95% (26 Oct 2020 10:31) (93% - 100%)    PHYSICAL EXAM:  GENERAL:  Well appearing, in NAD, anxious, pleasant and cooperative  HEAD:  NCAT  EYES: PERRLA, conjunctiva clear  NECK: Supple, No JVD  CHEST/LUNG: CTA B/L. No w/r/r.  HEART: Reg rate. Normal S1, S2. No m/r/g.   ABDOMEN: Soft. Distended. TTP in epigastric region and LLQ. Bowel sounds present.  EXTREMITIES:  2+ Peripheral Pulses, No clubbing, cyanosis, edema.  PSYCH: AAOx3, appropriate affect  NEUROLOGY: grossly non-focal. +tremors of b/l arms  SKIN: No rashes or lesions

## 2020-10-26 NOTE — ED PROVIDER NOTE - ATTENDING CONTRIBUTION TO CARE
I performed a history and physical exam of the patient and discussed their management with the resident. I reviewed the resident's note and agree with the documented findings and plan of care. My medical decision making and observations are found above.    50 year old female with history of etoh abuse, pancreatitis, insulin dependent DM, depression/anxiety, bipolar disorder, and PTSD BIBEMS for abdominal/flank pain and N/V in setting of etoh intoxication. Patient reports gradual constant diffuse abd pain since yesterday. "feels like pancreatitis." A/w nbnb vomiting. +passing gas. no urinary symptoms. Last drink 6hours pta. no cp, sob. on exam, patient intoxicated aaox4, appears slightly uncomfortable but nontoxic. agitated but redirectable. abd with diffuse ttp without rigidity/rebound. no pulsating masses. no cva ttp. cards rrr no m/r/g, lungs ctabl. lower extremities equal b/l. 2+ pulses in distal extremities b/l. Possibly pancreatitis vs gb vs gastritis/pud. lower suspicion for ischemia/perf. Not actively withdrawing. will get labs, supportive care, imaging, reassess.

## 2020-10-26 NOTE — H&P ADULT - PROBLEM SELECTOR PLAN 1
- lipase 14 but CT showed pancreatic atrophy; also lipase has always been wnl (peak was 42 in Dec 2019)  - cont w/IVF  - clear liquid diet  - zofran prn nausea  - morphine prn abdominal pain - lipase 14 but CT shows pancreatic atrophy; also lipase has always been wnl (peak was 42 in Dec 2019)  - cont w/IVF  - clear liquid diet  - zofran prn nausea  - morphine prn abdominal pain

## 2020-10-26 NOTE — SBIRT NOTE ADULT - NSSBIRTBRIEFINTDET_GEN_A_CORE
Patient acknowledges excessive alcohol use in the context of family stress. Referral to be discussed on admission. Patient reports she is not well enough to complete full assessment.

## 2020-10-26 NOTE — ED PROVIDER NOTE - OBJECTIVE STATEMENT
50 year old female with history of etoh abuse, pancreatitis, DM, depression/anxiety, bipolar disorder, and PTSD BIBEMS for abdominal/flank pain in setting of etoh intoxication.    Per chart review, patient has history of agitation when intoxicated. 50 year old female with history of etoh abuse, pancreatitis, insulin dependent DM, depression/anxiety, bipolar disorder, and PTSD BIBEMS for abdominal/flank pain and N/V in setting of etoh intoxication. Patient is uncooperative and agitated at this time, unable to obtain clear history, but complains of significant epigastric/LLQ pain with L back pain, reports "it feels like pancreatitis". States she had vodka but does not say how much. Denies SI/HI. No fevers or recent trauma/illness. Has been poorly compliant with psych meds as she "doesn't want to mix with alcohol". Per chart review, patient has history of agitation when intoxicated.

## 2020-10-26 NOTE — BEHAVIORAL HEALTH ASSESSMENT NOTE - DIFFERENTIAL
Alcohol Use Disorder  Generalized Anxiety Disorder  Post Traumatic Stress Disorder  r/o Substance-Induced Mood Disorder  r/o Adjustment Disorder  r/o Major Depressive Disorder

## 2020-10-26 NOTE — H&P ADULT - NSHPREVIEWOFSYSTEMS_GEN_ALL_CORE
REVIEW OF SYSTEMS:    CONSTITUTIONAL: No weakness, weight loss, fevers or chills  EYES/ENT: No visual changes;  No vertigo or throat pain   NECK: No pain or stiffness  RESPIRATORY: No cough, wheezing, hemoptysis; No shortness of breath  CARDIOVASCULAR: No chest pain or palpitations, DOAN  GASTROINTESTINAL: + epigastric pain, nausea, vomiting. No hematemesis; No constipation. No melena or hematochezia.   GENITOURINARY: No dysuria, frequency or hematuria  NEUROLOGICAL: No numbness or weakness, AAOX3  SKIN: No itching, rashes  MUSCULOSKELETAL: no joint erythema, no joint swelling  PSYCHIATRIC: + depression, + anxiety, no HI/SI

## 2020-10-26 NOTE — ED ADULT NURSE NOTE - OBJECTIVE STATEMENT
49y/o female  presents to the ED via EMS from home c/o lower left abdominal pain (6/10 localized sharp pain), as per EMS pt had multiple alcoholic drinks unsure of actual amount of alcohol consumed. EMS blood sugar was 430 MD notified and aware, upon arrival to ED pt became agitated and was at risk for self harm. Md notified and ordered constant observation. Pt is AOx4, patent airway, clear lung sounds, tender upon palpation to LLQ, strong peripheral pulses, no changes in bowel/bladder patterns. Patient denies fever, chills, n/v, weakness, abd pain, diarrhea/constipation, numbness/tingling, urinary s/s, in no respiratory distress, no chest pain, Patient safety provided with call bell within reach and bed in the lowest position.

## 2020-10-26 NOTE — BEHAVIORAL HEALTH ASSESSMENT NOTE - NSBHCONSULTRECOMMENDOTHER_PSY_A_CORE FT
Please start Librium 50mg PO Q4h for alcohol withdrawal ASAP as patient is at high risk  Also start Ativan 2mg IV Q1h for increase in CIWA by 2 points

## 2020-10-26 NOTE — H&P ADULT - NSHPLABSRESULTS_GEN_ALL_CORE
EKG personally reviewed:    CXR personally reviewed: CXR personally reviewed: rotated, poor inspiratory effort, no infiltrates or effusions

## 2020-10-26 NOTE — BEHAVIORAL HEALTH ASSESSMENT NOTE - NSBHCHARTREVIEWIMAGING_PSY_A_CORE FT
< from: CT Abdomen and Pelvis w/ IV Cont (10.16.20 @ 05:31) >    IMPRESSION:  No acute findingsto explain the patient's symptoms.    Marked hepatic steatosis.    Interval increase in size and number of indeterminate splenic hypodense lesions. Clinical correlation is recommended and follow-up MRI can be obtained for further evaluation.    < end of copied text >    < from: CT Angio Abdomen and Pelvis w/ IV Cont (10.26.20 @ 05:15) >    IMPRESSION:  No CT evidence of acute mesenteric ischemia.    No bowel obstruction or evidence of acute malformation.    Severe hepatic steatosis.    Redemonstration of numerous indeterminant splenic hypodensities. Clinical correlation and MRI may be obtained for further evaluation.    < end of copied text >

## 2020-10-26 NOTE — H&P ADULT - ASSESSMENT
51 yo female w/pmh alcohol use disorder, pancreatitis, diabetes mellitis, depression, anxiety, presents to [facility] for cc abdominal pain, nausea, vomiting. Admitted for alcohol withdrawal, elevated lactate, and pancreatitis.

## 2020-10-26 NOTE — H&P ADULT - PROBLEM SELECTOR PLAN 6
- beta hydroxybutyrate wnl, pH 7.34  - UA: small ketones, CMP glucose 421, AG 19  - seems to have resolving mild DKA, most recent VBG anion gap is 6, though lactate is still rising, BP will wnl  - cont IVF, trend lactate - liver disease may be affecting ability to clear lactate?  - CT abdomen/pelvis no e/o mesenteric ischemia, other infectious sources  - check blood cx - beta hydroxybutyrate wnl, pH 7.34  - UA: small ketones, CMP glucose 421, AG 19  - seems to have resolving mild DKA, most recent VBG anion gap is 6, though lactate is still rising, BP will wnl  - cont IVF, trend lactate  - CT abdomen/pelvis no e/o mesenteric ischemia, other infectious sources  - check blood cx

## 2020-10-26 NOTE — H&P ADULT - NSICDXPASTMEDICALHX_GEN_ALL_CORE_FT
PAST MEDICAL HISTORY:  Alcohol abuse     Anxiety     Depression     Diabetes     Hepatic steatosis     MRSA cellulitis     Pancreatitis

## 2020-10-26 NOTE — CHART NOTE - NSCHARTNOTEFT_GEN_A_CORE
This report was requested by: Amy Frye | Reference #: [id number]    Patient Name: Danna Wright Date: 1970  Address: -[address] [place], [place] [address]Xvu: Female  Rx Written	Rx Dispensed	Drug	Quantity	Days Supply	Prescriber Name	Payment Method	Dispenser  10/01/2020	10/05/2020	clonazepam 0.5 mg tablet	90	30	Jose Antonio Hidalgo MD	[facility]  09/10/2020	09/16/2020	clonazepam 0.5 mg tablet	60	30	DENNIS Powell MD	[facility]  08/18/2020	08/18/2020	acetaminophen-cod #3 tablet	12	2	Jessica Casillas MD DDS	[facility]  08/08/2020	08/08/2020	clonazepam 0.5 mg tablet	90	30	Jose Antonio Hidalgo MD	[facility]  06/25/2020	06/26/2020	clonazepam 0.5 mg tablet	90	30	Jose Antonio Hidalgo MD	[facility]  05/12/2020	05/14/2020	clonazepam 0.5 mg tablet	90	30	Jose Antonio Hidalgo MD	[facility]  03/31/2020	03/31/2020	clonazepam 0.5 mg tablet	90	30	Jose Antonio Hidalgo MD	[facility]

## 2020-10-26 NOTE — BEHAVIORAL HEALTH ASSESSMENT NOTE - NSBHCHARTREVIEWVS_PSY_A_CORE FT
ICU Vital Signs Last 24 Hrs  T(C): 36.9 (26 Oct 2020 10:31), Max: 36.9 (26 Oct 2020 10:31)  T(F): 98.4 (26 Oct 2020 10:31), Max: 98.4 (26 Oct 2020 10:31)  HR: 104 (26 Oct 2020 10:31) (78 - 104)  BP: 159/94 (26 Oct 2020 10:31) (112/68 - 159/94)  BP(mean): --  ABP: --  ABP(mean): --  RR: 20 (26 Oct 2020 10:31) (15 - 20)  SpO2: 95% (26 Oct 2020 10:31) (93% - 100%)

## 2020-10-26 NOTE — BEHAVIORAL HEALTH ASSESSMENT NOTE - NSBHSOCIALHXDETAILSFT_PSY_A_CORE
Born in [place], grew up in [place]. Lived with mother and brother, at one point father was in the picture but left iso substance use. Graduated from [employer] and formerly employed in [employer]. Current on disability for mental health reasons. Has emotional support dog for the past 6 years.

## 2020-10-26 NOTE — BEHAVIORAL HEALTH ASSESSMENT NOTE - NSBHCHARTREVIEWLAB_PSY_A_CORE FT
15.1   6.86  )-----------( 151      ( 26 Oct 2020 02:57 )             44.1     10-26    136  |  97  |  7   ----------------------------<  421<H>  3.9   |  20<L>  |  0.56    Ca    9.0      26 Oct 2020 02:57  Phos  2.8     10-26  Mg     2.0     10-26    TPro  7.5  /  Alb  4.4  /  TBili  0.4  /  DBili  x   /  AST  50<H>  /  ALT  60<H>  /  AlkPhos  154<H>  10-26    Lipase, Serum: 14 U/L Urinalysis (10.26.20 @ 05:40)   Glucose Qualitative, Urine: 1000 mg/dL   Blood, Urine: Negative   pH Urine: 6.0   Color: Light Yellow   Urine Appearance: Clear   Bilirubin: Negative   Ketone - Urine: Small   Specific Gravity: 1.043   Protein, Urine: Negative   Urobilinogen: Negative   Nitrite: Negative   Leukocyte Esterase Concentration: Negative Blood Gas Profile - Venous (10.26.20 @ 09:35)   pH, Venous: 7.34   pCO2, Venous: 44 mmHg   pO2, Venous: 34 mmHg   HCO3, Venous: 24 mmol/L   Base Excess, Venous: -1.8 mmol/L   Oxygen Saturation, Venous: 57 %   Total CO2, Venous: 25 mmol/L

## 2020-10-26 NOTE — BEHAVIORAL HEALTH ASSESSMENT NOTE - NSBHMEDSOTHERFT_PSY_A_CORE
Currently takes Zoloft 50mg daily, Trazodone 100mg QHS, and Klonopin 0.5mg TID PRN anxiety (typically 1-2 doses/day) Currently takes Zoloft 50mg daily, Trazodone 100mg QHS, and Klonopin 0.5mg TID PRN anxiety (typically 1-2 doses/day)    Per iSTOP:  10/01/2020 10/05/2020 clonazepam 0.5 mg tablet  90 30 Jose Antonio Hidalgo MD [facility]   09/10/2020 09/16/2020 clonazepam 0.5 mg tablet  60 30 DENNIS Powell MD [facility]   08/18/2020 08/18/2020 acetaminophen-cod #3 tablet  12 2 Jessica Casillas MD [facility]   08/08/2020 08/08/2020 clonazepam 0.5 mg tablet  90 30 Jose Antonio Hidalgo MD [facility]   06/25/2020 06/26/2020 clonazepam 0.5 mg tablet  90 30 Jose Antonio Hidalgo MD [facility]   05/12/2020 05/14/2020 clonazepam 0.5 mg tablet  90 30 Jose Antonio Hidalgo MD [facility]   03/31/2020 03/31/2020 clonazepam 0.5 mg tablet  90 30 Jose Antonio Hidalgo MD [facility]   02/03/2020 02/04/2020 clonazepam 0.5 mg tablet  90 30 Jose Antonio Hidalgo MD [facility]   12/19/2019 12/19/2019 clonazepam 0.5 mg tablet  90 30 Jose Antonio Hidalgo [facility]   12/16/2019 12/16/2019 acetaminophen-cod #3 tablet  16 2 Jessica Casillas MD [facility]   11/09/2019 11/09/2019 clonazepam 0.5 mg tablet  90 30 Jose Antonio Hidalgo [facility]

## 2020-10-26 NOTE — PATIENT PROFILE ADULT - MONEY FOR FOOD
[facility] - PRIMARY CARE SKIN    CC: Acne  SUBJECTIVE:   Misbah Thompson is a(n) 23 year old male who presents to clinic today for follow-up of treatment of his acne with oral isotretinoin .  Previous treatments include: oral doxycycline, topical clindamycin, topical tretinoin.  .  Few pimples  No definite joint symptoms , or mood changes   Months completed : 3  Current dosage : 30 mg bid  Treatment response : arms are dry  Side effects noted : Dryness.    MangatarEDGE #: [id number]    Refer to electronic medical record (EMR) for past medical history and medications.    INTEGUMENTARY/SKIN: POSITIVE for acne  ROS: 14 point review of systems was negative except the symptoms listed above in the HPI.        OBJECTIVE:     Wt Readings from Last 2 Encounters:   09/05/19 127 lb (57.6 kg)   08/05/19 127 lb (57.6 kg)     GENERAL: healthy, alert and no distress.  SKIN: Lebron Skin Type - II.  Face, Neck and Trunk examined. The dermatoscope was used to help evaluate pigmented lesions.  Skin Pertinent Findings:                          Face: Moderate-severe scarring. Inflammatory papules   Chest: Some inflammatory papules.    Shoulders: some inflammatory papules and some moderate scarring    Back: Clear.    Diagnostic Test Results:  Monitoring CBC, Lipid Profile, AST, (hCG if female).    ASSESSMENT:     Encounter Diagnosis   Name Primary?     Acne vulgaris Yes     Comment: severe acne, oral isotretinoin treatment with monthly monitoring.  MDM: Side effects of oral isotretinoin reviewed - dryness of the skin and mucous membranes, arthralgias, myalgias, mood changes. Discussed potential flare of the acne.    PLAN:     Patient Instructions   FUTURE APPOINTMENTS  Follow up in 28-31 days.    ORAL ISOTRETINOIN INSTRUCTIONS  CURRENT DOSAGE: Take by mouth one 40 mg tablet, 2 times a day.      Stop all other acne products, except you may use only Cetaphil or CeraVe facial cleanser.    Take the isotretinoin with a fatty meal  "(such as peanut butter or yogurt) to improve the absorption of the medication.    OFFICE VISIT INSTRUCTIONS    Schedule follow-up appointments every 28-31 days.    Bring iPledge ID# and PASSWORD with you to each office visit. Make sure you have obtained your password before the next office visit.    IF YOU HAVE NOT RECEIVED YOUR PASSWORD IN THE MAIL IN 2 WEEKS, CALL IPLEDGE.    You will need to do a lab blood draw every month:    Schedule blood draw to be completed 1-2 days prior to each office visit  You may complete these at any [facility] lab; just remember to schedule it before you go.      If you are being seen elsewhere by a dermatologist for oral isotretinoin monitoring, be sure to go into iPledge for \"transfer of care\" for the appropriate physician.    Make sure to always  your medication within 3 days of prescription being sent to the pharmacy    Check with your insurance company for coverage of oral isotretinoin therapy for recalcitrant acne. Ask if they have a preferred brand of oral isotretinoin (e.g. Claravis, Myorisan, Zenatane, Amnesteem, Sotret)    RECOMMENDATIONS FOR DRYNESS    Moisturizer : Cetaphil facial moisturizer.    Nasal mist spray : Ocean brand. Use at bedtime.    Do not use sommer-synephrine.    Lips : Aquaphor ointment, Vaseline jelly, or Vanicream lip protectant for the dryness on the lips.    Eye drops : Refresh tears saline eye drops for dry eye symptoms. Consider also use of gel eye drops at bedtime if excessive eye dryness.    Due to dryness of mouth, floss daily and brush teeth at least twice daily.    Consider supplementation of omega 3 oil 1 gram/day.    Make sure to apply sunscreen regularly.        The dose is 0.5-1 mg/kg in two divided doses for 15-20 weeks.    After discussion of these important issues, he indicates complete understanding of all of the above, and Does wish to proceed with accutane therapy.    Literature provided: iPLEDGE Program materials. The patient " was thoroughly counseled about side effects, benefits, and risks of isotretinoin. he was registered on the iPLEDGE Program website. Initial lab studies were ordered. A signed consent was obtained.    Goal dosage: 8640 mg = 57.6 kg (actual weight) 150 mg/kg.    Dose:  Month #: 3  Oral isotretinoin dosage: 40 mg po BID.  Insurance preferred brand: Patient to check on insurance status.    Previous oral isotretinoin dosing:  N/A    Total dose to date of current course: 3000    RTC in one month for follow up, or sooner prn, with laboratory studies completed.    TT: 20 minutes.  CT: 15 minutes.   no

## 2020-10-26 NOTE — BEHAVIORAL HEALTH ASSESSMENT NOTE - NSBHCHARTREVIEWINVESTIGATE_PSY_A_CORE FT
< from: 12 Lead ECG (10.16.20 @ 01:00) >    Ventricular Rate 81 BPM    Atrial Rate 81 BPM    P-R Interval 130 ms    QRS Duration 82 ms    Q-T Interval 420 ms    QTC Calculation(Bazett) 487 ms    P Axis 50 degrees    R Axis 12 degrees    T Axis 18 degrees    Diagnosis Line NORMAL SINUS RHYTHM  T WAVE ABNORMALITY, CONSIDER ANTERIOR ISCHEMIA  ABNORMAL ECG  WHEN COMPARED WITH ECG OF 27-SEP-2019 08:35,  SIGNIFICANT CHANGES HAVE OCCURRED  anterior ST changes    < end of copied text >

## 2020-10-26 NOTE — ED PROVIDER NOTE - PROGRESS NOTE DETAILS
labs possibly c/w chronic pancreatitis although still unclear. will ctap for further eval given patient still requesting pain medications. Stephane PGY-1: CTa neg for mesenteric ischemia/pancreatitis. Patient reassessed, states pain improved but nauseous, will administer zofran. Discussed admission for pain control and potential further work up vs outpatient GI follow up. Patient expresses preference for D/C home with follow up with PCP and GI. Patient to be reassessed for down trending lactate s/p more fluids and PO challenge. Stephane PGY-1: CTa neg for mesenteric ischemia. Patient reassessed, states pain improved but nauseous, will administer zofran. Discussed admission for pain control vs outpatient follow up. Patient expresses preference for D/C home with follow up with PCP. Patient to be reassessed for down trending lactate s/p more fluids and PO challenge.

## 2020-10-26 NOTE — BEHAVIORAL HEALTH ASSESSMENT NOTE - RISK ASSESSMENT
Low Acute Suicide Risk The patient's acute risk for suicide is increased by being disabled, having chronic mental illness, having chronic medical illness, trauma history, substance use, and social isolation. However, this risk is mitigated by family support, treatment adherence, lack of SI/SIB, lack of prior SI/SIB/SA, responsibility to others, beloved pet, and future orientation. As such, her overall acute risk of suicide is low however chronic risk is elevated. Patient is able to contract for safety, therefore 1:1 not needed at this time.

## 2020-10-26 NOTE — CHART NOTE - NSCHARTNOTEFT_GEN_A_CORE
50 year old female with history of etoh abuse, pancreatitis, insulin dependent DM, depression/anxiety, bipolar disorder, and PTSD BIBEMS for abdominal/flank pain and N/V in setting of etoh intoxication. Patient is uncooperative and agitated at this time, unable to obtain clear history, but complains of significant epigastric/LLQ pain with L back pain, reports "it feels like pancreatitis". States she had vodka but does not say how much   found elevated lact level   r/o sepsis          Vital Signs:  Vital Signs Last 24 Hrs  T(C): 36.7 (26 Oct 2020 09:33), Max: 36.8 (26 Oct 2020 05:45)  T(F): 98.1 (26 Oct 2020 09:33), Max: 98.3 (26 Oct 2020 05:45)  HR: 92 (26 Oct 2020 09:33) (78 - 92)  BP: 118/78 (26 Oct 2020 09:33) (112/68 - 125/79)  BP(mean): --  RR: 20 (26 Oct 2020 09:33) (15 - 20)  SpO2: 93% (26 Oct 2020 09:33) (93% - 100%)    Labs:                        15.1   6.86  )-----------( 151      ( 26 Oct 2020 02:57 )             44.1     CBC Full  -  ( 26 Oct 2020 02:57 )  WBC Count : 6.86 K/uL  RBC Count : 4.73 M/uL  Hemoglobin : 15.1 g/dL  Hematocrit : 44.1 %  Platelet Count - Automated : 151 K/uL  Mean Cell Volume : 93.2 fl  Mean Cell Hemoglobin : 31.9 pg  Mean Cell Hemoglobin Concentration : 34.2 gm/dL  Auto Neutrophil # : 3.10 K/uL  Auto Lymphocyte # : 3.01 K/uL  Auto Monocyte # : 0.47 K/uL  Auto Eosinophil # : 0.19 K/uL  Auto Basophil # : 0.06 K/uL  Auto Neutrophil % : 45.1 %  Auto Lymphocyte % : 43.9 %  Auto Monocyte % : 6.9 %  Auto Eosinophil % : 2.8 %  Auto Basophil % : 0.9 %    10-26    136  |  97  |  7   ----------------------------<  421<H>  3.9   |  20<L>  |  0.56    Ca    9.0      26 Oct 2020 02:57  Phos  2.8     10-26  Mg     2.0     10-26    TPro  7.5  /  Alb  4.4  /  TBili  0.4  /  DBili  x   /  AST  50<H>  /  ALT  60<H>  /  AlkPhos  154<H>  10-26        LIVER FUNCTIONS - ( 26 Oct 2020 02:57 )  Alb: 4.4 g/dL / Pro: 7.5 g/dL / ALK PHOS: 154 U/L / ALT: 60 U/L / AST: 50 U/L / GGT: x                   Extremities:    Vascular:    Neurological:    Skin:    Lymph Nodes:    Musculoskeletal:    Psychiatric:        Assesment / Plan: 50 year old female with history of etoh abuse, pancreatitis, insulin dependent DM, depression/anxiety, bipolar disorder, and PTSD BIBEMS for abdominal/flank pain and N/V in setting of etoh intoxication. Patient is uncooperative and agitated at this time, unable to obtain clear history, but complains of significant epigastric/LLQ pain with L back pain, reports "it feels like pancreatitis". States she had vodka but does not say how much   found elevated lact level   ETOH blood level 300  r/o sepsis          Vital Signs:  Vital Signs Last 24 Hrs  T(C): 36.7 (26 Oct 2020 09:33), Max: 36.8 (26 Oct 2020 05:45)  T(F): 98.1 (26 Oct 2020 09:33), Max: 98.3 (26 Oct 2020 05:45)  HR: 92 (26 Oct 2020 09:33) (78 - 92)  BP: 118/78 (26 Oct 2020 09:33) (112/68 - 125/79)  BP(mean): --  RR: 20 (26 Oct 2020 09:33) (15 - 20)  SpO2: 93% (26 Oct 2020 09:33) (93% - 100%)    Labs:                        15.1   6.86  )-----------( 151      ( 26 Oct 2020 02:57 )             44.1     CBC Full  -  ( 26 Oct 2020 02:57 )  WBC Count : 6.86 K/uL  RBC Count : 4.73 M/uL  Hemoglobin : 15.1 g/dL  Hematocrit : 44.1 %  Platelet Count - Automated : 151 K/uL  Mean Cell Volume : 93.2 fl  Mean Cell Hemoglobin : 31.9 pg  Mean Cell Hemoglobin Concentration : 34.2 gm/dL  Auto Neutrophil # : 3.10 K/uL  Auto Lymphocyte # : 3.01 K/uL  Auto Monocyte # : 0.47 K/uL  Auto Eosinophil # : 0.19 K/uL  Auto Basophil # : 0.06 K/uL  Auto Neutrophil % : 45.1 %  Auto Lymphocyte % : 43.9 %  Auto Monocyte % : 6.9 %  Auto Eosinophil % : 2.8 %  Auto Basophil % : 0.9 %    10-26    136  |  97  |  7   ----------------------------<  421<H>  3.9   |  20<L>  |  0.56    Ca    9.0      26 Oct 2020 02:57  Phos  2.8     10-26  Mg     2.0     10-26    TPro  7.5  /  Alb  4.4  /  TBili  0.4  /  DBili  x   /  AST  50<H>  /  ALT  60<H>  /  AlkPhos  154<H>  10-26        LIVER FUNCTIONS - ( 26 Oct 2020 02:57 )  Alb: 4.4 g/dL / Pro: 7.5 g/dL / ALK PHOS: 154 U/L / ALT: 60 U/L / AST: 50 U/L / GGT: x                       Assesment / Plan:50 year old female with history of etoh abuse, pancreatitis, insulin dependent DM, depression/anxiety, bipolar disorder, and PTSD BIBEMS for abdominal/flank pain and N/V in setting of etoh intoxication. Patient is uncooperative and agitated at this time, unable to obtain clear history, but complains of significant epigastric/LLQ pain with L back pain, reports "it feels like pancreatitis". States she had vodka but does not say how much   found elevated lact level   ETOH blood level 300  r/o sepsis  blood culture x 2  3 L iv fluids in ER  elevated Lact level secondary to Etoh abuse   CIWA  consider Micu consult  D/w Dr Uday luna RPA-C

## 2020-10-27 ENCOUNTER — TRANSCRIPTION ENCOUNTER (OUTPATIENT)
Age: 50
End: 2020-10-27

## 2020-10-27 DIAGNOSIS — E11.42 TYPE 2 DIABETES MELLITUS WITH DIABETIC POLYNEUROPATHY: ICD-10-CM

## 2020-10-27 LAB
A1C WITH ESTIMATED AVERAGE GLUCOSE RESULT: 9.3 % — HIGH (ref 4–5.6)
ALBUMIN SERPL ELPH-MCNC: 3.5 G/DL — SIGNIFICANT CHANGE UP (ref 3.3–5)
ALP SERPL-CCNC: 91 U/L — SIGNIFICANT CHANGE UP (ref 40–120)
ALT FLD-CCNC: 75 U/L — HIGH (ref 10–45)
ANION GAP SERPL CALC-SCNC: 12 MMOL/L — SIGNIFICANT CHANGE UP (ref 5–17)
AST SERPL-CCNC: 81 U/L — HIGH (ref 10–40)
BILIRUB DIRECT SERPL-MCNC: 0.2 MG/DL — SIGNIFICANT CHANGE UP (ref 0–0.2)
BILIRUB INDIRECT FLD-MCNC: 0.6 MG/DL — SIGNIFICANT CHANGE UP (ref 0.2–1)
BILIRUB SERPL-MCNC: 0.8 MG/DL — SIGNIFICANT CHANGE UP (ref 0.2–1.2)
BUN SERPL-MCNC: 8 MG/DL — SIGNIFICANT CHANGE UP (ref 7–23)
CALCIUM SERPL-MCNC: 8.2 MG/DL — LOW (ref 8.4–10.5)
CHLORIDE SERPL-SCNC: 102 MMOL/L — SIGNIFICANT CHANGE UP (ref 96–108)
CO2 SERPL-SCNC: 22 MMOL/L — SIGNIFICANT CHANGE UP (ref 22–31)
CREAT SERPL-MCNC: 0.54 MG/DL — SIGNIFICANT CHANGE UP (ref 0.5–1.3)
ESTIMATED AVERAGE GLUCOSE: 220 MG/DL — HIGH (ref 68–114)
GLUCOSE BLDC GLUCOMTR-MCNC: 209 MG/DL — HIGH (ref 70–99)
GLUCOSE BLDC GLUCOMTR-MCNC: 219 MG/DL — HIGH (ref 70–99)
GLUCOSE BLDC GLUCOMTR-MCNC: 279 MG/DL — HIGH (ref 70–99)
GLUCOSE BLDC GLUCOMTR-MCNC: 306 MG/DL — HIGH (ref 70–99)
GLUCOSE BLDC GLUCOMTR-MCNC: 323 MG/DL — HIGH (ref 70–99)
GLUCOSE SERPL-MCNC: 221 MG/DL — HIGH (ref 70–99)
HCT VFR BLD CALC: 37.8 % — SIGNIFICANT CHANGE UP (ref 34.5–45)
HGB BLD-MCNC: 12.8 G/DL — SIGNIFICANT CHANGE UP (ref 11.5–15.5)
LACTATE SERPL-SCNC: 0.9 MMOL/L — SIGNIFICANT CHANGE UP (ref 0.7–2)
MAGNESIUM SERPL-MCNC: 1.7 MG/DL — SIGNIFICANT CHANGE UP (ref 1.6–2.6)
MCHC RBC-ENTMCNC: 31.9 PG — SIGNIFICANT CHANGE UP (ref 27–34)
MCHC RBC-ENTMCNC: 33.9 GM/DL — SIGNIFICANT CHANGE UP (ref 32–36)
MCV RBC AUTO: 94.3 FL — SIGNIFICANT CHANGE UP (ref 80–100)
NRBC # BLD: 0 /100 WBCS — SIGNIFICANT CHANGE UP (ref 0–0)
PHOSPHATE SERPL-MCNC: 1.7 MG/DL — LOW (ref 2.5–4.5)
PLATELET # BLD AUTO: 127 K/UL — LOW (ref 150–400)
POTASSIUM SERPL-MCNC: 3.5 MMOL/L — SIGNIFICANT CHANGE UP (ref 3.5–5.3)
POTASSIUM SERPL-SCNC: 3.5 MMOL/L — SIGNIFICANT CHANGE UP (ref 3.5–5.3)
PROT SERPL-MCNC: 5.8 G/DL — LOW (ref 6–8.3)
RBC # BLD: 4.01 M/UL — SIGNIFICANT CHANGE UP (ref 3.8–5.2)
RBC # FLD: 12.5 % — SIGNIFICANT CHANGE UP (ref 10.3–14.5)
SODIUM SERPL-SCNC: 136 MMOL/L — SIGNIFICANT CHANGE UP (ref 135–145)
WBC # BLD: 5.42 K/UL — SIGNIFICANT CHANGE UP (ref 3.8–10.5)
WBC # FLD AUTO: 5.42 K/UL — SIGNIFICANT CHANGE UP (ref 3.8–10.5)

## 2020-10-27 PROCEDURE — 99233 SBSQ HOSP IP/OBS HIGH 50: CPT | Mod: GC

## 2020-10-27 PROCEDURE — 99232 SBSQ HOSP IP/OBS MODERATE 35: CPT

## 2020-10-27 RX ORDER — POTASSIUM PHOSPHATE, MONOBASIC POTASSIUM PHOSPHATE, DIBASIC 236; 224 MG/ML; MG/ML
15 INJECTION, SOLUTION INTRAVENOUS ONCE
Refills: 0 | Status: COMPLETED | OUTPATIENT
Start: 2020-10-27 | End: 2020-10-27

## 2020-10-27 RX ORDER — INSULIN GLARGINE 100 [IU]/ML
20 INJECTION, SOLUTION SUBCUTANEOUS AT BEDTIME
Refills: 0 | Status: DISCONTINUED | OUTPATIENT
Start: 2020-10-27 | End: 2020-10-28

## 2020-10-27 RX ORDER — INSULIN LISPRO 100/ML
3 VIAL (ML) SUBCUTANEOUS
Refills: 0 | Status: DISCONTINUED | OUTPATIENT
Start: 2020-10-27 | End: 2020-10-28

## 2020-10-27 RX ORDER — INSULIN LISPRO 100/ML
VIAL (ML) SUBCUTANEOUS AT BEDTIME
Refills: 0 | Status: DISCONTINUED | OUTPATIENT
Start: 2020-10-27 | End: 2020-10-28

## 2020-10-27 RX ORDER — MAGNESIUM SULFATE 500 MG/ML
1 VIAL (ML) INJECTION ONCE
Refills: 0 | Status: COMPLETED | OUTPATIENT
Start: 2020-10-27 | End: 2020-10-27

## 2020-10-27 RX ORDER — INSULIN LISPRO 100/ML
VIAL (ML) SUBCUTANEOUS
Refills: 0 | Status: DISCONTINUED | OUTPATIENT
Start: 2020-10-27 | End: 2020-10-28

## 2020-10-27 RX ADMIN — Medication 50 MILLIGRAM(S): at 05:38

## 2020-10-27 RX ADMIN — Medication 2 MILLIGRAM(S): at 21:43

## 2020-10-27 RX ADMIN — Medication 1 MILLIGRAM(S): at 12:17

## 2020-10-27 RX ADMIN — Medication 2 MILLIGRAM(S): at 01:41

## 2020-10-27 RX ADMIN — Medication 1 TABLET(S): at 12:16

## 2020-10-27 RX ADMIN — PREGABALIN 1000 MICROGRAM(S): 225 CAPSULE ORAL at 12:17

## 2020-10-27 RX ADMIN — POTASSIUM PHOSPHATE, MONOBASIC POTASSIUM PHOSPHATE, DIBASIC 62.5 MILLIMOLE(S): 236; 224 INJECTION, SOLUTION INTRAVENOUS at 10:02

## 2020-10-27 RX ADMIN — Medication 50 MILLIGRAM(S): at 00:07

## 2020-10-27 RX ADMIN — Medication 50 MILLIGRAM(S): at 23:10

## 2020-10-27 RX ADMIN — Medication 1: at 23:10

## 2020-10-27 RX ADMIN — Medication 100 MILLIGRAM(S): at 21:44

## 2020-10-27 RX ADMIN — Medication 2: at 08:39

## 2020-10-27 RX ADMIN — INSULIN GLARGINE 20 UNIT(S): 100 INJECTION, SOLUTION SUBCUTANEOUS at 23:34

## 2020-10-27 RX ADMIN — SERTRALINE 50 MILLIGRAM(S): 25 TABLET, FILM COATED ORAL at 12:16

## 2020-10-27 RX ADMIN — Medication 4: at 12:16

## 2020-10-27 RX ADMIN — Medication 100 GRAM(S): at 09:46

## 2020-10-27 RX ADMIN — Medication 50 MILLIGRAM(S): at 12:17

## 2020-10-27 RX ADMIN — ENOXAPARIN SODIUM 40 MILLIGRAM(S): 100 INJECTION SUBCUTANEOUS at 12:17

## 2020-10-27 RX ADMIN — Medication 50 MILLIGRAM(S): at 09:08

## 2020-10-27 RX ADMIN — Medication 50 MILLIGRAM(S): at 17:57

## 2020-10-27 RX ADMIN — Medication 2: at 17:57

## 2020-10-27 RX ADMIN — Medication 1000 UNIT(S): at 12:16

## 2020-10-27 RX ADMIN — SODIUM CHLORIDE 125 MILLILITER(S): 9 INJECTION INTRAMUSCULAR; INTRAVENOUS; SUBCUTANEOUS at 21:45

## 2020-10-27 NOTE — CONSULT NOTE ADULT - ASSESSMENT
Patient is a 51 yo woman with uncontrolled type 2 diabetes (A1c of 9.3%), complicated by neuropathy with ETOH dependence admitted for withdrawal and pancreatitis (high medical decision making)    #T2DM uncontrolled (A1c of 9.3%) c/b polyneuropathy and pancreatitis with pancreatic atrophy  - goal glucose 100-180  - Lantus 20 units qhs  - Humalog 3 units TID pre-meal for now as pt is on clears - may need to increase dose as diet advances   - low pre-meal correction scale and low HS correction scale  - check FS AC/HS  - carb consistent diet  - registered dietician eval  - counseled on alcohol cessation. SW consult  - check c-peptide given h/o pancreatitis as pt may have developed pancreatic insufficiency with cessation of endogenous insulin production. c-peptide checked 2 yrs ago was low end of normal.  Discharge  - DC on insulin, dose TBD based on insulin requirement at discharge. F/u with Endocrinologist Dr. Beckford.    #HTN  -BP goal < 130/80    #HLD  -statin if no other contraindications      Celeste Hughes DO, Endocrine Fellow   Pager [phone]. from 9-5PM. After hours and on weekends please call [phone].     Patient is a 51 yo woman with uncontrolled type 2 diabetes (A1c of 9.3%), complicated by neuropathy with ETOH dependence admitted for withdrawal and pancreatitis (high medical decision making)    #T2DM uncontrolled (A1c of 9.3%) c/b polyneuropathy and pancreatitis with pancreatic atrophy  - goal glucose 100-180  - Lantus 20 units qhs  - Admelog 3 units TID pre-meal for now as pt is on clears - may need to increase dose as diet advances   - low pre-meal correction scale and low HS correction scale  - check FS AC/HS  - carb consistent diet  - registered dietician eval  - counseled on alcohol cessation. SW consult  - check c-peptide given h/o pancreatitis as pt may have developed pancreatic insufficiency with cessation of endogenous insulin production. c-peptide checked 2 yrs ago was low end of normal.  Discharge  - DC on insulin, dose TBD based on insulin requirement at discharge. F/u with Endocrinologist Dr. Beckford.    #HTN  -BP goal < 130/80    #HLD  -statin if no other contraindications      Celeste Hughes DO, Endocrine Fellow   Pager [phone]. from 9-5PM. After hours and on weekends please call [phone].     Patient is a 51 yo woman with uncontrolled type 2 diabetes (A1c of 9.3%), complicated by neuropathy with ETOH dependence admitted for withdrawal and pancreatitis (high medical decision making)    #T2DM uncontrolled (A1c of 9.3%) c/b polyneuropathy and pancreatitis with pancreatic atrophy  - goal glucose 100-180 mg/dL  - increase Lantus 20 units qhs  - start Admelog 3 units TID pre-meal for now as pt is on clears - may need to increase dose as diet advances   - low pre-meal correction scale and low HS correction scale  - check FS AC/HS  - carb consistent diet  - registered dietician eval  - counseled on alcohol cessation. SW consult  - check c-peptide given h/o pancreatitis as pt may have developed pancreatic insufficiency with cessation of endogenous insulin production. c-peptide checked 2 yrs ago was low end of normal.    Discharge  - DC on insulin, dose TBD based on insulin requirement at discharge. F/u with Endocrinologist Dr. Beckford.    #HTN  -BP goal < 130/80    #HLD  -statin if no other contraindications      Celeste Hughes DO, Endocrine Fellow   Pager [phone]. from 9-5PM. After hours and on weekends please call [phone].

## 2020-10-27 NOTE — CONSULT NOTE ADULT - ATTENDING COMMENTS
Patient seen at bedside, agree with plan as outlined above. Adjust basal bolus insulin. Check c-peptide in AM as well.    Sandra Carter MD   Pager # [phone]  On evenings and weekends, please call the office at [phone] or page endocrine fellow on call. Please note that this patient may be followed by different provider tomorrow. If no answer, contact the office.
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
## 2020-10-27 NOTE — PROGRESS NOTE ADULT - PROBLEM SELECTOR PLAN 5
transaminitis due to chronic alcohol use, likely also obesity  trend cmp  counseled on importance of alcohol cessation to prevent further liver damage  check INR
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
## 2020-10-27 NOTE — PROGRESS NOTE ADULT - PROBLEM SELECTOR PLAN 7
lovenox  clear liquid diet  consults: psychiatry    "indeterminate splenic hypodensities" seen on CT abd - outpatient f/u

## 2020-10-27 NOTE — CONSULT NOTE ADULT - ASSESSMENT
pt with abdominal pain, on therapy for ciwa.  no pancreatitis, no change in medical managmetn , ppi, cahnge to regular diet, d/c plan

## 2020-10-27 NOTE — PROGRESS NOTE ADULT - SUBJECTIVE AND OBJECTIVE BOX
SUBJECTIVE / OVERNIGHT EVENTS: pt c/o intermittent abd pain , denies nausea, vomiting       MEDICATIONS  (STANDING):  chlordiazePOXIDE 50 milliGRAM(s) Oral every 6 hours  cholecalciferol 1000 Unit(s) Oral daily  cyanocobalamin 1000 MICROGram(s) Oral daily  dextrose 5%. 1000 milliLiter(s) (50 mL/Hr) IV Continuous <Continuous>  dextrose 50% Injectable 12.5 Gram(s) IV Push once  dextrose 50% Injectable 25 Gram(s) IV Push once  dextrose 50% Injectable 25 Gram(s) IV Push once  enoxaparin Injectable 40 milliGRAM(s) SubCutaneous daily  folic acid 1 milliGRAM(s) Oral daily  insulin glargine Injectable (LANTUS) 20 Unit(s) SubCutaneous at bedtime  insulin lispro (ADMELOG) corrective regimen sliding scale   SubCutaneous three times a day before meals  insulin lispro (ADMELOG) corrective regimen sliding scale   SubCutaneous at bedtime  insulin lispro Injectable (ADMELOG) 3 Unit(s) SubCutaneous three times a day before meals  multivitamin 1 Tablet(s) Oral daily  sertraline 50 milliGRAM(s) Oral daily  sodium chloride 0.9%. 1000 milliLiter(s) (125 mL/Hr) IV Continuous <Continuous>  thiamine 100 milliGRAM(s) Oral at bedtime    MEDICATIONS  (PRN):  dextrose 40% Gel 15 Gram(s) Oral once PRN Blood Glucose LESS THAN 70 milliGRAM(s)/deciliter  gabapentin 300 milliGRAM(s) Oral two times a day PRN burning pain  glucagon  Injectable 1 milliGRAM(s) IntraMuscular once PRN Glucose LESS THAN 70 milligrams/deciliter  LORazepam     Tablet 2 milliGRAM(s) Oral every 4 hours PRN anxiety, agitation, tremors  LORazepam   Injectable 2 milliGRAM(s) IV Push every 4 hours PRN Increase in CIWA by 2 Points or agitation  morphine  - Injectable 4 milliGRAM(s) IV Push every 4 hours PRN Severe Pain (7 - 10)  ondansetron Injectable 4 milliGRAM(s) IV Push every 4 hours PRN Nausea and/or Vomiting  traZODone 50 milliGRAM(s) Oral at bedtime PRN Insomnia    Vital Signs Last 24 Hrs  T(C): 36.8 (27 Oct 2020 23:58), Max: 36.9 (27 Oct 2020 04:30)  T(F): 98.2 (27 Oct 2020 23:58), Max: 98.5 (27 Oct 2020 04:30)  HR: 75 (27 Oct 2020 23:58) (73 - 79)  BP: 118/78 (27 Oct 2020 23:58) (118/78 - 149/89)  BP(mean): --  RR: 18 (27 Oct 2020 23:58) (18 - 18)  SpO2: 97% (27 Oct 2020 23:58) (92% - 97%)    CAPILLARY BLOOD GLUCOSE      POCT Blood Glucose.: 279 mg/dL (27 Oct 2020 23:07)  POCT Blood Glucose.: 306 mg/dL (27 Oct 2020 21:51)  POCT Blood Glucose.: 219 mg/dL (27 Oct 2020 17:42)  POCT Blood Glucose.: 323 mg/dL (27 Oct 2020 12:07)  POCT Blood Glucose.: 209 mg/dL (27 Oct 2020 08:28)    I&O's Summary    26 Oct 2020 07:  -  27 Oct 2020 07:00  --------------------------------------------------------  IN: 1700 mL / OUT: 400 mL / NET: 1300 mL    27 Oct 2020 07:01  -  28 Oct 2020 00:24  --------------------------------------------------------  IN: 200 mL / OUT: 0 mL / NET: 200 mL        Constitutional: No fever, fatigue  Skin: No rash.  Eyes: No recent vision problems or eye pain.  ENT: No congestion, ear pain, or sore throat.  Cardiovascular: No chest pain or palpation.  Respiratory: No cough, shortness of breath, congestion, or wheezing.  Gastrointestinal: + abdominal pain, nausea, vomiting, or diarrhea.  Genitourinary: No dysuria.  Musculoskeletal: No joint swelling.  Neurologic: No headache.    PHYSICAL EXAM:  GENERAL: NAD  EYES: EOMI, PERRLA  NECK: Supple, No JVD  CHEST/LUNG: cta robert  HEART:  S1 , S2 +  ABDOMEN: soft , bs+, midl tenderness+ on deep palpation mid abd area  EXTREMITIES:  no edema  NEUROLOGY:alert awake       LABS:                        12.8   5.42  )-----------( 127      ( 27 Oct 2020 06:46 )             37.8     10-27    136  |  102  |  8   ----------------------------<  221<H>  3.5   |  22  |  0.54    Ca    8.2<L>      27 Oct 2020 06:46  Phos  1.7     10-27  Mg     1.7     10-27    TPro  5.8<L>  /  Alb  3.5  /  TBili  0.8  /  DBili  0.2  /  AST  81<H>  /  ALT  75<H>  /  AlkPhos  91  10-27    PT/INR - ( 26 Oct 2020 12:26 )   PT: 11.6 sec;   INR: 0.97 ratio               Urinalysis Basic - ( 26 Oct 2020 05:40 )    Color: Light Yellow / Appearance: Clear / S.043 / pH: x  Gluc: x / Ketone: Small  / Bili: Negative / Urobili: Negative   Blood: x / Protein: Negative / Nitrite: Negative   Leuk Esterase: Negative / RBC: x / WBC x   Sq Epi: x / Non Sq Epi: x / Bacteria: x        RADIOLOGY & ADDITIONAL TESTS:    Imaging Personally Reviewed:    Consultant(s) Notes Reviewed:      Care Discussed with Consultants/Other Providers:

## 2020-10-27 NOTE — DISCHARGE NOTE NURSING/CASE MANAGEMENT/SOCIAL WORK - NSDCPEFALRISK_GEN_ALL_CORE
Attempted to return patient call to have her return to office to  co -pay card. No answer and mailbox is full. Patient information on fall and injury prevention

## 2020-10-27 NOTE — PROGRESS NOTE ADULT - ASSESSMENT
49 yo female w/pmh alcohol use disorder, pancreatitis, diabetes mellitis, depression, anxiety, presents to [facility] for cc abdominal pain, nausea, vomiting. Admitted for alcohol withdrawal, elevated lactate, and pancreatitis.

## 2020-10-27 NOTE — PROGRESS NOTE ADULT - PROBLEM SELECTOR PLAN 6
- beta hydroxybutyrate wnl, pH 7.34  - UA: small ketones, CMP glucose 421, AG 19  - seems to have resolving mild DKA, most recent VBG anion gap is 6, though lactate is still rising, BP will wnl  - cont IVF, trend lactate  - CT abdomen/pelvis no e/o mesenteric ischemia, other infectious sources  - check blood cx

## 2020-10-27 NOTE — DISCHARGE NOTE NURSING/CASE MANAGEMENT/SOCIAL WORK - PATIENT PORTAL LINK FT
You can access the FollowMyHealth Patient Portal offered by [facility] by registering at the following website: [email or web address]. By joining [employer]’s FollowMyHealth portal, you will also be able to view your health information using other applications (apps) compatible with our system.

## 2020-10-28 ENCOUNTER — TRANSCRIPTION ENCOUNTER (OUTPATIENT)
Age: 50
End: 2020-10-28

## 2020-10-28 VITALS
OXYGEN SATURATION: 100 % | SYSTOLIC BLOOD PRESSURE: 120 MMHG | DIASTOLIC BLOOD PRESSURE: 76 MMHG | RESPIRATION RATE: 18 BRPM | HEART RATE: 81 BPM | TEMPERATURE: 98 F

## 2020-10-28 DIAGNOSIS — E78.5 HYPERLIPIDEMIA, UNSPECIFIED: ICD-10-CM

## 2020-10-28 LAB
ANION GAP SERPL CALC-SCNC: 10 MMOL/L — SIGNIFICANT CHANGE UP (ref 5–17)
BUN SERPL-MCNC: 6 MG/DL — LOW (ref 7–23)
C PEPTIDE SERPL-MCNC: 1 NG/ML — LOW (ref 1.1–4.4)
CALCIUM SERPL-MCNC: 8.5 MG/DL — SIGNIFICANT CHANGE UP (ref 8.4–10.5)
CHLORIDE SERPL-SCNC: 105 MMOL/L — SIGNIFICANT CHANGE UP (ref 96–108)
CO2 SERPL-SCNC: 22 MMOL/L — SIGNIFICANT CHANGE UP (ref 22–31)
CREAT SERPL-MCNC: 0.59 MG/DL — SIGNIFICANT CHANGE UP (ref 0.5–1.3)
GLUCOSE BLDC GLUCOMTR-MCNC: 238 MG/DL — HIGH (ref 70–99)
GLUCOSE BLDC GLUCOMTR-MCNC: 277 MG/DL — HIGH (ref 70–99)
GLUCOSE SERPL-MCNC: 204 MG/DL — HIGH (ref 70–99)
MAGNESIUM SERPL-MCNC: 1.9 MG/DL — SIGNIFICANT CHANGE UP (ref 1.6–2.6)
PHOSPHATE SERPL-MCNC: 2.1 MG/DL — LOW (ref 2.5–4.5)
POTASSIUM SERPL-MCNC: 3.7 MMOL/L — SIGNIFICANT CHANGE UP (ref 3.5–5.3)
POTASSIUM SERPL-SCNC: 3.7 MMOL/L — SIGNIFICANT CHANGE UP (ref 3.5–5.3)
SODIUM SERPL-SCNC: 137 MMOL/L — SIGNIFICANT CHANGE UP (ref 135–145)

## 2020-10-28 PROCEDURE — 71045 X-RAY EXAM CHEST 1 VIEW: CPT

## 2020-10-28 PROCEDURE — 83690 ASSAY OF LIPASE: CPT

## 2020-10-28 PROCEDURE — 85027 COMPLETE CBC AUTOMATED: CPT

## 2020-10-28 PROCEDURE — 82962 GLUCOSE BLOOD TEST: CPT

## 2020-10-28 PROCEDURE — 99232 SBSQ HOSP IP/OBS MODERATE 35: CPT

## 2020-10-28 PROCEDURE — 93005 ELECTROCARDIOGRAM TRACING: CPT

## 2020-10-28 PROCEDURE — 82947 ASSAY GLUCOSE BLOOD QUANT: CPT

## 2020-10-28 PROCEDURE — 96376 TX/PRO/DX INJ SAME DRUG ADON: CPT

## 2020-10-28 PROCEDURE — 96375 TX/PRO/DX INJ NEW DRUG ADDON: CPT

## 2020-10-28 PROCEDURE — 81003 URINALYSIS AUTO W/O SCOPE: CPT

## 2020-10-28 PROCEDURE — 83735 ASSAY OF MAGNESIUM: CPT

## 2020-10-28 PROCEDURE — 74174 CTA ABD&PLVS W/CONTRAST: CPT

## 2020-10-28 PROCEDURE — 80053 COMPREHEN METABOLIC PANEL: CPT

## 2020-10-28 PROCEDURE — U0003: CPT

## 2020-10-28 PROCEDURE — 83036 HEMOGLOBIN GLYCOSYLATED A1C: CPT

## 2020-10-28 PROCEDURE — 86769 SARS-COV-2 COVID-19 ANTIBODY: CPT

## 2020-10-28 PROCEDURE — 85610 PROTHROMBIN TIME: CPT

## 2020-10-28 PROCEDURE — 84132 ASSAY OF SERUM POTASSIUM: CPT

## 2020-10-28 PROCEDURE — 80307 DRUG TEST PRSMV CHEM ANLYZR: CPT

## 2020-10-28 PROCEDURE — 84100 ASSAY OF PHOSPHORUS: CPT

## 2020-10-28 PROCEDURE — 82435 ASSAY OF BLOOD CHLORIDE: CPT

## 2020-10-28 PROCEDURE — 81025 URINE PREGNANCY TEST: CPT

## 2020-10-28 PROCEDURE — 84295 ASSAY OF SERUM SODIUM: CPT

## 2020-10-28 PROCEDURE — 99285 EMERGENCY DEPT VISIT HI MDM: CPT | Mod: 25

## 2020-10-28 PROCEDURE — 84702 CHORIONIC GONADOTROPIN TEST: CPT

## 2020-10-28 PROCEDURE — 85025 COMPLETE CBC W/AUTO DIFF WBC: CPT

## 2020-10-28 PROCEDURE — 82010 KETONE BODYS QUAN: CPT

## 2020-10-28 PROCEDURE — 83605 ASSAY OF LACTIC ACID: CPT

## 2020-10-28 PROCEDURE — 82248 BILIRUBIN DIRECT: CPT

## 2020-10-28 PROCEDURE — 87040 BLOOD CULTURE FOR BACTERIA: CPT

## 2020-10-28 PROCEDURE — 84681 ASSAY OF C-PEPTIDE: CPT

## 2020-10-28 PROCEDURE — 80048 BASIC METABOLIC PNL TOTAL CA: CPT

## 2020-10-28 PROCEDURE — 82330 ASSAY OF CALCIUM: CPT

## 2020-10-28 PROCEDURE — 82803 BLOOD GASES ANY COMBINATION: CPT

## 2020-10-28 PROCEDURE — 85018 HEMOGLOBIN: CPT

## 2020-10-28 PROCEDURE — 85014 HEMATOCRIT: CPT

## 2020-10-28 PROCEDURE — 96374 THER/PROPH/DIAG INJ IV PUSH: CPT | Mod: XU

## 2020-10-28 RX ORDER — GABAPENTIN 400 MG/1
1 CAPSULE ORAL
Qty: 0 | Refills: 0 | DISCHARGE
Start: 2020-10-28

## 2020-10-28 RX ORDER — INSULIN DEGLUDEC 100 U/ML
30 INJECTION, SOLUTION SUBCUTANEOUS
Qty: 0 | Refills: 0 | DISCHARGE

## 2020-10-28 RX ORDER — SERTRALINE 25 MG/1
1 TABLET, FILM COATED ORAL
Qty: 0 | Refills: 0 | DISCHARGE
Start: 2020-10-28

## 2020-10-28 RX ORDER — INSULIN GLARGINE 100 [IU]/ML
26 INJECTION, SOLUTION SUBCUTANEOUS AT BEDTIME
Refills: 0 | Status: DISCONTINUED | OUTPATIENT
Start: 2020-10-28 | End: 2020-10-28

## 2020-10-28 RX ORDER — INSULIN LISPRO 100/ML
5 VIAL (ML) SUBCUTANEOUS
Refills: 0 | Status: DISCONTINUED | OUTPATIENT
Start: 2020-10-28 | End: 2020-10-28

## 2020-10-28 RX ORDER — GABAPENTIN 400 MG/1
1 CAPSULE ORAL
Qty: 0 | Refills: 0 | DISCHARGE

## 2020-10-28 RX ORDER — FOLIC ACID 0.8 MG
1 TABLET ORAL
Qty: 30 | Refills: 0
Start: 2020-10-28 | End: 2020-11-26

## 2020-10-28 RX ADMIN — Medication 2: at 08:57

## 2020-10-28 RX ADMIN — Medication 1 MILLIGRAM(S): at 12:53

## 2020-10-28 RX ADMIN — Medication 5 UNIT(S): at 13:16

## 2020-10-28 RX ADMIN — SERTRALINE 50 MILLIGRAM(S): 25 TABLET, FILM COATED ORAL at 12:52

## 2020-10-28 RX ADMIN — Medication 1 TABLET(S): at 12:53

## 2020-10-28 RX ADMIN — Medication 3 UNIT(S): at 08:57

## 2020-10-28 RX ADMIN — Medication 50 MILLIGRAM(S): at 05:58

## 2020-10-28 RX ADMIN — Medication 3: at 13:16

## 2020-10-28 RX ADMIN — Medication 50 MILLIGRAM(S): at 17:39

## 2020-10-28 RX ADMIN — Medication 1000 UNIT(S): at 12:52

## 2020-10-28 RX ADMIN — PREGABALIN 1000 MICROGRAM(S): 225 CAPSULE ORAL at 12:52

## 2020-10-28 RX ADMIN — Medication 50 MILLIGRAM(S): at 12:52

## 2020-10-28 RX ADMIN — ENOXAPARIN SODIUM 40 MILLIGRAM(S): 100 INJECTION SUBCUTANEOUS at 12:53

## 2020-10-28 NOTE — PROGRESS NOTE BEHAVIORAL HEALTH - NSBHCONSULTFOLLOWAFTERCARE_PSY_A_CORE FT
Recommend more intensive dual-diagnosis outpatient follow up after discharge
Recommend more intensive dual-diagnosis outpatient follow up after discharge

## 2020-10-28 NOTE — DISCHARGE NOTE PROVIDER - NSDCHOSPICE_GEN_A_CORE
[facility]            (For Outpatient Use Only) Initial Admit Date: 8/31/2017   Inpt/Obs Admit Date: Inpt: N/A / Obs: N/A   Discharge Date:    Hospital Acct:  [de-identified]   MRN: [de-identified]   CSN: [id number]        ENCOUNTER  Patient Class: OU August 31, 2017 No

## 2020-10-28 NOTE — PROGRESS NOTE BEHAVIORAL HEALTH - NSBHCONSULTRECOMMENDOTHER_PSY_A_CORE FT
tapered Librium 50mg PO Q6h for alcohol withdrawal   may decrease frequency of Ativan to 2mg IV Q4h for an increase in CIWA by 2 points
tapered Librium 50mg PO Q6h for alcohol withdrawal   may decrease frequency of Ativan to 2mg IV Q4h for an increase in CIWA by 2 points

## 2020-10-28 NOTE — PROGRESS NOTE ADULT - ASSESSMENT
Patient is a 49 yo woman with uncontrolled type 2 diabetes (A1c of 9.3%), complicated by neuropathy with ETOH dependence admitted for withdrawal and abdominal pain. BG values above goal, started on mealtime insulin today. Improved GI symptoms and tolerating POs. Noted to be receiving regular diet and not consistent carb. Will increase mealtime insulin and adjust basal insulin while inpatient. If cleared for discharge today, do not have any data to make changes to outpt regimen. Suspect missing doses at home due to ETOH use. Counseled on dangers of severe hypoglycemia w/insulin while drinking. BG goal (100-180mg/dl).

## 2020-10-28 NOTE — PROGRESS NOTE BEHAVIORAL HEALTH - NSBHCONSULTMEDSLEEP_PSY_A_CORE FT
Trazodone 50mg QHS PRN insomnia, patient takes 100mg QHS at home however reducing iso of standing benzos

## 2020-10-28 NOTE — PROGRESS NOTE ADULT - PROBLEM SELECTOR PLAN 1
-test BG AC/HS  -changed diet to consistent carb diet  -Increase Lantus 26 units QHS  -Increase Humalog 5 units AC meals  -c/w Humalog low correction scale AC and Low HS scale  - registered dietician keya  - counseled on alcohol cessation. SW consult  -c-peptide pending.   Discharge  - DC on insulin, dose TBD based on insulin requirement at discharge. F/u with Endocrinologist Dr. Beckford.

## 2020-10-28 NOTE — DISCHARGE NOTE PROVIDER - PROVIDER TOKENS
FREE:[LAST:[Jose Antonio],FIRST:[Gwen],PHONE:[phone]],FAX:[phone]   [phone]    -],ADDRESS:[PCP]],PROVIDER:[[id number]]]

## 2020-10-28 NOTE — PROGRESS NOTE BEHAVIORAL HEALTH - NSBHFUPINTERVALHXFT_PSY_A_CORE
Overnight, the patient reports that she has been sleeping well aside from being woken up for CIWA checks. She reports mild anxiety and says that she has been getting because of some phone calls from home, but she declined to discuss it. She also reports mild tremor in her hands bilaterally. She denies n/v, denies hallucinations, denies diaphoresis, tactile disturbances, or feeling agitated. She reports getting PRN ativan 2mg last night at 11pm but does not feel like she needs any at this time. She denies SIIP and HIIP.
Pt is less tachycardic and feels better but continues to have some symptoms of withdrawal including anxiety and slight tremor.  She continues to say that she would attend an outpt intensive alcohol rehab when discharged. She is motivated for treatment She has no other acute psychiatric symptoms and no thoughts of harming herself or others.

## 2020-10-28 NOTE — PROGRESS NOTE BEHAVIORAL HEALTH - SUMMARY
Patient is a 50 year old woman, disabled, single, domiciled with family, with PMH DM and pancreatitis, PPH MAURO, MDD, PTSD, and alcohol use disorder, 3 prior psychiatric hospitalizations at [facility] for depression <10 years ago, inpatient rehab 2 years ago for alcohol use, no history of SI/SIB/SA, history of withdrawal seizure >10 years ago, who presented to the ED with abdominal pain iso alcohol intoxication and is being admitted for inpatient treatment. Psych consulted for alcohol use and anxiety/depression.     Patient is no longer tachycardic and has CIWA scores between 0-2 only needing occasional PRN ativan. Will taper librium to q8 hours
Patient is a 50 year old woman, disabled, single, domiciled with family, with PMH DM and pancreatitis, PPH MAURO, MDD, PTSD, and alcohol use disorder, 3 prior psychiatric hospitalizations at [facility] for depression <10 years ago, inpatient rehab 2 years ago for alcohol use, no history of SI/SIB/SA, history of withdrawal seizure >10 years ago, who presented to the ED with abdominal pain iso alcohol intoxication and is being admitted for inpatient treatment. Psych consulted for alcohol use and anxiety/depression.     Patient's daily alcohol use is the most pertinent issue, as it is of significant quantity and patient reports history of withdrawal seizure as well as tremors. She is motivated for change however faces numerous obstacles including accessing adequate care, disability, medical illness, psychosocial difficulty at home, and boredom. Also contributing is significant generalized anxiety and PTSD with related symptoms. When patient is medically stabilized, optimization of her psychotropic regimen should be considered, as well as increasing her treatment and social support in the community. She may ultimately benefit from more intensive outpatient follow up.  Pt is less tachycardic after taking a total of Librium of 300mg since yesterday.    Tapered Librium frequency to 50mg po q6hrs today.

## 2020-10-28 NOTE — PROGRESS NOTE BEHAVIORAL HEALTH - NSBHCHARTREVIEWLAB_PSY_A_CORE FT
12.8   5.42  )-----------( 127      ( 27 Oct 2020 06:46 )             37.8   10-27    136  |  102  |  8   ----------------------------<  221<H>  3.5   |  22  |  0.54    Ca    8.2<L>      27 Oct 2020 06:46  Phos  1.7     10-27  Mg     1.7     10-27    TPro  5.8<L>  /  Alb  3.5  /  TBili  0.8  /  DBili  0.2  /  AST  81<H>  /  ALT  75<H>  /  AlkPhos  91  10-27
12.8   5.42  )-----------( 127      ( 27 Oct 2020 06:46 )             37.8   10-27    136  |  102  |  8   ----------------------------<  221<H>  3.5   |  22  |  0.54    Ca    8.2<L>      27 Oct 2020 06:46  Phos  1.7     10-27  Mg     1.7     10-27    TPro  5.8<L>  /  Alb  3.5  /  TBili  0.8  /  DBili  0.2  /  AST  81<H>  /  ALT  75<H>  /  AlkPhos  91  10-27

## 2020-10-28 NOTE — PROGRESS NOTE BEHAVIORAL HEALTH - CASE SUMMARY
Patient is a 50 year old woman, disabled, single, domiciled with family, with PMH DM and pancreatitis, PPH MAURO, MDD, PTSD, and alcohol use disorder, 3 prior psychiatric hospitalizations at [facility] for depression <10 years ago, inpatient rehab 2 years ago for alcohol use, no history of SI/SIB/SA, history of withdrawal seizure >10 years ago, who presented to the ED with abdominal pain iso alcohol intoxication and was admitted for inpatient treatment. Psych consulted for alcohol use and anxiety/depression.   Patient is no longer tachycardic and has CIWA scores between 0-2 only needing occasional PRN ativan.  Pt can be tapered to Librium 50mg q8hrs for three doses.  She may be discharged home once she is medically cleared.

## 2020-10-28 NOTE — PROGRESS NOTE BEHAVIORAL HEALTH - NSBHCONSULTMEDS_PSY_A_CORE FT
MEDICATIONS  (STANDING):  chlordiazePOXIDE 50 milliGRAM(s) Oral every 6 hours  sertraline 50 milliGRAM(s) Oral daily  thiamine 100 milliGRAM(s) Oral at bedtime
Continue home Zoloft 50mg PO daily  Trazodone 50mg QHS PRN insomnia, patient takes 100mg QHS at home however reducing iso of standing benzos

## 2020-10-28 NOTE — PROGRESS NOTE ADULT - SUBJECTIVE AND OBJECTIVE BOX
LUIS [id number]  50yFemale  T(C): 36.9 (10-28-20 @ 05:14), Max: 36.9 (10-28-20 @ 05:14)  HR: 64 (10-28-20 @ 05:14) (64 - 79)  BP: 127/69 (10-28-20 @ 05:14) (118/78 - 135/88)  RR: 18 (10-28-20 @ 05:14) (18 - 18)  SpO2: 96% (10-28-20 @ 05:14) (95% - 97%)  Wt(kg): --  10-27 @ 07:01  -  10-28 @ 07:00  --------------------------------------------------------  IN: 2000 mL / OUT: 0 mL / NET: 2000 mL      normal cephalic atraumatic  s1s2   clear to ascultation bilaterally  soft, non tender, non distended no guarding or rebound  no clubbing cyanosis or edema

## 2020-10-28 NOTE — PROGRESS NOTE BEHAVIORAL HEALTH - NSBHCONSULTOBSREASON_PSY_A_CORE FT
patient is not acutely at risk to herself or others
Low acute risk for suicide, able to contract for safety

## 2020-10-28 NOTE — DISCHARGE NOTE PROVIDER - NSDCCPCAREPLAN_GEN_ALL_CORE_FT
PRINCIPAL DISCHARGE DIAGNOSIS  Diagnosis: Chronic pancreatitis  Assessment and Plan of Treatment: acute on chronic, now resolved, follow up with PCP and GI      SECONDARY DISCHARGE DIAGNOSES  Diagnosis: Type 2 diabetes mellitus with diabetic polyneuropathy, with long-term current use of insulin  Assessment and Plan of Treatment: controlled, cont current home insulin, humalog 5 units 3 times a day before meals and tresiba 26 units at bed time, follow up with Endocrine    Diagnosis: Alcohol use disorder, severe, dependence  Assessment and Plan of Treatment: stable, cont current home meds, follow up with PCP and GI

## 2020-10-28 NOTE — DISCHARGE NOTE PROVIDER - HOSPITAL COURSE
49 yo female w/pmh alcohol use disorder, pancreatitis, diabetes mellitis, depression, anxiety, presents to [facility] for cc abdominal pain, nausea, vomiting. She says this has occurred for about a week. She presented to the ED on 10/16, discharged home the same day, however her abdominal exam did not resolve.  She was seen by Psych, Endocrine and GI. Her CIWA score is '0' now. She is hemodynamically stable to be discharged home today after last dose of Librium @ 6 pm, spoke to Attending and Psych.

## 2020-10-28 NOTE — PROGRESS NOTE ADULT - SUBJECTIVE AND OBJECTIVE BOX
Diabetes Follow up note:    Chief complaint: T2DM    Interval Hx: BG values remains above goal. Pt seen at bedside. Reports feeling much better today. Improved sleep and appetite. On basal/bolus insulin at home. Asking if she will be discharged home today.     Review of Systems:  General: no complaints.   GI: Tolerating POs. Denies N/V/D/Abd pain  CV: Denies CP/SOB  ENDO: No S&Sx of hypoglycemia  MEDS:  insulin glargine Injectable (LANTUS) 20 Unit(s) SubCutaneous at bedtime  insulin lispro (ADMELOG) corrective regimen sliding scale   SubCutaneous three times a day before meals  insulin lispro (ADMELOG) corrective regimen sliding scale   SubCutaneous at bedtime  insulin lispro Injectable (ADMELOG) 3 Unit(s) SubCutaneous three times a day before meals      Allergies    Bactrim (Anaphylaxis)  Eggplant mouth itches (Other)        PE:  General: Female sitting in bed. NAD>   Vital Signs Last 24 Hrs  T(C): 36.9 (28 Oct 2020 05:14), Max: 36.9 (28 Oct 2020 05:14)  T(F): 98.4 (28 Oct 2020 05:14), Max: 98.4 (28 Oct 2020 05:14)  HR: 64 (28 Oct 2020 05:14) (64 - 79)  BP: 127/69 (28 Oct 2020 05:14) (118/78 - 135/88)  BP(mean): --  RR: 18 (28 Oct 2020 05:14) (18 - 18)  SpO2: 96% (28 Oct 2020 05:14) (96% - 97%)  Abd: Soft, NT,ND, Central adiposity.   Extremities: Warm.   Neuro: A&O X3    LABS:  POCT Blood Glucose.: 238 mg/dL (10-28-20 @ 08:50)  POCT Blood Glucose.: 279 mg/dL (10-27-20 @ 23:07)  POCT Blood Glucose.: 306 mg/dL (10-27-20 @ 21:51)  POCT Blood Glucose.: 219 mg/dL (10-27-20 @ 17:42)  POCT Blood Glucose.: 323 mg/dL (10-27-20 @ 12:07)  POCT Blood Glucose.: 209 mg/dL (10-27-20 @ 08:28)  POCT Blood Glucose.: 297 mg/dL (10-26-20 @ 21:57)  POCT Blood Glucose.: 217 mg/dL (10-26-20 @ 18:40)  POCT Blood Glucose.: 237 mg/dL (10-26-20 @ 13:07)  POCT Blood Glucose.: 277 mg/dL (10-26-20 @ 06:57)  POCT Blood Glucose.: 277 mg/dL (10-26-20 @ 06:00)  POCT Blood Glucose.: 286 mg/dL (10-26-20 @ 05:26)  POCT Blood Glucose.: 310 mg/dL (10-26-20 @ 04:03)  POCT Blood Glucose.: 392 mg/dL (10-26-20 @ 02:52)                            12.8   5.42  )-----------( 127      ( 27 Oct 2020 06:46 )             37.8       10-28    137  |  105  |  6<L>  ----------------------------<  204<H>  3.7   |  22  |  0.59    Ca    8.5      28 Oct 2020 10:11  Phos  2.1     10-28  Mg     1.9     10-28    TPro  5.8<L>  /  Alb  3.5  /  TBili  0.8  /  DBili  0.2  /  AST  81<H>  /  ALT  75<H>  /  AlkPhos  91  10-27      A1C with Estimated Average Glucose Result: 9.3 % (10-27-20 @ 09:09)  Hemoglobin A1C, Whole Blood: 8.7 % (02-11-20 @ 14:07)  Hemoglobin A1C, Whole Blood: 8.7 % (12-07-19 @ 06:50)          Contact number: trace [phone] or [phone]

## 2020-10-28 NOTE — PROGRESS NOTE ADULT - PROBLEM SELECTOR PLAN 2
- currently mild alcohl withdrawal, 2mg ativan prn anxiety  - folic acid, thiamine, MVI qd
-can check lipid panel if remains inpatient  -not on statin     discussed w/pt and NP  pager: [phone]   office:  [phone]    -I spent a total time of 26 mins with the patient at bedside/on unit of which more than 50% of time was spent on counseling/coordination of care.
unknown

## 2020-10-28 NOTE — DISCHARGE NOTE PROVIDER - CARE PROVIDER_API CALL
Gwen Brady  PCP  Phone: [phone]  Fax: (   )    -  Follow Up Time:     Orlando Cassidy (DO)  Gastroenterology; Internal Medicine  [address] [place] [address]  Phone: [phone]  Fax: [phone]  Follow Up Time:

## 2020-10-28 NOTE — DISCHARGE NOTE PROVIDER - NSDCMRMEDTOKEN_GEN_ALL_CORE_FT
folic acid 1 mg oral tablet: 1 tab(s) orally once a day  gabapentin 300 mg oral capsule: 1 cap(s) orally 2 times a day, As needed, burning pain  HumaLOG 100 units/mL injectable solution: 5 unit(s) subcutaneous 3 times a day (before meals)  KlonoPIN 0.5 mg oral tablet: 1 tab(s) orally 2 to 3 times a day as needed  Multiple Vitamins oral tablet: 1 tab(s) orally once a day  sertraline 50 mg oral tablet: 1 tab(s) orally once a day  thiamine 100 mg oral tablet: 1 tab(s) orally once a day (at bedtime)  traZODone 100 mg oral tablet: 1 tab(s) orally once a day (at bedtime)  Tresiba FlexTouch 100 units/mL subcutaneous solution: 26 unit(s) subcutaneous once a day (at bedtime)  Vitamin B-12: 1 tab(s) orally once a day  Vitamin D3: 1 tab(s) orally once a day

## 2020-10-28 NOTE — PROGRESS NOTE BEHAVIORAL HEALTH - NSBHCHARTREVIEWIMAGING_PSY_A_CORE FT
< from: CT Angio Abdomen and Pelvis w/ IV Cont (10.26.20 @ 05:15) >    FINDINGS:  LOWER CHEST: Bilateral basilar subsegmental atelectasis.    LIVER: Severe hepatic steatosis.  BILE DUCTS: Normal caliber.  GALLBLADDER: Within normal limits.  SPLEEN: Numerous hypodensities, the largest of which measures 1.7 cm, indeterminant, as seen on prior examination.  PANCREAS: Fatty atrophy.  ADRENALS: Within normal limits.  KIDNEYS/URETERS: Within normal limits.    BLADDER: Within normal limits.  REPRODUCTIVE ORGANS: 2.2 cm left adnexal cyst. Uterus and right adnexa are unremarkable.    BOWEL: No bowel obstruction or evidence of acute bowel inflammation. Appendix is normal.  PERITONEUM: No ascites.  VESSELS: Within normal limits. Celiac, superior mesenteric, inferior mesenteric, and bilateral renal arteries are widely patent. Minimal infrarenal abdominal aortic calcification.  RETROPERITONEUM/LYMPH NODES: No lymphadenopathy.  ABDOMINAL WALL: Within normal limits.  BONES: Within normal limits.    IMPRESSION:  No CT evidence of acute mesenteric ischemia.    No bowel obstruction or evidence of acute malformation.    Severe hepatic steatosis.    Redemonstration of numerous indeterminant splenic hypodensities. Clinical correlation and MRI may be obtained for further evaluation.    < end of copied text >
< from: CT Abdomen and Pelvis w/ IV Cont (10.16.20 @ 05:31) >    IMPRESSION:  No acute findingsto explain the patient's symptoms.    Marked hepatic steatosis.    Interval increase in size and number of indeterminate splenic hypodense lesions. Clinical correlation is recommended and follow-up MRI can be obtained for further evaluation.    < end of copied text >    < from: CT Angio Abdomen and Pelvis w/ IV Cont (10.26.20 @ 05:15) >    IMPRESSION:  No CT evidence of acute mesenteric ischemia.    No bowel obstruction or evidence of acute malformation.    Severe hepatic steatosis.    Redemonstration of numerous indeterminant splenic hypodensities. Clinical correlation and MRI may be obtained for further evaluation.    < end of copied text >

## 2020-10-28 NOTE — PROGRESS NOTE BEHAVIORAL HEALTH - NSBHCHARTREVIEWVS_PSY_A_CORE FT
Vital Signs Last 24 Hrs  T(C): 36.9 (28 Oct 2020 05:14), Max: 36.9 (28 Oct 2020 05:14)  T(F): 98.4 (28 Oct 2020 05:14), Max: 98.4 (28 Oct 2020 05:14)  HR: 64 (28 Oct 2020 05:14) (64 - 79)  BP: 127/69 (28 Oct 2020 05:14) (118/78 - 135/88)  BP(mean): --  RR: 18 (28 Oct 2020 05:14) (18 - 18)  SpO2: 96% (28 Oct 2020 05:14) (95% - 97%)
Vital Signs Last 24 Hrs  T(C): 36.8 (27 Oct 2020 11:26), Max: 37.1 (26 Oct 2020 23:18)  T(F): 98.3 (27 Oct 2020 11:26), Max: 98.7 (26 Oct 2020 23:18)  HR: 78 (27 Oct 2020 11:26) (72 - 98)  BP: 133/76 (27 Oct 2020 11:26) (104/65 - 149/89)  BP(mean): --  RR: 18 (27 Oct 2020 11:26) (18 - 20)  SpO2: 95% (27 Oct 2020 11:26) (92% - 98%)

## 2020-10-29 LAB — DRUG SCREEN, SERUM: SIGNIFICANT CHANGE UP

## 2020-10-31 LAB
CULTURE RESULTS: SIGNIFICANT CHANGE UP
CULTURE RESULTS: SIGNIFICANT CHANGE UP
SPECIMEN SOURCE: SIGNIFICANT CHANGE UP
SPECIMEN SOURCE: SIGNIFICANT CHANGE UP

## 2020-11-07 NOTE — ED ADULT NURSE NOTE - TREMOR
Tele SR. Heparin and nitroglycerin gtt. Signed and held orders for CABG; hand-off per outgoing RN that pt unlikely to have procedure done today, PACU called to confirm that patient not on schedule for today: orders remain signed & held. Pt declines CABG education overnight, states he will be open to receive today. Denies CP. Headache, relief with tylenol, 2L oxygen, and icepack to back of head. Continue to monitor.   7=severe, visible even with arms not extended

## 2020-12-18 PROBLEM — K76.0 FATTY (CHANGE OF) LIVER, NOT ELSEWHERE CLASSIFIED: Chronic | Status: ACTIVE | Noted: 2020-10-26

## 2020-12-21 ENCOUNTER — APPOINTMENT (OUTPATIENT)
Dept: OPHTHALMOLOGY | Facility: CLINIC | Age: 50
End: 2020-12-21
Payer: MEDICARE

## 2020-12-21 ENCOUNTER — NON-APPOINTMENT (OUTPATIENT)
Age: 50
End: 2020-12-21

## 2020-12-21 PROCEDURE — 92004 COMPRE OPH EXAM NEW PT 1/>: CPT

## 2020-12-21 PROCEDURE — 99072 ADDL SUPL MATRL&STAF TM PHE: CPT

## 2020-12-21 NOTE — PROGRESS NOTE ADULT - PROBLEM SELECTOR PLAN 2
Elevated AG likely from DKA/ Starvation, normalized with fluids and Insulin.   Endo consulted, continue with Lantus, HISS.
Elevated AG likely from mild DKA on admission, normalized with fluids and Insulin.   Endo consulted, continue with Lantus, HISS.  Outpt f/u with Endocrine
Goal <130/80, consider anti-hypertensive agent.
Goal <130/80, consider anti-hypertensive agent.
Elevated AG likely from mild DKA on admission, normalized with fluids and Insulin.   Endo consulted, continue with Lantus, HISS.
- resolved  - resume lantus 15 units qhs with corrective insulin scale  - reintroduce outpatient regimen as diet is advanced
no

## 2021-01-06 ENCOUNTER — NON-APPOINTMENT (OUTPATIENT)
Age: 51
End: 2021-01-06

## 2021-01-06 ENCOUNTER — APPOINTMENT (OUTPATIENT)
Dept: OPHTHALMOLOGY | Facility: CLINIC | Age: 51
End: 2021-01-06
Payer: MEDICARE

## 2021-01-06 PROCEDURE — 92012 INTRM OPH EXAM EST PATIENT: CPT

## 2021-01-06 PROCEDURE — 92015 DETERMINE REFRACTIVE STATE: CPT

## 2021-01-06 PROCEDURE — 99072 ADDL SUPL MATRL&STAF TM PHE: CPT

## 2021-01-10 NOTE — ED ADULT NURSE NOTE - DOES PATIENT HAVE ADVANCE DIRECTIVE
No [FreeTextEntry1] : In summary, Key has mild bowing/prolapse of her mitral valve leaflets with only trivial mitral regurgitation (not hemodynamically significant).  Her aortic root is also not enlarged.  Today during her physical examination she had no orthostatic changes in heart rate or blood pressure while standing for 3 minutes.  She should continue to stay well-hydrated on a daily basis and have 2 salty snacks daily.  No additional medication is required.  All of her questions were answered. [Needs SBE Prophylaxis] : [unfilled] does not need bacterial endocarditis prophylaxis [May participate in all age-appropriate activities] : [unfilled] May participate in all age-appropriate activities. [Influenza vaccine is recommended] : Influenza vaccine is recommended

## 2021-01-22 NOTE — PATIENT PROFILE ADULT - NSPROGENPREVTRANSF_GEN_A_NUR
[address]  [address] [facility] [address]  Phone: [phone]  Fax: [phone]    Georgia Echavarria MD        January 22, 2021     Patient: Shana Peace   YOB: 1985   Date of Visit: 1/22/2021       To Whom It May Concern: It is my medical opinion that Tom Sarabia may return to work on 1/25/21. If you have any questions or concerns, please don't hesitate to call.     Sincerely,        Georgia Echavarria MD no

## 2021-04-08 ENCOUNTER — EMERGENCY (EMERGENCY)
Facility: HOSPITAL | Age: 51
LOS: 1 days | Discharge: HOME | End: 2021-04-08
Attending: STUDENT IN AN ORGANIZED HEALTH CARE EDUCATION/TRAINING PROGRAM | Admitting: STUDENT IN AN ORGANIZED HEALTH CARE EDUCATION/TRAINING PROGRAM
Payer: MEDICARE

## 2021-04-08 VITALS
HEIGHT: 62 IN | TEMPERATURE: 98 F | RESPIRATION RATE: 18 BRPM | OXYGEN SATURATION: 97 % | HEART RATE: 81 BPM | SYSTOLIC BLOOD PRESSURE: 103 MMHG | DIASTOLIC BLOOD PRESSURE: 81 MMHG

## 2021-04-08 DIAGNOSIS — L02.91 CUTANEOUS ABSCESS, UNSPECIFIED: Chronic | ICD-10-CM

## 2021-04-08 DIAGNOSIS — Z98.890 OTHER SPECIFIED POSTPROCEDURAL STATES: Chronic | ICD-10-CM

## 2021-04-08 DIAGNOSIS — Z90.89 ACQUIRED ABSENCE OF OTHER ORGANS: Chronic | ICD-10-CM

## 2021-04-08 PROCEDURE — 99053 MED SERV 10PM-8AM 24 HR FAC: CPT

## 2021-04-08 PROCEDURE — 99285 EMERGENCY DEPT VISIT HI MDM: CPT
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
## 2021-04-09 VITALS
RESPIRATION RATE: 16 BRPM | DIASTOLIC BLOOD PRESSURE: 77 MMHG | OXYGEN SATURATION: 97 % | TEMPERATURE: 98 F | SYSTOLIC BLOOD PRESSURE: 137 MMHG | HEART RATE: 75 BPM

## 2021-04-09 LAB
APPEARANCE UR: CLEAR — SIGNIFICANT CHANGE UP
BACTERIA # UR AUTO: NEGATIVE — SIGNIFICANT CHANGE UP
BASE EXCESS BLDV CALC-SCNC: -1.6 MMOL/L — SIGNIFICANT CHANGE UP (ref -3–2)
BASOPHILS # BLD AUTO: 0.09 K/UL — SIGNIFICANT CHANGE UP (ref 0–0.2)
BASOPHILS NFR BLD AUTO: 1.5 % — SIGNIFICANT CHANGE UP (ref 0–2)
BILIRUB UR-MCNC: NEGATIVE — SIGNIFICANT CHANGE UP
BLOOD GAS VENOUS - CREATININE: 0.5 MG/DL — SIGNIFICANT CHANGE UP (ref 0.5–1.3)
BLOOD GAS VENOUS COMPREHENSIVE RESULT: SIGNIFICANT CHANGE UP
BLOOD GAS VENOUS COMPREHENSIVE RESULT: SIGNIFICANT CHANGE UP
CHLORIDE BLDV-SCNC: 109 MMOL/L — HIGH (ref 96–108)
COLOR SPEC: YELLOW — SIGNIFICANT CHANGE UP
DIFF PNL FLD: NEGATIVE — SIGNIFICANT CHANGE UP
EOSINOPHIL # BLD AUTO: 0.19 K/UL — SIGNIFICANT CHANGE UP (ref 0–0.5)
EOSINOPHIL NFR BLD AUTO: 3.2 % — SIGNIFICANT CHANGE UP (ref 0–6)
EPI CELLS # UR: 3 /HPF — SIGNIFICANT CHANGE UP (ref 0–5)
GAS PNL BLDV: 137 MMOL/L — SIGNIFICANT CHANGE UP (ref 136–146)
GLUCOSE BLDV-MCNC: 398 MG/DL — HIGH (ref 70–99)
GLUCOSE UR QL: ABNORMAL
HCO3 BLDV-SCNC: 22 MMOL/L — SIGNIFICANT CHANGE UP (ref 20–27)
HCT VFR BLD CALC: 42.3 % — SIGNIFICANT CHANGE UP (ref 34.5–45)
HCT VFR BLDA CALC: 40.7 % — SIGNIFICANT CHANGE UP (ref 34.5–46.5)
HGB BLD CALC-MCNC: 13.3 G/DL — SIGNIFICANT CHANGE UP (ref 11.5–15.5)
HGB BLD-MCNC: 14.4 G/DL — SIGNIFICANT CHANGE UP (ref 11.5–15.5)
HYALINE CASTS # UR AUTO: 1 /LPF — SIGNIFICANT CHANGE UP (ref 0–7)
IANC: 2.83 K/UL — SIGNIFICANT CHANGE UP (ref 1.5–8.5)
IMM GRANULOCYTES NFR BLD AUTO: 0.5 % — SIGNIFICANT CHANGE UP (ref 0–1.5)
KETONES UR-MCNC: ABNORMAL
LACTATE BLDV-MCNC: 2.7 MMOL/L — HIGH (ref 0.5–2)
LEUKOCYTE ESTERASE UR-ACNC: NEGATIVE — SIGNIFICANT CHANGE UP
LIDOCAIN IGE QN: 13 U/L — SIGNIFICANT CHANGE UP (ref 7–60)
LYMPHOCYTES # BLD AUTO: 2.48 K/UL — SIGNIFICANT CHANGE UP (ref 1–3.3)
LYMPHOCYTES # BLD AUTO: 42.2 % — SIGNIFICANT CHANGE UP (ref 13–44)
MCHC RBC-ENTMCNC: 30.9 PG — SIGNIFICANT CHANGE UP (ref 27–34)
MCHC RBC-ENTMCNC: 34 GM/DL — SIGNIFICANT CHANGE UP (ref 32–36)
MCV RBC AUTO: 90.8 FL — SIGNIFICANT CHANGE UP (ref 80–100)
MONOCYTES # BLD AUTO: 0.25 K/UL — SIGNIFICANT CHANGE UP (ref 0–0.9)
MONOCYTES NFR BLD AUTO: 4.3 % — SIGNIFICANT CHANGE UP (ref 2–14)
NEUTROPHILS # BLD AUTO: 2.83 K/UL — SIGNIFICANT CHANGE UP (ref 1.8–7.4)
NEUTROPHILS NFR BLD AUTO: 48.3 % — SIGNIFICANT CHANGE UP (ref 43–77)
NITRITE UR-MCNC: NEGATIVE — SIGNIFICANT CHANGE UP
NRBC # BLD: 0 /100 WBCS — SIGNIFICANT CHANGE UP
NRBC # FLD: 0 K/UL — SIGNIFICANT CHANGE UP
PCO2 BLDV: 41 MMHG — SIGNIFICANT CHANGE UP (ref 39–42)
PH BLDV: 7.36 — SIGNIFICANT CHANGE UP (ref 7.32–7.43)
PH UR: 6.5 — SIGNIFICANT CHANGE UP (ref 5–8)
PLATELET # BLD AUTO: 188 K/UL — SIGNIFICANT CHANGE UP (ref 150–400)
PO2 BLDV: 42 MMHG — HIGH (ref 35–40)
POTASSIUM BLDV-SCNC: 3.8 MMOL/L — SIGNIFICANT CHANGE UP (ref 3.4–4.5)
PROT UR-MCNC: ABNORMAL
RBC # BLD: 4.66 M/UL — SIGNIFICANT CHANGE UP (ref 3.8–5.2)
RBC # FLD: 12.4 % — SIGNIFICANT CHANGE UP (ref 10.3–14.5)
RBC CASTS # UR COMP ASSIST: 2 /HPF — SIGNIFICANT CHANGE UP (ref 0–4)
SAO2 % BLDV: 73.2 % — SIGNIFICANT CHANGE UP (ref 60–85)
SP GR SPEC: 1.02 — SIGNIFICANT CHANGE UP (ref 1.01–1.02)
UROBILINOGEN FLD QL: SIGNIFICANT CHANGE UP
WBC # BLD: 5.87 K/UL — SIGNIFICANT CHANGE UP (ref 3.8–10.5)
WBC # FLD AUTO: 5.87 K/UL — SIGNIFICANT CHANGE UP (ref 3.8–10.5)
WBC UR QL: 1 /HPF — SIGNIFICANT CHANGE UP (ref 0–5)

## 2021-04-09 PROCEDURE — 74177 CT ABD & PELVIS W/CONTRAST: CPT | Mod: 26

## 2021-04-09 PROCEDURE — 73502 X-RAY EXAM HIP UNI 2-3 VIEWS: CPT | Mod: 26,LT

## 2021-04-09 PROCEDURE — 71045 X-RAY EXAM CHEST 1 VIEW: CPT | Mod: 26

## 2021-04-09 RX ORDER — SODIUM CHLORIDE 9 MG/ML
1000 INJECTION, SOLUTION INTRAVENOUS
Refills: 0 | Status: DISCONTINUED | OUTPATIENT
Start: 2021-04-09 | End: 2021-04-12

## 2021-04-09 RX ORDER — SODIUM CHLORIDE 9 MG/ML
1000 INJECTION, SOLUTION INTRAVENOUS ONCE
Refills: 0 | Status: COMPLETED | OUTPATIENT
Start: 2021-04-09 | End: 2021-04-09

## 2021-04-09 RX ORDER — SODIUM CHLORIDE 9 MG/ML
1000 INJECTION, SOLUTION INTRAVENOUS ONCE
Refills: 0 | Status: DISCONTINUED | OUTPATIENT
Start: 2021-04-09 | End: 2021-04-09

## 2021-04-09 RX ORDER — CLONAZEPAM 1 MG
0.5 TABLET ORAL ONCE
Refills: 0 | Status: DISCONTINUED | OUTPATIENT
Start: 2021-04-09 | End: 2021-04-09

## 2021-04-09 RX ORDER — FAMOTIDINE 10 MG/ML
20 INJECTION INTRAVENOUS ONCE
Refills: 0 | Status: COMPLETED | OUTPATIENT
Start: 2021-04-09 | End: 2021-04-09

## 2021-04-09 RX ORDER — ONDANSETRON 8 MG/1
4 TABLET, FILM COATED ORAL ONCE
Refills: 0 | Status: COMPLETED | OUTPATIENT
Start: 2021-04-09 | End: 2021-04-09

## 2021-04-09 RX ADMIN — SODIUM CHLORIDE 1000 MILLILITER(S): 9 INJECTION, SOLUTION INTRAVENOUS at 03:11

## 2021-04-09 RX ADMIN — SODIUM CHLORIDE 1000 MILLILITER(S): 9 INJECTION, SOLUTION INTRAVENOUS at 03:10

## 2021-04-09 RX ADMIN — SODIUM CHLORIDE 1000 MILLILITER(S): 9 INJECTION, SOLUTION INTRAVENOUS at 04:41

## 2021-04-09 RX ADMIN — FAMOTIDINE 20 MILLIGRAM(S): 10 INJECTION INTRAVENOUS at 01:45

## 2021-04-09 RX ADMIN — ONDANSETRON 4 MILLIGRAM(S): 8 TABLET, FILM COATED ORAL at 01:45

## 2021-04-09 RX ADMIN — Medication 30 MILLILITER(S): at 01:45

## 2021-04-09 RX ADMIN — Medication 0.5 MILLIGRAM(S): at 02:02

## 2021-04-09 RX ADMIN — SODIUM CHLORIDE 1000 MILLILITER(S): 9 INJECTION, SOLUTION INTRAVENOUS at 01:45

## 2021-04-09 NOTE — ED PROVIDER NOTE - PATIENT PORTAL LINK FT
You can access the FollowMyHealth Patient Portal offered by [facility] by registering at the following website: [email or web address]. By joining [facility]’s FollowMyHealth portal, you will also be able to view your health information using other applications (apps) compatible with our system.
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
## 2021-04-09 NOTE — ED PROVIDER NOTE - NSFOLLOWUPINSTRUCTIONS_ED_ALL_ED_FT
You were seen in the Emergency Department for abdominal pain.   1) Advance activity as tolerated.    2) Continue all previously prescribed medications as directed.    3) Follow up with your primary care physician in 24-48 hours - take copies of your results.    4) Return to the Emergency Department for worsening or persistent symptoms, and/or ANY NEW OR CONCERNING SYMPTOMS as described below.    You were seen today in the emergency room for abdominal pain. Although the testing done today indicates that your pain is not from an acute emergency, your pain could still represent a more serious problem. Most commonly, the pain you are having is likely not something serious and will resolve in a few days, however testing was done today based on the symptoms that you currently have; so if you develop new or worsening symptoms this could be a sign of a problem that was not tested for and means you should come back to the emergency room or see your doctor urgently. You need to follow up with your doctor in the next 48-72 hours. If you develop any new or worsening symptoms you need to return immediately to the emergency department. If you experience any of the following please come right back to the emergency room: severe nausea and vomiting with inability to tolerate eating, severe worsening of your pain, a new fever, new bleeding in stool or vomit, confusion, new numbness or weakness, passing out.
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
## 2021-04-09 NOTE — ED PROVIDER NOTE - PHYSICAL EXAMINATION
[Const] well-appearing, resting comfortably, no acute distress, pt yelling at staff  [HEENT] PERRL, EOMI, moist mucus membranes  [Neck] Supple, trachea midline  [CV] +S1/S2, no m/r/g appreciated  [Lungs] Clear to auscultations bilaterally, no adventitious lung sounds  [Abd] periumbilical TTP  [MSK] 5/5 upper extremity and lower extremity str bilaterally  [Skin] warm, dry, well-perfused  [Neuro] A&Ox3, Cranial Nerves II-XII intact  [] no CVAT

## 2021-04-09 NOTE — ED PROVIDER NOTE - PROGRESS NOTE DETAILS
Isabel, PGY-2: Pt reassessed multiple times in the past several hours. Patient states she is feeling "great," much improved, ambulatory, tolerating PO, lipase negative, CTAP negative. XR negative, some degree of alcoholic ketoacidosis perhaps with ketones and AG, improved after D5NS, patient has high 's, will take her meds when she gets home, feels well at this time.

## 2021-04-09 NOTE — ED PROVIDER NOTE - CLINICAL SUMMARY MEDICAL DECISION MAKING FREE TEXT BOX
50 y/o F w/ hx pancreatitis, depression/anxiety presents with abd pain x1 days, n/v, dysuria, L flank pain. denies fevers/chills, periumbilical TTP w/o rebound/guarding, VSS. Obtain labwork, CT abd/pelvis to r/o pancreatitis or intra-abd pathology, r/o kidney stones, obtain UA.

## 2021-04-09 NOTE — ED PROVIDER NOTE - ATTENDING CONTRIBUTION TO CARE
Ranjan HALL: I agree with the above provided history and exam and addend/modify it as follows.    51F w/ pmh alcohol use disorder, pancreatitis, diabetes mellitis, depression, anxiety -- p/w L abd/side pain since yesterday, assc w/ persistent nausea/vomiting, urinary frequency, dysuria, headache. Reports this feels similar to when she had pancreatitis in the past. Reports still feels nauseous, and appears very uncomfortable from L side pain.     *GEN:   uncomfortable, AOx3  *EYES:   pupils equally round and reactive to light, extra-occular movements intact  *HEENT:   airway patent  *CV:   regular rate and rhythm  *RESP:   clear to auscultation bilaterally, non-labored  *ABD:   L abd/side tenderness to palpation, R abd/side bruising  *:   no cva/flank tenderness  *EXTREM:   no MSK tenderness, full ROM throughout, no leg swelling  *SKIN:   dry, intact  *NEURO:   AOx3, cranial nerves intact throughout, strength 5/5, no focal loss of sensation, no pronator drift, finger/nose normal    DDx is broad includes pancreatitis, uti, pyelo, renal colic, traumatic intra-abd bleeding. Will give pain meds, ivf, check infectious/trauma workup, reassess    I Berny Woodruff MD performed a history and physical exam of the patient and discussed their management with the resident and /or advanced care provider. I reviewed the resident and /or ACP's note and agree with the documented findings and plan of care. My medical decision making and observations are found above.
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
## 2021-04-28 NOTE — H&P ADULT - DOES THIS PATIENT HAVE A HISTORY OF OR HAS BEEN DX WITH HEART FAILURE?
Upon review of the In Basket request we were able to locate, review, and update the patient chart as requested for Diabetic Eye Exam     Any additional questions or concerns should be emailed to the Practice Liaisons via [email or web address] email, please do not reply via In Basket      Thank you  Azul Mcekon no

## 2021-04-29 NOTE — DISCHARGE NOTE ADULT - INSTRUCTIONS
Patent patient is to call MD if temperature is above 101.4 . maintain drsg as shown by RN .  maintain clean and dry.

## 2021-05-04 NOTE — ED ADULT NURSE NOTE - FINAL NURSING ELECTRONIC SIGNATURE
Received fax from [facility]    LAST INR:  2.3  Patient Findings: All are negative. Please give direction for Warfarin dosing, f/u date, and any patient instructions.    Last dosage instructions:  Please have him continue with 6 mg PO every day and repeat an INR in 1 week.     Previous INR: 3.3     15-Mar-2018 16:21

## 2021-05-19 NOTE — ED PROVIDER NOTE - NS ED NOTE AC HIGH RISK COUNTRIES
Brandon presents today for her OB visit.    Patient would like communication of their results via:   José Manuel    Patient's current [facility] status: Active.     Chaperone needed:  No    [facility] - Patient is on [employer] Scheduling.           No

## 2021-05-30 NOTE — BEHAVIORAL HEALTH ASSESSMENT NOTE - NSBHPSYCHMEDSHX_PSY_A_CORE
"Assessment:     1. Open wound of left lower extremity, initial encounter         Plan:     Parish is a 81-year-old gentleman accompanied by his wife here today regarding a follow-up from a wound.  The injury occurred about 5 days ago and on close examination it appears that there is some edema and granulation tissue surrounding the scab which appears appropriate for the stage of healing.  This does not appear like an active cellulitis.  We did draw a Sun'aq around the area of erythema and I asked him to keep a close eye on it over the next 24 to 48 hours and alert me if there is any spreading or worsening redness.  The patient was comfortable with that plan.    Subjective:       81 y.o. male presents after calling into nurse triage regarding concerns about increasing redness around a wound that occurred last week.  The patient is not exactly sure what happened but when he got up out of bed in the middle of the night he hit his leg on something.  He states that there was increased swelling and redness over the last 24 hours which made him concerned about possible infection and he was guided to come in to be seen.  He systemically feels fine and denies any fever or chills.      Reviewed: The following portions of the patient's history were reviewed and updated as appropriate: allergies, current medications, past family history, past medical history, past social history, past surgical history and problem list.    Review of Systems  Pertinent items are noted in HPI.        Objective:     /64 (Patient Site: Left Arm, Patient Position: Sitting, Cuff Size: Adult Large)   Pulse 72   Ht 5' 9.5\" (1.765 m)   Wt 184 lb (83.5 kg)   BMI 26.78 kg/m    General appearance: alert, appears stated age and cooperative  Skin: 2cm area of swelling with mild redness around a 5mm X 4mm scab in the center. Careful palpation around the wound demonstrates some mild pitting edema but no fluctuance and it appears dry.       This note has " been dictated using voice recognition software. Any grammatical or context distortions are unintentional and inherent to the software   yes...

## 2021-06-22 ENCOUNTER — INPATIENT (INPATIENT)
Facility: HOSPITAL | Age: 51
LOS: 1 days | Discharge: HOME | End: 2021-06-24
Attending: INTERNAL MEDICINE | Admitting: INTERNAL MEDICINE
Payer: MEDICARE

## 2021-06-22 VITALS
RESPIRATION RATE: 18 BRPM | OXYGEN SATURATION: 100 % | SYSTOLIC BLOOD PRESSURE: 124 MMHG | HEIGHT: 62 IN | HEART RATE: 108 BPM | DIASTOLIC BLOOD PRESSURE: 90 MMHG | TEMPERATURE: 98 F

## 2021-06-22 DIAGNOSIS — Z90.89 ACQUIRED ABSENCE OF OTHER ORGANS: Chronic | ICD-10-CM

## 2021-06-22 DIAGNOSIS — L02.91 CUTANEOUS ABSCESS, UNSPECIFIED: Chronic | ICD-10-CM

## 2021-06-22 DIAGNOSIS — R11.10 VOMITING, UNSPECIFIED: ICD-10-CM

## 2021-06-22 DIAGNOSIS — Z98.890 OTHER SPECIFIED POSTPROCEDURAL STATES: Chronic | ICD-10-CM

## 2021-06-22 LAB
ALBUMIN SERPL ELPH-MCNC: 4.9 G/DL — SIGNIFICANT CHANGE UP (ref 3.3–5)
ALP SERPL-CCNC: 129 U/L — HIGH (ref 40–120)
ALT FLD-CCNC: 83 U/L — HIGH (ref 4–33)
ANION GAP SERPL CALC-SCNC: 13 MMOL/L — SIGNIFICANT CHANGE UP (ref 7–14)
ANION GAP SERPL CALC-SCNC: 34 MMOL/L — HIGH (ref 7–14)
APPEARANCE UR: CLEAR — SIGNIFICANT CHANGE UP
AST SERPL-CCNC: 54 U/L — HIGH (ref 4–32)
B PERT DNA SPEC QL NAA+PROBE: SIGNIFICANT CHANGE UP
B-OH-BUTYR SERPL-SCNC: 0.3 MMOL/L — SIGNIFICANT CHANGE UP (ref 0–0.4)
B-OH-BUTYR SERPL-SCNC: 0.3 MMOL/L — SIGNIFICANT CHANGE UP (ref 0–0.4)
BASE EXCESS BLDV CALC-SCNC: -5.1 MMOL/L — LOW (ref -3–2)
BASE EXCESS BLDV CALC-SCNC: 3.9 MMOL/L — HIGH (ref -3–2)
BASOPHILS # BLD AUTO: 0.05 K/UL — SIGNIFICANT CHANGE UP (ref 0–0.2)
BASOPHILS # BLD AUTO: 0.07 K/UL — SIGNIFICANT CHANGE UP (ref 0–0.2)
BASOPHILS NFR BLD AUTO: 0.4 % — SIGNIFICANT CHANGE UP (ref 0–2)
BASOPHILS NFR BLD AUTO: 0.5 % — SIGNIFICANT CHANGE UP (ref 0–2)
BILIRUB SERPL-MCNC: 2 MG/DL — HIGH (ref 0.2–1.2)
BILIRUB UR-MCNC: NEGATIVE — SIGNIFICANT CHANGE UP
BLOOD GAS VENOUS - CREATININE: 1 MG/DL — SIGNIFICANT CHANGE UP (ref 0.5–1.3)
BLOOD GAS VENOUS COMPREHENSIVE RESULT: SIGNIFICANT CHANGE UP
BUN SERPL-MCNC: 23 MG/DL — SIGNIFICANT CHANGE UP (ref 7–23)
BUN SERPL-MCNC: 31 MG/DL — HIGH (ref 7–23)
C PNEUM DNA SPEC QL NAA+PROBE: SIGNIFICANT CHANGE UP
CALCIUM SERPL-MCNC: 10.6 MG/DL — HIGH (ref 8.4–10.5)
CALCIUM SERPL-MCNC: 8.9 MG/DL — SIGNIFICANT CHANGE UP (ref 8.4–10.5)
CHLORIDE BLDV-SCNC: 96 MMOL/L — SIGNIFICANT CHANGE UP (ref 96–108)
CHLORIDE SERPL-SCNC: 101 MMOL/L — SIGNIFICANT CHANGE UP (ref 98–107)
CHLORIDE SERPL-SCNC: 83 MMOL/L — LOW (ref 98–107)
CO2 SERPL-SCNC: 14 MMOL/L — LOW (ref 22–31)
CO2 SERPL-SCNC: 25 MMOL/L — SIGNIFICANT CHANGE UP (ref 22–31)
COLOR SPEC: SIGNIFICANT CHANGE UP
CREAT SERPL-MCNC: 0.72 MG/DL — SIGNIFICANT CHANGE UP (ref 0.5–1.3)
CREAT SERPL-MCNC: 0.93 MG/DL — SIGNIFICANT CHANGE UP (ref 0.5–1.3)
DIFF PNL FLD: NEGATIVE — SIGNIFICANT CHANGE UP
EOSINOPHIL # BLD AUTO: 0.02 K/UL — SIGNIFICANT CHANGE UP (ref 0–0.5)
EOSINOPHIL # BLD AUTO: 0.11 K/UL — SIGNIFICANT CHANGE UP (ref 0–0.5)
EOSINOPHIL NFR BLD AUTO: 0.1 % — SIGNIFICANT CHANGE UP (ref 0–6)
EOSINOPHIL NFR BLD AUTO: 0.8 % — SIGNIFICANT CHANGE UP (ref 0–6)
FLUAV SUBTYP SPEC NAA+PROBE: SIGNIFICANT CHANGE UP
FLUBV RNA SPEC QL NAA+PROBE: SIGNIFICANT CHANGE UP
GAS PNL BLDV: 129 MMOL/L — LOW (ref 136–146)
GLUCOSE BLDC GLUCOMTR-MCNC: 129 MG/DL — HIGH (ref 70–99)
GLUCOSE BLDC GLUCOMTR-MCNC: 159 MG/DL — HIGH (ref 70–99)
GLUCOSE BLDC GLUCOMTR-MCNC: 173 MG/DL — HIGH (ref 70–99)
GLUCOSE BLDC GLUCOMTR-MCNC: 221 MG/DL — HIGH (ref 70–99)
GLUCOSE BLDC GLUCOMTR-MCNC: 358 MG/DL — HIGH (ref 70–99)
GLUCOSE BLDC GLUCOMTR-MCNC: 462 MG/DL — CRITICAL HIGH (ref 70–99)
GLUCOSE BLDV-MCNC: 490 MG/DL — CRITICAL HIGH (ref 70–99)
GLUCOSE SERPL-MCNC: 135 MG/DL — HIGH (ref 70–99)
GLUCOSE SERPL-MCNC: 509 MG/DL — CRITICAL HIGH (ref 70–99)
GLUCOSE UR QL: ABNORMAL
HADV DNA SPEC QL NAA+PROBE: SIGNIFICANT CHANGE UP
HCO3 BLDV-SCNC: 21 MMOL/L — SIGNIFICANT CHANGE UP (ref 20–27)
HCO3 BLDV-SCNC: 27 MMOL/L — SIGNIFICANT CHANGE UP (ref 20–27)
HCOV 229E RNA SPEC QL NAA+PROBE: SIGNIFICANT CHANGE UP
HCOV HKU1 RNA SPEC QL NAA+PROBE: SIGNIFICANT CHANGE UP
HCOV NL63 RNA SPEC QL NAA+PROBE: SIGNIFICANT CHANGE UP
HCOV OC43 RNA SPEC QL NAA+PROBE: SIGNIFICANT CHANGE UP
HCT VFR BLD CALC: 41.8 % — SIGNIFICANT CHANGE UP (ref 34.5–45)
HCT VFR BLD CALC: 51.2 % — HIGH (ref 34.5–45)
HCT VFR BLDA CALC: 55.1 % — HIGH (ref 34.5–46.5)
HGB BLD CALC-MCNC: 18 G/DL — HIGH (ref 11.5–15.5)
HGB BLD-MCNC: 14.5 G/DL — SIGNIFICANT CHANGE UP (ref 11.5–15.5)
HGB BLD-MCNC: 17.7 G/DL — HIGH (ref 11.5–15.5)
HMPV RNA SPEC QL NAA+PROBE: SIGNIFICANT CHANGE UP
HPIV1 RNA SPEC QL NAA+PROBE: SIGNIFICANT CHANGE UP
HPIV2 RNA SPEC QL NAA+PROBE: SIGNIFICANT CHANGE UP
HPIV3 RNA SPEC QL NAA+PROBE: SIGNIFICANT CHANGE UP
HPIV4 RNA SPEC QL NAA+PROBE: SIGNIFICANT CHANGE UP
IANC: 12.51 K/UL — HIGH (ref 1.5–8.5)
IANC: 9.02 K/UL — HIGH (ref 1.5–8.5)
IMM GRANULOCYTES NFR BLD AUTO: 0.5 % — SIGNIFICANT CHANGE UP (ref 0–1.5)
IMM GRANULOCYTES NFR BLD AUTO: 0.6 % — SIGNIFICANT CHANGE UP (ref 0–1.5)
KETONES UR-MCNC: ABNORMAL
LACTATE BLDV-MCNC: 5.7 MMOL/L — CRITICAL HIGH (ref 0.5–2)
LEUKOCYTE ESTERASE UR-ACNC: NEGATIVE — SIGNIFICANT CHANGE UP
LIDOCAIN IGE QN: 12 U/L — SIGNIFICANT CHANGE UP (ref 7–60)
LIDOCAIN IGE QN: 20 U/L — SIGNIFICANT CHANGE UP (ref 7–60)
LYMPHOCYTES # BLD AUTO: 1.63 K/UL — SIGNIFICANT CHANGE UP (ref 1–3.3)
LYMPHOCYTES # BLD AUTO: 10.8 % — LOW (ref 13–44)
LYMPHOCYTES # BLD AUTO: 2.98 K/UL — SIGNIFICANT CHANGE UP (ref 1–3.3)
LYMPHOCYTES # BLD AUTO: 22.8 % — SIGNIFICANT CHANGE UP (ref 13–44)
MAGNESIUM SERPL-MCNC: 2.4 MG/DL — SIGNIFICANT CHANGE UP (ref 1.6–2.6)
MCHC RBC-ENTMCNC: 31.8 PG — SIGNIFICANT CHANGE UP (ref 27–34)
MCHC RBC-ENTMCNC: 32.2 PG — SIGNIFICANT CHANGE UP (ref 27–34)
MCHC RBC-ENTMCNC: 34.6 GM/DL — SIGNIFICANT CHANGE UP (ref 32–36)
MCHC RBC-ENTMCNC: 34.7 GM/DL — SIGNIFICANT CHANGE UP (ref 32–36)
MCV RBC AUTO: 91.9 FL — SIGNIFICANT CHANGE UP (ref 80–100)
MCV RBC AUTO: 92.7 FL — SIGNIFICANT CHANGE UP (ref 80–100)
MONOCYTES # BLD AUTO: 0.81 K/UL — SIGNIFICANT CHANGE UP (ref 0–0.9)
MONOCYTES # BLD AUTO: 0.85 K/UL — SIGNIFICANT CHANGE UP (ref 0–0.9)
MONOCYTES NFR BLD AUTO: 5.4 % — SIGNIFICANT CHANGE UP (ref 2–14)
MONOCYTES NFR BLD AUTO: 6.5 % — SIGNIFICANT CHANGE UP (ref 2–14)
NEUTROPHILS # BLD AUTO: 12.51 K/UL — HIGH (ref 1.8–7.4)
NEUTROPHILS # BLD AUTO: 9.02 K/UL — HIGH (ref 1.8–7.4)
NEUTROPHILS NFR BLD AUTO: 69 % — SIGNIFICANT CHANGE UP (ref 43–77)
NEUTROPHILS NFR BLD AUTO: 82.6 % — HIGH (ref 43–77)
NITRITE UR-MCNC: NEGATIVE — SIGNIFICANT CHANGE UP
NRBC # BLD: 0 /100 WBCS — SIGNIFICANT CHANGE UP
NRBC # BLD: 0 /100 WBCS — SIGNIFICANT CHANGE UP
NRBC # FLD: 0 K/UL — SIGNIFICANT CHANGE UP
NRBC # FLD: 0 K/UL — SIGNIFICANT CHANGE UP
PCO2 BLDV: 18 MMHG — LOW (ref 41–51)
PCO2 BLDV: 44 MMHG — SIGNIFICANT CHANGE UP (ref 41–51)
PH BLDV: 7.42 — SIGNIFICANT CHANGE UP (ref 7.32–7.43)
PH BLDV: 7.57 — HIGH (ref 7.32–7.43)
PH UR: 5.5 — SIGNIFICANT CHANGE UP (ref 5–8)
PHOSPHATE SERPL-MCNC: 2.4 MG/DL — LOW (ref 2.5–4.5)
PLATELET # BLD AUTO: 173 K/UL — SIGNIFICANT CHANGE UP (ref 150–400)
PLATELET # BLD AUTO: 214 K/UL — SIGNIFICANT CHANGE UP (ref 150–400)
PO2 BLDV: 24 MMHG — LOW (ref 35–40)
PO2 BLDV: 42 MMHG — HIGH (ref 35–40)
POTASSIUM BLDV-SCNC: 4 MMOL/L — SIGNIFICANT CHANGE UP (ref 3.4–4.5)
POTASSIUM SERPL-MCNC: 3.3 MMOL/L — LOW (ref 3.5–5.3)
POTASSIUM SERPL-MCNC: 4.3 MMOL/L — SIGNIFICANT CHANGE UP (ref 3.5–5.3)
POTASSIUM SERPL-SCNC: 3.3 MMOL/L — LOW (ref 3.5–5.3)
POTASSIUM SERPL-SCNC: 4.3 MMOL/L — SIGNIFICANT CHANGE UP (ref 3.5–5.3)
PROT SERPL-MCNC: 8.9 G/DL — HIGH (ref 6–8.3)
PROT UR-MCNC: ABNORMAL
RAPID RVP RESULT: SIGNIFICANT CHANGE UP
RBC # BLD: 4.51 M/UL — SIGNIFICANT CHANGE UP (ref 3.8–5.2)
RBC # BLD: 5.57 M/UL — HIGH (ref 3.8–5.2)
RBC # FLD: 12.2 % — SIGNIFICANT CHANGE UP (ref 10.3–14.5)
RBC # FLD: 12.3 % — SIGNIFICANT CHANGE UP (ref 10.3–14.5)
RSV RNA SPEC QL NAA+PROBE: SIGNIFICANT CHANGE UP
RV+EV RNA SPEC QL NAA+PROBE: SIGNIFICANT CHANGE UP
SAO2 % BLDV: 47.1 % — LOW (ref 60–85)
SAO2 % BLDV: 78.2 % — SIGNIFICANT CHANGE UP (ref 60–85)
SARS-COV-2 RNA SPEC QL NAA+PROBE: SIGNIFICANT CHANGE UP
SODIUM SERPL-SCNC: 131 MMOL/L — LOW (ref 135–145)
SODIUM SERPL-SCNC: 139 MMOL/L — SIGNIFICANT CHANGE UP (ref 135–145)
SP GR SPEC: 1.03 — HIGH (ref 1.01–1.02)
UROBILINOGEN FLD QL: SIGNIFICANT CHANGE UP
WBC # BLD: 13.08 K/UL — HIGH (ref 3.8–10.5)
WBC # BLD: 15.13 K/UL — HIGH (ref 3.8–10.5)
WBC # FLD AUTO: 13.08 K/UL — HIGH (ref 3.8–10.5)
WBC # FLD AUTO: 15.13 K/UL — HIGH (ref 3.8–10.5)

## 2021-06-22 PROCEDURE — 71045 X-RAY EXAM CHEST 1 VIEW: CPT | Mod: 26

## 2021-06-22 PROCEDURE — 36569 INSJ PICC 5 YR+ W/O IMAGING: CPT

## 2021-06-22 PROCEDURE — 76937 US GUIDE VASCULAR ACCESS: CPT | Mod: 26,59

## 2021-06-22 PROCEDURE — 99291 CRITICAL CARE FIRST HOUR: CPT

## 2021-06-22 RX ORDER — DEXTROSE 50 % IN WATER 50 %
25 SYRINGE (ML) INTRAVENOUS ONCE
Refills: 0 | Status: DISCONTINUED | OUTPATIENT
Start: 2021-06-22 | End: 2021-06-24

## 2021-06-22 RX ORDER — INSULIN HUMAN 100 [IU]/ML
4 INJECTION, SOLUTION SUBCUTANEOUS
Qty: 100 | Refills: 0 | Status: DISCONTINUED | OUTPATIENT
Start: 2021-06-22 | End: 2021-06-23

## 2021-06-22 RX ORDER — DEXTROSE 50 % IN WATER 50 %
12.5 SYRINGE (ML) INTRAVENOUS ONCE
Refills: 0 | Status: DISCONTINUED | OUTPATIENT
Start: 2021-06-22 | End: 2021-06-24

## 2021-06-22 RX ORDER — ONDANSETRON 8 MG/1
4 TABLET, FILM COATED ORAL ONCE
Refills: 0 | Status: COMPLETED | OUTPATIENT
Start: 2021-06-22 | End: 2021-06-22

## 2021-06-22 RX ORDER — INSULIN LISPRO 100/ML
VIAL (ML) SUBCUTANEOUS AT BEDTIME
Refills: 0 | Status: DISCONTINUED | OUTPATIENT
Start: 2021-06-22 | End: 2021-06-24

## 2021-06-22 RX ORDER — SODIUM CHLORIDE 9 MG/ML
1000 INJECTION INTRAMUSCULAR; INTRAVENOUS; SUBCUTANEOUS ONCE
Refills: 0 | Status: COMPLETED | OUTPATIENT
Start: 2021-06-22 | End: 2021-06-22

## 2021-06-22 RX ORDER — GLUCAGON INJECTION, SOLUTION 0.5 MG/.1ML
1 INJECTION, SOLUTION SUBCUTANEOUS ONCE
Refills: 0 | Status: DISCONTINUED | OUTPATIENT
Start: 2021-06-22 | End: 2021-06-24

## 2021-06-22 RX ORDER — CLONAZEPAM 1 MG
1 TABLET ORAL
Qty: 0 | Refills: 0 | DISCHARGE

## 2021-06-22 RX ORDER — PANTOPRAZOLE SODIUM 20 MG/1
40 TABLET, DELAYED RELEASE ORAL DAILY
Refills: 0 | Status: DISCONTINUED | OUTPATIENT
Start: 2021-06-22 | End: 2021-06-24

## 2021-06-22 RX ORDER — SODIUM CHLORIDE 9 MG/ML
1000 INJECTION INTRAMUSCULAR; INTRAVENOUS; SUBCUTANEOUS
Refills: 0 | Status: DISCONTINUED | OUTPATIENT
Start: 2021-06-22 | End: 2021-06-22

## 2021-06-22 RX ORDER — DIAZEPAM 5 MG
5 TABLET ORAL ONCE
Refills: 0 | Status: DISCONTINUED | OUTPATIENT
Start: 2021-06-22 | End: 2021-06-22

## 2021-06-22 RX ORDER — INSULIN LISPRO 100/ML
VIAL (ML) SUBCUTANEOUS EVERY 4 HOURS
Refills: 0 | Status: DISCONTINUED | OUTPATIENT
Start: 2021-06-22 | End: 2021-06-22

## 2021-06-22 RX ORDER — INSULIN LISPRO 100/ML
8 VIAL (ML) SUBCUTANEOUS ONCE
Refills: 0 | Status: DISCONTINUED | OUTPATIENT
Start: 2021-06-22 | End: 2021-06-22

## 2021-06-22 RX ORDER — CHLORHEXIDINE GLUCONATE 213 G/1000ML
1 SOLUTION TOPICAL
Refills: 0 | Status: DISCONTINUED | OUTPATIENT
Start: 2021-06-22 | End: 2021-06-23

## 2021-06-22 RX ORDER — INSULIN LISPRO 100/ML
VIAL (ML) SUBCUTANEOUS
Refills: 0 | Status: DISCONTINUED | OUTPATIENT
Start: 2021-06-22 | End: 2021-06-24

## 2021-06-22 RX ORDER — MORPHINE SULFATE 50 MG/1
4 CAPSULE, EXTENDED RELEASE ORAL ONCE
Refills: 0 | Status: DISCONTINUED | OUTPATIENT
Start: 2021-06-22 | End: 2021-06-22

## 2021-06-22 RX ORDER — DEXTROSE MONOHYDRATE, SODIUM CHLORIDE, AND POTASSIUM CHLORIDE 50; .745; 4.5 G/1000ML; G/1000ML; G/1000ML
1000 INJECTION, SOLUTION INTRAVENOUS
Refills: 0 | Status: DISCONTINUED | OUTPATIENT
Start: 2021-06-22 | End: 2021-06-23

## 2021-06-22 RX ORDER — CHOLECALCIFEROL (VITAMIN D3) 125 MCG
1 CAPSULE ORAL
Qty: 0 | Refills: 0 | DISCHARGE

## 2021-06-22 RX ORDER — SODIUM CHLORIDE 9 MG/ML
1000 INJECTION, SOLUTION INTRAVENOUS
Refills: 0 | Status: DISCONTINUED | OUTPATIENT
Start: 2021-06-22 | End: 2021-06-22

## 2021-06-22 RX ORDER — INSULIN DEGLUDEC 100 U/ML
26 INJECTION, SOLUTION SUBCUTANEOUS
Qty: 0 | Refills: 0 | DISCHARGE

## 2021-06-22 RX ORDER — PREGABALIN 225 MG/1
1 CAPSULE ORAL
Qty: 0 | Refills: 0 | DISCHARGE

## 2021-06-22 RX ORDER — ONDANSETRON 8 MG/1
4 TABLET, FILM COATED ORAL EVERY 6 HOURS
Refills: 0 | Status: DISCONTINUED | OUTPATIENT
Start: 2021-06-22 | End: 2021-06-23

## 2021-06-22 RX ORDER — SODIUM CHLORIDE 9 MG/ML
1000 INJECTION, SOLUTION INTRAVENOUS ONCE
Refills: 0 | Status: COMPLETED | OUTPATIENT
Start: 2021-06-22 | End: 2021-06-22

## 2021-06-22 RX ORDER — SODIUM CHLORIDE 9 MG/ML
1000 INJECTION, SOLUTION INTRAVENOUS
Refills: 0 | Status: DISCONTINUED | OUTPATIENT
Start: 2021-06-22 | End: 2021-06-24

## 2021-06-22 RX ORDER — INSULIN GLARGINE 100 [IU]/ML
20 INJECTION, SOLUTION SUBCUTANEOUS AT BEDTIME
Refills: 0 | Status: DISCONTINUED | OUTPATIENT
Start: 2021-06-22 | End: 2021-06-23

## 2021-06-22 RX ORDER — INSULIN LISPRO 100/ML
6 VIAL (ML) SUBCUTANEOUS
Refills: 0 | Status: DISCONTINUED | OUTPATIENT
Start: 2021-06-22 | End: 2021-06-23

## 2021-06-22 RX ORDER — DEXTROSE 50 % IN WATER 50 %
15 SYRINGE (ML) INTRAVENOUS ONCE
Refills: 0 | Status: DISCONTINUED | OUTPATIENT
Start: 2021-06-22 | End: 2021-06-24

## 2021-06-22 RX ORDER — POTASSIUM CHLORIDE 20 MEQ
40 PACKET (EA) ORAL ONCE
Refills: 0 | Status: COMPLETED | OUTPATIENT
Start: 2021-06-22 | End: 2021-06-22

## 2021-06-22 RX ADMIN — MORPHINE SULFATE 4 MILLIGRAM(S): 50 CAPSULE, EXTENDED RELEASE ORAL at 18:15

## 2021-06-22 RX ADMIN — Medication 2 MILLIGRAM(S): at 13:39

## 2021-06-22 RX ADMIN — SODIUM CHLORIDE 1000 MILLILITER(S): 9 INJECTION INTRAMUSCULAR; INTRAVENOUS; SUBCUTANEOUS at 17:00

## 2021-06-22 RX ADMIN — SODIUM CHLORIDE 125 MILLILITER(S): 9 INJECTION INTRAMUSCULAR; INTRAVENOUS; SUBCUTANEOUS at 17:01

## 2021-06-22 RX ADMIN — INSULIN GLARGINE 20 UNIT(S): 100 INJECTION, SOLUTION SUBCUTANEOUS at 23:04

## 2021-06-22 RX ADMIN — DEXTROSE MONOHYDRATE, SODIUM CHLORIDE, AND POTASSIUM CHLORIDE 175 MILLILITER(S): 50; .745; 4.5 INJECTION, SOLUTION INTRAVENOUS at 21:11

## 2021-06-22 RX ADMIN — ONDANSETRON 4 MILLIGRAM(S): 8 TABLET, FILM COATED ORAL at 13:59

## 2021-06-22 RX ADMIN — Medication 30 MILLILITER(S): at 23:04

## 2021-06-22 RX ADMIN — Medication 2 MILLIGRAM(S): at 13:57

## 2021-06-22 RX ADMIN — Medication 40 MILLIEQUIVALENT(S): at 20:10

## 2021-06-22 RX ADMIN — INSULIN HUMAN 7.4 UNIT(S)/HR: 100 INJECTION, SOLUTION SUBCUTANEOUS at 17:17

## 2021-06-22 RX ADMIN — ONDANSETRON 4 MILLIGRAM(S): 8 TABLET, FILM COATED ORAL at 20:09

## 2021-06-22 RX ADMIN — MORPHINE SULFATE 4 MILLIGRAM(S): 50 CAPSULE, EXTENDED RELEASE ORAL at 13:49

## 2021-06-22 RX ADMIN — SODIUM CHLORIDE 1000 MILLILITER(S): 9 INJECTION, SOLUTION INTRAVENOUS at 17:00

## 2021-06-22 RX ADMIN — SODIUM CHLORIDE 1000 MILLILITER(S): 9 INJECTION INTRAMUSCULAR; INTRAVENOUS; SUBCUTANEOUS at 14:34

## 2021-06-22 RX ADMIN — PANTOPRAZOLE SODIUM 40 MILLIGRAM(S): 20 TABLET, DELAYED RELEASE ORAL at 17:01

## 2021-06-22 RX ADMIN — Medication 40 MILLIEQUIVALENT(S): at 23:04

## 2021-06-22 RX ADMIN — MORPHINE SULFATE 4 MILLIGRAM(S): 50 CAPSULE, EXTENDED RELEASE ORAL at 19:00

## 2021-06-22 RX ADMIN — Medication 30 MILLILITER(S): at 17:01

## 2021-06-22 RX ADMIN — SODIUM CHLORIDE 1000 MILLILITER(S): 9 INJECTION, SOLUTION INTRAVENOUS at 13:39

## 2021-06-22 RX ADMIN — MORPHINE SULFATE 4 MILLIGRAM(S): 50 CAPSULE, EXTENDED RELEASE ORAL at 17:00

## 2021-06-22 NOTE — H&P ADULT - NSHPLABSRESULTS_GEN_ALL_CORE
VITAL SIGNS:  ICU Vital Signs Last 24 Hrs  T(C): 36.2 (2021 13:15), Max: 36.8 (2021 12:38)  T(F): 97.2 (2021 13:15), Max: 98.2 (2021 12:38)  HR: 90 (2021 16:15) (81 - 108)  BP: 148/84 (2021 16:15) (124/90 - 148/84)  BP(mean): --  ABP: --  ABP(mean): --  RR: 16 (2021 16:15) (16 - 30)  SpO2: 99% (2021 16:15) (99% - 100%)      CAPILLARY BLOOD GLUCOSE      POCT Blood Glucose.: 462 mg/dL (2021 16:43)      MEDICATIONS:  MEDICATIONS  (STANDING):  dextrose 40% Gel 15 Gram(s) Oral once  dextrose 5%. 1000 milliLiter(s) (50 mL/Hr) IV Continuous <Continuous>  dextrose 5%. 1000 milliLiter(s) (100 mL/Hr) IV Continuous <Continuous>  dextrose 50% Injectable 25 Gram(s) IV Push once  dextrose 50% Injectable 12.5 Gram(s) IV Push once  dextrose 50% Injectable 25 Gram(s) IV Push once  glucagon  Injectable 1 milliGRAM(s) IntraMuscular once  insulin regular Infusion 7.4 Unit(s)/Hr (7.4 mL/Hr) IV Continuous <Continuous>  pantoprazole  Injectable 40 milliGRAM(s) IV Push daily  sodium chloride 0.9%. 1000 milliLiter(s) (125 mL/Hr) IV Continuous <Continuous>    MEDICATIONS  (PRN):  aluminum hydroxide/magnesium hydroxide/simethicone Suspension 30 milliLiter(s) Oral every 6 hours PRN Dyspepsia  ondansetron Injectable 4 milliGRAM(s) IV Push every 6 hours PRN Nausea and/or Vomiting      ALLERGIES:  Allergies    Bactrim (Anaphylaxis)  Eggplant mouth itches (Other)    Intolerances        LABS:                        17.7   15.13 )-----------( 214      ( 2021 13:57 )             51.2     06-22    131<L>  |  83<L>  |  31<H>  ----------------------------<  509<HH>  4.3   |  14<L>  |  0.93    Ca    10.6<H>      2021 13:57  Phos  2.4       Mg     2.4         TPro  8.9<H>  /  Alb  4.9  /  TBili  2.0<H>  /  DBili  x   /  AST  54<H>  /  ALT  83<H>  /  AlkPhos  129<H>        Urinalysis Basic - ( 2021 15:07 )    Color: Light Yellow / Appearance: Clear / S.030 / pH: x  Gluc: x / Ketone: Large  / Bili: Negative / Urobili: <2 mg/dL   Blood: x / Protein: Trace / Nitrite: Negative   Leuk Esterase: Negative / RBC: x / WBC x   Sq Epi: x / Non Sq Epi: x / Bacteria: x        RADIOLOGY & ADDITIONAL TESTS: Reviewed.  CXR:   Clear lungs

## 2021-06-22 NOTE — ED PROVIDER NOTE - PHYSICAL EXAMINATION
General: awake, alert looks ill, non toxic, moderate distress  Head: atraumatic, normocephalic  Eyes: PERRL, EOMI, no scleral icterus  ENT: no epistaxis, dry mucous membranes, normal phonation, airway patent  Neck: full ROM, no midline ttp  CV: tachy, BP reassuring, distal pulses intact   Pulm: lungs CTA b/l, no wheezing, tachypneic   GI: soft, diffuse ttp, no masses, no rigidity   Back: normal ROM, no midline ttp, no signs of trauma  Extremities: normal ROM, joints stable, distal pulses intact, no edema  Neuro: alert, oriented x3, moving all extremities, interactive, normal speech/memory, not obtunded   Derm: warm, dry, normal color, no rash/wounds

## 2021-06-22 NOTE — ED PROVIDER NOTE - ATTENDING CONTRIBUTION TO CARE
agree with resident note    " 51 yoF, PMHx of IDDM, prior DKA, daily etoh use, pancreatitis 2/2 to etoh use presenting with NV, tremors, decreased PO, abd pain. States has not eaten in 5 days. No drink for several days. Last insulin dose this am. No fever. No vomiting or diarrhea. Has needed ICU for both DKA and etoh withdrawal in the past"    Pt uncomfortable, states no PO intake for 5 days, N/V consistent with past pancreatitis    PE: uncomfortable in pain; tachypneic; hypertensive; dry oral mucosa; CTAB/L; s1 s2 no m/r/g abd soft/NT/ND ext: no edema    Imp: vitals likely consistent with withdrawal and with vomiting likely etiology; tachypnea will r/o DKA, infectious pathology; check lipase given hx of pancreatitis    benzos/IVF/pain control/r/o DKA  admit

## 2021-06-22 NOTE — ED ADULT NURSE NOTE - OBJECTIVE STATEMENT
PT AOx4, ambulatory c/o abd pain, NV, decreased PO intake for 5 days. pt has hx of pancreatitis, DKA, etoh abuse. pt states last drink was 5 days ago. pt endorsing abd pain and noted to have tremors. PT AOx4, ambulatory c/o abd pain, NV, decreased PO intake for 5 days. pt has hx of pancreatitis, DKA, etoh abuse. pt states last drink was 5 days ago. pt endorsing abd pain and headache and noted to have tremors. pt states last time she use inslin was this morning. pt denies chest pain, SOB, fever, chills. 18g placed by ultrasound in left ac, labs sent.

## 2021-06-22 NOTE — H&P ADULT - ATTENDING COMMENTS
51 F with DM, EToh use, MDD here with n/v/abd pain, found to have HAGMA/CARLOS/lactic acidosis due to DKA    Will start insulin gtt, q1h finger sticks  c/w IVF  monitor for hypokalemia and hypophosphatemia   monitor off abx  high risk for metabolic deterioration given DKA  monitor for etoh withdrawal 51 F with DM, EToh use, MDD here with n/v/abd pain, found to have HAGMA/CARLOS/lactic acidosis due to DKA    Will start insulin gtt, q1h finger sticks  c/w IVF  monitor for hypokalemia and hypophosphatemia   monitor off abx  high risk for metabolic deterioration given DKA  monitor for etoh withdrawal    GOC: Full code  DVT ppx: low risk, ambulatory, may start SCDs

## 2021-06-22 NOTE — CHART NOTE - NSCHARTNOTEFT_GEN_A_CORE
Received a call from endocrine.   Start insulin gtt at 0.1 units/kg.   Patient's weight 73.6 kg.     Maverick Sanchez NP   pager [phone]

## 2021-06-22 NOTE — ED PROVIDER NOTE - PROGRESS NOTE DETAILS
Haverty, PGY3- no acidosis, vitals improved. Reeval. Dispo based off sx control. Yared HALL: MICU Have been consulted and will come evaluate patient

## 2021-06-22 NOTE — PROCEDURE NOTE - ADDITIONAL PROCEDURE DETAILS
20G Extended Dwell Arrow IV Catheter was placed in LUE under US Guidance.
20G Extended Dwell Arrow IV Catheter was placed in RUE under US Guidance.

## 2021-06-22 NOTE — H&P ADULT - NSHPREVIEWOFSYSTEMS_GEN_ALL_CORE
CONSTITUTIONAL: No weakness, fevers or chills. has headache  EYES/ENT: No visual changes;  No vertigo or throat pain   NECK: No pain or stiffness  RESPIRATORY: No cough, wheezing, hemoptysis; No shortness of breath  CARDIOVASCULAR: No chest pain or palpitations  GASTROINTESTINAL: has abdominal pain and nausea, no hematemesis; No diarrhea or constipation. No melena or hematochezia.  GENITOURINARY: No dysuria, frequency or hematuria  NEUROLOGICAL: No numbness or weakness  SKIN: No itching, burning, rashes, or lesions   All other review of systems is negative unless indicated above.

## 2021-06-22 NOTE — ED ADULT NURSE REASSESSMENT NOTE - NS ED NURSE REASSESS COMMENT FT1
Mobile Critical Care RN: patient is 51/f PMHx of IDDM and daily ETOH, states she stopped drinking 5 days ago and has been experiencing fatigue, tremors, decreased PO and abdominal pain. found to be hyperglycemic in the 400s, and started on  insulin gtt, FS monitored q1hr and titrated as per MICU team. patient was given 4mg morphine IVP x2 for abdominal pain 8/10, with relief. No other complaints at this time. No distress noted. Respirations even and unlabored. Awaiting MICU bed.

## 2021-06-22 NOTE — ED PROVIDER NOTE - CLINICAL SUMMARY MEDICAL DECISION MAKING FREE TEXT BOX
Yes
Leti, PGY3- 51 yoF, PMHx of IDDM, prior DKA, daily etoh use, pancreatitis 2/2 to etoh use presenting with NV, tremors, decreased PO, abd pain.  tachy, tachypneic, diffuse abd ttp,  hyperglycemic, given hx of dka and tachypnea concern for dka c/b etoh withdrawl/pancreatitis  at this time no signs of shock state  aggressive resus with BZD, analgesia, ivf, and eventually insulin   needs admission, possibly icu

## 2021-06-22 NOTE — H&P ADULT - HISTORY OF PRESENT ILLNESS
51F with DM2 and h/o DKA on Lantus, alcohol dependence, h/o MRSA gluteal  abscess and cellulitis (09/2017) & major depressive DO (remote h/o suicide attempt), pancreatitis 2/2 alcohol use, presents with several days of nausea and vomiting, abdominal pain, and inability to tolerate PO intake. She states that she has not eaten in the past 5 days. She denies diarrhea, chest pain, shortness of breath. She says she takes 10U lantus at night and in the morning, and today took 15U before coming to the hospital.     In the ED, patient was afebrile, tachycardic to 108, saturating 100% on RA. Given ativan 4mg total, morphine 4mg, zofran, PPI, maalox, 2L normal saline, insulin gtt started at 7.4U/hr.

## 2021-06-22 NOTE — ED ADULT NURSE NOTE - CHIEF COMPLAINT QUOTE
Pt with history of DKA and pancreatitis, c/o N/V and abd  pain, inability to eat or drink for 5 days. C/o dizziness, weakness, tingling to extremities, chest pain and SOB. Fs 485 Attending Attestation (For Attendings USE Only)...

## 2021-06-22 NOTE — H&P ADULT - NSHPPHYSICALEXAM_GEN_ALL_CORE
PHYSICAL EXAM:  Vital Signs Last 24 Hrs  T(C): 36.2 (06-22-21 @ 13:15)  T(F): 97.2 (06-22-21 @ 13:15), Max: 98.2 (06-22-21 @ 12:38)  HR: 90 (06-22-21 @ 16:15) (81 - 108)  BP: 148/84 (06-22-21 @ 16:15)  BP(mean): --  RR: 16 (06-22-21 @ 16:15) (16 - 30)  SpO2: 99% (06-22-21 @ 16:15) (99% - 100%)  Wt(kg): --    Constitutional: NAD, awake and alert  EYES: EOMI  ENT:  Normal Hearing, no tonsillar exudates   Neck: Soft and supple , no thyromegaly   Respiratory: Breath sounds are clear bilaterally, No wheezing, rales or rhonchi  Cardiovascular: S1 and S2, regular rate and rhythm, no Murmurs, gallops or rubs, no JVD,    Gastrointestinal: Bowel Sounds present, soft, nontender, nondistended, no guarding, no rebound  Extremities: No cyanosis or clubbing; warm to touch  Vascular: 2+ peripheral pulses lower ex  Neurological: No focal deficits, CN II-XII intact bilaterally, sensation to light touch intact in all extremities, gait intact. Pupils are equally reactive to light and symmetrical in size.   Musculoskeletal: 5/5 strength b/l upper and lower extremities; no joint swelling.  Skin: No rashes  Psych: no depression or anhedonia, AAOx3  HEME: no bruises, no nose bleeds

## 2021-06-22 NOTE — H&P ADULT - ASSESSMENT
51 year old hx if pancreatitis, diabetes, etoh abuse, coming to the hospital with several days of abdominal pain, poor PO intake, N/V, labs concerning for diabetic ketoacidosis.         #Neuro  -no active issues       #Respiratory   -saturating well on RA   -CXR w/ clear lungs         #CV:   -BP within acceptable limits   -not in a shock state       #GI:   -NPO currently given abdominal pain   -zofran for nausea   -transminitis chronic, hx of pancreatitis, can check a triglyceride level   -lipase WNL       #Renal:   -BUN:Cr >20   -Scr WNL   -monitor I/Os   -no evidence of acute renal failure or CARLOS   -fluid resuscitation as necessary   -Anion gap of 34 w/ bicarb of 14  -patient is overall alkatotic in VBG with pH 7.57 but high AG   -Delta Delta gap 2.2, > 2, concurrent metabolic alkalosis if suspected HAGMA 2/2 DKA/Lactic Acid     #Endo   -DKA   -insulin gtt w/ q4 BMP and VBG   -transition to basal bolus when necessary     #Heme/Onc   -leukocytosis likely reactive   platelets WNL   -Hgb elevated, patient is likely dry from poor po intake   -recheck after fluid resuscitation   -elevated protein likely 2/2 poor po intake    #ID:   -SIRS criteria met   -lactate elevated 2/2 dehydration   -no evidence of infection   -no abx necessary

## 2021-06-22 NOTE — PROCEDURE NOTE - NSSITEPREP_SKIN_A_CORE
alcohol/Adherence to aseptic technique: hand hygiene prior to donning barriers (gown, gloves), don cap and mask, sterile drape over patient
alcohol/Adherence to aseptic technique: hand hygiene prior to donning barriers (gown, gloves), don cap and mask, sterile drape over patient

## 2021-06-22 NOTE — CHART NOTE - NSCHARTNOTEFT_GEN_A_CORE
50 yo female with PMHx of DM2 (prior DKA admissions), Alcohol Use Disorder presenting with n/v. Labs consistent with DKA.  BHB 7.6, AG 34, BG ~500s  - Discussed patient with primary team and recommended starting insulin gtt per DKA protocol while patient was in the ED and discussing MICU transfer for further management.  - Patient has now been accepted to MICU at this time on insulin gtt.  - Endocrine will continue to follow. Full consult to follow in the morning. 52 yo female with PMHx of DM2 (prior DKA admissions), Alcohol Use Disorder presenting with n/v. Labs consistent with DKA.  BHB 7.6, AG 34, BG ~500s  - Discussed with primary team and recommended starting insulin gtt per DKA protocol while patient was in the ED and discussing MICU transfer for further management.  - Patient has now been accepted to MICU at this time on insulin gtt.  - Endocrine will continue to follow. Full consult to follow in the morning.

## 2021-06-22 NOTE — ED PROVIDER NOTE - PMH
Alcohol abuse    Anxiety    Depression    Diabetes    Hepatic steatosis    MRSA cellulitis    Pancreatitis

## 2021-06-22 NOTE — ED PROVIDER NOTE - OBJECTIVE STATEMENT
51 yoF, PMHx of IDDM, prior DKA, daily etoh use, pancreatitis 2/2 to etoh use presenting with NV, tremors, decreased PO, abd pain. States has not eaten in 5 days. No drink for several days. Last insulin dose this am. No fever. No vomiting or diarrhea. Has needed ICU for both DKA and etoh withdrawal in the past.

## 2021-06-22 NOTE — SBIRT NOTE ADULT - NSSBIRTALCPASSREFTXDET_GEN_A_CORE
Provided SBIRT services: Full screen positive. Referral to Treatment Performed. Screening results were reviewed with the patient and patient was provided information about healthy guidelines and potential negative consequences associated with level of risk. Motivation and readiness to reduce or stop use was discussed and goals and activities to make changes were suggested/offered. Referral for complete assessment and level of care determination at a certified treatment facility was completed by contacting the treatment facility. Pt was referred and given the information to [facility] in [place].  coordinator from tx program will reach out to pt to scheduled an intake appt. Pt verbalized agreement with plan. Pt enrolled in [employer] for follow-up and external navigation. Warm handoff was completed with peer advocate, Sara.    Provided SBIRT services: Full screen positive. Referral to Treatment Performed. Screening results were reviewed with the patient and patient was provided information about healthy guidelines and potential negative consequences associated with level of risk. Motivation and readiness to reduce or stop use was discussed and goals and activities to make changes were suggested/offered. Referral for complete assessment and level of care determination at a certified treatment facility was completed by contacting the treatment facility. Pt was referred to and given the information to [facility] in [place].  coordinator from tx program will reach out to pt to scheduled an intake appt. Pt verbalized agreement with plan. Pt enrolled in [employer] for follow-up and external navigation. Warm handoff was completed with peer advocate, Sara.

## 2021-06-22 NOTE — ED ADULT TRIAGE NOTE - CHIEF COMPLAINT QUOTE
Pt with history of DKA and pancreatitis, c/o N/V and abd  pain, inability to eat or drink for 5 days. C/o dizziness, weakness, tingling to extremities, chest pain and SOB. Fs 485

## 2021-06-22 NOTE — CHART NOTE - NSCHARTNOTEFT_GEN_A_CORE
Per patient, she takes lantus 10 units in AM and 10 units PM.  She only took lantus 15 units this morning.   She is also on novolog sliding scale at home, which she has not been taking.       House endocrine consulted for DKA evaluation.    Maverick Sanchez NP

## 2021-06-23 DIAGNOSIS — E78.5 HYPERLIPIDEMIA, UNSPECIFIED: ICD-10-CM

## 2021-06-23 DIAGNOSIS — E11.65 TYPE 2 DIABETES MELLITUS WITH HYPERGLYCEMIA: ICD-10-CM

## 2021-06-23 DIAGNOSIS — I10 ESSENTIAL (PRIMARY) HYPERTENSION: ICD-10-CM

## 2021-06-23 LAB
A1C WITH ESTIMATED AVERAGE GLUCOSE RESULT: 9.1 % — HIGH (ref 4–5.6)
A1C WITH ESTIMATED AVERAGE GLUCOSE RESULT: 9.2 % — HIGH (ref 4–5.6)
ALBUMIN SERPL ELPH-MCNC: 3.6 G/DL — SIGNIFICANT CHANGE UP (ref 3.3–5)
ALP SERPL-CCNC: 81 U/L — SIGNIFICANT CHANGE UP (ref 40–120)
ALT FLD-CCNC: 48 U/L — HIGH (ref 4–33)
ANION GAP SERPL CALC-SCNC: 10 MMOL/L — SIGNIFICANT CHANGE UP (ref 7–14)
ANION GAP SERPL CALC-SCNC: 12 MMOL/L — SIGNIFICANT CHANGE UP (ref 7–14)
AST SERPL-CCNC: 32 U/L — SIGNIFICANT CHANGE UP (ref 4–32)
B-OH-BUTYR SERPL-SCNC: <0 MMOL/L — SIGNIFICANT CHANGE UP (ref 0–0.4)
BASOPHILS # BLD AUTO: 0.06 K/UL — SIGNIFICANT CHANGE UP (ref 0–0.2)
BASOPHILS NFR BLD AUTO: 0.5 % — SIGNIFICANT CHANGE UP (ref 0–2)
BILIRUB SERPL-MCNC: 0.8 MG/DL — SIGNIFICANT CHANGE UP (ref 0.2–1.2)
BUN SERPL-MCNC: 20 MG/DL — SIGNIFICANT CHANGE UP (ref 7–23)
BUN SERPL-MCNC: 20 MG/DL — SIGNIFICANT CHANGE UP (ref 7–23)
CALCIUM SERPL-MCNC: 9 MG/DL — SIGNIFICANT CHANGE UP (ref 8.4–10.5)
CALCIUM SERPL-MCNC: 9.2 MG/DL — SIGNIFICANT CHANGE UP (ref 8.4–10.5)
CHLORIDE SERPL-SCNC: 98 MMOL/L — SIGNIFICANT CHANGE UP (ref 98–107)
CHLORIDE SERPL-SCNC: 99 MMOL/L — SIGNIFICANT CHANGE UP (ref 98–107)
CO2 SERPL-SCNC: 24 MMOL/L — SIGNIFICANT CHANGE UP (ref 22–31)
CO2 SERPL-SCNC: 25 MMOL/L — SIGNIFICANT CHANGE UP (ref 22–31)
COVID-19 SPIKE DOMAIN AB INTERP: POSITIVE
COVID-19 SPIKE DOMAIN ANTIBODY RESULT: >250 U/ML — HIGH
CREAT SERPL-MCNC: 0.71 MG/DL — SIGNIFICANT CHANGE UP (ref 0.5–1.3)
CREAT SERPL-MCNC: 0.73 MG/DL — SIGNIFICANT CHANGE UP (ref 0.5–1.3)
EOSINOPHIL # BLD AUTO: 0.19 K/UL — SIGNIFICANT CHANGE UP (ref 0–0.5)
EOSINOPHIL NFR BLD AUTO: 1.5 % — SIGNIFICANT CHANGE UP (ref 0–6)
ESTIMATED AVERAGE GLUCOSE: 214 MG/DL — HIGH (ref 68–114)
ESTIMATED AVERAGE GLUCOSE: 217 MG/DL — HIGH (ref 68–114)
GLUCOSE BLDC GLUCOMTR-MCNC: 173 MG/DL — HIGH (ref 70–99)
GLUCOSE BLDC GLUCOMTR-MCNC: 190 MG/DL — HIGH (ref 70–99)
GLUCOSE BLDC GLUCOMTR-MCNC: 196 MG/DL — HIGH (ref 70–99)
GLUCOSE BLDC GLUCOMTR-MCNC: 198 MG/DL — HIGH (ref 70–99)
GLUCOSE BLDC GLUCOMTR-MCNC: 209 MG/DL — HIGH (ref 70–99)
GLUCOSE BLDC GLUCOMTR-MCNC: 225 MG/DL — HIGH (ref 70–99)
GLUCOSE BLDC GLUCOMTR-MCNC: 260 MG/DL — HIGH (ref 70–99)
GLUCOSE SERPL-MCNC: 178 MG/DL — HIGH (ref 70–99)
GLUCOSE SERPL-MCNC: 178 MG/DL — HIGH (ref 70–99)
HCT VFR BLD CALC: 37.6 % — SIGNIFICANT CHANGE UP (ref 34.5–45)
HGB BLD-MCNC: 12.8 G/DL — SIGNIFICANT CHANGE UP (ref 11.5–15.5)
IANC: 8.42 K/UL — SIGNIFICANT CHANGE UP (ref 1.5–8.5)
IMM GRANULOCYTES NFR BLD AUTO: 0.7 % — SIGNIFICANT CHANGE UP (ref 0–1.5)
LYMPHOCYTES # BLD AUTO: 2.66 K/UL — SIGNIFICANT CHANGE UP (ref 1–3.3)
LYMPHOCYTES # BLD AUTO: 21 % — SIGNIFICANT CHANGE UP (ref 13–44)
MAGNESIUM SERPL-MCNC: 2.3 MG/DL — SIGNIFICANT CHANGE UP (ref 1.6–2.6)
MAGNESIUM SERPL-MCNC: 2.3 MG/DL — SIGNIFICANT CHANGE UP (ref 1.6–2.6)
MCHC RBC-ENTMCNC: 32.2 PG — SIGNIFICANT CHANGE UP (ref 27–34)
MCHC RBC-ENTMCNC: 34 GM/DL — SIGNIFICANT CHANGE UP (ref 32–36)
MCV RBC AUTO: 94.7 FL — SIGNIFICANT CHANGE UP (ref 80–100)
MONOCYTES # BLD AUTO: 1.24 K/UL — HIGH (ref 0–0.9)
MONOCYTES NFR BLD AUTO: 9.8 % — SIGNIFICANT CHANGE UP (ref 2–14)
NEUTROPHILS # BLD AUTO: 8.42 K/UL — HIGH (ref 1.8–7.4)
NEUTROPHILS NFR BLD AUTO: 66.5 % — SIGNIFICANT CHANGE UP (ref 43–77)
NRBC # BLD: 0 /100 WBCS — SIGNIFICANT CHANGE UP
NRBC # FLD: 0 K/UL — SIGNIFICANT CHANGE UP
PHOSPHATE SERPL-MCNC: 2.2 MG/DL — LOW (ref 2.5–4.5)
PLATELET # BLD AUTO: 146 K/UL — LOW (ref 150–400)
POTASSIUM SERPL-MCNC: 4.5 MMOL/L — SIGNIFICANT CHANGE UP (ref 3.5–5.3)
POTASSIUM SERPL-MCNC: 4.6 MMOL/L — SIGNIFICANT CHANGE UP (ref 3.5–5.3)
POTASSIUM SERPL-SCNC: 4.5 MMOL/L — SIGNIFICANT CHANGE UP (ref 3.5–5.3)
POTASSIUM SERPL-SCNC: 4.6 MMOL/L — SIGNIFICANT CHANGE UP (ref 3.5–5.3)
PROT SERPL-MCNC: 6 G/DL — SIGNIFICANT CHANGE UP (ref 6–8.3)
RBC # BLD: 3.97 M/UL — SIGNIFICANT CHANGE UP (ref 3.8–5.2)
RBC # FLD: 12.4 % — SIGNIFICANT CHANGE UP (ref 10.3–14.5)
SARS-COV-2 IGG+IGM SERPL QL IA: >250 U/ML — HIGH
SARS-COV-2 IGG+IGM SERPL QL IA: POSITIVE
SODIUM SERPL-SCNC: 133 MMOL/L — LOW (ref 135–145)
SODIUM SERPL-SCNC: 135 MMOL/L — SIGNIFICANT CHANGE UP (ref 135–145)
WBC # BLD: 12.66 K/UL — HIGH (ref 3.8–10.5)
WBC # FLD AUTO: 12.66 K/UL — HIGH (ref 3.8–10.5)

## 2021-06-23 PROCEDURE — 99223 1ST HOSP IP/OBS HIGH 75: CPT

## 2021-06-23 PROCEDURE — 99291 CRITICAL CARE FIRST HOUR: CPT

## 2021-06-23 RX ORDER — INSULIN GLARGINE 100 [IU]/ML
25 INJECTION, SOLUTION SUBCUTANEOUS AT BEDTIME
Refills: 0 | Status: DISCONTINUED | OUTPATIENT
Start: 2021-06-23 | End: 2021-06-24

## 2021-06-23 RX ORDER — FOLIC ACID 0.8 MG
1 TABLET ORAL DAILY
Refills: 0 | Status: DISCONTINUED | OUTPATIENT
Start: 2021-06-23 | End: 2021-06-24

## 2021-06-23 RX ORDER — TRAZODONE HCL 50 MG
100 TABLET ORAL AT BEDTIME
Refills: 0 | Status: DISCONTINUED | OUTPATIENT
Start: 2021-06-23 | End: 2021-06-23

## 2021-06-23 RX ORDER — SERTRALINE 25 MG/1
50 TABLET, FILM COATED ORAL DAILY
Refills: 0 | Status: DISCONTINUED | OUTPATIENT
Start: 2021-06-23 | End: 2021-06-23

## 2021-06-23 RX ORDER — INSULIN LISPRO 100/ML
8 VIAL (ML) SUBCUTANEOUS
Refills: 0 | Status: DISCONTINUED | OUTPATIENT
Start: 2021-06-23 | End: 2021-06-24

## 2021-06-23 RX ADMIN — Medication 8 UNIT(S): at 19:31

## 2021-06-23 RX ADMIN — Medication 6 UNIT(S): at 08:52

## 2021-06-23 RX ADMIN — Medication 2 MILLIGRAM(S): at 00:00

## 2021-06-23 RX ADMIN — PANTOPRAZOLE SODIUM 40 MILLIGRAM(S): 20 TABLET, DELAYED RELEASE ORAL at 12:08

## 2021-06-23 RX ADMIN — Medication 3: at 08:52

## 2021-06-23 RX ADMIN — Medication 2 MILLIGRAM(S): at 12:30

## 2021-06-23 RX ADMIN — Medication 1: at 19:31

## 2021-06-23 RX ADMIN — CHLORHEXIDINE GLUCONATE 1 APPLICATION(S): 213 SOLUTION TOPICAL at 06:49

## 2021-06-23 RX ADMIN — Medication 2: at 12:09

## 2021-06-23 RX ADMIN — Medication 30 MILLILITER(S): at 12:07

## 2021-06-23 RX ADMIN — Medication 1 MILLIGRAM(S): at 12:08

## 2021-06-23 RX ADMIN — Medication 6 UNIT(S): at 12:08

## 2021-06-23 RX ADMIN — INSULIN GLARGINE 25 UNIT(S): 100 INJECTION, SOLUTION SUBCUTANEOUS at 23:35

## 2021-06-23 NOTE — CONSULT NOTE ADULT - ASSESSMENT
51F with DM2 and h/o DKA on insulin,  alcohol dependence, h/o MRSA gluteal  abscess and cellulitis (09/2017) & major depressive DO (remote h/o suicide attempt), pancreatitis 2/2 alcohol use, presents with several days of nausea and vomiting, abdominal pain, and inability to tolerate PO intake found to be in DKA, pt admitted to MICU s/p insulin drip with DKA protocol, now improved  endocrine called for further assistance      1. DM type 2 with DKA  T2DM uncontrolled (A1c of 9.3%) c/b polyneuropathy and pancreatitis with pancreatic atrophy  - goal glucose 100-180 mg    6/23/21  - increase Lantus 25 units qhs  - c/w Admelog 6 units TID pre-meal  - low pre-meal correction scale and low HS correction scale  - check FS AC/HS  - carb consistent diet  - registered dietician eval    -check c-peptide given h/o pancreatitis as pt may have developed pancreatic insufficiency with cessation of endogenous insulin production.   cpeptide 1 in 2020      inform endocrine of hypoglycemia or persistent hyperglycemia episodes as changes in pts insulin regimen will need to be made.   notify endocrine if any plans to be NPO/diet changes as this will also affect insulin regimen.    Discharge  -- counseled on alcohol cessation. She is now taking part in zoom meetings to help. we discussed [facility] consult  -she recently started Januvia. i d/w pt she should stop this given pancreatitis hx  - DC on insulin, dose TBD based on insulin requirement at discharge. F/u with Endocrinologist Dr. Beckford. (please inform him of inpt admission and dc of Januvia)        #HTN  -BP goal < 130/80      #HLD  -check fasting lipid panel   -statin if no other contraindications        Honey Mcgregor MD  Attending Physician   Department of Endocrinology, Diabetes and Metabolism       Pager [phone] ([facility])/ [phone] ([facility]) [please provide 10 digit call back number]  Nights and weekends: [phone]  Please note that this patient may be followed by a different provider tomorrow. If no answer or after hours, please contact [phone].  For final dc reccomendations, please call [phone] or page the endocrine fellow on call.
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
## 2021-06-23 NOTE — PROGRESS NOTE ADULT - ATTENDING COMMENTS
51 year old woman admitted to MICU for management of DKA, that has now resolved. Transitioned to basal bolus insulin. Follow up Endocrine recommendations.    eligible for transfer to floor

## 2021-06-23 NOTE — CHART NOTE - NSCHARTNOTEFT_GEN_A_CORE
MICU Transfer Note  ---------------------------    Transfer from: MICU  Transfer to:  (  ) Medicine    (  ) Telemetry    (  ) RCU    (  ) Palliative    (  ) Stroke Unit    (  ) _______________  Accepting Physician:      MICU COURSE  51 y.o female hx of alcohol abuse (last drink 6/17) w/ hx of seizures and prior intubation, anxiety/depression, T2DM w/ hx of DKA, pancreatitis 2/2 alcohol use, coming into hospital 6/22/21 after 5 days of abdominal pain, chills at home, poor PO intake, diarrhea, admitted for DKA in the setting of stress/infection. Patient says that mother had a cold and she caught it from mom, as well as going on a "binge drinking" episode of 6/17 (can drink 2-3pints/day when binge drinking). Patient was started on an insulin gtt at in the ED. Patient was admitted to MICU due to anion gap and beta hydroxy butyrate. Switched to basal bolus night of 6/21 and off of insulin gtt. Now eating with anion gap closed, non acidotic, basal-bolus insulin.       OBJECTIVE --  Vital Signs Last 24 Hrs  T(C): 36.6 (23 Jun 2021 04:00), Max: 37.1 (22 Jun 2021 19:45)  T(F): 97.9 (23 Jun 2021 04:00), Max: 98.8 (22 Jun 2021 19:45)  HR: 72 (23 Jun 2021 06:09) (61 - 108)  BP: 114/62 (23 Jun 2021 06:09) (88/46 - 151/51)  BP(mean): 76 (23 Jun 2021 06:09) (54 - 105)  RR: 18 (23 Jun 2021 06:09) (13 - 30)  SpO2: 95% (23 Jun 2021 06:09) (92% - 100%)    I&O's Summary      MEDICATIONS  (STANDING):  chlorhexidine 4% Liquid 1 Application(s) Topical <User Schedule>  dextrose 40% Gel 15 Gram(s) Oral once  dextrose 5%. 1000 milliLiter(s) (50 mL/Hr) IV Continuous <Continuous>  dextrose 5%. 1000 milliLiter(s) (100 mL/Hr) IV Continuous <Continuous>  dextrose 50% Injectable 25 Gram(s) IV Push once  dextrose 50% Injectable 12.5 Gram(s) IV Push once  dextrose 50% Injectable 25 Gram(s) IV Push once  folic acid 1 milliGRAM(s) Oral daily  glucagon  Injectable 1 milliGRAM(s) IntraMuscular once  insulin glargine Injectable (LANTUS) 20 Unit(s) SubCutaneous at bedtime  insulin lispro (ADMELOG) corrective regimen sliding scale   SubCutaneous three times a day before meals  insulin lispro (ADMELOG) corrective regimen sliding scale   SubCutaneous at bedtime  insulin lispro Injectable (ADMELOG) 6 Unit(s) SubCutaneous three times a day before meals  pantoprazole  Injectable 40 milliGRAM(s) IV Push daily  sertraline 50 milliGRAM(s) Oral daily  traZODone 100 milliGRAM(s) Oral at bedtime    MEDICATIONS  (PRN):  aluminum hydroxide/magnesium hydroxide/simethicone Suspension 30 milliLiter(s) Oral every 6 hours PRN Dyspepsia  ondansetron Injectable 4 milliGRAM(s) IV Push every 6 hours PRN Nausea and/or Vomiting        LABS                                            12.8                  Neurophils% (auto):   66.5   (06-23 @ 02:21):    12.66)-----------(146          Lymphocytes% (auto):  21.0                                          37.6                   Eosinphils% (auto):   1.5      Manual%: Neutrophils x    ; Lymphocytes x    ; Eosinophils x    ; Bands%: x    ; Blasts x                                    133    |  98     |  20                  Calcium: 9.0   / iCa: x      (06-23 @ 02:21)    ----------------------------<  178       Magnesium: 2.3                              4.5     |  25     |  0.71             Phosphorous: 2.2      TPro  6.0    /  Alb  3.6    /  TBili  0.8    /  DBili  x      /  AST  32     /  ALT  48     /  AlkPhos  81     23 Jun 2021 02:21            ASSESSMENT & PLAN:   51 year old female admitted to the MICU for DKA in the setting of stress and infection. Started on insulin gtt and transitioned to basal bolus, tolerating diet, abdominal pain subsided.       For Follow-Up:  [ ] monitor sugars and titrate insulin as needed   [ ] monitor lytes and treat hypokalemia/hypophosphatemia   [ ] monitor hemodynamics   [ ] d/c on correct insulin regimen  [ ] monitor for signs/symptoms of alcohol withdrawal MICU Transfer Note  ---------------------------    Transfer from: MICU  Transfer to:  (  ) Medicine    (  ) Telemetry    (  ) RCU    (  ) Palliative    (  ) Stroke Unit    (  ) _______________  Accepting Physician:      MICU COURSE  51 y.o female hx of alcohol abuse (last drink 6/17) w/ hx of seizures and prior intubation, anxiety/depression, T2DM w/ hx of DKA, pancreatitis 2/2 alcohol use, coming into hospital 6/22/21 after 5 days of abdominal pain, chills at home, poor PO intake, diarrhea, admitted for DKA in the setting of stress/infection. Patient says that mother had a cold and she caught it from mom, as well as going on a "binge drinking" episode of 6/17 (can drink 2-3pints/day when binge drinking). Patient was started on an insulin gtt at in the ED. Patient was admitted to MICU due to anion gap and beta hydroxy butyrate. Switched to basal bolus night of 6/21 and off of insulin gtt. Now eating with anion gap closed, non acidotic, basal-bolus insulin.       OBJECTIVE --  Vital Signs Last 24 Hrs  T(C): 36.6 (23 Jun 2021 04:00), Max: 37.1 (22 Jun 2021 19:45)  T(F): 97.9 (23 Jun 2021 04:00), Max: 98.8 (22 Jun 2021 19:45)  HR: 72 (23 Jun 2021 06:09) (61 - 108)  BP: 114/62 (23 Jun 2021 06:09) (88/46 - 151/51)  BP(mean): 76 (23 Jun 2021 06:09) (54 - 105)  RR: 18 (23 Jun 2021 06:09) (13 - 30)  SpO2: 95% (23 Jun 2021 06:09) (92% - 100%)    I&O's Summary      MEDICATIONS  (STANDING):  chlorhexidine 4% Liquid 1 Application(s) Topical <User Schedule>  dextrose 40% Gel 15 Gram(s) Oral once  dextrose 5%. 1000 milliLiter(s) (50 mL/Hr) IV Continuous <Continuous>  dextrose 5%. 1000 milliLiter(s) (100 mL/Hr) IV Continuous <Continuous>  dextrose 50% Injectable 25 Gram(s) IV Push once  dextrose 50% Injectable 12.5 Gram(s) IV Push once  dextrose 50% Injectable 25 Gram(s) IV Push once  folic acid 1 milliGRAM(s) Oral daily  glucagon  Injectable 1 milliGRAM(s) IntraMuscular once  insulin glargine Injectable (LANTUS) 20 Unit(s) SubCutaneous at bedtime  insulin lispro (ADMELOG) corrective regimen sliding scale   SubCutaneous three times a day before meals  insulin lispro (ADMELOG) corrective regimen sliding scale   SubCutaneous at bedtime  insulin lispro Injectable (ADMELOG) 6 Unit(s) SubCutaneous three times a day before meals  pantoprazole  Injectable 40 milliGRAM(s) IV Push daily  sertraline 50 milliGRAM(s) Oral daily  traZODone 100 milliGRAM(s) Oral at bedtime    MEDICATIONS  (PRN):  aluminum hydroxide/magnesium hydroxide/simethicone Suspension 30 milliLiter(s) Oral every 6 hours PRN Dyspepsia  ondansetron Injectable 4 milliGRAM(s) IV Push every 6 hours PRN Nausea and/or Vomiting        LABS                                            12.8                  Neurophils% (auto):   66.5   (06-23 @ 02:21):    12.66)-----------(146          Lymphocytes% (auto):  21.0                                          37.6                   Eosinphils% (auto):   1.5      Manual%: Neutrophils x    ; Lymphocytes x    ; Eosinophils x    ; Bands%: x    ; Blasts x                                    133    |  98     |  20                  Calcium: 9.0   / iCa: x      (06-23 @ 02:21)    ----------------------------<  178       Magnesium: 2.3                              4.5     |  25     |  0.71             Phosphorous: 2.2      TPro  6.0    /  Alb  3.6    /  TBili  0.8    /  DBili  x      /  AST  32     /  ALT  48     /  AlkPhos  81     23 Jun 2021 02:21            ASSESSMENT & PLAN:   51 year old female admitted to the MICU for DKA in the setting of stress and infection. Started on insulin gtt and transitioned to basal bolus, tolerating diet, abdominal pain subsided.       For Follow-Up:  [ ] monitor sugars and titrate insulin as needed   [ ] monitor lytes and treat hypokalemia/hypophosphatemia   [ ] monitor hemodynamics   [ ] d/c on correct insulin regimen  [ ] re-start home meds when appropriate, QTc is prolonged currently and SSRI can cause prolongation

## 2021-06-24 ENCOUNTER — TRANSCRIPTION ENCOUNTER (OUTPATIENT)
Age: 51
End: 2021-06-24

## 2021-06-24 VITALS
OXYGEN SATURATION: 99 % | SYSTOLIC BLOOD PRESSURE: 126 MMHG | TEMPERATURE: 98 F | HEART RATE: 63 BPM | RESPIRATION RATE: 18 BRPM | DIASTOLIC BLOOD PRESSURE: 66 MMHG

## 2021-06-24 DIAGNOSIS — F32.9 MAJOR DEPRESSIVE DISORDER, SINGLE EPISODE, UNSPECIFIED: ICD-10-CM

## 2021-06-24 DIAGNOSIS — F10.10 ALCOHOL ABUSE, UNCOMPLICATED: ICD-10-CM

## 2021-06-24 DIAGNOSIS — Z29.9 ENCOUNTER FOR PROPHYLACTIC MEASURES, UNSPECIFIED: ICD-10-CM

## 2021-06-24 LAB
ANION GAP SERPL CALC-SCNC: 9 MMOL/L — SIGNIFICANT CHANGE UP (ref 7–14)
BUN SERPL-MCNC: 16 MG/DL — SIGNIFICANT CHANGE UP (ref 7–23)
CALCIUM SERPL-MCNC: 8.8 MG/DL — SIGNIFICANT CHANGE UP (ref 8.4–10.5)
CHLORIDE SERPL-SCNC: 103 MMOL/L — SIGNIFICANT CHANGE UP (ref 98–107)
CO2 SERPL-SCNC: 23 MMOL/L — SIGNIFICANT CHANGE UP (ref 22–31)
CREAT SERPL-MCNC: 0.74 MG/DL — SIGNIFICANT CHANGE UP (ref 0.5–1.3)
CULTURE RESULTS: SIGNIFICANT CHANGE UP
GLUCOSE BLDC GLUCOMTR-MCNC: 176 MG/DL — HIGH (ref 70–99)
GLUCOSE BLDC GLUCOMTR-MCNC: 196 MG/DL — HIGH (ref 70–99)
GLUCOSE SERPL-MCNC: 176 MG/DL — HIGH (ref 70–99)
HCT VFR BLD CALC: 36.9 % — SIGNIFICANT CHANGE UP (ref 34.5–45)
HGB BLD-MCNC: 12.6 G/DL — SIGNIFICANT CHANGE UP (ref 11.5–15.5)
LACTATE SERPL-SCNC: 0.9 MMOL/L — SIGNIFICANT CHANGE UP (ref 0.5–2)
MAGNESIUM SERPL-MCNC: 2.2 MG/DL — SIGNIFICANT CHANGE UP (ref 1.6–2.6)
MCHC RBC-ENTMCNC: 31.9 PG — SIGNIFICANT CHANGE UP (ref 27–34)
MCHC RBC-ENTMCNC: 34.1 GM/DL — SIGNIFICANT CHANGE UP (ref 32–36)
MCV RBC AUTO: 93.4 FL — SIGNIFICANT CHANGE UP (ref 80–100)
NRBC # BLD: 0 /100 WBCS — SIGNIFICANT CHANGE UP
PHOSPHATE SERPL-MCNC: 2.5 MG/DL — SIGNIFICANT CHANGE UP (ref 2.5–4.5)
PLATELET # BLD AUTO: 118 K/UL — LOW (ref 150–400)
POTASSIUM SERPL-MCNC: 3.9 MMOL/L — SIGNIFICANT CHANGE UP (ref 3.5–5.3)
POTASSIUM SERPL-SCNC: 3.9 MMOL/L — SIGNIFICANT CHANGE UP (ref 3.5–5.3)
RBC # BLD: 3.95 M/UL — SIGNIFICANT CHANGE UP (ref 3.8–5.2)
RBC # FLD: 12.3 % — SIGNIFICANT CHANGE UP (ref 10.3–14.5)
SODIUM SERPL-SCNC: 135 MMOL/L — SIGNIFICANT CHANGE UP (ref 135–145)
SPECIMEN SOURCE: SIGNIFICANT CHANGE UP
WBC # BLD: 8.14 K/UL — SIGNIFICANT CHANGE UP (ref 3.8–10.5)
WBC # FLD AUTO: 8.14 K/UL — SIGNIFICANT CHANGE UP (ref 3.8–10.5)

## 2021-06-24 PROCEDURE — 99239 HOSP IP/OBS DSCHRG MGMT >30: CPT

## 2021-06-24 PROCEDURE — 93010 ELECTROCARDIOGRAM REPORT: CPT

## 2021-06-24 PROCEDURE — 99232 SBSQ HOSP IP/OBS MODERATE 35: CPT

## 2021-06-24 RX ORDER — INSULIN LISPRO 100/ML
9 VIAL (ML) SUBCUTANEOUS
Refills: 0 | Status: DISCONTINUED | OUTPATIENT
Start: 2021-06-24 | End: 2021-06-24

## 2021-06-24 RX ORDER — INSULIN GLARGINE 100 [IU]/ML
20 INJECTION, SOLUTION SUBCUTANEOUS
Qty: 0 | Refills: 0 | DISCHARGE

## 2021-06-24 RX ORDER — TRAZODONE HCL 50 MG
100 TABLET ORAL AT BEDTIME
Refills: 0 | Status: DISCONTINUED | OUTPATIENT
Start: 2021-06-24 | End: 2021-06-24

## 2021-06-24 RX ORDER — INSULIN ASPART 100 [IU]/ML
4 INJECTION, SOLUTION SUBCUTANEOUS
Qty: 0 | Refills: 0 | DISCHARGE

## 2021-06-24 RX ORDER — ACETAMINOPHEN 500 MG
650 TABLET ORAL ONCE
Refills: 0 | Status: DISCONTINUED | OUTPATIENT
Start: 2021-06-24 | End: 2021-06-24

## 2021-06-24 RX ORDER — LANOLIN ALCOHOL/MO/W.PET/CERES
6 CREAM (GRAM) TOPICAL AT BEDTIME
Refills: 0 | Status: DISCONTINUED | OUTPATIENT
Start: 2021-06-24 | End: 2021-06-24

## 2021-06-24 RX ORDER — ALPRAZOLAM 0.25 MG
0.25 TABLET ORAL ONCE
Refills: 0 | Status: DISCONTINUED | OUTPATIENT
Start: 2021-06-24 | End: 2021-06-24

## 2021-06-24 RX ORDER — INSULIN GLARGINE 100 [IU]/ML
27 INJECTION, SOLUTION SUBCUTANEOUS AT BEDTIME
Refills: 0 | Status: DISCONTINUED | OUTPATIENT
Start: 2021-06-24 | End: 2021-06-24

## 2021-06-24 RX ORDER — SITAGLIPTIN 50 MG/1
1 TABLET, FILM COATED ORAL
Qty: 0 | Refills: 0 | DISCHARGE

## 2021-06-24 RX ADMIN — Medication 0.25 MILLIGRAM(S): at 02:15

## 2021-06-24 RX ADMIN — Medication 8 UNIT(S): at 09:17

## 2021-06-24 RX ADMIN — Medication 1 MILLIGRAM(S): at 12:30

## 2021-06-24 RX ADMIN — Medication 1: at 12:31

## 2021-06-24 RX ADMIN — PANTOPRAZOLE SODIUM 40 MILLIGRAM(S): 20 TABLET, DELAYED RELEASE ORAL at 12:30

## 2021-06-24 RX ADMIN — Medication 1: at 09:18

## 2021-06-24 RX ADMIN — Medication 8 UNIT(S): at 12:30

## 2021-06-24 NOTE — CHART NOTE - NSCHARTNOTEFT_GEN_A_CORE
Notified by RN Notified by RN that patient was stating she wanted to have Ativan IV to sleep. Patient previously given Ativan 2 mg IV and states she is too anxious to take her home Klonopin 1 mg or nighttime Trazodone 100 mg and only Ativan will work. It was explained to her that she cannot go home on IV Ativan and that she should resume her home medications to ensure they are working, prior to leaving the hospital. She states she will be leaving the hospital in the morning, even if that means signing out AMA. EKG repeated and QTc 488. Trazodone was reordered PRN. Patient agreeable to take a one time dose of Xanax 0.25 mg PO instead of Ativan 2 mg IV. Will continue to monitor at this time.

## 2021-06-24 NOTE — CHART NOTE - NSCHARTNOTESELECT_GEN_ALL_CORE
Endocrine/Event Note
Event Note
MAR ACCEPT NOTE/Transfer Note
MICU transfer note

## 2021-06-24 NOTE — DISCHARGE NOTE PROVIDER - HOSPITAL COURSE
51y F PMHx alcohol abuse w/ hx of seizures and prior intubation, anxiety/depression, T2DM w/ hx of DKA, pancreatitis 2/2 alcohol use p/w 5 days of abdominal pain, chills, poor PO intake, diarrhea, admitted for DKA in the setting of stress/infection, treated with insulin gtt. Transferred from MICU 6/23    Hospital course:  Pt admitted with DKA. HgA1C 9.1. S/p insulin gtt, now on basal/bolus. Anion gap closed, not acidotic, patient BHB wnl. Jesus cleared pt for discharge and recommended discharging on Lantus 27 units and Admelog 9 units TID. Pt will STOP Januvia. Pt will follow up with Endocrinologist Dr. Beckford.   Upon DC , ucx grew prelim gram positive organisms. Pt denied pain with urination, hematuria, burning with urination, urinary frequency. Therefore will not DC on Abx and pt educated to call PCP if she starts to develop any urinary symptoms. Discussed this with Dr. Ott on discharge.    QTc was prolonged on Admission however repeat QTc done today was normal 441ms. Pt can resume home psych meds- discussed with Dr. Ott. Pt has insulin already at home and does not need refills.    Case discussed with Dr. Ott and Jesus on 6/24, pt medically stable for discharge home.

## 2021-06-24 NOTE — PROGRESS NOTE ADULT - PROBLEM SELECTOR PLAN 2
Last drink 1 week ago, out of window for withdrawal  - No evidence of withdrawal currently  - Encouraged ETOH cessation

## 2021-06-24 NOTE — DISCHARGE NOTE PROVIDER - CARE PROVIDER_API CALL
ISAIAH OATES  Endocrinology, Diabetes and Metabolism  [address] [place] [address]  Phone: [phone]  Fax: [phone]  Follow Up Time:

## 2021-06-24 NOTE — DISCHARGE NOTE NURSING/CASE MANAGEMENT/SOCIAL WORK - PATIENT PORTAL LINK FT
You can access the FollowMyHealth Patient Portal offered by [facility] by registering at the following website: [email or web address]. By joining [facility]’s FollowMyHealth portal, you will also be able to view your health information using other applications (apps) compatible with our system.

## 2021-06-24 NOTE — PROGRESS NOTE ADULT - PROBLEM SELECTOR PLAN 1
DM type 2 with DKA, initially admitted to MICU for insulin gtt, now improved.   - (A1c of 9.3%) c/b polyneuropathy and pancreatitis with pancreatic atrophy  - Endo recs appreciated regarding insulin regimen: Lantus to 27 units qhs + Admelog to 9 units TID + ISS  - check c-peptide, can check as outpatient for more accurate value.  - Dispo: Discontinue januvia given pancreatitis history.
Mogen Clamp

## 2021-06-24 NOTE — PROGRESS NOTE ADULT - ASSESSMENT
51 year old hx if pancreatitis, diabetes, etoh abuse, coming to the hospital with several days of abdominal pain, poor PO intake, N/V, labs concerning for diabetic ketoacidosis, treated with insulin gtt.         #Neuro  -no active issues       #Respiratory   -saturating well on RA   -CXR w/ clear lungs       #CV:   -BP within acceptable limits   -not in a shock state       #GI:   -diet tolerated; carb consistent   -zofran for nausea   -lipase WNL   -monitor stools     #Renal:   -BUN:Cr >20   -Scr WNL   -monitor I/Os   -no evidence of acute renal failure or CARLOS   -fluid resuscitation as necessary       #Endo   -DKA   -anion gap closed, not acidotic, patient BHB wnl   -20U lantus and 6U premeal TID     #Heme/Onc   -leukocytosis likely reactive   platelets WNL   -Hgb elevated, patient is likely dry from poor po intake   -recheck after fluid resuscitation   -elevated protein likely 2/2 poor po intake    #ID:   -SIRS criteria met   -lactate elevated 2/2 dehydration   -no evidence of infection   -no abx necessary         
51F hx of alcohol abuse (last drink 6/17) w/ hx of seizures and prior intubation, anxiety/depression, T2DM w/ hx of DKA, pancreatitis 2/2 alcohol use, initially admitted to MICU for DKA, now improved and on medical service. 
51F with DM2 and h/o DKA on insulin,  alcohol dependence, h/o MRSA gluteal  abscess and cellulitis (09/2017) & major depressive DO (remote h/o suicide attempt), pancreatitis 2/2 alcohol use, presents with several days of nausea and vomiting, abdominal pain, and inability to tolerate PO intake found to be in DKA, pt admitted to MICU s/p insulin drip with DKA protocol, now improved.      1. DM type 2 with DKA  T2DM uncontrolled (A1c of 9.3%) c/b polyneuropathy and pancreatitis with pancreatic atrophy  - goal glucose 100-180 mg - close to goal range today  While inpatient:  - increase Lantus to 27 units qhs  - increase Admelog to 9 units TID pre-meal  - low pre-meal correction scale and low HS correction scale  - check FS AC/HS  - carb consistent diet  - registered dietician eval  -concern for low c-peptide, can check as outpatient for more accurate value. Will be discharged on basal and bolus insulin.    Discharge:  -Discontinue januvia given pancreatitis history.  - Recommend Lantus 27 units qhs and Novolog 9/9/9 units premeal (pens) for discharge  Ensure patient has glucometer and testing supplies at home      #HTN  -BP goal < 130/80      #HLD  -check fasting lipid panel as outpatient  -statin if no other contraindications    Discharge recommendations provided to primary team.    Ivan Pickard MD  Division of Endocrinology  Pager: [phone]    If after 6PM or before 9AM, or on weekends/holidays, please call endocrine answering service for assistance ([phone]).  For nonurgent matters email [email or web address] for assistance.

## 2021-06-24 NOTE — DISCHARGE NOTE PROVIDER - NSDCCPCAREPLAN_GEN_ALL_CORE_FT
PRINCIPAL DISCHARGE DIAGNOSIS  Diagnosis: Type 2 diabetes mellitus with hyperglycemia, with long-term current use of insulin  Assessment and Plan of Treatment: Your Hemoglobin A1C is 9.1. Target goal for hemoglobin A1C is <7. Your insulin was adjusted- please take Basaglar 27 units at bedtime and Novolog 9 units three times a day before meals. Please STOP Januvia.  Monitor blood glucose levels throughout the day before meals and at bedtime. Record blood sugars and bring to outpatient providers appointment in order to be reviewed by your doctor for management modifications. If your sugars are more than 400 or less than 70 you should contact your PCP immediately. Monitor for signs/symptoms of low blood glucose, such as, dizziness, altered mental status, or cool/clammy skin. In addition, monitor for signs/symptoms of high blood glucose, such as, feeling hot, dry, fatigued, or with increased thirst/urination. Make regular podiatry appointments in order to have feet checked for wounds and uncontrolled toe nail growth to prevent infections, as well as, appointments with an ophthalmologist to monitor your vision.   Follow up with your Endocrinologist Dr. Beckford in 1-2 weeks.

## 2021-06-24 NOTE — DISCHARGE NOTE PROVIDER - NSDCMRMEDTOKEN_GEN_ALL_CORE_FT
Basaglar KwikPen 100 units/mL subcutaneous solution: 27 unit(s) subcutaneous once a day (at bedtime)  folic acid 1 mg oral tablet: 1 tab(s) orally once a day  KlonoPIN 1 mg oral tablet: 1 tab(s) orally 3 times a day    ISTOP Reference #: [id number]  NovoLOG FlexPen 100 units/mL injectable solution: 9 unit(s) subcutaneous 3 times a day (with meals)   sertraline 50 mg oral tablet: 1 tab(s) orally once a day  traZODone 100 mg oral tablet: 1 tab(s) orally once a day (at bedtime)

## 2021-06-24 NOTE — PROGRESS NOTE ADULT - SUBJECTIVE AND OBJECTIVE BOX
Chief Complaint: DM2 wit DKA    History: tolerating po  denies complaints  no hypoglycemia symptoms or events    MEDICATIONS  (STANDING):  dextrose 40% Gel 15 Gram(s) Oral once  dextrose 5%. 1000 milliLiter(s) (50 mL/Hr) IV Continuous <Continuous>  dextrose 5%. 1000 milliLiter(s) (100 mL/Hr) IV Continuous <Continuous>  dextrose 50% Injectable 25 Gram(s) IV Push once  dextrose 50% Injectable 12.5 Gram(s) IV Push once  dextrose 50% Injectable 25 Gram(s) IV Push once  folic acid 1 milliGRAM(s) Oral daily  glucagon  Injectable 1 milliGRAM(s) IntraMuscular once  insulin glargine Injectable (LANTUS) 25 Unit(s) SubCutaneous at bedtime  insulin lispro (ADMELOG) corrective regimen sliding scale   SubCutaneous three times a day before meals  insulin lispro (ADMELOG) corrective regimen sliding scale   SubCutaneous at bedtime  insulin lispro Injectable (ADMELOG) 8 Unit(s) SubCutaneous three times a day before meals  pantoprazole  Injectable 40 milliGRAM(s) IV Push daily  traZODone 100 milliGRAM(s) Oral at bedtime    MEDICATIONS  (PRN):  aluminum hydroxide/magnesium hydroxide/simethicone Suspension 30 milliLiter(s) Oral every 6 hours PRN Dyspepsia  melatonin 6 milliGRAM(s) Oral at bedtime PRN Insomnia      Allergies    Bactrim (Anaphylaxis)  Eggplant mouth itches (Other)    Intolerances      Review of Systems:  ALL OTHER SYSTEMS REVIEWED AND NEGATIVE      PHYSICAL EXAM:  VITALS: T(C): 36.7 (06-24-21 @ 06:03)  T(F): 98 (06-24-21 @ 06:03), Max: 98.7 (06-23-21 @ 21:55)  HR: 58 (06-24-21 @ 06:03) (58 - 64)  BP: 123/69 (06-24-21 @ 06:03) (118/70 - 123/69)  RR:  (17 - 18)  SpO2:  (98% - 100%)  Wt(kg): --  GENERAL: NAD, well-groomed, well-developed  EYES: No proptosis, no lid lag, anicteric  HEENT:  Atraumatic, Normocephalic, moist mucous membranes  RESPIRATORY: nonlabored respirations, no wheezing  PSYCH: Alert and oriented x 3, normal affect, normal mood    CAPILLARY BLOOD GLUCOSE      POCT Blood Glucose.: 176 mg/dL (24 Jun 2021 12:05)  POCT Blood Glucose.: 196 mg/dL (24 Jun 2021 08:44)  POCT Blood Glucose.: 173 mg/dL (23 Jun 2021 23:28)  POCT Blood Glucose.: 209 mg/dL (23 Jun 2021 22:05)  POCT Blood Glucose.: 198 mg/dL (23 Jun 2021 19:27)  POCT Blood Glucose.: 196 mg/dL (23 Jun 2021 17:19)      06-24    135  |  103  |  16  ----------------------------<  176<H>  3.9   |  23  |  0.74    EGFR if : 110  EGFR if non : 95    Ca    8.8      06-24  Mg     2.2     06-24  Phos  2.5     06-24    TPro  6.0  /  Alb  3.6  /  TBili  0.8  /  DBili  x   /  AST  32  /  ALT  48<H>  /  AlkPhos  81  06-23      A1C with Estimated Average Glucose Result: 9.1 % (06-23-21 @ 02:21)  A1C with Estimated Average Glucose Result: 9.2 % (06-23-21 @ 02:21)  A1C with Estimated Average Glucose Result: 9.3 % (10-27-20 @ 09:09)      Thyroid Function Tests:                      
  INTERVAL HPI/OVERNIGHT EVENTS:    SUBJECTIVE: Patient seen and examined at bedside. States she is feeling better, less abdominal pain, denies fevers, chest pain, shortness of breath. Hemodynamically stable.       VITAL SIGNS:  ICU Vital Signs Last 24 Hrs  T(C): 36.7 (2021 08:00), Max: 37.1 (2021 19:45)  T(F): 98.1 (2021 08:00), Max: 98.8 (2021 19:45)  HR: 65 (2021 08:00) (61 - 108)  BP: 107/43 (2021 08:00) (88/46 - 151/51)  BP(mean): 60 (2021 08:00) (54 - 105)  ABP: --  ABP(mean): --  RR: 17 (2021 08:00) (13 - 30)  SpO2: 97% (2021 08:00) (92% - 100%)      Plateau pressure:   P/F ratio:     CAPILLARY BLOOD GLUCOSE      POCT Blood Glucose.: 260 mg/dL (2021 08:01)      PHYSICAL EXAM:    General: no acute distress  HEENT: atraumatic, normocephalic   Neck: no gross thyromegaly   Respiratory: lungs clear to auscultation bilaterally   Cardiovascular: RRR, no murmurs   Abdomen: soft, no rebound/guarding, some left sided tenderness to palpation   Extremities: warm and well perfused, no lower extremity edema or erythema   Neurological: no FNDs     MEDICATIONS:  MEDICATIONS  (STANDING):  chlorhexidine 4% Liquid 1 Application(s) Topical <User Schedule>  dextrose 40% Gel 15 Gram(s) Oral once  dextrose 5%. 1000 milliLiter(s) (50 mL/Hr) IV Continuous <Continuous>  dextrose 5%. 1000 milliLiter(s) (100 mL/Hr) IV Continuous <Continuous>  dextrose 50% Injectable 25 Gram(s) IV Push once  dextrose 50% Injectable 12.5 Gram(s) IV Push once  dextrose 50% Injectable 25 Gram(s) IV Push once  folic acid 1 milliGRAM(s) Oral daily  glucagon  Injectable 1 milliGRAM(s) IntraMuscular once  insulin glargine Injectable (LANTUS) 20 Unit(s) SubCutaneous at bedtime  insulin lispro (ADMELOG) corrective regimen sliding scale   SubCutaneous three times a day before meals  insulin lispro (ADMELOG) corrective regimen sliding scale   SubCutaneous at bedtime  insulin lispro Injectable (ADMELOG) 6 Unit(s) SubCutaneous three times a day before meals  pantoprazole  Injectable 40 milliGRAM(s) IV Push daily  sertraline 50 milliGRAM(s) Oral daily  traZODone 100 milliGRAM(s) Oral at bedtime    MEDICATIONS  (PRN):  aluminum hydroxide/magnesium hydroxide/simethicone Suspension 30 milliLiter(s) Oral every 6 hours PRN Dyspepsia  ondansetron Injectable 4 milliGRAM(s) IV Push every 6 hours PRN Nausea and/or Vomiting      ALLERGIES:  Allergies    Bactrim (Anaphylaxis)  Eggplant mouth itches (Other)    Intolerances        LABS:                        12.8   12.66 )-----------( 146      ( 2021 02:21 )             37.6     06-23    133<L>  |  98  |  20  ----------------------------<  178<H>  4.5   |  25  |  0.71    Ca    9.0      2021 02:21  Phos  2.2     06-23  Mg     2.3     06-23    TPro  6.0  /  Alb  3.6  /  TBili  0.8  /  DBili  x   /  AST  32  /  ALT  48<H>  /  AlkPhos  81  06-23      Urinalysis Basic - ( 2021 15:07 )    Color: Light Yellow / Appearance: Clear / S.030 / pH: x  Gluc: x / Ketone: Large  / Bili: Negative / Urobili: <2 mg/dL   Blood: x / Protein: Trace / Nitrite: Negative   Leuk Esterase: Negative / RBC: x / WBC x   Sq Epi: x / Non Sq Epi: x / Bacteria: x        RADIOLOGY & ADDITIONAL TESTS: Reviewed.
[facility] Division of Hospital Medicine  Sahra Ott MD  Pager: [phone]      Patient is a 51y old  Female who presents with a chief complaint of DKA (24 Jun 2021 13:23)      SUBJECTIVE / OVERNIGHT EVENTS: patient states she feels well and would like to go home. She states she has been eating without nausea, vomiting or abd pain. She has all her DM supplies and is in the process of changing MDs due to insurance issues (recently switched insurance). Her last ETOH use was over 1 week ago.     MEDICATIONS  (STANDING):  acetaminophen   Tablet .. 650 milliGRAM(s) Oral once  dextrose 40% Gel 15 Gram(s) Oral once  dextrose 5%. 1000 milliLiter(s) (50 mL/Hr) IV Continuous <Continuous>  dextrose 5%. 1000 milliLiter(s) (100 mL/Hr) IV Continuous <Continuous>  dextrose 50% Injectable 25 Gram(s) IV Push once  dextrose 50% Injectable 12.5 Gram(s) IV Push once  dextrose 50% Injectable 25 Gram(s) IV Push once  folic acid 1 milliGRAM(s) Oral daily  glucagon  Injectable 1 milliGRAM(s) IntraMuscular once  insulin glargine Injectable (LANTUS) 27 Unit(s) SubCutaneous at bedtime  insulin lispro (ADMELOG) corrective regimen sliding scale   SubCutaneous three times a day before meals  insulin lispro (ADMELOG) corrective regimen sliding scale   SubCutaneous at bedtime  insulin lispro Injectable (ADMELOG) 9 Unit(s) SubCutaneous three times a day before meals  pantoprazole  Injectable 40 milliGRAM(s) IV Push daily  traZODone 100 milliGRAM(s) Oral at bedtime    MEDICATIONS  (PRN):  aluminum hydroxide/magnesium hydroxide/simethicone Suspension 30 milliLiter(s) Oral every 6 hours PRN Dyspepsia  melatonin 6 milliGRAM(s) Oral at bedtime PRN Insomnia      CAPILLARY BLOOD GLUCOSE      POCT Blood Glucose.: 176 mg/dL (24 Jun 2021 12:05)  POCT Blood Glucose.: 196 mg/dL (24 Jun 2021 08:44)  POCT Blood Glucose.: 173 mg/dL (23 Jun 2021 23:28)  POCT Blood Glucose.: 209 mg/dL (23 Jun 2021 22:05)  POCT Blood Glucose.: 198 mg/dL (23 Jun 2021 19:27)  POCT Blood Glucose.: 196 mg/dL (23 Jun 2021 17:19)    I&O's Summary    23 Jun 2021 07:01  -  24 Jun 2021 07:00  --------------------------------------------------------  IN: 0 mL / OUT: 700 mL / NET: -700 mL        PHYSICAL EXAM:  Vital Signs Last 24 Hrs  T(C): 36.7 (24 Jun 2021 06:03), Max: 37.1 (23 Jun 2021 21:55)  T(F): 98 (24 Jun 2021 06:03), Max: 98.7 (23 Jun 2021 21:55)  HR: 58 (24 Jun 2021 06:03) (58 - 64)  BP: 123/69 (24 Jun 2021 06:03) (118/79 - 123/69)  BP(mean): --  RR: 18 (24 Jun 2021 06:03) (17 - 18)  SpO2: 100% (24 Jun 2021 06:03) (100% - 100%)    CONSTITUTIONAL: NAD, well-developed, well-groomed  ENMT: Moist oral mucosa  RESPIRATORY: Normal respiratory effort; lungs are clear to auscultation bilaterally  CARDIOVASCULAR:  S1/S2; No lower extremity edema  ABDOMEN: Nontender to palpation, normoactive bowel sounds, no rebound/guarding  PSYCH: A+O to person, place, and time; affect appropriate  NEUROLOGY: no focal deficits  SKIN: No rashes; no palpable lesions    LABS:                        12.6   8.14  )-----------( 118      ( 24 Jun 2021 06:19 )             36.9     06-24    135  |  103  |  16  ----------------------------<  176<H>  3.9   |  23  |  0.74    Ca    8.8      24 Jun 2021 06:19  Phos  2.5     06-24  Mg     2.2     06-24    TPro  6.0  /  Alb  3.6  /  TBili  0.8  /  DBili  x   /  AST  32  /  ALT  48<H>  /  AlkPhos  81  06-23              Culture - Urine (collected 22 Jun 2021 14:30)  Source: .Urine Clean Catch (Midstream)  Final Report (24 Jun 2021 14:05):    50,000 - 99,000 CFU/mL Streptococcus agalactiae (Group B) isolated    Group B streptococci are susceptible to ampicillin,    penicillin and cefazolin, but may be resistant to    erythromycin and clindamycin.    Recommendations for intrapartum prophylaxis for Group B    streptococci are penicillin or ampicillin.    <10,000 CFU/ml Normal Urogenital himanshu present        RADIOLOGY & ADDITIONAL TESTS:  Results Reviewed:   Imaging Personally Reviewed:  Electrocardiogram Personally Reviewed:    COORDINATION OF CARE:  Care Discussed with Consultants/Other Providers [Y/N]:  Prior or Outpatient Records Reviewed [Y/N]:

## 2021-06-24 NOTE — DISCHARGE NOTE NURSING/CASE MANAGEMENT/SOCIAL WORK - NSDCVIVACCINE_GEN_ALL_CORE_FT
Tdap; 29-Apr-2020 02:47; Munira Felipe (RN); [employer]; [id number] (Exp. Date: 19-Mar-2022); IntraMuscular; Deltoid Left.; 0.5 milliLiter(s); VIS (VIS Published: 09-May-2013, VIS Presented: 29-Apr-2020);

## 2021-07-06 NOTE — ED ADULT TRIAGE NOTE - BP NONINVASIVE SYSTOLIC (MM HG)
Review of System: Musculoskeletal problem(s):foot pain  Integumentary problem(s):nail changes  Endocrine problem(s):negative      Past Family Social History: Family History of diabetes:  sister and brother  Medications, allergies, problem list, past medical history, past surgical history, social history, and family history were all reviewed in the electronic medical record.          Patient presents to clinic today with a chief complaint of painful, inflamed toenails which the patient states hurt inside the shoes.  The patient reports painful nails that they can no longer self debride.  The pain is related as being a pressure type of pain occurring when the tops of the shoes rub on the toenails.  Pain is related as being mild to moderate, and is made worse with dressier shoes.  Nurse's notes were reviewed and accepted.    Physical Exam:  Reveals nails that are thickened, lytic, brittle, discolored with subungual debris present.  There is noted pain to palpation of the nails.  The nails are flaky and also exhibit an odor consistent with onychomycosis.  The nails are also dystrophic.  Nails affected are left 1, left 2, left 3, left 4, right 1, right 2, right 3, right 4, right 5.  Any remaining nails are observed to be elongated and non-dystrophic.    Peripheral Vascular Exam:  Reveals thickened nails.    Diagnosis:  Dystrophic nails which are also mycotic with report of pain, generalized atherosclerosis indicated by the presence of peripheral vascular findings, and non-dystrophic nails.     Procedure:  The patient's feet were examined on today's date.  The patient was instructed about good foot health.  All the affected nails were debrided utilizing sharp and mechanical debridement, reducing the diseased nail tissue to a level that alleviated the patient's symptoms and is medically safe for the patient once verbal consent was obtained.  The remaining 0 non-dystrophic nails were also trimmed.  All the sharp nail care  was accomplished using a sterile nail clipper.   The patient was then instructed about his or her conditions and recommendations were made for these conditions.  The patient was instructed to return to clinic as needed.  Today's treatment was confirmed to be medically necessary.                       144

## 2021-07-07 NOTE — ED PROVIDER NOTE - CARDIAC CAPILLARY REFILL
PT DAILY TREATMENT NOTE    Patient Name: Charlette Moss  Date:2021  : 1976  [x]  Patient  Verified  Payor: Guillermina Crespo / Plan: Ammon Coulter RPN / Product Type: Commerical /    In time:1:33  Out time:2:30  Total Treatment Time (min): 57  Total Timed Codes (min): 57  1:1 Treatment Time (MC only): 57   Visit #: 15 of 24    Treatment Area: Pain radiating to right shoulder [M25.511]    SUBJECTIVE  Pain Level (0-10 scale): 0  Any medication changes, allergies to medications, adverse drug reactions, diagnosis change, or new procedure performed?: [x] No    [] Yes (see summary sheet for update)  Subjective functional status/changes:   [] No changes reported  Pt reporting no significant pain over the last week with no issues with shoulder during vacation. Pt reporting most difficulty with placing objects on shelf overhead. OBJECTIVE      27 min Therapeutic Exercise:  [x] See flow sheet :   Rationale: increase ROM and increase strength to improve the patients ability to perform daily activities with decreased pain and symptom levels    15 min Therapeutic Activity:  []  See flow sheet : pt education on HEP, camarena to progress exercises, what to avoid,   Rationale: increase ROM and increase strength  to improve the patients ability to perform daily activities with decreased pain and symptom levels    15 min Manual Therapy:  Sternal mobilization with active pelvic tilt, Superior T4 (MILAD technique) MILAD infraclavicular rib pump with active breath   STM to right infraspinatus, lat and subscap in S/L  STM to right UT, scalenes and SCM in supine, SOR  Obtained consent for hand placement    Rationale: decrease pain, increase ROM and increase tissue extensibility to improve the patients ability to perform daily activities with decreased pain and symptom levels  The manual therapy interventions were performed at a separate and distinct time from the therapeutic activities interventions.     With   [] TE   [] TA   [] neuro   [] other: Patient Education: [x] Review HEP    [] Progressed/Changed HEP based on:   [] positioning   [] body mechanics   [] transfers   [] heat/ice application    [] other:      Other Objective/Functional Measures: see updated goals below  Good response to standing right reach with green TB     Pain Level (0-10 scale) post treatment: 0    ASSESSMENT/Changes in Function:Pt has been seen 15 visits including initial evaluation S/P right Shoulder arthroscopic subacromial decompression, distal clavical excision, RCR, and longhead biceps tenodesis DOS 3/25/21. Pt also had right cubital tunnel and carpal tunnel release DOS 2/25/31. Pt overall is making good progress towards goals with reporting ability to wash hair without bending over and increasing ease with ADLs with most challenged with placing object on a shelf overhead and some increased pain with trying on multiple shirts at once. Pt reporting max pain now 4/10 in right shoulder with activity mostly anterior. Right shoulder AROM has improved nicely with now [facility] in all planes except IR and ER with decreased thoracic extension as well. Right shoulder strength is improving however continues to be limited especially with shoulder flexion and abduction possibly contributing to pain with reaching overhead. Pt would benefit from continued skilled PT services to address remaining strength and ROM deficits to allow pt to return to working out and complete ADLs with increased ease. Patient will continue to benefit from skilled PT services to modify and progress therapeutic interventions, address functional mobility deficits, address ROM deficits, address strength deficits, analyze and address soft tissue restrictions, analyze and cue movement patterns, analyze and modify body mechanics/ergonomics, assess and modify postural abnormalities and instruct in home and community integration to attain remaining goals.      []  See Plan of Care  []  See progress note/recertification  []  See Discharge Summary          Progress towards goals / Updated goals:  Short Term Goals: STG- To be accomplished in 4 week(s):  1.  Pt will be independent with HEP to encourage prophylaxis. Eval: HEP dispensed   Last PN: compliance per pt report goal MET 5/5/21      2. Pt right shoulder flexion PROM will achieve 150deg to demonstrate improved shoulder mobility to be able to progress per protocol. Eval: right shoulder flexion PROM 78deg  Last PN:  6/7/21 progressing Active sh flexion: supine 143 degrees/after PPT exercises 150  Current: 156* AROM goal MET 7/7/21     Long Term Goals: LTG- To be accomplished in 8 week(s):  1.  Pt will report >/=80% improvement in symptoms to be able to complete ADLs with increased ease. Eval: 4/10 pain at worst by end of day. Unable to put hair up and complete ADLs with ease  Last PN: no pain, but still limited in ADLs per reports   Current: 70% improvement,  3-4/10 max pain with most difficulty with putting stuff on shefl higher than shoulder height, increased soreness with trying on a lot of shirts porgressing well 7/7/21     2.  Pt bilateral shoulder strength will improve in all planes to 5/5 MMT to allow pt to return to working out. Eval: left UE all planes 5/5, unable to assess right UE due to protocol. Will assess when appropriate  Last PN:: Progressing 6/7/21 progressing as per tolerance to exercises.     Current: progressing 7/7/21    L (1-5) R (1-5)   Flexors 5 3+   Abductors 5 3+   External Rotators 5  4+   Internal Rotators 5 4+   Serratus 4+  3+   Rhomboids  5 4+   Lower Trapezius  5  4+   Elbow Flexion 5 5    Elbow Extension 5  5     3. Pt right  strength will be equal to the left to allow pt to lift weights working out with increased ease.   Eval: right 63#, left 80#  Last PN: 5/28/21  strength:  right 75#, left 80#  Current: therapist forgot, will reassess NV     4.  Pt right shoulder AROM will improve to WFL to allow pt to shower and put hair up with increased ease. Eval:  PROM Right   Flexion 78*   ABD  58*   ER @ 90 Degrees @45* 24*   IR @ 90 Degrees @45*45*      Last PN:   progressing 6/7/21 Active sh flexion: supine 143 degrees/after PPT exercises 150 degrees  IR @ 45*: 70*  ER at 45*: 64    Current: 156* flexion, 172* abduction, 27* IR, 60* ER progressing well 7/7/21         5.  Pt FOTO score will increase by >/= 26 points to show improvement in subjective function.   Eval: 43  Last PN: 56  Current:r 79 progressing well 7/7/21         PLAN  []  Upgrade activities as tolerated     [x]  Continue plan of care  []  Update interventions per flow sheet       []  Discharge due to:_  []  Other:_      Xochilt Vance 7/7/2021  1:36 PM    Future Appointments   Date Time Provider Juliette Ames   7/12/2021 11:30 AM Selvin Mota [facility]   7/22/2021  5:15 PM Tawanda Espinoza, PT, DPT [facility] less than or equal to 2 seconds

## 2021-07-11 NOTE — ED ADULT TRIAGE NOTE - SPO2 (%)
Patient arrives ambulatory to ED with c/c of fever, cough, muscle aches, onset 1 week.  Patient reports taking ibuprofen and mucinex at 5pm-6pm. Patient reports she has traveled to [place], [place] and [place] recently  ,.
99
Walk in

## 2021-08-06 ENCOUNTER — HOSPITAL ENCOUNTER (EMERGENCY)
Dept: HOSPITAL 74 - JER | Age: 51
LOS: 1 days | Discharge: HOME | End: 2021-08-07
Payer: COMMERCIAL

## 2021-08-06 VITALS — HEART RATE: 96 BPM | TEMPERATURE: 98.5 F | SYSTOLIC BLOOD PRESSURE: 149 MMHG | DIASTOLIC BLOOD PRESSURE: 77 MMHG

## 2021-08-06 VITALS — BODY MASS INDEX: 29.2 KG/M2
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
## 2021-08-06 DIAGNOSIS — S09.90XA: ICD-10-CM

## 2021-08-06 DIAGNOSIS — F10.230: Primary | ICD-10-CM

## 2021-08-06 LAB
ALBUMIN SERPL-MCNC: 3.6 G/DL (ref 3.4–5)
ALP SERPL-CCNC: 126 U/L (ref 45–117)
ALT SERPL-CCNC: 109 U/L (ref 13–61)
ANION GAP SERPL CALC-SCNC: 13 MMOL/L (ref 8–16)
AST SERPL-CCNC: 125 U/L (ref 15–37)
BASOPHILS # BLD: 1.3 % (ref 0–2)
BILIRUB SERPL-MCNC: 0.5 MG/DL (ref 0.2–1)
BUN SERPL-MCNC: 9.5 MG/DL (ref 7–18)
CALCIUM SERPL-MCNC: 8.3 MG/DL (ref 8.5–10.1)
CHLORIDE SERPL-SCNC: 104 MMOL/L (ref 98–107)
CO2 SERPL-SCNC: 22 MMOL/L (ref 21–32)
CREAT SERPL-MCNC: 0.7 MG/DL (ref 0.55–1.3)
DEPRECATED RDW RBC AUTO: 14.5 % (ref 11.6–15.6)
EOSINOPHIL # BLD: 2.4 % (ref 0–4.5)
GLUCOSE SERPL-MCNC: 299 MG/DL (ref 74–106)
HCT VFR BLD CALC: 42.7 % (ref 32.4–45.2)
HGB BLD-MCNC: 14.6 GM/DL (ref 10.7–15.3)
LIPASE SERPL-CCNC: 54 U/L (ref 73–393)
LYMPHOCYTES # BLD: 43.6 % (ref 8–40)
MCH RBC QN AUTO: 32.6 PG (ref 25.7–33.7)
MCHC RBC AUTO-ENTMCNC: 34.3 G/DL (ref 32–36)
MCV RBC: 95.1 FL (ref 80–96)
MONOCYTES # BLD AUTO: 6.6 % (ref 3.8–10.2)
NEUTROPHILS # BLD: 46.1 % (ref 42.8–82.8)
PLATELET # BLD AUTO: 124 10^3/UL (ref 134–434)
PMV BLD: 7.9 FL (ref 7.5–11.1)
PROT SERPL-MCNC: 7.4 G/DL (ref 6.4–8.2)
RBC # BLD AUTO: 4.49 M/MM3 (ref 3.6–5.2)
SODIUM SERPL-SCNC: 139 MMOL/L (ref 136–145)
WBC # BLD AUTO: 4.2 K/MM3 (ref 4–10)

## 2021-08-06 PROCEDURE — C9803 HOPD COVID-19 SPEC COLLECT: HCPCS

## 2021-08-06 PROCEDURE — U0005 INFEC AGEN DETEC AMPLI PROBE: HCPCS

## 2021-08-06 PROCEDURE — U0003 INFECTIOUS AGENT DETECTION BY NUCLEIC ACID (DNA OR RNA); SEVERE ACUTE RESPIRATORY SYNDROME CORONAVIRUS 2 (SARS-COV-2) (CORONAVIRUS DISEASE [COVID-19]), AMPLIFIED PROBE TECHNIQUE, MAKING USE OF HIGH THROUGHPUT TECHNOLOGIES AS DESCRIBED BY CMS-2020-01-R: HCPCS

## 2021-08-25 NOTE — PROGRESS NOTE ADULT - PROBLEM SELECTOR PROBLEM 2
EPS Progress Note    Ms. Wong is a 78 yo F current every day smoker (59 PY) with HTN, bicuspid aortic valve / severe AS with normal LVEF, and CAD who is s/p bioprosthetic AVR, ascending/hemiarch replacement, frozen elephant trunk, left aorto-subclavian bypass and CABG x 2 on 8/9/21 course cb post-op Afib RVR on 8/15 with conversion to NSR who was on amio and heparin gtt.  EP called today for Afib RVR 120s and low v-rate in 40s, and soft BPs. Epicardial wires in place with low rate set at 40 bpm. Pt is asymptomatic. Pt is sitting up in chair in no acute distress.    O:  T(C): 35.9 (08-25-21 @ 09:16), Max: 36.4 (08-25-21 @ 00:52)  HR: 124 (08-25-21 @ 14:10) (55 - 139)  BP: 125/78 (08-25-21 @ 14:10) (84/59 - 131/63)  RR: 29 (08-25-21 @ 14:10) (11 - 29)  SpO2: 96% (08-25-21 @ 14:10) (91% - 98%)    TELE: Afib v-rates 40-120s    PHYSICAL  General:  NAD, sitting up in chair       Chest:  CTA B/L, no w/r/r  Cardiac:  RRR, + s1/s2 , no m/g/r  Abdomen:   soft ND/NT  Extremities: bilateral LE edema  Skin: CDI dressing to sternal area. No rash noted, slightly jaundice  Psych: A&Ox3, normal affect and mood  Neuro: no deficit noted     LABS:                        8.1    10.85 )-----------( 162      ( 25 Aug 2021 06:35 )             26.7     139  |  104  |  26<H>  ----------------------------<  100<H>  4.0   |  25  |  0.87    Ca    9.0      25 Aug 2021 06:34  Phos  2.8     08-25  Mg     1.8     08-25    TPro  6.1  /  Alb  3.6  /  TBili  1.5<H>  /  DBili  x   /  AST  18  /  ALT  28  /  AlkPhos  106  08-24  PT/INR - ( 24 Aug 2021 04:28 )   PT: 13.7 sec;   INR: 1.15     PTT - ( 24 Aug 2021 04:28 )  PTT:28.6 sec    MEDICATIONS:  albumin human  5% IVPB 250 milliLiter(s) IV Intermittent once  ALPRAZolam 0.25 milliGRAM(s) Oral two times a day PRN  aMIOdarone    Tablet 400 milliGRAM(s) Oral every 8 hours  aspirin  chewable 81 milliGRAM(s) Enteral Tube daily  budesonide  80 MICROgram(s)/formoterol 4.5 MICROgram(s) Inhaler 2 Puff(s) Inhalation two times a day  ceFAZolin   IVPB 1000 milliGRAM(s) IV Intermittent every 8 hours  furosemide    Tablet 20 milliGRAM(s) Oral <User Schedule>  glucagon  Injectable 1 milliGRAM(s) IntraMuscular once  ??heparin   Injectable 5000 Unit(s) SubCutaneous every 8 hours ??  levalbuterol Inhalation 0.63 milliGRAM(s) Inhalation every 6 hours  lidocaine   4% Patch 1 Patch Transdermal daily  lisinopril 10 milliGRAM(s) Oral daily  oxycodone    5 mG/acetaminophen 325 mG 1 Tablet(s) Oral/Enteral Tube every 6 hours PRN  pantoprazole  Injectable 40 milliGRAM(s) IV Push daily  senna 2 Tablet(s) Oral at bedtime  sodium chloride 0.9% lock flush 3 milliLiter(s) IV Push every 8 hours  testosterone patch 4 mG/24 Hr(s) 4 milliGRAM(s) Transdermal daily  testosterone patch 4 mG/24 Hr(s) 4 milliGRAM(s) Transdermal <User Schedule>    ASSESSMENT/PLAN  Ms. Wong is a 78 yo F smoker with HTN, bicuspid aortic valve / severe AS with normal LVEF, and CAD who is s/p bioprosthetic AVR, ascending/hemiarch replacement, frozen elephant trunk, left aorto-subclavian bypass and CABG x 2 on 8/9/21 course cb episodes of post-op Afib RVR on 8/15 with conversion to NSR who was on amio and heparin gtt. EP called for tachy-marifer Afib RVR with low v-rate to 40s.    - Epicardial wires in place; Low rate increased to 70.  - Discussed dual chamber PPM placement with patient for tomorrow; will consent  - Please continue amiodarone  - Continue anticoagulation heparin gtt or eliquis  - Continue lasix  - please make NPO after midnight             Alcohol abuse

## 2021-09-22 NOTE — ED ADULT NURSE REASSESSMENT NOTE - PAIN ACTIVITY
Date of Procedure: 09/22/21


Preoperative Diagnosis: 


Recurrent right inguinal hernia


Postoperative Diagnosis: 


Recurrent right inguinal hernia


Procedure(s) Performed: 


Laparoscopic robotic repair of recurrent right inguinal hernia





Excision of right cord lipoma


Anesthesia: RENETTA


Surgeon: Sami De La Garza


Estimated Blood Loss (ml): 10


Pathology: other (Right cord lipoma)


Condition: stable


Disposition: PACU


Description of Procedure: 


The patient's placed on the operating table in the supine position.  The patient

received general anesthesia.  The patient's abdomen was prepped and draped in 

usual sterile fashion.  The skin was anesthetized 1% local Xylocaine at the 

incision sites.  Using an 11 blade a skin incision was made at the umbilicus.  

The fascia was grasped with a [place] and then the peritoneal cavity was entered 

with the Veress needle.  Position of the Veress needle was confirmed with a posi

tive drop test.  After adequate insufflation a 5 mm trocar was placed into the 

peritoneal cavity.


The Laparoscope was placed the peritoneal cavity.  And a robotic 8 mm trocar was

placed in the right lateral position and then another 8 mm robotic trochars 

placed in the left lateral position.  The original 5 mm trocar was exchanged for

a 12 mm trocar.  The patient was placed in reverse Trendelenburg and then the 

patient was docked to the robot.





Next the peritoneum over top of the hernia was incised and then using blunt and 

sharp dissection and electrocautery the hernia sac was dissected free from the 

floor of the inguinal canal.  The cord lipoma was dissected free and sent to 

pathology.  The hernia sac was completely reduced into the peritoneal cavity.  

And then using the Pro  mesh the hernia was repaired.  The peritoneum was 

then sutured with 20V lock suture.  The patient was then undocked the robot.  

The needle was withdrawn from the peritoneal cavity.  The umbilical trocar site 

was closed with 0 Ethibond suture.  The skin was closed interrupted 3-0 Monocryl

suture.  Dermabond dressing was applied.  Patient was sent to recovery in stable

condition.
6

## 2021-11-08 ENCOUNTER — INPATIENT (INPATIENT)
Facility: HOSPITAL | Age: 51
LOS: 1 days | Discharge: HOME | DRG: 638 | End: 2021-11-10
Attending: STUDENT IN AN ORGANIZED HEALTH CARE EDUCATION/TRAINING PROGRAM | Admitting: STUDENT IN AN ORGANIZED HEALTH CARE EDUCATION/TRAINING PROGRAM
Payer: MEDICARE

## 2021-11-08 VITALS
DIASTOLIC BLOOD PRESSURE: 86 MMHG | HEIGHT: 63 IN | OXYGEN SATURATION: 97 % | SYSTOLIC BLOOD PRESSURE: 137 MMHG | TEMPERATURE: 98 F | RESPIRATION RATE: 22 BRPM | WEIGHT: 160.06 LBS | HEART RATE: 112 BPM

## 2021-11-08 DIAGNOSIS — E10.10 TYPE 1 DIABETES MELLITUS WITH KETOACIDOSIS WITHOUT COMA: ICD-10-CM

## 2021-11-08 DIAGNOSIS — L02.91 CUTANEOUS ABSCESS, UNSPECIFIED: Chronic | ICD-10-CM

## 2021-11-08 DIAGNOSIS — Z98.890 OTHER SPECIFIED POSTPROCEDURAL STATES: Chronic | ICD-10-CM

## 2021-11-08 DIAGNOSIS — Z90.89 ACQUIRED ABSENCE OF OTHER ORGANS: Chronic | ICD-10-CM

## 2021-11-08 LAB
ALBUMIN SERPL ELPH-MCNC: 4 G/DL — SIGNIFICANT CHANGE UP (ref 3.3–5)
ALBUMIN SERPL ELPH-MCNC: 4.6 G/DL — SIGNIFICANT CHANGE UP (ref 3.3–5)
ALBUMIN SERPL ELPH-MCNC: 5.4 G/DL — HIGH (ref 3.3–5)
ALP SERPL-CCNC: 100 U/L — SIGNIFICANT CHANGE UP (ref 40–120)
ALP SERPL-CCNC: 116 U/L — SIGNIFICANT CHANGE UP (ref 40–120)
ALP SERPL-CCNC: 132 U/L — HIGH (ref 40–120)
ALT FLD-CCNC: 65 U/L — HIGH (ref 10–45)
ALT FLD-CCNC: 71 U/L — HIGH (ref 10–45)
ALT FLD-CCNC: 84 U/L — HIGH (ref 10–45)
ANION GAP SERPL CALC-SCNC: 22 MMOL/L — HIGH (ref 5–17)
ANION GAP SERPL CALC-SCNC: 27 MMOL/L — HIGH (ref 5–17)
ANION GAP SERPL CALC-SCNC: 35 MMOL/L — HIGH (ref 5–17)
APPEARANCE UR: CLEAR — SIGNIFICANT CHANGE UP
APTT BLD: 21 SEC — LOW (ref 27.5–35.5)
AST SERPL-CCNC: 105 U/L — HIGH (ref 10–40)
AST SERPL-CCNC: 89 U/L — HIGH (ref 10–40)
AST SERPL-CCNC: 90 U/L — HIGH (ref 10–40)
B-OH-BUTYR SERPL-SCNC: 7.2 MMOL/L — HIGH
BACTERIA # UR AUTO: NEGATIVE — SIGNIFICANT CHANGE UP
BASE EXCESS BLDV CALC-SCNC: -10.1 MMOL/L — LOW (ref -2–2)
BASE EXCESS BLDV CALC-SCNC: -11.8 MMOL/L — LOW (ref -2–2)
BASE EXCESS BLDV CALC-SCNC: -11.9 MMOL/L — LOW (ref -2–2)
BASE EXCESS BLDV CALC-SCNC: -13 MMOL/L — LOW (ref -2–2)
BASOPHILS # BLD AUTO: 0.05 K/UL — SIGNIFICANT CHANGE UP (ref 0–0.2)
BASOPHILS # BLD AUTO: 0.08 K/UL — SIGNIFICANT CHANGE UP (ref 0–0.2)
BASOPHILS NFR BLD AUTO: 0.7 % — SIGNIFICANT CHANGE UP (ref 0–2)
BASOPHILS NFR BLD AUTO: 1 % — SIGNIFICANT CHANGE UP (ref 0–2)
BILIRUB DIRECT SERPL-MCNC: 1.1 MG/DL — HIGH (ref 0–0.2)
BILIRUB INDIRECT FLD-MCNC: 1 MG/DL — SIGNIFICANT CHANGE UP (ref 0.2–1)
BILIRUB SERPL-MCNC: 2 MG/DL — HIGH (ref 0.2–1.2)
BILIRUB SERPL-MCNC: 2.1 MG/DL — HIGH (ref 0.2–1.2)
BILIRUB SERPL-MCNC: 2.4 MG/DL — HIGH (ref 0.2–1.2)
BILIRUB UR-MCNC: NEGATIVE — SIGNIFICANT CHANGE UP
BUN SERPL-MCNC: 11 MG/DL — SIGNIFICANT CHANGE UP (ref 7–23)
BUN SERPL-MCNC: 12 MG/DL — SIGNIFICANT CHANGE UP (ref 7–23)
BUN SERPL-MCNC: 14 MG/DL — SIGNIFICANT CHANGE UP (ref 7–23)
CA-I SERPL-SCNC: 1.14 MMOL/L — LOW (ref 1.15–1.33)
CA-I SERPL-SCNC: 1.17 MMOL/L — SIGNIFICANT CHANGE UP (ref 1.15–1.33)
CA-I SERPL-SCNC: 1.2 MMOL/L — SIGNIFICANT CHANGE UP (ref 1.15–1.33)
CA-I SERPL-SCNC: 1.22 MMOL/L — SIGNIFICANT CHANGE UP (ref 1.15–1.33)
CALCIUM SERPL-MCNC: 8.8 MG/DL — SIGNIFICANT CHANGE UP (ref 8.4–10.5)
CALCIUM SERPL-MCNC: 9.3 MG/DL — SIGNIFICANT CHANGE UP (ref 8.4–10.5)
CALCIUM SERPL-MCNC: 9.9 MG/DL — SIGNIFICANT CHANGE UP (ref 8.4–10.5)
CHLORIDE BLDV-SCNC: 100 MMOL/L — SIGNIFICANT CHANGE UP (ref 96–108)
CHLORIDE BLDV-SCNC: 102 MMOL/L — SIGNIFICANT CHANGE UP (ref 96–108)
CHLORIDE BLDV-SCNC: 95 MMOL/L — LOW (ref 96–108)
CHLORIDE BLDV-SCNC: 99 MMOL/L — SIGNIFICANT CHANGE UP (ref 96–108)
CHLORIDE SERPL-SCNC: 90 MMOL/L — LOW (ref 96–108)
CHLORIDE SERPL-SCNC: 96 MMOL/L — SIGNIFICANT CHANGE UP (ref 96–108)
CHLORIDE SERPL-SCNC: 98 MMOL/L — SIGNIFICANT CHANGE UP (ref 96–108)
CO2 BLDV-SCNC: 14 MMOL/L — LOW (ref 22–26)
CO2 BLDV-SCNC: 15 MMOL/L — LOW (ref 22–26)
CO2 BLDV-SCNC: 15 MMOL/L — LOW (ref 22–26)
CO2 BLDV-SCNC: 16 MMOL/L — LOW (ref 22–26)
CO2 SERPL-SCNC: 12 MMOL/L — LOW (ref 22–31)
CO2 SERPL-SCNC: 12 MMOL/L — LOW (ref 22–31)
CO2 SERPL-SCNC: 13 MMOL/L — LOW (ref 22–31)
COLOR SPEC: SIGNIFICANT CHANGE UP
CREAT SERPL-MCNC: 0.57 MG/DL — SIGNIFICANT CHANGE UP (ref 0.5–1.3)
CREAT SERPL-MCNC: 0.59 MG/DL — SIGNIFICANT CHANGE UP (ref 0.5–1.3)
CREAT SERPL-MCNC: 0.67 MG/DL — SIGNIFICANT CHANGE UP (ref 0.5–1.3)
DIFF PNL FLD: NEGATIVE — SIGNIFICANT CHANGE UP
EOSINOPHIL # BLD AUTO: 0.01 K/UL — SIGNIFICANT CHANGE UP (ref 0–0.5)
EOSINOPHIL # BLD AUTO: 0.17 K/UL — SIGNIFICANT CHANGE UP (ref 0–0.5)
EOSINOPHIL NFR BLD AUTO: 0.1 % — SIGNIFICANT CHANGE UP (ref 0–6)
EOSINOPHIL NFR BLD AUTO: 2 % — SIGNIFICANT CHANGE UP (ref 0–6)
EPI CELLS # UR: 1 /HPF — SIGNIFICANT CHANGE UP
ETHANOL SERPL-MCNC: SIGNIFICANT CHANGE UP MG/DL (ref 0–10)
GAS PNL BLDV: 133 MMOL/L — LOW (ref 136–145)
GAS PNL BLDV: 134 MMOL/L — LOW (ref 136–145)
GAS PNL BLDV: 134 MMOL/L — LOW (ref 136–145)
GAS PNL BLDV: 135 MMOL/L — LOW (ref 136–145)
GAS PNL BLDV: SIGNIFICANT CHANGE UP
GLUCOSE BLDC GLUCOMTR-MCNC: 297 MG/DL — HIGH (ref 70–99)
GLUCOSE BLDC GLUCOMTR-MCNC: 373 MG/DL — HIGH (ref 70–99)
GLUCOSE BLDV-MCNC: 322 MG/DL — HIGH (ref 70–99)
GLUCOSE BLDV-MCNC: 335 MG/DL — HIGH (ref 70–99)
GLUCOSE BLDV-MCNC: 412 MG/DL — HIGH (ref 70–99)
GLUCOSE BLDV-MCNC: 476 MG/DL — CRITICAL HIGH (ref 70–99)
GLUCOSE SERPL-MCNC: 341 MG/DL — HIGH (ref 70–99)
GLUCOSE SERPL-MCNC: 392 MG/DL — HIGH (ref 70–99)
GLUCOSE SERPL-MCNC: 416 MG/DL — HIGH (ref 70–99)
GLUCOSE UR QL: ABNORMAL
HCO3 BLDV-SCNC: 13 MMOL/L — LOW (ref 22–29)
HCO3 BLDV-SCNC: 14 MMOL/L — LOW (ref 22–29)
HCO3 BLDV-SCNC: 14 MMOL/L — LOW (ref 22–29)
HCO3 BLDV-SCNC: 15 MMOL/L — LOW (ref 22–29)
HCT VFR BLD CALC: 39.2 % — SIGNIFICANT CHANGE UP (ref 34.5–45)
HCT VFR BLD CALC: 48.3 % — HIGH (ref 34.5–45)
HCT VFR BLDA CALC: 38 % — SIGNIFICANT CHANGE UP (ref 34.5–46.5)
HCT VFR BLDA CALC: 40 % — SIGNIFICANT CHANGE UP (ref 34.5–46.5)
HCT VFR BLDA CALC: 45 % — SIGNIFICANT CHANGE UP (ref 34.5–46.5)
HCT VFR BLDA CALC: 51 % — HIGH (ref 34.5–46.5)
HGB BLD CALC-MCNC: 12.7 G/DL — SIGNIFICANT CHANGE UP (ref 11.7–16.1)
HGB BLD CALC-MCNC: 13.3 G/DL — SIGNIFICANT CHANGE UP (ref 11.7–16.1)
HGB BLD CALC-MCNC: 14.9 G/DL — SIGNIFICANT CHANGE UP (ref 11.7–16.1)
HGB BLD CALC-MCNC: 16.9 G/DL — HIGH (ref 11.7–16.1)
HGB BLD-MCNC: 13.2 G/DL — SIGNIFICANT CHANGE UP (ref 11.5–15.5)
HGB BLD-MCNC: 16.8 G/DL — HIGH (ref 11.5–15.5)
HOROWITZ INDEX BLDV+IHG-RTO: 21 — SIGNIFICANT CHANGE UP
HOROWITZ INDEX BLDV+IHG-RTO: 21 — SIGNIFICANT CHANGE UP
HYALINE CASTS # UR AUTO: 0 /LPF — SIGNIFICANT CHANGE UP (ref 0–7)
IMM GRANULOCYTES NFR BLD AUTO: 0.5 % — SIGNIFICANT CHANGE UP (ref 0–1.5)
IMM GRANULOCYTES NFR BLD AUTO: 1.1 % — SIGNIFICANT CHANGE UP (ref 0–1.5)
INR BLD: 1.04 RATIO — SIGNIFICANT CHANGE UP (ref 0.88–1.16)
KETONES UR-MCNC: ABNORMAL
LACTATE BLDV-MCNC: 3.1 MMOL/L — HIGH (ref 0.7–2)
LACTATE BLDV-MCNC: 3.5 MMOL/L — HIGH (ref 0.7–2)
LACTATE BLDV-MCNC: 4 MMOL/L — CRITICAL HIGH (ref 0.7–2)
LACTATE BLDV-MCNC: 6.9 MMOL/L — CRITICAL HIGH (ref 0.7–2)
LEUKOCYTE ESTERASE UR-ACNC: NEGATIVE — SIGNIFICANT CHANGE UP
LYMPHOCYTES # BLD AUTO: 0.49 K/UL — LOW (ref 1–3.3)
LYMPHOCYTES # BLD AUTO: 1.13 K/UL — SIGNIFICANT CHANGE UP (ref 1–3.3)
LYMPHOCYTES # BLD AUTO: 13.6 % — SIGNIFICANT CHANGE UP (ref 13–44)
LYMPHOCYTES # BLD AUTO: 7 % — LOW (ref 13–44)
MAGNESIUM SERPL-MCNC: 1.8 MG/DL — SIGNIFICANT CHANGE UP (ref 1.6–2.6)
MAGNESIUM SERPL-MCNC: 1.8 MG/DL — SIGNIFICANT CHANGE UP (ref 1.6–2.6)
MAGNESIUM SERPL-MCNC: 2.1 MG/DL — SIGNIFICANT CHANGE UP (ref 1.6–2.6)
MCHC RBC-ENTMCNC: 32 PG — SIGNIFICANT CHANGE UP (ref 27–34)
MCHC RBC-ENTMCNC: 32.7 PG — SIGNIFICANT CHANGE UP (ref 27–34)
MCHC RBC-ENTMCNC: 33.7 GM/DL — SIGNIFICANT CHANGE UP (ref 32–36)
MCHC RBC-ENTMCNC: 34.8 GM/DL — SIGNIFICANT CHANGE UP (ref 32–36)
MCV RBC AUTO: 94 FL — SIGNIFICANT CHANGE UP (ref 80–100)
MCV RBC AUTO: 94.9 FL — SIGNIFICANT CHANGE UP (ref 80–100)
MONOCYTES # BLD AUTO: 0.48 K/UL — SIGNIFICANT CHANGE UP (ref 0–0.9)
MONOCYTES # BLD AUTO: 0.48 K/UL — SIGNIFICANT CHANGE UP (ref 0–0.9)
MONOCYTES NFR BLD AUTO: 5.8 % — SIGNIFICANT CHANGE UP (ref 2–14)
MONOCYTES NFR BLD AUTO: 6.9 % — SIGNIFICANT CHANGE UP (ref 2–14)
NEUTROPHILS # BLD AUTO: 5.86 K/UL — SIGNIFICANT CHANGE UP (ref 1.8–7.4)
NEUTROPHILS # BLD AUTO: 6.42 K/UL — SIGNIFICANT CHANGE UP (ref 1.8–7.4)
NEUTROPHILS NFR BLD AUTO: 77.1 % — HIGH (ref 43–77)
NEUTROPHILS NFR BLD AUTO: 84.2 % — HIGH (ref 43–77)
NITRITE UR-MCNC: NEGATIVE — SIGNIFICANT CHANGE UP
NRBC # BLD: 0 /100 WBCS — SIGNIFICANT CHANGE UP (ref 0–0)
NRBC # BLD: 0 /100 WBCS — SIGNIFICANT CHANGE UP (ref 0–0)
PCO2 BLDV: 29 MMHG — LOW (ref 39–42)
PCO2 BLDV: 30 MMHG — LOW (ref 39–42)
PCO2 BLDV: 30 MMHG — LOW (ref 39–42)
PCO2 BLDV: 31 MMHG — LOW (ref 39–42)
PH BLDV: 7.25 — LOW (ref 7.32–7.43)
PH BLDV: 7.26 — LOW (ref 7.32–7.43)
PH BLDV: 7.27 — LOW (ref 7.32–7.43)
PH BLDV: 7.3 — LOW (ref 7.32–7.43)
PH UR: 5.5 — SIGNIFICANT CHANGE UP (ref 5–8)
PHOSPHATE SERPL-MCNC: 2.4 MG/DL — LOW (ref 2.5–4.5)
PHOSPHATE SERPL-MCNC: 3.7 MG/DL — SIGNIFICANT CHANGE UP (ref 2.5–4.5)
PHOSPHATE SERPL-MCNC: 3.7 MG/DL — SIGNIFICANT CHANGE UP (ref 2.5–4.5)
PLATELET # BLD AUTO: 126 K/UL — LOW (ref 150–400)
PLATELET # BLD AUTO: 167 K/UL — SIGNIFICANT CHANGE UP (ref 150–400)
PO2 BLDV: 31 MMHG — SIGNIFICANT CHANGE UP (ref 25–45)
PO2 BLDV: 34 MMHG — SIGNIFICANT CHANGE UP (ref 25–45)
PO2 BLDV: 37 MMHG — SIGNIFICANT CHANGE UP (ref 25–45)
PO2 BLDV: 57 MMHG — HIGH (ref 25–45)
POTASSIUM BLDV-SCNC: 5 MMOL/L — SIGNIFICANT CHANGE UP (ref 3.5–5.1)
POTASSIUM BLDV-SCNC: 5.2 MMOL/L — HIGH (ref 3.5–5.1)
POTASSIUM BLDV-SCNC: 5.3 MMOL/L — HIGH (ref 3.5–5.1)
POTASSIUM BLDV-SCNC: 5.6 MMOL/L — HIGH (ref 3.5–5.1)
POTASSIUM SERPL-MCNC: 4.8 MMOL/L — SIGNIFICANT CHANGE UP (ref 3.5–5.3)
POTASSIUM SERPL-MCNC: 5.1 MMOL/L — SIGNIFICANT CHANGE UP (ref 3.5–5.3)
POTASSIUM SERPL-MCNC: 5.3 MMOL/L — SIGNIFICANT CHANGE UP (ref 3.5–5.3)
POTASSIUM SERPL-SCNC: 4.8 MMOL/L — SIGNIFICANT CHANGE UP (ref 3.5–5.3)
POTASSIUM SERPL-SCNC: 5.1 MMOL/L — SIGNIFICANT CHANGE UP (ref 3.5–5.3)
POTASSIUM SERPL-SCNC: 5.3 MMOL/L — SIGNIFICANT CHANGE UP (ref 3.5–5.3)
PROT SERPL-MCNC: 6.8 G/DL — SIGNIFICANT CHANGE UP (ref 6–8.3)
PROT SERPL-MCNC: 7.7 G/DL — SIGNIFICANT CHANGE UP (ref 6–8.3)
PROT SERPL-MCNC: 8.5 G/DL — HIGH (ref 6–8.3)
PROT UR-MCNC: ABNORMAL
PROTHROM AB SERPL-ACNC: 12.4 SEC — SIGNIFICANT CHANGE UP (ref 10.6–13.6)
RBC # BLD: 4.13 M/UL — SIGNIFICANT CHANGE UP (ref 3.8–5.2)
RBC # BLD: 5.14 M/UL — SIGNIFICANT CHANGE UP (ref 3.8–5.2)
RBC # FLD: 12 % — SIGNIFICANT CHANGE UP (ref 10.3–14.5)
RBC # FLD: 12.2 % — SIGNIFICANT CHANGE UP (ref 10.3–14.5)
RBC CASTS # UR COMP ASSIST: 1 /HPF — SIGNIFICANT CHANGE UP (ref 0–4)
SAO2 % BLDV: 51.9 % — LOW (ref 67–88)
SAO2 % BLDV: 56.8 % — LOW (ref 67–88)
SAO2 % BLDV: 66.4 % — LOW (ref 67–88)
SAO2 % BLDV: 87.1 % — SIGNIFICANT CHANGE UP (ref 67–88)
SARS-COV-2 RNA SPEC QL NAA+PROBE: SIGNIFICANT CHANGE UP
SODIUM SERPL-SCNC: 133 MMOL/L — LOW (ref 135–145)
SODIUM SERPL-SCNC: 135 MMOL/L — SIGNIFICANT CHANGE UP (ref 135–145)
SODIUM SERPL-SCNC: 137 MMOL/L — SIGNIFICANT CHANGE UP (ref 135–145)
SP GR SPEC: 1.03 — HIGH (ref 1.01–1.02)
TROPONIN T, HIGH SENSITIVITY RESULT: <6 NG/L — SIGNIFICANT CHANGE UP (ref 0–51)
UROBILINOGEN FLD QL: NEGATIVE — SIGNIFICANT CHANGE UP
WBC # BLD: 6.97 K/UL — SIGNIFICANT CHANGE UP (ref 3.8–10.5)
WBC # BLD: 8.32 K/UL — SIGNIFICANT CHANGE UP (ref 3.8–10.5)
WBC # FLD AUTO: 6.97 K/UL — SIGNIFICANT CHANGE UP (ref 3.8–10.5)
WBC # FLD AUTO: 8.32 K/UL — SIGNIFICANT CHANGE UP (ref 3.8–10.5)
WBC UR QL: 1 /HPF — SIGNIFICANT CHANGE UP (ref 0–5)

## 2021-11-08 PROCEDURE — 76937 US GUIDE VASCULAR ACCESS: CPT | Mod: 26

## 2021-11-08 PROCEDURE — 93010 ELECTROCARDIOGRAM REPORT: CPT

## 2021-11-08 PROCEDURE — 99291 CRITICAL CARE FIRST HOUR: CPT

## 2021-11-08 RX ORDER — ONDANSETRON 8 MG/1
4 TABLET, FILM COATED ORAL ONCE
Refills: 0 | Status: COMPLETED | OUTPATIENT
Start: 2021-11-08 | End: 2021-11-08

## 2021-11-08 RX ORDER — DEXTROSE 50 % IN WATER 50 %
25 SYRINGE (ML) INTRAVENOUS ONCE
Refills: 0 | Status: DISCONTINUED | OUTPATIENT
Start: 2021-11-08 | End: 2021-11-10

## 2021-11-08 RX ORDER — DEXTROSE 50 % IN WATER 50 %
15 SYRINGE (ML) INTRAVENOUS ONCE
Refills: 0 | Status: DISCONTINUED | OUTPATIENT
Start: 2021-11-08 | End: 2021-11-10

## 2021-11-08 RX ORDER — INSULIN LISPRO 100/ML
VIAL (ML) SUBCUTANEOUS EVERY 4 HOURS
Refills: 0 | Status: DISCONTINUED | OUTPATIENT
Start: 2021-11-08 | End: 2021-11-09

## 2021-11-08 RX ORDER — GLUCAGON INJECTION, SOLUTION 0.5 MG/.1ML
1 INJECTION, SOLUTION SUBCUTANEOUS ONCE
Refills: 0 | Status: DISCONTINUED | OUTPATIENT
Start: 2021-11-08 | End: 2021-11-10

## 2021-11-08 RX ORDER — SODIUM CHLORIDE 9 MG/ML
1000 INJECTION, SOLUTION INTRAVENOUS ONCE
Refills: 0 | Status: COMPLETED | OUTPATIENT
Start: 2021-11-08 | End: 2021-11-08

## 2021-11-08 RX ORDER — SODIUM CHLORIDE 9 MG/ML
1000 INJECTION, SOLUTION INTRAVENOUS
Refills: 0 | Status: DISCONTINUED | OUTPATIENT
Start: 2021-11-08 | End: 2021-11-10

## 2021-11-08 RX ORDER — SODIUM CHLORIDE 9 MG/ML
1000 INJECTION, SOLUTION INTRAVENOUS
Refills: 0 | Status: DISCONTINUED | OUTPATIENT
Start: 2021-11-08 | End: 2021-11-08

## 2021-11-08 RX ORDER — INSULIN GLARGINE 100 [IU]/ML
27 INJECTION, SOLUTION SUBCUTANEOUS AT BEDTIME
Refills: 0 | Status: DISCONTINUED | OUTPATIENT
Start: 2021-11-08 | End: 2021-11-09

## 2021-11-08 RX ORDER — ACETAMINOPHEN 500 MG
1000 TABLET ORAL ONCE
Refills: 0 | Status: DISCONTINUED | OUTPATIENT
Start: 2021-11-08 | End: 2021-11-08

## 2021-11-08 RX ORDER — ACETAMINOPHEN 500 MG
1000 TABLET ORAL ONCE
Refills: 0 | Status: COMPLETED | OUTPATIENT
Start: 2021-11-08 | End: 2021-11-08

## 2021-11-08 RX ORDER — SODIUM CHLORIDE 9 MG/ML
1000 INJECTION INTRAMUSCULAR; INTRAVENOUS; SUBCUTANEOUS ONCE
Refills: 0 | Status: DISCONTINUED | OUTPATIENT
Start: 2021-11-08 | End: 2021-11-08

## 2021-11-08 RX ORDER — KETOROLAC TROMETHAMINE 30 MG/ML
15 SYRINGE (ML) INJECTION ONCE
Refills: 0 | Status: DISCONTINUED | OUTPATIENT
Start: 2021-11-08 | End: 2021-11-08

## 2021-11-08 RX ORDER — MORPHINE SULFATE 50 MG/1
2 CAPSULE, EXTENDED RELEASE ORAL ONCE
Refills: 0 | Status: DISCONTINUED | OUTPATIENT
Start: 2021-11-08 | End: 2021-11-08

## 2021-11-08 RX ORDER — MAGNESIUM SULFATE 500 MG/ML
2 VIAL (ML) INJECTION ONCE
Refills: 0 | Status: COMPLETED | OUTPATIENT
Start: 2021-11-08 | End: 2021-11-08

## 2021-11-08 RX ORDER — DEXTROSE 50 % IN WATER 50 %
12.5 SYRINGE (ML) INTRAVENOUS ONCE
Refills: 0 | Status: DISCONTINUED | OUTPATIENT
Start: 2021-11-08 | End: 2021-11-10

## 2021-11-08 RX ORDER — POTASSIUM CHLORIDE 20 MEQ
10 PACKET (EA) ORAL
Refills: 0 | Status: DISCONTINUED | OUTPATIENT
Start: 2021-11-08 | End: 2021-11-08

## 2021-11-08 RX ORDER — SODIUM CHLORIDE 9 MG/ML
1000 INJECTION, SOLUTION INTRAVENOUS
Refills: 0 | Status: DISCONTINUED | OUTPATIENT
Start: 2021-11-08 | End: 2021-11-09

## 2021-11-08 RX ORDER — INSULIN HUMAN 100 [IU]/ML
7 INJECTION, SOLUTION SUBCUTANEOUS
Qty: 100 | Refills: 0 | Status: DISCONTINUED | OUTPATIENT
Start: 2021-11-08 | End: 2021-11-08

## 2021-11-08 RX ORDER — ENOXAPARIN SODIUM 100 MG/ML
40 INJECTION SUBCUTANEOUS DAILY
Refills: 0 | Status: DISCONTINUED | OUTPATIENT
Start: 2021-11-08 | End: 2021-11-09

## 2021-11-08 RX ORDER — FAMOTIDINE 10 MG/ML
20 INJECTION INTRAVENOUS ONCE
Refills: 0 | Status: COMPLETED | OUTPATIENT
Start: 2021-11-08 | End: 2021-11-08

## 2021-11-08 RX ORDER — ONDANSETRON 8 MG/1
4 TABLET, FILM COATED ORAL ONCE
Refills: 0 | Status: DISCONTINUED | OUTPATIENT
Start: 2021-11-08 | End: 2021-11-08

## 2021-11-08 RX ADMIN — ONDANSETRON 4 MILLIGRAM(S): 8 TABLET, FILM COATED ORAL at 18:19

## 2021-11-08 RX ADMIN — Medication 2 MILLIGRAM(S): at 23:14

## 2021-11-08 RX ADMIN — Medication 3: at 22:26

## 2021-11-08 RX ADMIN — Medication 15 MILLIGRAM(S): at 16:14

## 2021-11-08 RX ADMIN — ONDANSETRON 4 MILLIGRAM(S): 8 TABLET, FILM COATED ORAL at 15:09

## 2021-11-08 RX ADMIN — SODIUM CHLORIDE 1000 MILLILITER(S): 9 INJECTION, SOLUTION INTRAVENOUS at 16:19

## 2021-11-08 RX ADMIN — INSULIN GLARGINE 27 UNIT(S): 100 INJECTION, SOLUTION SUBCUTANEOUS at 17:45

## 2021-11-08 RX ADMIN — Medication 4 MILLIGRAM(S): at 18:51

## 2021-11-08 RX ADMIN — MORPHINE SULFATE 2 MILLIGRAM(S): 50 CAPSULE, EXTENDED RELEASE ORAL at 21:25

## 2021-11-08 RX ADMIN — ONDANSETRON 4 MILLIGRAM(S): 8 TABLET, FILM COATED ORAL at 22:36

## 2021-11-08 RX ADMIN — MORPHINE SULFATE 2 MILLIGRAM(S): 50 CAPSULE, EXTENDED RELEASE ORAL at 21:10

## 2021-11-08 RX ADMIN — Medication 4 MILLIGRAM(S): at 22:16

## 2021-11-08 RX ADMIN — SODIUM CHLORIDE 1000 MILLILITER(S): 9 INJECTION, SOLUTION INTRAVENOUS at 15:09

## 2021-11-08 RX ADMIN — Medication 4 MILLIGRAM(S): at 18:19

## 2021-11-08 RX ADMIN — FAMOTIDINE 20 MILLIGRAM(S): 10 INJECTION INTRAVENOUS at 16:14

## 2021-11-08 RX ADMIN — SODIUM CHLORIDE 1000 MILLILITER(S): 9 INJECTION, SOLUTION INTRAVENOUS at 17:45

## 2021-11-08 RX ADMIN — Medication 400 MILLIGRAM(S): at 18:20

## 2021-11-08 RX ADMIN — Medication 62.5 MILLIMOLE(S): at 22:20

## 2021-11-08 RX ADMIN — Medication 50 GRAM(S): at 22:30

## 2021-11-08 RX ADMIN — Medication 4 MILLIGRAM(S): at 16:15

## 2021-11-08 RX ADMIN — SODIUM CHLORIDE 150 MILLILITER(S): 9 INJECTION, SOLUTION INTRAVENOUS at 22:36

## 2021-11-08 RX ADMIN — SODIUM CHLORIDE 1000 MILLILITER(S): 9 INJECTION, SOLUTION INTRAVENOUS at 17:46

## 2021-11-08 NOTE — H&P ADULT - ASSESSMENT
50 yo F w/ PMH cholecystectomy, ETOH withdrawal, seizure which required intubation, DKA in past, P/w n/v and worsening epigastric pain. Now presenting with Etoh withdrawal, and DKA.     Neuro  - Etoh withdrawal   - Last drink was 2 days ago   - S/P 4 ativan in ED   - CIWA protocol     Resp  - Increased RR 20-30  - Currently on RA O2 Sats > 95%   - GAS with Anion-Gap metabolic acidosis with lactate 4      CV  - Patient's blood pressure 130-140   - Tachycardic   - EKG shows T wave inversion with troponin pending       GI  - Zofran check QTc   - NPO since not tolerating PO       Renal  - Cr 0.57   - No active issues       ID  - No active issues       Endo  - Glucose 416 -> 392   - Beta 7.2, GAP 24       Heme         Ethics      50 yo F w/ PMH cholecystectomy, ETOH withdrawal, seizure which required intubation, DKA in past, P/w n/v and worsening epigastric pain. Now presenting with Etoh withdrawal, and DKA.     Neuro  - Etoh withdrawal   - Last drink was 2 days ago   - S/P 4 ativan in ED   - CIWA protocol     Resp  - Increased RR 20-30  - Currently on RA O2 Sats > 95%   - GAS with Anion-Gap metabolic acidosis with lactate 4      CV  - Patient's blood pressure 130-140   - Tachycardic   - EKG shows T wave inversion with troponin pending       GI  - Hx of pancreatitis   - ABD pain on presentation   - Will get CT ABD   - Zofran check QTc   - NPO since not tolerating PO         Renal  - Cr 0.57   - No active issues       ID  - SIRS on admission   - No signs of infection   - No active issues       Endo  DKA   - Glucose 416 -> 392   - Beta 7.2, GAP 24   - Start Insulin gtt until GAP closes x2 BMP and transition to subq  - Replete K when < 5   - Start D5 when Glucose < 200  - Endo consult       Heme   - No active issues   - DVT prophylaxis: Lovenox       Ethics   - Full code         50 yo F w/ PMH cholecystectomy, ETOH withdrawal, seizure which required intubation, DKA in past, P/w n/v and worsening epigastric pain. Now presenting with Etoh withdrawal, and DKA.     Neuro  - Etoh withdrawal   - Last drink was 2 days ago   - S/P 4 ativan in ED   - CIWA protocol     Resp  - Increased RR 20-30  - Currently on RA O2 Sats > 95%   - GAS with Anion-Gap metabolic acidosis with lactate 4      CV  - Patient's blood pressure 130-140   - Tachycardic   - EKG shows T wave inversion with troponin pending       GI  - Hx of pancreatitis   - ABD pain on presentation   - Will get CT ABD   - Zofran check QTc   - NPO since not tolerating PO         Renal  - Cr 0.57   - No active issues       ID  - SIRS on admission   - No signs of infection   - No active issues       Endo  DKA   - Glucose 416 -> 392   - Beta 7.2, GAP 24   - Q4 FS with correctional insulin scale   - IVF LR 150cc   - Endo consult       Heme   - No active issues   - DVT prophylaxis: Lovenox       Ethics   - Full code         50 yo F w/ PMH cholecystectomy, ETOH withdrawal, seizure which required intubation, DKA in past, P/w n/v and worsening epigastric pain. Now presenting with Etoh withdrawal, and DKA.     Neuro  - Etoh withdrawal   - Last drink was 2 days ago   - S/P 4 ativan in ED   - CIWA protocol   - 4mg Ativan standing   - 2mg Ativan PRN     Resp  - Increased RR 20-30  - Currently on RA O2 Sats > 95%   - GAS with Anion-Gap metabolic acidosis with lactate 4      CV  - Patient's blood pressure 130-140   - Tachycardic   - EKG shows T wave inversion with troponin pending       GI  - Hx of pancreatitis   - ABD pain on presentation   - Will get CT ABD   - Zofran check QTc   - NPO since not tolerating PO         Renal  - Cr 0.57   - No active issues       ID  - SIRS on admission   - No signs of infection   - No active issues       Endo  DKA   - Glucose 416 -> 392   - Beta 7.2, GAP 24   - Q4 FS with correctional insulin scale   - IVF LR 150cc   - Endo consult       Heme   - No active issues   - DVT prophylaxis: Lovenox       Ethics   - Full code

## 2021-11-08 NOTE — H&P ADULT - NSHPPHYSICALEXAM_GEN_ALL_CORE
CONSTITUTIONAL: NAD; well-developed  RESPIRATORY: Normal respiratory effort; lungs are clear to auscultation bilaterally; No Crackles/Rhonchi/Wheezing  CARDIOVASCULAR: Regular rate and rhythm, normal S1 and S2, no murmur/rub/gallop; No lower extremity edema; Peripheral pulses are 2+ bilaterally  ABDOMEN: Nontender to palpation, normoactive bowel sounds, no rebound/guarding; No hepatosplenomegaly  MUSCLOSKELETAL: no clubbing or cyanosis of digits; no joint swelling or tenderness to palpation  EXTREMITY: Lower extremities Non-tender to palpation; non-erythematous B/L  PSYCH: A&Ox3; Normal Affect CONSTITUTIONAL: NAD; well-developed  RESPIRATORY: increased respiratory effort; lungs are clear to auscultation bilaterally; No Crackles/Rhonchi/Wheezing  CARDIOVASCULAR: Sinus tachycardia; normal rhythm, normal S1 and S2, no murmur/rub/gallop; No lower extremity edema;   ABDOMEN: Nontender to palpation, normoactive bowel sounds, no rebound/guarding; No hepatosplenomegaly  MUSCLOSKELETAL: No clubbing or cyanosis of digits; no joint swelling or tenderness to palpation  EXTREMITY: Lower extremities Non-tender to palpation; non-erythematous B/L  PSYCH: A&Ox3; Normal Affect

## 2021-11-08 NOTE — H&P ADULT - ATTENDING COMMENTS
52 yo F w/ PMH cholecystectomy, ETOH withdrawal, seizure which required intubation, DKA in past, P/w n/v and worsening epigastric pain. Now presenting with Etoh withdrawal, and DKA.   Will start with Fluid given exam and history is consistent with severe volume loss . Will also add home dose lantus.  If Gap not improving will start drip.  Would like to avoid finger sticks in a patient in severe withdraw to reduce further pain and agitation while managing severe ETOH withdrawal (Ciwa> 20 on exam).  Given total of 12 ativan now on 4q4  Continue with Prn morphine for abdominal pain.

## 2021-11-08 NOTE — H&P ADULT - HISTORY OF PRESENT ILLNESS
52 yo F w/ PMH cholecystectomy, ETOH withdrawal, seizure which required intubation, DKA in past, P/w n/v and worsening epigastric pain. Patient's last drink was 2 days ago, she is also having non-bloody, billious emesis. Also having diarrhea, but no fever. Takes Novolog 27U bedtime, Novolog premeals. Patient feels tremoulous and anxious, feels like she is withdrawing. A&Ox3.     In ED patient received 2L of fluids, now receive 2 more, 150cc/hr after.     Lorazepam, CT  T wave inversion with trops pending  52 yo F w/ PMH cholecystectomy, ETOH withdrawal, seizure which required intubation, DKA in past, P/w n/v and worsening epigastric pain. Patient's last drink was 2 days ago, she is also having non-bloody, billious emesis. Also having diarrhea, but no fever. Takes Novolog 27U bedtime, Novolog premeals. Patient feels tremoulous and anxious, feels like she is withdrawing. A&Ox3.     In ED patient received 2L of fluids. EKG shows T wave inversion with trops pending      52 yo F w/ PMH cholecystectomy, ETOH withdrawal, seizure which required intubation, DKA in past, P/w n/v and worsening epigastric pain. Patient's last drink was 2 days ago, she is also having non-bloody, billious emesis. Also having diarrhea, but no fever. Takes Novolog 27U bedtime, Novolog premeals. Has not taken insulin yet today.  Patient feels tremoulous and anxious, feels like she is withdrawing.  epigastric discomfort and nausea vomiting.     In ED patient received 2L of fluids initially with plans to start insulin gtt as  bicarb 12 BHB 7.2 elevated VBG lactate 6.9. EKG shows T wave inversion V2-V6  with trops pending. Received lorazepam in the ED and zofran.

## 2021-11-08 NOTE — ED PROVIDER NOTE - PROGRESS NOTE DETAILS
Darrian Sr MD, Attending: concerning for DKA and will start insulin ggt and call micu Darrian Sr MD, Attending: spoke with micu and will take the patient. recom starting lantus and send trop and ctap. patient's epigastric and chest pain likely due to persistent vomiting.

## 2021-11-08 NOTE — ED PROVIDER NOTE - NS ED ROS FT
GENERAL: No fever, weight changes, nightsweats  EYES: no change in vision  HEENT: no dysplasia, odynophagia, ear pain, rhinorrhea, epistasis   CARDIAC: no chest pain  PULMONARY: no productive cough or SOB  GI: HPI   : dysuria   SKIN: no rashes, abnormal bruising or bleeding  NEURO: + headache, no numbness/tingling, extremity weakness   MSK: No joint pain

## 2021-11-08 NOTE — ED ADULT TRIAGE NOTE - CHIEF COMPLAINT QUOTE
Pt presents to ED with c/o ETOH withdrawal.  Pt is actively vomiting in triage.  Last drink was 2 days ago.  Pt drinks more than 2 liters a day.

## 2021-11-08 NOTE — ED ADULT NURSE NOTE - OBJECTIVE STATEMENT
51yr old female w/ pmhx of DM, ETOH abuse w/ hospitalization presents to ED c/o ETOH withdrawal. Pt sates she currently drinks approx 2 Liters of vodka a day. Pt is trying to quit drinking and states she has not had a drink in 2 days and since then she has been nauseous and vomiting. Pt states she is unable to keep anything down, she is also c/o headaches, and tremors. she states she has been hospitalized for ETOH withdrawal in the past. pt CIWA is 14, she denies CP, SOB, Fevers, chills, diaphoresis, visual/auditory disturbances. Pts FS is 422. MD aware. Pt placed on CM, bed lowered and locked, call bell within reach. will reassess

## 2021-11-08 NOTE — ED PROVIDER NOTE - ATTENDING CONTRIBUTION TO CARE
Agree with above except noted:     p.w n/v, diarrhea, s/p no etoh for 2 days. epigastric pain likely from vomiting, fingerstick at triage 400s. concerning for DKA as well. will get cxr for possible pneumomediastinum. tachycardiac and discomfort likely from etoh withdrawal vs pancreatitis vs DKA + dysuria, will get infectious eval. s/p cholecystectomy. will admit.

## 2021-11-08 NOTE — H&P ADULT - NSHPLABSRESULTS_GEN_ALL_CORE
16.8   8.32  )-----------( 167      ( 08 Nov 2021 15:14 )             48.3   11-08    135  |  96  |  12  ----------------------------<  392<H>  5.1   |  12<L>  |  0.57    Ca    9.3      08 Nov 2021 17:56  Phos  3.7     11-08  Mg     1.8     11-08    TPro  7.7  /  Alb  4.6  /  TBili  2.1<H>  /  DBili  1.1<H>  /  AST  89<H>  /  ALT  71<H>  /  AlkPhos  116  11-08

## 2021-11-08 NOTE — ED PROVIDER NOTE - OBJECTIVE STATEMENT
51F, h.o cholecystectomy, etoh withdrawal, seizure, intubation, DKA, p.w n/v and epigastric pain. patient states last drink was 2 days ago. bilious emesis, no blood. take 7-12U novolog at home before meals. + abdominal pain mostly upper, + diarrhea but no fever.

## 2021-11-08 NOTE — ED PROVIDER NOTE - PHYSICAL EXAMINATION
General: mild distress due to pain and vomiting, good hygiene, well developed, tremors  HENT: Atraumatic, EOMI, no conjunctivae injection, moist mucosa.   Neck: normal ROM and trachea midline   Cardiovascular: tachycardiac, S1&2, no M or R, radial pulses equal and b/l  Respiratory: CTABL, no wheezes or crackles, no decreased breath sounds  Abdominal: soft and upper TTP, non distended, neg for guarding, no CVA tenderness   Extremities: no edema of the legs/feet, distal pulses equal and b/l   Skin: warm, well perfused  Neurologic: nonfocal, AAOx3, following commands   Psych: normal mood and affect

## 2021-11-08 NOTE — H&P ADULT - NSHPREVIEWOFSYSTEMS_GEN_ALL_CORE
General: Denies dizziness, fatigue  Eyes: Denies blurry vision  ENMT: Denies rhinorrhea  Respiratory: Denies cough, SOB  Cardiovascular: Denies palpitations, CP  Gastrointestinal: Denies abd pain, N/V/D/C, hematochezia, melena  : Denies dysuria, increased freq  Musculoskeletal: Denies edema, joint pain  Endocrine: Denies increased thirst, increased frequency  Allergic/Immunologic: Denies rashes or hives  Neuro: Denies weakness, numbness  Psych: Denies anxiety, depression  All ROS negative unless indicated above General: +malaise  Respiratory: Denies cough, SOB  Cardiovascular: no chest pain   Gastrointestinal: +epigastric pain +Nonbloody however bilious vomiting +diarrhea   Endocrine: Denies increased thirst, increased frequency  Allergic/Immunologic: Denies rashes or hives  Neuro: Denies weakness, numbness  Psych: Denies anxiety, depression  All ROS negative unless indicated above

## 2021-11-09 DIAGNOSIS — E11.10 TYPE 2 DIABETES MELLITUS WITH KETOACIDOSIS WITHOUT COMA: ICD-10-CM

## 2021-11-09 DIAGNOSIS — E78.5 HYPERLIPIDEMIA, UNSPECIFIED: ICD-10-CM

## 2021-11-09 DIAGNOSIS — I10 ESSENTIAL (PRIMARY) HYPERTENSION: ICD-10-CM

## 2021-11-09 DIAGNOSIS — E11.65 TYPE 2 DIABETES MELLITUS WITH HYPERGLYCEMIA: ICD-10-CM

## 2021-11-09 LAB
ALBUMIN SERPL ELPH-MCNC: 3.6 G/DL — SIGNIFICANT CHANGE UP (ref 3.3–5)
ALBUMIN SERPL ELPH-MCNC: 3.6 G/DL — SIGNIFICANT CHANGE UP (ref 3.3–5)
ALBUMIN SERPL ELPH-MCNC: 3.7 G/DL — SIGNIFICANT CHANGE UP (ref 3.3–5)
ALP SERPL-CCNC: 83 U/L — SIGNIFICANT CHANGE UP (ref 40–120)
ALP SERPL-CCNC: 84 U/L — SIGNIFICANT CHANGE UP (ref 40–120)
ALP SERPL-CCNC: 89 U/L — SIGNIFICANT CHANGE UP (ref 40–120)
ALT FLD-CCNC: 43 U/L — SIGNIFICANT CHANGE UP (ref 10–45)
ALT FLD-CCNC: 48 U/L — HIGH (ref 10–45)
ALT FLD-CCNC: 54 U/L — HIGH (ref 10–45)
ANION GAP SERPL CALC-SCNC: 10 MMOL/L — SIGNIFICANT CHANGE UP (ref 5–17)
ANION GAP SERPL CALC-SCNC: 11 MMOL/L — SIGNIFICANT CHANGE UP (ref 5–17)
ANION GAP SERPL CALC-SCNC: 12 MMOL/L — SIGNIFICANT CHANGE UP (ref 5–17)
ANION GAP SERPL CALC-SCNC: 20 MMOL/L — HIGH (ref 5–17)
AST SERPL-CCNC: 42 U/L — HIGH (ref 10–40)
AST SERPL-CCNC: 44 U/L — HIGH (ref 10–40)
AST SERPL-CCNC: 57 U/L — HIGH (ref 10–40)
B-OH-BUTYR SERPL-SCNC: 0.3 MMOL/L — SIGNIFICANT CHANGE UP
BASE EXCESS BLDV CALC-SCNC: -8.1 MMOL/L — LOW (ref -2–2)
BASE EXCESS BLDV CALC-SCNC: 0.7 MMOL/L — SIGNIFICANT CHANGE UP (ref -2–2)
BASOPHILS # BLD AUTO: 0.05 K/UL — SIGNIFICANT CHANGE UP (ref 0–0.2)
BASOPHILS NFR BLD AUTO: 0.8 % — SIGNIFICANT CHANGE UP (ref 0–2)
BILIRUB SERPL-MCNC: 1.1 MG/DL — SIGNIFICANT CHANGE UP (ref 0.2–1.2)
BILIRUB SERPL-MCNC: 1.3 MG/DL — HIGH (ref 0.2–1.2)
BILIRUB SERPL-MCNC: 1.3 MG/DL — HIGH (ref 0.2–1.2)
BUN SERPL-MCNC: 10 MG/DL — SIGNIFICANT CHANGE UP (ref 7–23)
BUN SERPL-MCNC: 12 MG/DL — SIGNIFICANT CHANGE UP (ref 7–23)
CA-I SERPL-SCNC: 1.23 MMOL/L — SIGNIFICANT CHANGE UP (ref 1.15–1.33)
CA-I SERPL-SCNC: 1.23 MMOL/L — SIGNIFICANT CHANGE UP (ref 1.15–1.33)
CALCIUM SERPL-MCNC: 8.5 MG/DL — SIGNIFICANT CHANGE UP (ref 8.4–10.5)
CALCIUM SERPL-MCNC: 8.6 MG/DL — SIGNIFICANT CHANGE UP (ref 8.4–10.5)
CALCIUM SERPL-MCNC: 8.6 MG/DL — SIGNIFICANT CHANGE UP (ref 8.4–10.5)
CALCIUM SERPL-MCNC: 8.8 MG/DL — SIGNIFICANT CHANGE UP (ref 8.4–10.5)
CHLORIDE BLDV-SCNC: 103 MMOL/L — SIGNIFICANT CHANGE UP (ref 96–108)
CHLORIDE BLDV-SCNC: 106 MMOL/L — SIGNIFICANT CHANGE UP (ref 96–108)
CHLORIDE SERPL-SCNC: 103 MMOL/L — SIGNIFICANT CHANGE UP (ref 96–108)
CHLORIDE SERPL-SCNC: 107 MMOL/L — SIGNIFICANT CHANGE UP (ref 96–108)
CHLORIDE SERPL-SCNC: 108 MMOL/L — SIGNIFICANT CHANGE UP (ref 96–108)
CHLORIDE SERPL-SCNC: 108 MMOL/L — SIGNIFICANT CHANGE UP (ref 96–108)
CO2 BLDV-SCNC: 18 MMOL/L — LOW (ref 22–26)
CO2 BLDV-SCNC: 27 MMOL/L — HIGH (ref 22–26)
CO2 SERPL-SCNC: 15 MMOL/L — LOW (ref 22–31)
CO2 SERPL-SCNC: 19 MMOL/L — LOW (ref 22–31)
CO2 SERPL-SCNC: 21 MMOL/L — LOW (ref 22–31)
CO2 SERPL-SCNC: 21 MMOL/L — LOW (ref 22–31)
CREAT SERPL-MCNC: 0.57 MG/DL — SIGNIFICANT CHANGE UP (ref 0.5–1.3)
CREAT SERPL-MCNC: 0.58 MG/DL — SIGNIFICANT CHANGE UP (ref 0.5–1.3)
CREAT SERPL-MCNC: 0.59 MG/DL — SIGNIFICANT CHANGE UP (ref 0.5–1.3)
CREAT SERPL-MCNC: 0.66 MG/DL — SIGNIFICANT CHANGE UP (ref 0.5–1.3)
CULTURE RESULTS: SIGNIFICANT CHANGE UP
EOSINOPHIL # BLD AUTO: 0.14 K/UL — SIGNIFICANT CHANGE UP (ref 0–0.5)
EOSINOPHIL NFR BLD AUTO: 2.3 % — SIGNIFICANT CHANGE UP (ref 0–6)
GAS PNL BLDA: SIGNIFICANT CHANGE UP
GAS PNL BLDV: 134 MMOL/L — LOW (ref 136–145)
GAS PNL BLDV: 135 MMOL/L — LOW (ref 136–145)
GAS PNL BLDV: SIGNIFICANT CHANGE UP
GLUCOSE BLDC GLUCOMTR-MCNC: 124 MG/DL — HIGH (ref 70–99)
GLUCOSE BLDC GLUCOMTR-MCNC: 138 MG/DL — HIGH (ref 70–99)
GLUCOSE BLDC GLUCOMTR-MCNC: 138 MG/DL — HIGH (ref 70–99)
GLUCOSE BLDC GLUCOMTR-MCNC: 139 MG/DL — HIGH (ref 70–99)
GLUCOSE BLDC GLUCOMTR-MCNC: 141 MG/DL — HIGH (ref 70–99)
GLUCOSE BLDC GLUCOMTR-MCNC: 152 MG/DL — HIGH (ref 70–99)
GLUCOSE BLDC GLUCOMTR-MCNC: 158 MG/DL — HIGH (ref 70–99)
GLUCOSE BLDC GLUCOMTR-MCNC: 159 MG/DL — HIGH (ref 70–99)
GLUCOSE BLDC GLUCOMTR-MCNC: 164 MG/DL — HIGH (ref 70–99)
GLUCOSE BLDC GLUCOMTR-MCNC: 174 MG/DL — HIGH (ref 70–99)
GLUCOSE BLDC GLUCOMTR-MCNC: 177 MG/DL — HIGH (ref 70–99)
GLUCOSE BLDC GLUCOMTR-MCNC: 191 MG/DL — HIGH (ref 70–99)
GLUCOSE BLDC GLUCOMTR-MCNC: 218 MG/DL — HIGH (ref 70–99)
GLUCOSE BLDC GLUCOMTR-MCNC: 227 MG/DL — HIGH (ref 70–99)
GLUCOSE BLDC GLUCOMTR-MCNC: 240 MG/DL — HIGH (ref 70–99)
GLUCOSE BLDC GLUCOMTR-MCNC: 247 MG/DL — HIGH (ref 70–99)
GLUCOSE BLDC GLUCOMTR-MCNC: 262 MG/DL — HIGH (ref 70–99)
GLUCOSE BLDV-MCNC: 172 MG/DL — HIGH (ref 70–99)
GLUCOSE BLDV-MCNC: 278 MG/DL — HIGH (ref 70–99)
GLUCOSE SERPL-MCNC: 124 MG/DL — HIGH (ref 70–99)
GLUCOSE SERPL-MCNC: 144 MG/DL — HIGH (ref 70–99)
GLUCOSE SERPL-MCNC: 182 MG/DL — HIGH (ref 70–99)
GLUCOSE SERPL-MCNC: 266 MG/DL — HIGH (ref 70–99)
HCG SERPL-ACNC: <2 MIU/ML — SIGNIFICANT CHANGE UP
HCO3 BLDV-SCNC: 17 MMOL/L — LOW (ref 22–29)
HCO3 BLDV-SCNC: 25 MMOL/L — SIGNIFICANT CHANGE UP (ref 22–29)
HCT VFR BLD CALC: 36.5 % — SIGNIFICANT CHANGE UP (ref 34.5–45)
HCT VFR BLDA CALC: 37 % — SIGNIFICANT CHANGE UP (ref 34.5–46.5)
HCT VFR BLDA CALC: 38 % — SIGNIFICANT CHANGE UP (ref 34.5–46.5)
HGB BLD CALC-MCNC: 12.3 G/DL — SIGNIFICANT CHANGE UP (ref 11.7–16.1)
HGB BLD CALC-MCNC: 12.8 G/DL — SIGNIFICANT CHANGE UP (ref 11.7–16.1)
HGB BLD-MCNC: 12 G/DL — SIGNIFICANT CHANGE UP (ref 11.5–15.5)
HOROWITZ INDEX BLDV+IHG-RTO: 21 — SIGNIFICANT CHANGE UP
IMM GRANULOCYTES NFR BLD AUTO: 0.8 % — SIGNIFICANT CHANGE UP (ref 0–1.5)
LACTATE BLDV-MCNC: 1.4 MMOL/L — SIGNIFICANT CHANGE UP (ref 0.7–2)
LACTATE BLDV-MCNC: 2.5 MMOL/L — HIGH (ref 0.7–2)
LYMPHOCYTES # BLD AUTO: 1.08 K/UL — SIGNIFICANT CHANGE UP (ref 1–3.3)
LYMPHOCYTES # BLD AUTO: 17.8 % — SIGNIFICANT CHANGE UP (ref 13–44)
MAGNESIUM SERPL-MCNC: 2.1 MG/DL — SIGNIFICANT CHANGE UP (ref 1.6–2.6)
MAGNESIUM SERPL-MCNC: 2.1 MG/DL — SIGNIFICANT CHANGE UP (ref 1.6–2.6)
MAGNESIUM SERPL-MCNC: 2.2 MG/DL — SIGNIFICANT CHANGE UP (ref 1.6–2.6)
MAGNESIUM SERPL-MCNC: 2.4 MG/DL — SIGNIFICANT CHANGE UP (ref 1.6–2.6)
MCHC RBC-ENTMCNC: 32 PG — SIGNIFICANT CHANGE UP (ref 27–34)
MCHC RBC-ENTMCNC: 32.9 GM/DL — SIGNIFICANT CHANGE UP (ref 32–36)
MCV RBC AUTO: 97.3 FL — SIGNIFICANT CHANGE UP (ref 80–100)
MONOCYTES # BLD AUTO: 0.88 K/UL — SIGNIFICANT CHANGE UP (ref 0–0.9)
MONOCYTES NFR BLD AUTO: 14.5 % — HIGH (ref 2–14)
NEUTROPHILS # BLD AUTO: 3.87 K/UL — SIGNIFICANT CHANGE UP (ref 1.8–7.4)
NEUTROPHILS NFR BLD AUTO: 63.8 % — SIGNIFICANT CHANGE UP (ref 43–77)
NRBC # BLD: 0 /100 WBCS — SIGNIFICANT CHANGE UP (ref 0–0)
PCO2 BLDV: 33 MMHG — LOW (ref 39–42)
PCO2 BLDV: 40 MMHG — SIGNIFICANT CHANGE UP (ref 39–42)
PH BLDV: 7.32 — SIGNIFICANT CHANGE UP (ref 7.32–7.43)
PH BLDV: 7.41 — SIGNIFICANT CHANGE UP (ref 7.32–7.43)
PHOSPHATE SERPL-MCNC: 1.5 MG/DL — LOW (ref 2.5–4.5)
PHOSPHATE SERPL-MCNC: 2.4 MG/DL — LOW (ref 2.5–4.5)
PHOSPHATE SERPL-MCNC: 2.5 MG/DL — SIGNIFICANT CHANGE UP (ref 2.5–4.5)
PHOSPHATE SERPL-MCNC: 2.6 MG/DL — SIGNIFICANT CHANGE UP (ref 2.5–4.5)
PLATELET # BLD AUTO: 124 K/UL — LOW (ref 150–400)
PO2 BLDV: 42 MMHG — SIGNIFICANT CHANGE UP (ref 25–45)
PO2 BLDV: 47 MMHG — HIGH (ref 25–45)
POTASSIUM BLDV-SCNC: 3.4 MMOL/L — LOW (ref 3.5–5.1)
POTASSIUM BLDV-SCNC: 4.6 MMOL/L — SIGNIFICANT CHANGE UP (ref 3.5–5.1)
POTASSIUM SERPL-MCNC: 3.6 MMOL/L — SIGNIFICANT CHANGE UP (ref 3.5–5.3)
POTASSIUM SERPL-MCNC: 4.2 MMOL/L — SIGNIFICANT CHANGE UP (ref 3.5–5.3)
POTASSIUM SERPL-MCNC: 4.3 MMOL/L — SIGNIFICANT CHANGE UP (ref 3.5–5.3)
POTASSIUM SERPL-MCNC: 4.5 MMOL/L — SIGNIFICANT CHANGE UP (ref 3.5–5.3)
POTASSIUM SERPL-SCNC: 3.6 MMOL/L — SIGNIFICANT CHANGE UP (ref 3.5–5.3)
POTASSIUM SERPL-SCNC: 4.2 MMOL/L — SIGNIFICANT CHANGE UP (ref 3.5–5.3)
POTASSIUM SERPL-SCNC: 4.3 MMOL/L — SIGNIFICANT CHANGE UP (ref 3.5–5.3)
POTASSIUM SERPL-SCNC: 4.5 MMOL/L — SIGNIFICANT CHANGE UP (ref 3.5–5.3)
PROT SERPL-MCNC: 5.8 G/DL — LOW (ref 6–8.3)
PROT SERPL-MCNC: 5.8 G/DL — LOW (ref 6–8.3)
PROT SERPL-MCNC: 6 G/DL — SIGNIFICANT CHANGE UP (ref 6–8.3)
RBC # BLD: 3.75 M/UL — LOW (ref 3.8–5.2)
RBC # FLD: 12.4 % — SIGNIFICANT CHANGE UP (ref 10.3–14.5)
SAO2 % BLDV: 75.7 % — SIGNIFICANT CHANGE UP (ref 67–88)
SAO2 % BLDV: 81.8 % — SIGNIFICANT CHANGE UP (ref 67–88)
SODIUM SERPL-SCNC: 138 MMOL/L — SIGNIFICANT CHANGE UP (ref 135–145)
SODIUM SERPL-SCNC: 138 MMOL/L — SIGNIFICANT CHANGE UP (ref 135–145)
SODIUM SERPL-SCNC: 139 MMOL/L — SIGNIFICANT CHANGE UP (ref 135–145)
SODIUM SERPL-SCNC: 140 MMOL/L — SIGNIFICANT CHANGE UP (ref 135–145)
SPECIMEN SOURCE: SIGNIFICANT CHANGE UP
WBC # BLD: 6.07 K/UL — SIGNIFICANT CHANGE UP (ref 3.8–10.5)
WBC # FLD AUTO: 6.07 K/UL — SIGNIFICANT CHANGE UP (ref 3.8–10.5)

## 2021-11-09 PROCEDURE — 93010 ELECTROCARDIOGRAM REPORT: CPT

## 2021-11-09 PROCEDURE — 99223 1ST HOSP IP/OBS HIGH 75: CPT

## 2021-11-09 PROCEDURE — 99291 CRITICAL CARE FIRST HOUR: CPT

## 2021-11-09 PROCEDURE — 74177 CT ABD & PELVIS W/CONTRAST: CPT | Mod: 26

## 2021-11-09 RX ORDER — PANTOPRAZOLE SODIUM 20 MG/1
40 TABLET, DELAYED RELEASE ORAL DAILY
Refills: 0 | Status: DISCONTINUED | OUTPATIENT
Start: 2021-11-09 | End: 2021-11-10

## 2021-11-09 RX ORDER — INSULIN LISPRO 100/ML
11 VIAL (ML) SUBCUTANEOUS
Refills: 0 | Status: DISCONTINUED | OUTPATIENT
Start: 2021-11-09 | End: 2021-11-09

## 2021-11-09 RX ORDER — THIAMINE MONONITRATE (VIT B1) 100 MG
100 TABLET ORAL DAILY
Refills: 0 | Status: DISCONTINUED | OUTPATIENT
Start: 2021-11-09 | End: 2021-11-10

## 2021-11-09 RX ORDER — SODIUM CHLORIDE 9 MG/ML
1000 INJECTION, SOLUTION INTRAVENOUS
Refills: 0 | Status: DISCONTINUED | OUTPATIENT
Start: 2021-11-09 | End: 2021-11-10

## 2021-11-09 RX ORDER — INSULIN LISPRO 100/ML
VIAL (ML) SUBCUTANEOUS AT BEDTIME
Refills: 0 | Status: DISCONTINUED | OUTPATIENT
Start: 2021-11-09 | End: 2021-11-10

## 2021-11-09 RX ORDER — INSULIN GLARGINE 100 [IU]/ML
32 INJECTION, SOLUTION SUBCUTANEOUS ONCE
Refills: 0 | Status: COMPLETED | OUTPATIENT
Start: 2021-11-09 | End: 2021-11-09

## 2021-11-09 RX ORDER — INSULIN GLARGINE 100 [IU]/ML
32 INJECTION, SOLUTION SUBCUTANEOUS AT BEDTIME
Refills: 0 | Status: DISCONTINUED | OUTPATIENT
Start: 2021-11-10 | End: 2021-11-10

## 2021-11-09 RX ORDER — DEXTROSE 50 % IN WATER 50 %
15 SYRINGE (ML) INTRAVENOUS ONCE
Refills: 0 | Status: DISCONTINUED | OUTPATIENT
Start: 2021-11-09 | End: 2021-11-10

## 2021-11-09 RX ORDER — DEXTROSE 50 % IN WATER 50 %
12.5 SYRINGE (ML) INTRAVENOUS ONCE
Refills: 0 | Status: DISCONTINUED | OUTPATIENT
Start: 2021-11-09 | End: 2021-11-10

## 2021-11-09 RX ORDER — POTASSIUM PHOSPHATE, MONOBASIC POTASSIUM PHOSPHATE, DIBASIC 236; 224 MG/ML; MG/ML
30 INJECTION, SOLUTION INTRAVENOUS ONCE
Refills: 0 | Status: COMPLETED | OUTPATIENT
Start: 2021-11-09 | End: 2021-11-09

## 2021-11-09 RX ORDER — DEXTROSE 50 % IN WATER 50 %
25 SYRINGE (ML) INTRAVENOUS ONCE
Refills: 0 | Status: DISCONTINUED | OUTPATIENT
Start: 2021-11-09 | End: 2021-11-10

## 2021-11-09 RX ORDER — INSULIN LISPRO 100/ML
5 VIAL (ML) SUBCUTANEOUS
Refills: 0 | Status: DISCONTINUED | OUTPATIENT
Start: 2021-11-09 | End: 2021-11-10

## 2021-11-09 RX ORDER — INSULIN HUMAN 100 [IU]/ML
4 INJECTION, SOLUTION SUBCUTANEOUS
Qty: 100 | Refills: 0 | Status: DISCONTINUED | OUTPATIENT
Start: 2021-11-09 | End: 2021-11-09

## 2021-11-09 RX ORDER — INSULIN LISPRO 100/ML
VIAL (ML) SUBCUTANEOUS
Refills: 0 | Status: DISCONTINUED | OUTPATIENT
Start: 2021-11-09 | End: 2021-11-10

## 2021-11-09 RX ORDER — GLUCAGON INJECTION, SOLUTION 0.5 MG/.1ML
1 INJECTION, SOLUTION SUBCUTANEOUS ONCE
Refills: 0 | Status: DISCONTINUED | OUTPATIENT
Start: 2021-11-09 | End: 2021-11-10

## 2021-11-09 RX ORDER — FOLIC ACID 0.8 MG
1 TABLET ORAL DAILY
Refills: 0 | Status: DISCONTINUED | OUTPATIENT
Start: 2021-11-09 | End: 2021-11-10

## 2021-11-09 RX ORDER — SODIUM CHLORIDE 9 MG/ML
1000 INJECTION, SOLUTION INTRAVENOUS
Refills: 0 | Status: DISCONTINUED | OUTPATIENT
Start: 2021-11-09 | End: 2021-11-09

## 2021-11-09 RX ADMIN — SODIUM CHLORIDE 150 MILLILITER(S): 9 INJECTION, SOLUTION INTRAVENOUS at 10:18

## 2021-11-09 RX ADMIN — PANTOPRAZOLE SODIUM 40 MILLIGRAM(S): 20 TABLET, DELAYED RELEASE ORAL at 12:09

## 2021-11-09 RX ADMIN — Medication 100 MILLIGRAM(S): at 12:09

## 2021-11-09 RX ADMIN — Medication 4 MILLIGRAM(S): at 14:00

## 2021-11-09 RX ADMIN — Medication 2 MILLIGRAM(S): at 12:00

## 2021-11-09 RX ADMIN — INSULIN HUMAN 4 UNIT(S)/HR: 100 INJECTION, SOLUTION SUBCUTANEOUS at 04:12

## 2021-11-09 RX ADMIN — Medication 4 MILLIGRAM(S): at 17:52

## 2021-11-09 RX ADMIN — Medication 1 MILLIGRAM(S): at 12:09

## 2021-11-09 RX ADMIN — Medication 0: at 21:32

## 2021-11-09 RX ADMIN — INSULIN GLARGINE 32 UNIT(S): 100 INJECTION, SOLUTION SUBCUTANEOUS at 18:06

## 2021-11-09 RX ADMIN — Medication 1 TABLET(S): at 12:09

## 2021-11-09 RX ADMIN — Medication 4 MILLIGRAM(S): at 01:09

## 2021-11-09 RX ADMIN — POTASSIUM PHOSPHATE, MONOBASIC POTASSIUM PHOSPHATE, DIBASIC 83.33 MILLIMOLE(S): 236; 224 INJECTION, SOLUTION INTRAVENOUS at 10:18

## 2021-11-09 RX ADMIN — Medication 3: at 01:16

## 2021-11-09 RX ADMIN — Medication 4 MILLIGRAM(S): at 21:31

## 2021-11-09 RX ADMIN — SODIUM CHLORIDE 50 MILLILITER(S): 9 INJECTION, SOLUTION INTRAVENOUS at 21:45

## 2021-11-09 RX ADMIN — Medication 4 MILLIGRAM(S): at 06:06

## 2021-11-09 RX ADMIN — Medication 2 MILLIGRAM(S): at 01:08

## 2021-11-09 RX ADMIN — SODIUM CHLORIDE 150 MILLILITER(S): 9 INJECTION, SOLUTION INTRAVENOUS at 04:08

## 2021-11-09 RX ADMIN — Medication 4 MILLIGRAM(S): at 10:19

## 2021-11-09 NOTE — CONSULT NOTE ADULT - ATTENDING SUPERVISION STATEMENT
Detail Level: Zone
Initiate Treatment: 5 FU cream for 2 weeks. Start in the fall, schedule a FU OV for 2-3 months after completing treatment.
Fellow

## 2021-11-09 NOTE — PROGRESS NOTE ADULT - ASSESSMENT
50 yo F w/ PMH DM, ETOH withdrawal w/ seizure which required intubation, DKA in past, cholecystectomy, P/w n/v and worsening epigastric pain. Now presenting with Etoh withdrawal, and DKA. On insulin gtt, [facility] protocol.     Neuro  - Etoh withdrawal   - Last drink was 2 days ago   - S/P 4 ativan in ED   - CIWA protocol   - 4mg Ativan standing   - 2mg Ativan PRN     Resp  - Increased RR 20-30  - Currently on RA O2 Sats > 95%   - GAS with Anion-Gap metabolic acidosis with lactate 4      CV  - Patient's blood pressure 130-140   - Tachycardic   - EKG shows T wave inversion with troponin pending       GI  - Hx of pancreatitis   - ABD pain on presentation   - Will get CT ABD   - Zofran check QTc   - NPO since not tolerating PO         Renal  - Cr 0.57   - No active issues       ID  - SIRS on admission   - No signs of infection   - No active issues       Endo  DKA   - Glucose 416 -> 392   - Beta 7.2, GAP 24   - Q4 FS with correctional insulin scale   - IVF LR 150cc   - Endo consult       Heme   - No active issues   - DVT prophylaxis: Lovenox       Ethics   - Full code 52 yo F w/ PMH DM, ETOH withdrawal w/ seizure which required intubation, DKA in past, cholecystectomy, P/w n/v and worsening epigastric pain. Now presenting with Etoh withdrawal, and DKA. On insulin gtt, [facility] protocol.     Neuro  - Etoh withdrawal   - Last drink was 2 days ago   - CIWA protocol   - 4mg Ativan standing, taper   - 2mg Ativan PRN   - Will need Etoh withdrawal counseling/resources since she wants to quit drinking alcohol    Resp  - Increased RR 20-30  - Currently on RA O2 Sats > 95%   - Lactate downtrending       CV  - Patient's blood pressure 130-140   - Tachycardic   - EKG shows T wave inversion with troponin pending       GI  - Hx of pancreatitis   - ABD pain on presentation   - Will get CT ABD   - Zofran check QTc   - NPO since not tolerating PO         Renal  - Cr 0.57   - No active issues       ID  - SIRS on admission   - No signs of infection   - No active issues       Endo  DKA   - Glucose 416 -> 392   - Beta 7.2, GAP 24 - > 20   - Q4 FS with correctional insulin scale   - IVF D5/LR 150cc w/ K additive   - Insulin gtt rate 4/hr   - Endo consult , f/u reccs       Heme   - No active issues   - DVT prophylaxis: SCD      Ethics   - Full code

## 2021-11-09 NOTE — PROGRESS NOTE ADULT - SUBJECTIVE AND OBJECTIVE BOX
Grover Duvall MD  [phone]/[facility] [id number]    OVERNIGHT EVENTS:    SUBJECTIVE: Patient seen and examined at bedside. Patient denies fever, chills, SOB, chest pain, N/V/D.    OBJECTIVE:    VITAL SIGNS:  ICU Vital Signs Last 24 Hrs  T(C): 36.9 (2021 04:00), Max: 37.3 (2021 18:46)  T(F): 98.4 (2021 04:00), Max: 99.1 (2021 18:46)  HR: 71 (2021 07:00) (69 - 112)  BP: 101/57 (2021 07:00) (101/57 - 160/85)  BP(mean): 74 (2021 07:00) (74 - 115)  ABP: --  ABP(mean): --  RR: 20 (2021 07:00) (18 - 34)  SpO2: 94% (2021 07:00) (90% - 100%)         @ 07:01  -   @ 07:00  --------------------------------------------------------  IN: 4119 mL / OUT: 1250 mL / NET: 2869 mL        =================PHYSICAL EXAM=================    GENERAL: Laying comfortably, NAD  EYES: EOMI, PERRL, no scleral icterus  NECK: No JVD  LUNG: Clear to auscultation bilaterally; No wheeze, crackles or rhonci  HEART: Regular rate and rhythm; No murmurs, rubs, or gallops  ABDOMEN: Soft, Nontender, Nondistended  EXTREMITIES:  No LE edema, 2+ Peripheral Pulses, No clubbing, cyanosis, or edema  PSYCH: AAOx3  NEUROLOGY: non-focal, strength 5/5 in all extremities, sensation intact  SKIN: No rashes or lesions    =================================================    LABS:                        12.0   6.07  )-----------( 124      ( 2021 00:49 )             36.5     Auto Eosinophil # 0.14  / Auto Eosinophil % 2.3   / Auto Neutrophil # 3.87  / Auto Neutrophil % 63.8  / BANDS % x                            13.2   6.97  )-----------( 126      ( 2021 21:03 )             39.2     Auto Eosinophil # 0.01  / Auto Eosinophil % 0.1   / Auto Neutrophil # 5.86  / Auto Neutrophil % 84.2  / BANDS % x                            16.8   8.32  )-----------( 167      ( 2021 15:14 )             48.3     Auto Eosinophil # 0.17  / Auto Eosinophil % 2.0   / Auto Neutrophil # 6.42  / Auto Neutrophil % 77.1  / BANDS % x            138  |  103  |  10  ----------------------------<  266<H>  4.5   |  15<L>  |  0.57      133<L>  |  98  |  11  ----------------------------<  341<H>  5.3   |  13<L>  |  0.59      135  |  96  |  12  ----------------------------<  392<H>  5.1   |  12<L>  |  0.57    Ca    8.5      2021 00:49  Mg     2.4       Phos  2.5       TPro  6.0  /  Alb  3.7  /  TBili  1.3<H>  /  DBili  x   /  AST  57<H>  /  ALT  54<H>  /  AlkPhos  89    TPro  6.8  /  Alb  4.0  /  TBili  2.0<H>  /  DBili  x   /  AST  90<H>  /  ALT  65<H>  /  AlkPhos  100    TPro  7.7  /  Alb  4.6  /  TBili  2.1<H>  /  DBili  1.1<H>  /  AST  89<H>  /  ALT  71<H>  /  AlkPhos  116  11-    PT/INR - ( 2021 21:03 )   PT: 12.4 sec;   INR: 1.04 ratio         PTT - ( 2021 21:03 )  PTT:21.0 sec      Urinalysis Basic - ( 2021 16:43 )    Color: Light Yellow / Appearance: Clear / S.033 / pH: x  Gluc: x / Ketone: Large  / Bili: Negative / Urobili: Negative   Blood: x / Protein: 30 mg/dL / Nitrite: Negative   Leuk Esterase: Negative / RBC: 1 /hpf / WBC 1 /HPF   Sq Epi: x / Non Sq Epi: 1 /hpf / Bacteria: Negative               MEDICATIONS:  MEDICATIONS  (STANDING):  dextrose 40% Gel 15 Gram(s) Oral once  dextrose 5% + lactated ringers. 1000 milliLiter(s) (150 mL/Hr) IV Continuous <Continuous>  dextrose 5%. 1000 milliLiter(s) (50 mL/Hr) IV Continuous <Continuous>  dextrose 5%. 1000 milliLiter(s) (100 mL/Hr) IV Continuous <Continuous>  dextrose 50% Injectable 25 Gram(s) IV Push once  dextrose 50% Injectable 12.5 Gram(s) IV Push once  dextrose 50% Injectable 25 Gram(s) IV Push once  enoxaparin Injectable 40 milliGRAM(s) SubCutaneous daily  glucagon  Injectable 1 milliGRAM(s) IntraMuscular once  insulin regular Infusion 4 Unit(s)/Hr (4 mL/Hr) IV Continuous <Continuous>  LORazepam   Injectable 4 milliGRAM(s) IV Push every 4 hours    MEDICATIONS  (PRN):  LORazepam   Injectable 2 milliGRAM(s) IV Push every 1 hour PRN CIWA-Ar score 8 or greater      ALLERGIES:  Allergies    Bactrim (Anaphylaxis)  Eggplant mouth itches (Other)    Intolerances        LABS:                        12.0   6.07  )-----------( 124      ( 2021 00:49 )             36.5         138  |  103  |  10  ----------------------------<  266<H>  4.5   |  15<L>  |  0.57    Ca    8.5      2021 00:49  Phos  2.5       Mg     2.4         TPro  6.0  /  Alb  3.7  /  TBili  1.3<H>  /  DBili  x   /  AST  57<H>  /  ALT  54<H>  /  AlkPhos  89      PT/INR - ( 2021 21:03 )   PT: 12.4 sec;   INR: 1.04 ratio         PTT - ( 2021 21:03 )  PTT:21.0 sec  Urinalysis Basic - ( 2021 16:43 )    Color: Light Yellow / Appearance: Clear / S.033 / pH: x  Gluc: x / Ketone: Large  / Bili: Negative / Urobili: Negative   Blood: x / Protein: 30 mg/dL / Nitrite: Negative   Leuk Esterase: Negative / RBC: 1 /hpf / WBC 1 /HPF   Sq Epi: x / Non Sq Epi: 1 /hpf / Bacteria: Negative        CAPILLARY BLOOD GLUCOSE      POCT Blood Glucose.: 191 mg/dL (2021 06:53)      RADIOLOGY & ADDITIONAL TESTS: Reviewed. Grover Duvall MD  [phone]/[facility] [id number]    OVERNIGHT EVENTS:  - Started on insulin gtt 4/hr   - D5/LR     SUBJECTIVE: Patient seen and examined at bedside. Patient feels same symptom wise from day prior. Still feels nauseous, but has not had another episode of vomiting since yesterday. Has not had a bowel movement since yesterday. Still feels like she cant take PO, also complaining of ABD pain.     OBJECTIVE:    VITAL SIGNS:  ICU Vital Signs Last 24 Hrs  T(C): 36.9 (2021 04:00), Max: 37.3 (2021 18:46)  T(F): 98.4 (2021 04:00), Max: 99.1 (2021 18:46)  HR: 71 (2021 07:00) (69 - 112)  BP: 101/57 (2021 07:00) (101/57 - 160/85)  BP(mean): 74 (2021 07:00) (74 - 115)  ABP: --  ABP(mean): --  RR: 20 (2021 07:00) (18 - 34)  SpO2: 94% (2021 07:00) (90% - 100%)         @ 07:01  -   @ 07:00  --------------------------------------------------------  IN: 4119 mL / OUT: 1250 mL / NET: 2869 mL        =================PHYSICAL EXAM=================    GENERAL: Laying comfortably, NAD  EYES: PERRL, no scleral icterus  LUNG: Clear to auscultation bilaterally; No wheeze, crackles or rhonci  HEART: Regular rate and rhythm; No murmurs, rubs, or gallops  ABDOMEN: Soft, tender to palpation epigastric region, Nondistended  EXTREMITIES:  No LE edema, 2+ Peripheral Pulses, No clubbing, cyanosis, or edema  PSYCH: AAOx3  NEUROLOGY: non-focal, strength 5/5 in all extremities, sensation intact    =================================================    LABS:                        12.0   6.07  )-----------( 124      ( 2021 00:49 )             36.5     Auto Eosinophil # 0.14  / Auto Eosinophil % 2.3   / Auto Neutrophil # 3.87  / Auto Neutrophil % 63.8  / BANDS % x                            13.2   6.97  )-----------( 126      ( 2021 21:03 )             39.2     Auto Eosinophil # 0.01  / Auto Eosinophil % 0.1   / Auto Neutrophil # 5.86  / Auto Neutrophil % 84.2  / BANDS % x                            16.8   8.32  )-----------( 167      ( 2021 15:14 )             48.3     Auto Eosinophil # 0.17  / Auto Eosinophil % 2.0   / Auto Neutrophil # 6.42  / Auto Neutrophil % 77.1  / BANDS % x            138  |  103  |  10  ----------------------------<  266<H>  4.5   |  15<L>  |  0.57      133<L>  |  98  |  11  ----------------------------<  341<H>  5.3   |  13<L>  |  0.59      135  |  96  |  12  ----------------------------<  392<H>  5.1   |  12<L>  |  0.57    Ca    8.5      2021 00:49  Mg     2.4       Phos  2.5       TPro  6.0  /  Alb  3.7  /  TBili  1.3<H>  /  DBili  x   /  AST  57<H>  /  ALT  54<H>  /  AlkPhos  89    TPro  6.8  /  Alb  4.0  /  TBili  2.0<H>  /  DBili  x   /  AST  90<H>  /  ALT  65<H>  /  AlkPhos  100    TPro  7.7  /  Alb  4.6  /  TBili  2.1<H>  /  DBili  1.1<H>  /  AST  89<H>  /  ALT  71<H>  /  AlkPhos  116      PT/INR - ( 2021 21:03 )   PT: 12.4 sec;   INR: 1.04 ratio         PTT - ( 2021 21:03 )  PTT:21.0 sec      Urinalysis Basic - ( 2021 16:43 )    Color: Light Yellow / Appearance: Clear / S.033 / pH: x  Gluc: x / Ketone: Large  / Bili: Negative / Urobili: Negative   Blood: x / Protein: 30 mg/dL / Nitrite: Negative   Leuk Esterase: Negative / RBC: 1 /hpf / WBC 1 /HPF   Sq Epi: x / Non Sq Epi: 1 /hpf / Bacteria: Negative               MEDICATIONS:  MEDICATIONS  (STANDING):  dextrose 40% Gel 15 Gram(s) Oral once  dextrose 5% + lactated ringers. 1000 milliLiter(s) (150 mL/Hr) IV Continuous <Continuous>  dextrose 5%. 1000 milliLiter(s) (50 mL/Hr) IV Continuous <Continuous>  dextrose 5%. 1000 milliLiter(s) (100 mL/Hr) IV Continuous <Continuous>  dextrose 50% Injectable 25 Gram(s) IV Push once  dextrose 50% Injectable 12.5 Gram(s) IV Push once  dextrose 50% Injectable 25 Gram(s) IV Push once  enoxaparin Injectable 40 milliGRAM(s) SubCutaneous daily  glucagon  Injectable 1 milliGRAM(s) IntraMuscular once  insulin regular Infusion 4 Unit(s)/Hr (4 mL/Hr) IV Continuous <Continuous>  LORazepam   Injectable 4 milliGRAM(s) IV Push every 4 hours    MEDICATIONS  (PRN):  LORazepam   Injectable 2 milliGRAM(s) IV Push every 1 hour PRN CIWA-Ar score 8 or greater      ALLERGIES:  Allergies    Bactrim (Anaphylaxis)  Eggplant mouth itches (Other)    Intolerances        LABS:                        12.0   6.07  )-----------( 124      ( 2021 00:49 )             36.5         138  |  103  |  10  ----------------------------<  266<H>  4.5   |  15<L>  |  0.57    Ca    8.5      2021 00:49  Phos  2.5       Mg     2.4         TPro  6.0  /  Alb  3.7  /  TBili  1.3<H>  /  DBili  x   /  AST  57<H>  /  ALT  54<H>  /  AlkPhos  89      PT/INR - ( 2021 21:03 )   PT: 12.4 sec;   INR: 1.04 ratio         PTT - ( 2021 21:03 )  PTT:21.0 sec  Urinalysis Basic - ( 2021 16:43 )    Color: Light Yellow / Appearance: Clear / S.033 / pH: x  Gluc: x / Ketone: Large  / Bili: Negative / Urobili: Negative   Blood: x / Protein: 30 mg/dL / Nitrite: Negative   Leuk Esterase: Negative / RBC: 1 /hpf / WBC 1 /HPF   Sq Epi: x / Non Sq Epi: 1 /hpf / Bacteria: Negative        CAPILLARY BLOOD GLUCOSE      POCT Blood Glucose.: 191 mg/dL (2021 06:53)      RADIOLOGY & ADDITIONAL TESTS: Reviewed.
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
## 2021-11-10 ENCOUNTER — TRANSCRIPTION ENCOUNTER (OUTPATIENT)
Age: 51
End: 2021-11-10

## 2021-11-10 VITALS
RESPIRATION RATE: 26 BRPM | DIASTOLIC BLOOD PRESSURE: 69 MMHG | OXYGEN SATURATION: 93 % | HEART RATE: 91 BPM | SYSTOLIC BLOOD PRESSURE: 112 MMHG

## 2021-11-10 LAB
A1C WITH ESTIMATED AVERAGE GLUCOSE RESULT: 10.2 % — HIGH (ref 4–5.6)
ALBUMIN SERPL ELPH-MCNC: 3.2 G/DL — LOW (ref 3.3–5)
ALP SERPL-CCNC: 85 U/L — SIGNIFICANT CHANGE UP (ref 40–120)
ALT FLD-CCNC: 39 U/L — SIGNIFICANT CHANGE UP (ref 10–45)
ANION GAP SERPL CALC-SCNC: 9 MMOL/L — SIGNIFICANT CHANGE UP (ref 5–17)
AST SERPL-CCNC: 47 U/L — HIGH (ref 10–40)
BILIRUB SERPL-MCNC: 1.1 MG/DL — SIGNIFICANT CHANGE UP (ref 0.2–1.2)
BUN SERPL-MCNC: 9 MG/DL — SIGNIFICANT CHANGE UP (ref 7–23)
CALCIUM SERPL-MCNC: 8.6 MG/DL — SIGNIFICANT CHANGE UP (ref 8.4–10.5)
CHLORIDE SERPL-SCNC: 107 MMOL/L — SIGNIFICANT CHANGE UP (ref 96–108)
CHOLEST SERPL-MCNC: 191 MG/DL — SIGNIFICANT CHANGE UP
CO2 SERPL-SCNC: 19 MMOL/L — LOW (ref 22–31)
CREAT SERPL-MCNC: 0.61 MG/DL — SIGNIFICANT CHANGE UP (ref 0.5–1.3)
ESTIMATED AVERAGE GLUCOSE: 246 MG/DL — HIGH (ref 68–114)
GLUCOSE BLDC GLUCOMTR-MCNC: 105 MG/DL — HIGH (ref 70–99)
GLUCOSE BLDC GLUCOMTR-MCNC: 153 MG/DL — HIGH (ref 70–99)
GLUCOSE SERPL-MCNC: 226 MG/DL — HIGH (ref 70–99)
HCT VFR BLD CALC: 38.9 % — SIGNIFICANT CHANGE UP (ref 34.5–45)
HDLC SERPL-MCNC: 73 MG/DL — SIGNIFICANT CHANGE UP
HGB BLD-MCNC: 13 G/DL — SIGNIFICANT CHANGE UP (ref 11.5–15.5)
LIPID PNL WITH DIRECT LDL SERPL: 94 MG/DL — SIGNIFICANT CHANGE UP
MAGNESIUM SERPL-MCNC: 1.8 MG/DL — SIGNIFICANT CHANGE UP (ref 1.6–2.6)
MCHC RBC-ENTMCNC: 31.7 PG — SIGNIFICANT CHANGE UP (ref 27–34)
MCHC RBC-ENTMCNC: 33.4 GM/DL — SIGNIFICANT CHANGE UP (ref 32–36)
MCV RBC AUTO: 94.9 FL — SIGNIFICANT CHANGE UP (ref 80–100)
NON HDL CHOLESTEROL: 118 MG/DL — SIGNIFICANT CHANGE UP
NRBC # BLD: 0 /100 WBCS — SIGNIFICANT CHANGE UP (ref 0–0)
PHOSPHATE SERPL-MCNC: 2 MG/DL — LOW (ref 2.5–4.5)
PLATELET # BLD AUTO: 106 K/UL — LOW (ref 150–400)
POTASSIUM SERPL-MCNC: 4.2 MMOL/L — SIGNIFICANT CHANGE UP (ref 3.5–5.3)
POTASSIUM SERPL-SCNC: 4.2 MMOL/L — SIGNIFICANT CHANGE UP (ref 3.5–5.3)
PROT SERPL-MCNC: 5.8 G/DL — LOW (ref 6–8.3)
RBC # BLD: 4.1 M/UL — SIGNIFICANT CHANGE UP (ref 3.8–5.2)
RBC # FLD: 12.1 % — SIGNIFICANT CHANGE UP (ref 10.3–14.5)
SODIUM SERPL-SCNC: 135 MMOL/L — SIGNIFICANT CHANGE UP (ref 135–145)
TRIGL SERPL-MCNC: 120 MG/DL — SIGNIFICANT CHANGE UP
WBC # BLD: 4.75 K/UL — SIGNIFICANT CHANGE UP (ref 3.8–10.5)
WBC # FLD AUTO: 4.75 K/UL — SIGNIFICANT CHANGE UP (ref 3.8–10.5)

## 2021-11-10 PROCEDURE — 83690 ASSAY OF LIPASE: CPT

## 2021-11-10 PROCEDURE — 82330 ASSAY OF CALCIUM: CPT

## 2021-11-10 PROCEDURE — 80307 DRUG TEST PRSMV CHEM ANLYZR: CPT

## 2021-11-10 PROCEDURE — 85730 THROMBOPLASTIN TIME PARTIAL: CPT

## 2021-11-10 PROCEDURE — 84484 ASSAY OF TROPONIN QUANT: CPT

## 2021-11-10 PROCEDURE — 99291 CRITICAL CARE FIRST HOUR: CPT | Mod: 25

## 2021-11-10 PROCEDURE — 85610 PROTHROMBIN TIME: CPT

## 2021-11-10 PROCEDURE — 74177 CT ABD & PELVIS W/CONTRAST: CPT

## 2021-11-10 PROCEDURE — U0003: CPT

## 2021-11-10 PROCEDURE — 82010 KETONE BODYS QUAN: CPT

## 2021-11-10 PROCEDURE — 82962 GLUCOSE BLOOD TEST: CPT

## 2021-11-10 PROCEDURE — 80076 HEPATIC FUNCTION PANEL: CPT

## 2021-11-10 PROCEDURE — 84295 ASSAY OF SERUM SODIUM: CPT

## 2021-11-10 PROCEDURE — 36415 COLL VENOUS BLD VENIPUNCTURE: CPT

## 2021-11-10 PROCEDURE — 80061 LIPID PANEL: CPT

## 2021-11-10 PROCEDURE — 96375 TX/PRO/DX INJ NEW DRUG ADDON: CPT

## 2021-11-10 PROCEDURE — 83735 ASSAY OF MAGNESIUM: CPT

## 2021-11-10 PROCEDURE — 82803 BLOOD GASES ANY COMBINATION: CPT

## 2021-11-10 PROCEDURE — 85014 HEMATOCRIT: CPT

## 2021-11-10 PROCEDURE — 80053 COMPREHEN METABOLIC PANEL: CPT

## 2021-11-10 PROCEDURE — 82947 ASSAY GLUCOSE BLOOD QUANT: CPT

## 2021-11-10 PROCEDURE — 82435 ASSAY OF BLOOD CHLORIDE: CPT

## 2021-11-10 PROCEDURE — 80048 BASIC METABOLIC PNL TOTAL CA: CPT

## 2021-11-10 PROCEDURE — 83036 HEMOGLOBIN GLYCOSYLATED A1C: CPT

## 2021-11-10 PROCEDURE — 96374 THER/PROPH/DIAG INJ IV PUSH: CPT

## 2021-11-10 PROCEDURE — 82565 ASSAY OF CREATININE: CPT

## 2021-11-10 PROCEDURE — 93005 ELECTROCARDIOGRAM TRACING: CPT

## 2021-11-10 PROCEDURE — 76937 US GUIDE VASCULAR ACCESS: CPT

## 2021-11-10 PROCEDURE — 87086 URINE CULTURE/COLONY COUNT: CPT

## 2021-11-10 PROCEDURE — 84100 ASSAY OF PHOSPHORUS: CPT

## 2021-11-10 PROCEDURE — 85025 COMPLETE CBC W/AUTO DIFF WBC: CPT

## 2021-11-10 PROCEDURE — U0005: CPT

## 2021-11-10 PROCEDURE — 84132 ASSAY OF SERUM POTASSIUM: CPT

## 2021-11-10 PROCEDURE — 99233 SBSQ HOSP IP/OBS HIGH 50: CPT | Mod: GC

## 2021-11-10 PROCEDURE — 83605 ASSAY OF LACTIC ACID: CPT

## 2021-11-10 PROCEDURE — 85018 HEMOGLOBIN: CPT

## 2021-11-10 PROCEDURE — 84702 CHORIONIC GONADOTROPIN TEST: CPT

## 2021-11-10 PROCEDURE — 81001 URINALYSIS AUTO W/SCOPE: CPT

## 2021-11-10 RX ORDER — THIAMINE MONONITRATE (VIT B1) 100 MG
1 TABLET ORAL
Qty: 0 | Refills: 0 | DISCHARGE
Start: 2021-11-10

## 2021-11-10 RX ORDER — SODIUM CHLORIDE 9 MG/ML
1000 INJECTION, SOLUTION INTRAVENOUS
Refills: 0 | Status: DISCONTINUED | OUTPATIENT
Start: 2021-11-10 | End: 2021-11-10

## 2021-11-10 RX ORDER — INSULIN GLARGINE 100 [IU]/ML
28 INJECTION, SOLUTION SUBCUTANEOUS AT BEDTIME
Refills: 0 | Status: DISCONTINUED | OUTPATIENT
Start: 2021-11-10 | End: 2021-11-10

## 2021-11-10 RX ORDER — ATORVASTATIN CALCIUM 80 MG/1
40 TABLET, FILM COATED ORAL AT BEDTIME
Refills: 0 | Status: DISCONTINUED | OUTPATIENT
Start: 2021-11-10 | End: 2021-11-10

## 2021-11-10 RX ORDER — POTASSIUM PHOSPHATE, MONOBASIC POTASSIUM PHOSPHATE, DIBASIC 236; 224 MG/ML; MG/ML
15 INJECTION, SOLUTION INTRAVENOUS ONCE
Refills: 0 | Status: COMPLETED | OUTPATIENT
Start: 2021-11-10 | End: 2021-11-10

## 2021-11-10 RX ORDER — INSULIN LISPRO 100/ML
2 VIAL (ML) SUBCUTANEOUS
Refills: 0 | Status: DISCONTINUED | OUTPATIENT
Start: 2021-11-10 | End: 2021-11-10

## 2021-11-10 RX ORDER — INSULIN GLARGINE 100 [IU]/ML
27 INJECTION, SOLUTION SUBCUTANEOUS
Qty: 0 | Refills: 0 | DISCHARGE

## 2021-11-10 RX ORDER — POLYETHYLENE GLYCOL 3350 17 G/17G
17 POWDER, FOR SOLUTION ORAL
Refills: 0 | Status: DISCONTINUED | OUTPATIENT
Start: 2021-11-10 | End: 2021-11-10

## 2021-11-10 RX ADMIN — Medication 1: at 08:50

## 2021-11-10 RX ADMIN — POLYETHYLENE GLYCOL 3350 17 GRAM(S): 17 POWDER, FOR SOLUTION ORAL at 05:47

## 2021-11-10 RX ADMIN — POTASSIUM PHOSPHATE, MONOBASIC POTASSIUM PHOSPHATE, DIBASIC 62.5 MILLIMOLE(S): 236; 224 INJECTION, SOLUTION INTRAVENOUS at 02:01

## 2021-11-10 RX ADMIN — Medication 4 MILLIGRAM(S): at 02:01

## 2021-11-10 RX ADMIN — Medication 2 MILLIGRAM(S): at 00:20

## 2021-11-10 RX ADMIN — Medication 4 MILLIGRAM(S): at 13:01

## 2021-11-10 RX ADMIN — PANTOPRAZOLE SODIUM 40 MILLIGRAM(S): 20 TABLET, DELAYED RELEASE ORAL at 13:01

## 2021-11-10 RX ADMIN — Medication 1 MILLIGRAM(S): at 13:01

## 2021-11-10 RX ADMIN — Medication 5 UNIT(S): at 08:50

## 2021-11-10 RX ADMIN — Medication 100 MILLIGRAM(S): at 13:01

## 2021-11-10 RX ADMIN — Medication 4 MILLIGRAM(S): at 05:47

## 2021-11-10 RX ADMIN — Medication 1 TABLET(S): at 13:00

## 2021-11-10 RX ADMIN — Medication 2 MILLIGRAM(S): at 09:06

## 2021-11-10 RX ADMIN — SODIUM CHLORIDE 75 MILLILITER(S): 9 INJECTION, SOLUTION INTRAVENOUS at 08:56

## 2021-11-10 NOTE — DISCHARGE NOTE PROVIDER - CARE PROVIDER_API CALL
ISAIAH OATES  Endocrinology, Diabetes and Metabolism  [address] [place] [address]  Phone: [phone]  Fax: [phone]  Follow Up Time:

## 2021-11-10 NOTE — PROGRESS NOTE ADULT - SUBJECTIVE AND OBJECTIVE BOX
Grover Duvall MD  [phone]/[facility] [id number]    OVERNIGHT EVENTS:    SUBJECTIVE: Patient seen and examined at bedside. Patient denies fever, chills, SOB, chest pain, N/V/D.    OBJECTIVE:    VITAL SIGNS:  ICU Vital Signs Last 24 Hrs  T(C): 37 (10 Nov 2021 04:00), Max: 37.3 (10 Nov 2021 00:00)  T(F): 98.6 (10 Nov 2021 04:00), Max: 99.1 (10 Nov 2021 00:00)  HR: 67 (10 Nov 2021 07:) (65 - 75)  BP: 127/75 (10 Nov 2021 07:00) (92/51 - 138/78)  BP(mean): 96 (10 Nov 2021 07:) (67 - 103)  ABP: --  ABP(mean): --  RR: 20 (10 Nov 2021 07:00) (16 - 30)  SpO2: 93% (10 Nov 2021 07:) (90% - 100%)         @ 07:01  -  11-10 @ 07:00  --------------------------------------------------------  IN: 3890.3 mL / OUT: 600 mL / NET: 3290.3 mL        =================PHYSICAL EXAM=================    GENERAL: Laying comfortably, NAD  EYES: EOMI, PERRL, no scleral icterus  NECK: No JVD  LUNG: Clear to auscultation bilaterally; No wheeze, crackles or rhonci  HEART: Regular rate and rhythm; No murmurs, rubs, or gallops  ABDOMEN: Soft, Nontender, Nondistended  EXTREMITIES:  No LE edema, 2+ Peripheral Pulses, No clubbing, cyanosis, or edema  PSYCH: AAOx3  NEUROLOGY: non-focal, strength 5/5 in all extremities, sensation intact  SKIN: No rashes or lesions    =================================================    LABS:                        13.0   4.75  )-----------( 106      ( 10 Nov 2021 00:29 )             38.9     Auto Eosinophil # x     / Auto Eosinophil % x     / Auto Neutrophil # x     / Auto Neutrophil % x     / BANDS % x                            12.0   6.07  )-----------( 124      ( 2021 00:49 )             36.5     Auto Eosinophil # 0.14  / Auto Eosinophil % 2.3   / Auto Neutrophil # 3.87  / Auto Neutrophil % 63.8  / BANDS % x                            13.2   6.97  )-----------( 126      ( 2021 21:03 )             39.2     Auto Eosinophil # 0.01  / Auto Eosinophil % 0.1   / Auto Neutrophil # 5.86  / Auto Neutrophil % 84.2  / BANDS % x        11-10    135  |  107  |  9   ----------------------------<  226<H>  4.2   |  19<L>  |  0.61      138  |  107  |  12  ----------------------------<  144<H>  4.2   |  19<L>  |  0.59      139  |  108  |  12  ----------------------------<  124<H>  4.3   |  21<L>  |  0.66    Ca    8.6      10 Nov 2021 00:29  Mg     1.8     11-10  Phos  2.0     11-10  TPro  5.8<L>  /  Alb  3.2<L>  /  TBili  1.1  /  DBili  x   /  AST  47<H>  /  ALT  39  /  AlkPhos  85  11-10  TPro  5.8<L>  /  Alb  3.6  /  TBili  1.1  /  DBili  x   /  AST  42<H>  /  ALT  43  /  AlkPhos  84    TPro  5.8<L>  /  Alb  3.6  /  TBili  1.3<H>  /  DBili  x   /  AST  44<H>  /  ALT  48<H>  /  AlkPhos  83  11-09    PT/INR - ( 2021 21:03 )   PT: 12.4 sec;   INR: 1.04 ratio         PTT - ( 2021 21:03 )  PTT:21.0 sec      Urinalysis Basic - ( 2021 16:43 )    Color: Light Yellow / Appearance: Clear / S.033 / pH: x  Gluc: x / Ketone: Large  / Bili: Negative / Urobili: Negative   Blood: x / Protein: 30 mg/dL / Nitrite: Negative   Leuk Esterase: Negative / RBC: 1 /hpf / WBC 1 /HPF   Sq Epi: x / Non Sq Epi: 1 /hpf / Bacteria: Negative               MEDICATIONS:  MEDICATIONS  (STANDING):  dextrose 40% Gel 15 Gram(s) Oral once  dextrose 40% Gel 15 Gram(s) Oral once  dextrose 50% Injectable 25 Gram(s) IV Push once  dextrose 50% Injectable 12.5 Gram(s) IV Push once  dextrose 50% Injectable 25 Gram(s) IV Push once  dextrose 50% Injectable 25 Gram(s) IV Push once  dextrose 50% Injectable 12.5 Gram(s) IV Push once  dextrose 50% Injectable 25 Gram(s) IV Push once  folic acid 1 milliGRAM(s) Oral daily  glucagon  Injectable 1 milliGRAM(s) IntraMuscular once  glucagon  Injectable 1 milliGRAM(s) IntraMuscular once  insulin glargine Injectable (LANTUS) 32 Unit(s) SubCutaneous at bedtime  insulin lispro (ADMELOG) corrective regimen sliding scale   SubCutaneous three times a day before meals  insulin lispro (ADMELOG) corrective regimen sliding scale   SubCutaneous at bedtime  insulin lispro Injectable (ADMELOG) 5 Unit(s) SubCutaneous before breakfast  insulin lispro Injectable (ADMELOG) 5 Unit(s) SubCutaneous before lunch  insulin lispro Injectable (ADMELOG) 5 Unit(s) SubCutaneous before dinner  lactated ringers. 1000 milliLiter(s) (75 mL/Hr) IV Continuous <Continuous>  LORazepam     Tablet 4 milliGRAM(s) Oral every 6 hours  multivitamin 1 Tablet(s) Oral daily  pantoprazole  Injectable 40 milliGRAM(s) IV Push daily  polyethylene glycol 3350 17 Gram(s) Oral two times a day  thiamine 100 milliGRAM(s) Oral daily    MEDICATIONS  (PRN):  LORazepam   Injectable 2 milliGRAM(s) IV Push every 1 hour PRN CIWA-Ar score 8 or greater      ALLERGIES:  Allergies    Bactrim (Anaphylaxis)  Eggplant mouth itches (Other)    Intolerances        LABS:                        13.0   4.75  )-----------( 106      ( 10 Nov 2021 00:29 )             38.9     11-10    135  |  107  |  9   ----------------------------<  226<H>  4.2   |  19<L>  |  0.61    Ca    8.6      10 Nov 2021 00:29  Phos  2.0     11-10  Mg     1.8     11-10    TPro  5.8<L>  /  Alb  3.2<L>  /  TBili  1.1  /  DBili  x   /  AST  47<H>  /  ALT  39  /  AlkPhos  85  11-10    PT/INR - ( 2021 21:03 )   PT: 12.4 sec;   INR: 1.04 ratio         PTT - ( 2021 21:03 )  PTT:21.0 sec  Urinalysis Basic - ( 2021 16:43 )    Color: Light Yellow / Appearance: Clear / S.033 / pH: x  Gluc: x / Ketone: Large  / Bili: Negative / Urobili: Negative   Blood: x / Protein: 30 mg/dL / Nitrite: Negative   Leuk Esterase: Negative / RBC: 1 /hpf / WBC 1 /HPF   Sq Epi: x / Non Sq Epi: 1 /hpf / Bacteria: Negative        CAPILLARY BLOOD GLUCOSE      POCT Blood Glucose.: 240 mg/dL (2021 21:29)      RADIOLOGY & ADDITIONAL TESTS: Reviewed. Grover Duvall MD  [phone]/[facility] [id number]    OVERNIGHT EVENTS:    SUBJECTIVE: Patient seen and examined at bedside. Patient denies fever, chills, SOB, chest pain, N/V/D.    OBJECTIVE:    VITAL SIGNS:  ICU Vital Signs Last 24 Hrs  T(C): 37 (10 Nov 2021 04:00), Max: 37.3 (10 Nov 2021 00:00)  T(F): 98.6 (10 Nov 2021 04:00), Max: 99.1 (10 Nov 2021 00:00)  HR: 67 (10 Nov 2021 07:) (65 - 75)  BP: 127/75 (10 Nov 2021 07:00) (92/51 - 138/78)  BP(mean): 96 (10 Nov 2021 07:) (67 - 103)  ABP: --  ABP(mean): --  RR: 20 (10 Nov 2021 07:00) (16 - 30)  SpO2: 93% (10 Nov 2021 07:) (90% - 100%)         @ 07:01  -  11-10 @ 07:00  --------------------------------------------------------  IN: 3890.3 mL / OUT: 600 mL / NET: 3290.3 mL        =================PHYSICAL EXAM=================    GENERAL: Laying comfortably, NAD  EYES: PERRL, no scleral icterus  LUNG: Clear to auscultation bilaterally; No wheeze, crackles or rhonci  HEART: Regular rate and rhythm; No murmurs, rubs, or gallops  ABDOMEN: Soft, Nontender, Nondistended  EXTREMITIES:  No LE edema, 2+ Peripheral Pulses, No clubbing, cyanosis, or edema  PSYCH: AAOx3  NEUROLOGY: non-focal, strength 5/5 in all extremities, sensation intact  SKIN: No rashes or lesions    =================================================    LABS:                        13.0   4.75  )-----------( 106      ( 10 Nov 2021 00:29 )             38.9     Auto Eosinophil # x     / Auto Eosinophil % x     / Auto Neutrophil # x     / Auto Neutrophil % x     / BANDS % x                            12.0   6.07  )-----------( 124      ( 2021 00:49 )             36.5     Auto Eosinophil # 0.14  / Auto Eosinophil % 2.3   / Auto Neutrophil # 3.87  / Auto Neutrophil % 63.8  / BANDS % x                            13.2   6.97  )-----------( 126      ( 2021 21:03 )             39.2     Auto Eosinophil # 0.01  / Auto Eosinophil % 0.1   / Auto Neutrophil # 5.86  / Auto Neutrophil % 84.2  / BANDS % x        11-10    135  |  107  |  9   ----------------------------<  226<H>  4.2   |  19<L>  |  0.61      138  |  107  |  12  ----------------------------<  144<H>  4.2   |  19<L>  |  0.59      139  |  108  |  12  ----------------------------<  124<H>  4.3   |  21<L>  |  0.66    Ca    8.6      10 Nov 2021 00:29  Mg     1.8     11-10  Phos  2.0     11-10  TPro  5.8<L>  /  Alb  3.2<L>  /  TBili  1.1  /  DBili  x   /  AST  47<H>  /  ALT  39  /  AlkPhos  85  11-10  TPro  5.8<L>  /  Alb  3.6  /  TBili  1.1  /  DBili  x   /  AST  42<H>  /  ALT  43  /  AlkPhos  84  11-09  TPro  5.8<L>  /  Alb  3.6  /  TBili  1.3<H>  /  DBili  x   /  AST  44<H>  /  ALT  48<H>  /  AlkPhos  83  11-    PT/INR - ( 2021 21:03 )   PT: 12.4 sec;   INR: 1.04 ratio         PTT - ( 2021 21:03 )  PTT:21.0 sec      Urinalysis Basic - ( 2021 16:43 )    Color: Light Yellow / Appearance: Clear / S.033 / pH: x  Gluc: x / Ketone: Large  / Bili: Negative / Urobili: Negative   Blood: x / Protein: 30 mg/dL / Nitrite: Negative   Leuk Esterase: Negative / RBC: 1 /hpf / WBC 1 /HPF   Sq Epi: x / Non Sq Epi: 1 /hpf / Bacteria: Negative               MEDICATIONS:  MEDICATIONS  (STANDING):  dextrose 40% Gel 15 Gram(s) Oral once  dextrose 40% Gel 15 Gram(s) Oral once  dextrose 50% Injectable 25 Gram(s) IV Push once  dextrose 50% Injectable 12.5 Gram(s) IV Push once  dextrose 50% Injectable 25 Gram(s) IV Push once  dextrose 50% Injectable 25 Gram(s) IV Push once  dextrose 50% Injectable 12.5 Gram(s) IV Push once  dextrose 50% Injectable 25 Gram(s) IV Push once  folic acid 1 milliGRAM(s) Oral daily  glucagon  Injectable 1 milliGRAM(s) IntraMuscular once  glucagon  Injectable 1 milliGRAM(s) IntraMuscular once  insulin glargine Injectable (LANTUS) 32 Unit(s) SubCutaneous at bedtime  insulin lispro (ADMELOG) corrective regimen sliding scale   SubCutaneous three times a day before meals  insulin lispro (ADMELOG) corrective regimen sliding scale   SubCutaneous at bedtime  insulin lispro Injectable (ADMELOG) 5 Unit(s) SubCutaneous before breakfast  insulin lispro Injectable (ADMELOG) 5 Unit(s) SubCutaneous before lunch  insulin lispro Injectable (ADMELOG) 5 Unit(s) SubCutaneous before dinner  lactated ringers. 1000 milliLiter(s) (75 mL/Hr) IV Continuous <Continuous>  LORazepam     Tablet 4 milliGRAM(s) Oral every 6 hours  multivitamin 1 Tablet(s) Oral daily  pantoprazole  Injectable 40 milliGRAM(s) IV Push daily  polyethylene glycol 3350 17 Gram(s) Oral two times a day  thiamine 100 milliGRAM(s) Oral daily    MEDICATIONS  (PRN):  LORazepam   Injectable 2 milliGRAM(s) IV Push every 1 hour PRN PAVEL-Ar score 8 or greater      ALLERGIES:  Allergies    Bactrim (Anaphylaxis)  Eggplant mouth itches (Other)    Intolerances        LABS:                        13.0   4.75  )-----------( 106      ( 10 Nov 2021 00:29 )             38.9     11-10    135  |  107  |  9   ----------------------------<  226<H>  4.2   |  19<L>  |  0.61    Ca    8.6      10 Nov 2021 00:29  Phos  2.0     11-10  Mg     1.8     11-10    TPro  5.8<L>  /  Alb  3.2<L>  /  TBili  1.1  /  DBili  x   /  AST  47<H>  /  ALT  39  /  AlkPhos  85  11-10    PT/INR - ( 2021 21:03 )   PT: 12.4 sec;   INR: 1.04 ratio         PTT - ( 2021 21:03 )  PTT:21.0 sec  Urinalysis Basic - ( 2021 16:43 )    Color: Light Yellow / Appearance: Clear / S.033 / pH: x  Gluc: x / Ketone: Large  / Bili: Negative / Urobili: Negative   Blood: x / Protein: 30 mg/dL / Nitrite: Negative   Leuk Esterase: Negative / RBC: 1 /hpf / WBC 1 /HPF   Sq Epi: x / Non Sq Epi: 1 /hpf / Bacteria: Negative        CAPILLARY BLOOD GLUCOSE      POCT Blood Glucose.: 240 mg/dL (2021 21:29)      RADIOLOGY & ADDITIONAL TESTS: Reviewed.

## 2021-11-10 NOTE — DISCHARGE NOTE NURSING/CASE MANAGEMENT/SOCIAL WORK - PATIENT PORTAL LINK FT
You can access the FollowMyHealth Patient Portal offered by [facility] by registering at the following website: [email or web address]. By joining [employer]’s FollowMyHealth portal, you will also be able to view your health information using other applications (apps) compatible with our system.

## 2021-11-10 NOTE — SBIRT NOTE ADULT - NSSBIRTDRGPOSREINDET_GEN_A_CORE
Patient denies any illicit drug use. LMSW provided room for ventilation, positive reinforcement, encouragement and validation.

## 2021-11-10 NOTE — PROGRESS NOTE ADULT - SUBJECTIVE AND OBJECTIVE BOX
Admitted for: alcohol withdrawal and DKA    Following for: DKA    Subjective:       MEDICATIONS  (STANDING):  dextrose 40% Gel 15 Gram(s) Oral once  dextrose 40% Gel 15 Gram(s) Oral once  dextrose 50% Injectable 25 Gram(s) IV Push once  dextrose 50% Injectable 12.5 Gram(s) IV Push once  dextrose 50% Injectable 25 Gram(s) IV Push once  dextrose 50% Injectable 25 Gram(s) IV Push once  dextrose 50% Injectable 12.5 Gram(s) IV Push once  dextrose 50% Injectable 25 Gram(s) IV Push once  folic acid 1 milliGRAM(s) Oral daily  glucagon  Injectable 1 milliGRAM(s) IntraMuscular once  glucagon  Injectable 1 milliGRAM(s) IntraMuscular once  insulin glargine Injectable (LANTUS) 32 Unit(s) SubCutaneous at bedtime  insulin lispro (ADMELOG) corrective regimen sliding scale   SubCutaneous three times a day before meals  insulin lispro (ADMELOG) corrective regimen sliding scale   SubCutaneous at bedtime  insulin lispro Injectable (ADMELOG) 5 Unit(s) SubCutaneous before breakfast  insulin lispro Injectable (ADMELOG) 5 Unit(s) SubCutaneous before lunch  insulin lispro Injectable (ADMELOG) 5 Unit(s) SubCutaneous before dinner  LORazepam     Tablet 4 milliGRAM(s) Oral every 6 hours  multivitamin 1 Tablet(s) Oral daily  pantoprazole  Injectable 40 milliGRAM(s) IV Push daily  polyethylene glycol 3350 17 Gram(s) Oral two times a day  thiamine 100 milliGRAM(s) Oral daily    MEDICATIONS  (PRN):  LORazepam   Injectable 2 milliGRAM(s) IV Push every 1 hour PRN CIWA-Ar score 8 or greater      Allergies    Bactrim (Anaphylaxis)  Eggplant mouth itches (Other)    Intolerances      PHYSICAL EXAM:  VITALS: T(C): 37 (11-10-21 @ 04:00)  T(F): 98.6 (11-10-21 @ 04:00), Max: 99.1 (11-10-21 @ 00:00)  HR: 70 (11-10-21 @ 11:00) (65 - 75)  BP: 103/65 (11-10-21 @ 11:00) (92/51 - 138/78)  RR:  (16 - 29)  SpO2:  (87% - 95%)  Wt(kg): --  GENERAL: NAD  EYES: No proptosis, no lid lag, anicteric  HEENT:  Atraumatic  THYROID: Normal size, no palpable nodules  RESPIRATORY: Clear to auscultation bilaterally  CARDIOVASCULAR: Regular rate and rhythm; No murmurs; no peripheral edema  GI: Soft, nontender, non distended, normal bowel sounds  SKIN: Dry  PSYCH: Alert and oriented x 3, flat affect      A1C with Estimated Average Glucose Result: 10.2 % (11-10-21 @ 04:08)  A1C with Estimated Average Glucose Result: 9.1 % (06-23-21 @ 02:21)  A1C with Estimated Average Glucose Result: 9.2 % (06-23-21 @ 02:21)      POCT Blood Glucose.: 105 mg/dL (11-10-21 @ 12:36)  POCT Blood Glucose.: 153 mg/dL (11-10-21 @ 08:42)  POCT Blood Glucose.: 240 mg/dL (11-09-21 @ 21:29)  POCT Blood Glucose.: 174 mg/dL (11-09-21 @ 18:49)  POCT Blood Glucose.: 124 mg/dL (11-09-21 @ 18:05)  POCT Blood Glucose.: 138 mg/dL (11-09-21 @ 17:14)  POCT Blood Glucose.: 141 mg/dL (11-09-21 @ 16:14)  POCT Blood Glucose.: 159 mg/dL (11-09-21 @ 14:52)  POCT Blood Glucose.: 158 mg/dL (11-09-21 @ 14:03)  POCT Blood Glucose.: 138 mg/dL (11-09-21 @ 12:15)  POCT Blood Glucose.: 152 mg/dL (11-09-21 @ 11:06)  POCT Blood Glucose.: 139 mg/dL (11-09-21 @ 10:01)  POCT Blood Glucose.: 164 mg/dL (11-09-21 @ 09:05)  POCT Blood Glucose.: 177 mg/dL (11-09-21 @ 08:11)  POCT Blood Glucose.: 191 mg/dL (11-09-21 @ 06:53)  POCT Blood Glucose.: 218 mg/dL (11-09-21 @ 06:04)  POCT Blood Glucose.: 247 mg/dL (11-09-21 @ 05:01)  POCT Blood Glucose.: 227 mg/dL (11-09-21 @ 04:00)  POCT Blood Glucose.: 262 mg/dL (11-09-21 @ 01:13)  POCT Blood Glucose.: 297 mg/dL (11-08-21 @ 22:24)  POCT Blood Glucose.: 373 mg/dL (11-08-21 @ 18:49)  POCT Blood Glucose.: 371 mg/dL (11-08-21 @ 17:18)  POCT Blood Glucose.: 422 mg/dL (11-08-21 @ 14:57)      11-10    135  |  107  |  9   ----------------------------<  226<H>  4.2   |  19<L>  |  0.61    EGFR if : 123  EGFR if non : 106    Ca    8.6      11-10  Mg     1.8     11-10  Phos  2.0     11-10    TPro  5.8<L>  /  Alb  3.2<L>  /  TBili  1.1  /  DBili  x   /  AST  47<H>  /  ALT  39  /  AlkPhos  85  11-10      Thyroid Function Tests:                         Admitted for: alcohol withdrawal and DKA    Following for: DKA    Subjective: Patient is feeling better. Wants to sign out AMA      MEDICATIONS  (STANDING):  dextrose 40% Gel 15 Gram(s) Oral once  dextrose 40% Gel 15 Gram(s) Oral once  dextrose 50% Injectable 25 Gram(s) IV Push once  dextrose 50% Injectable 12.5 Gram(s) IV Push once  dextrose 50% Injectable 25 Gram(s) IV Push once  dextrose 50% Injectable 25 Gram(s) IV Push once  dextrose 50% Injectable 12.5 Gram(s) IV Push once  dextrose 50% Injectable 25 Gram(s) IV Push once  folic acid 1 milliGRAM(s) Oral daily  glucagon  Injectable 1 milliGRAM(s) IntraMuscular once  glucagon  Injectable 1 milliGRAM(s) IntraMuscular once  insulin glargine Injectable (LANTUS) 32 Unit(s) SubCutaneous at bedtime  insulin lispro (ADMELOG) corrective regimen sliding scale   SubCutaneous three times a day before meals  insulin lispro (ADMELOG) corrective regimen sliding scale   SubCutaneous at bedtime  insulin lispro Injectable (ADMELOG) 5 Unit(s) SubCutaneous before breakfast  insulin lispro Injectable (ADMELOG) 5 Unit(s) SubCutaneous before lunch  insulin lispro Injectable (ADMELOG) 5 Unit(s) SubCutaneous before dinner  LORazepam     Tablet 4 milliGRAM(s) Oral every 6 hours  multivitamin 1 Tablet(s) Oral daily  pantoprazole  Injectable 40 milliGRAM(s) IV Push daily  polyethylene glycol 3350 17 Gram(s) Oral two times a day  thiamine 100 milliGRAM(s) Oral daily    MEDICATIONS  (PRN):  LORazepam   Injectable 2 milliGRAM(s) IV Push every 1 hour PRN CIWA-Ar score 8 or greater      Allergies    Bactrim (Anaphylaxis)  Eggplant mouth itches (Other)    Intolerances      PHYSICAL EXAM:  VITALS: T(C): 37 (11-10-21 @ 04:00)  T(F): 98.6 (11-10-21 @ 04:00), Max: 99.1 (11-10-21 @ 00:00)  HR: 70 (11-10-21 @ 11:00) (65 - 75)  BP: 103/65 (11-10-21 @ 11:00) (92/51 - 138/78)  RR:  (16 - 29)  SpO2:  (87% - 95%)  Wt(kg): --  GENERAL: NAD  EYES: No proptosis, no lid lag, anicteric  HEENT:  Atraumatic  THYROID: Normal size, no palpable nodules  RESPIRATORY: Clear to auscultation bilaterally  CARDIOVASCULAR: Regular rate and rhythm; No murmurs; no peripheral edema  GI: Soft, nontender, non distended, normal bowel sounds  SKIN: Dry  PSYCH: Alert and oriented x 3, flat affect      A1C with Estimated Average Glucose Result: 10.2 % (11-10-21 @ 04:08)  A1C with Estimated Average Glucose Result: 9.1 % (06-23-21 @ 02:21)  A1C with Estimated Average Glucose Result: 9.2 % (06-23-21 @ 02:21)      POCT Blood Glucose.: 105 mg/dL (11-10-21 @ 12:36)  POCT Blood Glucose.: 153 mg/dL (11-10-21 @ 08:42)  POCT Blood Glucose.: 240 mg/dL (11-09-21 @ 21:29)  POCT Blood Glucose.: 174 mg/dL (11-09-21 @ 18:49)  POCT Blood Glucose.: 124 mg/dL (11-09-21 @ 18:05)  POCT Blood Glucose.: 138 mg/dL (11-09-21 @ 17:14)  POCT Blood Glucose.: 141 mg/dL (11-09-21 @ 16:14)  POCT Blood Glucose.: 159 mg/dL (11-09-21 @ 14:52)  POCT Blood Glucose.: 158 mg/dL (11-09-21 @ 14:03)  POCT Blood Glucose.: 138 mg/dL (11-09-21 @ 12:15)  POCT Blood Glucose.: 152 mg/dL (11-09-21 @ 11:06)  POCT Blood Glucose.: 139 mg/dL (11-09-21 @ 10:01)  POCT Blood Glucose.: 164 mg/dL (11-09-21 @ 09:05)  POCT Blood Glucose.: 177 mg/dL (11-09-21 @ 08:11)  POCT Blood Glucose.: 191 mg/dL (11-09-21 @ 06:53)  POCT Blood Glucose.: 218 mg/dL (11-09-21 @ 06:04)  POCT Blood Glucose.: 247 mg/dL (11-09-21 @ 05:01)  POCT Blood Glucose.: 227 mg/dL (11-09-21 @ 04:00)  POCT Blood Glucose.: 262 mg/dL (11-09-21 @ 01:13)  POCT Blood Glucose.: 297 mg/dL (11-08-21 @ 22:24)  POCT Blood Glucose.: 373 mg/dL (11-08-21 @ 18:49)  POCT Blood Glucose.: 371 mg/dL (11-08-21 @ 17:18)  POCT Blood Glucose.: 422 mg/dL (11-08-21 @ 14:57)      11-10    135  |  107  |  9   ----------------------------<  226<H>  4.2   |  19<L>  |  0.61    EGFR if : 123  EGFR if non : 106    Ca    8.6      11-10  Mg     1.8     11-10  Phos  2.0     11-10    TPro  5.8<L>  /  Alb  3.2<L>  /  TBili  1.1  /  DBili  x   /  AST  47<H>  /  ALT  39  /  AlkPhos  85  11-10      Thyroid Function Tests:

## 2021-11-10 NOTE — DISCHARGE NOTE PROVIDER - NSDCCPCAREPLAN_GEN_ALL_CORE_FT
PRINCIPAL DISCHARGE DIAGNOSIS  Diagnosis: DKA, type 1  Assessment and Plan of Treatment: You had diabetic ketoacidosis which was treated with insulin given via a drip and then we transitioned you to a mealtime and night time insulin regimen.  This condition resolved. Please follow up with an endocrinologist for further management of your condition Dr. Beckford at [facility].  [phone]. [phone] [place] [phone].      SECONDARY DISCHARGE DIAGNOSES  Diagnosis: Alcohol withdrawal  Assessment and Plan of Treatment: When you first came to the hospital you had signs and symptoms concerning for alcohol withdrawal for which you were placed on a medication regimen called an ativan taper. You elected to leave AMA before completing the ativan taper for alcohol withdrawal. We reccomend completing a short ativan taper and cessation from alcohol. We have sent this medication to your pharmacy.

## 2021-11-10 NOTE — PROGRESS NOTE ADULT - ASSESSMENT
50 yo F w/ PMH DM, ETOH withdrawal w/ seizure which required intubation, DKA in past, cholecystectomy, P/w n/v and worsening epigastric pain. Now presenting with Etoh withdrawal, and DKA. On insulin gtt, [facility] protocol. Patient AMA.     Neuro  - Etoh withdrawal   - Last drink was 2 days ago   - CIWA protocol   - 4mg Ativan standing, taper   - 2mg Ativan PRN   - Will need Etoh withdrawal counseling/resources since she wants to quit drinking alcohol    Resp  - Increased RR 20-30  - Currently on RA O2 Sats > 95%   - Lactate downtrending       CV  - Patient's blood pressure 130-140   - Tachycardic     GI  - Hx of pancreatitis   - ABD pain on presentation   - no pancreatitis on CT         Renal  - Cr 0.57   - No active issues       ID  - SIRS on admission   - No signs of infection   - No active issues       Endo  DKA   - GAP closed   - DKA resolved         Heme   - No active issues   - DVT prophylaxis: SCD      Ethics   - Full code

## 2021-11-10 NOTE — CHART NOTE - NSCHARTNOTEFT_GEN_A_CORE
Called by RN, as patient wishes to leave AMA. Patient seen at bedside to further discuss plan of care. Discussed risks of leaving against medical advice, which includes worsening of current medical condition, up to and including death. Patient understands risks and still wishes to leave. AMA paperwork signed and placed in chart. Dr. Lemon made aware.     Will continue to follow, RN to call if any changes.

## 2021-11-10 NOTE — DISCHARGE NOTE PROVIDER - NSDCMRMEDTOKEN_GEN_ALL_CORE_FT
Basaglar KwikPen 100 units/mL subcutaneous solution: 27 unit(s) subcutaneous once a day (at bedtime)  folic acid 1 mg oral tablet: 1 tab(s) orally once a day  KlonoPIN 1 mg oral tablet: 1 tab(s) orally 3 times a day    ISTOP Reference #: [id number]  NovoLOG FlexPen 100 units/mL injectable solution: 9 unit(s) subcutaneous 3 times a day (with meals)   sertraline 50 mg oral tablet: 1 tab(s) orally once a day  traZODone 100 mg oral tablet: 1 tab(s) orally once a day (at bedtime)   Basaglar KwikPen 100 units/mL subcutaneous solution: 28 unit(s) subcutaneous once a day (at bedtime)  folic acid 1 mg oral tablet: 1 tab(s) orally once a day  KlonoPIN 1 mg oral tablet: 1 tab(s) orally 3 times a day    ISTOP Reference #: [id number]  NovoLOG FlexPen 100 units/mL injectable solution: 9 unit(s) subcutaneous 3 times a day (with meals)   sertraline 50 mg oral tablet: 1 tab(s) orally once a day  thiamine 100 mg oral tablet: 1 tab(s) orally once a day  traZODone 100 mg oral tablet: 1 tab(s) orally once a day (at bedtime)   Ativan 1 mg oral tablet: Please take two tablets every four hours for six total doses    Then take one tablet every four hours for six doses    and finally take half tab every 12 hours for 4 total doses  Basaglar KwikPen 100 units/mL subcutaneous solution: 28 unit(s) subcutaneous once a day (at bedtime)  folic acid 1 mg oral tablet: 1 tab(s) orally once a day  KlonoPIN 1 mg oral tablet: 1 tab(s) orally 3 times a day    ISTOP Reference #: [id number]  NovoLOG FlexPen 100 units/mL injectable solution: 9 unit(s) subcutaneous 3 times a day (with meals)   sertraline 50 mg oral tablet: 1 tab(s) orally once a day  thiamine 100 mg oral tablet: 1 tab(s) orally once a day  traZODone 100 mg oral tablet: 1 tab(s) orally once a day (at bedtime)

## 2021-11-10 NOTE — SBIRT NOTE ADULT - NSSBIRTALCPOSREINDET_GEN_A_CORE
Patient relayed she is a recovering Alcoholic and currently apart of [facility] at [facility]. LMSW provided room for ventilation, positive reinforcement, encouragement and validation.

## 2021-11-10 NOTE — DISCHARGE NOTE PROVIDER - HOSPITAL COURSE
50 yo F w/ PMH cholecystectomy, ETOH withdrawal, seizure which required intubation, DKA in past, P/w n/v and worsening epigastric pain. Patient's last drink was 2 days ago, she is also having non-bloody, billious emesis. Also having diarrhea, but no fever. Takes Novolog 27U bedtime, Novolog premeals. Has not taken insulin yet today.  Patient feels tremoulous and anxious, feels like she is withdrawing.  epigastric discomfort and nausea vomiting.     In ED patient received 2L of fluids initially with plans to start insulin gtt as  bicarb 12 BHB 7.2 elevated VBG lactate 6.9. EKG shows T wave inversion V2-V6  with trops pending. Received lorazepam in the ED and zofran. 52 yo F w/ PMH cholecystectomy, ETOH withdrawal, seizure which required intubation, DKA in past, P/w n/v and worsening epigastric pain. Patient's last drink was 2 days ago, she is also having non-bloody, billious emesis. Also having diarrhea, but no fever. Takes Novolog 27U bedtime, Novolog premeals. Has not taken insulin yet today.  Patient feels tremoulous and anxious, feels like she is withdrawing.  epigastric discomfort and nausea vomiting.     In ED patient received 2L of fluids initially with plans to start insulin gtt as  bicarb 12 BHB 7.2 elevated VBG lactate 6.9. EKG shows T wave inversion V2-V6  with trops pending. Received lorazepam in the ED and zofran.     Clinical course:    Anion gap closed transition to basal bolus regimen.     On ativan taper for alcohol withdrawal with plans to complete taper inpatient however patient expressed desire to leave AMA. Risks and benefits of receiving treatment for alcohol discussed with patient and she voiced understanding. Patient determined to have capacity. Encourage to follow up to receive medical care as soon as possible or return to ED/hospital for further care.

## 2021-11-10 NOTE — PROGRESS NOTE ADULT - ASSESSMENT
50 yo F w/ PMH cholecystectomy, ETOH withdrawal, seizure which required intubation, uncontrolled T2DM c/b neuropathy and recurrent DKA in past, P/w n/v and worsening epigastric pain, presenting for alcohol withdrawal and DKA vs. alcoholic ketoacidosis.      #Moderate to severe DKA in setting of uncontrolled T2DM c/b neuropathy  A1c 9.1% in June 2021. Inpatient FS goal 100-180  On arrival, AG 35, Bicarb 12, BHB 5.6, pH 7.30 and large urinary ketones. Now resolved, with AG closed x 3, normal pH and BHB 0.3.   Recs:   -Decrease Lantus to 28 units qHS   -While on clear liquid diet, and if tolerating - decrease Admelog to 2 units qAC and continue low dose correctional scale qAC and qHS  -If eating full meals - start Admelog 9 units qAC with low dose correctional scale qAC and qHS  -Check serum osmolality to ensure this is not related to alcoholic ketoacidosis   For d/c:  -Will need to d/c with basal/bolus regimen. doses tbd  -Can f/u with Dr. Beckford at [facility]    #HLD  Can check lipid panel   Start Atorvastatin if LDL > 70    #HTN  Management per primary team  BP goal <130/80      Malika Edwards MD  Endocrine Fellow  Pager: [phone]/CHRISTINA [phone]  Consults 9am-5pm: [phone]  After 5pm and weekends: [phone]   52 yo F w/ PMH cholecystectomy, ETOH withdrawal, seizure which required intubation, uncontrolled T2DM c/b neuropathy and recurrent DKA in past, P/w n/v and worsening epigastric pain, presenting for alcohol withdrawal and DKA vs. alcoholic ketoacidosis.      #Moderate to severe DKA in setting of uncontrolled T2DM c/b neuropathy  A1c 9.1% in June 2021. Inpatient FS goal 100-180  On arrival, AG 35, Bicarb 12, BHB 5.6, pH 7.30 and large urinary ketones. Now resolved, with AG closed x 3, normal pH and BHB 0.3.   Recs:   -Decrease Lantus to 28 units qHS   -While on clear liquid diet, and if tolerating - decrease Admelog to 2 units qAC and continue low dose correctional scale qAC and qHS  -If eating full meals - start Admelog 9 units qAC with low dose correctional scale qAC and qHS  -Check serum osmolality to ensure this is not related to alcoholic ketoacidosis   For d/c:  -Can d/c home with Lantus 28 units qHS and Admelog 9 units qAC  -Can f/u with Dr. Beckford at [facility]    #HLD  Can check lipid panel   Start Atorvastatin if LDL > 70    #HTN  Management per primary team  BP goal <130/80    Discussed with Dr. Sarabia and primary team    Malika Edwards MD  Endocrine Fellow  Pager: [phone]/CHRISTINA [phone]  Consults 9am-5pm: [phone]  After 5pm and weekends: [phone]

## 2021-11-10 NOTE — CHART NOTE - NSCHARTNOTEFT_GEN_A_CORE
MICU Transfer Note    Transfer from: MICU    Transfer to: ( x ) Medicine    (  ) Telemetry     (   ) RCU        (    ) Palliative         (   ) Stroke Unit          (   ) __________________    Accepting physican:      This is a 50 yo F w/ PMH DM, ETOH withdrawal w/ seizure which required intubation, DKA in past, cholecystectomy, presented with n/v and worsening epigastric pain. In ED patient received 2L of fluids initially with plans to start insulin gtt as BG in 400s, AG 35 bicarb 12 BHB 7.2, lactate 6.9,  pH 7.30 and large urinary ketones. EKG shows T wave inversion V2-V6  with trops pending. Received lorazepam in the ED and zofran. Admitted to MICU with ETOH withdrawal on CIWA and  DKA on insulin gtt.  Now resolved, with AG closed x 3, normal pH and BHB 0.3.        Neuro  - Etoh withdrawal, CIWA protocol   -Pt anxious but A&O x 3  - On Ativan standing and taper   - Will need Etoh withdrawal counseling/resources since she wants to quit drinking alcohol    Resp  -No active issues  - Currently on RA O2 Sats > 95%       CV  -Hemodynamically stable  - Patient's blood pressure 130-140   - Tachycardic   - EKG shows T wave inversion with troponin pending       GI  - Hx of pancreatitis   - ABD pain on presentation   - CT with no evidence of pancreatitis    - Zofran check QTc   - on regular diet        Renal  - Cr 0.57   - No active issues       ID  - SIRS on admission   - No signs of infection   - No active issues       Endo  DKA   -now off insulin drip  On Lantus 32 and Admelog 5/5/5 with insulin ss  - Endo on board      Heme   - No active issues   - DVT prophylaxis: SCD      Ethics   - Full code        Vital Signs Last 24 Hrs  T(C): 37.3 (10 Nov 2021 00:00), Max: 37.3 (10 Nov 2021 00:00)  T(F): 99.1 (10 Nov 2021 00:00), Max: 99.1 (10 Nov 2021 00:00)  HR: 65 (10 Nov 2021 03:00) (65 - 80)  BP: 114/60 (10 Nov 2021 03:00) (92/51 - 138/78)  BP(mean): 81 (10 Nov 2021 03:00) (67 - 103)  RR: 22 (10 Nov 2021 03:00) (16 - 30)  SpO2: 93% (10 Nov 2021 03:00) (90% - 100%)  I&O's Summary    08 Nov 2021 07:01  -  09 Nov 2021 07:00  --------------------------------------------------------  IN: 4119 mL / OUT: 1250 mL / NET: 2869 mL    09 Nov 2021 07:01  -  10 Nov 2021 03:19  --------------------------------------------------------  IN: 3465.8 mL / OUT: 500 mL / NET: 2965.8 mL        MEDICATIONS  (STANDING):  dextrose 40% Gel 15 Gram(s) Oral once  dextrose 40% Gel 15 Gram(s) Oral once  dextrose 50% Injectable 25 Gram(s) IV Push once  dextrose 50% Injectable 12.5 Gram(s) IV Push once  dextrose 50% Injectable 25 Gram(s) IV Push once  dextrose 50% Injectable 25 Gram(s) IV Push once  dextrose 50% Injectable 12.5 Gram(s) IV Push once  dextrose 50% Injectable 25 Gram(s) IV Push once  folic acid 1 milliGRAM(s) Oral daily  glucagon  Injectable 1 milliGRAM(s) IntraMuscular once  glucagon  Injectable 1 milliGRAM(s) IntraMuscular once  insulin glargine Injectable (LANTUS) 32 Unit(s) SubCutaneous at bedtime  insulin lispro (ADMELOG) corrective regimen sliding scale   SubCutaneous three times a day before meals  insulin lispro (ADMELOG) corrective regimen sliding scale   SubCutaneous at bedtime  insulin lispro Injectable (ADMELOG) 5 Unit(s) SubCutaneous before breakfast  insulin lispro Injectable (ADMELOG) 5 Unit(s) SubCutaneous before lunch  insulin lispro Injectable (ADMELOG) 5 Unit(s) SubCutaneous before dinner  lactated ringers. 1000 milliLiter(s) (75 mL/Hr) IV Continuous <Continuous>  LORazepam   Injectable 4 milliGRAM(s) IV Push every 4 hours  multivitamin 1 Tablet(s) Oral daily  pantoprazole  Injectable 40 milliGRAM(s) IV Push daily  polyethylene glycol 3350 17 Gram(s) Oral two times a day  thiamine 100 milliGRAM(s) Oral daily    MEDICATIONS  (PRN):  LORazepam   Injectable 2 milliGRAM(s) IV Push every 1 hour PRN CIWA-Ar score 8 or greater        LABS                                            13.0                  Neurophils% (auto):   x      (11-10 @ 00:29):    4.75 )-----------(106          Lymphocytes% (auto):  x                                             38.9                   Eosinphils% (auto):   x        Manual%: Neutrophils x    ; Lymphocytes x    ; Eosinophils x    ; Bands%: x    ; Blasts x                                    135    |  107    |  9                   Calcium: 8.6   / iCa: x      (11-10 @ 00:29)    ----------------------------<  226       Magnesium: 1.8                              4.2     |  19     |  0.61             Phosphorous: 2.0      TPro  5.8    /  Alb  3.2    /  TBili  1.1    /  DBili  x      /  AST  47     /  ALT  39     /  AlkPhos  85     10 Nov 2021 00:29 MICU Transfer Note    Transfer from: MICU    Transfer to: ( x ) Medicine    (  ) Telemetry     (   ) RCU        (    ) Palliative         (   ) Stroke Unit          (   ) __________________    Accepting physican: Dr. Ribeiro       This is a 50 yo F w/ PMH DM, ETOH withdrawal w/ seizure which required intubation, DKA in past, cholecystectomy, presented with n/v and worsening epigastric pain. In ED patient received 2L of fluids initially with plans to start insulin gtt as BG in 400s, AG 35 bicarb 12 BHB 7.2, lactate 6.9,  pH 7.30 and large urinary ketones. EKG shows T wave inversion V2-V6  with trops pending. Received lorazepam in the ED and zofran. Admitted to MICU with ETOH withdrawal on CIWA and  DKA on insulin gtt.  Now resolved, with AG closed x 3, normal pH and BHB 0.3.        Neuro  - Etoh withdrawal, CIWA protocol   -Pt anxious but A&O x 3  - On Ativan standing and taper   - Will need Etoh withdrawal counseling/resources since she wants to quit drinking alcohol    Resp  -No active issues  - Currently on RA O2 Sats > 95%       CV  -Hemodynamically stable  - Patient's blood pressure 130-140   - Tachycardic   - EKG shows T wave inversion with troponin pending       GI  - Hx of pancreatitis   - ABD pain on presentation   - CT with no evidence of pancreatitis    - Zofran check QTc   - on regular diet        Renal  - Cr 0.57   - No active issues       ID  - SIRS on admission   - No signs of infection   - No active issues       Endo  DKA   -now off insulin drip  On Lantus 32 and Admelog 5/5/5 with insulin ss  - Endo on board      Heme   - No active issues   - DVT prophylaxis: SCD      Ethics   - Full code        Vital Signs Last 24 Hrs  T(C): 37.3 (10 Nov 2021 00:00), Max: 37.3 (10 Nov 2021 00:00)  T(F): 99.1 (10 Nov 2021 00:00), Max: 99.1 (10 Nov 2021 00:00)  HR: 65 (10 Nov 2021 03:00) (65 - 80)  BP: 114/60 (10 Nov 2021 03:00) (92/51 - 138/78)  BP(mean): 81 (10 Nov 2021 03:00) (67 - 103)  RR: 22 (10 Nov 2021 03:00) (16 - 30)  SpO2: 93% (10 Nov 2021 03:00) (90% - 100%)  I&O's Summary    08 Nov 2021 07:01  -  09 Nov 2021 07:00  --------------------------------------------------------  IN: 4119 mL / OUT: 1250 mL / NET: 2869 mL    09 Nov 2021 07:01  -  10 Nov 2021 03:19  --------------------------------------------------------  IN: 3465.8 mL / OUT: 500 mL / NET: 2965.8 mL        MEDICATIONS  (STANDING):  dextrose 40% Gel 15 Gram(s) Oral once  dextrose 40% Gel 15 Gram(s) Oral once  dextrose 50% Injectable 25 Gram(s) IV Push once  dextrose 50% Injectable 12.5 Gram(s) IV Push once  dextrose 50% Injectable 25 Gram(s) IV Push once  dextrose 50% Injectable 25 Gram(s) IV Push once  dextrose 50% Injectable 12.5 Gram(s) IV Push once  dextrose 50% Injectable 25 Gram(s) IV Push once  folic acid 1 milliGRAM(s) Oral daily  glucagon  Injectable 1 milliGRAM(s) IntraMuscular once  glucagon  Injectable 1 milliGRAM(s) IntraMuscular once  insulin glargine Injectable (LANTUS) 32 Unit(s) SubCutaneous at bedtime  insulin lispro (ADMELOG) corrective regimen sliding scale   SubCutaneous three times a day before meals  insulin lispro (ADMELOG) corrective regimen sliding scale   SubCutaneous at bedtime  insulin lispro Injectable (ADMELOG) 5 Unit(s) SubCutaneous before breakfast  insulin lispro Injectable (ADMELOG) 5 Unit(s) SubCutaneous before lunch  insulin lispro Injectable (ADMELOG) 5 Unit(s) SubCutaneous before dinner  lactated ringers. 1000 milliLiter(s) (75 mL/Hr) IV Continuous <Continuous>  LORazepam   Injectable 4 milliGRAM(s) IV Push every 4 hours  multivitamin 1 Tablet(s) Oral daily  pantoprazole  Injectable 40 milliGRAM(s) IV Push daily  polyethylene glycol 3350 17 Gram(s) Oral two times a day  thiamine 100 milliGRAM(s) Oral daily    MEDICATIONS  (PRN):  LORazepam   Injectable 2 milliGRAM(s) IV Push every 1 hour PRN CIWA-Ar score 8 or greater        LABS                                            13.0                  Neurophils% (auto):   x      (11-10 @ 00:29):    4.75 )-----------(106          Lymphocytes% (auto):  x                                             38.9                   Eosinphils% (auto):   x        Manual%: Neutrophils x    ; Lymphocytes x    ; Eosinophils x    ; Bands%: x    ; Blasts x                                    135    |  107    |  9                   Calcium: 8.6   / iCa: x      (11-10 @ 00:29)    ----------------------------<  226       Magnesium: 1.8                              4.2     |  19     |  0.61             Phosphorous: 2.0      TPro  5.8    /  Alb  3.2    /  TBili  1.1    /  DBili  x      /  AST  47     /  ALT  39     /  AlkPhos  85     10 Nov 2021 00:29

## 2021-11-10 NOTE — PROGRESS NOTE ADULT - ATTENDING COMMENTS
Etoh ketosis and DKA - resolved   etoh withdrawal   no radiographic evidence of pancreatitis  ppi for reflx history  scd no need for thromboproph    transfer to floor with services to assist etoh dependence    cc time 25 mins

## 2021-12-26 NOTE — H&P ADULT - HISTORY OF PRESENT ILLNESS
difficulty breathing 50 yo F PMHx of DM2, Depression, EtOH abuse w/withdrawal, alcoholic pancreatitis, presents for evaluation of RLQ abdominal pain, nausea and vomiting. Patient additionally reports, that she has not taken any insulin during her latest three day asher drinking. The patient reports heavy drinking for the past seven years. She does not drink consistently (3 days off/ 4 days on), but when she is on a binge she drinks 2 pints of vodka a day.     In the ED  VS:   Patient received Dilaudid 0.5 mg x2, Humalog 4 units, Ativan 2 mg x2 and Zofran 4 mg x2. She also received NS 2 L bolus. 48 yo F PMHx of DM2, Depression, EtOH abuse w/withdrawal, alcoholic pancreatitis, presents for evaluation of RLQ abdominal pain, nausea and vomiting. Patient additionally reports, that she has not taken any insulin during her latest three day asher drinking. The patient reports heavy drinking for the past seven years. She does not drink consistently (3 days off/ 4 days on), but when she is on a binge she drinks 2 pints of vodka a day.     In the ED  VS: Afebrile, /98, HR 76, SPO2 95% RA  Patient received Dilaudid 0.5 mg x2, Humalog 4 units, Ativan 2 mg x2 and Zofran 4 mg x2. She also received NS 2 L bolus. 50 yo F PMHx of DM2, Depression, EtOH abuse w/withdrawal, alcoholic pancreatitis, presents for evaluation of RLQ abdominal pain, nausea and vomiting. Patient additionally reports, that she has not taken any insulin during her latest three day asher drinking. The patient lives with family who uses drugs and alcohol. Nate states has been in a stressful environment and has anxiety issues. When she has anxiety, she drinks to help. She drank 2 pints of vodka daily, last drink Tuesday morning.  During this binge she did not take any medication including her lantus and humolog. She then developed abdominal pain with nausea and vomiting. No known sick contacts, no fever or chills, no chest pain or SOB.  +abdominal pain with nausea and nb/nb emesis.  Unable to endorse whether worsening polyuria.  Patient came to ED for further evaluation.  In the ED  VS: Afebrile, /98, HR 76, SPO2 95% RA  Patient received Dilaudid 0.5 mg x2, Humalog 4 units, Ativan 2 mg x2 and Zofran 4 mg x2. She also received NS 2 L bolus.     Patient seen this AM by team. Patient states abdominal pain has improved, is able to tolerate liquid diet.  No further nausea or emesis. Still having tremors. No hallucinations. No current SI/HI.

## 2022-03-15 NOTE — BEHAVIORAL HEALTH ASSESSMENT NOTE - DESCRIPTION
-- DO NOT REPLY / DO NOT REPLY ALL --  -- Message is from the [facility]--    General Patient Message      Reason for Call: Patient forgot to add alternative number to call in case she doesn't answer     Caller Information       Type Contact Phone    03/15/2022 09:33 AM CDT Phone (Incoming) Jed Hannah J (Self) [phone] (M)          Alternative phone number: [phone]    Turnaround time given to caller:   \"This message will be sent to [state Provider's name]. The clinical team will fulfill your request as soon as they review your message.\"     Insulin-dependent DM, history of pancreatitis

## 2022-04-14 NOTE — ED ADULT TRIAGE NOTE - BP NONINVASIVE DIASTOLIC (MM HG)
General Surgery Update    The patient's father Tyron Chilel Sr  was called and updated after surgery to his satisfaction  He is aware multiple trips to the OR will be needed and dialysis will start once he returns to the ICU       Wander Thakur MD  PGY-5 General Surgery 82

## 2022-04-24 NOTE — ED ADULT NURSE NOTE - PRO INTERPRETER NEED 2
Report received from ROBERT Martin. Pt resting in bed at this time, no signs of acute distress noted. Pt denies any needs at this time.   English

## 2022-04-29 NOTE — ED ADULT NURSE NOTE - CHIEF COMPLAINT
[FreeTextEntry1] : I discussed the findings and options with . JASWANT CABEZAS in detail.\par \par No intervention is warranted for his minimal voiding symptoms.\par \par Providing the PSA is normal and there are no new problems, I would like to see Mr. Cabezas in one year (bladder sono, PSA). \par  The patient is a 48y Female complaining of multiple medical complaints.

## 2022-05-15 NOTE — ED ADULT NURSE NOTE - NS ED NOTE ABUSE SUSPICION NEGLECT YN
[facility] MEDICINE PROGRESS NOTE    Patient: Dwight Elliott Today's Date: 5/15/2022   YOB: 1947 Admission Date: 3/14/2022  1:59 PM   MRN: [id number] Inpatient LOS: 58 day(s)   Room:  [facility]/01 Hospital Day:  Hospital Day: 63       History and Subjective complaints       Interval history and Overnight events:    Patient examined today at bedside. Interval events noted.   Lab results and imaging reviewed.   Remains clinically the same.   Labs checked, patient with SHANT and sever hypernatremia due to poor oral intake of fluids.   Reached out, left voice message to guardian.   Patient is a candidate for inpatient comfort care.     Awaiting placement to [facility] hospice.         Reviewed Pertinent Histories: Medical History, Surgical History, Social History, Family History    ROS: Pertinent systems negative except as above.    Medications: Reviewed       Scheduled Medications:    lidocaine 2% urethral, 10 mL, Once  lidocaine 2% urethral, 10 mL, Once  QUEtiapine, 12.5 mg, BID  tamsulosin, 0.4 mg, Daily PC  senna, 2 tablet, Nightly  Potassium Standard Replacement Protocol, , See Admin Instructions  mupirocin, , TID  QUEtiapine, 50 mg, Nightly  divalproex, 500 mg, 2 times per day  memantine, 5 mg, 2 times per day  thiamine, 100 mg, Daily  folic acid, 5 mg, Daily  cyanocobalamin, 1,000 mcg, Daily  amLODIPine, 10 mg, Daily  pneumococcal 23-valent vaccine, 0.5 mL, Once  influenza virus quadrivalent vaccine inactivated injection, 0.5 mL, Once  Magnesium Standard Replacement Protocol, , See Admin Instructions  Phosphorus Standard Replacement Protocol, , See Admin Instructions      Continuous Infusions:    PRN Medications:  ondansetron, polyethylene glycol, docusate sodium-sennosides, bisacodyl, magnesium hydroxide, aluminum-magnesium hydroxide-simethicone, OLANZapine, haloperidol lactate, OLANZapine, acetaminophen, LORazepam, hydrALAZINE, sodium chloride, metoPROLOL      Physical Examination        Vital 24 Hour Range Most Recent Value   Temperature Temp  Min: 96.3 °F (35.7 °C)  Max: 97.2 °F (36.2 °C) 97.2 °F (36.2 °C)   Pulse Pulse  Min: 89  Max: 99 99   Respiratory Resp  Min: 18  Max: 18 18   Blood Pressure BP  Min: 90/60  Max: 116/71 116/71   Pulse Oximetry SpO2  Min: 98 %  Max: 98 % 98 %   Arterial BP No data recorded     O2 No data recorded       Intake and Output:      Intake/Output Summary (Last 24 hours) at 5/15/2022 0946  Last data filed at 5/15/2022 0900  Gross per 24 hour   Intake 495 ml   Output --   Net 495 ml        Last Stool Occurrence:  1 (05/15/22 0543)    Vital Most Recent Value First Value   Weight 51.6 kg (113 lb 12.8 oz) Weight: 62.7 kg (138 lb 3.2 oz)   Height 6' (182.9 cm) Height: 6' (182.9 cm)   BMI 15.43 N/A       General: NAD, cachectic   Skin: Warm and dry, No icterus  HEENT: PERRLA, EOMI, supple neck, no JVD  Respiratory: Clear to auscultation bilaterally, breathing non labored   Cardio: S1, S2, regular rate and rhythm, no murmur   Abdomen: BS +, not tender, not distended; No masses   Muscular: ROM intact, no lower extremity edema, muscle atrophy   Neurologic: awake, confused, severe cognitive impairment, right arm spastic, lethargic       Test Results     Labs: The Laboratory values listed below have been reviewed and pertinent findings discussed in the Assessment and Plan.    No results found    No results found    No results found    No results found    Radiology: Imaging studies have been reviewed and pertinent findings discussed in the Assessment and Plan.    No results found for any visits on 03/14/22 (from the past 48 hour(s)).    CT HEAD WO CONTRAST   Final Result   IMPRESSION:      1. No acute intracranial abnormality. The recently seen right   cerebral convexity subdural hematoma is not visible on this CT.   2. Chronic involutional change.      Signed by: Jayro Anthony      FL GUIDED LUMBAR PUNCTURE   Final Result   IMPRESSION: Technically successful  fluoroscopically guided lumbar   puncture yielding 11 cc of clear fluid opening pressure of 17 cm   water.             Signed by: Dwight Estrada      MRI BRAIN WO CONTRAST   Final Result   IMPRESSION:    1. Thin right cerebral convexity subdural hematoma measuring less   than 2 mm. No mass effect.   2. Punctate foci of restricted diffusion in the right frontal   lobe near the vertex, punctate infarct versus axonal injury   3. Right frontal scalp swelling.   4. Nonspecific white matter changes likely related to advanced   chronic intravascular ischemic change.      Signed by: Addie Kumar      CT HEAD WO CONTRAST   Final Result   IMPRESSION: No evidence of intracranial mass, hemorrhage, or   edema.      Right frontal scalp soft tissue injury and hematoma. No adjacent   fracture. No acute abnormality in the brain.      Signed by: Dwight Estrada      CT Cervical Spine WO Contrast   Final Result   IMPRESSION: No evidence of cervical spine fracture or   subluxation.      Stable chronic abnormalities.      Signed by: Dwight Estrada      XR Knee 3 View Right   Final Result   IMPRESSION:   No acute process.      Signed by: Lew Patel      XR Chest AP or PA   Final Result   IMPRESSION:   No acute process.      Signed by: Lew Patel          ECG:  Results for orders placed or performed during the hospital encounter of 03/14/22   Electrocardiogram 12-Lead   Result Value Ref Range    Ventricular Rate EKG/Min (BPM) 87     Atrial Rate (BPM) 87     VA-Interval (MSEC) 132     QRS-Interval (MSEC) 86     QT-Interval (MSEC) 396     QTc 476     P Axis (Degrees) 69     R Axis (Degrees) 49     T Axis (Degrees) 86     REPORT TEXT       Normal sinus rhythm  Possible  Left atrial enlargement  Left ventricular hypertrophy  with repolarization abnormality  Abnormal ECG  When compared with ECG of  14-MAR-2022 14:36,  No significant change was found  Confirmed by ALLEN GUTIERREZ MD ([id number]) on 3/28/2022 6:42:53 AM         ECHO:  Normal  left ventricular size and systolic function. No regional wall motion abnormalities.  Severe concentric left ventricular hypertrophy. Increased left ventricular filling pressure.  Normal right ventricular size and systolic function.    Tubes, Devices, Monitoring     Telemetry: Off      Macdonald: No    Assessment and Plan         Patient is 75 year old male with PMH of hx of CVA, alcoholism, sub dural hematoma, that presents with altered mental status. Prolonged hospital stay due to issues with placement to SNF. Goals of care discussed with son who was made guardian, status changed to DNR, with plan to placement to hospice SNF.       # Acute metabolic encephalopathy - multifactorial (vascular dementia, alcohol abuse, hyperosmolar state)  # Acute urinary retention   # Alcohol abuse with withdrawal - not on POA   # Hypercalcemia - unclear etiology   # Poor oral intake with dysphagia - likely marker of end stage dementia   # severe protein malnutrition   # Acute on chronic hypernatremia   # SHANT       Plan:  - continue supportive care   - delirium preventive measures  - IVF hypotonic  - will discuss with guardian if he would want full comfort care while inpatient  - awaiting placement in SNF hospice             Consults:    IP CONSULT TO NEUROLOGY  IP CONSULT TO PSYCHIATRY  IP CONSULT TO NEUROLOGY  IP CONSULT TO NEUROLOGY  IP CONSULT TO PSYCHIATRY  IP CONSULT TO PSYCHIATRY  IP CONSULT TO PSYCHIATRY  IP CONSULT TO PSYCHIATRY    Diet:  Nursing To Pass Tray - Feed Patient  Regular, Dysphagia 2/ground/minced, Potassium 40 Meq; Thickened Liquids - Nectar; Crushed With Applesauce /low K+ Diet  Daily W Breakfast; Prosource Gelatein 20/gelatin, High Protein, Sugar Free, Fruit Punch Oral Nutrition Supplement  Daily W Dinner; Magic Cup Dessert/high Protein Pudding, Chocolate Oral Nutrition Supplement  Therapy Orders:    PT and OT Orders Placed this Encounter   Procedures   • Occupational Therapy   • Occupational Therapy   •  Occupational Therapy   • Physical Therapy   • Physical Therapy   • Physical Therapy   • Physical Therapy       Body mass index is 15.43 kg/m². - underweight BMI<18.5  DVT Prophylaxis - SCDs         Advanced Directives     Code Status: Do Not Resuscitate           Discharge Plan     The patients treatment plans were discussed with patient and family, RN and .     Recommendations for Discharge   SW     PT SNF   OT SNF   SLP home with assitance or SNF     Anticipated discharge destination: Hospice         Barriers to Discharge: Pending Identification of a bed at an appropriate post-acute facility and/or Insurance authorization for the post-acute bed.          Momcilo Durdevic, MD  Hospitalist  5/15/2022  9:46 AM    (Contact by secure chat)     No

## 2022-05-23 ENCOUNTER — RESULT REVIEW (OUTPATIENT)
Age: 52
End: 2022-05-23

## 2022-06-02 ENCOUNTER — APPOINTMENT (OUTPATIENT)
Dept: CARDIOLOGY | Facility: CLINIC | Age: 52
End: 2022-06-02
Payer: MEDICARE

## 2022-06-02 ENCOUNTER — NON-APPOINTMENT (OUTPATIENT)
Age: 52
End: 2022-06-02

## 2022-06-02 VITALS
TEMPERATURE: 97.2 F | HEIGHT: 61 IN | OXYGEN SATURATION: 97 % | WEIGHT: 170 LBS | BODY MASS INDEX: 32.1 KG/M2 | HEART RATE: 60 BPM | DIASTOLIC BLOOD PRESSURE: 80 MMHG | SYSTOLIC BLOOD PRESSURE: 118 MMHG

## 2022-06-02 DIAGNOSIS — I95.9 HYPOTENSION, UNSPECIFIED: ICD-10-CM

## 2022-06-02 DIAGNOSIS — F41.8 OTHER SPECIFIED ANXIETY DISORDERS: ICD-10-CM

## 2022-06-02 DIAGNOSIS — Z78.9 OTHER SPECIFIED HEALTH STATUS: ICD-10-CM

## 2022-06-02 DIAGNOSIS — Z83.42 FAMILY HISTORY OF FAMILIAL HYPERCHOLESTEROLEMIA: ICD-10-CM

## 2022-06-02 DIAGNOSIS — Z83.3 FAMILY HISTORY OF DIABETES MELLITUS: ICD-10-CM

## 2022-06-02 DIAGNOSIS — R07.89 OTHER CHEST PAIN: ICD-10-CM

## 2022-06-02 DIAGNOSIS — R06.02 SHORTNESS OF BREATH: ICD-10-CM

## 2022-06-02 DIAGNOSIS — R42 DIZZINESS AND GIDDINESS: ICD-10-CM

## 2022-06-02 DIAGNOSIS — Z87.898 PERSONAL HISTORY OF OTHER SPECIFIED CONDITIONS: ICD-10-CM

## 2022-06-02 DIAGNOSIS — Z82.49 FAMILY HISTORY OF ISCHEMIC HEART DISEASE AND OTHER DISEASES OF THE CIRCULATORY SYSTEM: ICD-10-CM

## 2022-06-02 PROCEDURE — 93306 TTE W/DOPPLER COMPLETE: CPT

## 2022-06-02 PROCEDURE — 93000 ELECTROCARDIOGRAM COMPLETE: CPT

## 2022-06-02 PROCEDURE — 99204 OFFICE O/P NEW MOD 45 MIN: CPT

## 2022-06-02 RX ORDER — INSULIN LISPRO 100 [IU]/ML
100 INJECTION, SOLUTION INTRAVENOUS; SUBCUTANEOUS
Qty: 2 | Refills: 4 | Status: DISCONTINUED | COMMUNITY
Start: 2018-01-20 | End: 2022-06-02

## 2022-06-02 RX ORDER — INSULIN GLARGINE 100 [IU]/ML
100 INJECTION, SOLUTION SUBCUTANEOUS
Qty: 5 | Refills: 3 | Status: DISCONTINUED | COMMUNITY
Start: 2017-11-10 | End: 2022-06-02

## 2022-06-02 RX ORDER — DOCUSATE SODIUM 100 MG/1
100 CAPSULE ORAL 3 TIMES DAILY
Qty: 90 | Refills: 0 | Status: DISCONTINUED | COMMUNITY
Start: 2018-01-30 | End: 2022-06-02

## 2022-06-02 RX ORDER — ALBIGLUTIDE 30 MG/.5ML
30 INJECTION, POWDER, LYOPHILIZED, FOR SOLUTION SUBCUTANEOUS
Qty: 3 | Refills: 2 | Status: DISCONTINUED | COMMUNITY
Start: 2018-05-10 | End: 2022-06-02

## 2022-06-02 RX ORDER — SENNA 8.6 MG/1
8.6 TABLET, FILM COATED ORAL AT BEDTIME
Qty: 30 | Refills: 0 | Status: DISCONTINUED | COMMUNITY
Start: 2018-01-30 | End: 2022-06-02

## 2022-06-02 RX ORDER — DULAGLUTIDE 1.5 MG/.5ML
1.5 INJECTION, SOLUTION SUBCUTANEOUS
Qty: 3 | Refills: 3 | Status: DISCONTINUED | COMMUNITY
Start: 2018-04-27 | End: 2022-06-02

## 2022-06-02 RX ORDER — CLONAZEPAM 1 MG/1
1 TABLET ORAL 3 TIMES DAILY
Refills: 0 | Status: ACTIVE | COMMUNITY

## 2022-06-02 RX ORDER — INSULIN LISPRO 100 [IU]/ML
(50-50) 100 INJECTION, SUSPENSION SUBCUTANEOUS
Qty: 1 | Refills: 5 | Status: DISCONTINUED | COMMUNITY
Start: 2018-04-27 | End: 2022-06-02

## 2022-06-02 RX ORDER — INSULIN GLARGINE 100 [IU]/ML
INJECTION, SOLUTION SUBCUTANEOUS
Refills: 0 | Status: DISCONTINUED | COMMUNITY
End: 2022-06-02

## 2022-06-02 RX ORDER — SERTRALINE HYDROCHLORIDE 50 MG/1
50 TABLET, FILM COATED ORAL DAILY
Refills: 0 | Status: ACTIVE | COMMUNITY

## 2022-06-02 RX ORDER — TRAZODONE HYDROCHLORIDE 100 MG/1
100 TABLET ORAL
Refills: 0 | Status: ACTIVE | COMMUNITY

## 2022-06-02 NOTE — DISCUSSION/SUMMARY
[FreeTextEntry1] : In a summary Danna Rivera is a middle aged female with Diabetes, on Insulin. Low Carb diet. LDL goal less than 70 g/dL. Episodes of hypotension, drink plenty of water. Increase salt intake. Atypical chest pains and shortness of breath on exertion, Echo done showed normal LV systolic function and wall motion. Shortness of breath on exertion can be angina equivalent in diabetics . Will get Pharmacological nuclear stress test to rule out ischemia as she can not walk on Treadmill secondary to Joint pains. Further plan based on Stress test results. Eat healthy. Start exercises. Spent 50 minutes on encounter. Follow up in 6 months.

## 2022-06-02 NOTE — REVIEW OF SYSTEMS
[Feeling Fatigued] : feeling fatigued [Dyspnea on exertion] : dyspnea during exertion [Palpitations] : palpitations [Joint Pain] : joint pain [Dizziness] : dizziness [Numbness (Hypoesthesia)] : numbness [Tingling (Paresthesia)] : tingling [Anxiety] : anxiety [Negative] : Respiratory [Fever] : no fever [Headache] : no headache [Chills] : no chills [Blurry Vision] : no blurred vision [Lower Ext Edema] : no extremity edema [Orthopnea] : no orthopnea [PND] : no PND [Abdominal Pain] : no abdominal pain [Nausea] : no nausea [Vomiting] : no vomiting [Heartburn] : no heartburn [Rash] : no rash [Itching] : no itching [Tremor] : no tremor was seen [Confusion] : no confusion was observed [Under Stress] : not under stress [Easy Bleeding] : no tendency for easy bleeding [Easy Bruising] : no tendency for easy bruising [FreeTextEntry5] : on and off sharp chest pains.

## 2022-06-02 NOTE — HISTORY OF PRESENT ILLNESS
[FreeTextEntry1] : Danna Rivera is a 52 year old female with Diabetes for more than 10 years comes for cardiac evaluation. History of alcohol abuse, stopped drinking 3 months ago. She is going to dual program for alcohol dependence  and depression as a outpatient. Had few episodes of Hypotension Systolic BP 80- 90' s when she went to doctors. Does not eat well. Do not drink much fluids. Eats low salt diet. Complaining of random onset of sharp chest pains, 7/10 in intensity, non radiating and relieved by itself in few seconds of couple of years duration. No exertional chest pains. History of Depression and Anxiety. Gets palpitations when anxious. On and off dizziness. Mild shortness of breath on exertion which is relieved with rest in few minutes of few years duration. Physically not much active. Has Joint pains.

## 2022-06-16 ENCOUNTER — APPOINTMENT (OUTPATIENT)
Dept: CV DIAGNOSITCS | Facility: HOSPITAL | Age: 52
End: 2022-06-16

## 2022-06-24 ENCOUNTER — APPOINTMENT (OUTPATIENT)
Dept: CV DIAGNOSTICS | Facility: HOSPITAL | Age: 52
End: 2022-06-24

## 2022-09-22 NOTE — ED ADULT TRIAGE NOTE - ARRIVAL FROM
Pt has an appt today to [facility]. Pt. Needs a referral placed. Order placed.
Julia MELTON    Address  [address][phone]    Please advise if expedited referral can be made?
Home

## 2022-09-23 ENCOUNTER — APPOINTMENT (OUTPATIENT)
Dept: OTOLARYNGOLOGY | Facility: CLINIC | Age: 52
End: 2022-09-23

## 2022-09-26 NOTE — ED BEHAVIORAL HEALTH ASSESSMENT NOTE - PATIENT'S CHIEF COMPLAINT
Name: Wilder Tenorio      : 1949      MRN: [id number]  Encounter Provider: Atilio Pratt MD  Encounter Date: 2022   Encounter department: [address] [place] [address]     1  Anxiety  Assessment & Plan:  She feels her symptoms are well controlled with citalopram at current dose and does not feel she needs to make any changes at this time  Asked patient to call sooner than next scheduled appointment for any complaints or issues  2  Hyperlipidemia, mixed  Assessment & Plan:  Continue atorvastatin and will continue to follow labs for lipids and LFT's  Encouraged low fat diet  Orders:  -     CBC and differential; Future; Expected date: 2023  -     Comprehensive metabolic panel; Future; Expected date: 2023  -     Lipid panel; Future; Expected date: 2023    3  Lumbar radiculitis  Assessment & Plan:  She will continue gabapentin, she gets relief of her symptoms with this  Orders:  -     gabapentin (NEURONTIN) 100 mg capsule; Take 2 capsules (200 mg total) by mouth 3 (three) times a day    4  Persistent insomnia  -     zolpidem (AMBIEN) 5 mg tablet; Take 1 tablet (5 mg total) by mouth daily at bedtime as needed for sleep    5  Vitamin D deficiency  -     Vitamin D 25 hydroxy; Future; Expected date: 2023    6  Elevated glucose  -     HEMOGLOBIN A1C W/ EAG ESTIMATION; Future; Expected date: 2023    7  Need for vaccination  -     influenza vaccine, high-dose, PF 0 7 mL (FLUZONE HIGH-DOSE)           Subjective      Here for follow up visit  She feels well  Feels her citalopram is controlling her moods and does not feel she would like to or need to make any changes at this time  Denies side effects, SI/HI  There is no chest pain or dyspnea  Taking statin without side effects  We reviewed her lab results  Review of Systems   Constitutional: Negative  Respiratory: Negative  Cardiovascular: Negative  Psychiatric/Behavioral: Negative  Current Outpatient Medications on File Prior to Visit   Medication Sig    atorvastatin (LIPITOR) 10 mg tablet TAKE 1 TABLET BY MOUTH EVERY DAY    citalopram (CeleXA) 40 mg tablet TAKE 1 TABLET BY MOUTH EVERY DAY    fluticasone (FLONASE) 50 mcg/act nasal spray SPRAY 2 SPRAYS INTO EACH NOSTRIL EVERY DAY    [DISCONTINUED] gabapentin (NEURONTIN) 100 mg capsule Take 2 capsules (200 mg total) by mouth 3 (three) times a day    [DISCONTINUED] zolpidem (AMBIEN) 5 mg tablet Take 1 tablet (5 mg total) by mouth daily at bedtime as needed for sleep       Objective     /70   Pulse 84   Temp 97 5 °F (36 4 °C)   Resp 16   Ht 5' 2" (1 575 m)   Wt 65 1 kg (143 lb 9 6 oz)   SpO2 97%   BMI 26 26 kg/m²     Physical Exam  Constitutional:       Appearance: She is well-developed  Eyes:      Conjunctiva/sclera: Conjunctivae normal    Neck:      Thyroid: No thyromegaly  Vascular: No JVD  Cardiovascular:      Rate and Rhythm: Normal rate and regular rhythm  Heart sounds: Normal heart sounds  No murmur heard  No friction rub  No gallop  Pulmonary:      Effort: Pulmonary effort is normal       Breath sounds: Normal breath sounds  No wheezing or rales  Abdominal:      General: Bowel sounds are normal  There is no distension  Palpations: Abdomen is soft  Tenderness: There is no abdominal tenderness  Musculoskeletal:      Cervical back: Neck supple  Psychiatric:         Mood and Affect: Mood normal          Behavior: Behavior normal          Thought Content:  Thought content normal          Judgment: Judgment normal        Js Matias MD "I was binge drinking"

## 2022-12-05 ENCOUNTER — APPOINTMENT (OUTPATIENT)
Dept: CARDIOLOGY | Facility: CLINIC | Age: 52
End: 2022-12-05

## 2022-12-07 NOTE — ED ADULT TRIAGE NOTE - BP NONINVASIVE SYSTOLIC (MM HG)
149 Composite Graft Text: The defect edges were debeveled with a #15 scalpel blade.  Given the location of the defect, shape of the defect, the proximity to free margins and the fact the defect was full thickness a composite graft was deemed most appropriate.  The defect was outline and then transferred to the donor site.  A full thickness graft was then excised from the donor site. The graft was then placed in the primary defect, oriented appropriately and then sutured into place.  The secondary defect was then repaired using a primary closure.

## 2022-12-08 ENCOUNTER — APPOINTMENT (OUTPATIENT)
Dept: OTOLARYNGOLOGY | Facility: CLINIC | Age: 52
End: 2022-12-08
Payer: MEDICARE

## 2022-12-08 VITALS
HEART RATE: 66 BPM | BODY MASS INDEX: 30.12 KG/M2 | HEIGHT: 63 IN | WEIGHT: 170 LBS | DIASTOLIC BLOOD PRESSURE: 75 MMHG | SYSTOLIC BLOOD PRESSURE: 110 MMHG

## 2022-12-08 DIAGNOSIS — H90.3 SENSORINEURAL HEARING LOSS, BILATERAL: ICD-10-CM

## 2022-12-08 DIAGNOSIS — J32.9 CHRONIC SINUSITIS, UNSPECIFIED: ICD-10-CM

## 2022-12-08 DIAGNOSIS — R09.81 NASAL CONGESTION: ICD-10-CM

## 2022-12-08 DIAGNOSIS — H66.90 OTITIS MEDIA, UNSPECIFIED, UNSPECIFIED EAR: ICD-10-CM

## 2022-12-08 DIAGNOSIS — H93.13 TINNITUS, BILATERAL: ICD-10-CM

## 2022-12-08 DIAGNOSIS — H93.293 OTHER ABNORMAL AUDITORY PERCEPTIONS, BILATERAL: ICD-10-CM

## 2022-12-08 PROCEDURE — 99204 OFFICE O/P NEW MOD 45 MIN: CPT | Mod: 25

## 2022-12-08 PROCEDURE — 92625 TINNITUS ASSESSMENT: CPT

## 2022-12-08 PROCEDURE — 92567 TYMPANOMETRY: CPT

## 2022-12-08 PROCEDURE — 31231 NASAL ENDOSCOPY DX: CPT

## 2022-12-08 PROCEDURE — 92557 COMPREHENSIVE HEARING TEST: CPT

## 2022-12-08 RX ORDER — MULTIVITAMIN
TABLET ORAL
Refills: 0 | Status: ACTIVE | COMMUNITY

## 2022-12-08 RX ORDER — VITAMIN B COMPLEX
CAPSULE ORAL
Refills: 0 | Status: ACTIVE | COMMUNITY

## 2022-12-08 RX ORDER — SOD CHLOR,BICARB/SQUEEZ BOTTLE
PACKET, WITH RINSE DEVICE NASAL
Qty: 1 | Refills: 3 | Status: ACTIVE | COMMUNITY
Start: 2022-12-08 | End: 1900-01-01

## 2022-12-08 RX ORDER — CHROMIUM 200 MCG
TABLET ORAL
Refills: 0 | Status: ACTIVE | COMMUNITY

## 2022-12-08 RX ORDER — FLUTICASONE PROPIONATE 50 UG/1
50 SPRAY, METERED NASAL DAILY
Qty: 1 | Refills: 2 | Status: ACTIVE | COMMUNITY
Start: 2022-12-08 | End: 1900-01-01
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
## 2022-12-08 NOTE — DATA REVIEWED
[de-identified] : An audiogram was ordered and performed including pure tones, tympanometry and speech testing for the patients complaint of hearing loss\par I have independently reviewed the patient's audiogram from today and my findings include \par AU Hearing -1k hz Mild SNHL sloping to 8k hz. AU Tymp A. Tinnitus Pitch Match 8k hz @30db
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
## 2022-12-08 NOTE — PHYSICAL EXAM
[Nasal Endoscopy Performed] : nasal endoscopy was performed, see procedure section for findings [] : septum deviated to the left [Normal] : mucosa is normal [Midline] : trachea located in midline position

## 2022-12-08 NOTE — REASON FOR VISIT
[Initial Consultation] : an initial consultation for [FreeTextEntry2] : referred by Dr. Jose Antonio Soto, PCP for recurring ear infections and sinus infections.

## 2022-12-13 ENCOUNTER — NON-APPOINTMENT (OUTPATIENT)
Age: 52
End: 2022-12-13

## 2022-12-13 ENCOUNTER — APPOINTMENT (OUTPATIENT)
Dept: OPHTHALMOLOGY | Facility: CLINIC | Age: 52
End: 2022-12-13
Payer: MEDICARE

## 2022-12-13 PROCEDURE — 92014 COMPRE OPH EXAM EST PT 1/>: CPT

## 2022-12-13 PROCEDURE — 92015 DETERMINE REFRACTIVE STATE: CPT

## 2022-12-17 ENCOUNTER — EMERGENCY (EMERGENCY)
Facility: HOSPITAL | Age: 52
LOS: 1 days | Discharge: HOME | End: 2022-12-17
Attending: EMERGENCY MEDICINE
Payer: MEDICARE

## 2022-12-17 VITALS
HEART RATE: 55 BPM | DIASTOLIC BLOOD PRESSURE: 70 MMHG | SYSTOLIC BLOOD PRESSURE: 136 MMHG | RESPIRATION RATE: 20 BRPM | OXYGEN SATURATION: 96 %

## 2022-12-17 VITALS
HEIGHT: 63 IN | DIASTOLIC BLOOD PRESSURE: 80 MMHG | TEMPERATURE: 98 F | HEART RATE: 73 BPM | WEIGHT: 169.98 LBS | OXYGEN SATURATION: 98 % | SYSTOLIC BLOOD PRESSURE: 106 MMHG | RESPIRATION RATE: 18 BRPM

## 2022-12-17 DIAGNOSIS — L02.91 CUTANEOUS ABSCESS, UNSPECIFIED: Chronic | ICD-10-CM

## 2022-12-17 DIAGNOSIS — Z98.890 OTHER SPECIFIED POSTPROCEDURAL STATES: Chronic | ICD-10-CM

## 2022-12-17 DIAGNOSIS — Z90.89 ACQUIRED ABSENCE OF OTHER ORGANS: Chronic | ICD-10-CM

## 2022-12-17 LAB
ALBUMIN SERPL ELPH-MCNC: 4.2 G/DL — SIGNIFICANT CHANGE UP (ref 3.3–5)
ALP SERPL-CCNC: 124 U/L — HIGH (ref 40–120)
ALT FLD-CCNC: 21 U/L — SIGNIFICANT CHANGE UP (ref 10–45)
AMMONIA BLD-MCNC: 14 UMOL/L — SIGNIFICANT CHANGE UP (ref 11–55)
ANION GAP SERPL CALC-SCNC: 16 MMOL/L — SIGNIFICANT CHANGE UP (ref 5–17)
APPEARANCE UR: CLEAR — SIGNIFICANT CHANGE UP
APTT BLD: 31.1 SEC — SIGNIFICANT CHANGE UP (ref 27.5–35.5)
AST SERPL-CCNC: 49 U/L — HIGH (ref 10–40)
BASE EXCESS BLDV CALC-SCNC: -2 MMOL/L — SIGNIFICANT CHANGE UP (ref -2–3)
BASOPHILS # BLD AUTO: 0.06 K/UL — SIGNIFICANT CHANGE UP (ref 0–0.2)
BASOPHILS NFR BLD AUTO: 0.7 % — SIGNIFICANT CHANGE UP (ref 0–2)
BILIRUB SERPL-MCNC: 0.6 MG/DL — SIGNIFICANT CHANGE UP (ref 0.2–1.2)
BILIRUB UR-MCNC: NEGATIVE — SIGNIFICANT CHANGE UP
BLD GP AB SCN SERPL QL: NEGATIVE — SIGNIFICANT CHANGE UP
BUN SERPL-MCNC: 10 MG/DL — SIGNIFICANT CHANGE UP (ref 7–23)
CA-I SERPL-SCNC: 1.23 MMOL/L — SIGNIFICANT CHANGE UP (ref 1.15–1.33)
CALCIUM SERPL-MCNC: 9.7 MG/DL — SIGNIFICANT CHANGE UP (ref 8.4–10.5)
CHLORIDE BLDV-SCNC: 104 MMOL/L — SIGNIFICANT CHANGE UP (ref 96–108)
CHLORIDE SERPL-SCNC: 106 MMOL/L — SIGNIFICANT CHANGE UP (ref 96–108)
CO2 BLDV-SCNC: 23 MMOL/L — SIGNIFICANT CHANGE UP (ref 22–26)
CO2 SERPL-SCNC: 15 MMOL/L — LOW (ref 22–31)
COLOR SPEC: COLORLESS — SIGNIFICANT CHANGE UP
CREAT SERPL-MCNC: 0.61 MG/DL — SIGNIFICANT CHANGE UP (ref 0.5–1.3)
DIFF PNL FLD: NEGATIVE — SIGNIFICANT CHANGE UP
EGFR: 109 ML/MIN/1.73M2 — SIGNIFICANT CHANGE UP
EOSINOPHIL # BLD AUTO: 0.21 K/UL — SIGNIFICANT CHANGE UP (ref 0–0.5)
EOSINOPHIL NFR BLD AUTO: 2.5 % — SIGNIFICANT CHANGE UP (ref 0–6)
GAS PNL BLDV: 135 MMOL/L — LOW (ref 136–145)
GAS PNL BLDV: SIGNIFICANT CHANGE UP
GAS PNL BLDV: SIGNIFICANT CHANGE UP
GLUCOSE BLDV-MCNC: 139 MG/DL — HIGH (ref 70–99)
GLUCOSE SERPL-MCNC: 142 MG/DL — HIGH (ref 70–99)
GLUCOSE UR QL: NEGATIVE — SIGNIFICANT CHANGE UP
HCO3 BLDV-SCNC: 22 MMOL/L — SIGNIFICANT CHANGE UP (ref 22–29)
HCT VFR BLD CALC: 44.8 % — SIGNIFICANT CHANGE UP (ref 34.5–45)
HCT VFR BLDA CALC: 45 % — SIGNIFICANT CHANGE UP (ref 34.5–46.5)
HGB BLD CALC-MCNC: 15 G/DL — SIGNIFICANT CHANGE UP (ref 11.7–16.1)
HGB BLD-MCNC: 14.8 G/DL — SIGNIFICANT CHANGE UP (ref 11.5–15.5)
IMM GRANULOCYTES NFR BLD AUTO: 0.4 % — SIGNIFICANT CHANGE UP (ref 0–0.9)
INR BLD: 1.06 RATIO — SIGNIFICANT CHANGE UP (ref 0.88–1.16)
KETONES UR-MCNC: NEGATIVE — SIGNIFICANT CHANGE UP
LACTATE BLDV-MCNC: 1.5 MMOL/L — SIGNIFICANT CHANGE UP (ref 0.5–2)
LACTATE BLDV-MCNC: 2 MMOL/L — SIGNIFICANT CHANGE UP (ref 0.5–2)
LEUKOCYTE ESTERASE UR-ACNC: NEGATIVE — SIGNIFICANT CHANGE UP
LIDOCAIN IGE QN: 15 U/L — SIGNIFICANT CHANGE UP (ref 7–60)
LYMPHOCYTES # BLD AUTO: 2.46 K/UL — SIGNIFICANT CHANGE UP (ref 1–3.3)
LYMPHOCYTES # BLD AUTO: 28.7 % — SIGNIFICANT CHANGE UP (ref 13–44)
MCHC RBC-ENTMCNC: 29.1 PG — SIGNIFICANT CHANGE UP (ref 27–34)
MCHC RBC-ENTMCNC: 33 GM/DL — SIGNIFICANT CHANGE UP (ref 32–36)
MCV RBC AUTO: 88 FL — SIGNIFICANT CHANGE UP (ref 80–100)
MONOCYTES # BLD AUTO: 0.5 K/UL — SIGNIFICANT CHANGE UP (ref 0–0.9)
MONOCYTES NFR BLD AUTO: 5.8 % — SIGNIFICANT CHANGE UP (ref 2–14)
NEUTROPHILS # BLD AUTO: 5.3 K/UL — SIGNIFICANT CHANGE UP (ref 1.8–7.4)
NEUTROPHILS NFR BLD AUTO: 61.9 % — SIGNIFICANT CHANGE UP (ref 43–77)
NITRITE UR-MCNC: NEGATIVE — SIGNIFICANT CHANGE UP
NRBC # BLD: 0 /100 WBCS — SIGNIFICANT CHANGE UP (ref 0–0)
PCO2 BLDV: 36 MMHG — LOW (ref 39–42)
PH BLDV: 7.4 — SIGNIFICANT CHANGE UP (ref 7.32–7.43)
PH UR: 7 — SIGNIFICANT CHANGE UP (ref 5–8)
PLATELET # BLD AUTO: 192 K/UL — SIGNIFICANT CHANGE UP (ref 150–400)
PO2 BLDV: 42 MMHG — SIGNIFICANT CHANGE UP (ref 25–45)
POTASSIUM BLDV-SCNC: 5.8 MMOL/L — HIGH (ref 3.5–5.1)
POTASSIUM SERPL-MCNC: 5.5 MMOL/L — HIGH (ref 3.5–5.3)
POTASSIUM SERPL-SCNC: 5.5 MMOL/L — HIGH (ref 3.5–5.3)
PROT SERPL-MCNC: 7.8 G/DL — SIGNIFICANT CHANGE UP (ref 6–8.3)
PROT UR-MCNC: NEGATIVE — SIGNIFICANT CHANGE UP
PROTHROM AB SERPL-ACNC: 12.3 SEC — SIGNIFICANT CHANGE UP (ref 10.5–13.4)
RAPID RVP RESULT: DETECTED
RBC # BLD: 5.09 M/UL — SIGNIFICANT CHANGE UP (ref 3.8–5.2)
RBC # FLD: 12.8 % — SIGNIFICANT CHANGE UP (ref 10.3–14.5)
RH IG SCN BLD-IMP: POSITIVE — SIGNIFICANT CHANGE UP
SAO2 % BLDV: 74.1 % — SIGNIFICANT CHANGE UP (ref 67–88)
SARS-COV-2 RNA SPEC QL NAA+PROBE: DETECTED
SODIUM SERPL-SCNC: 137 MMOL/L — SIGNIFICANT CHANGE UP (ref 135–145)
SP GR SPEC: 1.04 — HIGH (ref 1.01–1.02)
UROBILINOGEN FLD QL: NEGATIVE — SIGNIFICANT CHANGE UP
WBC # BLD: 8.56 K/UL — SIGNIFICANT CHANGE UP (ref 3.8–10.5)
WBC # FLD AUTO: 8.56 K/UL — SIGNIFICANT CHANGE UP (ref 3.8–10.5)

## 2022-12-17 PROCEDURE — 82962 GLUCOSE BLOOD TEST: CPT

## 2022-12-17 PROCEDURE — 99284 EMERGENCY DEPT VISIT MOD MDM: CPT | Mod: 25

## 2022-12-17 PROCEDURE — 85730 THROMBOPLASTIN TIME PARTIAL: CPT

## 2022-12-17 PROCEDURE — 82947 ASSAY GLUCOSE BLOOD QUANT: CPT

## 2022-12-17 PROCEDURE — 81003 URINALYSIS AUTO W/O SCOPE: CPT

## 2022-12-17 PROCEDURE — 86900 BLOOD TYPING SEROLOGIC ABO: CPT

## 2022-12-17 PROCEDURE — 85014 HEMATOCRIT: CPT

## 2022-12-17 PROCEDURE — 82330 ASSAY OF CALCIUM: CPT

## 2022-12-17 PROCEDURE — 82803 BLOOD GASES ANY COMBINATION: CPT

## 2022-12-17 PROCEDURE — 74177 CT ABD & PELVIS W/CONTRAST: CPT | Mod: 26,MA

## 2022-12-17 PROCEDURE — 84295 ASSAY OF SERUM SODIUM: CPT

## 2022-12-17 PROCEDURE — 96374 THER/PROPH/DIAG INJ IV PUSH: CPT | Mod: XU

## 2022-12-17 PROCEDURE — 85610 PROTHROMBIN TIME: CPT

## 2022-12-17 PROCEDURE — 99285 EMERGENCY DEPT VISIT HI MDM: CPT

## 2022-12-17 PROCEDURE — 87077 CULTURE AEROBIC IDENTIFY: CPT

## 2022-12-17 PROCEDURE — 87040 BLOOD CULTURE FOR BACTERIA: CPT

## 2022-12-17 PROCEDURE — 85025 COMPLETE CBC W/AUTO DIFF WBC: CPT

## 2022-12-17 PROCEDURE — 84132 ASSAY OF SERUM POTASSIUM: CPT

## 2022-12-17 PROCEDURE — 80053 COMPREHEN METABOLIC PANEL: CPT

## 2022-12-17 PROCEDURE — 96375 TX/PRO/DX INJ NEW DRUG ADDON: CPT

## 2022-12-17 PROCEDURE — 74177 CT ABD & PELVIS W/CONTRAST: CPT | Mod: MA

## 2022-12-17 PROCEDURE — 86901 BLOOD TYPING SEROLOGIC RH(D): CPT

## 2022-12-17 PROCEDURE — 85018 HEMOGLOBIN: CPT

## 2022-12-17 PROCEDURE — 87086 URINE CULTURE/COLONY COUNT: CPT

## 2022-12-17 PROCEDURE — 83605 ASSAY OF LACTIC ACID: CPT

## 2022-12-17 PROCEDURE — 83690 ASSAY OF LIPASE: CPT

## 2022-12-17 PROCEDURE — 82435 ASSAY OF BLOOD CHLORIDE: CPT

## 2022-12-17 PROCEDURE — 86850 RBC ANTIBODY SCREEN: CPT

## 2022-12-17 PROCEDURE — 82140 ASSAY OF AMMONIA: CPT

## 2022-12-17 PROCEDURE — 0225U NFCT DS DNA&RNA 21 SARSCOV2: CPT

## 2022-12-17 RX ORDER — MORPHINE SULFATE 50 MG/1
4 CAPSULE, EXTENDED RELEASE ORAL ONCE
Refills: 0 | Status: DISCONTINUED | OUTPATIENT
Start: 2022-12-17 | End: 2022-12-17

## 2022-12-17 RX ORDER — SODIUM CHLORIDE 9 MG/ML
2400 INJECTION, SOLUTION INTRAVENOUS ONCE
Refills: 0 | Status: COMPLETED | OUTPATIENT
Start: 2022-12-17 | End: 2022-12-17

## 2022-12-17 RX ORDER — ONDANSETRON 8 MG/1
4 TABLET, FILM COATED ORAL ONCE
Refills: 0 | Status: COMPLETED | OUTPATIENT
Start: 2022-12-17 | End: 2022-12-17

## 2022-12-17 RX ADMIN — MORPHINE SULFATE 4 MILLIGRAM(S): 50 CAPSULE, EXTENDED RELEASE ORAL at 15:18

## 2022-12-17 RX ADMIN — SODIUM CHLORIDE 2400 MILLILITER(S): 9 INJECTION, SOLUTION INTRAVENOUS at 14:48

## 2022-12-17 RX ADMIN — MORPHINE SULFATE 4 MILLIGRAM(S): 50 CAPSULE, EXTENDED RELEASE ORAL at 14:48

## 2022-12-17 RX ADMIN — ONDANSETRON 4 MILLIGRAM(S): 8 TABLET, FILM COATED ORAL at 14:48

## 2022-12-17 NOTE — ED PROVIDER NOTE - PROGRESS NOTE DETAILS
Kennedy Khanna MD: Patient endorses feeling better. Labwork is normal and imaging without any obvious cause for abdominal pain. CT scan with multiple splenic lesions. Unclear etiology; discussed results with patient and explained that it may be related to many things including malignancy. She verbalized that she will try to get the MRI as soon as she can. Will d/c.

## 2022-12-17 NOTE — ED PROVIDER NOTE - ATTENDING CONTRIBUTION TO CARE
Patient is a 52-year-old female pmhx dm, status post cholecystectomy as well as ovarian cyst rupture presenting with right lower quadrant pain over McBurney's point quite tender on exam but no rebound or guarding proceed with nausea vomiting and chills classic presentation for rule out appendicitis, CT labs ordered pending work-up, analgesia ordered.

## 2022-12-17 NOTE — ED ADULT NURSE NOTE - OBJECTIVE STATEMENT
51 y/o Female presenting to the ED ambulatory, A&Ox3, complaining of abdominal pain that started in the umbilical area and radiated into the right lower abdominal pain. Pt reports now pain radiates into the right flank and causes nausea. Pt reports feeling bloated and feeling "pressure" before and after urinating. endorses fevers, took tylenol earlier this morning. Abdomen distended, tender and rigid. Guarding noted. Pt appears to be in pain. Pt in no current distress. Denies hematuria, dysuria, headache, CP, SOB. Hx of diabetes on insulin. Safety and comfort measures provided, bed locked and in lowest position, side rails up for safety. Call bell within reach. Awaiting US IV by MD

## 2022-12-17 NOTE — ED PROVIDER NOTE - OBJECTIVE STATEMENT
52F with PMHx of DM (on lantus 30U and premeal 10U TID) p/w RLQ abdominal pain. She endorses that the pain has been ongoing for the past 52F with PMHx of DM (on lantus 30U and premeal 10U TID) p/w RLQ abdominal pain. She endorses that the pain has been ongoing for the past week and has gradually worsened. Pain started periumbilical and is now radiating to the RLQ and back. Endorses worsening nausea and vomiting during this time as well. Endorses feeling feverish. Denies any sick contacts and states she has already undergone menopause. No alcohol use in 8 months. Denies cirrhosis.

## 2022-12-17 NOTE — ED PROVIDER NOTE - PATIENT PORTAL LINK FT
You can access the FollowMyHealth Patient Portal offered by [facility] by registering at the following website: [email or web address]. By joining [employer]’s FollowMyHealth portal, you will also be able to view your health information using other applications (apps) compatible with our system.

## 2022-12-19 LAB
CULTURE RESULTS: SIGNIFICANT CHANGE UP
SPECIMEN SOURCE: SIGNIFICANT CHANGE UP

## 2022-12-20 RX ORDER — CEPHALEXIN 500 MG
1 CAPSULE ORAL
Qty: 14 | Refills: 0
Start: 2022-12-20 | End: 2022-12-26

## 2022-12-20 NOTE — ED POST DISCHARGE NOTE - DETAILS
12/20: spoke with pt who endorses urinary frequency and dysuria, will send keflex to pharmacy, all questions answered, will f/u with pcp. -Sahra Kinney PA-C

## 2023-01-18 ENCOUNTER — INPATIENT (INPATIENT)
Facility: HOSPITAL | Age: 53
LOS: 1 days | Discharge: HOME | DRG: 74 | End: 2023-01-20
Attending: INTERNAL MEDICINE | Admitting: INTERNAL MEDICINE
Payer: MEDICARE

## 2023-01-18 VITALS
DIASTOLIC BLOOD PRESSURE: 79 MMHG | TEMPERATURE: 98 F | RESPIRATION RATE: 22 BRPM | SYSTOLIC BLOOD PRESSURE: 130 MMHG | OXYGEN SATURATION: 97 % | WEIGHT: 158.73 LBS | HEART RATE: 60 BPM | HEIGHT: 63 IN

## 2023-01-18 DIAGNOSIS — R10.9 UNSPECIFIED ABDOMINAL PAIN: ICD-10-CM

## 2023-01-18 DIAGNOSIS — Z90.89 ACQUIRED ABSENCE OF OTHER ORGANS: Chronic | ICD-10-CM

## 2023-01-18 DIAGNOSIS — L02.91 CUTANEOUS ABSCESS, UNSPECIFIED: Chronic | ICD-10-CM

## 2023-01-18 DIAGNOSIS — Z98.890 OTHER SPECIFIED POSTPROCEDURAL STATES: Chronic | ICD-10-CM

## 2023-01-18 DIAGNOSIS — F41.9 ANXIETY DISORDER, UNSPECIFIED: ICD-10-CM

## 2023-01-18 DIAGNOSIS — E11.9 TYPE 2 DIABETES MELLITUS WITHOUT COMPLICATIONS: ICD-10-CM

## 2023-01-18 DIAGNOSIS — F10.10 ALCOHOL ABUSE, UNCOMPLICATED: ICD-10-CM

## 2023-01-18 DIAGNOSIS — K85.90 ACUTE PANCREATITIS WITHOUT NECROSIS OR INFECTION, UNSPECIFIED: ICD-10-CM

## 2023-01-18 LAB
ALBUMIN SERPL ELPH-MCNC: 4.7 G/DL — SIGNIFICANT CHANGE UP (ref 3.3–5)
ALP SERPL-CCNC: 209 U/L — HIGH (ref 40–120)
ALT FLD-CCNC: 21 U/L — SIGNIFICANT CHANGE UP (ref 10–45)
ANION GAP SERPL CALC-SCNC: 14 MMOL/L — SIGNIFICANT CHANGE UP (ref 5–17)
APPEARANCE UR: CLEAR — SIGNIFICANT CHANGE UP
APTT BLD: 30 SEC — SIGNIFICANT CHANGE UP (ref 27.5–35.5)
AST SERPL-CCNC: 20 U/L — SIGNIFICANT CHANGE UP (ref 10–40)
BACTERIA # UR AUTO: NEGATIVE — SIGNIFICANT CHANGE UP
BASE EXCESS BLDV CALC-SCNC: 0.1 MMOL/L — SIGNIFICANT CHANGE UP (ref -2–3)
BASOPHILS # BLD AUTO: 0.08 K/UL — SIGNIFICANT CHANGE UP (ref 0–0.2)
BASOPHILS NFR BLD AUTO: 0.7 % — SIGNIFICANT CHANGE UP (ref 0–2)
BILIRUB SERPL-MCNC: 0.6 MG/DL — SIGNIFICANT CHANGE UP (ref 0.2–1.2)
BILIRUB UR-MCNC: NEGATIVE — SIGNIFICANT CHANGE UP
BUN SERPL-MCNC: 15 MG/DL — SIGNIFICANT CHANGE UP (ref 7–23)
CA-I SERPL-SCNC: 1.27 MMOL/L — SIGNIFICANT CHANGE UP (ref 1.15–1.33)
CALCIUM SERPL-MCNC: 10.2 MG/DL — SIGNIFICANT CHANGE UP (ref 8.4–10.5)
CHLORIDE BLDV-SCNC: 99 MMOL/L — SIGNIFICANT CHANGE UP (ref 96–108)
CHLORIDE SERPL-SCNC: 99 MMOL/L — SIGNIFICANT CHANGE UP (ref 96–108)
CO2 BLDV-SCNC: 29 MMOL/L — HIGH (ref 22–26)
CO2 SERPL-SCNC: 23 MMOL/L — SIGNIFICANT CHANGE UP (ref 22–31)
COLOR SPEC: COLORLESS — SIGNIFICANT CHANGE UP
CREAT SERPL-MCNC: 0.61 MG/DL — SIGNIFICANT CHANGE UP (ref 0.5–1.3)
DIFF PNL FLD: NEGATIVE — SIGNIFICANT CHANGE UP
EGFR: 109 ML/MIN/1.73M2 — SIGNIFICANT CHANGE UP
EOSINOPHIL # BLD AUTO: 0.25 K/UL — SIGNIFICANT CHANGE UP (ref 0–0.5)
EOSINOPHIL NFR BLD AUTO: 2.3 % — SIGNIFICANT CHANGE UP (ref 0–6)
EPI CELLS # UR: 1 /HPF — SIGNIFICANT CHANGE UP
FLUAV AG NPH QL: SIGNIFICANT CHANGE UP
FLUBV AG NPH QL: SIGNIFICANT CHANGE UP
GAS PNL BLDV: 133 MMOL/L — LOW (ref 136–145)
GAS PNL BLDV: SIGNIFICANT CHANGE UP
GAS PNL BLDV: SIGNIFICANT CHANGE UP
GLUCOSE BLDC GLUCOMTR-MCNC: 141 MG/DL — HIGH (ref 70–99)
GLUCOSE BLDC GLUCOMTR-MCNC: 146 MG/DL — HIGH (ref 70–99)
GLUCOSE BLDC GLUCOMTR-MCNC: 176 MG/DL — HIGH (ref 70–99)
GLUCOSE BLDV-MCNC: 235 MG/DL — HIGH (ref 70–99)
GLUCOSE SERPL-MCNC: 222 MG/DL — HIGH (ref 70–99)
GLUCOSE UR QL: NEGATIVE — SIGNIFICANT CHANGE UP
HCG SERPL-ACNC: 2.7 MIU/ML — SIGNIFICANT CHANGE UP
HCO3 BLDV-SCNC: 27 MMOL/L — SIGNIFICANT CHANGE UP (ref 22–29)
HCT VFR BLD CALC: 47.3 % — HIGH (ref 34.5–45)
HCT VFR BLDA CALC: 47 % — HIGH (ref 34.5–46.5)
HGB BLD CALC-MCNC: 15.8 G/DL — SIGNIFICANT CHANGE UP (ref 11.7–16.1)
HGB BLD-MCNC: 15.5 G/DL — SIGNIFICANT CHANGE UP (ref 11.5–15.5)
HYALINE CASTS # UR AUTO: 0 /LPF — SIGNIFICANT CHANGE UP (ref 0–2)
IMM GRANULOCYTES NFR BLD AUTO: 0.5 % — SIGNIFICANT CHANGE UP (ref 0–0.9)
INR BLD: 0.91 RATIO — SIGNIFICANT CHANGE UP (ref 0.88–1.16)
KETONES UR-MCNC: NEGATIVE — SIGNIFICANT CHANGE UP
LACTATE BLDV-MCNC: 2.1 MMOL/L — HIGH (ref 0.5–2)
LEUKOCYTE ESTERASE UR-ACNC: ABNORMAL
LIDOCAIN IGE QN: 13 U/L — SIGNIFICANT CHANGE UP (ref 7–60)
LYMPHOCYTES # BLD AUTO: 29 % — SIGNIFICANT CHANGE UP (ref 13–44)
LYMPHOCYTES # BLD AUTO: 3.15 K/UL — SIGNIFICANT CHANGE UP (ref 1–3.3)
MAGNESIUM SERPL-MCNC: 2 MG/DL — SIGNIFICANT CHANGE UP (ref 1.6–2.6)
MCHC RBC-ENTMCNC: 29.5 PG — SIGNIFICANT CHANGE UP (ref 27–34)
MCHC RBC-ENTMCNC: 32.8 GM/DL — SIGNIFICANT CHANGE UP (ref 32–36)
MCV RBC AUTO: 89.9 FL — SIGNIFICANT CHANGE UP (ref 80–100)
MONOCYTES # BLD AUTO: 0.51 K/UL — SIGNIFICANT CHANGE UP (ref 0–0.9)
MONOCYTES NFR BLD AUTO: 4.7 % — SIGNIFICANT CHANGE UP (ref 2–14)
NEUTROPHILS # BLD AUTO: 6.82 K/UL — SIGNIFICANT CHANGE UP (ref 1.8–7.4)
NEUTROPHILS NFR BLD AUTO: 62.8 % — SIGNIFICANT CHANGE UP (ref 43–77)
NITRITE UR-MCNC: NEGATIVE — SIGNIFICANT CHANGE UP
NRBC # BLD: 0 /100 WBCS — SIGNIFICANT CHANGE UP (ref 0–0)
PCO2 BLDV: 53 MMHG — HIGH (ref 39–42)
PH BLDV: 7.32 — SIGNIFICANT CHANGE UP (ref 7.32–7.43)
PH UR: 6.5 — SIGNIFICANT CHANGE UP (ref 5–8)
PLATELET # BLD AUTO: 202 K/UL — SIGNIFICANT CHANGE UP (ref 150–400)
PO2 BLDV: 40 MMHG — SIGNIFICANT CHANGE UP (ref 25–45)
POTASSIUM BLDV-SCNC: 4.5 MMOL/L — SIGNIFICANT CHANGE UP (ref 3.5–5.1)
POTASSIUM SERPL-MCNC: 4.3 MMOL/L — SIGNIFICANT CHANGE UP (ref 3.5–5.3)
POTASSIUM SERPL-SCNC: 4.3 MMOL/L — SIGNIFICANT CHANGE UP (ref 3.5–5.3)
PROT SERPL-MCNC: 8.4 G/DL — HIGH (ref 6–8.3)
PROT UR-MCNC: SIGNIFICANT CHANGE UP
PROTHROM AB SERPL-ACNC: 10.5 SEC — SIGNIFICANT CHANGE UP (ref 10.5–13.4)
RBC # BLD: 5.26 M/UL — HIGH (ref 3.8–5.2)
RBC # FLD: 11.9 % — SIGNIFICANT CHANGE UP (ref 10.3–14.5)
RBC CASTS # UR COMP ASSIST: 1 /HPF — SIGNIFICANT CHANGE UP (ref 0–4)
RSV RNA NPH QL NAA+NON-PROBE: SIGNIFICANT CHANGE UP
SAO2 % BLDV: 64.4 % — LOW (ref 67–88)
SARS-COV-2 RNA SPEC QL NAA+PROBE: SIGNIFICANT CHANGE UP
SODIUM SERPL-SCNC: 136 MMOL/L — SIGNIFICANT CHANGE UP (ref 135–145)
SP GR SPEC: 1.05 — HIGH (ref 1.01–1.02)
UROBILINOGEN FLD QL: NEGATIVE — SIGNIFICANT CHANGE UP
WBC # BLD: 10.86 K/UL — HIGH (ref 3.8–10.5)
WBC # FLD AUTO: 10.86 K/UL — HIGH (ref 3.8–10.5)
WBC UR QL: 13 /HPF — HIGH (ref 0–5)

## 2023-01-18 PROCEDURE — 71045 X-RAY EXAM CHEST 1 VIEW: CPT | Mod: 26

## 2023-01-18 PROCEDURE — 99285 EMERGENCY DEPT VISIT HI MDM: CPT

## 2023-01-18 PROCEDURE — 74177 CT ABD & PELVIS W/CONTRAST: CPT | Mod: 26,MA

## 2023-01-18 RX ORDER — SODIUM CHLORIDE 9 MG/ML
1000 INJECTION INTRAMUSCULAR; INTRAVENOUS; SUBCUTANEOUS ONCE
Refills: 0 | Status: COMPLETED | OUTPATIENT
Start: 2023-01-18 | End: 2023-01-18

## 2023-01-18 RX ORDER — DEXTROSE 50 % IN WATER 50 %
25 SYRINGE (ML) INTRAVENOUS ONCE
Refills: 0 | Status: DISCONTINUED | OUTPATIENT
Start: 2023-01-18 | End: 2023-01-20

## 2023-01-18 RX ORDER — GABAPENTIN 400 MG/1
300 CAPSULE ORAL
Refills: 0 | Status: DISCONTINUED | OUTPATIENT
Start: 2023-01-18 | End: 2023-01-20

## 2023-01-18 RX ORDER — TRAZODONE HCL 50 MG
100 TABLET ORAL AT BEDTIME
Refills: 0 | Status: DISCONTINUED | OUTPATIENT
Start: 2023-01-18 | End: 2023-01-20

## 2023-01-18 RX ORDER — INSULIN ASPART 100 [IU]/ML
9 INJECTION, SOLUTION SUBCUTANEOUS
Qty: 0 | Refills: 0 | DISCHARGE

## 2023-01-18 RX ORDER — MORPHINE SULFATE 50 MG/1
4 CAPSULE, EXTENDED RELEASE ORAL ONCE
Refills: 0 | Status: DISCONTINUED | OUTPATIENT
Start: 2023-01-18 | End: 2023-01-18

## 2023-01-18 RX ORDER — CLONAZEPAM 1 MG
1 TABLET ORAL THREE TIMES A DAY
Refills: 0 | Status: DISCONTINUED | OUTPATIENT
Start: 2023-01-18 | End: 2023-01-20

## 2023-01-18 RX ORDER — GABAPENTIN 400 MG/1
600 CAPSULE ORAL AT BEDTIME
Refills: 0 | Status: DISCONTINUED | OUTPATIENT
Start: 2023-01-18 | End: 2023-01-20

## 2023-01-18 RX ORDER — ACETAMINOPHEN 500 MG
1000 TABLET ORAL ONCE
Refills: 0 | Status: COMPLETED | OUTPATIENT
Start: 2023-01-18 | End: 2023-01-18

## 2023-01-18 RX ORDER — SODIUM CHLORIDE 9 MG/ML
1000 INJECTION, SOLUTION INTRAVENOUS
Refills: 0 | Status: DISCONTINUED | OUTPATIENT
Start: 2023-01-18 | End: 2023-01-20

## 2023-01-18 RX ORDER — GLUCAGON INJECTION, SOLUTION 0.5 MG/.1ML
1 INJECTION, SOLUTION SUBCUTANEOUS ONCE
Refills: 0 | Status: DISCONTINUED | OUTPATIENT
Start: 2023-01-18 | End: 2023-01-20

## 2023-01-18 RX ORDER — THIAMINE MONONITRATE (VIT B1) 100 MG
100 TABLET ORAL DAILY
Refills: 0 | Status: DISCONTINUED | OUTPATIENT
Start: 2023-01-18 | End: 2023-01-20

## 2023-01-18 RX ORDER — ENOXAPARIN SODIUM 100 MG/ML
40 INJECTION SUBCUTANEOUS EVERY 24 HOURS
Refills: 0 | Status: DISCONTINUED | OUTPATIENT
Start: 2023-01-18 | End: 2023-01-20

## 2023-01-18 RX ORDER — DEXTROSE 50 % IN WATER 50 %
15 SYRINGE (ML) INTRAVENOUS ONCE
Refills: 0 | Status: DISCONTINUED | OUTPATIENT
Start: 2023-01-18 | End: 2023-01-20

## 2023-01-18 RX ORDER — DEXTROSE 50 % IN WATER 50 %
12.5 SYRINGE (ML) INTRAVENOUS ONCE
Refills: 0 | Status: DISCONTINUED | OUTPATIENT
Start: 2023-01-18 | End: 2023-01-20

## 2023-01-18 RX ORDER — INSULIN GLARGINE 100 [IU]/ML
28 INJECTION, SOLUTION SUBCUTANEOUS
Qty: 0 | Refills: 0 | DISCHARGE

## 2023-01-18 RX ORDER — ONDANSETRON 8 MG/1
4 TABLET, FILM COATED ORAL ONCE
Refills: 0 | Status: COMPLETED | OUTPATIENT
Start: 2023-01-18 | End: 2023-01-18

## 2023-01-18 RX ORDER — INSULIN GLARGINE 100 [IU]/ML
10 INJECTION, SOLUTION SUBCUTANEOUS AT BEDTIME
Refills: 0 | Status: DISCONTINUED | OUTPATIENT
Start: 2023-01-18 | End: 2023-01-19

## 2023-01-18 RX ORDER — FOLIC ACID 0.8 MG
1 TABLET ORAL DAILY
Refills: 0 | Status: DISCONTINUED | OUTPATIENT
Start: 2023-01-18 | End: 2023-01-20

## 2023-01-18 RX ORDER — PANTOPRAZOLE SODIUM 20 MG/1
40 TABLET, DELAYED RELEASE ORAL DAILY
Refills: 0 | Status: DISCONTINUED | OUTPATIENT
Start: 2023-01-18 | End: 2023-01-20

## 2023-01-18 RX ORDER — CEFTRIAXONE 500 MG/1
1000 INJECTION, POWDER, FOR SOLUTION INTRAMUSCULAR; INTRAVENOUS ONCE
Refills: 0 | Status: COMPLETED | OUTPATIENT
Start: 2023-01-18 | End: 2023-01-18

## 2023-01-18 RX ORDER — PANTOPRAZOLE SODIUM 20 MG/1
40 TABLET, DELAYED RELEASE ORAL ONCE
Refills: 0 | Status: COMPLETED | OUTPATIENT
Start: 2023-01-18 | End: 2023-01-18

## 2023-01-18 RX ORDER — INSULIN LISPRO 100/ML
3 VIAL (ML) SUBCUTANEOUS
Refills: 0 | Status: DISCONTINUED | OUTPATIENT
Start: 2023-01-18 | End: 2023-01-20

## 2023-01-18 RX ORDER — SERTRALINE 25 MG/1
50 TABLET, FILM COATED ORAL DAILY
Refills: 0 | Status: DISCONTINUED | OUTPATIENT
Start: 2023-01-18 | End: 2023-01-20

## 2023-01-18 RX ADMIN — INSULIN GLARGINE 10 UNIT(S): 100 INJECTION, SOLUTION SUBCUTANEOUS at 23:11

## 2023-01-18 RX ADMIN — Medication 1 MILLIGRAM(S): at 14:20

## 2023-01-18 RX ADMIN — SODIUM CHLORIDE 1000 MILLILITER(S): 9 INJECTION INTRAMUSCULAR; INTRAVENOUS; SUBCUTANEOUS at 02:28

## 2023-01-18 RX ADMIN — Medication 100 MILLIGRAM(S): at 14:20

## 2023-01-18 RX ADMIN — ONDANSETRON 4 MILLIGRAM(S): 8 TABLET, FILM COATED ORAL at 02:28

## 2023-01-18 RX ADMIN — Medication 3 UNIT(S): at 17:48

## 2023-01-18 RX ADMIN — SODIUM CHLORIDE 1000 MILLILITER(S): 9 INJECTION INTRAMUSCULAR; INTRAVENOUS; SUBCUTANEOUS at 05:16

## 2023-01-18 RX ADMIN — MORPHINE SULFATE 4 MILLIGRAM(S): 50 CAPSULE, EXTENDED RELEASE ORAL at 06:27

## 2023-01-18 RX ADMIN — MORPHINE SULFATE 4 MILLIGRAM(S): 50 CAPSULE, EXTENDED RELEASE ORAL at 20:13

## 2023-01-18 RX ADMIN — CEFTRIAXONE 1000 MILLIGRAM(S): 500 INJECTION, POWDER, FOR SOLUTION INTRAMUSCULAR; INTRAVENOUS at 09:06

## 2023-01-18 RX ADMIN — ENOXAPARIN SODIUM 40 MILLIGRAM(S): 100 INJECTION SUBCUTANEOUS at 14:24

## 2023-01-18 RX ADMIN — PANTOPRAZOLE SODIUM 40 MILLIGRAM(S): 20 TABLET, DELAYED RELEASE ORAL at 09:14

## 2023-01-18 RX ADMIN — MORPHINE SULFATE 4 MILLIGRAM(S): 50 CAPSULE, EXTENDED RELEASE ORAL at 07:54

## 2023-01-18 RX ADMIN — Medication 400 MILLIGRAM(S): at 05:11

## 2023-01-18 RX ADMIN — CEFTRIAXONE 100 MILLIGRAM(S): 500 INJECTION, POWDER, FOR SOLUTION INTRAMUSCULAR; INTRAVENOUS at 08:19

## 2023-01-18 RX ADMIN — MORPHINE SULFATE 4 MILLIGRAM(S): 50 CAPSULE, EXTENDED RELEASE ORAL at 02:29

## 2023-01-18 RX ADMIN — GABAPENTIN 300 MILLIGRAM(S): 400 CAPSULE ORAL at 14:20

## 2023-01-18 RX ADMIN — Medication 1 TABLET(S): at 14:23

## 2023-01-18 RX ADMIN — MORPHINE SULFATE 4 MILLIGRAM(S): 50 CAPSULE, EXTENDED RELEASE ORAL at 13:58

## 2023-01-18 RX ADMIN — SERTRALINE 50 MILLIGRAM(S): 25 TABLET, FILM COATED ORAL at 14:56

## 2023-01-18 RX ADMIN — Medication 100 MILLIGRAM(S): at 22:05

## 2023-01-18 RX ADMIN — MORPHINE SULFATE 4 MILLIGRAM(S): 50 CAPSULE, EXTENDED RELEASE ORAL at 15:08

## 2023-01-18 RX ADMIN — MORPHINE SULFATE 4 MILLIGRAM(S): 50 CAPSULE, EXTENDED RELEASE ORAL at 23:25

## 2023-01-18 RX ADMIN — Medication 1 MILLIGRAM(S): at 22:05

## 2023-01-18 RX ADMIN — GABAPENTIN 600 MILLIGRAM(S): 400 CAPSULE ORAL at 22:05

## 2023-01-18 NOTE — CONSULT NOTE ADULT - ATTENDING COMMENTS
date of service 1/18/23    pt seen and examined  pt chart and imaging reviewed in detail  agree with note above  52F with chronic left sided abd pain, known splenic lesions and incidental mobile cecum - no evidence of obstruction or volvulus  agree with admit to medicine  no acute surgical intervention at this time - possible overall increased risk of cecal volvulus   resting in bed, NAD  soft, vague left sided tenderness, no r/g  f/u MRI to better evaluate splenic lesions  f/u gi consult  cont supportive care per medicine  will follow with you  d/w pt & Med team @ bedside in detail.

## 2023-01-18 NOTE — H&P ADULT - NSHPPHYSICALEXAM_GEN_ALL_CORE
PHYSICAL EXAM: vital signs noted on [place]  in no apparent distress  HEENT: PINKY EOMI  Neck: Supple, no JVD, no thyromegaly  Lungs: no wheeze, no crackles  CVS: S1 S2 no M/R/G  Abdomen: left sided tenderness, no organomegaly, BS present  Neuro: AO x 3 no focal weakness, no sensory abnormalities  Psych: appropriate affect  Skin: warm, dry  Ext: no cyanosis or clubbing, no edema  Msk: no joint swelling or deformities  Back: no CVA tenderness, no kyphosis/scoliosis

## 2023-01-18 NOTE — CONSULT NOTE ADULT - ASSESSMENT
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     Recommendations:  - Mobile cecum, no acute surgical intervention, patient without evidence of volvulus  - Splenic nodules, medicine/heme workup as outpatient or inpatient   - PO challenge   - Dispo per ED  - Case discussed with Attending Surgeon Dr. DIOR Cobb  - Call with any questions     David Holden MD, PGY2  [facility]   [phone]

## 2023-01-18 NOTE — ED PROVIDER NOTE - PROGRESS NOTE DETAILS
DO Miguelangel: patient re-assessed and reports improved, but continued pain despite multiple rounds of opioid pain medications.  surgery previously consulted and evaluated patient at bedside. DO Miguelangel: patient re-assessed and reports improved, but continued pain despite multiple rounds of opioid pain medications.  surgery previously consulted and evaluated patient at bedside, who recommended medical admission.  patient given dose of Protonix and admitted medically.

## 2023-01-18 NOTE — PATIENT PROFILE ADULT - HOME ACCESSIBILITY CONCERNS
Patient is calling saying he is returning your call, he said it was something about his insurance  Dane macias 
none

## 2023-01-18 NOTE — H&P ADULT - PROBLEM SELECTOR PLAN 1
unclear etiology  GI consultation requested  splenic hypodense lesions   MR abdomen ordered   surgery help appreciated

## 2023-01-18 NOTE — ED PROVIDER NOTE - ATTENDING CONTRIBUTION TO CARE
I, Vignesh Quarles, performed a history and physical exam of the patient and discussed their management with the resident and /or advanced care provider. I reviewed the resident and /or ACP's note and agree with the documented findings and plan of care. I was present and available for all procedures.

## 2023-01-18 NOTE — PATIENT PROFILE ADULT - CONTRAINDICATIONS & PRECAUTIONS (SELECT ALL THAT APPLY)
Spoke to Fina about Opal's sheila hose and the request to have the group home stop putting that on until her toe is healed. This is ok per Dr Davidson and this is faxed to the group home.  
15-Sep-2021
A severe allergic reaction (e.g. anaphylaxis) to any component of the applicable vaccine being administered

## 2023-01-18 NOTE — H&P ADULT - ASSESSMENT
52 year old woman with PMH of anxiety on zoloft, Klonopin trazodone, and gabapentin at home, also diabetic on home insulin,  presents with several weeks of poorly localizable abdominal discomfort that has gotten worse over the past few days

## 2023-01-18 NOTE — ED ADULT NURSE REASSESSMENT NOTE - NS ED NURSE REASSESS COMMENT FT1
MRI forms filled out by pt and faxed to MRI. Pt made aware to remain NPO starting at 1900 because MRI is at 2300. Pt states she will eat dinner a little later.
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
## 2023-01-18 NOTE — ED PROVIDER NOTE - NS ED ROS FT
CONSTITUTIONAL: No weakness, fevers or chills  EYES/ENT: No visual changes;  No vertigo or throat pain   NECK: No pain or stiffness  RESPIRATORY: No cough, wheezing, hemoptysis; No shortness of breath  CARDIOVASCULAR: No chest pain or palpitations  GASTROINTESTINAL: + abdominal pain, nausea, vomiting  GENITOURINARY: No dysuria, frequency or hematuria  NEUROLOGICAL: No numbness or weakness  SKIN: No itching, burning, rashes, or lesions   All other review of systems is negative unless indicated above.

## 2023-01-18 NOTE — H&P ADULT - NSHPREVIEWOFSYSTEMS_GEN_ALL_CORE
Gen: no loss of wt no loss of appetite  ENT: no dizziness no hearing loss  Ophth: no blurring of vision no loss of vision  Resp: No cough no sputum production  CVS: No chest pain no palpitations no orthopnea  GI: + nausea and vomiting no diarrhea   : no dysuria, hematuria  Endo: no polyuria no excessive sweating  Neuro: no weakness no paresthesias  Heme: No petechiae no easy bruising  Msk: No joint pain no swelling  Skin: No rash no itching

## 2023-01-18 NOTE — PATIENT PROFILE ADULT - NSPROREFERSVCHOMEDIABETES_GEN_A_NUR
Preceptor Attestation:    Patient seen and evaluated in person. I discussed the patient with the resident. I have verified the content of the note, which accurately reflects my assessment of the patient and the plan of care.   Supervising Physician:  Franklin Durand MD.                    no

## 2023-01-18 NOTE — ED PROVIDER NOTE - CLINICAL SUMMARY MEDICAL DECISION MAKING FREE TEXT BOX
Patient is a 52 year old female with past medical history significant for DM (on lantus 30U and premeal 10U TID) and previous alcohol abuse presents to the Emergency Department with chief complaint of left upper quadrant pain.  Patient evaluated for metabolic and intra-abdominal etiology of symptoms.  Patient received EKG, CT A/P, and labs.  Patient received anti-emetic and multiple rounds of opioid pain medications.  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  CT significant for multiple splenic lesions. Patient is a 52 year old female with past medical history significant for DM (on lantus 30U and premeal 10U TID) and previous alcohol abuse presents to the Emergency Department with chief complaint of left upper quadrant pain.  Patient evaluated for metabolic and intra-abdominal etiology of symptoms.  Patient received EKG, CT A/P, and labs.  Patient received anti-emetic and multiple rounds of opioid pain medications.  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  CT significant for multiple splenic lesions.    Vignesh Quarles MD: I agree with the residents MDM written above.    Patient was aware of the splenic lesions prior to the CT imaging.  On exam patient was in moderate distress and uncomfortable appearing.  Vitals were stable and exam is notable for left-sided abdominal tenderness with mild guarding but no rebound and CVA tenderness appreciated as well.    CT imaging was ordered given the degree of discomfort and pain to palpation, low clinical suspicion for ACS and appeared the patient's presentation was more related to intra-abdominal pathology though patient did not appear to have an acute abdomen.  Unable to control patient's pain and patient found to have a mobile cecum on CT imaging and surgery was consulted.  Regardless of surgeries recommendations patient will need admission for the intractable pain.

## 2023-01-18 NOTE — ED PROVIDER NOTE - PHYSICAL EXAMINATION
PHYSICAL EXAM:  GENERAL: NAD, lying in bed comfortably  HEAD:  Atraumatic, Normocephalic  EYES: EOMI, PERRLA, conjunctiva and sclera clear  ENT: No erythema/pallor/petechiae/lesions; TMs clear b/l  NECK: Supple, No JVD  LUNG: CTA b/l; no r/r/w  HEART: RRR, +S1/S2; No m/r/g  ABDOMEN: soft, NT/ND; + LUQ  EXTREMITIES:  2+ Peripheral Pulses, brisk cap refill. No clubbing, cyanosis, or edema  NERVOUS SYSTEM:  AAOx3, speech clear. No sensory/motor deficits   MSK: FROM all 4 extremities, full and equal strength  SKIN: No rashes or lesions

## 2023-01-18 NOTE — H&P ADULT - NSHPSOCIALHISTORY_GEN_ALL_CORE
non smoker  no IVDA  remote ETOH abuse last drink > 1year ago  (did have one hospitalization for ETOH withdrawal prior to that)  lives with mother  single

## 2023-01-18 NOTE — PATIENT PROFILE ADULT - FALL HARM RISK - HARM RISK INTERVENTIONS

## 2023-01-18 NOTE — ED ADULT NURSE NOTE - OBJECTIVE STATEMENT
53 y/o female presents to the ED endorsing 10/10 L-sided abdominal/flank pain x2 days. A/Ox4. Ambulatory at baseline. PMH: Spleen Lesions. 51 y/o female presents to the ED endorsing 10/10 L-sided abdominal/flank pain x2 days. A/Ox4. Ambulatory at baseline. PMH: Spleen Lesions, Anxiety, Depression and Former Chronic Alcohol User. Patient endorses recent ED visit for abdominal pain. Patient endorses nausea, "beige-colored" stools, HA and numbness/tingling RLE. Upon assessment, abdomen is soft, distended and tender to palpation. Patient endorses last BM on 1/17/22. Patient endorses urinary urgency. Patient declines urinary retention or burning upon urination. Patient on CM, NSR. Safety measures maintained, side rails intact and bed in the lowest position. 51 y/o female presents to the ED endorsing 10/10 L-sided abdominal/flank pain "several" days. A/Ox4. Ambulatory at baseline. PMH: Spleen Lesions, Anxiety, Depression and Former Chronic Alcohol User. Patient endorses recent ED visit for abdominal pain. Patient endorses nausea, "beige-colored" stools, HA and numbness/tingling RLE. Upon assessment, abdomen is soft, distended and tender to palpation. Patient endorses last BM on 1/17/22. Patient endorses urinary urgency. Patient declines urinary retention or burning upon urination. Patient on CM, NSR. Safety measures maintained, side rails intact and bed in the lowest position.

## 2023-01-18 NOTE — ED PROVIDER NOTE - OBJECTIVE STATEMENT
Patient is a 52 year old female with past medical history significant for DM (on lantus 30U and premeal 10U TID) and previous alcohol abuse presents to the Emergency Department with chief complaint of left upper quadrant pain.  Patient reports worsening abdominal pain for the past 30 days.  Patient states she has had a constant, dull pain to the area, which acutely worsened the night prior to presentation.  Patient reports associated nausea and multiple episodes of non-bilious, nonbloody vomiting.  Patient denies alleviating or exacerbating factors, such as food consumption.  Patient denies cough, fever, chest pain, or other physical complaints.  No sick contacts or recent travel.

## 2023-01-18 NOTE — ED PROVIDER NOTE - CARE PLAN
1 Principal Discharge DX:	Intractable abdominal pain  Secondary Diagnosis:	Nausea and vomiting  Secondary Diagnosis:	Urinary tract infection  Secondary Diagnosis:	Mobile cecum

## 2023-01-18 NOTE — H&P ADULT - HISTORY OF PRESENT ILLNESS
52 year old woman presents with several weeks of poorly localizable abdominal discomfort that has gotten worse over the past few days.  Last night she had an episode of small volume emesis, nonbloody and nonbilious. This along with the discomfort prompted her to come into the hospital.  She denies fevers but has subjective chills, no sick contacts. Had 1 episode of beige loose stool. Continues to pas gas.  In ED remains afebrile and HD stable.  She has a history of known splenic nodules, and has had kidney stones in the past and without hematuria at present. PMH of anxiety on zoloft, klonopin, trazodone, and gabapentin at home, also diabetic on home insulin. Had cholecystectomy in 2018, no other intraabdominal surgeries

## 2023-01-19 LAB
A1C WITH ESTIMATED AVERAGE GLUCOSE RESULT: 9.8 % — HIGH (ref 4–5.6)
ANION GAP SERPL CALC-SCNC: 9 MMOL/L — SIGNIFICANT CHANGE UP (ref 5–17)
BUN SERPL-MCNC: 13 MG/DL — SIGNIFICANT CHANGE UP (ref 7–23)
CALCIUM SERPL-MCNC: 9.6 MG/DL — SIGNIFICANT CHANGE UP (ref 8.4–10.5)
CHLORIDE SERPL-SCNC: 104 MMOL/L — SIGNIFICANT CHANGE UP (ref 96–108)
CO2 SERPL-SCNC: 26 MMOL/L — SIGNIFICANT CHANGE UP (ref 22–31)
CREAT SERPL-MCNC: 0.7 MG/DL — SIGNIFICANT CHANGE UP (ref 0.5–1.3)
CULTURE RESULTS: SIGNIFICANT CHANGE UP
EGFR: 105 ML/MIN/1.73M2 — SIGNIFICANT CHANGE UP
ESTIMATED AVERAGE GLUCOSE: 235 MG/DL — HIGH (ref 68–114)
GLUCOSE BLDC GLUCOMTR-MCNC: 200 MG/DL — HIGH (ref 70–99)
GLUCOSE BLDC GLUCOMTR-MCNC: 216 MG/DL — HIGH (ref 70–99)
GLUCOSE BLDC GLUCOMTR-MCNC: 283 MG/DL — HIGH (ref 70–99)
GLUCOSE BLDC GLUCOMTR-MCNC: 299 MG/DL — HIGH (ref 70–99)
GLUCOSE SERPL-MCNC: 189 MG/DL — HIGH (ref 70–99)
HCT VFR BLD CALC: 42.9 % — SIGNIFICANT CHANGE UP (ref 34.5–45)
HGB BLD-MCNC: 13.9 G/DL — SIGNIFICANT CHANGE UP (ref 11.5–15.5)
HIV 1+2 AB+HIV1 P24 AG SERPL QL IA: SIGNIFICANT CHANGE UP
MCHC RBC-ENTMCNC: 29.3 PG — SIGNIFICANT CHANGE UP (ref 27–34)
MCHC RBC-ENTMCNC: 32.4 GM/DL — SIGNIFICANT CHANGE UP (ref 32–36)
MCV RBC AUTO: 90.5 FL — SIGNIFICANT CHANGE UP (ref 80–100)
NRBC # BLD: 0 /100 WBCS — SIGNIFICANT CHANGE UP (ref 0–0)
PLATELET # BLD AUTO: 157 K/UL — SIGNIFICANT CHANGE UP (ref 150–400)
POTASSIUM SERPL-MCNC: 4.3 MMOL/L — SIGNIFICANT CHANGE UP (ref 3.5–5.3)
POTASSIUM SERPL-SCNC: 4.3 MMOL/L — SIGNIFICANT CHANGE UP (ref 3.5–5.3)
RBC # BLD: 4.74 M/UL — SIGNIFICANT CHANGE UP (ref 3.8–5.2)
RBC # FLD: 12.1 % — SIGNIFICANT CHANGE UP (ref 10.3–14.5)
SODIUM SERPL-SCNC: 139 MMOL/L — SIGNIFICANT CHANGE UP (ref 135–145)
SPECIMEN SOURCE: SIGNIFICANT CHANGE UP
TSH SERPL-MCNC: 0.98 UIU/ML — SIGNIFICANT CHANGE UP (ref 0.27–4.2)
WBC # BLD: 8.47 K/UL — SIGNIFICANT CHANGE UP (ref 3.8–10.5)
WBC # FLD AUTO: 8.47 K/UL — SIGNIFICANT CHANGE UP (ref 3.8–10.5)

## 2023-01-19 PROCEDURE — 74182 MRI ABDOMEN W/CONTRAST: CPT | Mod: 26

## 2023-01-19 RX ORDER — INSULIN LISPRO 100/ML
VIAL (ML) SUBCUTANEOUS
Refills: 0 | Status: DISCONTINUED | OUTPATIENT
Start: 2023-01-19 | End: 2023-01-20

## 2023-01-19 RX ORDER — ACETAMINOPHEN 500 MG
1000 TABLET ORAL ONCE
Refills: 0 | Status: DISCONTINUED | OUTPATIENT
Start: 2023-01-19 | End: 2023-01-19

## 2023-01-19 RX ORDER — INSULIN GLARGINE 100 [IU]/ML
20 INJECTION, SOLUTION SUBCUTANEOUS AT BEDTIME
Refills: 0 | Status: DISCONTINUED | OUTPATIENT
Start: 2023-01-19 | End: 2023-01-20

## 2023-01-19 RX ORDER — MORPHINE SULFATE 50 MG/1
4 CAPSULE, EXTENDED RELEASE ORAL ONCE
Refills: 0 | Status: DISCONTINUED | OUTPATIENT
Start: 2023-01-19 | End: 2023-01-19

## 2023-01-19 RX ORDER — MORPHINE SULFATE 50 MG/1
4 CAPSULE, EXTENDED RELEASE ORAL EVERY 6 HOURS
Refills: 0 | Status: DISCONTINUED | OUTPATIENT
Start: 2023-01-19 | End: 2023-01-20

## 2023-01-19 RX ORDER — INSULIN LISPRO 100/ML
VIAL (ML) SUBCUTANEOUS AT BEDTIME
Refills: 0 | Status: DISCONTINUED | OUTPATIENT
Start: 2023-01-19 | End: 2023-01-20

## 2023-01-19 RX ADMIN — MORPHINE SULFATE 4 MILLIGRAM(S): 50 CAPSULE, EXTENDED RELEASE ORAL at 17:31

## 2023-01-19 RX ADMIN — PANTOPRAZOLE SODIUM 40 MILLIGRAM(S): 20 TABLET, DELAYED RELEASE ORAL at 12:35

## 2023-01-19 RX ADMIN — Medication 100 MILLIGRAM(S): at 12:34

## 2023-01-19 RX ADMIN — Medication 100 MILLIGRAM(S): at 22:11

## 2023-01-19 RX ADMIN — MORPHINE SULFATE 4 MILLIGRAM(S): 50 CAPSULE, EXTENDED RELEASE ORAL at 22:27

## 2023-01-19 RX ADMIN — Medication 1 MILLIGRAM(S): at 16:08

## 2023-01-19 RX ADMIN — ENOXAPARIN SODIUM 40 MILLIGRAM(S): 100 INJECTION SUBCUTANEOUS at 16:03

## 2023-01-19 RX ADMIN — GABAPENTIN 300 MILLIGRAM(S): 400 CAPSULE ORAL at 08:36

## 2023-01-19 RX ADMIN — Medication 3 UNIT(S): at 12:35

## 2023-01-19 RX ADMIN — GABAPENTIN 300 MILLIGRAM(S): 400 CAPSULE ORAL at 16:03

## 2023-01-19 RX ADMIN — Medication 1 MILLIGRAM(S): at 12:35

## 2023-01-19 RX ADMIN — MORPHINE SULFATE 4 MILLIGRAM(S): 50 CAPSULE, EXTENDED RELEASE ORAL at 22:12

## 2023-01-19 RX ADMIN — MORPHINE SULFATE 4 MILLIGRAM(S): 50 CAPSULE, EXTENDED RELEASE ORAL at 09:53

## 2023-01-19 RX ADMIN — INSULIN GLARGINE 20 UNIT(S): 100 INJECTION, SOLUTION SUBCUTANEOUS at 22:11

## 2023-01-19 RX ADMIN — MORPHINE SULFATE 4 MILLIGRAM(S): 50 CAPSULE, EXTENDED RELEASE ORAL at 17:12

## 2023-01-19 RX ADMIN — Medication 1: at 22:23

## 2023-01-19 RX ADMIN — MORPHINE SULFATE 4 MILLIGRAM(S): 50 CAPSULE, EXTENDED RELEASE ORAL at 10:08

## 2023-01-19 RX ADMIN — GABAPENTIN 600 MILLIGRAM(S): 400 CAPSULE ORAL at 22:11

## 2023-01-19 RX ADMIN — SERTRALINE 50 MILLIGRAM(S): 25 TABLET, FILM COATED ORAL at 12:34

## 2023-01-19 RX ADMIN — Medication 3 UNIT(S): at 08:36

## 2023-01-19 RX ADMIN — Medication 3 UNIT(S): at 17:15

## 2023-01-19 RX ADMIN — Medication 1 TABLET(S): at 12:34

## 2023-01-19 NOTE — PROGRESS NOTE ADULT - ATTENDING COMMENTS
date of service 1/19/23    pt seen and examined  pt chart and imaging reviewed in detail  agree with note above  52F with chronic left sided abd pain, known splenic lesions and incidental mobile cecum - no evidence of obstruction or volvulus  no acute surgical intervention at this time - possible overall increased risk of cecal volvulus   resting in bed, NAD  soft, vague left sided tenderness, no r/g  f/u MRI to better evaluate splenic lesions  f/u gi consult for EGD in am  cont supportive care per medicine  will follow with you  d/w pt & Dr Cisse @ bedside in detail.

## 2023-01-20 ENCOUNTER — TRANSCRIPTION ENCOUNTER (OUTPATIENT)
Age: 53
End: 2023-01-20

## 2023-01-20 VITALS
HEART RATE: 52 BPM | SYSTOLIC BLOOD PRESSURE: 120 MMHG | TEMPERATURE: 98 F | RESPIRATION RATE: 18 BRPM | OXYGEN SATURATION: 97 % | DIASTOLIC BLOOD PRESSURE: 78 MMHG

## 2023-01-20 LAB
GLUCOSE BLDC GLUCOMTR-MCNC: 141 MG/DL — HIGH (ref 70–99)
GLUCOSE BLDC GLUCOMTR-MCNC: 142 MG/DL — HIGH (ref 70–99)
GLUCOSE BLDC GLUCOMTR-MCNC: 167 MG/DL — HIGH (ref 70–99)
SARS-COV-2 RNA SPEC QL NAA+PROBE: SIGNIFICANT CHANGE UP

## 2023-01-20 PROCEDURE — 81001 URINALYSIS AUTO W/SCOPE: CPT

## 2023-01-20 PROCEDURE — 82962 GLUCOSE BLOOD TEST: CPT

## 2023-01-20 PROCEDURE — 71045 X-RAY EXAM CHEST 1 VIEW: CPT

## 2023-01-20 PROCEDURE — 82435 ASSAY OF BLOOD CHLORIDE: CPT

## 2023-01-20 PROCEDURE — 80053 COMPREHEN METABOLIC PANEL: CPT

## 2023-01-20 PROCEDURE — 99285 EMERGENCY DEPT VISIT HI MDM: CPT | Mod: 25

## 2023-01-20 PROCEDURE — A9585: CPT

## 2023-01-20 PROCEDURE — 82947 ASSAY GLUCOSE BLOOD QUANT: CPT

## 2023-01-20 PROCEDURE — 82803 BLOOD GASES ANY COMBINATION: CPT

## 2023-01-20 PROCEDURE — 85025 COMPLETE CBC W/AUTO DIFF WBC: CPT

## 2023-01-20 PROCEDURE — 74182 MRI ABDOMEN W/CONTRAST: CPT

## 2023-01-20 PROCEDURE — 87086 URINE CULTURE/COLONY COUNT: CPT

## 2023-01-20 PROCEDURE — 82565 ASSAY OF CREATININE: CPT

## 2023-01-20 PROCEDURE — U0005: CPT

## 2023-01-20 PROCEDURE — 83605 ASSAY OF LACTIC ACID: CPT

## 2023-01-20 PROCEDURE — 85018 HEMOGLOBIN: CPT

## 2023-01-20 PROCEDURE — 82330 ASSAY OF CALCIUM: CPT

## 2023-01-20 PROCEDURE — 96365 THER/PROPH/DIAG IV INF INIT: CPT

## 2023-01-20 PROCEDURE — 96375 TX/PRO/DX INJ NEW DRUG ADDON: CPT

## 2023-01-20 PROCEDURE — 96376 TX/PRO/DX INJ SAME DRUG ADON: CPT

## 2023-01-20 PROCEDURE — 85730 THROMBOPLASTIN TIME PARTIAL: CPT

## 2023-01-20 PROCEDURE — 85027 COMPLETE CBC AUTOMATED: CPT

## 2023-01-20 PROCEDURE — 87637 SARSCOV2&INF A&B&RSV AMP PRB: CPT

## 2023-01-20 PROCEDURE — 74177 CT ABD & PELVIS W/CONTRAST: CPT | Mod: MA

## 2023-01-20 PROCEDURE — 94640 AIRWAY INHALATION TREATMENT: CPT

## 2023-01-20 PROCEDURE — 84702 CHORIONIC GONADOTROPIN TEST: CPT

## 2023-01-20 PROCEDURE — 84295 ASSAY OF SERUM SODIUM: CPT

## 2023-01-20 PROCEDURE — 36415 COLL VENOUS BLD VENIPUNCTURE: CPT

## 2023-01-20 PROCEDURE — 83735 ASSAY OF MAGNESIUM: CPT

## 2023-01-20 PROCEDURE — 87389 HIV-1 AG W/HIV-1&-2 AB AG IA: CPT

## 2023-01-20 PROCEDURE — 85014 HEMATOCRIT: CPT

## 2023-01-20 PROCEDURE — 80048 BASIC METABOLIC PNL TOTAL CA: CPT

## 2023-01-20 PROCEDURE — 84443 ASSAY THYROID STIM HORMONE: CPT

## 2023-01-20 PROCEDURE — 84132 ASSAY OF SERUM POTASSIUM: CPT

## 2023-01-20 PROCEDURE — 85610 PROTHROMBIN TIME: CPT

## 2023-01-20 PROCEDURE — 83690 ASSAY OF LIPASE: CPT

## 2023-01-20 PROCEDURE — 83036 HEMOGLOBIN GLYCOSYLATED A1C: CPT

## 2023-01-20 PROCEDURE — U0003: CPT

## 2023-01-20 RX ORDER — GABAPENTIN 400 MG/1
0 CAPSULE ORAL
Qty: 0 | Refills: 0 | DISCHARGE

## 2023-01-20 RX ORDER — INSULIN LISPRO 100/ML
10 VIAL (ML) SUBCUTANEOUS
Qty: 0 | Refills: 0 | DISCHARGE

## 2023-01-20 RX ORDER — LIDOCAINE HCL 20 MG/ML
4 VIAL (ML) INJECTION ONCE
Refills: 0 | Status: COMPLETED | OUTPATIENT
Start: 2023-01-20 | End: 2023-01-20

## 2023-01-20 RX ORDER — PANTOPRAZOLE SODIUM 20 MG/1
1 TABLET, DELAYED RELEASE ORAL
Qty: 30 | Refills: 0
Start: 2023-01-20 | End: 2023-02-18

## 2023-01-20 RX ADMIN — SERTRALINE 50 MILLIGRAM(S): 25 TABLET, FILM COATED ORAL at 13:21

## 2023-01-20 RX ADMIN — Medication 4 MILLILITER(S): at 10:59

## 2023-01-20 RX ADMIN — MORPHINE SULFATE 4 MILLIGRAM(S): 50 CAPSULE, EXTENDED RELEASE ORAL at 08:56

## 2023-01-20 RX ADMIN — GABAPENTIN 300 MILLIGRAM(S): 400 CAPSULE ORAL at 08:56

## 2023-01-20 RX ADMIN — Medication 1 MILLIGRAM(S): at 13:20

## 2023-01-20 RX ADMIN — Medication 1 TABLET(S): at 13:20

## 2023-01-20 RX ADMIN — MORPHINE SULFATE 4 MILLIGRAM(S): 50 CAPSULE, EXTENDED RELEASE ORAL at 09:11

## 2023-01-20 RX ADMIN — PANTOPRAZOLE SODIUM 40 MILLIGRAM(S): 20 TABLET, DELAYED RELEASE ORAL at 13:21

## 2023-01-20 RX ADMIN — Medication 1: at 08:24

## 2023-01-20 RX ADMIN — Medication 3 UNIT(S): at 14:41

## 2023-01-20 RX ADMIN — Medication 100 MILLIGRAM(S): at 13:21

## 2023-01-20 RX ADMIN — ENOXAPARIN SODIUM 40 MILLIGRAM(S): 100 INJECTION SUBCUTANEOUS at 14:22

## 2023-01-20 RX ADMIN — GABAPENTIN 300 MILLIGRAM(S): 400 CAPSULE ORAL at 14:22

## 2023-01-20 NOTE — DISCHARGE NOTE PROVIDER - NSDCFUSCHEDAPPT_GEN_ALL_CORE_FT
Juancarlos Stinson  [facility]  [address] [facility]  Scheduled Appointment: 03/16/2023

## 2023-01-20 NOTE — DISCHARGE NOTE PROVIDER - NSDCMRMEDTOKEN_GEN_ALL_CORE_FT
folic acid 1 mg oral tablet: 1 tab(s) orally once a day  gabapentin: 300 milligram(s) orally 2 times a day  HumaLOG 100 units/mL injectable solution: 10 unit(s) injectable 3 times a day    KlonoPIN 1 mg oral tablet: 1 tab(s) orally 3 times a day as needed  Lantus 100 units/mL subcutaneous solution: 30 unit(s) subcutaneous once a day  Multiple Vitamins oral tablet: 1 tab(s) orally once a day  pantoprazole 40 mg oral delayed release tablet: 1 tab(s) orally once a day   sertraline 50 mg oral tablet: 1 tab(s) orally once a day  thiamine 100 mg oral tablet: 1 tab(s) orally once a day  traZODone 100 mg oral tablet: 1 tab(s) orally once a day (at bedtime)  Vitamin D3: 1 tab(s) orally once a day

## 2023-01-20 NOTE — DISCHARGE NOTE PROVIDER - PROVIDER TOKENS
PROVIDER:[TOKEN:[[id number]]],PROVIDER:[TOKEN:[[id number]]] PROVIDER:[TOKEN:[[id number]]],PROVIDER:[TOKEN:[[id number]]],PROVIDER:[TOKEN:[[id number]]]

## 2023-01-20 NOTE — DISCHARGE NOTE NURSING/CASE MANAGEMENT/SOCIAL WORK - PATIENT PORTAL LINK FT
You can access the FollowMyHealth Patient Portal offered by [facility] by registering at the following website: [email or web address]. By joining [employer]’s FollowMyHealth portal, you will also be able to view your health information using other applications (apps) compatible with our system.

## 2023-01-20 NOTE — DISCHARGE NOTE PROVIDER - CARE PROVIDERS DIRECT ADDRESSES
,[email or web address],DirectAddress_Unknown [email or web address]_Unknown,[email or web address]
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
## 2023-01-20 NOTE — DISCHARGE NOTE PROVIDER - NSDCCPCAREPLAN_GEN_ALL_CORE_FT
PRINCIPAL DISCHARGE DIAGNOSIS  Diagnosis: Diabetic gastroparesis  Assessment and Plan of Treatment:       SECONDARY DISCHARGE DIAGNOSES  Diagnosis: Nausea and vomiting  Assessment and Plan of Treatment:     Diagnosis: Diabetes mellitus  Assessment and Plan of Treatment:     Diagnosis: Anxiety and depression  Assessment and Plan of Treatment:     Diagnosis: Intractable abdominal pain  Assessment and Plan of Treatment:     Diagnosis: Urinary tract infection  Assessment and Plan of Treatment:     Diagnosis: Mobile cecum  Assessment and Plan of Treatment:      PRINCIPAL DISCHARGE DIAGNOSIS  Diagnosis: Diabetic gastroparesis  Assessment and Plan of Treatment: avoid opioids follow up Dr Edwards as outpatient VERY IMPORTANT      SECONDARY DISCHARGE DIAGNOSES  Diagnosis: Nausea and vomiting  Assessment and Plan of Treatment: take pantoprazole as prescribed, avoid OTC pain meds like Motrin, Aleve    Diagnosis: Diabetes mellitus  Assessment and Plan of Treatment: follow up Dr Cha. Please call immediately after discharge to set up an appt ASAP. Please call Dr Cisse  if you have trouble scheduling the appt    Diagnosis: Anxiety and depression  Assessment and Plan of Treatment: take meds as before    Diagnosis: Intractable abdominal pain  Assessment and Plan of Treatment: eat small meals do not eat late evening, Stay mobile after meals    Diagnosis: Mobile cecum  Assessment and Plan of Treatment: follow up with Dr Cobb as outpatient

## 2023-01-20 NOTE — PROGRESS NOTE ADULT - NSPROGADDITIONALINFOA_GEN_ALL_CORE
discussed with patient in detail, expresses understanding of treatment plans.
discussed with patient in detail, expresses understanding of treatment plans.  control of DM reinforced at length   follow up with endo also emphasized at length
(2) cough or sneeze

## 2023-01-20 NOTE — DISCHARGE NOTE PROVIDER - HOSPITAL COURSE
52 year old woman with PMH of anxiety on zoloft, Klonopin trazodone, and gabapentin at home, also diabetic on home insulin,  presents with several weeks of poorly localizable abdominal discomfort that has gotten worse over the past few days     Problem/Plan - 1:  ·  Problem: Abdominal pain.   ·  Plan: likely secondary to gastroparesis  EGD noted ++ food in stomach  splenic hypodense lesions on MR are hemangiomata  no intervention at this time per surgery   outpatient follow up for cecal hypermobility state.  outpatient follow up GI for motility study  no opioids no Reglan for now     Problem/Plan - 2:  ·  Problem: Diabetes mellitus.   ·  Plan: continue finger sticks with short acting insulin sliding scale  finger sticks consistent with uncontrolled DM Type 2 with hyperglycemia   resume home dose Lantus   endo follow up reinforced at length  HbA1C 9.8.     Problem/Plan - 3:  ·  Problem: Anxiety and depression.   ·  Plan: continue home regimen  will continue to monitor.     Problem/Plan - 4:  ·  Problem: Alcohol abuse.   ·  Plan: remote  continue thiamine and folate   continued abstinence reinforced at length.       Additional Information:  Additional Information: discussed with patient in detail, expresses understanding of treatment plans.  control of DM reinforced at length   follow up with endo also emphasized at length

## 2023-01-20 NOTE — PROGRESS NOTE ADULT - PROBLEM SELECTOR PLAN 4
remote  continue thiamine and folate   continued abstinence reinforced at length
remote  continue thiamine and folate   continued abstinence reinforced at length

## 2023-01-20 NOTE — PROGRESS NOTE ADULT - PROBLEM SELECTOR PLAN 1
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
unclear etiology  GI consultation appreciated   persistent abdominal pain  EGD tomorrow   splenic hypodense lesions   MR abdomen pending   surgery help appreciated  no surgical intervention at this time

## 2023-01-20 NOTE — DISCHARGE NOTE PROVIDER - CARE PROVIDER_API CALL
Domenica Cha (DO)  Internal Medicine  [address] [facility] [address] [facility], [facility] [address]  [place] [address]  Phone: [phone]  Fax: [phone]  Follow Up Time:     Marco Edwards (DO)  Gastroenterology; Internal Medicine  [address] [place] [id number]  Phone: [phone]  Fax: [phone]  Follow Up Time:    Domenica Cha (DO)  Internal Medicine  [address], [address]  [place] [address]  Phone: [phone]  Fax: [phone]  Follow Up Time:     Marco Edwards (DO)  Gastroenterology; Internal Medicine  [address] [place] [address]  Phone: [phone]  Fax: [phone]  Follow Up Time:     Bob Cobb)  Surgery  [address] [facility], [address]  [place] [address]  Phone: [phone]  Fax: [phone]  Follow Up Time:

## 2023-01-20 NOTE — PROGRESS NOTE ADULT - ASSESSMENT
abdominal pain    CT comparisons shows possible "mobile cecum"  i explained to her this poses increase risk for colonic volvulous  she has daily bm, no need for laxatives  MRI ordered for splenic lesions  she had colonoscopy 6 months ago  no need to repeat; she is obtaining the report  Advance diet as tolerated   plan for upper gastrointestinal endoscopy tomorrow  NPO after MN  will follow   petty pt, petty APC     I reviewed the overnight course of events on the unit, re-confirming the patient history. I discussed the care with the patient and their family  The plan of care was discussed with the physician assistant and modifications were made to the notation where appropriate.   Differential diagnosis and plan of care discussed with patient after the evaluation  35 minutes spent on total encounter of which more than fifty percent of the encounter was spent counseling and/or coordinating care by the attending physician.  Advanced care planning was discussed with patient and family.  Advanced care planning forms were reviewed and discussed.  Risks, benefits and alternatives of gastroenterologic procedures were discussed in detail and all questions were answered. 
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     Recommendations:  - Mobile cecum, no acute surgical intervention, patient without evidence of volvulus  - Splenic nodules, medicine/heme workup as outpatient or inpatient   - f/u MRI      Red Surgery   [id number]
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     Recommendations:  - Mobile cecum, no acute surgical intervention, patient without evidence of volvulus  - Splenic nodules likely splenic hemangiomas per MRI, medicine/heme workup as outpatient or inpatient   - f/u endoscopy findings     Red Surgery   [id number]
52 year old woman with PMH of anxiety on zoloft, Klonopin trazodone, and gabapentin at home, also diabetic on home insulin,  presents with several weeks of poorly localizable abdominal discomfort that has gotten worse over the past few days
52 year old woman with PMH of anxiety on zoloft, Klonopin trazodone, and gabapentin at home, also diabetic on home insulin,  presents with several weeks of poorly localizable abdominal discomfort that has gotten worse over the past few days

## 2023-01-20 NOTE — PROGRESS NOTE ADULT - ATTENDING COMMENTS
date of service 1/20/23    pt seen and examined  pt chart and imaging reviewed in detail  agree with note above  52F with chronic left sided abd pain, known splenic lesions and incidental mobile cecum - no evidence of obstruction or volvulus  no acute surgical intervention at this time - possible overall increased risk of cecal volvulus   resting in bed, NAD  soft, vague left sided tenderness, no r/g  MRI + splenic hemangiomas - no intervention indicated  appreciate gi consult / EGD concerning for gastroparesis  outpt gastric emptying study planned  cont supportive care per medicine/GI  no sx contraindication to dc home  pt can f/u as outpt [phone]  dc planning per primary team  d/w pt & Dr Cisse @ bedside in detail.

## 2023-01-20 NOTE — PRE-OP CHECKLIST - SITE MARKED BY SURGEON
Chief Complaint   Patient presents with   • Fall     ER follow up fall on 4/28/18   • Back Pain     left mid back pain   • Medication Management     patient unsure of current medications, fosamax, sertraline, hydrocodone/apap, pepcid       SUBJECTIVE:  Eunice Rodas is a 84 year old female presenting with several concerns. She has had a couple of falls. She reports that she just trips or slips. She does not really report having any vertigo or lightheadedness or presyncope. Sometimes she does admit she feels a little bit off balance. She said that her ribs and the back are still bothering her. Her back is still bothering her. She wants to know what she can take for pain for this. She was seen in the emergency room on the 28th and had CT of the head as well as CT of the chest. No new fractures were noted. Patient has a history of compression fractures. She currently denies any chest pain, chest pressure, or chest tightness. She denies any shortness of breath. She denies any palpitations. Denies any loss of consciousness.    She reports she is concerned because her hearing seems bad. She has been evaluated by audiology in the past.    She reports that she is only drinking one drink per day. When asked to quantify how much it is she says it is a small 1. We have previously discussed that she must absolutely quit drinking.    Her sisters are with her today and they have paperwork that she may possibly be going into [place]. They would like the paperwork completed. We have been talking about assisted living or other more protective environment for many months with the patient. She has been hesitant to move out of her house.    She reports her mood is doing okay. She still in this to being somewhat lonely and down. She had gotten a little bit more involved in [employer] again recently which was helpful.    PAST MEDICAL HISTORY:  Patient Active Problem List    Diagnosis Date Noted   • Closed fracture of neck of right femur  (CMS/[facility]) 07/18/2017     Priority: High     July 18, 2017     IMPRESSION: Findings compatible with a nondisplaced, slightly impacted  subcapital fracture of the right femoral neck.     • Falls frequently 07/18/2017     Priority: Medium     CT head 4/28/2018  IMPRESSION:  1. No acute finding.    CT ABD/PELVIS 4/28/2018  IMPRESSION:  1. No evidence of acute traumatic injury.     2. Stable thoracic spine compression deformities and old impacted right  femoral neck fracture.     3. Stable small lung nodules.48 hour Holter monitor. February 26, 2018.    The rhythm was normal sinus with heart rates between 55-94 BPM.  The average heart rate was 75 BPM.    Very rare PVCs were seen. 775 and 48 hours. The PVCs were predominantly unifocal in origin.    Very rare PACs were seen. Total of 400 were seen in 48 hours.    CT cervical spine 11/11/2017  Impression: No acute bone or joint abnormality.    CT head 11/11/2017  Prominent lacunar infarct in the basal ganglia on the left, unchanged.  Additional areas of decreased attenuation the periventricular white matter  consistent with small vessel ischemic disease. No findings to suggest acute  ischemia.     Bones of the calvarium and skull base are intact. Mucosal thickening in the  right maxillary sinus, remaining Visualized paranasal sinuses and mastoid  air cells are clear.        Impression: No acute intracranial abnormality     • Closed nondisplaced midcervical fracture of right femur with routine healing 08/02/2017     Priority: Low   • Compression fracture of spine (CMS/[facility]) 04/24/2017     Priority: Low     CT 12/29/2017  IMPRESSION:   1. There is no evidence of pulmonary embolus.  2. There is no evidence of acute pulmonary disease.  3. Compression fractures most likely osteoporotic T12 and T9 vertebral  bodies new from 2015 but age indeterminate.    MRI 4/24/2017  IMPRESSION:    Multiple mild lower thoracic vertebral body compression deformities with T9  and T12 fractures  likely subacute. Percutaneous vertebroplasty could be  therapeutic. There is no definite acute fracture or significant associated  spinal canal narrowing.    Small central disc protrusion at T7-8.    Questionable minute syrinx at T3-4 of unlikely significance.     • Compression fracture of L1 lumbar vertebra (CMS/[facility]) 09/26/2016     Priority: Low     9/24/2016  IMPRESSION: Wedge type compression fracture of L1 that is of indeterminate  Age.    Started fosamax 6/2016     • Closed fracture of ramus of left pubis (CMS/[facility]) 02/20/2016     Priority: Low     2/19/2016  There is generalized osteopenia. Nondisplaced fractures involving the  left inferior pubic ramus are again identified. There is minimal, if any,  callus formation.. Left hip arthroplasty hardware appears stable. No  evidence of hardware complication.     IMPRESSION:    1. Stable exam. Nondisplaced inferior pubic rami fractures are again  identified, without significant callus formation.     • Fracture of manubrium 12/20/2015     Priority: Low     CT 11/12/2015  IMPRESSION: On the sagittal reformatted images, questionable anterior and  posterior fractures involving the manubrium of the sternum. No secondary  hematoma or other findings in this region. Conceivably this may represent  artifact in this region but a fracture cannot be completely excluded.  There is no significant displacement of these possible fracture fragments.  Mild superior endplate compression deformity of T8 and T10 probably remote.  Atherosclerotic changes of the thoracic aorta. No aneurysmal dilatation.  No evidence for pneumothorax or significant infiltrate or mass in either  lung. There is one small 3 mm nodule in the anterior upper left lung,  series 3 image 29.   Very small posterior left or right pleural effusion with minimal  atelectasis or infiltrate in both lung bases     • Liver cyst 12/20/2015     Priority: Low     CT 4/14/2015  IMPRESSION: 2 small areas of low-attenuation  within the liver too small to  characterize most likely suggesting cysts.  Status post cholecystectomy.  No definite spleen could be seen with certainty.  Slightly prominent extrarenal pelvis of the left kidney.  Fluid-filled loops of nondilated small bowel in the pelvis most likely a  normal finding. (No oral contrast was given)  Some artifacts in the lower pelvis secondary to a total left hip  prosthesis.  Facet degenerative changes of the lower lumbar spine     • Peripheral vascular disease (CMS/HCC) 12/20/2015     Priority: Low     CAROLYN September 9, 2014  Right 0.46, left 0.76     • Cerebral microvascular disease 10/26/2015     Priority: Low     CT head August 15, 2014  IMPRESSION:  1. No acute intracranial hemorrhage.  2. Moderate atrophy.  3. White matter changes are likely related to small vessel disease.     • Lacunar infarction, history 10/26/2015     Priority: Low     MRI March 2013  IMPRESSION:  Mild atrophy.  Scattered areas of chronic small vessel ischemic changes  Small remote bilateral lacunar infarcts  No definite evidence for acute findings     • S/P hip hemiarthroplasty 04/24/2015     Priority: Low   • Chronic kidney disease 03/27/2015     Priority: Low   • Macrocytic anemia 10/06/2014     Priority: Low   • Alcohol abuse in remission 10/06/2014     Priority: Low   • Hip fracture, left (CMS/[facility]) 08/22/2014     Priority: Low   • S/P left hip cemented hemiarthroplasty 08/18/2014     Priority: Low   • S/P left hip cemented hemiarthroplasty 08/18/2014     Priority: Low     8./15/14.  Left hip fracture 2/2 Fall S/P left hip cemented hemiarthroplasty-Dr. Dobbins     • LVH (left ventricular hypertrophy) 03/15/2013     Priority: Low   • Diastolic dysfunction 03/15/2013     Priority: Low     Resolved on echo 8/16/14     • Mild mitral regurgitation 03/15/2013     Priority: Low     Resolved on echo 8/16/14     • Mild tricuspid regurgitation 03/15/2013     Priority: Low   • Celiac disease 03/14/2013      Priority: Low   • Cardiomyopathy (CMS/HCC) 03/14/2013     Priority: Low     ECHO 11/16/2015  This is a limited echocardiogram to evaluated LVEF  all the images are forsshorten therefore I am unable to accurately estimated LVEF  Based on the shortaxis and parasternal long axis views , LVEF is rount 35-40%  Prior echocardiogram in 2014 reviewed , same problem exists    Echo 8/16/2014   Left ventricular ejection fraction, 44 %.  Normal right ventricular size and systolic function, RVSP 53.6 mmHg.  No significant valve abnormalities.  Compared to prior study, EF has improved from 40-44%. PA has increased from 32-53 mm Hg.    EF 35-40% on cardiac catheter 2011     • Essential hypertension 03/14/2013     Priority: Low   • Overactive bladder 03/14/2013     Priority: Low   • Other and unspecified hyperlipidemia 03/14/2013     Priority: Low   • Edema 03/14/2013     Priority: Low   • Tubular adenoma 03/14/2013     Priority: Low     Colonoscopy March 8, 2011     • CAD (coronary artery disease) 03/14/2013     Priority: Low     Catheter June 4, 2013  PROCEDURE:  1. Left heart catheterization, coronary angiography.  2. Angiomax infusion.  3. Intravascular ultrasound of the left anterior descending stent.  4. Restenting of the left anterior descending with a 2.75x14 Resolute drug-eluting stent post-dilated with a 3.0 noncompliant balloon with excellent angiographic result (see report).    CORONARY ANGIOGRAPHY:  On fluoroscopy, coronary calcification was noted. Left main coronary appeared normal. There was a stent in the mid to proximal left anterior descending with moderate in-stent restenosis angiographically with CALLIE 3 flow. The circumflex and right coronary systems had evidence for mild plaquing.     • CAD S/P percutaneous coronary angioplasty w/ BMS in-stent stenosis, LAD 03/14/2013     Priority: Low     Catheter June 4, 2013 PROCEDURE: 1. Left heart catheterization, coronary angiography. 2. Angiomax infusion. 3.  Intravascular ultrasound of the left anterior descending stent. 4. Restenting of the left anterior descending with a 2.75x14 Resolute drug-eluting stent post-dilated with a 3.0 noncompliant balloon with excellent angiographic result (see report).  CORONARY ANGIOGRAPHY: On fluoroscopy, cor         Past Surgical History:   Procedure Laterality Date   • Appendectomy     • Breast lumpectomy     • Colonoscopy w/ polypectomy  03/08/2011   • Coronary angioplasty with stent placement  06/30/2011   • Dexa bone density axial skeleton  05/04/2011   • Hip surgery      Left Hemiathroplasty Femoral Neck Fracture   • Hysterectomy      including removal of ovaries   • Mammo screening bilateral  03/16/2011   • Pelvis laparoscopy,dx      exploratory   • Removal gallbladder      Cholecystectomy (gallbladder)       Current Outpatient Prescriptions   Medication Sig Dispense Refill   • acetaminophen (TYLENOL) 325 MG tablet Take 2 tablets by mouth every 6 hours as needed for Pain. 30 tablet 0   • losartan (COZAAR) 50 MG tablet Take 1 tablet by mouth daily. 90 tablet 0   • oxybutynin (DITROPAN) 5 MG tablet Take 1 tablet by mouth daily. 90 tablet 0   • hydrochlorothiazide (HYDRODIURIL) 25 MG tablet Take 1 tablet by mouth daily. 90 tablet 0   • acetaminophen (TYLENOL) 325 MG tablet Take 2 tablets by mouth every 4 hours as needed for Pain or Fever. 30 tablet 0   • aspirin (ECOTRIN) 325 MG EC tablet Take 1 tablet by mouth daily. 30 tablet 0   • DISPENSE Please dispense 4 wheeled walker with seat. NPI [id number] 1 Units 0   • sertraline (ZOLOFT) 50 MG tablet Take 1 tablet by mouth daily. 1/2 tab daily for the first week 30 tablet 2   • alendronate (FOSAMAX) 70 MG tablet Take 1 tablet by mouth every 7 days. 4 tablet 5   • lidocaine (LIDODERM) 5 % patch Place 1 patch onto the skin daily. Remove patch 12 hours after applying (Patient not taking: Reported on 5/9/2018) 30 patch 0   • HYDROcodone-acetaminophen (NORCO) 5-325 MG per tablet Take 1  tablet by mouth every 4 hours as needed for Pain. (Patient not taking: Reported on 1/19/2018) 40 tablet 0   • famotidine (PEPCID) 20 MG tablet Take 1 tablet by mouth daily. Indications: Treatment to Prevent Stress Ulcers 30 tablet 0     No current facility-administered medications for this visit.        ALLERGIES:   Allergen Reactions   • Gluten Nausea & Vomiting   • Penicillins      Fatigue         Family History   Problem Relation Age of Onset   • Heart disease Mother    • Cancer Father    • Heart disease Sister        SOCIAL HISTORY:  History   Smoking Status   • Former Smoker   • Packs/day: 1.00   • Years: 5.00   • Types: Cigarettes   • Quit date: 1/1/1960   Smokeless Tobacco   • Never Used       OBJECTIVE:  Visit Vitals  /63   Pulse 72   Resp 18   Ht 5' 1\" (1.549 m)   Wt 44 kg   BMI 18.33 kg/m²     General: Thin elderly white female. She is in no acute distress.  HEENT:  Eyes are moist. Sclerae are anicteric. Conjunctivae are neither pale nor injected. Oropharynx is moist without erythema, exudate, or petechiae. Left ear cerumen impaction is noted.  Neck:  Supple. No JVD (jugular venous distention), bruit, or thyromegaly. Trachea is midline. No anterior cervical, posterior cervical, or supraclavicular adenopathy.  Lungs:  Clear to auscultation bilaterally without crackles or wheezes. There is normal effort. There is good aeration.  Heart:  Regular rate and rhythm without murmurs, rubs, or gallops. Radial and posterior tibial pulses are palpable and symmetric bilaterally.  Chest: Palpation of the posterior lateral chest wall produces significant pain. There is no bruising, swelling, or erythema.   Abdomen:  Soft. Nontender. Nondistended. No masses or hepatosplenomegaly are appreciated. There is no rebound or guarding.  Extremities:  No cyanosis, clubbing, But there is trace edema.  Skin:  Warm and dry with normal turgor. A couple bruises are noted. There is an abraded area on the left anterior  shin..  Neurologic:  Alert and oriented ×3. Exam grossly nonfocal.    ASSESSMENT AND PLAN:    Eunice was seen today for fall, back pain and medication management.    Diagnoses and all orders for this visit:    Essential hypertension  The patient's blood pressure is optimally controlled. She is not having any side effects from her medication. Her most recent labs are reviewed.  Lifestyle modification is discussed and handout is given. Continue present management. Follow-up as directed. She is borderline orthostatic. Will not increase her medications at the current time.  Recurrent major depressive disorder, in partial remission (CMS/HCC)  Stable on current regimen. Definitely increased socialization will be helpful. Cutting down on alcohol/quitting alcohol altogether would be helpful.  Bilateral hearing loss, unspecified hearing loss type  Repeat evaluation with audiology if desired. Nurse flushes the wax out of the ear today.  Compression fracture of spine, with routine healing, subsequent encounter  Care discussed. Continue bisphosphonate.  Falls frequently  Suspect these are all related to alcohol. She has had other workup. Must stop drinking.  Alcohol abuse, episodic  See above. Needs to stop drinking. In November she had a BAL of 0.25.  Coronary artery disease involving native coronary artery of native heart without angina pectoris  Stable.  Asymptomatic.  Continue risk factor reduction.   Chronic kidney disease, stage 3 (moderate)  Avoid nephrotoxins. Continue risk factor reduction. Continue to check labs as discussed. Follow-up as directed.  Rib pain on left side  -     XR RIBS LEFT W PA CHEST; Future  Radiology to review. Further plan pending interpretation.    Instructed to return to clinic or call if symptoms are not resolved as discussed, or sooner if worse.  All questions answered and patient is agreeable to this plan.  Refer to after visit summary.         n/a

## 2023-01-20 NOTE — PROGRESS NOTE ADULT - PROBLEM SELECTOR PLAN 2
continue finger sticks with short acting insulin sliding scale  finger sticks consistent with uncontrolled DM Type 2 with hyperglycemia   up-titrate Lantus to 20   HbA1C 9.8  endo help appreciated
continue finger sticks with short acting insulin sliding scale  finger sticks consistent with uncontrolled DM Type 2 with hyperglycemia   resume home dose Lantus   endo follow up reinforced at length  HbA1C 9.8

## 2023-01-20 NOTE — DISCHARGE NOTE NURSING/CASE MANAGEMENT/SOCIAL WORK - NSDCPEFALRISK_GEN_ALL_CORE
For information on Fall & Injury Prevention, visit: [email or web address] OR  [email or web address] OR  https://www.cdc.gov/steadi/patient.html

## 2023-01-20 NOTE — PROGRESS NOTE ADULT - SUBJECTIVE AND OBJECTIVE BOX
SUBJECTIVE:   Seen and examined at bedside. Reports ongoing mild pain on the lower quadrants.   MRI with splenic hemangiomas.     OBJECTIVE: T(C): 36.8 (01-20-23 @ 04:35), Max: 36.9 (01-19-23 @ 12:31)  HR: 53 (01-20-23 @ 04:35) (53 - 55)  BP: 129/84 (01-20-23 @ 04:35) (108/74 - 129/84)  RR: 18 (01-20-23 @ 04:35) (18 - 18)  SpO2: 96% (01-20-23 @ 04:35) (95% - 96%)  Wt(kg): --  I&O's Summary    19 Jan 2023 07:01  -  20 Jan 2023 07:00  --------------------------------------------------------  IN: 660 mL / OUT: 0 mL / NET: 660 mL      I&O's Detail    19 Jan 2023 07:01  -  20 Jan 2023 07:00  --------------------------------------------------------  IN:    Oral Fluid: 660 mL  Total IN: 660 mL    OUT:  Total OUT: 0 mL    Total NET: 660 mL      Physical Exam  General: NAD  Resp: nonlabored   Abdomen: soft, minimally tender on LLQ, nondistended  VasC: WWP    MEDICATIONS  (STANDING):  dextrose 5%. 1000 milliLiter(s) (50 mL/Hr) IV Continuous <Continuous>  dextrose 5%. 1000 milliLiter(s) (100 mL/Hr) IV Continuous <Continuous>  dextrose 50% Injectable 12.5 Gram(s) IV Push once  dextrose 50% Injectable 25 Gram(s) IV Push once  dextrose 50% Injectable 25 Gram(s) IV Push once  enoxaparin Injectable 40 milliGRAM(s) SubCutaneous every 24 hours  folic acid 1 milliGRAM(s) Oral daily  gabapentin 300 milliGRAM(s) Oral <User Schedule>  gabapentin 600 milliGRAM(s) Oral at bedtime  glucagon  Injectable 1 milliGRAM(s) IntraMuscular once  insulin glargine Injectable (LANTUS) 20 Unit(s) SubCutaneous at bedtime  insulin lispro (ADMELOG) corrective regimen sliding scale   SubCutaneous three times a day before meals  insulin lispro (ADMELOG) corrective regimen sliding scale   SubCutaneous at bedtime  insulin lispro Injectable (ADMELOG) 3 Unit(s) SubCutaneous three times a day before meals  multivitamin 1 Tablet(s) Oral daily  pantoprazole  Injectable 40 milliGRAM(s) IV Push daily  sertraline 50 milliGRAM(s) Oral daily  thiamine 100 milliGRAM(s) Oral daily  traZODone 100 milliGRAM(s) Oral at bedtime    MEDICATIONS  (PRN):  clonazePAM  Tablet 1 milliGRAM(s) Oral three times a day PRN anxiety  dextrose Oral Gel 15 Gram(s) Oral once PRN Blood Glucose LESS THAN 70 milliGRAM(s)/deciliter  morphine  - Injectable 4 milliGRAM(s) IV Push every 6 hours PRN Severe Pain (7 - 10)      LABS:                        13.9   8.47  )-----------( 157      ( 19 Jan 2023 07:07 )             42.9     01-19    139  |  104  |  13  ----------------------------<  189<H>  4.3   |  26  |  0.70    Ca    9.6      19 Jan 2023 07:07            
        SUBJECTIVE:   Seen and examined at bedside. Reports mild abd pain on her left side.     OBJECTIVE: T(C): 37.1 (23 @ 04:56), Max: 37.1 (23 @ 04:56)  HR: 58 (23 @ 04:56) (52 - 60)  BP: 103/62 (23 @ 04:56) (103/62 - 157/93)  RR: 18 (23 @ 04:56) (18 - 19)  SpO2: 94% (23 @ 04:56) (94% - 99%)  Wt(kg): --  I&O's Summary    I&O's Detail    Physical Exam  General: NAD  Resp: nonlabored   Abdomen: soft, mildly tender, nondistended  Vasc: WWP    MEDICATIONS  (STANDING):  dextrose 5%. 1000 milliLiter(s) (50 mL/Hr) IV Continuous <Continuous>  dextrose 5%. 1000 milliLiter(s) (100 mL/Hr) IV Continuous <Continuous>  dextrose 50% Injectable 25 Gram(s) IV Push once  dextrose 50% Injectable 12.5 Gram(s) IV Push once  dextrose 50% Injectable 25 Gram(s) IV Push once  enoxaparin Injectable 40 milliGRAM(s) SubCutaneous every 24 hours  folic acid 1 milliGRAM(s) Oral daily  gabapentin 300 milliGRAM(s) Oral <User Schedule>  gabapentin 600 milliGRAM(s) Oral at bedtime  glucagon  Injectable 1 milliGRAM(s) IntraMuscular once  insulin glargine Injectable (LANTUS) 10 Unit(s) SubCutaneous at bedtime  insulin lispro Injectable (ADMELOG) 3 Unit(s) SubCutaneous three times a day before meals  multivitamin 1 Tablet(s) Oral daily  pantoprazole  Injectable 40 milliGRAM(s) IV Push daily  sertraline 50 milliGRAM(s) Oral daily  thiamine 100 milliGRAM(s) Oral daily  traZODone 100 milliGRAM(s) Oral at bedtime    MEDICATIONS  (PRN):  clonazePAM  Tablet 1 milliGRAM(s) Oral three times a day PRN anxiety  dextrose Oral Gel 15 Gram(s) Oral once PRN Blood Glucose LESS THAN 70 milliGRAM(s)/deciliter      LABS:                        13.9   8.47  )-----------( 157      ( 2023 07:07 )             42.9     -    139  |  104  |  13  ----------------------------<  189<H>  4.3   |  26  |  0.70    Ca    9.6      2023 07:07  Mg     2.0         TPro  8.4<H>  /  Alb  4.7  /  TBili  0.6  /  DBili  x   /  AST  20  /  ALT  21  /  AlkPhos  209<H>  -18    PT/INR - ( 2023 02:26 )   PT: 10.5 sec;   INR: 0.91 ratio         PTT - ( 2023 02:26 )  PTT:30.0 sec  Urinalysis Basic - ( 2023 04:21 )    Color: Colorless / Appearance: Clear / S.051 / pH: x  Gluc: x / Ketone: Negative  / Bili: Negative / Urobili: Negative   Blood: x / Protein: Trace / Nitrite: Negative   Leuk Esterase: Large / RBC: 1 /hpf / WBC 13 /HPF   Sq Epi: x / Non Sq Epi: 1 /hpf / Bacteria: Negative        
Patient is a 52y old  Female who presents with a chief complaint of abdominal pain (2023 12:12)      DATE OF SERVICE: 23 @ 14:41    SUBJECTIVE / OVERNIGHT EVENTS: overnight events noted    ROS:  Resp: No cough no sputum production  CVS: No chest pain no palpitations no orthopnea  GI: no N/V/D  "I feel a little better"           MEDICATIONS  (STANDING):  dextrose 5%. 1000 milliLiter(s) (50 mL/Hr) IV Continuous <Continuous>  dextrose 5%. 1000 milliLiter(s) (100 mL/Hr) IV Continuous <Continuous>  dextrose 50% Injectable 25 Gram(s) IV Push once  dextrose 50% Injectable 12.5 Gram(s) IV Push once  dextrose 50% Injectable 25 Gram(s) IV Push once  enoxaparin Injectable 40 milliGRAM(s) SubCutaneous every 24 hours  folic acid 1 milliGRAM(s) Oral daily  gabapentin 300 milliGRAM(s) Oral <User Schedule>  gabapentin 600 milliGRAM(s) Oral at bedtime  glucagon  Injectable 1 milliGRAM(s) IntraMuscular once  insulin glargine Injectable (LANTUS) 20 Unit(s) SubCutaneous at bedtime  insulin lispro Injectable (ADMELOG) 3 Unit(s) SubCutaneous three times a day before meals  multivitamin 1 Tablet(s) Oral daily  pantoprazole  Injectable 40 milliGRAM(s) IV Push daily  sertraline 50 milliGRAM(s) Oral daily  thiamine 100 milliGRAM(s) Oral daily  traZODone 100 milliGRAM(s) Oral at bedtime    MEDICATIONS  (PRN):  clonazePAM  Tablet 1 milliGRAM(s) Oral three times a day PRN anxiety  dextrose Oral Gel 15 Gram(s) Oral once PRN Blood Glucose LESS THAN 70 milliGRAM(s)/deciliter        CAPILLARY BLOOD GLUCOSE      POCT Blood Glucose.: 299 mg/dL (2023 12:02)  POCT Blood Glucose.: 200 mg/dL (2023 08:17)  POCT Blood Glucose.: 141 mg/dL (2023 21:40)  POCT Blood Glucose.: 146 mg/dL (2023 17:09)    I&O's Summary      Vital Signs Last 24 Hrs  T(C): 36.9 (2023 12:31), Max: 37.1 (2023 04:56)  T(F): 98.4 (2023 12:31), Max: 98.8 (2023 04:56)  HR: 55 (2023 12:31) (52 - 60)  BP: 119/78 (2023 12:31) (103/62 - 157/93)  BP(mean): 106 (2023 20:00) (106 - 106)  RR: 18 (2023 12:31) (18 - 19)  SpO2: 96% (2023 12:31) (94% - 99%)    PHYSICAL EXAM:   HEENT: PINKY EOMI  Neck: Supple, no JVD  Lungs: no wheeze, no crackles  CVS: S1 S2 no M/R/G  Abdomen: left sided tenderness +   Neuro: AO x 3 nonfocal   Skin: warm, dry  Ext: no edema  Back: no CVA tenderness    LABS:                        13.9   8.47  )-----------( 157      ( 2023 07:07 )             42.9         139  |  104  |  13  ----------------------------<  189<H>  4.3   |  26  |  0.70    Ca    9.6      2023 07:07  Mg     2.0         TPro  8.4<H>  /  Alb  4.7  /  TBili  0.6  /  DBili  x   /  AST  20  /  ALT  21  /  AlkPhos  209<H>  18    PT/INR - ( 2023 02:26 )   PT: 10.5 sec;   INR: 0.91 ratio         PTT - ( 2023 02:26 )  PTT:30.0 sec      Urinalysis Basic - ( 2023 04:21 )    Color: Colorless / Appearance: Clear / S.051 / pH: x  Gluc: x / Ketone: Negative  / Bili: Negative / Urobili: Negative   Blood: x / Protein: Trace / Nitrite: Negative   Leuk Esterase: Large / RBC: 1 /hpf / WBC 13 /HPF   Sq Epi: x / Non Sq Epi: 1 /hpf / Bacteria: Negative          All consultant(s) notes reviewed and care discussed with other providers        Contact Number, Dr Cisse [phone]
[facility]  Randy Carrion PA-C  [address] [place] [address]  [phone]      INTERVAL HPI/OVERNIGHT EVENTS:  pt seen and examined, no new events  still with c/o abdominal pain       MEDICATIONS  (STANDING):  dextrose 5%. 1000 milliLiter(s) (50 mL/Hr) IV Continuous <Continuous>  dextrose 5%. 1000 milliLiter(s) (100 mL/Hr) IV Continuous <Continuous>  dextrose 50% Injectable 25 Gram(s) IV Push once  dextrose 50% Injectable 12.5 Gram(s) IV Push once  dextrose 50% Injectable 25 Gram(s) IV Push once  enoxaparin Injectable 40 milliGRAM(s) SubCutaneous every 24 hours  folic acid 1 milliGRAM(s) Oral daily  gabapentin 300 milliGRAM(s) Oral <User Schedule>  gabapentin 600 milliGRAM(s) Oral at bedtime  glucagon  Injectable 1 milliGRAM(s) IntraMuscular once  insulin glargine Injectable (LANTUS) 10 Unit(s) SubCutaneous at bedtime  insulin lispro Injectable (ADMELOG) 3 Unit(s) SubCutaneous three times a day before meals  multivitamin 1 Tablet(s) Oral daily  pantoprazole  Injectable 40 milliGRAM(s) IV Push daily  sertraline 50 milliGRAM(s) Oral daily  thiamine 100 milliGRAM(s) Oral daily  traZODone 100 milliGRAM(s) Oral at bedtime    MEDICATIONS  (PRN):  clonazePAM  Tablet 1 milliGRAM(s) Oral three times a day PRN anxiety  dextrose Oral Gel 15 Gram(s) Oral once PRN Blood Glucose LESS THAN 70 milliGRAM(s)/deciliter      Allergies    Bactrim (Anaphylaxis)  Eggplant mouth itches (Other)    Intolerances        ROS:   General:  No wt loss, fevers, chills, night sweats, fatigue,   Eyes:  Good vision, no reported pain  ENT:  No sore throat, pain, runny nose, dysphagia  CV:  No pain, palpitations, hypo/hypertension  Resp:  No dyspnea, cough, tachypnea, wheezing  GI:  + pain, No nausea, No vomiting, No diarrhea, No constipation, No weight loss, No fever, No pruritis, No rectal bleeding, No tarry stools, No dysphagia,  :  No pain, bleeding, incontinence, nocturia  Muscle:  No pain, weakness  Neuro:  No weakness, tingling, memory problems  Psych:  No fatigue, insomnia, mood problems, depression  Endocrine:  No polyuria, polydipsia, cold/heat intolerance  Heme:  No petechiae, ecchymosis, easy bruisability  Skin:  No rash, tattoos, scars, edema      PHYSICAL EXAM:   Vital Signs:  Vital Signs Last 24 Hrs  T(C): 37.1 (2023 04:56), Max: 37.1 (2023 04:56)  T(F): 98.8 (2023 04:56), Max: 98.8 (2023 04:56)  HR: 58 (2023 04:56) (52 - 60)  BP: 103/62 (2023 04:56) (103/62 - 157/93)  BP(mean): 106 (2023 20:00) (106 - 106)  RR: 18 (2023 04:56) (18 - 19)  SpO2: 94% (2023 04:56) (94% - 99%)    Parameters below as of 2023 04:56  Patient On (Oxygen Delivery Method): room air      Daily     Daily     GENERAL:  Appears stated age,   HEENT:  NC/AT,    CHEST:  Full & symmetric excursion,   HEART:  Regular rhythm,  ABDOMEN:  Soft, non-tender, non-distended,  EXTEREMITIES:  no cyanosis  SKIN:  No rash  NEURO:  Alert,       LABS:                        13.9   8.47  )-----------( 157      ( 2023 07:07 )             42.9         139  |  104  |  13  ----------------------------<  189<H>  4.3   |  26  |  0.70    Ca    9.6      2023 07:07  Mg     2.0         TPro  8.4<H>  /  Alb  4.7  /  TBili  0.6  /  DBili  x   /  AST  20  /  ALT  21  /  AlkPhos  209<H>      PT/INR - ( 2023 02:26 )   PT: 10.5 sec;   INR: 0.91 ratio         PTT - ( 2023 02:26 )  PTT:30.0 sec  Urinalysis Basic - ( 2023 04:21 )    Color: Colorless / Appearance: Clear / S.051 / pH: x  Gluc: x / Ketone: Negative  / Bili: Negative / Urobili: Negative   Blood: x / Protein: Trace / Nitrite: Negative   Leuk Esterase: Large / RBC: 1 /hpf / WBC 13 /HPF   Sq Epi: x / Non Sq Epi: 1 /hpf / Bacteria: Negative        RADIOLOGY & ADDITIONAL TESTS:  
Patient is a 52y old  Female who presents with a chief complaint of abdominal pain (20 Jan 2023 09:09)      DATE OF SERVICE: 01-20-23 @ 13:06    SUBJECTIVE / OVERNIGHT EVENTS: overnight events noted    ROS:  Resp: No cough no sputum production  CVS: No chest pain no palpitations no orthopnea  GI: no N/V/D  "I feel much better"   tolerating diet         MEDICATIONS  (STANDING):  dextrose 5%. 1000 milliLiter(s) (50 mL/Hr) IV Continuous <Continuous>  dextrose 5%. 1000 milliLiter(s) (100 mL/Hr) IV Continuous <Continuous>  dextrose 50% Injectable 12.5 Gram(s) IV Push once  dextrose 50% Injectable 25 Gram(s) IV Push once  dextrose 50% Injectable 25 Gram(s) IV Push once  enoxaparin Injectable 40 milliGRAM(s) SubCutaneous every 24 hours  folic acid 1 milliGRAM(s) Oral daily  gabapentin 300 milliGRAM(s) Oral <User Schedule>  gabapentin 600 milliGRAM(s) Oral at bedtime  glucagon  Injectable 1 milliGRAM(s) IntraMuscular once  insulin glargine Injectable (LANTUS) 20 Unit(s) SubCutaneous at bedtime  insulin lispro (ADMELOG) corrective regimen sliding scale   SubCutaneous three times a day before meals  insulin lispro (ADMELOG) corrective regimen sliding scale   SubCutaneous at bedtime  insulin lispro Injectable (ADMELOG) 3 Unit(s) SubCutaneous three times a day before meals  multivitamin 1 Tablet(s) Oral daily  pantoprazole  Injectable 40 milliGRAM(s) IV Push daily  sertraline 50 milliGRAM(s) Oral daily  thiamine 100 milliGRAM(s) Oral daily  traZODone 100 milliGRAM(s) Oral at bedtime    MEDICATIONS  (PRN):  clonazePAM  Tablet 1 milliGRAM(s) Oral three times a day PRN anxiety  dextrose Oral Gel 15 Gram(s) Oral once PRN Blood Glucose LESS THAN 70 milliGRAM(s)/deciliter  morphine  - Injectable 4 milliGRAM(s) IV Push every 6 hours PRN Severe Pain (7 - 10)        CAPILLARY BLOOD GLUCOSE      POCT Blood Glucose.: 142 mg/dL (20 Jan 2023 12:39)  POCT Blood Glucose.: 167 mg/dL (20 Jan 2023 08:19)  POCT Blood Glucose.: 283 mg/dL (19 Jan 2023 22:00)  POCT Blood Glucose.: 216 mg/dL (19 Jan 2023 17:11)    I&O's Summary    19 Jan 2023 07:01  -  20 Jan 2023 07:00  --------------------------------------------------------  IN: 660 mL / OUT: 0 mL / NET: 660 mL        Vital Signs Last 24 Hrs  T(C): 36.4 (20 Jan 2023 10:56), Max: 36.8 (20 Jan 2023 04:35)  T(F): 97.6 (20 Jan 2023 10:43), Max: 98.3 (20 Jan 2023 04:35)  HR: 48 (20 Jan 2023 12:05) (48 - 56)  BP: 127/70 (20 Jan 2023 12:05) (108/74 - 139/64)  BP(mean): 106 (20 Jan 2023 10:56) (106 - 106)  RR: 16 (20 Jan 2023 12:05) (13 - 18)  SpO2: 98% (20 Jan 2023 12:05) (94% - 98%)      PHYSICAL EXAM:   HEENT: PINKY EOMI  Neck: Supple, no JVD  Lungs: no wheeze, no crackles  CVS: S1 S2 no M/R/G  Abdomen: left sided tenderness +   Neuro: AO x 3 nonfocal   Skin: warm, dry  Ext: no edema  Back: no CVA tenderness    LABS:                        13.9   8.47  )-----------( 157      ( 19 Jan 2023 07:07 )             42.9     01-19    139  |  104  |  13  ----------------------------<  189<H>  4.3   |  26  |  0.70    Ca    9.6      19 Jan 2023 07:07                  All consultant(s) notes reviewed and care discussed with other providers        Contact Number, Dr Cisse [phone]

## 2023-01-20 NOTE — PATIENT PROFILE ADULT. - TRANSFUSION PREMEDICATION REQUIRED
none 56 yr old female presents with preop dx leiomyoma of uterus scheduled for D&C hysteroscopy with myosure, myomectomy, reports LMP 1/14/23; heavy vaginal bleeding, Hgb 9.8 went to hospital for palpitations s/p 1U PRBC, reports pelvic cramps right more than left, reports she now experiences palpitations once in a while

## 2023-03-07 ENCOUNTER — APPOINTMENT (OUTPATIENT)
Dept: ENDOCRINOLOGY | Facility: CLINIC | Age: 53
End: 2023-03-07

## 2023-03-08 ENCOUNTER — APPOINTMENT (OUTPATIENT)
Dept: ENDOCRINOLOGY | Facility: CLINIC | Age: 53
End: 2023-03-08
Payer: MEDICARE

## 2023-03-08 VITALS
BODY MASS INDEX: 31.89 KG/M2 | HEART RATE: 65 BPM | HEIGHT: 63 IN | DIASTOLIC BLOOD PRESSURE: 80 MMHG | SYSTOLIC BLOOD PRESSURE: 110 MMHG | WEIGHT: 180 LBS | OXYGEN SATURATION: 97 %

## 2023-03-08 PROCEDURE — 99204 OFFICE O/P NEW MOD 45 MIN: CPT

## 2023-03-09 LAB
CREAT SPEC-SCNC: 44 MG/DL
MICROALBUMIN 24H UR DL<=1MG/L-MCNC: <1.2 MG/DL
MICROALBUMIN/CREAT 24H UR-RTO: NORMAL MG/G

## 2023-03-09 RX ORDER — BLOOD-GLUCOSE METER
W/DEVICE EACH MISCELLANEOUS
Qty: 1 | Refills: 0 | Status: ACTIVE | COMMUNITY
Start: 2023-03-09 | End: 1900-01-01

## 2023-03-09 RX ORDER — LANCETS 33 GAUGE
EACH MISCELLANEOUS
Qty: 100 | Refills: 0 | Status: ACTIVE | COMMUNITY
Start: 2023-03-09 | End: 1900-01-01

## 2023-03-09 RX ORDER — SYRING-NEEDL,DISP,INSUL,0.3 ML 30 GX5/16"
SYRINGE, EMPTY DISPOSABLE MISCELLANEOUS
Qty: 1 | Refills: 0 | Status: ACTIVE | COMMUNITY
Start: 2023-03-09 | End: 1900-01-01

## 2023-03-09 RX ORDER — METFORMIN ER 500 MG 500 MG/1
500 TABLET ORAL
Qty: 126 | Refills: 0 | Status: ACTIVE | COMMUNITY
Start: 2023-03-09 | End: 1900-01-01

## 2023-03-09 RX ORDER — BLOOD SUGAR DIAGNOSTIC
STRIP MISCELLANEOUS 4 TIMES DAILY
Qty: 2 | Refills: 3 | Status: ACTIVE | COMMUNITY
Start: 2018-02-08 | End: 1900-01-01

## 2023-03-09 NOTE — PHYSICAL EXAM
[Alert] : alert [Well Nourished] : well nourished [No Acute Distress] : no acute distress [Well Developed] : well developed [Normal Sclera/Conjunctiva] : normal sclera/conjunctiva [No Proptosis] : no proptosis [No Neck Mass] : no neck mass was observed [Supple] : the neck was supple [Thyroid Not Enlarged] : the thyroid was not enlarged [No Respiratory Distress] : no respiratory distress [No Accessory Muscle Use] : no accessory muscle use [Clear to Auscultation] : lungs were clear to auscultation bilaterally [Normal S1, S2] : normal S1 and S2 [Normal Rate] : heart rate was normal [Regular Rhythm] : with a regular rhythm [No Edema] : no peripheral edema [Not Tender] : non-tender [Not Distended] : not distended [Soft] : abdomen soft [No Stigmata of Cushings Syndrome] : no stigmata of Cushings Syndrome [Normal Gait] : normal gait [Diminished Throughout Both Feet] : diminished tactile sensation with monofilament testing throughout both feet [#1 Diminished] : number 1 was diminished [#2 Diminished] : number 2 was diminished [#3 Diminished] : number 3 was diminished [No Tremors] : no tremors [Oriented x3] : oriented to person, place, and time [Normal Affect] : the affect was normal

## 2023-03-14 NOTE — HISTORY OF PRESENT ILLNESS
[FreeTextEntry1] : HISTORY OF PRESENTING ILLNESS:\par The patient is a 52 year old female being seen in the office today for evaluation of diabetes. Recent admission in 1/2023 to [facility] for abdominal pain s/p EGD, was told likely secondary to gastroparesis. Other medical history significant for mobile cecum following GI may need possible surgery, neuropathy, fibromyalgia, depression, and anxiety. Previous hx of a alcohol abuse, now sober since May 2022. \par \par CHIEF COMPLAINT: T2DM \par \par DIABETES HPI:\par Type of Diabetes: T2DM \par Duration of Diabetes: > 20 years ago \par A1c: 1/19/2023 9.8%, 2/2023 8.5%, 3/9/2023 in office 8.5%\par Current home regimen:\par Lispo 12-14 before meals based on BG, if BG > 300 then will give 14 units \par Lantus 30 units at bedtime \par \par Past medications: Metformin and glipizide, metformin stopped 2/2 alcohol abuse  \par Compliance: forgets to take Lantus about 2 times per month, forgets to take lispo 1-2 times per week \par Insulin injection sites: abdomen and thigh region \par \par Diabetes complications:\par Microvascular: [+] neuropathy, [-] nephropathy, [-] retinopathy\par Has neuropathy on gabapentin \par Macrovascular: [-] CAD, [-] CVA, [-] PAD\par History of DKA/hospitalizations due to Diabetes: hospitalized for DKA 3 times in her lifetime, last admission was > 1 year ago \par \par Last retinopathy screening: UTD, denies retinopathy \par Last nephropathy screening (urine ACR): 3/2018 wnl\par Last foot exam/podiatry visit: does not see, no active issues\par \par BG self monitoring: Checks fingersticks 4x times a day, using her mother's glucometer, did not bring in glucometer to office visit   \par BG Trends: \par AM FS: 160-180, today 180\par Before meals: 200's\par Hypoglycemia events: rare\par \par Diet: eats 3 meals a day \par Breakfast: special K cereal or boiled egg with slice of avocado, cup of coffee with creamer\par Lunch: just started eating salads recently\par Dinner: Justine rice, source of protein: chicken or steak, veggies\par Snacks: was snacking before, now no longer snacking as much, chocolate\par Drinks: cranberry/pomegranate juice diluted throughout the day, regular ginger ale \par \par Exercise: walks her dogs daily\par \par Steroid intake: not currently \par \par Family history of diabetes: T2DM in mother\par Social history: on disability \par \par Denies blurry vision, reports symptoms of polydypsia and polyuria\par \par Gained 20 pounds since last summer \par Hx of pancreatitis 2/2 alcohol abuse \par \par Previous hx of a alcohol abuse, now sober May 2022. Is in support groups and getting therapy. Has good emotional support from family. Hx of depression and anxiety, reports stable currently. \par \par Recent labs from 2/28/2023 hospitalization\par Cr 0.66\par \par Total Cholesterol 162\par Triglycerides 98\par HDL 60\par LDL 82\par non HDL cholesterol 101\par \par PCP: Dr. Hidalgo [phone]\par \par

## 2023-03-14 NOTE — REVIEW OF SYSTEMS
[Recent Weight Gain (___ Lbs)] : recent weight gain: [unfilled] lbs [Gas/Bloating] : gas/bloating [Polyuria] : polyuria [Pain/Numbness of Digits] : pain/numbness of digits [As Noted in HPI] : as noted in HPI [Depression] : depression [Anxiety] : anxiety [Polydipsia] : polydipsia [Negative] : Heme/Lymph [Fatigue] : no fatigue [Recent Weight Loss (___ Lbs)] : no recent weight loss [Eye Pain] : no pain [Blurred Vision] : no blurred vision [Dysphagia] : no dysphagia [Neck Pain] : no neck pain [Dysphonia] : no dysphonia [Chest Pain] : no chest pain [Shortness Of Breath] : no shortness of breath [Abdominal Pain] : no abdominal pain [Suicidal Ideation] : no suicidal ideation

## 2023-03-14 NOTE — ASSESSMENT
[FreeTextEntry1] : The patient is a 52 year old female being seen in the office today for evaluation of diabetes. Recent admission in 1/2023 to [facility] for abdominal pain s/p EGD, was told likely secondary to gastroparesis. Other medical history significant for mobile cecum following GI may need possible surgery, neuropathy, fibromyalgia, depression, and anxiety. Previous hx of a alcohol abuse, now sober since May 2022. \par \par T2DM\par - A1c: 1/19/2023 9.8%, 2/2023 8.5%, 3/9/2023 in office 8.5%\par - Current home regimen:\par Lispo 12-14 before meals based on BG, if BG > 300 then will give 14 units \par Lantus 30 units at bedtime \par - Limited data as patient did not bring in glucometer to office visit. \par - Plan: Increase Novolog to 14 units before meals. Continue Lantus 30 units for now\par - Was previously on Metformin, was stopped 2/2 risk of lactic acidosis with alcohol abuse, patient now sober since 5/2022. Labs from 2/2023 with Cr and GFR wnl. Will restart Metformin ER (pt with hx of gastroparesis so will titrate slowly), start Metformin  mg BID for 2 weeks then increase to Metformin ER 1000 mg in AM and 500 mg in PM for 2 weeks then increase to Metformin ER 1000 mg BID. \par - Will hold off on GLP-1 given history of pancreatitis\par - BG monitoring: Check BG before meals and at bedtime, intermittently check 2 hours post meal BG\par - Will send CGM to pharmacy\par - Labs: recently check during 2/2023 hospitalization. Cr 0.66,  \par - Preventive: \par  Nephropathy screening: will obtain urine albumin/creatinine ratio today\par  Retinopathy screening: UTD\par  Foot exam/podiatrist: + neuropathy b/l feet on monofilament test, advised patient to follow up with podiatry\par - Counseling: We discussed diabetes foot care, long term complications of diabetes including but not limited to neuropathy, nephropathy, retinopathy and cardiovascular disease and the benefits of good glycemic control in preventing said complications. We discussed the risks and benefits of diabetes medications and/or insulin as relevant for today, prevention and management of hypoglycemia, importance of medication compliance and blood glucose monitoring.\par - Discussed diabetic diet, decreased intake of simple/processed sugars, increase intake of whole foods with protein and fiber. Increase physical activity as tolerated with cardiovascular exercise 150 min/per week \par - I discussed the importance of avoiding mild hypoglycemia (FS<70 mg/dl) and severe hypoglycemia (FS<55 mg/dl) with the patient. I also discussed hypoglycemia correction with 4 oz of juice or 4 glucose tablets and rechecking FS in 15 minutes. If FS is still low, repeat 4oz juice or 4 glucose tablets and consume 12 grams of carbohydrates. \par \par HCM\par - Recent labs from 2/28/2023 hospitalization: Total Cholesterol 162, Triglycerides 98, HDL 60, LDL 82, non HDL, cholesterol 101\par - Discussed healthy diet and exercise, possibly elevated 2/2 hyperglycemia \par - Repeat lipid profile with next blood draw \par \par Telehealth visit in 4-6 weeks, 3 month follow up with Dr. Cha\par \par Cristina Polanco PA-C\par [facility]

## 2023-03-16 ENCOUNTER — APPOINTMENT (OUTPATIENT)
Dept: OTOLARYNGOLOGY | Facility: CLINIC | Age: 53
End: 2023-03-16
Payer: MEDICARE

## 2023-03-16 VITALS
TEMPERATURE: 97.9 F | WEIGHT: 178 LBS | HEIGHT: 63 IN | DIASTOLIC BLOOD PRESSURE: 82 MMHG | HEART RATE: 60 BPM | SYSTOLIC BLOOD PRESSURE: 124 MMHG | BODY MASS INDEX: 31.54 KG/M2

## 2023-03-16 DIAGNOSIS — J34.2 DEVIATED NASAL SEPTUM: ICD-10-CM

## 2023-03-16 DIAGNOSIS — J34.89 OTHER SPECIFIED DISORDERS OF NOSE AND NASAL SINUSES: ICD-10-CM

## 2023-03-16 PROCEDURE — 31231 NASAL ENDOSCOPY DX: CPT

## 2023-03-16 PROCEDURE — 99214 OFFICE O/P EST MOD 30 MIN: CPT | Mod: 25

## 2023-03-23 ENCOUNTER — APPOINTMENT (OUTPATIENT)
Dept: NUCLEAR MEDICINE | Facility: HOSPITAL | Age: 53
End: 2023-03-23
Payer: MEDICARE

## 2023-03-23 ENCOUNTER — RESULT REVIEW (OUTPATIENT)
Age: 53
End: 2023-03-23

## 2023-03-23 ENCOUNTER — OUTPATIENT (OUTPATIENT)
Dept: OUTPATIENT SERVICES | Facility: HOSPITAL | Age: 53
LOS: 1 days | End: 2023-03-23

## 2023-03-23 DIAGNOSIS — R10.84 GENERALIZED ABDOMINAL PAIN: ICD-10-CM

## 2023-03-23 DIAGNOSIS — L02.91 CUTANEOUS ABSCESS, UNSPECIFIED: Chronic | ICD-10-CM

## 2023-03-23 DIAGNOSIS — R11.2 NAUSEA WITH VOMITING, UNSPECIFIED: ICD-10-CM

## 2023-03-23 DIAGNOSIS — Z98.890 OTHER SPECIFIED POSTPROCEDURAL STATES: Chronic | ICD-10-CM

## 2023-03-23 DIAGNOSIS — Z90.89 ACQUIRED ABSENCE OF OTHER ORGANS: Chronic | ICD-10-CM

## 2023-03-23 PROCEDURE — 78264 GASTRIC EMPTYING IMG STUDY: CPT | Mod: 26
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
## 2023-03-24 NOTE — ED PROVIDER NOTE - MUSCULOSKELETAL, MLM
Addended by: CHASE OATES on: 3/24/2023 10:40 AM     Modules accepted: Orders    
Addended by: THALIA BARON on: 3/24/2023 09:33 AM     Modules accepted: Orders    
Spine appears normal, range of motion is not limited, no muscle or joint tenderness. (+) tremor of b/l upper ext.

## 2023-03-28 ENCOUNTER — APPOINTMENT (OUTPATIENT)
Dept: CT IMAGING | Facility: IMAGING CENTER | Age: 53
End: 2023-03-28

## 2023-04-10 ENCOUNTER — APPOINTMENT (OUTPATIENT)
Dept: ENDOCRINOLOGY | Facility: CLINIC | Age: 53
End: 2023-04-10
Payer: MEDICARE

## 2023-04-10 PROCEDURE — 99211 OFF/OP EST MAY X REQ PHY/QHP: CPT | Mod: 95

## 2023-04-12 ENCOUNTER — OUTPATIENT (OUTPATIENT)
Dept: OUTPATIENT SERVICES | Facility: HOSPITAL | Age: 53
LOS: 1 days | End: 2023-04-12

## 2023-04-12 VITALS
TEMPERATURE: 98 F | HEIGHT: 63 IN | HEART RATE: 66 BPM | WEIGHT: 180.78 LBS | SYSTOLIC BLOOD PRESSURE: 111 MMHG | DIASTOLIC BLOOD PRESSURE: 74 MMHG | RESPIRATION RATE: 14 BRPM | OXYGEN SATURATION: 97 %

## 2023-04-12 DIAGNOSIS — Z98.890 OTHER SPECIFIED POSTPROCEDURAL STATES: Chronic | ICD-10-CM

## 2023-04-12 DIAGNOSIS — K56.2 VOLVULUS: ICD-10-CM

## 2023-04-12 DIAGNOSIS — Z90.89 ACQUIRED ABSENCE OF OTHER ORGANS: Chronic | ICD-10-CM

## 2023-04-12 DIAGNOSIS — L72.9 FOLLICULAR CYST OF THE SKIN AND SUBCUTANEOUS TISSUE, UNSPECIFIED: Chronic | ICD-10-CM

## 2023-04-12 DIAGNOSIS — L02.91 CUTANEOUS ABSCESS, UNSPECIFIED: Chronic | ICD-10-CM

## 2023-04-12 LAB
A1C WITH ESTIMATED AVERAGE GLUCOSE RESULT: 8.7 % — HIGH (ref 4–5.6)
ALBUMIN SERPL ELPH-MCNC: 4.4 G/DL — SIGNIFICANT CHANGE UP (ref 3.3–5)
ALP SERPL-CCNC: 128 U/L — HIGH (ref 40–120)
ALT FLD-CCNC: 14 U/L — SIGNIFICANT CHANGE UP (ref 4–33)
ANION GAP SERPL CALC-SCNC: 13 MMOL/L — SIGNIFICANT CHANGE UP (ref 7–14)
APTT BLD: 34.1 SEC — SIGNIFICANT CHANGE UP (ref 27–36.3)
AST SERPL-CCNC: 19 U/L — SIGNIFICANT CHANGE UP (ref 4–32)
BILIRUB SERPL-MCNC: 0.5 MG/DL — SIGNIFICANT CHANGE UP (ref 0.2–1.2)
BLD GP AB SCN SERPL QL: NEGATIVE — SIGNIFICANT CHANGE UP
BUN SERPL-MCNC: 12 MG/DL — SIGNIFICANT CHANGE UP (ref 7–23)
CALCIUM SERPL-MCNC: 9.7 MG/DL — SIGNIFICANT CHANGE UP (ref 8.4–10.5)
CHLORIDE SERPL-SCNC: 102 MMOL/L — SIGNIFICANT CHANGE UP (ref 98–107)
CO2 SERPL-SCNC: 21 MMOL/L — LOW (ref 22–31)
CREAT SERPL-MCNC: 0.78 MG/DL — SIGNIFICANT CHANGE UP (ref 0.5–1.3)
EGFR: 92 ML/MIN/1.73M2 — SIGNIFICANT CHANGE UP
ESTIMATED AVERAGE GLUCOSE: 203 — SIGNIFICANT CHANGE UP
GLUCOSE SERPL-MCNC: 74 MG/DL — SIGNIFICANT CHANGE UP (ref 70–99)
HCG SERPL-ACNC: <5 MIU/ML — SIGNIFICANT CHANGE UP
HCT VFR BLD CALC: 44.1 % — SIGNIFICANT CHANGE UP (ref 34.5–45)
HGB BLD-MCNC: 14.2 G/DL — SIGNIFICANT CHANGE UP (ref 11.5–15.5)
INR BLD: 1.07 RATIO — SIGNIFICANT CHANGE UP (ref 0.88–1.16)
MCHC RBC-ENTMCNC: 28.5 PG — SIGNIFICANT CHANGE UP (ref 27–34)
MCHC RBC-ENTMCNC: 32.2 GM/DL — SIGNIFICANT CHANGE UP (ref 32–36)
MCV RBC AUTO: 88.4 FL — SIGNIFICANT CHANGE UP (ref 80–100)
NRBC # BLD: 0 /100 WBCS — SIGNIFICANT CHANGE UP (ref 0–0)
NRBC # FLD: 0 K/UL — SIGNIFICANT CHANGE UP (ref 0–0)
PLATELET # BLD AUTO: 248 K/UL — SIGNIFICANT CHANGE UP (ref 150–400)
POTASSIUM SERPL-MCNC: 4 MMOL/L — SIGNIFICANT CHANGE UP (ref 3.5–5.3)
POTASSIUM SERPL-SCNC: 4 MMOL/L — SIGNIFICANT CHANGE UP (ref 3.5–5.3)
PROT SERPL-MCNC: 7.8 G/DL — SIGNIFICANT CHANGE UP (ref 6–8.3)
PROTHROM AB SERPL-ACNC: 12.4 SEC — SIGNIFICANT CHANGE UP (ref 10.5–13.4)
RBC # BLD: 4.99 M/UL — SIGNIFICANT CHANGE UP (ref 3.8–5.2)
RBC # FLD: 11.9 % — SIGNIFICANT CHANGE UP (ref 10.3–14.5)
RH IG SCN BLD-IMP: POSITIVE — SIGNIFICANT CHANGE UP
SODIUM SERPL-SCNC: 136 MMOL/L — SIGNIFICANT CHANGE UP (ref 135–145)
WBC # BLD: 10.31 K/UL — SIGNIFICANT CHANGE UP (ref 3.8–10.5)
WBC # FLD AUTO: 10.31 K/UL — SIGNIFICANT CHANGE UP (ref 3.8–10.5)

## 2023-04-12 RX ORDER — CLONAZEPAM 1 MG
1 TABLET ORAL
Qty: 0 | Refills: 0 | DISCHARGE

## 2023-04-12 RX ORDER — INSULIN LISPRO 100/ML
10 VIAL (ML) SUBCUTANEOUS
Qty: 0 | Refills: 0 | DISCHARGE

## 2023-04-12 RX ORDER — INSULIN GLARGINE 100 [IU]/ML
30 INJECTION, SOLUTION SUBCUTANEOUS
Qty: 0 | Refills: 0 | DISCHARGE

## 2023-04-12 RX ORDER — DEXTROSE 50 % IN WATER 50 %
12.5 SYRINGE (ML) INTRAVENOUS ONCE
Refills: 0 | Status: DISCONTINUED | OUTPATIENT
Start: 2023-04-17 | End: 2023-04-19
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
## 2023-04-12 NOTE — H&P PST ADULT - NSICDXPASTMEDICALHX_GEN_ALL_CORE_FT
PAST MEDICAL HISTORY:  Alcohol abuse     Anxiety     Depression     Hepatic steatosis     MRSA cellulitis     Pancreatitis     Type 2 diabetes mellitus     Volvulus      PAST MEDICAL HISTORY:  Alcohol abuse     Anxiety     Depression     Gastroparesis     GERD (gastroesophageal reflux disease)     Hepatic steatosis     MRSA cellulitis     Pancreatitis     Type 2 diabetes mellitus     Volvulus

## 2023-04-12 NOTE — H&P PST ADULT - GASTROINTESTINAL
details… normal/soft/nontender/nondistended/normal active bowel sounds/no guarding/no rigidity/no organomegaly/no palpable michael

## 2023-04-12 NOTE — PHYSICAL EXAM
[Alert] : alert [Well Nourished] : well nourished [No Acute Distress] : no acute distress [Well Developed] : well developed [Normal Sclera/Conjunctiva] : normal sclera/conjunctiva [No Proptosis] : no proptosis [No Respiratory Distress] : no respiratory distress [No Accessory Muscle Use] : no accessory muscle use [No Tremors] : no tremors [Oriented x3] : oriented to person, place, and time [Normal Affect] : the affect was normal [Normal Rate and Effort] : normal respiratory rate and effort [de-identified] : Video appointment was performed at this visit

## 2023-04-12 NOTE — ADDENDUM
[FreeTextEntry1] : Followed up with patient via phone call end of day 4/10/2023 and also on 4/11/2023 to remind patient to email BG data so proper adjustments can be made to DM regimen. Reports understanding.

## 2023-04-12 NOTE — H&P PST ADULT - NEGATIVE ENMT SYMPTOMS
no hearing difficulty/no ear pain/no vertigo/no sinus symptoms/no nasal congestion/no nasal discharge/no nasal obstruction/no post-nasal discharge/no nose bleeds/no recurrent cold sores/no abnormal taste sensation/no gum bleeding/no throat pain/no dysphagia

## 2023-04-12 NOTE — REASON FOR VISIT
[DM Type 2] : DM Type 2 [Home] : at home, [unfilled] , at the time of the visit. [Medical Office: ([facility])___] : at the medical office located in  [Patient] : the patient [Self] : self [Follow - Up] : a follow-up visit

## 2023-04-12 NOTE — H&P PST ADULT - HISTORY OF PRESENT ILLNESS
54y/o female scheduled for laparoscopic right hemicolectomy on 4/17/2023.  Pt states, "went to [facility] ER c/o abdominal pain, cat scan was done, found identified vovulus, was tx with IV fluids.  Also dx with gastroparesis, mild discomfort today."  54y/o female scheduled for laparoscopic right hemicolectomy on 4/17/2023.  Pt states, "went to [facility] ER c/o abdominal pain 1/2023, cat scan was done, found identified vovulus, was tx with IV fluids.  Also dx with gastroparesis, mild discomfort today."

## 2023-04-12 NOTE — ASSESSMENT
[FreeTextEntry1] : The patient is a 53 year old female being seen in the office today for evaluation of diabetes. Recent admission in 1/2023 to [facility] for abdominal pain s/p EGD, was told likely secondary to gastroparesis. Other medical history significant for mobile cecum following GI may need possible surgery, neuropathy, fibromyalgia, depression, and anxiety. Previous hx of a alcohol abuse, now sober since May 2022. \par \par T2DM\par - A1c: 1/19/2023 9.8%, 2/2023 8.5%, 3/9/2023 in office 8.5%\par - Current home regimen:\par - Novolog to 10-14 units before meals, determines dose based on BG \par - Lantus 30 units\par - Metformin 1000mg BID --- started at last visit, occasional nausea, no constipation\par - Plan: Patient had very limited BG data available to review. Has been checking with glucometer but did not have BG data available to review during time of visit.\par - Patient is to send in log of BG via email so adjustments to above regimen can be made. From the data that was provided will likely recommend continuing with metformin 1000 mg bid and Lantus 30 units at bedtime. Will likely need to adjust NovoLog dosing but would need additional data. \par - Will hold off on GLP-1 given history of pancreatitis\par - BG monitoring: Check BG before meals and at bedtime, intermittently check 2 hours post meal BG\par - will attempt to get CGM for patient \par - Labs: recently check during 2/2023 hospitalization. Cr 0.66,  \par - Preventive: \par  Nephropathy screening: urine albumin/creatinine ratio wnl 3/2023\par  Retinopathy screening: UTD\par  Foot exam/podiatrist: + neuropathy b/l feet on monofilament test, advised patient to follow up with podiatry\par - Counseling: We discussed diabetes foot care, long term complications of diabetes including but not limited to neuropathy, nephropathy, retinopathy and cardiovascular disease and the benefits of good glycemic control in preventing said complications. We discussed the risks and benefits of diabetes medications and/or insulin as relevant for today, prevention and management of hypoglycemia, importance of medication compliance and blood glucose monitoring.\par - Discussed diabetic diet, decreased intake of simple/processed sugars, increase intake of whole foods with protein and fiber. Increase physical activity as tolerated with cardiovascular exercise 150 min/per week \par - I discussed the importance of avoiding mild hypoglycemia (FS<70 mg/dl) and severe hypoglycemia (FS<55 mg/dl) with the patient. I also discussed hypoglycemia correction with 4 oz of juice or 4 glucose tablets and rechecking FS in 15 minutes. If FS is still low, repeat 4oz juice or 4 glucose tablets and consume 12 grams of carbohydrates. \par \par HCM\par - Recent labs from 2/28/2023 hospitalization: Total Cholesterol 162, Triglycerides 98, HDL 60, LDL 82, non HDL, cholesterol 101\par - Discussed healthy diet and exercise, possibly elevated 2/2 hyperglycemia \par - Repeat lipid profile with next blood draw \par \par 2 month follow up with Dr. Cha\par \par Cristina Polanco PA-C\par [facility]

## 2023-04-12 NOTE — REVIEW OF SYSTEMS
[Recent Weight Gain (___ Lbs)] : recent weight gain: [unfilled] lbs [Gas/Bloating] : gas/bloating [Polyuria] : polyuria [Pain/Numbness of Digits] : pain/numbness of digits [Depression] : depression [Anxiety] : anxiety [Polydipsia] : polydipsia [Negative] : Heme/Lymph [As Noted in HPI] : as noted in HPI [Fatigue] : no fatigue [Recent Weight Loss (___ Lbs)] : no recent weight loss [Eye Pain] : no pain [Blurred Vision] : no blurred vision [Dysphagia] : no dysphagia [Neck Pain] : no neck pain [Dysphonia] : no dysphonia [Chest Pain] : no chest pain [Palpitations] : no palpitations [Shortness Of Breath] : no shortness of breath [Abdominal Pain] : no abdominal pain [Suicidal Ideation] : no suicidal ideation

## 2023-04-12 NOTE — H&P PST ADULT - PROBLEM SELECTOR PLAN 1
Pt scheduled for laparoscopic right hemicolectomy on 4/17/2023.  labs done results pending, ekg in chart.  Pt instructed to obtain preop covid testing.  urine cup provided.  Chlorhexidene provided-  preop teaching done, pt able to verbalize understanding.  Preop teaching done, pt able to verbalize understanding.   medication day of procedure- zofran, pantoprazole, sertraline, gabapentin, (clonazepam if needed)  dm pt instructed not to take dm medication day of procedure, night prior to procedure take lantus 15 units   pst request   surgeon requesting cardiology eval, pst will also request, pt c/o sob with climbing a flight of stairs  cardiologist- Dr. Maricel Palma cardiologist [phone]   echo 2022 in chart

## 2023-04-13 ENCOUNTER — NON-APPOINTMENT (OUTPATIENT)
Age: 53
End: 2023-04-13

## 2023-04-13 ENCOUNTER — APPOINTMENT (OUTPATIENT)
Dept: CARDIOLOGY | Facility: CLINIC | Age: 53
End: 2023-04-13
Payer: MEDICARE

## 2023-04-13 VITALS
SYSTOLIC BLOOD PRESSURE: 118 MMHG | DIASTOLIC BLOOD PRESSURE: 78 MMHG | OXYGEN SATURATION: 97 % | BODY MASS INDEX: 31.54 KG/M2 | HEART RATE: 62 BPM | HEIGHT: 63 IN | WEIGHT: 178 LBS

## 2023-04-13 DIAGNOSIS — Z01.810 ENCOUNTER FOR PREPROCEDURAL CARDIOVASCULAR EXAMINATION: ICD-10-CM

## 2023-04-13 PROCEDURE — 93000 ELECTROCARDIOGRAM COMPLETE: CPT | Mod: NC

## 2023-04-13 PROCEDURE — 99214 OFFICE O/P EST MOD 30 MIN: CPT | Mod: 25

## 2023-04-13 NOTE — PHYSICAL EXAM
[Normal Conjunctiva] : normal conjunctiva [No Carotid Bruit] : no carotid bruit [Soft] : abdomen soft [Non Tender] : non-tender [Normal Bowel Sounds] : normal bowel sounds [Normal Gait] : normal gait [No Edema] : no edema [No Cyanosis] : no cyanosis [No Rash] : no rash [No Skin Lesions] : no skin lesions [Normal] : alert and oriented, normal memory

## 2023-04-13 NOTE — DISCUSSION/SUMMARY
[FreeTextEntry1] : In a summary Danna Rivera is a middle aged female with Diabetes, on medications and low Carb diet. Pre- op, asymptomatic cardiac wise, physically active and Echo done in 6/2022 showed normal LV systolic function will be low risk cardiac wise for surgery. Explained Ms. Keith in detail. Follow up after surgery.

## 2023-04-13 NOTE — REVIEW OF SYSTEMS
[Abdominal Pain] : abdominal pain [Joint Pain] : joint pain [Negative] : Respiratory [Blurry Vision] : no blurred vision [Dyspnea on exertion] : not dyspnea during exertion [Chest Discomfort] : no chest discomfort [Lower Ext Edema] : no extremity edema [Palpitations] : no palpitations [Orthopnea] : no orthopnea [PND] : no PND [Nausea] : no nausea [Vomiting] : no vomiting [Heartburn] : no heartburn [Rash] : no rash [Itching] : no itching [Dizziness] : no dizziness [Tremor] : no tremor was seen [Numbness (Hypoesthesia)] : no numbness [Tingling (Paresthesia)] : no tingling [Confusion] : no confusion was observed [Anxiety] : no anxiety [Under Stress] : not under stress [Easy Bleeding] : no tendency for easy bleeding [Easy Bruising] : no tendency for easy bruising [FreeTextEntry7] : on and off abdomianl pains.

## 2023-04-13 NOTE — HISTORY OF PRESENT ILLNESS
[FreeTextEntry1] : Danna Rivera is a 53 year old female with Diabetes comes for pre- op cardiac evaluation for small and large bowel surgery. Denies any chest pains. No palpitations. No shortness of breath on exertion. Walks about 1 mile a day. Climbs 2-3 stair cases with out any symptoms. Complaint to medications. No complications with previous anaesthesia.

## 2023-04-14 ENCOUNTER — NON-APPOINTMENT (OUTPATIENT)
Age: 53
End: 2023-04-14

## 2023-04-14 NOTE — ASU PATIENT PROFILE, ADULT - NSICDXPASTMEDICALHX_GEN_ALL_CORE_FT
PAST MEDICAL HISTORY:  Alcohol abuse     Anxiety     Depression     Gastroparesis     GERD (gastroesophageal reflux disease)     Hepatic steatosis     MRSA cellulitis     Pancreatitis     Type 2 diabetes mellitus     Volvulus

## 2023-04-16 ENCOUNTER — TRANSCRIPTION ENCOUNTER (OUTPATIENT)
Age: 53
End: 2023-04-16

## 2023-04-17 ENCOUNTER — INPATIENT (INPATIENT)
Facility: HOSPITAL | Age: 53
LOS: 3 days | Discharge: HOME | End: 2023-04-21
Attending: STUDENT IN AN ORGANIZED HEALTH CARE EDUCATION/TRAINING PROGRAM | Admitting: STUDENT IN AN ORGANIZED HEALTH CARE EDUCATION/TRAINING PROGRAM
Payer: MEDICARE

## 2023-04-17 ENCOUNTER — TRANSCRIPTION ENCOUNTER (OUTPATIENT)
Age: 53
End: 2023-04-17

## 2023-04-17 VITALS
TEMPERATURE: 98 F | OXYGEN SATURATION: 97 % | RESPIRATION RATE: 18 BRPM | HEIGHT: 63 IN | SYSTOLIC BLOOD PRESSURE: 110 MMHG | HEART RATE: 65 BPM | WEIGHT: 179.9 LBS | DIASTOLIC BLOOD PRESSURE: 70 MMHG

## 2023-04-17 DIAGNOSIS — Z98.890 OTHER SPECIFIED POSTPROCEDURAL STATES: Chronic | ICD-10-CM

## 2023-04-17 DIAGNOSIS — Z90.89 ACQUIRED ABSENCE OF OTHER ORGANS: Chronic | ICD-10-CM

## 2023-04-17 DIAGNOSIS — L02.91 CUTANEOUS ABSCESS, UNSPECIFIED: Chronic | ICD-10-CM

## 2023-04-17 DIAGNOSIS — L72.9 FOLLICULAR CYST OF THE SKIN AND SUBCUTANEOUS TISSUE, UNSPECIFIED: Chronic | ICD-10-CM

## 2023-04-17 DIAGNOSIS — K56.2 VOLVULUS: ICD-10-CM

## 2023-04-17 LAB
GLUCOSE BLDC GLUCOMTR-MCNC: 167 MG/DL — HIGH (ref 70–99)
GLUCOSE BLDC GLUCOMTR-MCNC: 192 MG/DL — HIGH (ref 70–99)
GLUCOSE BLDC GLUCOMTR-MCNC: 197 MG/DL — HIGH (ref 70–99)
HCG UR QL: NEGATIVE — SIGNIFICANT CHANGE UP

## 2023-04-17 PROCEDURE — 88307 TISSUE EXAM BY PATHOLOGIST: CPT | Mod: 26

## 2023-04-17 DEVICE — STAPLER COVIDIEN ENDO GIA 80-3.8MM BLUE: Type: IMPLANTABLE DEVICE | Status: FUNCTIONAL

## 2023-04-17 DEVICE — LIGATING CLIPS WECK HORIZON MEDIUM (BLUE) 24: Type: IMPLANTABLE DEVICE | Status: FUNCTIONAL

## 2023-04-17 DEVICE — LIGATING CLIPS WECK HORIZON LARGE (ORANGE) 24: Type: IMPLANTABLE DEVICE | Status: FUNCTIONAL

## 2023-04-17 DEVICE — STAPLER COVIDIEN ENDO GIA 80-3.8MM BLUE RELOAD: Type: IMPLANTABLE DEVICE | Status: FUNCTIONAL

## 2023-04-17 RX ORDER — SODIUM CHLORIDE 9 MG/ML
1000 INJECTION, SOLUTION INTRAVENOUS
Refills: 0 | Status: DISCONTINUED | OUTPATIENT
Start: 2023-04-17 | End: 2023-04-18

## 2023-04-17 RX ORDER — HYDROMORPHONE HYDROCHLORIDE 2 MG/ML
0.5 INJECTION INTRAMUSCULAR; INTRAVENOUS; SUBCUTANEOUS
Refills: 0 | Status: DISCONTINUED | OUTPATIENT
Start: 2023-04-17 | End: 2023-04-18

## 2023-04-17 RX ORDER — KETOROLAC TROMETHAMINE 30 MG/ML
15 SYRINGE (ML) INJECTION EVERY 6 HOURS
Refills: 0 | Status: DISCONTINUED | OUTPATIENT
Start: 2023-04-17 | End: 2023-04-18

## 2023-04-17 RX ORDER — HYDROMORPHONE HYDROCHLORIDE 2 MG/ML
0.5 INJECTION INTRAMUSCULAR; INTRAVENOUS; SUBCUTANEOUS EVERY 6 HOURS
Refills: 0 | Status: DISCONTINUED | OUTPATIENT
Start: 2023-04-17 | End: 2023-04-19

## 2023-04-17 RX ORDER — INSULIN LISPRO 100/ML
VIAL (ML) SUBCUTANEOUS
Refills: 0 | Status: DISCONTINUED | OUTPATIENT
Start: 2023-04-17 | End: 2023-04-19

## 2023-04-17 RX ORDER — ONDANSETRON 8 MG/1
4 TABLET, FILM COATED ORAL ONCE
Refills: 0 | Status: DISCONTINUED | OUTPATIENT
Start: 2023-04-17 | End: 2023-04-18

## 2023-04-17 RX ORDER — ACETAMINOPHEN 500 MG
1000 TABLET ORAL EVERY 6 HOURS
Refills: 0 | Status: COMPLETED | OUTPATIENT
Start: 2023-04-17 | End: 2023-04-18

## 2023-04-17 RX ORDER — HEPARIN SODIUM 5000 [USP'U]/ML
5000 INJECTION INTRAVENOUS; SUBCUTANEOUS EVERY 8 HOURS
Refills: 0 | Status: DISCONTINUED | OUTPATIENT
Start: 2023-04-17 | End: 2023-04-21

## 2023-04-17 RX ORDER — HYDROMORPHONE HYDROCHLORIDE 2 MG/ML
1 INJECTION INTRAMUSCULAR; INTRAVENOUS; SUBCUTANEOUS
Refills: 0 | Status: DISCONTINUED | OUTPATIENT
Start: 2023-04-17 | End: 2023-04-18

## 2023-04-17 RX ORDER — INSULIN LISPRO 100/ML
VIAL (ML) SUBCUTANEOUS AT BEDTIME
Refills: 0 | Status: DISCONTINUED | OUTPATIENT
Start: 2023-04-18 | End: 2023-04-19

## 2023-04-17 RX ORDER — OXYCODONE HYDROCHLORIDE 5 MG/1
2.5 TABLET ORAL EVERY 4 HOURS
Refills: 0 | Status: DISCONTINUED | OUTPATIENT
Start: 2023-04-17 | End: 2023-04-21

## 2023-04-17 RX ORDER — MORPHINE SULFATE 50 MG/1
4 CAPSULE, EXTENDED RELEASE ORAL
Refills: 0 | Status: DISCONTINUED | OUTPATIENT
Start: 2023-04-17 | End: 2023-04-18

## 2023-04-17 RX ORDER — OXYCODONE HYDROCHLORIDE 5 MG/1
5 TABLET ORAL EVERY 4 HOURS
Refills: 0 | Status: DISCONTINUED | OUTPATIENT
Start: 2023-04-17 | End: 2023-04-21

## 2023-04-17 RX ADMIN — HYDROMORPHONE HYDROCHLORIDE 1 MILLIGRAM(S): 2 INJECTION INTRAMUSCULAR; INTRAVENOUS; SUBCUTANEOUS at 22:00

## 2023-04-17 RX ADMIN — MORPHINE SULFATE 4 MILLIGRAM(S): 50 CAPSULE, EXTENDED RELEASE ORAL at 21:25

## 2023-04-17 RX ADMIN — SODIUM CHLORIDE 100 MILLILITER(S): 9 INJECTION, SOLUTION INTRAVENOUS at 20:13

## 2023-04-17 RX ADMIN — HYDROMORPHONE HYDROCHLORIDE 1 MILLIGRAM(S): 2 INJECTION INTRAMUSCULAR; INTRAVENOUS; SUBCUTANEOUS at 21:50

## 2023-04-17 RX ADMIN — HYDROMORPHONE HYDROCHLORIDE 1 MILLIGRAM(S): 2 INJECTION INTRAMUSCULAR; INTRAVENOUS; SUBCUTANEOUS at 22:30

## 2023-04-17 RX ADMIN — HYDROMORPHONE HYDROCHLORIDE 1 MILLIGRAM(S): 2 INJECTION INTRAMUSCULAR; INTRAVENOUS; SUBCUTANEOUS at 22:45

## 2023-04-17 RX ADMIN — MORPHINE SULFATE 4 MILLIGRAM(S): 50 CAPSULE, EXTENDED RELEASE ORAL at 21:35

## 2023-04-17 NOTE — ASU PREOP CHECKLIST - 1.
pt states the ensure was colored white like milk, Anesthesia and surgeon made aware , pt delayed for OR x 8 hours for npo status.
not applicable

## 2023-04-17 NOTE — BRIEF OPERATIVE NOTE - OPERATION/FINDINGS
lap right hemicolectomy  side to side ileocolic anastomosis, Anival technique  hemostasis confirmed, fascia closed with Maxon

## 2023-04-18 DIAGNOSIS — E11.9 TYPE 2 DIABETES MELLITUS WITHOUT COMPLICATIONS: ICD-10-CM

## 2023-04-18 DIAGNOSIS — K21.9 GASTRO-ESOPHAGEAL REFLUX DISEASE WITHOUT ESOPHAGITIS: ICD-10-CM

## 2023-04-18 DIAGNOSIS — F10.10 ALCOHOL ABUSE, UNCOMPLICATED: ICD-10-CM

## 2023-04-18 LAB
ANION GAP SERPL CALC-SCNC: 15 MMOL/L — HIGH (ref 7–14)
BUN SERPL-MCNC: 14 MG/DL — SIGNIFICANT CHANGE UP (ref 7–23)
CALCIUM SERPL-MCNC: 9.1 MG/DL — SIGNIFICANT CHANGE UP (ref 8.4–10.5)
CHLORIDE SERPL-SCNC: 100 MMOL/L — SIGNIFICANT CHANGE UP (ref 98–107)
CO2 SERPL-SCNC: 18 MMOL/L — LOW (ref 22–31)
CREAT SERPL-MCNC: 0.63 MG/DL — SIGNIFICANT CHANGE UP (ref 0.5–1.3)
EGFR: 107 ML/MIN/1.73M2 — SIGNIFICANT CHANGE UP
GLUCOSE BLDC GLUCOMTR-MCNC: 224 MG/DL — HIGH (ref 70–99)
GLUCOSE BLDC GLUCOMTR-MCNC: 245 MG/DL — HIGH (ref 70–99)
GLUCOSE BLDC GLUCOMTR-MCNC: 330 MG/DL — HIGH (ref 70–99)
GLUCOSE BLDC GLUCOMTR-MCNC: 336 MG/DL — HIGH (ref 70–99)
GLUCOSE BLDC GLUCOMTR-MCNC: 393 MG/DL — HIGH (ref 70–99)
GLUCOSE SERPL-MCNC: 258 MG/DL — HIGH (ref 70–99)
HCT VFR BLD CALC: 42.4 % — SIGNIFICANT CHANGE UP (ref 34.5–45)
HGB BLD-MCNC: 13.7 G/DL — SIGNIFICANT CHANGE UP (ref 11.5–15.5)
MAGNESIUM SERPL-MCNC: 2 MG/DL — SIGNIFICANT CHANGE UP (ref 1.6–2.6)
MCHC RBC-ENTMCNC: 28.5 PG — SIGNIFICANT CHANGE UP (ref 27–34)
MCHC RBC-ENTMCNC: 32.3 GM/DL — SIGNIFICANT CHANGE UP (ref 32–36)
MCV RBC AUTO: 88.3 FL — SIGNIFICANT CHANGE UP (ref 80–100)
NRBC # BLD: 0 /100 WBCS — SIGNIFICANT CHANGE UP (ref 0–0)
NRBC # FLD: 0 K/UL — SIGNIFICANT CHANGE UP (ref 0–0)
PHOSPHATE SERPL-MCNC: 3.6 MG/DL — SIGNIFICANT CHANGE UP (ref 2.5–4.5)
PLATELET # BLD AUTO: 194 K/UL — SIGNIFICANT CHANGE UP (ref 150–400)
POTASSIUM SERPL-MCNC: 4 MMOL/L — SIGNIFICANT CHANGE UP (ref 3.5–5.3)
POTASSIUM SERPL-SCNC: 4 MMOL/L — SIGNIFICANT CHANGE UP (ref 3.5–5.3)
RBC # BLD: 4.8 M/UL — SIGNIFICANT CHANGE UP (ref 3.8–5.2)
RBC # FLD: 11.6 % — SIGNIFICANT CHANGE UP (ref 10.3–14.5)
SODIUM SERPL-SCNC: 133 MMOL/L — LOW (ref 135–145)
WBC # BLD: 11.87 K/UL — HIGH (ref 3.8–10.5)
WBC # FLD AUTO: 11.87 K/UL — HIGH (ref 3.8–10.5)

## 2023-04-18 RX ORDER — TRAZODONE HCL 50 MG
100 TABLET ORAL AT BEDTIME
Refills: 0 | Status: DISCONTINUED | OUTPATIENT
Start: 2023-04-18 | End: 2023-04-21

## 2023-04-18 RX ORDER — INSULIN GLARGINE 100 [IU]/ML
5 INJECTION, SOLUTION SUBCUTANEOUS AT BEDTIME
Refills: 0 | Status: DISCONTINUED | OUTPATIENT
Start: 2023-04-18 | End: 2023-04-18

## 2023-04-18 RX ORDER — PANTOPRAZOLE SODIUM 20 MG/1
40 TABLET, DELAYED RELEASE ORAL
Refills: 0 | Status: DISCONTINUED | OUTPATIENT
Start: 2023-04-18 | End: 2023-04-21

## 2023-04-18 RX ORDER — SERTRALINE 25 MG/1
50 TABLET, FILM COATED ORAL DAILY
Refills: 0 | Status: DISCONTINUED | OUTPATIENT
Start: 2023-04-18 | End: 2023-04-21

## 2023-04-18 RX ORDER — FOLIC ACID 0.8 MG
1 TABLET ORAL DAILY
Refills: 0 | Status: DISCONTINUED | OUTPATIENT
Start: 2023-04-18 | End: 2023-04-21

## 2023-04-18 RX ORDER — GABAPENTIN 400 MG/1
400 CAPSULE ORAL
Refills: 0 | Status: DISCONTINUED | OUTPATIENT
Start: 2023-04-18 | End: 2023-04-18

## 2023-04-18 RX ORDER — THIAMINE MONONITRATE (VIT B1) 100 MG
100 TABLET ORAL DAILY
Refills: 0 | Status: DISCONTINUED | OUTPATIENT
Start: 2023-04-18 | End: 2023-04-21

## 2023-04-18 RX ORDER — MAGNESIUM OXIDE 400 MG ORAL TABLET 241.3 MG
1000 TABLET ORAL
Refills: 0 | Status: DISCONTINUED | OUTPATIENT
Start: 2023-04-18 | End: 2023-04-18

## 2023-04-18 RX ORDER — GABAPENTIN 400 MG/1
100 CAPSULE ORAL THREE TIMES A DAY
Refills: 0 | Status: DISCONTINUED | OUTPATIENT
Start: 2023-04-18 | End: 2023-04-21

## 2023-04-18 RX ORDER — INSULIN GLARGINE 100 [IU]/ML
10 INJECTION, SOLUTION SUBCUTANEOUS AT BEDTIME
Refills: 0 | Status: DISCONTINUED | OUTPATIENT
Start: 2023-04-18 | End: 2023-04-19

## 2023-04-18 RX ADMIN — HEPARIN SODIUM 5000 UNIT(S): 5000 INJECTION INTRAVENOUS; SUBCUTANEOUS at 08:53

## 2023-04-18 RX ADMIN — Medication 15 MILLIGRAM(S): at 12:26

## 2023-04-18 RX ADMIN — Medication 400 MILLIGRAM(S): at 18:33

## 2023-04-18 RX ADMIN — Medication 15 MILLIGRAM(S): at 06:52

## 2023-04-18 RX ADMIN — Medication 15 MILLIGRAM(S): at 12:46

## 2023-04-18 RX ADMIN — Medication 15 MILLIGRAM(S): at 05:52

## 2023-04-18 RX ADMIN — HYDROMORPHONE HYDROCHLORIDE 0.5 MILLIGRAM(S): 2 INJECTION INTRAMUSCULAR; INTRAVENOUS; SUBCUTANEOUS at 16:40

## 2023-04-18 RX ADMIN — HYDROMORPHONE HYDROCHLORIDE 0.5 MILLIGRAM(S): 2 INJECTION INTRAMUSCULAR; INTRAVENOUS; SUBCUTANEOUS at 10:41

## 2023-04-18 RX ADMIN — Medication 3: at 22:02

## 2023-04-18 RX ADMIN — HYDROMORPHONE HYDROCHLORIDE 0.5 MILLIGRAM(S): 2 INJECTION INTRAMUSCULAR; INTRAVENOUS; SUBCUTANEOUS at 04:17

## 2023-04-18 RX ADMIN — Medication 1 MILLIGRAM(S): at 12:27

## 2023-04-18 RX ADMIN — OXYCODONE HYDROCHLORIDE 5 MILLIGRAM(S): 5 TABLET ORAL at 03:05

## 2023-04-18 RX ADMIN — Medication 100 MILLIGRAM(S): at 21:58

## 2023-04-18 RX ADMIN — OXYCODONE HYDROCHLORIDE 5 MILLIGRAM(S): 5 TABLET ORAL at 09:06

## 2023-04-18 RX ADMIN — OXYCODONE HYDROCHLORIDE 5 MILLIGRAM(S): 5 TABLET ORAL at 08:06

## 2023-04-18 RX ADMIN — HYDROMORPHONE HYDROCHLORIDE 0.5 MILLIGRAM(S): 2 INJECTION INTRAMUSCULAR; INTRAVENOUS; SUBCUTANEOUS at 10:26

## 2023-04-18 RX ADMIN — GABAPENTIN 100 MILLIGRAM(S): 400 CAPSULE ORAL at 13:42

## 2023-04-18 RX ADMIN — HEPARIN SODIUM 5000 UNIT(S): 5000 INJECTION INTRAVENOUS; SUBCUTANEOUS at 00:56

## 2023-04-18 RX ADMIN — Medication 15 MILLIGRAM(S): at 18:34

## 2023-04-18 RX ADMIN — Medication 4: at 12:48

## 2023-04-18 RX ADMIN — Medication 15 MILLIGRAM(S): at 01:56

## 2023-04-18 RX ADMIN — Medication 1000 MILLIGRAM(S): at 01:56

## 2023-04-18 RX ADMIN — Medication 1000 MILLIGRAM(S): at 18:50

## 2023-04-18 RX ADMIN — Medication 4: at 17:17

## 2023-04-18 RX ADMIN — OXYCODONE HYDROCHLORIDE 5 MILLIGRAM(S): 5 TABLET ORAL at 20:27

## 2023-04-18 RX ADMIN — Medication 100 MILLIGRAM(S): at 12:27

## 2023-04-18 RX ADMIN — INSULIN GLARGINE 10 UNIT(S): 100 INJECTION, SOLUTION SUBCUTANEOUS at 21:56

## 2023-04-18 RX ADMIN — HEPARIN SODIUM 5000 UNIT(S): 5000 INJECTION INTRAVENOUS; SUBCUTANEOUS at 16:45

## 2023-04-18 RX ADMIN — GABAPENTIN 100 MILLIGRAM(S): 400 CAPSULE ORAL at 21:57

## 2023-04-18 RX ADMIN — Medication 15 MILLIGRAM(S): at 18:50

## 2023-04-18 RX ADMIN — MAGNESIUM OXIDE 400 MG ORAL TABLET 1000 MILLIGRAM(S): 241.3 TABLET ORAL at 18:33

## 2023-04-18 RX ADMIN — Medication 400 MILLIGRAM(S): at 00:56

## 2023-04-18 RX ADMIN — HYDROMORPHONE HYDROCHLORIDE 0.5 MILLIGRAM(S): 2 INJECTION INTRAMUSCULAR; INTRAVENOUS; SUBCUTANEOUS at 16:55

## 2023-04-18 RX ADMIN — Medication 15 MILLIGRAM(S): at 00:56

## 2023-04-18 RX ADMIN — SERTRALINE 50 MILLIGRAM(S): 25 TABLET, FILM COATED ORAL at 12:27

## 2023-04-18 RX ADMIN — SODIUM CHLORIDE 100 MILLILITER(S): 9 INJECTION, SOLUTION INTRAVENOUS at 08:53

## 2023-04-18 RX ADMIN — HYDROMORPHONE HYDROCHLORIDE 0.5 MILLIGRAM(S): 2 INJECTION INTRAMUSCULAR; INTRAVENOUS; SUBCUTANEOUS at 04:47

## 2023-04-18 RX ADMIN — OXYCODONE HYDROCHLORIDE 5 MILLIGRAM(S): 5 TABLET ORAL at 04:05

## 2023-04-18 RX ADMIN — OXYCODONE HYDROCHLORIDE 5 MILLIGRAM(S): 5 TABLET ORAL at 13:42

## 2023-04-18 RX ADMIN — PANTOPRAZOLE SODIUM 40 MILLIGRAM(S): 20 TABLET, DELAYED RELEASE ORAL at 12:27

## 2023-04-18 RX ADMIN — Medication 400 MILLIGRAM(S): at 12:24

## 2023-04-18 RX ADMIN — Medication 1000 MILLIGRAM(S): at 12:46

## 2023-04-18 RX ADMIN — OXYCODONE HYDROCHLORIDE 5 MILLIGRAM(S): 5 TABLET ORAL at 21:27

## 2023-04-18 RX ADMIN — Medication 400 MILLIGRAM(S): at 05:52

## 2023-04-18 RX ADMIN — OXYCODONE HYDROCHLORIDE 5 MILLIGRAM(S): 5 TABLET ORAL at 14:42

## 2023-04-18 RX ADMIN — Medication 2: at 08:32

## 2023-04-18 RX ADMIN — Medication 1000 MILLIGRAM(S): at 06:52

## 2023-04-18 NOTE — PATIENT PROFILE ADULT - FALL HARM RISK - HARM RISK INTERVENTIONS

## 2023-04-18 NOTE — PROGRESS NOTE ADULT - ASSESSMENT
53yoF with PMHx as above now POD #1 s/p lap right hemicolectomy    - Pain Control  - F/U AM Labs  - DVT PPx  - Ambulation and OOB  - Resume home Zoloft and Trazadone   - FS per protocol    A Team   P. [id number]

## 2023-04-18 NOTE — CHART NOTE - NSCHARTNOTEFT_GEN_A_CORE
POST-OP NOTE    LUIS POTTS | [id number] | CHRISTINA FLORES [id number]    Procedure: s/p Lap Right Hemicolectomy    Subjective:  Patient with mild abdominal pain, controlled with medication  Patient reports being tired  Denies SOB, nausea/vomiting, chest pain      Vital Signs Last 24 Hrs  T(C): 36.4 (17 Apr 2023 23:00), Max: 36.9 (17 Apr 2023 06:49)  T(F): 97.5 (17 Apr 2023 23:00), Max: 98.4 (17 Apr 2023 06:49)  HR: 76 (17 Apr 2023 23:45) (65 - 84)  BP: 103/63 (17 Apr 2023 23:45) (103/63 - 130/65)  BP(mean): 74 (17 Apr 2023 23:45) (70 - 86)  RR: 16 (17 Apr 2023 23:45) (9 - 20)  SpO2: 97% (17 Apr 2023 23:45) (92% - 100%)    Parameters below as of 17 Apr 2023 23:45  Patient On (Oxygen Delivery Method): nasal cannula  O2 Flow (L/min): 2    I&O's Summary    17 Apr 2023 07:01  -  18 Apr 2023 01:34  --------------------------------------------------------  IN: 530 mL / OUT: 195 mL / NET: 335 mL             PHYSICAL EXAM:  Gen: NAD, A&Ox3  Resp: breathing easily, no stridor  CV: RRR  Abdomen: soft, nontender, nondistended, incisions c/d/i  Skin: Normal color, no rashes or lesions

## 2023-04-18 NOTE — CONSULT NOTE ADULT - PROBLEM SELECTOR RECOMMENDATION 9
HbA1C 8.3  will continue to monitor finger sticks with short acting insulin sliding scale  no oral meds  finger sticks 200 - 300  start low dose Lantus 5 units while inpatient and increase as meeded  caution with hypoglycemia

## 2023-04-18 NOTE — PROGRESS NOTE ADULT - SUBJECTIVE AND OBJECTIVE BOX
53yoF with PMHx Alcohol abuse, Anxiety, Depression, Gastroparesis, GERD (gastroesophageal reflux disease), Hepatic steatosis, MRSA cellulitis, Pancreatitis, Type 2 diabetes Mellitus and recently diagnosed with a Volvulus presented for elective Right Hemicolectomy.    This morning patient seen and examined at bedside, reports feeling well, tolerating sips of clears and pain is well controlled. She denies passing gas or having BM.      Vital Signs Last 24 Hrs  T(C): 36.9 (18 Apr 2023 05:52), Max: 36.9 (18 Apr 2023 05:52)  T(F): 98.4 (18 Apr 2023 05:52), Max: 98.4 (18 Apr 2023 05:52)  HR: 77 (18 Apr 2023 05:52) (70 - 84)  BP: 139/75 (18 Apr 2023 05:52) (103/63 - 139/75)  BP(mean): 74 (17 Apr 2023 23:45) (70 - 86)  RR: 17 (18 Apr 2023 05:52) (9 - 20)  SpO2: 100% (18 Apr 2023 05:52) (92% - 100%)    O2 Parameters below as of 18 Apr 2023 05:52  Patient On (Oxygen Delivery Method): nasal cannula  O2 Flow (L/min): 2      I&O's Detail    17 Apr 2023 07:01  -  18 Apr 2023 07:00  --------------------------------------------------------  IN:    IV PiggyBack: 100 mL    Lactated Ringers: 1000 mL    Oral Fluid: 250 mL  Total IN: 1350 mL    OUT:    Indwelling Catheter - Urethral (mL): 545 mL  Total OUT: 545 mL    Total NET: 805 mL      Gen: NAD, A&Ox3  Chest: non labored breathing  Abd: soft, NTND, no evidence of rebound or guarding, incisions are clean, dry and intact   Ext: warm, well perfused   : perez in place draining clear yellow urine

## 2023-04-18 NOTE — PROGRESS NOTE ADULT - NS ATTEND AMEND GEN_ALL_CORE FT
DATE OF SERVICE: 04-18-23 @ 11:50    Seen and examiend, POD 1 s/p lap right hemicolectomy.  Feels mildly bloated, tolerating CLD, pain controlled  VSS  Labs appropriate   overnight, clear  Abdomen soft, appropriate incisional ttp, nondistended    DC perez  Continue CLD  OOB and ambulate  IV tyl/Toradol for pain, avoid narcotics  Continue ERP pathway
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
## 2023-04-18 NOTE — CONSULT NOTE ADULT - NSCONSULTADDITIONALINFOA_GEN_ALL_CORE
Thank you for the consultation request.  I will be away 4/18/23 thru 5/9/23 Dr Gann can be reached at [phone] if any questions arise    discussed with surgery service

## 2023-04-19 ENCOUNTER — TRANSCRIPTION ENCOUNTER (OUTPATIENT)
Age: 53
End: 2023-04-19

## 2023-04-19 LAB
ANION GAP SERPL CALC-SCNC: 13 MMOL/L — SIGNIFICANT CHANGE UP (ref 7–14)
BUN SERPL-MCNC: 17 MG/DL — SIGNIFICANT CHANGE UP (ref 7–23)
CALCIUM SERPL-MCNC: 9.1 MG/DL — SIGNIFICANT CHANGE UP (ref 8.4–10.5)
CHLORIDE SERPL-SCNC: 101 MMOL/L — SIGNIFICANT CHANGE UP (ref 98–107)
CO2 SERPL-SCNC: 21 MMOL/L — LOW (ref 22–31)
CREAT SERPL-MCNC: 0.66 MG/DL — SIGNIFICANT CHANGE UP (ref 0.5–1.3)
EGFR: 106 ML/MIN/1.73M2 — SIGNIFICANT CHANGE UP
GLUCOSE BLDC GLUCOMTR-MCNC: 244 MG/DL — HIGH (ref 70–99)
GLUCOSE BLDC GLUCOMTR-MCNC: 272 MG/DL — HIGH (ref 70–99)
GLUCOSE BLDC GLUCOMTR-MCNC: 277 MG/DL — HIGH (ref 70–99)
GLUCOSE BLDC GLUCOMTR-MCNC: 281 MG/DL — HIGH (ref 70–99)
GLUCOSE SERPL-MCNC: 298 MG/DL — HIGH (ref 70–99)
HCT VFR BLD CALC: 41 % — SIGNIFICANT CHANGE UP (ref 34.5–45)
HGB BLD-MCNC: 12.9 G/DL — SIGNIFICANT CHANGE UP (ref 11.5–15.5)
MAGNESIUM SERPL-MCNC: 2.5 MG/DL — SIGNIFICANT CHANGE UP (ref 1.6–2.6)
MCHC RBC-ENTMCNC: 28.3 PG — SIGNIFICANT CHANGE UP (ref 27–34)
MCHC RBC-ENTMCNC: 31.5 GM/DL — LOW (ref 32–36)
MCV RBC AUTO: 89.9 FL — SIGNIFICANT CHANGE UP (ref 80–100)
NRBC # BLD: 0 /100 WBCS — SIGNIFICANT CHANGE UP (ref 0–0)
NRBC # FLD: 0 K/UL — SIGNIFICANT CHANGE UP (ref 0–0)
PHOSPHATE SERPL-MCNC: 2.4 MG/DL — LOW (ref 2.5–4.5)
PLATELET # BLD AUTO: 168 K/UL — SIGNIFICANT CHANGE UP (ref 150–400)
POTASSIUM SERPL-MCNC: 4.9 MMOL/L — SIGNIFICANT CHANGE UP (ref 3.5–5.3)
POTASSIUM SERPL-SCNC: 4.9 MMOL/L — SIGNIFICANT CHANGE UP (ref 3.5–5.3)
RBC # BLD: 4.56 M/UL — SIGNIFICANT CHANGE UP (ref 3.8–5.2)
RBC # FLD: 11.8 % — SIGNIFICANT CHANGE UP (ref 10.3–14.5)
SODIUM SERPL-SCNC: 135 MMOL/L — SIGNIFICANT CHANGE UP (ref 135–145)
WBC # BLD: 13.5 K/UL — HIGH (ref 3.8–10.5)
WBC # FLD AUTO: 13.5 K/UL — HIGH (ref 3.8–10.5)

## 2023-04-19 PROCEDURE — 99222 1ST HOSP IP/OBS MODERATE 55: CPT

## 2023-04-19 RX ORDER — METFORMIN HYDROCHLORIDE 850 MG/1
1 TABLET ORAL
Qty: 60 | Refills: 0
Start: 2023-04-19 | End: 2023-05-18

## 2023-04-19 RX ORDER — INSULIN LISPRO 100/ML
VIAL (ML) SUBCUTANEOUS
Refills: 0 | Status: DISCONTINUED | OUTPATIENT
Start: 2023-04-19 | End: 2023-04-20

## 2023-04-19 RX ORDER — INSULIN LISPRO 100/ML
3 VIAL (ML) SUBCUTANEOUS
Refills: 0 | Status: DISCONTINUED | OUTPATIENT
Start: 2023-04-19 | End: 2023-04-19

## 2023-04-19 RX ORDER — INSULIN GLARGINE 100 [IU]/ML
20 INJECTION, SOLUTION SUBCUTANEOUS AT BEDTIME
Refills: 0 | Status: DISCONTINUED | OUTPATIENT
Start: 2023-04-19 | End: 2023-04-19

## 2023-04-19 RX ORDER — INSULIN LISPRO 100/ML
VIAL (ML) SUBCUTANEOUS
Refills: 0 | Status: DISCONTINUED | OUTPATIENT
Start: 2023-04-19 | End: 2023-04-19

## 2023-04-19 RX ORDER — INSULIN LISPRO 100/ML
5 VIAL (ML) SUBCUTANEOUS
Refills: 0 | Status: DISCONTINUED | OUTPATIENT
Start: 2023-04-19 | End: 2023-04-19

## 2023-04-19 RX ORDER — OXYCODONE HYDROCHLORIDE 5 MG/1
5 TABLET ORAL ONCE
Refills: 0 | Status: DISCONTINUED | OUTPATIENT
Start: 2023-04-19 | End: 2023-04-19

## 2023-04-19 RX ORDER — ACETAMINOPHEN 500 MG
2 TABLET ORAL
Qty: 0 | Refills: 0 | DISCHARGE
Start: 2023-04-19

## 2023-04-19 RX ORDER — INSULIN GLARGINE 100 [IU]/ML
24 INJECTION, SOLUTION SUBCUTANEOUS AT BEDTIME
Refills: 0 | Status: DISCONTINUED | OUTPATIENT
Start: 2023-04-19 | End: 2023-04-20

## 2023-04-19 RX ORDER — INSULIN LISPRO 100/ML
VIAL (ML) SUBCUTANEOUS AT BEDTIME
Refills: 0 | Status: DISCONTINUED | OUTPATIENT
Start: 2023-04-19 | End: 2023-04-21

## 2023-04-19 RX ORDER — INSULIN LISPRO 100/ML
8 VIAL (ML) SUBCUTANEOUS
Refills: 0 | Status: DISCONTINUED | OUTPATIENT
Start: 2023-04-19 | End: 2023-04-20

## 2023-04-19 RX ORDER — ONDANSETRON 8 MG/1
1 TABLET, FILM COATED ORAL
Refills: 0 | DISCHARGE

## 2023-04-19 RX ORDER — SODIUM,POTASSIUM PHOSPHATES 278-250MG
1 POWDER IN PACKET (EA) ORAL ONCE
Refills: 0 | Status: COMPLETED | OUTPATIENT
Start: 2023-04-19 | End: 2023-04-19

## 2023-04-19 RX ORDER — ACETAMINOPHEN 500 MG
1000 TABLET ORAL EVERY 6 HOURS
Refills: 0 | Status: DISCONTINUED | OUTPATIENT
Start: 2023-04-19 | End: 2023-04-21

## 2023-04-19 RX ADMIN — Medication 3: at 17:21

## 2023-04-19 RX ADMIN — Medication 8 UNIT(S): at 17:22

## 2023-04-19 RX ADMIN — OXYCODONE HYDROCHLORIDE 5 MILLIGRAM(S): 5 TABLET ORAL at 14:20

## 2023-04-19 RX ADMIN — OXYCODONE HYDROCHLORIDE 5 MILLIGRAM(S): 5 TABLET ORAL at 16:02

## 2023-04-19 RX ADMIN — HYDROMORPHONE HYDROCHLORIDE 0.5 MILLIGRAM(S): 2 INJECTION INTRAMUSCULAR; INTRAVENOUS; SUBCUTANEOUS at 06:25

## 2023-04-19 RX ADMIN — OXYCODONE HYDROCHLORIDE 5 MILLIGRAM(S): 5 TABLET ORAL at 17:02

## 2023-04-19 RX ADMIN — OXYCODONE HYDROCHLORIDE 5 MILLIGRAM(S): 5 TABLET ORAL at 21:28

## 2023-04-19 RX ADMIN — Medication 1000 MILLIGRAM(S): at 11:38

## 2023-04-19 RX ADMIN — Medication 6: at 12:31

## 2023-04-19 RX ADMIN — OXYCODONE HYDROCHLORIDE 5 MILLIGRAM(S): 5 TABLET ORAL at 02:19

## 2023-04-19 RX ADMIN — OXYCODONE HYDROCHLORIDE 5 MILLIGRAM(S): 5 TABLET ORAL at 20:28

## 2023-04-19 RX ADMIN — HEPARIN SODIUM 5000 UNIT(S): 5000 INJECTION INTRAVENOUS; SUBCUTANEOUS at 00:31

## 2023-04-19 RX ADMIN — INSULIN GLARGINE 24 UNIT(S): 100 INJECTION, SOLUTION SUBCUTANEOUS at 22:09

## 2023-04-19 RX ADMIN — GABAPENTIN 100 MILLIGRAM(S): 400 CAPSULE ORAL at 05:55

## 2023-04-19 RX ADMIN — PANTOPRAZOLE SODIUM 40 MILLIGRAM(S): 20 TABLET, DELAYED RELEASE ORAL at 06:05

## 2023-04-19 RX ADMIN — Medication 5 UNIT(S): at 08:50

## 2023-04-19 RX ADMIN — GABAPENTIN 100 MILLIGRAM(S): 400 CAPSULE ORAL at 22:08

## 2023-04-19 RX ADMIN — HEPARIN SODIUM 5000 UNIT(S): 5000 INJECTION INTRAVENOUS; SUBCUTANEOUS at 08:34

## 2023-04-19 RX ADMIN — Medication 5 UNIT(S): at 12:31

## 2023-04-19 RX ADMIN — Medication 100 MILLIGRAM(S): at 22:09

## 2023-04-19 RX ADMIN — HEPARIN SODIUM 5000 UNIT(S): 5000 INJECTION INTRAVENOUS; SUBCUTANEOUS at 16:03

## 2023-04-19 RX ADMIN — OXYCODONE HYDROCHLORIDE 5 MILLIGRAM(S): 5 TABLET ORAL at 10:40

## 2023-04-19 RX ADMIN — Medication 1000 MILLIGRAM(S): at 12:38

## 2023-04-19 RX ADMIN — Medication 1 MILLIGRAM(S): at 11:38

## 2023-04-19 RX ADMIN — SERTRALINE 50 MILLIGRAM(S): 25 TABLET, FILM COATED ORAL at 12:32

## 2023-04-19 RX ADMIN — Medication 6: at 08:35

## 2023-04-19 RX ADMIN — OXYCODONE HYDROCHLORIDE 5 MILLIGRAM(S): 5 TABLET ORAL at 01:19

## 2023-04-19 RX ADMIN — GABAPENTIN 100 MILLIGRAM(S): 400 CAPSULE ORAL at 13:20

## 2023-04-19 RX ADMIN — OXYCODONE HYDROCHLORIDE 5 MILLIGRAM(S): 5 TABLET ORAL at 13:20

## 2023-04-19 RX ADMIN — Medication 100 MILLIGRAM(S): at 11:39

## 2023-04-19 RX ADMIN — Medication 1 PACKET(S): at 10:11

## 2023-04-19 RX ADMIN — OXYCODONE HYDROCHLORIDE 5 MILLIGRAM(S): 5 TABLET ORAL at 09:40

## 2023-04-19 RX ADMIN — HYDROMORPHONE HYDROCHLORIDE 0.5 MILLIGRAM(S): 2 INJECTION INTRAMUSCULAR; INTRAVENOUS; SUBCUTANEOUS at 05:55

## 2023-04-19 NOTE — PROGRESS NOTE ADULT - ASSESSMENT
52y/o female scheduled for laparoscopic right hemicolectomy on 4/17/2023 now POD 1 after surgery        Problem/Recommendation - 1:  ·  Problem: DM type 2, not at goal.   ·  Recommendation: HbA1C 8.3  will continue to monitor finger sticks with short acting insulin sliding scale  no oral meds  finger sticks 200 - 300  Increased Lantus and pre meal .  caution with hypoglycemia.     Problem/Recommendation - 2:  ·  Problem: Volvulus.   ·  Recommendation: s/p surgery  tolerating diet   management per surgery.     Problem/Recommendation - 3:  ·  Problem: GERD (gastroesophageal reflux disease).   ·  Recommendation: continue pantoprazole   asymptomatic.     Problem/Recommendation - 4:  ·  Problem: Alcohol abuse.   ·  Recommendation: quit 1 year ago   abstinence reinforced at length.

## 2023-04-19 NOTE — DISCHARGE NOTE PROVIDER - NSDCFUADDINST_GEN_ALL_CORE_FT
WOUND CARE:  Please keep incisions clean and dry. Please do not Scrub or rub incisions. Do not use lotion or powder on incisions.   BATHING: You may shower and/or sponge bathe. You may use warm soapy water in the shower and rinse, pat dry.  ACTIVITY: No heavy lifting or straining. Otherwise, you may return to your usual level of physical activity. If you are taking narcotic pain medication DO NOT drive a car, operate machinery or make important decisions.  DIET: Return to your usual diet.  NOTIFY YOUR SURGEON IF YOU HAVE: any bleeding that does not stop, any pus draining from your wound(s), any fever (over 100.4 F) persistent nausea/vomiting, or if your pain is not controlled on your discharge pain medications, unable to urinate.  Please follow up with your primary care physician in one week regarding your hospitalization, bring copies of your discharge paperwork.  Please follow up with your surgeon, Dr. Sen

## 2023-04-19 NOTE — DISCHARGE NOTE PROVIDER - CARE PROVIDER_API CALL
Iker Sen (DO)  Surgery  [address] [facility], [address]  [place] [address]  Phone: [phone]  Fax: [phone]  Follow Up Time: 1 week

## 2023-04-19 NOTE — PROGRESS NOTE ADULT - ASSESSMENT
53 year old female with PMH type 2 DM, alcohol abuse, anxiety, depression, GERD, gastroparesis, hepatic steatosis, pancreatitis and PSH cholecystectomy presents s/p laparoscopic R colectomy on 4/17    Plan  - Endocrine consult for hyperglycemia  - Diet: LRD  - Restart home meds  - Pain control  - DVT prophylaxis  - Discharge home tomorrow    A team surgery [id number]
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
## 2023-04-19 NOTE — DISCHARGE NOTE PROVIDER - NSDCMRMEDTOKEN_GEN_ALL_CORE_FT
clonazePAM 1 mg oral tablet: 1 tablet with sensor orally 3 times a day as needed for  anxiety  folic acid 1 mg oral tablet: 1 tab(s) orally once a day  gabapentin: 300 milligram(s) orally 2 times a day  insulin aspart 100 units/mL injectable solution: injectable  Insulin Aspart FlexPen 100 units/mL injectable solution: injectable 3 times a day (with meals) sliding scale 10 units to 14 units  Lantus 100 units/mL subcutaneous solution: 30 unit(s) subcutaneous once a day (at bedtime)  magnesium: 1 tab oral daily  Multiple Vitamins oral tablet: 1 tab(s) orally once a day  pantoprazole 40 mg oral delayed release tablet: 1 tab(s) orally once a day   sertraline 50 mg oral tablet: 1 tab(s) orally once a day  thiamine 100 mg oral tablet: 1 tab(s) orally once a day  tiZANidine 4 mg oral tablet: 1 tab(s) orally once a day (at bedtime) as needed for  muscle spasm  traZODone 100 mg oral tablet: 1 tab(s) orally once a day (at bedtime)  Vitamin D3: 1 tab(s) orally once a day   acetaminophen 500 mg oral tablet: 2 tab(s) orally every 6 hours As needed Temp greater or equal to 38C (100.4F), Mild Pain (1 - 3)  clonazePAM 1 mg oral tablet: 1 tablet with sensor orally 3 times a day as needed for  anxiety  folic acid 1 mg oral tablet: 1 tab(s) orally once a day  gabapentin: 300 milligram(s) orally 2 times a day  insulin aspart 100 units/mL injectable solution: injectable  Insulin Aspart FlexPen 100 units/mL injectable solution: injectable 3 times a day (with meals) sliding scale 10 units to 14 units  Lantus 100 units/mL subcutaneous solution: 30 unit(s) subcutaneous once a day (at bedtime)  magnesium: 1 tab oral daily  metFORMIN 1000 mg oral tablet: 1 tab(s) orally 2 times a day  Multiple Vitamins oral tablet: 1 tab(s) orally once a day  pantoprazole 40 mg oral delayed release tablet: 1 tab(s) orally once a day   sertraline 50 mg oral tablet: 1 tab(s) orally once a day  thiamine 100 mg oral tablet: 1 tab(s) orally once a day  tiZANidine 4 mg oral tablet: 1 tab(s) orally once a day (at bedtime) as needed for  muscle spasm  traZODone 100 mg oral tablet: 1 tab(s) orally once a day (at bedtime)  Vitamin D3: 1 tab(s) orally once a day

## 2023-04-19 NOTE — CONSULT NOTE ADULT - ATTENDING COMMENTS
Uncontrolled DM2  Agree with basal/bolus plan as outlined, adjust as plan above   d/w pt in detail regarding lifestyle changes with carb controlled diet, monitoring FSBG, exercise  Endocrine team consulted for uncontrolled diabetes. Patient is high risk with high level decision making due to uncontrolled diabetes which places patient at high risk for cardiovascular and cerebrovascular events. Patient with lability of glucose requiring close monitoring and insulin adjustments.  outpt fu with endocrine at 865 post dc    Honey Mcgregor MD  Attending Physician   Department of Endocrinology, Diabetes and Metabolism     weekdays: 9am to 5pm: email [facility] or [facility] and or TEAMS     Nights and weekends: [phone]  Please note that this patient may be followed by a different provider tomorrow.   If no answer or after hours, please contact [phone].  For final dc reccomendations, please call [phone] or page the endocrine fellow on call.

## 2023-04-19 NOTE — DISCHARGE NOTE PROVIDER - HOSPITAL COURSE
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 Patient tolerated operation well and there were no post-operative complications identified. Patient remained hemodynamically stable in the PACU and transferred to the surgical floor. Diet was restarted and advanced as tolerated. Pain control was transitioned from IV to PO pain meds. At this time, patient is currently ambulating, voiding, tolerating a regular diet. Pain well controlled on PO pain meds. Patient has been deemed stable for discharge home with follow up as an outpatient. 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 Patient tolerated operation well and there were no post-operative complications identified. Patient remained hemodynamically stable in the PACU and transferred to the surgical floor. Diet was restarted and advanced as tolerated. Pain control was transitioned from IV to PO pain meds. At this time, patient is currently ambulating, voiding, tolerating a regular diet. Pain well controlled on PO pain meds. Patient has been deemed stable for discharge home with follow up as an outpatient.    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 Patient tolerated operation well and there were no post-operative complications identified. Patient remained hemodynamically stable in the PACU and transferred to the surgical floor. 4/19 Endocrine was consulted for postoperative hyperglycemia. Diet was restarted and advanced as tolerated. Pain control was transitioned from IV to PO pain meds. At this time, patient is currently ambulating, voiding, tolerating a regular diet. Pain well controlled on PO pain meds. Patient has been deemed stable for discharge home with follow up as an outpatient.    ISTOP Reference #: [id number]  Last prescribed Acetaminophen-codeine by Adolfo eCrvantes on 4/5/23; dispensed by 4/13/23 with 30 day supply

## 2023-04-19 NOTE — PROGRESS NOTE ADULT - SUBJECTIVE AND OBJECTIVE BOX
Date of Service  : 04-19-23     INTERVAL HPI/OVERNIGHT EVENTS: I feel better.   Vital Signs Last 24 Hrs  T(C): 36.4 (19 Apr 2023 14:15), Max: 36.7 (18 Apr 2023 17:48)  T(F): 97.6 (19 Apr 2023 14:15), Max: 98 (18 Apr 2023 17:48)  HR: 67 (19 Apr 2023 14:15) (60 - 67)  BP: 111/62 (19 Apr 2023 14:15) (109/62 - 125/64)  BP(mean): --  RR: 18 (19 Apr 2023 14:15) (17 - 18)  SpO2: 96% (19 Apr 2023 14:15) (95% - 100%)    Parameters below as of 19 Apr 2023 14:15  Patient On (Oxygen Delivery Method): room air      I&O's Summary    18 Apr 2023 07:01  -  19 Apr 2023 07:00  --------------------------------------------------------  IN: 2220 mL / OUT: 2075 mL / NET: 145 mL    19 Apr 2023 07:01  -  19 Apr 2023 16:35  --------------------------------------------------------  IN: 480 mL / OUT: 1000 mL / NET: -520 mL      MEDICATIONS  (STANDING):  folic acid 1 milliGRAM(s) Oral daily  gabapentin 100 milliGRAM(s) Oral three times a day  heparin   Injectable 5000 Unit(s) SubCutaneous every 8 hours  insulin glargine Injectable (LANTUS) 24 Unit(s) SubCutaneous at bedtime  insulin lispro (ADMELOG) corrective regimen sliding scale   SubCutaneous three times a day before meals  insulin lispro (ADMELOG) corrective regimen sliding scale   SubCutaneous at bedtime  insulin lispro Injectable (ADMELOG) 8 Unit(s) SubCutaneous three times a day before meals  pantoprazole    Tablet 40 milliGRAM(s) Oral before breakfast  sertraline 50 milliGRAM(s) Oral daily  thiamine 100 milliGRAM(s) Oral daily  traZODone 100 milliGRAM(s) Oral at bedtime    MEDICATIONS  (PRN):  acetaminophen     Tablet .. 1000 milliGRAM(s) Oral every 6 hours PRN Temp greater or equal to 38C (100.4F), Mild Pain (1 - 3)  oxyCODONE    IR 2.5 milliGRAM(s) Oral every 4 hours PRN Moderate Pain (4 - 6)  oxyCODONE    IR 5 milliGRAM(s) Oral every 4 hours PRN Severe Pain (7 - 10)    LABS:                        12.9   13.50 )-----------( 168      ( 19 Apr 2023 05:45 )             41.0     04-19    135  |  101  |  17  ----------------------------<  298<H>  4.9   |  21<L>  |  0.66    Ca    9.1      19 Apr 2023 05:45  Phos  2.4     04-19  Mg     2.50     04-19          CAPILLARY BLOOD GLUCOSE      POCT Blood Glucose.: 281 mg/dL (19 Apr 2023 12:17)  POCT Blood Glucose.: 277 mg/dL (19 Apr 2023 08:20)  POCT Blood Glucose.: 393 mg/dL (18 Apr 2023 21:08)  POCT Blood Glucose.: 336 mg/dL (18 Apr 2023 17:07)          REVIEW OF SYSTEMS:  CONSTITUTIONAL: No fever, weight loss, or fatigue  EYES: No eye pain, visual disturbances, or discharge  ENMT:  No difficulty hearing, tinnitus, vertigo; No sinus or throat pain  NECK: No pain or stiffness  RESPIRATORY: No cough, wheezing, chills or hemoptysis; No shortness of breath  CARDIOVASCULAR: No chest pain, palpitations, dizziness, or leg swelling  GASTROINTESTINAL: No abdominal or epigastric pain. No nausea, vomiting, or hematemesis; No diarrhea or constipation. No melena or hematochezia.  GENITOURINARY: No dysuria, frequency, hematuria, or incontinence  NEUROLOGICAL: No headaches, memory loss, loss of strength, numbness, or tremors  SKIN: No itching, burning, rashes, or lesions   ENDOCRINE: No heat or cold intolerance; No hair loss    Consultant(s) Notes Reviewed:  [x ] YES  [ ] NO    PHYSICAL EXAM:  GENERAL: NAD, well-groomed, well-developed,not in any distress ,  HEAD:  Atraumatic, Normocephalic  NECK: Supple, No JVD, Normal thyroid  NERVOUS SYSTEM:  Alert & Oriented X3, No focal deficit   CHEST/LUNG: Good air entry bilateral with no  rales, rhonchi, wheezing, or rubs  HEART: Regular rate and rhythm; No murmurs, rubs, or gallops  ABDOMEN: Soft, Nontender, Nondistended; Bowel sounds present  EXTREMITIES:  2+ Peripheral Pulses, No clubbing, cyanosis, or edema      Care Discussed with Consultants/Other Providers [ x] YES  [ ] NO

## 2023-04-19 NOTE — DISCHARGE NOTE PROVIDER - NSFOLLOWUPCLINICS_GEN_ALL_ED_FT
[facility]  Endocrinology  [address] [place] [address]  Phone: [phone]  Fax:   Follow Up Time: 1 week

## 2023-04-19 NOTE — CONSULT NOTE ADULT - ASSESSMENT
3yoF with PMHx Alcohol abuse, Anxiety, Depression, Gastroparesis, GERD (gastroesophageal reflux disease), Hepatic steatosis, MRSA cellulitis, Pancreatitis, Type 2 diabetes Mellitus and recently diagnosed with a Volvulus presented for elective Right Hemicolectomy. s/p Laparoscopic Elective R Hemicolectomy POD#2. Pt stated that she was following Dr Greenberg Endocrinologist  but recently changed it to [facility]. She had her appointment with Cristina HARDIN 4/10 and follow up with Dr. Black in two months . She is on Lantus 30units at bedtime and had fasting btw 120-140 and few around 160. Before meals she takes 10-14 units depend on FS levels. She has noticing some hypoglycemia level in 60 before dinner usually at times when she didn't finish her lunch . She also takes metformin 1000mg twice  daily which was held before surgery.  Pt has not had started using CGM. She took her Lantus 15 units at bedtime night before surgery. Pt adheres to carb consistent diet but had numbness and tingling in her hands and feet b/l , on gabapentin.   In-pt pt started having hyperglycemia on POD #1 . Endo consulted for further management. On Jan 19th 9.8 hgbA1C and 8.7 on April 12th.   Currently patient is on 20 units Lantus at bedtime and sliding scale with premeals 5 units     Plan:  #Hyperglycemia   -Jan 19th 9.8 hgbA1C and 8.7 on April 12th  -Lap R hemicolectomy POD# day2  -FS are in 300s and 200s  -Currently on Lantus 20 units with 5 units premeals with moderate ISS  -At home On Lantus 30units at bedtime and 10 units premeals  -In-pt Endo Recs:     To prepare for dc:    -Discussed with patient the importance of Carb consistent diet and exercise as tolerated.   -Discussed glycemic goal of a1c <7% to prevent microvascular complications of diabetes mellitus and reduce the risk of macrovascular complications.  -Counseled pt on kinetics and action of basal and prandial insulin. Discussed that pt should check FSBG before meals and ALSO take the prandial insulin BEFORE meals  - Discharge on premeal insulin and Lantus. do not dc on correction scale or bedtime scale.  - At home, Patient should check FSBG premeals and bedtime. Pt should call their doctor when FSBG <70 or above >400 and or consistently above 200s as changes in the regimen will have to be made.  -pt should call PMD for DM related questions or concerns until pt is seen by CDE or endocrine  -Recommend annual podiatry and ophthalmology follow up.         -------------------------------------------------------------------------------------------------------------------------------------    DISCHARGE RX:    - On basal /bolus insulin at home   -On discharge Recs..... 3yoF with PMHx Alcohol abuse, Anxiety, Depression, Gastroparesis, GERD (gastroesophageal reflux disease), Hepatic steatosis, MRSA cellulitis, Pancreatitis, Type 2 diabetes Mellitus and recently diagnosed with a Volvulus presented for elective Right Hemicolectomy. s/p Laparoscopic Elective R Hemicolectomy 4/17. Endo consulted for hyperglycemia. Pt's home regimen 30units of Lantus at bedtime and 10-14 units premeals with metformin 1000mg twice daily.       Assessment:  #Hyperglycemia Post op, s/p Lap R hemicolectomy 4/17  #HTN  #HLD      Plan:  #Hyperglycemia Post op, s/p Lap R hemicolectomy 4/17    -HgbA1C on Jan 19th 9.8  and 8.7 on April 12th  -FS are in 300s and 200s post op  -Currently on Lantus 20 units with 5 units premeals with moderate ISS  -Will increase Lantus to 24 units at bedtime with 8 units premeals with low dose ISS  -Nutrition consult in-place  -Carb consistent and DASH diet     To prepare for dc:  -Discussed with patient the importance of Carb consistent diet and exercise as tolerated.   -Discussed glycemic goal of a1c <7% to prevent microvascular complications of diabetes mellitus and reduce the risk of macrovascular complications.  -Counseled pt on kinetics and action of basal and prandial insulin. Discussed that pt should check FSBG before meals and ALSO take the prandial insulin BEFORE meals  - Discharge on premeal insulin and Lantus. do not dc on correction scale or bedtime scale.  - At home, Patient should check FSBG premeals and bedtime. Pt should call their doctor when FSBG <70 or above >400 and or consistently above 200s as changes in the regimen will have to be made.  -pt should call PMD for DM related questions or concerns until pt is seen by CDE or endocrine  -Recommend annual podiatry and ophthalmology follow up.     DISCHARGE RX DM 2:    - On basal /bolus insulin at home- will be dc on basal bolus insulin with metformin 1000mg twice daily  - Final doses of basal/bolus insulin will be decided based on inpatient trajectory     #HTN  -BP goal <130/80  -BP is normal w/o any medications  -OP microalb/ cr ratio    #HLD  -Outpatient lipid profile shows LDL in 80s and then 50s  -ASCVD <7.5%  -Consider STATINS if no contraindications for primary prevention  3yoF with PMHx Alcohol abuse, Anxiety, Depression, Gastroparesis, GERD (gastroesophageal reflux disease), Hepatic steatosis, MRSA cellulitis, Pancreatitis, Type 2 diabetes Mellitus and recently diagnosed with a Volvulus presented for elective Right Hemicolectomy. s/p Laparoscopic Elective R Hemicolectomy 4/17. Endo consulted for hyperglycemia. Pt's home regimen 30units of Lantus at bedtime and 10-14 units premeals with metformin 1000mg twice daily.       Assessment:  #Hyperglycemia Post op, s/p Lap R hemicolectomy 4/17  #HTN  #HLD      Plan:  #Hyperglycemia Post op, s/p Lap R hemicolectomy 4/17    -HgbA1C on Jan 19th 9.8  and 8.7 on April 12th  -FS are in 300s and 200s post op  -Inpt goal 100-180  -Currently on Lantus 20 units with 5 units premeals with moderate ISS  -Will increase Lantus to 24 units at bedtime with 8 units premeals with low dose ISS  -Nutrition consult in-place  -Carb consistent and DASH diet     To prepare for dc:  -Discussed with patient the importance of Carb consistent diet and exercise as tolerated.   -Discussed glycemic goal of a1c <7% to prevent microvascular complications of diabetes mellitus and reduce the risk of macrovascular complications.  -Counseled pt on kinetics and action of basal and prandial insulin. Discussed that pt should check FSBG before meals and ALSO take the prandial insulin BEFORE meals  - Discharge on premeal insulin and Lantus. do not dc on correction scale or bedtime scale.  - At home, Patient should check FSBG premeals and bedtime. Pt should call their doctor when FSBG <70 or above >400 and or consistently above 200s as changes in the regimen will have to be made.  -pt should call PMD for DM related questions or concerns until pt is seen by CDE or endocrine  -Recommend annual podiatry and ophthalmology follow up.     DISCHARGE RX DM 2:    - On basal /bolus insulin at home- will be dc on basal bolus insulin with metformin 1000mg twice daily  - Final doses of basal/bolus insulin will be decided based on inpatient trajectory     #HTN  -BP goal <130/80  -BP is normal w/o any medications  -OP microalb/ cr ratio    #HLD  -Outpatient lipid profile shows LDL in 80s and then 50s  -ASCVD <7.5%  -Consider STATINS if no contraindications for primary prevention  53yoF with PMHx Alcohol abuse, Anxiety, Depression, Gastroparesis, GERD (gastroesophageal reflux disease), Hepatic steatosis, MRSA cellulitis, Pancreatitis, Type 2 diabetes Mellitus and recently diagnosed with a Volvulus presented for elective Right Hemicolectomy. s/p Laparoscopic Elective R Hemicolectomy 4/17. Endo consulted for hyperglycemia.   Pt's home regimen 30units of Lantus at bedtime and 10-14 units premeals with metformin 1000mg twice daily.       Assessment:  #Hyperglycemia Post op, s/p Lap R hemicolectomy 4/17  #HTN  #HLD      Plan:  #Hyperglycemia Post op, s/p Lap R hemicolectomy 4/17    -HgbA1C on Jan 19th 9.8  and 8.7 on April 12th  -FS are in 300s and 200s post op  -Inpt goal 100-180  -Currently on Lantus 20 units with 5 units premeals with moderate ISS  -Will increase Lantus to 24 units at bedtime with 8 units premeals with low dose ISS  -Nutrition consult in-place  -Carb consistent and DASH diet     To prepare for dc:  -Discussed with patient the importance of Carb consistent diet and exercise as tolerated.   -Discussed glycemic goal of a1c <7% to prevent microvascular complications of diabetes mellitus and reduce the risk of macrovascular complications.  -Counseled pt on kinetics and action of basal and prandial insulin. Discussed that pt should check FSBG before meals and ALSO take the prandial insulin BEFORE meals  - Discharge on premeal insulin and Lantus. do not dc on correction scale or bedtime scale.  - At home, Patient should check FSBG premeals and bedtime. Pt should call their doctor when FSBG <70 or above >400 and or consistently above 200s as changes in the regimen will have to be made.  -pt should call endocrine for DM related questions or concerns  -Recommend annual podiatry and ophthalmology follow up.     DISCHARGE RX DM 2:  - On basal /bolus insulin at home- will be dc on basal bolus insulin with metformin 1000mg twice daily- please note pt is no longer drinking   - Final doses of basal/bolus insulin will be decided based on inpatient trajectory, likely dc on home regimen with outpt fu     #HTN  -BP goal <130/80  -BP is normal w/o any medications  -OP microalb/ cr ratio    #HLD  -Outpatient lipid profile shows LDL in 80s and then 50s  -ASCVD <7.5%  -Consider STATIN if no contraindications for primary prevention, can be assessed outpt

## 2023-04-19 NOTE — PROGRESS NOTE ADULT - SUBJECTIVE AND OBJECTIVE BOX
TEAM [ A ] Surgery Daily Progress Note  =====================================================  INTERVAL EVENTS: Hyperglycemic to 300s,s/p lantus 10 units at bedtime and low ISS  SUBJECTIVE: Patient seen and examined at bedside on AM rounds. Patient reports that they're feeling well. Patient is tolerating regular diet without N/V. She is passing gas and having BM. Denies fever, chills, chest pain, SOB      ALLERGIES:  Eggplant mouth itches (Other)  Bactrim (Anaphylaxis)      --------------------------------------------------------------------------------------    MEDICATIONS:    Neurologic Medications  gabapentin 100 milliGRAM(s) Oral three times a day  HYDROmorphone  Injectable 0.5 milliGRAM(s) IV Push every 6 hours PRN Severe Pain (7 - 10)  oxyCODONE    IR 2.5 milliGRAM(s) Oral every 4 hours PRN Moderate Pain (4 - 6)  oxyCODONE    IR 5 milliGRAM(s) Oral every 4 hours PRN Severe Pain (7 - 10)  sertraline 50 milliGRAM(s) Oral daily  traZODone 100 milliGRAM(s) Oral at bedtime    Respiratory Medications    Cardiovascular Medications    Gastrointestinal Medications  folic acid 1 milliGRAM(s) Oral daily  pantoprazole    Tablet 40 milliGRAM(s) Oral before breakfast  thiamine 100 milliGRAM(s) Oral daily    Genitourinary Medications    Hematologic/Oncologic Medications  heparin   Injectable 5000 Unit(s) SubCutaneous every 8 hours    Antimicrobial/Immunologic Medications    Endocrine/Metabolic Medications  dextrose 50% Injectable 12.5 Gram(s) IV Push once  insulin glargine Injectable (LANTUS) 10 Unit(s) SubCutaneous at bedtime  insulin lispro (ADMELOG) corrective regimen sliding scale   SubCutaneous at bedtime  insulin lispro (ADMELOG) corrective regimen sliding scale   SubCutaneous three times a day before meals  insulin lispro Injectable (ADMELOG) 3 Unit(s) SubCutaneous three times a day before meals    Topical/Other Medications    --------------------------------------------------------------------------------------    VITAL SIGNS:  ICU Vital Signs Last 24 Hrs  T(C): 36.6 (19 Apr 2023 05:35), Max: 36.7 (18 Apr 2023 17:48)  T(F): 97.8 (19 Apr 2023 05:35), Max: 98 (18 Apr 2023 17:48)  HR: 60 (19 Apr 2023 05:35) (60 - 79)  BP: 120/73 (19 Apr 2023 05:35) (120/72 - 125/64)  BP(mean): --  ABP: --  ABP(mean): --  RR: 18 (19 Apr 2023 05:35) (17 - 18)  SpO2: 95% (19 Apr 2023 05:35) (95% - 100%)    O2 Parameters below as of 19 Apr 2023 05:35  Patient On (Oxygen Delivery Method): room air  --------------------------------------------------------------------------------------    EXAM    General: NAD, resting in bed comfortably.  Cardiac: regular rate, warm and well perfused  Respiratory: Nonlabored respirations, normal cw expansion.  Abdomen: soft, nontender, nondistended, surgical incision c/d/i  Extremities: normal strength, FROM, no deformities    --------------------------------------------------------------------------------------    LABS                        12.9   13.50 )-----------( 168      ( 19 Apr 2023 05:45 )             41.0   04-18    133<L>  |  100  |  14  ----------------------------<  258<H>  4.0   |  18<L>  |  0.63    Ca    9.1      18 Apr 2023 06:00  Phos  3.6     04-18  Mg     2.00     04-18    --------------------------------------------------------------------------------------    INS AND OUTS:  I&O's Detail    18 Apr 2023 07:01  -  19 Apr 2023 07:00  --------------------------------------------------------  IN:    IV PiggyBack: 200 mL    Lactated Ringers: 700 mL    Oral Fluid: 1320 mL  Total IN: 2220 mL    OUT:    Indwelling Catheter - Urethral (mL): 650 mL    Voided (mL): 1425 mL  Total OUT: 2075 mL    Total NET: 145 mL        --------------------------------------------------------------------------------------

## 2023-04-20 DIAGNOSIS — I10 ESSENTIAL (PRIMARY) HYPERTENSION: ICD-10-CM

## 2023-04-20 DIAGNOSIS — E78.49 OTHER HYPERLIPIDEMIA: ICD-10-CM

## 2023-04-20 LAB
ANION GAP SERPL CALC-SCNC: 14 MMOL/L — SIGNIFICANT CHANGE UP (ref 7–14)
BUN SERPL-MCNC: 14 MG/DL — SIGNIFICANT CHANGE UP (ref 7–23)
CALCIUM SERPL-MCNC: 8.5 MG/DL — SIGNIFICANT CHANGE UP (ref 8.4–10.5)
CHLORIDE SERPL-SCNC: 103 MMOL/L — SIGNIFICANT CHANGE UP (ref 98–107)
CO2 SERPL-SCNC: 18 MMOL/L — LOW (ref 22–31)
CREAT SERPL-MCNC: 0.63 MG/DL — SIGNIFICANT CHANGE UP (ref 0.5–1.3)
EGFR: 107 ML/MIN/1.73M2 — SIGNIFICANT CHANGE UP
GLUCOSE BLDC GLUCOMTR-MCNC: 171 MG/DL — HIGH (ref 70–99)
GLUCOSE BLDC GLUCOMTR-MCNC: 172 MG/DL — HIGH (ref 70–99)
GLUCOSE BLDC GLUCOMTR-MCNC: 280 MG/DL — HIGH (ref 70–99)
GLUCOSE BLDC GLUCOMTR-MCNC: 286 MG/DL — HIGH (ref 70–99)
GLUCOSE BLDC GLUCOMTR-MCNC: 299 MG/DL — HIGH (ref 70–99)
GLUCOSE SERPL-MCNC: 278 MG/DL — HIGH (ref 70–99)
HCT VFR BLD CALC: 37.1 % — SIGNIFICANT CHANGE UP (ref 34.5–45)
HGB BLD-MCNC: 11.9 G/DL — SIGNIFICANT CHANGE UP (ref 11.5–15.5)
MAGNESIUM SERPL-MCNC: 1.9 MG/DL — SIGNIFICANT CHANGE UP (ref 1.6–2.6)
MCHC RBC-ENTMCNC: 28.9 PG — SIGNIFICANT CHANGE UP (ref 27–34)
MCHC RBC-ENTMCNC: 32.1 GM/DL — SIGNIFICANT CHANGE UP (ref 32–36)
MCV RBC AUTO: 90 FL — SIGNIFICANT CHANGE UP (ref 80–100)
NRBC # BLD: 0 /100 WBCS — SIGNIFICANT CHANGE UP (ref 0–0)
NRBC # FLD: 0 K/UL — SIGNIFICANT CHANGE UP (ref 0–0)
PHOSPHATE SERPL-MCNC: 1.5 MG/DL — LOW (ref 2.5–4.5)
PLATELET # BLD AUTO: 131 K/UL — LOW (ref 150–400)
POTASSIUM SERPL-MCNC: 4.5 MMOL/L — SIGNIFICANT CHANGE UP (ref 3.5–5.3)
POTASSIUM SERPL-SCNC: 4.5 MMOL/L — SIGNIFICANT CHANGE UP (ref 3.5–5.3)
RBC # BLD: 4.12 M/UL — SIGNIFICANT CHANGE UP (ref 3.8–5.2)
RBC # FLD: 12 % — SIGNIFICANT CHANGE UP (ref 10.3–14.5)
SODIUM SERPL-SCNC: 135 MMOL/L — SIGNIFICANT CHANGE UP (ref 135–145)
WBC # BLD: 11.66 K/UL — HIGH (ref 3.8–10.5)
WBC # FLD AUTO: 11.66 K/UL — HIGH (ref 3.8–10.5)

## 2023-04-20 PROCEDURE — 99232 SBSQ HOSP IP/OBS MODERATE 35: CPT | Mod: GC

## 2023-04-20 RX ORDER — INSULIN LISPRO 100/ML
VIAL (ML) SUBCUTANEOUS
Refills: 0 | Status: DISCONTINUED | OUTPATIENT
Start: 2023-04-20 | End: 2023-04-21

## 2023-04-20 RX ORDER — INSULIN LISPRO 100/ML
10 VIAL (ML) SUBCUTANEOUS
Refills: 0 | Status: DISCONTINUED | OUTPATIENT
Start: 2023-04-20 | End: 2023-04-21

## 2023-04-20 RX ORDER — SODIUM,POTASSIUM PHOSPHATES 278-250MG
2 POWDER IN PACKET (EA) ORAL ONCE
Refills: 0 | Status: COMPLETED | OUTPATIENT
Start: 2023-04-20 | End: 2023-04-20

## 2023-04-20 RX ORDER — POTASSIUM PHOSPHATE, MONOBASIC POTASSIUM PHOSPHATE, DIBASIC 236; 224 MG/ML; MG/ML
15 INJECTION, SOLUTION INTRAVENOUS ONCE
Refills: 0 | Status: COMPLETED | OUTPATIENT
Start: 2023-04-20 | End: 2023-04-20

## 2023-04-20 RX ORDER — IBUPROFEN 200 MG
400 TABLET ORAL EVERY 6 HOURS
Refills: 0 | Status: DISCONTINUED | OUTPATIENT
Start: 2023-04-20 | End: 2023-04-21

## 2023-04-20 RX ORDER — INSULIN GLARGINE 100 [IU]/ML
30 INJECTION, SOLUTION SUBCUTANEOUS AT BEDTIME
Refills: 0 | Status: DISCONTINUED | OUTPATIENT
Start: 2023-04-20 | End: 2023-04-21

## 2023-04-20 RX ADMIN — Medication 400 MILLIGRAM(S): at 18:01

## 2023-04-20 RX ADMIN — HEPARIN SODIUM 5000 UNIT(S): 5000 INJECTION INTRAVENOUS; SUBCUTANEOUS at 00:17

## 2023-04-20 RX ADMIN — OXYCODONE HYDROCHLORIDE 5 MILLIGRAM(S): 5 TABLET ORAL at 01:39

## 2023-04-20 RX ADMIN — OXYCODONE HYDROCHLORIDE 5 MILLIGRAM(S): 5 TABLET ORAL at 05:19

## 2023-04-20 RX ADMIN — Medication 400 MILLIGRAM(S): at 11:20

## 2023-04-20 RX ADMIN — HEPARIN SODIUM 5000 UNIT(S): 5000 INJECTION INTRAVENOUS; SUBCUTANEOUS at 07:57

## 2023-04-20 RX ADMIN — Medication 10 UNIT(S): at 12:22

## 2023-04-20 RX ADMIN — PANTOPRAZOLE SODIUM 40 MILLIGRAM(S): 20 TABLET, DELAYED RELEASE ORAL at 06:18

## 2023-04-20 RX ADMIN — Medication 400 MILLIGRAM(S): at 17:19

## 2023-04-20 RX ADMIN — Medication 100 MILLIGRAM(S): at 22:07

## 2023-04-20 RX ADMIN — Medication 3: at 12:18

## 2023-04-20 RX ADMIN — Medication 400 MILLIGRAM(S): at 12:20

## 2023-04-20 RX ADMIN — OXYCODONE HYDROCHLORIDE 5 MILLIGRAM(S): 5 TABLET ORAL at 10:38

## 2023-04-20 RX ADMIN — Medication 400 MILLIGRAM(S): at 23:50

## 2023-04-20 RX ADMIN — OXYCODONE HYDROCHLORIDE 5 MILLIGRAM(S): 5 TABLET ORAL at 23:00

## 2023-04-20 RX ADMIN — Medication 2 PACKET(S): at 12:09

## 2023-04-20 RX ADMIN — Medication 1: at 22:06

## 2023-04-20 RX ADMIN — OXYCODONE HYDROCHLORIDE 5 MILLIGRAM(S): 5 TABLET ORAL at 06:19

## 2023-04-20 RX ADMIN — GABAPENTIN 100 MILLIGRAM(S): 400 CAPSULE ORAL at 05:18

## 2023-04-20 RX ADMIN — Medication 2: at 17:20

## 2023-04-20 RX ADMIN — Medication 3: at 08:03

## 2023-04-20 RX ADMIN — OXYCODONE HYDROCHLORIDE 5 MILLIGRAM(S): 5 TABLET ORAL at 13:58

## 2023-04-20 RX ADMIN — HEPARIN SODIUM 5000 UNIT(S): 5000 INJECTION INTRAVENOUS; SUBCUTANEOUS at 23:50

## 2023-04-20 RX ADMIN — Medication 10 UNIT(S): at 17:19

## 2023-04-20 RX ADMIN — Medication 8 UNIT(S): at 08:03

## 2023-04-20 RX ADMIN — OXYCODONE HYDROCHLORIDE 5 MILLIGRAM(S): 5 TABLET ORAL at 18:50

## 2023-04-20 RX ADMIN — GABAPENTIN 100 MILLIGRAM(S): 400 CAPSULE ORAL at 13:13

## 2023-04-20 RX ADMIN — OXYCODONE HYDROCHLORIDE 5 MILLIGRAM(S): 5 TABLET ORAL at 00:39

## 2023-04-20 RX ADMIN — OXYCODONE HYDROCHLORIDE 5 MILLIGRAM(S): 5 TABLET ORAL at 19:20

## 2023-04-20 RX ADMIN — OXYCODONE HYDROCHLORIDE 5 MILLIGRAM(S): 5 TABLET ORAL at 09:38

## 2023-04-20 RX ADMIN — SERTRALINE 50 MILLIGRAM(S): 25 TABLET, FILM COATED ORAL at 13:13

## 2023-04-20 RX ADMIN — HEPARIN SODIUM 5000 UNIT(S): 5000 INJECTION INTRAVENOUS; SUBCUTANEOUS at 16:29

## 2023-04-20 RX ADMIN — Medication 100 MILLIGRAM(S): at 11:20

## 2023-04-20 RX ADMIN — INSULIN GLARGINE 30 UNIT(S): 100 INJECTION, SOLUTION SUBCUTANEOUS at 22:07

## 2023-04-20 RX ADMIN — GABAPENTIN 100 MILLIGRAM(S): 400 CAPSULE ORAL at 22:08

## 2023-04-20 RX ADMIN — OXYCODONE HYDROCHLORIDE 5 MILLIGRAM(S): 5 TABLET ORAL at 14:47

## 2023-04-20 RX ADMIN — POTASSIUM PHOSPHATE, MONOBASIC POTASSIUM PHOSPHATE, DIBASIC 62.5 MILLIMOLE(S): 236; 224 INJECTION, SOLUTION INTRAVENOUS at 09:01

## 2023-04-20 RX ADMIN — Medication 1 MILLIGRAM(S): at 11:20

## 2023-04-20 NOTE — PROGRESS NOTE ADULT - SUBJECTIVE AND OBJECTIVE BOX
TEAM [ A ] Surgery Daily Progress Note  =====================================================    SUBJECTIVE: Patient seen and examined at bedside on AM rounds. Patient reports that they're feeling well. Patient is tolerating diet, having gas and bowel movements. Denies fever, chills, N/V, chest pain, SOB      ALLERGIES:  Eggplant mouth itches (Other)  Bactrim (Anaphylaxis)      --------------------------------------------------------------------------------------    MEDICATIONS:    Neurologic Medications  acetaminophen     Tablet .. 1000 milliGRAM(s) Oral every 6 hours PRN Temp greater or equal to 38C (100.4F), Mild Pain (1 - 3)  gabapentin 100 milliGRAM(s) Oral three times a day  oxyCODONE    IR 2.5 milliGRAM(s) Oral every 4 hours PRN Moderate Pain (4 - 6)  oxyCODONE    IR 5 milliGRAM(s) Oral every 4 hours PRN Severe Pain (7 - 10)  sertraline 50 milliGRAM(s) Oral daily  traZODone 100 milliGRAM(s) Oral at bedtime    Respiratory Medications    Cardiovascular Medications    Gastrointestinal Medications  folic acid 1 milliGRAM(s) Oral daily  pantoprazole    Tablet 40 milliGRAM(s) Oral before breakfast  thiamine 100 milliGRAM(s) Oral daily    Genitourinary Medications    Hematologic/Oncologic Medications  heparin   Injectable 5000 Unit(s) SubCutaneous every 8 hours    Antimicrobial/Immunologic Medications    Endocrine/Metabolic Medications  insulin glargine Injectable (LANTUS) 24 Unit(s) SubCutaneous at bedtime  insulin lispro (ADMELOG) corrective regimen sliding scale   SubCutaneous three times a day before meals  insulin lispro (ADMELOG) corrective regimen sliding scale   SubCutaneous at bedtime  insulin lispro Injectable (ADMELOG) 8 Unit(s) SubCutaneous three times a day before meals    Topical/Other Medications    --------------------------------------------------------------------------------------    VITAL SIGNS:  ICU Vital Signs Last 24 Hrs  T(C): 37 (20 Apr 2023 05:25), Max: 37 (20 Apr 2023 05:25)  T(F): 98.6 (20 Apr 2023 05:25), Max: 98.6 (20 Apr 2023 05:25)  HR: 60 (20 Apr 2023 05:25) (59 - 69)  BP: 134/76 (20 Apr 2023 05:25) (106/60 - 134/76)  BP(mean): --  ABP: --  ABP(mean): --  RR: 18 (20 Apr 2023 05:25) (16 - 18)  SpO2: 96% (20 Apr 2023 05:25) (95% - 99%)    O2 Parameters below as of 20 Apr 2023 05:25  Patient On (Oxygen Delivery Method): room air  --------------------------------------------------------------------------------------    EXAM    General: NAD, resting in bed comfortably.  Cardiac: regular rate, warm and well perfused  Respiratory: Nonlabored respirations, normal cw expansion.  Abdomen: soft, nontender, nondistended, midline and port sites c/d/i  Extremities: normal strength, FROM, no deformities    --------------------------------------------------------------------------------------    LABS                        12.9   13.50 )-----------( 168      ( 19 Apr 2023 05:45 )             41.0   04-19    135  |  101  |  17  ----------------------------<  298<H>  4.9   |  21<L>  |  0.66    Ca    9.1      19 Apr 2023 05:45  Phos  2.4     04-19  Mg     2.50     04-19  --------------------------------------------------------------------------------------    INS AND OUTS:  I&O's Detail    18 Apr 2023 07:01  -  19 Apr 2023 07:00  --------------------------------------------------------  IN:    IV PiggyBack: 200 mL    Lactated Ringers: 700 mL    Oral Fluid: 1320 mL  Total IN: 2220 mL    OUT:    Indwelling Catheter - Urethral (mL): 650 mL    Voided (mL): 1425 mL  Total OUT: 2075 mL    Total NET: 145 mL      19 Apr 2023 07:01  -  20 Apr 2023 06:38  --------------------------------------------------------  IN:    Oral Fluid: 1440 mL  Total IN: 1440 mL    OUT:    Voided (mL): 2300 mL  Total OUT: 2300 mL    Total NET: -860 mL        --------------------------------------------------------------------------------------

## 2023-04-20 NOTE — PROVIDER CONTACT NOTE (OTHER) - SITUATION
Pt. was ordered KPHos IV, c/o burning sensation from IV insertion site. PIV flushing freely, no s/s infiltration
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
## 2023-04-20 NOTE — PROGRESS NOTE ADULT - SUBJECTIVE AND OBJECTIVE BOX
Date of Service  : 04-20-23     INTERVAL HPI/OVERNIGHT EVENTS: I feel fine so going home.   Vital Signs Last 24 Hrs  T(C): 37.1 (20 Apr 2023 14:00), Max: 37.1 (20 Apr 2023 09:23)  T(F): 98.8 (20 Apr 2023 14:00), Max: 98.8 (20 Apr 2023 14:00)  HR: 62 (20 Apr 2023 14:00) (59 - 69)  BP: 97/60 (20 Apr 2023 14:00) (97/60 - 134/76)  BP(mean): --  RR: 16 (20 Apr 2023 14:00) (16 - 18)  SpO2: 96% (20 Apr 2023 14:00) (95% - 99%)    Parameters below as of 20 Apr 2023 14:00  Patient On (Oxygen Delivery Method): room air      I&O's Summary    19 Apr 2023 07:01  -  20 Apr 2023 07:00  --------------------------------------------------------  IN: 1440 mL / OUT: 2300 mL / NET: -860 mL    20 Apr 2023 07:01  -  20 Apr 2023 17:05  --------------------------------------------------------  IN: 800 mL / OUT: 900 mL / NET: -100 mL      MEDICATIONS  (STANDING):  folic acid 1 milliGRAM(s) Oral daily  gabapentin 100 milliGRAM(s) Oral three times a day  heparin   Injectable 5000 Unit(s) SubCutaneous every 8 hours  ibuprofen  Tablet. 400 milliGRAM(s) Oral every 6 hours  insulin glargine Injectable (LANTUS) 30 Unit(s) SubCutaneous at bedtime  insulin lispro (ADMELOG) corrective regimen sliding scale   SubCutaneous at bedtime  insulin lispro (ADMELOG) corrective regimen sliding scale   SubCutaneous three times a day before meals  insulin lispro Injectable (ADMELOG) 10 Unit(s) SubCutaneous three times a day before meals  pantoprazole    Tablet 40 milliGRAM(s) Oral before breakfast  sertraline 50 milliGRAM(s) Oral daily  thiamine 100 milliGRAM(s) Oral daily  traZODone 100 milliGRAM(s) Oral at bedtime    MEDICATIONS  (PRN):  acetaminophen     Tablet .. 1000 milliGRAM(s) Oral every 6 hours PRN Temp greater or equal to 38C (100.4F), Mild Pain (1 - 3)  oxyCODONE    IR 2.5 milliGRAM(s) Oral every 4 hours PRN Moderate Pain (4 - 6)  oxyCODONE    IR 5 milliGRAM(s) Oral every 4 hours PRN Severe Pain (7 - 10)    LABS:                        11.9   11.66 )-----------( 131      ( 20 Apr 2023 05:40 )             37.1     04-20    135  |  103  |  14  ----------------------------<  278<H>  4.5   |  18<L>  |  0.63    Ca    8.5      20 Apr 2023 05:40  Phos  1.5     04-20  Mg     1.90     04-20          CAPILLARY BLOOD GLUCOSE      POCT Blood Glucose.: 171 mg/dL (20 Apr 2023 16:12)  POCT Blood Glucose.: 286 mg/dL (20 Apr 2023 12:15)  POCT Blood Glucose.: 280 mg/dL (20 Apr 2023 08:01)  POCT Blood Glucose.: 244 mg/dL (19 Apr 2023 21:16)          REVIEW OF SYSTEMS:  CONSTITUTIONAL: No fever, weight loss, or fatigue  EYES: No eye pain, visual disturbances, or discharge  ENMT:  No difficulty hearing, tinnitus, vertigo; No sinus or throat pain  NECK: No pain or stiffness  RESPIRATORY: No cough, wheezing, chills or hemoptysis; No shortness of breath  CARDIOVASCULAR: No chest pain, palpitations, dizziness, or leg swelling  GASTROINTESTINAL: No abdominal or epigastric pain. No nausea, vomiting, or hematemesis; No diarrhea or constipation. No melena or hematochezia.  GENITOURINARY: No dysuria, frequency, hematuria, or incontinence  NEUROLOGICAL: No headaches, memory loss, loss of strength, numbness, or tremors    RADIOLOGY & ADDITIONAL TESTS:    Consultant(s) Notes Reviewed:  [x ] YES  [ ] NO    PHYSICAL EXAM:  GENERAL: NAD, well-groomed, well-developed,not in any distress ,  HEAD:  Atraumatic, Normocephalic  EYES: EOMI, PERRLA, conjunctiva and sclera clear  ENMT: No tonsillar erythema, exudates, or enlargement; Moist mucous membranes, Good dentition, No lesions  NECK: Supple, No JVD, Normal thyroid  NERVOUS SYSTEM:  Alert & Oriented X3, No focal deficit   CHEST/LUNG: Good air entry bilateral with no  rales, rhonchi, wheezing, or rubs  HEART: Regular rate and rhythm; No murmurs, rubs, or gallops  ABDOMEN: Soft, Nontender, Nondistended; Bowel sounds present  EXTREMITIES:  2+ Peripheral Pulses, No clubbing, cyanosis, or edema  SKIN: No rashes or lesions    Care Discussed with Consultants/Other Providers [ x] YES  [ ] NO

## 2023-04-20 NOTE — PROVIDER CONTACT NOTE (OTHER) - ASSESSMENT
AOX4, R AC PIV 20g. flushing freely, no s/s infiltration, c/o burning sensation from IV insertion site. Pt. currently taking KPhos IV.

## 2023-04-20 NOTE — DIETITIAN INITIAL EVALUATION ADULT - PERSON TAUGHT/METHOD
verbal instruction/written material/teach back - (Patient repeats in own words)/patient instructed/mother instructed

## 2023-04-20 NOTE — DIETITIAN INITIAL EVALUATION ADULT - PERTINENT MEDS FT
MEDICATIONS  (STANDING):  folic acid 1 milliGRAM(s) Oral daily  gabapentin 100 milliGRAM(s) Oral three times a day  heparin   Injectable 5000 Unit(s) SubCutaneous every 8 hours  ibuprofen  Tablet. 400 milliGRAM(s) Oral every 6 hours  insulin glargine Injectable (LANTUS) 30 Unit(s) SubCutaneous at bedtime  insulin lispro (ADMELOG) corrective regimen sliding scale   SubCutaneous three times a day before meals  insulin lispro (ADMELOG) corrective regimen sliding scale   SubCutaneous at bedtime  insulin lispro Injectable (ADMELOG) 10 Unit(s) SubCutaneous three times a day before meals  pantoprazole    Tablet 40 milliGRAM(s) Oral before breakfast  potassium phosphate / sodium phosphate Powder (PHOS-NaK) 2 Packet(s) Oral once  sertraline 50 milliGRAM(s) Oral daily  thiamine 100 milliGRAM(s) Oral daily  traZODone 100 milliGRAM(s) Oral at bedtime    MEDICATIONS  (PRN):  acetaminophen     Tablet .. 1000 milliGRAM(s) Oral every 6 hours PRN Temp greater or equal to 38C (100.4F), Mild Pain (1 - 3)  oxyCODONE    IR 2.5 milliGRAM(s) Oral every 4 hours PRN Moderate Pain (4 - 6)  oxyCODONE    IR 5 milliGRAM(s) Oral every 4 hours PRN Severe Pain (7 - 10)

## 2023-04-20 NOTE — DIETITIAN INITIAL EVALUATION ADULT - REASON FOR ADMISSION
Intestinal volvulus    Pt is a 53 yoF with PMHx Alcohol abuse, Anxiety, Depression, Gastroparesis, GERD (gastroesophageal reflux disease), Hepatic steatosis, MRSA cellulitis, Pancreatitis, Type 2 diabetes Mellitus and recently diagnosed with a Volvulus> She is s/p lap right hemicolectomy.

## 2023-04-20 NOTE — PROGRESS NOTE ADULT - ASSESSMENT
54y/o female scheduled for laparoscopic right hemicolectomy on 4/17/2023 now POD 1 after surgery        Problem/Recommendation - 1:  ·  Problem: DM type 2, not at goal.   ·  Recommendation: HbA1C 8.3  will continue to monitor finger sticks with short acting insulin sliding scale  no oral meds  finger sticks 200 - 300  Increased Lantus and pre meal .  caution with hypoglycemia.  Has endocrinology  appointment so continue home meds.      Problem/Recommendation - 2:  ·  Problem: Volvulus.   ·  Recommendation: s/p surgery  tolerating diet   management per surgery.     Problem/Recommendation - 3:  ·  Problem: GERD (gastroesophageal reflux disease).   ·  Recommendation: continue pantoprazole   asymptomatic.     Problem/Recommendation - 4:  ·  Problem: Alcohol abuse.   ·  Recommendation: quit 1 year ago   abstinence reinforced at length.

## 2023-04-20 NOTE — DIETITIAN INITIAL EVALUATION ADULT - FUNCTIONAL SCREEN CURRENT LEVEL: SWALLOWING (IF SCORE 2 OR MORE FOR ANY ITEM, CONSULT REHAB SERVICES), MLM)
Instructions (Optional): Encouraged biopsy. patient declines today and chooses to observe for changes Detail Level: Detailed 0 = swallows foods/liquids without difficulty

## 2023-04-20 NOTE — DIETITIAN INITIAL EVALUATION ADULT - OTHER INFO
Pt states er appetite has been improving. She is tolerating her po intake well. Pt has no complaints of GI distress (nausea/vomiting/diarrhea/constipation). Pt has no difficulty chewing or swallowing. Pt is allergic to eggplant.  Pt reports knowing a consistent carb diet and trying to follow it. Reviewed diet with Pt. Also instructed her on low fiber diet. Written information both diets was given to Pt. Pt appeared to have a good understanding of her diet using teach back.

## 2023-04-20 NOTE — PROGRESS NOTE ADULT - ASSESSMENT
53 year old female with PMH type 2 DM, alcohol abuse, anxiety, depression, GERD, gastroparesis, hepatic steatosis, pancreatitis and PSH cholecystectomy presents s/p laparoscopic R colectomy on 4/17    Plan  - Diet: LRD  - Lantus 24 units and admelog 8 TID  - Restart home meds  - Pain control  - DVT prophylaxis  - Appreciate endocrine discharge recs  - Discharge home today    A team surgery [id number]

## 2023-04-20 NOTE — DIETITIAN INITIAL EVALUATION ADULT - CALCULATED FROM (CAL/KG)
9349 Quality 431: Preventive Care And Screening: Unhealthy Alcohol Use - Screening: Patient screened for unhealthy alcohol use using a single question and scores less than 2 times per year

## 2023-04-20 NOTE — PROGRESS NOTE ADULT - ASSESSMENT
53yoF with PMHx Alcohol abuse, Anxiety, Depression, Gastroparesis, GERD (gastroesophageal reflux disease), Hepatic steatosis, MRSA cellulitis, Pancreatitis, Type 2 diabetes Mellitus and recently diagnosed with a Volvulus presented for elective Right Hemicolectomy. s/p Laparoscopic Elective R Hemicolectomy 4/17. Endo consulted for hyperglycemia.   Pt's home regimen 30units of Lantus at bedtime and 10-14 units premeals with metformin 1000mg twice daily.       Assessment:  #Hyperglycemia Post op, s/p Lap R hemicolectomy 4/17  #HTN  #HLD      Plan:  #Hyperglycemia Post op, s/p Lap R hemicolectomy 4/17    -HgbA1C on Jan 19th 9.8  and 8.7 on April 12th  -FS are in 300s and 200s post op  -Inpt goal 100-180  -Currently on Lantus 20 units with 5 units premeals with moderate ISS  -Will increase Lantus to 24 units at bedtime with 8 units premeals with low dose ISS  -Nutrition consult in-place  -Carb consistent and DASH diet     To prepare for dc:  -Discussed with patient the importance of Carb consistent diet and exercise as tolerated.   -Discussed glycemic goal of a1c <7% to prevent microvascular complications of diabetes mellitus and reduce the risk of macrovascular complications.  -Counseled pt on kinetics and action of basal and prandial insulin. Discussed that pt should check FSBG before meals and ALSO take the prandial insulin BEFORE meals  - Discharge on premeal insulin and Lantus. do not dc on correction scale or bedtime scale.  - At home, Patient should check FSBG premeals and bedtime. Pt should call their doctor when FSBG <70 or above >400 and or consistently above 200s as changes in the regimen will have to be made.  -pt should call endocrine for DM related questions or concerns  -Recommend annual podiatry and ophthalmology follow up.     DISCHARGE RX DM 2:  - On basal /bolus insulin at home- will be dc on basal bolus insulin with metformin 1000mg twice daily- please note pt is no longer drinking   - Final doses of basal/bolus insulin will be decided based on inpatient trajectory, likely dc on home regimen with outpt fu     #HTN  -BP goal <130/80  -BP is normal w/o any medications  -OP microalb/ cr ratio    #HLD  -Outpatient lipid profile shows LDL in 80s and then 50s  -ASCVD <7.5%  -Consider STATIN if no contraindications for primary prevention, can be assessed outpt    53yoF with PMHx Alcohol abuse, Anxiety, Depression, Gastroparesis, GERD (gastroesophageal reflux disease), Hepatic steatosis, MRSA cellulitis, Pancreatitis, Type 2 diabetes Mellitus and recently diagnosed with a Volvulus presented for elective Right Hemicolectomy. s/p Laparoscopic Elective R Hemicolectomy 4/17. Endo consulted for hyperglycemia.   Pt's home regimen 30units of Lantus at bedtime and 10-14 units premeals with metformin 1000mg twice daily.       Assessment:  #Hyperglycemia Post op, s/p Lap R hemicolectomy 4/17  #HTN  #HLD      Plan:  #Hyperglycemia Post op, s/p Lap R hemicolectomy 4/17    -HgbA1C on Jan 19th 9.8  and 8.7 on April 12th  -FS are in 300s and 200s post op  -Inpt goal 100-180  -Currently on Lantus 24 units at bedtime with 8 units premeals  -Will increase Lantus to 30units at bedtime and increase premeals to 10 units , c/w low dose ISS  -Nutrition consult in-place  -Carb consistent and DASH diet     DC Recs:    -If planning to dc today , dc on Lantus 30 units at bedtime and Novolog 10 units before each meal with metformin 1000mg twice daily  -Discussed with patient glycemic goal of a1c <7% to prevent microvascular complications of diabetes mellitus and reduce the risk of macrovascular complications.  -Counseled pt on kinetics and action of basal and prandial insulin. Discussed that pt should check FSBG before meals and ALSO take the prandial insulin BEFORE meals  - Discharge on premeal insulin and Lantus. do not dc on correction scale or bedtime scale.  - At home, Patient should check FSBG premeals and bedtime. Pt should call their doctor when FSBG <70 or above >400 and or consistently above 200s as changes in the regimen will have to be made.  -pt should call endocrine for DM related questions or concerns  -Recommend annual podiatry and ophthalmology follow up.      #HTN  -BP goal <130/80  -BP is normal w/o any medications  -OP microalb/ cr ratio    #HLD  -Outpatient lipid profile shows LDL in 80s and then 50s  -ASCVD <7.5%  -Consider STATIN if no contraindications for primary prevention, can be assessed outpt    53yoF with PMHx Alcohol abuse, Anxiety, Depression, Gastroparesis, GERD (gastroesophageal reflux disease), Hepatic steatosis, MRSA cellulitis, Pancreatitis, Type 2 diabetes Mellitus and recently diagnosed with a Volvulus presented for elective Right Hemicolectomy. s/p Laparoscopic Elective R Hemicolectomy 4/17. Endo consulted for hyperglycemia.   Pt's home regimen 30units of Lantus at bedtime and 10-14 units premeals with metformin 1000mg twice daily.       Assessment:  #DM2 Hyperglycemia Post op, s/p Lap R hemicolectomy 4/17  #HTN  #HLD      Plan:  #DM2 Hyperglycemia Post op, s/p Lap R hemicolectomy 4/17    -HgbA1C on Jan 19th 9.8  and 8.7 on April 12th  -FS are in 300s and 200s post op  -Inpt goal 100-180  -Currently on Lantus 24 units at bedtime with 8 units premeals  -Will increase Lantus to 30 units at bedtime and increase premeal Admelog to 10 units qac TID , c/w low dose ISS  -Nutrition consult in-place  -Carb consistent and DASH diet     DC Recs:    -If planning to dc today , dc on Lantus 30 units at bedtime and Novolog 10 units before each meal with metformin 1000mg twice daily  -Discussed with patient glycemic goal of a1c <7% to prevent microvascular complications of diabetes mellitus and reduce the risk of macrovascular complications.  -Counseled pt on kinetics and action of basal and prandial insulin. Discussed that pt should check FSBG before meals and ALSO take the prandial insulin BEFORE meals  - Discharge on premeal insulin and Lantus. do not dc on correction scale or bedtime scale.  - At home, Patient should check FSBG premeals and bedtime. Pt should call their doctor when FSBG <70 or above >400 and or consistently above 200s as changes in the regimen will have to be made.  -pt should call endocrine for DM related questions or concerns  -Recommend annual podiatry and ophthalmology follow up.      #HTN  -BP goal <130/80  -BP is normal w/o any medications  -OP microalb/ cr ratio    #HLD  -Outpatient lipid profile shows LDL in 80s and then 50s  -ASCVD <7.5%  -Consider STATIN if no contraindications for primary prevention, can be assessed outpt    53yoF with PMHx Alcohol abuse, Anxiety, Depression, Gastroparesis, GERD (gastroesophageal reflux disease), Hepatic steatosis, MRSA cellulitis, Pancreatitis, Type 2 diabetes Mellitus and recently diagnosed with a Volvulus presented for elective Right Hemicolectomy. s/p Laparoscopic Elective R Hemicolectomy 4/17. Endo consulted for hyperglycemia.   Pt's home regimen 30units of Lantus at bedtime and 10-14 units premeals with metformin 1000mg twice daily.       Assessment:  #DM2 Hyperglycemia Post op, s/p Lap R hemicolectomy 4/17  #HTN  #HLD      Plan:  #DM2 Hyperglycemia Post op, s/p Lap R hemicolectomy 4/17    -HgbA1C on Jan 19th 9.8  and 8.7 on April 12th  -FS are in 300s and 200s post op  -Inpt goal 100-180  -Currently on Lantus 24 units at bedtime with 8 units premeals  -Will increase Lantus to 30 units at bedtime and increase premeal Admelog to 10 units qac TID , c/w low dose ISS  -Nutrition consult in-place  -Carb consistent and DASH diet     DC Recs:    -If planning to dc today , dc on Lantus 30 units at bedtime and Novolog 10 units before each meal with metformin 1000mg twice daily  -Pt has appointment with Dr. Dorina Cha at [address] [place] [address] on June 7th at 10:20am   -Reinforced to start using her CGM (Jaden) at home which was sent to her pharmacy in her OP appointment with LASHAWN Link on 4/10  -Discussed with patient glycemic goal of a1c <7% to prevent microvascular complications of diabetes mellitus and reduce the risk of macrovascular complications.  -Counseled pt on kinetics and action of basal and prandial insulin. Discussed that pt should check FSBG before meals and ALSO take the prandial insulin BEFORE meals  - Discharge on premeal insulin and Lantus. do not dc on correction scale or bedtime scale.  - At home, Patient should check FSBG premeals and bedtime. Pt should call their doctor when FSBG <70 or above >400 and or consistently above 200s as changes in the regimen will have to be made.  -pt should call endocrine for DM related questions or concerns  -Recommend annual podiatry and ophthalmology follow up.      #HTN  -BP goal <130/80  -BP is normal w/o any medications  -OP microalb/ cr ratio    #HLD  -Outpatient lipid profile shows LDL in 80s and then 50s  -ASCVD <7.5%  -Consider STATIN if no contraindications for primary prevention, can be assessed outpt

## 2023-04-20 NOTE — DIETITIAN INITIAL EVALUATION ADULT - PERTINENT LABORATORY DATA
04-20    135  |  103  |  14  ----------------------------<  278<H>  4.5   |  18<L>  |  0.63    Ca    8.5      20 Apr 2023 05:40  Phos  1.5     04-20  Mg     1.90     04-20    POCT Blood Glucose.: 280 mg/dL (04-20-23 @ 08:01)  A1C with Estimated Average Glucose Result: 8.7 % (04-12-23 @ 19:55)  A1C with Estimated Average Glucose Result: 9.8 % (01-19-23 @ 07:09)

## 2023-04-21 ENCOUNTER — TRANSCRIPTION ENCOUNTER (OUTPATIENT)
Age: 53
End: 2023-04-21

## 2023-04-21 VITALS
HEART RATE: 63 BPM | TEMPERATURE: 99 F | OXYGEN SATURATION: 99 % | RESPIRATION RATE: 18 BRPM | DIASTOLIC BLOOD PRESSURE: 60 MMHG | SYSTOLIC BLOOD PRESSURE: 92 MMHG

## 2023-04-21 LAB
ANION GAP SERPL CALC-SCNC: 13 MMOL/L — SIGNIFICANT CHANGE UP (ref 7–14)
BUN SERPL-MCNC: 11 MG/DL — SIGNIFICANT CHANGE UP (ref 7–23)
CALCIUM SERPL-MCNC: 9.4 MG/DL — SIGNIFICANT CHANGE UP (ref 8.4–10.5)
CHLORIDE SERPL-SCNC: 103 MMOL/L — SIGNIFICANT CHANGE UP (ref 98–107)
CO2 SERPL-SCNC: 22 MMOL/L — SIGNIFICANT CHANGE UP (ref 22–31)
CREAT SERPL-MCNC: 0.64 MG/DL — SIGNIFICANT CHANGE UP (ref 0.5–1.3)
EGFR: 107 ML/MIN/1.73M2 — SIGNIFICANT CHANGE UP
GLUCOSE BLDC GLUCOMTR-MCNC: 156 MG/DL — HIGH (ref 70–99)
GLUCOSE BLDC GLUCOMTR-MCNC: 195 MG/DL — HIGH (ref 70–99)
GLUCOSE SERPL-MCNC: 220 MG/DL — HIGH (ref 70–99)
HCT VFR BLD CALC: 38.9 % — SIGNIFICANT CHANGE UP (ref 34.5–45)
HGB BLD-MCNC: 12.6 G/DL — SIGNIFICANT CHANGE UP (ref 11.5–15.5)
MAGNESIUM SERPL-MCNC: 1.9 MG/DL — SIGNIFICANT CHANGE UP (ref 1.6–2.6)
MCHC RBC-ENTMCNC: 29 PG — SIGNIFICANT CHANGE UP (ref 27–34)
MCHC RBC-ENTMCNC: 32.4 GM/DL — SIGNIFICANT CHANGE UP (ref 32–36)
MCV RBC AUTO: 89.4 FL — SIGNIFICANT CHANGE UP (ref 80–100)
NRBC # BLD: 0 /100 WBCS — SIGNIFICANT CHANGE UP (ref 0–0)
NRBC # FLD: 0 K/UL — SIGNIFICANT CHANGE UP (ref 0–0)
PHOSPHATE SERPL-MCNC: 3.4 MG/DL — SIGNIFICANT CHANGE UP (ref 2.5–4.5)
PLATELET # BLD AUTO: 178 K/UL — SIGNIFICANT CHANGE UP (ref 150–400)
POTASSIUM SERPL-MCNC: 4 MMOL/L — SIGNIFICANT CHANGE UP (ref 3.5–5.3)
POTASSIUM SERPL-SCNC: 4 MMOL/L — SIGNIFICANT CHANGE UP (ref 3.5–5.3)
RBC # BLD: 4.35 M/UL — SIGNIFICANT CHANGE UP (ref 3.8–5.2)
RBC # FLD: 12.1 % — SIGNIFICANT CHANGE UP (ref 10.3–14.5)
SODIUM SERPL-SCNC: 138 MMOL/L — SIGNIFICANT CHANGE UP (ref 135–145)
WBC # BLD: 10.15 K/UL — SIGNIFICANT CHANGE UP (ref 3.8–10.5)
WBC # FLD AUTO: 10.15 K/UL — SIGNIFICANT CHANGE UP (ref 3.8–10.5)

## 2023-04-21 PROCEDURE — 99232 SBSQ HOSP IP/OBS MODERATE 35: CPT

## 2023-04-21 RX ORDER — INSULIN LISPRO 100/ML
12 VIAL (ML) SUBCUTANEOUS
Refills: 0 | Status: DISCONTINUED | OUTPATIENT
Start: 2023-04-21 | End: 2023-04-21

## 2023-04-21 RX ORDER — IBUPROFEN 200 MG
1 TABLET ORAL
Qty: 0 | Refills: 0 | DISCHARGE
Start: 2023-04-21

## 2023-04-21 RX ADMIN — OXYCODONE HYDROCHLORIDE 5 MILLIGRAM(S): 5 TABLET ORAL at 12:58

## 2023-04-21 RX ADMIN — Medication 1000 MILLIGRAM(S): at 10:18

## 2023-04-21 RX ADMIN — Medication 100 MILLIGRAM(S): at 14:27

## 2023-04-21 RX ADMIN — Medication 400 MILLIGRAM(S): at 00:50

## 2023-04-21 RX ADMIN — Medication 1 MILLIGRAM(S): at 12:27

## 2023-04-21 RX ADMIN — SERTRALINE 50 MILLIGRAM(S): 25 TABLET, FILM COATED ORAL at 12:28

## 2023-04-21 RX ADMIN — Medication 400 MILLIGRAM(S): at 12:58

## 2023-04-21 RX ADMIN — Medication 2: at 12:20

## 2023-04-21 RX ADMIN — OXYCODONE HYDROCHLORIDE 5 MILLIGRAM(S): 5 TABLET ORAL at 04:57

## 2023-04-21 RX ADMIN — GABAPENTIN 100 MILLIGRAM(S): 400 CAPSULE ORAL at 06:06

## 2023-04-21 RX ADMIN — Medication 12 UNIT(S): at 12:20

## 2023-04-21 RX ADMIN — OXYCODONE HYDROCHLORIDE 5 MILLIGRAM(S): 5 TABLET ORAL at 05:57

## 2023-04-21 RX ADMIN — Medication 400 MILLIGRAM(S): at 12:28

## 2023-04-21 RX ADMIN — OXYCODONE HYDROCHLORIDE 5 MILLIGRAM(S): 5 TABLET ORAL at 00:00

## 2023-04-21 RX ADMIN — Medication 1000 MILLIGRAM(S): at 09:48

## 2023-04-21 RX ADMIN — Medication 2: at 08:15

## 2023-04-21 RX ADMIN — Medication 400 MILLIGRAM(S): at 07:06

## 2023-04-21 RX ADMIN — Medication 10 UNIT(S): at 08:13

## 2023-04-21 RX ADMIN — PANTOPRAZOLE SODIUM 40 MILLIGRAM(S): 20 TABLET, DELAYED RELEASE ORAL at 06:06

## 2023-04-21 RX ADMIN — Medication 400 MILLIGRAM(S): at 06:06

## 2023-04-21 RX ADMIN — OXYCODONE HYDROCHLORIDE 5 MILLIGRAM(S): 5 TABLET ORAL at 12:28

## 2023-04-21 NOTE — PROGRESS NOTE ADULT - PROVIDER SPECIALTY LIST ADULT
Internal Medicine
Colorectal Surgery
Endocrinology
Colorectal Surgery
Colorectal Surgery
Internal Medicine
Surgery
Endocrinology

## 2023-04-21 NOTE — PROGRESS NOTE ADULT - SUBJECTIVE AND OBJECTIVE BOX
TEAM [ A ] Surgery Daily Progress Note  =====================================================    SUBJECTIVE: Patient seen and examined at bedside on AM rounds. Patient reports that they're feeling well. She is tolerating regular diet without N/V. She reports appropriate pain control. She is passing gas and having BM. Denies fever, chills, chest pain SOB    ALLERGIES:  Eggplant mouth itches (Other)  Bactrim (Anaphylaxis)      --------------------------------------------------------------------------------------    MEDICATIONS:    Neurologic Medications  acetaminophen     Tablet .. 1000 milliGRAM(s) Oral every 6 hours PRN Temp greater or equal to 38C (100.4F), Mild Pain (1 - 3)  gabapentin 100 milliGRAM(s) Oral three times a day  ibuprofen  Tablet. 400 milliGRAM(s) Oral every 6 hours  oxyCODONE    IR 5 milliGRAM(s) Oral every 4 hours PRN Severe Pain (7 - 10)  oxyCODONE    IR 2.5 milliGRAM(s) Oral every 4 hours PRN Moderate Pain (4 - 6)  sertraline 50 milliGRAM(s) Oral daily  traZODone 100 milliGRAM(s) Oral at bedtime    Respiratory Medications    Cardiovascular Medications    Gastrointestinal Medications  folic acid 1 milliGRAM(s) Oral daily  pantoprazole    Tablet 40 milliGRAM(s) Oral before breakfast  thiamine 100 milliGRAM(s) Oral daily    Genitourinary Medications    Hematologic/Oncologic Medications  heparin   Injectable 5000 Unit(s) SubCutaneous every 8 hours    Antimicrobial/Immunologic Medications    Endocrine/Metabolic Medications  insulin glargine Injectable (LANTUS) 30 Unit(s) SubCutaneous at bedtime  insulin lispro (ADMELOG) corrective regimen sliding scale   SubCutaneous three times a day before meals  insulin lispro (ADMELOG) corrective regimen sliding scale   SubCutaneous at bedtime  insulin lispro Injectable (ADMELOG) 10 Unit(s) SubCutaneous three times a day before meals    Topical/Other Medications    --------------------------------------------------------------------------------------    VITAL SIGNS:  ICU Vital Signs Last 24 Hrs  T(C): 36.4 (21 Apr 2023 06:00), Max: 37.1 (20 Apr 2023 09:23)  T(F): 97.6 (21 Apr 2023 06:00), Max: 98.8 (20 Apr 2023 14:00)  HR: 67 (21 Apr 2023 06:00) (59 - 67)  BP: 99/60 (21 Apr 2023 06:00) (97/60 - 112/65)  BP(mean): --  ABP: --  ABP(mean): --  RR: 18 (21 Apr 2023 06:00) (16 - 18)  SpO2: 98% (21 Apr 2023 06:00) (96% - 99%)    O2 Parameters below as of 21 Apr 2023 06:00  Patient On (Oxygen Delivery Method): room air    --------------------------------------------------------------------------------------    EXAM    General: NAD, resting in bed comfortably.  Cardiac: regular rate, warm and well perfused  Respiratory: Nonlabored respirations, normal cw expansion.  Abdomen: soft, nontender, nondistended, surgical incisions c/d/i  Extremities: normal strength, FROM, no deformities    --------------------------------------------------------------------------------------    LABS                        12.6   10.15 )-----------( 178      ( 21 Apr 2023 05:25 )             38.9   04-20    135  |  103  |  14  ----------------------------<  278<H>  4.5   |  18<L>  |  0.63    Ca    8.5      20 Apr 2023 05:40  Phos  1.5     04-20  Mg     1.90     04-20        --------------------------------------------------------------------------------------    INS AND OUTS:  I&O's Detail    20 Apr 2023 07:01  -  21 Apr 2023 07:00  --------------------------------------------------------  IN:    IV PiggyBack: 100 mL    Oral Fluid: 1420 mL  Total IN: 1520 mL    OUT:    Voided (mL): 2000 mL  Total OUT: 2000 mL    Total NET: -480 mL        --------------------------------------------------------------------------------------

## 2023-04-21 NOTE — PROGRESS NOTE ADULT - SUBJECTIVE AND OBJECTIVE BOX
Chief Complaint: DM2    History: tolerating po  no hypoglycemia events or symptoms  for dc planning today    MEDICATIONS  (STANDING):  folic acid 1 milliGRAM(s) Oral daily  gabapentin 100 milliGRAM(s) Oral three times a day  heparin   Injectable 5000 Unit(s) SubCutaneous every 8 hours  ibuprofen  Tablet. 400 milliGRAM(s) Oral every 6 hours  insulin glargine Injectable (LANTUS) 30 Unit(s) SubCutaneous at bedtime  insulin lispro (ADMELOG) corrective regimen sliding scale   SubCutaneous at bedtime  insulin lispro (ADMELOG) corrective regimen sliding scale   SubCutaneous three times a day before meals  insulin lispro Injectable (ADMELOG) 12 Unit(s) SubCutaneous three times a day before meals  pantoprazole    Tablet 40 milliGRAM(s) Oral before breakfast  sertraline 50 milliGRAM(s) Oral daily  thiamine 100 milliGRAM(s) Oral daily  traZODone 100 milliGRAM(s) Oral at bedtime    MEDICATIONS  (PRN):  acetaminophen     Tablet .. 1000 milliGRAM(s) Oral every 6 hours PRN Temp greater or equal to 38C (100.4F), Mild Pain (1 - 3)  oxyCODONE    IR 5 milliGRAM(s) Oral every 4 hours PRN Severe Pain (7 - 10)  oxyCODONE    IR 2.5 milliGRAM(s) Oral every 4 hours PRN Moderate Pain (4 - 6)      Allergies    Eggplant mouth itches (Other)  Bactrim (Anaphylaxis)    Intolerances      Review of Systems:  ALL OTHER SYSTEMS REVIEWED AND NEGATIVE      PHYSICAL EXAM:  VITALS: T(C): 37.2 (04-21-23 @ 10:19)  T(F): 99 (04-21-23 @ 10:19), Max: 99 (04-21-23 @ 10:19)  HR: 63 (04-21-23 @ 10:19) (59 - 67)  BP: 92/60 (04-21-23 @ 10:19) (92/60 - 112/65)  RR:  (16 - 18)  SpO2:  (96% - 99%)  Wt(kg): --  GENERAL: NAD, well-groomed, well-developed  EYES: No proptosis, no lid lag, anicteric  HEENT:  Atraumatic, Normocephalic, moist mucous membranes  RESPIRATORY: nonlabored respirations, no wheezing  PSYCH: Alert and oriented x 3, normal affect, normal mood    CAPILLARY BLOOD GLUCOSE      POCT Blood Glucose.: 195 mg/dL (21 Apr 2023 08:08)  POCT Blood Glucose.: 299 mg/dL (20 Apr 2023 22:04)  POCT Blood Glucose.: 172 mg/dL (20 Apr 2023 17:18)  POCT Blood Glucose.: 171 mg/dL (20 Apr 2023 16:12)  POCT Blood Glucose.: 286 mg/dL (20 Apr 2023 12:15)      04-21    138  |  103  |  11  ----------------------------<  220<H>  4.0   |  22  |  0.64    eGFR: 107    Ca    9.4      04-21  Mg     1.90     04-21  Phos  3.4     04-21        A1C with Estimated Average Glucose Result: 8.7 % (04-12-23 @ 19:55)  A1C with Estimated Average Glucose Result: 9.8 % (01-19-23 @ 07:09)      Thyroid Function Tests:

## 2023-04-21 NOTE — DISCHARGE NOTE NURSING/CASE MANAGEMENT/SOCIAL WORK - NSDCPEFALRISK_GEN_ALL_CORE
For information on Fall & Injury Prevention, visit: [email or web address] OR  [email or web address] OR  https://www.cdc.gov/steadi/patient.html

## 2023-04-21 NOTE — PROGRESS NOTE ADULT - ATTENDING COMMENTS
DATE OF SERVICE: 04-19-23 @ 12:04    Seen and examined, feeling better, having some incisional pain. + small amount of flatus  VSS  WBC 13.5 today  Abdomen soft appropriate TTP, no rebound/guarding    Advance diet with bowel fx  OOB and ambulate  Glucose mgt, endocrinology consult  Monitor bowel fx
DATE OF SERVICE: 04-20-23 @ 21:48    Doing well, no pain today; tolerating diet, having BM/flatus  Abd soft NT/ND  WBC 11  Finger sticks still elevated all day  Will monitor one more day given elevated FS to ensure that home insulin regimen is appropriate  Diet as tolerated  DC planning for AM if glucose controlled
DATE OF SERVICE: 04-21-23 @ 12:35    No events, mild soreness today. + BM/F, tolerating diet  VSS  Labs stable  Glucose improved  Abd soft NT/ND    DC home today  Fu endo recs, outpatient endo fu  FU in office 2 wk
DM2 hyperglycemia post hemicolectomy. Tolerating po.  For dc planning on basal bolus insulin as outlined plus resume metformin.  Obtained Jaden to start when home.  Follow up endocrine planned with Dr. Dorina Cha.    Ivan Pickard MD  Division of Endocrinology  Pager: [phone]    If after 6PM or before 9AM, or on weekends/holidays, please call endocrine answering service for assistance ([phone]).  For nonurgent matters email [email or web address] for assistance.

## 2023-04-21 NOTE — DISCHARGE NOTE NURSING/CASE MANAGEMENT/SOCIAL WORK - NSDCFUADDAPPT_GEN_ALL_CORE_FT
Please follow up with the endocrinology clinic at [address] [place] [address] upon discharge for further diabetes management. You have been prescribed a new medication. Please take Metformin 1,000 mg twice daily. Continue to take home dose of lantus 30 units at night and aspart premeals.

## 2023-04-21 NOTE — DISCHARGE NOTE NURSING/CASE MANAGEMENT/SOCIAL WORK - NSDCVIVACCINE_GEN_ALL_CORE_FT
Tdap; 29-Apr-2020 02:47; Munira Felipe (RN); [employer]; [id number] (Exp. Date: 19-Mar-2022); IntraMuscular; Deltoid Left.; 0.5 milliLiter(s); VIS (VIS Published: 09-May-2013, VIS Presented: 29-Apr-2020);

## 2023-04-21 NOTE — DISCHARGE NOTE NURSING/CASE MANAGEMENT/SOCIAL WORK - PATIENT PORTAL LINK FT
You can access the FollowMyHealth Patient Portal offered by [facility] by registering at the following website: [email or web address]. By joining [employer] [employer]’s FollowMyHealth portal, you will also be able to view your health information using other applications (apps) compatible with our system.

## 2023-04-21 NOTE — PROGRESS NOTE ADULT - ASSESSMENT
53 year old female with PMH type 2 DM, alcohol abuse, anxiety, depression, GERD, gastroparesis, hepatic steatosis, pancreatitis and PSH cholecystectomy presents s/p laparoscopic R colectomy on 4/17    Plan  - Diet: LRD  - Lantus 24 units and admelog 8 TID  - Restart home meds  - Pain control  - DVT prophylaxis  - Appreciate endocrine discharge recs  - Discharge home today if glucose controlled    A team surgery [id number]

## 2023-04-21 NOTE — PROGRESS NOTE ADULT - ASSESSMENT
53yoF with PMHx Alcohol abuse, Anxiety, Depression, Gastroparesis, GERD (gastroesophageal reflux disease), Hepatic steatosis, MRSA cellulitis, Pancreatitis, Type 2 diabetes Mellitus and recently diagnosed with a Volvulus presented for elective Right Hemicolectomy. s/p Laparoscopic Elective R Hemicolectomy 4/17. Endo consulted for hyperglycemia.       #DM2 Hyperglycemia Post op, s/p Lap R hemicolectomy 4/17  Pt's home regimen: Lantus 30 units at bedtime and Novolog 10-14 units TID plus metformin 1000mg twice daily.   -HbA1C 8.7%  -Inpt goal glucose 100-180 mg/dl, mildly elevated but improving  -Continue Lantus 30 units qhs  -Increased Admelog to 12/12/12 units premeal TID  -Nutrition consult in-place  -Carb consistent and DASH diet     DC Recs:  - dc on Lantus 30 units at bedtime and Novolog 12 units before each meal TID with metformin 1000mg twice daily  -Pt has appointment with Dr. Dorina Cha at [address] [place] [address] on June 7th at 10:20am   -Reinforced to start using her CGM (Jaden) at home which was sent to her pharmacy in her OP appointment with LASHAWN Link on 4/10  - At home, Patient should check FSBG premeals and bedtime. Pt should call their doctor when FSBG <70 or above >400 and or consistently above 200s as changes in the regimen will have to be made.    #HTN  -BP goal <130/80  -BP is normal w/o any medications  -Outpatient microalb/ cr ratio    #HLD  -Outpatient lipid profile shows LDL in 80s and then 50s  -ASCVD <7.5%  -Consider STATIN if no contraindications for primary prevention, can be assessed outpt     Ivan Pickard MD  Division of Endocrinology  Pager: [phone]    If after 6PM or before 9AM, or on weekends/holidays, please call endocrine answering service for assistance ([phone]).  For nonurgent matters email [email or web address] for assistance. 53yoF with PMHx Alcohol abuse, Anxiety, Depression, Gastroparesis, GERD (gastroesophageal reflux disease), Hepatic steatosis, MRSA cellulitis, Pancreatitis, Type 2 diabetes Mellitus and recently diagnosed with a Volvulus presented for elective Right Hemicolectomy. s/p Laparoscopic Elective R Hemicolectomy 4/17. Endo consulted for hyperglycemia.       #DM2 Hyperglycemia Post op, s/p Lap R hemicolectomy 4/17  Pt's home regimen: Lantus 30 units at bedtime and Novolog 10-14 units TID plus metformin 1000mg twice daily.   -HbA1C 8.7%  -Inpt goal glucose 100-180 mg/dl, mildly elevated but improving  -Continue Lantus 30 units qhs  -Increased Admelog to 12/12/12 units premeal TID  -Moderate Admelog scale premeal/low bedtime scale  -Nutrition consult in-place  -Carb consistent and DASH diet     DC Recs:  - dc on Lantus 30 units at bedtime and Novolog 12 units before each meal TID with metformin 1000mg twice daily  -Pt has appointment with Dr. Dorina Cha at [address] [place] [address] on June 7th at 10:20am   -Reinforced to start using her CGM (Jaden) at home which was sent to her pharmacy in her OP appointment with LASHAWN Link on 4/10  - At home, Patient should check FSBG premeals and bedtime. Pt should call their doctor when FSBG <70 or above >400 and or consistently above 200s as changes in the regimen will have to be made.    #HTN  -BP goal <130/80  -BP is normal w/o any medications  -Outpatient microalb/ cr ratio    #HLD  -Outpatient lipid profile shows LDL in 80s and then 50s  -ASCVD <7.5%  -Consider STATIN if no contraindications for primary prevention, can be assessed outpt     Ivan Pickard MD  Division of Endocrinology  Pager: [phone]    If after 6PM or before 9AM, or on weekends/holidays, please call endocrine answering service for assistance ([phone]).  For nonurgent matters email [email or web address] for assistance.

## 2023-04-25 NOTE — PATIENT PROFILE ADULT - NSPROGENBLOODRESTRICT_GEN_A_NUR
none Chonodrocutaneous Helical Advancement Flap Text: The defect edges were debeveled with a #15 scalpel blade.  Given the location of the defect and the proximity to free margins a chondrocutaneous helical advancement flap was deemed most appropriate.  Using a sterile surgical marker, the appropriate advancement flap was drawn incorporating the defect and placing the expected incisions within the relaxed skin tension lines where possible.    The area thus outlined was incised deep to adipose tissue with a #15 scalpel blade.  The skin margins were undermined to an appropriate distance in all directions utilizing iris scissors.

## 2023-04-27 ENCOUNTER — INPATIENT (INPATIENT)
Facility: HOSPITAL | Age: 53
LOS: 8 days | Discharge: HOME | End: 2023-05-06
Attending: INTERNAL MEDICINE | Admitting: INTERNAL MEDICINE
Payer: MEDICARE

## 2023-04-27 VITALS
TEMPERATURE: 101 F | OXYGEN SATURATION: 99 % | DIASTOLIC BLOOD PRESSURE: 56 MMHG | HEART RATE: 95 BPM | RESPIRATION RATE: 16 BRPM | SYSTOLIC BLOOD PRESSURE: 107 MMHG | HEIGHT: 63 IN

## 2023-04-27 DIAGNOSIS — Z98.890 OTHER SPECIFIED POSTPROCEDURAL STATES: Chronic | ICD-10-CM

## 2023-04-27 DIAGNOSIS — D64.9 ANEMIA, UNSPECIFIED: ICD-10-CM

## 2023-04-27 DIAGNOSIS — Z90.89 ACQUIRED ABSENCE OF OTHER ORGANS: Chronic | ICD-10-CM

## 2023-04-27 DIAGNOSIS — L02.91 CUTANEOUS ABSCESS, UNSPECIFIED: Chronic | ICD-10-CM

## 2023-04-27 DIAGNOSIS — L72.9 FOLLICULAR CYST OF THE SKIN AND SUBCUTANEOUS TISSUE, UNSPECIFIED: Chronic | ICD-10-CM

## 2023-04-27 PROBLEM — E11.9 TYPE 2 DIABETES MELLITUS WITHOUT COMPLICATIONS: Chronic | Status: ACTIVE | Noted: 2023-04-12

## 2023-04-27 LAB
ALBUMIN SERPL ELPH-MCNC: 4.1 G/DL — SIGNIFICANT CHANGE UP (ref 3.3–5)
ALP SERPL-CCNC: 100 U/L — SIGNIFICANT CHANGE UP (ref 40–120)
ALT FLD-CCNC: 19 U/L — SIGNIFICANT CHANGE UP (ref 4–33)
ANION GAP SERPL CALC-SCNC: 16 MMOL/L — HIGH (ref 7–14)
APAP SERPL-MCNC: <10 UG/ML — LOW (ref 15–25)
APPEARANCE UR: CLEAR — SIGNIFICANT CHANGE UP
APTT BLD: 30.7 SEC — SIGNIFICANT CHANGE UP (ref 27–36.3)
AST SERPL-CCNC: 40 U/L — HIGH (ref 4–32)
B PERT DNA SPEC QL NAA+PROBE: SIGNIFICANT CHANGE UP
B PERT+PARAPERT DNA PNL SPEC NAA+PROBE: SIGNIFICANT CHANGE UP
BACTERIA # UR AUTO: NEGATIVE — SIGNIFICANT CHANGE UP
BASE EXCESS BLDV CALC-SCNC: -2.1 MMOL/L — LOW (ref -2–3)
BASOPHILS # BLD AUTO: 0.46 K/UL — HIGH (ref 0–0.2)
BASOPHILS NFR BLD AUTO: 1.7 % — SIGNIFICANT CHANGE UP (ref 0–2)
BILIRUB SERPL-MCNC: 3.8 MG/DL — HIGH (ref 0.2–1.2)
BILIRUB UR-MCNC: NEGATIVE — SIGNIFICANT CHANGE UP
BLD GP AB SCN SERPL QL: POSITIVE — SIGNIFICANT CHANGE UP
BLOOD GAS VENOUS COMPREHENSIVE RESULT: SIGNIFICANT CHANGE UP
BLOOD GAS VENOUS COMPREHENSIVE RESULT: SIGNIFICANT CHANGE UP
BORDETELLA PARAPERTUSSIS (RAPRVP): SIGNIFICANT CHANGE UP
BUN SERPL-MCNC: 19 MG/DL — SIGNIFICANT CHANGE UP (ref 7–23)
C PNEUM DNA SPEC QL NAA+PROBE: SIGNIFICANT CHANGE UP
CALCIUM SERPL-MCNC: 9 MG/DL — SIGNIFICANT CHANGE UP (ref 8.4–10.5)
CHLORIDE BLDV-SCNC: 102 MMOL/L — SIGNIFICANT CHANGE UP (ref 96–108)
CHLORIDE SERPL-SCNC: 99 MMOL/L — SIGNIFICANT CHANGE UP (ref 98–107)
CO2 BLDV-SCNC: 24.5 MMOL/L — SIGNIFICANT CHANGE UP (ref 22–26)
CO2 SERPL-SCNC: 21 MMOL/L — LOW (ref 22–31)
COLOR SPEC: YELLOW — SIGNIFICANT CHANGE UP
CREAT SERPL-MCNC: 0.71 MG/DL — SIGNIFICANT CHANGE UP (ref 0.5–1.3)
DIFF PNL FLD: ABNORMAL
EGFR: 103 ML/MIN/1.73M2 — SIGNIFICANT CHANGE UP
EOSINOPHIL # BLD AUTO: 0.71 K/UL — HIGH (ref 0–0.5)
EOSINOPHIL NFR BLD AUTO: 2.6 % — SIGNIFICANT CHANGE UP (ref 0–6)
EPI CELLS # UR: 1 /HPF — SIGNIFICANT CHANGE UP (ref 0–5)
FLUAV SUBTYP SPEC NAA+PROBE: SIGNIFICANT CHANGE UP
FLUBV RNA SPEC QL NAA+PROBE: SIGNIFICANT CHANGE UP
GAS PNL BLDV: 137 MMOL/L — SIGNIFICANT CHANGE UP (ref 136–145)
GIANT PLATELETS BLD QL SMEAR: PRESENT — SIGNIFICANT CHANGE UP
GLUCOSE BLDC GLUCOMTR-MCNC: 222 MG/DL — HIGH (ref 70–99)
GLUCOSE BLDC GLUCOMTR-MCNC: 249 MG/DL — HIGH (ref 70–99)
GLUCOSE BLDC GLUCOMTR-MCNC: 251 MG/DL — HIGH (ref 70–99)
GLUCOSE BLDC GLUCOMTR-MCNC: 272 MG/DL — HIGH (ref 70–99)
GLUCOSE BLDC GLUCOMTR-MCNC: 300 MG/DL — HIGH (ref 70–99)
GLUCOSE BLDC GLUCOMTR-MCNC: 307 MG/DL — HIGH (ref 70–99)
GLUCOSE BLDV-MCNC: 274 MG/DL — HIGH (ref 70–99)
GLUCOSE SERPL-MCNC: 270 MG/DL — HIGH (ref 70–99)
GLUCOSE UR QL: ABNORMAL
HADV DNA SPEC QL NAA+PROBE: SIGNIFICANT CHANGE UP
HCG SERPL-ACNC: <5 MIU/ML — SIGNIFICANT CHANGE UP
HCO3 BLDV-SCNC: 23 MMOL/L — SIGNIFICANT CHANGE UP (ref 22–29)
HCOV 229E RNA SPEC QL NAA+PROBE: SIGNIFICANT CHANGE UP
HCOV HKU1 RNA SPEC QL NAA+PROBE: SIGNIFICANT CHANGE UP
HCOV NL63 RNA SPEC QL NAA+PROBE: SIGNIFICANT CHANGE UP
HCOV OC43 RNA SPEC QL NAA+PROBE: SIGNIFICANT CHANGE UP
HCT VFR BLD CALC: 18.6 % — CRITICAL LOW (ref 34.5–45)
HCT VFR BLD CALC: 19.2 % — CRITICAL LOW (ref 34.5–45)
HCT VFR BLDA CALC: 20 % — CRITICAL LOW (ref 34.5–46.5)
HGB BLD CALC-MCNC: 6.6 G/DL — CRITICAL LOW (ref 11.7–16.1)
HGB BLD-MCNC: 6.1 G/DL — CRITICAL LOW (ref 11.5–15.5)
HGB BLD-MCNC: 6.4 G/DL — CRITICAL LOW (ref 11.5–15.5)
HMPV RNA SPEC QL NAA+PROBE: SIGNIFICANT CHANGE UP
HPIV1 RNA SPEC QL NAA+PROBE: SIGNIFICANT CHANGE UP
HPIV2 RNA SPEC QL NAA+PROBE: SIGNIFICANT CHANGE UP
HPIV3 RNA SPEC QL NAA+PROBE: SIGNIFICANT CHANGE UP
HPIV4 RNA SPEC QL NAA+PROBE: SIGNIFICANT CHANGE UP
HYALINE CASTS # UR AUTO: 1 /LPF — SIGNIFICANT CHANGE UP (ref 0–7)
IANC: 19.4 K/UL — HIGH (ref 1.8–7.4)
INR BLD: 1.18 RATIO — HIGH (ref 0.88–1.16)
KETONES UR-MCNC: ABNORMAL
LACTATE BLDV-MCNC: 1.9 MMOL/L — SIGNIFICANT CHANGE UP (ref 0.5–2)
LEUKOCYTE ESTERASE UR-ACNC: ABNORMAL
LYMPHOCYTES # BLD AUTO: 1.44 K/UL — SIGNIFICANT CHANGE UP (ref 1–3.3)
LYMPHOCYTES # BLD AUTO: 5.3 % — LOW (ref 13–44)
M PNEUMO DNA SPEC QL NAA+PROBE: SIGNIFICANT CHANGE UP
MANUAL SMEAR VERIFICATION: SIGNIFICANT CHANGE UP
MCHC RBC-ENTMCNC: 28.8 PG — SIGNIFICANT CHANGE UP (ref 27–34)
MCHC RBC-ENTMCNC: 30.5 PG — SIGNIFICANT CHANGE UP (ref 27–34)
MCHC RBC-ENTMCNC: 32.8 GM/DL — SIGNIFICANT CHANGE UP (ref 32–36)
MCHC RBC-ENTMCNC: 33.3 GM/DL — SIGNIFICANT CHANGE UP (ref 32–36)
MCV RBC AUTO: 87.7 FL — SIGNIFICANT CHANGE UP (ref 80–100)
MCV RBC AUTO: 91.4 FL — SIGNIFICANT CHANGE UP (ref 80–100)
METAMYELOCYTES # FLD: 3.5 % — HIGH (ref 0–1)
MONOCYTES # BLD AUTO: 1.9 K/UL — HIGH (ref 0–0.9)
MONOCYTES NFR BLD AUTO: 7 % — SIGNIFICANT CHANGE UP (ref 2–14)
MYELOCYTES NFR BLD: 4.4 % — HIGH (ref 0–0)
NEUTROPHILS # BLD AUTO: 20.53 K/UL — HIGH (ref 1.8–7.4)
NEUTROPHILS NFR BLD AUTO: 73.7 % — SIGNIFICANT CHANGE UP (ref 43–77)
NEUTS BAND # BLD: 1.8 % — SIGNIFICANT CHANGE UP (ref 0–6)
NITRITE UR-MCNC: NEGATIVE — SIGNIFICANT CHANGE UP
NRBC # BLD: 3 /100 — HIGH (ref 0–0)
NRBC # BLD: 7 /100 WBCS — HIGH (ref 0–0)
NRBC # FLD: 1.86 K/UL — HIGH (ref 0–0)
PCO2 BLDV: 41 MMHG — SIGNIFICANT CHANGE UP (ref 39–52)
PH BLDV: 7.36 — SIGNIFICANT CHANGE UP (ref 7.32–7.43)
PH UR: 5.5 — SIGNIFICANT CHANGE UP (ref 5–8)
PLAT MORPH BLD: NORMAL — SIGNIFICANT CHANGE UP
PLATELET # BLD AUTO: 214 K/UL — SIGNIFICANT CHANGE UP (ref 150–400)
PLATELET # BLD AUTO: 264 K/UL — SIGNIFICANT CHANGE UP (ref 150–400)
PLATELET COUNT - ESTIMATE: NORMAL — SIGNIFICANT CHANGE UP
PO2 BLDV: <20 MMHG — LOW (ref 25–45)
POIKILOCYTOSIS BLD QL AUTO: SIGNIFICANT CHANGE UP
POLYCHROMASIA BLD QL SMEAR: SIGNIFICANT CHANGE UP
POTASSIUM BLDV-SCNC: 4.8 MMOL/L — SIGNIFICANT CHANGE UP (ref 3.5–5.1)
POTASSIUM SERPL-MCNC: 4.7 MMOL/L — SIGNIFICANT CHANGE UP (ref 3.5–5.3)
POTASSIUM SERPL-SCNC: 4.7 MMOL/L — SIGNIFICANT CHANGE UP (ref 3.5–5.3)
PROT SERPL-MCNC: 7.4 G/DL — SIGNIFICANT CHANGE UP (ref 6–8.3)
PROT UR-MCNC: ABNORMAL
PROTHROM AB SERPL-ACNC: 13.7 SEC — HIGH (ref 10.5–13.4)
RAPID RVP RESULT: SIGNIFICANT CHANGE UP
RBC # BLD: 2.1 M/UL — LOW (ref 3.8–5.2)
RBC # BLD: 2.12 M/UL — LOW (ref 3.8–5.2)
RBC # FLD: 20.9 % — HIGH (ref 10.3–14.5)
RBC # FLD: 22.2 % — HIGH (ref 10.3–14.5)
RBC BLD AUTO: NORMAL — SIGNIFICANT CHANGE UP
RBC CASTS # UR COMP ASSIST: 2 /HPF — SIGNIFICANT CHANGE UP (ref 0–4)
RH IG SCN BLD-IMP: POSITIVE — SIGNIFICANT CHANGE UP
ROULEAUX BLD QL SMEAR: PRESENT
RSV RNA SPEC QL NAA+PROBE: SIGNIFICANT CHANGE UP
RV+EV RNA SPEC QL NAA+PROBE: SIGNIFICANT CHANGE UP
SAO2 % BLDV: 21.5 % — LOW (ref 67–88)
SARS-COV-2 RNA SPEC QL NAA+PROBE: SIGNIFICANT CHANGE UP
SODIUM SERPL-SCNC: 136 MMOL/L — SIGNIFICANT CHANGE UP (ref 135–145)
SP GR SPEC: 1.03 — SIGNIFICANT CHANGE UP (ref 1.01–1.05)
SPHEROCYTES BLD QL SMEAR: SLIGHT — SIGNIFICANT CHANGE UP
UROBILINOGEN FLD QL: SIGNIFICANT CHANGE UP
WBC # BLD: 25.63 K/UL — HIGH (ref 3.8–10.5)
WBC # BLD: 27.19 K/UL — HIGH (ref 3.8–10.5)
WBC # FLD AUTO: 25.63 K/UL — HIGH (ref 3.8–10.5)
WBC # FLD AUTO: 27.19 K/UL — HIGH (ref 3.8–10.5)
WBC UR QL: 24 /HPF — HIGH (ref 0–5)

## 2023-04-27 PROCEDURE — 72125 CT NECK SPINE W/O DYE: CPT | Mod: 26,MA

## 2023-04-27 PROCEDURE — 86077 PHYS BLOOD BANK SERV XMATCH: CPT

## 2023-04-27 PROCEDURE — 70450 CT HEAD/BRAIN W/O DYE: CPT | Mod: 26,MA

## 2023-04-27 PROCEDURE — 73030 X-RAY EXAM OF SHOULDER: CPT | Mod: 26,LT

## 2023-04-27 PROCEDURE — 99285 EMERGENCY DEPT VISIT HI MDM: CPT

## 2023-04-27 PROCEDURE — 76937 US GUIDE VASCULAR ACCESS: CPT | Mod: 26

## 2023-04-27 PROCEDURE — 73502 X-RAY EXAM HIP UNI 2-3 VIEWS: CPT | Mod: 26,LT

## 2023-04-27 PROCEDURE — 74177 CT ABD & PELVIS W/CONTRAST: CPT | Mod: 26,MA

## 2023-04-27 PROCEDURE — 71045 X-RAY EXAM CHEST 1 VIEW: CPT | Mod: 26

## 2023-04-27 PROCEDURE — 36000 PLACE NEEDLE IN VEIN: CPT

## 2023-04-27 RX ORDER — PANTOPRAZOLE SODIUM 20 MG/1
40 TABLET, DELAYED RELEASE ORAL EVERY 12 HOURS
Refills: 0 | Status: DISCONTINUED | OUTPATIENT
Start: 2023-04-27 | End: 2023-05-01

## 2023-04-27 RX ORDER — DEXTROSE 50 % IN WATER 50 %
12.5 SYRINGE (ML) INTRAVENOUS ONCE
Refills: 0 | Status: DISCONTINUED | OUTPATIENT
Start: 2023-04-27 | End: 2023-05-01

## 2023-04-27 RX ORDER — INSULIN LISPRO 100/ML
VIAL (ML) SUBCUTANEOUS EVERY 6 HOURS
Refills: 0 | Status: DISCONTINUED | OUTPATIENT
Start: 2023-04-27 | End: 2023-04-28

## 2023-04-27 RX ORDER — ACETAMINOPHEN 500 MG
1000 TABLET ORAL ONCE
Refills: 0 | Status: COMPLETED | OUTPATIENT
Start: 2023-04-27 | End: 2023-04-27

## 2023-04-27 RX ORDER — ONDANSETRON 8 MG/1
4 TABLET, FILM COATED ORAL ONCE
Refills: 0 | Status: COMPLETED | OUTPATIENT
Start: 2023-04-27 | End: 2023-04-27

## 2023-04-27 RX ORDER — GABAPENTIN 400 MG/1
600 CAPSULE ORAL ONCE
Refills: 0 | Status: COMPLETED | OUTPATIENT
Start: 2023-04-27 | End: 2023-04-27

## 2023-04-27 RX ORDER — GLUCAGON INJECTION, SOLUTION 0.5 MG/.1ML
1 INJECTION, SOLUTION SUBCUTANEOUS ONCE
Refills: 0 | Status: DISCONTINUED | OUTPATIENT
Start: 2023-04-27 | End: 2023-05-06

## 2023-04-27 RX ORDER — INSULIN LISPRO 100/ML
VIAL (ML) SUBCUTANEOUS AT BEDTIME
Refills: 0 | Status: DISCONTINUED | OUTPATIENT
Start: 2023-04-27 | End: 2023-04-27

## 2023-04-27 RX ORDER — GABAPENTIN 400 MG/1
300 CAPSULE ORAL
Refills: 0 | Status: DISCONTINUED | OUTPATIENT
Start: 2023-04-27 | End: 2023-05-05

## 2023-04-27 RX ORDER — DEXTROSE 50 % IN WATER 50 %
15 SYRINGE (ML) INTRAVENOUS ONCE
Refills: 0 | Status: DISCONTINUED | OUTPATIENT
Start: 2023-04-27 | End: 2023-05-01

## 2023-04-27 RX ORDER — DEXTROSE 50 % IN WATER 50 %
25 SYRINGE (ML) INTRAVENOUS ONCE
Refills: 0 | Status: DISCONTINUED | OUTPATIENT
Start: 2023-04-27 | End: 2023-05-01

## 2023-04-27 RX ORDER — SODIUM CHLORIDE 9 MG/ML
1000 INJECTION, SOLUTION INTRAVENOUS
Refills: 0 | Status: DISCONTINUED | OUTPATIENT
Start: 2023-04-27 | End: 2023-04-28

## 2023-04-27 RX ORDER — PIPERACILLIN AND TAZOBACTAM 4; .5 G/20ML; G/20ML
3.38 INJECTION, POWDER, LYOPHILIZED, FOR SOLUTION INTRAVENOUS EVERY 8 HOURS
Refills: 0 | Status: DISCONTINUED | OUTPATIENT
Start: 2023-04-27 | End: 2023-05-01

## 2023-04-27 RX ORDER — CLONAZEPAM 1 MG
1 TABLET ORAL THREE TIMES A DAY
Refills: 0 | Status: DISCONTINUED | OUTPATIENT
Start: 2023-04-27 | End: 2023-05-04

## 2023-04-27 RX ORDER — SODIUM CHLORIDE 9 MG/ML
1000 INJECTION, SOLUTION INTRAVENOUS ONCE
Refills: 0 | Status: COMPLETED | OUTPATIENT
Start: 2023-04-27 | End: 2023-04-27

## 2023-04-27 RX ORDER — TRAZODONE HCL 50 MG
100 TABLET ORAL AT BEDTIME
Refills: 0 | Status: DISCONTINUED | OUTPATIENT
Start: 2023-04-27 | End: 2023-05-06

## 2023-04-27 RX ORDER — INSULIN GLARGINE 100 [IU]/ML
15 INJECTION, SOLUTION SUBCUTANEOUS AT BEDTIME
Refills: 0 | Status: DISCONTINUED | OUTPATIENT
Start: 2023-04-27 | End: 2023-04-28

## 2023-04-27 RX ORDER — SERTRALINE 25 MG/1
50 TABLET, FILM COATED ORAL DAILY
Refills: 0 | Status: DISCONTINUED | OUTPATIENT
Start: 2023-04-27 | End: 2023-05-06

## 2023-04-27 RX ORDER — VANCOMYCIN HCL 1 G
1000 VIAL (EA) INTRAVENOUS ONCE
Refills: 0 | Status: DISCONTINUED | OUTPATIENT
Start: 2023-04-27 | End: 2023-04-28

## 2023-04-27 RX ORDER — PANTOPRAZOLE SODIUM 20 MG/1
40 TABLET, DELAYED RELEASE ORAL EVERY 24 HOURS
Refills: 0 | Status: DISCONTINUED | OUTPATIENT
Start: 2023-04-27 | End: 2023-04-27

## 2023-04-27 RX ORDER — MORPHINE SULFATE 50 MG/1
4 CAPSULE, EXTENDED RELEASE ORAL ONCE
Refills: 0 | Status: DISCONTINUED | OUTPATIENT
Start: 2023-04-27 | End: 2023-04-27

## 2023-04-27 RX ORDER — SODIUM CHLORIDE 9 MG/ML
1000 INJECTION, SOLUTION INTRAVENOUS
Refills: 0 | Status: DISCONTINUED | OUTPATIENT
Start: 2023-04-27 | End: 2023-04-29

## 2023-04-27 RX ORDER — DIPHENHYDRAMINE HCL 50 MG
50 CAPSULE ORAL ONCE
Refills: 0 | Status: COMPLETED | OUTPATIENT
Start: 2023-04-27 | End: 2023-04-27

## 2023-04-27 RX ORDER — PIPERACILLIN AND TAZOBACTAM 4; .5 G/20ML; G/20ML
3.38 INJECTION, POWDER, LYOPHILIZED, FOR SOLUTION INTRAVENOUS ONCE
Refills: 0 | Status: COMPLETED | OUTPATIENT
Start: 2023-04-27 | End: 2023-04-27

## 2023-04-27 RX ORDER — INSULIN LISPRO 100/ML
VIAL (ML) SUBCUTANEOUS
Refills: 0 | Status: DISCONTINUED | OUTPATIENT
Start: 2023-04-27 | End: 2023-04-27

## 2023-04-27 RX ADMIN — PANTOPRAZOLE SODIUM 40 MILLIGRAM(S): 20 TABLET, DELAYED RELEASE ORAL at 19:33

## 2023-04-27 RX ADMIN — INSULIN GLARGINE 15 UNIT(S): 100 INJECTION, SOLUTION SUBCUTANEOUS at 22:35

## 2023-04-27 RX ADMIN — SODIUM CHLORIDE 1000 MILLILITER(S): 9 INJECTION, SOLUTION INTRAVENOUS at 14:17

## 2023-04-27 RX ADMIN — MORPHINE SULFATE 4 MILLIGRAM(S): 50 CAPSULE, EXTENDED RELEASE ORAL at 16:33

## 2023-04-27 RX ADMIN — Medication 50 MILLIGRAM(S): at 12:17

## 2023-04-27 RX ADMIN — Medication 2: at 23:48

## 2023-04-27 RX ADMIN — PIPERACILLIN AND TAZOBACTAM 200 GRAM(S): 4; .5 INJECTION, POWDER, LYOPHILIZED, FOR SOLUTION INTRAVENOUS at 12:18

## 2023-04-27 RX ADMIN — Medication 1000 MILLIGRAM(S): at 13:39

## 2023-04-27 RX ADMIN — Medication 400 MILLIGRAM(S): at 22:53

## 2023-04-27 RX ADMIN — Medication 400 MILLIGRAM(S): at 11:42

## 2023-04-27 RX ADMIN — Medication 1000 MILLIGRAM(S): at 23:23

## 2023-04-27 RX ADMIN — GABAPENTIN 600 MILLIGRAM(S): 400 CAPSULE ORAL at 12:17

## 2023-04-27 RX ADMIN — Medication 3: at 16:33

## 2023-04-27 RX ADMIN — Medication 100 MILLIGRAM(S): at 23:48

## 2023-04-27 RX ADMIN — Medication 1000 MILLIGRAM(S): at 12:12

## 2023-04-27 RX ADMIN — GABAPENTIN 300 MILLIGRAM(S): 400 CAPSULE ORAL at 19:32

## 2023-04-27 RX ADMIN — MORPHINE SULFATE 4 MILLIGRAM(S): 50 CAPSULE, EXTENDED RELEASE ORAL at 17:03

## 2023-04-27 RX ADMIN — ONDANSETRON 4 MILLIGRAM(S): 8 TABLET, FILM COATED ORAL at 12:18

## 2023-04-27 NOTE — ED ADULT TRIAGE NOTE - CHIEF COMPLAINT QUOTE
Pt presents for fall out of bed this morning, no head trauma, no loss of consciousness. Pt had surgery last week related to colon. Pt skin appears jaundiced. Pt endorses fever since yesterday, and weakness, currently febrile. Pt hyperglycemic.

## 2023-04-27 NOTE — ED ADULT NURSE NOTE - NSIMPLEMENTINTERV_GEN_ALL_ED
Implemented All Fall Risk Interventions:  [place] to call system. Call bell, personal items and telephone within reach. Instruct patient to call for assistance. Room bathroom lighting operational. Non-slip footwear when patient is off stretcher. Physically safe environment: no spills, clutter or unnecessary equipment. Stretcher in lowest position, wheels locked, appropriate side rails in place. Provide visual cue, wrist band, yellow gown, etc. Monitor gait and stability. Monitor for mental status changes and reorient to person, place, and time. Review medications for side effects contributing to fall risk. Reinforce activity limits and safety measures with patient and family.

## 2023-04-27 NOTE — H&P ADULT - NSHPPHYSICALEXAM_GEN_ALL_CORE
PHYSICAL EXAM  GENERAL: NAD, lying in bed comfortably  CHEST: nonlabored, no increased WOB  ABDOMEN: Soft, TTP RLQ and right lateral abdominal region Nondistended. Incision sites clean, dry with no erythema or induration.  EXTREMITIES: Well perfused. No clubbing, cyanosis, or edema  NERVOUS SYSTEM:  Alert & Oriented X3

## 2023-04-27 NOTE — ED PROVIDER NOTE - PHYSICAL EXAMINATION
GENERAL: mild acute distress, endomorphic body habitus  HEENT: atraumatic, normocephalic, vision grossly intact, EOMI, no conjunctivitis or discharge, hearing grossly intact, no nasal discharge or epistaxis, clear pharynx  CV: regular rate, normal rhythm, normal S1/S2, no murmurs/rubs, no cyanosis  PULM: normal work of breathing, clear breath sounds in b/l upper/lower lung fields, no crackles/rales/rhonchi/wheezing  GI:  diffuse abdominal tenderness, soft/nondistended abdomen, no guarding or rebound tenderness, no palpable masses  NEURO: A&Ox4, follows commands, normal speech, no focal motor or sensory deficits  MSK: no joint tenderness/swelling/erythema, ranging all extremities with no appreciable loss of ROM  EXT: no peripheral edema, no calf tenderness, no redness or swelling  SKIN: jaundiced, 1 large midline + 3 smaller L sided abdominal healing surgical incisions w/ no drainage, warm, dry, no rashes  PSYCH: appropriate mood and affect GENERAL: mild acute distress, endomorphic body habitus  HEENT: atraumatic, normocephalic, vision grossly intact, EOMI, no conjunctivitis or discharge, hearing grossly intact, no nasal discharge or epistaxis, clear pharynx  CV: regular rate, normal rhythm, normal S1/S2, no murmurs/rubs, no cyanosis  PULM: normal work of breathing, clear breath sounds in b/l upper/lower lung fields, no crackles/rales/rhonchi/wheezing  GI:  diffuse abdominal tenderness, soft/nondistended abdomen, no guarding or rebound tenderness, no palpable masses  NEURO: A&Ox4, follows commands, normal speech, no focal motor or sensory deficits  MSK: L posterior shoulder pain, L hip pain  EXT: no peripheral edema, no calf tenderness, no redness or swelling  SKIN: jaundiced, 1 large midline + 3 smaller L sided abdominal healing surgical incisions w/ no drainage, warm, dry, no rashes  PSYCH: appropriate mood and affect

## 2023-04-27 NOTE — ED PROVIDER NOTE - ATTENDING CONTRIBUTION TO CARE
The patient is a 53y Female who has a past medical and surgery history of Anxiety Depression Alcohol abuse c/b pancreatitis/Hepatic steatosis   DMII Gastroparesis MRSA cellulitis cholecystectomy recent repair of volvulus PTED with sepsis    Vital Signs Last 24 Hrs  T(F): 100.8 HR: 95 BP: 107/56 RR: 16 SpO2: 99% (27 Apr 2023 09:38)   PE: as described; my additions and exceptions are noted in the chart    DATA:  EKG: pending at time of evaluation  LAB: Pending at time of evaluation     IMPRESSION/RISK:  Dx= sepsis hopefully not from post op complication  Consideration include In addition to infectious workup will also need CT to see if there is no collection requiring source control while treating for the usual causes of post op fever (PNA UTI drug rx) doubt PE   Plan  acetaminophen   IVPB .. 1000 milliGRAM(s) IV Intermittent  lactated ringers Bolus 1000 milliLiter(s) IV Bolus  ondansetron Injectable 4 milliGRAM(s) IV Push  piperacillin/tazobactam IVPB... 3.375 Gram(s) IV Intermittent  Surgery made aware of patient at 10:40 will follow

## 2023-04-27 NOTE — REASON FOR VISIT
[Initial Consultation] : an initial consultation for [Family Member] : family member [FreeTextEntry2] : surgical consultation Quality 130: Documentation Of Current Medications In The Medical Record: Current Medications Documented Detail Level: Detailed Quality 137: Melanoma: Continuity Of Care - Recall System: Patient information entered into a recall system that includes: target date for the next exam specified AND a process to follow up with patients regarding missed or unscheduled appointments Quality 226: Preventive Care And Screening: Tobacco Use: Screening And Cessation Intervention: Patient screened for tobacco use and is an ex/non-smoker Quality 111:Pneumonia Vaccination Status For Older Adults: Pneumococcal vaccine (PPSV23) administered on or after patient’s 60th birthday and before the end of the measurement period

## 2023-04-27 NOTE — PATIENT PROFILE ADULT - FALL HARM RISK - HARM RISK INTERVENTIONS

## 2023-04-27 NOTE — H&P ADULT - ASSESSMENT
53F PMHx DM on insulin c/b gastroparesis and neuropathy presents for 2 days of dehydration/HA/nausea/SOB in the setting of R lap hemicolectomy w/ ileocolic anastomosis (4/17/2023) exacerbated by GLF from bed today w/ head and left-sided trauma.  Patient received left-sided lap hemicolectomy for volvulus at [facility] (Dr. Iker Sen).  Recent fall off bed at home, no LOC. ED workup significant for H/H 6.4/19.2, elevated WBC, elevated T Bili 3.8, IDB 2.8, DB 1.0. Radiographic imaging negative for postsurgical anastomotic leak, or other abnormalities.     Plan:  - Admit to A Team Surgery under Dr. Sen  - s/p 1u pRBC in ED, f/u post transfusion CBC  - monitor H/H  - f/u GI recs, no evidence of active bleeding   - IV abx, monitor fevers  - f/u am labs  - NPO/IVF  - no pain meds before abdominal exam    Plan discussed with and approved by attending, Dr. Francy Craft MD PGY-2  A Team Surgery   [id number]

## 2023-04-27 NOTE — ED ADULT NURSE NOTE - COVID-19 ORDERING FACILITY
[facility]  [facility]

## 2023-04-27 NOTE — ED PROVIDER NOTE - PROGRESS NOTE DETAILS
Kenn:CT done, no issues with anastamosis, no identified bleeding. HgB markedly lower than previous unclear etiology, no hematemesis/melena/hematochezia, infectious vs slow bleed vs ?other etiology. D/w surg they will admit, plan for 1prbc then rpt cbc to determine need for further transfusion, hemodynamically stable.

## 2023-04-27 NOTE — H&P ADULT - ATTENDING COMMENTS
DATE OF SERVICE: 04-27-23 @ 21:22    Seen and examined. POD 10 today after R hemicolectomy for mobile cecum. Post op course initially uneventful and discharged home on POD 4. Reports that until 2 days ago she had been healing well, tolerating a diet and having normal bowel function. She said she noticed dark stool at that time and developed lightheadedness and dizziness. She reports falling 2x at home and today was brought to the ED after a fall. She denies any BRBPR. She reports R sided abdominal pain. Had been taking insulin at home with glucose readings in 60-70    in ED Tm 102.7, , BP stable  WBC 27, Hb 6.4, Cr WNL; T bili 3.8; Lactate 1.9 and pH 7.36  CT scan reviewed personally with radiologist - no intraabdominal pathology, anastomosis appears in tact with no evidence of intraabdominal infection, fluid, or air  Abdomen is soft, +RLQ TTP, no rebound/guarding, non peritoneal    Differential is broad at this time given anemia and fever   Possible anastomotic bleed vs UGIB, will transfuse now, PPI started, GI consult  Fever, will start Zosyn for now, CT without any intraabdominal findings, repeat CBC today  IVF hydration  Endo eval for hyperglycemia  Medicine consult  Serial exams  Monitor I/O, strict UOP  Monitor temp curve, currently defervesced   Discussed with patient and mother at length DATE OF SERVICE: 04-27-23 @ 21:22    Seen and examined. POD 10 today after R hemicolectomy for mobile cecum. Post op course initially uneventful and discharged home on POD 4. Reports that until 2 days ago she had been healing well, tolerating a diet and having normal bowel function. She said she noticed dark stool at that time and developed lightheadedness and dizziness. She reports falling 2x at home and today was brought to the ED after a fall. She denies any BRBPR. She reports R sided abdominal pain. Had been taking insulin at home with glucose readings in 60-70    in ED Tm 102.7, , BP stable  WBC 27, Hb 6.4, Cr WNL; T bili 3.8; Lactate 1.9 and pH 7.36  CT scan reviewed personally with radiologist - no intraabdominal pathology, anastomosis appears in tact with no evidence of intraabdominal infection, fluid, or air  Abdomen is soft, +RLQ TTP, no rebound/guarding, non peritoneal  EUGENE with soft brown stool    Differential is broad at this time given anemia and fever   Possible anastomotic bleed vs UGIB, will transfuse now, PPI started, GI consult  Fever, will start Zosyn for now, CT without any intraabdominal findings, repeat CBC today  IVF hydration  Endo eval for hyperglycemia  Medicine consult  Serial exams  Monitor I/O, strict UOP  Monitor temp curve, currently defervesced   Discussed with patient and mother at length

## 2023-04-27 NOTE — H&P ADULT - HISTORY OF PRESENT ILLNESS
53F PMHx DM on insulin c/b gastroparesis and neuropathy presents for 2 days of dehydration/HA/nausea/SOB in the setting of R lap hemicolectomy w/ ileocolic anastomosis (4/17/2023) exacerbated by GLF from bed today w/ head and left-sided trauma.  Patient received left-sided lap hemicolectomy for volvulus at [facility] (Dr. Iker Sen).  She reports coughing and diarrhea since surgery as was expected, but reports dehydration, throbbing headaches refractory to Tylenol (last dose 4/27 0500), SOB, and diffuse abdominal pain over the past 2 days.  She hit her head and left side after falling out of bed (~3 feet) which worsened her headache and nauseousness.  She now complains of left shoulder and left hip pain, and was unable to stand up without assistant from EMS. She received steroid injections in her left shoulder that was recently stopped due to hypoglycemia.  She denies any recent alcohol use, previous smoking history, or other illicit substances.  She has a history of cholecystectomy and sepsis requiring right thigh surgery.  ED workup significant for H/H 6.4/19.2, elevated WBC, elevated T Bili 3.8, IDB 2.8, DB 1.0. Radiographic imaging negative for postsurgical anastomotic leak, or other abnormalities.

## 2023-04-27 NOTE — H&P ADULT - NSHPLABSRESULTS_GEN_ALL_CORE
.  LABS:                         6.4    . )-----------( 264      ( 2023 11:55 )             19.2         136  |  99  |  19  ----------------------------<  270<H>  4.7   |  21<L>  |  0.71    Ca    9.0      2023 11:55    TPro  7.4  /  Alb  4.1  /  TBili  3.8<H>  /  DBili  1.0<H>  /  AST  40<H>  /  ALT  19  /  AlkPhos  100      PT/INR - ( 2023 11:55 )   PT: 13.7 sec;   INR: 1.18 ratio         PTT - ( 2023 11:55 )  PTT:30.7 sec  Urinalysis Basic - ( 2023 12:30 )    Color: Yellow / Appearance: Clear / S.032 / pH: x  Gluc: x / Ketone: Large  / Bili: Negative / Urobili: <2 mg/dL   Blood: x / Protein: Trace / Nitrite: Negative   Leuk Esterase: Large / RBC: 2 /HPF / WBC 24 /HPF   Sq Epi: x / Non Sq Epi: x / Bacteria: Negative            RADIOLOGY, EKG & ADDITIONAL TESTS: Reviewed.

## 2023-04-27 NOTE — ED ADULT NURSE REASSESSMENT NOTE - NS ED NURSE REASSESS COMMENT FT1
Bolus LR facilitated by pump for efficient infusion at this time. Pt awake and alert, A&OX4, resp even and unlabored. Medicated per order for pain. Denies CP, SOB, HA, N/V, palpitations, fatigue, dizziness, blurry vision, urinary symptoms.
One USIV infiltrated. Heat applied. IV removed. Arrow placed to L upper arm by MD Bullard successfully. First unit of PRBC started per order. Pt Awake and alert. A&OX4. Resp even and unlabored. Denies CP, SOB, N/V, palpitations. Educated on s/s of transfusion rxn. Consent in chart. Call bell in reach. Will continue to monitor. MD Garcia from surgery aware of pts previous temperature- ok to start blood due to need for blood at this time. NSR.
Report given to floor RN Alexander. RN aware that blood will take over and hour at this time due to patient having antibodies  (also called upon requesting blood bank release request and was told that the blood must be specially prepared.) R AC USIV noted to be mildly swollen and painful to the patient- removed and heat pack applied. MD Bullard made aware of need for two US IVs to be placed for patient to received blood. MD Bullard unable to obtain USIV at bedside in ED. Contacted the ultra sound team. Awaiting response.
break coverage RN: Pt IV infiltrated. Hospitalist at bedside and made aware. PRBC unit arrived but sent back down to blood back within timely manner. Provider at bedside currently attempting US guided IV again for PRBC transfusion.
LASHAWN Montoya aware of temp and that pt has not received blood yet. New USIV placed to R AC. + flush + blood return. Remaining LR bolus restarted.

## 2023-04-27 NOTE — ED ADULT NURSE NOTE - OBJECTIVE STATEMENT
Pt received to rm 9, awake and alert, A&OX4, ambulatory at baseline. S/p  R lap hemicolectomy w/ ileocolic anastomosis  on 4/17/2023. Incision site clean and intact. States that she was going to stand up from bed last night to use the bathroom when she fell out of bed but felt very dizzy and had an unwitnessed syncopal episode. Unsure if she hit her head or how long she was down for. Reporting nonbloody diarrhea and headaches since  the surgery. C/o L shoulder and L hip pain at this time.  Respirations even and unlabored. NSR on CM. Denies CP, SOB, N/V, HA, palpitations Bed in lowest position, call bell within reach. Awaiting USIV placement by MD.

## 2023-04-27 NOTE — ED PROVIDER NOTE - OBJECTIVE STATEMENT
53-year-old female with history of DM on insulin c/b gastroparesis and neuropathy presents for 2 days of dehydration/HA/nausea/SOB in the setting of R lap hemicolectomy w/ ileocolic anastomosis (4/17/2023) exacerbated by GLF from bed today w/ head and left-sided trauma.  Patient received left-sided lap hemicolectomy for volvulus at [facility] (Dr. Iker Sen).  She reports coughing and diarrhea since surgery as was expected, but reports dehydration, throbbing headaches refractory to Tylenol (last dose 4/27 0500), SOB, and diffuse abdominal pain over the past 2 days.  She hit her head and left side after falling out of bed (~3 feet) which worsened her headache and nauseousness.  She now complains of left shoulder and left hip pain, and was unable to stand up without assistant from EMS. She received steroid injections in her left shoulder that was recently stopped due to hypoglycemia.  She denies any recent alcohol use, previous smoking history, or other illicit substances.  She has a history of cholecystectomy and sepsis requiring right thigh surgery.

## 2023-04-27 NOTE — ED PROVIDER NOTE - CLINICAL SUMMARY MEDICAL DECISION MAKING FREE TEXT BOX
53-year-old female with history of DM on insulin c/b gastroparesis and neuropathy presents for 2 days of dehydration/HA/nausea/SOB in the setting of or lap hemicolectomy W/ileocolic anastomosis (4/17/2023) exacerbated by GLF from bed today w/ head and left-sided trauma. VS remarkable for rectal temperature of ~102F and borderline tachycardia. Physical exam is remarkable for diffuse abdominal tenderness, jaundice, 1 large midline + 3 smaller L sided abdominal healing surgical incisions w/ no drainage. Concern for sepsis secondary to SBP (possibly secondary to surgery - i.e. anastomosis leakage) vs URI. Concern for hip fracture due to TTP. Plan for sepsis work-up, head/L shoulder/L hip/pelvis imaging. Dispo pending results. 53-year-old female with history of DM on insulin c/b gastroparesis and neuropathy presents for 2 days of dehydration/HA/nausea/SOB in the setting of or lap hemicolectomy W/ileocolic anastomosis (4/17/2023) exacerbated by GLF from bed today w/ head and left-sided trauma. VS remarkable for rectal temperature of ~102F and borderline tachycardia. Physical exam is remarkable for diffuse abdominal tenderness, jaundice, 1 large midline + 3 smaller L sided abdominal healing surgical incisions w/ no drainage, and L posterior shoulder pain + L hip pain. Concern for sepsis secondary to SBP (possibly secondary to surgery - i.e. anastomosis leakage) vs URI. Concern for hip fracture due to TTP. Plan for sepsis work-up, head/L shoulder/L hip/pelvis imaging. Dispo pending results.

## 2023-04-27 NOTE — PATIENT PROFILE ADULT - TOBACCO USE
I recommend that she focus on her post op recovery first and that we discuss nutrition at the next f/u visit (appears that there was no weight entered at the last visit because she was in wheel chair)   Never smoker

## 2023-04-28 DIAGNOSIS — E78.5 HYPERLIPIDEMIA, UNSPECIFIED: ICD-10-CM

## 2023-04-28 DIAGNOSIS — E11.9 TYPE 2 DIABETES MELLITUS WITHOUT COMPLICATIONS: ICD-10-CM

## 2023-04-28 DIAGNOSIS — I10 ESSENTIAL (PRIMARY) HYPERTENSION: ICD-10-CM

## 2023-04-28 PROBLEM — K56.2 VOLVULUS: Chronic | Status: ACTIVE | Noted: 2023-04-12

## 2023-04-28 PROBLEM — K31.84 GASTROPARESIS: Chronic | Status: ACTIVE | Noted: 2023-04-12

## 2023-04-28 PROBLEM — K21.9 GASTRO-ESOPHAGEAL REFLUX DISEASE WITHOUT ESOPHAGITIS: Chronic | Status: ACTIVE | Noted: 2023-04-12

## 2023-04-28 LAB
ALBUMIN SERPL ELPH-MCNC: 3.3 G/DL — SIGNIFICANT CHANGE UP (ref 3.3–5)
ALBUMIN SERPL ELPH-MCNC: 3.6 G/DL — SIGNIFICANT CHANGE UP (ref 3.3–5)
ALBUMIN SERPL ELPH-MCNC: 3.7 G/DL — SIGNIFICANT CHANGE UP (ref 3.3–5)
ALP SERPL-CCNC: 82 U/L — SIGNIFICANT CHANGE UP (ref 40–120)
ALP SERPL-CCNC: 84 U/L — SIGNIFICANT CHANGE UP (ref 40–120)
ALP SERPL-CCNC: 85 U/L — SIGNIFICANT CHANGE UP (ref 40–120)
ALT FLD-CCNC: 16 U/L — SIGNIFICANT CHANGE UP (ref 4–33)
ALT FLD-CCNC: 16 U/L — SIGNIFICANT CHANGE UP (ref 4–33)
ALT FLD-CCNC: 18 U/L — SIGNIFICANT CHANGE UP (ref 4–33)
ANION GAP SERPL CALC-SCNC: 10 MMOL/L — SIGNIFICANT CHANGE UP (ref 7–14)
ANION GAP SERPL CALC-SCNC: 11 MMOL/L — SIGNIFICANT CHANGE UP (ref 7–14)
ANION GAP SERPL CALC-SCNC: 12 MMOL/L — SIGNIFICANT CHANGE UP (ref 7–14)
APTT BLD: 26.4 SEC — LOW (ref 27–36.3)
APTT BLD: 29.4 SEC — SIGNIFICANT CHANGE UP (ref 27–36.3)
AST SERPL-CCNC: 39 U/L — HIGH (ref 4–32)
AST SERPL-CCNC: 41 U/L — HIGH (ref 4–32)
AST SERPL-CCNC: 45 U/L — HIGH (ref 4–32)
BASOPHILS # BLD AUTO: 0.15 K/UL — SIGNIFICANT CHANGE UP (ref 0–0.2)
BASOPHILS # BLD AUTO: 0.27 K/UL — HIGH (ref 0–0.2)
BASOPHILS NFR BLD AUTO: 0.7 % — SIGNIFICANT CHANGE UP (ref 0–2)
BASOPHILS NFR BLD AUTO: 1.1 % — SIGNIFICANT CHANGE UP (ref 0–2)
BILIRUB DIRECT SERPL-MCNC: 1.4 MG/DL — HIGH (ref 0–0.3)
BILIRUB INDIRECT FLD-MCNC: 3.4 MG/DL — HIGH (ref 0–1)
BILIRUB SERPL-MCNC: 3.4 MG/DL — HIGH (ref 0.2–1.2)
BILIRUB SERPL-MCNC: 3.9 MG/DL — HIGH (ref 0.2–1.2)
BILIRUB SERPL-MCNC: 4.8 MG/DL — HIGH (ref 0.2–1.2)
BUN SERPL-MCNC: 13 MG/DL — SIGNIFICANT CHANGE UP (ref 7–23)
BUN SERPL-MCNC: 15 MG/DL — SIGNIFICANT CHANGE UP (ref 7–23)
BUN SERPL-MCNC: 15 MG/DL — SIGNIFICANT CHANGE UP (ref 7–23)
CALCIUM SERPL-MCNC: 8.6 MG/DL — SIGNIFICANT CHANGE UP (ref 8.4–10.5)
CALCIUM SERPL-MCNC: 8.6 MG/DL — SIGNIFICANT CHANGE UP (ref 8.4–10.5)
CALCIUM SERPL-MCNC: 8.8 MG/DL — SIGNIFICANT CHANGE UP (ref 8.4–10.5)
CHLORIDE SERPL-SCNC: 102 MMOL/L — SIGNIFICANT CHANGE UP (ref 98–107)
CHLORIDE SERPL-SCNC: 103 MMOL/L — SIGNIFICANT CHANGE UP (ref 98–107)
CHLORIDE SERPL-SCNC: 99 MMOL/L — SIGNIFICANT CHANGE UP (ref 98–107)
CO2 SERPL-SCNC: 23 MMOL/L — SIGNIFICANT CHANGE UP (ref 22–31)
CREAT SERPL-MCNC: 0.71 MG/DL — SIGNIFICANT CHANGE UP (ref 0.5–1.3)
CREAT SERPL-MCNC: 0.72 MG/DL — SIGNIFICANT CHANGE UP (ref 0.5–1.3)
CREAT SERPL-MCNC: 0.74 MG/DL — SIGNIFICANT CHANGE UP (ref 0.5–1.3)
D DIMER BLD IA.RAPID-MCNC: 2186 NG/ML DDU — HIGH
EGFR: 101 ML/MIN/1.73M2 — SIGNIFICANT CHANGE UP
EGFR: 103 ML/MIN/1.73M2 — SIGNIFICANT CHANGE UP
EGFR: 98 ML/MIN/1.73M2 — SIGNIFICANT CHANGE UP
EOSINOPHIL # BLD AUTO: 0.34 K/UL — SIGNIFICANT CHANGE UP (ref 0–0.5)
EOSINOPHIL # BLD AUTO: 0.39 K/UL — SIGNIFICANT CHANGE UP (ref 0–0.5)
EOSINOPHIL NFR BLD AUTO: 1.4 % — SIGNIFICANT CHANGE UP (ref 0–6)
EOSINOPHIL NFR BLD AUTO: 1.7 % — SIGNIFICANT CHANGE UP (ref 0–6)
GLUCOSE BLDC GLUCOMTR-MCNC: 204 MG/DL — HIGH (ref 70–99)
GLUCOSE BLDC GLUCOMTR-MCNC: 207 MG/DL — HIGH (ref 70–99)
GLUCOSE BLDC GLUCOMTR-MCNC: 219 MG/DL — HIGH (ref 70–99)
GLUCOSE BLDC GLUCOMTR-MCNC: 237 MG/DL — HIGH (ref 70–99)
GLUCOSE BLDC GLUCOMTR-MCNC: 363 MG/DL — HIGH (ref 70–99)
GLUCOSE SERPL-MCNC: 204 MG/DL — HIGH (ref 70–99)
GLUCOSE SERPL-MCNC: 208 MG/DL — HIGH (ref 70–99)
GLUCOSE SERPL-MCNC: 228 MG/DL — HIGH (ref 70–99)
HAPTOGLOB SERPL-MCNC: <20 MG/DL — LOW (ref 34–200)
HCT VFR BLD CALC: 24.2 % — LOW (ref 34.5–45)
HCT VFR BLD CALC: 26 % — LOW (ref 34.5–45)
HGB BLD-MCNC: 8.1 G/DL — LOW (ref 11.5–15.5)
HGB BLD-MCNC: 8.7 G/DL — LOW (ref 11.5–15.5)
IANC: 15.57 K/UL — HIGH (ref 1.8–7.4)
IANC: 15.79 K/UL — HIGH (ref 1.8–7.4)
IMM GRANULOCYTES NFR BLD AUTO: 10.6 % — HIGH (ref 0–0.9)
IMM GRANULOCYTES NFR BLD AUTO: 9.8 % — HIGH (ref 0–0.9)
INR BLD: 1.18 RATIO — HIGH (ref 0.88–1.16)
INR BLD: 1.24 RATIO — HIGH (ref 0.88–1.16)
LDH SERPL L TO P-CCNC: 1066 U/L — HIGH (ref 135–225)
LYMPHOCYTES # BLD AUTO: 12.5 % — LOW (ref 13–44)
LYMPHOCYTES # BLD AUTO: 16.3 % — SIGNIFICANT CHANGE UP (ref 13–44)
LYMPHOCYTES # BLD AUTO: 2.82 K/UL — SIGNIFICANT CHANGE UP (ref 1–3.3)
LYMPHOCYTES # BLD AUTO: 4.04 K/UL — HIGH (ref 1–3.3)
MAGNESIUM SERPL-MCNC: 2.1 MG/DL — SIGNIFICANT CHANGE UP (ref 1.6–2.6)
MAGNESIUM SERPL-MCNC: 2.2 MG/DL — SIGNIFICANT CHANGE UP (ref 1.6–2.6)
MAGNESIUM SERPL-MCNC: 2.2 MG/DL — SIGNIFICANT CHANGE UP (ref 1.6–2.6)
MCHC RBC-ENTMCNC: 30.3 PG — SIGNIFICANT CHANGE UP (ref 27–34)
MCHC RBC-ENTMCNC: 30.6 PG — SIGNIFICANT CHANGE UP (ref 27–34)
MCHC RBC-ENTMCNC: 33.5 GM/DL — SIGNIFICANT CHANGE UP (ref 32–36)
MCHC RBC-ENTMCNC: 33.5 GM/DL — SIGNIFICANT CHANGE UP (ref 32–36)
MCV RBC AUTO: 90.6 FL — SIGNIFICANT CHANGE UP (ref 80–100)
MCV RBC AUTO: 91.3 FL — SIGNIFICANT CHANGE UP (ref 80–100)
MONOCYTES # BLD AUTO: 1.12 K/UL — HIGH (ref 0–0.9)
MONOCYTES # BLD AUTO: 1.9 K/UL — HIGH (ref 0–0.9)
MONOCYTES NFR BLD AUTO: 5 % — SIGNIFICANT CHANGE UP (ref 2–14)
MONOCYTES NFR BLD AUTO: 7.7 % — SIGNIFICANT CHANGE UP (ref 2–14)
NEUTROPHILS # BLD AUTO: 15.57 K/UL — HIGH (ref 1.8–7.4)
NEUTROPHILS # BLD AUTO: 15.79 K/UL — HIGH (ref 1.8–7.4)
NEUTROPHILS NFR BLD AUTO: 62.9 % — SIGNIFICANT CHANGE UP (ref 43–77)
NEUTROPHILS NFR BLD AUTO: 70.3 % — SIGNIFICANT CHANGE UP (ref 43–77)
NRBC # BLD: 8 /100 WBCS — HIGH (ref 0–0)
NRBC # BLD: 9 /100 WBCS — HIGH (ref 0–0)
NRBC # FLD: 1.76 K/UL — HIGH (ref 0–0)
NRBC # FLD: 2.25 K/UL — HIGH (ref 0–0)
PHOSPHATE SERPL-MCNC: 2.2 MG/DL — LOW (ref 2.5–4.5)
PHOSPHATE SERPL-MCNC: 2.8 MG/DL — SIGNIFICANT CHANGE UP (ref 2.5–4.5)
PHOSPHATE SERPL-MCNC: 2.9 MG/DL — SIGNIFICANT CHANGE UP (ref 2.5–4.5)
PLATELET # BLD AUTO: 194 K/UL — SIGNIFICANT CHANGE UP (ref 150–400)
PLATELET # BLD AUTO: 204 K/UL — SIGNIFICANT CHANGE UP (ref 150–400)
POTASSIUM SERPL-MCNC: 3.8 MMOL/L — SIGNIFICANT CHANGE UP (ref 3.5–5.3)
POTASSIUM SERPL-MCNC: 4.1 MMOL/L — SIGNIFICANT CHANGE UP (ref 3.5–5.3)
POTASSIUM SERPL-MCNC: 4.3 MMOL/L — SIGNIFICANT CHANGE UP (ref 3.5–5.3)
POTASSIUM SERPL-SCNC: 3.8 MMOL/L — SIGNIFICANT CHANGE UP (ref 3.5–5.3)
POTASSIUM SERPL-SCNC: 4.1 MMOL/L — SIGNIFICANT CHANGE UP (ref 3.5–5.3)
POTASSIUM SERPL-SCNC: 4.3 MMOL/L — SIGNIFICANT CHANGE UP (ref 3.5–5.3)
PROT SERPL-MCNC: 6.5 G/DL — SIGNIFICANT CHANGE UP (ref 6–8.3)
PROT SERPL-MCNC: 6.6 G/DL — SIGNIFICANT CHANGE UP (ref 6–8.3)
PROT SERPL-MCNC: 6.6 G/DL — SIGNIFICANT CHANGE UP (ref 6–8.3)
PROTHROM AB SERPL-ACNC: 13.7 SEC — HIGH (ref 10.5–13.4)
PROTHROM AB SERPL-ACNC: 14.4 SEC — HIGH (ref 10.5–13.4)
RBC # BLD: 2.65 M/UL — LOW (ref 3.8–5.2)
RBC # BLD: 2.65 M/UL — LOW (ref 3.8–5.2)
RBC # BLD: 2.87 M/UL — LOW (ref 3.8–5.2)
RBC # FLD: 21.2 % — HIGH (ref 10.3–14.5)
RBC # FLD: 22.9 % — HIGH (ref 10.3–14.5)
RETICS #: 258.1 K/UL — HIGH (ref 25–125)
RETICS/RBC NFR: 9.7 % — HIGH (ref 0.5–2.5)
SODIUM SERPL-SCNC: 133 MMOL/L — LOW (ref 135–145)
SODIUM SERPL-SCNC: 136 MMOL/L — SIGNIFICANT CHANGE UP (ref 135–145)
SODIUM SERPL-SCNC: 137 MMOL/L — SIGNIFICANT CHANGE UP (ref 135–145)
SURGICAL PATHOLOGY STUDY: SIGNIFICANT CHANGE UP
WBC # BLD: 22.48 K/UL — HIGH (ref 3.8–10.5)
WBC # BLD: 24.73 K/UL — HIGH (ref 3.8–10.5)
WBC # FLD AUTO: 22.48 K/UL — HIGH (ref 3.8–10.5)
WBC # FLD AUTO: 24.73 K/UL — HIGH (ref 3.8–10.5)

## 2023-04-28 PROCEDURE — 74176 CT ABD & PELVIS W/O CONTRAST: CPT | Mod: 26

## 2023-04-28 PROCEDURE — 76937 US GUIDE VASCULAR ACCESS: CPT | Mod: 26

## 2023-04-28 PROCEDURE — 99223 1ST HOSP IP/OBS HIGH 75: CPT | Mod: GC

## 2023-04-28 PROCEDURE — 99221 1ST HOSP IP/OBS SF/LOW 40: CPT | Mod: GC

## 2023-04-28 PROCEDURE — 99222 1ST HOSP IP/OBS MODERATE 55: CPT

## 2023-04-28 RX ORDER — VANCOMYCIN HCL 1 G
1000 VIAL (EA) INTRAVENOUS EVERY 12 HOURS
Refills: 0 | Status: DISCONTINUED | OUTPATIENT
Start: 2023-04-28 | End: 2023-04-28

## 2023-04-28 RX ORDER — OXYCODONE HYDROCHLORIDE 5 MG/1
5 TABLET ORAL ONCE
Refills: 0 | Status: DISCONTINUED | OUTPATIENT
Start: 2023-04-28 | End: 2023-04-28

## 2023-04-28 RX ORDER — ACETAMINOPHEN 500 MG
1000 TABLET ORAL EVERY 6 HOURS
Refills: 0 | Status: COMPLETED | OUTPATIENT
Start: 2023-04-28 | End: 2023-04-29

## 2023-04-28 RX ORDER — ACETAMINOPHEN 500 MG
1000 TABLET ORAL ONCE
Refills: 0 | Status: COMPLETED | OUTPATIENT
Start: 2023-04-28 | End: 2023-04-28

## 2023-04-28 RX ORDER — INSULIN GLARGINE 100 [IU]/ML
24 INJECTION, SOLUTION SUBCUTANEOUS AT BEDTIME
Refills: 0 | Status: DISCONTINUED | OUTPATIENT
Start: 2023-04-28 | End: 2023-04-29

## 2023-04-28 RX ORDER — VANCOMYCIN HCL 1 G
1000 VIAL (EA) INTRAVENOUS ONCE
Refills: 0 | Status: COMPLETED | OUTPATIENT
Start: 2023-04-28 | End: 2023-04-28

## 2023-04-28 RX ORDER — INSULIN LISPRO 100/ML
VIAL (ML) SUBCUTANEOUS EVERY 6 HOURS
Refills: 0 | Status: DISCONTINUED | OUTPATIENT
Start: 2023-04-28 | End: 2023-04-29

## 2023-04-28 RX ORDER — ONDANSETRON 8 MG/1
4 TABLET, FILM COATED ORAL ONCE
Refills: 0 | Status: COMPLETED | OUTPATIENT
Start: 2023-04-28 | End: 2023-04-28

## 2023-04-28 RX ADMIN — Medication 1000 MILLIGRAM(S): at 11:00

## 2023-04-28 RX ADMIN — OXYCODONE HYDROCHLORIDE 5 MILLIGRAM(S): 5 TABLET ORAL at 22:15

## 2023-04-28 RX ADMIN — Medication 400 MILLIGRAM(S): at 22:57

## 2023-04-28 RX ADMIN — SERTRALINE 50 MILLIGRAM(S): 25 TABLET, FILM COATED ORAL at 12:05

## 2023-04-28 RX ADMIN — INSULIN GLARGINE 24 UNIT(S): 100 INJECTION, SOLUTION SUBCUTANEOUS at 21:09

## 2023-04-28 RX ADMIN — Medication 1 MILLIGRAM(S): at 16:34

## 2023-04-28 RX ADMIN — ONDANSETRON 4 MILLIGRAM(S): 8 TABLET, FILM COATED ORAL at 20:44

## 2023-04-28 RX ADMIN — Medication 4: at 23:49

## 2023-04-28 RX ADMIN — PIPERACILLIN AND TAZOBACTAM 25 GRAM(S): 4; .5 INJECTION, POWDER, LYOPHILIZED, FOR SOLUTION INTRAVENOUS at 06:53

## 2023-04-28 RX ADMIN — Medication 1000 MILLIGRAM(S): at 23:27

## 2023-04-28 RX ADMIN — Medication 4: at 12:05

## 2023-04-28 RX ADMIN — PANTOPRAZOLE SODIUM 40 MILLIGRAM(S): 20 TABLET, DELAYED RELEASE ORAL at 17:45

## 2023-04-28 RX ADMIN — GABAPENTIN 300 MILLIGRAM(S): 400 CAPSULE ORAL at 06:31

## 2023-04-28 RX ADMIN — GABAPENTIN 300 MILLIGRAM(S): 400 CAPSULE ORAL at 17:45

## 2023-04-28 RX ADMIN — OXYCODONE HYDROCHLORIDE 5 MILLIGRAM(S): 5 TABLET ORAL at 22:45

## 2023-04-28 RX ADMIN — OXYCODONE HYDROCHLORIDE 5 MILLIGRAM(S): 5 TABLET ORAL at 18:39

## 2023-04-28 RX ADMIN — PIPERACILLIN AND TAZOBACTAM 25 GRAM(S): 4; .5 INJECTION, POWDER, LYOPHILIZED, FOR SOLUTION INTRAVENOUS at 14:18

## 2023-04-28 RX ADMIN — Medication 250 MILLIGRAM(S): at 06:16

## 2023-04-28 RX ADMIN — Medication 400 MILLIGRAM(S): at 10:24

## 2023-04-28 RX ADMIN — PANTOPRAZOLE SODIUM 40 MILLIGRAM(S): 20 TABLET, DELAYED RELEASE ORAL at 06:31

## 2023-04-28 RX ADMIN — Medication 4: at 17:44

## 2023-04-28 RX ADMIN — Medication 100 MILLIGRAM(S): at 21:09

## 2023-04-28 RX ADMIN — PIPERACILLIN AND TAZOBACTAM 25 GRAM(S): 4; .5 INJECTION, POWDER, LYOPHILIZED, FOR SOLUTION INTRAVENOUS at 22:15

## 2023-04-28 RX ADMIN — Medication 5: at 06:52

## 2023-04-28 NOTE — CONSULT NOTE ADULT - ASSESSMENT
A/P: 52 YO F with DM2, s/p R lap hemicolectomy w/ileocolic anastomosis (4/17/23), being evaluated for possible leak. Endocrine consulted for DM2 management.    #Type 2 Diabetes Mellitus, uncontrolled  - HbA1c 8.7%; home regimen:  - Recommend lantus 24 units QHS  - Recommend moderate admelog scale q6h while npo  - when diet is resumed can start admelog 8 units with meals  - Please check FSG before meals and QHS, or q6h while NPO  - RD consult  - hypoglycemia orderset prn  - Discharge planning:   basal-bolus insulin + metformin 1 g bid    Appt already arranged:  Dr. Dorina Cha at [address] [place] [address] on June 7th at 10:20am     #HTN  -BP goal <130/80  -BP is normal w/o any medications  -Outpatient microalb/ cr ratio    #HLD  -Outpatient lipid profile shows LDL in 80s and then 50s  -ASCVD <7.5%  -Consider STATIN if no contraindications for primary prevention, can be assessed outpt     Sahra Ruggiero MD  Attending Physician   Department of Endocrinology, Diabetes and Metabolism   [employer] Teams for 04-28-23 @ 10:08.    If before 9AM or after 5PM, or on weekends/holidays, please call the Endocrine answering service for assistance ([phone]).  For nonurgent matters, please email [email or web address] for assistance.     Please note that a different provider may be following this patient each day.      A/P: 54 yo F with DM2, s/p R lap hemicolectomy w/ileocolic anastomosis (4/17/23), being evaluated for possible leak. Endocrine consulted for DM2 management.    #Type 2 Diabetes Mellitus, uncontrolled  - HbA1c 8.7%; home regimen: lantus 30 units + novolog 14 units with meals + metformin 1 g bid  - Currently NPO  - Recommend lantus 24 units QHS  - Recommend moderate admelog scale q6h while npo  - when diet is resumed can start admelog 8 units with meals if pt is eating (can start with 5 units if low appetite)  - Please check FSG before meals and QHS, or q6h while NPO  - RD consult  - hypoglycemia orderset prn  - Discharge planning:   basal-bolus insulin + metformin 1 g bid    Appt already arranged:  Dr. Dorina Cha at [address] [place] [address] on June 7th at 10:20am     #HTN  -BP goal <130/80  -BP is normal w/o any medications  -Outpatient microalb/ cr ratio    #HLD  -Outpatient lipid profile shows LDL in 80s and then 50s  -ASCVD <7.5%  -Consider STATIN if no contraindications for primary prevention, can be assessed outpt     adjusted insulin orders and updated team    Sahra Ruggiero MD  Attending Physician   Department of Endocrinology, Diabetes and Metabolism   [employer] Teams for 04-28-23 @ 10:08.    If before 9AM or after 5PM, or on weekends/holidays, please call the Endocrine answering service for assistance ([phone]).  For nonurgent matters, please email [email or web address] for assistance.     Please note that a different provider may be following this patient each day.

## 2023-04-28 NOTE — PROGRESS NOTE ADULT - ASSESSMENT
53F PMHx DM on insulin c/b gastroparesis and neuropathy presents for 2 days of dehydration/HA/nausea/SOB in the setting of R lap hemicolectomy w/ ileocolic anastomosis (4/17/2023) exacerbated by GLF from bed today w/ head and left-sided trauma.  Patient received left-sided lap hemicolectomy for volvulus at [facility] (Dr. Iker Sen).  Recent fall off bed at home, no LOC. ED workup significant for H/H 6.4/19.2, elevated WBC, elevated T Bili 3.8, IDB 2.8, DB 1.0. Radiographic imaging negative for postsurgical anastomotic leak, or other abnormalities.     Plan:  - Follow-up H/H & CMP @ 11am   - monitor H/H  - GI consult  - Endo consult  - c/w Zosyn; monitor fevers  - NPO/IVF  - no pain meds before abdominal exam    A Team Surgery   [id number]

## 2023-04-28 NOTE — CONSULT NOTE ADULT - ASSESSMENT
Ms. Keith is a 53 year old female with a PMHx of DM on insulin c/b gastroparesis and neuropathy, ETOH abuse with hx of pancreatitis, with a recent admission for R lap hemicolectomy w/ ileocolic anastomosis (4/17/2023) in the setting of mobile cecum syndrome and priro volvulus presenting to the hospital after having having a fall out of bed found to have fevers to 102 with labs notable for Hgb of 6. GI consulted for further workup and management of the above.    #Occult Anemia   Hx notable for R lap hemicolectomy w/ ileocolic anastomosis (4/17/2023) in the setting of mobile cecum syndrome and prior volvulus presenting to the hospital after having a fall with shortness of breath and reproted black stools at home(brown stool on rectal exam during admission) with labs notable for H/H 6.4/19.2 from recent d/c hgb of 12.6 s/p 2 units with increase to 8.7 with normal BUN. CTA AP without any signs of active bleeding or issues related to anastomosis. No ongoing  antiplatelet or anticoagulation use with last EGD during 1/2023 with no acute findings. Overall, low clinical suspicion for any rapid upper GI bleeding. Given elevated T. Kyler with elevated PTT/INR would be concern for ongoing hemolysis as etiology of acute anemia. No plans for any EGD or c-scope at this time with rec'd for hemolysis workup as outlined below     #Elevated T. Kyler   Labs notable for elevated TB of 3.9 predominantly indirect with no billiary pathology on recent CT. Would be concerned for hemolysis as noted above     Recommendations:  -No plans for any endoscopic intervention at this time   -Please obtain Haptoglobin, LDH, Retic count, PT/INR, D-dimer and PTT and review smear with oncology   -Heme consult   -Trend CBC BID and tranfuse to keep hgb>8  -Daily LFT/INR     All recommendations are tentative until note is attested by attending.     Rudy Travis, PGY-4  Gastroenterology/Hepatology Fellow  Available on [employer] Teams   [phone] (Long Range Pager)  [phone] (Short Range Pager [facility])    After 5pm, please contact the on-call GI fellow. [phone]

## 2023-04-28 NOTE — CONSULT NOTE ADULT - SUBJECTIVE AND OBJECTIVE BOX
HPI:  Ms. Keith is a 53 year old female with a PMHx of DM on insulin c/b gastroparesis and neuropathy, ETOH abuse with hx of pancreatitis, with a recent admission for R lap hemicolectomy w/ ileocolic anastomosis (2023) in the setting of mobile cecum syndrome and priro volvulus presenting to the hospital after having having a fall out of bed found to have fevers to 102 with labs notable for Hgb of 6. GI consulted for further workup and management of the above.    As noted above, pt recently underwent a left-sided lap hemicolectomy for volvulus with Dr. Iker Sen on 2023. Procedure was uncomplicated although patient currently presents back to the hospital after having an unwitnessed fall hitting the side of her it her head and left side after falling out of bed (~3 feet). In addition to the above, she also notes ongoing SOB and diffuse abdominal pain over the past 2 days with questions of black stools around three days ago. Given her above symptoms she presented to the ED for evalaution. Vitals in the ED notable for fevers to 102 with labs notable for wbc count of 25 with H/H 6.4/19.2 from recent d/c 12.6 s/p 2 units with incerase to 8.7 and LFTs notable for T Bili 3.8, IDB 2.8, DB 1.0.  CTA AP without any signs of active bleeding or issues related to anasomtosis. Regarding medications, patient denies any antiplatelet or anticoagulation use. Denies any episode sof hematochezia or hematemesis. Patient denies any family history of GI malignancy. GI review of systems negative for dysphagia, odynophagia, early satiety, weight loss. Denies recent travel or sick contacts. Last EGD during 2023 with no acute findings.     Allergies:  Bactrim (Anaphylaxis)  Eggplant mouth itches (Other)    Home Medications:  acetaminophen 500 mg oral tablet: 2 tab(s) orally every 6 hours As needed Temp greater or equal to 38C (100.4F), Mild Pain (1 - 3) (2023 15:59)  clonazePAM 1 mg oral tablet: 1 tablet with sensor orally 3 times a day as needed for  anxiety (2023 15:59)  gabapentin: 300 milligram(s) orally 2 times a day (2023 15:59)  ibuprofen 400 mg oral tablet: 1 tab(s) orally every 6 hours (2023 15:59)  insulin aspart 100 units/mL injectable solution: injectable (2023 15:59)  Insulin Aspart FlexPen 100 units/mL injectable solution: injectable 3 times a day (with meals) sliding scale 10 units to 14 units (2023 15:59)  Lantus 100 units/mL subcutaneous solution: 30 unit(s) subcutaneous once a day (at bedtime) (2023 15:59)  magnesium: 1 tab oral daily (2023 15:59)  Multiple Vitamins oral tablet: 1 tab(s) orally once a day (2023 15:59)  sertraline 50 mg oral tablet: 1 tab(s) orally once a day (2023 15:59)  thiamine 100 mg oral tablet: 1 tab(s) orally once a day (2023 15:59)  tiZANidine 4 mg oral tablet: 1 tab(s) orally once a day (at bedtime) as needed for  muscle spasm (2023 15:59)  traZODone 100 mg oral tablet: 1 tab(s) orally once a day (at bedtime) (2023 15:59)  Vitamin D3: 1 tab(s) orally once a day (2023 15:59)    Hospital Medications:  clonazePAM  Tablet 1 milliGRAM(s) Oral three times a day PRN  dextrose 50% Injectable 12.5 Gram(s) IV Push once  dextrose 50% Injectable 25 Gram(s) IV Push once  dextrose 50% Injectable 25 Gram(s) IV Push once  dextrose Oral Gel 15 Gram(s) Oral once PRN  gabapentin 300 milliGRAM(s) Oral two times a day  glucagon  Injectable 1 milliGRAM(s) IntraMuscular once  insulin glargine Injectable (LANTUS) 24 Unit(s) SubCutaneous at bedtime  insulin lispro (ADMELOG) corrective regimen sliding scale   SubCutaneous every 6 hours  lactated ringers. 1000 milliLiter(s) IV Continuous <Continuous>  pantoprazole  Injectable 40 milliGRAM(s) IV Push every 12 hours  piperacillin/tazobactam IVPB.. 3.375 Gram(s) IV Intermittent every 8 hours  sertraline 50 milliGRAM(s) Oral daily  traZODone 100 milliGRAM(s) Oral at bedtime      PMHX/PSHX:   Volvulus with right lap hemicolectomy  Diabetes  Anxiety-Depression  Alcohol abuse with hx of pancreatitis   MRSA cellulitis  Type 2 diabetes mellitus  GERD (gastroesophageal reflux disease)  Gastroparesis  H/O nasal septoplasty  History of cholecystectomy  S/P tonsillectomy  Cyst of skinn     Family history:  Family history of diabetes mellitus in mother  Family history of systemic lupus erythematosus  Family history of diabetes mellitus  Family history of cerebral aneurysm (Grandparent, Aunt)  Family history of abscess of skin or subcutaneous tissue (Sibling)  FH: breast cancer    Social History:   Tob: Denies  EtOH: Denies  Illicit Drugs: Denies    ROS: Complete and normal except as mentioned above    PHYSICAL EXAM:   GENERAL:  Ill appearing   HEENT:  +scleral icterus   CHEST:  no respiratory distress  HEART:  Regular rate and rhythm  ABDOMEN:  Soft, non-tender, non-distended, normoactive bowel sounds. Rectal exam with brown stool in the rectal vault. Suture site c/d/i  NEURO:  Alert and oriented x 2-3    Vital Signs:  Vital Signs Last 24 Hrs  T(C): 37.5 (2023 09:48), Max: 38.8 (2023 22:22)  T(F): 99.5 (2023 09:48), Max: 101.8 (2023 22:22)  HR: 77 (2023 09:48) (69 - 92)  BP: 117/66 (2023 09:48) (100/59 - 128/66)  BP(mean): --  RR: 18 (2023 09:48) (16 - 18)  SpO2: 98% (2023 09:48) (94% - 100%)    Parameters below as of 2023 09:48  Patient On (Oxygen Delivery Method): nasal cannula      LABS:                        8.7    24.73 )-----------( 204      ( 2023 04:25 )             26.0     Mean Cell Volume: 90.6 fL (-23 @ 04:25)        136  |  103  |  15  ----------------------------<  208<H>  4.1   |  23  |  0.71    Ca    8.6      2023 04:25  Phos  2.8       Mg     2.20         TPro  6.6  /  Alb  3.3  /  TBili  3.9<H>  /  DBili  x   /  AST  41<H>  /  ALT  16  /  AlkPhos  82  -    LIVER FUNCTIONS - ( 2023 04:25 )  Alb: 3.3 g/dL / Pro: 6.6 g/dL / ALK PHOS: 82 U/L / ALT: 16 U/L / AST: 41 U/L / GGT: x           PT/INR - ( 2023 23:25 )   PT: 14.4 sec;   INR: 1.24 ratio         PTT - ( 2023 23:25 )  PTT:29.4 sec  Urinalysis Basic - ( 2023 12:30 )    Color: Yellow / Appearance: Clear / S.032 / pH: x  Gluc: x / Ketone: Large  / Bili: Negative / Urobili: <2 mg/dL   Blood: x / Protein: Trace / Nitrite: Negative   Leuk Esterase: Large / RBC: 2 /HPF / WBC 24 /HPF   Sq Epi: x / Non Sq Epi: x / Bacteria: Negative                              8.7    24.73 )-----------( 204      ( 2023 04:25 )             26.0                         6.1    25.63 )-----------( 214      ( 2023 23:25 )             18.6                         6.4    27.19 )-----------( 264      ( 2023 11:55 )             19.2       Imaging:  < from: CT Abdomen and Pelvis w/ IV Cont (23 @ 12:11) >  FINDINGS:  LOWER CHEST: Within normal limits.    LIVER: Within normal limits.  BILE DUCTS: Normal caliber.  GALLBLADDER: Cholecystectomy.  SPLEEN: Innumerable indeterminate hypodensities redemonstrated.  PANCREAS: Mild fatty atrophy.  ADRENALS: Within normal limits.  KIDNEYS/URETERS: Within normal limits.    BLADDER: Within normal limits.  REPRODUCTIVE ORGANS: Unremarkable uterus.    BOWEL: No bowel obstruction. Status post interval right hemicolectomy   with ileocolic anastomosis.  PERITONEUM: No ascites. Mild omental fat stranding, postoperative. No   pneumoperitoneum.  VESSELS: Within normal limits.  RETROPERITONEUM/LYMPH NODES: No lymphadenopathy.  ABDOMINAL WALL: Postoperative changes.  BONES: Within normal limits.    IMPRESSION:    Satisfactory postoperative appearance 10 days status post right   hemicolectomy. No acute intra-abdominal pathology.    Innumerable indeterminate splenic hypodensities redemonstrated. Consider   follow-up MRI.    < end of copied text >             HPI:  Ms. Keith is a 53 year old female with a PMHx of DM on insulin c/b gastroparesis and neuropathy, ETOH abuse with hx of pancreatitis, with a recent admission for R lap hemicolectomy w/ ileocolic anastomosis (2023) in the setting of mobile cecum syndrome and priro volvulus presenting to the hospital after having having a fall out of bed found to have fevers to 102 with labs notable for Hgb of 6. GI consulted for further workup and management of the above.    As noted above, pt recently underwent a lap hemicolectomy for volvulus with Dr. Iker Sen on 2023. Procedure was uncomplicated although patient currently presents back to the hospital after having an unwitnessed fall hitting the side of her it her head and left side after falling out of bed (~3 feet). In addition to the above, she also notes ongoing SOB and diffuse abdominal pain over the past 2 days with questions of black stools around three days ago. Given her above symptoms she presented to the ED for evalaution. Vitals in the ED notable for fevers to 102 with labs notable for wbc count of 25 with H/H 6.4/19.2 from recent d/c 12.6 s/p 2 units with incerase to 8.7 and LFTs notable for T Bili 3.8, IDB 2.8, DB 1.0.  CTA AP without any signs of active bleeding or issues related to anasomtosis. Regarding medications, patient denies any antiplatelet or anticoagulation use. Denies any episode sof hematochezia or hematemesis. Patient denies any family history of GI malignancy. GI review of systems negative for dysphagia, odynophagia, early satiety, weight loss. Denies recent travel or sick contacts. Last EGD during 2023 with no acute findings.     Allergies:  Bactrim (Anaphylaxis)  Eggplant mouth itches (Other)    Home Medications:  acetaminophen 500 mg oral tablet: 2 tab(s) orally every 6 hours As needed Temp greater or equal to 38C (100.4F), Mild Pain (1 - 3) (2023 15:59)  clonazePAM 1 mg oral tablet: 1 tablet with sensor orally 3 times a day as needed for  anxiety (2023 15:59)  gabapentin: 300 milligram(s) orally 2 times a day (2023 15:59)  ibuprofen 400 mg oral tablet: 1 tab(s) orally every 6 hours (2023 15:59)  insulin aspart 100 units/mL injectable solution: injectable (2023 15:59)  Insulin Aspart FlexPen 100 units/mL injectable solution: injectable 3 times a day (with meals) sliding scale 10 units to 14 units (2023 15:59)  Lantus 100 units/mL subcutaneous solution: 30 unit(s) subcutaneous once a day (at bedtime) (2023 15:59)  magnesium: 1 tab oral daily (2023 15:59)  Multiple Vitamins oral tablet: 1 tab(s) orally once a day (2023 15:59)  sertraline 50 mg oral tablet: 1 tab(s) orally once a day (2023 15:59)  thiamine 100 mg oral tablet: 1 tab(s) orally once a day (2023 15:59)  tiZANidine 4 mg oral tablet: 1 tab(s) orally once a day (at bedtime) as needed for  muscle spasm (2023 15:59)  traZODone 100 mg oral tablet: 1 tab(s) orally once a day (at bedtime) (2023 15:59)  Vitamin D3: 1 tab(s) orally once a day (2023 15:59)    Hospital Medications:  clonazePAM  Tablet 1 milliGRAM(s) Oral three times a day PRN  dextrose 50% Injectable 12.5 Gram(s) IV Push once  dextrose 50% Injectable 25 Gram(s) IV Push once  dextrose 50% Injectable 25 Gram(s) IV Push once  dextrose Oral Gel 15 Gram(s) Oral once PRN  gabapentin 300 milliGRAM(s) Oral two times a day  glucagon  Injectable 1 milliGRAM(s) IntraMuscular once  insulin glargine Injectable (LANTUS) 24 Unit(s) SubCutaneous at bedtime  insulin lispro (ADMELOG) corrective regimen sliding scale   SubCutaneous every 6 hours  lactated ringers. 1000 milliLiter(s) IV Continuous <Continuous>  pantoprazole  Injectable 40 milliGRAM(s) IV Push every 12 hours  piperacillin/tazobactam IVPB.. 3.375 Gram(s) IV Intermittent every 8 hours  sertraline 50 milliGRAM(s) Oral daily  traZODone 100 milliGRAM(s) Oral at bedtime      PMHX/PSHX:   Volvulus with right lap hemicolectomy  Diabetes  Anxiety-Depression  Alcohol abuse with hx of pancreatitis   MRSA cellulitis  Type 2 diabetes mellitus  GERD (gastroesophageal reflux disease)  Gastroparesis  H/O nasal septoplasty  History of cholecystectomy  S/P tonsillectomy  Cyst of skinn     Family history:  Family history of diabetes mellitus in mother  Family history of systemic lupus erythematosus  Family history of diabetes mellitus  Family history of cerebral aneurysm (Grandparent, Aunt)  Family history of abscess of skin or subcutaneous tissue (Sibling)  FH: breast cancer    Social History:   Tob: Denies  EtOH: Denies  Illicit Drugs: Denies    ROS: Complete and normal except as mentioned above    PHYSICAL EXAM:   GENERAL:  Ill appearing   HEENT:  +scleral icterus   CHEST:  no respiratory distress  HEART:  Regular rate and rhythm  ABDOMEN:  Soft, non-tender, non-distended, normoactive bowel sounds. Rectal exam with brown stool in the rectal vault. Suture site c/d/i  NEURO:  Alert and oriented x 2-3    Vital Signs:  Vital Signs Last 24 Hrs  T(C): 37.5 (2023 09:48), Max: 38.8 (2023 22:22)  T(F): 99.5 (2023 09:48), Max: 101.8 (2023 22:22)  HR: 77 (2023 09:48) (69 - 92)  BP: 117/66 (2023 09:48) (100/59 - 128/66)  BP(mean): --  RR: 18 (2023 09:48) (16 - 18)  SpO2: 98% (2023 09:48) (94% - 100%)    Parameters below as of 2023 09:48  Patient On (Oxygen Delivery Method): nasal cannula      LABS:                        8.7    24.73 )-----------( 204      ( 2023 04:25 )             26.0     Mean Cell Volume: 90.6 fL (- @ 04:25)        136  |  103  |  15  ----------------------------<  208<H>  4.1   |  23  |  0.71    Ca    8.6      2023 04:25  Phos  2.8       Mg     2.20         TPro  6.6  /  Alb  3.3  /  TBili  3.9<H>  /  DBili  x   /  AST  41<H>  /  ALT  16  /  AlkPhos  82      LIVER FUNCTIONS - ( 2023 04:25 )  Alb: 3.3 g/dL / Pro: 6.6 g/dL / ALK PHOS: 82 U/L / ALT: 16 U/L / AST: 41 U/L / GGT: x           PT/INR - ( 2023 23:25 )   PT: 14.4 sec;   INR: 1.24 ratio         PTT - ( 2023 23:25 )  PTT:29.4 sec  Urinalysis Basic - ( 2023 12:30 )    Color: Yellow / Appearance: Clear / S.032 / pH: x  Gluc: x / Ketone: Large  / Bili: Negative / Urobili: <2 mg/dL   Blood: x / Protein: Trace / Nitrite: Negative   Leuk Esterase: Large / RBC: 2 /HPF / WBC 24 /HPF   Sq Epi: x / Non Sq Epi: x / Bacteria: Negative                              8.7    24.73 )-----------( 204      ( 2023 04:25 )             26.0                         6.1    25.63 )-----------( 214      ( 2023 23:25 )             18.6                         6.4    27.19 )-----------( 264      ( 2023 11:55 )             19.2       Imaging:  < from: CT Abdomen and Pelvis w/ IV Cont (23 @ 12:11) >  FINDINGS:  LOWER CHEST: Within normal limits.    LIVER: Within normal limits.  BILE DUCTS: Normal caliber.  GALLBLADDER: Cholecystectomy.  SPLEEN: Innumerable indeterminate hypodensities redemonstrated.  PANCREAS: Mild fatty atrophy.  ADRENALS: Within normal limits.  KIDNEYS/URETERS: Within normal limits.    BLADDER: Within normal limits.  REPRODUCTIVE ORGANS: Unremarkable uterus.    BOWEL: No bowel obstruction. Status post interval right hemicolectomy   with ileocolic anastomosis.  PERITONEUM: No ascites. Mild omental fat stranding, postoperative. No   pneumoperitoneum.  VESSELS: Within normal limits.  RETROPERITONEUM/LYMPH NODES: No lymphadenopathy.  ABDOMINAL WALL: Postoperative changes.  BONES: Within normal limits.    IMPRESSION:    Satisfactory postoperative appearance 10 days status post right   hemicolectomy. No acute intra-abdominal pathology.    Innumerable indeterminate splenic hypodensities redemonstrated. Consider   follow-up MRI.    < end of copied text >
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
## 2023-04-28 NOTE — DISCHARGE NOTE NURSING/CASE MANAGEMENT/SOCIAL WORK - NSDPDISTO_GEN_ALL_CORE
"     Office Note      Date: 2023  Patient Name: Caty Wyatt  MRN: [id number]  : 1948    Chief Complaint   Patient presents with   • Diabetes     Type II       History of Present Illness:   Caty Wyatt is a 74 y.o. female who presents for Diabetes type 2.   Current RX 4 insulin shots per day , metformin     Bg checks are done:>10 times per day with kelsie  Hypoglycemia : occasional mild    2 weeks of kelsie data are reviewed. 69 % in ranges. 8 % low. 19 % slightly high. 4 % very high. Estimated a1c 6.7   these data are  to    low blood sugars are at night      Last A1c:  Hemoglobin A1C   Date Value Ref Range Status   2023 7.8 % Final   2019 7.3  Final       Changes in health since last visit: has had issues with her feet . Last eye exam up to date.    Subjective            Review of Systems:   Review of Systems   Musculoskeletal:        Sore feet       The following portions of the patient's history were reviewed and updated as appropriate: allergies, current medications, past family history, past medical history, past social history, past surgical history and problem list.    Objective     Visit Vitals  /62 (BP Location: Left arm, Patient Position: Sitting, Cuff Size: Adult)   Pulse 67   Ht 157.5 cm (62\")   Wt 64.4 kg (142 lb)   SpO2 97%   BMI 25.97 kg/m²           Physical Exam:  Physical Exam  Vitals reviewed.   Constitutional:       Appearance: Normal appearance.   Musculoskeletal:        Feet:    Feet:      Comments: 1- redness  2- swelling    Diabetic Foot Exam Performed and Monofilament Test Performed  Neurological:      Mental Status: She is alert.   Psychiatric:         Mood and Affect: Mood normal.         Behavior: Behavior normal.         Thought Content: Thought content normal.         Judgment: Judgment normal.          Assessment / Plan      Assessment & Plan:  Problem List Items Addressed This Visit        Other    Type 2 diabetes mellitus with " "hyperglycemia, with long-term current use of insulin - Primary    Current Assessment & Plan      Stable.   Due to nocturnal hypoglycemia, will reduce long acting insulin          Relevant Medications    metFORMIN (GLUCOPHAGE) 1000 MG tablet    Lantus SoloStar 100 UNIT/ML injection pen    Insulin Aspart (novoLOG) 100 UNIT/ML injection    Other Relevant Orders    POC Glucose, Blood   Other Visit Diagnoses     Uncontrolled type 2 diabetes mellitus with hyperglycemia        Relevant Medications    TRUEplus Insulin Syringe 31G X 5/16\" 1 ML misc    Insulin Aspart (novoLOG) 100 UNIT/ML injection           Leander Shirley MD   04/28/2023  " Home

## 2023-04-28 NOTE — PROGRESS NOTE ADULT - SUBJECTIVE AND OBJECTIVE BOX
A TEAM SURGERY DAILY PROGRESS NOTE    S: Patient seen and examined at bedside. Patient very sleepy this morning. Denies nausea/emesis. Overnight H/H improved after transfusion.     O: Vital Signs Last 24 Hrs  T(C): 36.7 (28 Apr 2023 06:30), Max: 39.3 (27 Apr 2023 10:30)  T(F): 98 (28 Apr 2023 06:30), Max: 102.7 (27 Apr 2023 10:30)  HR: 69 (28 Apr 2023 06:30) (69 - 102)  BP: 107/58 (28 Apr 2023 06:30) (100/59 - 128/66)  BP(mean): --  RR: 18 (28 Apr 2023 06:30) (16 - 20)  SpO2: 94% (28 Apr 2023 06:30) (94% - 100%)    Parameters below as of 28 Apr 2023 06:30  Patient On (Oxygen Delivery Method): nasal cannula  O2 Flow (L/min): 2      I&O's Detail    27 Apr 2023 07:01  -  28 Apr 2023 07:00  --------------------------------------------------------  IN:    Lactated Ringers: 375 mL    PRBCs (Packed Red Blood Cells): 300 mL  Total IN: 675 mL    OUT:    Oral Fluid: 0 mL    Voided (mL): 0 mL  Total OUT: 0 mL    Total NET: 675 mL      EXAM  General: alert and oriented, NAD  Resp: airway patent, respirations unlabored  CVS: regular rate and rhythm  Abdomen: Soft, TTP RLQ and right lateral abdominal region Nondistended. Incision sites clean, dry with no erythema or induration.  Skin: warm, dry, appropriate color      LABS                      8.7    24.73 )-----------( 204      ( 28 Apr 2023 04:25 )             26.0   04-28    136  |  103  |  15  ----------------------------<  208<H>  4.1   |  23  |  0.71    Ca    8.6      28 Apr 2023 04:25  Phos  2.8     04-28  Mg     2.20     04-28    TPro  6.6  /  Alb  3.3  /  TBili  3.9<H>  /  DBili  x   /  AST  41<H>  /  ALT  16  /  AlkPhos  82  04-28

## 2023-04-28 NOTE — CONSULT NOTE ADULT - ASSESSMENT
53F PMHx DM on insulin c/b gastroparesis and neuropathy presents for 2 days of dehydration/HA/nausea/SOB in the setting of R lap hemicolectomy w/ ileocolic anastomosis (4/17/2023)  consulted for hyperbilirubinemia with elevated indirect bilirubin, and anemia.    #Anemia  # Leukocytosis  # Indirect hyperbilirubinemia  - Patient had Hb of 6.1 -> s/p 2U prbc transfusion  - patient's Hb improved to 8.7  - please order LDH, haptoglobin, repeat bilirubin, retic count  - Will review peripheral blood smear:  53F PMHx DM on insulin c/b gastroparesis and neuropathy presents for 2 days of dehydration/HA/nausea/SOB in the setting of R lap hemicolectomy w/ ileocolic anastomosis (4/17/2023)  consulted for hyperbilirubinemia with elevated indirect bilirubin, and anemia.    #Anemia  # Indirect hyperbilirubinemia  - Patient had Hb of 6.1 -> s/p 2U prbc transfusion  - patient's Hb improved to 8.7  - given that patient had prior surgery and reports black stool, recommend GI eval  - please order LDH, haptoglobin, repeat bilirubin, retic count, coags, D-dimer, fibrinogen and trend daily  -  peripheral blood smear: occasional nucleated RBC , retics++, a few spherocytes with zaheer cells, also saw possible you jolly bodies. Leukocytes increased/hpf, neutrophils with toxic granulations seen.  - hemolytic anemia is possible however, GIB is still high on differential given her presentation of black stool  - check Coomb's test  - trend hemolysis labs daily    # Leukocytosis  - Given the fever and leukocytosis post surgery, suspect infectious process going on   - CT abdomen and pelvis reports no abscess . Unchanged possible fat necrosis in the right anterior hemipelvis. No evidence of anastomotic leak.  - Recommend infection work up

## 2023-04-29 LAB
ALBUMIN SERPL ELPH-MCNC: 3.3 G/DL — SIGNIFICANT CHANGE UP (ref 3.3–5)
ALP SERPL-CCNC: 83 U/L — SIGNIFICANT CHANGE UP (ref 40–120)
ALT FLD-CCNC: 16 U/L — SIGNIFICANT CHANGE UP (ref 4–33)
ANION GAP SERPL CALC-SCNC: 12 MMOL/L — SIGNIFICANT CHANGE UP (ref 7–14)
APTT BLD: 25.9 SEC — LOW (ref 27–36.3)
AST SERPL-CCNC: 43 U/L — HIGH (ref 4–32)
BILIRUB DIRECT SERPL-MCNC: 1.5 MG/DL — HIGH (ref 0–0.3)
BILIRUB INDIRECT FLD-MCNC: 2.9 MG/DL — HIGH (ref 0–1)
BILIRUB SERPL-MCNC: 4.4 MG/DL — HIGH (ref 0.2–1.2)
BLD GP AB SCN SERPL QL: NEGATIVE — SIGNIFICANT CHANGE UP
BUN SERPL-MCNC: 13 MG/DL — SIGNIFICANT CHANGE UP (ref 7–23)
CALCIUM SERPL-MCNC: 8.5 MG/DL — SIGNIFICANT CHANGE UP (ref 8.4–10.5)
CHLORIDE SERPL-SCNC: 98 MMOL/L — SIGNIFICANT CHANGE UP (ref 98–107)
CO2 SERPL-SCNC: 22 MMOL/L — SIGNIFICANT CHANGE UP (ref 22–31)
CREAT SERPL-MCNC: 0.59 MG/DL — SIGNIFICANT CHANGE UP (ref 0.5–1.3)
DAT POLY-SP REAG RBC QL: POSITIVE — SIGNIFICANT CHANGE UP
EGFR: 109 ML/MIN/1.73M2 — SIGNIFICANT CHANGE UP
GLUCOSE BLDC GLUCOMTR-MCNC: 138 MG/DL — HIGH (ref 70–99)
GLUCOSE BLDC GLUCOMTR-MCNC: 169 MG/DL — HIGH (ref 70–99)
GLUCOSE BLDC GLUCOMTR-MCNC: 223 MG/DL — HIGH (ref 70–99)
GLUCOSE BLDC GLUCOMTR-MCNC: 262 MG/DL — HIGH (ref 70–99)
GLUCOSE SERPL-MCNC: 157 MG/DL — HIGH (ref 70–99)
HAPTOGLOB SERPL-MCNC: <20 MG/DL — LOW (ref 34–200)
HCT VFR BLD CALC: 22 % — LOW (ref 34.5–45)
HCT VFR BLD CALC: 22.2 % — LOW (ref 34.5–45)
HGB BLD-MCNC: 7.1 G/DL — LOW (ref 11.5–15.5)
HGB BLD-MCNC: 7.3 G/DL — LOW (ref 11.5–15.5)
INR BLD: 1.18 RATIO — HIGH (ref 0.88–1.16)
LDH SERPL L TO P-CCNC: 1175 U/L — HIGH (ref 135–225)
MAGNESIUM SERPL-MCNC: 2 MG/DL — SIGNIFICANT CHANGE UP (ref 1.6–2.6)
MCHC RBC-ENTMCNC: 30.6 PG — SIGNIFICANT CHANGE UP (ref 27–34)
MCHC RBC-ENTMCNC: 31.3 PG — SIGNIFICANT CHANGE UP (ref 27–34)
MCHC RBC-ENTMCNC: 32.3 GM/DL — SIGNIFICANT CHANGE UP (ref 32–36)
MCHC RBC-ENTMCNC: 32.9 GM/DL — SIGNIFICANT CHANGE UP (ref 32–36)
MCV RBC AUTO: 94.8 FL — SIGNIFICANT CHANGE UP (ref 80–100)
MCV RBC AUTO: 95.3 FL — SIGNIFICANT CHANGE UP (ref 80–100)
NRBC # BLD: 4 /100 WBCS — HIGH (ref 0–0)
NRBC # BLD: 6 /100 WBCS — HIGH (ref 0–0)
NRBC # FLD: 0.59 K/UL — HIGH (ref 0–0)
NRBC # FLD: 1.04 K/UL — HIGH (ref 0–0)
PHOSPHATE SERPL-MCNC: 3 MG/DL — SIGNIFICANT CHANGE UP (ref 2.5–4.5)
PLATELET # BLD AUTO: 162 K/UL — SIGNIFICANT CHANGE UP (ref 150–400)
PLATELET # BLD AUTO: 165 K/UL — SIGNIFICANT CHANGE UP (ref 150–400)
POTASSIUM SERPL-MCNC: 3.3 MMOL/L — LOW (ref 3.5–5.3)
POTASSIUM SERPL-SCNC: 3.3 MMOL/L — LOW (ref 3.5–5.3)
PROT SERPL-MCNC: 6.3 G/DL — SIGNIFICANT CHANGE UP (ref 6–8.3)
PROTHROM AB SERPL-ACNC: 13.7 SEC — HIGH (ref 10.5–13.4)
RBC # BLD: 2.32 M/UL — LOW (ref 3.8–5.2)
RBC # BLD: 2.33 M/UL — LOW (ref 3.8–5.2)
RBC # FLD: 24.7 % — HIGH (ref 10.3–14.5)
RBC # FLD: 25.6 % — HIGH (ref 10.3–14.5)
SODIUM SERPL-SCNC: 132 MMOL/L — LOW (ref 135–145)
WBC # BLD: 15.15 K/UL — HIGH (ref 3.8–10.5)
WBC # BLD: 17.51 K/UL — HIGH (ref 3.8–10.5)
WBC # FLD AUTO: 15.15 K/UL — HIGH (ref 3.8–10.5)
WBC # FLD AUTO: 17.51 K/UL — HIGH (ref 3.8–10.5)

## 2023-04-29 RX ORDER — ONDANSETRON 8 MG/1
4 TABLET, FILM COATED ORAL ONCE
Refills: 0 | Status: COMPLETED | OUTPATIENT
Start: 2023-04-29 | End: 2023-04-29

## 2023-04-29 RX ORDER — INSULIN GLARGINE 100 [IU]/ML
30 INJECTION, SOLUTION SUBCUTANEOUS AT BEDTIME
Refills: 0 | Status: DISCONTINUED | OUTPATIENT
Start: 2023-04-29 | End: 2023-04-29

## 2023-04-29 RX ORDER — FOLIC ACID 0.8 MG
1 TABLET ORAL DAILY
Refills: 0 | Status: DISCONTINUED | OUTPATIENT
Start: 2023-04-29 | End: 2023-05-06

## 2023-04-29 RX ORDER — DEXTROSE MONOHYDRATE, SODIUM CHLORIDE, AND POTASSIUM CHLORIDE 50; .745; 4.5 G/1000ML; G/1000ML; G/1000ML
1000 INJECTION, SOLUTION INTRAVENOUS
Refills: 0 | Status: DISCONTINUED | OUTPATIENT
Start: 2023-04-29 | End: 2023-04-29

## 2023-04-29 RX ORDER — INSULIN LISPRO 100/ML
VIAL (ML) SUBCUTANEOUS
Refills: 0 | Status: DISCONTINUED | OUTPATIENT
Start: 2023-04-29 | End: 2023-05-06

## 2023-04-29 RX ORDER — INSULIN LISPRO 100/ML
5 VIAL (ML) SUBCUTANEOUS
Refills: 0 | Status: DISCONTINUED | OUTPATIENT
Start: 2023-04-29 | End: 2023-04-30

## 2023-04-29 RX ORDER — DEXTROSE MONOHYDRATE, SODIUM CHLORIDE, AND POTASSIUM CHLORIDE 50; .745; 4.5 G/1000ML; G/1000ML; G/1000ML
1000 INJECTION, SOLUTION INTRAVENOUS
Refills: 0 | Status: DISCONTINUED | OUTPATIENT
Start: 2023-04-29 | End: 2023-04-30

## 2023-04-29 RX ORDER — INSULIN LISPRO 100/ML
VIAL (ML) SUBCUTANEOUS AT BEDTIME
Refills: 0 | Status: DISCONTINUED | OUTPATIENT
Start: 2023-04-29 | End: 2023-05-06

## 2023-04-29 RX ORDER — OXYCODONE HYDROCHLORIDE 5 MG/1
5 TABLET ORAL ONCE
Refills: 0 | Status: DISCONTINUED | OUTPATIENT
Start: 2023-04-29 | End: 2023-04-29

## 2023-04-29 RX ORDER — SODIUM CHLORIDE 9 MG/ML
1000 INJECTION, SOLUTION INTRAVENOUS ONCE
Refills: 0 | Status: COMPLETED | OUTPATIENT
Start: 2023-04-29 | End: 2023-04-29

## 2023-04-29 RX ORDER — INSULIN GLARGINE 100 [IU]/ML
24 INJECTION, SOLUTION SUBCUTANEOUS AT BEDTIME
Refills: 0 | Status: DISCONTINUED | OUTPATIENT
Start: 2023-04-29 | End: 2023-05-01

## 2023-04-29 RX ADMIN — DEXTROSE MONOHYDRATE, SODIUM CHLORIDE, AND POTASSIUM CHLORIDE 120 MILLILITER(S): 50; .745; 4.5 INJECTION, SOLUTION INTRAVENOUS at 10:38

## 2023-04-29 RX ADMIN — OXYCODONE HYDROCHLORIDE 5 MILLIGRAM(S): 5 TABLET ORAL at 09:40

## 2023-04-29 RX ADMIN — Medication 1000 MILLIGRAM(S): at 06:08

## 2023-04-29 RX ADMIN — Medication 5 UNIT(S): at 12:41

## 2023-04-29 RX ADMIN — Medication 3: at 12:42

## 2023-04-29 RX ADMIN — Medication 1000 MILLIGRAM(S): at 15:15

## 2023-04-29 RX ADMIN — PANTOPRAZOLE SODIUM 40 MILLIGRAM(S): 20 TABLET, DELAYED RELEASE ORAL at 17:43

## 2023-04-29 RX ADMIN — Medication 1 MILLIGRAM(S): at 13:07

## 2023-04-29 RX ADMIN — PIPERACILLIN AND TAZOBACTAM 25 GRAM(S): 4; .5 INJECTION, POWDER, LYOPHILIZED, FOR SOLUTION INTRAVENOUS at 22:16

## 2023-04-29 RX ADMIN — Medication 1 MILLIGRAM(S): at 17:41

## 2023-04-29 RX ADMIN — ONDANSETRON 4 MILLIGRAM(S): 8 TABLET, FILM COATED ORAL at 17:41

## 2023-04-29 RX ADMIN — ONDANSETRON 4 MILLIGRAM(S): 8 TABLET, FILM COATED ORAL at 07:03

## 2023-04-29 RX ADMIN — Medication 400 MILLIGRAM(S): at 14:43

## 2023-04-29 RX ADMIN — PIPERACILLIN AND TAZOBACTAM 25 GRAM(S): 4; .5 INJECTION, POWDER, LYOPHILIZED, FOR SOLUTION INTRAVENOUS at 06:38

## 2023-04-29 RX ADMIN — Medication 2: at 06:37

## 2023-04-29 RX ADMIN — Medication 1 MILLIGRAM(S): at 12:41

## 2023-04-29 RX ADMIN — INSULIN GLARGINE 24 UNIT(S): 100 INJECTION, SOLUTION SUBCUTANEOUS at 22:14

## 2023-04-29 RX ADMIN — Medication 100 MILLIGRAM(S): at 22:14

## 2023-04-29 RX ADMIN — PANTOPRAZOLE SODIUM 40 MILLIGRAM(S): 20 TABLET, DELAYED RELEASE ORAL at 05:39

## 2023-04-29 RX ADMIN — SERTRALINE 50 MILLIGRAM(S): 25 TABLET, FILM COATED ORAL at 12:41

## 2023-04-29 RX ADMIN — Medication 2: at 17:43

## 2023-04-29 RX ADMIN — OXYCODONE HYDROCHLORIDE 5 MILLIGRAM(S): 5 TABLET ORAL at 08:58

## 2023-04-29 RX ADMIN — Medication 400 MILLIGRAM(S): at 05:38

## 2023-04-29 RX ADMIN — SODIUM CHLORIDE 1000 MILLILITER(S): 9 INJECTION, SOLUTION INTRAVENOUS at 08:58

## 2023-04-29 RX ADMIN — Medication 5 UNIT(S): at 17:43

## 2023-04-29 RX ADMIN — GABAPENTIN 300 MILLIGRAM(S): 400 CAPSULE ORAL at 05:39

## 2023-04-29 RX ADMIN — GABAPENTIN 300 MILLIGRAM(S): 400 CAPSULE ORAL at 17:40

## 2023-04-29 NOTE — PROGRESS NOTE ADULT - ASSESSMENT
53F PMHx DM on insulin c/b gastroparesis and neuropathy presents for 2 days of dehydration/HA/nausea/SOB in the setting of R lap hemicolectomy w/ ileocolic anastomosis (4/17/2023) exacerbated by GLF from bed today w/ head and left-sided trauma.  Patient received left-sided lap hemicolectomy for volvulus at [facility] (Dr. Iker Sen).  Recent fall off bed at home, no LOC. ED workup significant for H/H 6.4/19.2, elevated WBC, elevated T Bili 3.8, IDB 2.8, DB 1.0. Radiographic imaging negative for postsurgical anastomotic leak, or other abnormalities.     Plan:  - monitor H/H  - Appreciate GI recs: PPI  - Appreciate Endo recs: outpatient DM f/u  - Appreciate Heme recs: Hemolysis w/u  - c/w Zosyn; monitor fevers  - CLD  - no pain meds before abdominal exam    A Team Surgery   [id number]

## 2023-04-29 NOTE — PROGRESS NOTE ADULT - SUBJECTIVE AND OBJECTIVE BOX
Overnight events:   - No acute events    SUBJECTIVE:  Patient was seen and examined on AM rounds. Reports 1 small episode of nonbloody nonbilious emesis in the AM. Reports mild abdominal pain. Denies fever/chills, SOB, chest pain.    OBJECTIVE:  Vital Signs Last 24 Hrs  T(C): 36.6 (29 Apr 2023 05:39), Max: 37.8 (28 Apr 2023 22:00)  T(F): 97.8 (29 Apr 2023 05:39), Max: 100 (28 Apr 2023 22:00)  HR: 91 (29 Apr 2023 05:39) (65 - 91)  BP: 100/58 (29 Apr 2023 05:39) (95/50 - 117/87)  BP(mean): --  RR: 18 (29 Apr 2023 05:39) (17 - 18)  SpO2: 100% (29 Apr 2023 05:39) (94% - 100%)    Parameters below as of 29 Apr 2023 05:39  Patient On (Oxygen Delivery Method): nasal cannula  O2 Flow (L/min): 2        04-28-23 @ 07:01  -  04-29-23 @ 07:00  --------------------------------------------------------  IN: 3210 mL / OUT: 1800 mL / NET: 1410 mL        Physical Examination:  General: alert and orientedx3, NAD  Resp: airway patent, respirations unlabored  CVS: regular rate and rhythm  Abdomen: Soft, TTP RLQ and right lateral abdominal region Nondistended. Incision sites clean, dry with no erythema or induration.  Skin: warm, dry, appropriate color        LABS:                        7.3    17.51 )-----------( 165      ( 29 Apr 2023 06:16 )             22.2       04-29    132<L>  |  98  |  13  ----------------------------<  157<H>  3.3<L>   |  22  |  0.59    Ca    8.5      29 Apr 2023 06:16  Phos  3.0     04-29  Mg     2.00     04-29    TPro  6.3  /  Alb  3.3  /  TBili  4.4<H>  /  DBili  1.5<H>  /  AST  43<H>  /  ALT  16  /  AlkPhos  83  04-29

## 2023-04-30 LAB
-  AMPICILLIN: SIGNIFICANT CHANGE UP
-  CIPROFLOXACIN: SIGNIFICANT CHANGE UP
-  LEVOFLOXACIN: SIGNIFICANT CHANGE UP
-  NITROFURANTOIN: SIGNIFICANT CHANGE UP
-  TETRACYCLINE: SIGNIFICANT CHANGE UP
-  VANCOMYCIN: SIGNIFICANT CHANGE UP
ALBUMIN SERPL ELPH-MCNC: 3.2 G/DL — LOW (ref 3.3–5)
ALP SERPL-CCNC: 79 U/L — SIGNIFICANT CHANGE UP (ref 40–120)
ALT FLD-CCNC: 13 U/L — SIGNIFICANT CHANGE UP (ref 4–33)
ANION GAP SERPL CALC-SCNC: 10 MMOL/L — SIGNIFICANT CHANGE UP (ref 7–14)
APTT BLD: 25.2 SEC — LOW (ref 27–36.3)
AST SERPL-CCNC: 35 U/L — HIGH (ref 4–32)
BASOPHILS # BLD AUTO: 0.06 K/UL — SIGNIFICANT CHANGE UP (ref 0–0.2)
BASOPHILS NFR BLD AUTO: 0.5 % — SIGNIFICANT CHANGE UP (ref 0–2)
BILIRUB SERPL-MCNC: 2.2 MG/DL — HIGH (ref 0.2–1.2)
BUN SERPL-MCNC: 8 MG/DL — SIGNIFICANT CHANGE UP (ref 7–23)
CALCIUM SERPL-MCNC: 8.5 MG/DL — SIGNIFICANT CHANGE UP (ref 8.4–10.5)
CHLORIDE SERPL-SCNC: 104 MMOL/L — SIGNIFICANT CHANGE UP (ref 98–107)
CO2 SERPL-SCNC: 23 MMOL/L — SIGNIFICANT CHANGE UP (ref 22–31)
CREAT SERPL-MCNC: 0.72 MG/DL — SIGNIFICANT CHANGE UP (ref 0.5–1.3)
CULTURE RESULTS: SIGNIFICANT CHANGE UP
D DIMER BLD IA.RAPID-MCNC: 1777 NG/ML DDU — HIGH
EGFR: 101 ML/MIN/1.73M2 — SIGNIFICANT CHANGE UP
EOSINOPHIL # BLD AUTO: 0.23 K/UL — SIGNIFICANT CHANGE UP (ref 0–0.5)
EOSINOPHIL NFR BLD AUTO: 1.9 % — SIGNIFICANT CHANGE UP (ref 0–6)
GLUCOSE BLDC GLUCOMTR-MCNC: 142 MG/DL — HIGH (ref 70–99)
GLUCOSE BLDC GLUCOMTR-MCNC: 165 MG/DL — HIGH (ref 70–99)
GLUCOSE BLDC GLUCOMTR-MCNC: 166 MG/DL — HIGH (ref 70–99)
GLUCOSE BLDC GLUCOMTR-MCNC: 168 MG/DL — HIGH (ref 70–99)
GLUCOSE BLDC GLUCOMTR-MCNC: 174 MG/DL — HIGH (ref 70–99)
GLUCOSE BLDC GLUCOMTR-MCNC: 381 MG/DL — HIGH (ref 70–99)
GLUCOSE SERPL-MCNC: 165 MG/DL — HIGH (ref 70–99)
HAPTOGLOB SERPL-MCNC: <20 MG/DL — LOW (ref 34–200)
HCT VFR BLD CALC: 26.5 % — LOW (ref 34.5–45)
HGB BLD-MCNC: 8.6 G/DL — LOW (ref 11.5–15.5)
IANC: 8.73 K/UL — HIGH (ref 1.8–7.4)
IMM GRANULOCYTES NFR BLD AUTO: 5.2 % — HIGH (ref 0–0.9)
INR BLD: 1.17 RATIO — HIGH (ref 0.88–1.16)
LDH SERPL L TO P-CCNC: 930 U/L — HIGH (ref 135–225)
LYMPHOCYTES # BLD AUTO: 1.75 K/UL — SIGNIFICANT CHANGE UP (ref 1–3.3)
LYMPHOCYTES # BLD AUTO: 14.8 % — SIGNIFICANT CHANGE UP (ref 13–44)
MAGNESIUM SERPL-MCNC: 2.2 MG/DL — SIGNIFICANT CHANGE UP (ref 1.6–2.6)
MCHC RBC-ENTMCNC: 31.7 PG — SIGNIFICANT CHANGE UP (ref 27–34)
MCHC RBC-ENTMCNC: 32.5 GM/DL — SIGNIFICANT CHANGE UP (ref 32–36)
MCV RBC AUTO: 97.8 FL — SIGNIFICANT CHANGE UP (ref 80–100)
METHOD TYPE: SIGNIFICANT CHANGE UP
MONOCYTES # BLD AUTO: 0.42 K/UL — SIGNIFICANT CHANGE UP (ref 0–0.9)
MONOCYTES NFR BLD AUTO: 3.6 % — SIGNIFICANT CHANGE UP (ref 2–14)
NEUTROPHILS # BLD AUTO: 8.73 K/UL — HIGH (ref 1.8–7.4)
NEUTROPHILS NFR BLD AUTO: 74 % — SIGNIFICANT CHANGE UP (ref 43–77)
NRBC # BLD: 3 /100 WBCS — HIGH (ref 0–0)
NRBC # FLD: 0.36 K/UL — HIGH (ref 0–0)
ORGANISM # SPEC MICROSCOPIC CNT: SIGNIFICANT CHANGE UP
ORGANISM # SPEC MICROSCOPIC CNT: SIGNIFICANT CHANGE UP
PHOSPHATE SERPL-MCNC: 3.3 MG/DL — SIGNIFICANT CHANGE UP (ref 2.5–4.5)
PLATELET # BLD AUTO: 144 K/UL — LOW (ref 150–400)
POTASSIUM SERPL-MCNC: 4 MMOL/L — SIGNIFICANT CHANGE UP (ref 3.5–5.3)
POTASSIUM SERPL-SCNC: 4 MMOL/L — SIGNIFICANT CHANGE UP (ref 3.5–5.3)
PROT SERPL-MCNC: 6.1 G/DL — SIGNIFICANT CHANGE UP (ref 6–8.3)
PROTHROM AB SERPL-ACNC: 13.6 SEC — HIGH (ref 10.5–13.4)
RBC # BLD: 2.71 M/UL — LOW (ref 3.8–5.2)
RBC # FLD: 25.1 % — HIGH (ref 10.3–14.5)
RETICS #: 357.5 K/UL — HIGH (ref 25–125)
RETICS/RBC NFR: 13.3 % — HIGH (ref 0.5–2.5)
SODIUM SERPL-SCNC: 137 MMOL/L — SIGNIFICANT CHANGE UP (ref 135–145)
SPECIMEN SOURCE: SIGNIFICANT CHANGE UP
WBC # BLD: 11.81 K/UL — HIGH (ref 3.8–10.5)
WBC # FLD AUTO: 11.81 K/UL — HIGH (ref 3.8–10.5)

## 2023-04-30 PROCEDURE — 93010 ELECTROCARDIOGRAM REPORT: CPT

## 2023-04-30 PROCEDURE — 99232 SBSQ HOSP IP/OBS MODERATE 35: CPT

## 2023-04-30 RX ORDER — IMMUNE GLOBULIN (HUMAN) 10 G/100ML
65 INJECTION INTRAVENOUS; SUBCUTANEOUS DAILY
Refills: 0 | Status: DISCONTINUED | OUTPATIENT
Start: 2023-04-30 | End: 2023-04-30

## 2023-04-30 RX ORDER — FAMOTIDINE 10 MG/ML
20 INJECTION INTRAVENOUS ONCE
Refills: 0 | Status: DISCONTINUED | OUTPATIENT
Start: 2023-04-30 | End: 2023-05-01

## 2023-04-30 RX ORDER — ACETAMINOPHEN 500 MG
1000 TABLET ORAL ONCE
Refills: 0 | Status: DISCONTINUED | OUTPATIENT
Start: 2023-04-30 | End: 2023-05-01

## 2023-04-30 RX ORDER — DIPHENHYDRAMINE HCL 50 MG
50 CAPSULE ORAL ONCE
Refills: 0 | Status: COMPLETED | OUTPATIENT
Start: 2023-04-30 | End: 2023-04-30

## 2023-04-30 RX ORDER — MEPERIDINE HYDROCHLORIDE 50 MG/ML
12.5 INJECTION INTRAMUSCULAR; INTRAVENOUS; SUBCUTANEOUS ONCE
Refills: 0 | Status: DISCONTINUED | OUTPATIENT
Start: 2023-04-30 | End: 2023-04-30

## 2023-04-30 RX ORDER — ACETAMINOPHEN 500 MG
1000 TABLET ORAL ONCE
Refills: 0 | Status: COMPLETED | OUTPATIENT
Start: 2023-04-30 | End: 2023-04-30

## 2023-04-30 RX ORDER — IMMUNE GLOBULIN (HUMAN) 10 G/100ML
65 INJECTION INTRAVENOUS; SUBCUTANEOUS DAILY
Refills: 0 | Status: DISCONTINUED | OUTPATIENT
Start: 2023-04-30 | End: 2023-05-02

## 2023-04-30 RX ORDER — INSULIN LISPRO 100/ML
8 VIAL (ML) SUBCUTANEOUS
Refills: 0 | Status: DISCONTINUED | OUTPATIENT
Start: 2023-04-30 | End: 2023-05-01

## 2023-04-30 RX ORDER — EPINEPHRINE 0.3 MG/.3ML
0.3 INJECTION INTRAMUSCULAR; SUBCUTANEOUS ONCE
Refills: 0 | Status: COMPLETED | OUTPATIENT
Start: 2023-04-30 | End: 2023-04-30

## 2023-04-30 RX ADMIN — DEXTROSE MONOHYDRATE, SODIUM CHLORIDE, AND POTASSIUM CHLORIDE 120 MILLILITER(S): 50; .745; 4.5 INJECTION, SOLUTION INTRAVENOUS at 11:08

## 2023-04-30 RX ADMIN — GABAPENTIN 300 MILLIGRAM(S): 400 CAPSULE ORAL at 06:14

## 2023-04-30 RX ADMIN — GABAPENTIN 300 MILLIGRAM(S): 400 CAPSULE ORAL at 18:09

## 2023-04-30 RX ADMIN — Medication 1: at 09:04

## 2023-04-30 RX ADMIN — Medication 1 MILLIGRAM(S): at 09:28

## 2023-04-30 RX ADMIN — IMMUNE GLOBULIN (HUMAN) 108.33 GRAM(S): 10 INJECTION INTRAVENOUS; SUBCUTANEOUS at 19:23

## 2023-04-30 RX ADMIN — Medication 1: at 18:09

## 2023-04-30 RX ADMIN — Medication 5 UNIT(S): at 12:41

## 2023-04-30 RX ADMIN — Medication 5: at 12:42

## 2023-04-30 RX ADMIN — Medication 8 UNIT(S): at 18:09

## 2023-04-30 RX ADMIN — Medication 400 MILLIGRAM(S): at 21:43

## 2023-04-30 RX ADMIN — Medication 1 MILLIGRAM(S): at 18:08

## 2023-04-30 RX ADMIN — PANTOPRAZOLE SODIUM 40 MILLIGRAM(S): 20 TABLET, DELAYED RELEASE ORAL at 06:14

## 2023-04-30 RX ADMIN — MEPERIDINE HYDROCHLORIDE 12.5 MILLIGRAM(S): 50 INJECTION INTRAMUSCULAR; INTRAVENOUS; SUBCUTANEOUS at 22:03

## 2023-04-30 RX ADMIN — PIPERACILLIN AND TAZOBACTAM 25 GRAM(S): 4; .5 INJECTION, POWDER, LYOPHILIZED, FOR SOLUTION INTRAVENOUS at 06:14

## 2023-04-30 RX ADMIN — PANTOPRAZOLE SODIUM 40 MILLIGRAM(S): 20 TABLET, DELAYED RELEASE ORAL at 18:09

## 2023-04-30 RX ADMIN — Medication 50 MILLIGRAM(S): at 21:45

## 2023-04-30 RX ADMIN — Medication 1 MILLIGRAM(S): at 11:08

## 2023-04-30 RX ADMIN — Medication 5 UNIT(S): at 09:04

## 2023-04-30 RX ADMIN — Medication 1000 MILLIGRAM(S): at 22:13

## 2023-04-30 RX ADMIN — SERTRALINE 50 MILLIGRAM(S): 25 TABLET, FILM COATED ORAL at 11:08

## 2023-04-30 RX ADMIN — MEPERIDINE HYDROCHLORIDE 12.5 MILLIGRAM(S): 50 INJECTION INTRAMUSCULAR; INTRAVENOUS; SUBCUTANEOUS at 22:33

## 2023-04-30 RX ADMIN — PIPERACILLIN AND TAZOBACTAM 25 GRAM(S): 4; .5 INJECTION, POWDER, LYOPHILIZED, FOR SOLUTION INTRAVENOUS at 14:52

## 2023-04-30 RX ADMIN — INSULIN GLARGINE 24 UNIT(S): 100 INJECTION, SOLUTION SUBCUTANEOUS at 23:33

## 2023-04-30 NOTE — PROVIDER CONTACT NOTE (OTHER) - ASSESSMENT
/54, HR 62, asymptomatic
A&O4, pt currently receiving blood transfusion through only access. RN and ANM unsuccessful in getting IV access in pt.
A&O4, pt currently receiving blood transfusion, O2 88-90% on RA
Patient AOx4, VSS, no complaints of pain
Pt c/o abdominal pain from previous surgery and lower back pain.
A&O4, Pt has temp 101.8F oral and chills post blood transfusion, /66 HR 88. Pt c/o of feeling hot, cold packs given to patient

## 2023-04-30 NOTE — PROGRESS NOTE ADULT - SUBJECTIVE AND OBJECTIVE BOX
Chief Complaint: DM 2 uncontrolled with hyperglycemia     History: Saw patient at bedside, reports she is tolerating eating and beginning to consume more. She endorses some nausea but denies vomiting. Most recent FS with hyperglycemia.  Diet now advanced to regular.     MEDICATIONS  (STANDING):  dextrose 50% Injectable 12.5 Gram(s) IV Push once  dextrose 50% Injectable 25 Gram(s) IV Push once  dextrose 50% Injectable 25 Gram(s) IV Push once  folic acid 1 milliGRAM(s) Oral daily  gabapentin 300 milliGRAM(s) Oral two times a day  glucagon  Injectable 1 milliGRAM(s) IntraMuscular once  insulin glargine Injectable (LANTUS) 24 Unit(s) SubCutaneous at bedtime  insulin lispro (ADMELOG) corrective regimen sliding scale   SubCutaneous three times a day before meals  insulin lispro (ADMELOG) corrective regimen sliding scale   SubCutaneous at bedtime  insulin lispro Injectable (ADMELOG) 8 Unit(s) SubCutaneous three times a day before meals  pantoprazole  Injectable 40 milliGRAM(s) IV Push every 12 hours  piperacillin/tazobactam IVPB.. 3.375 Gram(s) IV Intermittent every 8 hours  sertraline 50 milliGRAM(s) Oral daily  traZODone 100 milliGRAM(s) Oral at bedtime    MEDICATIONS  (PRN):  clonazePAM  Tablet 1 milliGRAM(s) Oral three times a day PRN for anxiety  dextrose Oral Gel 15 Gram(s) Oral once PRN Blood Glucose LESS THAN 70 milliGRAM(s)/deciliter      Allergies    Bactrim (Anaphylaxis)  Eggplant mouth itches (Other)      Review of Systems:  Constitutional: No fever  Eyes: No blurry vision  Neuro: No tremors  HEENT: No pain  Cardiovascular: No chest pain, palpitations  Respiratory: No SOB, no cough  GI: Some nausea, no vomiting, abdominal pain  Psych: no depression  Endocrine: no polyuria, polydipsia      PHYSICAL EXAM:  VITALS: T(C): 36.7 (04-30-23 @ 09:13)  T(F): 98.1 (04-30-23 @ 09:13), Max: 98.9 (04-29-23 @ 14:10)  HR: 67 (04-30-23 @ 09:13) (67 - 89)  BP: 104/82 (04-30-23 @ 09:13) (93/46 - 125/67)  RR:  (16 - 18)  SpO2:  (94% - 98%)  Wt(kg): --  GENERAL: NAD, well-groomed  EYES: No proptosis, no lid lag, anicteric  HEENT:  Atraumatic, Normocephalic, moist mucous membranes  RESPIRATORY: Non labored respirations   MUSCULOSKELETAL: Full range of motion, normal strength  NEURO: extraocular movements intact, no tremor  PSYCH: Alert and oriented x 3, normal affect, normal mood      CAPILLARY BLOOD GLUCOSE      POCT Blood Glucose.: 381 mg/dL (30 Apr 2023 12:22)  POCT Blood Glucose.: 165 mg/dL (30 Apr 2023 08:33)  POCT Blood Glucose.: 142 mg/dL (30 Apr 2023 06:16)  POCT Blood Glucose.: 138 mg/dL (29 Apr 2023 22:12)  POCT Blood Glucose.: 223 mg/dL (29 Apr 2023 17:33)      04-30    137  |  104  |  8   ----------------------------<  165<H>  4.0   |  23  |  0.72    eGFR: 101    Ca    8.5      04-30  Mg     2.20     04-30  Phos  3.3     04-30    TPro  6.1  /  Alb  3.2<L>  /  TBili  2.2<H>  /  DBili  x   /  AST  35<H>  /  ALT  13  /  AlkPhos  79  04-30            A1C with Estimated Average Glucose Result: 8.7 % (04-12-23 @ 19:55)  A1C with Estimated Average Glucose Result: 9.8 % (01-19-23 @ 07:09)          Diet, Regular (04-30-23 @ 13:22)

## 2023-04-30 NOTE — PROVIDER CONTACT NOTE (OTHER) - ACTION/TREATMENT ORDERED:
A team gave permission to pause fluids while the patient is receiving zosyn
MD made aware. MD came to bedside and assessed patient. MD states to administer PRN Tylenol IV for temperature and reassess temperature post administration.
MD made aware. MD states okay for patient to have 1 IV access for now. Second IV access to be revisited during day, possibly have IV team come to bedside.
MD made aware. MD states to put patient on 2L NC.
provider made aware
[facility].

## 2023-04-30 NOTE — PROGRESS NOTE ADULT - ASSESSMENT
Assessment and Recommendation:   · Assessment	  A/P: 52 yo F with DM2, s/p R lap hemicolectomy w/ileocolic anastomosis (4/17/23), being evaluated for possible leak. Endocrine consulted for DM2 management.    #Type 2 Diabetes Mellitus, uncontrolled  - HbA1c 8.7%; home regimen: lantus 30 units + novolog 14 units with meals + metformin 1 g bid  - Diet now advanced to regular, please include consistent carbohydrate component   - Continue lantus 24 units QHS  - Agree with increase in Admelog to 8 units with meals- HOLD if not eating   - Continue Admelog LOW correctional scales before meals and bedtime   - Please check FSG before meals and QHS, or q6h while NPO  - RD consult - ordered   - hypoglycemia orderset prn  - Discharge planning:   basal-bolus insulin + metformin 1 g bid    Appt already arranged:  Dr. Dorina Cha at [address] [place] [address] on June 7th at 10:20am     #HTN  -BP goal <130/80  -BP is normal w/o any medications  -Outpatient microalb/ cr ratio    #HLD  -Outpatient lipid profile shows LDL in 80s and then 50s  -ASCVD <7.5%  -Consider STATIN if no contraindications for primary prevention, can be assessed outpt     Discussed above with primary team - Dr Gregory Osullivan, COLTON-BC  Nurse Practitioner   Division of Endocrinology  Pager: CHRISTINA pager [phone]    If out of hospital/unavailable when paged, please note: patient will be cared for by another provider on the endocrine service.  Please call the endocrine answering service for assistance to reach covering provider ([phone]). For non-urgent matters, please email [email or web address] for assistance.

## 2023-04-30 NOTE — PROVIDER CONTACT NOTE (OTHER) - BACKGROUND
s/p R lap hemicolectomy w/ ileocolic anastomosis (4/17/2023)
anemia, s/p right lap hemicolectomy, h/o GERD, T2DM, hepatic steatosis, pancreatitis
pt admitted for anemia
pt admitted for anemia
Patient here for anemia
pt admitted for anemia

## 2023-04-30 NOTE — PROGRESS NOTE ADULT - SUBJECTIVE AND OBJECTIVE BOX
Surgery Progress Note     Overnight Events:     Subjective:  Patient seen and examined.       Vital Signs:  Vital Signs Last 24 Hrs  T(C): 36.8 (30 Apr 2023 02:06), Max: 37.2 (29 Apr 2023 14:10)  T(F): 98.3 (30 Apr 2023 02:06), Max: 98.9 (29 Apr 2023 14:10)  HR: 86 (30 Apr 2023 02:06) (67 - 89)  BP: 113/64 (30 Apr 2023 02:06) (93/46 - 116/60)  BP(mean): --  RR: 18 (30 Apr 2023 02:06) (16 - 18)  SpO2: 95% (30 Apr 2023 02:06) (94% - 98%)    Parameters below as of 30 Apr 2023 02:06  Patient On (Oxygen Delivery Method): room air        CAPILLARY BLOOD GLUCOSE      POCT Blood Glucose.: 142 mg/dL (30 Apr 2023 06:16)  POCT Blood Glucose.: 138 mg/dL (29 Apr 2023 22:12)  POCT Blood Glucose.: 223 mg/dL (29 Apr 2023 17:33)  POCT Blood Glucose.: 262 mg/dL (29 Apr 2023 11:58)      I&O's Detail    28 Apr 2023 07:01  -  29 Apr 2023 07:00  --------------------------------------------------------  IN:    IV PiggyBack: 700 mL    Lactated Ringers: 2250 mL    Oral Fluid: 260 mL  Total IN: 3210 mL    OUT:    Voided (mL): 1800 mL  Total OUT: 1800 mL    Total NET: 1410 mL      29 Apr 2023 07:01  -  30 Apr 2023 06:46  --------------------------------------------------------  IN:    dextrose 5% + sodium chloride 0.45% w/ Additives: 600 mL    IV PiggyBack: 100 mL    Lactated Ringers Bolus: 1000 mL    Oral Fluid: 900 mL    PRBCs (Packed Red Blood Cells): 300 mL  Total IN: 2900 mL    OUT:    Voided (mL): 1500 mL  Total OUT: 1500 mL    Total NET: 1400 mL            Physical Exam:  General: alert and orientedx3, NAD  Resp: airway patent, respirations unlabored  CVS: regular rate and rhythm  Abdomen: Soft, TTP RLQ and right lateral abdominal region Nondistended. Incision sites clean, dry with no erythema or induration.  Skin: warm, dry, appropriate color      Labs:    04-29    132<L>  |  98  |  13  ----------------------------<  157<H>  3.3<L>   |  22  |  0.59    Ca    8.5      29 Apr 2023 06:16  Phos  3.0     04-29  Mg     2.00     04-29    TPro  6.3  /  Alb  3.3  /  TBili  4.4<H>  /  DBili  1.5<H>  /  AST  43<H>  /  ALT  16  /  AlkPhos  83  04-29    LIVER FUNCTIONS - ( 29 Apr 2023 06:16 )  Alb: 3.3 g/dL / Pro: 6.3 g/dL / ALK PHOS: 83 U/L / ALT: 16 U/L / AST: 43 U/L / GGT: x                                 7.1    15.15 )-----------( 162      ( 29 Apr 2023 13:17 )             22.0     PT/INR - ( 29 Apr 2023 06:16 )   PT: 13.7 sec;   INR: 1.18 ratio         PTT - ( 29 Apr 2023 06:16 )  PTT:25.9 sec     Surgery Progress Note     Overnight Events: s/p 1u pRBC for Hgb 7.1 (7.3)    Subjective:  Patient seen and examined on AM rounds. Patient had 2 episodes of small vol emesis yesterday, she reports that this was unrelated to clears intake. Denies nausea this AM. Abdominal pain is improved.      Vital Signs:  Vital Signs Last 24 Hrs  T(C): 36.8 (30 Apr 2023 02:06), Max: 37.2 (29 Apr 2023 14:10)  T(F): 98.3 (30 Apr 2023 02:06), Max: 98.9 (29 Apr 2023 14:10)  HR: 86 (30 Apr 2023 02:06) (67 - 89)  BP: 113/64 (30 Apr 2023 02:06) (93/46 - 116/60)  BP(mean): --  RR: 18 (30 Apr 2023 02:06) (16 - 18)  SpO2: 95% (30 Apr 2023 02:06) (94% - 98%)    Parameters below as of 30 Apr 2023 02:06  Patient On (Oxygen Delivery Method): room air        CAPILLARY BLOOD GLUCOSE      POCT Blood Glucose.: 142 mg/dL (30 Apr 2023 06:16)  POCT Blood Glucose.: 138 mg/dL (29 Apr 2023 22:12)  POCT Blood Glucose.: 223 mg/dL (29 Apr 2023 17:33)  POCT Blood Glucose.: 262 mg/dL (29 Apr 2023 11:58)      I&O's Detail    28 Apr 2023 07:01  -  29 Apr 2023 07:00  --------------------------------------------------------  IN:    IV PiggyBack: 700 mL    Lactated Ringers: 2250 mL    Oral Fluid: 260 mL  Total IN: 3210 mL    OUT:    Voided (mL): 1800 mL  Total OUT: 1800 mL    Total NET: 1410 mL      29 Apr 2023 07:01  -  30 Apr 2023 06:46  --------------------------------------------------------  IN:    dextrose 5% + sodium chloride 0.45% w/ Additives: 600 mL    IV PiggyBack: 100 mL    Lactated Ringers Bolus: 1000 mL    Oral Fluid: 900 mL    PRBCs (Packed Red Blood Cells): 300 mL  Total IN: 2900 mL    OUT:    Voided (mL): 1500 mL  Total OUT: 1500 mL    Total NET: 1400 mL            Physical Exam:  General: NAD, resting in bed  Resp: unlabored breathing  CVS: regular rate  Abdomen: soft, nondistended, minimal RLQ tenderness, incisions intact  Skin: warm, dry, appropriate color      Labs:    04-29    132<L>  |  98  |  13  ----------------------------<  157<H>  3.3<L>   |  22  |  0.59    Ca    8.5      29 Apr 2023 06:16  Phos  3.0     04-29  Mg     2.00     04-29    TPro  6.3  /  Alb  3.3  /  TBili  4.4<H>  /  DBili  1.5<H>  /  AST  43<H>  /  ALT  16  /  AlkPhos  83  04-29    LIVER FUNCTIONS - ( 29 Apr 2023 06:16 )  Alb: 3.3 g/dL / Pro: 6.3 g/dL / ALK PHOS: 83 U/L / ALT: 16 U/L / AST: 43 U/L / GGT: x                                 7.1    15.15 )-----------( 162      ( 29 Apr 2023 13:17 )             22.0     PT/INR - ( 29 Apr 2023 06:16 )   PT: 13.7 sec;   INR: 1.18 ratio         PTT - ( 29 Apr 2023 06:16 )  PTT:25.9 sec

## 2023-04-30 NOTE — PROVIDER CONTACT NOTE (OTHER) - REASON
Pt has temp 101.8F oral and chills post blood transfusion
O2 88-90% on RA
pt currently receiving blood transfusion through only access, unable to get another access
/54, HR 62
Maintenance fluids and Zosyn not compatible
Pain 8/10

## 2023-04-30 NOTE — CHART NOTE - NSCHARTNOTEFT_GEN_A_CORE
Labs reviewed. Consistent with hemolytic picture. Recommend starting IVIG at 1 g/kg daily x2.    Tamia Alvarado M.D.  Hematology and Medical Oncology Fellow  Pager: [phone]  For weekends and evenings (5 pm - 8 am), please page Heme/Onc fellow on call. Labs reviewed. Consistent with hemolytic picture. Recommend starting IVIG at 1 g/kg daily x2. Additional steroids would have beeen ideal. However, given recent surgery, will defer using steroids to prevent complications with wound healing. Case d/w Dr. Finch.    Addendum 10:30 Pm:    Called by Surgery team as patient was having an infusion reaction. Per team: vitals sign stable and patient was afebrile. Patient c/o rigors, chills, and jaw pain. Denies shortness of breath, tachypnea. Recommended to give Benadyl 50 mg IV x1, Pepcid 20 mg IV x1, Acetaminophen 1000 mg IV x1, as well as Demerol 12.5 mg IV x1. Started IV NS at 150 cc/hr for 1 Liter and reassess. Okay to restart IVIG infusion, if patient's symptoms have improved. Please restart at a lower rate and titrate up slowly. Please do not hesitate to page/call Hematology fellow on call if patient develops additional reactions.  For subsequent infusion (which is scheduled for tomorrow), please pre-medicate patient with Benadryl 50 mg IV x1 and Acetaminophen 650 mg po x1 30 min before infusion.    Tamia Alvarado M.D.  Hematology and Medical Oncology Fellow  Pager: [phone]  For weekends and evenings (5 pm - 8 am), please page Heme/Onc fellow on call. Labs reviewed. Consistent with hemolytic picture. Recommend starting IVIG at 1 g/kg daily x2. Additional steroids would have been favored. However, given recent surgery, will defer using steroids to prevent complications with wound healing. Case d/w Dr. Finch.    Addendum 10:30 Pm:    Called by Surgery team as patient was having an infusion reaction. Per team: vitals sign stable and patient was afebrile. Patient c/o rigors, chills, and jaw pain. Denies shortness of breath, tachypnea. Recommended to give Benadyl 50 mg IV x1, Pepcid 20 mg IV x1, Acetaminophen 1000 mg IV x1, as well as Demerol 12.5 mg IV x1. Started IV NS at 150 cc/hr for 1 Liter and reassess. Okay to restart IVIG infusion, if patient's symptoms have improved. Please restart at a lower rate and titrate up slowly. Please do not hesitate to page/call Hematology fellow on call if patient develops additional reactions.  For subsequent infusion (which is scheduled for tomorrow), please pre-medicate patient with Benadryl 50 mg IV x1 and Acetaminophen 650 mg po x1 30 min before infusion.    Tamia Alvarado M.D.  Hematology and Medical Oncology Fellow  Pager: [phone]  For weekends and evenings (5 pm - 8 am), please page Heme/Onc fellow on call.

## 2023-04-30 NOTE — PROVIDER CONTACT NOTE (OTHER) - SITUATION
O2 88-90% on RA
Patient is a hard stick with only one IV access, Dextrose 5% + LR with Potassium not compatible with zosyn
/54, HR 62
Pt has temp 101.8F oral and chills post blood transfusion
pt currently receiving blood transfusion through only access, unable to get another access
Pt c/o abdominal pain from previous surgery and lower back pain.

## 2023-04-30 NOTE — PROGRESS NOTE ADULT - ASSESSMENT
53F PMHx DM on insulin c/b gastroparesis and neuropathy presents for 2 days of dehydration/HA/nausea/SOB in the setting of R lap hemicolectomy w/ ileocolic anastomosis (4/17/2023) exacerbated by GLF from bed today w/ head and left-sided trauma.  Patient received left-sided lap hemicolectomy for volvulus at [facility] (Dr. Iker Sen).  Recent fall off bed at home, no LOC. ED workup significant for H/H 6.4/19.2, elevated WBC, elevated T Bili 3.8, IDB 2.8, DB 1.0. Radiographic imaging negative for postsurgical anastomotic leak, or other abnormalities.     Plan:  - monitor H/H  - Appreciate GI recs: PPI  - Appreciate Endo recs: outpatient DM f/u  - Appreciate Heme recs: Hemolysis w/u  - c/w Zosyn; monitor fevers  - CLD  - no pain meds before abdominal exam    A Team Surgery   [id number] 53F PMHx DM on insulin c/b gastroparesis and neuropathy presents for 2 days of dehydration/HA/nausea/SOB in the setting of R lap hemicolectomy w/ ileocolic anastomosis (4/17/2023) exacerbated by GLF from bed today w/ head and left-sided trauma. Patient received left-sided lap hemicolectomy for volvulus at [facility] (Dr. Iker Sen). Recent fall off bed at home, no LOC. ED workup significant for H/H 6.4/19.2, elevated WBC, elevated T Bili 3.8, IDB 2.8, DB 1.0. Radiographic imaging negative for postsurgical anastomotic leak, or other abnormalities.     Plan:  - UCx growing E. faecalis - f/u sensitivities  - f/u AM post-transfusion CBC  - monitor H/H  - Appreciate GI recs: PPI  - Appreciate Endo recs: outpatient DM f/u  - Appreciate Heme recs: Hemolysis w/u  - c/w Zosyn; monitor fevers  - CLD  - no pain meds before abdominal exam  - holding DVT ppx    A Team Surgery   [id number]

## 2023-05-01 ENCOUNTER — TRANSCRIPTION ENCOUNTER (OUTPATIENT)
Age: 53
End: 2023-05-01

## 2023-05-01 DIAGNOSIS — R55 SYNCOPE AND COLLAPSE: ICD-10-CM

## 2023-05-01 DIAGNOSIS — N39.0 URINARY TRACT INFECTION, SITE NOT SPECIFIED: ICD-10-CM

## 2023-05-01 DIAGNOSIS — D58.9 HEREDITARY HEMOLYTIC ANEMIA, UNSPECIFIED: ICD-10-CM

## 2023-05-01 DIAGNOSIS — F41.9 ANXIETY DISORDER, UNSPECIFIED: ICD-10-CM

## 2023-05-01 LAB
ALBUMIN SERPL ELPH-MCNC: 2.9 G/DL — LOW (ref 3.3–5)
ALP SERPL-CCNC: 72 U/L — SIGNIFICANT CHANGE UP (ref 40–120)
ALT FLD-CCNC: 14 U/L — SIGNIFICANT CHANGE UP (ref 4–33)
ANION GAP SERPL CALC-SCNC: 10 MMOL/L — SIGNIFICANT CHANGE UP (ref 7–14)
ANION GAP SERPL CALC-SCNC: 9 MMOL/L — SIGNIFICANT CHANGE UP (ref 7–14)
APTT BLD: 26.2 SEC — LOW (ref 27–36.3)
APTT BLD: 27.9 SEC — SIGNIFICANT CHANGE UP (ref 27–36.3)
AST SERPL-CCNC: 29 U/L — SIGNIFICANT CHANGE UP (ref 4–32)
BASOPHILS # BLD AUTO: 0.05 K/UL — SIGNIFICANT CHANGE UP (ref 0–0.2)
BASOPHILS NFR BLD AUTO: 0.6 % — SIGNIFICANT CHANGE UP (ref 0–2)
BILIRUB SERPL-MCNC: 1.3 MG/DL — HIGH (ref 0.2–1.2)
BUN SERPL-MCNC: 7 MG/DL — SIGNIFICANT CHANGE UP (ref 7–23)
BUN SERPL-MCNC: 8 MG/DL — SIGNIFICANT CHANGE UP (ref 7–23)
CALCIUM SERPL-MCNC: 8.4 MG/DL — SIGNIFICANT CHANGE UP (ref 8.4–10.5)
CALCIUM SERPL-MCNC: 8.7 MG/DL — SIGNIFICANT CHANGE UP (ref 8.4–10.5)
CHLORIDE SERPL-SCNC: 104 MMOL/L — SIGNIFICANT CHANGE UP (ref 98–107)
CHLORIDE SERPL-SCNC: 104 MMOL/L — SIGNIFICANT CHANGE UP (ref 98–107)
CO2 SERPL-SCNC: 22 MMOL/L — SIGNIFICANT CHANGE UP (ref 22–31)
CO2 SERPL-SCNC: 23 MMOL/L — SIGNIFICANT CHANGE UP (ref 22–31)
CREAT SERPL-MCNC: 0.66 MG/DL — SIGNIFICANT CHANGE UP (ref 0.5–1.3)
CREAT SERPL-MCNC: 0.69 MG/DL — SIGNIFICANT CHANGE UP (ref 0.5–1.3)
EGFR: 105 ML/MIN/1.73M2 — SIGNIFICANT CHANGE UP
EGFR: 106 ML/MIN/1.73M2 — SIGNIFICANT CHANGE UP
EOSINOPHIL # BLD AUTO: 0.21 K/UL — SIGNIFICANT CHANGE UP (ref 0–0.5)
EOSINOPHIL NFR BLD AUTO: 2.7 % — SIGNIFICANT CHANGE UP (ref 0–6)
GLUCOSE BLDC GLUCOMTR-MCNC: 147 MG/DL — HIGH (ref 70–99)
GLUCOSE BLDC GLUCOMTR-MCNC: 183 MG/DL — HIGH (ref 70–99)
GLUCOSE BLDC GLUCOMTR-MCNC: 191 MG/DL — HIGH (ref 70–99)
GLUCOSE BLDC GLUCOMTR-MCNC: 205 MG/DL — HIGH (ref 70–99)
GLUCOSE SERPL-MCNC: 149 MG/DL — HIGH (ref 70–99)
GLUCOSE SERPL-MCNC: 165 MG/DL — HIGH (ref 70–99)
HAPTOGLOB SERPL-MCNC: <20 MG/DL — LOW (ref 34–200)
HCT VFR BLD CALC: 24 % — LOW (ref 34.5–45)
HCT VFR BLD CALC: 26.4 % — LOW (ref 34.5–45)
HGB BLD-MCNC: 7.7 G/DL — LOW (ref 11.5–15.5)
HGB BLD-MCNC: 8.1 G/DL — LOW (ref 11.5–15.5)
IANC: 5.52 K/UL — SIGNIFICANT CHANGE UP (ref 1.8–7.4)
IMM GRANULOCYTES NFR BLD AUTO: 2.9 % — HIGH (ref 0–0.9)
INR BLD: 1.15 RATIO — SIGNIFICANT CHANGE UP (ref 0.88–1.16)
INR BLD: 1.2 RATIO — HIGH (ref 0.88–1.16)
LDH SERPL L TO P-CCNC: 787 U/L — HIGH (ref 135–225)
LYMPHOCYTES # BLD AUTO: 1.4 K/UL — SIGNIFICANT CHANGE UP (ref 1–3.3)
LYMPHOCYTES # BLD AUTO: 18.2 % — SIGNIFICANT CHANGE UP (ref 13–44)
MAGNESIUM SERPL-MCNC: 1.9 MG/DL — SIGNIFICANT CHANGE UP (ref 1.6–2.6)
MAGNESIUM SERPL-MCNC: 2.1 MG/DL — SIGNIFICANT CHANGE UP (ref 1.6–2.6)
MCHC RBC-ENTMCNC: 30.7 GM/DL — LOW (ref 32–36)
MCHC RBC-ENTMCNC: 31.4 PG — SIGNIFICANT CHANGE UP (ref 27–34)
MCHC RBC-ENTMCNC: 31.7 PG — SIGNIFICANT CHANGE UP (ref 27–34)
MCHC RBC-ENTMCNC: 32.1 GM/DL — SIGNIFICANT CHANGE UP (ref 32–36)
MCV RBC AUTO: 102.3 FL — HIGH (ref 80–100)
MCV RBC AUTO: 98.8 FL — SIGNIFICANT CHANGE UP (ref 80–100)
MONOCYTES # BLD AUTO: 0.3 K/UL — SIGNIFICANT CHANGE UP (ref 0–0.9)
MONOCYTES NFR BLD AUTO: 3.9 % — SIGNIFICANT CHANGE UP (ref 2–14)
NEUTROPHILS # BLD AUTO: 5.52 K/UL — SIGNIFICANT CHANGE UP (ref 1.8–7.4)
NEUTROPHILS NFR BLD AUTO: 71.7 % — SIGNIFICANT CHANGE UP (ref 43–77)
NRBC # BLD: 0 /100 WBCS — SIGNIFICANT CHANGE UP (ref 0–0)
NRBC # BLD: 1 /100 WBCS — HIGH (ref 0–0)
NRBC # FLD: 0.07 K/UL — HIGH (ref 0–0)
NRBC # FLD: 0.14 K/UL — HIGH (ref 0–0)
PHOSPHATE SERPL-MCNC: 2.6 MG/DL — SIGNIFICANT CHANGE UP (ref 2.5–4.5)
PHOSPHATE SERPL-MCNC: 3.9 MG/DL — SIGNIFICANT CHANGE UP (ref 2.5–4.5)
PLATELET # BLD AUTO: 136 K/UL — LOW (ref 150–400)
PLATELET # BLD AUTO: 142 K/UL — LOW (ref 150–400)
POTASSIUM SERPL-MCNC: 3.8 MMOL/L — SIGNIFICANT CHANGE UP (ref 3.5–5.3)
POTASSIUM SERPL-MCNC: 4 MMOL/L — SIGNIFICANT CHANGE UP (ref 3.5–5.3)
POTASSIUM SERPL-SCNC: 3.8 MMOL/L — SIGNIFICANT CHANGE UP (ref 3.5–5.3)
POTASSIUM SERPL-SCNC: 4 MMOL/L — SIGNIFICANT CHANGE UP (ref 3.5–5.3)
PROT SERPL-MCNC: 7.2 G/DL — SIGNIFICANT CHANGE UP (ref 6–8.3)
PROTHROM AB SERPL-ACNC: 13.4 SEC — SIGNIFICANT CHANGE UP (ref 10.5–13.4)
PROTHROM AB SERPL-ACNC: 13.9 SEC — HIGH (ref 10.5–13.4)
RBC # BLD: 2.43 M/UL — LOW (ref 3.8–5.2)
RBC # BLD: 2.58 M/UL — LOW (ref 3.8–5.2)
RBC # BLD: 2.58 M/UL — LOW (ref 3.8–5.2)
RBC # FLD: 25.4 % — HIGH (ref 10.3–14.5)
RBC # FLD: 27.2 % — HIGH (ref 10.3–14.5)
RETICS #: 416.2 K/UL — HIGH (ref 25–125)
RETICS/RBC NFR: 16.1 % — HIGH (ref 0.5–2.5)
SODIUM SERPL-SCNC: 135 MMOL/L — SIGNIFICANT CHANGE UP (ref 135–145)
SODIUM SERPL-SCNC: 137 MMOL/L — SIGNIFICANT CHANGE UP (ref 135–145)
WBC # BLD: 10.5 K/UL — SIGNIFICANT CHANGE UP (ref 3.8–10.5)
WBC # BLD: 7.7 K/UL — SIGNIFICANT CHANGE UP (ref 3.8–10.5)
WBC # FLD AUTO: 10.5 K/UL — SIGNIFICANT CHANGE UP (ref 3.8–10.5)
WBC # FLD AUTO: 7.7 K/UL — SIGNIFICANT CHANGE UP (ref 3.8–10.5)

## 2023-05-01 PROCEDURE — 99232 SBSQ HOSP IP/OBS MODERATE 35: CPT

## 2023-05-01 PROCEDURE — 99233 SBSQ HOSP IP/OBS HIGH 50: CPT | Mod: GC

## 2023-05-01 RX ORDER — INSULIN GLARGINE 100 [IU]/ML
26 INJECTION, SOLUTION SUBCUTANEOUS AT BEDTIME
Refills: 0 | Status: DISCONTINUED | OUTPATIENT
Start: 2023-05-01 | End: 2023-05-04

## 2023-05-01 RX ORDER — ACETAMINOPHEN 500 MG
1000 TABLET ORAL ONCE
Refills: 0 | Status: COMPLETED | OUTPATIENT
Start: 2023-05-01 | End: 2023-05-01

## 2023-05-01 RX ORDER — PANTOPRAZOLE SODIUM 20 MG/1
40 TABLET, DELAYED RELEASE ORAL
Refills: 0 | Status: DISCONTINUED | OUTPATIENT
Start: 2023-05-01 | End: 2023-05-06

## 2023-05-01 RX ORDER — PANTOPRAZOLE SODIUM 20 MG/1
40 TABLET, DELAYED RELEASE ORAL
Refills: 0 | Status: DISCONTINUED | OUTPATIENT
Start: 2023-05-01 | End: 2023-05-01

## 2023-05-01 RX ORDER — AMPICILLIN SODIUM AND SULBACTAM SODIUM 250; 125 MG/ML; MG/ML
3 INJECTION, POWDER, FOR SUSPENSION INTRAMUSCULAR; INTRAVENOUS EVERY 6 HOURS
Refills: 0 | Status: DISCONTINUED | OUTPATIENT
Start: 2023-05-01 | End: 2023-05-03

## 2023-05-01 RX ORDER — DIPHENHYDRAMINE HCL 50 MG
50 CAPSULE ORAL ONCE
Refills: 0 | Status: DISCONTINUED | OUTPATIENT
Start: 2023-05-01 | End: 2023-05-01

## 2023-05-01 RX ORDER — NYSTATIN CREAM 100000 [USP'U]/G
1 CREAM TOPICAL ONCE
Refills: 0 | Status: COMPLETED | OUTPATIENT
Start: 2023-05-01 | End: 2023-05-01

## 2023-05-01 RX ORDER — INSULIN LISPRO 100/ML
9 VIAL (ML) SUBCUTANEOUS
Refills: 0 | Status: DISCONTINUED | OUTPATIENT
Start: 2023-05-01 | End: 2023-05-02

## 2023-05-01 RX ORDER — ACETAMINOPHEN 500 MG
1000 TABLET ORAL EVERY 6 HOURS
Refills: 0 | Status: DISCONTINUED | OUTPATIENT
Start: 2023-05-01 | End: 2023-05-01

## 2023-05-01 RX ORDER — DIPHENHYDRAMINE HCL 50 MG
50 CAPSULE ORAL ONCE
Refills: 0 | Status: COMPLETED | OUTPATIENT
Start: 2023-05-01 | End: 2023-05-01

## 2023-05-01 RX ADMIN — Medication 9 UNIT(S): at 17:54

## 2023-05-01 RX ADMIN — GABAPENTIN 300 MILLIGRAM(S): 400 CAPSULE ORAL at 17:53

## 2023-05-01 RX ADMIN — Medication 8 UNIT(S): at 08:49

## 2023-05-01 RX ADMIN — PANTOPRAZOLE SODIUM 40 MILLIGRAM(S): 20 TABLET, DELAYED RELEASE ORAL at 05:24

## 2023-05-01 RX ADMIN — SERTRALINE 50 MILLIGRAM(S): 25 TABLET, FILM COATED ORAL at 11:13

## 2023-05-01 RX ADMIN — Medication 1: at 08:50

## 2023-05-01 RX ADMIN — Medication 400 MILLIGRAM(S): at 19:07

## 2023-05-01 RX ADMIN — Medication 50 MILLIGRAM(S): at 03:59

## 2023-05-01 RX ADMIN — Medication 1 MILLIGRAM(S): at 10:40

## 2023-05-01 RX ADMIN — IMMUNE GLOBULIN (HUMAN) 108.33 GRAM(S): 10 INJECTION INTRAVENOUS; SUBCUTANEOUS at 19:57

## 2023-05-01 RX ADMIN — NYSTATIN CREAM 1 APPLICATION(S): 100000 CREAM TOPICAL at 16:49

## 2023-05-01 RX ADMIN — AMPICILLIN SODIUM AND SULBACTAM SODIUM 200 GRAM(S): 250; 125 INJECTION, POWDER, FOR SUSPENSION INTRAMUSCULAR; INTRAVENOUS at 17:52

## 2023-05-01 RX ADMIN — PIPERACILLIN AND TAZOBACTAM 25 GRAM(S): 4; .5 INJECTION, POWDER, LYOPHILIZED, FOR SOLUTION INTRAVENOUS at 05:25

## 2023-05-01 RX ADMIN — Medication 8 UNIT(S): at 12:36

## 2023-05-01 RX ADMIN — Medication 1 MILLIGRAM(S): at 11:13

## 2023-05-01 RX ADMIN — Medication 2: at 12:37

## 2023-05-01 RX ADMIN — AMPICILLIN SODIUM AND SULBACTAM SODIUM 200 GRAM(S): 250; 125 INJECTION, POWDER, FOR SUSPENSION INTRAMUSCULAR; INTRAVENOUS at 11:13

## 2023-05-01 RX ADMIN — Medication 100 MILLIGRAM(S): at 22:35

## 2023-05-01 RX ADMIN — GABAPENTIN 300 MILLIGRAM(S): 400 CAPSULE ORAL at 05:24

## 2023-05-01 RX ADMIN — Medication 1000 MILLIGRAM(S): at 19:37

## 2023-05-01 RX ADMIN — Medication 50 MILLIGRAM(S): at 19:07

## 2023-05-01 RX ADMIN — INSULIN GLARGINE 26 UNIT(S): 100 INJECTION, SOLUTION SUBCUTANEOUS at 22:35

## 2023-05-01 NOTE — DIETITIAN INITIAL EVALUATION ADULT - PERTINENT MEDS FT
MEDICATIONS  (STANDING):  acetaminophen   IVPB .. 1000 milliGRAM(s) IV Intermittent once  ampicillin/sulbactam  IVPB 3 Gram(s) IV Intermittent every 6 hours  diphenhydrAMINE Injectable 50 milliGRAM(s) IV Push once  folic acid 1 milliGRAM(s) Oral daily  gabapentin 300 milliGRAM(s) Oral two times a day  glucagon  Injectable 1 milliGRAM(s) IntraMuscular once  immune   globulin 10% (GAMMAGARD) IVPB 65 Gram(s) IV Intermittent daily  insulin glargine Injectable (LANTUS) 24 Unit(s) SubCutaneous at bedtime  insulin lispro (ADMELOG) corrective regimen sliding scale   SubCutaneous three times a day before meals  insulin lispro (ADMELOG) corrective regimen sliding scale   SubCutaneous at bedtime  insulin lispro Injectable (ADMELOG) 8 Unit(s) SubCutaneous three times a day before meals  nystatin Cream 1 Application(s) Topical once  pantoprazole  Injectable 40 milliGRAM(s) IV Push every 12 hours  sertraline 50 milliGRAM(s) Oral daily  traZODone 100 milliGRAM(s) Oral at bedtime    MEDICATIONS  (PRN):  clonazePAM  Tablet 1 milliGRAM(s) Oral three times a day PRN for anxiety

## 2023-05-01 NOTE — CONSULT NOTE ADULT - ASSESSMENT
53F PMHx DM on insulin c/b gastroparesis and neuropathy presents for 2 days of dehydration/HA/nausea/SOB in the setting of R lap hemicolectomy w/ ileocolic anastomosis (4/17/2023) exacerbated by GLF from bed today w/ head and left-sided trauma.  Patient received left-sided lap hemicolectomy for volvulus at [facility] (Dr. Iker Sen).  She reports coughing and diarrhea since surgery as was expected, but reports dehydration, throbbing headaches refractory to Tylenol (last dose 4/27 0500), SOB, and diffuse abdominal pain over the past 2 days. She passed out .  She hit her head and left side after falling out of bed (~3 feet) which worsened her headache and nauseousness.  She now complains of left shoulder and left hip pain, and was unable to stand up without assistant from EMS. She received steroid injections in her left shoulder that was recently stopped due to hypoglycemia.  She denies any recent alcohol use, previous smoking history, or other illicit substances.

## 2023-05-01 NOTE — DISCHARGE NOTE PROVIDER - CARE PROVIDER_API CALL
Iker Sen (DO)  Surgery  [address] [facility] [address]  [place] [address]  Phone: [phone]  Fax: [phone]  Follow Up Time: 2 weeks   Iker Sen (DO)  Surgery  [address] [facility] [address]  [place] [address]  Phone: [phone]  Fax: [phone]  Follow Up Time: 2 weeks    Marian Ficnh)  Hematology; Medical Oncology  [address] [place] [address]  Phone: [phone]  Fax: [phone]  Follow Up Time:

## 2023-05-01 NOTE — DIETITIAN INITIAL EVALUATION ADULT - PERTINENT LABORATORY DATA
05-01    135  |  104  |  8   ----------------------------<  165<H>  4.0   |  22  |  0.66    Ca    8.7      01 May 2023 06:55  Phos  3.9     05-01  Mg     2.10     05-01    TPro  7.2  /  Alb  2.9<L>  /  TBili  1.3<H>  /  DBili  x   /  AST  29  /  ALT  14  /  AlkPhos  72  05-01  POCT Blood Glucose.: 205 mg/dL (05-01-23 @ 12:34)  A1C with Estimated Average Glucose Result: 8.7 % (04-12-23 @ 19:55)  A1C with Estimated Average Glucose Result: 9.8 % (01-19-23 @ 07:09)

## 2023-05-01 NOTE — ED ADULT NURSE NOTE - NS TRANSFER PATIENT BELONGINGS
Clothing Bexarotene Pregnancy And Lactation Text: This medication is Pregnancy Category X and should not be given to women who are pregnant or may become pregnant. This medication should not be used if you are breast feeding.

## 2023-05-01 NOTE — DISCHARGE NOTE PROVIDER - HOSPITAL COURSE
53F PMHx DM on insulin c/b gastroparesis and neuropathy presents for 2 days of dehydration/HA/nausea/SOB in the setting of R lap hemicolectomy w/ ileocolic anastomosis (4/17/2023) exacerbated by GLF from bed today w/ head and left-sided trauma.  Patient received left-sided lap hemicolectomy for volvulus at [facility] (Dr. Iker Sen).  She reports coughing and diarrhea since surgery as was expected, but reports dehydration, throbbing headaches refractory to Tylenol (last dose 4/27 0500), SOB, and diffuse abdominal pain over the past 2 days.  She hit her head and left side after falling out of bed (~3 feet) which worsened her headache and nauseousness.  She now complains of left shoulder and left hip pain, and was unable to stand up without assistant from EMS. She received steroid injections in her left shoulder that was recently stopped due to hypoglycemia.  She denies any recent alcohol use, previous smoking history, or other illicit substances.  She has a history of cholecystectomy and sepsis requiring right thigh surgery.  ED workup significant for H/H 6.4/19.2, elevated WBC, elevated T Bili 3.8, IDB 2.8, DB 1.0. Radiographic imaging negative for postsurgical anastomotic leak, or other abnormalities.     Patient admitted to colorectal surgery under the care of Dr Sen    4/28 GI consulted for suspected GI bleed. Given elevated T. Klyer with elevated PTT/INR would be concern for ongoing hemolysis as etiology of acute anemia.  Hematology consulted for anemia and indirect hyperbilirubinemia. Consistent with hemolytic picture. Recommend starting IVIG at 1 g/kg daily x2. Patient had mild transfusion reaction including rigors, chills, jaw pain. Per hem, was recommended to give pepcid, tylenol, benadryl, demerol, and start IVF.    Diet was restarted and advanced as tolerated. Pain control was transitioned from IV to PO pain meds. At this time, patient is currently ambulating, voiding, tolerating a regular diet. Pain well controlled on PO pain meds. Patient has been deemed stable for discharge home with follow up as an outpatient.    53F PMHx DM on insulin c/b gastroparesis and neuropathy presents for 2 days of dehydration/HA/nausea/SOB in the setting of R lap hemicolectomy w/ ileocolic anastomosis (4/17/2023) exacerbated by GLF from bed today w/ head and left-sided trauma.  Patient received left-sided lap hemicolectomy for volvulus at [facility] (Dr. Iker Sen).  She reports coughing and diarrhea since surgery as was expected, but reports dehydration, throbbing headaches refractory to Tylenol (last dose 4/27 0500), SOB, and diffuse abdominal pain over the past 2 days.  She hit her head and left side after falling out of bed (~3 feet) which worsened her headache and nauseousness.  She now complains of left shoulder and left hip pain, and was unable to stand up without assistant from EMS. She received steroid injections in her left shoulder that was recently stopped due to hypoglycemia.  She denies any recent alcohol use, previous smoking history, or other illicit substances.  She has a history of cholecystectomy and sepsis requiring right thigh surgery.  ED workup significant for H/H 6.4/19.2, elevated WBC, elevated T Bili 3.8, IDB 2.8, DB 1.0. Radiographic imaging negative for postsurgical anastomotic leak, or other abnormalities.     Patient admitted to colorectal surgery under the care of Dr Sen    4/28 GI consulted for suspected GI bleed. Given elevated T. Kyler with elevated PTT/INR would be concern for ongoing hemolysis as etiology of acute anemia.  Hematology consulted for anemia and indirect hyperbilirubinemia. Consistent with hemolytic picture. Recommend starting IVIG at 1 g/kg daily x2. Patient had mild transfusion reaction including rigors, chills, jaw pain. Per hem, was recommended to give pepcid, tylenol, benadryl, demerol, and start IVF.  4/30 Presents with suspected IVIG infusion reaction including rigors, chills, jaw pain. Hem recommended Benadryl, Pepcid, Tylenol, Demerol and IVF  5/1 Internal medicine consulted for comanagement of comorbidities  5/2 Neurology and ID consulted for concern for aseptic meningitis. Patient presents with headache, nausea, vomiting, photophobia, neck pain, and fevers.  5/3 MRI head with and without IV contrast demonstrates No hydrocephalus, mass effect, acute intracranial hemorrhage, vasogenic edema, or acute territorial infarct. No abnormal parenchymal or leptomeningeal enhancement. MR venogram with IV contrast demonstrates No evidence of dural sinus thrombosis.  *** up to date until 5/3    Diet was restarted and advanced as tolerated. Pain control was transitioned from IV to PO pain meds. At this time, patient is currently ambulating, voiding, tolerating a regular diet. Pain well controlled on PO pain meds. Patient has been deemed stable for discharge home with follow up as an outpatient.    53F PMHx DM on insulin c/b gastroparesis and neuropathy presents for 2 days of dehydration/HA/nausea/SOB in the setting of R lap hemicolectomy w/ ileocolic anastomosis (4/17/2023) exacerbated by GLF from bed today w/ head and left-sided trauma.  Patient received left-sided lap hemicolectomy for volvulus at [facility] (Dr. Iker Sen).  She reports coughing and diarrhea since surgery as was expected, but reports dehydration, throbbing headaches refractory to Tylenol (last dose 4/27 0500), SOB, and diffuse abdominal pain over the past 2 days.  She hit her head and left side after falling out of bed (~3 feet) which worsened her headache and nauseousness.  She now complains of left shoulder and left hip pain, and was unable to stand up without assistant from EMS. She received steroid injections in her left shoulder that was recently stopped due to hypoglycemia.  She denies any recent alcohol use, previous smoking history, or other illicit substances.  She has a history of cholecystectomy and sepsis requiring right thigh surgery.  ED workup significant for H/H 6.4/19.2, elevated WBC, elevated T Bili 3.8, IDB 2.8, DB 1.0. Radiographic imaging negative for postsurgical anastomotic leak, or other abnormalities.     Patient admitted to colorectal surgery under the care of Dr Sen. Transferred to medicine 05/04.    4/28 GI consulted for suspected GI bleed. Given elevated T. Kyler with elevated PTT/INR would be concern for ongoing hemolysis as etiology of acute anemia.  Hematology consulted for anemia and indirect hyperbilirubinemia. Consistent with hemolytic picture. Recommend starting IVIG at 1 g/kg daily x2. Patient had mild transfusion reaction including rigors, chills, jaw pain. Per hem, was recommended to give pepcid, tylenol, benadryl, demerol, and start IVF.  4/30 Presents with suspected IVIG infusion reaction including rigors, chills, jaw pain. Hem recommended Benadryl, Pepcid, Tylenol, Demerol and IVF  5/1 Internal medicine consulted for comanagement of comorbidities  5/2 Neurology and ID consulted for concern for aseptic meningitis. Patient presents with headache, nausea, vomiting, photophobia, neck pain, and fevers.  5/3 MRI head with and without IV contrast demonstrates No hydrocephalus, mass effect, acute intracranial hemorrhage, vasogenic edema, or acute territorial infarct. No abnormal parenchymal or leptomeningeal enhancement. MR venogram with IV contrast demonstrates No evidence of dural sinus thrombosis.  5/4 s/p LP. CSF signficant for WBC. Continue IV abx   5/5 per ID dc antibiotics. Heme and neurology cleared for discharge       Diet was restarted and advanced as tolerated. Pain control was transitioned from IV to PO pain meds. At this time, patient is currently ambulating, voiding, tolerating a regular diet. Pain well controlled on PO pain meds. Patient has been deemed stable for discharge home with follow up as an outpatient.    53F PMHx DM on insulin c/b gastroparesis and neuropathy presents for 2 days of dehydration/HA/nausea/SOB in the setting of R lap hemicolectomy w/ ileocolic anastomosis (4/17/2023) exacerbated by GLF from bed today w/ head and left-sided trauma.  Patient received left-sided lap hemicolectomy for volvulus at [facility] (Dr. Iker Sen).  She reports coughing and diarrhea since surgery as was expected, but reports dehydration, throbbing headaches refractory to Tylenol (last dose 4/27 0500), SOB, and diffuse abdominal pain over the past 2 days.  She hit her head and left side after falling out of bed (~3 feet) which worsened her headache and nauseousness.  She now complains of left shoulder and left hip pain, and was unable to stand up without assistant from EMS. She received steroid injections in her left shoulder that was recently stopped due to hypoglycemia.  She denies any recent alcohol use, previous smoking history, or other illicit substances.  She has a history of cholecystectomy and sepsis requiring right thigh surgery.  ED workup significant for H/H 6.4/19.2, elevated WBC, elevated T Bili 3.8, IDB 2.8, DB 1.0. Radiographic imaging negative for postsurgical anastomotic leak, or other abnormalities.     Patient admitted to colorectal surgery under the care of Dr Sen. Transferred to medicine 05/04.    4/28 GI consulted for suspected GI bleed. Given elevated T. Kyler with elevated PTT/INR would be concern for ongoing hemolysis as etiology of acute anemia.  Hematology consulted for anemia and indirect hyperbilirubinemia. Consistent with hemolytic picture. Recommend starting IVIG at 1 g/kg daily x2. Patient had mild transfusion reaction including rigors, chills, jaw pain. Per hem, was recommended to give pepcid, tylenol, benadryl, demerol, and start IVF.  4/30 Presents with suspected IVIG infusion reaction including rigors, chills, jaw pain. Hem recommended Benadryl, Pepcid, Tylenol, Demerol and IVF  5/1 Internal medicine consulted for comanagement of comorbidities  5/2 Neurology and ID consulted for concern for aseptic meningitis. Patient presents with headache, nausea, vomiting, photophobia, neck pain, and fevers.  5/3 MRI head with and without IV contrast demonstrates No hydrocephalus, mass effect, acute intracranial hemorrhage, vasogenic edema, or acute territorial infarct. No abnormal parenchymal or leptomeningeal enhancement. MR venogram with IV contrast demonstrates No evidence of dural sinus thrombosis.  5/4 s/p LP. CSF signficant for WBC. Continue IV abx   5/5 per ID dc antibiotics. Heme and neurology cleared for discharge     Diet was restarted and advanced as tolerated. Pain control was transitioned from IV to PO pain meds. At this time, patient is currently ambulating, voiding, tolerating a regular diet. Pain well controlled on PO pain meds. Patient has been deemed stable for discharge home with follow up as an outpatient per Dr. Ribeiro on 5/6/2023.

## 2023-05-01 NOTE — DISCHARGE NOTE PROVIDER - PROVIDER TOKENS
PROVIDER:[TOKEN:[[id number]],FOLLOWUP:[2 weeks]] PROVIDER:[TOKEN:[[id number]],FOLLOWUP:[2 weeks]],PROVIDER:[TOKEN:[[id number]]]

## 2023-05-01 NOTE — DIETITIAN INITIAL EVALUATION ADULT - WEIGHT USED FOR CALCULATIONS
current weight Alert and oriented to person, place and time, memory intact, behavior appropriate to situation, PERRL.

## 2023-05-01 NOTE — PROGRESS NOTE ADULT - ASSESSMENT
A/P: 52 yo F with DM2, s/p R lap hemicolectomy w/ileocolic anastomosis (4/17/23), being evaluated for possible leak. Endocrine consulted for DM2 management.    #Type 2 Diabetes Mellitus, uncontrolled  - HbA1c 8.7%; home regimen: lantus 30 units + novolog 14 units with meals + metformin 1 g bid  - Glucose Target 100-180 mg/dl: slightly above goal   - Increase Lantus to 26 units QHS  - Increase Admelog to 9 units with meals- HOLD if not eating   - Continue Admelog LOW correctional scales before meals and bedtime   - Please check FSG before meals and QHS, or q6h while NPO  - RD consult - ordered   - hypoglycemia orderset prn  - Discharge planning:   basal-bolus insulin + metformin 1 g bid with meals   Ensure patient has working glucometer, test strips, lancets, alcohol pads, and BD renetta pen needles  Please also prescribe glucose tabs, Baqsimi nasal spray or glucagon emergency kit for hypoglycemia risk   Appt already arranged:Dr. Dorina Cha at [address] [place] [address] [address]; [place] [address] on June 7th at 10:20am  Please follow up with pcp, opthalmology, podiatry, and endocrinology as an outpt    #HTN  -BP goal <130/80  -BP is normal w/o any medications  -Outpatient microalb/ cr ratio    #HLD  -Outpatient lipid profile shows LDL in 80s and then 50s  -ASCVD <7.5%  -Consider STATIN if no contraindications for primary prevention, can be assessed outpt     Halie Frederick  Nurse Practitioner  Division of Endocrinology & Diabetes  In house pager #[phone]    If before 9AM or after 6PM, or on weekends/holidays, please call endocrine answering service for assistance ([phone]).For nonurgent matters email [email or web address] for assistance.

## 2023-05-01 NOTE — PROVIDER CONTACT NOTE (MEDICATION) - BACKGROUND
anemia, GERD, s/p right lap hemicolectomy
anemia, h/o GERD, T2DM
pt admitted for anemia
pt admitted for anemia
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
## 2023-05-01 NOTE — DIETITIAN INITIAL EVALUATION ADULT - ORAL INTAKE PTA/DIET HISTORY
Patient reports good appetite and PO intake PTA. Hx of gastroparesis and have made changes to diet by avoiding concentrated sweets, high fat foods and limited sources of carbohydrates and high fiber food. This was advised to her by MD. Allergies to eggplant.

## 2023-05-01 NOTE — DIETITIAN INITIAL EVALUATION ADULT - OTHER INFO
Patient reports feeling better, consuming good po intake in-house. Patient denies any nausea/vomiting/diarrhea/constipation or difficulty chewing and swallowing. +BM 4/30. Reviewed gastroparesis and DM diet with patient. RD provided the patient with extensive verbal and written DM diet education; including, sources of carb, DM Myplate, importance of having well balanced meals, protein at each meal, better meal planning and having small frequent meals as tolerated give history of gastroparesis. All nutrition related questions and concerns answered to patient satisfaction.

## 2023-05-01 NOTE — DIETITIAN INITIAL EVALUATION ADULT - REASON FOR ADMISSION
Anemia    Per chart review, 54y/o F with medical history of DM (HbA1c 8.7% 4/12) on insulin c/b gastroparesis and neuropathy presents for 2 days of dehydration/HA/nausea/SOB in the setting of R lap hemicolectomy w/ ileocolic anastomosis (4/17/2023) consulted for hyperbilirubinemia with elevated indirect bilirubin, and anemia.

## 2023-05-01 NOTE — PROGRESS NOTE ADULT - SUBJECTIVE AND OBJECTIVE BOX
A Team (General Surgery) Daily Progress Note    SUBJECTIVE:  Pt seen and examined, and is resting comfortably in bed. Pain is adequately controlled on current regimen. Patient has no complaints at this time. Pt reports abdominal pain has resolved. Pt reports tolerating diet.     Interval/Overnight Events:  - Transfusion Reaction following IVIG transfusion with rigors/chills  - Heme consulted overnight, stat labs drawn  - Benadryl/Pepcid/Tylenol/Demerol given  - VSS, afebrile overnight  - No acute events following initial transfusion reaction. Remainder of transfusion well tolerated.  - f/u AM labs    OBJECTIVE:  Vital Signs Last 24 Hrs  T(C): 37 (01 May 2023 06:00), Max: 37.4 (30 Apr 2023 23:53)  T(F): 98.6 (01 May 2023 06:00), Max: 99.3 (30 Apr 2023 23:53)  HR: 57 (01 May 2023 06:00) (56 - 68)  BP: 108/62 (01 May 2023 06:00) (98/61 - 111/66)  BP(mean): --  RR: 18 (01 May 2023 06:00) (17 - 20)  SpO2: 95% (01 May 2023 06:00) (93% - 100%)    Parameters below as of 01 May 2023 06:00  Patient On (Oxygen Delivery Method): nasal cannula  O2 Flow (L/min): 2      I&O's Detail    30 Apr 2023 07:01  -  01 May 2023 07:00  --------------------------------------------------------  IN:    IV PiggyBack: 650 mL    Oral Fluid: 1440 mL  Total IN: 2090 mL    OUT:    dextrose 5% + lactated ringers w/ Additives: 0 mL    Voided (mL): 1875 mL  Total OUT: 1875 mL    Total NET: 215 mL        Exam:  General: NAD, resting in bed  Resp: unlabored breathing  CVS: regular rate  Abdomen: soft, nondistended, minimal RLQ tenderness, incisions intact  Skin: warm, dry, appropriate color                        8.1    7.70  )-----------( 136      ( 01 May 2023 06:55 )             26.4       04-30    137  |  104  |  7   ----------------------------<  149<H>  3.8   |  23  |  0.69    Ca    8.4      30 Apr 2023 23:11  Phos  2.6     04-30  Mg     1.90     04-30    TPro  6.1  /  Alb  3.2<L>  /  TBili  2.2<H>  /  DBili  x   /  AST  35<H>  /  ALT  13  /  AlkPhos  79  04-30      PT/INR - ( 01 May 2023 06:55 )   PT: 13.4 sec;   INR: 1.15 ratio         PTT - ( 01 May 2023 06:55 )  PTT:27.9 sec    ASSESSMENT:  53F PMHx DM on insulin c/b gastroparesis and neuropathy presents for 2 days of dehydration/HA/nausea/SOB in the setting of R lap hemicolectomy w/ ileocolic anastomosis (4/17/2023) exacerbated by GLF from bed today w/ head and left-sided trauma. Patient received left-sided lap hemicolectomy for volvulus at [facility] (Dr. Iker Sen). Recent fall off bed at home, no LOC. ED workup significant for H/H 6.4/19.2, elevated WBC, elevated T Bili 3.8, IDB 2.8, DB 1.0. Radiographic imaging negative for postsurgical anastomotic leak, or other abnormalities.    PLAN:  - UCx growing E. faecalis - f/u sensitivities  - f/u AM CBC  - monitor H/H  - Appreciate GI recs: PPI  - Appreciate Endo recs: outpatient DM f/u  - Appreciate Heme recs: Hemolysis w/u  - monitor fevers, change abx to unasyn (1 week total)  - LRD diet  - premedicate with benadryl IVP/IV Tylenol 30 min prior to initiation of IVIG transfusion 5/1.  - no pain meds before abdominal exam  - holding DVT ppx        A Team Surgery  pager: [phone]

## 2023-05-01 NOTE — DISCHARGE NOTE PROVIDER - NSDCCPCAREPLAN_GEN_ALL_CORE_FT
PRINCIPAL DISCHARGE DIAGNOSIS  Diagnosis: Anemia  Assessment and Plan of Treatment:       SECONDARY DISCHARGE DIAGNOSES  Diagnosis: Sepsis  Assessment and Plan of Treatment:     Diagnosis: Acute UTI  Assessment and Plan of Treatment:      PRINCIPAL DISCHARGE DIAGNOSIS  Diagnosis: Anemia  Assessment and Plan of Treatment: You were admitted in the hospital for anemia. Follow up with hematology outpatient upon discharge.      SECONDARY DISCHARGE DIAGNOSES  Diagnosis: Sepsis  Assessment and Plan of Treatment: You completed IV antibiotics during your inpatient stay.     PRINCIPAL DISCHARGE DIAGNOSIS  Diagnosis: Anemia  Assessment and Plan of Treatment: You were admitted in the hospital for anemia. Follow up with hematology outpatient upon discharge.      SECONDARY DISCHARGE DIAGNOSES  Diagnosis: Type 2 diabetes mellitus  Assessment and Plan of Treatment: Take diabetic medications as instructed. Follow up with your endocrinologist outpatient.    Diagnosis: Sepsis  Assessment and Plan of Treatment: You completed IV antibiotics during your inpatient stay.

## 2023-05-01 NOTE — PROVIDER CONTACT NOTE (MEDICATION) - REASON
due for IVIG at 12pm
pt experiencing skin reaction after Vancomycin administration
patient is due for IG, Zoysn is infusing. patient only have one IV access, unable to insert an IV access after multiple try
pt currently receiving blood transfusion through only access, unable to get another access, anable to administer Zosyn IV and IV fluids

## 2023-05-01 NOTE — PROVIDER CONTACT NOTE (MEDICATION) - ASSESSMENT
A&O4, pt experiencing skin reaction after Vancomycin administration, skin raised and yellow in color spreading from IV site to lower arm. Vancomycin currently paused.
due for IVIG at 12pm, last does started around 19:30 last night
A&O4, pt currently receiving blood transfusion through only access, unable to get another access, anable to administer Zosyn IV and IV fluids
patient is due for IG, Zoysn is infusing. patient only have one IV access, unable to insert an IV access after multiple try

## 2023-05-01 NOTE — DIETITIAN INITIAL EVALUATION ADULT - COLLABORATION WITH OTHER PROVIDERS
1. Suggest outpatient follow up with an endocrinologist to ensure long-term DM diet comprehension and compliance.   2. Suggest outpatient follow up with appropriate RD for the purposes of long-term nutrition evaluation and diet education.

## 2023-05-01 NOTE — DISCHARGE NOTE PROVIDER - NSDCMRMEDTOKEN_GEN_ALL_CORE_FT
acetaminophen 500 mg oral tablet: 2 tab(s) orally every 6 hours As needed Temp greater or equal to 38C (100.4F), Mild Pain (1 - 3)  clonazePAM 1 mg oral tablet: 1 tablet with sensor orally 3 times a day as needed for  anxiety  folic acid 1 mg oral tablet: 1 tab(s) orally once a day  gabapentin: 300 milligram(s) orally 2 times a day  ibuprofen 400 mg oral tablet: 1 tab(s) orally every 6 hours  insulin aspart 100 units/mL injectable solution: injectable  Insulin Aspart FlexPen 100 units/mL injectable solution: injectable 3 times a day (with meals) sliding scale 10 units to 14 units  Lantus 100 units/mL subcutaneous solution: 30 unit(s) subcutaneous once a day (at bedtime)  magnesium: 1 tab oral daily  metFORMIN 1000 mg oral tablet: 1 tab(s) orally 2 times a day  Multiple Vitamins oral tablet: 1 tab(s) orally once a day  pantoprazole 40 mg oral delayed release tablet: 1 tab(s) orally once a day   sertraline 50 mg oral tablet: 1 tab(s) orally once a day  thiamine 100 mg oral tablet: 1 tab(s) orally once a day  tiZANidine 4 mg oral tablet: 1 tab(s) orally once a day (at bedtime) as needed for  muscle spasm  traZODone 100 mg oral tablet: 1 tab(s) orally once a day (at bedtime)  Vitamin D3: 1 tab(s) orally once a day   clonazePAM 1 mg oral tablet: 1 tablet with sensor orally 3 times a day as needed for  anxiety  folic acid 1 mg oral tablet: 1 tab(s) orally once a day  gabapentin 300 mg oral capsule: 1 cap(s) orally 3 times a day  insulin lispro 100 units/mL injectable solution: 6 unit(s) injectable 3 times a day (before meals)  Lantus 100 units/mL subcutaneous solution: 30 unit(s) subcutaneous once a day (at bedtime)  metFORMIN 1000 mg oral tablet: 1 tab(s) orally 2 times a day  Multiple Vitamins oral tablet: 1 tab(s) orally once a day  pantoprazole 40 mg oral delayed release tablet: 1 tab(s) orally once a day   sertraline 50 mg oral tablet: 1 tab(s) orally once a day  thiamine 100 mg oral tablet: 1 tab(s) orally once a day  traZODone 100 mg oral tablet: 1 tab(s) orally once a day (at bedtime)  Vitamin D3: 1 tab(s) orally once a day   clonazePAM 1 mg oral tablet: 1 tablet with sensor orally 3 times a day as needed for  anxiety  folic acid 1 mg oral tablet: 1 tab(s) orally once a day  gabapentin 300 mg oral capsule: 1 cap(s) orally 3 times a day  insulin lispro 100 units/mL injectable solution: 6 unit(s) injectable 3 times a day (before meals)  Lantus 100 units/mL subcutaneous solution: 30 unit(s) subcutaneous once a day (at bedtime)  metFORMIN 1000 mg oral tablet: 1 tab(s) orally 2 times a day  Multiple Vitamins oral tablet: 1 tab(s) orally once a day  oxycodone-acetaminophen 5 mg-300 mg oral tablet: 1 tab(s) orally every 6 hours as needed for  severe pain MDD: 4 tablets  pantoprazole 40 mg oral delayed release tablet: 1 tab(s) orally once a day   sertraline 50 mg oral tablet: 1 tab(s) orally once a day  thiamine 100 mg oral tablet: 1 tab(s) orally once a day  traZODone 100 mg oral tablet: 1 tab(s) orally once a day (at bedtime)  Vitamin D3: 1 tab(s) orally once a day

## 2023-05-01 NOTE — PROVIDER CONTACT NOTE (MEDICATION) - SITUATION
pt currently receiving blood transfusion through only access, unable to get another access, anable to administer Zosyn IV and IV fluids
due for IVIG at 12pm
patient is due for IG, Zoysn is infusing. patient only have one IV access, unable to insert an IV access after multiple try
pt experiencing skin reaction after Vancomycin administration

## 2023-05-01 NOTE — DISCHARGE NOTE PROVIDER - NSDCACTIVITY_GEN_ALL_CORE
Walking - Indoors allowed/Walking - Outdoors allowed/Follow Instructions Provided by your Surgical Team Walking - Indoors allowed/Walking - Outdoors allowed

## 2023-05-01 NOTE — DIETITIAN INITIAL EVALUATION ADULT - NS FNS DIET ORDER
Diet, Regular:   Consistent Carbohydrate {Evening Snack} (CSTCHOSN)  Fiber/Residue Restricted (LOWFIBER) (04-30-23 @ 17:07)

## 2023-05-01 NOTE — PROGRESS NOTE ADULT - SUBJECTIVE AND OBJECTIVE BOX
Chief Complaint: Type 2 Diabetes Mellitus, uncontrolled    History: Pt seen at bedside. Pt tolerating oral diet. Pt denies nausea and vomiting/any signs of hypoglycemia. Pt reports an adequate appetite.    MEDICATIONS  (STANDING):  acetaminophen   IVPB .. 1000 milliGRAM(s) IV Intermittent once  ampicillin/sulbactam  IVPB 3 Gram(s) IV Intermittent every 6 hours  diphenhydrAMINE Injectable 50 milliGRAM(s) IV Push once  folic acid 1 milliGRAM(s) Oral daily  gabapentin 300 milliGRAM(s) Oral two times a day  glucagon  Injectable 1 milliGRAM(s) IntraMuscular once  immune   globulin 10% (GAMMAGARD) IVPB 65 Gram(s) IV Intermittent daily  insulin glargine Injectable (LANTUS) 24 Unit(s) SubCutaneous at bedtime  insulin lispro (ADMELOG) corrective regimen sliding scale   SubCutaneous three times a day before meals  insulin lispro (ADMELOG) corrective regimen sliding scale   SubCutaneous at bedtime  insulin lispro Injectable (ADMELOG) 8 Unit(s) SubCutaneous three times a day before meals  nystatin Cream 1 Application(s) Topical once  pantoprazole    Tablet 40 milliGRAM(s) Oral before breakfast  sertraline 50 milliGRAM(s) Oral daily  traZODone 100 milliGRAM(s) Oral at bedtime    MEDICATIONS  (PRN):  clonazePAM  Tablet 1 milliGRAM(s) Oral three times a day PRN for anxiety      Allergies: Bactrim (Anaphylaxis)  Eggplant mouth itches (Other)    Review of Systems:  Respiratory: No SOB, no cough  GI: No nausea, vomiting, abdominal pain  Endocrine: no polyuria, polydipsia      PHYSICAL EXAM:  VITALS: T(C): 36.8 (05-01-23 @ 10:00)  T(F): 98.3 (05-01-23 @ 10:00), Max: 99.3 (04-30-23 @ 23:53)  HR: 64 (05-01-23 @ 10:00) (56 - 68)  BP: 109/61 (05-01-23 @ 10:00) (98/61 - 111/66)  RR:  (17 - 20)  SpO2:  (93% - 100%)  Wt(kg): --  GENERAL: NAD, well-groomed, well-developed  RESPIRATORY: No labored breathing   GI: Soft, nontender, non distended  PSYCH: Alert and oriented x 3, normal affect, normal mood      CAPILLARY BLOOD GLUCOSE  POCT Blood Glucose.: 205 mg/dL (01 May 2023 12:34)  POCT Blood Glucose.: 191 mg/dL (01 May 2023 08:25)  POCT Blood Glucose.: 166 mg/dL (30 Apr 2023 23:24)  POCT Blood Glucose.: 168 mg/dL (30 Apr 2023 21:38)  POCT Blood Glucose.: 174 mg/dL (30 Apr 2023 17:44)    A1C with Estimated Average Glucose (04.12.23 @ 19:55)    A1C with Estimated Average Glucose Result: 8.7   Estimated Average Glucose: 203 05-01    135  |  104  |  8   ----------------------------<  165<H>  4.0   |  22  |  0.66    eGFR: 106    Ca    8.7      05-01  Mg     2.10     05-01  Phos  3.9     05-01    TPro  7.2  /  Alb  2.9<L>  /  TBili  1.3<H>  /  DBili  x   /  AST  29  /  ALT  14  /  AlkPhos  72  05-01      Diet, Regular:   Consistent Carbohydrate Evening Snack (CSTCHOSN)  Fiber/Residue Restricted (LOWFIBER) (04-30-23 @ 17:07) [Active]

## 2023-05-01 NOTE — PROVIDER CONTACT NOTE (MEDICATION) - ACTION/TREATMENT ORDERED:
MD made aware. MD states to prioritize blood transfusion and reschedule Zosyn to be administered once blood transfusion is complete. IV fluids to be resumed once blood transfusion is complete.
provider made aware, ok to reschedule IVIG to 1930
provider made aware. ok to start IG when Zoysn is complete. OK to give next does Zoysn when IG is complete as per provider
MD made aware. MD states to not continue administration of Vancomycin and to begin administration of Zosyn.

## 2023-05-01 NOTE — PROGRESS NOTE ADULT - ASSESSMENT
53F PMHx DM on insulin c/b gastroparesis and neuropathy presents for 2 days of dehydration/HA/nausea/SOB in the setting of R lap hemicolectomy w/ ileocolic anastomosis (4/17/2023)  consulted for hyperbilirubinemia with elevated indirect bilirubin, and anemia.    #Anemia  # Indirect hyperbilirubinemia  - Patient had Hb of 6.1 -> s/p 2U prbc transfusion; responded appropriately. however, downtrended again.   - s/p pRBC  - given that patient had prior surgery and reports black stool, recommend GI eval  -LDH elevated, hapto<20, retic elevated, with increase in total direct bilirubin; IgG positive, C3 negative, eluate negative --> concern for hemolytic anemia.   - S/P dose 1 of IVIG yesterday; hemolytic labs improved this am. continue with second dose today.   - Ideally treatment would be with steroids, however given recent surgery, will not initiate at this time. Can consider initiating if not responding to IVIG or Hgb drops again.   - reviewed peripheral blood smear today: showing:   - trend hemolysis labs daily    # Leukocytosis - resolved     53F PMHx DM on insulin c/b gastroparesis and neuropathy presents for 2 days of dehydration/HA/nausea/SOB in the setting of R lap hemicolectomy w/ ileocolic anastomosis (4/17/2023)  consulted for hyperbilirubinemia with elevated indirect bilirubin, and anemia.    #Anemia  # Indirect hyperbilirubinemia  - Patient had Hb of 6.1 -> s/p 2U prbc transfusion; responded appropriately. however, downtrended again.   - s/p pRBC  - given that patient had prior surgery and reports black stool, recommend GI eval  -LDH elevated, hapto<20, retic elevated, with increase in total direct bilirubin; IgG positive, C3 negative, eluate negative --> concern for hemolytic anemia.   - S/P dose 1 of IVIG yesterday; hemolytic labs improved this am. continue with second dose today.   - Ideally treatment would be with steroids, however discussed with surgery over the weekend, and would defer for now given recent surgery.  Can consider initiating if not responding to IVIG or Hgb drops again.   - reviewed peripheral blood smear today: zaheer cells, polychromatophils,  1-2 schistocytes per HPF, no atypical blast like cells, mulitple large platelets,   - trend hemolysis labs daily    # Leukocytosis - resolved    Gabrielle Jaeger MD   PGY5 heme onc fellow

## 2023-05-01 NOTE — PROGRESS NOTE ADULT - SUBJECTIVE AND OBJECTIVE BOX
Hematology Oncology Follow-up    INTERVAL HPI/OVERNIGHT EVENTS:  No o/n events, patient reports to be doing better today. She had IVIG without any reactions.       VITAL SIGNS:  T(F): 98.3 (05-01-23 @ 10:00)  HR: 64 (05-01-23 @ 10:00)  BP: 109/61 (05-01-23 @ 10:00)  RR: 18 (05-01-23 @ 10:00)  SpO2: 95% (05-01-23 @ 10:00)  Wt(kg): --    04-30-23 @ 07:01  -  05-01-23 @ 07:00  --------------------------------------------------------  IN: 2090 mL / OUT: 1875 mL / NET: 215 mL    05-01-23 @ 07:01  -  05-01-23 @ 12:36  --------------------------------------------------------  IN: 360 mL / OUT: 0 mL / NET: 360 mL        PHYSICAL EXAM:    Constitutional: AAOx3, NAD  Eyes: PERRL, EOMI, sclera non-icteric  Neck: supple, no masses, no JVD, no lymphadenopathy  Respiratory: CTA b/l, no wheezing, rhonchi, rales, with normal respiratory effort  Cardiovascular: RRR, normal S1S2, no M/R/G  Gastrointestinal: soft, NTND, no masses palpable, BS normal in all four quadrants, no HSM  Extremities:  no edema  MSK: no obvious abnormalities, normal ROM, no lymphadenopathy  Neurological: Grossly intact  Skin: Normal temperature, no rash, no echymoses, no petichiae  Psych: normal affect    MEDICATIONS  (STANDING):  acetaminophen   IVPB .. 1000 milliGRAM(s) IV Intermittent once  ampicillin/sulbactam  IVPB 3 Gram(s) IV Intermittent every 6 hours  diphenhydrAMINE Injectable 50 milliGRAM(s) IV Push once  folic acid 1 milliGRAM(s) Oral daily  gabapentin 300 milliGRAM(s) Oral two times a day  glucagon  Injectable 1 milliGRAM(s) IntraMuscular once  immune   globulin 10% (GAMMAGARD) IVPB 65 Gram(s) IV Intermittent daily  insulin glargine Injectable (LANTUS) 24 Unit(s) SubCutaneous at bedtime  insulin lispro (ADMELOG) corrective regimen sliding scale   SubCutaneous three times a day before meals  insulin lispro (ADMELOG) corrective regimen sliding scale   SubCutaneous at bedtime  insulin lispro Injectable (ADMELOG) 8 Unit(s) SubCutaneous three times a day before meals  nystatin Cream 1 Application(s) Topical once  pantoprazole  Injectable 40 milliGRAM(s) IV Push every 12 hours  sertraline 50 milliGRAM(s) Oral daily  traZODone 100 milliGRAM(s) Oral at bedtime    MEDICATIONS  (PRN):  clonazePAM  Tablet 1 milliGRAM(s) Oral three times a day PRN for anxiety      Bactrim (Anaphylaxis)  Eggplant mouth itches (Other)      LABS:                        8.1    7.70  )-----------( 136      ( 01 May 2023 06:55 )             26.4     05-01    135  |  104  |  8   ----------------------------<  165<H>  4.0   |  22  |  0.66    Ca    8.7      01 May 2023 06:55  Phos  3.9     05-01  Mg     2.10     05-01    TPro  7.2  /  Alb  2.9<L>  /  TBili  1.3<H>  /  DBili  x   /  AST  29  /  ALT  14  /  AlkPhos  72  05-01    PT/INR - ( 01 May 2023 06:55 )   PT: 13.4 sec;   INR: 1.15 ratio         PTT - ( 01 May 2023 06:55 )  PTT:27.9 sec Lactate Dehydrogenase, Serum: 787 U/L (05-01 @ 06:55)  Haptoglobin, Serum: <20 mg/dL (05-01 @ 06:55)                  RADIOLOGY & ADDITIONAL TESTS:  Studies reviewed. Hematology Oncology Follow-up    INTERVAL HPI/OVERNIGHT EVENTS:  No o/n events, patient reports to be doing better today. She had IVIG without any reactions.       VITAL SIGNS:  T(F): 98.3 (05-01-23 @ 10:00)  HR: 64 (05-01-23 @ 10:00)  BP: 109/61 (05-01-23 @ 10:00)  RR: 18 (05-01-23 @ 10:00)  SpO2: 95% (05-01-23 @ 10:00)  Wt(kg): --    04-30-23 @ 07:01  -  05-01-23 @ 07:00  --------------------------------------------------------  IN: 2090 mL / OUT: 1875 mL / NET: 215 mL    05-01-23 @ 07:01  -  05-01-23 @ 12:36  --------------------------------------------------------  IN: 360 mL / OUT: 0 mL / NET: 360 mL        PHYSICAL EXAM:    Constitutional: AAOx3, NAD  Eyes: Mild icterus - improved   Neck: supple, no masses, no JVD, no lymphadenopathy  Respiratory: CTA b/l, no wheezing, rhonchi, rales, with normal respiratory effort  Cardiovascular: RRR, normal S1S2, no M/R/G  Gastrointestinal: soft, NTND, midline umbilicus incision healing well, no drainage   Extremities:  no edema  MSK: no obvious abnormalities, normal ROM, no lymphadenopathy  Neurological: Grossly intact  Skin: Normal temperature, no rash, no echymoses, no petichiae  Psych: normal affect    MEDICATIONS  (STANDING):  acetaminophen   IVPB .. 1000 milliGRAM(s) IV Intermittent once  ampicillin/sulbactam  IVPB 3 Gram(s) IV Intermittent every 6 hours  diphenhydrAMINE Injectable 50 milliGRAM(s) IV Push once  folic acid 1 milliGRAM(s) Oral daily  gabapentin 300 milliGRAM(s) Oral two times a day  glucagon  Injectable 1 milliGRAM(s) IntraMuscular once  immune   globulin 10% (GAMMAGARD) IVPB 65 Gram(s) IV Intermittent daily  insulin glargine Injectable (LANTUS) 24 Unit(s) SubCutaneous at bedtime  insulin lispro (ADMELOG) corrective regimen sliding scale   SubCutaneous three times a day before meals  insulin lispro (ADMELOG) corrective regimen sliding scale   SubCutaneous at bedtime  insulin lispro Injectable (ADMELOG) 8 Unit(s) SubCutaneous three times a day before meals  nystatin Cream 1 Application(s) Topical once  pantoprazole  Injectable 40 milliGRAM(s) IV Push every 12 hours  sertraline 50 milliGRAM(s) Oral daily  traZODone 100 milliGRAM(s) Oral at bedtime    MEDICATIONS  (PRN):  clonazePAM  Tablet 1 milliGRAM(s) Oral three times a day PRN for anxiety      Bactrim (Anaphylaxis)  Eggplant mouth itches (Other)      LABS:                        8.1    7.70  )-----------( 136      ( 01 May 2023 06:55 )             26.4     05-01    135  |  104  |  8   ----------------------------<  165<H>  4.0   |  22  |  0.66    Ca    8.7      01 May 2023 06:55  Phos  3.9     05-01  Mg     2.10     05-01    TPro  7.2  /  Alb  2.9<L>  /  TBili  1.3<H>  /  DBili  x   /  AST  29  /  ALT  14  /  AlkPhos  72  05-01    PT/INR - ( 01 May 2023 06:55 )   PT: 13.4 sec;   INR: 1.15 ratio         PTT - ( 01 May 2023 06:55 )  PTT:27.9 sec Lactate Dehydrogenase, Serum: 787 U/L (05-01 @ 06:55)  Haptoglobin, Serum: <20 mg/dL (05-01 @ 06:55)                  RADIOLOGY & ADDITIONAL TESTS:  Studies reviewed.

## 2023-05-02 LAB
ALBUMIN SERPL ELPH-MCNC: 2.9 G/DL — LOW (ref 3.3–5)
ALP SERPL-CCNC: 96 U/L — SIGNIFICANT CHANGE UP (ref 40–120)
ALT FLD-CCNC: 11 U/L — SIGNIFICANT CHANGE UP (ref 4–33)
ANION GAP SERPL CALC-SCNC: 9 MMOL/L — SIGNIFICANT CHANGE UP (ref 7–14)
APPEARANCE UR: CLEAR — SIGNIFICANT CHANGE UP
AST SERPL-CCNC: 24 U/L — SIGNIFICANT CHANGE UP (ref 4–32)
BILIRUB SERPL-MCNC: 0.9 MG/DL — SIGNIFICANT CHANGE UP (ref 0.2–1.2)
BILIRUB UR-MCNC: NEGATIVE — SIGNIFICANT CHANGE UP
BUN SERPL-MCNC: 11 MG/DL — SIGNIFICANT CHANGE UP (ref 7–23)
CALCIUM SERPL-MCNC: 8.7 MG/DL — SIGNIFICANT CHANGE UP (ref 8.4–10.5)
CHLORIDE SERPL-SCNC: 103 MMOL/L — SIGNIFICANT CHANGE UP (ref 98–107)
CO2 SERPL-SCNC: 23 MMOL/L — SIGNIFICANT CHANGE UP (ref 22–31)
COLOR SPEC: YELLOW — SIGNIFICANT CHANGE UP
CREAT SERPL-MCNC: 0.69 MG/DL — SIGNIFICANT CHANGE UP (ref 0.5–1.3)
CULTURE RESULTS: SIGNIFICANT CHANGE UP
CULTURE RESULTS: SIGNIFICANT CHANGE UP
DIFF PNL FLD: NEGATIVE — SIGNIFICANT CHANGE UP
EGFR: 105 ML/MIN/1.73M2 — SIGNIFICANT CHANGE UP
GLUCOSE BLDC GLUCOMTR-MCNC: 112 MG/DL — HIGH (ref 70–99)
GLUCOSE BLDC GLUCOMTR-MCNC: 134 MG/DL — HIGH (ref 70–99)
GLUCOSE BLDC GLUCOMTR-MCNC: 218 MG/DL — HIGH (ref 70–99)
GLUCOSE BLDC GLUCOMTR-MCNC: 285 MG/DL — HIGH (ref 70–99)
GLUCOSE SERPL-MCNC: 274 MG/DL — HIGH (ref 70–99)
GLUCOSE UR QL: NEGATIVE — SIGNIFICANT CHANGE UP
HAPTOGLOB SERPL-MCNC: <20 MG/DL — LOW (ref 34–200)
HCT VFR BLD CALC: 27.3 % — LOW (ref 34.5–45)
HGB BLD-MCNC: 8.3 G/DL — LOW (ref 11.5–15.5)
KETONES UR-MCNC: ABNORMAL
LDH SERPL L TO P-CCNC: 651 U/L — HIGH (ref 135–225)
LEUKOCYTE ESTERASE UR-ACNC: NEGATIVE — SIGNIFICANT CHANGE UP
MAGNESIUM SERPL-MCNC: 2.1 MG/DL — SIGNIFICANT CHANGE UP (ref 1.6–2.6)
MCHC RBC-ENTMCNC: 30.4 GM/DL — LOW (ref 32–36)
MCHC RBC-ENTMCNC: 30.4 PG — SIGNIFICANT CHANGE UP (ref 27–34)
MCV RBC AUTO: 100 FL — SIGNIFICANT CHANGE UP (ref 80–100)
NITRITE UR-MCNC: NEGATIVE — SIGNIFICANT CHANGE UP
NRBC # BLD: 0 /100 WBCS — SIGNIFICANT CHANGE UP (ref 0–0)
NRBC # FLD: 0.02 K/UL — HIGH (ref 0–0)
PH UR: 7 — SIGNIFICANT CHANGE UP (ref 5–8)
PHOSPHATE SERPL-MCNC: 4 MG/DL — SIGNIFICANT CHANGE UP (ref 2.5–4.5)
PLATELET # BLD AUTO: 133 K/UL — LOW (ref 150–400)
POTASSIUM SERPL-MCNC: 4.4 MMOL/L — SIGNIFICANT CHANGE UP (ref 3.5–5.3)
POTASSIUM SERPL-SCNC: 4.4 MMOL/L — SIGNIFICANT CHANGE UP (ref 3.5–5.3)
PROT SERPL-MCNC: 8.5 G/DL — HIGH (ref 6–8.3)
PROT UR-MCNC: ABNORMAL
RBC # BLD: 2.73 M/UL — LOW (ref 3.8–5.2)
RBC # BLD: 2.73 M/UL — LOW (ref 3.8–5.2)
RBC # FLD: 26.7 % — HIGH (ref 10.3–14.5)
RETICS #: 505.1 K/UL — HIGH (ref 25–125)
RETICS/RBC NFR: 18.5 % — HIGH (ref 0.5–2.5)
SODIUM SERPL-SCNC: 135 MMOL/L — SIGNIFICANT CHANGE UP (ref 135–145)
SP GR SPEC: 1.02 — SIGNIFICANT CHANGE UP (ref 1.01–1.05)
SPECIMEN SOURCE: SIGNIFICANT CHANGE UP
SPECIMEN SOURCE: SIGNIFICANT CHANGE UP
UROBILINOGEN FLD QL: ABNORMAL
WBC # BLD: 6.56 K/UL — SIGNIFICANT CHANGE UP (ref 3.8–10.5)
WBC # FLD AUTO: 6.56 K/UL — SIGNIFICANT CHANGE UP (ref 3.8–10.5)

## 2023-05-02 PROCEDURE — 71045 X-RAY EXAM CHEST 1 VIEW: CPT | Mod: 26

## 2023-05-02 PROCEDURE — 99223 1ST HOSP IP/OBS HIGH 75: CPT

## 2023-05-02 PROCEDURE — 70450 CT HEAD/BRAIN W/O DYE: CPT | Mod: 26

## 2023-05-02 PROCEDURE — 72125 CT NECK SPINE W/O DYE: CPT | Mod: 26

## 2023-05-02 PROCEDURE — 99232 SBSQ HOSP IP/OBS MODERATE 35: CPT

## 2023-05-02 RX ORDER — SODIUM CHLORIDE 9 MG/ML
500 INJECTION INTRAMUSCULAR; INTRAVENOUS; SUBCUTANEOUS ONCE
Refills: 0 | Status: COMPLETED | OUTPATIENT
Start: 2023-05-02 | End: 2023-05-02

## 2023-05-02 RX ORDER — HYDROMORPHONE HYDROCHLORIDE 2 MG/ML
0.25 INJECTION INTRAMUSCULAR; INTRAVENOUS; SUBCUTANEOUS ONCE
Refills: 0 | Status: DISCONTINUED | OUTPATIENT
Start: 2023-05-02 | End: 2023-05-02

## 2023-05-02 RX ORDER — VANCOMYCIN HCL 1 G
1250 VIAL (EA) INTRAVENOUS EVERY 12 HOURS
Refills: 0 | Status: DISCONTINUED | OUTPATIENT
Start: 2023-05-02 | End: 2023-05-05

## 2023-05-02 RX ORDER — HYDROMORPHONE HYDROCHLORIDE 2 MG/ML
0.5 INJECTION INTRAMUSCULAR; INTRAVENOUS; SUBCUTANEOUS ONCE
Refills: 0 | Status: DISCONTINUED | OUTPATIENT
Start: 2023-05-02 | End: 2023-05-02

## 2023-05-02 RX ORDER — CEFTRIAXONE 500 MG/1
2000 INJECTION, POWDER, FOR SOLUTION INTRAMUSCULAR; INTRAVENOUS EVERY 12 HOURS
Refills: 0 | Status: DISCONTINUED | OUTPATIENT
Start: 2023-05-02 | End: 2023-05-05

## 2023-05-02 RX ORDER — INSULIN LISPRO 100/ML
9 VIAL (ML) SUBCUTANEOUS
Refills: 0 | Status: DISCONTINUED | OUTPATIENT
Start: 2023-05-02 | End: 2023-05-03

## 2023-05-02 RX ORDER — HEPARIN SODIUM 5000 [USP'U]/ML
5000 INJECTION INTRAVENOUS; SUBCUTANEOUS EVERY 8 HOURS
Refills: 0 | Status: DISCONTINUED | OUTPATIENT
Start: 2023-05-02 | End: 2023-05-02

## 2023-05-02 RX ORDER — ONDANSETRON 8 MG/1
4 TABLET, FILM COATED ORAL ONCE
Refills: 0 | Status: COMPLETED | OUTPATIENT
Start: 2023-05-02 | End: 2023-05-02

## 2023-05-02 RX ORDER — POLYETHYLENE GLYCOL 3350 17 G/17G
17 POWDER, FOR SOLUTION ORAL DAILY
Refills: 0 | Status: DISCONTINUED | OUTPATIENT
Start: 2023-05-02 | End: 2023-05-06

## 2023-05-02 RX ORDER — ACETAMINOPHEN 500 MG
1000 TABLET ORAL ONCE
Refills: 0 | Status: COMPLETED | OUTPATIENT
Start: 2023-05-02 | End: 2023-05-02

## 2023-05-02 RX ORDER — DIPHENHYDRAMINE HCL 50 MG
50 CAPSULE ORAL ONCE
Refills: 0 | Status: DISCONTINUED | OUTPATIENT
Start: 2023-05-02 | End: 2023-05-02

## 2023-05-02 RX ORDER — LIDOCAINE 4 G/100G
1 CREAM TOPICAL ONCE
Refills: 0 | Status: COMPLETED | OUTPATIENT
Start: 2023-05-02 | End: 2023-05-02

## 2023-05-02 RX ORDER — ACETAMINOPHEN 500 MG
1000 TABLET ORAL ONCE
Refills: 0 | Status: DISCONTINUED | OUTPATIENT
Start: 2023-05-02 | End: 2023-05-02

## 2023-05-02 RX ORDER — ACETAMINOPHEN 500 MG
975 TABLET ORAL ONCE
Refills: 0 | Status: COMPLETED | OUTPATIENT
Start: 2023-05-02 | End: 2023-05-02

## 2023-05-02 RX ADMIN — HYDROMORPHONE HYDROCHLORIDE 0.5 MILLIGRAM(S): 2 INJECTION INTRAMUSCULAR; INTRAVENOUS; SUBCUTANEOUS at 18:31

## 2023-05-02 RX ADMIN — AMPICILLIN SODIUM AND SULBACTAM SODIUM 200 GRAM(S): 250; 125 INJECTION, POWDER, FOR SUSPENSION INTRAMUSCULAR; INTRAVENOUS at 23:37

## 2023-05-02 RX ADMIN — Medication 3: at 09:04

## 2023-05-02 RX ADMIN — SODIUM CHLORIDE 500 MILLILITER(S): 9 INJECTION INTRAMUSCULAR; INTRAVENOUS; SUBCUTANEOUS at 12:05

## 2023-05-02 RX ADMIN — HYDROMORPHONE HYDROCHLORIDE 0.5 MILLIGRAM(S): 2 INJECTION INTRAMUSCULAR; INTRAVENOUS; SUBCUTANEOUS at 23:11

## 2023-05-02 RX ADMIN — AMPICILLIN SODIUM AND SULBACTAM SODIUM 200 GRAM(S): 250; 125 INJECTION, POWDER, FOR SUSPENSION INTRAMUSCULAR; INTRAVENOUS at 06:50

## 2023-05-02 RX ADMIN — Medication 9 UNIT(S): at 12:32

## 2023-05-02 RX ADMIN — AMPICILLIN SODIUM AND SULBACTAM SODIUM 200 GRAM(S): 250; 125 INJECTION, POWDER, FOR SUSPENSION INTRAMUSCULAR; INTRAVENOUS at 17:31

## 2023-05-02 RX ADMIN — Medication 975 MILLIGRAM(S): at 07:25

## 2023-05-02 RX ADMIN — HYDROMORPHONE HYDROCHLORIDE 0.5 MILLIGRAM(S): 2 INJECTION INTRAMUSCULAR; INTRAVENOUS; SUBCUTANEOUS at 14:41

## 2023-05-02 RX ADMIN — Medication 2 TABLET(S): at 11:12

## 2023-05-02 RX ADMIN — CEFTRIAXONE 100 MILLIGRAM(S): 500 INJECTION, POWDER, FOR SOLUTION INTRAMUSCULAR; INTRAVENOUS at 19:14

## 2023-05-02 RX ADMIN — GABAPENTIN 300 MILLIGRAM(S): 400 CAPSULE ORAL at 06:50

## 2023-05-02 RX ADMIN — Medication 400 MILLIGRAM(S): at 18:31

## 2023-05-02 RX ADMIN — HYDROMORPHONE HYDROCHLORIDE 0.5 MILLIGRAM(S): 2 INJECTION INTRAMUSCULAR; INTRAVENOUS; SUBCUTANEOUS at 10:13

## 2023-05-02 RX ADMIN — PANTOPRAZOLE SODIUM 40 MILLIGRAM(S): 20 TABLET, DELAYED RELEASE ORAL at 12:22

## 2023-05-02 RX ADMIN — LIDOCAINE 1 PATCH: 4 CREAM TOPICAL at 17:29

## 2023-05-02 RX ADMIN — Medication 1 MILLIGRAM(S): at 12:24

## 2023-05-02 RX ADMIN — Medication 2 TABLET(S): at 11:42

## 2023-05-02 RX ADMIN — HYDROMORPHONE HYDROCHLORIDE 0.5 MILLIGRAM(S): 2 INJECTION INTRAMUSCULAR; INTRAVENOUS; SUBCUTANEOUS at 09:58

## 2023-05-02 RX ADMIN — HYDROMORPHONE HYDROCHLORIDE 0.5 MILLIGRAM(S): 2 INJECTION INTRAMUSCULAR; INTRAVENOUS; SUBCUTANEOUS at 14:56

## 2023-05-02 RX ADMIN — GABAPENTIN 300 MILLIGRAM(S): 400 CAPSULE ORAL at 19:13

## 2023-05-02 RX ADMIN — Medication 9 UNIT(S): at 09:04

## 2023-05-02 RX ADMIN — HYDROMORPHONE HYDROCHLORIDE 0.25 MILLIGRAM(S): 2 INJECTION INTRAMUSCULAR; INTRAVENOUS; SUBCUTANEOUS at 12:37

## 2023-05-02 RX ADMIN — INSULIN GLARGINE 26 UNIT(S): 100 INJECTION, SOLUTION SUBCUTANEOUS at 23:11

## 2023-05-02 RX ADMIN — Medication 100 MILLIGRAM(S): at 23:11

## 2023-05-02 RX ADMIN — HYDROMORPHONE HYDROCHLORIDE 0.5 MILLIGRAM(S): 2 INJECTION INTRAMUSCULAR; INTRAVENOUS; SUBCUTANEOUS at 18:46

## 2023-05-02 RX ADMIN — HYDROMORPHONE HYDROCHLORIDE 0.5 MILLIGRAM(S): 2 INJECTION INTRAMUSCULAR; INTRAVENOUS; SUBCUTANEOUS at 23:41

## 2023-05-02 RX ADMIN — HEPARIN SODIUM 5000 UNIT(S): 5000 INJECTION INTRAVENOUS; SUBCUTANEOUS at 14:42

## 2023-05-02 RX ADMIN — AMPICILLIN SODIUM AND SULBACTAM SODIUM 200 GRAM(S): 250; 125 INJECTION, POWDER, FOR SUSPENSION INTRAMUSCULAR; INTRAVENOUS at 01:03

## 2023-05-02 RX ADMIN — SERTRALINE 50 MILLIGRAM(S): 25 TABLET, FILM COATED ORAL at 12:23

## 2023-05-02 RX ADMIN — ONDANSETRON 4 MILLIGRAM(S): 8 TABLET, FILM COATED ORAL at 12:21

## 2023-05-02 RX ADMIN — LIDOCAINE 1 PATCH: 4 CREAM TOPICAL at 19:41

## 2023-05-02 RX ADMIN — Medication 125 MILLIGRAM(S): at 19:43

## 2023-05-02 RX ADMIN — AMPICILLIN SODIUM AND SULBACTAM SODIUM 200 GRAM(S): 250; 125 INJECTION, POWDER, FOR SUSPENSION INTRAMUSCULAR; INTRAVENOUS at 12:24

## 2023-05-02 RX ADMIN — HYDROMORPHONE HYDROCHLORIDE 0.25 MILLIGRAM(S): 2 INJECTION INTRAMUSCULAR; INTRAVENOUS; SUBCUTANEOUS at 12:21

## 2023-05-02 NOTE — PROGRESS NOTE ADULT - SUBJECTIVE AND OBJECTIVE BOX
SUBJECTIVE / OVERNIGHT EVENTS: pt c/o pain all over the body  05-02-23     MEDICATIONS  (STANDING):  ampicillin/sulbactam  IVPB 3 Gram(s) IV Intermittent every 6 hours  folic acid 1 milliGRAM(s) Oral daily  gabapentin 300 milliGRAM(s) Oral two times a day  glucagon  Injectable 1 milliGRAM(s) IntraMuscular once  heparin   Injectable 5000 Unit(s) SubCutaneous every 8 hours  HYDROmorphone  Injectable 0.5 milliGRAM(s) IV Push once  insulin glargine Injectable (LANTUS) 26 Unit(s) SubCutaneous at bedtime  insulin lispro (ADMELOG) corrective regimen sliding scale   SubCutaneous three times a day before meals  insulin lispro (ADMELOG) corrective regimen sliding scale   SubCutaneous at bedtime  insulin lispro Injectable (ADMELOG) 9 Unit(s) SubCutaneous three times a day before meals  lidocaine   4% Patch 1 Patch Transdermal once  pantoprazole    Tablet 40 milliGRAM(s) Oral before breakfast  polyethylene glycol 3350 17 Gram(s) Oral daily  sertraline 50 milliGRAM(s) Oral daily  traZODone 100 milliGRAM(s) Oral at bedtime    MEDICATIONS  (PRN):  clonazePAM  Tablet 1 milliGRAM(s) Oral three times a day PRN for anxiety    T(C): 37.6 (05-02-23 @ 09:10), Max: 37.6 (05-02-23 @ 09:10)  HR: 65 (05-02-23 @ 09:10) (60 - 98)  BP: 141/84 (05-02-23 @ 09:10) (101/56 - 141/84)  RR: 18 (05-02-23 @ 09:10) (18 - 19)  SpO2: 96% (05-02-23 @ 09:10) (95% - 100%)    CAPILLARY BLOOD GLUCOSE      POCT Blood Glucose.: 134 mg/dL (02 May 2023 12:21)  POCT Blood Glucose.: 285 mg/dL (02 May 2023 08:43)  POCT Blood Glucose.: 183 mg/dL (01 May 2023 22:14)  POCT Blood Glucose.: 147 mg/dL (01 May 2023 17:38)    I&O's Summary    01 May 2023 07:01  -  02 May 2023 07:00  --------------------------------------------------------  IN: 2510 mL / OUT: 500 mL / NET: 2010 mL    02 May 2023 07:01  -  02 May 2023 14:42  --------------------------------------------------------  IN: 500 mL / OUT: 0 mL / NET: 500 mL        Constitutional: No fever, fatigue  Skin: No rash.  Eyes: No recent vision problems or eye pain.  ENT: No congestion, ear pain, or sore throat.  Cardiovascular: No chest pain or palpation.  Respiratory: No cough, shortness of breath, congestion, or wheezing.  Gastrointestinal: No abdominal pain, nausea, vomiting, or diarrhea.  Genitourinary: No dysuria.  Musculoskeletal: pain all over the body  Neurologic: No headache.    PHYSICAL EXAM:  GENERAL: NAD  EYES: EOMI, PERRLA  NECK: Supple, No JVD  CHEST/LUNG: dec breath sounds at bases   HEART:  S1 , S2 +  ABDOMEN: soft , bs+  EXTREMITIES:  trace edema  NEUROLOGY:alert awake      LABS:                        8.3    6.56  )-----------( 133      ( 02 May 2023 06:00 )             27.3     05-02    135  |  103  |  11  ----------------------------<  274<H>  4.4   |  23  |  0.69    Ca    8.7      02 May 2023 06:00  Phos  4.0     05-02  Mg     2.10     05-02    TPro  8.5<H>  /  Alb  2.9<L>  /  TBili  0.9  /  DBili  x   /  AST  24  /  ALT  11  /  AlkPhos  96  05-02    PT/INR - ( 01 May 2023 06:55 )   PT: 13.4 sec;   INR: 1.15 ratio         PTT - ( 01 May 2023 06:55 )  PTT:27.9 sec          RADIOLOGY & ADDITIONAL TESTS:    Imaging Personally Reviewed:yes    Consultant(s) Notes Reviewed:  yes    Care Discussed with Consultants/Other Providers:yes

## 2023-05-02 NOTE — PROGRESS NOTE ADULT - ASSESSMENT
53F PMHx DM on insulin c/b gastroparesis and neuropathy presents for 2 days of dehydration/HA/nausea/SOB in the setting of R lap hemicolectomy w/ ileocolic anastomosis (4/17/2023) exacerbated by GLF from bed today w/ head and left-sided trauma. Patient received left-sided lap hemicolectomy for volvulus at [facility] (Dr. Iker Sen). Recent fall off bed at home, no LOC. ED workup significant for H/H 6.4/19.2, elevated WBC, elevated T Bili 3.8, IDB 2.8, DB 1.0. Radiographic imaging negative for postsurgical anastomotic leak, or other abnormalities.    PLAN:  - UCx growing E. faecalis; f/u sensitivities  - Monitor H/H  - Appreciate GI recs: PPI  - Appreciate Endo recs: outpatient DM f/u  - Appreciate Heme recs: Hemolysis w/u  - monitor fevers  - c/w Unasyn (1 week total)  - LRD diet  - premedicate with benadryl IVP/IV Tylenol 30 min prior to initiation of IVIG transfusion 5/1.  - no pain meds before abdominal exam  - holding DVT ppx    A Team Surgery  pager: [phone]

## 2023-05-02 NOTE — PROVIDER CONTACT NOTE (CHANGE IN STATUS NOTIFICATION) - ASSESSMENT
patient back from cat scan and temperature is 100.5. Pt is asymptomatic
pt has a fever of 101.4 but is asymptomatic

## 2023-05-02 NOTE — PROGRESS NOTE ADULT - ASSESSMENT
53F PMHx DM on insulin c/b gastroparesis and neuropathy presents for 2 days of dehydration/HA/nausea/SOB in the setting of R lap hemicolectomy w/ ileocolic anastomosis (4/17/2023)     Pain - dilaudid as needed with close monitor of pts cognition / resp status - discussed with surgery PA about poss pain management eval     Dm2- - as per Endo,  HbA1c 8.7%; home regimen: lantus 30 units + novolog 14 units with meals + metformin 1 g bid  - Glucose Target 100-180 mg/dl: slightly above goal   - Increase Lantus to 26 units QHS  - Increase Admelog to 9 units with meals- HOLD if not eating   - Continue Admelog LOW correctional scales before meals and bedtime     #Anemia  # Indirect hyperbilirubinemia  - Patient had Hb of 6.1 -> s/p 2U prbc transfusion; responded appropriately. however, downtrended again.   - s/p pRBC     -LDH elevated, hapto<20, retic elevated, with increase in total direct bilirubin; IgG positive, C3 negative, eluate negative --> concern for hemolytic anemia.   - S/P dose 1 of IVIG yesterday; hemolytic labs improved this am. continue with second dose today.   -as per Heme,  Ideally treatment would be with steroids, however discussed with surgery over the weekend, and would defer for now given recent surgery.  Can consider initiating if not responding to IVIG or Hgb drops again.   - reviewed peripheral blood smear today: zaheer cells, polychromatophils,  1-2 schistocytes per HPF, no atypical blast like cells, mulitple large platelets,   - trend hemolysis labs daily    rest of the management as per surgery team

## 2023-05-02 NOTE — PROGRESS NOTE ADULT - SUBJECTIVE AND OBJECTIVE BOX
Chief Complaint: Type 2 Diabetes Mellitus, uncontrolled    History: Pt seen at bedside. Pt with poor appetite. complaining of headache primary team made aware by RN and myself. Positive vomiting today. No hypoglycemia noted     MEDICATIONS  (STANDING):  ampicillin/sulbactam  IVPB 3 Gram(s) IV Intermittent every 6 hours  folic acid 1 milliGRAM(s) Oral daily  gabapentin 300 milliGRAM(s) Oral two times a day  glucagon  Injectable 1 milliGRAM(s) IntraMuscular once  insulin glargine Injectable (LANTUS) 26 Unit(s) SubCutaneous at bedtime  insulin lispro (ADMELOG) corrective regimen sliding scale   SubCutaneous three times a day before meals  insulin lispro (ADMELOG) corrective regimen sliding scale   SubCutaneous at bedtime  insulin lispro Injectable (ADMELOG) 9 Unit(s) SubCutaneous three times a day before meals  lidocaine   4% Patch 1 Patch Transdermal once  pantoprazole    Tablet 40 milliGRAM(s) Oral before breakfast  polyethylene glycol 3350 17 Gram(s) Oral daily  sertraline 50 milliGRAM(s) Oral daily  traZODone 100 milliGRAM(s) Oral at bedtime    MEDICATIONS  (PRN):  clonazePAM  Tablet 1 milliGRAM(s) Oral three times a day PRN for anxiety      Allergies: Bactrim (Anaphylaxis)  Eggplant mouth itches (Other)      Review of Systems:  Respiratory: No SOB, no cough  GI: No nausea, vomiting, abdominal pain  Endocrine: no polyuria, polydipsia      PHYSICAL EXAM:  VITALS: T(C): 38.6 (05-02-23 @ 14:00)  T(F): 101.4 (05-02-23 @ 14:00), Max: 101.4 (05-02-23 @ 14:00)  HR: 72 (05-02-23 @ 14:00) (60 - 98)  BP: 118/70 (05-02-23 @ 14:00) (101/56 - 141/84)  RR:  (18 - 19)  SpO2:  (92% - 100%)  Wt(kg): --  GENERAL: NAD, well-groomed, well-developed  RESPIRATORY: No labored breathing   GI: Soft, nontender, non distended  PSYCH: Alert and oriented x 3, normal affect, normal mood      CAPILLARY BLOOD GLUCOSE  POCT Blood Glucose.: 134 mg/dL (02 May 2023 12:21)  POCT Blood Glucose.: 285 mg/dL (02 May 2023 08:43)  POCT Blood Glucose.: 183 mg/dL (01 May 2023 22:14)  POCT Blood Glucose.: 147 mg/dL (01 May 2023 17:38)    A1C with Estimated Average Glucose (04.12.23 @ 19:55)    A1C with Estimated Average Glucose Result: 8.7   Estimated Average Glucose: 203 05-02    135  |  103  |  11  ----------------------------<  274<H>  4.4   |  23  |  0.69    eGFR: 105    Ca    8.7      05-02  Mg     2.10     05-02  Phos  4.0     05-02    TPro  8.5<H>  /  Alb  2.9<L>  /  TBili  0.9  /  DBili  x   /  AST  24  /  ALT  11  /  AlkPhos  96  05-02    Diet, Regular:   Consistent Carbohydrate Evening Snack (CSTCHOSN)  Fiber/Residue Restricted (LOWFIBER) (04-30-23 @ 17:07) [Active]

## 2023-05-02 NOTE — PROGRESS NOTE ADULT - ASSESSMENT
A/P: 54 yo F with DM2, s/p R lap hemicolectomy w/ileocolic anastomosis (4/17/23), being evaluated for possible leak. Endocrine consulted for DM2 management.    #Type 2 Diabetes Mellitus, uncontrolled  - HbA1c 8.7%; home regimen: lantus 30 units + novolog 14 units with meals + metformin 1 g bid  - Glucose Target 100-180 mg/dl: Above goal this am. Pt denies eating overnight.  Poor appetite today. Based on am glucose would have decreased but due to decreased appetite will continue to monitor fs on current insulin regimen   - Continue Lantus 26 units QHS  - Continue Admelog 9 units with meals- HOLD if not eating; Please only give if eats>50% of meal. Premeal given at lunch although pt didn't eat. Advised RN to hold standing premeal if not eating give correction scale based on glucose level  - Continue Admelog LOW correctional scales before meals and bedtime   - Please check FSG before meals and QHS, or q6h while NPO; please check 3 am FS   - RD consult - ordered   - hypoglycemia orderset prn  - Discharge planning:   basal-bolus insulin + metformin 1 g bid with meals   Ensure patient has working glucometer, test strips, lancets, alcohol pads, and BD renetta pen needles  Please also prescribe glucose tabs, Baqsimi nasal spray or glucagon emergency kit for hypoglycemia risk   Appt already arranged:Dr. Dorina Cha at [address]; [place] [address] on June 7th at 10:20am  Please follow up with pcp, opthalmology, podiatry, and endocrinology as an outpt    #HTN  -BP goal <130/80  -BP is normal w/o any medications  -Outpatient microalb/ cr ratio    #HLD  -Outpatient lipid profile shows LDL in 80s and then 50s  -ASCVD <7.5%  -Consider STATIN if no contraindications for primary prevention, can be assessed outpt     Halie Frederick  Nurse Practitioner  Division of Endocrinology & Diabetes  In house pager #[phone]    If before 9AM or after 6PM, or on weekends/holidays, please call endocrine answering service for assistance ([phone]).For nonurgent matters email [email or web address] for assistance.

## 2023-05-02 NOTE — PROVIDER CONTACT NOTE (CHANGE IN STATUS NOTIFICATION) - ACTION/TREATMENT ORDERED:
provider said okay. No tylenol to be given at te time due to pt going down for CT scan. Temp to be retaken when pt comes back to unit
provider said okay and will not order tylenol and to wait till later to give tylenol

## 2023-05-02 NOTE — CONSULT NOTE ADULT - ATTENDING COMMENTS
------------------------------------------------------------------  54 yo woman s/p lap hemicolectomy for volvulus with Dr. Iker Sen on 4/17/2023 presented back to the hospital on 4/27/23 after having an unwitnessed fall hitting the side of her head  after falling out of bed (~3 feet). She has also had diffuse abdominal pain over the past 2 days with questions of black stools. Patient reports that prior to surgery she took some antibiotics as instructed but cannot recall the name.  Hematology consulted for anemia; patient noted to have elevated bilirubin with predominance of indirect hyperbilirubinemia    - clinical exam significant for scleral icterus/jaundice  - please complete hemolysis work-up (Tiffanie test, LDH, retic, haptoglobin (may be low due to recent transfusion))  - start folic acid  - peripheral smear c/w hemolytic process with increase in orthochromatophilic erythrocytes, anisiocytosis, rare nucleated RBC    - diffuse abdominal pain; CT reviewed; no radiographic evidence of intra-abdominal lesion  - leukocytosis, fever and abdominal pain certainly concerning for post-operative infectious process
History/PE as above. Patient seen/examined. As noted, currently admitted following an episode yesterday of weakness and fall out of bed with subsequent detection of fever, leukocytosis and decreased hemoglobin. Recent events noted-status post laparoscopic assisted right hemicolectomy for mobile cecum approximately one week ago. Reports no nausea or vomiting. Describes incisional pain. Question as to dark stool but no definite melena or hematochezia. PE/labs/CT as noted. Currently comfortable/NAD/nontoxic-appearing. Abdomen-nondistended with mild tenderness at laparoscopic port sites. No significant distention. EUGENE-light brown stool. No blood.  IMP: Significant anemia noted but no definite indication of overt GI blood loss. In view of elevated  and direct bilirubin evaluate for hemolysis.  REC:  -Monitor hemoglobin/hematocrit.  -Serial liver enzymes.  -Haptoglobin, LDH, reticulocyte count.  -Pantoprazole 40 mg daily.  -No NSAIDs.  -Observe for any over GI bleeding. At current time, no plan for endoscopic intervention.
Ms. Keith is a 53 year old right handed woman with PMHx of DM, neuropathy,  volvulus s/p R lap hemicolectomy w/ ileocolic anastomosis, and other medical problems who was brought back to [facility] for altered mental status, headache, nausea and fall. During admission IVIG started for hemolytic anemia on 4/30 and had infusion reaction. Neurology consulted for headache and possible meningitis. She has no focal deficit on exam and except left lower extremity weakness possible limited due to the pain and effort. Today headache is improved as compared yesterday. Agree with LP. MRI brain and MRV did not showed any acute pathology. Neuroimaging was reviewed by myself no dural enhancement or venous sinus thrombosis.   We tried LP at bedside but unfortunately, unsuccessful attempt. Will request LP under IR.   I discussed the diagnosis, treatment plan and prognosis with the patient.  All questions and concerns were addressed. The patient demonstrated good understanding of the treatment plan.  If you have any further questions, please do not hesitate to contact our team.  Thank you for allowing us to participate in this patient care.

## 2023-05-02 NOTE — CONSULT NOTE ADULT - ASSESSMENT
VS 99.7F, HR 65, /84, RR18, 96%Ra.   meds: heparin ppx, unasyn, insulin, PEG, PPI, folic acid, klonopin PRN, gabapentin, sertraline, trazodone.   labs: wbc 6.56, hgb 8.3, plt 133, . elevated retic % 18.5, cmp albumin 2.9, ,  Patient Danna Keith is a 54 yo woman PMHx DM on insulin c/b gastroparesis and neuropathy, recent R lap hemicolectomy w/ ileocolic anastomosis (4/17/2023) who presented to the hospital on 4/27/23 after AMS, passing out and falling out of bed. Patient reports she was due to have follow up with her doctor and then started to become dizzy that day, more delusional, not making sense and then collapsed and possibly hit her head and L side of her body. Found with anemia, leukocytosis, elevated T bili, s/p pRBC with improvement in symptoms. However heme concern for anemia, indirect hyperbilirubinemia, and received IVIG two doses (4/30/23, 5/1/23). Developed a suspected reported transfusion reaction on IVIG. Neurology consulted as patient has recently since IVIG developed severe, all over, sharp in nature, with associated nausea, vomiting, photophobia, neck pain. Had fever 101.4F, HR72, 118/70, RR18, saturating well.     Meds: heparin ppx, unasyn, insulin, PEG, PPI, folic acid, klonopin PRN, gabapentin, sertraline, trazodone.   labs: wbc 6.56, hgb 8.3, plt 133, . elevated retic % 18.5, cmp albumin 2.9,     Impression: Patient with headaches, nausea, vomiting, photophobia, neck pain, no clear neck rigidity/ kernig/brudinki sign in setting of recent IVIG use and concern of hemolytic anemia. Differential diagnosis given her multiple comorbidities makes it broader. Includes but not limited to primary headache disorder, secondary headache disorder (ie side effect of IVIG), meningitis, hemorrhage, stroke, etc. Patients after IVIG can develop delayed headaches, nausea, vomiting, photophobia as known side effect, which is likely. If meningitis would suspect more so to be aseptic meningitis from IVIG based on presentation and exam compared to viral/ bacterial meningitis. Lower suspicion of encephalitis, cerebrovascular etiology including hemorrhage, ischemic stroke, sinus thrombosis based on history/exam and imaging done so far.      Recommendations:  [ ] Given fever, concern for infection, consider infectious workup   [ ] Appreciate ID evaluation, will defer empiric antibiotic/ antiviral coverage to ID for guidance  [ ] MRI brain w/ w/o contrast, MR venogram w/ contrast, spoke with MRI tech and will try to expedite  --- > If patient has clinically worsening symptoms/ exam, may have to consider more urgent CT angio of head and neck w/ con and CT venogram head w/ con imaging  [ ] Recommend LP, however patient had recently received heparin subq, therefore may have to wait until able after being held. Advised primary team.  --- > LP: protein, glucose, cell count, albumin, AFB, gram strain with culture, PCR, HSV, IgG index, flow cytometry, lyme, [place], VDRL, fungal cultures, CMV/EBV PCR,  [ ] procalcitonin, LFTs, ESR, CRP, TSH, T3/T4, thiamine, B12, folate  [ ] consider tele  [ ] Low threshold of ICU evaluation if clinically worsening. Patient at risk for clinical decompensation given comorbidities and differential diagnosis of potential etiologies. Patient was advised.   [ ] Neuro-checks q4hrs, aspiration, fall precautions    Discussed patient care with initial recommendations at time of evaluation with neuro attending, primary team, patient. Prelim evaluation is not limited to that above. Non-neurologic findings seen on imaging per primary team management. The patient demonstrated good understanding of the diagnostic plan. Recommendations will be finalized/amended once signed by attending.

## 2023-05-02 NOTE — PROVIDER CONTACT NOTE (CHANGE IN STATUS NOTIFICATION) - SITUATION
patient back from cat scan and temperature is 100.5. Pt is asymptomatic
pt has a fever of 101.4 but is asymptomatic. All other vitals are within normal limits.

## 2023-05-02 NOTE — CONSULT NOTE ADULT - SUBJECTIVE AND OBJECTIVE BOX
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 VFF. EOMI with R gaze end nystagmus that fatigues. V1-3 intact LT, No álvaro facial asymmetry b/l. Hearing grossly normal b/l. Tongue midline and able to move side to side.     Motor - Normal bulk and tone.  Effort dependent   L/R         Deltoid 4+/4+  Biceps   4+/4+     Triceps  4/4          4+/4+   L/R         Hip Flexion  3/4- Knee Extension  4/4  Dorsiflexion  4+/5      Plantar Flexion 4/4     Sensation: Intact to LT, vibration b/l but diminished temp on L side particularly leg.    Reflexes L/R:  Biceps(C5) 2/2  BR(C6) 2/2   Triceps(C7)  2/2 Patellar(L4)  1/1  Ankle 1/1   Toes: downgoing bilaterally  No ankle clonus  Negative james sign bilaterally   Negative pectoral reflex bilaterally   Coordination: No dysmetria to FTN b/l UE  Gait: unable to assess due to pain     LABORATORY:  CBC                       8.3    6.56  )-----------( 133      ( 02 May 2023 06:00 )             27.3     Chem 05-02    135  |  103  |  11  ----------------------------<  274<H>  4.4   |  23  |  0.69    Ca    8.7      02 May 2023 06:00  Phos  4.0     05-02  Mg     2.10     05-02    TPro  8.5<H>  /  Alb  2.9<L>  /  TBili  0.9  /  DBili  x   /  AST  24  /  ALT  11  /  AlkPhos  96  05-02    LFTs LIVER FUNCTIONS - ( 02 May 2023 06:00 )  Alb: 2.9 g/dL / Pro: 8.5 g/dL / ALK PHOS: 96 U/L / ALT: 11 U/L / AST: 24 U/L / GGT: x           Coagulopathy PT/INR - ( 01 May 2023 06:55 )   PT: 13.4 sec;   INR: 1.15 ratio       PTT - ( 01 May 2023 06:55 )  PTT:27.9 sec  Lipid Panel   A1c   Cardiac enzymes     U/A   CSF  Other    STUDIES & IMAGING: (EEG, CT, MR, U/S, TTE/CHARLIE):     < from: CT Cervical Spine No Cont (05.02.23 @ 15:29) >    ACC: [id number] EXAM:  CT CERVICAL SPINE   ORDERED BY: SIOMARA DELA CRUZ     ACC: [id number] EXAM:  CT BRAIN   ORDERED BY: ABRAN SCHWARZ     PROCEDURE DATE:  05/02/2023          INTERPRETATION:  INDICATIONS:  Acute worsening headache and emesis    CT brain:  TECHNIQUE:  Serial axial images were obtained from the skull base to the   vertex without intravenous contrast.    COMPARISON EXAMINATION: 4/27/2023    FINDINGS:  Ventricles and sulci:  Normal.  Intra-axial:  No mass, blood or abnormal attenuation is seen.  Extra-axial:  No mass or collection is seen.  Visualized sinuses:  Clear.  Visualized mastoids:  Clear.  Calvarium:  Normal.  Miscellaneous:  None.    CT cervical spine:  TECHNIQUE:  Axial images were obtained using multislice helical   technique.  Reformatted coronal and sagittal images were performed.    COMPARISON EXAMINATION:  4/27/2023    FINDINGS:  Vertebral bodies:  Minimal marginal osteophyte formation.  Alignment:  No subluxations.  Intervertebral disc spaces: Minimal disc space narrowing C5-6   Miscellaneous:  None.    IMPRESSION:    BRAIN CT :No intracranial hemorrhage. No space-occupying lesion. No   change 4/27/2023  CERVICAL SPINE CT: Minimal degenerative changes. No change 4/27/2023    --- End of Report ---    ALFONZO SCHUSTER MD; Attending Radiologist  This document has been electronically signed. May  2 2023  3:46PM    < end of copied text >

## 2023-05-02 NOTE — CONSULT NOTE ADULT - ASSESSMENT
53 f with DM on insulin c/b gastroparesis and neuropathy, had LP long time a go to r/o SLE, recent admission for volvulus s/p R lap hemicolectomy w/ ileocolic anastomosis (4/17/2023) presented 4/27 with fever, weakness, headache, nausea and fall  febrile to 102.7, tachy WBC: 27, Hgb: 6.4  carmella positive  abd/pelvis CT: no anastomosis leak  was started on IVIG for hemolytic anemia on 4/30 and had infusion reaction with rigors and jaw pain during the infusion, s/p benadryl and also had another dose 5/1  Hgb, WBC and fever improved  urine cx showed E. faecalis and coag neg staph but pt has no symptoms was started on unasyn 5/1  on 5/2 pt had worse headache of her life with neck stiffness, vomiting and fever again    initial fever, leukocytosis and anemia due to hemolytic process s/p IVIG 4/30 and 5/1with improved fever, leukocytosis and anemia now with fever headache, neck stiffness appears to be meningitis clinically most likely aseptic due to IVIG vs autoimmune   positive urine cx with E. faecalis and coag neg staph with no symptoms, likely asymptomatic bacteriuria   * LP as soon as possible with cell count, glucose, protein,  PCR, HSV PCR, crypt and please ask neuro if they want to add more studies  * discontinue unasyn  * start vanco 1250 q 12 and ceftriaxone 2 q 12 for now but if the LP is not s/o bacterial infection will stop  * f/u with neurology and hem    The above assessment and plan was discussed with the primary team    Nelida Wright MD  contact on teams  After 5pm and on weekends call [phone]
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
## 2023-05-03 LAB
ALBUMIN SERPL ELPH-MCNC: 3.4 G/DL — SIGNIFICANT CHANGE UP (ref 3.3–5)
ALP SERPL-CCNC: 75 U/L — SIGNIFICANT CHANGE UP (ref 40–120)
ALT FLD-CCNC: 12 U/L — SIGNIFICANT CHANGE UP (ref 4–33)
ANION GAP SERPL CALC-SCNC: 8 MMOL/L — SIGNIFICANT CHANGE UP (ref 7–14)
APTT BLD: 29.8 SEC — SIGNIFICANT CHANGE UP (ref 27–36.3)
AST SERPL-CCNC: 20 U/L — SIGNIFICANT CHANGE UP (ref 4–32)
BILIRUB SERPL-MCNC: 0.9 MG/DL — SIGNIFICANT CHANGE UP (ref 0.2–1.2)
BUN SERPL-MCNC: 8 MG/DL — SIGNIFICANT CHANGE UP (ref 7–23)
CALCIUM SERPL-MCNC: 8.9 MG/DL — SIGNIFICANT CHANGE UP (ref 8.4–10.5)
CHLORIDE SERPL-SCNC: 101 MMOL/L — SIGNIFICANT CHANGE UP (ref 98–107)
CO2 SERPL-SCNC: 24 MMOL/L — SIGNIFICANT CHANGE UP (ref 22–31)
CREAT SERPL-MCNC: 0.66 MG/DL — SIGNIFICANT CHANGE UP (ref 0.5–1.3)
CRP SERPL-MCNC: 35.3 MG/L — HIGH
EGFR: 106 ML/MIN/1.73M2 — SIGNIFICANT CHANGE UP
ERYTHROCYTE [SEDIMENTATION RATE] IN BLOOD: 101 MM/HR — HIGH (ref 4–25)
FOLATE SERPL-MCNC: 19.4 NG/ML — HIGH (ref 3.1–17.5)
GLUCOSE BLDC GLUCOMTR-MCNC: 105 MG/DL — HIGH (ref 70–99)
GLUCOSE BLDC GLUCOMTR-MCNC: 117 MG/DL — HIGH (ref 70–99)
GLUCOSE BLDC GLUCOMTR-MCNC: 177 MG/DL — HIGH (ref 70–99)
GLUCOSE BLDC GLUCOMTR-MCNC: 192 MG/DL — HIGH (ref 70–99)
GLUCOSE SERPL-MCNC: 105 MG/DL — HIGH (ref 70–99)
HAPTOGLOB SERPL-MCNC: <20 MG/DL — LOW (ref 34–200)
HCT VFR BLD CALC: 29.4 % — LOW (ref 34.5–45)
HGB BLD-MCNC: 9 G/DL — LOW (ref 11.5–15.5)
INR BLD: 1.21 RATIO — HIGH (ref 0.88–1.16)
LDH SERPL L TO P-CCNC: 622 U/L — HIGH (ref 135–225)
MAGNESIUM SERPL-MCNC: 2.2 MG/DL — SIGNIFICANT CHANGE UP (ref 1.6–2.6)
MCHC RBC-ENTMCNC: 30.6 GM/DL — LOW (ref 32–36)
MCHC RBC-ENTMCNC: 30.7 PG — SIGNIFICANT CHANGE UP (ref 27–34)
MCV RBC AUTO: 100.3 FL — HIGH (ref 80–100)
NRBC # BLD: 0 /100 WBCS — SIGNIFICANT CHANGE UP (ref 0–0)
NRBC # FLD: 0 K/UL — SIGNIFICANT CHANGE UP (ref 0–0)
PHOSPHATE SERPL-MCNC: 2.9 MG/DL — SIGNIFICANT CHANGE UP (ref 2.5–4.5)
PLATELET # BLD AUTO: 138 K/UL — LOW (ref 150–400)
POTASSIUM SERPL-MCNC: 3.9 MMOL/L — SIGNIFICANT CHANGE UP (ref 3.5–5.3)
POTASSIUM SERPL-SCNC: 3.9 MMOL/L — SIGNIFICANT CHANGE UP (ref 3.5–5.3)
PROCALCITONIN SERPL-MCNC: 0.24 NG/ML — HIGH (ref 0.02–0.1)
PROT SERPL-MCNC: 8.4 G/DL — HIGH (ref 6–8.3)
PROTHROM AB SERPL-ACNC: 14.1 SEC — HIGH (ref 10.5–13.4)
RBC # BLD: 2.93 M/UL — LOW (ref 3.8–5.2)
RBC # FLD: 24.1 % — HIGH (ref 10.3–14.5)
SODIUM SERPL-SCNC: 133 MMOL/L — LOW (ref 135–145)
T3 SERPL-MCNC: 70 NG/DL — LOW (ref 80–200)
T4 AB SER-ACNC: 7.35 UG/DL — SIGNIFICANT CHANGE UP (ref 5.1–13)
TSH SERPL-MCNC: 1.49 UIU/ML — SIGNIFICANT CHANGE UP (ref 0.27–4.2)
VIT B12 SERPL-MCNC: 1018 PG/ML — HIGH (ref 200–900)
WBC # BLD: 6.72 K/UL — SIGNIFICANT CHANGE UP (ref 3.8–10.5)
WBC # FLD AUTO: 6.72 K/UL — SIGNIFICANT CHANGE UP (ref 3.8–10.5)

## 2023-05-03 PROCEDURE — 99233 SBSQ HOSP IP/OBS HIGH 50: CPT | Mod: GC

## 2023-05-03 PROCEDURE — 70545 MR ANGIOGRAPHY HEAD W/DYE: CPT | Mod: 26,59

## 2023-05-03 PROCEDURE — 99223 1ST HOSP IP/OBS HIGH 75: CPT | Mod: GC

## 2023-05-03 PROCEDURE — 70553 MRI BRAIN STEM W/O & W/DYE: CPT | Mod: 26

## 2023-05-03 PROCEDURE — 99232 SBSQ HOSP IP/OBS MODERATE 35: CPT

## 2023-05-03 PROCEDURE — 99232 SBSQ HOSP IP/OBS MODERATE 35: CPT | Mod: GC

## 2023-05-03 RX ORDER — HYDROMORPHONE HYDROCHLORIDE 2 MG/ML
0.5 INJECTION INTRAMUSCULAR; INTRAVENOUS; SUBCUTANEOUS ONCE
Refills: 0 | Status: DISCONTINUED | OUTPATIENT
Start: 2023-05-03 | End: 2023-05-03

## 2023-05-03 RX ORDER — ONDANSETRON 8 MG/1
4 TABLET, FILM COATED ORAL ONCE
Refills: 0 | Status: COMPLETED | OUTPATIENT
Start: 2023-05-03 | End: 2023-05-03

## 2023-05-03 RX ORDER — LIDOCAINE HCL 20 MG/ML
10 VIAL (ML) INJECTION ONCE
Refills: 0 | Status: COMPLETED | OUTPATIENT
Start: 2023-05-03 | End: 2023-05-03

## 2023-05-03 RX ORDER — INSULIN LISPRO 100/ML
7 VIAL (ML) SUBCUTANEOUS
Refills: 0 | Status: DISCONTINUED | OUTPATIENT
Start: 2023-05-03 | End: 2023-05-04

## 2023-05-03 RX ORDER — ACETAMINOPHEN 500 MG
1000 TABLET ORAL EVERY 6 HOURS
Refills: 0 | Status: DISCONTINUED | OUTPATIENT
Start: 2023-05-03 | End: 2023-05-04

## 2023-05-03 RX ADMIN — Medication 1000 MILLIGRAM(S): at 15:26

## 2023-05-03 RX ADMIN — HYDROMORPHONE HYDROCHLORIDE 0.5 MILLIGRAM(S): 2 INJECTION INTRAMUSCULAR; INTRAVENOUS; SUBCUTANEOUS at 05:52

## 2023-05-03 RX ADMIN — Medication 7 UNIT(S): at 18:00

## 2023-05-03 RX ADMIN — HYDROMORPHONE HYDROCHLORIDE 0.5 MILLIGRAM(S): 2 INJECTION INTRAMUSCULAR; INTRAVENOUS; SUBCUTANEOUS at 22:15

## 2023-05-03 RX ADMIN — Medication 1000 MILLIGRAM(S): at 14:56

## 2023-05-03 RX ADMIN — Medication 1000 MILLIGRAM(S): at 04:30

## 2023-05-03 RX ADMIN — Medication 1: at 08:49

## 2023-05-03 RX ADMIN — Medication 10 MILLILITER(S): at 15:32

## 2023-05-03 RX ADMIN — CEFTRIAXONE 100 MILLIGRAM(S): 500 INJECTION, POWDER, FOR SOLUTION INTRAMUSCULAR; INTRAVENOUS at 06:03

## 2023-05-03 RX ADMIN — HYDROMORPHONE HYDROCHLORIDE 0.5 MILLIGRAM(S): 2 INJECTION INTRAMUSCULAR; INTRAVENOUS; SUBCUTANEOUS at 16:07

## 2023-05-03 RX ADMIN — HYDROMORPHONE HYDROCHLORIDE 0.5 MILLIGRAM(S): 2 INJECTION INTRAMUSCULAR; INTRAVENOUS; SUBCUTANEOUS at 15:37

## 2023-05-03 RX ADMIN — PANTOPRAZOLE SODIUM 40 MILLIGRAM(S): 20 TABLET, DELAYED RELEASE ORAL at 06:40

## 2023-05-03 RX ADMIN — Medication 125 MILLIGRAM(S): at 06:34

## 2023-05-03 RX ADMIN — Medication 9 UNIT(S): at 12:51

## 2023-05-03 RX ADMIN — HYDROMORPHONE HYDROCHLORIDE 0.5 MILLIGRAM(S): 2 INJECTION INTRAMUSCULAR; INTRAVENOUS; SUBCUTANEOUS at 21:44

## 2023-05-03 RX ADMIN — Medication 9 UNIT(S): at 08:48

## 2023-05-03 RX ADMIN — HYDROMORPHONE HYDROCHLORIDE 0.5 MILLIGRAM(S): 2 INJECTION INTRAMUSCULAR; INTRAVENOUS; SUBCUTANEOUS at 11:25

## 2023-05-03 RX ADMIN — LIDOCAINE 1 PATCH: 4 CREAM TOPICAL at 08:33

## 2023-05-03 RX ADMIN — CEFTRIAXONE 100 MILLIGRAM(S): 500 INJECTION, POWDER, FOR SOLUTION INTRAMUSCULAR; INTRAVENOUS at 17:03

## 2023-05-03 RX ADMIN — HYDROMORPHONE HYDROCHLORIDE 0.5 MILLIGRAM(S): 2 INJECTION INTRAMUSCULAR; INTRAVENOUS; SUBCUTANEOUS at 11:55

## 2023-05-03 RX ADMIN — ONDANSETRON 4 MILLIGRAM(S): 8 TABLET, FILM COATED ORAL at 15:37

## 2023-05-03 RX ADMIN — Medication 1000 MILLIGRAM(S): at 03:57

## 2023-05-03 RX ADMIN — Medication 125 MILLIGRAM(S): at 17:04

## 2023-05-03 RX ADMIN — HYDROMORPHONE HYDROCHLORIDE 0.5 MILLIGRAM(S): 2 INJECTION INTRAMUSCULAR; INTRAVENOUS; SUBCUTANEOUS at 06:15

## 2023-05-03 RX ADMIN — Medication 1 MILLIGRAM(S): at 11:25

## 2023-05-03 RX ADMIN — SERTRALINE 50 MILLIGRAM(S): 25 TABLET, FILM COATED ORAL at 11:25

## 2023-05-03 RX ADMIN — INSULIN GLARGINE 26 UNIT(S): 100 INJECTION, SOLUTION SUBCUTANEOUS at 21:45

## 2023-05-03 RX ADMIN — Medication 100 MILLIGRAM(S): at 21:44

## 2023-05-03 RX ADMIN — Medication 1 MILLIGRAM(S): at 09:05

## 2023-05-03 RX ADMIN — GABAPENTIN 300 MILLIGRAM(S): 400 CAPSULE ORAL at 05:54

## 2023-05-03 RX ADMIN — POLYETHYLENE GLYCOL 3350 17 GRAM(S): 17 POWDER, FOR SOLUTION ORAL at 11:25

## 2023-05-03 RX ADMIN — GABAPENTIN 300 MILLIGRAM(S): 400 CAPSULE ORAL at 17:04
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
## 2023-05-03 NOTE — PROCEDURE NOTE - NSPROCNAME_GEN_A_CORE
History of Present Illness


History of Present Illness


Date Seen by Provider:  Sep 3, 2020


Time Seen by Provider:  17:00


Date of Admission





History of Present Illness


70yo presented for EP study. Per anesthesia pt may have aspirated upon waking up

for anesthesia. CXR is normal and pt has no leukocytosis. He is currently on RA.

No complications noted.





Allergies and Home Medications


Allergies


Uncoded Allergies:  


     SULFA (Allergy, Unknown, 12/17/14)





Home Medications


Acetaminophen 325 Mg Capsule, 325-650 MG PO Q8H PRN for PAIN-MILD (1-4), 

(Reported)


Amitriptyline HCl 25 Mg Tablet, 25 MG PO HS, (Reported)


Doxazosin Mesylate 4 Mg Tablet, 4 MG PO HS, (Reported)


Losartan Potassium 25 Mg Tablet, 25 MG PO 1700, (Reported)


Metoprolol Succinate 25 Mg Tab.er.24h, 50 MG PO DAILY, (Reported)


   TAKES 2 (25MG) TABS 


Pantoprazole Sodium 40 Mg Tablet.dr, 40 MG PO DAILY, (Reported)





Past Medical-Social-Family Hx


Patient Social History


Alcohol Use:  Denies Use


Recreational Drug Use:  No


Smoking Status:  Former Smoker


Type Used:  Pipe


Former Smoker, Quit:  Nov 21, 1990


Recent Foreign Travel:  No


Recent Hopitalizations:  No





Immunizations Up To Date


Tetanus Booster (TDap):  Unknown


Date of Pneumonia Vaccine:  Nov 1, 2017


Date of Influenza Vaccine:  Nov 4, 2019





Seasonal Allergies


Seasonal Allergies:  No





Past Medical History


Surgeries:  Yes (colon resection, hernia )


Tonsillectomy


Respiratory:  No


Cardiac:  Yes (PSVT)


Palpitations, Syncope


Neurological:  No


Reproductive Disorders:  No


Sexually Transmitted Disease:  No


Genitourinary:  Yes (ca tumor of bladder)


Gastrointestinal:  Yes (HX COLON CANCER)


Gastroesophageal Reflux


Musculoskeletal:  No


Endocrine:  No


Hearing Impairment:  Hard of Hearing


Cancer:  Yes


Colon, Kidney


Did You Recieve Any Treatments:  Yes


What Type of Treatment Did You:  Surgical Intervention


Psychosocial:  No


Integumentary:  No


Blood Disorders:  Yes (BEING FOLOLOWED BY BLOOD CA, NOT SURE OF NAME)





Family Medical History





Alzheimer's disease


  19 FATHER


Colon cancer


  19 FATHER


Hypertension


  G8 BROTHER


Parkinson's disease


  19 FATHER





Review of Systems


Time Seen by Provider:  05:55


Constitutional:  No: Fever, Chills, Sweats, Weakness, Malaise, Other


Respiratory:  No: Cough, Dry, Shortness of breath, SOB with excertion, Wheezing,

Hemoptysis, Pleuritic Pain, Sputum, Wheezing, Other





Sepsis Event


Evaluation


Height, Weight, BMI


Height: 5'11.00"


Weight: 205lbs. 0.0oz. 92.797126cq; 27.37 BMI


Method:





Exam


Exam





Vital Signs








  Date Time  Temp Pulse Resp B/P (MAP) Pulse Ox O2 Delivery O2 Flow Rate FiO2


 


9/3/20 16:16     93 Room Air 2.00 


 


9/3/20 15:00  93 22 123/61 (81) 93 Room Air  


 


9/3/20 14:30  84 24 130/70 (90) 92 Room Air  


 


9/3/20 14:00  99 28 163/97 (119) 93 Room Air  


 


9/3/20 13:45  90 23 142/79 (100) 92 Room Air  


 


9/3/20 13:30  87 10 136/80 (98) 96 Room Air  


 


9/3/20 13:22  88      


 


9/3/20 13:15  86 19 131/70 (90) 93 Room Air  


 


9/3/20 13:00 36.2  24 106/76 (86) 94 Room Air  


 


9/3/20 13:00 36.4 86 18 120/69 (86) 94 Room Air  


 


9/3/20 13:00     93 Room Air  


 


9/3/20 13:00      Room Air  


 


9/3/20 12:50   20 101/60 (74) 93 Room Air  


 


9/3/20 12:46   20 88/56 (67) 98 Room Air  


 


9/3/20 12:45      Room Air  


 


9/3/20 12:40   20 76/56 (63) 99 OxyMask 6 


 


9/3/20 12:35      OxyMask 6 


 


9/3/20 12:30   20 88/58 (68) 99 OxyMask 6 


 


9/3/20 12:28      OxyMask 6 


 


9/3/20 12:28 36.1  22 96/53 (67) 95 OxyMask 6 


 


9/3/20 11:32  66      


 


9/3/20 07:57 36.5 123 20 150/94 (112) 95 Room Air  








Height & Weight


Height: 5'11.00"


Weight: 205lbs. 0.0oz. 92.610921kc; 27.37 BMI


Method:


General Appearance:  WD/WN


HEENT:  PERRL/EOMI, TMs Normal, Normal ENT Inspection, Pharynx Normal


Neck:  Full Range of Motion, Normal Inspection, Non Tender, Supple


Respiratory:  Chest Non Tender, Lungs Clear, Normal Breath Sounds, No Accessory 

Muscle Use, No Respiratory Distress


Cardiovascular:  Regular Rate, Rhythm, No Edema, No Gallop, No JVD, No Murmur


Capillary Refill:  Less Than 3 Seconds


Gastrointestinal:  normal bowel sounds, non tender, soft


Extremity:  Normal Capillary Refill, Normal Inspection, Normal Range of Motion, 

No Pedal Edema


Neurologic/Psychiatric:  Alert, Oriented x3


Skin:  Normal Color, Warm/Dry


Lymphatic:  No Adenopathy





Results


Lab


Laboratory Tests


9/3/20 07:57











Assessment/Plan


Assessment/Plan


S/P EP procedure 


Possible aspiration 


   -Will hold off on starting Abx for now and continue to monitor close.











SHAHEEN LEVY DO               Sep 3, 2020 16:21 Lumbar Puncture

## 2023-05-03 NOTE — PROCEDURE NOTE - ADDITIONAL PROCEDURE DETAILS
Neurology    After discussion in AM with neuro attending, planned patient for LP after MRI results. Procedure team unable to perform today, thus attempted by neurology service. Informed consent was obtained from the patient in writing. Discussed risks (infection, bleeding, paralysis, post LP headache, etc) and benefits (ability to diagnose headache etiology, if infectious). Patient Platelets were (138) and PT/INR/PTT ( 14.1/ 1.21/ 29.8  ) and imaging showed no large mass or concern elevated intracranial pressure. Patient not on DVT PPx or anticoagulated. Heparin subq was held on 5/2. Patient with no overlying skin infection. The area was prepped and draped in the usual sterile fashion. Using landmarks, a 20 gauge spinal needle was inserted. Procedure was attempted by myself and by neuro attending without success. No CSF was able to be removed. Patient tolerated the procedure. There was minimal blood loss, no overlying hematoma appreciated after. Discussed with patient, primary team afterwards for recommendation of LP underx-ray guidance with neuroradiology. Neurology recommendations will be attested by attending on prior day neuro note. Neurology    After discussion in AM with neuro attending, planned patient for LP after MRI results. Procedure team unable to perform today, thus attempted by neurology service. Informed consent was obtained from the patient in writing. Discussed risks (infection, bleeding, paralysis, post LP headache, etc) and benefits (ability to diagnose headache etiology, if infectious). Patient Platelets were (138) and PT/INR/PTT ( 14.1/ 1.21/ 29.8  ) and imaging showed no large mass or concern elevated intracranial pressure. Patient not on DVT PPx or anticoagulated. Heparin subq was held on 5/2. Patient with no overlying skin infection. The area was prepped and draped in the usual sterile fashion. Using landmarks, a 20 gauge spinal needle was inserted. Procedure was attempted by myself and by neuro attending without success. No CSF was able to be removed. Patient tolerated the procedure. There was minimal blood loss, no overlying hematoma appreciated after. Discarded all LP materials in appropriate containers. Discussed with patient, primary team afterwards for recommendation of LP under x-ray guidance with neuroradiology. Neurology recommendations will be attested by attending on prior day neuro note.

## 2023-05-03 NOTE — PROGRESS NOTE ADULT - SUBJECTIVE AND OBJECTIVE BOX
Hematology Oncology Follow-up    INTERVAL HPI/OVERNIGHT EVENTS:  No o/n events, patient resting comfortably. Had extreme headache adn neck pain yesterday with photophobia. Reports today her neck pain is improved, no longer has light sensistivity but does still have significant headache.     VITAL SIGNS:  T(F): 98.4 (23 @ 10:53)  HR: 64 (23 @ 10:53)  BP: 111/62 (23 @ 10:53)  RR: 18 (23 @ 10:53)  SpO2: 98% (23 @ 10:53)  Wt(kg): --    23 @ 07:01  -  23 @ 07:00  --------------------------------------------------------  IN: 860 mL / OUT: 300 mL / NET: 560 mL    23 @ 07:01  -  23 @ 13:29  --------------------------------------------------------  IN: 240 mL / OUT: 700 mL / NET: -460 mL        PHYSICAL EXAM:    Constitutional: AAOx3, NAD  Eyes: PERRL, EOMI, sclera non-icteric  Respiratory: CTA b/l, no wheezing, rhonchi, rales, with normal respiratory effort  Cardiovascular: RRR, normal S1S2, no M/R/G  Gastrointestinal: soft, NTND, no masses palpable, BS normal in all four quadrants, no HSM  Extremities:  no edema  MSK: no obvious abnormalities, normal ROM, no lymphadenopathy  Neurological: Grossly intact  Skin: Normal temperature, no rash, no echymoses, no petechiae  Psych: normal affect    MEDICATIONS  (STANDING):  cefTRIAXone   IVPB 2000 milliGRAM(s) IV Intermittent every 12 hours  folic acid 1 milliGRAM(s) Oral daily  gabapentin 300 milliGRAM(s) Oral two times a day  glucagon  Injectable 1 milliGRAM(s) IntraMuscular once  insulin glargine Injectable (LANTUS) 26 Unit(s) SubCutaneous at bedtime  insulin lispro (ADMELOG) corrective regimen sliding scale   SubCutaneous three times a day before meals  insulin lispro (ADMELOG) corrective regimen sliding scale   SubCutaneous at bedtime  insulin lispro Injectable (ADMELOG) 9 Unit(s) SubCutaneous three times a day before meals  pantoprazole    Tablet 40 milliGRAM(s) Oral before breakfast  polyethylene glycol 3350 17 Gram(s) Oral daily  sertraline 50 milliGRAM(s) Oral daily  traZODone 100 milliGRAM(s) Oral at bedtime  vancomycin  IVPB 1250 milliGRAM(s) IV Intermittent every 12 hours    MEDICATIONS  (PRN):  acetaminophen     Tablet .. 1000 milliGRAM(s) Oral every 6 hours PRN Mild Pain (1 - 3)  clonazePAM  Tablet 1 milliGRAM(s) Oral three times a day PRN for anxiety      Bactrim (Anaphylaxis)  Eggplant mouth itches (Other)      LABS:                        9.0    6.72  )-----------( 138      ( 03 May 2023 11:13 )             29.4     05-03    133<L>  |  101  |  8   ----------------------------<  105<H>  3.9   |  24  |  0.66    Ca    8.9      03 May 2023 11:13  Phos  2.9     05-  Mg     2.20     -    TPro  8.4<H>  /  Alb  3.4  /  TBili  0.9  /  DBili  0.3  /  AST  20  /  ALT  12  /  AlkPhos  75  05-03    PT/INR - ( 03 May 2023 11:13 )   PT: 14.1 sec;   INR: 1.21 ratio         PTT - ( 03 May 2023 11:13 )  PTT:29.8 sec Haptoglobin, Serum: <20 mg/dL ( @ 11:13)  Lactate Dehydrogenase, Serum: 622 U/L ( @ 11:13)    Urinalysis Basic - ( 02 May 2023 20:14 )    Color: Yellow / Appearance: Clear / S.025 / pH: x  Gluc: x / Ketone: Trace  / Bili: Negative / Urobili: 12 mg/dL   Blood: x / Protein: Trace / Nitrite: Negative   Leuk Esterase: Negative / RBC: x / WBC x   Sq Epi: x / Non Sq Epi: x / Bacteria: x      Vitamin B12, Serum: 1018 pg/mL (23 @ 11:13)  Folate, Serum: 19.4 ng/mL (23 @ 11:13)    Bilirubin Direct, Serum: 0.3 (23 @ 11:13)  Bilirubin: Negative (23 @ 20:14)    RADIOLOGY & ADDITIONAL TESTS:  Studies reviewed. Hematology Oncology Follow-up    INTERVAL HPI/OVERNIGHT EVENTS:  No o/n events, patient resting comfortably. Had extreme headache and neck pain/stiffness yesterday with photophobia. Reports today her neck pain is improved, no longer has light sensitivity but does still have significant headache.     VITAL SIGNS:  T(F): 98.4 (23 @ 10:53)  HR: 64 (23 @ 10:53)  BP: 111/62 (23 @ 10:53)  RR: 18 (23 @ 10:53)  SpO2: 98% (23 @ 10:53)  Wt(kg): --    23 @ 07:01  -  23 @ 07:00  --------------------------------------------------------  IN: 860 mL / OUT: 300 mL / NET: 560 mL    23 @ 07:01  -  23 @ 13:29  --------------------------------------------------------  IN: 240 mL / OUT: 700 mL / NET: -460 mL        PHYSICAL EXAM:    Constitutional: AAOx3, NAD  Eyes: PERRL, EOMI, sclera non-icteric  Respiratory: CTA b/l, no wheezing, rhonchi, rales, with normal respiratory effort  Cardiovascular: RRR, normal S1S2, no M/R/G  Gastrointestinal: soft, NTND, no masses palpable, BS normal in all four quadrants, no HSM  Extremities:  no edema  MSK: no obvious abnormalities, normal ROM, no lymphadenopathy  Neurological: Grossly intact  Skin: Normal temperature, no rash, no echymoses, no petechiae  Psych: normal affect    MEDICATIONS  (STANDING):  cefTRIAXone   IVPB 2000 milliGRAM(s) IV Intermittent every 12 hours  folic acid 1 milliGRAM(s) Oral daily  gabapentin 300 milliGRAM(s) Oral two times a day  glucagon  Injectable 1 milliGRAM(s) IntraMuscular once  insulin glargine Injectable (LANTUS) 26 Unit(s) SubCutaneous at bedtime  insulin lispro (ADMELOG) corrective regimen sliding scale   SubCutaneous three times a day before meals  insulin lispro (ADMELOG) corrective regimen sliding scale   SubCutaneous at bedtime  insulin lispro Injectable (ADMELOG) 9 Unit(s) SubCutaneous three times a day before meals  pantoprazole    Tablet 40 milliGRAM(s) Oral before breakfast  polyethylene glycol 3350 17 Gram(s) Oral daily  sertraline 50 milliGRAM(s) Oral daily  traZODone 100 milliGRAM(s) Oral at bedtime  vancomycin  IVPB 1250 milliGRAM(s) IV Intermittent every 12 hours    MEDICATIONS  (PRN):  acetaminophen     Tablet .. 1000 milliGRAM(s) Oral every 6 hours PRN Mild Pain (1 - 3)  clonazePAM  Tablet 1 milliGRAM(s) Oral three times a day PRN for anxiety      Bactrim (Anaphylaxis)  Eggplant mouth itches (Other)      LABS:                        9.0    6.72  )-----------( 138      ( 03 May 2023 11:13 )             29.4     05-03    133<L>  |  101  |  8   ----------------------------<  105<H>  3.9   |  24  |  0.66    Ca    8.9      03 May 2023 11:13  Phos  2.9     05-  Mg     2.20     -    TPro  8.4<H>  /  Alb  3.4  /  TBili  0.9  /  DBili  0.3  /  AST  20  /  ALT  12  /  AlkPhos  75  05-03    PT/INR - ( 03 May 2023 11:13 )   PT: 14.1 sec;   INR: 1.21 ratio         PTT - ( 03 May 2023 11:13 )  PTT:29.8 sec Haptoglobin, Serum: <20 mg/dL ( @ 11:13)  Lactate Dehydrogenase, Serum: 622 U/L ( @ 11:13)    Urinalysis Basic - ( 02 May 2023 20:14 )    Color: Yellow / Appearance: Clear / S.025 / pH: x  Gluc: x / Ketone: Trace  / Bili: Negative / Urobili: 12 mg/dL   Blood: x / Protein: Trace / Nitrite: Negative   Leuk Esterase: Negative / RBC: x / WBC x   Sq Epi: x / Non Sq Epi: x / Bacteria: x      Vitamin B12, Serum: 1018 pg/mL (23 @ 11:13)  Folate, Serum: 19.4 ng/mL (23 @ 11:13)    Bilirubin Direct, Serum: 0.3 (23 @ 11:13)  Bilirubin: Negative (23 @ 20:14)    RADIOLOGY & ADDITIONAL TESTS:  Studies reviewed.

## 2023-05-03 NOTE — PROGRESS NOTE ADULT - ASSESSMENT
A/P: 52 yo F with DM2, s/p R lap hemicolectomy w/ileocolic anastomosis (4/17/23), being evaluated for possible leak. Endocrine consulted for DM2 management.    #Type 2 Diabetes Mellitus, uncontrolled  - HbA1c 8.7%; home regimen: lantus 30 units + novolog 14 units with meals + metformin 1 g bid  - Glucose Target 100-180 mg/dl: Slightly above goal this am and tightly controlled throughout the day.    - Continue Lantus 26 units QHS  - Decrease Admelog to 7 units with meals- HOLD if not eating; Please only give if eats>50% of meal.   - Continue Admelog LOW correctional scales before meals and bedtime   - Please check FSG before meals and QHS, or q6h while NPO  - RD consult - ordered   - hypoglycemia orderset prn  - Discharge planning:   basal-bolus insulin + metformin 1 g bid with meals   Ensure patient has working glucometer, test strips, lancets, alcohol pads, and BD renetta pen needles  Please also prescribe glucose tabs, Baqsimi nasal spray or glucagon emergency kit for hypoglycemia risk   Appt already arranged:Dr. Dorina Cha at [address] [facility] [address]; [place] [address] on June 7th at 10:20am  Please follow up with pcp, opthalmology, podiatry, and endocrinology as an outpt    #HTN  -BP goal <130/80  -BP is normal w/o any medications  -Outpatient microalb/ cr ratio    #HLD  -Outpatient lipid profile shows LDL in 80s and then 50s  -ASCVD <7.5%  -Consider STATIN if no contraindications for primary prevention, can be assessed outpt     Halie Frederick  Nurse Practitioner  Division of Endocrinology & Diabetes  In house pager #[phone]    If before 9AM or after 6PM, or on weekends/holidays, please call endocrine answering service for assistance ([phone]).For nonurgent matters email [email or web address] for assistance.

## 2023-05-03 NOTE — PROGRESS NOTE ADULT - SUBJECTIVE AND OBJECTIVE BOX
Surgery Progress Note     Overnight Events: Febrile to 100.5 at 1645, IV tylenol given  - infectious workup sent  - LP and MRIs not done overnight    Subjective:  Patient seen and examined on AM rounds. Patient reports that headache has improved overnight, still endorses neck stiffness. Denies nausea, vomiting, fevers, chills.      Vital Signs:  Vital Signs Last 24 Hrs  T(C): 37.2 (03 May 2023 06:00), Max: 38.6 (02 May 2023 14:00)  T(F): 99 (03 May 2023 06:00), Max: 101.4 (02 May 2023 14:00)  HR: 73 (03 May 2023 06:00) (61 - 79)  BP: 109/62 (03 May 2023 06:00) (109/62 - 143/77)  BP(mean): --  RR: 18 (03 May 2023 06:00) (17 - 18)  SpO2: 97% (03 May 2023 06:00) (92% - 99%)    Parameters below as of 03 May 2023 06:00  Patient On (Oxygen Delivery Method): room air        CAPILLARY BLOOD GLUCOSE      POCT Blood Glucose.: 218 mg/dL (02 May 2023 21:20)  POCT Blood Glucose.: 112 mg/dL (02 May 2023 17:10)  POCT Blood Glucose.: 134 mg/dL (02 May 2023 12:21)  POCT Blood Glucose.: 285 mg/dL (02 May 2023 08:43)      I&O's Detail    02 May 2023 07:01  -  03 May 2023 07:00  --------------------------------------------------------  IN:    Oral Fluid: 360 mL    Sodium Chloride 0.9% Bolus: 500 mL  Total IN: 860 mL    OUT:    Voided (mL): 300 mL  Total OUT: 300 mL    Total NET: 560 mL            Physical Exam:  General: NAD, resting comfortably in bed  Resp: airway patent, respirations unlabored  CVS: regular rate and rhythm  Abdomen: soft, nondistended, nontender, incisions intact  Extremities: no edema  Skin: warm, dry, appropriate color      Labs:    05-02    135  |  103  |  11  ----------------------------<  274<H>  4.4   |  23  |  0.69    Ca    8.7      02 May 2023 06:00  Phos  4.0     05-02  Mg     2.10     05-02    TPro  8.5<H>  /  Alb  2.9<L>  /  TBili  0.9  /  DBili  x   /  AST  24  /  ALT  11  /  AlkPhos  96  05-02    LIVER FUNCTIONS - ( 02 May 2023 06:00 )  Alb: 2.9 g/dL / Pro: 8.5 g/dL / ALK PHOS: 96 U/L / ALT: 11 U/L / AST: 24 U/L / GGT: x                                 8.3    6.56  )-----------( 133      ( 02 May 2023 06:00 )             27.3

## 2023-05-03 NOTE — PROGRESS NOTE ADULT - ASSESSMENT
53 f with DM on insulin c/b gastroparesis and neuropathy, had LP long time a go to r/o SLE, recent admission for volvulus s/p R lap hemicolectomy w/ ileocolic anastomosis (4/17/2023) presented 4/27 with fever, weakness, headache, nausea and fall  febrile to 102.7, tachy WBC: 27, Hgb: 6.4  carmella positive  abd/pelvis CT: no anastomosis leak  was started on IVIG for hemolytic anemia on 4/30 and had infusion reaction with rigors and jaw pain during the infusion, s/p benadryl and also had another dose 5/1  Hgb, WBC and fever improved  urine cx showed E. faecalis and coag neg staph but pt has no symptoms was started on unasyn 5/1  on 5/2 pt had worse headache of her life with neck stiffness, vomiting and fever again    initial fever, leukocytosis and anemia due to hemolytic process s/p IVIG 4/30 and 5/1with improved fever, leukocytosis and anemia now with fever headache, neck stiffness appears to be meningitis clinically most likely aseptic due to IVIG vs autoimmune, MRI/MRV unremarkable  positive urine cx with E. faecalis and coag neg staph with no symptoms, likely asymptomatic bacteriuria   * LP  with cell count, glucose, protein,  PCR, HSV PCR, crypt and please ask neuro if they want to add more studies  * c/w vanco 1250 q 12 and ceftriaxone 2 q 12 for now but if the LP is not s/o bacterial infection will stop  * f/u with neurology and hem    The above assessment and plan was discussed with the primary team    Nelida Wright MD  contact on teams  After 5pm and on weekends call [phone]

## 2023-05-03 NOTE — PROCEDURE NOTE - NSINFORMCONSENT_GEN_A_CORE
Consent form filled, risks benefits discussed and placed in patient binder chart/Benefits, risks, and possible complications of procedure explained to patient/caregiver who verbalized understanding and gave written consent.
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
## 2023-05-03 NOTE — PROGRESS NOTE ADULT - SUBJECTIVE AND OBJECTIVE BOX
SUBJECTIVE / OVERNIGHT EVENTS: pt c/o pain all over the body  23     MEDICATIONS  (STANDING):  cefTRIAXone   IVPB 2000 milliGRAM(s) IV Intermittent every 12 hours  folic acid 1 milliGRAM(s) Oral daily  gabapentin 300 milliGRAM(s) Oral two times a day  glucagon  Injectable 1 milliGRAM(s) IntraMuscular once  insulin glargine Injectable (LANTUS) 26 Unit(s) SubCutaneous at bedtime  insulin lispro (ADMELOG) corrective regimen sliding scale   SubCutaneous three times a day before meals  insulin lispro (ADMELOG) corrective regimen sliding scale   SubCutaneous at bedtime  insulin lispro Injectable (ADMELOG) 7 Unit(s) SubCutaneous three times a day before meals  pantoprazole    Tablet 40 milliGRAM(s) Oral before breakfast  polyethylene glycol 3350 17 Gram(s) Oral daily  sertraline 50 milliGRAM(s) Oral daily  traZODone 100 milliGRAM(s) Oral at bedtime  vancomycin  IVPB 1250 milliGRAM(s) IV Intermittent every 12 hours    MEDICATIONS  (PRN):  acetaminophen     Tablet .. 1000 milliGRAM(s) Oral every 6 hours PRN Mild Pain (1 - 3)  clonazePAM  Tablet 1 milliGRAM(s) Oral three times a day PRN for anxiety    Vital Signs Last 24 Hrs  T(C): 36.8 (23 @ 22:00), Max: 37.3 (23 @ 18:00)  T(F): 98.2 (23 @ 22:00), Max: 99.1 (23 @ 18:00)  HR: 62 (23 @ 22:00) (62 - 73)  BP: 99/61 (23 @ 22:00) (99/61 - 117/56)  BP(mean): --  RR: 17 (23 @ 22:00) (17 - 18)  SpO2: 95% (23 @ 22:00) (95% - 99%)          Constitutional: No fever, fatigue  Skin: No rash.  Eyes: No recent vision problems or eye pain.  ENT: No congestion, ear pain, or sore throat.  Cardiovascular: No chest pain or palpation.  Respiratory: No cough, shortness of breath, congestion, or wheezing.  Gastrointestinal: No abdominal pain, nausea, vomiting, or diarrhea.  Genitourinary: No dysuria.  Musculoskeletal: pain all over the body  Neurologic: No headache.    PHYSICAL EXAM:  GENERAL: NAD  EYES: EOMI, PERRLA  NECK: Supple, No JVD  CHEST/LUNG: dec breath sounds at bases   HEART:  S1 , S2 +  ABDOMEN: soft , bs+  EXTREMITIES:  trace edema  NEUROLOGY:alert awake      LABS:      133<L>  |  101  |  8   ----------------------------<  105<H>  3.9   |  24  |  0.66    Ca    8.9      03 May 2023 11:13  Phos  2.9       Mg     2.20         TPro  8.4<H>  /  Alb  3.4  /  TBili  0.9  /  DBili  0.3  /  AST  20  /  ALT  12  /  AlkPhos  75      Creatinine Trend: 0.66 <--, 0.69 <--, 0.66 <--, 0.69 <--, 0.72 <--, 0.59 <--, 0.72 <--, 0.71 <--, 0.74 <--, 0.71 <--                        9.0    6.72  )-----------( 138      ( 03 May 2023 11:13 )             29.4     Urine Studies:  Urinalysis Basic - ( 02 May 2023 20:14 )    Color: Yellow / Appearance: Clear / S.025 / pH:   Gluc:  / Ketone: Trace  / Bili: Negative / Urobili: 12 mg/dL   Blood:  / Protein: Trace / Nitrite: Negative   Leuk Esterase: Negative / RBC:  / WBC    Sq Epi:  / Non Sq Epi:  / Bacteria:               LIVER FUNCTIONS - ( 03 May 2023 11:13 )  Alb: 3.4 g/dL / Pro: 8.4 g/dL / ALK PHOS: 75 U/L / ALT: 12 U/L / AST: 20 U/L / GGT: x           PT/INR - ( 03 May 2023 11:13 )   PT: 14.1 sec;   INR: 1.21 ratio         PTT - ( 03 May 2023 11:13 )  PTT:29.8 sec    Imaging Personally Reviewed:yes    Consultant(s) Notes Reviewed:  yes    Care Discussed with Consultants/Other Providers:yes

## 2023-05-03 NOTE — PROGRESS NOTE ADULT - ASSESSMENT
53F PMHx DM on insulin c/b gastroparesis and neuropathy presents for 2 days of dehydration/HA/nausea/SOB in the setting of R lap hemicolectomy w/ ileocolic anastomosis (4/17/2023)  consulted for hyperbilirubinemia with elevated indirect bilirubin, and anemia.    #Anemia  # Indirect hyperbilirubinemia  - Patient had Hb of 6.1 -> s/p 2U pRBC transfusion; responded appropriately. however, downtrended again. CBC today with improved hgb of now 9.0.   - s/p 3 pRBC - last on 4/29  - given that patient had prior surgery and reports black stool, recommend GI eval  - labs from 4/30, 5/1 with LDH elevated, hapto<20, retic elevated, with increase in total direct bilirubin; IgG positive, C3 negative, eluate negative --> concern for hemolytic anemia.   - S/P 2 doses of IVIG 4/30-5/1-  hemolytic labs improved this am.    - Ideally treatment would be with steroids, however discussed with surgery over the weekend, and would defer for now given recent surgery.  Can consider initiating if not responding to IVIG or Hgb drops again.   - trend hemolysis labs daily; will need follow up appointment with hematologist as outpatient, will verify insurance for f/up at [facility].     # Rule out meningitis    - Febrile, headache, neck stiffness, photophobia on 5/2  - 5/2 CT head, CT cervical spine without any acute abnormalities   - 5/3 MRI and MRV head without any acute abnormalities or enhancements   - ID consulted; started on Vanco and ceftriaxone; plan for LP with cultures.   - Consideration for aseptic meningitis from ID and primary team: although it has been reported, IVIG induced aseptic meningitis is very rare reported in <1% of patients.   - follow up with results.     # Leukocytosis - resolved    Gabrielle Jaeger MD   PGY5 heme onc fellow

## 2023-05-03 NOTE — PROGRESS NOTE ADULT - SUBJECTIVE AND OBJECTIVE BOX
Chief Complaint: Type 2 Diabetes Mellitus, uncontrolled    History: Pt seen at bedside. Pt tolerating oral diet. Pt denies nausea and vomiting/any signs of hypoglycemia. Pt reports an improved appetite    MEDICATIONS  (STANDING):  cefTRIAXone   IVPB 2000 milliGRAM(s) IV Intermittent every 12 hours  folic acid 1 milliGRAM(s) Oral daily  gabapentin 300 milliGRAM(s) Oral two times a day  glucagon  Injectable 1 milliGRAM(s) IntraMuscular once  insulin glargine Injectable (LANTUS) 26 Unit(s) SubCutaneous at bedtime  insulin lispro (ADMELOG) corrective regimen sliding scale   SubCutaneous three times a day before meals  insulin lispro (ADMELOG) corrective regimen sliding scale   SubCutaneous at bedtime  insulin lispro Injectable (ADMELOG) 7 Unit(s) SubCutaneous three times a day before meals  pantoprazole    Tablet 40 milliGRAM(s) Oral before breakfast  polyethylene glycol 3350 17 Gram(s) Oral daily  sertraline 50 milliGRAM(s) Oral daily  traZODone 100 milliGRAM(s) Oral at bedtime  vancomycin  IVPB 1250 milliGRAM(s) IV Intermittent every 12 hours    MEDICATIONS  (PRN):  acetaminophen     Tablet .. 1000 milliGRAM(s) Oral every 6 hours PRN Mild Pain (1 - 3)  clonazePAM  Tablet 1 milliGRAM(s) Oral three times a day PRN for anxiety      Allergies: Bactrim (Anaphylaxis)  Eggplant mouth itches (Other)    Review of Systems:  Respiratory: No SOB, no cough  GI: No nausea, vomiting, abdominal pain  Endocrine: no polyuria, polydipsia      PHYSICAL EXAM:  VITALS: T(C): 37 (05-03-23 @ 14:00)  T(F): 98.6 (05-03-23 @ 14:00), Max: 99.4 (05-02-23 @ 18:00)  HR: 72 (05-03-23 @ 14:00) (61 - 79)  BP: 115/66 (05-03-23 @ 14:00) (109/62 - 143/77)  RR:  (17 - 18)  SpO2:  (95% - 99%)  Wt(kg): --  GENERAL: NAD, well-groomed, well-developed  RESPIRATORY: No labored breathing   GI: Soft, nontender, non distended  PSYCH: Alert and oriented x 3, normal affect, normal mood      CAPILLARY BLOOD GLUCOSE  POCT Blood Glucose.: 105 mg/dL (03 May 2023 12:14)  POCT Blood Glucose.: 192 mg/dL (03 May 2023 08:32)  POCT Blood Glucose.: 218 mg/dL (02 May 2023 21:20)    A1C with Estimated Average Glucose (04.12.23 @ 19:55)    A1C with Estimated Average Glucose Result: 8.7   Estimated Average Glucose: 203 05-03    133<L>  |  101  |  8   ----------------------------<  105<H>  3.9   |  24  |  0.66    eGFR: 106    Ca    8.9      05-03  Mg     2.20     05-03  Phos  2.9     05-03    TPro  8.4<H>  /  Alb  3.4  /  TBili  0.9  /  DBili  0.3  /  AST  20  /  ALT  12  /  AlkPhos  75  05-03      Thyroid Function Tests:  05-03 @ 11:13 TSH 1.49 FreeT4 -- T3 70 Anti TPO -- Anti Thyroglobulin Ab -- TSI --    Diet, Regular:   Consistent Carbohydrate Evening Snack (CSTCHOSN)  Fiber/Residue Restricted (LOWFIBER) (04-30-23 @ 17:07) [Active]

## 2023-05-03 NOTE — CHART NOTE - NSCHARTNOTEFT_GEN_A_CORE
Discussed with Neurology regarding patient's new presentation of aseptic meningitis overnight.  Neurology wanted a MR Brain and MR venogram of head which was ordered urgently.  Both Neurology and surgical team tried to expedite MRIs. MRI called multiple times by the surgical team overnight to further expedite, but met with futility due to scheduling issues.  Neurology still with concerns for bleeding from anticipated lumbar puncture due to recent AC administration and lack of MRI decided to hold off on procedure overnight. Neurology still recommending LP with low threshold for ICU care if patient had a worsening in symptoms.    Currently, patient with intact neuro exam, but still with headache and neck stiffness being managed with acetaminophen and narcotic pain medications. Pain has improved overnight.  Will further discuss plans with Neurology today and continue to monitor throughout the day.    A Team Surgery  [id number] Discussed with Neurology regarding patient's new presentation of aseptic meningitis overnight.    Neurology wanted a MR Brain and MR venogram of head which was ordered urgently.    Both Neurology and surgical team tried to expedite MRIs. MRI called multiple times by the surgical team overnight to further expedite, but met with futility due to scheduling issues.    Neurology still with concerns for bleeding from anticipated lumbar puncture due to recent AC administration and lack of MRI decided to hold off on procedure overnight. Neurology still recommending LP with low threshold for ICU care if patient had a worsening in symptoms.    Currently, patient with intact neuro exam, but still with headache and neck stiffness being managed with acetaminophen and narcotic pain medications. Pain has improved overnight.  Will further discuss plans with Neurology today and continue to monitor throughout the day.    A Team Surgery  [id number] Discussed with Neurology regarding patient's new presentation of aseptic meningitis overnight.    Neurology wanted a MR Brain and MR venogram of head which was ordered urgently.    Both Neurology and surgical team tried to expedite MRIs. MRI called multiple times by the surgical team overnight to further expedite, but met with futility due to scheduling issues.    Neurology, still with concerns for bleeding from anticipated lumbar puncture due to recent AC administration and lack of MRI, decided to hold off on procedure overnight. Neurology still recommending LP with low threshold for ICU care if patient had a worsening in symptoms.    Currently, patient with intact neuro exam, but still with headache and neck stiffness being managed with acetaminophen and narcotic pain medications. Pain has improved overnight.  Will further discuss plans with Neurology today and continue to monitor throughout the day.    A Team Surgery  [id number]

## 2023-05-03 NOTE — PROGRESS NOTE ADULT - SUBJECTIVE AND OBJECTIVE BOX
Follow Up:  fever, meningitis    Interval History/ROS: no more fever, still with significant headache, no more vomiting, no cough, abd pain or dysuria          Allergies  Bactrim (Anaphylaxis)  Eggplant mouth itches (Other)        ANTIMICROBIALS:  cefTRIAXone   IVPB 2000 every 12 hours  vancomycin  IVPB 1250 every 12 hours      OTHER MEDS:  acetaminophen     Tablet .. 1000 milliGRAM(s) Oral every 6 hours PRN  clonazePAM  Tablet 1 milliGRAM(s) Oral three times a day PRN  folic acid 1 milliGRAM(s) Oral daily  gabapentin 300 milliGRAM(s) Oral two times a day  glucagon  Injectable 1 milliGRAM(s) IntraMuscular once  HYDROmorphone  Injectable 0.5 milliGRAM(s) IV Push once  insulin glargine Injectable (LANTUS) 26 Unit(s) SubCutaneous at bedtime  insulin lispro (ADMELOG) corrective regimen sliding scale   SubCutaneous three times a day before meals  insulin lispro (ADMELOG) corrective regimen sliding scale   SubCutaneous at bedtime  insulin lispro Injectable (ADMELOG) 9 Unit(s) SubCutaneous three times a day before meals  lidocaine 1% (Preservative-free) Injectable 10 milliLiter(s) Local Injection once  ondansetron Injectable 4 milliGRAM(s) IV Push once  pantoprazole    Tablet 40 milliGRAM(s) Oral before breakfast  polyethylene glycol 3350 17 Gram(s) Oral daily  sertraline 50 milliGRAM(s) Oral daily  traZODone 100 milliGRAM(s) Oral at bedtime      Vital Signs Last 24 Hrs  T(C): 37 (03 May 2023 14:00), Max: 38.1 (02 May 2023 16:40)  T(F): 98.6 (03 May 2023 14:00), Max: 100.5 (02 May 2023 16:40)  HR: 72 (03 May 2023 14:00) (61 - 79)  BP: 115/66 (03 May 2023 14:00) (109/62 - 143/77)  BP(mean): --  RR: 18 (03 May 2023 14:00) (17 - 18)  SpO2: 99% (03 May 2023 14:00) (95% - 99%)    Parameters below as of 03 May 2023 14:00  Patient On (Oxygen Delivery Method): room air        Physical Exam:  General:   non toxic  Respiratory:   comfortable on RA  abd:   soft, BS +, not tender  :     no CVAT, no suprapubic tenderness, no perez  Musculoskeletal : no joint swelling, no edema  Skin:    no rash  vascular: no phlebitis  Neurologic:     neck rigidity with positive kernig                            9.0    6.72  )-----------( 138      ( 03 May 2023 11:13 )             29.4       05-03    133<L>  |  101  |  8   ----------------------------<  105<H>  3.9   |  24  |  0.66    Ca    8.9      03 May 2023 11:13  Phos  2.9     05-03  Mg     2.20     05-03    TPro  8.4<H>  /  Alb  3.4  /  TBili  0.9  /  DBili  0.3  /  AST  20  /  ALT  12  /  AlkPhos  75  05-03      Urinalysis Basic - ( 02 May 2023 20:14 )    Color: Yellow / Appearance: Clear / S.025 / pH: x  Gluc: x / Ketone: Trace  / Bili: Negative / Urobili: 12 mg/dL   Blood: x / Protein: Trace / Nitrite: Negative   Leuk Esterase: Negative / RBC: x / WBC x   Sq Epi: x / Non Sq Epi: x / Bacteria: x        MICROBIOLOGY:  v  Clean Catch Clean Catch (Midstream)  23   >100,000 CFU/ml Enterococcus faecalis  50,000 - 99,000 CFU/mL Coag negative Staphylococcus not Staph  saprophyticus "Susceptibilities not performed"  --  Enterococcus faecalis      .Blood Blood-Peripheral  23   No Growth Final  --  --      .Blood Blood-Peripheral  23   No Growth Final  --  --          Rapid RVP Result: NotDetec ( @ 12:30)        RADIOLOGY:  Images independently visualized and reviewed personally, findings as below  < from: MR Venogram Head w/ IV Cont (23 @ 12:34) >    IMPRESSION:   Unremarkable MR venogram of the intracranial dural sinuses.     No evidence of dural sinus thrombosis.    < end of copied text >  < from: CT Abdomen and Pelvis w/ Oral Cont (23 @ 17:21) >  IMPRESSION:  Unchanged possible fat necrosis in the right anterior hemipelvis. No   evidence of anastomotic leak.    < end of copied text >

## 2023-05-04 LAB
ALBUMIN SERPL ELPH-MCNC: 3 G/DL — LOW (ref 3.3–5)
ALP SERPL-CCNC: 90 U/L — SIGNIFICANT CHANGE UP (ref 40–120)
ALT FLD-CCNC: 11 U/L — SIGNIFICANT CHANGE UP (ref 4–33)
ANION GAP SERPL CALC-SCNC: 10 MMOL/L — SIGNIFICANT CHANGE UP (ref 7–14)
APPEARANCE CSF: ABNORMAL
APPEARANCE SPUN FLD: COLORLESS — SIGNIFICANT CHANGE UP
APTT BLD: 29.8 SEC — SIGNIFICANT CHANGE UP (ref 27–36.3)
AST SERPL-CCNC: 22 U/L — SIGNIFICANT CHANGE UP (ref 4–32)
BILIRUB SERPL-MCNC: 0.7 MG/DL — SIGNIFICANT CHANGE UP (ref 0.2–1.2)
BUN SERPL-MCNC: 9 MG/DL — SIGNIFICANT CHANGE UP (ref 7–23)
CALCIUM SERPL-MCNC: 8.3 MG/DL — LOW (ref 8.4–10.5)
CHLORIDE SERPL-SCNC: 103 MMOL/L — SIGNIFICANT CHANGE UP (ref 98–107)
CO2 SERPL-SCNC: 21 MMOL/L — LOW (ref 22–31)
COLOR CSF: COLORLESS — SIGNIFICANT CHANGE UP
CREAT SERPL-MCNC: 0.76 MG/DL — SIGNIFICANT CHANGE UP (ref 0.5–1.3)
CRYPTOC AG CSF-ACNC: NEGATIVE — SIGNIFICANT CHANGE UP
CSF PCR RESULT: SIGNIFICANT CHANGE UP
EGFR: 95 ML/MIN/1.73M2 — SIGNIFICANT CHANGE UP
GLUCOSE BLDC GLUCOMTR-MCNC: 157 MG/DL — HIGH (ref 70–99)
GLUCOSE BLDC GLUCOMTR-MCNC: 178 MG/DL — HIGH (ref 70–99)
GLUCOSE BLDC GLUCOMTR-MCNC: 190 MG/DL — HIGH (ref 70–99)
GLUCOSE BLDC GLUCOMTR-MCNC: 191 MG/DL — HIGH (ref 70–99)
GLUCOSE BLDC GLUCOMTR-MCNC: 221 MG/DL — HIGH (ref 70–99)
GLUCOSE CSF-MCNC: 92 MG/DL — HIGH (ref 40–70)
GLUCOSE SERPL-MCNC: 214 MG/DL — HIGH (ref 70–99)
GRAM STN FLD: SIGNIFICANT CHANGE UP
HAPTOGLOB SERPL-MCNC: <20 MG/DL — LOW (ref 34–200)
HCT VFR BLD CALC: 27.8 % — LOW (ref 34.5–45)
HGB BLD-MCNC: 8.8 G/DL — LOW (ref 11.5–15.5)
INR BLD: 1.17 RATIO — HIGH (ref 0.88–1.16)
LDH SERPL L TO P-CCNC: 526 U/L — HIGH (ref 135–225)
LYMPHOCYTES # CSF: 21 % — SIGNIFICANT CHANGE UP
MAGNESIUM SERPL-MCNC: 2.1 MG/DL — SIGNIFICANT CHANGE UP (ref 1.6–2.6)
MCHC RBC-ENTMCNC: 31.5 PG — SIGNIFICANT CHANGE UP (ref 27–34)
MCHC RBC-ENTMCNC: 31.7 GM/DL — LOW (ref 32–36)
MCV RBC AUTO: 99.6 FL — SIGNIFICANT CHANGE UP (ref 80–100)
MONOS+MACROS NFR CSF: 4 % — SIGNIFICANT CHANGE UP
NEUTROPHILS # CSF: 75 % — SIGNIFICANT CHANGE UP
NIGHT BLUE STAIN TISS: SIGNIFICANT CHANGE UP
NRBC # BLD: 0 /100 WBCS — SIGNIFICANT CHANGE UP (ref 0–0)
NRBC # FLD: 0.02 K/UL — HIGH (ref 0–0)
NRBC NFR CSF: 1183 CELLS/UL — CRITICAL HIGH (ref 0–5)
PHOSPHATE SERPL-MCNC: 2.6 MG/DL — SIGNIFICANT CHANGE UP (ref 2.5–4.5)
PLATELET # BLD AUTO: 133 K/UL — LOW (ref 150–400)
POTASSIUM SERPL-MCNC: 3.9 MMOL/L — SIGNIFICANT CHANGE UP (ref 3.5–5.3)
POTASSIUM SERPL-SCNC: 3.9 MMOL/L — SIGNIFICANT CHANGE UP (ref 3.5–5.3)
PROT CSF-MCNC: 29 MG/DL — SIGNIFICANT CHANGE UP (ref 15–45)
PROT SERPL-MCNC: 7.5 G/DL — SIGNIFICANT CHANGE UP (ref 6–8.3)
PROTHROM AB SERPL-ACNC: 13.6 SEC — HIGH (ref 10.5–13.4)
RBC # BLD: 2.79 M/UL — LOW (ref 3.8–5.2)
RBC # CSF: 155 CELLS/UL — HIGH (ref 0–0)
RBC # FLD: 22.5 % — HIGH (ref 10.3–14.5)
SODIUM SERPL-SCNC: 134 MMOL/L — LOW (ref 135–145)
SPECIMEN SOURCE: SIGNIFICANT CHANGE UP
SPECIMEN SOURCE: SIGNIFICANT CHANGE UP
TOTAL CELLS COUNTED, SPINAL FLUID: 100 CELLS — SIGNIFICANT CHANGE UP
TUBE TYPE: SIGNIFICANT CHANGE UP
VANCOMYCIN TROUGH SERPL-MCNC: 14.9 UG/ML — SIGNIFICANT CHANGE UP (ref 10–20)
WBC # BLD: 6.04 K/UL — SIGNIFICANT CHANGE UP (ref 3.8–10.5)
WBC # FLD AUTO: 6.04 K/UL — SIGNIFICANT CHANGE UP (ref 3.8–10.5)

## 2023-05-04 PROCEDURE — 99232 SBSQ HOSP IP/OBS MODERATE 35: CPT

## 2023-05-04 PROCEDURE — 62328 DX LMBR SPI PNXR W/FLUOR/CT: CPT

## 2023-05-04 PROCEDURE — 99233 SBSQ HOSP IP/OBS HIGH 50: CPT | Mod: GC

## 2023-05-04 RX ORDER — HYDROMORPHONE HYDROCHLORIDE 2 MG/ML
0.5 INJECTION INTRAMUSCULAR; INTRAVENOUS; SUBCUTANEOUS ONCE
Refills: 0 | Status: DISCONTINUED | OUTPATIENT
Start: 2023-05-04 | End: 2023-05-04

## 2023-05-04 RX ORDER — TRAMADOL HYDROCHLORIDE 50 MG/1
50 TABLET ORAL ONCE
Refills: 0 | Status: DISCONTINUED | OUTPATIENT
Start: 2023-05-04 | End: 2023-05-04

## 2023-05-04 RX ORDER — INSULIN LISPRO 100/ML
7 VIAL (ML) SUBCUTANEOUS
Refills: 0 | Status: DISCONTINUED | OUTPATIENT
Start: 2023-05-04 | End: 2023-05-05

## 2023-05-04 RX ORDER — CLONAZEPAM 1 MG
1 TABLET ORAL THREE TIMES A DAY
Refills: 0 | Status: DISCONTINUED | OUTPATIENT
Start: 2023-05-04 | End: 2023-05-06

## 2023-05-04 RX ORDER — INSULIN GLARGINE 100 [IU]/ML
28 INJECTION, SOLUTION SUBCUTANEOUS AT BEDTIME
Refills: 0 | Status: DISCONTINUED | OUTPATIENT
Start: 2023-05-04 | End: 2023-05-05

## 2023-05-04 RX ORDER — SODIUM,POTASSIUM PHOSPHATES 278-250MG
1 POWDER IN PACKET (EA) ORAL ONCE
Refills: 0 | Status: COMPLETED | OUTPATIENT
Start: 2023-05-04 | End: 2023-05-04

## 2023-05-04 RX ORDER — ACETAMINOPHEN 500 MG
1000 TABLET ORAL EVERY 6 HOURS
Refills: 0 | Status: DISCONTINUED | OUTPATIENT
Start: 2023-05-04 | End: 2023-05-05

## 2023-05-04 RX ADMIN — POLYETHYLENE GLYCOL 3350 17 GRAM(S): 17 POWDER, FOR SOLUTION ORAL at 11:46

## 2023-05-04 RX ADMIN — HYDROMORPHONE HYDROCHLORIDE 0.5 MILLIGRAM(S): 2 INJECTION INTRAMUSCULAR; INTRAVENOUS; SUBCUTANEOUS at 12:16

## 2023-05-04 RX ADMIN — Medication 1: at 12:25

## 2023-05-04 RX ADMIN — Medication 2: at 09:16

## 2023-05-04 RX ADMIN — OXYCODONE HYDROCHLORIDE 5 MILLIGRAM(S): 5 TABLET ORAL at 19:35

## 2023-05-04 RX ADMIN — Medication 1000 MILLIGRAM(S): at 23:29

## 2023-05-04 RX ADMIN — Medication 7 UNIT(S): at 12:26

## 2023-05-04 RX ADMIN — HYDROMORPHONE HYDROCHLORIDE 0.5 MILLIGRAM(S): 2 INJECTION INTRAMUSCULAR; INTRAVENOUS; SUBCUTANEOUS at 05:30

## 2023-05-04 RX ADMIN — Medication 100 MILLIGRAM(S): at 22:15

## 2023-05-04 RX ADMIN — INSULIN GLARGINE 28 UNIT(S): 100 INJECTION, SOLUTION SUBCUTANEOUS at 22:15

## 2023-05-04 RX ADMIN — HYDROMORPHONE HYDROCHLORIDE 0.5 MILLIGRAM(S): 2 INJECTION INTRAMUSCULAR; INTRAVENOUS; SUBCUTANEOUS at 16:17

## 2023-05-04 RX ADMIN — Medication 7 UNIT(S): at 19:13

## 2023-05-04 RX ADMIN — Medication 125 MILLIGRAM(S): at 19:56

## 2023-05-04 RX ADMIN — HYDROMORPHONE HYDROCHLORIDE 0.5 MILLIGRAM(S): 2 INJECTION INTRAMUSCULAR; INTRAVENOUS; SUBCUTANEOUS at 04:56

## 2023-05-04 RX ADMIN — Medication 1000 MILLIGRAM(S): at 11:17

## 2023-05-04 RX ADMIN — Medication 125 MILLIGRAM(S): at 06:41

## 2023-05-04 RX ADMIN — Medication 1000 MILLIGRAM(S): at 10:47

## 2023-05-04 RX ADMIN — CEFTRIAXONE 100 MILLIGRAM(S): 500 INJECTION, POWDER, FOR SOLUTION INTRAMUSCULAR; INTRAVENOUS at 18:57

## 2023-05-04 RX ADMIN — PANTOPRAZOLE SODIUM 40 MILLIGRAM(S): 20 TABLET, DELAYED RELEASE ORAL at 04:56

## 2023-05-04 RX ADMIN — Medication 1000 MILLIGRAM(S): at 19:35

## 2023-05-04 RX ADMIN — Medication 1 TABLET(S): at 10:47

## 2023-05-04 RX ADMIN — SERTRALINE 50 MILLIGRAM(S): 25 TABLET, FILM COATED ORAL at 11:46

## 2023-05-04 RX ADMIN — GABAPENTIN 300 MILLIGRAM(S): 400 CAPSULE ORAL at 19:56

## 2023-05-04 RX ADMIN — CEFTRIAXONE 100 MILLIGRAM(S): 500 INJECTION, POWDER, FOR SOLUTION INTRAMUSCULAR; INTRAVENOUS at 04:58

## 2023-05-04 RX ADMIN — Medication 1000 MILLIGRAM(S): at 23:59

## 2023-05-04 RX ADMIN — HYDROMORPHONE HYDROCHLORIDE 0.5 MILLIGRAM(S): 2 INJECTION INTRAMUSCULAR; INTRAVENOUS; SUBCUTANEOUS at 15:47

## 2023-05-04 RX ADMIN — Medication 1 MILLIGRAM(S): at 11:46

## 2023-05-04 RX ADMIN — Medication 1000 MILLIGRAM(S): at 18:57

## 2023-05-04 RX ADMIN — HYDROMORPHONE HYDROCHLORIDE 0.5 MILLIGRAM(S): 2 INJECTION INTRAMUSCULAR; INTRAVENOUS; SUBCUTANEOUS at 23:39

## 2023-05-04 RX ADMIN — Medication 1: at 19:12

## 2023-05-04 RX ADMIN — GABAPENTIN 300 MILLIGRAM(S): 400 CAPSULE ORAL at 04:56

## 2023-05-04 RX ADMIN — HYDROMORPHONE HYDROCHLORIDE 0.5 MILLIGRAM(S): 2 INJECTION INTRAMUSCULAR; INTRAVENOUS; SUBCUTANEOUS at 11:46

## 2023-05-04 NOTE — PROGRESS NOTE ADULT - ASSESSMENT
53 year old female with PMH type 2 DM, anxiety, depression, gastroparesis, GERD, hepatic steatosis, volvulus s/p lap R hemicolectomy with Dr Sen on 4/17 presented with dark stool, lightheadedness and dizziness. Patient now experiencing fevers, headache, neck stiffness, nausea and vomiting concerning for meningitis. 5/3 Neurology attempted LP without success    Plan  - LP with neuroradiology  - Diet: regular CC  - Continue with ceftriaxone and vanco  - Insulin regimen per endo  - Home meds restarted  - DVT prophylaxis held in the setting of procedure   - Appreciate medicine, ID, neurology, and heme recs    A team surgery [id number]

## 2023-05-04 NOTE — PROVIDER CONTACT NOTE (CRITICAL VALUE NOTIFICATION) - ACTION/TREATMENT ORDERED:
MD made aware. MD states to begin administration of second blood transfusion
MD made aware. MD states to begin second blood transfusion.
Provider made aware

## 2023-05-04 NOTE — PROGRESS NOTE ADULT - ASSESSMENT
53 f with DM on insulin c/b gastroparesis and neuropathy, had LP long time a go to r/o SLE, recent admission for volvulus s/p R lap hemicolectomy w/ ileocolic anastomosis (4/17/2023) presented 4/27 with fever, weakness, headache, nausea and fall  febrile to 102.7, tachy WBC: 27, Hgb: 6.4  carmella positive  abd/pelvis CT: no anastomosis leak  was started on IVIG for hemolytic anemia on 4/30 and had infusion reaction with rigors and jaw pain during the infusion, s/p benadryl and also had another dose 5/1  Hgb, WBC and fever improved  urine cx showed E. faecalis and coag neg staph but pt has no symptoms was started on unasyn 5/1  on 5/2 pt had worse headache of her life with neck stiffness, vomiting and fever again    initial fever, leukocytosis and anemia due to hemolytic process s/p IVIG 4/30 and 5/1with improved fever, leukocytosis and anemia now with fever headache, neck stiffness appears to be meningitis clinically most likely aseptic due to IVIG vs autoimmune, MRI/MRV unremarkable  positive urine cx with E. faecalis and coag neg staph with no symptoms, likely asymptomatic bacteriuria   s/p LP with 1183 WBC, 75% PMN, glucose 92, protein 29 gram stain negative  * on vanco 1250 q 12 and ceftriaxone 2 q 12 will follow the CSF PCR, if negative discontinue antibiotics  * f/u with neurology and hem    The above assessment and plan was discussed with the primary team    Nelida Wright MD  contact on teams  After 5pm and on weekends call [phone]

## 2023-05-04 NOTE — PROGRESS NOTE ADULT - SUBJECTIVE AND OBJECTIVE BOX
Chief Complaint: Type 2 Diabetes Mellitus, uncontrolled    History: Pt seen at bedside. Pt tolerating oral diet. Pt denies nausea and vomiting/any signs of hypoglycemia. Pt reports an adequate appetite.     MEDICATIONS  (STANDING):  acetaminophen     Tablet .. 1000 milliGRAM(s) Oral every 6 hours  cefTRIAXone   IVPB 2000 milliGRAM(s) IV Intermittent every 12 hours  folic acid 1 milliGRAM(s) Oral daily  gabapentin 300 milliGRAM(s) Oral two times a day  glucagon  Injectable 1 milliGRAM(s) IntraMuscular once  insulin glargine Injectable (LANTUS) 26 Unit(s) SubCutaneous at bedtime  insulin lispro (ADMELOG) corrective regimen sliding scale   SubCutaneous three times a day before meals  insulin lispro (ADMELOG) corrective regimen sliding scale   SubCutaneous at bedtime  insulin lispro Injectable (ADMELOG) 7 Unit(s) SubCutaneous three times a day before meals  pantoprazole    Tablet 40 milliGRAM(s) Oral before breakfast  polyethylene glycol 3350 17 Gram(s) Oral daily  sertraline 50 milliGRAM(s) Oral daily  traZODone 100 milliGRAM(s) Oral at bedtime  vancomycin  IVPB 1250 milliGRAM(s) IV Intermittent every 12 hours    MEDICATIONS  (PRN):  clonazePAM  Tablet 1 milliGRAM(s) Oral three times a day PRN for anxiety      Allergies: Bactrim (Anaphylaxis)  Eggplant mouth itches (Other)      Review of Systems:  Respiratory: No SOB, no cough  GI: No nausea, vomiting, abdominal pain  Endocrine: no polyuria, polydipsia    PHYSICAL EXAM:  VITALS: T(C): 36.7 (05-04-23 @ 10:42)  T(F): 98.1 (05-04-23 @ 10:42), Max: 99.1 (05-03-23 @ 18:00)  HR: 55 (05-04-23 @ 10:42) (55 - 70)  BP: 114/68 (05-04-23 @ 10:42) (99/61 - 117/56)  RR:  (16 - 17)  SpO2:  (95% - 100%)  Wt(kg): --  GENERAL: NAD, well-groomed, well-developed  RESPIRATORY: No labored breathing   GI: Soft, nontender, non distended  PSYCH: Alert and oriented x 3, normal affect, normal mood      CAPILLARY BLOOD GLUCOSE  POCT Blood Glucose.: 157 mg/dL (04 May 2023 12:03)  POCT Blood Glucose.: 221 mg/dL (04 May 2023 08:26)  POCT Blood Glucose.: 177 mg/dL (03 May 2023 21:43)  POCT Blood Glucose.: 117 mg/dL (03 May 2023 17:27)    A1C with Estimated Average Glucose (04.12.23 @ 19:55)    A1C with Estimated Average Glucose Result: 8.7   Estimated Average Glucose: 203 05-04    134<L>  |  103  |  9   ----------------------------<  214<H>  3.9   |  21<L>  |  0.76    eGFR: 95    Ca    8.3<L>      05-04  Mg     2.10     05-04  Phos  2.6     05-04    TPro  7.5  /  Alb  3.0<L>  /  TBili  0.7  /  DBili  0.3  /  AST  22  /  ALT  11  /  AlkPhos  90  05-04      Thyroid Function Tests:  05-03 @ 11:13 TSH 1.49 FreeT4 -- T3 70 Anti TPO -- Anti Thyroglobulin Ab -- TSI --    Diet, Regular:   Consistent Carbohydrate Evening Snack (CSTCHOSN)  Fiber/Residue Restricted (LOWFIBER) (04-30-23 @ 17:07) [Active]

## 2023-05-04 NOTE — PROGRESS NOTE ADULT - SUBJECTIVE AND OBJECTIVE BOX
Neurology Progress Note    SUBJECTIVE/OBJECTIVE/INTERVAL EVENTS: Patient seen and examined at bedside in AM before any procedures. Patient reports headache and neck pain is at least 50% better than before. She feels generally well otherwise. No new neurologic symptoms including double vision, speech changes, worsening extremity numbness/weakness. Attempted bedside LP yesterday with attending but unable to obtain CSF due to anatomy pain from positioning, and degree of scoliosis based on landmarks.     MEDICATIONS  (STANDING):  cefTRIAXone   IVPB 2000 milliGRAM(s) IV Intermittent every 12 hours  folic acid 1 milliGRAM(s) Oral daily  gabapentin 300 milliGRAM(s) Oral two times a day  glucagon  Injectable 1 milliGRAM(s) IntraMuscular once  insulin glargine Injectable (LANTUS) 26 Unit(s) SubCutaneous at bedtime  insulin lispro (ADMELOG) corrective regimen sliding scale   SubCutaneous three times a day before meals  insulin lispro (ADMELOG) corrective regimen sliding scale   SubCutaneous at bedtime  insulin lispro Injectable (ADMELOG) 7 Unit(s) SubCutaneous three times a day before meals  pantoprazole    Tablet 40 milliGRAM(s) Oral before breakfast  polyethylene glycol 3350 17 Gram(s) Oral daily  sertraline 50 milliGRAM(s) Oral daily  traZODone 100 milliGRAM(s) Oral at bedtime  vancomycin  IVPB 1250 milliGRAM(s) IV Intermittent every 12 hours    MEDICATIONS  (PRN):  acetaminophen     Tablet .. 1000 milliGRAM(s) Oral every 6 hours PRN Mild Pain (1 - 3)  clonazePAM  Tablet 1 milliGRAM(s) Oral three times a day PRN for anxiety      VITALS & EXAMINATION:  Vital Signs Last 24 Hrs  T(C): 37.1 (04 May 2023 07:27), Max: 37.3 (03 May 2023 18:00)  T(F): 98.7 (04 May 2023 07:27), Max: 99.1 (03 May 2023 18:00)  HR: 69 (04 May 2023 06:00) (62 - 72)  BP: 113/57 (04 May 2023 07:27) (99/61 - 117/56)  BP(mean): --  RR: 16 (04 May 2023 07:27) (16 - 18)  SpO2: 100% (04 May 2023 07:27) (95% - 100%)    Parameters below as of 04 May 2023 06:00  Patient On (Oxygen Delivery Method): room air      General:  Constitutional: Female, appears stated age, nontoxic, not in distress  Head: Normocephalic;   Eyes: clear sclera;   Extremities: No cyanosis;   Resp: breathing comfortably   Neck: no neck rigidity      Neurological (>12):  MS: Awake, alert. Oriented person place situation. Follows all commands. Attends to examiner. Mentation appears intact/ baseline. Able to provide good history.   Language: Speech is clear, fluent, good repetition,  comprehension, registration of words.  CNs: PERRL (R 3mm, L 3mm). no APD. VFF. EOMI. V1-3 intact LT, No álvaro facial asymmetry b/l. Hearing grossly normal b/l. Tongue midline and able to move side to side.     Motor - Normal bulk and tone.  Effort dependent   L/R         Deltoid 5/5  Biceps   5/5     Triceps  4+/4+          5/5   L/R         Hip Flexion  5/5 Knee Extension 5/5  Dorsiflexion  5/5      Plantar Flexion 4+/4+     Sensation: Intact to LT, vibration b/l but diminished temp on L side particularly leg.    Reflexes L/R:  Biceps(C5) 2/2  BR(C6) 2/2   Triceps(C7)  2/2 Patellar(L4)  1/1  Ankle 1/1   Toes: downgoing bilaterally  No ankle clonus  Negative james sign bilaterally   Negative pectoral reflex bilaterally   Coordination: No dysmetria to FTN b/l UE  Gait: unable to assess due to pain        LABORATORY:  CBC                       8.8    6.04  )-----------( 133      ( 04 May 2023 04:06 )             27.8     Chem 05-04    134<L>  |  103  |  9   ----------------------------<  214<H>  3.9   |  21<L>  |  0.76    Ca    8.3<L>      04 May 2023 04:06  Phos  2.6     05-04  Mg     2.10     05-04    TPro  7.5  /  Alb  3.0<L>  /  TBili  0.7  /  DBili  0.3  /  AST  22  /  ALT  11  /  AlkPhos  90  05-04    LFTs LIVER FUNCTIONS - ( 04 May 2023 04:06 )  Alb: 3.0 g/dL / Pro: 7.5 g/dL / ALK PHOS: 90 U/L / ALT: 11 U/L / AST: 22 U/L / GGT: x           Coagulopathy PT/INR - ( 04 May 2023 04:06 )   PT: 13.6 sec;   INR: 1.17 ratio         PTT - ( 04 May 2023 04:06 )  PTT:29.8 sec  Lipid Panel   A1c   Cardiac enzymes     U/A Urinalysis Basic - ( 02 May 2023 20:14 )    Color: Yellow / Appearance: Clear / S.025 / pH: x  Gluc: x / Ketone: Trace  / Bili: Negative / Urobili: 12 mg/dL   Blood: x / Protein: Trace / Nitrite: Negative   Leuk Esterase: Negative / RBC: x / WBC x   Sq Epi: x / Non Sq Epi: x / Bacteria: x      CSF  Immunological  Other    STUDIES & IMAGING: (EEG, CT, MR, U/S, TTE/CHARLIE): Neurology Progress Note    SUBJECTIVE/OBJECTIVE/INTERVAL EVENTS: Patient seen and examined at bedside in AM before any procedures. Patient reports headache and neck pain is at least 50% better than before. She feels generally well otherwise. No new neurologic symptoms including double vision, speech changes, worsening extremity numbness/weakness. Attempted bedside LP yesterday with attending but unable to obtain CSF due to anatomy pain from positioning, and degree of scoliosis based on landmarks. D/w primary team in AM, patient tentative plan for xray guidance LP today AM.     MEDICATIONS  (STANDING):  cefTRIAXone   IVPB 2000 milliGRAM(s) IV Intermittent every 12 hours  folic acid 1 milliGRAM(s) Oral daily  gabapentin 300 milliGRAM(s) Oral two times a day  glucagon  Injectable 1 milliGRAM(s) IntraMuscular once  insulin glargine Injectable (LANTUS) 26 Unit(s) SubCutaneous at bedtime  insulin lispro (ADMELOG) corrective regimen sliding scale   SubCutaneous three times a day before meals  insulin lispro (ADMELOG) corrective regimen sliding scale   SubCutaneous at bedtime  insulin lispro Injectable (ADMELOG) 7 Unit(s) SubCutaneous three times a day before meals  pantoprazole    Tablet 40 milliGRAM(s) Oral before breakfast  polyethylene glycol 3350 17 Gram(s) Oral daily  sertraline 50 milliGRAM(s) Oral daily  traZODone 100 milliGRAM(s) Oral at bedtime  vancomycin  IVPB 1250 milliGRAM(s) IV Intermittent every 12 hours    MEDICATIONS  (PRN):  acetaminophen     Tablet .. 1000 milliGRAM(s) Oral every 6 hours PRN Mild Pain (1 - 3)  clonazePAM  Tablet 1 milliGRAM(s) Oral three times a day PRN for anxiety      VITALS & EXAMINATION:  Vital Signs Last 24 Hrs  T(C): 37.1 (04 May 2023 07:27), Max: 37.3 (03 May 2023 18:00)  T(F): 98.7 (04 May 2023 07:27), Max: 99.1 (03 May 2023 18:00)  HR: 69 (04 May 2023 06:00) (62 - 72)  BP: 113/57 (04 May 2023 07:27) (99/61 - 117/56)  BP(mean): --  RR: 16 (04 May 2023 07:27) (16 - 18)  SpO2: 100% (04 May 2023 07:27) (95% - 100%)    Parameters below as of 04 May 2023 06:00  Patient On (Oxygen Delivery Method): room air      General:  Constitutional: Female, appears stated age, nontoxic, not in distress  Head: Normocephalic;   Eyes: clear sclera;   Extremities: No cyanosis;   Resp: breathing comfortably   Neck: no neck rigidity      Neurological (>12):  MS: Awake, alert. Oriented person place situation. Follows all commands. Attends to examiner. Mentation appears intact/ baseline. Able to provide good history.   Language: Speech is clear, fluent, good repetition,  comprehension, registration of words.  CNs: PERRL (R 3mm, L 3mm). no APD. VFF. EOMI. V1-3 intact LT, No álvaro facial asymmetry b/l. Hearing grossly normal b/l. Tongue midline and able to move side to side.     Motor - Normal bulk and tone.  Effort dependent but better than prior day  L/R         Deltoid 5/5  Biceps   5/5     Triceps  4+/4+          5/5   L/R         Hip Flexion  5/5 Knee Extension 5/5  Dorsiflexion  5/5      Plantar Flexion 4+/4+     Sensation: Intact to LT, vibration b/l but diminished temp on L side particularly leg.    Reflexes L/R:  Biceps(C5) 2/2  BR(C6) 2/2   Triceps(C7)  2/2 Patellar(L4)  1/1  Ankle 1/1   Toes: downgoing bilaterally  No ankle clonus  Negative james sign bilaterally   Negative pectoral reflex bilaterally   Coordination: No dysmetria to FTN b/l UE  Gait: unable to assess due to pain     LABORATORY:  CBC                       8.8    6.04  )-----------( 133      ( 04 May 2023 04:06 )             27.8     Chem 05-04    134<L>  |  103  |  9   ----------------------------<  214<H>  3.9   |  21<L>  |  0.76    Ca    8.3<L>      04 May 2023 04:06  Phos  2.6     05-04  Mg     2.10     05-04    TPro  7.5  /  Alb  3.0<L>  /  TBili  0.7  /  DBili  0.3  /  AST  22  /  ALT  11  /  AlkPhos  90  05-04    LFTs LIVER FUNCTIONS - ( 04 May 2023 04:06 )  Alb: 3.0 g/dL / Pro: 7.5 g/dL / ALK PHOS: 90 U/L / ALT: 11 U/L / AST: 22 U/L / GGT: x           Coagulopathy PT/INR - ( 04 May 2023 04:06 )   PT: 13.6 sec;   INR: 1.17 ratio       PTT - ( 04 May 2023 04:06 )  PTT:29.8 sec  Lipid Panel   A1c   Cardiac enzymes     U/A Urinalysis Basic - ( 02 May 2023 20:14 )    Color: Yellow / Appearance: Clear / S.025 / pH: x  Gluc: x / Ketone: Trace  / Bili: Negative / Urobili: 12 mg/dL   Blood: x / Protein: Trace / Nitrite: Negative   Leuk Esterase: Negative / RBC: x / WBC x   Sq Epi: x / Non Sq Epi: x / Bacteria: x      CSF  Immunological  Other    STUDIES & IMAGING: (EEG, CT, MR, U/S, TTE/CHARLIE):    < from: MR Venogram Head w/ IV Cont (23 @ 12:34) >    ACC: [id number] EXAM:  MR VENOGRAM BRAIN IC   ORDERED BY: ROSIE RAMIREZ     PROCEDURE DATE:  2023          INTERPRETATION:  MR brain venogram with and without gadolinium contrast    CLINICAL INFORMATION:    Aseptic meningitis headache no lytic anemia    TECHNIQUE:   Three-dimensional time of flight MR venogram was performed   in the sagittal plane.  Three-dimensional time-of-flight MR angiography   was performed in the sagittal plane following administration 8 mL   gadolinium, 2 mL discarded.Post processing angiographic reconstruction   of images was performed.   Each data set was reconstructed as maximum   intensity pixel images and displayed in multiple rotations.  Supplemental   gadolinium-enhanced images were obtained as a volumetricT1-weighted   gradient echo acquisition.  This data set was reconstructed in the   sagittal and coronal planes.    COMPARISON:   Concurrent MR brain and CT head preceding day available for   review.    FINDINGS:    The superior sagittal sinus is patent in both its anterior and posterior   limbs.   The right transverse and sigmoid sinuses are patent to the right   internal jugular vein. In the right mid transverse sinus ovoid   well-circumscribed defect is typical for arachnoid granulation. The left   transverse and sigmoid sinus are patent to the left internal jugular   vein.  Asymmetry of the transverse sinus caliber is within the limits of   developmental variation.    The internal cerebral veins and straight sinus appear patent.    The cavernous sinuses demonstrate no anomalous flow.  The superior   ophthalmic veins are not dilated.      IMPRESSION:   Unremarkable MR venogram of the intracranial dural sinuses.     No evidence of dural sinus thrombosis.    --- End of Report ---       < from: MR Head w/wo IV Cont (23 @ 10:35) >    ACC: [id number] EXAM:  MR BRAIN WAW IC   ORDERED BY: ROSIE RAMIREZ     PROCEDURE DATE:  2023          INTERPRETATION:  Contrast-enhanced MRI of the brain.    CLINICAL INDICATION: Hemolytic anemia, aseptic meningitis    TECHNIQUE:  Multiplanar, multisequence MR images of the brain were   obtained before and after the intravenous administration of 8 cc of   Gadavist. 2 cc were discarded.    COMPARISON: CT brain 2023.    FINDINGS:    No hydrocephalus, midline shift, mass effect, vasogenic edema, or acute   intracranial hemorrhage.    Diffusion weighted images demonstrate no acute territorial infarct.    Single nonspecific, nonenhancing focus of T2 and FLAIR hyperintense   signal in the left temporal white matter.    No abnormal parenchymal or leptomeningeal enhancement.    Flow voids are seen within the major intracranial vessels consistent with   their patency.    Air-fluid level in the left sphenoid sinus, remaining paranasal sinuses   and mastoid air cells are clear.    Orbits, sellar and suprasellar structures, and craniocervical junction   are unremarkable.    IMPRESSION:    No hydrocephalus, mass effect, acute intracranial hemorrhage, vasogenic   edema, or acute territorial infarct.    No abnormal parenchymal or leptomeningeal enhancement.    --- End of Report ---

## 2023-05-04 NOTE — PROGRESS NOTE ADULT - ASSESSMENT
53F PMHx DM on insulin c/b gastroparesis and neuropathy presents for 2 days of dehydration/HA/nausea/SOB in the setting of R lap hemicolectomy w/ ileocolic anastomosis (4/17/2023)     Pain - dilaudid as needed with close monitor of pts cognition / resp status - discussed with surgery PA about poss pain management eval     Dm2- - as per Endo,  HbA1c 8.7%; home regimen: lantus 30 units + novolog 14 units with meals + metformin 1 g bid  - Glucose Target 100-180 mg/dl: slightly above goal   - Increase Lantus to 26 units QHS  - Increase Admelog to 9 units with meals- HOLD if not eating   - Continue Admelog LOW correctional scales before meals and bedtime     #Anemia  # Indirect hyperbilirubinemia  - Patient had Hb of 6.1 -> s/p 2U prbc transfusion; responded appropriately. however, downtrended again.   - s/p pRBC     -LDH elevated, hapto<20, retic elevated, with increase in total direct bilirubin; IgG positive, C3 negative, eluate negative --> concern for hemolytic anemia.   - S/P dose 1 of IVIGTotal Cells Counted, Spinal Fluid: 100 Cells (05.04.23 @ 12:10)  -heme f/u    - Headache- suspected reported transfusion reaction on IVIG. Neurology consulted as patient has since IVIG developed severe headache, all over, sharp in nature, with associated nausea, vomiting, photophobia, neck pain. Had fever 101.4F, HR72, 118/70, RR18, saturating well. MRI and MR venogram performed   -  s/p LP , Appreciate neuro input

## 2023-05-04 NOTE — PROGRESS NOTE ADULT - SUBJECTIVE AND OBJECTIVE BOX
Follow Up:  fever, meningitis    Interval History/ROS: no more fever, headache resolved, no more vomiting, no cough, abd pain or dysuria      Allergies  Bactrim (Anaphylaxis)  Eggplant mouth itches (Other)        ANTIMICROBIALS:  cefTRIAXone   IVPB 2000 every 12 hours  vancomycin  IVPB 1250 every 12 hours      OTHER MEDS:  acetaminophen     Tablet .. 1000 milliGRAM(s) Oral every 6 hours  clonazePAM  Tablet 1 milliGRAM(s) Oral three times a day PRN  folic acid 1 milliGRAM(s) Oral daily  gabapentin 300 milliGRAM(s) Oral two times a day  glucagon  Injectable 1 milliGRAM(s) IntraMuscular once  insulin glargine Injectable (LANTUS) 26 Unit(s) SubCutaneous at bedtime  insulin lispro (ADMELOG) corrective regimen sliding scale   SubCutaneous three times a day before meals  insulin lispro (ADMELOG) corrective regimen sliding scale   SubCutaneous at bedtime  insulin lispro Injectable (ADMELOG) 7 Unit(s) SubCutaneous three times a day before meals  pantoprazole    Tablet 40 milliGRAM(s) Oral before breakfast  polyethylene glycol 3350 17 Gram(s) Oral daily  sertraline 50 milliGRAM(s) Oral daily  traZODone 100 milliGRAM(s) Oral at bedtime      Vital Signs Last 24 Hrs  T(C): 36.7 (04 May 2023 10:42), Max: 37.3 (03 May 2023 18:00)  T(F): 98.1 (04 May 2023 10:42), Max: 99.1 (03 May 2023 18:00)  HR: 55 (04 May 2023 10:42) (55 - 70)  BP: 114/68 (04 May 2023 10:42) (99/61 - 117/56)  BP(mean): --  RR: 16 (04 May 2023 10:42) (16 - 17)  SpO2: 99% (04 May 2023 10:42) (95% - 100%)    Parameters below as of 04 May 2023 10:42  Patient On (Oxygen Delivery Method): room air        Physical Exam:  General:   non toxic  Respiratory:   comfortable on RA  abd:   soft, BS +, not tender  :     no CVAT, no suprapubic tenderness, no perez  Musculoskeletal : no joint swelling, no edema  Skin:    no rash  vascular: no phlebitis  Neurologic:     no more neck rigidity                           8.8    6.04  )-----------( 133      ( 04 May 2023 04:06 )             27.8           134<L>  |  103  |  9   ----------------------------<  214<H>  3.9   |  21<L>  |  0.76    Ca    8.3<L>      04 May 2023 04:06  Phos  2.6       Mg     2.10         TPro  7.5  /  Alb  3.0<L>  /  TBili  0.7  /  DBili  0.3  /  AST  22  /  ALT  11  /  AlkPhos  90        Urinalysis Basic - ( 02 May 2023 20:14 )    Color: Yellow / Appearance: Clear / S.025 / pH: x  Gluc: x / Ketone: Trace  / Bili: Negative / Urobili: 12 mg/dL   Blood: x / Protein: Trace / Nitrite: Negative   Leuk Esterase: Negative / RBC: x / WBC x   Sq Epi: x / Non Sq Epi: x / Bacteria: x        MICROBIOLOGY:  Vancomycin Level, Trough: 14.9 ug/mL (23 @ 04:06)  v  .CSF CSF  23 --  --    polymorphonuclear leukocytes seen per oil power field  No organisms seen per oil power field  by cytocentrifuge      .Blood Blood-Peripheral  23   No growth to date.  --  --      .Blood Blood-Peripheral  23   No growth to date.  --  --      Clean Catch Clean Catch (Midstream)  23   >100,000 CFU/ml Enterococcus faecalis  50,000 - 99,000 CFU/mL Coag negative Staphylococcus not Staph  saprophyticus "Susceptibilities not performed"  --  Enterococcus faecalis      .Blood Blood-Peripheral  23   No Growth Final  --  --      .Blood Blood-Peripheral  23   No Growth Final  --  --                RADIOLOGY:  Images independently visualized and reviewed personally, findings as below  < from: MR Venogram Head w/ IV Cont (23 @ 12:34) >    IMPRESSION:   Unremarkable MR venogram of the intracranial dural sinuses.     No evidence of dural sinus thrombosis.    < end of copied text >

## 2023-05-04 NOTE — PROVIDER CONTACT NOTE (CRITICAL VALUE NOTIFICATION) - ASSESSMENT
A&O4, pt completed one blood transfusion, currently pending second blood transfusion
A&O4, pt completed one blood transfusion, pending second blood transfusion
a&o x4, no c/o pain, nausea, or vomitting.

## 2023-05-04 NOTE — PROGRESS NOTE ADULT - ASSESSMENT
Patient Danna Keith is a 52 yo woman PMHx DM on insulin c/b gastroparesis and neuropathy, recent R lap hemicolectomy w/ ileocolic anastomosis (4/17/2023) who presented to the hospital on 4/27/23 after AMS, passing out and falling out of bed. Patient reports she was due to have follow up with her doctor and then started to become dizzy that day, not making sense and then collapsed and possibly hit her head and L side of her body. Found with anemia, leukocytosis, elevated T bili, s/p pRBC with improvement in cognitive symptoms. However heme concern for anemia, indirect hyperbilirubinemia, and received IVIG two doses (4/30/23, 5/1/23). Developed a suspected reported transfusion reaction on IVIG. Neurology consulted as patient has since IVIG developed severe headache, all over, sharp in nature, with associated nausea, vomiting, photophobia, neck pain. Had fever 101.4F, HR72, 118/70, RR18, saturating well. MRI and MR venogram performed and unrevealing.    Meds: heparin ppx, unasyn, insulin, PEG, PPI, folic acid, klonopin PRN, gabapentin, sertraline, trazodone.   labs: wbc 6.56, hgb 8.3, plt 133, . elevated retic % 18.5, cmp albumin 2.9,     Impression: Patient with headaches, nausea, vomiting, photophobia, neck pain, improving in setting of recent IVIG use. Differential diagnosis given her multiple comorbidities makes it broad. Includes but not limited to primary headache disorder, secondary headache disorder (ie side effect of IVIG), meningitis. Imaging suggests no evidence of hemorrhage, stroke, sinus thrombosis. Patients after IVIG can develop delayed headaches, nausea, vomiting, photophobia as known side effect, which is likely. If meningitis would suspect more so to be aseptic meningitis from IVIG based on presentation and exam compared to viral/ bacterial meningitis. Lower suspicion of encephalitis.      Recommendations:  [x] Given fever, concern for infection, consider infectious workup   [x] Appreciate ID evaluation, will defer empiric antibiotic/ antiviral coverage to ID for guidance  [x] MRI brain w/ w/o contrast, MR venogram w/ contrast unrevealing for etiology  [ ] LP under neuroradiology guidance: protein, glucose, cell count, albumin, AFB, gram strain with culture, PCR, HSV, IgG index, flow cytometry, lyme, [place], VDRL, fungal cultures, CMV/EBV PCR,  [ ] procalcitonin, LFTs, ESR, CRP, TSH, T3/T4, thiamine, B12, folate  [ ] consider tele  [ ] Low threshold of ICU evaluation if clinically worsening. Patient at risk for clinical decompensation given comorbidities and differential diagnosis of potential etiologies. Patient was advised.   [ ] Neuro-checks q4hrs, aspiration, fall precautions    Discussed patient care with recommendations at time of evaluation with neuro attending, primary team, patient at bedside. Prelim evaluation is not limited to that above. Non-neurologic findings seen on imaging per primary team management. The patient demonstrated good understanding of the diagnostic plan and able to verbalize back. Recommendations will be finalized/amended once signed by attending.

## 2023-05-04 NOTE — PROVIDER CONTACT NOTE (CRITICAL VALUE NOTIFICATION) - SITUATION
Hemoglobin 6.1 Hematocrit 18.6
critical value - CSF Count WBC 11.83
Blood gas lab Hemoglobin 6.5 and Hematocrit 20

## 2023-05-04 NOTE — PROGRESS NOTE ADULT - ASSESSMENT
A/P: 54 yo F with DM2, s/p R lap hemicolectomy w/ileocolic anastomosis (4/17/23), being evaluated for possible leak. Endocrine consulted for DM2 management.    #Type 2 Diabetes Mellitus, uncontrolled  - HbA1c 8.7%; home regimen: lantus 30 units + novolog 14 units with meals + metformin 1 g bid  - Glucose Target 100-180 mg/dl: Slightly above goal this am; stable at lunch   - Increase slightly to Lantus 28 units QHS  - Continue Admelog 7 units with meals- HOLD if not eating  - Continue Admelog LOW correctional scales before meals and bedtime   - Please check FSG before meals and QHS, or q6h while NPO  - RD consult - ordered   - hypoglycemia orderset prn  - Discharge planning:   basal-bolus insulin + metformin 1 g bid with meals   Ensure patient has working glucometer, test strips, lancets, alcohol pads, and BD renetta pen needles  Please also prescribe glucose tabs, Baqsimi nasal spray or glucagon emergency kit for hypoglycemia risk   Appt already arranged:Dr. Dorina Cha at [address] [facility] [address]; [place] [address] on June 7th at 10:20am  Please follow up with pcp, opthalmology, podiatry, and endocrinology as an outpt    #HTN  -BP goal <130/80  -BP is normal w/o any medications  -Outpatient microalb/ cr ratio    #HLD  -Outpatient lipid profile shows LDL in 80s and then 50s  -ASCVD <7.5%  -Consider STATIN if no contraindications for primary prevention, can be assessed outpt     D/w Primary team     Halie Frederick  Nurse Practitioner  Division of Endocrinology & Diabetes  In house pager #[phone]    If before 9AM or after 6PM, or on weekends/holidays, please call endocrine answering service for assistance ([phone]).For nonurgent matters email [email or web address] for assistance.      A/P: 54 yo F with DM2, s/p R lap hemicolectomy w/ileocolic anastomosis (4/17/23), being evaluated for possible leak. Endocrine consulted for DM2 management.    #Type 2 Diabetes Mellitus, uncontrolled  - HbA1c 8.7%; home regimen: lantus 30 units + novolog 14 units with meals + metformin 1 g bid  - Glucose Target 100-180 mg/dl: Slightly above goal this am; stable at lunch   - Increase slightly to Lantus 28 units QHS  - Continue Admelog 7 units with meals- HOLD if not eating  - Continue Admelog LOW correctional scales before meals and bedtime   - Please check FSG before meals and QHS, or q6h while NPO  - RD consult - ordered   - hypoglycemia orderset prn  -Consider changing abx solution from dextrose to sodium chloride    - Discharge planning:   basal-bolus insulin + metformin 1 g bid with meals   Ensure patient has working glucometer, test strips, lancets, alcohol pads, and BD renetta pen needles  Please also prescribe glucose tabs, Baqsimi nasal spray or glucagon emergency kit for hypoglycemia risk   Appt already arranged:Dr. Dorina Cha at [address] [facility] [address]; [place] [address] on June 7th at 10:20am  Please follow up with pcp, opthalmology, podiatry, and endocrinology as an outpt    #HTN  -BP goal <130/80  -BP is normal w/o any medications  -Outpatient microalb/ cr ratio    #HLD  -Outpatient lipid profile shows LDL in 80s and then 50s  -ASCVD <7.5%  -Consider STATIN if no contraindications for primary prevention, can be assessed outpt     D/w Primary team     Halie Frederick  Nurse Practitioner  Division of Endocrinology & Diabetes  In house pager #[phone]    If before 9AM or after 6PM, or on weekends/holidays, please call endocrine answering service for assistance ([phone]).For nonurgent matters email [email or web address] for assistance.

## 2023-05-04 NOTE — PROGRESS NOTE ADULT - SUBJECTIVE AND OBJECTIVE BOX
TEAM [ ** ] Surgery Daily Progress Note  =====================================================    SUBJECTIVE: Patient seen and examined at bedside on AM rounds. Patient reports that they're feeling well. NPO/Tolerating diet, denies nausea, vomiting.    Flatus/    BM. OOB/Amublating as tolerated. Denies fever, chills.    PMH:   ***    ALLERGIES:  Bactrim (Anaphylaxis)  Eggplant mouth itches (Other)      --------------------------------------------------------------------------------------    MEDICATIONS:    Neurologic Medications  acetaminophen     Tablet .. 1000 milliGRAM(s) Oral every 6 hours PRN Mild Pain (1 - 3)  clonazePAM  Tablet 1 milliGRAM(s) Oral three times a day PRN for anxiety  gabapentin 300 milliGRAM(s) Oral two times a day  sertraline 50 milliGRAM(s) Oral daily  traZODone 100 milliGRAM(s) Oral at bedtime    Respiratory Medications    Cardiovascular Medications    Gastrointestinal Medications  folic acid 1 milliGRAM(s) Oral daily  pantoprazole    Tablet 40 milliGRAM(s) Oral before breakfast  polyethylene glycol 3350 17 Gram(s) Oral daily    Genitourinary Medications    Hematologic/Oncologic Medications    Antimicrobial/Immunologic Medications  cefTRIAXone   IVPB 2000 milliGRAM(s) IV Intermittent every 12 hours  vancomycin  IVPB 1250 milliGRAM(s) IV Intermittent every 12 hours    Endocrine/Metabolic Medications  glucagon  Injectable 1 milliGRAM(s) IntraMuscular once  insulin glargine Injectable (LANTUS) 26 Unit(s) SubCutaneous at bedtime  insulin lispro (ADMELOG) corrective regimen sliding scale   SubCutaneous three times a day before meals  insulin lispro (ADMELOG) corrective regimen sliding scale   SubCutaneous at bedtime  insulin lispro Injectable (ADMELOG) 7 Unit(s) SubCutaneous three times a day before meals    Topical/Other Medications    --------------------------------------------------------------------------------------    VITAL SIGNS:  ICU Vital Signs Last 24 Hrs  T(C): 37.1 (04 May 2023 07:27), Max: 37.3 (03 May 2023 18:00)  T(F): 98.7 (04 May 2023 07:27), Max: 99.1 (03 May 2023 18:00)  HR: 69 (04 May 2023 06:00) (62 - 72)  BP: 113/57 (04 May 2023 07:27) (99/61 - 117/56)  BP(mean): --  ABP: --  ABP(mean): --  RR: 16 (04 May 2023 07:27) (16 - 18)  SpO2: 100% (04 May 2023 07:27) (95% - 100%)    O2 Parameters below as of 04 May 2023 06:00  Patient On (Oxygen Delivery Method): room air    --------------------------------------------------------------------------------------    EXAM    General: NAD, resting in bed comfortably.  Cardiac: regular rate, warm and well perfused  Respiratory: Nonlabored respirations, normal cw expansion.  Abdomen: soft, nontender, nondistended  Extremities: normal strength, FROM, no deformities    --------------------------------------------------------------------------------------    LABS                        8.8    6.04  )-----------( 133      ( 04 May 2023 04:06 )             27.8   05-04    134<L>  |  103  |  9   ----------------------------<  214<H>  3.9   |  21<L>  |  0.76    Ca    8.3<L>      04 May 2023 04:06  Phos  2.6     05-04  Mg     2.10     05-04    TPro  7.5  /  Alb  3.0<L>  /  TBili  0.7  /  DBili  0.3  /  AST  22  /  ALT  11  /  AlkPhos  90  05-04      --------------------------------------------------------------------------------------    INS AND OUTS:  I&O's Detail    03 May 2023 07:01  -  04 May 2023 07:00  --------------------------------------------------------  IN:    IV PiggyBack: 50 mL    Oral Fluid: 1180 mL  Total IN: 1230 mL    OUT:    Voided (mL): 700 mL  Total OUT: 700 mL    Total NET: 530 mL    --------------------------------------------------------------------------------------

## 2023-05-04 NOTE — PROGRESS NOTE ADULT - SUBJECTIVE AND OBJECTIVE BOX
SUBJECTIVE / OVERNIGHT EVENTS: pt c/o pain all over the body  23     MEDICATIONS  (STANDING):  acetaminophen     Tablet .. 1000 milliGRAM(s) Oral every 6 hours  cefTRIAXone   IVPB 2000 milliGRAM(s) IV Intermittent every 12 hours  folic acid 1 milliGRAM(s) Oral daily  gabapentin 300 milliGRAM(s) Oral two times a day  glucagon  Injectable 1 milliGRAM(s) IntraMuscular once  insulin glargine Injectable (LANTUS) 28 Unit(s) SubCutaneous at bedtime  insulin lispro (ADMELOG) corrective regimen sliding scale   SubCutaneous three times a day before meals  insulin lispro (ADMELOG) corrective regimen sliding scale   SubCutaneous at bedtime  insulin lispro Injectable (ADMELOG) 7 Unit(s) SubCutaneous three times a day before meals  pantoprazole    Tablet 40 milliGRAM(s) Oral before breakfast  polyethylene glycol 3350 17 Gram(s) Oral daily  sertraline 50 milliGRAM(s) Oral daily  traZODone 100 milliGRAM(s) Oral at bedtime  vancomycin  IVPB 1250 milliGRAM(s) IV Intermittent every 12 hours    MEDICATIONS  (PRN):  clonazePAM  Tablet 1 milliGRAM(s) Oral three times a day PRN for anxiety    Vital Signs Last 24 Hrs  T(C): 36.7 (23 @ 22:29), Max: 37.1 (23 @ 06:00)  T(F): 98.1 (23 @ 22:29), Max: 98.7 (23 @ 06:00)  HR: 59 (23 @ 22:29) (54 - 70)  BP: 124/69 (23 @ 22:29) (104/68 - 124/69)  BP(mean): --  RR: 18 (23 @ 22:29) (16 - 18)  SpO2: 100% (23 @ 22:29) (96% - 100%)          Constitutional: No fever, fatigue  Skin: No rash.  Eyes: No recent vision problems or eye pain.  ENT: No congestion, ear pain, or sore throat.  Cardiovascular: No chest pain or palpation.  Respiratory: No cough, shortness of breath, congestion, or wheezing.  Gastrointestinal: No abdominal pain, nausea, vomiting, or diarrhea.  Genitourinary: No dysuria.  Musculoskeletal: pain all over the body  Neurologic: No headache.    PHYSICAL EXAM:  GENERAL: NAD  EYES: EOMI, PERRLA  NECK: Supple, No JVD  CHEST/LUNG: dec breath sounds at bases   HEART:  S1 , S2 +  ABDOMEN: soft , bs+  EXTREMITIES:  trace edema  NEUROLOGY:alert awake      LABS:      134<L>  |  103  |  9   ----------------------------<  214<H>  3.9   |  21<L>  |  0.76    Ca    8.3<L>      04 May 2023 04:06  Phos  2.6       Mg     2.10         TPro  7.5  /  Alb  3.0<L>  /  TBili  0.7  /  DBili  0.3  /  AST  22  /  ALT  11  /  AlkPhos  90      Creatinine Trend: 0.76 <--, 0.66 <--, 0.69 <--, 0.66 <--, 0.69 <--, 0.72 <--, 0.59 <--, 0.72 <--, 0.71 <--                        8.8    6.04  )-----------( 133      ( 04 May 2023 04:06 )             27.8     Urine Studies:  Urinalysis Basic - ( 02 May 2023 20:14 )    Color: Yellow / Appearance: Clear / S.025 / pH:   Gluc:  / Ketone: Trace  / Bili: Negative / Urobili: 12 mg/dL   Blood:  / Protein: Trace / Nitrite: Negative   Leuk Esterase: Negative / RBC:  / WBC    Sq Epi:  / Non Sq Epi:  / Bacteria:               LIVER FUNCTIONS - ( 04 May 2023 04:06 )  Alb: 3.0 g/dL / Pro: 7.5 g/dL / ALK PHOS: 90 U/L / ALT: 11 U/L / AST: 22 U/L / GGT: x           PT/INR - ( 04 May 2023 04:06 )   PT: 13.6 sec;   INR: 1.17 ratio         PTT - ( 04 May 2023 04:06 )  PTT:29.8 sec            LIVER FUNCTIONS - ( 03 May 2023 11:13 )  Alb: 3.4 g/dL / Pro: 8.4 g/dL / ALK PHOS: 75 U/L / ALT: 12 U/L / AST: 20 U/L / GGT: x           PT/INR - ( 03 May 2023 11:13 )   PT: 14.1 sec;   INR: 1.21 ratio         PTT - ( 03 May 2023 11:13 )  PTT:29.8 sec    Imaging Personally Reviewed:yes    Consultant(s) Notes Reviewed:  yes    Care Discussed with Consultants/Other Providers:yes

## 2023-05-05 LAB
ALBUMIN CSF-MCNC: 15.2 MG/DL — SIGNIFICANT CHANGE UP (ref 14–25)
ALBUMIN SERPL ELPH-MCNC: 2676 MG/DL — LOW (ref 3500–5200)
ANION GAP SERPL CALC-SCNC: 11 MMOL/L — SIGNIFICANT CHANGE UP (ref 7–14)
BLD GP AB SCN SERPL QL: NEGATIVE — SIGNIFICANT CHANGE UP
BUN SERPL-MCNC: 8 MG/DL — SIGNIFICANT CHANGE UP (ref 7–23)
CALCIUM SERPL-MCNC: 8.8 MG/DL — SIGNIFICANT CHANGE UP (ref 8.4–10.5)
CHLORIDE SERPL-SCNC: 105 MMOL/L — SIGNIFICANT CHANGE UP (ref 98–107)
CO2 SERPL-SCNC: 21 MMOL/L — LOW (ref 22–31)
CREAT SERPL-MCNC: 0.66 MG/DL — SIGNIFICANT CHANGE UP (ref 0.5–1.3)
EGFR: 106 ML/MIN/1.73M2 — SIGNIFICANT CHANGE UP
GLUCOSE BLDC GLUCOMTR-MCNC: 107 MG/DL — HIGH (ref 70–99)
GLUCOSE BLDC GLUCOMTR-MCNC: 159 MG/DL — HIGH (ref 70–99)
GLUCOSE BLDC GLUCOMTR-MCNC: 161 MG/DL — HIGH (ref 70–99)
GLUCOSE BLDC GLUCOMTR-MCNC: 241 MG/DL — HIGH (ref 70–99)
GLUCOSE BLDC GLUCOMTR-MCNC: 275 MG/DL — HIGH (ref 70–99)
GLUCOSE BLDC GLUCOMTR-MCNC: 67 MG/DL — LOW (ref 70–99)
GLUCOSE BLDC GLUCOMTR-MCNC: 68 MG/DL — LOW (ref 70–99)
GLUCOSE SERPL-MCNC: 228 MG/DL — HIGH (ref 70–99)
HCT VFR BLD CALC: 30.6 % — LOW (ref 34.5–45)
HGB BLD-MCNC: 9.3 G/DL — LOW (ref 11.5–15.5)
IGG CSF-MCNC: 6 MG/DL — HIGH
IGG FLD-MCNC: 2917 MG/DL — HIGH (ref 610–1660)
IGG SYNTH RATE SER+CSF CALC-MRATE: -17.9 MG/DAY — SIGNIFICANT CHANGE UP
IGG/ALB CLEAR SER+CSF-RTO: 0.4 — SIGNIFICANT CHANGE UP
IGG/ALB CSF: 0.39 RATIO — HIGH
IGG/ALB SER: 1.09 RATIO — SIGNIFICANT CHANGE UP
LDH SERPL L TO P-CCNC: 491 U/L — HIGH (ref 135–225)
MAGNESIUM SERPL-MCNC: 2 MG/DL — SIGNIFICANT CHANGE UP (ref 1.6–2.6)
MCHC RBC-ENTMCNC: 30.4 GM/DL — LOW (ref 32–36)
MCHC RBC-ENTMCNC: 30.7 PG — SIGNIFICANT CHANGE UP (ref 27–34)
MCV RBC AUTO: 101 FL — HIGH (ref 80–100)
NRBC # BLD: 0 /100 WBCS — SIGNIFICANT CHANGE UP (ref 0–0)
NRBC # FLD: 0 K/UL — SIGNIFICANT CHANGE UP (ref 0–0)
PHOSPHATE SERPL-MCNC: 3.5 MG/DL — SIGNIFICANT CHANGE UP (ref 2.5–4.5)
PLATELET # BLD AUTO: 153 K/UL — SIGNIFICANT CHANGE UP (ref 150–400)
POTASSIUM SERPL-MCNC: 4.2 MMOL/L — SIGNIFICANT CHANGE UP (ref 3.5–5.3)
POTASSIUM SERPL-SCNC: 4.2 MMOL/L — SIGNIFICANT CHANGE UP (ref 3.5–5.3)
RBC # BLD: 3.03 M/UL — LOW (ref 3.8–5.2)
RBC # BLD: 3.03 M/UL — LOW (ref 3.8–5.2)
RBC # FLD: 21.8 % — HIGH (ref 10.3–14.5)
RETICS #: 309.7 K/UL — HIGH (ref 25–125)
RETICS/RBC NFR: 10.2 % — HIGH (ref 0.5–2.5)
RH IG SCN BLD-IMP: POSITIVE — SIGNIFICANT CHANGE UP
SODIUM SERPL-SCNC: 137 MMOL/L — SIGNIFICANT CHANGE UP (ref 135–145)
WBC # BLD: 4.72 K/UL — SIGNIFICANT CHANGE UP (ref 3.8–10.5)
WBC # FLD AUTO: 4.72 K/UL — SIGNIFICANT CHANGE UP (ref 3.8–10.5)

## 2023-05-05 PROCEDURE — 99231 SBSQ HOSP IP/OBS SF/LOW 25: CPT | Mod: GC

## 2023-05-05 PROCEDURE — 99232 SBSQ HOSP IP/OBS MODERATE 35: CPT

## 2023-05-05 RX ORDER — ACETAMINOPHEN 500 MG
650 TABLET ORAL EVERY 8 HOURS
Refills: 0 | Status: DISCONTINUED | OUTPATIENT
Start: 2023-05-05 | End: 2023-05-06

## 2023-05-05 RX ORDER — INSULIN GLARGINE 100 [IU]/ML
30 INJECTION, SOLUTION SUBCUTANEOUS AT BEDTIME
Refills: 0 | Status: DISCONTINUED | OUTPATIENT
Start: 2023-05-05 | End: 2023-05-06

## 2023-05-05 RX ORDER — OXYCODONE HYDROCHLORIDE 5 MG/1
5 TABLET ORAL EVERY 6 HOURS
Refills: 0 | Status: DISCONTINUED | OUTPATIENT
Start: 2023-05-05 | End: 2023-05-06

## 2023-05-05 RX ORDER — OXYCODONE HYDROCHLORIDE 5 MG/1
5 TABLET ORAL EVERY 6 HOURS
Refills: 0 | Status: DISCONTINUED | OUTPATIENT
Start: 2023-05-05 | End: 2023-05-05

## 2023-05-05 RX ORDER — HYDROMORPHONE HYDROCHLORIDE 2 MG/ML
0.5 INJECTION INTRAMUSCULAR; INTRAVENOUS; SUBCUTANEOUS ONCE
Refills: 0 | Status: DISCONTINUED | OUTPATIENT
Start: 2023-05-05 | End: 2023-05-05

## 2023-05-05 RX ORDER — ACETAMINOPHEN WITH CODEINE 300MG-30MG
1 TABLET ORAL EVERY 6 HOURS
Refills: 0 | Status: DISCONTINUED | OUTPATIENT
Start: 2023-05-05 | End: 2023-05-05

## 2023-05-05 RX ORDER — INSULIN LISPRO 100/ML
6 VIAL (ML) SUBCUTANEOUS
Refills: 0 | Status: DISCONTINUED | OUTPATIENT
Start: 2023-05-06 | End: 2023-05-06

## 2023-05-05 RX ORDER — GABAPENTIN 400 MG/1
300 CAPSULE ORAL THREE TIMES A DAY
Refills: 0 | Status: DISCONTINUED | OUTPATIENT
Start: 2023-05-05 | End: 2023-05-06

## 2023-05-05 RX ORDER — SENNA PLUS 8.6 MG/1
2 TABLET ORAL AT BEDTIME
Refills: 0 | Status: DISCONTINUED | OUTPATIENT
Start: 2023-05-05 | End: 2023-05-06

## 2023-05-05 RX ADMIN — Medication 1: at 14:51

## 2023-05-05 RX ADMIN — HYDROMORPHONE HYDROCHLORIDE 0.5 MILLIGRAM(S): 2 INJECTION INTRAMUSCULAR; INTRAVENOUS; SUBCUTANEOUS at 00:09

## 2023-05-05 RX ADMIN — HYDROMORPHONE HYDROCHLORIDE 0.5 MILLIGRAM(S): 2 INJECTION INTRAMUSCULAR; INTRAVENOUS; SUBCUTANEOUS at 12:00

## 2023-05-05 RX ADMIN — Medication 1 MILLIGRAM(S): at 12:01

## 2023-05-05 RX ADMIN — GABAPENTIN 300 MILLIGRAM(S): 400 CAPSULE ORAL at 07:41

## 2023-05-05 RX ADMIN — Medication 7 UNIT(S): at 14:51

## 2023-05-05 RX ADMIN — HYDROMORPHONE HYDROCHLORIDE 0.5 MILLIGRAM(S): 2 INJECTION INTRAMUSCULAR; INTRAVENOUS; SUBCUTANEOUS at 12:30

## 2023-05-05 RX ADMIN — OXYCODONE HYDROCHLORIDE 5 MILLIGRAM(S): 5 TABLET ORAL at 17:31

## 2023-05-05 RX ADMIN — GABAPENTIN 300 MILLIGRAM(S): 400 CAPSULE ORAL at 22:38

## 2023-05-05 RX ADMIN — SENNA PLUS 2 TABLET(S): 8.6 TABLET ORAL at 22:34

## 2023-05-05 RX ADMIN — OXYCODONE HYDROCHLORIDE 5 MILLIGRAM(S): 5 TABLET ORAL at 22:34

## 2023-05-05 RX ADMIN — Medication 125 MILLIGRAM(S): at 07:04

## 2023-05-05 RX ADMIN — Medication 1000 MILLIGRAM(S): at 16:30

## 2023-05-05 RX ADMIN — Medication 1000 MILLIGRAM(S): at 06:53

## 2023-05-05 RX ADMIN — POLYETHYLENE GLYCOL 3350 17 GRAM(S): 17 POWDER, FOR SOLUTION ORAL at 11:59

## 2023-05-05 RX ADMIN — Medication 1000 MILLIGRAM(S): at 14:52

## 2023-05-05 RX ADMIN — OXYCODONE HYDROCHLORIDE 5 MILLIGRAM(S): 5 TABLET ORAL at 18:01

## 2023-05-05 RX ADMIN — SERTRALINE 50 MILLIGRAM(S): 25 TABLET, FILM COATED ORAL at 12:01

## 2023-05-05 RX ADMIN — Medication 650 MILLIGRAM(S): at 22:35

## 2023-05-05 RX ADMIN — INSULIN GLARGINE 30 UNIT(S): 100 INJECTION, SOLUTION SUBCUTANEOUS at 22:38

## 2023-05-05 RX ADMIN — CEFTRIAXONE 100 MILLIGRAM(S): 500 INJECTION, POWDER, FOR SOLUTION INTRAMUSCULAR; INTRAVENOUS at 06:24

## 2023-05-05 RX ADMIN — Medication 100 MILLIGRAM(S): at 22:35

## 2023-05-05 RX ADMIN — Medication 1000 MILLIGRAM(S): at 06:23

## 2023-05-05 RX ADMIN — PANTOPRAZOLE SODIUM 40 MILLIGRAM(S): 20 TABLET, DELAYED RELEASE ORAL at 06:24

## 2023-05-05 RX ADMIN — GABAPENTIN 300 MILLIGRAM(S): 400 CAPSULE ORAL at 17:32

## 2023-05-05 RX ADMIN — Medication 650 MILLIGRAM(S): at 23:05

## 2023-05-05 RX ADMIN — Medication 7 UNIT(S): at 08:59

## 2023-05-05 RX ADMIN — Medication 3: at 08:59

## 2023-05-05 NOTE — CONSULT NOTE ADULT - PROVIDER SPECIALTY LIST ADULT
Infectious Disease
Gastroenterology
Endocrinology
Internal Medicine
Pain Medicine
Heme/Onc
Neurology

## 2023-05-05 NOTE — PROGRESS NOTE ADULT - REASON FOR ADMISSION
Abdominal Pain, Low Hemoglobin

## 2023-05-05 NOTE — PROGRESS NOTE ADULT - SUBJECTIVE AND OBJECTIVE BOX
Neurology  Progress Note  05-05-23    Name: LUIS POTTS 53y    Review of Systems: Patient w/ mild headache, otherwise, patient reports feeling overall improved.    Medications:  MEDICATIONS  (STANDING):  acetaminophen     Tablet .. 1000 milliGRAM(s) Oral every 6 hours  folic acid 1 milliGRAM(s) Oral daily  gabapentin 300 milliGRAM(s) Oral two times a day  glucagon  Injectable 1 milliGRAM(s) IntraMuscular once  insulin glargine Injectable (LANTUS) 30 Unit(s) SubCutaneous at bedtime  insulin lispro (ADMELOG) corrective regimen sliding scale   SubCutaneous three times a day before meals  insulin lispro (ADMELOG) corrective regimen sliding scale   SubCutaneous at bedtime  insulin lispro Injectable (ADMELOG) 7 Unit(s) SubCutaneous three times a day before meals  pantoprazole    Tablet 40 milliGRAM(s) Oral before breakfast  polyethylene glycol 3350 17 Gram(s) Oral daily  senna 2 Tablet(s) Oral at bedtime  sertraline 50 milliGRAM(s) Oral daily  traZODone 100 milliGRAM(s) Oral at bedtime    MEDICATIONS  (PRN):  clonazePAM  Tablet 1 milliGRAM(s) Oral three times a day PRN for anxiety      Vitals:  T(C): 36.8 (05-05-23 @ 10:03), Max: 37.1 (05-05-23 @ 06:00)  HR: 63 (05-05-23 @ 10:03) (54 - 73)  BP: 111/61 (05-05-23 @ 10:03) (96/60 - 124/69)  RR: 18 (05-05-23 @ 10:03) (18 - 18)  SpO2: 100% (05-05-23 @ 10:03) (99% - 100%)    Neurological examination:    Neck: Neck pain when testing range of motion    Neurological Examination:    - Mental Status: Eyes open, attends to examiner; speech is fluent and follows commands    - Cranial Nerves:  II: Visual fields are full to confrontation; pupils are equal, round, and reactive to light   III, IV, VI: Extraocular movements are intact without nystagmus  V: Facial sensation is intact in the V1-V3 distribution bilaterally  VII: Face is symmetric with normal eye closure and smile  VIII: Hearing is intact to conversation  IX, X: Uvula is midline and soft palate rises symmetrically  XII: Tongue protrudes in the midline    - Motor/Strength Testing:                                   Right           Left  Deltoid                     5                 5  Biceps                      5                 5  Triceps                     5                 5  Hand                  5                 5    Hip Flex                   5                  5  Knee Ext	      5                  5  Dorsiflex                  5                  5  Plantarflex               5                  5    - There is no pronator drift.   - Normal muscle bulk and tone throughout.    - Reflexes:   Bicep (C5/C6):                  R 2+ --- L 2+   Brachioradialis (C5/C6) :   R 2+ --- L 2+   Patella (L3/L4) :                 R 1+ --- L 1+   Ankle (S1) :                       R 1+ --- L 1+     - Plant responses down bilaterally.    - Sensory: Grossly intact throughout to light touch x 4 extremities.  - Coordination: Finger-nose-finger and heel-shin intact without ataxia or dysmetria.        Labs:                        9.3    4.72  )-----------( 153      ( 05 May 2023 07:20 )             30.6     05-05    137  |  105  |  8   ----------------------------<  228<H>  4.2   |  21<L>  |  0.66    Ca    8.8      05 May 2023 07:20  Phos  3.5     05-05  Mg     2.00     05-05    TPro  x   /  Alb  2676<L>  /  TBili  x   /  DBili  x   /  AST  x   /  ALT  x   /  AlkPhos  x   05-04    CAPILLARY BLOOD GLUCOSE      POCT Blood Glucose.: 161 mg/dL (05 May 2023 14:49)    LIVER FUNCTIONS - ( 04 May 2023 12:07 )  Alb: 2676 mg/dL / Pro: x     / ALK PHOS: x     / ALT: x     / AST: x     / GGT: x             Culture - Fungal, CSF (collected 04 May 2023 11:30)  Source: .CSF CSF  Preliminary Report (05 May 2023 07:14):    Testing in progress    Culture - CSF with Gram Stain (collected 04 May 2023 11:30)  Source: .CSF CSF  Gram Stain (04 May 2023 13:01):    polymorphonuclear leukocytes seen per oil power field    No organisms seen per oil power field    by cytocentrifuge  Preliminary Report (05 May 2023 07:47):    No growth to date.    Culture - Acid Fast - CSF (collected 04 May 2023 11:30)  Source: .CSF CSF    Culture - Blood (collected 02 May 2023 18:45)  Source: .Blood Blood-Peripheral  Preliminary Report (04 May 2023 02:02):    No growth to date.    Culture - Blood (collected 02 May 2023 18:30)  Source: .Blood Blood-Peripheral  Preliminary Report (04 May 2023 02:02):    No growth to date.      PT/INR - ( 04 May 2023 04:06 )   PT: 13.6 sec;   INR: 1.17 ratio    PTT - ( 04 May 2023 04:06 )  PTT:29.8 sec    CSF:  Total Nucleated Cell Count, CSF: 1183 cells/uL (05-04-23 @ 12:10)  RBC Count - Spinal Fluid: 155 cells/uL (05-04-23 @ 12:10)    Protein, CSF: 29 mg/dL (05-04-23 @ 11:48)      Radiology:  MR Head w/wo IV Cont:  (03 May 2023 10:35)  FINDINGS:    No hydrocephalus, midline shift, mass effect, vasogenic edema, or acute   intracranial hemorrhage.    Diffusion weighted images demonstrate no acute territorial infarct.    Single nonspecific, nonenhancing focus of T2 and FLAIR hyperintense   signal in the left temporal white matter.    No abnormal parenchymal or leptomeningeal enhancement.    Flow voids are seen within the major intracranial vessels consistent with   their patency.    Air-fluid level in the left sphenoid sinus, remaining paranasal sinuses   and mastoid air cells are clear.    Orbits, sellar and suprasellar structures, and craniocervical junction   are unremarkable.    IMPRESSION:    No hydrocephalus, mass effect, acute intracranial hemorrhage, vasogenic   edema, or acute territorial infarct.    No abnormal parenchymal or leptomeningeal enhancement.      MR Venogram Head w/ IV Cont (05.03.23 @ 12:34)    FINDINGS:    The superior sagittal sinus is patent in both its anterior and posterior   limbs.   The right transverse and sigmoid sinuses are patent to the right   internal jugular vein. In the right mid transverse sinus ovoid   well-circumscribed defect is typical for arachnoid granulation. The left   transverse and sigmoid sinus are patent to the left internal jugular   vein.  Asymmetry of the transverse sinus caliber is within the limits of   developmental variation.    The internal cerebral veins and straight sinus appear patent.    The cavernous sinuses demonstrate no anomalous flow.  The superior   ophthalmic veins are not dilated.      IMPRESSION:   Unremarkable MR venogram of the intracranial dural sinuses.     No evidence of dural sinus thrombosis.

## 2023-05-05 NOTE — CONSULT NOTE ADULT - CONSULT REASON
cervical stretches       Plan:     Times per week: 1  Plan weeks: 3 months  Specific instructions for Next Treatment: cervical stretching, strengthening    Charles Adair, [id number] [facility] generalized body pain

## 2023-05-05 NOTE — CONSULT NOTE ADULT - CONSULT REQUESTED DATE/TIME
05-May-2023 16:10
28-Apr-2023 16:43
02-May-2023 15:41
02-May-2023 17:11
28-Apr-2023 13:29
28-Apr-2023 10:08
01-May-2023 11:37

## 2023-05-05 NOTE — PROGRESS NOTE ADULT - SUBJECTIVE AND OBJECTIVE BOX
SUBJECTIVE:  Feels better. No headache, nausea or vomiting.    OBJECTIVE:  Vital Signs Last 24 Hrs  T(C): 37.1 (05 May 2023 06:00), Max: 37.1 (05 May 2023 06:00)  T(F): 98.8 (05 May 2023 06:00), Max: 98.8 (05 May 2023 06:00)  HR: 73 (05 May 2023 06:00) (54 - 73)  BP: 104/64 (05 May 2023 06:00) (96/60 - 124/69)  BP(mean): --  RR: 18 (05 May 2023 06:00) (16 - 18)  SpO2: 100% (05 May 2023 06:00) (99% - 100%)    Parameters below as of 05 May 2023 06:00  Patient On (Oxygen Delivery Method): room air          05-04-23 @ 07:01  -  05-05-23 @ 07:00  --------------------------------------------------------  IN: 980 mL / OUT: 0 mL / NET: 980 mL        Physical Examination:  GEN: NAD, resting quietly  PULM: symmetric chest rise bilaterally, no increased WOB  ABD: soft, nontender, nondistended, no rebound or guarding, incision CDI  EXTR: no LE erythema, moving all extremities      LABS:                        9.3    4.72  )-----------( 153      ( 05 May 2023 07:20 )             30.6       05-05    137  |  105  |  8   ----------------------------<  228<H>  4.2   |  21<L>  |  0.66    Ca    8.8      05 May 2023 07:20  Phos  3.5     05-05  Mg     2.00     05-05    TPro  7.5  /  Alb  3.0<L>  /  TBili  0.7  /  DBili  0.3  /  AST  22  /  ALT  11  /  AlkPhos  90  05-04

## 2023-05-05 NOTE — PROGRESS NOTE ADULT - ATTENDING SUPERVISION STATEMENT
Resident
Fellow
Resident
Fellow
Resident
Resident

## 2023-05-05 NOTE — PROGRESS NOTE ADULT - ASSESSMENT
A/P: 52 yo F with DM2, s/p R lap hemicolectomy w/ileocolic anastomosis (4/17/23), being evaluated for possible leak. Endocrine consulted for DM2 management.    #Type 2 Diabetes Mellitus, uncontrolled  - HbA1c 8.7%; home regimen: lantus 30 units + novolog 14 units with meals + metformin 1 g bid  - Glucose Target 100-180 mg/dl: Slightly above goal this am; stable at lunch   - Increase slightly to Lantus 30 units QHS  - Continue Admelog 7 units with meals- HOLD if not eating  - Continue Admelog LOW correctional scales before meals and bedtime   - Please check FSG before meals and QHS, or q6h while NPO  - RD consult appreciated  - hypoglycemia orderset prn    - Discharge planning:   basal-bolus insulin + metformin 1 g bid with meals   Ensure patient has working glucometer, test strips, lancets, alcohol pads, and BD renetta pen needles  Please also prescribe glucose tabs, Baqsimi nasal spray or glucagon emergency kit for hypoglycemia risk   Appt already arranged: Dr. Dorina Cha at [address]; [place] [address] on June 7th at 10:20am  Please follow up with pcp, opthalmology, podiatry, and endocrinology as an outpt    #HTN  -BP goal <130/80  -BP is normal w/o any medications  -Outpatient microalb/ cr ratio    #HLD  -Outpatient lipid profile shows LDL in 80s and then 50s  -ASCVD <7.5%  -Consider STATIN if no contraindications for primary prevention, can be assessed outpt     COLTON Merchant-BC  Nurse Practitioner   Division of Endocrinology  Pager: CHRISTINA pager [phone]    If out of hospital/unavailable when paged, please note: patient will be cared for by another provider on the endocrine service.  Please call the endocrine answering service for assistance to reach covering provider ([phone]). For non-urgent matters, please email [email or web address] for assistance.

## 2023-05-05 NOTE — PROGRESS NOTE ADULT - ASSESSMENT
53 year old female with PMH type 2 DM, anxiety, depression, gastroparesis, GERD, hepatic steatosis, volvulus s/p lap R hemicolectomy with Dr Sen on 4/17 presented with dark stool, lightheadedness and dizziness. Patient now experiencing fevers, headache, neck stiffness, nausea and vomiting concerning for meningitis. 5/3 Neurology attempted LP without success, now s/p successful LP 5/4 with resolution of fevers, HA and abdominal pain.    Plan  - No surgical intervention indicated  - Diet: regular CC  - Abx per ID recs  - Insulin regimen per endo  - Appreciate medicine, ID, neurology, and heme recs  - Rest of care per primary medical team    A team surgery [id number]

## 2023-05-05 NOTE — PROGRESS NOTE ADULT - ASSESSMENT
52 yo woman PMHx DM on insulin c/b gastroparesis and neuropathy, recent R lap hemicolectomy w/ ileocolic anastomosis (4/17/2023) who presented to the hospital on 4/27/23 after AMS, passing out and falling out of bed. Patient reports she was due to have follow up with her doctor and then started to become dizzy that day, not making sense and then collapsed and possibly hit her head and L side of her body. Found with anemia, leukocytosis, elevated T bili, s/p pRBC with improvement in cognitive symptoms. However heme concern for anemia, indirect hyperbilirubinemia, and received IVIG two doses (4/30/23, 5/1/23). Developed a suspected reported transfusion reaction on IVIG. Neurology consulted as patient has since IVIG developed severe headache, all over, sharp in nature, with associated nausea, vomiting, photophobia, neck pain. Had fever 101.4F, HR72, 118/70, RR18, saturating well. MRI and MR venogram performed and unrevealing.    Impression: Patient with headaches, nausea, vomiting, photophobia, neck pain, in setting of recent IVIG use; improving s/p discontinuation. Workup negative for CNS infectious etiology. MR imaging negative any acute pathologies. CSF w/ elevated TNCC, neutrophil predominant. Most likely etiology is aseptic meningitis as adverse effect of IVIG use.    Recommendations:  [x] Appreciate ID evaluation, will defer empiric antibiotic/ antiviral coverage to ID for guidance  [x] MRI brain w/ w/o contrast, MR venogram w/ contrast unrevealing for etiology  [ ] LP under neuroradiology guidance: protein, glucose, cell count, albumin, AFB, gram strain with culture, PCR, HSV, IgG index, flow cytometry, lyme, [place], VDRL, fungal cultures, CMV/EBV PCR.  [ ] f/u procalcitonin, LFTs, ESR, CRP, TSH, T3/T4, thiamine, B12, folate    Discussed patient care with recommendations at time of evaluation with neuro attending, primary team, and patient. No further neurological workup or management indicated at this time. Follow-up w/ outpatient neurology.    Neurology  [address], [address]  [place] [address]  Phone: [phone]  Fax: [phone]  Follow Up Time: routine

## 2023-05-05 NOTE — PROGRESS NOTE ADULT - SUBJECTIVE AND OBJECTIVE BOX
Follow Up:  fever, meningitis    Interval History/ROS: no more fever, headache significantly improved, no more vomiting, no cough, abd pain or dysuria, CSF PCR negative        Allergies  Bactrim (Anaphylaxis)  Eggplant mouth itches (Other)        ANTIMICROBIALS:      OTHER MEDS:  acetaminophen     Tablet .. 1000 milliGRAM(s) Oral every 6 hours  clonazePAM  Tablet 1 milliGRAM(s) Oral three times a day PRN  folic acid 1 milliGRAM(s) Oral daily  gabapentin 300 milliGRAM(s) Oral two times a day  glucagon  Injectable 1 milliGRAM(s) IntraMuscular once  insulin glargine Injectable (LANTUS) 28 Unit(s) SubCutaneous at bedtime  insulin lispro (ADMELOG) corrective regimen sliding scale   SubCutaneous three times a day before meals  insulin lispro (ADMELOG) corrective regimen sliding scale   SubCutaneous at bedtime  insulin lispro Injectable (ADMELOG) 7 Unit(s) SubCutaneous three times a day before meals  pantoprazole    Tablet 40 milliGRAM(s) Oral before breakfast  polyethylene glycol 3350 17 Gram(s) Oral daily  senna 2 Tablet(s) Oral at bedtime  sertraline 50 milliGRAM(s) Oral daily  traZODone 100 milliGRAM(s) Oral at bedtime      Vital Signs Last 24 Hrs  T(C): 36.8 (05 May 2023 10:03), Max: 37.1 (05 May 2023 06:00)  T(F): 98.3 (05 May 2023 10:03), Max: 98.8 (05 May 2023 06:00)  HR: 63 (05 May 2023 10:03) (54 - 73)  BP: 111/61 (05 May 2023 10:03) (96/60 - 124/69)  BP(mean): --  RR: 18 (05 May 2023 10:03) (17 - 18)  SpO2: 100% (05 May 2023 10:03) (99% - 100%)    Parameters below as of 05 May 2023 10:03  Patient On (Oxygen Delivery Method): room air        Physical Exam:  General:   non toxic  Respiratory:   comfortable on RA  abd:   soft, BS +, not tender  :     no CVAT, no suprapubic tenderness, no perez  Musculoskeletal : no joint swelling, no edema  Skin:    no rash  vascular: no phlebitis  Neurologic:     no more neck rigidity                           9.3    4.72  )-----------( 153      ( 05 May 2023 07:20 )             30.6       05-05    137  |  105  |  8   ----------------------------<  228<H>  4.2   |  21<L>  |  0.66    Ca    8.8      05 May 2023 07:20  Phos  3.5     05-05  Mg     2.00     05-05    TPro  x   /  Alb  2676<L>  /  TBili  x   /  DBili  x   /  AST  x   /  ALT  x   /  AlkPhos  x   05-04          MICROBIOLOGY:  v  .CSF CSF  05-04-23   No growth to date.  --    polymorphonuclear leukocytes seen per oil power field  No organisms seen per oil power field  by cytocentrifuge      .Blood Blood-Peripheral  05-02-23   No growth to date.  --  --      .Blood Blood-Peripheral  05-02-23   No growth to date.  --  --      Clean Catch Clean Catch (Midstream)  04-27-23   >100,000 CFU/ml Enterococcus faecalis  50,000 - 99,000 CFU/mL Coag negative Staphylococcus not Staph  saprophyticus "Susceptibilities not performed"  --  Enterococcus faecalis      .Blood Blood-Peripheral  04-27-23   No Growth Final  --  --      .Blood Blood-Peripheral  04-27-23   No Growth Final  --  --                RADIOLOGY:  Images independently visualized and reviewed personally, findings as below  < from: MR Venogram Head w/ IV Cont (05.03.23 @ 12:34) >    IMPRESSION:   Unremarkable MR venogram of the intracranial dural sinuses.     No evidence of dural sinus thrombosis.    < end of copied text >

## 2023-05-05 NOTE — PROGRESS NOTE ADULT - PROVIDER SPECIALTY LIST ADULT
Colorectal Surgery
Colorectal Surgery
Heme/Onc
Internal Medicine
Neurology
Colorectal Surgery
Infectious Disease
Internal Medicine
Neurology
Endocrinology
Endocrinology
Heme/Onc
Colorectal Surgery
Colorectal Surgery
Infectious Disease
Infectious Disease
Surgery
Endocrinology

## 2023-05-05 NOTE — PROGRESS NOTE ADULT - SUBJECTIVE AND OBJECTIVE BOX
Chief Complaint: Type 2 Diabetes Mellitus, uncontrolled    History: Pt seen at bedside. Pt tolerating oral diet. Pt denies nausea and vomiting/any signs of hypoglycemia. Pt reports an adequate appetite.     MEDICATIONS  (STANDING):  acetaminophen     Tablet .. 1000 milliGRAM(s) Oral every 6 hours  folic acid 1 milliGRAM(s) Oral daily  gabapentin 300 milliGRAM(s) Oral two times a day  glucagon  Injectable 1 milliGRAM(s) IntraMuscular once  insulin glargine Injectable (LANTUS) 28 Unit(s) SubCutaneous at bedtime  insulin lispro (ADMELOG) corrective regimen sliding scale   SubCutaneous three times a day before meals  insulin lispro (ADMELOG) corrective regimen sliding scale   SubCutaneous at bedtime  insulin lispro Injectable (ADMELOG) 7 Unit(s) SubCutaneous three times a day before meals  pantoprazole    Tablet 40 milliGRAM(s) Oral before breakfast  polyethylene glycol 3350 17 Gram(s) Oral daily  senna 2 Tablet(s) Oral at bedtime  sertraline 50 milliGRAM(s) Oral daily  traZODone 100 milliGRAM(s) Oral at bedtime      Allergies: Bactrim (Anaphylaxis)  Eggplant mouth itches (Other)      Review of Systems:  Respiratory: No SOB, no cough  GI: No nausea, vomiting, abdominal pain  Endocrine: no polyuria, polydipsia    PHYSICAL EXAM:  Vital Signs Last 24 Hrs  T(C): 36.8 (05 May 2023 10:03), Max: 37.1 (05 May 2023 06:00)  T(F): 98.3 (05 May 2023 10:03), Max: 98.8 (05 May 2023 06:00)  HR: 63 (05 May 2023 10:03) (54 - 73)  BP: 111/61 (05 May 2023 10:03) (96/60 - 124/69)  BP(mean): --  RR: 18 (05 May 2023 10:03) (18 - 18)  SpO2: 100% (05 May 2023 10:03) (99% - 100%)    Parameters below as of 05 May 2023 10:03  Patient On (Oxygen Delivery Method): room air      GENERAL: NAD  RESPIRATORY: No labored breathing   GI: Soft, nontender, non distended  PSYCH: Alert and oriented x 3, normal affect, normal mood    CAPILLARY BLOOD GLUCOSE      POCT Blood Glucose.: 159 mg/dL (05 May 2023 12:18)  POCT Blood Glucose.: 275 mg/dL (05 May 2023 08:27)  POCT Blood Glucose.: 191 mg/dL (04 May 2023 22:12)  POCT Blood Glucose.: 190 mg/dL (04 May 2023 19:11)  POCT Blood Glucose.: 178 mg/dL (04 May 2023 17:57)      A1C with Estimated Average Glucose (04.12.23 @ 19:55)    A1C with Estimated Average Glucose Result: 8.7   Estimated Average Glucose: 203 05-05    137  |  105  |  8   ----------------------------<  228<H>  4.2   |  21<L>  |  0.66    Ca    8.8      05 May 2023 07:20  Phos  3.5     05-05  Mg     2.00     05-05    TPro  x   /  Alb  2676<L>  /  TBili  x   /  DBili  x   /  AST  x   /  ALT  x   /  AlkPhos  x   05-04      Thyroid Function Tests:  05-03 @ 11:13 TSH 1.49 FreeT4 -- T3 70 Anti TPO -- Anti Thyroglobulin Ab -- TSI --    Diet, Regular:   Consistent Carbohydrate Evening Snack (CSTCHOSN)  Fiber/Residue Restricted (LOWFIBER) (04-30-23 @ 17:07) [Active]

## 2023-05-05 NOTE — PROGRESS NOTE ADULT - ATTENDING COMMENTS
DATE OF SERVICE: 05-01-23 @ 09:19    Seen and examined, overnight events noted. feeling better today and tolerated next dose of IVIG. No abdominal pain today, having bowel fx. Tolerating regular diet without issue    VSS  WBC 7.7 today, Hb 8.1, bili 1.3    Abdomen soft NT/ND today    Will narrow abx to Unasyn per cx data  Diet as tolerated  Hold on steroid for now given UTI and post op, continue IVIG  FU heme for additional workup/recs at this time
DATE OF SERVICE: 05-02-23 @ 12:49    Seen and examined. C/o headache today, radiating from back of neck to head. Vomit x2 today, nonbilious.  VSS  WBC 6.5, Hb stable 8.3; Bili 0.9  Abdomen soft NT/ND today    Fioricet given for possible migraine, IVF bolus, zofran  Will obtain CT head given severity of SAMANIEGO  Fu heme onc for additional recs re anemia  Continue IVIG, last dose tonight
DATE OF SERVICE: 05-04-23 @ 14:23    No events, feeling much better. No HA. No abdominal pain  VSS  Labs stable, normal WBC, hemolysis labs downtrending  MRI imaging negative  LP unsuccessful with neuro, pending AGNIESZKA   Abdomen soft NT/ND    Will transfer care to medical service at this time given ongoing workup for hemolytic anemia, possible meningitis  Abdomen remains benign, she is tolerating a diet, having bowel fx, and her exam has improved without any tenderness today  Appreciate heme, neuro, endo, medicine input  Will follow
DATE OF SERVICE: 05-05-23 @ 18:56    Seen and examined, tolerating diet, having bowel fx  Neuro workup unrevealing, off abx, no further fevers    Abdomen soft NT/ND    Ok for dc from surgical perspective  No additional intervention planned, no need for abx from surgery
Date of service 5/3     HA resolved, afebrile  Pending MRI/LP  FU neuro  FU heme  Tolerating diet, abdomen benign
Patient was seen and examined at bedside. As per patient headache remains well controlled and comfortably lying on bed. Patient denies for new neurologic symptoms. No focal deficit on exam.   Ms. 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 During admission IVIG started for hemolytic anemia on 4/30 and had infusion reaction. Neurology consulted for headache and possible meningitis.   Headache is improved. There is no further inpatient neurology work up needed. Patient will benefit from outpatient EMG and NCV for neuropathy.   If you have any further questions, please do not hesitate to contact our team.  Thank you for allowing us to participate in this patient care.
DATE OF SERVICE: 04-28-23 @ 21:12    Seen and examined. 2u PRBC overnight with improvement in Hb. Had abdominal pain and lethargy this AM.  BP has remained stable, no tachycardia, no fever since yesterday  WBC still elevated 22, Hb 8.7->8.1; Bili 4.8/1.4; elevated retic/LDH  Abdomen soft +RLQ TTP, no rebound/guarding    CT today with PO contrast reviewed with radiologist, no evidence of anastomotic leak/intraabdominal abscess. Has area of fat necrosis appearing in the RLQ, possibly omentum; no other intraabdominal findings    Given 2 CT scans with and without PO contrast, there does not appear to be an anastomotic issue at this time, there is not evidence of extraluminal air/fluid.  Anemia work up in progress, appreciate both GI and heme inputs, pending labs still; differential includes hemolytic anemia vs GIB  Passing flatus, no BM since admission  Vitals stalbe  Continue fluid  Continue IV abx  Labs in AM  serial exams
DATE OF SERVICE: 04-29-23 @ 15:42    Seen and examined. +BM/F last night, reports abdominal pain has improved after moving bowels; dark brown with no evidence of melena per pt  VSS, afebrile 24 hours, Bp stable  WBC downtrending 22->17->15  Hb 8.1 -> 7.3 -> 7.1  LDH elevated, haptoblogin low, Bili still elevated  UA from admission positive with cx growing enterococcus; Bcx negative    Abdomen exam improved, soft. mild RLQ TTP -improved, no rebound/guarding    CT without evidence of abdominal source of symptoms, exam is improved today after having BM  Will advance to CLD for now  Appreciate heme workup, Tiffanie test sent, continue to trend labs  GI Fu, no evidence of melena at this time to indicate GIB  Will give 1u PRBC today for downtrending Hb  Am labs
Patient was seen and examined at bedside. As per patient headache is significantly better and comfortably lying on bed. Patient denies for new neurologic symptoms. She had LP under IR and she tolerated the procedure without any immediate complication. No focal deficit on exam and unable to assess leg due to s/p LP.   Ms. 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 During admission IVIG started for hemolytic anemia on 4/30 and had infusion reaction. Neurology consulted for headache and possible meningitis.   Headache is improved. s/p LP with 1183 WBC, 75% PMN, glucose 92, protein 29.   If you have any further questions, please do not hesitate to contact our team.  Thank you for allowing us to participate in this patient care.
------------------------------------------------------------------  54 yo woman s/p lap hemicolectomy for volvulus with Dr. Iker Sen on 4/17/2023 presented back to the hospital on 4/27/23 after having an unwitnessed fall hitting the side of her head  after falling out of bed (~3 feet). She has also had diffuse abdominal pain and dark stools prior. 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  - Hg improved and stable; bilirubin levels have normalized; to continue folic acid; in event of relapse of autoimmune hemolytic anemia, would consider steroids  - trigger for patient's hemolytic reaction unclear; possibility of reaction to antibiotic vs. exposure to anesthesia  - headache/photophobia and concern for aseptic meningitis as per ID; as stated in fellow's note, there is <1% chance of aseptic meningitis with exposure to IVIG; brain imaging negative; await results of LP
DATE OF SERVICE: 04-30-23 @ 15:11    Seen and examined. 1u prbc yesterday with good response. Feeling better, asking for more food.   VSS  Hb 8.6 today, wbc 11, bili downtrending  Abdomen soft mild RLQ TTP improved, no rebound/guarding    D/w heme team today, feel there is an element of hemolytic anemia with unclear etiology. They recommended starting steroid, however given recent post op and concurrent UTI will hold off for now and monitor WBC. Ok to start IVIG per heme team if no wound healing side effects  Advance diet as tolerated this evening  AM labs  Monitor WBC, fu urine cx, continue zosyn  OOB
------------------------------------------------------------------  54 yo woman s/p lap hemicolectomy for volvulus with Dr. Iker Sen on 4/17/2023 presented back to the hospital on 4/27/23 after having an unwitnessed fall hitting the side of her head  after falling out of bed (~3 feet). She has also had diffuse abdominal pain and dark stools prior. 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  - clinically improved with rise in Hg level; to continue folic acid  - trigger for patient's hemolytic reaction unclear; discussed with patient possibility of reaction to antibiotic vs. exposure to anesthesia

## 2023-05-05 NOTE — PROGRESS NOTE ADULT - ASSESSMENT
53 f with DM on insulin c/b gastroparesis and neuropathy, had LP long time a go to r/o SLE, recent admission for volvulus s/p R lap hemicolectomy w/ ileocolic anastomosis (4/17/2023) presented 4/27 with fever, weakness, headache, nausea and fall  febrile to 102.7, tachy WBC: 27, Hgb: 6.4  carmella positive  abd/pelvis CT: no anastomosis leak  was started on IVIG for hemolytic anemia on 4/30 and had infusion reaction with rigors and jaw pain during the infusion, s/p benadryl and also had another dose 5/1  Hgb, WBC and fever improved  urine cx showed E. faecalis and coag neg staph but pt has no symptoms was started on unasyn 5/1  on 5/2 pt had worse headache of her life with neck stiffness, vomiting and fever again    initial fever, leukocytosis and anemia due to hemolytic process s/p IVIG 4/30 and 5/1with improved fever, leukocytosis and anemia then with fever headache, neck stiffness, aseptic meningitis  most likely  due to IVIG vs autoimmune, s/p LP with 1183 WBC, 75% PMN, glucose 92, protein 29, cx and PCR negative   MRI/MRV unremarkable  positive urine cx with E. faecalis and coag neg staph with no symptoms, likely asymptomatic bacteriuria     * discontinue vanco and ceftriaxone  * further management as per neurology and hem    The above assessment and plan was discussed with the primary team    Nelida Wright MD  contact on teams  After 5pm and on weekends call [phone]

## 2023-05-05 NOTE — PROGRESS NOTE ADULT - SUBJECTIVE AND OBJECTIVE BOX
SUBJECTIVE / OVERNIGHT EVENTS: pt says her headache is much better   23     MEDICATIONS  (STANDING):  acetaminophen     Tablet .. 650 milliGRAM(s) Oral every 8 hours  folic acid 1 milliGRAM(s) Oral daily  gabapentin 300 milliGRAM(s) Oral three times a day  glucagon  Injectable 1 milliGRAM(s) IntraMuscular once  insulin glargine Injectable (LANTUS) 30 Unit(s) SubCutaneous at bedtime  insulin lispro (ADMELOG) corrective regimen sliding scale   SubCutaneous three times a day before meals  insulin lispro (ADMELOG) corrective regimen sliding scale   SubCutaneous at bedtime  insulin lispro Injectable (ADMELOG) 6 Unit(s) SubCutaneous three times a day before meals  pantoprazole    Tablet 40 milliGRAM(s) Oral before breakfast  polyethylene glycol 3350 17 Gram(s) Oral daily  senna 2 Tablet(s) Oral at bedtime  sertraline 50 milliGRAM(s) Oral daily  traZODone 100 milliGRAM(s) Oral at bedtime    MEDICATIONS  (PRN):  clonazePAM  Tablet 1 milliGRAM(s) Oral three times a day PRN for anxiety  oxyCODONE    IR 5 milliGRAM(s) Oral every 6 hours PRN Moderate Pain (4 - 6)    Vital Signs Last 24 Hrs  T(C): 37.2 (23 @ 22:00), Max: 37.2 (23 @ 22:00)  T(F): 98.9 (23 @ 22:00), Max: 98.9 (23 @ 22:00)  HR: 70 (23 @ 22:00) (58 - 73)  BP: 103/60 (23 @ 22:00) (96/60 - 111/61)  BP(mean): --  RR: 17 (23 @ 22:00) (17 - 18)  SpO2: 99% (23 @ 22:00) (99% - 100%)        Constitutional: No fever, fatigue  Skin: No rash.  Eyes: No recent vision problems or eye pain.  ENT: No congestion, ear pain, or sore throat.  Cardiovascular: No chest pain or palpation.  Respiratory: No cough, shortness of breath, congestion, or wheezing.  Gastrointestinal: No abdominal pain, nausea, vomiting, or diarrhea.  Genitourinary: No dysuria.  Musculoskeletal: pain all over the body  Neurologic: No headache.    PHYSICAL EXAM:  GENERAL: NAD  EYES: EOMI, PERRLA  NECK: Supple, No JVD  CHEST/LUNG: dec breath sounds at bases   HEART:  S1 , S2 +  ABDOMEN: soft , bs+  EXTREMITIES:  trace edema  NEUROLOGY:alert awake      LABS:      137  |  105  |  8   ----------------------------<  228<H>  4.2   |  21<L>  |  0.66    Ca    8.8      05 May 2023 07:20  Phos  3.5       Mg     2.00         TPro      /  Alb  2676<L>  /  TBili      /  DBili      /  AST      /  ALT      /  AlkPhos          Creatinine Trend: 0.66 <--, 0.76 <--, 0.66 <--, 0.69 <--, 0.66 <--, 0.69 <--, 0.72 <--, 0.59 <--                        9.3    4.72  )-----------( 153      ( 05 May 2023 07:20 )             30.6     Urine Studies:  Urinalysis Basic - ( 02 May 2023 20:14 )    Color: Yellow / Appearance: Clear / S.025 / pH:   Gluc:  / Ketone: Trace  / Bili: Negative / Urobili: 12 mg/dL   Blood:  / Protein: Trace / Nitrite: Negative   Leuk Esterase: Negative / RBC:  / WBC    Sq Epi:  / Non Sq Epi:  / Bacteria:               LIVER FUNCTIONS - ( 04 May 2023 12:07 )  Alb: 2676 mg/dL / Pro: x     / ALK PHOS: x     / ALT: x     / AST: x     / GGT: x           PT/INR - ( 04 May 2023 04:06 )   PT: 13.6 sec;   INR: 1.17 ratio         PTT - ( 04 May 2023 04:06 )  PTT:29.8 sec          LIVER FUNCTIONS - ( 04 May 2023 04:06 )  Alb: 3.0 g/dL / Pro: 7.5 g/dL / ALK PHOS: 90 U/L / ALT: 11 U/L / AST: 22 U/L / GGT: x           PT/INR - ( 04 May 2023 04:06 )   PT: 13.6 sec;   INR: 1.17 ratio         PTT - ( 04 May 2023 04:06 )  PTT:29.8 sec            LIVER FUNCTIONS - ( 03 May 2023 11:13 )  Alb: 3.4 g/dL / Pro: 8.4 g/dL / ALK PHOS: 75 U/L / ALT: 12 U/L / AST: 20 U/L / GGT: x           PT/INR - ( 03 May 2023 11:13 )   PT: 14.1 sec;   INR: 1.21 ratio         PTT - ( 03 May 2023 11:13 )  PTT:29.8 sec    Imaging Personally Reviewed:yes    Consultant(s) Notes Reviewed:  yes    Care Discussed with Consultants/Other Providers:yes
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
## 2023-05-05 NOTE — PROGRESS NOTE ADULT - ASSESSMENT
53F PMHx DM on insulin c/b gastroparesis and neuropathy presents for 2 days of dehydration/HA/nausea/SOB in the setting of R lap hemicolectomy w/ ileocolic anastomosis (4/17/2023)     Pain - dilaudid as needed with close monitor of pts cognition / resp status - discussed with surgery PA about poss pain management eval     Dm2- - as per Endo,  HbA1c 8.7%; home regimen: lantus 30 units + novolog 14 units with meals + metformin 1 g bid  - Glucose Target 100-180 mg/dl: slightly above goal   - Increase Lantus to 26 units QHS  - Increase Admelog to 9 units with meals- HOLD if not eating   - Continue Admelog LOW correctional scales before meals and bedtime     #Anemia  # Indirect hyperbilirubinemia  - Patient had Hb of 6.1 -> s/p 2U prbc transfusion; responded appropriately. however, downtrended again.   - s/p pRBC  - heme cleared pt for dc     -LDH elevated, hapto<20, retic elevated, with increase in total direct bilirubin; IgG positive, C3 negative, eluate negative --> concern for hemolytic anemia.   - S/P dose 1 of IVIGTotal Cells Counted, Spinal Fluid: 100 Cells (05.04.23 @ 12:10)  -heme f/u    - Headache- suspected reported transfusion reaction on IVIG. Neurology consulted as patient has since IVIG developed severe headache, all over, sharp in nature, with associated nausea, vomiting, photophobia, neck pain. Had fever 101.4F, HR72, 118/70, RR18, saturating well. MRI and MR venogram performed   -  s/p LP , Appreciate neuro input- cleared for dc by neuro    pain control  poss dc tomorrow

## 2023-05-06 ENCOUNTER — TRANSCRIPTION ENCOUNTER (OUTPATIENT)
Age: 53
End: 2023-05-06

## 2023-05-06 VITALS
DIASTOLIC BLOOD PRESSURE: 75 MMHG | SYSTOLIC BLOOD PRESSURE: 114 MMHG | OXYGEN SATURATION: 98 % | TEMPERATURE: 98 F | RESPIRATION RATE: 18 BRPM | HEART RATE: 64 BPM

## 2023-05-06 LAB
ANION GAP SERPL CALC-SCNC: 11 MMOL/L — SIGNIFICANT CHANGE UP (ref 7–14)
BUN SERPL-MCNC: 9 MG/DL — SIGNIFICANT CHANGE UP (ref 7–23)
CALCIUM SERPL-MCNC: 8.9 MG/DL — SIGNIFICANT CHANGE UP (ref 8.4–10.5)
CHLORIDE SERPL-SCNC: 106 MMOL/L — SIGNIFICANT CHANGE UP (ref 98–107)
CO2 SERPL-SCNC: 20 MMOL/L — LOW (ref 22–31)
CREAT SERPL-MCNC: 0.59 MG/DL — SIGNIFICANT CHANGE UP (ref 0.5–1.3)
EGFR: 109 ML/MIN/1.73M2 — SIGNIFICANT CHANGE UP
GLUCOSE BLDC GLUCOMTR-MCNC: 164 MG/DL — HIGH (ref 70–99)
GLUCOSE SERPL-MCNC: 198 MG/DL — HIGH (ref 70–99)
HCT VFR BLD CALC: 29.6 % — LOW (ref 34.5–45)
HGB BLD-MCNC: 9.4 G/DL — LOW (ref 11.5–15.5)
LDH SERPL L TO P-CCNC: 424 U/L — HIGH (ref 135–225)
MAGNESIUM SERPL-MCNC: 1.9 MG/DL — SIGNIFICANT CHANGE UP (ref 1.6–2.6)
MCHC RBC-ENTMCNC: 30.5 PG — SIGNIFICANT CHANGE UP (ref 27–34)
MCHC RBC-ENTMCNC: 31.8 GM/DL — LOW (ref 32–36)
MCV RBC AUTO: 96.1 FL — SIGNIFICANT CHANGE UP (ref 80–100)
NRBC # BLD: 0 /100 WBCS — SIGNIFICANT CHANGE UP (ref 0–0)
NRBC # FLD: 0.02 K/UL — HIGH (ref 0–0)
PHOSPHATE SERPL-MCNC: 3.6 MG/DL — SIGNIFICANT CHANGE UP (ref 2.5–4.5)
PLATELET # BLD AUTO: 179 K/UL — SIGNIFICANT CHANGE UP (ref 150–400)
POTASSIUM SERPL-MCNC: 4 MMOL/L — SIGNIFICANT CHANGE UP (ref 3.5–5.3)
POTASSIUM SERPL-SCNC: 4 MMOL/L — SIGNIFICANT CHANGE UP (ref 3.5–5.3)
RBC # BLD: 3.08 M/UL — LOW (ref 3.8–5.2)
RBC # BLD: 3.08 M/UL — LOW (ref 3.8–5.2)
RBC # FLD: 21.1 % — HIGH (ref 10.3–14.5)
RETICS #: 245.8 K/UL — HIGH (ref 25–125)
RETICS/RBC NFR: 8 % — HIGH (ref 0.5–2.5)
SODIUM SERPL-SCNC: 137 MMOL/L — SIGNIFICANT CHANGE UP (ref 135–145)
WBC # BLD: 5.02 K/UL — SIGNIFICANT CHANGE UP (ref 3.8–10.5)
WBC # FLD AUTO: 5.02 K/UL — SIGNIFICANT CHANGE UP (ref 3.8–10.5)

## 2023-05-06 RX ORDER — INSULIN ASPART 100 [IU]/ML
0 INJECTION, SOLUTION SUBCUTANEOUS
Refills: 0 | DISCHARGE

## 2023-05-06 RX ORDER — INSULIN ASPART 100 [IU]/ML
14 INJECTION, SOLUTION SUBCUTANEOUS
Refills: 0 | DISCHARGE

## 2023-05-06 RX ORDER — OXYCODONE AND ACETAMINOPHEN 5; 325 MG/1; MG/1
1 TABLET ORAL
Qty: 12 | Refills: 0
Start: 2023-05-06 | End: 2023-05-08

## 2023-05-06 RX ORDER — GABAPENTIN 400 MG/1
300 CAPSULE ORAL
Qty: 0 | Refills: 0 | DISCHARGE

## 2023-05-06 RX ORDER — INSULIN LISPRO 100 U/ML
6 INJECTION, SOLUTION INTRAVENOUS; SUBCUTANEOUS
Qty: 10 | Refills: 0 | DISCHARGE
Start: 2023-05-06 | End: 2023-06-04

## 2023-05-06 RX ORDER — INSULIN LISPRO 100/ML
6 VIAL (ML) SUBCUTANEOUS
Qty: 10 | Refills: 0
Start: 2023-05-06 | End: 2023-06-04

## 2023-05-06 RX ORDER — TIZANIDINE 4 MG/1
1 TABLET ORAL
Refills: 0 | DISCHARGE

## 2023-05-06 RX ORDER — GABAPENTIN 400 MG/1
1 CAPSULE ORAL
Qty: 0 | Refills: 0 | DISCHARGE
Start: 2023-05-06

## 2023-05-06 RX ADMIN — GABAPENTIN 300 MILLIGRAM(S): 400 CAPSULE ORAL at 06:39

## 2023-05-06 RX ADMIN — SERTRALINE 50 MILLIGRAM(S): 25 TABLET, FILM COATED ORAL at 11:12

## 2023-05-06 RX ADMIN — Medication 650 MILLIGRAM(S): at 06:39

## 2023-05-06 RX ADMIN — OXYCODONE HYDROCHLORIDE 5 MILLIGRAM(S): 5 TABLET ORAL at 06:39

## 2023-05-06 RX ADMIN — Medication 1: at 08:41

## 2023-05-06 RX ADMIN — Medication 6 UNIT(S): at 08:42

## 2023-05-06 RX ADMIN — PANTOPRAZOLE SODIUM 40 MILLIGRAM(S): 20 TABLET, DELAYED RELEASE ORAL at 06:38

## 2023-05-06 RX ADMIN — POLYETHYLENE GLYCOL 3350 17 GRAM(S): 17 POWDER, FOR SOLUTION ORAL at 11:11

## 2023-05-06 RX ADMIN — Medication 1 MILLIGRAM(S): at 11:11

## 2023-05-06 NOTE — DISCHARGE NOTE NURSING/CASE MANAGEMENT/SOCIAL WORK - NSDCVIVACCINE_GEN_ALL_CORE_FT
Tdap; 29-Apr-2020 02:47; Munira Felipe (RN); [employer]; [id number] (Exp. Date: 19-Mar-2022); IntraMuscular; Deltoid Left.; 0.5 milliLiter(s); VIS (VIS Published: 09-May-2013, VIS Presented: 29-Apr-2020);

## 2023-05-06 NOTE — DISCHARGE NOTE NURSING/CASE MANAGEMENT/SOCIAL WORK - PATIENT PORTAL LINK FT
You can access the FollowMyHealth Patient Portal offered by [facility] by registering at the following website: [email or web address]. By joining [facility]’s FollowMyHealth portal, you will also be able to view your health information using other applications (apps) compatible with our system.

## 2023-05-07 LAB
CULTURE RESULTS: NO GROWTH — SIGNIFICANT CHANGE UP
SPECIMEN SOURCE: SIGNIFICANT CHANGE UP
WNV RNA SPEC QL NAA+PROBE: SIGNIFICANT CHANGE UP
WNV RNA SPEC QL NAA+PROBE: SIGNIFICANT CHANGE UP

## 2023-05-08 LAB
CULTURE RESULTS: SIGNIFICANT CHANGE UP
CULTURE RESULTS: SIGNIFICANT CHANGE UP
EBV PCR: SIGNIFICANT CHANGE UP IU/ML
SPECIMEN SOURCE: SIGNIFICANT CHANGE UP
SPECIMEN SOURCE: SIGNIFICANT CHANGE UP
VDRL CSF-TITR: SIGNIFICANT CHANGE UP

## 2023-05-09 LAB
B BURGDOR AB CSF-ACNC: SIGNIFICANT CHANGE UP
WNV IGG CSF IA-ACNC: NEGATIVE — SIGNIFICANT CHANGE UP
WNV IGM CSF IA-ACNC: NEGATIVE — SIGNIFICANT CHANGE UP

## 2023-05-10 ENCOUNTER — OUTPATIENT (OUTPATIENT)
Dept: OUTPATIENT SERVICES | Facility: HOSPITAL | Age: 53
LOS: 1 days | Discharge: HOME | End: 2023-05-10

## 2023-05-10 DIAGNOSIS — D59.10 AUTOIMMUNE HEMOLYTIC ANEMIA, UNSPECIFIED: ICD-10-CM

## 2023-05-10 DIAGNOSIS — L72.9 FOLLICULAR CYST OF THE SKIN AND SUBCUTANEOUS TISSUE, UNSPECIFIED: Chronic | ICD-10-CM

## 2023-05-10 DIAGNOSIS — L02.91 CUTANEOUS ABSCESS, UNSPECIFIED: Chronic | ICD-10-CM

## 2023-05-10 DIAGNOSIS — Z90.89 ACQUIRED ABSENCE OF OTHER ORGANS: Chronic | ICD-10-CM

## 2023-05-10 DIAGNOSIS — Z98.890 OTHER SPECIFIED POSTPROCEDURAL STATES: Chronic | ICD-10-CM

## 2023-05-16 ENCOUNTER — OUTPATIENT (OUTPATIENT)
Dept: OUTPATIENT SERVICES | Facility: HOSPITAL | Age: 53
LOS: 1 days | End: 2023-05-16
Payer: MEDICARE

## 2023-05-16 ENCOUNTER — RESULT REVIEW (OUTPATIENT)
Age: 53
End: 2023-05-16

## 2023-05-16 ENCOUNTER — LABORATORY RESULT (OUTPATIENT)
Age: 53
End: 2023-05-16

## 2023-05-16 ENCOUNTER — APPOINTMENT (OUTPATIENT)
Dept: HEMATOLOGY ONCOLOGY | Facility: CLINIC | Age: 53
End: 2023-05-16
Payer: MEDICARE

## 2023-05-16 ENCOUNTER — NON-APPOINTMENT (OUTPATIENT)
Age: 53
End: 2023-05-16

## 2023-05-16 VITALS
DIASTOLIC BLOOD PRESSURE: 82 MMHG | BODY MASS INDEX: 29.9 KG/M2 | TEMPERATURE: 97 F | SYSTOLIC BLOOD PRESSURE: 124 MMHG | WEIGHT: 170.86 LBS | HEIGHT: 63.39 IN | OXYGEN SATURATION: 99 % | HEART RATE: 72 BPM | RESPIRATION RATE: 16 BRPM

## 2023-05-16 DIAGNOSIS — Z98.890 OTHER SPECIFIED POSTPROCEDURAL STATES: Chronic | ICD-10-CM

## 2023-05-16 DIAGNOSIS — Z90.89 ACQUIRED ABSENCE OF OTHER ORGANS: Chronic | ICD-10-CM

## 2023-05-16 DIAGNOSIS — R21 RASH AND OTHER NONSPECIFIC SKIN ERUPTION: ICD-10-CM

## 2023-05-16 DIAGNOSIS — L72.9 FOLLICULAR CYST OF THE SKIN AND SUBCUTANEOUS TISSUE, UNSPECIFIED: Chronic | ICD-10-CM

## 2023-05-16 DIAGNOSIS — D59.10 AUTOIMMUNE HEMOLYTIC ANEMIA, UNSPECIFIED: ICD-10-CM

## 2023-05-16 DIAGNOSIS — L02.91 CUTANEOUS ABSCESS, UNSPECIFIED: Chronic | ICD-10-CM

## 2023-05-16 LAB
BASOPHILS # BLD AUTO: 0.07 K/UL — SIGNIFICANT CHANGE UP (ref 0–0.2)
BASOPHILS NFR BLD AUTO: 1.1 % — SIGNIFICANT CHANGE UP (ref 0–2)
EOSINOPHIL # BLD AUTO: 0.13 K/UL — SIGNIFICANT CHANGE UP (ref 0–0.5)
EOSINOPHIL NFR BLD AUTO: 2.1 % — SIGNIFICANT CHANGE UP (ref 0–6)
HCT VFR BLD CALC: 35.5 % — SIGNIFICANT CHANGE UP (ref 34.5–45)
HGB BLD-MCNC: 11.5 G/DL — SIGNIFICANT CHANGE UP (ref 11.5–15.5)
IMM GRANULOCYTES NFR BLD AUTO: 0.3 % — SIGNIFICANT CHANGE UP (ref 0–0.9)
LYMPHOCYTES # BLD AUTO: 1.95 K/UL — SIGNIFICANT CHANGE UP (ref 1–3.3)
LYMPHOCYTES # BLD AUTO: 31 % — SIGNIFICANT CHANGE UP (ref 13–44)
MCHC RBC-ENTMCNC: 31.2 PG — SIGNIFICANT CHANGE UP (ref 27–34)
MCHC RBC-ENTMCNC: 32.4 G/DL — SIGNIFICANT CHANGE UP (ref 32–36)
MCV RBC AUTO: 96.2 FL — SIGNIFICANT CHANGE UP (ref 80–100)
MONOCYTES # BLD AUTO: 0.39 K/UL — SIGNIFICANT CHANGE UP (ref 0–0.9)
MONOCYTES NFR BLD AUTO: 6.2 % — SIGNIFICANT CHANGE UP (ref 2–14)
NEUTROPHILS # BLD AUTO: 3.74 K/UL — SIGNIFICANT CHANGE UP (ref 1.8–7.4)
NEUTROPHILS NFR BLD AUTO: 59.3 % — SIGNIFICANT CHANGE UP (ref 43–77)
NRBC # BLD: 0 /100 WBCS — SIGNIFICANT CHANGE UP (ref 0–0)
PLATELET # BLD AUTO: 196 K/UL — SIGNIFICANT CHANGE UP (ref 150–400)
RBC # BLD: 3.69 M/UL — LOW (ref 3.8–5.2)
RBC # FLD: 15.4 % — HIGH (ref 10.3–14.5)
WBC # BLD: 6.3 K/UL — SIGNIFICANT CHANGE UP (ref 3.8–10.5)
WBC # FLD AUTO: 6.3 K/UL — SIGNIFICANT CHANGE UP (ref 3.8–10.5)

## 2023-05-16 PROCEDURE — 86860 RBC ANTIBODY ELUTION: CPT

## 2023-05-16 PROCEDURE — 86880 COOMBS TEST DIRECT: CPT

## 2023-05-16 PROCEDURE — 86901 BLOOD TYPING SEROLOGIC RH(D): CPT

## 2023-05-16 PROCEDURE — 86900 BLOOD TYPING SEROLOGIC ABO: CPT

## 2023-05-16 PROCEDURE — 99215 OFFICE O/P EST HI 40 MIN: CPT

## 2023-05-16 PROCEDURE — 86850 RBC ANTIBODY SCREEN: CPT

## 2023-05-16 PROCEDURE — 86870 RBC ANTIBODY IDENTIFICATION: CPT

## 2023-05-16 RX ORDER — FOLIC ACID 1 MG/1
1 TABLET ORAL DAILY
Qty: 90 | Refills: 3 | Status: ACTIVE | COMMUNITY
Start: 2023-05-16 | End: 1900-01-01

## 2023-05-16 RX ORDER — NYSTATIN 100000 1/G
100000 POWDER TOPICAL
Qty: 1 | Refills: 3 | Status: ACTIVE | COMMUNITY
Start: 2023-05-16 | End: 1900-01-01

## 2023-05-17 ENCOUNTER — RESULT REVIEW (OUTPATIENT)
Age: 53
End: 2023-05-17

## 2023-05-17 LAB
ALBUMIN SERPL ELPH-MCNC: 4.1 G/DL
ALP BLD-CCNC: 113 U/L
ALT SERPL-CCNC: 10 U/L
ANION GAP SERPL CALC-SCNC: 11 MMOL/L
AST SERPL-CCNC: 22 U/L
BILIRUB INDIRECT SERPL-MCNC: 0.5 MG/DL
BILIRUB SERPL-MCNC: 0.8 MG/DL
BUN SERPL-MCNC: 8 MG/DL
CALCIUM SERPL-MCNC: 9.7 MG/DL
CHLORIDE SERPL-SCNC: 105 MMOL/L
CO2 SERPL-SCNC: 23 MMOL/L
CREAT SERPL-MCNC: 0.59 MG/DL
DAT C3-SP REAG RBC QL: NEGATIVE — SIGNIFICANT CHANGE UP
EGFR: 109 ML/MIN/1.73M2
GLUCOSE SERPL-MCNC: 115 MG/DL
HAPTOGLOB SERPL-MCNC: <20 MG/DL
LDH SERPL-CCNC: 411 U/L
POTASSIUM SERPL-SCNC: 4.2 MMOL/L
PROT SERPL-MCNC: 8.1 G/DL
RETICS #: 152.2 K/UL — HIGH (ref 25–125)
RETICS/RBC NFR: 4.2 % — HIGH (ref 0.5–2.5)
SODIUM SERPL-SCNC: 140 MMOL/L

## 2023-05-17 NOTE — ASSESSMENT
[FreeTextEntry1] : 54 yo woman who about 1 week after right hemicolectomy and lysis of adhesions for mobile cecum in 4/2023, developed Autoimmune hemolysis; suspect trigger may have been anesthesia vs. antibiotic exposure. Due to relative contraindication of steroids in the post-operative period due to concern for wound healing, she received IVIG 1g/kg X2 doses and responded well. Course complicated by UTI and suspected aseptic meningitis as a result of IVIG (although this is a very rare complication comprising less than 1% of all aseptic meningitis cases\par \par - CBC reviewed from today and noted further improvement of Hg\par - will check hemolysis labs including LDH, Retic, Haptoglobin and Direct Tiffanie test\par - will start folic acid 1mg daily; for unclear reason she was discharged home without this\par - if hemolysis persists and causes recurrent anemia, would opt for trial of steroids \par - CT imaging as inpatient has not revealed evidence of underlying lymphoproliferative disorder. \par - for fungal rash will prescribe Nystatin powder to apply BID for 10 days; if no improvement, may need to see dermatology\par - will continue to monitor lab values on monthly basis; patient is aware that she can call office if she begins to feel fatigued\par - Patient had the opportunity to have all their questions answered to their satisfaction \par - f/u in 3-4 months or sooner pending labs and clinical status

## 2023-05-17 NOTE — REASON FOR VISIT
[Initial Consultation] : an initial consultation for [Parent] : parent [FreeTextEntry2] : Establishing care in the office for hemolytic anemia

## 2023-05-17 NOTE — REVIEW OF SYSTEMS
[Fatigue] : fatigue [Diarrhea: Grade 0] : Diarrhea: Grade 0 [Negative] : Allergic/Immunologic [FreeTextEntry2] : but improved since hospitalization [de-identified] : rash in the folds of her axilla and groin

## 2023-05-17 NOTE — PHYSICAL EXAM
[Normal] : affect appropriate [de-identified] : well healed abdominal incsions [de-identified] : fungal rash with patches of raised erythema in the folds of bilateral axilla and in the folds of the groin

## 2023-05-17 NOTE — CONSULT LETTER
[Dear  ___] : Dear  [unfilled], [Consult Letter:] : I had the pleasure of evaluating your patient, [unfilled]. [Please see my note below.] : Please see my note below. [Consult Closing:] : Thank you very much for allowing me to participate in the care of this patient.  If you have any questions, please do not hesitate to contact me. [Sincerely,] : Sincerely, [FreeTextEntry3] : Marian Finch MD\par \par Division of Hematology/Oncology\par [facility]\par [address]\[place] [address] \par Tel: [phone]\par Fax: [phone]\par Email: [email or web address]  [DrCamacho  ___] : Dr. BRAXTON

## 2023-05-17 NOTE — HISTORY OF PRESENT ILLNESS
[de-identified] : Patient intially seen at [facility] on 4/29/23 when she was admitted and found to have acute anemia. In March 2023 she was found to have gastroparesis and mobile cecum for which she underwent right hemicolectomy with ileocolic anastomosis on 4/17/23 by Dr. Iker Sen. Her hospital course was not complicated but about 1 week postoperatively she began to feel weak , reported black stool and had syncopal episode during which she fell out of bed at home and was brought emergently to the emergency room. \par Prior to surgery Hg was 13.4 g/dL; post op was 12.9g/dL; on the day she returned to [facility], Hg had dropped to 6.1 g/dL. She received 3 units pRBC.\par Workup revealed LDH elevated, hapto<20, retic elevated, with increase in total direct bilirubin; IgG positive, C3 negative, eluate negative. These findings were c/w hemolytic anemia and on 4/30/23 she was started on IVIG 1g//kg daily X2 doses. Case was discussed with surgery team and given proximity to her recent operation, steroids were deemed to be relatively contraindicated due to concern for hindering wound healing.\par \par She also had a fever on 4/27/23; blood cultures were negative but urine culture grew E. Faecalis sensitive to ampicillin and nitrofurantoin.\par \par Hg level normalized but unfortunately she developed severe headache and photophobia which was concerning for aseptic meningitis which is reported in <1% of cases with exposure to IVIG. Imaging with head CT and brain MRI was negative LP was attempted several times at bedside and ultimately done in IR; CSF fluid culture was negative; viral serologies were negative.\par \par 5/16/23 - presents to hematology office to establish care. [100: Normal, no complaints, no evidence of disease.] : 100: Normal, no complaints, no evidence of disease.

## 2023-05-18 LAB — CA SERPL QL: ABNORMAL

## 2023-05-18 PROCEDURE — 86077 PHYS BLOOD BANK SERV XMATCH: CPT

## 2023-05-27 ENCOUNTER — INPATIENT (INPATIENT)
Facility: HOSPITAL | Age: 53
LOS: 2 days | Discharge: HOME | DRG: 571 | End: 2023-05-30
Attending: INTERNAL MEDICINE | Admitting: INTERNAL MEDICINE
Payer: MEDICARE

## 2023-05-27 VITALS
OXYGEN SATURATION: 100 % | WEIGHT: 169.98 LBS | SYSTOLIC BLOOD PRESSURE: 121 MMHG | RESPIRATION RATE: 20 BRPM | DIASTOLIC BLOOD PRESSURE: 83 MMHG | HEART RATE: 63 BPM | HEIGHT: 63 IN | TEMPERATURE: 98 F

## 2023-05-27 DIAGNOSIS — Z90.89 ACQUIRED ABSENCE OF OTHER ORGANS: Chronic | ICD-10-CM

## 2023-05-27 DIAGNOSIS — L72.9 FOLLICULAR CYST OF THE SKIN AND SUBCUTANEOUS TISSUE, UNSPECIFIED: Chronic | ICD-10-CM

## 2023-05-27 DIAGNOSIS — L02.91 CUTANEOUS ABSCESS, UNSPECIFIED: Chronic | ICD-10-CM

## 2023-05-27 DIAGNOSIS — Z98.890 OTHER SPECIFIED POSTPROCEDURAL STATES: Chronic | ICD-10-CM

## 2023-05-27 PROCEDURE — 99285 EMERGENCY DEPT VISIT HI MDM: CPT | Mod: 25

## 2023-05-27 PROCEDURE — 36000 PLACE NEEDLE IN VEIN: CPT

## 2023-05-27 RX ORDER — METRONIDAZOLE 500 MG
500 TABLET ORAL ONCE
Refills: 0 | Status: COMPLETED | OUTPATIENT
Start: 2023-05-27 | End: 2023-05-27

## 2023-05-27 RX ORDER — VANCOMYCIN HCL 1 G
1000 VIAL (EA) INTRAVENOUS ONCE
Refills: 0 | Status: COMPLETED | OUTPATIENT
Start: 2023-05-27 | End: 2023-05-27

## 2023-05-27 RX ORDER — PIPERACILLIN AND TAZOBACTAM 4; .5 G/20ML; G/20ML
3.38 INJECTION, POWDER, LYOPHILIZED, FOR SOLUTION INTRAVENOUS ONCE
Refills: 0 | Status: COMPLETED | OUTPATIENT
Start: 2023-05-27 | End: 2023-05-27

## 2023-05-27 NOTE — ED ADULT TRIAGE NOTE - CHIEF COMPLAINT QUOTE
stepped on a nail about a week ago; punctured right foot; now has pain, redness, and swelling to site; c/o HA;  pt is DM II; recent admit to [facility] with sepsis and meningitis

## 2023-05-27 NOTE — ED ADULT NURSE NOTE - NSFALLRISKINTERV_ED_ALL_ED
Assistance OOB with selected safe patient handling equipment if applicable/Communicate fall risk and risk factors to all staff, patient, and family/Provide patient with walking aids/Provide visual cue: yellow wristband, yellow gown, etc/Reinforce activity limits and safety measures with patient and family/Call bell, personal items and telephone in reach/Instruct patient to call for assistance before getting out of bed/chair/stretcher/Non-slip footwear applied when patient is off stretcher/[place] to call system/Physically safe environment - no spills, clutter or unnecessary equipment/Purposeful Proactive Rounding/Room/bathroom lighting operational, light cord in reach

## 2023-05-27 NOTE — ED ADULT NURSE NOTE - CHIEF COMPLAINT QUOTE
stepped on a nail about a week ago; punctured right foot; now has pain, redness, and swelling to site; c/o HA;  pt is DM II; recent admit to [facility] with sepsis and meningitis

## 2023-05-27 NOTE — ED ADULT NURSE NOTE - OBJECTIVE STATEMENT
Pt 53 year old female, A/O x4. Pt came in due to right foot screw  wound. PMH- DM2, meningitis, sepsis. As per pt, x 1 week ago, she stepped on screw . Today, wound increased in pain, swelling and "looks like there is puss in it". Difficulty with ambulation due to pain. Upon assessment, wound is closed, no foul odor or drainage present. Pedal pulses b/l. Denies fever, chills, n/v/d, ha, numbness/ tingling.

## 2023-05-28 DIAGNOSIS — L08.9 LOCAL INFECTION OF THE SKIN AND SUBCUTANEOUS TISSUE, UNSPECIFIED: ICD-10-CM

## 2023-05-28 DIAGNOSIS — Z29.9 ENCOUNTER FOR PROPHYLACTIC MEASURES, UNSPECIFIED: ICD-10-CM

## 2023-05-28 DIAGNOSIS — E11.9 TYPE 2 DIABETES MELLITUS WITHOUT COMPLICATIONS: ICD-10-CM

## 2023-05-28 DIAGNOSIS — Z90.49 ACQUIRED ABSENCE OF OTHER SPECIFIED PARTS OF DIGESTIVE TRACT: Chronic | ICD-10-CM

## 2023-05-28 DIAGNOSIS — F41.9 ANXIETY DISORDER, UNSPECIFIED: ICD-10-CM

## 2023-05-28 DIAGNOSIS — L03.90 CELLULITIS, UNSPECIFIED: ICD-10-CM

## 2023-05-28 LAB
ALBUMIN SERPL ELPH-MCNC: 4.2 G/DL — SIGNIFICANT CHANGE UP (ref 3.3–5)
ALP SERPL-CCNC: 120 U/L — SIGNIFICANT CHANGE UP (ref 40–120)
ALT FLD-CCNC: 11 U/L — SIGNIFICANT CHANGE UP (ref 10–45)
ANION GAP SERPL CALC-SCNC: 14 MMOL/L — SIGNIFICANT CHANGE UP (ref 5–17)
APTT BLD: 32.7 SEC — SIGNIFICANT CHANGE UP (ref 27.5–35.5)
AST SERPL-CCNC: 18 U/L — SIGNIFICANT CHANGE UP (ref 10–40)
BASE EXCESS BLDV CALC-SCNC: -2.2 MMOL/L — LOW (ref -2–3)
BASOPHILS # BLD AUTO: 0.06 K/UL — SIGNIFICANT CHANGE UP (ref 0–0.2)
BASOPHILS NFR BLD AUTO: 0.6 % — SIGNIFICANT CHANGE UP (ref 0–2)
BILIRUB SERPL-MCNC: 0.4 MG/DL — SIGNIFICANT CHANGE UP (ref 0.2–1.2)
BUN SERPL-MCNC: 11 MG/DL — SIGNIFICANT CHANGE UP (ref 7–23)
CA-I SERPL-SCNC: 1.24 MMOL/L — SIGNIFICANT CHANGE UP (ref 1.15–1.33)
CALCIUM SERPL-MCNC: 9.6 MG/DL — SIGNIFICANT CHANGE UP (ref 8.4–10.5)
CHLORIDE BLDV-SCNC: 105 MMOL/L — SIGNIFICANT CHANGE UP (ref 96–108)
CHLORIDE SERPL-SCNC: 104 MMOL/L — SIGNIFICANT CHANGE UP (ref 96–108)
CO2 BLDV-SCNC: 26 MMOL/L — SIGNIFICANT CHANGE UP (ref 22–26)
CO2 SERPL-SCNC: 20 MMOL/L — LOW (ref 22–31)
CREAT SERPL-MCNC: 0.66 MG/DL — SIGNIFICANT CHANGE UP (ref 0.5–1.3)
CRP SERPL-MCNC: 8 MG/L — HIGH (ref 0–4)
EGFR: 106 ML/MIN/1.73M2 — SIGNIFICANT CHANGE UP
EOSINOPHIL # BLD AUTO: 0.25 K/UL — SIGNIFICANT CHANGE UP (ref 0–0.5)
EOSINOPHIL NFR BLD AUTO: 2.4 % — SIGNIFICANT CHANGE UP (ref 0–6)
FLUAV AG NPH QL: SIGNIFICANT CHANGE UP
FLUBV AG NPH QL: SIGNIFICANT CHANGE UP
GAS PNL BLDV: 137 MMOL/L — SIGNIFICANT CHANGE UP (ref 136–145)
GAS PNL BLDV: SIGNIFICANT CHANGE UP
GLUCOSE BLDC GLUCOMTR-MCNC: 152 MG/DL — HIGH (ref 70–99)
GLUCOSE BLDC GLUCOMTR-MCNC: 167 MG/DL — HIGH (ref 70–99)
GLUCOSE BLDC GLUCOMTR-MCNC: 193 MG/DL — HIGH (ref 70–99)
GLUCOSE BLDC GLUCOMTR-MCNC: 246 MG/DL — HIGH (ref 70–99)
GLUCOSE BLDV-MCNC: 148 MG/DL — HIGH (ref 70–99)
GLUCOSE SERPL-MCNC: 117 MG/DL — HIGH (ref 70–99)
HCO3 BLDV-SCNC: 24 MMOL/L — SIGNIFICANT CHANGE UP (ref 22–29)
HCT VFR BLD CALC: 38.7 % — SIGNIFICANT CHANGE UP (ref 34.5–45)
HCT VFR BLDA CALC: 36 % — SIGNIFICANT CHANGE UP (ref 34.5–46.5)
HGB BLD CALC-MCNC: 12 G/DL — SIGNIFICANT CHANGE UP (ref 11.7–16.1)
HGB BLD-MCNC: 12.4 G/DL — SIGNIFICANT CHANGE UP (ref 11.5–15.5)
IMM GRANULOCYTES NFR BLD AUTO: 0.4 % — SIGNIFICANT CHANGE UP (ref 0–0.9)
INR BLD: 0.99 RATIO — SIGNIFICANT CHANGE UP (ref 0.88–1.16)
LACTATE BLDV-MCNC: 1.4 MMOL/L — SIGNIFICANT CHANGE UP (ref 0.5–2)
LYMPHOCYTES # BLD AUTO: 2.74 K/UL — SIGNIFICANT CHANGE UP (ref 1–3.3)
LYMPHOCYTES # BLD AUTO: 25.9 % — SIGNIFICANT CHANGE UP (ref 13–44)
MCHC RBC-ENTMCNC: 30.8 PG — SIGNIFICANT CHANGE UP (ref 27–34)
MCHC RBC-ENTMCNC: 32 GM/DL — SIGNIFICANT CHANGE UP (ref 32–36)
MCV RBC AUTO: 96.3 FL — SIGNIFICANT CHANGE UP (ref 80–100)
MONOCYTES # BLD AUTO: 0.83 K/UL — SIGNIFICANT CHANGE UP (ref 0–0.9)
MONOCYTES NFR BLD AUTO: 7.9 % — SIGNIFICANT CHANGE UP (ref 2–14)
NEUTROPHILS # BLD AUTO: 6.64 K/UL — SIGNIFICANT CHANGE UP (ref 1.8–7.4)
NEUTROPHILS NFR BLD AUTO: 62.8 % — SIGNIFICANT CHANGE UP (ref 43–77)
NRBC # BLD: 0 /100 WBCS — SIGNIFICANT CHANGE UP (ref 0–0)
PCO2 BLDV: 47 MMHG — HIGH (ref 39–42)
PH BLDV: 7.32 — SIGNIFICANT CHANGE UP (ref 7.32–7.43)
PLATELET # BLD AUTO: 196 K/UL — SIGNIFICANT CHANGE UP (ref 150–400)
PO2 BLDV: 33 MMHG — SIGNIFICANT CHANGE UP (ref 25–45)
POTASSIUM BLDV-SCNC: 3.9 MMOL/L — SIGNIFICANT CHANGE UP (ref 3.5–5.1)
POTASSIUM SERPL-MCNC: 4.5 MMOL/L — SIGNIFICANT CHANGE UP (ref 3.5–5.3)
POTASSIUM SERPL-SCNC: 4.5 MMOL/L — SIGNIFICANT CHANGE UP (ref 3.5–5.3)
PROT SERPL-MCNC: 7.7 G/DL — SIGNIFICANT CHANGE UP (ref 6–8.3)
PROTHROM AB SERPL-ACNC: 11.5 SEC — SIGNIFICANT CHANGE UP (ref 10.5–13.4)
RBC # BLD: 4.02 M/UL — SIGNIFICANT CHANGE UP (ref 3.8–5.2)
RBC # FLD: 13.2 % — SIGNIFICANT CHANGE UP (ref 10.3–14.5)
RSV RNA NPH QL NAA+NON-PROBE: SIGNIFICANT CHANGE UP
SAO2 % BLDV: 47 % — LOW (ref 67–88)
SARS-COV-2 RNA SPEC QL NAA+PROBE: SIGNIFICANT CHANGE UP
SODIUM SERPL-SCNC: 138 MMOL/L — SIGNIFICANT CHANGE UP (ref 135–145)
WBC # BLD: 10.25 K/UL — SIGNIFICANT CHANGE UP (ref 3.8–10.5)
WBC # FLD AUTO: 10.25 K/UL — SIGNIFICANT CHANGE UP (ref 3.8–10.5)

## 2023-05-28 PROCEDURE — 73630 X-RAY EXAM OF FOOT: CPT | Mod: 26,RT

## 2023-05-28 PROCEDURE — 73610 X-RAY EXAM OF ANKLE: CPT | Mod: 26,RT

## 2023-05-28 PROCEDURE — 99223 1ST HOSP IP/OBS HIGH 75: CPT

## 2023-05-28 RX ORDER — CLONAZEPAM 1 MG
1 TABLET ORAL
Refills: 0 | DISCHARGE

## 2023-05-28 RX ORDER — TRAZODONE HCL 50 MG
100 TABLET ORAL AT BEDTIME
Refills: 0 | Status: DISCONTINUED | OUTPATIENT
Start: 2023-05-28 | End: 2023-05-30

## 2023-05-28 RX ORDER — GABAPENTIN 400 MG/1
1 CAPSULE ORAL
Refills: 0 | DISCHARGE

## 2023-05-28 RX ORDER — MORPHINE SULFATE 50 MG/1
2 CAPSULE, EXTENDED RELEASE ORAL EVERY 6 HOURS
Refills: 0 | Status: DISCONTINUED | OUTPATIENT
Start: 2023-05-28 | End: 2023-05-30

## 2023-05-28 RX ORDER — GABAPENTIN 400 MG/1
600 CAPSULE ORAL THREE TIMES A DAY
Refills: 0 | Status: DISCONTINUED | OUTPATIENT
Start: 2023-05-28 | End: 2023-05-30

## 2023-05-28 RX ORDER — MORPHINE SULFATE 50 MG/1
4 CAPSULE, EXTENDED RELEASE ORAL ONCE
Refills: 0 | Status: DISCONTINUED | OUTPATIENT
Start: 2023-05-28 | End: 2023-05-28

## 2023-05-28 RX ORDER — OXYCODONE HYDROCHLORIDE 5 MG/1
5 TABLET ORAL EVERY 6 HOURS
Refills: 0 | Status: DISCONTINUED | OUTPATIENT
Start: 2023-05-28 | End: 2023-05-30

## 2023-05-28 RX ORDER — CHOLECALCIFEROL (VITAMIN D3) 125 MCG
1 CAPSULE ORAL
Qty: 0 | Refills: 0 | DISCHARGE

## 2023-05-28 RX ORDER — SENNA PLUS 8.6 MG/1
2 TABLET ORAL AT BEDTIME
Refills: 0 | Status: DISCONTINUED | OUTPATIENT
Start: 2023-05-28 | End: 2023-05-30

## 2023-05-28 RX ORDER — ENOXAPARIN SODIUM 100 MG/ML
40 INJECTION SUBCUTANEOUS EVERY 24 HOURS
Refills: 0 | Status: DISCONTINUED | OUTPATIENT
Start: 2023-05-28 | End: 2023-05-30

## 2023-05-28 RX ORDER — DEXTROSE 50 % IN WATER 50 %
25 SYRINGE (ML) INTRAVENOUS ONCE
Refills: 0 | Status: DISCONTINUED | OUTPATIENT
Start: 2023-05-28 | End: 2023-05-30

## 2023-05-28 RX ORDER — KETOROLAC TROMETHAMINE 30 MG/ML
15 SYRINGE (ML) INJECTION ONCE
Refills: 0 | Status: DISCONTINUED | OUTPATIENT
Start: 2023-05-28 | End: 2023-05-28

## 2023-05-28 RX ORDER — ACETAMINOPHEN 500 MG
650 TABLET ORAL EVERY 6 HOURS
Refills: 0 | Status: DISCONTINUED | OUTPATIENT
Start: 2023-05-28 | End: 2023-05-30

## 2023-05-28 RX ORDER — PIPERACILLIN AND TAZOBACTAM 4; .5 G/20ML; G/20ML
3.38 INJECTION, POWDER, LYOPHILIZED, FOR SOLUTION INTRAVENOUS ONCE
Refills: 0 | Status: COMPLETED | OUTPATIENT
Start: 2023-05-28 | End: 2023-05-28

## 2023-05-28 RX ORDER — THIAMINE MONONITRATE (VIT B1) 100 MG
100 TABLET ORAL DAILY
Refills: 0 | Status: DISCONTINUED | OUTPATIENT
Start: 2023-05-28 | End: 2023-05-30

## 2023-05-28 RX ORDER — PANTOPRAZOLE SODIUM 20 MG/1
40 TABLET, DELAYED RELEASE ORAL
Refills: 0 | Status: DISCONTINUED | OUTPATIENT
Start: 2023-05-28 | End: 2023-05-30

## 2023-05-28 RX ORDER — PIPERACILLIN AND TAZOBACTAM 4; .5 G/20ML; G/20ML
3.38 INJECTION, POWDER, LYOPHILIZED, FOR SOLUTION INTRAVENOUS EVERY 8 HOURS
Refills: 0 | Status: DISCONTINUED | OUTPATIENT
Start: 2023-05-28 | End: 2023-05-30

## 2023-05-28 RX ORDER — TRAZODONE HCL 50 MG
1 TABLET ORAL
Qty: 0 | Refills: 0 | DISCHARGE

## 2023-05-28 RX ORDER — MUPIROCIN 20 MG/G
1 OINTMENT TOPICAL
Refills: 0 | Status: DISCONTINUED | OUTPATIENT
Start: 2023-05-28 | End: 2023-05-30

## 2023-05-28 RX ORDER — LANOLIN ALCOHOL/MO/W.PET/CERES
3 CREAM (GRAM) TOPICAL AT BEDTIME
Refills: 0 | Status: DISCONTINUED | OUTPATIENT
Start: 2023-05-28 | End: 2023-05-30

## 2023-05-28 RX ORDER — INSULIN LISPRO 100/ML
VIAL (ML) SUBCUTANEOUS
Refills: 0 | Status: DISCONTINUED | OUTPATIENT
Start: 2023-05-28 | End: 2023-05-30

## 2023-05-28 RX ORDER — TETANUS TOXOID, REDUCED DIPHTHERIA TOXOID AND ACELLULAR PERTUSSIS VACCINE, ADSORBED 5; 2.5; 8; 8; 2.5 [IU]/.5ML; [IU]/.5ML; UG/.5ML; UG/.5ML; UG/.5ML
0.5 SUSPENSION INTRAMUSCULAR ONCE
Refills: 0 | Status: COMPLETED | OUTPATIENT
Start: 2023-05-28 | End: 2023-05-28

## 2023-05-28 RX ORDER — DEXTROSE 50 % IN WATER 50 %
15 SYRINGE (ML) INTRAVENOUS ONCE
Refills: 0 | Status: DISCONTINUED | OUTPATIENT
Start: 2023-05-28 | End: 2023-05-30

## 2023-05-28 RX ORDER — INSULIN GLARGINE 100 [IU]/ML
15 INJECTION, SOLUTION SUBCUTANEOUS AT BEDTIME
Refills: 0 | Status: DISCONTINUED | OUTPATIENT
Start: 2023-05-28 | End: 2023-05-30

## 2023-05-28 RX ORDER — SERTRALINE 25 MG/1
50 TABLET, FILM COATED ORAL DAILY
Refills: 0 | Status: DISCONTINUED | OUTPATIENT
Start: 2023-05-28 | End: 2023-05-30

## 2023-05-28 RX ORDER — VANCOMYCIN HCL 1 G
1000 VIAL (EA) INTRAVENOUS EVERY 12 HOURS
Refills: 0 | Status: DISCONTINUED | OUTPATIENT
Start: 2023-05-28 | End: 2023-05-30

## 2023-05-28 RX ORDER — CLONAZEPAM 1 MG
1 TABLET ORAL THREE TIMES A DAY
Refills: 0 | Status: DISCONTINUED | OUTPATIENT
Start: 2023-05-28 | End: 2023-05-30

## 2023-05-28 RX ORDER — FOLIC ACID 0.8 MG
1 TABLET ORAL DAILY
Refills: 0 | Status: DISCONTINUED | OUTPATIENT
Start: 2023-05-28 | End: 2023-05-30

## 2023-05-28 RX ADMIN — MORPHINE SULFATE 4 MILLIGRAM(S): 50 CAPSULE, EXTENDED RELEASE ORAL at 09:43

## 2023-05-28 RX ADMIN — OXYCODONE HYDROCHLORIDE 5 MILLIGRAM(S): 5 TABLET ORAL at 18:53

## 2023-05-28 RX ADMIN — MORPHINE SULFATE 2 MILLIGRAM(S): 50 CAPSULE, EXTENDED RELEASE ORAL at 15:05

## 2023-05-28 RX ADMIN — Medication 250 MILLIGRAM(S): at 17:57

## 2023-05-28 RX ADMIN — TETANUS TOXOID, REDUCED DIPHTHERIA TOXOID AND ACELLULAR PERTUSSIS VACCINE, ADSORBED 0.5 MILLILITER(S): 5; 2.5; 8; 8; 2.5 SUSPENSION INTRAMUSCULAR at 02:41

## 2023-05-28 RX ADMIN — PIPERACILLIN AND TAZOBACTAM 200 GRAM(S): 4; .5 INJECTION, POWDER, LYOPHILIZED, FOR SOLUTION INTRAVENOUS at 10:04

## 2023-05-28 RX ADMIN — Medication 1 MILLIGRAM(S): at 12:28

## 2023-05-28 RX ADMIN — Medication 1: at 12:03

## 2023-05-28 RX ADMIN — ENOXAPARIN SODIUM 40 MILLIGRAM(S): 100 INJECTION SUBCUTANEOUS at 12:29

## 2023-05-28 RX ADMIN — Medication 15 MILLIGRAM(S): at 06:39

## 2023-05-28 RX ADMIN — OXYCODONE HYDROCHLORIDE 5 MILLIGRAM(S): 5 TABLET ORAL at 18:00

## 2023-05-28 RX ADMIN — SERTRALINE 50 MILLIGRAM(S): 25 TABLET, FILM COATED ORAL at 12:28

## 2023-05-28 RX ADMIN — MORPHINE SULFATE 4 MILLIGRAM(S): 50 CAPSULE, EXTENDED RELEASE ORAL at 09:13

## 2023-05-28 RX ADMIN — GABAPENTIN 600 MILLIGRAM(S): 400 CAPSULE ORAL at 13:45

## 2023-05-28 RX ADMIN — PIPERACILLIN AND TAZOBACTAM 200 GRAM(S): 4; .5 INJECTION, POWDER, LYOPHILIZED, FOR SOLUTION INTRAVENOUS at 01:12

## 2023-05-28 RX ADMIN — Medication 15 MILLIGRAM(S): at 01:59

## 2023-05-28 RX ADMIN — MUPIROCIN 1 APPLICATION(S): 20 OINTMENT TOPICAL at 17:41

## 2023-05-28 RX ADMIN — MORPHINE SULFATE 4 MILLIGRAM(S): 50 CAPSULE, EXTENDED RELEASE ORAL at 03:29

## 2023-05-28 RX ADMIN — PIPERACILLIN AND TAZOBACTAM 25 GRAM(S): 4; .5 INJECTION, POWDER, LYOPHILIZED, FOR SOLUTION INTRAVENOUS at 13:48

## 2023-05-28 RX ADMIN — Medication 1: at 17:41

## 2023-05-28 RX ADMIN — PIPERACILLIN AND TAZOBACTAM 25 GRAM(S): 4; .5 INJECTION, POWDER, LYOPHILIZED, FOR SOLUTION INTRAVENOUS at 22:20

## 2023-05-28 RX ADMIN — GABAPENTIN 600 MILLIGRAM(S): 400 CAPSULE ORAL at 23:02

## 2023-05-28 RX ADMIN — Medication 1 TABLET(S): at 12:28

## 2023-05-28 RX ADMIN — INSULIN GLARGINE 15 UNIT(S): 100 INJECTION, SOLUTION SUBCUTANEOUS at 22:18

## 2023-05-28 RX ADMIN — Medication 250 MILLIGRAM(S): at 02:00

## 2023-05-28 RX ADMIN — SENNA PLUS 2 TABLET(S): 8.6 TABLET ORAL at 22:19

## 2023-05-28 RX ADMIN — Medication 100 MILLIGRAM(S): at 12:28

## 2023-05-28 RX ADMIN — Medication 100 MILLIGRAM(S): at 23:01

## 2023-05-28 RX ADMIN — Medication 100 MILLIGRAM(S): at 04:08

## 2023-05-28 NOTE — H&P ADULT - ASSESSMENT
54 yo F PMH IDDM2 c/b gastroparesis and neuropathy, volvulus s/p r hemicolectomy w/ ileocolic anastomosis 4/17/23, recent hospitalization with hemolytic anemia s/p IVIG with infusion reaction p/w r foot pain adm w diabetic foot infection/cellulitis.

## 2023-05-28 NOTE — ED PROVIDER NOTE - ATTENDING CONTRIBUTION TO CARE
53-year-old history of diabetes type 2, gastroparesis, GERD, pancreatitis, MRSA cellulitis, EtOH abuse, depression, anxiety reports status post stepping on a nail about a week ago, puncture wound right foot, now experiencing pain/redness/swelling to the site.  Patient is hyperesthetic to IV placement by the RN  red base of rt foot encircling puncture wound, erythema to the dorsum of the foot, non-tachycardic, mild distress 2/2 condition, cooperative,   Deyvi Tena MD, FACEP: In this physician's medical judgement based on clinical history and physical exam the patient's signs and symptoms lead to differential diagnoses which includes but is not limited to: Puncture wound with cellulitis, risk of tetanus, lymphangitis, deep tissue infection 2/2 puncture    Historical features, symptoms, and clinical exam not consistent with: sepsis    Labs were ordered and independently reviewed by me.  EKG was ordered and independently reviewed by me.  Imaging was ordered and reviewed by me.    Appropriate medications for the patient's presenting complaints were ordered, and effects were reassessed.    Patient's records including prior hospital visit, med and medical history were reviewed.  Presents with her Mom at bedside  Escalation to admission/observation was considered.    Will follow up on labs, therapeutics, imaging, reassess and disposition as clinically indicated.  *The above represents an initial assessment/impression. Please refer to my progress notes below for potential changes in patient clinical course*

## 2023-05-28 NOTE — CHART NOTE - NSCHARTNOTEFT_GEN_A_CORE
Reference #: [id number]    You have not added a MARIA M number. Keeping your MARIA M number(s) up to date on the My MARIA M # tab will enable the separation of your prescriptions from others in the search results.    Practitioner Count: 2  Pharmacy Count: 1  Current Opioid Prescriptions: 1  Current Benzodiazepine Prescriptions: 1  Current Stimulant Prescriptions: 0      Patient Demographic Information (PDI)       PDI	First Name	Last Name	Birth Date	Gender	Street Address	[facility]	Zip Code  DENNIS Keith	1970	Female	[phone]	[place] [facility]	[address]    Prescription Information      PDI Filter:    PDI	Current Rx	Drug Type	Rx Written	Rx Dispensed	Drug	Quantity	Days Supply	Prescriber Name	Prescriber MARIA M #  A	Y	B	05/22/2023	05/24/2023	clonazepam 1 mg tablet	90	30	Jose Antonio Hidalgo MD	[id number]  [facility]  [facility]	Y	O	04/05/2023	05/16/2023	acetaminophen-cod #3 tablet	60	30	PodderAdolfo MD	[id number]  Payment Method Insurance  [facility]  A	N	O	04/05/2023	04/13/2023	acetaminophen-cod #3 tablet	60	30	PodderAdolfo MD	[id number]  [facility] Method Insurance  [facility]  A	N	O	03/28/2023	04/03/2023	acetaminophen-cod #3 tablet	28	7	PodderAdolfo MD	[id number]  Payment Method Insurance  [facility]  A	N	B	03/29/2023	03/29/2023	clonazepam 1 mg tablet	90	30	Jose Antonio Hidalgo MD	[id number]  Payment Method Insurance  [facility]  A	N	B	02/23/2023	02/27/2023	clonazepam 1 mg tablet	90	30	Jose Antonio Hidalgo MD	[id number]  Payment Method Insurance  [facility]  A	N	O	01/11/2023	02/15/2023	acetaminophen-cod #3 tablet	60	30	PodderAdolfo MD	[id number]  Payment Method Insurance  [facility]  A	N	O	01/11/2023	01/17/2023	acetaminophen-cod #3 tablet	60	30	PodderAdolfo MD	[id number]  Payment Method Insurance  [facility]  A	N	B	01/17/2023	01/17/2023	clonazepam 1 mg tablet	90	30	Jose Antonio Hidalgo MD	[id number]  Payment Method Insurance  [facility]  A	N	O	12/14/2022	12/14/2022	acetaminophen-cod #3 tablet	60	30	Podder, Adolfo HALL	[id number]  Payment Method Insurance  [facility]  A	N	B	12/05/2022	12/05/2022	clonazepam 1 mg tablet	90	30	Jose Antonio Hidalgo MD	[id number]  Payment Method Insurance  [facility]  A	N	O	11/11/2022	11/28/2022	acetaminophen-cod #3 tablet	28	7	Podder, Adolfo HALL	[id number]  Payment Method Insurance  [facility]  A	N	O	11/11/2022	11/12/2022	acetaminophen-cod #3 tablet	28	7	Podder, Adolfo HALL	[id number]  Payment Method Insurance  [facility]  A	N	O	10/19/2022	10/29/2022	acetaminophen-cod #3 tablet	28	7	Podder, Adolfo HALL	[id number]  Payment Method Insurance  [facility]  A	N	B	10/27/2022	10/29/2022	clonazepam 1 mg tablet	90	30	Jose Antonio Hidalgo MD	[id number]  Payment Method Insurance  [facility]  A	N	O	10/19/2022	10/20/2022	acetaminophen-cod #3 tablet	28	7	Podder, Adolfo HALL	[id number]  Payment Method Insurance  [facility]  A	N	O	09/06/2022	10/11/2022	acetaminophen-cod #3 tablet	28	7	Podder, Adolfo HALL	[id number]  Payment Method Insurance  [facility]  A	N	B	08/31/2022	09/21/2022	clonazepam 1 mg tablet	90	30	Jose Antonio Hidalgo MD	[id number]  Payment Method Insurance  [facility]  A	N	O	09/06/2022	09/06/2022	acetaminophen-cod #3 tablet	28	7	Podder, Adolfo HALL	[id number]  Payment Method Insurance  [facility]  A	N	B	08/17/2022	08/19/2022	clonazepam 1 mg tablet	60	30	Sherrill Kurtz MD	[id number]  Payment Method Insurance  [facility]  A	N	B	06/29/2022	06/30/2022	clonazepam 1 mg tablet	90	30	Jose Antonio Hidalgo MD	[id number]  Payment Method Insurance  [facility] [place]

## 2023-05-28 NOTE — H&P ADULT - NSHPLABSRESULTS_GEN_ALL_CORE
LABS:                         12.4   10.25 )-----------( 196      ( 28 May 2023 00:30 )             38.7     05-28    138  |  104  |  11  ----------------------------<  117<H>  4.5   |  20<L>  |  0.66    Ca    9.6      28 May 2023 00:30    TPro  7.7  /  Alb  4.2  /  TBili  0.4  /  DBili  x   /  AST  18  /  ALT  11  /  AlkPhos  120  05-28    PT/INR - ( 28 May 2023 01:30 )   PT: 11.5 sec;   INR: 0.99 ratio         PTT - ( 28 May 2023 01:30 )  PTT:32.7 sec            Records reviewed from prior hospitalization as above

## 2023-05-28 NOTE — H&P ADULT - NSHPPHYSICALEXAM_GEN_ALL_CORE
Vital Signs Last 24 Hrs  T(C): 36.6 (28 May 2023 09:03), Max: 36.7 (27 May 2023 21:46)  T(F): 97.8 (28 May 2023 09:03), Max: 98.1 (27 May 2023 21:46)  HR: 63 (28 May 2023 09:03) (57 - 63)  BP: 105/69 (28 May 2023 09:03) (103/63 - 121/83)  BP(mean): 74 (28 May 2023 03:25) (74 - 92)  RR: 16 (28 May 2023 09:03) (16 - 20)  SpO2: 98% (28 May 2023 09:03) (97% - 100%)    Parameters below as of 28 May 2023 09:03  Patient On (Oxygen Delivery Method): room air        PHYSICAL EXAM:  GENERAL:  Well appearing, in NAD  HEAD:  NCAT  EYES: conjunctiva clear  NECK: Supple, No JVD  CHEST/LUNG: CTA B/L. No w/r/r.  HEART: Reg rate. Normal S1, S2. No m/r/g.   ABDOMEN: SNTND  EXTREMITIES:  2+ Peripheral Pulses - normal flow posterior tibial pulse r foot  r foot edematous, non erythematous, w ttp throughout, limited ROM 2/2 pain at ankle  r sole w/ healing ulceration ~1 cm in size. no open lesions or purulence.  PSYCH: AAOx3, anxious, tearful  NEUROLOGY: grossly non-focal  SKIN: No rashes or lesions

## 2023-05-28 NOTE — H&P ADULT - HISTORY OF PRESENT ILLNESS
52 yo F PMH IDDM2 c/b gastroparesis and neuropathy, volvulus s/p r hemicolectomy w/ ileocolic anastomosis 4/17/23, recent hospitalization with hemolytic anemia s/p IVIG with infusion reaction p/w r foot pain. Pt states after recent dc from hospital 5/5, she was walking her dog and stepped on a nail that went through her sneaker. She noticed when she came home that the nail punctured the sole of her foot and she cleaned it out herself at home. Does not remember the last time she had received the tetanus vaccine. The foot did not bother her and she was walking around normally until 2 days prior, she noticed her r foot begin to swell and become painful - throbbing sensation, numb. She took tylenol and tylenol with codeine which did not help the pain and decided to come to the ED. No f/chills, cp, sob, abd pain, n/v.    In the ED VSS afebrile  meds received: vanc iv, flagyl iv, zosyn iv, toradol iv x 2, morphine 4 mg iv, tdap x 1

## 2023-05-28 NOTE — PATIENT PROFILE ADULT - DEAF OR HARD OF HEARING?
To Dr. Trujillo   Did you get the forwarded referral message to complete the questionnaire for the pediatric developmentalist referral?   no

## 2023-05-28 NOTE — PHYSICAL THERAPY INITIAL EVALUATION ADULT - PERTINENT HX OF CURRENT PROBLEM, REHAB EVAL
Pt is a 52 yo F adm to [facility] on 5/28/23 with PMH IDDM2 c/b gastroparesis and neuropathy, volvulus s/p R hemicolectomy w/ ileocolic anastomosis 4/17/23, recent hospitalization with hemolytic anemia s/p IVIG with infusion reaction p/w r foot pain. Pt states after recent dc from hospital 5/5, she was walking her dog and stepped on a nail that went through her sneaker. She noticed when she came home that the nail punctured the sole of her foot and she cleaned it out herself at home. Does not remember the last time she had received the tetanus vaccine. The foot did not bother her and she was walking around normally until 2 days prior, she noticed her r foot begin to swell and become painful - throbbing sensation, numb. She took tylenol and tylenol with codeine which did not help the pain and decided to come to the ED. No f/chills, cp, sob, abd pain, n/v. In the ED VSS afebrile meds received: vanc iv, flagyl iv, zosyn iv, toradol iv x 2, morphine 4 mg iv, tdap x 1 HC: IV abx; xray R ankle: No acute displaced fracture or dislocation. Mild first metatarsophalangeal joint arthrosis. Soft tissue swelling about the ankle

## 2023-05-28 NOTE — H&P ADULT - PROBLEM SELECTOR PLAN 1
hx of MRSA cellulitis per cx report from 9/2017, also at risk for pseudomonas given dm2 hx and recent hospitalization  does not meet sepsis criteria  - empiric zosyn and vanc iv ordered  - monitor vanc trough  - consulted Dr Dill podiatry attending  - f/u xray foot  - pain control - morphine 2 mg iv q6h prn, oxy prn mod pain. bowel regimen ordered

## 2023-05-28 NOTE — PATIENT PROFILE ADULT - FALL HARM RISK - HARM RISK INTERVENTIONS
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
## 2023-05-28 NOTE — ED PROVIDER NOTE - PROGRESS NOTE DETAILS
Patient endorsed to Dr. JOSELINE Ribeiro at the time of admission. Based on patient's history and physical exam, as well as the results of today's workup, I feel that patient warrants admission to the hospital for further workup/evaluation and continued management. I discussed the findings of today's workup with the patient and addressed the patient's questions and concerns. The patient was agreeable with admission. Our team spoke with the inpatient receiving team who accepted the patient for admission and subsequently took over the patient's care at the time of admission. The receiving team will follow up on pending labs, analgesia, any clinical imaging results, ancillary findings, reassess, and disposition as clinically indicated. Details of patient and plan conveyed to receiving physician team and conveyed back for understanding. There were no questions at this time about the patient's status, disposition, and plan. Patient's care to be taken over by receiving physician team at this time, all decisions regarding the progression of care will be made at their discretion.

## 2023-05-28 NOTE — ED PROVIDER NOTE - PHYSICAL EXAMINATION
General: well appearing, NAD  Head: Atraumatic  Eyes: EOM grossly in tact, no scleral icterus  Neurology: A&Ox 3 , nonfocal, GOFF x 4  Respiratory: normal respiratory effort  CV: Extremities warm and well perfused  Abdominal: Soft, non-distended  Extremities: RLE with a 1 cm nodule, erythema of dorsal and plantar foot with streaking up anterior shin  2+ DP pulse on B/L LE  Skin: No rashes

## 2023-05-28 NOTE — CONSULT NOTE ADULT - ASSESSMENT
53F presents with right foot submet 4 superficial abscess   - Patient seen and evaluated   - Afebrile, no leukocytosis   - Right foot submet 4 fluctuance with superficial blister noted, no open lesions, erythema periwound extending to plantar forefoot.   - Right foot xray: no gas, no OM   - Aseptic excisional debridement and deroofing of right foot submet 4 blister to the level subQ and not beyond subQ with 1cc purulent drainage expressed and granular underlying wound noted. Irrigated wound with sterile saline and applied betadine followed by DSD. Pt tolerated procedure well.   - Right foot wound culture taken   - Recommend continue IV ABx   - Ordered surgical shoe   - Ordered mupirocin for right foot wound   - Pod plan local wound care pending erythema resolution   - Discussed with attending

## 2023-05-28 NOTE — H&P ADULT - NSHPREVIEWOFSYSTEMS_GEN_ALL_CORE
Review of Systems:   CONSTITUTIONAL: No fever, weight loss  EYES: No eye pain, visual disturbances, or discharge  ENMT:  No difficulty hearing, tinnitus, vertigo; No sinus or throat pain  RESPIRATORY: No SOB. No cough, wheezing, chills or hemoptysis  CARDIOVASCULAR: No chest pain, palpitations, dizziness  GASTROINTESTINAL: No abdominal or epigastric pain. No nausea, vomiting, or hematemesis; No diarrhea or constipation. No melena or hematochezia.  GENITOURINARY: No dysuria, frequency, hematuria, or incontinence  NEUROLOGICAL: No headaches, memory loss, loss of strength, numbness, or tremors  SKIN: No itching, burning  ENDOCRINE: No heat or cold intolerance; No hair loss  MUSCULOSKELETAL: + foot pain and swelling  PSYCHIATRIC: +anxiety  HEME/LYMPH: No easy bruising, or bleeding gums

## 2023-05-28 NOTE — ED PROVIDER NOTE - CLINICAL SUMMARY MEDICAL DECISION MAKING FREE TEXT BOX
53-year-old female the past medical history of DM on insulin c/b gastroparesis and neuropathy presents for 2 days of dehydration/HA/nausea/SOB in the setting of R lap hemicolectomy w/ ileocolic anastomosis presenting with concern of right foot pain after stepping on a screwdriver. Differential diagnosis includes but is not limited to cellulitis vs. abscess vs. tetanus. would get labs, ecg, cxr and tx with abx. likely admit.

## 2023-05-28 NOTE — PHYSICAL THERAPY INITIAL EVALUATION ADULT - ADDITIONAL COMMENTS
Lives with mother in pvt house, 6 steps to enter and 1flt to 2nd floor. Bathroom and bedroom in 2nd floor. Pt on disability

## 2023-05-28 NOTE — ED PROCEDURE NOTE - ATTENDING CONTRIBUTION TO CARE
POCUS: Emergency Department Focused Ultrasound performed at patient's bedside with Deyvi Tena MD, FACEP  Peripheral IV access in the Emergency Department obtained under dynamic ultrasound guidance with dark nonpulsatile blood return.  Catheter was flushed afterwards without any resistance or resultant extravasation.  IV catheter confirmed in compressible vein after insertion.   ***Deyvi Tena MD FACEP*** Attending physician was available for the key components of the procedure, the patient tolerated well. There were no complications with the procedure.

## 2023-05-28 NOTE — H&P ADULT - PROBLEM SELECTOR PLAN 2
w/ gastroparesis and neuropathy  - on lantus 30 u bedtime at home - will give 15 u bedtime for now as FS controlled  - ISS  - gabapentin tid

## 2023-05-29 LAB
ANION GAP SERPL CALC-SCNC: 14 MMOL/L — SIGNIFICANT CHANGE UP (ref 5–17)
BUN SERPL-MCNC: 12 MG/DL — SIGNIFICANT CHANGE UP (ref 7–23)
CALCIUM SERPL-MCNC: 9.2 MG/DL — SIGNIFICANT CHANGE UP (ref 8.4–10.5)
CHLORIDE SERPL-SCNC: 103 MMOL/L — SIGNIFICANT CHANGE UP (ref 96–108)
CO2 SERPL-SCNC: 21 MMOL/L — LOW (ref 22–31)
CREAT SERPL-MCNC: 0.77 MG/DL — SIGNIFICANT CHANGE UP (ref 0.5–1.3)
EGFR: 93 ML/MIN/1.73M2 — SIGNIFICANT CHANGE UP
GLUCOSE BLDC GLUCOMTR-MCNC: 197 MG/DL — HIGH (ref 70–99)
GLUCOSE BLDC GLUCOMTR-MCNC: 274 MG/DL — HIGH (ref 70–99)
GLUCOSE BLDC GLUCOMTR-MCNC: 292 MG/DL — HIGH (ref 70–99)
GLUCOSE BLDC GLUCOMTR-MCNC: 319 MG/DL — HIGH (ref 70–99)
GLUCOSE SERPL-MCNC: 202 MG/DL — HIGH (ref 70–99)
HCT VFR BLD CALC: 41.5 % — SIGNIFICANT CHANGE UP (ref 34.5–45)
HGB BLD-MCNC: 13.8 G/DL — SIGNIFICANT CHANGE UP (ref 11.5–15.5)
MCHC RBC-ENTMCNC: 30.9 PG — SIGNIFICANT CHANGE UP (ref 27–34)
MCHC RBC-ENTMCNC: 33.3 GM/DL — SIGNIFICANT CHANGE UP (ref 32–36)
MCV RBC AUTO: 92.8 FL — SIGNIFICANT CHANGE UP (ref 80–100)
MRSA PCR RESULT.: SIGNIFICANT CHANGE UP
NRBC # BLD: 0 /100 WBCS — SIGNIFICANT CHANGE UP (ref 0–0)
PLATELET # BLD AUTO: 182 K/UL — SIGNIFICANT CHANGE UP (ref 150–400)
POTASSIUM SERPL-MCNC: 4 MMOL/L — SIGNIFICANT CHANGE UP (ref 3.5–5.3)
POTASSIUM SERPL-SCNC: 4 MMOL/L — SIGNIFICANT CHANGE UP (ref 3.5–5.3)
RBC # BLD: 4.47 M/UL — SIGNIFICANT CHANGE UP (ref 3.8–5.2)
RBC # FLD: 12.9 % — SIGNIFICANT CHANGE UP (ref 10.3–14.5)
S AUREUS DNA NOSE QL NAA+PROBE: DETECTED
SODIUM SERPL-SCNC: 138 MMOL/L — SIGNIFICANT CHANGE UP (ref 135–145)
WBC # BLD: 5.84 K/UL — SIGNIFICANT CHANGE UP (ref 3.8–10.5)
WBC # FLD AUTO: 5.84 K/UL — SIGNIFICANT CHANGE UP (ref 3.8–10.5)

## 2023-05-29 RX ORDER — INSULIN LISPRO 100/ML
4 VIAL (ML) SUBCUTANEOUS ONCE
Refills: 0 | Status: COMPLETED | OUTPATIENT
Start: 2023-05-29 | End: 2023-05-29

## 2023-05-29 RX ADMIN — OXYCODONE HYDROCHLORIDE 5 MILLIGRAM(S): 5 TABLET ORAL at 08:54

## 2023-05-29 RX ADMIN — Medication 3: at 12:45

## 2023-05-29 RX ADMIN — Medication 3: at 17:24

## 2023-05-29 RX ADMIN — Medication 100 MILLIGRAM(S): at 22:12

## 2023-05-29 RX ADMIN — Medication 100 MILLIGRAM(S): at 12:12

## 2023-05-29 RX ADMIN — SERTRALINE 50 MILLIGRAM(S): 25 TABLET, FILM COATED ORAL at 12:12

## 2023-05-29 RX ADMIN — GABAPENTIN 600 MILLIGRAM(S): 400 CAPSULE ORAL at 05:32

## 2023-05-29 RX ADMIN — PIPERACILLIN AND TAZOBACTAM 25 GRAM(S): 4; .5 INJECTION, POWDER, LYOPHILIZED, FOR SOLUTION INTRAVENOUS at 14:53

## 2023-05-29 RX ADMIN — OXYCODONE HYDROCHLORIDE 5 MILLIGRAM(S): 5 TABLET ORAL at 16:37

## 2023-05-29 RX ADMIN — Medication 1: at 08:42

## 2023-05-29 RX ADMIN — MUPIROCIN 1 APPLICATION(S): 20 OINTMENT TOPICAL at 05:30

## 2023-05-29 RX ADMIN — PIPERACILLIN AND TAZOBACTAM 25 GRAM(S): 4; .5 INJECTION, POWDER, LYOPHILIZED, FOR SOLUTION INTRAVENOUS at 22:11

## 2023-05-29 RX ADMIN — Medication 250 MILLIGRAM(S): at 05:29

## 2023-05-29 RX ADMIN — ENOXAPARIN SODIUM 40 MILLIGRAM(S): 100 INJECTION SUBCUTANEOUS at 12:12

## 2023-05-29 RX ADMIN — OXYCODONE HYDROCHLORIDE 5 MILLIGRAM(S): 5 TABLET ORAL at 23:13

## 2023-05-29 RX ADMIN — INSULIN GLARGINE 15 UNIT(S): 100 INJECTION, SOLUTION SUBCUTANEOUS at 22:12

## 2023-05-29 RX ADMIN — Medication 1 MILLIGRAM(S): at 12:12

## 2023-05-29 RX ADMIN — Medication 1 TABLET(S): at 12:12

## 2023-05-29 RX ADMIN — SENNA PLUS 2 TABLET(S): 8.6 TABLET ORAL at 22:12

## 2023-05-29 RX ADMIN — Medication 250 MILLIGRAM(S): at 18:39

## 2023-05-29 RX ADMIN — GABAPENTIN 600 MILLIGRAM(S): 400 CAPSULE ORAL at 22:12

## 2023-05-29 RX ADMIN — OXYCODONE HYDROCHLORIDE 5 MILLIGRAM(S): 5 TABLET ORAL at 02:48

## 2023-05-29 RX ADMIN — MUPIROCIN 1 APPLICATION(S): 20 OINTMENT TOPICAL at 17:24

## 2023-05-29 RX ADMIN — OXYCODONE HYDROCHLORIDE 5 MILLIGRAM(S): 5 TABLET ORAL at 17:30

## 2023-05-29 RX ADMIN — GABAPENTIN 600 MILLIGRAM(S): 400 CAPSULE ORAL at 14:52

## 2023-05-29 RX ADMIN — Medication 4 UNIT(S): at 23:14

## 2023-05-29 RX ADMIN — PANTOPRAZOLE SODIUM 40 MILLIGRAM(S): 20 TABLET, DELAYED RELEASE ORAL at 05:32

## 2023-05-29 RX ADMIN — OXYCODONE HYDROCHLORIDE 5 MILLIGRAM(S): 5 TABLET ORAL at 09:35

## 2023-05-29 RX ADMIN — OXYCODONE HYDROCHLORIDE 5 MILLIGRAM(S): 5 TABLET ORAL at 02:18

## 2023-05-29 NOTE — PROGRESS NOTE ADULT - PROBLEM SELECTOR PLAN 1
hx of MRSA cellulitis per cx report from 9/2017, also at risk for pseudomonas given dm2 hx and recent hospitalization  - s/p bedside right foot submet 4 deroofed blister: granular wound bed, erythema resolved, diffuse edema to R foot, no purulence expressed.   - Right foot xray: no gas, no OM    - Right foot wound culture pending  - Recommend continue IV ABx   - Recommend mupirocin for right foot wound   - Pod plan local wound care pending erythema resolution

## 2023-05-29 NOTE — PROGRESS NOTE ADULT - ASSESSMENT
53F presents with right foot submet 4 superficial abscess   - Patient seen and evaluated   - Afebrile, no leukocytosis   - s/p bedside right foot submet 4 deroofed blister: granular wound bed, erythema resolved, diffuse edema to R foot, no purulence expressed.   - Right foot xray: no gas, no OM    - Right foot wound culture pending  - Recommend continue IV ABx   - Recommend mupirocin for right foot wound   - Pod plan local wound care pending erythema resolution   - Discussed with attending

## 2023-05-30 ENCOUNTER — TRANSCRIPTION ENCOUNTER (OUTPATIENT)
Age: 53
End: 2023-05-30

## 2023-05-30 VITALS
RESPIRATION RATE: 18 BRPM | DIASTOLIC BLOOD PRESSURE: 61 MMHG | OXYGEN SATURATION: 97 % | SYSTOLIC BLOOD PRESSURE: 106 MMHG | HEART RATE: 80 BPM | TEMPERATURE: 98 F

## 2023-05-30 LAB
GLUCOSE BLDC GLUCOMTR-MCNC: 194 MG/DL — HIGH (ref 70–99)
GLUCOSE BLDC GLUCOMTR-MCNC: 255 MG/DL — HIGH (ref 70–99)
GLUCOSE BLDC GLUCOMTR-MCNC: 286 MG/DL — HIGH (ref 70–99)
VANCOMYCIN TROUGH SERPL-MCNC: 10.4 UG/ML — SIGNIFICANT CHANGE UP (ref 10–20)

## 2023-05-30 PROCEDURE — 73630 X-RAY EXAM OF FOOT: CPT

## 2023-05-30 PROCEDURE — 85014 HEMATOCRIT: CPT

## 2023-05-30 PROCEDURE — 85018 HEMOGLOBIN: CPT

## 2023-05-30 PROCEDURE — G0378: CPT

## 2023-05-30 PROCEDURE — 84132 ASSAY OF SERUM POTASSIUM: CPT

## 2023-05-30 PROCEDURE — 82803 BLOOD GASES ANY COMBINATION: CPT

## 2023-05-30 PROCEDURE — 86140 C-REACTIVE PROTEIN: CPT

## 2023-05-30 PROCEDURE — 96374 THER/PROPH/DIAG INJ IV PUSH: CPT

## 2023-05-30 PROCEDURE — 87637 SARSCOV2&INF A&B&RSV AMP PRB: CPT

## 2023-05-30 PROCEDURE — 80202 ASSAY OF VANCOMYCIN: CPT

## 2023-05-30 PROCEDURE — 87040 BLOOD CULTURE FOR BACTERIA: CPT

## 2023-05-30 PROCEDURE — 87205 SMEAR GRAM STAIN: CPT

## 2023-05-30 PROCEDURE — 85652 RBC SED RATE AUTOMATED: CPT

## 2023-05-30 PROCEDURE — 83605 ASSAY OF LACTIC ACID: CPT

## 2023-05-30 PROCEDURE — 99285 EMERGENCY DEPT VISIT HI MDM: CPT

## 2023-05-30 PROCEDURE — 85610 PROTHROMBIN TIME: CPT

## 2023-05-30 PROCEDURE — 80048 BASIC METABOLIC PNL TOTAL CA: CPT

## 2023-05-30 PROCEDURE — 87070 CULTURE OTHR SPECIMN AEROBIC: CPT

## 2023-05-30 PROCEDURE — 80053 COMPREHEN METABOLIC PANEL: CPT

## 2023-05-30 PROCEDURE — 85730 THROMBOPLASTIN TIME PARTIAL: CPT

## 2023-05-30 PROCEDURE — 82962 GLUCOSE BLOOD TEST: CPT

## 2023-05-30 PROCEDURE — 87186 SC STD MICRODIL/AGAR DIL: CPT

## 2023-05-30 PROCEDURE — 87640 STAPH A DNA AMP PROBE: CPT

## 2023-05-30 PROCEDURE — 85025 COMPLETE CBC W/AUTO DIFF WBC: CPT

## 2023-05-30 PROCEDURE — 90715 TDAP VACCINE 7 YRS/> IM: CPT

## 2023-05-30 PROCEDURE — 36415 COLL VENOUS BLD VENIPUNCTURE: CPT

## 2023-05-30 PROCEDURE — 82435 ASSAY OF BLOOD CHLORIDE: CPT

## 2023-05-30 PROCEDURE — 85027 COMPLETE CBC AUTOMATED: CPT

## 2023-05-30 PROCEDURE — 84295 ASSAY OF SERUM SODIUM: CPT

## 2023-05-30 PROCEDURE — 73610 X-RAY EXAM OF ANKLE: CPT

## 2023-05-30 PROCEDURE — 96375 TX/PRO/DX INJ NEW DRUG ADDON: CPT

## 2023-05-30 PROCEDURE — 99222 1ST HOSP IP/OBS MODERATE 55: CPT

## 2023-05-30 PROCEDURE — 82947 ASSAY GLUCOSE BLOOD QUANT: CPT

## 2023-05-30 PROCEDURE — 87641 MR-STAPH DNA AMP PROBE: CPT

## 2023-05-30 PROCEDURE — 82330 ASSAY OF CALCIUM: CPT

## 2023-05-30 RX ORDER — OXYCODONE HYDROCHLORIDE 5 MG/1
1 TABLET ORAL
Qty: 20 | Refills: 0
Start: 2023-05-30 | End: 2023-06-03

## 2023-05-30 RX ORDER — SENNA PLUS 8.6 MG/1
2 TABLET ORAL
Qty: 60 | Refills: 0
Start: 2023-05-30 | End: 2023-06-28

## 2023-05-30 RX ORDER — MUPIROCIN 20 MG/G
1 OINTMENT TOPICAL
Qty: 1 | Refills: 0
Start: 2023-05-30 | End: 2023-06-18

## 2023-05-30 RX ORDER — INSULIN GLARGINE 100 [IU]/ML
15 INJECTION, SOLUTION SUBCUTANEOUS
Qty: 0 | Refills: 0 | DISCHARGE

## 2023-05-30 RX ORDER — LANOLIN ALCOHOL/MO/W.PET/CERES
1 CREAM (GRAM) TOPICAL
Qty: 31 | Refills: 0
Start: 2023-05-30 | End: 2023-06-29

## 2023-05-30 RX ORDER — CEPHALEXIN 500 MG
1 CAPSULE ORAL
Qty: 28 | Refills: 0
Start: 2023-05-30 | End: 2023-06-05

## 2023-05-30 RX ORDER — ACETAMINOPHEN 500 MG
2 TABLET ORAL
Qty: 0 | Refills: 0 | DISCHARGE
Start: 2023-05-30

## 2023-05-30 RX ADMIN — GABAPENTIN 600 MILLIGRAM(S): 400 CAPSULE ORAL at 14:23

## 2023-05-30 RX ADMIN — OXYCODONE HYDROCHLORIDE 5 MILLIGRAM(S): 5 TABLET ORAL at 00:13

## 2023-05-30 RX ADMIN — Medication 3: at 12:39

## 2023-05-30 RX ADMIN — PIPERACILLIN AND TAZOBACTAM 25 GRAM(S): 4; .5 INJECTION, POWDER, LYOPHILIZED, FOR SOLUTION INTRAVENOUS at 14:48

## 2023-05-30 RX ADMIN — Medication 250 MILLIGRAM(S): at 05:29

## 2023-05-30 RX ADMIN — OXYCODONE HYDROCHLORIDE 5 MILLIGRAM(S): 5 TABLET ORAL at 15:18

## 2023-05-30 RX ADMIN — Medication 100 MILLIGRAM(S): at 12:42

## 2023-05-30 RX ADMIN — ENOXAPARIN SODIUM 40 MILLIGRAM(S): 100 INJECTION SUBCUTANEOUS at 12:43

## 2023-05-30 RX ADMIN — OXYCODONE HYDROCHLORIDE 5 MILLIGRAM(S): 5 TABLET ORAL at 05:48

## 2023-05-30 RX ADMIN — PANTOPRAZOLE SODIUM 40 MILLIGRAM(S): 20 TABLET, DELAYED RELEASE ORAL at 06:27

## 2023-05-30 RX ADMIN — OXYCODONE HYDROCHLORIDE 5 MILLIGRAM(S): 5 TABLET ORAL at 14:48

## 2023-05-30 RX ADMIN — SERTRALINE 50 MILLIGRAM(S): 25 TABLET, FILM COATED ORAL at 12:43

## 2023-05-30 RX ADMIN — Medication 3: at 17:27

## 2023-05-30 RX ADMIN — PIPERACILLIN AND TAZOBACTAM 25 GRAM(S): 4; .5 INJECTION, POWDER, LYOPHILIZED, FOR SOLUTION INTRAVENOUS at 07:31

## 2023-05-30 RX ADMIN — MUPIROCIN 1 APPLICATION(S): 20 OINTMENT TOPICAL at 05:30

## 2023-05-30 RX ADMIN — GABAPENTIN 600 MILLIGRAM(S): 400 CAPSULE ORAL at 05:29

## 2023-05-30 RX ADMIN — Medication 1 TABLET(S): at 12:42

## 2023-05-30 RX ADMIN — Medication 1 MILLIGRAM(S): at 12:43

## 2023-05-30 RX ADMIN — MUPIROCIN 1 APPLICATION(S): 20 OINTMENT TOPICAL at 17:28

## 2023-05-30 RX ADMIN — Medication 1: at 08:41

## 2023-05-30 NOTE — DISCHARGE NOTE PROVIDER - NSDCFUADDAPPT_GEN_ALL_CORE_FT
Podiatry Discharge Instructions:  Follow up: Please follow up with Dr. Whitt within 1 week of discharge from the hospital, please call [phone] for appointment and discuss that you recently were seen in the hospital.  Wound Care: Please mupirocin to the right foot daily followed by 4x4 gauze and kindra daily.   Weight bearing: Please weight bear as tolerated to the right foot in a surgical shoe.  Antibiotics: Please continue as instructed.

## 2023-05-30 NOTE — DISCHARGE NOTE PROVIDER - NSDCCPCAREPLAN_GEN_ALL_CORE_FT
PRINCIPAL DISCHARGE DIAGNOSIS  Diagnosis: Foot infection  Assessment and Plan of Treatment: You were  treated with IV antibiotics.   Continue takina oral antibiotics as prescribed.      SECONDARY DISCHARGE DIAGNOSES  Diagnosis: Diabetes mellitus  Assessment and Plan of Treatment: Your Hemoglobin A1c  was 8.7 on April 12 th on this admission.  Make sure you get your HgA1c checked every three months.  If you take oral diabetes medications, check your blood glucose two times a day.  If you take insulin, check your blood glucose before meals and at bedtime.  It's important not to skip any meals.  Keep a log of your blood glucose results and always take it with you to your doctor appointments.  Keep a list of your current medications including injectables and over the counter medications and bring this medication list with you to all your doctor appointments.  If you have not seen your ophthalmologist this year call for appointment.  Check your feet daily for redness, sores, or openings. Do not self treat. If no improvement in two days call your primary care physician for an appointment.  Low blood sugar (hypoglycemia) is a blood sugar below 70mg/dl. Check your blood sugar if you feel signs/symptoms of hypoglycemia. If your blood sugar is below 70 take 15 grams of carbohydrates (ex 4 oz of apple juice, 3-4 glucose tablets, or 4-6 oz of regular soda) wait 15 minutes and repeat blood sugar to make sure it comes up above 70.  If your blood sugar is above 70 and you are due for a meal, have a meal.  If you are not due for a meal have a snack.  This snack helps keeps your blood sugar at a safe range.      Diagnosis: Acute cellulitis  Assessment and Plan of Treatment: Continue antibiotics.

## 2023-05-30 NOTE — DISCHARGE NOTE NURSING/CASE MANAGEMENT/SOCIAL WORK - NSDCPEFALRISK_GEN_ALL_CORE
For information on Fall & Injury Prevention, visit: [email or web address] OR  [email or web address] OR  https://www.cdc.gov/steadi/patient.html

## 2023-05-30 NOTE — CONSULT NOTE ADULT - ASSESSMENT
52 yo F PMH IDDM2 c/b gastroparesis and neuropathy, volvulus s/p r hemicolectomy w/ ileocolic anastomosis 4/17/23, recent hospitalization with hemolytic anemia s/p IVIG with infusion reaction p/w r foot pain.    Overall cellulitis, Staph aureus infection, elevated CRP.   clinically improved.       PLAN:   On vanco and zosyn   stopped vanco   MRSA PCR negative   wound cx prelim with staph aureus, f/u final cx   can transition to keflex 500 mg q6h   blood cx NTD  podiatry to on board.   Plan for 10 days of abx total. Day 3/10 of abx total.       Plan discussed with Medicine NP.     Ynes Trinidad  Please contact through MS Teams   If no response or past 5 pm/weekend call [phone].

## 2023-05-30 NOTE — DISCHARGE NOTE NURSING/CASE MANAGEMENT/SOCIAL WORK - NSDCVIVACCINE_GEN_ALL_CORE_FT
Tdap; 29-Apr-2020 02:47; Munira Felipe (RN); [employer]; [id number] (Exp. Date: 19-Mar-2022); IntraMuscular; Deltoid Left.; 0.5 milliLiter(s); VIS (VIS Published: 09-May-2013, VIS Presented: 29-Apr-2020);   Tdap; 28-May-2023 02:41; Jaswinder Gray (LAWRENCE); [employer]; [id number] (Exp. Date: 22-Feb-2025); IntraMuscular; Deltoid Left.; 0.5 milliLiter(s); VIS (VIS Published: 09-May-2013, VIS Presented: 28-May-2023);

## 2023-05-30 NOTE — PROGRESS NOTE ADULT - ASSESSMENT
53F presents with right foot submet 4 superficial abscess   - Patient seen and evaluated   - Afebrile, no leukocytosis   - s/p bedside right foot submet 4 deroofed blister: granular wound bed, erythema resolved, diffuse edema to R foot, no purulence expressed.   - Right foot xray: no gas, no OM    - Right foot wound culture pending  - Recommend continue IV ABx   - Recommend mupirocin for right foot wound   - Recommend PO Augmentin x 10 days upon discharge   - Patient is stable for discharge from podiatry standpoint standpoint   - F/u information and instructions can be found inthe f/u section of d/c provider note   - Discussed with attending  53F presents with right foot submet 4 superficial abscess   - Patient seen and evaluated   - Afebrile, no leukocytosis   - s/p bedside right foot submet 4 deroofed blister: granular wound bed, erythema resolved, diffuse edema to R foot, no purulence expressed.   - Right foot xray: no gas, no OM    - Right foot wound culture pending  - Recommend continue IV ABx   - Recommend mupirocin for right foot wound   - Recommend PO Doxycycline x 10 days upon discharge   - Patient is stable for discharge from podiatry standpoint standpoint   - F/u information and instructions can be found inthe f/u section of d/c provider note   - Discussed with attending

## 2023-05-30 NOTE — DISCHARGE NOTE PROVIDER - HOSPITAL COURSE
52 yo F PMH IDDM2 c/b gastroparesis and neuropathy, volvulus s/p r hemicolectomy w/ ileocolic anastomosis 4/17/23, recent hospitalization with hemolytic anemia s/p IVIG with infusion reaction p/w r foot pain adm w diabetic foot infection/cellulitis.       Problem/Plan - 1:  ·  Problem: Acute cellulitis.   ·  Plan: hx of MRSA cellulitis per cx report from 9/2017, also at risk for pseudomonas given dm2 hx and recent hospitalization  - s/p bedside right foot submet 4 deroofed blister: granular wound bed, erythema resolved, diffuse edema to R foot, no purulence expressed.   - Right foot xray: no gas, no OM    - Right foot wound culture pending  - Recommend continue IV ABx   - Recommend mupirocin for right foot wound   - Pod plan local wound care pending erythema resolution.     Problem/Plan - 2:  ·  Problem: Diabetes mellitus.   ·  Plan: w/ gastroparesis and neuropathy  - on lantus 30 u bedtime at home - will give 15 u bedtime for now as FS controlled  - ISS  - gabapentin tid.     Problem/Plan - 3:  ·  Problem: Anxiety.   ·  Plan: istopped  - klonopin 1mg tid prn.     Problem/Plan - 4:  ·  Problem: Prophylactic measure.   ·  Plan: dvt ppx: lovenox  PT consult placed.    Pt medically cleared for discharge home today per Podiatry and ID.

## 2023-05-30 NOTE — DISCHARGE NOTE PROVIDER - CARE PROVIDER_API CALL
GHADA BLANCO  [address] [place] [address]  Phone: [phone]  Fax: [phone]  Established Patient  Follow Up Time: 1 week

## 2023-05-30 NOTE — DISCHARGE NOTE NURSING/CASE MANAGEMENT/SOCIAL WORK - PATIENT PORTAL LINK FT
You can access the FollowMyHealth Patient Portal offered by [facility] by registering at the following website: [email or web address]. By joining [employer] [facility]’s FollowMyHealth portal, you will also be able to view your health information using other applications (apps) compatible with our system.

## 2023-05-30 NOTE — DISCHARGE NOTE NURSING/CASE MANAGEMENT/SOCIAL WORK - NSDCFUADDAPPT_GEN_ALL_CORE_FT
Podiatry Discharge Instructions:  Follow up: Please follow up with Dr. Whitt within 1 week of discharge from the hospital, please call [phone] for appointment and discuss that you recently were seen in the hospital.  Wound Care: Please mupirocin to the right foot daily followed by 4x4 gauze and kindra daily.   Weight bearing: Please weight bear as tolerated to the right foot in a surgical shoe.  Antibiotics: Please continue as instructed.

## 2023-05-30 NOTE — DISCHARGE NOTE PROVIDER - NSDCMRMEDTOKEN_GEN_ALL_CORE_FT
clonazePAM 1 mg oral tablet: 1 tablet with sensor orally 3 times a day as needed for  anxiety  folic acid 1 mg oral tablet: 1 tab(s) orally once a day  gabapentin 600 mg oral tablet: 1 orally 3 times a day  insulin lispro 100 units/mL injectable solution: 6 unit(s) injectable 3 times a day (before meals)  Lantus 100 units/mL subcutaneous solution: 30 unit(s) subcutaneous once a day (at bedtime)  metFORMIN 1000 mg oral tablet: 1 tab(s) orally 2 times a day  Multiple Vitamins oral tablet: 1 tab(s) orally once a day  pantoprazole 40 mg oral delayed release tablet: 1 tab(s) orally once a day   sertraline 50 mg oral tablet: 1 tab(s) orally once a day  thiamine 100 mg oral tablet: 1 tab(s) orally once a day  traZODone 100 mg oral tablet: 1 tab(s) orally once a day (at bedtime)  Vitamin D3: 1 tab(s) orally once a day   acetaminophen 325 mg oral tablet: 2 tab(s) orally every 6 hours as needed for Mild Pain (1 - 3)  cephalexin 500 mg oral tablet: 1 tab(s) orally every 6 hours  clonazePAM 1 mg oral tablet: 1 tablet with sensor orally 3 times a day as needed for  anxiety  folic acid 1 mg oral tablet: 1 tab(s) orally once a day  gabapentin 600 mg oral tablet: 1 orally 3 times a day  insulin lispro 100 units/mL injectable solution: 6 unit(s) injectable 3 times a day (before meals)  Lantus 100 units/mL subcutaneous solution: 15 unit(s) subcutaneous once a day (at bedtime)  Melatonin 3 mg oral tablet: 1 tab(s) orally once a day (at bedtime)  metFORMIN 1000 mg oral tablet: 1 tab(s) orally 2 times a day  Multiple Vitamins oral tablet: 1 tab(s) orally once a day  mupirocin 2% topical ointment: Apply topically to affected area 2 times a day  oxyCODONE 5 mg oral tablet: 1 tab(s) orally every 6 hours as needed for Moderate to severe pain MDD: 4  pantoprazole 40 mg oral delayed release tablet: 1 tab(s) orally once a day   senna leaf extract oral tablet: 2 tab(s) orally once a day (at bedtime)  sertraline 50 mg oral tablet: 1 tab(s) orally once a day  thiamine 100 mg oral tablet: 1 tab(s) orally once a day  traZODone 100 mg oral tablet: 1 tab(s) orally once a day (at bedtime)  Vitamin D3: 1 tab(s) orally once a day   Wheelchair

## 2023-05-31 LAB
-  AMPICILLIN/SULBACTAM: SIGNIFICANT CHANGE UP
-  CEFAZOLIN: SIGNIFICANT CHANGE UP
-  CLINDAMYCIN: SIGNIFICANT CHANGE UP
-  ERYTHROMYCIN: SIGNIFICANT CHANGE UP
-  GENTAMICIN: SIGNIFICANT CHANGE UP
-  OXACILLIN: SIGNIFICANT CHANGE UP
-  PENICILLIN: SIGNIFICANT CHANGE UP
-  RIFAMPIN: SIGNIFICANT CHANGE UP
-  TETRACYCLINE: SIGNIFICANT CHANGE UP
-  TRIMETHOPRIM/SULFAMETHOXAZOLE: SIGNIFICANT CHANGE UP
-  VANCOMYCIN: SIGNIFICANT CHANGE UP
METHOD TYPE: SIGNIFICANT CHANGE UP

## 2023-06-03 LAB
CULTURE RESULTS: SIGNIFICANT CHANGE UP
CULTURE RESULTS: SIGNIFICANT CHANGE UP
ORGANISM # SPEC MICROSCOPIC CNT: SIGNIFICANT CHANGE UP
ORGANISM # SPEC MICROSCOPIC CNT: SIGNIFICANT CHANGE UP
SPECIMEN SOURCE: SIGNIFICANT CHANGE UP
SPECIMEN SOURCE: SIGNIFICANT CHANGE UP

## 2023-06-07 ENCOUNTER — APPOINTMENT (OUTPATIENT)
Dept: ENDOCRINOLOGY | Facility: CLINIC | Age: 53
End: 2023-06-07
Payer: MEDICARE

## 2023-06-07 VITALS
DIASTOLIC BLOOD PRESSURE: 86 MMHG | BODY MASS INDEX: 30.45 KG/M2 | HEART RATE: 65 BPM | OXYGEN SATURATION: 98 % | WEIGHT: 174 LBS | SYSTOLIC BLOOD PRESSURE: 122 MMHG | HEIGHT: 63.39 IN

## 2023-06-07 PROCEDURE — 99215 OFFICE O/P EST HI 40 MIN: CPT | Mod: 25

## 2023-06-07 PROCEDURE — 95251 CONT GLUC MNTR ANALYSIS I&R: CPT

## 2023-06-07 NOTE — HISTORY OF PRESENT ILLNESS
[FreeTextEntry1] : The patient is a 53 year old female here for a follow up on diabetes (telehealth appointment was performed at today's visit). Recent admission in 1/2023 to [facility] for abdominal pain s/p EGD, was told likely secondary to gastroparesis. Other medical history significant for mobile cecum following GI, neuropathy, fibromyalgia, depression, and anxiety. Previous hx of a alcohol abuse, now sober since May 2022. Was also in the hospital for cellulitis of the foot.\par \par DIABETES HPI:\par Type of Diabetes: T2DM \par Duration of Diabetes: > 15 years ago \par A1c:  3/9/2023 in office 8.5%\par Current home regimen:\par - Novolog to 6-8 units before meals, determines dose based on BG \par - Lantus 30 units at night - however admits to skipping it 2-3 times a week \par - Metformin 1000mg BID --- started at last visit, occasional nausea, no constipation\par \par Past medications:glipizide\par \par Diabetes complications:\par Microvascular: [+] neuropathy, [-] nephropathy, [-] retinopathy\par Has neuropathy on gabapentin \par Macrovascular: [-] CAD, [-] CVA, [-] PAD\par History of DKA/hospitalizations due to Diabetes: hospitalized for DKA 3 times in her lifetime, last admission was > 1 year ago \par \par Last retinopathy screening: UTD, denies retinopathy - 2023 \par Last nephropathy screening (urine ACR): 3/2023 wnl\par Last foot exam/podiatry visit: does not see, does daily foot exams, denies active wounds or infections, reports neuropathy b/l feet and hands\par \par Diet: eating small meals 2/2 gastroparesis per patient, currently eating 2-3 meals a day, trying to eat more smaller meals more frequently per GI recommendations.\par \par 24 hr food recall:\par Breakfast: coffee + vanilla creamer + hard boiled egg \par Lunch: fish + vegetables \par Dinner: salmon and vegetables \par Snacks: likes \par Drinks: occasional soda \par \par tir 41%\par high 35%\par >240 24%\par <0%\par \par avg 199\par 12a 1876a 210\par 12p 209\par 6pm- 190\par 12a - 198\par \par 1 month ago transfusion\par \par Exercise: walks\par Steroid intake: not currently \par \par Family history of diabetes: T2DM in mother\par Social history: on disability \par \par Denies blurry vision, reports symptoms of polydypsia and polyuria\par Has gained about 20 pounds over the last one year per patient. \par \par Hx of pancreatitis 2/2 alcohol abuse. Previous hx of a alcohol abuse, now sober May 2022. Is in support groups and getting therapy. Has good emotional support from family. Hx of depression and anxiety, reports stable currently. \par \par Recent labs from 2/28/2023 hospitalization\par Cr 0.66\par \par Total Cholesterol 162\par Triglycerides 98\par HDL 60\par LDL 82\par non HDL cholesterol 101\par \par PCP: Dr. Hidalgo [phone]\par

## 2023-06-07 NOTE — ASSESSMENT
[FreeTextEntry1] : Patient presents as a hospital follow-up\par \par #Type 2 diabetes\par -Patient with longstanding history of type 2 diabetes with severe fluctuations.\par -I reviewed and analyzed her freestyle carlos data.  Her time in range is 41%, running high 35%, very high 24%, low 0%.  Her average blood glucose is 199 and her blood sugars are generally running in the high 100s to 200s.\par -Patient admits that she is not taking her basal insulin 2-3 times a week which is likely contributing to her hyperglycemia\par Plan:\par -Advised patient to consistently start taking her insulin so that we are able to make further adjustments after a consistent regimen\par -Continue with NovoLog 4 to 8 units however patient is not adjusting her insulin based on what she is eating.  Would benefit from meeting with dietitian\par -Patient to make an appointment with the CDE\par -Not a candidate for GLP-1 agonist in the setting of gastroparesis and history of pancreatitis\par \par Patient counseled extensively about the complications of diabetes including but not limited to nephropathy, neuropathy, and retinopathy. We discussed the importance of annual foot and optho exams. Explained that ideally blood sugars in the morning prior to breakfast should be between 80 and 130. Blood sugars should be checked 2 hours after eating and should be <180. If blood sugar is <70, patient should treat the blood sugar FIRST and then contact provider. Advised patient to let us know if BG persistently <70 or >200\par

## 2023-06-08 RX ORDER — INSULIN GLARGINE 100 [IU]/ML
100 INJECTION, SOLUTION SUBCUTANEOUS
Qty: 3 | Refills: 3 | Status: ACTIVE | COMMUNITY
Start: 1900-01-01 | End: 1900-01-01

## 2023-06-08 RX ORDER — INSULIN ASPART 100 [IU]/ML
100 INJECTION, SOLUTION INTRAVENOUS; SUBCUTANEOUS
Qty: 1 | Refills: 5 | Status: ACTIVE | COMMUNITY
Start: 2018-08-07 | End: 1900-01-01

## 2023-06-08 RX ORDER — PEN NEEDLE, DIABETIC 29 G X1/2"
31G X 5 MM NEEDLE, DISPOSABLE MISCELLANEOUS
Qty: 5 | Refills: 1 | Status: ACTIVE | COMMUNITY
Start: 2017-05-24 | End: 1900-01-01

## 2023-06-09 RX ORDER — INSULIN LISPRO 100 [IU]/ML
100 INJECTION, SOLUTION INTRAVENOUS; SUBCUTANEOUS
Qty: 5 | Refills: 3 | Status: ACTIVE | COMMUNITY
Start: 2023-06-09 | End: 1900-01-01

## 2023-06-15 NOTE — ED ADULT NURSE NOTE - CADM POA URETHRAL CATHETER
PROCEDURE NOTE      Patient Name: Linda Manzano. Date of Procedure: Maria Guadalupe 15, 2023    Preoperative Diagnosis:  M51.26    Post Operative Diagnosis:  M51.26    Procedure :    left L5 Selective Nerve Root Block  left S1 Selective Nerve Root Block    Consent:  Informed consent was obtained prior to the procedure. The patient was given the opportunity to ask questions regarding the procedure and its associated risks. In addition to the potential risks associated with the procedure itself, the patient was informed both verbally and in writing of the potential side effects of the use of glucocorticoid. The patient appeared to comprehend the informed consent and desired to have the procedure performed. Procedure: The patient was placed in the prone position on the fluoroscopy table and the back was prepped and draped in the usual sterile manner. The exact spinal level was  identified using fluoroscopy, and Lidocaine 1 % was injected locally, a # 22 gauge spinal needle was passed to the transverse process. The depth was noted and the needle redirected to pass inferior and approximately one cm anterior to the transverse process. Yes  1 cc of Isovue M-200 was used to verify positioning in the epidural and paravertebral space and outlined the course of the spinal nerve into the epidural space. The same procedure was repeated at each spinal level indicated above. A total of 10 mg of preservative free Dexamethasone and 2 cc of Lidocaine was slowly injected. The patient tolerated the procedure well. The injection area was cleaned and bandaids applied. Not excessive bleeding was noted. Patient dressed and discharged to home with instructions. Discussion: The patient tolerated the procedure well.                                               Vale Poole MD  Maria Guadalupe 15, 2023 No

## 2023-06-21 LAB
CULTURE RESULTS: SIGNIFICANT CHANGE UP
SPECIMEN SOURCE: SIGNIFICANT CHANGE UP

## 2023-06-30 ENCOUNTER — APPOINTMENT (OUTPATIENT)
Dept: ENDOCRINOLOGY | Facility: CLINIC | Age: 53
End: 2023-06-30

## 2023-07-06 NOTE — PROGRESS NOTE ADULT - ASSESSMENT
Reviewed records available for pre transplant evaluation. Pt has ESRD on dialysis since 11/28/2021 per 2728 form. Patient has moved to [place] from [place] in March 2023 to live with her daughter, Getachew. Only have 1 nephrology provider note available at this time which is a limited dialysis note 05/01/2023 which reviews medical history as HTN, anemia, secondary hyperparathroidism and surgical history as hysterectomy and AVF.  No nephrology records are found in [place] from [place]. I called [facility] today and requested they fax [place] [facility] records to our Office.  Trying to release records in [facility] from [facility]. ? Smoking/ alcohol/ recreational drug use. Mammogram done 06/08/23 at [facility] which was negative. Right breast US also done on 06/08/2023 to assess right nipple pain, impression was negative. No colonoscopy found. BMI 18-19. All okay to proceed with kidney transplant evaluation.   Contacted patient's daughter, Getachew, and introduced myself as her mother's Transplant Coordinator, also introduced the role of the Transplant Coordinator in the transplant process.  Explained the purpose of this call including reviewing next steps and answering questions.  Getachew confirmed her mother does want to proceed with a kidney transplant evaluation. She explained her mother has been on dialysis for about a year, her kidney failure is from HTN. She explained her mother was living in [place] and just moved here in Feb 2023 to live in [place] with herself and her new 2 month old baby.   Confirmed Referring Provider, Dialysis Center, and Primary Care Physician. Notified patient of the importance of continued communication with referring providers and primary care physicians.    Reviewed components of transplant evaluation process including necessary appointments, tests, and procedures.    Answered questions for patient regarding evaluation, provided my name and contact information and requested they  call with any additional questions.    Determined that patient would like additional information regarding transplant by:     Drop Down choices: Mail, Email, Vivian, Phone Call   Encourage Vivian   Getachew confirmed herself and her mother will attend [facility] on 09/14/2023, slot # 3. Instructed Getachew to bring 1-2 people with them to eval and to eat and drink and normally on eval day. Instructed pt they will receive an email from Ashleigh in our Office prior to eval with a Receipt of Info and educational materials - instructed to read materials and sign consent prior to eval.  Instructed pt on use of My Transplant Place and to view pre-kidney eval parts 1 and 2, as well as, on the use of www.unos.org and www.srtr.org.  Getachew expressed very good  understanding of all and was in good agreement with the plan.    Smart set orders into Epic for PKE 09/14/2023, slot # 3.     Pre transplant education letter/ email sent.    48 y/o F PMHx T2DM, EtOH abuse, MDD, anxiety p/w nausea, vomiting, abdominal pain, reduced PO intake, and palpitations, found to have DKA in the setting of pancreatitis, most likely 2/2 EtOH abuse. Patient s/p lap cholecystectomy POD#1
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
## 2023-08-03 NOTE — ED BEHAVIORAL HEALTH ASSESSMENT NOTE - HYGIENE
Good Tremfya Counseling: I discussed with the patient the risks of guselkumab including but not limited to immunosuppression, serious infections, and drug reactions.  The patient understands that monitoring is required including a PPD at baseline and must alert us or the primary physician if symptoms of infection or other concerning signs are noted.

## 2023-08-09 NOTE — ED BEHAVIORAL HEALTH ASSESSMENT NOTE - CURRENT MEDICATION
Zoloft 100 mg po QD, Trazodone 100 mg po qhs, Klonopin 0.5 mg po TID prn  Topomax 50 mg po QD  Hydromorphone 2 mg po q6-h hrs prn  Dilaudid 2 mg q8  insulin, senna, docusate, tylenol, simethicone Double O-Z Plasty Text: The defect edges were debeveled with a #15 scalpel blade.  Given the location of the defect, shape of the defect and the proximity to free margins a Double O-Z plasty (double transposition flap) was deemed most appropriate.  Using a sterile surgical marker, the appropriate transposition flaps were drawn incorporating the defect and placing the expected incisions within the relaxed skin tension lines where possible. The area thus outlined was incised deep to adipose tissue with a #15 scalpel blade.  The skin margins were undermined to an appropriate distance in all directions utilizing iris scissors.  Hemostasis was achieved with electrocautery.  The flaps were then transposed into place, one clockwise and the other counterclockwise, and anchored with interrupted buried subcutaneous sutures.

## 2023-08-09 NOTE — DISCHARGE NOTE PROVIDER - NSDCCONDITION_GEN_ALL_CORE
Stable Melolabial Interpolation Flap Text: A decision was made to reconstruct the defect utilizing an interpolation axial flap and a staged reconstruction.  A telfa template was made of the defect.  This telfa template was then used to outline the melolabial interpolation flap.  The donor area for the pedicle flap was then injected with anesthesia.  The flap was excised through the skin and subcutaneous tissue down to the layer of the underlying musculature.  The pedicle flap was carefully excised within this deep plane to maintain its blood supply.  The edges of the donor site were undermined.   The donor site was closed in a primary fashion.  The pedicle was then rotated into position and sutured.  Once the tube was sutured into place, adequate blood supply was confirmed with blanching and refill.  The pedicle was then wrapped with xeroform gauze and dressed appropriately with a telfa and gauze bandage to ensure continued blood supply and protect the attached pedicle.

## 2023-08-29 NOTE — H&P PST ADULT - GENERAL
Date of Procedure: 8/29/2023    Pre-Op diagnosis:   1. Uterine polyp   2. PCOS    Post-op diagnosis:   1. PCOS      Procedure:    1. Procedure(s):  HYSTEROSCOPIC ENDOMETRIAL SAMPLING      Surgeon: REGI Arechiga MD  Assist: NEDA Haagn, PGY3    Anesthesia: General  IVF: 500ml  UO: 200ml  EBL: 10ml  Fluid Deficit: 100ml  Blood products: None  Grafts/Implants: None    VTE Prophylaxis: SCDs     Specimens to pathology:   1) endometrial tissue    Findings:  The uterine cavity and bilateral ostia were seen.  Areas of thickened endometrium admixed with normal endometrium  Normal cavity  Bleeding was minimal.    PROCEDURE:  The patient was taken to the operating room and placed on the operating table. General anesthesia was induced and patient was placed in the dorsal lithotomy position. A bimanual exam revealed an anteverted uterus of normal size. The patient was prepped and draped in normal sterile fashion. A sterile straight cath performed revealed clear yellow urine. A weighted speculum was inserted into the vagina and the cervix was grasped with a single tooth tenaculum. The cervix was dilated with Hegar dilators to accommodate the hysteroscope. The hysteroscope was assembled and inserted into the cervix and then further into the uterine cavity. The fundus and ostia bilaterally were visualized and findings above were visualized. The resectoscope was inserted into the operative port and used to sample endometrium. The hysteroscope and device were removed. The tenaculum was removed from the cervix with good hemostasis noted. The weighted speculum and all instruments were removed.  Surgical instrument count was correct x2. Anesthesia was discontinued and patient was transferred to the PACU in stable condition.    Loretta Hagan MD   Obstetrics & Gynecology PGY-3    I was present and scrubbed for the entire surgery.      Carl Arechiga MD        
details…

## 2023-09-07 ENCOUNTER — OUTPATIENT (OUTPATIENT)
Dept: OUTPATIENT SERVICES | Facility: HOSPITAL | Age: 53
LOS: 1 days | Discharge: HOME | End: 2023-09-07

## 2023-09-07 DIAGNOSIS — Z98.890 OTHER SPECIFIED POSTPROCEDURAL STATES: Chronic | ICD-10-CM

## 2023-09-07 DIAGNOSIS — Z90.89 ACQUIRED ABSENCE OF OTHER ORGANS: Chronic | ICD-10-CM

## 2023-09-07 DIAGNOSIS — L02.91 CUTANEOUS ABSCESS, UNSPECIFIED: Chronic | ICD-10-CM

## 2023-09-07 DIAGNOSIS — Z90.49 ACQUIRED ABSENCE OF OTHER SPECIFIED PARTS OF DIGESTIVE TRACT: Chronic | ICD-10-CM

## 2023-09-07 DIAGNOSIS — L72.9 FOLLICULAR CYST OF THE SKIN AND SUBCUTANEOUS TISSUE, UNSPECIFIED: Chronic | ICD-10-CM

## 2023-09-07 DIAGNOSIS — D59.10 AUTOIMMUNE HEMOLYTIC ANEMIA, UNSPECIFIED: ICD-10-CM

## 2023-09-13 ENCOUNTER — NON-APPOINTMENT (OUTPATIENT)
Age: 53
End: 2023-09-13

## 2023-09-15 ENCOUNTER — RESULT REVIEW (OUTPATIENT)
Age: 53
End: 2023-09-15

## 2023-09-15 ENCOUNTER — OUTPATIENT (OUTPATIENT)
Dept: OUTPATIENT SERVICES | Facility: HOSPITAL | Age: 53
LOS: 1 days | End: 2023-09-15
Payer: MEDICARE

## 2023-09-15 ENCOUNTER — APPOINTMENT (OUTPATIENT)
Dept: HEMATOLOGY ONCOLOGY | Facility: CLINIC | Age: 53
End: 2023-09-15
Payer: MEDICARE

## 2023-09-15 VITALS
HEART RATE: 62 BPM | TEMPERATURE: 97.1 F | WEIGHT: 171.3 LBS | RESPIRATION RATE: 16 BRPM | BODY MASS INDEX: 29.97 KG/M2 | SYSTOLIC BLOOD PRESSURE: 114 MMHG | DIASTOLIC BLOOD PRESSURE: 72 MMHG | OXYGEN SATURATION: 99 %

## 2023-09-15 DIAGNOSIS — Z90.89 ACQUIRED ABSENCE OF OTHER ORGANS: Chronic | ICD-10-CM

## 2023-09-15 DIAGNOSIS — L72.9 FOLLICULAR CYST OF THE SKIN AND SUBCUTANEOUS TISSUE, UNSPECIFIED: Chronic | ICD-10-CM

## 2023-09-15 DIAGNOSIS — Z98.890 OTHER SPECIFIED POSTPROCEDURAL STATES: Chronic | ICD-10-CM

## 2023-09-15 DIAGNOSIS — D59.10 AUTOIMMUNE HEMOLYTIC ANEMIA, UNSPECIFIED: ICD-10-CM

## 2023-09-15 DIAGNOSIS — L02.91 CUTANEOUS ABSCESS, UNSPECIFIED: Chronic | ICD-10-CM

## 2023-09-15 DIAGNOSIS — Z90.49 ACQUIRED ABSENCE OF OTHER SPECIFIED PARTS OF DIGESTIVE TRACT: Chronic | ICD-10-CM

## 2023-09-15 LAB
BASOPHILS # BLD AUTO: 0.06 K/UL — SIGNIFICANT CHANGE UP (ref 0–0.2)
BASOPHILS NFR BLD AUTO: 0.7 % — SIGNIFICANT CHANGE UP (ref 0–2)
EOSINOPHIL # BLD AUTO: 0.26 K/UL — SIGNIFICANT CHANGE UP (ref 0–0.5)
EOSINOPHIL NFR BLD AUTO: 3.2 % — SIGNIFICANT CHANGE UP (ref 0–6)
HCT VFR BLD CALC: 38.1 % — SIGNIFICANT CHANGE UP (ref 34.5–45)
HGB BLD-MCNC: 13.2 G/DL — SIGNIFICANT CHANGE UP (ref 11.5–15.5)
IMM GRANULOCYTES NFR BLD AUTO: 0.2 % — SIGNIFICANT CHANGE UP (ref 0–0.9)
LYMPHOCYTES # BLD AUTO: 2.17 K/UL — SIGNIFICANT CHANGE UP (ref 1–3.3)
LYMPHOCYTES # BLD AUTO: 26.5 % — SIGNIFICANT CHANGE UP (ref 13–44)
MCHC RBC-ENTMCNC: 30.2 PG — SIGNIFICANT CHANGE UP (ref 27–34)
MCHC RBC-ENTMCNC: 34.6 G/DL — SIGNIFICANT CHANGE UP (ref 32–36)
MCV RBC AUTO: 87.2 FL — SIGNIFICANT CHANGE UP (ref 80–100)
MONOCYTES # BLD AUTO: 0.41 K/UL — SIGNIFICANT CHANGE UP (ref 0–0.9)
MONOCYTES NFR BLD AUTO: 5 % — SIGNIFICANT CHANGE UP (ref 2–14)
NEUTROPHILS # BLD AUTO: 5.28 K/UL — SIGNIFICANT CHANGE UP (ref 1.8–7.4)
NEUTROPHILS NFR BLD AUTO: 64.4 % — SIGNIFICANT CHANGE UP (ref 43–77)
NRBC # BLD: 0 /100 WBCS — SIGNIFICANT CHANGE UP (ref 0–0)
PLATELET # BLD AUTO: 179 K/UL — SIGNIFICANT CHANGE UP (ref 150–400)
RBC # BLD: 4.37 M/UL — SIGNIFICANT CHANGE UP (ref 3.8–5.2)
RBC # FLD: 13.4 % — SIGNIFICANT CHANGE UP (ref 10.3–14.5)
RETICS #: 108.4 K/UL — SIGNIFICANT CHANGE UP (ref 25–125)
RETICS/RBC NFR: 2.5 % — SIGNIFICANT CHANGE UP (ref 0.5–2.5)
WBC # BLD: 8.2 K/UL — SIGNIFICANT CHANGE UP (ref 3.8–10.5)
WBC # FLD AUTO: 8.2 K/UL — SIGNIFICANT CHANGE UP (ref 3.8–10.5)

## 2023-09-15 PROCEDURE — 86922 COMPATIBILITY TEST ANTIGLOB: CPT

## 2023-09-15 PROCEDURE — 86901 BLOOD TYPING SEROLOGIC RH(D): CPT

## 2023-09-15 PROCEDURE — 86850 RBC ANTIBODY SCREEN: CPT

## 2023-09-15 PROCEDURE — 86900 BLOOD TYPING SEROLOGIC ABO: CPT

## 2023-09-15 PROCEDURE — 99213 OFFICE O/P EST LOW 20 MIN: CPT

## 2023-09-16 LAB
ALBUMIN SERPL ELPH-MCNC: 4.4 G/DL
ALP BLD-CCNC: 151 U/L
ALT SERPL-CCNC: 15 U/L
ANION GAP SERPL CALC-SCNC: 12 MMOL/L
AST SERPL-CCNC: 18 U/L
BILIRUB DIRECT SERPL-MCNC: 0.2 MG/DL
BILIRUB SERPL-MCNC: 0.5 MG/DL
BUN SERPL-MCNC: 11 MG/DL
CALCIUM SERPL-MCNC: 9.4 MG/DL
CHLORIDE SERPL-SCNC: 101 MMOL/L
CO2 SERPL-SCNC: 24 MMOL/L
CREAT SERPL-MCNC: 0.73 MG/DL
EGFR: 100 ML/MIN/1.73M2
GLUCOSE SERPL-MCNC: 257 MG/DL
HAPTOGLOB SERPL-MCNC: 26 MG/DL
LDH SERPL-CCNC: 165 U/L
POTASSIUM SERPL-SCNC: 4.9 MMOL/L
PROT SERPL-MCNC: 6.8 G/DL
SODIUM SERPL-SCNC: 137 MMOL/L

## 2023-10-04 ENCOUNTER — MED ADMIN CHARGE (OUTPATIENT)
Age: 53
End: 2023-10-04

## 2023-10-04 ENCOUNTER — APPOINTMENT (OUTPATIENT)
Dept: ORTHOPEDIC SURGERY | Facility: CLINIC | Age: 53
End: 2023-10-04
Payer: MEDICARE

## 2023-10-04 VITALS
WEIGHT: 170 LBS | BODY MASS INDEX: 30.12 KG/M2 | HEIGHT: 63 IN | DIASTOLIC BLOOD PRESSURE: 66 MMHG | SYSTOLIC BLOOD PRESSURE: 96 MMHG | HEART RATE: 56 BPM

## 2023-10-04 DIAGNOSIS — M65.4 RADIAL STYLOID TENOSYNOVITIS [DE QUERVAIN]: ICD-10-CM

## 2023-10-04 DIAGNOSIS — M25.539 PAIN IN UNSPECIFIED WRIST: ICD-10-CM

## 2023-10-04 PROCEDURE — 99203 OFFICE O/P NEW LOW 30 MIN: CPT | Mod: 25

## 2023-10-04 PROCEDURE — 73130 X-RAY EXAM OF HAND: CPT | Mod: LT

## 2023-10-04 PROCEDURE — 20605 DRAIN/INJ JOINT/BURSA W/O US: CPT | Mod: LT

## 2023-10-04 PROCEDURE — 73110 X-RAY EXAM OF WRIST: CPT | Mod: LT

## 2023-10-27 ENCOUNTER — LABORATORY RESULT (OUTPATIENT)
Age: 53
End: 2023-10-27

## 2023-10-27 ENCOUNTER — APPOINTMENT (OUTPATIENT)
Dept: ENDOCRINOLOGY | Facility: CLINIC | Age: 53
End: 2023-10-27
Payer: MEDICARE

## 2023-10-27 VITALS
OXYGEN SATURATION: 97 % | HEART RATE: 82 BPM | DIASTOLIC BLOOD PRESSURE: 82 MMHG | BODY MASS INDEX: 30.29 KG/M2 | SYSTOLIC BLOOD PRESSURE: 115 MMHG | WEIGHT: 171 LBS

## 2023-10-27 DIAGNOSIS — E11.9 TYPE 2 DIABETES MELLITUS W/OUT COMPLICATIONS: ICD-10-CM

## 2023-10-27 DIAGNOSIS — E78.5 HYPERLIPIDEMIA, UNSPECIFIED: ICD-10-CM

## 2023-10-27 PROCEDURE — 99214 OFFICE O/P EST MOD 30 MIN: CPT | Mod: 25

## 2023-10-27 RX ORDER — EMPAGLIFLOZIN 10 MG/1
10 TABLET, FILM COATED ORAL DAILY
Qty: 1 | Refills: 2 | Status: ACTIVE | COMMUNITY
Start: 2023-10-27 | End: 1900-01-01

## 2023-10-30 LAB
ALBUMIN SERPL ELPH-MCNC: 4.6 G/DL
ALP BLD-CCNC: 157 U/L
ALT SERPL-CCNC: 21 U/L
ANION GAP SERPL CALC-SCNC: 16 MMOL/L
AST SERPL-CCNC: 31 U/L
BILIRUB SERPL-MCNC: 0.5 MG/DL
BUN SERPL-MCNC: 13 MG/DL
CALCIUM SERPL-MCNC: 10.4 MG/DL
CHLORIDE SERPL-SCNC: 103 MMOL/L
CHOLEST SERPL-MCNC: 197 MG/DL
CO2 SERPL-SCNC: 19 MMOL/L
CREAT SERPL-MCNC: 0.69 MG/DL
CREAT SPEC-SCNC: 157 MG/DL
EGFR: 105 ML/MIN/1.73M2
FRUCTOSAMINE SERPL-MCNC: 381 UMOL/L
GLUCOSE SERPL-MCNC: 187 MG/DL
HDLC SERPL-MCNC: 71 MG/DL
LDLC SERPL CALC-MCNC: 108 MG/DL
MICROALBUMIN 24H UR DL<=1MG/L-MCNC: <1.2 MG/DL
MICROALBUMIN/CREAT 24H UR-RTO: NORMAL MG/G
NONHDLC SERPL-MCNC: 126 MG/DL
POTASSIUM SERPL-SCNC: 4.6 MMOL/L
PROT SERPL-MCNC: 8.4 G/DL
SODIUM SERPL-SCNC: 137 MMOL/L
TRIGL SERPL-MCNC: 102 MG/DL
TSH SERPL-ACNC: 0.56 UIU/ML

## 2023-11-13 ENCOUNTER — APPOINTMENT (OUTPATIENT)
Dept: ENDOCRINOLOGY | Facility: CLINIC | Age: 53
End: 2023-11-13
Payer: MEDICARE

## 2023-11-13 PROCEDURE — G0108 DIAB MANAGE TRN  PER INDIV: CPT

## 2023-11-14 NOTE — DISCHARGE NOTE PROVIDER - NSDCHC_MEDRECSTATUS_GEN_ALL_CORE
Patient found semi-reclined in bed, A&Ox4, NAD, patient agreeable to participate in skilled PT evaluation despite the presence of pain, spO2 97% on room air
Admission Reconciliation is Completed  Discharge Reconciliation is Completed

## 2023-12-05 RX ORDER — INSULIN PMP CART,AUT,G6/7,CNTR
EACH SUBCUTANEOUS
Qty: 6 | Refills: 3 | Status: ACTIVE | COMMUNITY
Start: 2023-11-13 | End: 1900-01-01

## 2023-12-05 RX ORDER — INSULIN PMP CART,AUT,G6/7,CNTR
EACH SUBCUTANEOUS
Qty: 1 | Refills: 0 | Status: ACTIVE | COMMUNITY
Start: 2023-11-13 | End: 1900-01-01

## 2023-12-06 NOTE — PHYSICAL THERAPY INITIAL EVALUATION ADULT - SITTING BALANCE: DYNAMIC
No care due was identified.  [facility] Embedded Care Due Messages. Reference number: [id number].   12/06/2023 12:11:17 AM CST   normal balance

## 2023-12-12 NOTE — ED PROVIDER NOTE - CONSTITUTIONAL DEVELOPMENT, MLM
Problem: Discharge Planning  Goal: Discharge to home or other facility with appropriate resources  12/12/2023 0518 by Renato Meneses RN  Outcome: Progressing  Problem: Pain  Goal: Verbalizes/displays adequate comfort level or baseline comfort level  12/12/2023 0518 by Renato Meneses RN  Outcome: Progressing  Note: Pt doesnot have any complain of pain this shift. Problem: Skin/Tissue Integrity  Goal: Absence of new skin breakdown  Description: 1. Monitor for areas of redness and/or skin breakdown  2. Assess vascular access sites hourly  3. Every 4-6 hours minimum:  Change oxygen saturation probe site  4. Every 4-6 hours:  If on nasal continuous positive airway pressure, respiratory therapy assess nares and determine need for appliance change or resting period. 12/12/2023 0518 by Renato Meneses RN  Outcome: Progressing  Note: No new skin break down noted this shift.      Problem: Nutrition Deficit:  Goal: Optimize nutritional status  12/12/2023 0518 by Renato Meneses RN  Outcome: Progressing  Flowsheets (Taken 12/12/2023 0518)  Nutrient intake appropriate for improving, restoring, or maintaining nutritional needs:   Assess nutritional status and recommend course of action   Monitor oral intake, labs, and treatment plans   Recommend appropriate diets, oral nutritional supplements, and vitamin/mineral supplements     Discharge to home or other facility with appropriate resources:   Identify barriers to discharge with patient and caregiver   Arrange for needed discharge resources and transportation as appropriate   Identify discharge learning needs (meds, wound care, etc)   Refer to discharge planning if patient needs post-hospital services based on physician order or complex needs related to functional status, cognitive ability or social support system     Problem: Safety - Adult  Goal: Free from fall injury  12/12/2023 0518 by Renato Meneses RN  Outcome: Progressing  Note: High Fall Risk per CYNTHIA/KAT: Explained fall risk precautions to pt and family and rationale behind their use to keep the patient safe. Pt bed is in low position, side rails up, call light and belongings are in reach. Fall wristband applied and present on pts wrist.  Bed alarm on.  Pt encouraged to call for assistance. Will continue with hourly rounds for PO intake, pain needs, toileting and repositioning as needed.           well developed

## 2023-12-18 ENCOUNTER — APPOINTMENT (OUTPATIENT)
Dept: OPHTHALMOLOGY | Facility: CLINIC | Age: 53
End: 2023-12-18

## 2023-12-19 ENCOUNTER — APPOINTMENT (OUTPATIENT)
Dept: ENDOCRINOLOGY | Facility: CLINIC | Age: 53
End: 2023-12-19

## 2023-12-19 RX ORDER — INSULIN ASPART 100 [IU]/ML
100 INJECTION, SOLUTION INTRAVENOUS; SUBCUTANEOUS
Qty: 10 | Refills: 3 | Status: ACTIVE | COMMUNITY
Start: 2023-12-19 | End: 1900-01-01

## 2024-01-08 ENCOUNTER — APPOINTMENT (OUTPATIENT)
Dept: ENDOCRINOLOGY | Facility: CLINIC | Age: 54
End: 2024-01-08
Payer: COMMERCIAL

## 2024-01-08 PROCEDURE — P0019: CPT

## 2024-01-10 ENCOUNTER — APPOINTMENT (OUTPATIENT)
Dept: ENDOCRINOLOGY | Facility: CLINIC | Age: 54
End: 2024-01-10
Payer: MEDICARE

## 2024-01-10 PROCEDURE — ZZZZZ: CPT

## 2024-01-12 RX ORDER — BETAMETHA AC,SOD PHOS/WATER/PF 6 MG/ML
6 (3-3) VIAL (ML) INJECTION
Qty: 1 | Refills: 0 | Status: COMPLETED | OUTPATIENT
Start: 2023-10-04

## 2024-01-12 RX ORDER — LIDOCAINE HYDROCHLORIDE 10 MG/ML
1 INJECTION, SOLUTION INFILTRATION; PERINEURAL
Refills: 0 | Status: COMPLETED | OUTPATIENT
Start: 2023-10-04

## 2024-01-16 RX ORDER — INSULIN LISPRO 100 [IU]/ML
100 INJECTION, SOLUTION INTRAVENOUS; SUBCUTANEOUS
Qty: 2 | Refills: 3 | Status: ACTIVE | COMMUNITY
Start: 2024-01-16 | End: 1900-01-01

## 2024-02-28 ENCOUNTER — APPOINTMENT (OUTPATIENT)
Dept: ENDOCRINOLOGY | Facility: CLINIC | Age: 54
End: 2024-02-28

## 2024-03-21 NOTE — ED ADULT NURSE NOTE - PRO INTERPRETER NEED 2
Thank you for allowing us to provide you with medical care today.  We realize that you have many choices for your emergency care needs.  We thank you for choosing [facility].  Please choose us in the future for any continued health care needs.     The exam and treatment you received in the Emergency Department were for an emergent problem and are not intended as complete care. It is important that you follow up with a doctor, nurse practitioner, or physician's assistant for ongoing care. If your symptoms worsen or you do not improve as expected and you are unable to reach your usual health care provider, you should return to the Emergency Department. We are available 24 hours a day.     Please make an appointment with your health care provider(s) for follow up of your Emergency Department visit.  Take this sheet with you when you go to your follow-up visit.  
English

## 2024-03-25 ENCOUNTER — APPOINTMENT (OUTPATIENT)
Dept: PODIATRY | Facility: CLINIC | Age: 54
End: 2024-03-25

## 2024-03-25 ENCOUNTER — EMERGENCY (EMERGENCY)
Facility: HOSPITAL | Age: 54
LOS: 1 days | Discharge: HOME | End: 2024-03-25
Attending: EMERGENCY MEDICINE | Admitting: EMERGENCY MEDICINE
Payer: MEDICARE

## 2024-03-25 VITALS
DIASTOLIC BLOOD PRESSURE: 65 MMHG | OXYGEN SATURATION: 99 % | HEART RATE: 65 BPM | TEMPERATURE: 98 F | RESPIRATION RATE: 16 BRPM | SYSTOLIC BLOOD PRESSURE: 107 MMHG

## 2024-03-25 DIAGNOSIS — Z90.89 ACQUIRED ABSENCE OF OTHER ORGANS: Chronic | ICD-10-CM

## 2024-03-25 DIAGNOSIS — Z90.49 ACQUIRED ABSENCE OF OTHER SPECIFIED PARTS OF DIGESTIVE TRACT: Chronic | ICD-10-CM

## 2024-03-25 DIAGNOSIS — Z98.890 OTHER SPECIFIED POSTPROCEDURAL STATES: Chronic | ICD-10-CM

## 2024-03-25 DIAGNOSIS — L72.9 FOLLICULAR CYST OF THE SKIN AND SUBCUTANEOUS TISSUE, UNSPECIFIED: Chronic | ICD-10-CM

## 2024-03-25 DIAGNOSIS — L02.91 CUTANEOUS ABSCESS, UNSPECIFIED: Chronic | ICD-10-CM

## 2024-03-25 LAB
BASOPHILS # BLD AUTO: 0.08 K/UL — SIGNIFICANT CHANGE UP (ref 0–0.2)
BASOPHILS NFR BLD AUTO: 0.9 % — SIGNIFICANT CHANGE UP (ref 0–2)
EOSINOPHIL # BLD AUTO: 0.34 K/UL — SIGNIFICANT CHANGE UP (ref 0–0.5)
EOSINOPHIL NFR BLD AUTO: 4 % — SIGNIFICANT CHANGE UP (ref 0–6)
HCT VFR BLD CALC: 43.1 % — SIGNIFICANT CHANGE UP (ref 34.5–45)
HGB BLD-MCNC: 14.9 G/DL — SIGNIFICANT CHANGE UP (ref 11.5–15.5)
IANC: 4.35 K/UL — SIGNIFICANT CHANGE UP (ref 1.8–7.4)
IMM GRANULOCYTES NFR BLD AUTO: 0.4 % — SIGNIFICANT CHANGE UP (ref 0–0.9)
LYMPHOCYTES # BLD AUTO: 3.16 K/UL — SIGNIFICANT CHANGE UP (ref 1–3.3)
LYMPHOCYTES # BLD AUTO: 37.4 % — SIGNIFICANT CHANGE UP (ref 13–44)
MCHC RBC-ENTMCNC: 29.4 PG — SIGNIFICANT CHANGE UP (ref 27–34)
MCHC RBC-ENTMCNC: 34.6 GM/DL — SIGNIFICANT CHANGE UP (ref 32–36)
MCV RBC AUTO: 85.2 FL — SIGNIFICANT CHANGE UP (ref 80–100)
MONOCYTES # BLD AUTO: 0.49 K/UL — SIGNIFICANT CHANGE UP (ref 0–0.9)
MONOCYTES NFR BLD AUTO: 5.8 % — SIGNIFICANT CHANGE UP (ref 2–14)
NEUTROPHILS # BLD AUTO: 4.35 K/UL — SIGNIFICANT CHANGE UP (ref 1.8–7.4)
NEUTROPHILS NFR BLD AUTO: 51.5 % — SIGNIFICANT CHANGE UP (ref 43–77)
NRBC # BLD: 0 /100 WBCS — SIGNIFICANT CHANGE UP (ref 0–0)
NRBC # FLD: 0 K/UL — SIGNIFICANT CHANGE UP (ref 0–0)
PLATELET # BLD AUTO: 185 K/UL — SIGNIFICANT CHANGE UP (ref 150–400)
RBC # BLD: 5.06 M/UL — SIGNIFICANT CHANGE UP (ref 3.8–5.2)
RBC # FLD: 12.4 % — SIGNIFICANT CHANGE UP (ref 10.3–14.5)
WBC # BLD: 8.45 K/UL — SIGNIFICANT CHANGE UP (ref 3.8–10.5)
WBC # FLD AUTO: 8.45 K/UL — SIGNIFICANT CHANGE UP (ref 3.8–10.5)

## 2024-03-25 PROCEDURE — 93010 ELECTROCARDIOGRAM REPORT: CPT

## 2024-03-25 PROCEDURE — 99284 EMERGENCY DEPT VISIT MOD MDM: CPT

## 2024-03-25 NOTE — ED PROVIDER NOTE - PHYSICAL EXAMINATION
GEN:  Non-toxic appearing, non-distressed, speaking full sentences, non-diaphoretic, AAOx3  HEENT:  NCAT, neck supple, EOMI, PERRLA, sclera anicteric, no conjunctival pallor or injection, no stridor, normal voice, no tonsillar exudate, uvula midline  CV:  regular rhythm and rate, s1/s2 audible, no murmurs, rubs or gallops, peripheral pulses 2+ and symmetric  PULM:  non-labored respirations, lungs clear to auscultation bilaterally, no wheezes, crackles or rales  ABD:  non distended, non-tender, no rebound, no guarding, negative Khanna's sign, bowel sounds normal, no cvat  MSK:  no gross deformity, non-tender extremities and joints, range of motion grossly normal appearing, no extremity edema, extremities warm and well perfused   NEURO:  AAOx3, CN II-XII intact, motor 5/5 in upper and lower extremities bilaterally, sensation grossly intact in extremities and trunk, finger to nose testing wnl, no nystagmus, negative Romberg, no pronator drift, no gait deficit  SKIN:  warm, dry, no rash or vesicles    ED Tech [facility] Kramer present during exam

## 2024-03-25 NOTE — ED PROVIDER NOTE - OBJECTIVE STATEMENT
54-year-old female past medical history of insulin-dependent diabetes complicated by gastroparesis, upper and lower extremity neuropathy, previous alcohol abuse, depression, GERD, hospitalization in April 2023 for volvulus complicated by sepsis, history of hemolytic anemia, left shoulder osteoarthritis presents for paresthesias and abnormal sensation of the bilateral lower extremities.  Patient states this morning at approximately 6 AM she experienced numbness sensation of the bilateral lower extremities distal to the knee, circumferential, involving the dorsal and plantar aspects of bilateral feet.  Patient also states that she is experiencing abnormal sensation on the ball of her right foot and feels that she is stepping abnormally.  Patient states that this sensation has been constant but has improved since arrival to ED.  Patient was concerned and wanted to make sure that she did not have an infection.  Triage note reports that patient had headache, left jaw pain.  The patient states that she experienced this 1 day ago with resolution of symptoms without recurrence.  Headache was nonacute onset, not maximal in onset.  Patient also reports longstanding left upper extremity shoulder pain which has not changed in nature, denies trauma or heavy lifting.  Patient also reports longstanding pain of the left SI joint area which sometimes radiates to the left hip and posterior left thigh.  She is not currently experiencing this.  Patient also reports decreased energy over the last several days.  Patient denies recent travel or sick contacts.  Denies recent tobacco, alcohol or illicit drug use.  Denies fever, chest pain, nausea, vomiting, abdominal pain, diarrhea, dysuria, extremity weakness, balance problems, fall.

## 2024-03-25 NOTE — ED PROVIDER NOTE - PROGRESS NOTE DETAILS
[employer]:  pt feels fine and wants to leave.  awaiting repeat potassium.  no EKG changes with wide qrs or arrhythmia suggesting hyperkalemia.  likely due to hemolysis.  pt has dentist appointment in morning and does not want to wait any longer for results.  gave pt instructions to follow up results on the Follow [facility] and gave her return precautions in case the potassium comes back elevated.

## 2024-03-25 NOTE — ED PROVIDER NOTE - CLINICAL SUMMARY MEDICAL DECISION MAKING FREE TEXT BOX
54-year-old female past medical history of insulin-dependent diabetes complicated by gastroparesis, upper and lower extremity neuropathy, previous alcohol abuse, depression, GERD, hospitalization in April 2023 for volvulus complicated by sepsis, history of hemolytic anemia, left shoulder osteoarthritis presents for paresthesias and abnormal sensation of the bilateral lower extremities.  Vital signs stable, afebrile.  Exam nontoxic-appearing.  There is no abnormal sensation of the lower extremities.  Neurological exam is nonfocal.  Exam is not consistent of ischemic or infectious pathology.  Overall, suspect neuropathy given distribution of described lower extremity symptoms and history of neuropathy.  Patient is not experiencing current headache, low concern for clinically significant pathology.  Will check labs to evaluate for electrolyte abnormality.  EKG sinus rhythm with nonspecific ST changes.  Low concern for acute coronary syndrome as patient is without chest pain or shortness of breath.  Anticipate discharge.

## 2024-03-25 NOTE — ED PROVIDER NOTE - NSFOLLOWUPINSTRUCTIONS_ED_ALL_ED_FT
Peripheral Neuropathy    WHAT YOU NEED TO KNOW:    Peripheral neuropathy is a condition that affects nerves in your arms, legs, hands, or feet. It also may affect your organs, such as your lungs, stomach, bladder, or genitals. Peripheral neuropathy can affect the nerves that allow you to move or to feel objects. It also may affect nerves that control your body functions, such as digestion or urination. This condition may go away on its own, or you may always have it.    DISCHARGE INSTRUCTIONS:    Call your local emergency number (911 in the ) if:    You cannot walk at all.    Return to the emergency department if:    You fall.    Call your doctor or neurologist if:    Your pain is severe.    You cannot control your bladder.    You have trouble having sex.    You have questions or concerns about your condition or care.  Medicines: You may need any of the following:    Prescription pain medicine may be given. Ask your healthcare provider how to take this medicine safely. Some prescription pain medicines contain acetaminophen. Do not take other medicines that contain acetaminophen without talking to your healthcare provider. Too much acetaminophen may cause liver damage. Prescription pain medicine may cause constipation. Ask your healthcare provider how to prevent or treat constipation.    Antidepressants also help decrease pain.    Antiseizure medicine also helps with nerve pain.    Take your medicine as directed. Contact your healthcare provider if you think your medicine is not helping or if you have side effects. Tell him of her if you are allergic to any medicine. Keep a list of the medicines, vitamins, and herbs you take. Include the amounts, and when and why you take them. Bring the list or the pill bottles to follow-up visits. Carry your medicine list with you in case of an emergency.  Manage peripheral neuropathy:    Go to physical or occupational therapy as directed. Physical and occupational therapists may help you exercise your arms, legs, and hands. They may teach you new ways to do things at home.    Wear a brace or splint, if directed. You may need a device that supports or holds a body part still. For example, if you have carpal tunnel syndrome, you may need to wear a wrist brace.    Prevent falls. Move with care and stand up slowly. Wear shoes that support your feet, and do not go barefoot. Ask about walking aids, such as a cane or walker. You may want to install railings or nonslip pads in your home, especially in the bathroom. Ask for more information on how to avoid falls.  Fall Prevention for Adults    Check your skin daily. Sores can form where your skin makes contact with chairs, beds, or other body parts. They also can form under splints. Keep your skin clean, and check your skin daily for sores.    Exercise as directed. Ask about the best exercise plan for you. Physical activity may increase your balance and strength and may decrease your pain. It is best to start exercising slowly and do more as you get stronger.    Follow up with your doctor or neurologist as directed: Write down your questions so you remember to ask them during your visits.

## 2024-03-25 NOTE — ED ADULT NURSE NOTE - OBJECTIVE STATEMENT
Patient received in intake. A&O4. Ambulatory. Past medical history of DM. Came into ED with complaints of worsening bilateral leg weakness and tingling. States having neuropathy and noticed pain has gotten worse this morning. States "I wanted to make sure I wasn't sick and im okay". Patient states being nervous. Respirations even and unlabored. Denies SOB, chest pain, N/V/D, fevers. Stretcher in lowest position. Call bell within reach.

## 2024-03-25 NOTE — ED ADULT TRIAGE NOTE - CHIEF COMPLAINT QUOTE
pt c/o bilateral leg weakness/numbness and tingling since yesterday AM and headache, diffuse pain to left shoulder, jaw and hip and reports feeling lethargic.  hx of IDDM, has CGM. took 3 units humalog at 730, seizure disorder, ptsd, anxiety, depression, neuropathy, gastroparesis

## 2024-03-25 NOTE — ED PROVIDER NOTE - PATIENT PORTAL LINK FT
You can access the FollowMyHealth Patient Portal offered by [facility] by registering at the following website: [email or web address]. By joining [employer]’s FollowMyHealth portal, you will also be able to view your health information using other applications (apps) compatible with our system.

## 2024-03-26 VITALS
SYSTOLIC BLOOD PRESSURE: 125 MMHG | HEART RATE: 62 BPM | DIASTOLIC BLOOD PRESSURE: 62 MMHG | RESPIRATION RATE: 17 BRPM | TEMPERATURE: 98 F | OXYGEN SATURATION: 100 %

## 2024-03-26 LAB
ALBUMIN SERPL ELPH-MCNC: 4.4 G/DL — SIGNIFICANT CHANGE UP (ref 3.3–5)
ALP SERPL-CCNC: 141 U/L — HIGH (ref 40–120)
ALT FLD-CCNC: 27 U/L — SIGNIFICANT CHANGE UP (ref 4–33)
ANION GAP SERPL CALC-SCNC: 13 MMOL/L — SIGNIFICANT CHANGE UP (ref 7–14)
AST SERPL-CCNC: 86 U/L — HIGH (ref 4–32)
BILIRUB SERPL-MCNC: 0.6 MG/DL — SIGNIFICANT CHANGE UP (ref 0.2–1.2)
BUN SERPL-MCNC: 7 MG/DL — SIGNIFICANT CHANGE UP (ref 7–23)
CALCIUM SERPL-MCNC: 9.4 MG/DL — SIGNIFICANT CHANGE UP (ref 8.4–10.5)
CHLORIDE SERPL-SCNC: 101 MMOL/L — SIGNIFICANT CHANGE UP (ref 98–107)
CO2 SERPL-SCNC: 22 MMOL/L — SIGNIFICANT CHANGE UP (ref 22–31)
CREAT SERPL-MCNC: 0.7 MG/DL — SIGNIFICANT CHANGE UP (ref 0.5–1.3)
EGFR: 104 ML/MIN/1.73M2 — SIGNIFICANT CHANGE UP
GLUCOSE SERPL-MCNC: 110 MG/DL — HIGH (ref 70–99)
MAGNESIUM SERPL-MCNC: 2.1 MG/DL — SIGNIFICANT CHANGE UP (ref 1.6–2.6)
POTASSIUM SERPL-MCNC: 3.8 MMOL/L — SIGNIFICANT CHANGE UP (ref 3.5–5.3)
POTASSIUM SERPL-MCNC: 5.8 MMOL/L — HIGH (ref 3.5–5.3)
POTASSIUM SERPL-SCNC: 3.8 MMOL/L — SIGNIFICANT CHANGE UP (ref 3.5–5.3)
POTASSIUM SERPL-SCNC: 5.8 MMOL/L — HIGH (ref 3.5–5.3)
PROT SERPL-MCNC: 8.2 G/DL — SIGNIFICANT CHANGE UP (ref 6–8.3)
SODIUM SERPL-SCNC: 136 MMOL/L — SIGNIFICANT CHANGE UP (ref 135–145)
TSH SERPL-MCNC: 0.5 UIU/ML — SIGNIFICANT CHANGE UP (ref 0.27–4.2)

## 2024-04-09 RX ORDER — FLASH GLUCOSE SENSOR
KIT MISCELLANEOUS
Qty: 2 | Refills: 11 | Status: ACTIVE | COMMUNITY
Start: 2023-03-09 | End: 1900-01-01

## 2024-04-12 NOTE — BEHAVIORAL HEALTH ASSESSMENT NOTE - EMPLOYMENT
Spoke with patient. States no symptoms during this when was told changes with heart rate and skipped beats with slow and rapid beats. He is aware of recommendations to be seen in [facility] to start evaluation today. Patient voiced understanding.     Discussed ER warnings.    Disabled

## 2024-04-16 NOTE — ED PROVIDER NOTE - SEPSIS ALERT QUESTION 1
Introduction Text (Please End With A Colon): The following procedure was deferred:
Size Of Lesion In Cm (Optional): 0
Detail Level: Detailed
Instructions (Optional): Biopsied by oculoplastics. Mohs with Dr. Dotson
This patient was evaluated for sepsis.  At this time, a diagnosis of sepsis is not supported by the overall clinical picture.

## 2024-04-22 NOTE — ED ADULT NURSE NOTE - CAS TRG GENERAL NORM CIRC DET
FUTURE VISIT INFORMATION      SURGERY INFORMATION:  Date: 24  Location: uu or  Surgeon:  Nehemiah Kat MD   Anesthesia Type:  general  Procedure: AORTIC VALVE REPAIR POSSIBLE REPLACMENT AND ALL ASSOCIATED PROCEDURES,ECHOCARDIOGRAM, TRANSESOPHAGEAL, WITH ANESTHESIA   Consult: ov 24    RECORDS REQUESTED FROM:       Primary Care Provider: [facility]    Most recent EKG+ Tracin24    Most recent ECHO: 24    Most recent Coronary Angiogram: 24       Obvious significant bleeding/Strong peripheral pulses

## 2024-05-02 ENCOUNTER — APPOINTMENT (OUTPATIENT)
Dept: OPHTHALMOLOGY | Facility: CLINIC | Age: 54
End: 2024-05-02

## 2024-06-12 NOTE — ED PROVIDER NOTE - CHILD ABUSE FACILITY
HLD (hyperlipidemia) [facility] HLD (hyperlipidemia) HLD (hyperlipidemia) HLD (hyperlipidemia)

## 2024-07-31 ENCOUNTER — APPOINTMENT (OUTPATIENT)
Dept: ENDOCRINOLOGY | Facility: CLINIC | Age: 54
End: 2024-07-31

## 2024-08-13 ENCOUNTER — NON-APPOINTMENT (OUTPATIENT)
Age: 54
End: 2024-08-13

## 2024-08-13 ENCOUNTER — APPOINTMENT (OUTPATIENT)
Dept: OPHTHALMOLOGY | Facility: CLINIC | Age: 54
End: 2024-08-13
Payer: MEDICARE

## 2024-08-13 PROCEDURE — 92014 COMPRE OPH EXAM EST PT 1/>: CPT

## 2024-08-21 NOTE — ED PROVIDER NOTE - CPE EDP CARDIAC NORM
Student's Name:   Mik Garcia Birth Date  2005  Sex  male  Race/Ethnicity  [employer]/Grade Level/ID#     Address:   [address] [place] [address]    ________________________________          _________________          ___________________  Parent/Guardian                                               Telephone# Home:             Work:   HEALTH HISTORY: MUST BE COMPLETED AND SIGNED BY PARENT/GUARDIAN AND VERIFIED BY HEALTH CARE PROVIDER  ALLERGIES: (Food, drug, insect, other) has No Known Allergies.   MEDICATION: (List all prescribed or taken on a regular basis.) has a current medication list which includes the following prescription(s): fluticasone (FLONASE) 50 MCG/ACT nasal spray.   Diagnosis of asthma?   [] Yes   [] No  Loss of function of one of paired  organs?(eye/ear/kidney/testicle) [] Yes   [] No    Child wakes during night coughing? [] Yes   [] No  Hospitalizations? [] Yes   [] No    Birth defects? [] Yes   [] No       When? What for?     Developmental delay? [] Yes   [] No  Surgery? (List all.)  [] Yes   [] No    Blood disorders? Hemophilia,  Sickle Cell, Other? Explain. [] Yes   [] No       When? What for?     Diabetes? [] Yes   [] No  Serious injury or illness? [] Yes   [] No    Head injury/Concussion/Passed out? [] Yes   [] No  TB skin test positive (past/present)? * [] Yes   [] No *If yes, refer to local [facility] dept   Seizures? What are they like?  [] Yes   [] No  TB disease (past or present)? * [] Yes   [] No *If yes, refer to local [facility] dept   Heart problem/Shortness of breath?  [] Yes   [] No  Tobacco use (type, frequency)?  [] Yes   [] No    Heart murmur/High blood pressure? [] Yes   [] No  Alcohol/Drug use?  [] Yes   [] No    Dizziness or chest pain with exercise? [] Yes   [] No  Family history of sudden death  before age 50? (Cause?) [] Yes   [] No    Eye/Vision problems? _____           [] Glasses          [] Contacts  Last exam by eye doctor ______  Other concerns? (crossed eye,  drooping lids, squinting, difficulty reading) []  Dental    []  Braces    [] Bridge    []  Plate    []  Other    Additional information:   Ear/Hearing problems? [] Yes  [] No  Information may be shared with appropriate personnel for health and educational purposes.   Bone/Joint problem/injury/scoliosis? [] Yes  [] No  Parent/Guardian  Signatures:                                                                         Date   IMMUNIZATIONS: To be completed by health care provider. The mo/da/yr for every dose administered is required. If a specific vaccine is medically contraindicated, a separate written statement must be attached by the health care responsible for completing the health examination explaining the medical reason for the contraindication.   REQUIRED Vaccine/Dose  VACCINE/DOSE DATE DATE DATE DATE DATE   DTP or DTaP 2007 2007 2007 4/10/2008 3/8/2011   Tdap 2/12/2018       Polio (IPV) 2007 2007 2007 3/8/2011    Hib 2007 2007 2007 4/10/2008    Pneumococcal Conjugate 2007 2007 2007 4/10/2008    Hep B 2007 2007 2007 2007    Hep A/Hep B        MMR 1/10/2008 3/8/2011      Measles        Mumps        Rubella        Varicella 1/10/2008 3/8/2011      Meningococcal conjugate (MCV4) 2/12/2018 8/21/2024      Menigococcal B         RECOMMENDED, BUT NOT REQUIRED Vaccine/Dose  VACCINE/DOSE DATE DATE DATE   Hepatitis A 1/10/2008 7/10/2008 2/5/2010   HPV 2/12/2018 1/16/2019    Influenza 11/7/2013     COVID          Health care provider (MD, , APN, PA, school health professional, health official) verifying above immunization history must sign below. If adding dates to the above immunization history section, put your initials by date(s) and sign here.)  Electronically Signed by: Ynes Mack MD                                                    Date: 8/21/2024   Printed by Authority of the [facility] (COMPLETE BOTH SIDES)          12/23                 [facility]          Approved [facility] 5/2024      Certificates of [facility] to Immunizations or Physician Medical Statements of Medical Contraindication Are Reviewed and Maintained by the School Authority.  ALTERNATIVE PROOF OF IMMUNITY   1. Clinical diagnosis (measles, mumps, hepatitis B) is allowed when verified by physician and supported with lab confirmation.  Attach copy of lab result.    * MEASLES (Rubeola)  MO   DA  YR          **MUMPS  MO   DA  YR             HEPATITIS B  MO   DA   YR           VARICELLA  MO   DA  YR      2. History of varicella (chickenpox) disease is acceptable if verified by health care provider, school health professional or health official. Person signing below verifies that the parent/guardian’s description of varicella disease history is indicative of past infection and is accepting such history as documentation of disease.  Date of Disease                                  Signature                                                           Title   3. Laboratory Evidence of Immunity (check one)      [] Measles*     [] Mumps**     []Rubella     [] Varicella   (Attach copy of lab result)         *All measles cases diagnosed on or after July 1, 2002, must be confirmed by laboratory evidence.      **All mumps cases diagnosed on or after July 1, 2013, must be confirmed by laboratory evidence.   Physician Statements of Immunity MUST be submitted to [facility] for review.  Completion of Alternative 1 or 3 MUST be accompanied by Labs & Physician Signature: ___________________________   PHYSICAL EXAMINATION REQUIREMENTS   Entire section below to be completed by MD/DO/APN/PA   Head Circumference if <2-3 years old - /74 (BP Location: LUE - Left upper extremity, Patient Position: Sitting)   Pulse 104   Temp 97.7 °F (36.5 °C) (Temporal)   Resp 16   Ht 5' 9.29\" (1.76 m)   Wt 90.5 kg (199 lb 10 oz)   BMI 29.23 kg/m²   BSA 2.07 m²    95.3 %ile (Z= 1.68) based on CDC (Boys, 2-20  Years) BMI-for-age based on BMI available on 8/21/2024.   DIABETES SCREENING (NOT REQUIRED FOR ) BMI>85% age/sex: Yes     And any two of the following: Family History: No  Ethnic Minority: No    Signs of Insulin Resistance (hypertension, dyslipidemia, polycystic ovarian syndrome, acanthosis nigricans): No  At Risk: No   LEAD RISK QUESTIONNAIRE Required for children age 6 months through 6 years enrolled in licensed or public school operated day care, , nursery school and/or . (Blood test required if resides in [place] or high risk [facility].)  Questionnaire Administered? No  Blood Test Indicated? No   Blood Test Date/Result:    TB SKIN OR BLOOD TEST Recommended only for children in high-risk groups including children immunosuppressed due to HIV infection or other conditions, frequent travel to or born in high prevalence countries or those exposed to adults in high-risk categories. See CDC guidelines. http://www.cdc.gov/tb/publications/factsheets/testing/TB_testing.htm.  Test Needed: No     Test performed: No                          Skin Test:    Date Read              /      /             Result:   Positive__      Negative__        mm________                          Blood Test: Date Reported       /      /               Result:  Positive__      Negative__      Value________  LAB TESTS (recommended) Date/Result SCREENINGS Date/Results   Hemoglobin or Hematocrit  Developmental Screening Tool N/A     Urinalysis NA Social and Emotional Screening Completed on 8/21/24   Sickle Cell (when indicated)  Other:         SYSTEM REVIEW Normal  Comments/Follow-up/Needs  Normal Comments/Follow-up/Needs   Skin  Yes  Endocrine Yes    Ears Yes                  Screening Result Gastrointestinal Yes    Eyes Yes                  Screening Result: Genito-Urinary Yes                                      LMP:    Nose Yes  Neurologic Yes    Throat Yes  Musculoskeletal Yes    Mouth/Dental Yes  Spinal Exam Yes     Cardiovascular/HTN Yes  Nutritional status Yes    Respiratory Yes Diagnosis of Asthma: No   Mental Health Yes    Currently Prescribed Asthma Medication:        No  Quick-relief medication (e.g. Short Acting Beta Antagonist)        No  Controller medication (e.g. inhaled corticosteroid) Other     NEEDS/MODIFICATIONS required in the school setting: No restrictions DIETARY Needs/Restrictions: No restrictions   SPECIAL INSTRUCTIONS/DEVICES e.g. safety glasses, glass eye, chest protector for arrhythmia, pacemaker, prosthetic device, dental bridge, false teeth, athletic support/cup: No restrictions   MENTAL HEALTH/OTHER Is there anything else the school should know about this student?  If you would like to discuss this student’s health with school or school health personnel:  Not needed   EMERGENCY ACTION needed while at school due to child’s health condition (e.g. ,seizures, asthma, insect sting, food, peanut allergy, bleeding problem, diabetes, heart problem)?   No   On the basis of the examination on this day, I approve this child’s participation in                                (If No or Modified please attach explanation.)  PHYSICAL EDUCATION:  Yes                               INTERSCHOLASTIC SPORTS   Yes   Electronically Signed by: Ynes Mack MD                                                    Date: 8/21/2024   Address:  [facility] [address] PED[facility]ATRICS  [facility] [address] [address] [phone]   Printed by Authority of the [facility] (COMPLETE BOTH SIDES)          12/23                [facility]          Approved [facility] 5/2024   normal...

## 2024-09-27 NOTE — ED PROCEDURE NOTE - EBL
[FreeTextEntry1] : BARIATRIC SURGERY HISTORY: none   OBESITY COMORBIDITIES: none  ANTI-OBESITY MEDICATIONS: previously phentermine, tirzepatide but then shortages  OBESITY MEDICATION SIDE EFFECTS: none  Recommend the following: cont with increased physical activity cont high fiber, plant-predominant eating - particular focus on dietary fiber cont with 12.5mg of zepbound for now given very positive results  rtc in 6-8 weeks
[FreeTextEntry1] : BARIATRIC SURGERY HISTORY: none   OBESITY COMORBIDITIES: none  ANTI-OBESITY MEDICATIONS: previously phentermine, tirzepatide but then shortages  OBESITY MEDICATION SIDE EFFECTS: none  Recommend the following: cont with increased physical activity cont high fiber, plant-predominant eating - particular focus on dietary fiber cont with 12.5mg of zepbound for now given very positive results  rtc in 6-8 weeks
minimal

## 2024-10-29 NOTE — PROCEDURE NOTE - NSANESTHESIA_GEN_A_CORE
In an effort to ensure that our patients LiveWell, a Team Member has reviewed your chart and identified an opportunity to provide the best care possible. An attempt was made to discuss or schedule due or overdue Preventive or Chronic Condition care.Care Gaps identified: Hypertension.    The Outcome was Contact was not made, left message. We are attempting to schedule a follow up office visit. If you have any questions or need help with scheduling, contact your primary care provider..   Type of Appointment needed: Follow-up Visit   
no anesthesia administered
no anesthesia administered

## 2024-11-01 ENCOUNTER — EMERGENCY (EMERGENCY)
Facility: HOSPITAL | Age: 54
LOS: 1 days | Discharge: HOME | End: 2024-11-01
Attending: EMERGENCY MEDICINE | Admitting: EMERGENCY MEDICINE
Payer: MEDICARE

## 2024-11-01 VITALS
TEMPERATURE: 98 F | WEIGHT: 149.91 LBS | SYSTOLIC BLOOD PRESSURE: 133 MMHG | HEIGHT: 63 IN | OXYGEN SATURATION: 98 % | HEART RATE: 79 BPM | DIASTOLIC BLOOD PRESSURE: 85 MMHG | RESPIRATION RATE: 16 BRPM

## 2024-11-01 DIAGNOSIS — Z98.890 OTHER SPECIFIED POSTPROCEDURAL STATES: Chronic | ICD-10-CM

## 2024-11-01 DIAGNOSIS — L72.9 FOLLICULAR CYST OF THE SKIN AND SUBCUTANEOUS TISSUE, UNSPECIFIED: Chronic | ICD-10-CM

## 2024-11-01 DIAGNOSIS — Z90.49 ACQUIRED ABSENCE OF OTHER SPECIFIED PARTS OF DIGESTIVE TRACT: Chronic | ICD-10-CM

## 2024-11-01 DIAGNOSIS — L02.91 CUTANEOUS ABSCESS, UNSPECIFIED: Chronic | ICD-10-CM

## 2024-11-01 DIAGNOSIS — Z90.89 ACQUIRED ABSENCE OF OTHER ORGANS: Chronic | ICD-10-CM

## 2024-11-01 LAB
ALBUMIN SERPL ELPH-MCNC: 4.5 G/DL — SIGNIFICANT CHANGE UP (ref 3.3–5)
ALP SERPL-CCNC: 137 U/L — HIGH (ref 40–120)
ALT FLD-CCNC: 17 U/L — SIGNIFICANT CHANGE UP (ref 4–33)
ANION GAP SERPL CALC-SCNC: 13 MMOL/L — SIGNIFICANT CHANGE UP (ref 7–14)
AST SERPL-CCNC: 30 U/L — SIGNIFICANT CHANGE UP (ref 4–32)
BASOPHILS # BLD AUTO: 0.03 K/UL — SIGNIFICANT CHANGE UP (ref 0–0.2)
BASOPHILS NFR BLD AUTO: 0.3 % — SIGNIFICANT CHANGE UP (ref 0–2)
BILIRUB SERPL-MCNC: 0.6 MG/DL — SIGNIFICANT CHANGE UP (ref 0.2–1.2)
BLOOD GAS VENOUS COMPREHENSIVE RESULT: SIGNIFICANT CHANGE UP
BUN SERPL-MCNC: 10 MG/DL — SIGNIFICANT CHANGE UP (ref 7–23)
CALCIUM SERPL-MCNC: 9.4 MG/DL — SIGNIFICANT CHANGE UP (ref 8.4–10.5)
CHLORIDE SERPL-SCNC: 104 MMOL/L — SIGNIFICANT CHANGE UP (ref 98–107)
CO2 SERPL-SCNC: 25 MMOL/L — SIGNIFICANT CHANGE UP (ref 22–31)
CREAT SERPL-MCNC: 0.72 MG/DL — SIGNIFICANT CHANGE UP (ref 0.5–1.3)
EGFR: 101 ML/MIN/1.73M2 — SIGNIFICANT CHANGE UP
EOSINOPHIL # BLD AUTO: 0.23 K/UL — SIGNIFICANT CHANGE UP (ref 0–0.5)
EOSINOPHIL NFR BLD AUTO: 2.3 % — SIGNIFICANT CHANGE UP (ref 0–6)
GLUCOSE SERPL-MCNC: 102 MG/DL — HIGH (ref 70–99)
HCT VFR BLD CALC: 39.2 % — SIGNIFICANT CHANGE UP (ref 34.5–45)
HGB BLD-MCNC: 13.6 G/DL — SIGNIFICANT CHANGE UP (ref 11.5–15.5)
IANC: 6.92 K/UL — SIGNIFICANT CHANGE UP (ref 1.8–7.4)
IMM GRANULOCYTES NFR BLD AUTO: 0.2 % — SIGNIFICANT CHANGE UP (ref 0–0.9)
LIDOCAIN IGE QN: 14 U/L — SIGNIFICANT CHANGE UP (ref 7–60)
LYMPHOCYTES # BLD AUTO: 2.05 K/UL — SIGNIFICANT CHANGE UP (ref 1–3.3)
LYMPHOCYTES # BLD AUTO: 20.8 % — SIGNIFICANT CHANGE UP (ref 13–44)
MCHC RBC-ENTMCNC: 29.5 PG — SIGNIFICANT CHANGE UP (ref 27–34)
MCHC RBC-ENTMCNC: 34.7 G/DL — SIGNIFICANT CHANGE UP (ref 32–36)
MCV RBC AUTO: 85 FL — SIGNIFICANT CHANGE UP (ref 80–100)
MONOCYTES # BLD AUTO: 0.61 K/UL — SIGNIFICANT CHANGE UP (ref 0–0.9)
MONOCYTES NFR BLD AUTO: 6.2 % — SIGNIFICANT CHANGE UP (ref 2–14)
NEUTROPHILS # BLD AUTO: 6.92 K/UL — SIGNIFICANT CHANGE UP (ref 1.8–7.4)
NEUTROPHILS NFR BLD AUTO: 70.2 % — SIGNIFICANT CHANGE UP (ref 43–77)
NRBC # BLD: 0 /100 WBCS — SIGNIFICANT CHANGE UP (ref 0–0)
NRBC # FLD: 0 K/UL — SIGNIFICANT CHANGE UP (ref 0–0)
PLATELET # BLD AUTO: 191 K/UL — SIGNIFICANT CHANGE UP (ref 150–400)
POTASSIUM SERPL-MCNC: 4.5 MMOL/L — SIGNIFICANT CHANGE UP (ref 3.5–5.3)
POTASSIUM SERPL-SCNC: 4.5 MMOL/L — SIGNIFICANT CHANGE UP (ref 3.5–5.3)
PROT SERPL-MCNC: 7.9 G/DL — SIGNIFICANT CHANGE UP (ref 6–8.3)
RBC # BLD: 4.61 M/UL — SIGNIFICANT CHANGE UP (ref 3.8–5.2)
RBC # FLD: 11.5 % — SIGNIFICANT CHANGE UP (ref 10.3–14.5)
SODIUM SERPL-SCNC: 142 MMOL/L — SIGNIFICANT CHANGE UP (ref 135–145)
WBC # BLD: 9.86 K/UL — SIGNIFICANT CHANGE UP (ref 3.8–10.5)
WBC # FLD AUTO: 9.86 K/UL — SIGNIFICANT CHANGE UP (ref 3.8–10.5)

## 2024-11-01 PROCEDURE — 99285 EMERGENCY DEPT VISIT HI MDM: CPT

## 2024-11-01 RX ORDER — METOCLOPRAMIDE HCL 10 MG
10 TABLET ORAL ONCE
Refills: 0 | Status: COMPLETED | OUTPATIENT
Start: 2024-11-01 | End: 2024-11-01

## 2024-11-01 RX ORDER — SODIUM CHLORIDE 9 MG/ML
1000 INJECTION, SOLUTION INTRAMUSCULAR; INTRAVENOUS; SUBCUTANEOUS ONCE
Refills: 0 | Status: COMPLETED | OUTPATIENT
Start: 2024-11-01 | End: 2024-11-01

## 2024-11-01 RX ORDER — HYDROMORPHONE HCL/0.9% NACL/PF 6 MG/30 ML
1 PATIENT CONTROLLED ANALGESIA SYRINGE INTRAVENOUS ONCE
Refills: 0 | Status: DISCONTINUED | OUTPATIENT
Start: 2024-11-01 | End: 2024-11-01

## 2024-11-01 RX ORDER — ACETAMINOPHEN 500 MG
1000 TABLET ORAL ONCE
Refills: 0 | Status: COMPLETED | OUTPATIENT
Start: 2024-11-01 | End: 2024-11-01

## 2024-11-01 RX ORDER — ONDANSETRON HYDROCHLORIDE 2 MG/ML
4 INJECTION, SOLUTION INTRAMUSCULAR; INTRAVENOUS ONCE
Refills: 0 | Status: COMPLETED | OUTPATIENT
Start: 2024-11-01 | End: 2024-11-01

## 2024-11-01 RX ADMIN — Medication 1000 MILLIGRAM(S): at 22:31

## 2024-11-01 RX ADMIN — Medication 400 MILLIGRAM(S): at 22:01

## 2024-11-01 RX ADMIN — Medication 1000 MILLIGRAM(S): at 22:01

## 2024-11-01 RX ADMIN — Medication 1 MILLIGRAM(S): at 23:19

## 2024-11-01 RX ADMIN — Medication 10 MILLIGRAM(S): at 22:57

## 2024-11-01 RX ADMIN — SODIUM CHLORIDE 1000 MILLILITER(S): 9 INJECTION, SOLUTION INTRAMUSCULAR; INTRAVENOUS; SUBCUTANEOUS at 22:01

## 2024-11-01 RX ADMIN — ONDANSETRON HYDROCHLORIDE 4 MILLIGRAM(S): 2 INJECTION, SOLUTION INTRAMUSCULAR; INTRAVENOUS at 22:03

## 2024-11-01 NOTE — ED ADULT TRIAGE NOTE - CHIEF COMPLAINT QUOTE
pt  c/o n+v, diarrhea x 3 days. Pt also c/o atraumatic left foot pain starting today. Pt presents with redness and swelling to great toe. hx T2DM, gastroparesis. Pt denies chest pain, sob, fever, chills. RR even, unlabored. pt appears comfortable in triage.

## 2024-11-01 NOTE — ED ADULT NURSE NOTE - NS ED NURSE IV DC DT
61yo M w/ PMHx of HLD, h/o cervical cord compression s/p C5/C6 anterior decompression and fusion on 5/18/17 presents with weakness 2/2 cervical cord compression. 02-Nov-2024 06:41

## 2024-11-02 VITALS
RESPIRATION RATE: 20 BRPM | OXYGEN SATURATION: 100 % | TEMPERATURE: 98 F | HEART RATE: 65 BPM | SYSTOLIC BLOOD PRESSURE: 122 MMHG | DIASTOLIC BLOOD PRESSURE: 72 MMHG

## 2024-11-02 LAB
APPEARANCE UR: CLEAR — SIGNIFICANT CHANGE UP
BACTERIA # UR AUTO: NEGATIVE /HPF — SIGNIFICANT CHANGE UP
BILIRUB UR-MCNC: NEGATIVE — SIGNIFICANT CHANGE UP
CAST: 0 /LPF — SIGNIFICANT CHANGE UP (ref 0–4)
COLOR SPEC: YELLOW — SIGNIFICANT CHANGE UP
DIFF PNL FLD: NEGATIVE — SIGNIFICANT CHANGE UP
GLUCOSE UR QL: NEGATIVE MG/DL — SIGNIFICANT CHANGE UP
KETONES UR-MCNC: NEGATIVE MG/DL — SIGNIFICANT CHANGE UP
LEUKOCYTE ESTERASE UR-ACNC: ABNORMAL
NITRITE UR-MCNC: NEGATIVE — SIGNIFICANT CHANGE UP
PH UR: 6.5 — SIGNIFICANT CHANGE UP (ref 5–8)
PROT UR-MCNC: NEGATIVE MG/DL — SIGNIFICANT CHANGE UP
RBC CASTS # UR COMP ASSIST: 0 /HPF — SIGNIFICANT CHANGE UP (ref 0–4)
SP GR SPEC: 1.01 — SIGNIFICANT CHANGE UP (ref 1–1.03)
SQUAMOUS # UR AUTO: 0 /HPF — SIGNIFICANT CHANGE UP (ref 0–5)
UROBILINOGEN FLD QL: 0.2 MG/DL — SIGNIFICANT CHANGE UP (ref 0.2–1)
WBC UR QL: 2 /HPF — SIGNIFICANT CHANGE UP (ref 0–5)

## 2024-11-02 PROCEDURE — 74177 CT ABD & PELVIS W/CONTRAST: CPT | Mod: 26,MC

## 2024-11-02 PROCEDURE — 73630 X-RAY EXAM OF FOOT: CPT | Mod: 26,LT

## 2024-11-02 RX ORDER — HYDROMORPHONE HCL/0.9% NACL/PF 6 MG/30 ML
0.5 PATIENT CONTROLLED ANALGESIA SYRINGE INTRAVENOUS ONCE
Refills: 0 | Status: DISCONTINUED | OUTPATIENT
Start: 2024-11-02 | End: 2024-11-02

## 2024-11-02 RX ADMIN — Medication 0.5 MILLIGRAM(S): at 02:23

## 2024-11-02 RX ADMIN — Medication 0.5 MILLIGRAM(S): at 02:53

## 2024-11-02 NOTE — ED ADULT NURSE REASSESSMENT NOTE - NS ED NURSE REASSESS COMMENT FT1
PT awake. Respirations even and unlabored. PT states she has slight pain but it is manageable without medication. To be discharged. Safety precautions in place.

## 2024-11-02 NOTE — ED ADULT NURSE REASSESSMENT NOTE - NS ED NURSE REASSESS COMMENT FT1
PT received stable from mid shift RN. Respirations even and unlabored. PT stated she felt a relief in pain. Awaiting diagnostic testing. Safety precautions in place.

## 2024-11-02 NOTE — ED PROVIDER NOTE - CLINICAL SUMMARY MEDICAL DECISION MAKING FREE TEXT BOX
Saint Presley, DO (PGY2): 54-year-old female past medical history of insulin-dependent diabetes, complicated by gastroparesis, upper and lower extremity neuropathy, previous alcohol abuse, depression, GERD, hospitalization in April 2023 for volvulus complicated by sepsis, history of hemolytic anemia, left shoulder osteoarthritis presenting for multiple symptoms. Patient reporting abdominal pain associated with diarrhea and vomiting, left leg and toe pain. Reports that she walked her dog and when she got home, noted that her L great toe was swollen. The nail came off while she was seating on the stretcher. No chest pain, lightheadedness, dizziness, or neurological symptoms. No fever or chills.  Patient is hemodynamically stbale. no fever. Physical exam as above. DDx includes but is not limited to gastroenteritis, colitis, pancreatitis, SBO/LBO, although less likely given diarrhea, fracture. Plans for labs, including CBC, CMP, lipase. Plan for CTAP and xray of foot.

## 2024-11-02 NOTE — ED PROVIDER NOTE - ATTENDING CONTRIBUTION TO CARE
Brief HPI:  54-year-old female past medical history of gastroparesis, diabetes, mobile cecum status post surgery, follows with neurology for multiple sclerosis workup presents for abdominal pain, nausea, vomiting, diarrhea, Left toe pain for 1 day.  Patient states emesis is nonbilious and nonbloody.  Diarrhea is nonbloody.  Abdominal pain is epigastric, radiating diffusely, fever, sharp.  Patient denies trauma to Left great toe, however when she removed her shoe today she noticed that there was blood under the nail.    Vitals:   Reviewed    Exam:    GEN:  Non-toxic appearing, non-distressed, speaking full sentences, non-diaphoretic, AAOx3  HEENT:  NCAT, neck supple, EOMI, PERRLA, sclera anicteric, no conjunctival pallor or injection, no stridor, normal voice, no tonsillar exudate, uvula midline  CV:  regular rhythm and rate, s1/s2 audible, no murmurs, rubs or gallops, peripheral pulses 2+ and symmetric  PULM:  non-labored respirations, lungs clear to auscultation bilaterally, no wheezes, crackles or rales  ABD:  non distended, Diffusely tender, no rebound, no guarding, negative Khanna's sign, bowel sounds normal, no cvat  MSK:  Left great toe with scant blood under nail plate, nail plate somewhat mobile but attached at the eponychial fold, foot and toes nonerythematous, warm and well-perfused  NEURO:  AAOx3, CN II-XII intact, motor 5/5 in upper and lower extremities bilaterally, sensation grossly intact in extremities and trunk, finger to nose testing wnl, no nystagmus, negative Romberg, no pronator drift, no gait deficit  SKIN:  warm, dry, no rash or vesicles     A/P:  54-year-old female past medical history of gastroparesis, diabetes, mobile cecum status post surgery, follows with neurology for multiple sclerosis workup presents for abdominal pain, nausea, vomiting, diarrhea, Left toe pain for 1 day.  Vital signs stable, afebrile.  Exam nontoxic-appearing, abdomen diffusely tender but nonperitoneal.  Suspect recurrent gastroparesis given history.  Regarding left toe, there does appear to be an avulsed nail likely from minor trauma.  Will obtain labs, CT abdomen pelvis, foot x-ray, supportive care.  Disposition pending.

## 2024-11-02 NOTE — ED PROVIDER NOTE - NSFOLLOWUPINSTRUCTIONS_ED_ALL_ED_FT
You were seen in the emergency department today for abdominal pain. Your CAT scan showed that you have an umbilical hernia. Please monitor your symptoms and return to the ED for any skin changes in the area, if  the area over the hernia turns red or becomes tender, inability to pass stool or gas, vomiting.     Please follow up with your doctor regarding today's visit. You may bring copies of your results with you (provided in your discharge paperwork).     You may take 500-1000 mg acetaminophen (Tylenol) every 6 hours, as needed for pain.  You may take 600 mg ibuprofen every 8 hours, with food, as needed for pain.  You can take Tylenol and ibuprofen at the same time.     We hope you feel better! Thank you for trusting us with your care!

## 2024-11-02 NOTE — ED PROVIDER NOTE - PROGRESS NOTE DETAILS
Saint Presley, DO (PGY2):. labs reassuring. CTAP "showing diffusely fluid-filled colon, correlate for diarrhea. No discrete evidence of wall thickening or stranding to suggest colitis. Umbilical hernia containing loop of nonobstructed colon." Patient updated regarding those results. Reports that she feels better. Will PO challenge. Saint Presley, DO (PGY2): patient screaming stating that she needs stronger pain meds, states that she will leave and go home if no pain meds ordered. will give Dilaudid 1mg. Saint Presley (PGY2): Patient tolerating PO, cleared for discharge. Return precautions including but not limited to those listed on discharge instructions were discussed at length and patient felt comfortable going home. All questions answered prior to discharge.

## 2024-11-02 NOTE — ED PROVIDER NOTE - SHIFT CHANGE DETAILS
ALLAN:  Patient signed out to Dr. Abbott pending ct, x-ray, reassessment.  Prior to signout, patient alerted ED team that the nail plate of left great toe fell off spontaneously.  NO active bleeding or nailbed laceration on inspection.

## 2024-11-02 NOTE — ED PROVIDER NOTE - PATIENT PORTAL LINK FT
You can access the FollowMyHealth Patient Portal offered by [facility] by registering at the following website: [email or web address]. By joining [facility]’s FollowMyHealth portal, you will also be able to view your health information using other applications (apps) compatible with our system.

## 2024-11-03 LAB
CULTURE RESULTS: SIGNIFICANT CHANGE UP
SPECIMEN SOURCE: SIGNIFICANT CHANGE UP

## 2024-11-18 ENCOUNTER — APPOINTMENT (OUTPATIENT)
Dept: ENDOCRINOLOGY | Facility: CLINIC | Age: 54
End: 2024-11-18

## 2024-11-19 NOTE — ED ADULT NURSE NOTE - CAS EDN DISCHARGE ASSESSMENT
----- Message from Marie Brambila MD sent at 11/19/2024  7:55 AM CST -----  Please inform patient of normal MMG. Repeat in 1 year.   Alert and oriented to person, place and time

## 2025-01-14 NOTE — DISCHARGE NOTE PROVIDER - NSDCHC_MEDRECLITE_GEN_ALL_CORE
Addended by: GUS DE JESUS on: 1/14/2025 04:26 PM     Modules accepted: Orders     Click to Modify Medication Indication on Note Save

## 2025-03-18 ENCOUNTER — OUTPATIENT (OUTPATIENT)
Dept: OUTPATIENT SERVICES | Facility: HOSPITAL | Age: 55
LOS: 1 days | End: 2025-03-18
Payer: MEDICARE

## 2025-03-18 VITALS
RESPIRATION RATE: 16 BRPM | HEART RATE: 80 BPM | SYSTOLIC BLOOD PRESSURE: 117 MMHG | OXYGEN SATURATION: 98 % | WEIGHT: 137.79 LBS | TEMPERATURE: 98 F | HEIGHT: 63 IN | DIASTOLIC BLOOD PRESSURE: 82 MMHG

## 2025-03-18 DIAGNOSIS — L02.91 CUTANEOUS ABSCESS, UNSPECIFIED: Chronic | ICD-10-CM

## 2025-03-18 DIAGNOSIS — Z98.890 OTHER SPECIFIED POSTPROCEDURAL STATES: Chronic | ICD-10-CM

## 2025-03-18 DIAGNOSIS — Z01.818 ENCOUNTER FOR OTHER PREPROCEDURAL EXAMINATION: ICD-10-CM

## 2025-03-18 DIAGNOSIS — K43.2 INCISIONAL HERNIA WITHOUT OBSTRUCTION OR GANGRENE: ICD-10-CM

## 2025-03-18 DIAGNOSIS — Z90.89 ACQUIRED ABSENCE OF OTHER ORGANS: Chronic | ICD-10-CM

## 2025-03-18 DIAGNOSIS — Z90.49 ACQUIRED ABSENCE OF OTHER SPECIFIED PARTS OF DIGESTIVE TRACT: Chronic | ICD-10-CM

## 2025-03-18 DIAGNOSIS — E11.9 TYPE 2 DIABETES MELLITUS WITHOUT COMPLICATIONS: ICD-10-CM

## 2025-03-18 DIAGNOSIS — L72.9 FOLLICULAR CYST OF THE SKIN AND SUBCUTANEOUS TISSUE, UNSPECIFIED: Chronic | ICD-10-CM

## 2025-03-18 LAB
A1C WITH ESTIMATED AVERAGE GLUCOSE RESULT: 6.8 % — HIGH (ref 4–5.6)
ANION GAP SERPL CALC-SCNC: 16 MMOL/L — SIGNIFICANT CHANGE UP (ref 5–17)
BUN SERPL-MCNC: 17 MG/DL — SIGNIFICANT CHANGE UP (ref 7–23)
CALCIUM SERPL-MCNC: 10.1 MG/DL — SIGNIFICANT CHANGE UP (ref 8.4–10.5)
CHLORIDE SERPL-SCNC: 103 MMOL/L — SIGNIFICANT CHANGE UP (ref 96–108)
CO2 SERPL-SCNC: 19 MMOL/L — LOW (ref 22–31)
CREAT SERPL-MCNC: 0.81 MG/DL — SIGNIFICANT CHANGE UP (ref 0.5–1.3)
EGFR: 87 ML/MIN/1.73M2 — SIGNIFICANT CHANGE UP
EGFR: 87 ML/MIN/1.73M2 — SIGNIFICANT CHANGE UP
ESTIMATED AVERAGE GLUCOSE: 148 MG/DL — HIGH (ref 68–114)
GLUCOSE SERPL-MCNC: 149 MG/DL — HIGH (ref 70–99)
HGB BLD-MCNC: 16.1 G/DL — HIGH (ref 11.5–15.5)
MCHC RBC-ENTMCNC: 28.4 PG — SIGNIFICANT CHANGE UP (ref 27–34)
MCHC RBC-ENTMCNC: 32.9 G/DL — SIGNIFICANT CHANGE UP (ref 32–36)
MCV RBC AUTO: 86.2 FL — SIGNIFICANT CHANGE UP (ref 80–100)
NRBC BLD AUTO-RTO: 0 /100 WBCS — SIGNIFICANT CHANGE UP (ref 0–0)
PLATELET # BLD AUTO: 221 K/UL — SIGNIFICANT CHANGE UP (ref 150–400)
POTASSIUM SERPL-MCNC: 4.3 MMOL/L — SIGNIFICANT CHANGE UP (ref 3.5–5.3)
POTASSIUM SERPL-SCNC: 4.3 MMOL/L — SIGNIFICANT CHANGE UP (ref 3.5–5.3)
RBC # FLD: 12.2 % — SIGNIFICANT CHANGE UP (ref 10.3–14.5)
RH IG SCN BLD-IMP: POSITIVE — SIGNIFICANT CHANGE UP
SODIUM SERPL-SCNC: 138 MMOL/L — SIGNIFICANT CHANGE UP (ref 135–145)
WBC # BLD: 11.07 K/UL — HIGH (ref 3.8–10.5)
WBC # FLD AUTO: 11.07 K/UL — HIGH (ref 3.8–10.5)

## 2025-03-18 PROCEDURE — 85027 COMPLETE CBC AUTOMATED: CPT

## 2025-03-18 PROCEDURE — 86850 RBC ANTIBODY SCREEN: CPT

## 2025-03-18 PROCEDURE — 80048 BASIC METABOLIC PNL TOTAL CA: CPT

## 2025-03-18 PROCEDURE — 83036 HEMOGLOBIN GLYCOSYLATED A1C: CPT

## 2025-03-18 PROCEDURE — 86077 PHYS BLOOD BANK SERV XMATCH: CPT

## 2025-03-18 PROCEDURE — 86900 BLOOD TYPING SEROLOGIC ABO: CPT

## 2025-03-18 PROCEDURE — 87641 MR-STAPH DNA AMP PROBE: CPT

## 2025-03-18 PROCEDURE — G0463: CPT

## 2025-03-18 PROCEDURE — 86880 COOMBS TEST DIRECT: CPT

## 2025-03-18 PROCEDURE — 86901 BLOOD TYPING SEROLOGIC RH(D): CPT

## 2025-03-18 PROCEDURE — 87640 STAPH A DNA AMP PROBE: CPT

## 2025-03-18 PROCEDURE — 86905 BLOOD TYPING RBC ANTIGENS: CPT

## 2025-03-18 PROCEDURE — 86870 RBC ANTIBODY IDENTIFICATION: CPT

## 2025-03-18 RX ORDER — ONDANSETRON HCL/PF 4 MG/2 ML
1 VIAL (ML) INJECTION
Refills: 0 | DISCHARGE

## 2025-03-18 RX ORDER — TIRZEPATIDE 7.5 MG/.5ML
10 INJECTION, SOLUTION SUBCUTANEOUS
Refills: 0 | DISCHARGE

## 2025-03-18 RX ORDER — TRAMADOL HYDROCHLORIDE AND ACETAMINOPHEN 37.5; 325 MG/1; MG/1
0 TABLET ORAL
Refills: 0 | DISCHARGE

## 2025-03-18 NOTE — H&P PST ADULT - OTHER CARE PROVIDERS
Dr. Maricel Palma cardiologist [phone] 3/20/25; Dr. Finch, hematology  [phone]; Dr. Miller, rheumatologist, [phone] Dr. Maricel Palma cardiologist [phone]; Dr. Finch, hematology  [phone]; Dr. Miller, rheumatologist, [phone]
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
## 2025-03-18 NOTE — H&P PST ADULT - HISTORY OF PRESENT ILLNESS
56 yo female, PMH gastroparesis, volvulus s/p right hemicolectomy with ileocolic anastomosis on 4/17/23, about one week post-op came to ER after syncopal episode, her Hg was 6.1 and received 3 units PRBC and platelet transfusion, pt. was worked up and finding were c/w hemolytic anemia, 4/30/23 received IVIG, steroids were deemed to contraindicated 2/2 recent surgery; afterwards pt. developed headache and photophobia CT and MRI of brain negative for aseptic meningitis, LP done in IR and viral serologies negative. Pt. recently diagnosed fibromyalgia/RA - pt. takes Tylenol #3 and gabapentin daily for generalized pain L>R. Pt. had a recent CT   lesions on spleen and incisional hernia   pt. presents to PST for scheduled Robotic Incisional Hernia Repair with Mesh on 4/8/25. Denies recent fever, chills, chest pain, SOB, changes in bowel/urinary habits or recent exposure to COVID-19 infection.  56 yo female, PMH T2DM - on insulin with meals and Mounjaro, gastroparesis (as per pt., occasional nausea, has not gotten any worse with Mounjaro - takes Zofran prn), Fibromyalgia, RA, volvulus s/p right hemicolectomy with ileocolic anastomosis on 4/17/23, about one week post-op came to ER after syncopal episode, her Hg was 6.1 and received 3 units PRBC and platelet transfusion, after workup findings consistent with hemolytic anemia, 4/30/23 received IVIG (steroids were deemed contraindicated 2/2 recent surgery); afterwards pt. developed headache and photophobia - CT and MRI of brain negative for aseptic meningitis, LP done in IR and viral serologies negative. Pt. recently diagnosed fibromyalgia/RA - pt. takes Tylenol #3 and gabapentin daily for generalized pain L>R, recent incidental finding on CT scan of an incisional hernia. Pt. presents to PST for scheduled Robotic Incisional Hernia Repair with Mesh on 4/8/25. Denies recent fever, chills, chest pain, SOB, changes in bowel/urinary habits or recent exposure to COVID-19 infection.

## 2025-03-18 NOTE — H&P PST ADULT - RESPIRATORY
clear to auscultation bilaterally/breath sounds equal/good air movement/respirations non-labored/no intercostal retractions

## 2025-03-18 NOTE — H&P PST ADULT - GENERAL
Have You Had Previous Emsculpt Treatments Before?: has had previous treatments Number Of Treatments: 2 When Was Your Last Treatment?: 05/17/2021 details…

## 2025-03-18 NOTE — H&P PST ADULT - NSICDXPASTMEDICALHX_GEN_ALL_CORE_FT
PAST MEDICAL HISTORY:  Alcohol abuse     Anxiety     Depression     Gastroparesis     GERD (gastroesophageal reflux disease)     Hepatic steatosis     MRSA cellulitis     Pancreatitis     Type 2 diabetes mellitus     Volvulus      PAST MEDICAL HISTORY:  Alcohol abuse     Anxiety     Depression     Gastroparesis     GERD (gastroesophageal reflux disease)     H/O fibromyalgia     H/O rheumatoid arthritis     Hepatic steatosis     MRSA cellulitis     Pancreatitis     Type 2 diabetes mellitus     Volvulus

## 2025-03-18 NOTE — H&P PST ADULT - NEGATIVE GENERAL SYMPTOMS
no fever/no chills/no anorexia/no weight loss/no weight gain/no polyphagia/no polyuria/no polydipsia/no malaise/no fatigue no fever/no chills

## 2025-03-18 NOTE — H&P PST ADULT - ANESTHESIA, PREVIOUS REACTION, PROFILE
You can access the FollowMyHealth Patient Portal offered by [facility] by registering at the following website: [email or web address]. By joining [facility]’s FollowMyHealth portal, you will also be able to view your health information using other applications (apps) compatible with our system. severe nausea/nausea/vomiting

## 2025-03-18 NOTE — H&P PST ADULT - PROBLEM SELECTOR PLAN 1
Pt scheduled for laparoscopic right hemicolectomy on 4/17/2023.  labs done results pending, ekg in chart.  Pt instructed to obtain preop covid testing.  urine cup provided.  Chlorhexidene provided-  preop teaching done, pt able to verbalize understanding.  Preop teaching done, pt able to verbalize understanding.   medication day of procedure- zofran, pantoprazole, sertraline, gabapentin, (clonazepam if needed)  dm pt instructed not to take dm medication day of procedure, night prior to procedure take lantus 15 units   pst request   surgeon requesting cardiology eval, pst will also request, pt c/o sob with climbing a flight of stairs  cardiologist- Dr. Maricel Palma cardiologist [phone]   echo 2022 in chart Robotic Incisional Hernia Repair with Mesh on 4/8/25  Pre-op instructions, including Chlorhexidine soap, provided - all questions answered  Pt. not a candidate for ERP due to h/o gastroparesis, instructed not to have solid food 24 hours pre-op and follow a full liquid diet, NPO after 11 pm night before surgery  CBC, BMP, HgA1c, T&S, MRSA/MSSA done at PST

## 2025-03-18 NOTE — H&P PST ADULT - GASTROINTESTINAL COMMENTS
pain when wearing tight clothes, zofran at times 2/2 gastroparesis pain when wearing tight clothes, Zofran at times 2/2 gastroparesis

## 2025-03-18 NOTE — H&P PST ADULT - ASSESSMENT
Activity: Can walk 5 blocks, 2 flights of stairs, house chores, walks her dog daily    DASI scale:    Mallampati:    Dentition:  Activity: Can walk 5 blocks, 2 flights of stairs, house chores, walks her dog daily    DASI scale: 7.25    Mallampati: 2    Dentition: Denies loose teeth/dentures

## 2025-03-18 NOTE — H&P PST ADULT - ANTERIOR CERVICAL L
ANTICOAGULATION MANAGEMENT     Rakesh Samuels 75 year old male is on warfarin with therapeutic INR result. (Goal INR 2.0-3.0)    Recent labs: (last 7 days)     09/03/24  0436   INR 2.3       ASSESSMENT     Source(s): Chart Review and Home Care/Facility Nurse     Warfarin doses taken: Warfarin taken as instructed  Diet: No new diet changes identified  Medication/supplement changes:  continues on Macrobid  New illness, injury, or hospitalization: No  Signs or symptoms of bleeding or clotting: No  Previous result: Supratherapeutic  Additional findings: None       PLAN     Recommended plan for no diet, medication or health factor changes affecting INR     Dosing Instructions: Continue your current warfarin dose with next INR in 3 days       Summary  As of 9/3/2024      Full warfarin instructions:  5 mg every Sun, Wed; 2.5 mg all other days   Next INR check:  9/6/2024               Telephone call with [facility] nurse (medical care for seniors) who verbalizes understanding and agrees to plan and who agrees to plan and repeated back plan correctly    Orders given to  Homecare nurse/facility to recheck    Education provided: Please call back if any changes to your diet, medications or how you've been taking warfarin    Plan made per [facility] anticoagulation protocol    Anisa Christian, RN  Anticoagulation Clinic  9/3/2024    _______________________________________________________________________     Anticoagulation Episode Summary       Current INR goal:  2.0-3.0   TTR:  30.3% (3.6 wk)   Target end date:  Indefinite   Send INR reminders to:  [facility] (TCU/LTC/assisted)    Indications    Paroxysmal atrial fibrillation (H) [I48.0]             Comments:  [facility] [phone]             Anticoagulation Care Providers       Provider Role Specialty Phone number    Kylie Gomez NP Referring Nurse Practitioner [phone]               normal

## 2025-03-18 NOTE — H&P PST ADULT - PATIENT'S GENDER IDENTITY
on the discharge service for the patient. I have reviewed and made amendments to the documentation where necessary.
Female

## 2025-03-19 ENCOUNTER — APPOINTMENT (OUTPATIENT)
Dept: ENDOCRINOLOGY | Facility: CLINIC | Age: 55
End: 2025-03-19
Payer: MEDICARE

## 2025-03-19 DIAGNOSIS — E11.9 TYPE 2 DIABETES MELLITUS W/OUT COMPLICATIONS: ICD-10-CM

## 2025-03-19 LAB
MRSA PCR RESULT.: SIGNIFICANT CHANGE UP
S AUREUS DNA NOSE QL NAA+PROBE: SIGNIFICANT CHANGE UP

## 2025-03-19 PROCEDURE — 99214 OFFICE O/P EST MOD 30 MIN: CPT | Mod: 2W

## 2025-03-19 RX ORDER — PIOGLITAZONE HYDROCHLORIDE 15 MG/1
15 TABLET ORAL
Qty: 90 | Refills: 1 | Status: ACTIVE | COMMUNITY
Start: 2025-03-19 | End: 1900-01-01

## 2025-03-20 PROBLEM — Z87.39 PERSONAL HISTORY OF OTHER DISEASES OF THE MUSCULOSKELETAL SYSTEM AND CONNECTIVE TISSUE: Chronic | Status: ACTIVE | Noted: 2025-03-18

## 2025-03-28 ENCOUNTER — OUTPATIENT (OUTPATIENT)
Dept: OUTPATIENT SERVICES | Facility: HOSPITAL | Age: 55
LOS: 1 days | Discharge: HOME | End: 2025-03-28

## 2025-03-28 DIAGNOSIS — Z98.890 OTHER SPECIFIED POSTPROCEDURAL STATES: Chronic | ICD-10-CM

## 2025-03-28 DIAGNOSIS — L02.91 CUTANEOUS ABSCESS, UNSPECIFIED: Chronic | ICD-10-CM

## 2025-03-28 DIAGNOSIS — Z90.49 ACQUIRED ABSENCE OF OTHER SPECIFIED PARTS OF DIGESTIVE TRACT: Chronic | ICD-10-CM

## 2025-03-28 DIAGNOSIS — L72.9 FOLLICULAR CYST OF THE SKIN AND SUBCUTANEOUS TISSUE, UNSPECIFIED: Chronic | ICD-10-CM

## 2025-03-28 DIAGNOSIS — D59.19 OTHER AUTOIMMUNE HEMOLYTIC ANEMIA: ICD-10-CM

## 2025-03-28 DIAGNOSIS — Z90.89 ACQUIRED ABSENCE OF OTHER ORGANS: Chronic | ICD-10-CM

## 2025-03-29 ENCOUNTER — NON-APPOINTMENT (OUTPATIENT)
Age: 55
End: 2025-03-29

## 2025-03-31 ENCOUNTER — RESULT REVIEW (OUTPATIENT)
Age: 55
End: 2025-03-31

## 2025-03-31 ENCOUNTER — NON-APPOINTMENT (OUTPATIENT)
Age: 55
End: 2025-03-31

## 2025-03-31 ENCOUNTER — APPOINTMENT (OUTPATIENT)
Dept: HEMATOLOGY ONCOLOGY | Facility: CLINIC | Age: 55
End: 2025-03-31
Payer: MEDICARE

## 2025-03-31 VITALS
RESPIRATION RATE: 16 BRPM | OXYGEN SATURATION: 99 % | DIASTOLIC BLOOD PRESSURE: 94 MMHG | WEIGHT: 140.85 LBS | TEMPERATURE: 97.3 F | HEIGHT: 62.28 IN | HEART RATE: 69 BPM | SYSTOLIC BLOOD PRESSURE: 142 MMHG | BODY MASS INDEX: 25.59 KG/M2

## 2025-03-31 DIAGNOSIS — D59.10 AUTOIMMUNE HEMOLYTIC ANEMIA, UNSPECIFIED: ICD-10-CM

## 2025-03-31 LAB
BASOPHILS # BLD AUTO: 0.06 K/UL — SIGNIFICANT CHANGE UP (ref 0–0.2)
BASOPHILS NFR BLD AUTO: 0.8 % — SIGNIFICANT CHANGE UP (ref 0–2)
EOSINOPHIL # BLD AUTO: 0.27 K/UL — SIGNIFICANT CHANGE UP (ref 0–0.5)
EOSINOPHIL NFR BLD AUTO: 3.7 % — SIGNIFICANT CHANGE UP (ref 0–6)
HCT VFR BLD CALC: 37.1 % — SIGNIFICANT CHANGE UP (ref 34.5–45)
HGB BLD-MCNC: 13 G/DL — SIGNIFICANT CHANGE UP (ref 11.5–15.5)
IMM GRANULOCYTES NFR BLD AUTO: 0.3 % — SIGNIFICANT CHANGE UP (ref 0–0.9)
LYMPHOCYTES # BLD AUTO: 2.92 K/UL — SIGNIFICANT CHANGE UP (ref 1–3.3)
LYMPHOCYTES # BLD AUTO: 40.4 % — SIGNIFICANT CHANGE UP (ref 13–44)
MCHC RBC-ENTMCNC: 29.7 PG — SIGNIFICANT CHANGE UP (ref 27–34)
MCHC RBC-ENTMCNC: 35 G/DL — SIGNIFICANT CHANGE UP (ref 32–36)
MCV RBC AUTO: 84.9 FL — SIGNIFICANT CHANGE UP (ref 80–100)
MONOCYTES # BLD AUTO: 0.39 K/UL — SIGNIFICANT CHANGE UP (ref 0–0.9)
MONOCYTES NFR BLD AUTO: 5.4 % — SIGNIFICANT CHANGE UP (ref 2–14)
NEUTROPHILS # BLD AUTO: 3.56 K/UL — SIGNIFICANT CHANGE UP (ref 1.8–7.4)
NEUTROPHILS NFR BLD AUTO: 49.4 % — SIGNIFICANT CHANGE UP (ref 43–77)
NRBC BLD AUTO-RTO: 0 /100 WBCS — SIGNIFICANT CHANGE UP (ref 0–0)
PLATELET # BLD AUTO: 167 K/UL — SIGNIFICANT CHANGE UP (ref 150–400)
RBC # BLD: 4.37 M/UL — SIGNIFICANT CHANGE UP (ref 3.8–5.2)
RBC # FLD: 11.8 % — SIGNIFICANT CHANGE UP (ref 10.3–14.5)
WBC # BLD: 7.22 K/UL — SIGNIFICANT CHANGE UP (ref 3.8–10.5)
WBC # FLD AUTO: 7.22 K/UL — SIGNIFICANT CHANGE UP (ref 3.8–10.5)

## 2025-03-31 PROCEDURE — G2211 COMPLEX E/M VISIT ADD ON: CPT

## 2025-03-31 PROCEDURE — 99215 OFFICE O/P EST HI 40 MIN: CPT

## 2025-04-05 ENCOUNTER — APPOINTMENT (OUTPATIENT)
Dept: CT IMAGING | Facility: IMAGING CENTER | Age: 55
End: 2025-04-05
Payer: MEDICARE

## 2025-04-05 ENCOUNTER — OUTPATIENT (OUTPATIENT)
Dept: OUTPATIENT SERVICES | Facility: HOSPITAL | Age: 55
LOS: 1 days | End: 2025-04-05
Payer: MEDICARE

## 2025-04-05 DIAGNOSIS — Z98.890 OTHER SPECIFIED POSTPROCEDURAL STATES: Chronic | ICD-10-CM

## 2025-04-05 DIAGNOSIS — Z90.49 ACQUIRED ABSENCE OF OTHER SPECIFIED PARTS OF DIGESTIVE TRACT: Chronic | ICD-10-CM

## 2025-04-05 DIAGNOSIS — L02.91 CUTANEOUS ABSCESS, UNSPECIFIED: Chronic | ICD-10-CM

## 2025-04-05 DIAGNOSIS — Z90.89 ACQUIRED ABSENCE OF OTHER ORGANS: Chronic | ICD-10-CM

## 2025-04-05 DIAGNOSIS — Z00.8 ENCOUNTER FOR OTHER GENERAL EXAMINATION: ICD-10-CM

## 2025-04-05 PROCEDURE — 74177 CT ABD & PELVIS W/CONTRAST: CPT

## 2025-04-05 PROCEDURE — 74177 CT ABD & PELVIS W/CONTRAST: CPT | Mod: 26

## 2025-04-07 NOTE — ED BEHAVIORAL HEALTH ASSESSMENT NOTE - THOUGHT ASSOCIATIONS
CONSULTATION NOTE - NEPHROLOGY      Reason for Consultation  ROMA superimposed on CKD Stage 3a       Mr. Munoz is evaluated today at the request of Heather Pisano MD.      HISTORY OF PRESENT ILLNESS  Mr. Munoz is a 64 year old male, who initially presented to the ER c/o epigastric pain with associated nausea and multiple episodes non-bloody emesis. Pt also reports difficulty voiding since presentation to the ER. Pt with similar presentation in 2/2025 and was noted to have urinary retention at that time with discharge home with Brown catheter. This AM, bladder scan significant for urinary retention with 871cc-> awaiting Brown catheter placement by nursing staff. He currently denies any chest pain, fevers, or chills. Initial CT abd/pelvis with contrast in ER unremarkable aside from distended urinary bladder.    PMH significant for CKD Stage 3a, HTN, Type 2 DM, CAD s/p CABG, Chronic Systolic CHF with EF 20% s/p AICD placement, prior peptic ulcer.     Home Medications:      Medications Prior to Admission   Medication Sig Dispense Refill    insulin detemir (Levemir) 100 UNIT/ML injectable solution Inject 5 Units into the skin in the morning and 5 Units in the evening. 10 mL 12    HYDROcodone-acetaminophen (NORCO) 5-325 MG per tablet Take 1 tablet by mouth every 6 hours as needed for Pain. 12 tablet 0    polyethylene glycol (MIRALAX) 17 g packet Take 17 g by mouth daily as needed (constipation). Stir and dissolve powder in any 4 to 8 ounces of beverage, then drink. 20 each 0    ranolazine 1000 MG TABLET SR 12 HR Take 1 tablet by mouth in the morning and 1 tablet in the evening. 60 tablet 0    isosorbide mononitrate (IMDUR) 30 MG 24 hr tablet Take 1 tablet by mouth daily. 30 tablet 0    gabapentin (NEURONTIN) 300 MG capsule Take 1 capsule by mouth in the morning and 1 capsule at noon and 1 capsule in the evening. 90 capsule 0    colchicine (COLCRYS) 0.6 MG tablet Take 1 tablet by mouth daily. 30 tablet 0     acetaminophen (TYLENOL) 325 MG tablet Take 650 mg by mouth every 6 hours.      Symbicort 160-4.5 MCG/ACT inhaler Inhale 2 puffs into the lungs in the morning and 2 puffs in the evening. Needs refill      empagliflozin (Jardiance) 10 MG tablet Take 10 mg by mouth daily. Patient ran out      amLODIPine (NORVASC) 5 MG tablet Take 5 mg by mouth daily.      ticagrelor (Brilinta) 90 MG Tab Take 90 mg by mouth in the morning and 90 mg in the evening.      tamsulosin (FLOMAX) 0.4 MG Cap Take 0.4 mg by mouth daily.      lidocaine (LIDODERM) 5 % patch Place 2 patches onto the skin every 24 hours. To shoulders and chest      hydroCORTisone (CORTIZONE) 2.5 % ointment Apply 1 Application topically in the morning and 1 Application in the evening. To arms and legs      pregabalin (LYRICA) 75 MG capsule Take 75 mg by mouth in the morning and 75 mg in the evening. 60 capsule 0    aspirin 81 MG chewable tablet Chew 81 mg by mouth daily.      atorvastatin (LIPITOR) 80 MG tablet Take 1 tablet by mouth daily.      metoPROLOL succinate (TOPROL-XL) 25 MG 24 hr tablet Take 50 mg by mouth daily.         MEDICATIONS  Current Facility-Administered Medications   Medication Dose Route Frequency Provider Last Rate Last Admin    ondansetron (ZOFRAN ODT) disintegrating tablet 4 mg  4 mg Oral Q6H PRN Heather Nicole MD        Or    ondansetron (ZOFRAN) injection 4 mg  4 mg Intravenous Q6H PRN Heather Nicole MD   4 mg at 04/07/25 0733    morphine injection 2 mg  2 mg Intravenous Q8H PRN Luis Alberto Schilling MD   2 mg at 04/07/25 0734    aspirin chewable 81 mg  81 mg Oral Daily Luis Alberto Schilling MD   81 mg at 04/07/25 0927    atorvastatin (LIPITOR) tablet 80 mg  80 mg Oral Daily Luis Alberto Schilling MD   80 mg at 04/07/25 0927    amLODIPine (NORVASC) tablet 5 mg  5 mg Oral Daily Luis Alberto Schilling MD        insulin glargine (LANTUS) injection 5 Units  5 Units Subcutaneous Nightly Luis Alberto Schilling MD   5 Units at 04/06/25 2031     isosorbide mononitrate (IMDUR) ER tablet 30 mg  30 mg Oral Daily Luis Alberto Schilling MD   30 mg at 04/07/25 0928    metoPROLOL succinate (TOPROL-XL) ER tablet 50 mg  50 mg Oral Daily Luis Alberto Schilling MD   50 mg at 04/07/25 0927    ranolazine (RANEXA) 12 hr tablet 1,000 mg  1,000 mg Oral BID Luis Alberto Schilling MD   1,000 mg at 04/07/25 0928    budesonide-formoterol (SYMBICORT) 160-4.5 MCG/ACT inhaler 2 puff  2 puff Inhalation BID Luis Alberto Schilling MD   2 puff at 04/07/25 0921    tamsulosin (FLOMAX) capsule 0.4 mg  0.4 mg Oral Daily Luis Alberto Schilling MD   0.4 mg at 04/07/25 0927    ticagrelor (BRILINTA) tablet 90 mg  90 mg Oral BID Luis Alberto Schilling MD   90 mg at 04/07/25 0927    sacubitril-valsartan (ENTRESTO) 24-26 MG per tablet 1 tablet  1 tablet Oral BID Luis Alberto Schilling MD   1 tablet at 04/07/25 0929    DULoxetine (CYMBALTA) capsule 60 mg  60 mg Oral Daily Luis Alberto Schilling MD   60 mg at 04/07/25 0927    dextrose 50 % injection 25 g  25 g Intravenous PRN Luis Alberto Schilling MD        dextrose 50 % injection 12.5 g  12.5 g Intravenous PRN Luis Alberto Schilling MD        glucagon (GLUCAGEN) injection 1 mg  1 mg Intramuscular PRN Luis Alberto Schilling MD        dextrose (GLUTOSE) 40 % gel 15 g  15 g Oral PRN Luis Alberto Schilling MD        dextrose (GLUTOSE) 40 % gel 30 g  30 g Oral PRN Luis Alberto Schilling MD        insulin lispro (ADMELOG,HumaLOG) - Correction Dose   Subcutaneous TID WC Luis Alberto Schilling MD   2 Units at 04/06/25 1726    insulin lispro (ADMELOG,HumaLOG) - Correction Dose   Subcutaneous Nightly Luis Alberto Schilling MD        enoxaparin (LOVENOX) injection 40 mg  40 mg Subcutaneous Nightly Luis Alberto Schilling MD   40 mg at 04/06/25 2030    acetaminophen (TYLENOL) tablet 650 mg  650 mg Oral Q4H PRN Luis Alberto Schilling MD   650 mg at 04/06/25 2029    Or    acetaminophen (TYLENOL) suppository 650 mg  650 mg Rectal Q4H PRN Luis Alberto Schilling,  MD        polyethylene glycol (MIRALAX) packet 17 g  17 g Oral Daily PRN Luis Alberto Schilling MD        melatonin tablet 6 mg  6 mg Oral Nightly PRN Luis Alberto Schilling MD        Potassium Standard Replacement Protocol (Levels 3.5 and lower)   Does not apply See Admin Instructions Luis Alberto Schilling MD        Magnesium Standard Replacement Protocol   Does not apply See Admin Instructions Luis Alberto Schilling MD        Phosphorus Standard Replacement Protocol   Does not apply See Admin Instructions Luis Alberto Schilling MD        sodium chloride 0.9 % injection 10 mL  10 mL Intravenous PRN Luis Alberto Schilling MD   10 mL at 04/06/25 1509    sodium chloride 0.9 % injection 2 mL  2 mL Intracatheter 2 times per day Luis Alberto Schilling MD   2 mL at 04/07/25 0929    sodium chloride (NORMAL SALINE) 0.9 % bolus 500 mL  500 mL Intravenous PRN Luis Alberto Schilling MD        nitroGLYCERIN (NITROSTAT) sublingual tablet 0.4 mg  0.4 mg Sublingual Q5 Min PRN Luis Alberto Schilling MD   0.4 mg at 04/05/25 2228    pantoprazole (PROTONIX INJECT) injection 40 mg  40 mg Intravenous 2 times per day Luis Alberto Schilling MD   40 mg at 04/07/25 0929    alum-mag hydroxide+simethicone/lidocaine viscous (2:1) (MAGIC MOUTHWASH/GI COCKTAIL) (compounded) oral suspension 15 mL  15 mL Oral 4x Daily PRN Luis Alberto Schilling MD           ALLERGIES  Allergies as of 04/05/2025 - Reviewed 04/05/2025   Allergen Reaction Noted    Banana   (food and med) SWELLING and HIVES 04/09/2012        HISTORIES  Past Medical History:   Diagnosis Date    Asthma (CMD)     Cardiac pacemaker     Diabetes mellitus  (CMD)     Essential (primary) hypertension     High cholesterol        Past Surgical History:   Procedure Laterality Date    Cardiac catherization      Cardiac surgery      Coronary artery bypass graft         Family History   Problem Relation Age of Onset    Diabetes Mother     Heart disease Mother     Myocardial Infarction Mother      Diabetes Father     Heart disease Father     Heart disease Sister     Diabetes Brother        Social History     Socioeconomic History    Marital status: Legally      Spouse name: Not on file    Number of children: Not on file    Years of education: Not on file    Highest education level: Not on file   Occupational History    Not on file   Tobacco Use    Smoking status: Never    Smokeless tobacco: Never   Vaping Use    Vaping status: never used   Substance and Sexual Activity    Alcohol use: Not Currently    Drug use: Not Currently     Types: Marijuana     Comment: 2 times a week    Sexual activity: Not on file   Other Topics Concern    Not on file   Social History Narrative    Not on file     Social Determinants of Health     Financial Resource Strain: Low Risk  (4/5/2025)    Financial Resource Strain     Unable to Get: None   Recent Concern: Financial Resource Strain - High Risk (3/23/2025)    Financial Resource Strain     Unable to Get: Clothing;Medicine or Any Health Care (Medical, Dental, Mental Health, Vision)   Food Insecurity: Low Risk  (4/5/2025)    Food Insecurity     Worried about Food: Never true     Food is Gone: Never true   Recent Concern: Food Insecurity - Medium Risk (3/23/2025)    Food Insecurity     Worried about Food: Sometimes true     Food is Gone: Sometimes true   Transportation Needs: At Risk (4/5/2025)    Transportation Needs     Lack of Reliable Transportation: Yes   Physical Activity: Patient Unable To Answer (9/29/2024)    Received from [facility]    Exercise Vital Sign     Days of Exercise per Week: Patient unable to answer     Minutes of Exercise per Session: Patient unable to answer   Recent Concern: Physical Activity - Insufficiently Active (9/17/2024)    Received from [facility]    Exercise Vital Sign     Days of Exercise per Week: 3 days     Minutes of Exercise per Session: 30 min   Stress: Patient Unable  To Answer (9/29/2024)    Received from [facility] Occupational Health - Occupational Stress Questionnaire     Feeling of Stress : Patient unable to answer   Recent Concern: Stress - Stress Concern Present (9/17/2024)    Received from [facility] Occupational Health - Occupational Stress Questionnaire     Feeling of Stress : To some extent   Social Connections: Low Risk  (4/5/2025)    Social Connections     Social Connectivity: 5 or more times a week   Recent Concern: Social Connections - High Risk (2/3/2025)    Social Connections     Social Connectivity: Less than once a week   Interpersonal Safety: Low Risk  (4/5/2025)    Interpersonal Safety     How often physically hurt: Never     How often insulted or talked down to: Never     How often threatened with harm: Never     How often scream or curse at: Never          REVIEW OF SYSTEMS    12 point review of systems reviewed and negative aside from what is mentioned in the HPI    PHYSICAL EXAM  Visit Vitals  BP (!) 143/89 (Patient Position: Supine)   Pulse 74   Temp 98.1 °F (36.7 °C) (Oral)   Resp 18   Ht 5' 6\" (1.676 m)   Wt 69.8 kg (153 lb 14.1 oz)   SpO2 96%   BMI 24.84 kg/m²       Physical Exam:  Gen: NAD, Lying in bed on RA  HEENT: PERRL, EOMI, MMM  Neck: Supple, No JVD  CV: RRR S1S2 nl  Lungs: Diminished BS at bases, Non-labored respirations  Abd: Soft/mild epigastric and suprapubic TTP/ND, + BS  Ext: No e/c/c  Neuro: Non-focal, Alert and oriented x 3  Skin: No rashes/lesions    Data Review:   Chemistry:  Lab Results   Component Value Date    SODIUM 136 04/07/2025    SODIUM 139 09/18/2018    POTASSIUM 4.9 04/07/2025    POTASSIUM 4.2 09/18/2018    CHLORIDE 102 04/07/2025    CO2 21 04/07/2025    CO2 29 09/18/2018    BUN 23 (H) 04/07/2025    BUN 17 09/18/2018    CREATININE 1.64 (H) 04/07/2025    CREATININE 1.24 (H) 09/18/2018    CALCIUM 8.7 04/07/2025     GLUCOSE 150 (H) 04/07/2025    GLUCOSE 145 (H) 09/18/2018      CBC:  Lab Results   Component Value Date    WBC 7.6 04/07/2025    WBC 6.8 09/18/2018    RBC 5.00 04/07/2025    RBC 4.28 (L) 09/18/2018    HGB 15.2 04/07/2025    HGB 12.3 (L) 09/18/2018    HCT 43.9 04/07/2025     04/07/2025     09/18/2018      Urinalysis Component Data:  Lab Results   Component Value Date    MUCUS Present 12/24/2022     Cardiac markers:   No results found for: \"TROPONINI\", \"MYOGLOBIN\", \"CKMB\"   No components found for: \"49519\"  Magnesium   Date Value Ref Range Status   04/05/2025 2.5 (H) 1.7 - 2.4 mg/dL Final     Phosphorus   Date Value Ref Range Status   02/05/2025 4.8 (H) 2.4 - 4.7 mg/dL Final   ?      Imaging Study  LAST CT:  === 04/05/25 ===    CT ABDOMEN PELVIS W CONTRAST    - Narrative -  EXAM: CT ABDOMEN PELVIS W CONTRAST.    CLINICAL INDICATION: Generalized abdominal pain with emesis. Right-sided  pain more than left. Nausea.    COMPARISON:  03/23/2025.    TECHNIQUE: Axial acquisitions were obtained through the abdomen and pelvis.  Multiplanar reformatted imaging was performed.  Automatic exposure control  utilized for radiation dose reduction.    CONTRAST:  75 mL of Omnipaque 350 and oral contrast was administered.    FINDINGS:    LIVER and BILIARY: Appearance of fatty change of the liver, no liver  lesions are appreciated. The gallbladder appears normal. No biliary ductal  dilatation is seen..    SPLEEN, PANCREAS, and ADRENALS: The spleen, pancreas, and adrenal glands  are normal.    GENITOURINARY low-density exophytic lesion seen of the right kidney upper  pole statistically a cyst. Symmetric enhancement of the kidneys. No  hydronephrosis.    Urinary bladder is distended and thin-walled measuring 11 x 7 x 9.2 cm.  Prostate gland measures 4.2 cm.    CIRCULATORY: Atherosclerosis of the aorta, no aneurysm. Iliac stents are  noted bilaterally. Pacer leads noted. There is cardiomegaly    LYMPH NODES: There are no  enlarged lymph nodes identified in the abdomen or  pelvis.    GASTROINTESTINAL: Oral contrast seen through the transverse colon. Small  and large bowel are normal in caliber without wall thickening or  obstruction. Appendix is visualized and appears normal.    Stomach appears normal.  There is no free air or free fluid. No significant fat stranding is seen.    MUSCULOSKELETAL: There is a small hiatal hernia.  Musculature appears  symmetric.    Mild degenerative changes are noted in the spine.    RESPIRATORY: The lung bases are clear.    - Impression -  1.   No definite acute abnormalities are identified on this exam.  2.   Appearance of fatty change of the liver.  3.   Urinary bladder is distended and thin-walled measuring 11 x 7 x 9.2  cm.  4.   Atherosclerosis of the aorta, no aneurysm. Iliac stents are noted  bilaterally. Pacer leads noted. There is cardiomegaly.    FOR PHYSICIAN USE ONLY - Please note that this report was generated using  voice recognition software.  If you require clarification or feel that  there has been an error in this report please contact me through [employer].  Thank you very much for allowing me to participate in the care of  your patient.        Electronically Signed by: GIO MARINO M.D.  Signed on: 4/5/2025 9:16 PM  Workstation ID: [id number]      === 03/23/25 ===    CTA CHEST ABDOMEN PELVIS    - Narrative -  EXAM:  CTA CHEST ABDOMEN PELVIS    CLINICAL INDICATION: Chest pain. 64year old male with past medical history  significant for diabetes, hypertension, hyperlipidemia, asthma, PAD s/p  bilateral iliac stents, CAD s/p CABG and PCI x3, HFrEF (EF 20%), s/p ICD  and BPH presenting to the emergency department via EMS for evaluation of  chest pain that started last night. Patient states that his chest pain is  located in the middle and left side of his chest and describes it as sharp  pressure. He also reports associated nausea but no vomiting. Additionally,  patient reports  L lower extremity pain. He states that he was having some  LLE swelling last night which has since resolved. He adds that he has been  told that he has \"blockages in his leg\". Patient also complains of right  arm numbness that started a couple of days ago,    COMPARISON: CTA chest abdomen pelvis February 2, 2025.    TECHNIQUE: CTA chest abdomen pelvis performed following IV injection 100 cc  Omnipaque 3-D postprocessing is performed including MIPS imaging by  technologist under direct supervision of radiologist. Precontrast CT  examination chest abdomen pelvis performed.    FINDINGS:    Precontrast examination demonstrates left-sided cardiac conduction device  with distal portion of the leads unchanged in position.    Evidence of sternotomy. Evidence of CABG. Cardiac valve prosthesis.  Coronary vascular stent. Coronary vascular calcification.    There is reticular attenuation lateral aspect right lung base which remains  unchanged. Lungs are otherwise clear.    No evidence of pleural effusions or pneumothorax.    Vascular calcification thoracic aorta, abdominal aorta and iliac vessels  and vascular stents left and right common iliac artery. Caliber of the  thoracic aorta abdominal aorta is within normal limits.    CTA examination demonstrates homogeneous enhancement of the thoracic aorta,  abdominal aorta and iliac vessels with no evidence of discrete filling  defects. There is patency of the celiac trunk, superior mesenteric artery,  left and right renal artery and inferior mesenteric artery. No evidence of  significant stenosis. There is lack of opacification majority of the left  vertebral artery which is similar when compared to the previous  examination.    There is homogeneous enhancement of the pulmonary trunk, left and right  main lumbar artery, lobar and main segmental branches with no evidence of  discrete filling defects.    There is partially exophytic 2.3 cm hypodense exophytic lesion from  the  posterolateral aspect midpole right kidney with Hounsfield units of 9+/-10  units and remains unchanged. There is slight induration perirenal fat  bilaterally.    Mild uniform decrease attenuation hepatic parenchyma in relation to spleen  and mild heterogeneous enhancement the spleen.    Examination of the adrenal glands and pancreas demonstrate no significant  abnormality.    Several colonic diverticula with no evidence of abnormal soft tissue  attenuation adjacent to the diverticula. Caliber of the bowel is within  normal limits. Appendix appears within normal limits.    No evidence of free fluid or free air within the peritoneum.    Evidence of prior sternotomy. Cardiac valvular prosthesis.    Endplate spurs C3-C4, C4-C5, C5-C6. Narrowing of the glenohumeral joints  bilaterally with adjacent subchondral cysts and small spurs.    Disc bulge, endplate spurring facet degenerative change L3-L4. There is  disc bulge and endplate spur level L2-L3 and lower thoracic vertebra.    - Impression -  1. Apparent chronic occlusion left vertebral artery which appears similar  when compared previous examination. Recommend correlation with clinical  assessment and additional imaging as warranted  2. Evidence of prior sternotomy/CABG and cardiac valvular prosthesis.  3. There is a 2.3 cm hypodense exophytic lesion right kidney which remains  unchanged with Hounsfield units as quantified above most compatible with  renal cyst and there is mild induration perirenal fat bilaterally which may  be compatible with nonspecific medical renal disease.  4. Coronary atherosclerosis, atherosclerosis thoracic aorta, abdominal  aorta and iliac vessels and there are coronary vascular stents and vascular  stents left and right common iliac artery.  5. Colonic diverticula with no evidence of diverticulitis.  6. Uniform decrease attenuation hepatic parenchyma in relation to spleen  may be due to contrast bolus technique or diffuse fatty  infiltration of the  liver and there is mild heterogeneous enhancement of the spleen most likely  related to differences in red and white pulp and contrast bolus technique.  7. Mild linear atelectasis or fibrosis left and right lung base.  8. Degenerative spondylosis and degenerative change glenohumeral joints as  described above.    No evidence of dissection or aneurysm of the aorta and there is no CT  evidence of acute pulmonary thromboembolic disease.          Electronically Signed by: KYRA ALBERT M.D.  Signed on: 3/23/2025 5:50 PM  Workstation ID: [id number]      === 02/02/25 ===    CTA CHEST ABDOMEN PELVIS    - Narrative -  EXAM:  CTA CHEST ABDOMEN PELVIS    CLINICAL INDICATION: Severe epigastric pain, possible GI bleed. High  cholesterol and hypertension. Diabetes.    COMPARISON: Perfusion scan from November 2024. CTA chest from 4/26/2012.  Chest x-ray from earlier this episode. CTA chest through pelvis from  4/26/2012.    CT Angiography 3D postprocessing was performed on computer workstation at  the following location (acquisition vs remote/independent): acquisition.  Postprocessing Maximum intensity projections were done.  Volume rendering, shade surface rendering, 3D postprocessing were not done.  Contrast administered IV:  Omnipaque 350, 84 cc.    Angiographic and GI findings:  Excellent opacification in the chest of the right and left intrathoracic  circulation and excellent opacification of arteries in the abdomen and  pelvis. No filling defects in main through subsegmental divisions of  pulmonary artery. Normal caliber pulmonary vessels. Mild cardiomegaly  status post CABG/sternotomy. No intracardiac thrombus. Aortic caliber  normal, no intimal flap. Aortic calcifications. Bilateral aortoiliac stents  which are patent. High-grade stenosis however at the distal aspect of the  left stent within the left external iliac artery. Occlusion of the left  internal iliac artery. High-grade stenosis of  right internal iliac artery.  High-grade stenosis of left superficial femoral artery.    TIARRA patent. No significant narrowing of celiac or SMA. No significant  narrowing of solitary bilateral renal artery. No significant narrowing of  brachiocephalic or bilateral subclavian arteries. Right vertebral artery  patent.    Left vertebral origin is not visualized.    Distal esophageal concentric mild mural thickening. No hiatal hernia.  Stomach normal. Borderline dilatation of descending duodenum up to 2.8 cm  diameter. Mild dilatation of proximal third portion of the duodenum up to  3.2 cm diameter where there is punctate contrast density within the lumen  lumen as seen on series 4 image 75. There is also contrast density material  distributed more diffusely in the small bowel within the right abdomen and  in the appendix. Appendix caliber normal. Large bowel caliber normal. At  least a couple of right-sided diverticula without associated wall  thickening.    Other findings:  No pneumoperitoneum or other abnormal gas collection in the abdomen. No  pneumothorax. No actionable pulmonary nodule. Couple of calcified tiny  right lung nodules. Focal irregular interstitial thickening at the right  lateral lung base. Slight interstitial band formation in the lingula and  lower middle lobe. No honeycombing. No consolidation. Normal trachea and  bronchi. No emphysematous change.    Right glenohumeral mild narrowing with subchondral cysts and marginal  spurring. C6-7 moderate disc narrowing with osteophytes. Osteophytes at  other endplates. Lumbosacral disc heights maintained. Hips unremarkable. No  aggressive bone lesion or fracture.    Thyroid normal.    Left chest cardiac pacer with lead in RV. Mitral valve prosthesis.    No pathology in wall of chest or abdomen. Normal thyroid.    Right inguinal hernia 3.5 cm diameter containing only fat. Left inguinal  hernia 1 cm diameter containing only fat. No anasarca. Reasonably  symmetric  musculature. No pleural or pericardial effusion. No ascites or abscess.    No lymph node enlargement. No mesenteric mass. Peritoneum smooth.    Kidney volumes normal. No hydronephrosis or calculus. Right posterior  exophytic 25 Hounsfield unit round hypodensity 2 cm diameter. Ureters  normal. Urinary bladder slight concentric wall thickening at high volume.  Prostate 5.5 x 4.5 x 3.5 cm. Symmetric size seminal vesicles.    Spleen normal. Adrenals normal. Pancreas normal. Gallbladder and bile ducts  normal. Liver normal.    - Impression -  1. Possible acute bleeding into the third portion duodenum versus  radiopaque bowel content. More distally, there is more concentrated  radiopaque bowel content which is likely ingested material such as antacid.  2. Mild dilatation of proximal jejunum likely ileus.  3. Right renal lesion which may be a proteinaceous cyst or a neoplasm.  Recommend ultrasound.  4. Nonvisualized proximal left vertebral artery potentially occluded. If  relevant, could be further investigated by dedicated CTA head and neck.  5. Pelvic atherosclerotic stenoses and occlusions as described above.  Patent bilateral aortoiliac stents.  6. Cardiomegaly with pacemaker.  7. Esophageal mural thickening distally consistent with esophagitis.  8. Mild prostate enlargement with mild bladder wall thickening suggesting  chronic outlet obstruction.  9. Small inguinal hernias of only fat.  10. Slight fibrosis or atelectasis in lower lungs.  10. Diverticulosis.  11. C6-7 spondylosis.  12. Right glenohumeral osteoarthritis.  13. No evidence of aortic dissection or aneurysm and no evidence of  pulmonary embolism.      Electronically Signed by: STEPHEN FLORES M.D.  Signed on: 2/2/2025 7:39 PM  Workstation ID: [id number]  LAST X-RAY:  === 04/05/25 ===    XR CHEST PA AND LATERAL 2 VIEWS    - Narrative -  EXAM: XR CHEST PA AND LATERAL 2 VIEWS.    CLINICAL INDICATION: Chest pain and vomiting.    COMPARISON:  3/23/2025.    - Impression -  FINDINGS:/IMPRESSION:    No definite acute cardiopulmonary abnormality is seen. Support devices  overlie the chest limiting detail.    The lungs are clear. The hemidiaphragms are sharp.  The heart size and mediastinal structures appear stable. AICD lead in  place. Post sternotomy changes noted.  Bony structures appear stable as well.    FOR PHYSICIAN USE ONLY - Please note that this report was generated using  voice recognition software.  If you require clarification or feel that  there has been an error in this report please contact me through [employer].  Thank you very much for allowing me to participate in the care of  your patient.              Electronically Signed by: GIO MARINO M.D.  Signed on: 4/5/2025 7:36 PM  Workstation ID: [id number]      === 03/23/25 ===    XR CHEST AP OR PA    - Narrative -  EXAM: XR CHEST AP OR PA    CLINICAL INDICATION: Shortness of breath.    COMPARISON: Chest x-ray February 2, 2025.    - Impression -  FINDINGS/IMPRESSION:    Heart size, pulmonary vasculature and the mediastinal contour remain  stable. Lungs clear. No significant pleural effusions or pneumothorax.  Degenerative change glenohumeral joints bilaterally.    Evidence of prior sternotomy/CABG. Left-sided coronary vascular stents.  Left-sided cardiac conduction device with lead unchanged in position.  Overlying EKG leads.          Electronically Signed by: KYRA ALBERT M.D.  Signed on: 3/23/2025 3:38 PM  Workstation ID: [id number]      === 02/02/25 ===    XR ABDOMEN 1 VIEW    - Narrative -  EXAM: XR ABDOMEN 2 VIEW    CLINICAL INDICATION: Left lower quadrant abdominal pain.    COMPARISON: Abdominal x-ray on 4/17/2012    FINDINGS:    Nonspecific bowel gas pattern. Increased fecal retention seen throughout  the colon.    No suspicious calcifications identified.    - Impression -  Nonspecific bowel gas pattern. Increased fecal retention is identified  throughout the colon to be  correlated for constipation. If there is  persistent clinical concern consider CT examination.      FOR PHYSICIAN USE ONLY - Please note that this report was generated using  voice recognition software.  If you require clarification or feel that  there has been an error in this report please contact me through [employer].  Thank you very much for allowing me to participate in the care of  your patient.    Electronically Signed by: YESICA RESENDIZ  Signed on: 2/5/2025 1:03 PM  Workstation ID: [id number]  LAST U/S:  === 02/02/25 ===    US KIDNEY BILATERAL    - Narrative -  EXAM: RENAL ULTRASOUND,    CLINICAL INDICATION: Acute kidney injury..    COMPARISON: Renal ultrasound on 6/1/2018    FINDINGS: Please note the superior and inferior poles of both kidneys are  not well visualized limiting assessment.    Right kidney:  The right kidney measures 10.2 x 5 x 5.8 cm.. The right kidney demonstrates  increased cortical echogenicity. There is no collecting system dilatation.  Anechoic cyst in the upper pole of the right kidney measuring 2.5 x 1.9 x  1.7 cm.    Left kidney:  The left kidney measures 10.4 x 6.1 x 7.0 cm. The left kidney demonstrates  increased cortical echogenicity. There is no collecting system dilatation.    The urinary bladder is partially fluid filled and unremarkable.  Circumferential wall thickening in the bladder. Borderline prostatomegaly  with the prostate gland measuring 4.2 x 3.6 x 4.5 cm. Probable bilateral  ureteral jets.    - Impression -  1.   No evidence of hydronephrosis.  2.   Circumferential wall thickening in the bladder. Please correlate  clinically with urinalysis.  3.   Right renal cyst.  4.   Borderline to mild prostatomegaly.  5.   Suspected nonspecific medical renal disease.      If there is persistent clinical concern for renal pathology consider  further evaluation with dedicated renal CT or MRI as small stones and  neoplasm cannot be reliably excluded on  ultrasound.      FOR PHYSICIAN USE ONLY - Please note that this report was generated using  voice recognition software.  If you require clarification or feel that  there has been an error in this report please contact me through [employer].  Thank you very much for allowing me to participate in the care of  your patient.    Electronically Signed by: YESICA STORYUDDIN  Signed on: 2/4/2025 2:47 PM  Workstation ID: [id number]  If there is persistent clinical concern for renal pathology consider  further evaluation with dedicated renal CT or MRI as small stones and  neoplasm cannot be reliably excluded on ultrasound.      === 06/01/18 ===    US KIDNEY REESE    - Narrative -  Accession #    [id number]    EXAM: US KIDNEY BILATERAL 06/01/2018    CLINICAL INDICATION: Renal insufficiency.  Chronic kidney disease.    COMPARISON: Correlated with ultrasound abdomen from 04/03/2012.    Sonographic images of bilateral kidneys are submitted.    FINDINGS: Right kidney measures approximately 10.7 cm in length.  Left kidney measures  approximately 10.5 cm in length.  Mild diffuse increased echogenicity of bilateral renal  parenchyma is identified.    No definitive solid or cystic renal mass is noted.  No evidence for hydronephrosis is seen.  No  significant shadowing renal calculi are identified.    Visualized urinary bladder appears grossly unremarkable.    Prostate appears mildly heterogeneous and measures approximately 4.0 x 3.4 x 3.8 cm.    - Impression -  1.  Mildly echogenic bilateral kidneys suggestive of medical renal disease.  Recommend  correlation with clinical and laboratory assessment.  2.  No other significant sonographic abnormality of visualized bilateral kidneys.  Recommend  followup as clinically warranted.      [facility]      Transcribed By: KANIKA  06/01/18 5:43 pm    Dictated By:            NATANAEL CHAUHAN MD    Electronically Reviewed and Approved By:           NATANAEL CHAUHAN MD  06/01/18 5:48 pm      Intake  and Output  No intake/output data recorded.      ASSESSMENT/PLAN    1) ROMA superimposed on CKD Stage 3a  -Baseline creat appears to be ~1.3-1.4 per chart review  -Etiology of ROMA presumed 2/2 obstructive uropathy/acute urinary retention with contrast exposure and outpatient Entresto and Diuretic therapy contributing  -Plan for Brown catheter placement this AM given significant PVR on bladder scan  -Recommend avoiding all nephrotoxins  -CT imaging with no evidence hydronephrosis     2) Recurrent Urinary Retention  -Plan for Brown placement this AM and will titrate dose Tamsulosin to 0.8mg and add Finasteride therapy  -Will need close Urology F/U upon discharge     3) Chronic Systolic CHF  -Well compensated at this time with EF of 20% s/p AICD  -If no improvement in renal function s/p Brown placement, will consider holding Entresto therapy     4) H/O CAD s/p CABG     5) Type 2 DM/Diabetic Nephropathy  -Glycemic control as per primary team  -345mg protein per most recent UPC in 2/2025     6) HTN  -Adequate BP control on current regimen     7) Abdominal Pain  -Unclear etiology with CT imaging unremarkable     Thank you for the consult please feel free to contact with any further questions. Will continue to monitor closely from a Renal standpoint.    Authored by Marlene Glaser MD on 4/7/2025 10:36 AM    Normal

## 2025-04-15 ENCOUNTER — APPOINTMENT (OUTPATIENT)
Dept: HEMATOLOGY ONCOLOGY | Facility: CLINIC | Age: 55
End: 2025-04-15

## 2025-04-17 NOTE — PATIENT PROFILE ADULT - DEAF OR HARD OF HEARING?
Problem: Chronic Conditions and Co-morbidities  Goal: Patient's chronic conditions and co-morbidity symptoms are monitored and maintained or improved  Outcome: Progressing      no

## 2025-04-21 ENCOUNTER — OUTPATIENT (OUTPATIENT)
Dept: OUTPATIENT SERVICES | Facility: HOSPITAL | Age: 55
LOS: 1 days | End: 2025-04-21
Payer: MEDICARE

## 2025-04-21 ENCOUNTER — TRANSCRIPTION ENCOUNTER (OUTPATIENT)
Age: 55
End: 2025-04-21

## 2025-04-21 VITALS
RESPIRATION RATE: 16 BRPM | DIASTOLIC BLOOD PRESSURE: 70 MMHG | HEART RATE: 51 BPM | TEMPERATURE: 98 F | HEIGHT: 63 IN | SYSTOLIC BLOOD PRESSURE: 116 MMHG | WEIGHT: 139.99 LBS | OXYGEN SATURATION: 100 %

## 2025-04-21 VITALS
DIASTOLIC BLOOD PRESSURE: 72 MMHG | RESPIRATION RATE: 16 BRPM | OXYGEN SATURATION: 98 % | SYSTOLIC BLOOD PRESSURE: 126 MMHG | HEART RATE: 67 BPM

## 2025-04-21 DIAGNOSIS — K43.2 INCISIONAL HERNIA WITHOUT OBSTRUCTION OR GANGRENE: ICD-10-CM

## 2025-04-21 DIAGNOSIS — Z90.89 ACQUIRED ABSENCE OF OTHER ORGANS: Chronic | ICD-10-CM

## 2025-04-21 DIAGNOSIS — Z90.49 ACQUIRED ABSENCE OF OTHER SPECIFIED PARTS OF DIGESTIVE TRACT: Chronic | ICD-10-CM

## 2025-04-21 DIAGNOSIS — Z98.890 OTHER SPECIFIED POSTPROCEDURAL STATES: Chronic | ICD-10-CM

## 2025-04-21 DIAGNOSIS — L72.9 FOLLICULAR CYST OF THE SKIN AND SUBCUTANEOUS TISSUE, UNSPECIFIED: Chronic | ICD-10-CM

## 2025-04-21 DIAGNOSIS — L02.91 CUTANEOUS ABSCESS, UNSPECIFIED: Chronic | ICD-10-CM

## 2025-04-21 LAB
BLD GP AB SCN SERPL QL: SIGNIFICANT CHANGE UP
GLUCOSE BLDC GLUCOMTR-MCNC: 176 MG/DL — HIGH (ref 70–99)

## 2025-04-21 PROCEDURE — 86900 BLOOD TYPING SEROLOGIC ABO: CPT

## 2025-04-21 PROCEDURE — 36415 COLL VENOUS BLD VENIPUNCTURE: CPT

## 2025-04-21 PROCEDURE — 82962 GLUCOSE BLOOD TEST: CPT

## 2025-04-21 PROCEDURE — S2900: CPT

## 2025-04-21 PROCEDURE — 49595 RPR AA HRN 1ST > 10 RDC: CPT

## 2025-04-21 PROCEDURE — 86850 RBC ANTIBODY SCREEN: CPT

## 2025-04-21 PROCEDURE — 86901 BLOOD TYPING SEROLOGIC RH(D): CPT

## 2025-04-21 PROCEDURE — C1781: CPT

## 2025-04-21 DEVICE — MESH SOFT SQUARE 12X12IN: Type: IMPLANTABLE DEVICE | Status: FUNCTIONAL

## 2025-04-21 DEVICE — IMPLANTABLE DEVICE: Type: IMPLANTABLE DEVICE | Status: FUNCTIONAL

## 2025-04-21 RX ORDER — METOCLOPRAMIDE HCL 10 MG
10 TABLET ORAL ONCE
Refills: 0 | Status: DISCONTINUED | OUTPATIENT
Start: 2025-04-21 | End: 2025-05-05

## 2025-04-21 RX ORDER — OXYCODONE HYDROCHLORIDE 30 MG/1
1 TABLET ORAL
Qty: 8 | Refills: 0
Start: 2025-04-21 | End: 2025-04-22

## 2025-04-21 RX ORDER — FENTANYL CITRATE-0.9 % NACL/PF 100MCG/2ML
25 SYRINGE (ML) INTRAVENOUS
Refills: 0 | Status: DISCONTINUED | OUTPATIENT
Start: 2025-04-21 | End: 2025-04-21

## 2025-04-21 RX ORDER — SODIUM CHLORIDE 9 G/1000ML
1000 INJECTION, SOLUTION INTRAVENOUS
Refills: 0 | Status: DISCONTINUED | OUTPATIENT
Start: 2025-04-21 | End: 2025-04-21

## 2025-04-21 RX ORDER — ONDANSETRON HCL/PF 4 MG/2 ML
4 VIAL (ML) INJECTION ONCE
Refills: 0 | Status: COMPLETED | OUTPATIENT
Start: 2025-04-21 | End: 2025-04-21

## 2025-04-21 RX ORDER — OXYCODONE HYDROCHLORIDE AND ACETAMINOPHEN 10; 325 MG/1; MG/1
1 TABLET ORAL EVERY 4 HOURS
Refills: 0 | Status: DISCONTINUED | OUTPATIENT
Start: 2025-04-21 | End: 2025-04-21

## 2025-04-21 RX ORDER — HYDROMORPHONE/SOD CHLOR,ISO/PF 2 MG/10 ML
1 SYRINGE (ML) INJECTION
Refills: 0 | Status: DISCONTINUED | OUTPATIENT
Start: 2025-04-21 | End: 2025-04-21

## 2025-04-21 RX ORDER — SCOPOLAMINE 1 MG/3D
1 PATCH, EXTENDED RELEASE TRANSDERMAL ONCE
Refills: 0 | Status: COMPLETED | OUTPATIENT
Start: 2025-04-21 | End: 2025-04-21

## 2025-04-21 RX ADMIN — Medication 25 MICROGRAM(S): at 17:13

## 2025-04-21 RX ADMIN — Medication 1 MILLIGRAM(S): at 17:55

## 2025-04-21 RX ADMIN — Medication 4 MILLIGRAM(S): at 16:52

## 2025-04-21 RX ADMIN — Medication 25 MICROGRAM(S): at 17:39

## 2025-04-21 RX ADMIN — Medication 1 MILLIGRAM(S): at 19:13

## 2025-04-21 RX ADMIN — Medication 1 MILLIGRAM(S): at 19:17

## 2025-04-21 RX ADMIN — SCOPOLAMINE 1 PATCH: 1 PATCH, EXTENDED RELEASE TRANSDERMAL at 09:48

## 2025-04-21 RX ADMIN — Medication 1 MILLIGRAM(S): at 18:32

## 2025-04-21 NOTE — ASU PATIENT PROFILE, ADULT - NSICDXPASTMEDICALHX_GEN_ALL_CORE_FT
PAST MEDICAL HISTORY:  Alcohol abuse     Anxiety     Depression     Gastroparesis     GERD (gastroesophageal reflux disease)     H/O fibromyalgia     H/O rheumatoid arthritis     Hepatic steatosis     MRSA cellulitis     Pancreatitis     Type 2 diabetes mellitus     Volvulus

## 2025-04-21 NOTE — BRIEF OPERATIVE NOTE - NSICDXBRIEFPROCEDURE_GEN_ALL_CORE_FT
PROCEDURES:  Robot-assisted repair of incisional hernia using da Lety Si with transversus abdominis muscle release 21-Apr-2025 16:25:59  Candido Euceda

## 2025-04-21 NOTE — ASU DISCHARGE PLAN (ADULT/PEDIATRIC) - ASU DC SPECIAL INSTRUCTIONSFT
-  Use Tylenol ( 650 every 6 hours)  to be rotated every 3h with Motrin ( 400 mg every 6 h ) . Medication are out of the counter. If severe pain Oxycodone   - Schedule follow up appointment in 2 weeks  - You have Dermabond, a special glue, it will fall by itself, can shower with soap and water, pat dry.   - Please refrain from lifting more than 10 pounds for the next 6 weeks

## 2025-04-21 NOTE — ASU PREOP CHECKLIST - IV STARTED
"very difficult stick"--done by Dr. Bruce (anesthesiologist)/yes "very difficult stick"--done by Dr. Bruce (anesthesiologist) using sono IV finder/yes

## 2025-04-21 NOTE — ASU DISCHARGE PLAN (ADULT/PEDIATRIC) - FINANCIAL ASSISTANCE
[facility] provides services at a reduced cost to those who are determined to be eligible through [facility]’s financial assistance program. Information regarding [facility]’s financial assistance program can be found by going to [email or web address] or by calling [phone].

## 2025-04-21 NOTE — ASU DISCHARGE PLAN (ADULT/PEDIATRIC) - CARE PROVIDER_API CALL
kIer Sen (DO)  Surgery  [address], [address]  [place] [address]  Phone: [phone]  Fax: [phone]  Follow Up Time: 2 weeks

## 2025-05-13 ENCOUNTER — EMERGENCY (EMERGENCY)
Facility: HOSPITAL | Age: 55
LOS: 1 days | End: 2025-05-13
Attending: EMERGENCY MEDICINE | Admitting: EMERGENCY MEDICINE
Payer: MEDICARE

## 2025-05-13 VITALS
HEART RATE: 79 BPM | HEIGHT: 63 IN | RESPIRATION RATE: 16 BRPM | OXYGEN SATURATION: 98 % | SYSTOLIC BLOOD PRESSURE: 141 MMHG | DIASTOLIC BLOOD PRESSURE: 81 MMHG | TEMPERATURE: 98 F

## 2025-05-13 DIAGNOSIS — Z98.890 OTHER SPECIFIED POSTPROCEDURAL STATES: Chronic | ICD-10-CM

## 2025-05-13 DIAGNOSIS — Z90.49 ACQUIRED ABSENCE OF OTHER SPECIFIED PARTS OF DIGESTIVE TRACT: Chronic | ICD-10-CM

## 2025-05-13 DIAGNOSIS — L72.9 FOLLICULAR CYST OF THE SKIN AND SUBCUTANEOUS TISSUE, UNSPECIFIED: Chronic | ICD-10-CM

## 2025-05-13 DIAGNOSIS — Z90.89 ACQUIRED ABSENCE OF OTHER ORGANS: Chronic | ICD-10-CM

## 2025-05-13 DIAGNOSIS — L02.91 CUTANEOUS ABSCESS, UNSPECIFIED: Chronic | ICD-10-CM

## 2025-05-13 PROCEDURE — 99283 EMERGENCY DEPT VISIT LOW MDM: CPT

## 2025-05-13 NOTE — ED PROVIDER NOTE - PHYSICAL EXAMINATION
Gen:  Awake, alert, NAD, WDWN, NCAT, non-toxic appearing.  Eyes:  3mm PERRL, EOMI, no icterus, normal lids/lashes, normal conjunctivae.  ENT:  External inspection normal, pink/moist membranes.   CV:  S1S2, regular rate and rhythm, no murmur/gallops/rubs, no JVD, 2+ pulses b/l, no edema/cords/homans, good capillary refill, warm/well-perfused.  Resp:  Normal respiratory rate/effort, no respiratory distress, normal voice, speaking full sentences, lungs clear to auscultation b/l, no wheezing/rales/rhonchi, no retractions, no stridor.  Abd:  Soft abdomen, no tender/distended/guarding/rebound, no pulsatile mass, no CVA tender.   Musculoskeletal:  N/V intact, FROM all 4 extremities, normal motor tone, stable gait.   Neck:  FROM neck, supple, trachea midline, no meningismus.   Skin:  Color normal for race, warm and dry, no rash.  Neuro:  Oriented x3, CN 2-12 intact, normal motor, normal sensory, normal cerebellar, no tremors, no clonus/rigidity/hyper-reflexia, normal gait. GCS 15  Psych:  Attention normal. Affect normal. Behavior normal. Judgment normal. Denies AV hallucinations. Denies SI/HI.

## 2025-05-13 NOTE — ED PROVIDER NOTE - CLINICAL SUMMARY MEDICAL DECISION MAKING FREE TEXT BOX
55 female with hx of DM complicated by gastroparesis, prior depression/anxiety, GERD, prior EtOH use.   Patient s/p abdominal hernia repair with mesh  Patient presenting to the ED reporting need for refill of her clonazepam as she is between psychiatrists due to insurance.  Last dose of clonazepam 7 days ago.    Vitals stable.  Nontoxic appearing, n/v intact.  Airway intact, no respiratory distress, no hypoxia.  No abdominal or CVA tenderness.  Non-focal neuro. No tremors.  No A/V hallucinations.  CIWA 0.  No evidence of benzodiazepine withdrawal at this time.  Denies SI/HI.    Operative note/21/25 reviewed; patient admitted for abdominal ventral hernia repair with mesh.  I stop reviewed.  Patient received 30-day supply of clonazepam (90 tablets) on 2/22/2025    Patient/family advised regarding need for close outpatient follow up with psychiatry.  Patient stable for further care in outpatient setting. No indication for inpatient admission at this time. Patient/family advised regarding symptomatic & supportive care and symptoms to prompt ED return. Strict return precautions provided.

## 2025-05-13 NOTE — ED PROVIDER NOTE - NSFOLLOWUPCLINICS_GEN_ALL_ED_FT
[facility] Psychiatry  Psychiatry  [address] [address]  Phone: [phone]  Fax:   Follow Up Time: 1-3 Days    [facility]  Psychiatry  [address] [place] [address]  Phone: [phone]  Fax:   Follow Up Time: 1-3 Days

## 2025-05-13 NOTE — ED PROVIDER NOTE - PATIENT PORTAL LINK FT
You can access the FollowMyHealth Patient Portal offered by [facility] by registering at the following website: [email or web address]. By joining [facility]’s FollowMyHealth portal, you will also be able to view your health information using other applications (apps) compatible with our system.

## 2025-05-13 NOTE — ED PROVIDER NOTE - OBJECTIVE STATEMENT
55 female with hx of DM complicated by gastroparesis, prior depression/anxiety, GERD, prior EtOH use.   Patient s/p abdominal hernia repair with mesh  Patient presenting to the ED reporting need for refill of her clonazepam as she is between psychiatrists due to insurance.  Last dose of clonazepam 7 days ago.

## 2025-05-13 NOTE — ED ADULT TRIAGE NOTE - GLASGOW COMA SCALE: BEST VERBAL RESPONSE, MLM
POST OP RECOMMENDATIONS  Dr. Trey Cruz  [phone]  Discharge Instructions    Activity  You should get up and move about several times daily to reduce the risk of developing a blood clot in your legs.   No lifting more than 10 pounds for 6 weeks  Diet  Avoid raw fruits and vegetables  Avoid tough meats like steak and pork  If you have an ileostomy, avoid concentrated sugary drinks (Gatorade, soft drinks, milk products)  At your follow-up appointment, we can discuss returning to a normal diet  Dressings  The glue on your incisions will fall off on its own in 1-2 weeks.  You do not need to pack any of your incisions  Bathing  It's ok to shower anytime.   Do not submerge your incisions (bath, pool, lake, ocean, etc.) for 3 weeks.  You can wash your incisions with warm soapy water very gently and pat dry  Pain Management  Unless you have been told otherwise, it's ok to take Tylenol 1000 mg every 6 hours as needed.  I have provided you a prescription for a narcotic pain medication. Take this as needed.   I have also provided you a muscle relaxer if you were still using it in the hospital. Take this scheduled for a couple days, and then you can transition to taking it only as needed.   Please call the office if you have intense pain that is not relieved.   Blood clot prevention  You should be up walking multiple times each day to prevent blood clots from forming.   If I sent you home on a low-dose blood thinner shot, please take those every day until you run out.   Driving  Do not drive while taking narcotic pain medications.   Before driving, make sure that you are able to move and rotate appropriately to keep you and the rest of us safe on the road.   Follow up appointment  My office will call you with a follow up appointment if you do not have one already. It will be in 2-3 weeks.   If you do not hear from my office in 1 week, please call [phone] to ask about scheduling.   Remember to contact me for any of  the following:   Fever > 101 degrees  Severe pain that cannot be controlled by taking your pain pills  Severe nausea or vomiting that cannot be controlled by taking your nausea pills  Significant bleeding of your incisions  Drainage that has a bad smell or is yellow or green in appearance  Any other questions or concerns    (V5) oriented

## 2025-05-13 NOTE — ED PROVIDER NOTE - NSFOLLOWUPINSTRUCTIONS_ED_ALL_ED_FT
Please follow up with your PMD or Medicine Clinic in 2-3 days.  Follow up with Psychiatry this week.  Return to the ER for worsening or concerning symptoms.  Your results for testing will be available in the Follow My Health application which can be downloaded to your phone or checked online on a computer.  [email or web address]

## 2025-05-13 NOTE — ED PROVIDER NOTE - NS ED ROS FT
Constitutional: (-) fever (-) chills  HENT: (-) congestion (-) rhinorrhea (-) sore throat  Eyes: (-) pain (-) redness  Respiratory: (-) cough (-) shortness of breath (-) wheezing (-) stridor  Cardiovascular: (-) chest pain (-) palpitations (-) leg swelling  Gastrointestinal: (-) abdominal pain (-) blood in stool (no melena/hematochezia) (-) diarrhea (-) vomiting  Genitourinary: (-) dysuria (-) hematuria  Musculoskeletal: (-) gait problem (-) joint swelling (-) myalgias  Skin: (-) color change (-) rash  Neurological: (-) weakness (-) numbness (-) headaches (-) tremors  Psychiatric/Behavioral: (-) confusion (-) AV hallucinations

## 2025-05-13 NOTE — ED ADULT TRIAGE NOTE - CHIEF COMPLAINT QUOTE
pt coming to ED for refill of Clonazepam. + fatigue, increased anxiety, difficulty sleeping. last prescription filled 3 months ago, last dose taken 5 days ago. pt states had insurance changed and pt focused on medical needs over last few weeks and unable to established care with new psych. denies si, hi, avh. pt calm and social in triage, well appearing,

## 2025-05-13 NOTE — ED PROVIDER NOTE - IV ALTEPLASE INCLUSION HIDDEN
PATIENT Called and we spoke about the following:     Please arrive to [facility] ([facility]) at 10:30am on 4/23/24 for your scheduled procedure.  Address: [address] [place] [address] [place] [address] ([address] on left, 1st Floor Lobby)  >>>NO eating or drinking after midnight unless instructed otherwise by your Surgeon<<<    Thank you,  -[facility] Pre Admit Testing Dept.  Mon-Fri 7:30 am - 4 pm [phone]    show

## 2025-05-27 ENCOUNTER — APPOINTMENT (OUTPATIENT)
Dept: ENDOCRINOLOGY | Facility: CLINIC | Age: 55
End: 2025-05-27

## 2025-06-16 NOTE — PATIENT PROFILE ADULT. - TEACHING/LEARNING EDUCATIONAL LEVEL
-Admitted to inpatient status  -Presented with fevers and left knee pain.  Noted history of IVDA  -Blood cultures growing MRSA -   -Source likely related there her IV drug use and septic left knee  -Orthopedic surgery consulted and input appreciated.  She was taken for irrigation and debridement / arthrotomy of her left knee on 6/15.  -She is feeling much better today.  Remains afebrile since 6/14 at 8?23 AM.  -Repeat blood cultures today for clearance.  Check TTE.  -Consult infectious disease.  Unclear if she will be candidate for outpatient iv antibiotics?  Continue vancomycin for now.  -Counseled on importance of complete abstinence from illicit drugs.  UDS positive for cocaine, opiates and THC.  Patient wishes to pursue outpatient treatment upon discharge.   [facility]

## 2025-06-30 ENCOUNTER — EMERGENCY (EMERGENCY)
Facility: HOSPITAL | Age: 55
LOS: 1 days | End: 2025-06-30
Attending: EMERGENCY MEDICINE | Admitting: EMERGENCY MEDICINE
Payer: MEDICARE

## 2025-06-30 VITALS
TEMPERATURE: 98 F | RESPIRATION RATE: 20 BRPM | SYSTOLIC BLOOD PRESSURE: 134 MMHG | OXYGEN SATURATION: 100 % | HEART RATE: 60 BPM | DIASTOLIC BLOOD PRESSURE: 72 MMHG

## 2025-06-30 VITALS
OXYGEN SATURATION: 98 % | HEART RATE: 70 BPM | HEIGHT: 63 IN | RESPIRATION RATE: 18 BRPM | DIASTOLIC BLOOD PRESSURE: 96 MMHG | SYSTOLIC BLOOD PRESSURE: 150 MMHG

## 2025-06-30 DIAGNOSIS — L02.91 CUTANEOUS ABSCESS, UNSPECIFIED: Chronic | ICD-10-CM

## 2025-06-30 DIAGNOSIS — Z98.890 OTHER SPECIFIED POSTPROCEDURAL STATES: Chronic | ICD-10-CM

## 2025-06-30 DIAGNOSIS — Z90.49 ACQUIRED ABSENCE OF OTHER SPECIFIED PARTS OF DIGESTIVE TRACT: Chronic | ICD-10-CM

## 2025-06-30 DIAGNOSIS — L72.9 FOLLICULAR CYST OF THE SKIN AND SUBCUTANEOUS TISSUE, UNSPECIFIED: Chronic | ICD-10-CM

## 2025-06-30 DIAGNOSIS — Z90.89 ACQUIRED ABSENCE OF OTHER ORGANS: Chronic | ICD-10-CM

## 2025-06-30 LAB
ALBUMIN SERPL ELPH-MCNC: 5.3 G/DL — HIGH (ref 3.3–5)
ALP SERPL-CCNC: 143 U/L — HIGH (ref 40–120)
ALT FLD-CCNC: 23 U/L — SIGNIFICANT CHANGE UP (ref 4–33)
ANION GAP SERPL CALC-SCNC: 18 MMOL/L — HIGH (ref 7–14)
APPEARANCE UR: CLEAR — SIGNIFICANT CHANGE UP
APTT BLD: 35.4 SEC — SIGNIFICANT CHANGE UP (ref 26.1–36.8)
AST SERPL-CCNC: 37 U/L — HIGH (ref 4–32)
BACTERIA # UR AUTO: NEGATIVE /HPF — SIGNIFICANT CHANGE UP
BASOPHILS # BLD AUTO: 0.05 K/UL — SIGNIFICANT CHANGE UP (ref 0–0.2)
BASOPHILS NFR BLD AUTO: 0.4 % — SIGNIFICANT CHANGE UP (ref 0–2)
BILIRUB SERPL-MCNC: 0.9 MG/DL — SIGNIFICANT CHANGE UP (ref 0.2–1.2)
BILIRUB UR-MCNC: NEGATIVE — SIGNIFICANT CHANGE UP
BUN SERPL-MCNC: 14 MG/DL — SIGNIFICANT CHANGE UP (ref 7–23)
CALCIUM SERPL-MCNC: 10.4 MG/DL — SIGNIFICANT CHANGE UP (ref 8.4–10.5)
CAST: 4 /LPF — SIGNIFICANT CHANGE UP (ref 0–4)
CHLORIDE SERPL-SCNC: 101 MMOL/L — SIGNIFICANT CHANGE UP (ref 98–107)
CO2 SERPL-SCNC: 20 MMOL/L — LOW (ref 22–31)
COLOR SPEC: YELLOW — SIGNIFICANT CHANGE UP
CREAT SERPL-MCNC: 0.75 MG/DL — SIGNIFICANT CHANGE UP (ref 0.5–1.3)
DIFF PNL FLD: NEGATIVE — SIGNIFICANT CHANGE UP
EGFR: 95 ML/MIN/1.73M2 — SIGNIFICANT CHANGE UP
EGFR: 95 ML/MIN/1.73M2 — SIGNIFICANT CHANGE UP
EOSINOPHIL # BLD AUTO: 0.03 K/UL — SIGNIFICANT CHANGE UP (ref 0–0.5)
EOSINOPHIL NFR BLD AUTO: 0.2 % — SIGNIFICANT CHANGE UP (ref 0–6)
GAS PNL BLDV: SIGNIFICANT CHANGE UP
GLUCOSE SERPL-MCNC: 275 MG/DL — HIGH (ref 70–99)
GLUCOSE UR QL: 500 MG/DL
HCT VFR BLD CALC: 44.1 % — SIGNIFICANT CHANGE UP (ref 34.5–45)
HGB BLD-MCNC: 15.1 G/DL — SIGNIFICANT CHANGE UP (ref 11.5–15.5)
IMM GRANULOCYTES # BLD AUTO: 0.05 K/UL — SIGNIFICANT CHANGE UP (ref 0–0.07)
IMM GRANULOCYTES NFR BLD AUTO: 0.4 % — SIGNIFICANT CHANGE UP (ref 0–0.9)
INR BLD: 1.04 RATIO — SIGNIFICANT CHANGE UP (ref 0.85–1.16)
KETONES UR QL: 15 MG/DL
LEUKOCYTE ESTERASE UR-ACNC: NEGATIVE — SIGNIFICANT CHANGE UP
LIDOCAIN IGE QN: 24 U/L — SIGNIFICANT CHANGE UP (ref 7–60)
LYMPHOCYTES # BLD AUTO: 1.25 K/UL — SIGNIFICANT CHANGE UP (ref 1–3.3)
LYMPHOCYTES NFR BLD AUTO: 9.9 % — LOW (ref 13–44)
MCHC RBC-ENTMCNC: 29.2 PG — SIGNIFICANT CHANGE UP (ref 27–34)
MCHC RBC-ENTMCNC: 34.2 G/DL — SIGNIFICANT CHANGE UP (ref 32–36)
MCV RBC AUTO: 85.3 FL — SIGNIFICANT CHANGE UP (ref 80–100)
MONOCYTES # BLD AUTO: 0.35 K/UL — SIGNIFICANT CHANGE UP (ref 0–0.9)
MONOCYTES NFR BLD AUTO: 2.8 % — SIGNIFICANT CHANGE UP (ref 2–14)
NEUTROPHILS # BLD AUTO: 10.85 K/UL — HIGH (ref 1.8–7.4)
NEUTROPHILS NFR BLD AUTO: 86.3 % — HIGH (ref 43–77)
NITRITE UR-MCNC: NEGATIVE — SIGNIFICANT CHANGE UP
NRBC # BLD AUTO: 0 K/UL — SIGNIFICANT CHANGE UP (ref 0–0)
NRBC # FLD: 0 K/UL — SIGNIFICANT CHANGE UP (ref 0–0)
NRBC BLD AUTO-RTO: 0 /100 WBCS — SIGNIFICANT CHANGE UP (ref 0–0)
PH UR: 5.5 — SIGNIFICANT CHANGE UP (ref 5–8)
PLATELET # BLD AUTO: 198 K/UL — SIGNIFICANT CHANGE UP (ref 150–400)
PMV BLD: 11.5 FL — SIGNIFICANT CHANGE UP (ref 7–13)
POTASSIUM SERPL-MCNC: 4.4 MMOL/L — SIGNIFICANT CHANGE UP (ref 3.5–5.3)
POTASSIUM SERPL-SCNC: 4.4 MMOL/L — SIGNIFICANT CHANGE UP (ref 3.5–5.3)
PROT SERPL-MCNC: 9.1 G/DL — HIGH (ref 6–8.3)
PROT UR-MCNC: SIGNIFICANT CHANGE UP MG/DL
PROTHROM AB SERPL-ACNC: 12.1 SEC — SIGNIFICANT CHANGE UP (ref 9.9–13.4)
RBC # BLD: 5.17 M/UL — SIGNIFICANT CHANGE UP (ref 3.8–5.2)
RBC # FLD: 11.6 % — SIGNIFICANT CHANGE UP (ref 10.3–14.5)
RBC CASTS # UR COMP ASSIST: 1 /HPF — SIGNIFICANT CHANGE UP (ref 0–4)
SODIUM SERPL-SCNC: 139 MMOL/L — SIGNIFICANT CHANGE UP (ref 135–145)
SP GR SPEC: 1.08 — HIGH (ref 1–1.03)
SQUAMOUS # UR AUTO: 0 /HPF — SIGNIFICANT CHANGE UP (ref 0–5)
UROBILINOGEN FLD QL: 0.2 MG/DL — SIGNIFICANT CHANGE UP (ref 0.2–1)
WBC # BLD: 12.58 K/UL — HIGH (ref 3.8–10.5)
WBC # FLD AUTO: 12.58 K/UL — HIGH (ref 3.8–10.5)
WBC UR QL: 0 /HPF — SIGNIFICANT CHANGE UP (ref 0–5)

## 2025-06-30 PROCEDURE — 99285 EMERGENCY DEPT VISIT HI MDM: CPT

## 2025-06-30 PROCEDURE — 74177 CT ABD & PELVIS W/CONTRAST: CPT | Mod: 26

## 2025-06-30 PROCEDURE — 93010 ELECTROCARDIOGRAM REPORT: CPT

## 2025-06-30 RX ORDER — SODIUM CHLORIDE 9 G/1000ML
1000 INJECTION, SOLUTION INTRAVENOUS ONCE
Refills: 0 | Status: COMPLETED | OUTPATIENT
Start: 2025-06-30 | End: 2025-06-30

## 2025-06-30 RX ORDER — ACETAMINOPHEN 500 MG/5ML
1000 LIQUID (ML) ORAL ONCE
Refills: 0 | Status: DISCONTINUED | OUTPATIENT
Start: 2025-06-30 | End: 2025-06-30

## 2025-06-30 RX ORDER — HYDROMORPHONE/SOD CHLOR,ISO/PF 2 MG/10 ML
1 SYRINGE (ML) INJECTION ONCE
Refills: 0 | Status: DISCONTINUED | OUTPATIENT
Start: 2025-06-30 | End: 2025-06-30

## 2025-06-30 RX ORDER — METOCLOPRAMIDE HCL 10 MG
10 TABLET ORAL ONCE
Refills: 0 | Status: COMPLETED | OUTPATIENT
Start: 2025-06-30 | End: 2025-06-30

## 2025-06-30 RX ORDER — ACETAMINOPHEN 500 MG/5ML
1000 LIQUID (ML) ORAL ONCE
Refills: 0 | Status: COMPLETED | OUTPATIENT
Start: 2025-06-30 | End: 2025-06-30

## 2025-06-30 RX ADMIN — Medication 10 MILLIGRAM(S): at 16:36

## 2025-06-30 RX ADMIN — Medication 1 MILLIGRAM(S): at 15:22

## 2025-06-30 RX ADMIN — Medication 20 MILLIGRAM(S): at 17:46

## 2025-06-30 RX ADMIN — SODIUM CHLORIDE 1000 MILLILITER(S): 9 INJECTION, SOLUTION INTRAVENOUS at 15:21

## 2025-06-30 RX ADMIN — Medication 400 MILLIGRAM(S): at 15:21

## 2025-06-30 NOTE — CONSULT NOTE ADULT - SUBJECTIVE AND OBJECTIVE BOX
General Surgery Consult  Consulting surgical team: A team  Consulting attending: Francy  Patient seen and examined: 06-30-25 @ 15:10    HPI:  Patient is a 55y Female PMH DM, gastroparesis, PSH laparoscopic cholecystectomy 01/2018, lap R hemicolectomy 04/2023 robot assisted eTEP repair of an incisional hernia in 04/2025 presents with 3 days of abdominal pain, nausea, vomiting. General surgery called for further recommendations. Patient states she had been having episodes of nausea and vomiting, and has been passing flatus and had a bowel movement.         PAST MEDICAL HISTORY:  Diabetes    Anxiety    Depression    Vomiting    Alcohol abuse    MRSA cellulitis    Pancreatitis    Hepatic steatosis    Type 2 diabetes mellitus    Volvulus    GERD (gastroesophageal reflux disease)    Gastroparesis    H/O fibromyalgia    H/O rheumatoid arthritis        PAST SURGICAL HISTORY:  No significant past surgical history    H/O nasal septoplasty    Abscess    History of cholecystectomy    S/P tonsillectomy    Cyst of skin    S/P right hemicolectomy    History of lumbar puncture        ALLERGIES:  Eggplant mouth itches (Other)  Bactrim (Anaphylaxis)      MEDICATIONS:  acetaminophen   IVPB .. 1000 milliGRAM(s) IV Intermittent once  lactated ringers Bolus 1000 milliLiter(s) IV Bolus once  morphine  - Injectable 4 milliGRAM(s) IV Push Once      VITALS & I/Os:  Vital Signs Last 24 Hrs  T(C): --  T(F): --  HR: 70 (30 Jun 2025 14:01) (70 - 70)  BP: 150/96 (30 Jun 2025 14:01) (150/96 - 150/96)  BP(mean): --  RR: 18 (30 Jun 2025 14:01) (18 - 18)  SpO2: 98% (30 Jun 2025 14:01) (98% - 98%)        I&O's Summary      PHYSICAL EXAM:  General: No acute distress  Respiratory: Nonlabored respirations   Cardiovascular: pulse present  Abdominal: Softly distended, TTP in LUQ  Extremities: Appears warm and well perfused    LABS:          Lactate:                  IMAGING:

## 2025-06-30 NOTE — ED PROVIDER NOTE - CLINICAL SUMMARY MEDICAL DECISION MAKING FREE TEXT BOX
55-year-old female with past medical history of DM, gastroparesis, GERD, hernia repair 2 months ago presents the ED with complaints of abdominal pain, nausea and vomiting.  States that the pain started 2 weeks ago and worsened over the past 2 days.  Also notes having chills over the past couple days.  States that her last bowel movement was this morning and is normal.  Notes that she has been unable to keep anything down over the past week. Patient received 8mg of zofran from EMS prior to arrival.  Denies smoking any cannabis, however endorses CBD use few weeks ago.  Denies any fevers, chest pain, dysuria, hematuria.     Vital signs show blood pressure 150/96, unable to obtain temperature.  Physical exam reveals female in acute distress. Patient vomiting. Abdomen tender on exam, surgical scars noted without signs of infection.     Ddx includes SBO vs surgical complications vs cyclic vomiting syndrome. Will obtain CT abd/pelv, will obtain labs. Will treat with morphine and fluids and reassess. Dispo pending CT scan.

## 2025-06-30 NOTE — ED PROCEDURE NOTE - ATTENDING CONTRIBUTION TO CARE
Dr. Cisse: I personally supervised this procedure performed by the resident and I agree with the above documentation.

## 2025-06-30 NOTE — ED PROVIDER NOTE - PATIENT PORTAL LINK FT
You can access the FollowMyHealth Patient Portal offered by [facility] by registering at the following website: [email or web address]. By joining [facility]’s FollowMyHealth portal, you will also be able to view your health information using other applications (apps) compatible with our system.

## 2025-06-30 NOTE — ED PROVIDER NOTE - PROGRESS NOTE DETAILS
Surg aware and following.    Soniya Allison DO (PGY2) Cleared by surg. Will PO challenge Patient states that she does not want to wait for the UA. Denies any urinary complaints. Patient understands to follow up with her surg.    Soniya Allison DO (PGY2) Patient states that she does not want to wait for the UA. Denies any urinary complaints. Patient understands to follow up with her surg. PO challenge passed    Soniya Allison DO (PGY2)

## 2025-06-30 NOTE — ED ADULT NURSE NOTE - OBJECTIVE STATEMENT
MALCOLM RN. Patient A&Ox4 ambulatory c/o worsening n/v and severe abdominal pain x2 weeks. Hx. DM, gastroparesis, GERD, hernia repair (2 months ago). Patient arrived via EMS, received 8mg of Zofran prior to arrival. On assessment patient uncomfortable, dry heaving and thrashing on stretcher. Respirations even and unlabored. Abdomen soft, nondistended however tenderness noted throughout abdomen, surgical scars noted without signs of infection. Last BM was this morning and is normal. Patient denies chest pain, sob, urinary symptoms, fevers, marijuana use, drug or alcohol use. 22G IV placed to right arm, labs collected and sent to lab. Patient medicated per orders. Pending CT. Stretcher in lowest position, wheels locked, appropriate side rails in place, call bell in reach. Report given to primary RN Fadi.

## 2025-06-30 NOTE — CONSULT NOTE ADULT - ASSESSMENT
55y Female PMH DM, gastroparesis, PSH laparoscopic cholecystectomy 01/2018, lap R hemicolectomy 04/2023 robot assisted eTEP repair of an incisional hernia in 04/2025 presents with 3 days of abdominal pain, nausea, vomiting. General surgery called for further recommendations.    Recommendations  - pain control PRN   - nausea control PRN  - final plan pending labs and CTAP    Final plan to be discussed with attending Dr. Sen    Surgery A Team  [id number]

## 2025-06-30 NOTE — ED PROVIDER NOTE - ATTENDING CONTRIBUTION TO CARE
Attending note:   After face to face evaluation of this patient, I concur with above noted hx, pe, and care plan for this patient. Cisse: 55-year-old female with type 2 diabetes and gastroparesis.  Patient also has history of GERD and hernia repair 2 months ago.  Patient presents ED with 2 days of worsening abdominal pain with nausea vomiting.  Patient has had decreased appetite.  Symptoms been ongoing for over 2 weeks but worsened over the last 2 days.  There is been no fevers or chills.  Patient states she had normal bowel movement this morning.  Patient was given 8 mg of Zofran by EMS.  No urinary complaints.  Patient's blood pressure is mildly elevated.  Patient is writhing in pain and not able to lay still to get temperature.  Patient also unable to get an EKG.  Abdomen is diffusely tender but no significant distention is noted.  Surgical sites well-healed.  Rest of exam as noted above.  Patient with abdominal pain nausea vomiting given recent surgery would be concerned about surgical complications or SBO.  Will also check electrolytes and treat symptomatically and give IV fluids.  Surgery team is already aware of patient.  Patient has a lot of dry heaving and has had similar episodes in the past.  Consider cyclic vomiting if pain medication is not enough.

## 2025-06-30 NOTE — ED PROVIDER NOTE - PHYSICAL EXAMINATION
Soniya Allison DO (PGY2)   Physical Exam:    Gen: In acute distress, AOx3  Head: NCAT  HEENT: EOMI, PEERLA  Lung: CTAB, no respiratory distress, no wheezes/rhonchi/rales B/L  CV: RRR, no murmurs, rubs or gallops  Abd: soft, NT, ND, no guarding, no rigidity, no rebound tenderness, no CVA tenderness   MSK: no visible deformities, ROM normal in UE/LE, no back pain  Neuro: No focal sensory or motor deficits. Sensation intact to light touch all extremities.  Skin: Warm, well perfused, no rash, no leg swelling  Psych: normal affect, calm

## 2025-06-30 NOTE — ED ADULT NURSE NOTE - NSFALLRISKINTERV_ED_ALL_ED

## 2025-06-30 NOTE — ED ADULT TRIAGE NOTE - CHIEF COMPLAINT QUOTE
pt c/o abd pain with n/v and chills x 2 days.  pt is s/p hernia repair 2 months ago.  Hx:  gastroparesis, DM, hernia repair.  pt received zofran 8mg IM by EMS.  unable to obtain temp

## 2025-06-30 NOTE — ED PROVIDER NOTE - NSFOLLOWUPINSTRUCTIONS_ED_ALL_ED_FT
- You were seen in the emergency department today for abdominal pain.    - Lab and imaging results were discussed with you along with your discharge diagnosis.    - Follow up with your doctor in 1 week - bring copies of your results. Please follow up with your surgeon in 3-5 days.    - Return to the ED for any new, worsening, or concerning symptoms to you.    - Continue all prescribed medications.    - Take ibuprofen/tylenol as directed as needed for pain.     - Rest and keep yourself hydrated with fluids. - You were seen in the emergency department today for abdominal pain.    - Lab and imaging results were discussed with you along with your discharge diagnosis.    - Follow up with your doctor in 1 week - bring copies of your results. Please follow up with your surgeon in 3-5 days.    - Return to the ED for any new, worsening, or concerning symptoms to you. Return to the ED if you are unable to pass gas, have worsening abdominal pain, or any other concerning symptoms to you.    - Continue all prescribed medications.    - Take ibuprofen/tylenol as directed as needed for pain.     - Rest and keep yourself hydrated with fluids.

## 2025-07-03 ENCOUNTER — EMERGENCY (EMERGENCY)
Facility: HOSPITAL | Age: 55
LOS: 1 days | End: 2025-07-03
Attending: EMERGENCY MEDICINE | Admitting: EMERGENCY MEDICINE
Payer: MEDICARE

## 2025-07-03 VITALS
RESPIRATION RATE: 18 BRPM | DIASTOLIC BLOOD PRESSURE: 72 MMHG | TEMPERATURE: 98 F | SYSTOLIC BLOOD PRESSURE: 135 MMHG | HEART RATE: 65 BPM | OXYGEN SATURATION: 100 %

## 2025-07-03 VITALS
HEIGHT: 63 IN | RESPIRATION RATE: 18 BRPM | WEIGHT: 130.07 LBS | HEART RATE: 76 BPM | OXYGEN SATURATION: 99 % | SYSTOLIC BLOOD PRESSURE: 142 MMHG | DIASTOLIC BLOOD PRESSURE: 92 MMHG | TEMPERATURE: 97 F

## 2025-07-03 DIAGNOSIS — Z98.890 OTHER SPECIFIED POSTPROCEDURAL STATES: Chronic | ICD-10-CM

## 2025-07-03 DIAGNOSIS — Z90.89 ACQUIRED ABSENCE OF OTHER ORGANS: Chronic | ICD-10-CM

## 2025-07-03 DIAGNOSIS — L02.91 CUTANEOUS ABSCESS, UNSPECIFIED: Chronic | ICD-10-CM

## 2025-07-03 DIAGNOSIS — L72.9 FOLLICULAR CYST OF THE SKIN AND SUBCUTANEOUS TISSUE, UNSPECIFIED: Chronic | ICD-10-CM

## 2025-07-03 DIAGNOSIS — Z90.49 ACQUIRED ABSENCE OF OTHER SPECIFIED PARTS OF DIGESTIVE TRACT: Chronic | ICD-10-CM

## 2025-07-03 LAB
ALBUMIN SERPL ELPH-MCNC: 3.9 G/DL — SIGNIFICANT CHANGE UP (ref 3.3–5)
ALP SERPL-CCNC: 95 U/L — SIGNIFICANT CHANGE UP (ref 40–120)
ALT FLD-CCNC: 15 U/L — SIGNIFICANT CHANGE UP (ref 4–33)
ANION GAP SERPL CALC-SCNC: 13 MMOL/L — SIGNIFICANT CHANGE UP (ref 7–14)
APPEARANCE UR: CLEAR — SIGNIFICANT CHANGE UP
AST SERPL-CCNC: 18 U/L — SIGNIFICANT CHANGE UP (ref 4–32)
B-OH-BUTYR SERPL-SCNC: 1 MMOL/L — HIGH (ref 0–0.4)
BACTERIA # UR AUTO: NEGATIVE /HPF — SIGNIFICANT CHANGE UP
BASOPHILS # BLD AUTO: 0.02 K/UL — SIGNIFICANT CHANGE UP (ref 0–0.2)
BASOPHILS NFR BLD AUTO: 0.3 % — SIGNIFICANT CHANGE UP (ref 0–2)
BILIRUB SERPL-MCNC: 0.6 MG/DL — SIGNIFICANT CHANGE UP (ref 0.2–1.2)
BILIRUB UR-MCNC: NEGATIVE — SIGNIFICANT CHANGE UP
BLOOD GAS VENOUS COMPREHENSIVE RESULT: SIGNIFICANT CHANGE UP
BUN SERPL-MCNC: 14 MG/DL — SIGNIFICANT CHANGE UP (ref 7–23)
CALCIUM SERPL-MCNC: 8.8 MG/DL — SIGNIFICANT CHANGE UP (ref 8.4–10.5)
CAST: 3 /LPF — SIGNIFICANT CHANGE UP (ref 0–4)
CHLORIDE SERPL-SCNC: 105 MMOL/L — SIGNIFICANT CHANGE UP (ref 98–107)
CO2 SERPL-SCNC: 22 MMOL/L — SIGNIFICANT CHANGE UP (ref 22–31)
COD CRY URNS QL: PRESENT
COLOR SPEC: SIGNIFICANT CHANGE UP
CREAT SERPL-MCNC: 0.67 MG/DL — SIGNIFICANT CHANGE UP (ref 0.5–1.3)
DIFF PNL FLD: NEGATIVE — SIGNIFICANT CHANGE UP
EGFR: 104 ML/MIN/1.73M2 — SIGNIFICANT CHANGE UP
EGFR: 104 ML/MIN/1.73M2 — SIGNIFICANT CHANGE UP
EOSINOPHIL # BLD AUTO: 0.01 K/UL — SIGNIFICANT CHANGE UP (ref 0–0.5)
EOSINOPHIL NFR BLD AUTO: 0.1 % — SIGNIFICANT CHANGE UP (ref 0–6)
GLUCOSE SERPL-MCNC: 217 MG/DL — HIGH (ref 70–99)
GLUCOSE UR QL: 250 MG/DL
HCG SERPL-ACNC: 1.7 MIU/ML — SIGNIFICANT CHANGE UP
HCT VFR BLD CALC: 37.8 % — SIGNIFICANT CHANGE UP (ref 34.5–45)
HGB BLD-MCNC: 12.4 G/DL — SIGNIFICANT CHANGE UP (ref 11.5–15.5)
HYALINE CASTS # UR AUTO: PRESENT
IMM GRANULOCYTES # BLD AUTO: 0.02 K/UL — SIGNIFICANT CHANGE UP (ref 0–0.07)
IMM GRANULOCYTES NFR BLD AUTO: 0.3 % — SIGNIFICANT CHANGE UP (ref 0–0.9)
KETONES UR QL: 40 MG/DL
LEUKOCYTE ESTERASE UR-ACNC: NEGATIVE — SIGNIFICANT CHANGE UP
LIDOCAIN IGE QN: 19 U/L — SIGNIFICANT CHANGE UP (ref 7–60)
LYMPHOCYTES # BLD AUTO: 0.97 K/UL — LOW (ref 1–3.3)
LYMPHOCYTES NFR BLD AUTO: 13.9 % — SIGNIFICANT CHANGE UP (ref 13–44)
MAGNESIUM SERPL-MCNC: 2 MG/DL — SIGNIFICANT CHANGE UP (ref 1.6–2.6)
MCHC RBC-ENTMCNC: 28.3 PG — SIGNIFICANT CHANGE UP (ref 27–34)
MCHC RBC-ENTMCNC: 32.8 G/DL — SIGNIFICANT CHANGE UP (ref 32–36)
MCV RBC AUTO: 86.3 FL — SIGNIFICANT CHANGE UP (ref 80–100)
MONOCYTES # BLD AUTO: 0.22 K/UL — SIGNIFICANT CHANGE UP (ref 0–0.9)
MONOCYTES NFR BLD AUTO: 3.2 % — SIGNIFICANT CHANGE UP (ref 2–14)
NEUTROPHILS # BLD AUTO: 5.73 K/UL — SIGNIFICANT CHANGE UP (ref 1.8–7.4)
NEUTROPHILS NFR BLD AUTO: 82.2 % — HIGH (ref 43–77)
NITRITE UR-MCNC: NEGATIVE — SIGNIFICANT CHANGE UP
NRBC # BLD AUTO: 0 K/UL — SIGNIFICANT CHANGE UP (ref 0–0)
NRBC # FLD: 0 K/UL — SIGNIFICANT CHANGE UP (ref 0–0)
NRBC BLD AUTO-RTO: 0 /100 WBCS — SIGNIFICANT CHANGE UP (ref 0–0)
PH UR: 5.5 — SIGNIFICANT CHANGE UP (ref 5–8)
PHOSPHATE SERPL-MCNC: 2.7 MG/DL — SIGNIFICANT CHANGE UP (ref 2.5–4.5)
PLATELET # BLD AUTO: 174 K/UL — SIGNIFICANT CHANGE UP (ref 150–400)
PMV BLD: 11.2 FL — SIGNIFICANT CHANGE UP (ref 7–13)
POTASSIUM SERPL-MCNC: 3.9 MMOL/L — SIGNIFICANT CHANGE UP (ref 3.5–5.3)
POTASSIUM SERPL-SCNC: 3.9 MMOL/L — SIGNIFICANT CHANGE UP (ref 3.5–5.3)
PROT SERPL-MCNC: 6.8 G/DL — SIGNIFICANT CHANGE UP (ref 6–8.3)
PROT UR-MCNC: 30 MG/DL
RBC # BLD: 4.38 M/UL — SIGNIFICANT CHANGE UP (ref 3.8–5.2)
RBC # FLD: 11.6 % — SIGNIFICANT CHANGE UP (ref 10.3–14.5)
RBC CASTS # UR COMP ASSIST: 6 /HPF — HIGH (ref 0–4)
REVIEW: SIGNIFICANT CHANGE UP
SODIUM SERPL-SCNC: 140 MMOL/L — SIGNIFICANT CHANGE UP (ref 135–145)
SP GR SPEC: 1.04 — HIGH (ref 1–1.03)
SQUAMOUS # UR AUTO: 2 /HPF — SIGNIFICANT CHANGE UP (ref 0–5)
TROPONIN T, HIGH SENSITIVITY RESULT: 11 NG/L — SIGNIFICANT CHANGE UP
UROBILINOGEN FLD QL: 1 MG/DL — SIGNIFICANT CHANGE UP (ref 0.2–1)
WBC # BLD: 6.97 K/UL — SIGNIFICANT CHANGE UP (ref 3.8–10.5)
WBC # FLD AUTO: 6.97 K/UL — SIGNIFICANT CHANGE UP (ref 3.8–10.5)
WBC UR QL: 1 /HPF — SIGNIFICANT CHANGE UP (ref 0–5)

## 2025-07-03 PROCEDURE — 70450 CT HEAD/BRAIN W/O DYE: CPT | Mod: 26

## 2025-07-03 PROCEDURE — 71046 X-RAY EXAM CHEST 2 VIEWS: CPT | Mod: 26

## 2025-07-03 PROCEDURE — 99285 EMERGENCY DEPT VISIT HI MDM: CPT

## 2025-07-03 RX ORDER — METOCLOPRAMIDE HCL 10 MG
10 TABLET ORAL ONCE
Refills: 0 | Status: COMPLETED | OUTPATIENT
Start: 2025-07-03 | End: 2025-07-03

## 2025-07-03 RX ORDER — HYDROMORPHONE/SOD CHLOR,ISO/PF 2 MG/10 ML
2 SYRINGE (ML) INJECTION ONCE
Refills: 0 | Status: DISCONTINUED | OUTPATIENT
Start: 2025-07-03 | End: 2025-07-03

## 2025-07-03 RX ADMIN — Medication 2 MILLIGRAM(S): at 19:11

## 2025-07-03 RX ADMIN — Medication 40 MILLIGRAM(S): at 19:13

## 2025-07-03 RX ADMIN — Medication 10 MILLIGRAM(S): at 19:12

## 2025-07-03 NOTE — ED PROVIDER NOTE - ATTENDING CONTRIBUTION TO CARE
Patient evaluated on arrival to [facility]  The patient is a 55y Female who has a past medical and surgery history of Anxiety DM2 Depression Alcohol abuse MRSA cellulitis Pancreatitis Hepatic steatosis Volvulus GERD/Gastroparesis RA Fibromyalgia cholecystectomy tonsillectomy right hemicolectomy  PTED with   Vital Signs Last 24 Hrs  T(F): 97.1 HR: 76 BP: 142/92 RR: 18 SpO2: 99% (03 Jul 2025 18:10) (99% - 99%)  PE: as described; my additions and exceptions are noted in the chart    PE as documented   DATA:  EKG: pending at time of evaluation  LAB: Pending at time of evaluation    IMPRESSION/RISK:  Dx=Gastroparesis   Consideration include: + response to meds (chart reviewed patient received reglan dilaudid 1 and protonix on arrival   will send labs and reassess if improved will PO challenge and attempt d/c  Plan  as above   further w/u if failuire to improve

## 2025-07-03 NOTE — ED PROVIDER NOTE - NSFOLLOWUPINSTRUCTIONS_ED_ALL_ED_FT
You were seen in the emergency department for nausea and abdominal pain.  Your labs are reassuring.  You were given antinausea medications and your symptoms improved and you are able to hold down food.  Please make sure to follow-up with the new gastroenterologist you are planning to see.  You can tell them you are in the emergency department and they should be able to get you in sooner.  If you continue to not be able to hold down any fluids or food despite taking the Zofran at home that you have been please return to emergency department for an evaluation.

## 2025-07-03 NOTE — ED PROVIDER NOTE - ATTENDING APP SHARED VISIT CONTRIBUTION OF CARE
Patient evaluated on arrival to [facility]  The patient is a 55y Female who has a past medical and surgery history of Anxiety DM2 Depression Alcohol abuse MRSA cellulitis Pancreatitis Hepatic steatosis Volvulus GERD/Gastroparesis RA Fibromyalgia cholecystectomy tonsillectomy right hemicolectomy  PTED with   Vital Signs Last 24 Hrs  T(F): 97.1 HR: 76 BP: 142/92 RR: 18 SpO2: 99% (03 Jul 2025 18:10) (99% - 99%)  PE: as described; my additions and exceptions are noted in the chart    PE as documented   DATA:  EKG: pending at time of evaluation  LAB: Pending at time of evaluation    IMPRESSION/RISK:  Dx=Gastroparesis   Consideration include: + response to meds (chart reviewed patient received reglan dilaudid 1 and protonix on arrival   will send labs and reassess if improved will PO challenge and attempt d/c  Plan  as above   further w/u if failuire to improve

## 2025-07-03 NOTE — ED ADULT NURSE NOTE - OBJECTIVE STATEMENT
A&Ox4. Past medical history of type 2 diabetes on short acting meal insulin and Mounjaro (but has not been on Mounjaro since April 2025,) gastroparesis, EtOH abuse and pancreatitis, PSH laparoscopic cholecystectomy 01/2018, lap R hemicolectomy 04/2023 robot assisted eTEP repair of an incisional hernia in 04/2025 . Presents to the ED with gastroparesis.  Associated with N/V and abdominal pain x3 weeks. Denies CP, SOB, or dizziness. Respirations even and unlabored. 20 gauge IV placed in right wrist.  Labs obtained. Medications given as per current care plan. Awaiting diagnostic testing. Safety precautions in place.

## 2025-07-03 NOTE — ED ADULT NURSE NOTE - NSSISCREENINGQ4_ED_A_ED
Medication passed protocol.     Medication: Wellbutrin passed protocol.   Last office visit date: 2/17/25  Next appointment scheduled?: No; pharmacy reminder      No

## 2025-07-03 NOTE — ED PROVIDER NOTE - CLINICAL SUMMARY MEDICAL DECISION MAKING FREE TEXT BOX
55-year-old female with past medical history of type 2 diabetes on short acting meal insulin and Mounjaro (but has not been on Mounjaro since April 2025,) gastroparesis, EtOH abuse and pancreatitis, PSH laparoscopic cholecystectomy 01/2018, lap R hemicolectomy 04/2023 robot assisted eTEP repair of an incisional hernia in 04/2025 [facility]IB[facility] EMS with persistent nausea vomiting and abdominal pain for the past 3 weeks, today associated with vasovagal syncopal episode causing patient to fall forward onto her face–no retrograde amnesia and not on AC.      #abdominal pain  Abdominal exam without peritoneal signs. No evidence of acute abdomen at this time. Well appearing. Low suspicion for acute hepatobiliary disease (including acute cholecystitis), acute pancreatitis, PUD (including perforation), acute infectious processes (pneumonia, hepatitis, pyelonephritis), acute appendicitis, vascular catastrophe, bowel obstruction or viscus perforation. Presentation not consistent with other acute, emergent causes of abdominal pain at this time. Denies marijuana use. Sxs likely 2/2 gastroparesis, Recent CT AP from 06/30/25 w/o emergent findings    Plan: labs, UA, pain control,  if pain does not improve or if labs indicate low threshold to rpt CT AP    #head injury/ syncope with prodromal     Differential diagnosis includes reflex syncope (i.e. vasovagal syncope) vs orthostatic syncope given possible dehydration from n/v x weeks. No evidence of acute life threatening hemorrhage. Presentation not consistent with seizures given short time course, no postictal state, no seizure activity. Low suspicion for acute neurologic catastrophes to include ICH given lack of trauma, risk factors for bleeding diatheses. Low suspicion for vascular catastrophes to include PE, thoracic aortic dissection, AAA rupture. Presentation not consistent with acute life threatening arrhythmia, structural heart disease, electrical conduction abnormalities, or ACS    plan: labs, EKG, CTH, reassess

## 2025-07-03 NOTE — ED PROVIDER NOTE - PROGRESS NOTE DETAILS
Symptoms significantly improved. UA and labs reassuring. Will d/c home. Has plan to reestablish with new GI after weekend.

## 2025-07-03 NOTE — ED ADULT NURSE NOTE - TEMPLATE
Abdominal Pain, N/V/D Breath Sounds equal & clear to percussion & auscultation, no accessory muscle use

## 2025-07-03 NOTE — ED PROVIDER NOTE - PATIENT PORTAL LINK FT
You can access the FollowMyHealth Patient Portal offered by [facility] by registering at the following website: [email or web address]. By joining [facility]’s FollowMyHealth portal, you will also be able to view your health information using other applications (apps) compatible with our system.

## 2025-07-03 NOTE — ED PROVIDER NOTE - OBJECTIVE STATEMENT
DM, gastroparesis, PSH laparoscopic cholecystectomy 01/2018, lap R hemicolectomy 04/2023 robot assisted eTEP repair of an incisional hernia in 04/2025 55-year-old female with past medical history of type 2 diabetes on short acting meal insulin and Mounjaro (but has not been on Mounjaro since April 2025,) gastroparesis, EtOH abuse and pancreatitis, PSH laparoscopic cholecystectomy 01/2018, lap R hemicolectomy 04/2023 robot assisted eTEP repair of an incisional hernia in 04/2025 BIB[facility] EMS with persistent nausea vomiting and abdominal pain for the past 3 weeks, today associated with vasovagal syncopal episode causing patient to fall forward onto her face–no retrograde amnesia and not on AC.  EMS gave patient Zofran IV started IV fluids and also administered fentanyl.  Patient states she was initially seen on 6/30/2025 for the same symptoms and does not appear to have improved.  Patient states today she was going to the kitchen to get a block of ice as she felt dehydrated when she felt an onset of wooziness blurred vision and then passed out.  Pt states she woke up facedown on the floor but was able to get up on her own.  Denies any facial pain or headache.  Denies coffee-ground emesis or hematemesis.   has been taking her short acting insulin at home today at home when she checked her blood glucose was in the 250s.  Denies loose teeth, tracing blood, neck pain, back pain, chest pain, shortness of breath, palpitations, diarrhea, urine or bowel incontinence, saddle anesthesia, dysuria, hematuria, pain in upper or lower extremities, double vision.

## 2025-07-05 LAB
CULTURE RESULTS: SIGNIFICANT CHANGE UP
SPECIMEN SOURCE: SIGNIFICANT CHANGE UP

## 2025-07-12 ENCOUNTER — EMERGENCY (EMERGENCY)
Facility: HOSPITAL | Age: 55
LOS: 1 days | End: 2025-07-12
Admitting: EMERGENCY MEDICINE
Payer: MEDICARE

## 2025-07-12 VITALS
WEIGHT: 139.99 LBS | RESPIRATION RATE: 16 BRPM | SYSTOLIC BLOOD PRESSURE: 148 MMHG | HEIGHT: 63 IN | DIASTOLIC BLOOD PRESSURE: 69 MMHG | OXYGEN SATURATION: 98 % | TEMPERATURE: 98 F | HEART RATE: 68 BPM

## 2025-07-12 DIAGNOSIS — Z98.890 OTHER SPECIFIED POSTPROCEDURAL STATES: Chronic | ICD-10-CM

## 2025-07-12 DIAGNOSIS — L02.91 CUTANEOUS ABSCESS, UNSPECIFIED: Chronic | ICD-10-CM

## 2025-07-12 DIAGNOSIS — Z90.49 ACQUIRED ABSENCE OF OTHER SPECIFIED PARTS OF DIGESTIVE TRACT: Chronic | ICD-10-CM

## 2025-07-12 DIAGNOSIS — L72.9 FOLLICULAR CYST OF THE SKIN AND SUBCUTANEOUS TISSUE, UNSPECIFIED: Chronic | ICD-10-CM

## 2025-07-12 DIAGNOSIS — Z90.89 ACQUIRED ABSENCE OF OTHER ORGANS: Chronic | ICD-10-CM

## 2025-07-12 PROCEDURE — L9991: CPT

## 2025-07-12 NOTE — ED ADULT TRIAGE NOTE - CHIEF COMPLAINT QUOTE
Pt c/o increase in falls x2 days, weakness and pain/swelling in RLE. Sensation intact. R foot appears swollen. Denies any head strike/LOC/dizziness. Hx DM 2, fibromyalgia, anxiety

## 2025-07-15 NOTE — PROGRESS NOTE ADULT - PROBLEM SELECTOR PROBLEM 4
----- Message from Shay Arias DO sent at 7/14/2025  9:39 PM CDT -----  Ok print for visit; graph A1c please.    Type 2 diabetes mellitus with diabetic polyneuropathy, with long-term current use of insulin

## 2025-08-04 ENCOUNTER — INPATIENT (INPATIENT)
Facility: HOSPITAL | Age: 55
LOS: 1 days | Discharge: HOME | End: 2025-08-06
Attending: HOSPITALIST | Admitting: HOSPITALIST
Payer: MEDICARE

## 2025-08-04 VITALS
HEART RATE: 88 BPM | SYSTOLIC BLOOD PRESSURE: 143 MMHG | TEMPERATURE: 98 F | OXYGEN SATURATION: 100 % | RESPIRATION RATE: 20 BRPM | HEIGHT: 63 IN | DIASTOLIC BLOOD PRESSURE: 56 MMHG

## 2025-08-04 DIAGNOSIS — E11.9 TYPE 2 DIABETES MELLITUS WITHOUT COMPLICATIONS: ICD-10-CM

## 2025-08-04 DIAGNOSIS — L02.91 CUTANEOUS ABSCESS, UNSPECIFIED: Chronic | ICD-10-CM

## 2025-08-04 DIAGNOSIS — K31.84 GASTROPARESIS: ICD-10-CM

## 2025-08-04 DIAGNOSIS — F41.9 ANXIETY DISORDER, UNSPECIFIED: ICD-10-CM

## 2025-08-04 DIAGNOSIS — Z90.49 ACQUIRED ABSENCE OF OTHER SPECIFIED PARTS OF DIGESTIVE TRACT: Chronic | ICD-10-CM

## 2025-08-04 DIAGNOSIS — Z79.899 OTHER LONG TERM (CURRENT) DRUG THERAPY: ICD-10-CM

## 2025-08-04 DIAGNOSIS — Z29.9 ENCOUNTER FOR PROPHYLACTIC MEASURES, UNSPECIFIED: ICD-10-CM

## 2025-08-04 DIAGNOSIS — M79.671 PAIN IN RIGHT FOOT: ICD-10-CM

## 2025-08-04 DIAGNOSIS — Z90.89 ACQUIRED ABSENCE OF OTHER ORGANS: Chronic | ICD-10-CM

## 2025-08-04 DIAGNOSIS — Z98.890 OTHER SPECIFIED POSTPROCEDURAL STATES: Chronic | ICD-10-CM

## 2025-08-04 DIAGNOSIS — L72.9 FOLLICULAR CYST OF THE SKIN AND SUBCUTANEOUS TISSUE, UNSPECIFIED: Chronic | ICD-10-CM

## 2025-08-04 LAB
ALBUMIN SERPL ELPH-MCNC: 4.6 G/DL — SIGNIFICANT CHANGE UP (ref 3.3–5)
ALP SERPL-CCNC: 116 U/L — SIGNIFICANT CHANGE UP (ref 40–120)
ALT FLD-CCNC: 14 U/L — SIGNIFICANT CHANGE UP (ref 4–33)
ANION GAP SERPL CALC-SCNC: 15 MMOL/L — HIGH (ref 7–14)
AST SERPL-CCNC: 21 U/L — SIGNIFICANT CHANGE UP (ref 4–32)
BASOPHILS # BLD AUTO: 0.03 K/UL — SIGNIFICANT CHANGE UP (ref 0–0.2)
BASOPHILS NFR BLD AUTO: 0.3 % — SIGNIFICANT CHANGE UP (ref 0–2)
BILIRUB SERPL-MCNC: 0.6 MG/DL — SIGNIFICANT CHANGE UP (ref 0.2–1.2)
BUN SERPL-MCNC: 17 MG/DL — SIGNIFICANT CHANGE UP (ref 7–23)
CALCIUM SERPL-MCNC: 9.9 MG/DL — SIGNIFICANT CHANGE UP (ref 8.4–10.5)
CHLORIDE SERPL-SCNC: 105 MMOL/L — SIGNIFICANT CHANGE UP (ref 98–107)
CO2 SERPL-SCNC: 21 MMOL/L — LOW (ref 22–31)
CREAT SERPL-MCNC: 0.7 MG/DL — SIGNIFICANT CHANGE UP (ref 0.5–1.3)
EGFR: 103 ML/MIN/1.73M2 — SIGNIFICANT CHANGE UP
EGFR: 103 ML/MIN/1.73M2 — SIGNIFICANT CHANGE UP
EOSINOPHIL # BLD AUTO: 0.02 K/UL — SIGNIFICANT CHANGE UP (ref 0–0.5)
EOSINOPHIL NFR BLD AUTO: 0.2 % — SIGNIFICANT CHANGE UP (ref 0–6)
GLUCOSE SERPL-MCNC: 211 MG/DL — HIGH (ref 70–99)
HCT VFR BLD CALC: 43.4 % — SIGNIFICANT CHANGE UP (ref 34.5–45)
HGB BLD-MCNC: 14.7 G/DL — SIGNIFICANT CHANGE UP (ref 11.5–15.5)
IMM GRANULOCYTES # BLD AUTO: 0.05 K/UL — SIGNIFICANT CHANGE UP (ref 0–0.07)
IMM GRANULOCYTES NFR BLD AUTO: 0.5 % — SIGNIFICANT CHANGE UP (ref 0–0.9)
LIDOCAIN IGE QN: 29 U/L — SIGNIFICANT CHANGE UP (ref 7–60)
LYMPHOCYTES # BLD AUTO: 0.87 K/UL — LOW (ref 1–3.3)
LYMPHOCYTES NFR BLD AUTO: 8.3 % — LOW (ref 13–44)
MCHC RBC-ENTMCNC: 29.4 PG — SIGNIFICANT CHANGE UP (ref 27–34)
MCHC RBC-ENTMCNC: 33.9 G/DL — SIGNIFICANT CHANGE UP (ref 32–36)
MCV RBC AUTO: 86.8 FL — SIGNIFICANT CHANGE UP (ref 80–100)
MONOCYTES # BLD AUTO: 0.19 K/UL — SIGNIFICANT CHANGE UP (ref 0–0.9)
MONOCYTES NFR BLD AUTO: 1.8 % — LOW (ref 2–14)
NEUTROPHILS # BLD AUTO: 9.3 K/UL — HIGH (ref 1.8–7.4)
NEUTROPHILS NFR BLD AUTO: 88.9 % — HIGH (ref 43–77)
NRBC # BLD AUTO: 0 K/UL — SIGNIFICANT CHANGE UP (ref 0–0)
NRBC # FLD: 0 K/UL — SIGNIFICANT CHANGE UP (ref 0–0)
NRBC BLD AUTO-RTO: 0 /100 WBCS — SIGNIFICANT CHANGE UP (ref 0–0)
PLATELET # BLD AUTO: 181 K/UL — SIGNIFICANT CHANGE UP (ref 150–400)
PMV BLD: 11.6 FL — SIGNIFICANT CHANGE UP (ref 7–13)
POTASSIUM SERPL-MCNC: 4 MMOL/L — SIGNIFICANT CHANGE UP (ref 3.5–5.3)
POTASSIUM SERPL-SCNC: 4 MMOL/L — SIGNIFICANT CHANGE UP (ref 3.5–5.3)
PROT SERPL-MCNC: 7.4 G/DL — SIGNIFICANT CHANGE UP (ref 6–8.3)
RBC # BLD: 5 M/UL — SIGNIFICANT CHANGE UP (ref 3.8–5.2)
RBC # FLD: 11.9 % — SIGNIFICANT CHANGE UP (ref 10.3–14.5)
SODIUM SERPL-SCNC: 141 MMOL/L — SIGNIFICANT CHANGE UP (ref 135–145)
WBC # BLD: 10.46 K/UL — SIGNIFICANT CHANGE UP (ref 3.8–10.5)
WBC # FLD AUTO: 10.46 K/UL — SIGNIFICANT CHANGE UP (ref 3.8–10.5)

## 2025-08-04 PROCEDURE — 99223 1ST HOSP IP/OBS HIGH 75: CPT

## 2025-08-04 PROCEDURE — 73630 X-RAY EXAM OF FOOT: CPT | Mod: 26,RT

## 2025-08-04 PROCEDURE — 74177 CT ABD & PELVIS W/CONTRAST: CPT | Mod: 26

## 2025-08-04 PROCEDURE — 99285 EMERGENCY DEPT VISIT HI MDM: CPT

## 2025-08-04 RX ORDER — TRAZODONE HCL 100 MG
100 TABLET ORAL AT BEDTIME
Refills: 0 | Status: DISCONTINUED | OUTPATIENT
Start: 2025-08-04 | End: 2025-08-06

## 2025-08-04 RX ORDER — B1/B2/B3/B5/B6/B12/VIT C/FOLIC 500-0.5 MG
1 TABLET ORAL DAILY
Refills: 0 | Status: DISCONTINUED | OUTPATIENT
Start: 2025-08-04 | End: 2025-08-06

## 2025-08-04 RX ORDER — DEXTROSE 50 % IN WATER 50 %
15 SYRINGE (ML) INTRAVENOUS ONCE
Refills: 0 | Status: DISCONTINUED | OUTPATIENT
Start: 2025-08-04 | End: 2025-08-06

## 2025-08-04 RX ORDER — SODIUM CHLORIDE 9 G/1000ML
1000 INJECTION, SOLUTION INTRAVENOUS
Refills: 0 | Status: DISCONTINUED | OUTPATIENT
Start: 2025-08-04 | End: 2025-08-06

## 2025-08-04 RX ORDER — MAGNESIUM, ALUMINUM HYDROXIDE 200-200 MG
30 TABLET,CHEWABLE ORAL EVERY 6 HOURS
Refills: 0 | Status: DISCONTINUED | OUTPATIENT
Start: 2025-08-04 | End: 2025-08-06

## 2025-08-04 RX ORDER — HYDROMORPHONE/SOD CHLOR,ISO/PF 2 MG/10 ML
2 SYRINGE (ML) INJECTION ONCE
Refills: 0 | Status: DISCONTINUED | OUTPATIENT
Start: 2025-08-04 | End: 2025-08-04

## 2025-08-04 RX ORDER — SERTRALINE 100 MG/1
50 TABLET, FILM COATED ORAL DAILY
Refills: 0 | Status: DISCONTINUED | OUTPATIENT
Start: 2025-08-04 | End: 2025-08-06

## 2025-08-04 RX ORDER — GABAPENTIN 400 MG/1
300 CAPSULE ORAL THREE TIMES A DAY
Refills: 0 | Status: DISCONTINUED | OUTPATIENT
Start: 2025-08-04 | End: 2025-08-06

## 2025-08-04 RX ORDER — HEPARIN SODIUM 1000 [USP'U]/ML
5000 INJECTION INTRAVENOUS; SUBCUTANEOUS EVERY 8 HOURS
Refills: 0 | Status: DISCONTINUED | OUTPATIENT
Start: 2025-08-04 | End: 2025-08-06

## 2025-08-04 RX ORDER — CLONAZEPAM 0.5 MG/1
1 TABLET ORAL
Refills: 0 | Status: DISCONTINUED | OUTPATIENT
Start: 2025-08-04 | End: 2025-08-06

## 2025-08-04 RX ORDER — GLUCAGON 3 MG/1
1 POWDER NASAL ONCE
Refills: 0 | Status: DISCONTINUED | OUTPATIENT
Start: 2025-08-04 | End: 2025-08-06

## 2025-08-04 RX ORDER — DEXTROSE 50 % IN WATER 50 %
25 SYRINGE (ML) INTRAVENOUS ONCE
Refills: 0 | Status: DISCONTINUED | OUTPATIENT
Start: 2025-08-04 | End: 2025-08-06

## 2025-08-04 RX ORDER — FOLIC ACID 1 MG/1
1 TABLET ORAL DAILY
Refills: 0 | Status: DISCONTINUED | OUTPATIENT
Start: 2025-08-04 | End: 2025-08-06

## 2025-08-04 RX ORDER — METOCLOPRAMIDE HCL 10 MG
10 TABLET ORAL ONCE
Refills: 0 | Status: COMPLETED | OUTPATIENT
Start: 2025-08-04 | End: 2025-08-04

## 2025-08-04 RX ORDER — HYDROMORPHONE/SOD CHLOR,ISO/PF 2 MG/10 ML
0.5 SYRINGE (ML) INJECTION ONCE
Refills: 0 | Status: DISCONTINUED | OUTPATIENT
Start: 2025-08-04 | End: 2025-08-04

## 2025-08-04 RX ORDER — INSULIN LISPRO 100 U/ML
INJECTION, SOLUTION INTRAVENOUS; SUBCUTANEOUS
Refills: 0 | Status: DISCONTINUED | OUTPATIENT
Start: 2025-08-04 | End: 2025-08-06

## 2025-08-04 RX ORDER — ACETAMINOPHEN 500 MG/5ML
1000 LIQUID (ML) ORAL ONCE
Refills: 0 | Status: COMPLETED | OUTPATIENT
Start: 2025-08-04 | End: 2025-08-04

## 2025-08-04 RX ORDER — INSULIN LISPRO 100 U/ML
INJECTION, SOLUTION INTRAVENOUS; SUBCUTANEOUS AT BEDTIME
Refills: 0 | Status: DISCONTINUED | OUTPATIENT
Start: 2025-08-04 | End: 2025-08-06

## 2025-08-04 RX ORDER — ONDANSETRON HCL/PF 4 MG/2 ML
4 VIAL (ML) INJECTION EVERY 6 HOURS
Refills: 0 | Status: DISCONTINUED | OUTPATIENT
Start: 2025-08-04 | End: 2025-08-06

## 2025-08-04 RX ORDER — DEXTROSE 50 % IN WATER 50 %
12.5 SYRINGE (ML) INTRAVENOUS ONCE
Refills: 0 | Status: DISCONTINUED | OUTPATIENT
Start: 2025-08-04 | End: 2025-08-06

## 2025-08-04 RX ORDER — HYDROMORPHONE/SOD CHLOR,ISO/PF 2 MG/10 ML
0.5 SYRINGE (ML) INJECTION EVERY 6 HOURS
Refills: 0 | Status: DISCONTINUED | OUTPATIENT
Start: 2025-08-04 | End: 2025-08-06

## 2025-08-04 RX ADMIN — Medication 0.5 MILLIGRAM(S): at 21:30

## 2025-08-04 RX ADMIN — Medication 30 MILLILITER(S): at 23:57

## 2025-08-04 RX ADMIN — Medication 1000 MILLILITER(S): at 10:54

## 2025-08-04 RX ADMIN — Medication 10 MILLIGRAM(S): at 10:54

## 2025-08-04 RX ADMIN — Medication 2 MILLIGRAM(S): at 11:24

## 2025-08-04 RX ADMIN — Medication 0.5 MILLIGRAM(S): at 20:30

## 2025-08-04 RX ADMIN — Medication 2 MILLIGRAM(S): at 14:36

## 2025-08-04 RX ADMIN — Medication 100 MILLILITER(S): at 22:01

## 2025-08-04 RX ADMIN — Medication 2 MILLIGRAM(S): at 10:54

## 2025-08-04 RX ADMIN — Medication 0.5 MILLIGRAM(S): at 23:57

## 2025-08-04 RX ADMIN — Medication 400 MILLIGRAM(S): at 22:58

## 2025-08-04 RX ADMIN — Medication 1000 MILLIGRAM(S): at 23:58

## 2025-08-05 LAB
GLUCOSE BLDC GLUCOMTR-MCNC: 101 MG/DL — HIGH (ref 70–99)
GLUCOSE BLDC GLUCOMTR-MCNC: 103 MG/DL — HIGH (ref 70–99)
GLUCOSE BLDC GLUCOMTR-MCNC: 74 MG/DL — SIGNIFICANT CHANGE UP (ref 70–99)
GLUCOSE BLDC GLUCOMTR-MCNC: 91 MG/DL — SIGNIFICANT CHANGE UP (ref 70–99)

## 2025-08-05 PROCEDURE — 99233 SBSQ HOSP IP/OBS HIGH 50: CPT

## 2025-08-05 RX ORDER — TRAMADOL HYDROCHLORIDE AND ACETAMINOPHEN 37.5; 325 MG/1; MG/1
1 TABLET ORAL
Refills: 0 | DISCHARGE

## 2025-08-05 RX ORDER — HYDROMORPHONE/SOD CHLOR,ISO/PF 2 MG/10 ML
2 SYRINGE (ML) INJECTION ONCE
Refills: 0 | Status: DISCONTINUED | OUTPATIENT
Start: 2025-08-05 | End: 2025-08-05

## 2025-08-05 RX ORDER — ONDANSETRON HCL/PF 4 MG/2 ML
1 VIAL (ML) INJECTION
Refills: 0 | DISCHARGE

## 2025-08-05 RX ORDER — METOCLOPRAMIDE HCL 10 MG
10 TABLET ORAL ONCE
Refills: 0 | Status: COMPLETED | OUTPATIENT
Start: 2025-08-05 | End: 2025-08-05

## 2025-08-05 RX ORDER — MAGNESIUM OXIDE 400 MG
1 TABLET ORAL
Refills: 0 | DISCHARGE

## 2025-08-05 RX ORDER — TIZANIDINE 4 MG/1
1 TABLET ORAL
Refills: 0 | DISCHARGE

## 2025-08-05 RX ADMIN — Medication 0.5 MILLIGRAM(S): at 13:48

## 2025-08-05 RX ADMIN — HEPARIN SODIUM 5000 UNIT(S): 1000 INJECTION INTRAVENOUS; SUBCUTANEOUS at 21:28

## 2025-08-05 RX ADMIN — GABAPENTIN 300 MILLIGRAM(S): 400 CAPSULE ORAL at 13:30

## 2025-08-05 RX ADMIN — GABAPENTIN 300 MILLIGRAM(S): 400 CAPSULE ORAL at 06:19

## 2025-08-05 RX ADMIN — Medication 0.5 MILLIGRAM(S): at 06:20

## 2025-08-05 RX ADMIN — Medication 2 MILLIGRAM(S): at 22:24

## 2025-08-05 RX ADMIN — Medication 10 MILLIGRAM(S): at 14:16

## 2025-08-05 RX ADMIN — CLONAZEPAM 1 MILLIGRAM(S): 0.5 TABLET ORAL at 23:41

## 2025-08-05 RX ADMIN — HEPARIN SODIUM 5000 UNIT(S): 1000 INJECTION INTRAVENOUS; SUBCUTANEOUS at 06:20

## 2025-08-05 RX ADMIN — Medication 4 MILLIGRAM(S): at 09:02

## 2025-08-05 RX ADMIN — FOLIC ACID 1 MILLIGRAM(S): 1 TABLET ORAL at 11:11

## 2025-08-05 RX ADMIN — Medication 0.5 MILLIGRAM(S): at 00:59

## 2025-08-05 RX ADMIN — Medication 0.5 MILLIGRAM(S): at 12:28

## 2025-08-05 RX ADMIN — SERTRALINE 50 MILLIGRAM(S): 100 TABLET, FILM COATED ORAL at 11:11

## 2025-08-05 RX ADMIN — Medication 2 MILLIGRAM(S): at 23:24

## 2025-08-05 RX ADMIN — GABAPENTIN 300 MILLIGRAM(S): 400 CAPSULE ORAL at 21:28

## 2025-08-05 RX ADMIN — Medication 1 TABLET(S): at 11:11

## 2025-08-05 RX ADMIN — Medication 40 MILLIGRAM(S): at 06:20

## 2025-08-05 RX ADMIN — HEPARIN SODIUM 5000 UNIT(S): 1000 INJECTION INTRAVENOUS; SUBCUTANEOUS at 13:30

## 2025-08-06 ENCOUNTER — TRANSCRIPTION ENCOUNTER (OUTPATIENT)
Age: 55
End: 2025-08-06

## 2025-08-06 VITALS — WEIGHT: 136.91 LBS

## 2025-08-06 LAB
A1C WITH ESTIMATED AVERAGE GLUCOSE RESULT: 5.7 % — HIGH (ref 4–5.6)
ANION GAP SERPL CALC-SCNC: 13 MMOL/L — SIGNIFICANT CHANGE UP (ref 7–14)
BUN SERPL-MCNC: 10 MG/DL — SIGNIFICANT CHANGE UP (ref 7–23)
CALCIUM SERPL-MCNC: 9 MG/DL — SIGNIFICANT CHANGE UP (ref 8.4–10.5)
CHLORIDE SERPL-SCNC: 106 MMOL/L — SIGNIFICANT CHANGE UP (ref 98–107)
CO2 SERPL-SCNC: 21 MMOL/L — LOW (ref 22–31)
CREAT SERPL-MCNC: 0.6 MG/DL — SIGNIFICANT CHANGE UP (ref 0.5–1.3)
EGFR: 107 ML/MIN/1.73M2 — SIGNIFICANT CHANGE UP
EGFR: 107 ML/MIN/1.73M2 — SIGNIFICANT CHANGE UP
ESTIMATED AVERAGE GLUCOSE: 117 — SIGNIFICANT CHANGE UP
GLUCOSE BLDC GLUCOMTR-MCNC: 104 MG/DL — HIGH (ref 70–99)
GLUCOSE BLDC GLUCOMTR-MCNC: 79 MG/DL — SIGNIFICANT CHANGE UP (ref 70–99)
GLUCOSE SERPL-MCNC: 84 MG/DL — SIGNIFICANT CHANGE UP (ref 70–99)
HCT VFR BLD CALC: 39.9 % — SIGNIFICANT CHANGE UP (ref 34.5–45)
HGB BLD-MCNC: 13.5 G/DL — SIGNIFICANT CHANGE UP (ref 11.5–15.5)
MCHC RBC-ENTMCNC: 29.2 PG — SIGNIFICANT CHANGE UP (ref 27–34)
MCHC RBC-ENTMCNC: 33.8 G/DL — SIGNIFICANT CHANGE UP (ref 32–36)
MCV RBC AUTO: 86.2 FL — SIGNIFICANT CHANGE UP (ref 80–100)
MRSA PCR RESULT.: SIGNIFICANT CHANGE UP
NRBC # BLD AUTO: 0 K/UL — SIGNIFICANT CHANGE UP (ref 0–0)
NRBC # FLD: 0 K/UL — SIGNIFICANT CHANGE UP (ref 0–0)
NRBC BLD AUTO-RTO: 0 /100 WBCS — SIGNIFICANT CHANGE UP (ref 0–0)
PLATELET # BLD AUTO: 150 K/UL — SIGNIFICANT CHANGE UP (ref 150–400)
PMV BLD: 11.6 FL — SIGNIFICANT CHANGE UP (ref 7–13)
POTASSIUM SERPL-MCNC: 4.1 MMOL/L — SIGNIFICANT CHANGE UP (ref 3.5–5.3)
POTASSIUM SERPL-SCNC: 4.1 MMOL/L — SIGNIFICANT CHANGE UP (ref 3.5–5.3)
RBC # BLD: 4.63 M/UL — SIGNIFICANT CHANGE UP (ref 3.8–5.2)
RBC # FLD: 11.7 % — SIGNIFICANT CHANGE UP (ref 10.3–14.5)
S AUREUS DNA NOSE QL NAA+PROBE: SIGNIFICANT CHANGE UP
SODIUM SERPL-SCNC: 140 MMOL/L — SIGNIFICANT CHANGE UP (ref 135–145)
WBC # BLD: 5.99 K/UL — SIGNIFICANT CHANGE UP (ref 3.8–10.5)
WBC # FLD AUTO: 5.99 K/UL — SIGNIFICANT CHANGE UP (ref 3.8–10.5)

## 2025-08-06 PROCEDURE — 99239 HOSP IP/OBS DSCHRG MGMT >30: CPT

## 2025-08-06 RX ORDER — METOCLOPRAMIDE HCL 10 MG
1 TABLET ORAL
Qty: 21 | Refills: 0
Start: 2025-08-06 | End: 2025-08-12

## 2025-08-06 RX ORDER — MAGNESIUM, ALUMINUM HYDROXIDE 200-200 MG
10 TABLET,CHEWABLE ORAL
Qty: 280 | Refills: 0
Start: 2025-08-06 | End: 2025-08-12

## 2025-08-06 RX ADMIN — GABAPENTIN 300 MILLIGRAM(S): 400 CAPSULE ORAL at 05:48

## 2025-08-06 RX ADMIN — FOLIC ACID 1 MILLIGRAM(S): 1 TABLET ORAL at 11:36

## 2025-08-06 RX ADMIN — SERTRALINE 50 MILLIGRAM(S): 100 TABLET, FILM COATED ORAL at 11:36

## 2025-08-06 RX ADMIN — Medication 0.5 MILLIGRAM(S): at 11:37

## 2025-08-06 RX ADMIN — Medication 0.5 MILLIGRAM(S): at 10:52

## 2025-08-06 RX ADMIN — Medication 1 TABLET(S): at 11:36

## 2025-08-06 RX ADMIN — HEPARIN SODIUM 5000 UNIT(S): 1000 INJECTION INTRAVENOUS; SUBCUTANEOUS at 05:49

## 2025-08-06 RX ADMIN — Medication 40 MILLIGRAM(S): at 08:31

## 2025-08-06 RX ADMIN — Medication 30 MILLILITER(S): at 12:51

## 2025-08-06 RX ADMIN — GABAPENTIN 300 MILLIGRAM(S): 400 CAPSULE ORAL at 13:31

## 2025-08-06 RX ADMIN — Medication 1 APPLICATION(S): at 11:21

## 2025-08-11 ENCOUNTER — APPOINTMENT (OUTPATIENT)
Dept: INTERNAL MEDICINE | Facility: CLINIC | Age: 55
End: 2025-08-11

## 2025-08-12 ENCOUNTER — APPOINTMENT (OUTPATIENT)
Age: 55
End: 2025-08-12

## 2025-08-12 PROCEDURE — 99495 TRANSJ CARE MGMT MOD F2F 14D: CPT | Mod: 2W

## 2025-08-25 ENCOUNTER — APPOINTMENT (OUTPATIENT)
Dept: GASTROENTEROLOGY | Facility: CLINIC | Age: 55
End: 2025-08-25
Payer: MEDICARE

## 2025-08-25 VITALS
SYSTOLIC BLOOD PRESSURE: 119 MMHG | BODY MASS INDEX: 23.08 KG/M2 | OXYGEN SATURATION: 97 % | HEART RATE: 61 BPM | HEIGHT: 62.28 IN | WEIGHT: 127 LBS | DIASTOLIC BLOOD PRESSURE: 81 MMHG

## 2025-08-25 DIAGNOSIS — K31.84 GASTROPARESIS: ICD-10-CM

## 2025-08-25 PROCEDURE — G2211 COMPLEX E/M VISIT ADD ON: CPT

## 2025-08-25 PROCEDURE — 99214 OFFICE O/P EST MOD 30 MIN: CPT

## 2025-08-25 RX ORDER — ONDANSETRON 4 MG/1
4 TABLET, ORALLY DISINTEGRATING ORAL 3 TIMES DAILY
Qty: 15 | Refills: 0 | Status: ACTIVE | COMMUNITY
Start: 2025-08-25 | End: 1900-01-01

## 2025-09-09 ENCOUNTER — TRANSCRIPTION ENCOUNTER (OUTPATIENT)
Age: 55
End: 2025-09-09

## 2025-09-11 ENCOUNTER — APPOINTMENT (OUTPATIENT)
Age: 55
End: 2025-09-11
Payer: MEDICARE

## 2025-09-11 DIAGNOSIS — M79.7 FIBROMYALGIA: ICD-10-CM

## 2025-09-11 DIAGNOSIS — K31.84 GASTROPARESIS: ICD-10-CM

## 2025-09-11 DIAGNOSIS — E11.9 TYPE 2 DIABETES MELLITUS W/OUT COMPLICATIONS: ICD-10-CM

## 2025-09-11 PROCEDURE — 99495 TRANSJ CARE MGMT MOD F2F 14D: CPT | Mod: 2W

## (undated) DEVICE — POSITIONER FOAM EGG CRATE ULNAR 2PCS (PINK)

## (undated) DEVICE — NDL COUNTER FOAM AND MAGNET 20-40

## (undated) DEVICE — SUT VLOC 90 3-0 6" V-20 VIOLET

## (undated) DEVICE — SYR ALLIANCE II INFLATION 60ML

## (undated) DEVICE — XI DRAPE ARM

## (undated) DEVICE — SUT VLOC 180 0 12" GS-21 GREEN

## (undated) DEVICE — PACK IV START WITH CHG

## (undated) DEVICE — Device

## (undated) DEVICE — WARMING BLANKET FULL UNDERBODY

## (undated) DEVICE — TROCAR COVIDIEN BLUNT TIP HASSAN 12MM

## (undated) DEVICE — SOL IRR POUR H2O 1500ML

## (undated) DEVICE — SUT SILK 3-0 18" SH (POP-OFF)

## (undated) DEVICE — DRAPE 3/4 SHEET 52X76"

## (undated) DEVICE — STAPLER SKIN MULTI DIRECTION W35

## (undated) DEVICE — XI DRAPE COLUMN

## (undated) DEVICE — GLV 7.5 PROTEXIS (WHITE)

## (undated) DEVICE — TUBING SUCTION CONN 6FT STERILE

## (undated) DEVICE — SUT VLOC 180 3-0 9" V-20 GREEN

## (undated) DEVICE — CATH IV SAFE BC 22G X 1" (BLUE)

## (undated) DEVICE — LIGASURE MARYLAND 37CM

## (undated) DEVICE — TUBING IV SET GRAVITY 3Y 100" MACRO

## (undated) DEVICE — CANISTER DISPOSABLE THIN WALL 3000CC

## (undated) DEVICE — BLADE SURGICAL #10 CARBON

## (undated) DEVICE — POSITIONER STRAP ARMBOARD VELCRO TS-30

## (undated) DEVICE — DRSG TEGADERM 2.5X3"

## (undated) DEVICE — POOLE SUCTION TIP

## (undated) DEVICE — BASIN SET SINGLE

## (undated) DEVICE — SUT VICRYL 0 27" UR-6

## (undated) DEVICE — SUT VLOC 90 2-0 6" GS-22 UNDYED

## (undated) DEVICE — XI OBTURATOR OPTICAL BLADELESS 8MM

## (undated) DEVICE — DRSG STERISTRIPS 0.5 X 4"

## (undated) DEVICE — TUBING OLYMPUS INSUFFLATION

## (undated) DEVICE — DRAPE GENERAL ENDOSCOPY

## (undated) DEVICE — LIGASURE IMPACT

## (undated) DEVICE — SUT VLOC 180 2-0 6" GS-22 GREEN

## (undated) DEVICE — BALLOON US ENDO

## (undated) DEVICE — FOLEY TRAY 16FR 5CC LF UMETER CLOSED

## (undated) DEVICE — TROCAR COVIDIEN VERSAPORT BLADELESS OPTICAL 5MM STANDARD

## (undated) DEVICE — DRAPE FLUID WARMER 44 X 44"

## (undated) DEVICE — TROCAR COVIDIEN BLUNT TIP HASSAN 10MM

## (undated) DEVICE — VENODYNE/SCD SLEEVE CALF MEDIUM

## (undated) DEVICE — GLV 7 PROTEXIS (WHITE)

## (undated) DEVICE — SHEARS COVIDIEN ENDO SHEAR 5MM X 31CM W UNIPOLAR CAUTERY

## (undated) DEVICE — ELCTR GROUNDING PAD ADULT COVIDIEN

## (undated) DEVICE — DRAPE TOWEL BLUE 17" X 24"

## (undated) DEVICE — ELCTR BOVIE BLADE 3/4" EXTENDED LENGTH 6"

## (undated) DEVICE — SUT VLOC 180 3-0 6" V-20 GREEN

## (undated) DEVICE — TROCAR APPLIED MEDICAL KII BALLOON BLUNT TIP 12MM X 100MM

## (undated) DEVICE — SOL IRR POUR NS 0.9% 1500ML

## (undated) DEVICE — FOLEY HOLDER STATLOCK 2 WAY ADULT

## (undated) DEVICE — SENSOR O2 FINGER ADULT

## (undated) DEVICE — SUT VLOC 180 2-0 12" V-20 GREEN

## (undated) DEVICE — PACK GENERAL LAPAROSCOPY

## (undated) DEVICE — SUT MAXON 1 36" GS-24

## (undated) DEVICE — NDL HYPO SAFE 22G X 1.5" (BLACK)

## (undated) DEVICE — LABELS BLANK W PEN

## (undated) DEVICE — DRSG DERMABOND 0.7ML

## (undated) DEVICE — TUBING SUCTION NONCONDUCTIVE 6MM X 12FT

## (undated) DEVICE — XI CANNULA SEAL 5MM - 8 MM

## (undated) DEVICE — TUBING HYDRO-SURG PLUS IRRIGATOR W SMOKEVAC & PROBE

## (undated) DEVICE — CATH IV SAFE BC 20G X 1.16" (PINK)

## (undated) DEVICE — SUCTION YANKAUER NO CONTROL VENT

## (undated) DEVICE — TUBING SUCTION 20FT

## (undated) DEVICE — BITE BLOCK ADULT 20 X 27MM (GREEN)

## (undated) DEVICE — SOL INJ NS 0.9% 500ML 2 PORT

## (undated) DEVICE — TUBING INSUFFLATION LAP FILTER 10FT

## (undated) DEVICE — DRSG TELFA 3 X 8

## (undated) DEVICE — D HELP - CLEARVIEW CLEARIFY SYSTEM

## (undated) DEVICE — STAPLER SKIN VISI-STAT 35 WIDE